# Patient Record
Sex: FEMALE | Race: WHITE | NOT HISPANIC OR LATINO | Employment: UNEMPLOYED | URBAN - METROPOLITAN AREA
[De-identification: names, ages, dates, MRNs, and addresses within clinical notes are randomized per-mention and may not be internally consistent; named-entity substitution may affect disease eponyms.]

---

## 2017-03-11 ENCOUNTER — HOSPITAL ENCOUNTER (EMERGENCY)
Facility: HOSPITAL | Age: 57
Discharge: HOME | End: 2017-03-11
Attending: EMERGENCY MEDICINE
Payer: MEDICAID

## 2017-03-11 VITALS
HEART RATE: 89 BPM | BODY MASS INDEX: 19.29 KG/M2 | SYSTOLIC BLOOD PRESSURE: 115 MMHG | RESPIRATION RATE: 16 BRPM | OXYGEN SATURATION: 95 % | DIASTOLIC BLOOD PRESSURE: 65 MMHG | TEMPERATURE: 98 F | WEIGHT: 120 LBS | HEIGHT: 66 IN

## 2017-03-11 DIAGNOSIS — J20.9 ACUTE BRONCHITIS, UNSPECIFIED ORGANISM: Primary | ICD-10-CM

## 2017-03-11 DIAGNOSIS — R05.9 COUGH: ICD-10-CM

## 2017-03-11 LAB
DEPRECATED S PYO AG THROAT QL EIA: NEGATIVE
FLUAV AG SPEC QL IA: NEGATIVE
FLUBV AG SPEC QL IA: NEGATIVE
SPECIMEN SOURCE: NORMAL

## 2017-03-11 PROCEDURE — 87400 INFLUENZA A/B EACH AG IA: CPT | Mod: 59

## 2017-03-11 PROCEDURE — 87880 STREP A ASSAY W/OPTIC: CPT

## 2017-03-11 PROCEDURE — 99284 EMERGENCY DEPT VISIT MOD MDM: CPT

## 2017-03-11 PROCEDURE — 87081 CULTURE SCREEN ONLY: CPT

## 2017-03-11 RX ORDER — METHOCARBAMOL 750 MG/1
500 TABLET, FILM COATED ORAL 4 TIMES DAILY
COMMUNITY
End: 2017-08-18

## 2017-03-11 RX ORDER — FLUTICASONE PROPIONATE 50 MCG
1 SPRAY, SUSPENSION (ML) NASAL DAILY
Qty: 1 BOTTLE | Refills: 0 | Status: SHIPPED | OUTPATIENT
Start: 2017-03-11 | End: 2019-12-19 | Stop reason: SDUPTHER

## 2017-03-11 RX ORDER — METHYLPREDNISOLONE SOD SUCC 125 MG
125 VIAL (EA) INJECTION
Status: DISCONTINUED | OUTPATIENT
Start: 2017-03-11 | End: 2017-03-11

## 2017-03-11 RX ORDER — METHYLPREDNISOLONE 4 MG/1
TABLET ORAL
Qty: 1 PACKAGE | Refills: 0 | Status: SHIPPED | OUTPATIENT
Start: 2017-03-11 | End: 2017-04-01

## 2017-03-11 RX ORDER — AZITHROMYCIN 250 MG/1
TABLET, FILM COATED ORAL
Qty: 6 TABLET | Refills: 0 | Status: SHIPPED | OUTPATIENT
Start: 2017-03-11 | End: 2017-03-16

## 2017-03-11 RX ORDER — GUAIFENESIN/DEXTROMETHORPHAN 100-10MG/5
5 SYRUP ORAL 4 TIMES DAILY PRN
Qty: 120 ML | Refills: 0 | Status: SHIPPED | OUTPATIENT
Start: 2017-03-11 | End: 2017-03-21

## 2017-03-11 RX ORDER — BENZONATATE 100 MG/1
100 CAPSULE ORAL 3 TIMES DAILY PRN
COMMUNITY
End: 2017-08-18

## 2017-03-11 RX ORDER — ALBUTEROL SULFATE 90 UG/1
2 AEROSOL, METERED RESPIRATORY (INHALATION) EVERY 6 HOURS PRN
Qty: 18 G | Refills: 0 | Status: SHIPPED | OUTPATIENT
Start: 2017-03-11 | End: 2017-08-18 | Stop reason: SDUPTHER

## 2017-03-11 NOTE — ED PROVIDER NOTES
Encounter Date: 3/11/2017       History     Chief Complaint   Patient presents with    Cough     x 4 days     Sinusitis    Generalized Body Aches     Review of patient's allergies indicates:  No Known Allergies  HPI Comments: Patient is a 56 year old female with complain of flu-like symptoms for four days. She reports PMH significant for GERD and depression. She reports productive cough, chills, sore throat and congestion. She smokes 1/2 pack of cigarettes a day. She reports taking johnson seltzer and Nyquil with no significant improvement. She reports no significant improvement. Multiple sick contacts at home. She reports associated diarrhea. She denied fever, abdominal pain, vomiting, headache or neck stiffness.     The history is provided by the patient.     Past Medical History:   Diagnosis Date    Arthritis     Depression     GERD (gastroesophageal reflux disease)      Past Surgical History:   Procedure Laterality Date    TONSILLECTOMY       History reviewed. No pertinent family history.  Social History   Substance Use Topics    Smoking status: Current Every Day Smoker     Packs/day: 1.00    Smokeless tobacco: None    Alcohol use Yes     Review of Systems   Constitutional: Positive for chills. Negative for fever.   HENT: Positive for congestion, sinus pressure and sore throat.    Respiratory: Positive for cough and wheezing. Negative for shortness of breath.    Cardiovascular: Negative for chest pain.   Gastrointestinal: Positive for diarrhea. Negative for abdominal pain, nausea and vomiting.   Genitourinary: Negative for dysuria.   Musculoskeletal: Negative for back pain, neck pain and neck stiffness.   Skin: Negative for rash.   Neurological: Negative for weakness and headaches.   Hematological: Does not bruise/bleed easily.       Physical Exam   Initial Vitals   BP Pulse Resp Temp SpO2   03/11/17 1234 03/11/17 1234 03/11/17 1234 03/11/17 1234 03/11/17 1234   115/65 89 16 98.4 °F (36.9 °C) 95 %      Physical Exam    Nursing note and vitals reviewed.  Constitutional: She appears well-developed and well-nourished. No distress.   HENT:   Head: Normocephalic and atraumatic.   Right Ear: Tympanic membrane, external ear and ear canal normal.   Left Ear: Tympanic membrane, external ear and ear canal normal.   Nose: Rhinorrhea present.   Mouth/Throat: Uvula is midline. Posterior oropharyngeal erythema present.   Cobble stoning and erythema to posterior oropharynx,.    Eyes: Conjunctivae and lids are normal. Pupils are equal, round, and reactive to light. Right eye exhibits no discharge. Left eye exhibits no discharge.   Neck: Normal range of motion and full passive range of motion without pain. Neck supple.   Cardiovascular: Normal rate, regular rhythm and normal heart sounds. Exam reveals no gallop and no friction rub.    No murmur heard.  Pulmonary/Chest: Effort normal. She has no decreased breath sounds. She has wheezes in the right lower field and the left lower field. She has no rhonchi. She has no rales.   Mild expiratory wheezing to bilateral lower lung fields.    Abdominal: Soft. Normal appearance and bowel sounds are normal. There is no tenderness. There is no rigidity, no rebound and no guarding.   Musculoskeletal: Normal range of motion.   Neurological: She is alert.   Skin: Skin is warm and dry.         ED Course   Procedures  Labs Reviewed   THROAT SCREEN, RAPID   CULTURE, STREP A,  THROAT   INFLUENZA A AND B ANTIGEN             Medical Decision Making:   History:   Old Medical Records: I decided to obtain old medical records.  Clinical Tests:   Lab Tests: Ordered  Radiological Study: Ordered       APC / Resident Notes:   This is an emergent evaluation of a 56 year old female. Vital signs are stable. She is afebrile. Wheezing to bilateral lower lobes. Abdomen is soft and nontender.  There is no rebound, rigidity or distention.  I doubt intra-abdominal process. Influenza and strep is negative. CXR shows  no acute changes. Due to wheezing, smoking history and length of symptoms will treat as acute bronchitis with medrol dose pack and azithromycin. Patient is not hypoxic and is stable for discharge. Inhaler as needed. Discussed results with patient. Return precautions given. Patient is to follow up with their primary care provider. Case was discussed with Dr. Monroy who has evaluated the patient and is in agreement with the plan of care. All questions answered.                 ED Course     Clinical Impression:   The primary encounter diagnosis was Acute bronchitis, unspecified organism. A diagnosis of Cough was also pertinent to this visit.    Disposition:   Disposition: Discharged  Condition: Stable       Constance Davila PA-C  03/11/17 5697

## 2017-03-11 NOTE — ED AVS SNAPSHOT
[facility] [address] [place] [address]               Yvette Hutchinson   3/11/2017  1:22 PM   ED    Description:  Female : 1960   Department:  [facility]           Your Care was Coordinated By:     Provider Role From To    Ramone Monroy MD Attending Provider [phone] --    Constance Davila PA-C Physician Assistant [phone] --      Reason for Visit     Cough     Sinusitis     Generalized Body Aches           Diagnoses this Visit        Comments    Acute bronchitis, unspecified organism    -  Primary     Cough           ED Disposition     ED Disposition Condition Comment    Discharge             To Do List           Follow-up Information     Follow up with [facility].    Specialty:  Emergency Medicine    Why:  As needed    Contact information:    [address] [place] [id number] [phone]       These Medications        Disp Refills Start End    methylPREDNISolone (MEDROL DOSEPACK) 4 mg tablet 1 Package 0 3/11/2017 2017    Take as instructed    azithromycin (Z-MARCO ANTONIO) 250 MG tablet 6 tablet 0 3/11/2017 3/16/2017    Take 2 tablets by mouth on day 1; Take 1 tablet by mouth on days 2-5    dextromethorphan-guaifenesin  mg/5 ml (ROBITUSSIN-DM)  mg/5 mL liquid 120 mL 0 3/11/2017 3/21/2017    Take 5 mLs by mouth 4 (four) times daily as needed (cough). - Oral    fluticasone (FLONASE) 50 mcg/actuation nasal spray 1 Bottle 0 3/11/2017     1 spray by Each Nare route once daily. - Each Nare    albuterol 90 mcg/actuation inhaler 18 g 0 3/11/2017 3/11/2018    Inhale 2 puffs into the lungs every 6 (six) hours as needed for Wheezing. Rescue - Inhalation      [facility] On Call     [facility] Nurse Care [facility] -  Assistance  Registered nurses in the [facility] provide clinical advisement, health education, appointment booking, and other advisory  services.  Call for this free service at [phone].             Medications           Message regarding Medications     Verify the changes and/or additions to your medication regime listed below are the same as discussed with your clinician today.  If any of these changes or additions are incorrect, please notify your healthcare provider.        START taking these NEW medications        Refills    methylPREDNISolone (MEDROL DOSEPACK) 4 mg tablet 0    Sig: Take as instructed    Class: Print    azithromycin (Z-MARCO ANTONIO) 250 MG tablet 0    Sig: Take 2 tablets by mouth on day 1; Take 1 tablet by mouth on days 2-5    Class: Print    dextromethorphan-guaifenesin  mg/5 ml (ROBITUSSIN-DM)  mg/5 mL liquid 0    Sig: Take 5 mLs by mouth 4 (four) times daily as needed (cough).    Class: Print    Route: Oral    fluticasone (FLONASE) 50 mcg/actuation nasal spray 0    Si spray by Each Nare route once daily.    Class: Print    Route: Each Nare    albuterol 90 mcg/actuation inhaler 0    Sig: Inhale 2 puffs into the lungs every 6 (six) hours as needed for Wheezing. Rescue    Class: Print    Route: Inhalation      STOP taking these medications     fluoxetine (PROZAC) 20 MG tablet Take 20 mg by mouth once daily.    LANSOPRAZOLE (PREVACID ORAL) Take by mouth.           Verify that the below list of medications is an accurate representation of the medications you are currently taking.  If none reported, the list may be blank. If incorrect, please contact your healthcare provider. Carry this list with you in case of emergency.           Current Medications     benzonatate (TESSALON) 100 MG capsule Take 100 mg by mouth 3 (three) times daily as needed for Cough.    methocarbamol (ROBAXIN) 750 MG Tab Take 500 mg by mouth 4 (four) times daily.    albuterol 90 mcg/actuation inhaler Inhale 2 puffs into the lungs every 6 (six) hours as needed for Wheezing. Rescue    azithromycin (Z-MARCO ANTONIO) 250 MG tablet Take 2 tablets by mouth on  "day 1; Take 1 tablet by mouth on days 2-5    dextromethorphan-guaifenesin  mg/5 ml (ROBITUSSIN-DM)  mg/5 mL liquid Take 5 mLs by mouth 4 (four) times daily as needed (cough).    fluticasone (FLONASE) 50 mcg/actuation nasal spray 1 spray by Each Nare route once daily.    meloxicam (MOBIC) 7.5 MG tablet Take 7.5 mg by mouth once daily.    methylPREDNISolone (MEDROL DOSEPACK) 4 mg tablet Take as instructed           Clinical Reference Information           Your Vitals Were     BP Pulse Temp Resp Height Weight    115/65 89 98.4 °F (36.9 °C) (Oral) 16 5' 6" (1.676 m) 54.4 kg (120 lb)    SpO2 BMI             95% 19.37 kg/m2         Allergies as of 3/11/2017     No Known Allergies      Immunizations Administered on Date of Encounter - 3/11/2017     None      ED Micro, Lab, POCT     Start Ordered       Status Ordering Provider    03/11/17 1343 03/11/17 1342  Influenza antigen Nasopharyngeal Swab  STAT      Final result     03/11/17 1343 03/11/17 1342  Rapid strep screen  STAT      Final result     03/11/17 1342 03/11/17 1342  Strep A culture, throat  Once      In process       ED Imaging Orders     Start Ordered       Status Ordering Provider    03/11/17 1343 03/11/17 1342  X-Ray Chest PA And Lateral  1 time imaging      Final result       Discharge References/Attachments     BRONCHITIS WITH WHEEZING (ADULT) (ENGLISH)      [facility] Sign-Up     Activating your [facility] account is as easy as 1-2-3!     1) Visit [email or web address], select Sign Up Now, enter this activation code and your date of birth, then select Next.  [email or web address]  Expires: 4/25/2017  2:23 PM      2) Create a username and password to use when you visit [facility] in the future and select a security question in case you lose your password and select Next.    3) Enter your e-mail address and click Sign Up!    Additional Information  If you have questions, please e-mail [email or web address] or call [phone] to talk to our [facility] staff. " Remember, [facility] is NOT to be used for urgent needs. For medical emergencies, dial 911.          [facility] complies with applicable Federal civil rights laws and does not discriminate on the basis of race, color, national origin, age, disability, or sex.        Language Assistance Services     ATTENTION: Language assistance services are available, free of charge. Please call [phone].      ATENCIÓN: Si habla español, tiene a moore disposición servicios gratuitos de asistencia lingüística. Llame al [phone].     CHÚ Ý: N?u b?n nói Ti?ng Vi?t, có các d?ch v? h? tr? ngôn ng? mi?n phí dành cho b?n. G?i s? [phone].

## 2017-03-11 NOTE — ED NOTES
Discharge instructions, diagnosis, medications, and follow up discussed with patient. Patient verbalized understanding. All questions and concerns answered. No needs expressed at the time. Pt is awake, alert, and oriented with no acute distress noted. Respirations even and unlabored. Ambulatory out of ed.

## 2017-03-14 LAB — BACTERIA THROAT CULT: NORMAL

## 2017-03-23 ENCOUNTER — HOSPITAL ENCOUNTER (EMERGENCY)
Facility: HOSPITAL | Age: 57
Discharge: HOME | End: 2017-03-23
Attending: EMERGENCY MEDICINE
Payer: MEDICAID

## 2017-03-23 VITALS
TEMPERATURE: 98 F | WEIGHT: 130 LBS | HEIGHT: 63 IN | DIASTOLIC BLOOD PRESSURE: 59 MMHG | BODY MASS INDEX: 23.04 KG/M2 | SYSTOLIC BLOOD PRESSURE: 111 MMHG | OXYGEN SATURATION: 98 % | HEART RATE: 73 BPM | RESPIRATION RATE: 19 BRPM

## 2017-03-23 DIAGNOSIS — R06.2 WHEEZING: ICD-10-CM

## 2017-03-23 DIAGNOSIS — J06.9 VIRAL URI WITH COUGH: Primary | ICD-10-CM

## 2017-03-23 DIAGNOSIS — J20.9 ACUTE BRONCHITIS, UNSPECIFIED ORGANISM: ICD-10-CM

## 2017-03-23 DIAGNOSIS — R06.02 SOB (SHORTNESS OF BREATH): ICD-10-CM

## 2017-03-23 PROCEDURE — 63600175 PHARM REV CODE 636 W HCPCS: Performed by: PHYSICIAN ASSISTANT

## 2017-03-23 PROCEDURE — 96372 THER/PROPH/DIAG INJ SC/IM: CPT

## 2017-03-23 PROCEDURE — 25000242 PHARM REV CODE 250 ALT 637 W/ HCPCS: Performed by: PHYSICIAN ASSISTANT

## 2017-03-23 PROCEDURE — 94640 AIRWAY INHALATION TREATMENT: CPT | Mod: 76

## 2017-03-23 PROCEDURE — 99284 EMERGENCY DEPT VISIT MOD MDM: CPT | Mod: 25

## 2017-03-23 RX ORDER — PREDNISONE 20 MG/1
40 TABLET ORAL DAILY
Qty: 10 TABLET | Refills: 0 | Status: SHIPPED | OUTPATIENT
Start: 2017-03-23 | End: 2017-03-28

## 2017-03-23 RX ORDER — BENZONATATE 100 MG/1
100 CAPSULE ORAL 3 TIMES DAILY PRN
Qty: 20 CAPSULE | Refills: 0 | Status: SHIPPED | OUTPATIENT
Start: 2017-03-23 | End: 2017-04-02

## 2017-03-23 RX ORDER — KETOROLAC TROMETHAMINE 30 MG/ML
15 INJECTION, SOLUTION INTRAMUSCULAR; INTRAVENOUS
Status: COMPLETED | OUTPATIENT
Start: 2017-03-23 | End: 2017-03-23

## 2017-03-23 RX ORDER — ALBUTEROL SULFATE 90 UG/1
1-2 AEROSOL, METERED RESPIRATORY (INHALATION) EVERY 6 HOURS PRN
Qty: 1 INHALER | Refills: 0 | Status: SHIPPED | OUTPATIENT
Start: 2017-03-23 | End: 2019-12-19 | Stop reason: SDUPTHER

## 2017-03-23 RX ORDER — IPRATROPIUM BROMIDE AND ALBUTEROL SULFATE 2.5; .5 MG/3ML; MG/3ML
3 SOLUTION RESPIRATORY (INHALATION)
Status: DISPENSED | OUTPATIENT
Start: 2017-03-23 | End: 2017-03-23

## 2017-03-23 RX ORDER — PREDNISONE 20 MG/1
60 TABLET ORAL
Status: COMPLETED | OUTPATIENT
Start: 2017-03-23 | End: 2017-03-23

## 2017-03-23 RX ADMIN — PREDNISONE 60 MG: 20 TABLET ORAL at 02:03

## 2017-03-23 RX ADMIN — IPRATROPIUM BROMIDE AND ALBUTEROL SULFATE 3 ML: .5; 3 SOLUTION RESPIRATORY (INHALATION) at 02:03

## 2017-03-23 RX ADMIN — KETOROLAC TROMETHAMINE 15 MG: 30 INJECTION, SOLUTION INTRAMUSCULAR at 03:03

## 2017-03-23 NOTE — ED AVS SNAPSHOT
[facility] [address] [place] [address]               Yvette Hutchinson   3/23/2017  2:06 PM   ED    Description:  Female : 1960   Department:  [facility]           Your Care was Coordinated By:     Provider Role From To    Shawn Deleon MD Attending Provider [phone] --    Cj Jarrell PA-C Physician Assistant 17 [phone] --      Reason for Visit     Sore Throat           Diagnoses this Visit        Comments    Viral URI with cough    -  Primary     Wheezing         SOB (shortness of breath)         Acute bronchitis, unspecified organism           ED Disposition     None           To Do List           Follow-up Information     Follow up with [facility].    Specialty:  Emergency Medicine    Why:  If symptoms worsen    Contact information:    [address] [place] [id number] [phone]       These Medications        Disp Refills Start End    predniSONE (DELTASONE) 20 MG tablet 10 tablet 0 3/23/2017 3/28/2017    Take 2 tablets (40 mg total) by mouth once daily. - Oral    benzonatate (TESSALON) 100 MG capsule 20 capsule 0 3/23/2017 2017    Take 1 capsule (100 mg total) by mouth 3 (three) times daily as needed for Cough. - Oral    albuterol 90 mcg/actuation inhaler 1 Inhaler 0 3/23/2017 3/23/2018    Inhale 1-2 puffs into the lungs every 6 (six) hours as needed for Wheezing. Rescue - Inhalation      [facility] On Call     [facility] On Call Nurse Care Line -  Assistance  Registered nurses in the [facility] provide clinical advisement, health education, appointment booking, and other advisory services.  Call for this free service at [phone].             Medications           Message regarding Medications     Verify the changes and/or additions to your medication regime listed below are the same as discussed with your clinician today.  If any of these  changes or additions are incorrect, please notify your healthcare provider.        START taking these NEW medications        Refills    predniSONE (DELTASONE) 20 MG tablet 0    Sig: Take 2 tablets (40 mg total) by mouth once daily.    Class: Print    Route: Oral    benzonatate (TESSALON) 100 MG capsule 0    Sig: Take 1 capsule (100 mg total) by mouth 3 (three) times daily as needed for Cough.    Class: Print    Route: Oral    albuterol 90 mcg/actuation inhaler 0    Sig: Inhale 1-2 puffs into the lungs every 6 (six) hours as needed for Wheezing. Rescue    Class: Print    Route: Inhalation      These medications were administered today        Dose Freq    predniSONE tablet 60 mg 60 mg ED 1 Time    Sig: Take 3 tablets (60 mg total) by mouth ED 1 Time.    Class: Normal    Route: Oral    Cosign for Ordering: Required by Shawn Deleon MD    albuterol-ipratropium 2.5mg-0.5mg/3mL nebulizer solution 3 mL 3 mL Every 5 min    Sig: Take 3 mLs by nebulization every 5 (five) minutes.    Class: Normal    Route: Nebulization    Cosign for Ordering: Required by Shawn Deleon MD    ketorolac injection 15 mg 15 mg ED 1 Time    Sig: Inject 15 mg into the muscle ED 1 Time.    Class: Normal    Route: Intramuscular    Cosign for Ordering: Required by Shawn Deleon MD           Verify that the below list of medications is an accurate representation of the medications you are currently taking.  If none reported, the list may be blank. If incorrect, please contact your healthcare provider. Carry this list with you in case of emergency.           Current Medications     albuterol 90 mcg/actuation inhaler Inhale 2 puffs into the lungs every 6 (six) hours as needed for Wheezing. Rescue    benzonatate (TESSALON) 100 MG capsule Take 100 mg by mouth 3 (three) times daily as needed for Cough.    fluticasone (FLONASE) 50 mcg/actuation nasal spray 1 spray by Each Nare route once daily.    methocarbamol (ROBAXIN) 750 MG Tab Take 500 mg by mouth  "4 (four) times daily.    methylPREDNISolone (MEDROL DOSEPACK) 4 mg tablet Take as instructed    albuterol 90 mcg/actuation inhaler Inhale 1-2 puffs into the lungs every 6 (six) hours as needed for Wheezing. Rescue    benzonatate (TESSALON) 100 MG capsule Take 1 capsule (100 mg total) by mouth 3 (three) times daily as needed for Cough.    ketorolac injection 15 mg Inject 15 mg into the muscle ED 1 Time.    meloxicam (MOBIC) 7.5 MG tablet Take 7.5 mg by mouth once daily.    predniSONE (DELTASONE) 20 MG tablet Take 2 tablets (40 mg total) by mouth once daily.           Clinical Reference Information           Your Vitals Were     BP Pulse Temp Resp Height Weight    111/59 (BP Location: Left arm, Patient Position: Sitting) 73 98.3 °F (36.8 °C) (Oral) 19 5' 3" (1.6 m) 59 kg (130 lb)    SpO2 BMI             98% 23.03 kg/m2         Allergies as of 3/23/2017     No Known Allergies      Immunizations Administered on Date of Encounter - 3/23/2017     None      ED Micro, Lab, POCT     None      ED Imaging Orders     Start Ordered       Status Ordering Provider    03/23/17 1423 03/23/17 1423  X-Ray Chest PA And Lateral  1 time imaging      Final result         Discharge Instructions           Bronchitis with Wheezing (Viral or Bacterial: Adult)    Bronchitis is an infection of the air passages. It often occurs during a cold and is usually caused by a virus. Symptoms include cough with mucus (phlegm) and low-grade fever. This illness is contagious during the first few days and is spread through the air by coughing and sneezing, or by direct contact (touching the sick person and then touching your own eyes, nose, or mouth).  If there is a lot of inflammation, air flow is restricted. The air passages may also go into spasm, especially if you have asthma. This causes wheezing and difficulty breathing even in people who do not have asthma.  Bronchitis usually lasts 7 to 14 days. The wheezing should improve with treatment during the " first week. An inhaler is often prescribed to relax the air passages and stop wheezing. Antibiotics will be prescribed if your doctor thinks there is also a secondary bacterial infection.  Home care  · If symptoms are severe, rest at home for the first 2 to 3 days. When you go back to your usual activities, don't let yourself get too tired.  · Do not smoke. Also avoid being exposed to secondhand smoke.  · You may use over-the-counter medicine to control fever or pain, unless another medicine was prescribed. Note: If you have chronic liver or kidney disease or have ever had a stomach ulcer or gastrointestinal bleeding, talk with your healthcare provider before using these medicines. Also talk to your provider if you are taking medicine to prevent blood clots.) Aspirin should never be given to anyone younger than 18 years of age who is ill with a viral infection or fever. It may cause severe liver or brain damage.  · Your appetite may be poor, so a light diet is fine. Avoid dehydration by drinking 6 to 8 glasses of fluids per day (such as water, soft drinks, sports drinks, juices, tea, or soup). Extra fluids will help loosen secretions in the nose and lungs.  · Over-the-counter cough, cold, and sore-throat medicines will not shorten the length of the illness, but they may be helpful to reduce symptoms. (Note: Do not use decongestants if you have high blood pressure.)  · If you were given an inhaler, use it exactly as directed. If you need to use it more often than prescribed, your condition may be worsening. If this happens, contact your healthcare provider.  · If prescribed, finish all antibiotic medicine, even if you are feeling better after only a few days.  Follow-up care  Follow up with your healthcare provider, or as advised. If you had an X-ray or ECG (electrocardiogram), a specialist will review it. You will be notified of any new findings that may affect your care.  Note: If you are age 65 or older, or if you  have a chronic lung disease or condition that affects your immune system, or you smoke, talk to your healthcare provider about having a pneumococcal vaccinations and a yearly influenza vaccination (flu shot).  When to seek medical advice  Call your healthcare provider right away if any of these occur:  · Fever of 100.4°F (38°C) or higher  · Coughing up increasing amounts of colored sputum  · Weakness, drowsiness, headache, facial pain, ear pain, or a stiff neck  Call 911, or get immediate medical care  Contact emergency services right away if any of these occur.  · Coughing up blood  · Worsening weakness, drowsiness, headache, or stiff neck  · Increased wheezing not helped with medication, shortness of breath, or pain with breathing  Date Last Reviewed: 9/13/2015 © 2000-2016 [employer] [address] [place] [address]. All rights reserved. This information is not intended as a substitute for professional medical care. Always follow your healthcare professional's instructions.                Discharge References/Attachments     URI, VIRAL, NO ABX (ADULT) (ENGLISH)      [facility] Sign-Up     Activating your [facility] account is as easy as 1-2-3!     1) Visit [email or web address], select Sign Up Now, enter this activation code and your date of birth, then select Next.  [email or web address]  Expires: 4/25/2017  3:23 PM      2) Create a username and password to use when you visit [facility] in the future and select a security question in case you lose your password and select Next.    3) Enter your e-mail address and click Sign Up!    Additional Information  If you have questions, please e-mail [email or web address] or call [phone] to talk to our [facility] staff. Remember, [facility] is NOT to be used for urgent needs. For medical emergencies, dial 911.          [facility] complies with applicable Federal civil rights laws and does not discriminate on the basis of race, color,  national origin, age, disability, or sex.        Language Assistance Services     ATTENTION: Language assistance services are available, free of charge. Please call [phone].      ATENCIÓN: Si habla maisha, tiene a moore disposición servicios gratuitos de asistencia lingüística. Llame al [phone].     CHÚ Ý: N?u b?n nói Ti?ng Vi?t, có các d?ch v? h? tr? ngôn ng? mi?n phí dành cho b?n. G?i s? [phone].

## 2017-03-23 NOTE — ED NOTES
C/o sore throat, all over body aches with headache, moist cough, nausea, and abdominal cramping with occasional diarrhea. States that she is not able to sleep well due to the coughing and is not sure if she has had fever because she does not have a thermometer but has felt chills and hot at times. Has been using her inhaler 5 times per day and finished antibiotics that were prescribed to her. States that symptoms have been present for the past 2 weeks. Lung sounds wheezes thorughout. Moist cough noted. Alert calm family at bedside aware to notify nurse of needs or concerns.

## 2017-03-23 NOTE — ED NOTES
Given written and verbal DC instructions questions answered per MD aware to follow up with PCP encouraged to return if needed. Given RX with teaching. Aware to wait for shot time before DC

## 2017-03-23 NOTE — DISCHARGE INSTRUCTIONS
Bronchitis with Wheezing (Viral or Bacterial: Adult)    Bronchitis is an infection of the air passages. It often occurs during a cold and is usually caused by a virus. Symptoms include cough with mucus (phlegm) and low-grade fever. This illness is contagious during the first few days and is spread through the air by coughing and sneezing, or by direct contact (touching the sick person and then touching your own eyes, nose, or mouth).  If there is a lot of inflammation, air flow is restricted. The air passages may also go into spasm, especially if you have asthma. This causes wheezing and difficulty breathing even in people who do not have asthma.  Bronchitis usually lasts 7 to 14 days. The wheezing should improve with treatment during the first week. An inhaler is often prescribed to relax the air passages and stop wheezing. Antibiotics will be prescribed if your doctor thinks there is also a secondary bacterial infection.  Home care  · If symptoms are severe, rest at home for the first 2 to 3 days. When you go back to your usual activities, don't let yourself get too tired.  · Do not smoke. Also avoid being exposed to secondhand smoke.  · You may use over-the-counter medicine to control fever or pain, unless another medicine was prescribed. Note: If you have chronic liver or kidney disease or have ever had a stomach ulcer or gastrointestinal bleeding, talk with your healthcare provider before using these medicines. Also talk to your provider if you are taking medicine to prevent blood clots.) Aspirin should never be given to anyone younger than 18 years of age who is ill with a viral infection or fever. It may cause severe liver or brain damage.  · Your appetite may be poor, so a light diet is fine. Avoid dehydration by drinking 6 to 8 glasses of fluids per day (such as water, soft drinks, sports drinks, juices, tea, or soup). Extra fluids will help loosen secretions in the nose and lungs.  · Over-the-counter  cough, cold, and sore-throat medicines will not shorten the length of the illness, but they may be helpful to reduce symptoms. (Note: Do not use decongestants if you have high blood pressure.)  · If you were given an inhaler, use it exactly as directed. If you need to use it more often than prescribed, your condition may be worsening. If this happens, contact your healthcare provider.  · If prescribed, finish all antibiotic medicine, even if you are feeling better after only a few days.  Follow-up care  Follow up with your healthcare provider, or as advised. If you had an X-ray or ECG (electrocardiogram), a specialist will review it. You will be notified of any new findings that may affect your care.  Note: If you are age 65 or older, or if you have a chronic lung disease or condition that affects your immune system, or you smoke, talk to your healthcare provider about having a pneumococcal vaccinations and a yearly influenza vaccination (flu shot).  When to seek medical advice  Call your healthcare provider right away if any of these occur:  · Fever of 100.4°F (38°C) or higher  · Coughing up increasing amounts of colored sputum  · Weakness, drowsiness, headache, facial pain, ear pain, or a stiff neck  Call 911, or get immediate medical care  Contact emergency services right away if any of these occur.  · Coughing up blood  · Worsening weakness, drowsiness, headache, or stiff neck  · Increased wheezing not helped with medication, shortness of breath, or pain with breathing  Date Last Reviewed: 9/13/2015  © 6636-1264 [employer]. [address] [place] [address], [place] [address]. All rights reserved. This information is not intended as a substitute for professional medical care. Always follow your healthcare professional's instructions.

## 2017-03-23 NOTE — ED PROVIDER NOTES
Encounter Date: 3/23/2017       History     Chief Complaint   Patient presents with    Sore Throat     Sore throat, chest congestion.  Onset approx. 2 weeks ago.  Worsening over the past week.       Review of patient's allergies indicates:  No Known Allergies  HPI Comments: This 56-year-old white female with significant past medical history of GERD, arthritis, depression, tobacco abuse the presents the ED for the second time in 2 weeks with complaint of subjective fever, rhinorrhea, congestion, sore throat, cough, shortness of breath, wheezing, and generalized body aches.  Patient was seen in this ED roughly 2 weeks ago and was diagnosed with acute bronchitis which was treated with azithromycin, a Medrol Dosepak, Tessalon Perles, and Flonase.  The patient states her symptoms resolved completely for several days but returned today's ago.  She admits to taking the Flonase, Tessalon, and albuterol for her symptoms which has helped temporarily.  Since he did symptoms include nausea without vomiting and diarrhea.  She denies any known sick contacts.  No other complaints at this time.    The history is provided by the patient.     Past Medical History:   Diagnosis Date    Arthritis     Depression     GERD (gastroesophageal reflux disease)      Past Surgical History:   Procedure Laterality Date    TONSILLECTOMY       No family history on file.  Social History   Substance Use Topics    Smoking status: Current Every Day Smoker     Packs/day: 1.00    Smokeless tobacco: Not on file    Alcohol use Yes     Review of Systems   Constitutional: Positive for chills. Negative for diaphoresis and fever.   HENT: Positive for congestion, postnasal drip, rhinorrhea and sore throat. Negative for ear pain.    Eyes: Negative for discharge and redness.   Respiratory: Positive for cough, shortness of breath and wheezing.    Cardiovascular: Negative for chest pain and palpitations.   Gastrointestinal: Positive for diarrhea and nausea.  Negative for abdominal distention, abdominal pain and vomiting.   Musculoskeletal: Positive for myalgias.   Skin: Negative for color change and pallor.   Neurological: Negative for dizziness, light-headedness and headaches.   Psychiatric/Behavioral: Negative for agitation. The patient is not nervous/anxious.        Physical Exam   Initial Vitals   BP Pulse Resp Temp SpO2   03/23/17 1404 03/23/17 1404 03/23/17 1404 03/23/17 1404 03/23/17 1404   111/59 79 20 98.3 °F (36.8 °C) 95 %     Physical Exam    Constitutional: She appears well-developed and well-nourished.   HENT:   Head: Normocephalic and atraumatic.   Right Ear: Hearing, tympanic membrane, external ear and ear canal normal.   Left Ear: Hearing, tympanic membrane, external ear and ear canal normal.   Nose: Nose normal.   Mouth/Throat: Uvula is midline, oropharynx is clear and moist and mucous membranes are normal. No oropharyngeal exudate, posterior oropharyngeal edema, posterior oropharyngeal erythema or tonsillar abscesses.   Eyes: EOM are normal. Pupils are equal, round, and reactive to light.   Neck: Normal range of motion. Neck supple.   Cardiovascular: Normal rate, regular rhythm and normal heart sounds.   Pulmonary/Chest: No respiratory distress. She has wheezes. She exhibits no tenderness.   Abdominal: Soft. Bowel sounds are normal. She exhibits no distension. There is no tenderness.   Musculoskeletal: Normal range of motion.   Neurological: She is alert and oriented to person, place, and time.   Skin: Skin is warm and dry.   Psychiatric: She has a normal mood and affect. Thought content normal.         ED Course   Procedures  Labs Reviewed - No data to display          Medical Decision Making:   ED Management:  This is a 56-year-old white female with significant past medical history of GERD, arthritis, depression, tobacco abuse the presents the ED for the second time in 2 weeks with complaint of subjective fever, rhinorrhea, congestion, sore throat,  cough, shortness of breath, wheezing, and generalized body aches.  Just 2 weeks ago the patient was treated for acute bronchitis with azithromycin, Medrol Dosepak, Flonase, and Tessalon Perles along with albuterol inhaler.  She states her symptoms resolved several days but returned today's ago.  On arrival the patient is afebrile and nontoxic-appearing.  Pertinent physical exam findings include an unremarkable HEENT exam.  Heart was regular rate and rhythm.  There was diffuse but mild expiratory wheezing heard in all fields bilaterally.  The patient was using no accessory muscles for breathing and there were no retractions.  The patient's abdomen was soft, nontender with bowel sounds in all quadrants.  Rest of physical exam is unremarkable.  The patient was given a by mouth dose of prednisone and DuoNeb ×3 in the ED and her breathing improved.  Chest x-ray is unremarkable.  I believe this patient likely has a viral URI with cough along with viral bronchitis.  I do not believe that antibiotics are warranted at this time.  The patient will be symptomatically treated with prednisone, Tessalon, and new albuterol inhaler.  I have extremely low suspicion for any bacterial etiology such as strep pharyngitis, acute sinusitis, bacterial bronchitis, or pneumonia.  I do not suspect PE.  The patient has been instructed to follow with her PCP in the next 2-3 days or return to the ED for worsening symptoms.  She verbalized understanding was agreeable to treatment plan.  I discussed this case my attending physician the patient's record was reviewed.              Attending Attestation:     Physician Attestation Statement for NP/PA:   I discussed this assessment and plan of this patient with the NP/PA, but I did not personally examine the patient. The face to face encounter was performed by the NP/PA.                  ED Course     Clinical Impression:   The primary encounter diagnosis was Viral URI with cough. Diagnoses of  Wheezing, SOB (shortness of breath), and Acute bronchitis, unspecified organism were also pertinent to this visit.          Cj Jarrell PA-C  03/23/17 6245       Shawn Deleon MD  03/31/17 7134

## 2017-07-07 LAB — CRC RECOMMENDATION EXT: NORMAL

## 2017-08-18 ENCOUNTER — HOSPITAL ENCOUNTER (OUTPATIENT)
Facility: HOSPITAL | Age: 57
Discharge: HOME | End: 2017-08-19
Attending: EMERGENCY MEDICINE | Admitting: HOSPITALIST
Payer: MEDICAID

## 2017-08-18 DIAGNOSIS — R07.9 CHEST PAIN, UNSPECIFIED: Primary | ICD-10-CM

## 2017-08-18 DIAGNOSIS — R07.9 CHEST PAIN: ICD-10-CM

## 2017-08-18 PROBLEM — Z72.0 TOBACCO ABUSE: Status: ACTIVE | Noted: 2017-08-18

## 2017-08-18 LAB
ALBUMIN SERPL BCP-MCNC: 3.9 G/DL
ALP SERPL-CCNC: 111 U/L
ALT SERPL W/O P-5'-P-CCNC: 13 U/L
ANION GAP SERPL CALC-SCNC: 10 MMOL/L
AST SERPL-CCNC: 19 U/L
BASOPHILS # BLD AUTO: ABNORMAL K/UL
BASOPHILS NFR BLD: 0 %
BILIRUB SERPL-MCNC: 0.3 MG/DL
BUN SERPL-MCNC: 13 MG/DL
CALCIUM SERPL-MCNC: 9.6 MG/DL
CHLORIDE SERPL-SCNC: 107 MMOL/L
CO2 SERPL-SCNC: 24 MMOL/L
CREAT SERPL-MCNC: 0.8 MG/DL
DIFFERENTIAL METHOD: ABNORMAL
EOSINOPHIL # BLD AUTO: ABNORMAL K/UL
EOSINOPHIL NFR BLD: 4 %
ERYTHROCYTE [DISTWIDTH] IN BLOOD BY AUTOMATED COUNT: 13.9 %
EST. GFR  (AFRICAN AMERICAN): >60 ML/MIN/1.73 M^2
EST. GFR  (NON AFRICAN AMERICAN): >60 ML/MIN/1.73 M^2
GLUCOSE SERPL-MCNC: 84 MG/DL
HCT VFR BLD AUTO: 41.6 %
HGB BLD-MCNC: 13.9 G/DL
LYMPHOCYTES # BLD AUTO: ABNORMAL K/UL
LYMPHOCYTES NFR BLD: 39 %
MCH RBC QN AUTO: 31.1 PG
MCHC RBC AUTO-ENTMCNC: 33.5 G/DL
MCV RBC AUTO: 93 FL
MONOCYTES # BLD AUTO: ABNORMAL K/UL
MONOCYTES NFR BLD: 10 %
NEUTROPHILS NFR BLD: 47 %
PLATELET # BLD AUTO: 263 K/UL
PLATELET BLD QL SMEAR: ABNORMAL
PMV BLD AUTO: 9.1 FL
POTASSIUM SERPL-SCNC: 3.9 MMOL/L
PROT SERPL-MCNC: 6.8 G/DL
RBC # BLD AUTO: 4.48 M/UL
SMUDGE CELLS BLD QL SMEAR: PRESENT
SODIUM SERPL-SCNC: 141 MMOL/L
TROPONIN I SERPL DL<=0.01 NG/ML-MCNC: <0.006 NG/ML
WBC # BLD AUTO: 14.3 K/UL

## 2017-08-18 PROCEDURE — 93010 ELECTROCARDIOGRAM REPORT: CPT | Mod: ,,, | Performed by: INTERNAL MEDICINE

## 2017-08-18 PROCEDURE — 85007 BL SMEAR W/DIFF WBC COUNT: CPT

## 2017-08-18 PROCEDURE — 84484 ASSAY OF TROPONIN QUANT: CPT

## 2017-08-18 PROCEDURE — 25000003 PHARM REV CODE 250: Performed by: EMERGENCY MEDICINE

## 2017-08-18 PROCEDURE — 25000003 PHARM REV CODE 250: Performed by: NURSE PRACTITIONER

## 2017-08-18 PROCEDURE — 85027 COMPLETE CBC AUTOMATED: CPT

## 2017-08-18 PROCEDURE — G0378 HOSPITAL OBSERVATION PER HR: HCPCS

## 2017-08-18 PROCEDURE — 80053 COMPREHEN METABOLIC PANEL: CPT

## 2017-08-18 PROCEDURE — 99284 EMERGENCY DEPT VISIT MOD MDM: CPT | Mod: 25

## 2017-08-18 PROCEDURE — 63600175 PHARM REV CODE 636 W HCPCS: Performed by: EMERGENCY MEDICINE

## 2017-08-18 PROCEDURE — 93005 ELECTROCARDIOGRAM TRACING: CPT

## 2017-08-18 PROCEDURE — 96374 THER/PROPH/DIAG INJ IV PUSH: CPT

## 2017-08-18 RX ORDER — NITROGLYCERIN 0.4 MG/1
0.4 TABLET SUBLINGUAL EVERY 5 MIN PRN
Status: DISCONTINUED | OUTPATIENT
Start: 2017-08-18 | End: 2017-08-19 | Stop reason: HOSPADM

## 2017-08-18 RX ORDER — MORPHINE SULFATE 4 MG/ML
4 INJECTION, SOLUTION INTRAMUSCULAR; INTRAVENOUS
Status: COMPLETED | OUTPATIENT
Start: 2017-08-18 | End: 2017-08-18

## 2017-08-18 RX ORDER — ASPIRIN 325 MG
325 TABLET ORAL DAILY
Status: DISCONTINUED | OUTPATIENT
Start: 2017-08-19 | End: 2017-08-19 | Stop reason: HOSPADM

## 2017-08-18 RX ORDER — MAG HYDROX/ALUMINUM HYD/SIMETH 200-200-20
30 SUSPENSION, ORAL (FINAL DOSE FORM) ORAL EVERY 6 HOURS PRN
Status: DISCONTINUED | OUTPATIENT
Start: 2017-08-18 | End: 2017-08-19 | Stop reason: HOSPADM

## 2017-08-18 RX ORDER — ACETAMINOPHEN 325 MG/1
650 TABLET ORAL EVERY 6 HOURS PRN
Status: DISCONTINUED | OUTPATIENT
Start: 2017-08-18 | End: 2017-08-19 | Stop reason: HOSPADM

## 2017-08-18 RX ORDER — ASPIRIN 325 MG
325 TABLET ORAL
Status: COMPLETED | OUTPATIENT
Start: 2017-08-18 | End: 2017-08-18

## 2017-08-18 RX ORDER — IBUPROFEN 200 MG
1 TABLET ORAL
COMMUNITY
End: 2020-03-02

## 2017-08-18 RX ORDER — ONDANSETRON 2 MG/ML
4 INJECTION INTRAMUSCULAR; INTRAVENOUS EVERY 6 HOURS PRN
Status: DISCONTINUED | OUTPATIENT
Start: 2017-08-18 | End: 2017-08-19 | Stop reason: HOSPADM

## 2017-08-18 RX ORDER — SODIUM CHLORIDE 9 MG/ML
INJECTION, SOLUTION INTRAVENOUS CONTINUOUS
Status: DISCONTINUED | OUTPATIENT
Start: 2017-08-19 | End: 2017-08-19 | Stop reason: HOSPADM

## 2017-08-18 RX ADMIN — ASPIRIN 325 MG ORAL TABLET 325 MG: 325 PILL ORAL at 06:08

## 2017-08-18 RX ADMIN — ACETAMINOPHEN 650 MG: 325 TABLET, FILM COATED ORAL at 11:08

## 2017-08-18 RX ADMIN — ALUMINUM HYDROXIDE, MAGNESIUM HYDROXIDE, AND SIMETHICONE 30 ML: 200; 200; 20 SUSPENSION ORAL at 11:08

## 2017-08-18 RX ADMIN — SODIUM CHLORIDE: 0.9 INJECTION, SOLUTION INTRAVENOUS at 11:08

## 2017-08-18 RX ADMIN — MORPHINE SULFATE 4 MG: 4 INJECTION INTRAVENOUS at 06:08

## 2017-08-18 NOTE — ED PROVIDER NOTES
"Encounter Date: 8/18/2017    SCRIBE #1 NOTE: I, Ely Miller , am scribing for, and in the presence of,  Dr. Aiken . I have scribed the entire note.       History     Chief Complaint   Patient presents with    Chest Pain     "heaviness" off and on since yesterday with sob.         08/18/2017  6:03 PM     Chief Complaint: Chest pain       The patient is a 57 y.o. female who is presenting with gradual onset of CP that began x 1 day ago and has since resolved. The pt reports that the pain is intermittent, feels like "pressure across" chest, and radiates to the middle of her back. She notes that laying down exacerbates her pain, and denies any mitigating factors. No ripping or tearing pain.She endorses associated intermittent sweating, productive cough, SOB, as well as posterior R sided leg pain x 4 days. No recent fevers, diarrhea, emesis, nausea. No pertinent past surgical hx. Pertinent PMHx includes GERD. She endorses tobacco abuse.         The history is provided by the patient and medical records.     Review of patient's allergies indicates:  No Known Allergies  Past Medical History:   Diagnosis Date    Arthritis     Depression     GERD (gastroesophageal reflux disease)      Past Surgical History:   Procedure Laterality Date    TONSILLECTOMY       History reviewed. No pertinent family history.  Social History   Substance Use Topics    Smoking status: Current Every Day Smoker     Packs/day: 1.00    Smokeless tobacco: Never Used    Alcohol use Yes     Review of Systems   Constitutional: Positive for diaphoresis (intermittent). Negative for fever.   HENT: Negative for sore throat.    Respiratory: Positive for cough and shortness of breath.    Cardiovascular: Positive for chest pain.   Gastrointestinal: Negative for nausea.   Musculoskeletal: Positive for arthralgias, back pain and myalgias.   Skin: Negative for rash.   Neurological: Negative for weakness.   Hematological: Does not bruise/bleed easily. "   Psychiatric/Behavioral: Negative for confusion.   All other systems reviewed and are negative.      Physical Exam     Initial Vitals [08/18/17 1647]   BP Pulse Resp Temp SpO2   118/62 70 20 98.3 °F (36.8 °C) 95 %      MAP       80.67         Physical Exam    Nursing note and vitals reviewed.  Constitutional: She appears well-developed and well-nourished. She is not diaphoretic. No distress.   HENT:   Head: Normocephalic and atraumatic.   Mouth/Throat: Oropharynx is clear and moist.   Eyes: EOM are normal.   Neck: Normal range of motion. Neck supple.   Cardiovascular: Normal rate, regular rhythm, normal heart sounds and intact distal pulses.   Equal and brisk pulses bilateral radial arteries and bilateral dorsal pedal arteries   Pulmonary/Chest: Breath sounds normal. She has no wheezes. She has no rhonchi. She has no rales. She exhibits no tenderness.   Abdominal: Soft. There is no tenderness.   Musculoskeletal:   Unable to completely flex the right knee    No joint effusion   Lymphadenopathy:     She has no cervical adenopathy.   Neurological: She is alert and oriented to person, place, and time. She has normal strength. No sensory deficit.   There is no numbness to the bilateral upper or lower extremities   Skin: Skin is warm and dry. Capillary refill takes less than 2 seconds.         ED Course   Procedures  Labs Reviewed   CBC W/ AUTO DIFFERENTIAL - Abnormal; Notable for the following:        Result Value    WBC 14.30 (*)     MCH 31.1 (*)     MPV 9.1 (*)     All other components within normal limits   COMPREHENSIVE METABOLIC PANEL   TROPONIN I          X-Rays:   Independently Interpreted Readings:   Chest X-Ray: No acute disease seen, no consolidation, atelectasis or PTX.       Medical Decision Making:   Clinical Tests:   Lab Tests: Ordered and Reviewed  Radiological Study: Ordered and Reviewed  Medical Tests: Ordered and Reviewed            Scribe Attestation:   Scribe #1: I performed the above scribed service  and the documentation accurately describes the services I performed. I attest to the accuracy of the note.    Attending Attestation:           Physician Attestation for Scribe:  Physician Attestation Statement for Scribe #1: I, Dr. Aiken , reviewed documentation, as scribed by Ely Miller  in my presence, and it is both accurate and complete.                 ED Course   Comment By Time   Sinus rhythm 68 bpm no ST elevation or depression.  No T-wave inversion. Dipak Aiken MD 08/18 1800   57-year-old female with high cholesterol, smoker presents with intermittent episodes of substernal chest pressure for 2 days.  This radiates to her back but there is no ripping or tearing pain and no hypertension which would raise suspicion for an aortic dissection.  Description would not be consistent with a pulmonary embolus.  Initial troponin is negative.  Patient has no family history of early onset CAD but I think given her age for high cholesterol and smoking history she should be placed in the observation unit overnight.  [facility] medicine will admit the patient.  No chest pain in the emergency room.  She does complain of pain radiating down her right leg from her right lower back consistent with sciatica.  Normal pulse and normal sensation in the right lower extremity.  Also complaining of some right knee pain but there is no joint effusion or erythema or calor.   Dipak Aiken MD 08/18 2011     Clinical Impression:   The encounter diagnosis was Chest pain, unspecified.                           Dipak Aiken MD  08/19/17 0002

## 2017-08-18 NOTE — ED NOTES
"Pt aaox4, resp even and unlabored, skin pink warm and dry. Pt reports chest pain that woke her up from sleeping two nights ago. Also reports coughing, pt does not feel it's her heart. She thinks it is her bronchitis "acting up." reports right leg pain x several month that starts at her knee and radiates to thigh. Nadn. Safety maintained  "

## 2017-08-19 VITALS
TEMPERATURE: 99 F | OXYGEN SATURATION: 99 % | HEART RATE: 55 BPM | SYSTOLIC BLOOD PRESSURE: 92 MMHG | DIASTOLIC BLOOD PRESSURE: 58 MMHG | WEIGHT: 150 LBS | HEIGHT: 63 IN | BODY MASS INDEX: 26.58 KG/M2 | RESPIRATION RATE: 18 BRPM

## 2017-08-19 PROBLEM — D72.829 LEUKOCYTOSIS: Status: ACTIVE | Noted: 2017-08-19

## 2017-08-19 LAB
BILIRUB UR QL STRIP: NEGATIVE
CHOLEST/HDLC SERPL: 5 {RATIO}
CLARITY UR: CLEAR
COLOR UR: YELLOW
DIASTOLIC DYSFUNCTION: NO
ESTIMATED PA SYSTOLIC PRESSURE: 20.79
GLUCOSE UR QL STRIP: NEGATIVE
HDL/CHOLESTEROL RATIO: 20 %
HDLC SERPL-MCNC: 175 MG/DL
HDLC SERPL-MCNC: 35 MG/DL
HGB UR QL STRIP: ABNORMAL
KETONES UR QL STRIP: NEGATIVE
LDLC SERPL CALC-MCNC: 124.4 MG/DL
LEUKOCYTE ESTERASE UR QL STRIP: NEGATIVE
MITRAL VALVE REGURGITATION: NORMAL
NITRITE UR QL STRIP: NEGATIVE
NONHDLC SERPL-MCNC: 140 MG/DL
PH UR STRIP: 6 [PH] (ref 5–8)
PROT UR QL STRIP: NEGATIVE
RETIRED EF AND QEF - SEE NOTES: 55 (ref 55–65)
SP GR UR STRIP: 1.01 (ref 1–1.03)
TRICUSPID VALVE REGURGITATION: NORMAL
TRIGL SERPL-MCNC: 78 MG/DL
TROPONIN I SERPL DL<=0.01 NG/ML-MCNC: 0.01 NG/ML
TROPONIN I SERPL DL<=0.01 NG/ML-MCNC: <0.006 NG/ML
URN SPEC COLLECT METH UR: ABNORMAL
UROBILINOGEN UR STRIP-ACNC: NEGATIVE EU/DL

## 2017-08-19 PROCEDURE — 96361 HYDRATE IV INFUSION ADD-ON: CPT

## 2017-08-19 PROCEDURE — 63600175 PHARM REV CODE 636 W HCPCS: Performed by: NURSE PRACTITIONER

## 2017-08-19 PROCEDURE — G0378 HOSPITAL OBSERVATION PER HR: HCPCS

## 2017-08-19 PROCEDURE — S4991 NICOTINE PATCH NONLEGEND: HCPCS | Performed by: NURSE PRACTITIONER

## 2017-08-19 PROCEDURE — 96360 HYDRATION IV INFUSION INIT: CPT

## 2017-08-19 PROCEDURE — 36415 COLL VENOUS BLD VENIPUNCTURE: CPT

## 2017-08-19 PROCEDURE — 84484 ASSAY OF TROPONIN QUANT: CPT | Mod: 91

## 2017-08-19 PROCEDURE — 99220 PR INITIAL OBSERVATION CARE,LEVL III: CPT | Mod: ,,, | Performed by: INTERNAL MEDICINE

## 2017-08-19 PROCEDURE — 94761 N-INVAS EAR/PLS OXIMETRY MLT: CPT

## 2017-08-19 PROCEDURE — 25000003 PHARM REV CODE 250: Performed by: NURSE PRACTITIONER

## 2017-08-19 PROCEDURE — 80061 LIPID PANEL: CPT

## 2017-08-19 PROCEDURE — 84484 ASSAY OF TROPONIN QUANT: CPT

## 2017-08-19 PROCEDURE — 99900035 HC TECH TIME PER 15 MIN (STAT)

## 2017-08-19 PROCEDURE — 81003 URINALYSIS AUTO W/O SCOPE: CPT

## 2017-08-19 RX ORDER — ALBUTEROL SULFATE 2.5 MG/.5ML
2.5 SOLUTION RESPIRATORY (INHALATION) EVERY 4 HOURS PRN
Status: DISCONTINUED | OUTPATIENT
Start: 2017-08-19 | End: 2017-08-19 | Stop reason: HOSPADM

## 2017-08-19 RX ORDER — MORPHINE SULFATE 2 MG/ML
2 INJECTION, SOLUTION INTRAMUSCULAR; INTRAVENOUS EVERY 4 HOURS PRN
Status: DISCONTINUED | OUTPATIENT
Start: 2017-08-19 | End: 2017-08-19 | Stop reason: HOSPADM

## 2017-08-19 RX ORDER — MORPHINE SULFATE 4 MG/ML
4 INJECTION, SOLUTION INTRAMUSCULAR; INTRAVENOUS EVERY 4 HOURS PRN
Status: DISCONTINUED | OUTPATIENT
Start: 2017-08-19 | End: 2017-08-19 | Stop reason: HOSPADM

## 2017-08-19 RX ORDER — IBUPROFEN 200 MG
1 TABLET ORAL
Status: DISCONTINUED | OUTPATIENT
Start: 2017-08-19 | End: 2017-08-19 | Stop reason: HOSPADM

## 2017-08-19 RX ORDER — KETOROLAC TROMETHAMINE 30 MG/ML
15 INJECTION, SOLUTION INTRAMUSCULAR; INTRAVENOUS ONCE
Status: COMPLETED | OUTPATIENT
Start: 2017-08-19 | End: 2017-08-19

## 2017-08-19 RX ADMIN — LIDOCAINE HYDROCHLORIDE: 20 SOLUTION ORAL; TOPICAL at 01:08

## 2017-08-19 RX ADMIN — SODIUM CHLORIDE: 0.9 INJECTION, SOLUTION INTRAVENOUS at 07:08

## 2017-08-19 RX ADMIN — NICOTINE 1 PATCH: 21 PATCH TRANSDERMAL at 01:08

## 2017-08-19 RX ADMIN — MORPHINE SULFATE 1 MG: 2 INJECTION, SOLUTION INTRAMUSCULAR; INTRAVENOUS at 07:08

## 2017-08-19 RX ADMIN — KETOROLAC TROMETHAMINE 15 MG: 30 INJECTION, SOLUTION INTRAMUSCULAR at 01:08

## 2017-08-19 NOTE — HPI
Ms. Hutchinson is a 56yo F with a PMH of Arthritis, GERD, and Current Smoker. She presents to the hospital with c/o Chest Pain. It started about 1 day ago, is intermittent, and feels like a chest pressure. It radiates across her chest and to the mid-back.  Laying down makes the pain worst. Associated symptoms include sweating, SOB, and productive cough. She also c/o right posterior leg pain that radiates down the leg from her back. x4 days. Her troponin in the ED was negative. She currently denies pain or discomforts.

## 2017-08-19 NOTE — ASSESSMENT & PLAN NOTE
NPO after MN  NM Stress test  Consult cardiology  Trend Troponin  Lipid panel in AM  Monitor on telemetry  ASA, NTG prn

## 2017-08-19 NOTE — H&P
"[facility] Hospital Medicine  History & Physical    Patient Name: Yvette Hutchinson  MRN: [id number]  Admission Date: 8/18/2017  Attending Physician: Ramone Jovel MD   Primary Care Provider: Hernesto Sutton MD         Patient information was obtained from patient and ER records.     Subjective:     Principal Problem:Chest pain, unspecified    Chief Complaint:   Chief Complaint   Patient presents with    Chest Pain     "heaviness" off and on since yesterday with sob.        HPI: Ms. Hutchinson is a 58yo F with a PMH of Arthritis, GERD, and Current Smoker. She presents to the hospital with c/o Chest Pain. It started about 1 day ago, is intermittent, and feels like a chest pressure. It radiates across her chest and to the mid-back.  Laying down makes the pain worst. Associated symptoms include sweating, SOB, and productive cough. She also c/o right posterior leg pain that radiates down the leg from her back. x4 days. Her troponin in the ED was negative. She currently denies pain or discomforts.          Past Medical History:   Diagnosis Date    Arthritis     Depression     GERD (gastroesophageal reflux disease)        Past Surgical History:   Procedure Laterality Date    TONSILLECTOMY         Review of patient's allergies indicates:  No Known Allergies    No current facility-administered medications on file prior to encounter.      Current Outpatient Prescriptions on File Prior to Encounter   Medication Sig    albuterol 90 mcg/actuation inhaler Inhale 1-2 puffs into the lungs every 6 (six) hours as needed for Wheezing. Rescue    fluticasone (FLONASE) 50 mcg/actuation nasal spray 1 spray by Each Nare route once daily. (Patient taking differently: 1 spray by Each Nare route daily as needed for Rhinitis. )     Family History     None        Social History Main Topics    Smoking status: Current Every Day Smoker     Packs/day: 1.00    Smokeless tobacco: Never Used    Alcohol use " Yes    Drug use:      Types: Marijuana    Sexual activity: Not on file     Review of Systems   Constitutional: Positive for diaphoresis. Negative for fatigue and fever.   HENT: Negative for congestion.    Eyes: Negative for visual disturbance.   Respiratory: Positive for cough and shortness of breath.    Cardiovascular: Positive for chest pain. Negative for palpitations and leg swelling.   Gastrointestinal: Negative for abdominal pain, diarrhea, nausea and vomiting.   Genitourinary: Negative for dysuria.   Musculoskeletal: Positive for arthralgias and myalgias.        Right knee pain   Skin: Negative for wound.   Neurological: Negative for dizziness, syncope and headaches.   Hematological: Does not bruise/bleed easily.   Psychiatric/Behavioral: Negative for confusion and hallucinations.     Objective:     Vital Signs (Most Recent):  Temp: 97.9 °F (36.6 °C) (08/18/17 2336)  Pulse: (!) 55 (08/18/17 2336)  Resp: 18 (08/18/17 2336)  BP: 95/61 (08/18/17 2336)  SpO2: 95 % (08/18/17 2336) Vital Signs (24h Range):  Temp:  [97.7 °F (36.5 °C)-98.3 °F (36.8 °C)] 97.9 °F (36.6 °C)  Pulse:  [54-70] 55  Resp:  [16-20] 18  SpO2:  [95 %-100 %] 95 %  BP: ()/(53-69) 95/61     Weight: 68 kg (150 lb)  Body mass index is 26.57 kg/m².    Physical Exam     Significant Labs:   BMP:   Recent Labs  Lab 08/18/17 1831   GLU 84      K 3.9      CO2 24   BUN 13   CREATININE 0.8   CALCIUM 9.6     CBC:   Recent Labs  Lab 08/18/17 1831   WBC 14.30*   HGB 13.9   HCT 41.6        Troponin:   Recent Labs  Lab 08/18/17 1831   TROPONINI <0.006       EKG: NSR/no ectopy    Significant Imaging:     CXR: Reviewed film, looks ok.        Assessment/Plan:     * Chest pain, unspecified    NPO after MN  NM Stress test  Consult cardiology  Trend Troponin  Lipid panel in AM  Monitor on telemetry  ASA, NTG prn          Leukocytosis    Check UA          Tobacco abuse    Nicotine patch  Smoking cessation encouraged            VTE Risk  Mitigation         Ordered     Low Risk of VTE  Once      08/19/17 0235             Kelsey Stewart NP  Department of Hospital Medicine   [facility]

## 2017-08-19 NOTE — PROGRESS NOTES
08/19/17 0840   Patient Assessment/Suction   Level of Consciousness (AVPU) alert   All Lung Fields Breath Sounds clear   PRE-TX-O2-ETCO2   O2 Device (Oxygen Therapy) room air   SpO2 99 %   Pulse Oximetry Type Intermittent   $ Pulse Oximetry - Multiple Charge Pulse Oximetry - Multiple   Aerosol Therapy   $ Aerosol Therapy Charges PRN treatment not required

## 2017-08-19 NOTE — PLAN OF CARE
Received pt to floor from ED. Pt ambulated to bed from wheelchair with no assistance needed, steady gait. Pt oriented to call light and in NAD at this time. VSS. Reviewed safety precautions with pt, pt verbalized understanding. Will monitor.

## 2017-08-19 NOTE — SUBJECTIVE & OBJECTIVE
Past Medical History:   Diagnosis Date    Arthritis     Depression     GERD (gastroesophageal reflux disease)        Past Surgical History:   Procedure Laterality Date    TONSILLECTOMY         Review of patient's allergies indicates:  No Known Allergies    No current facility-administered medications on file prior to encounter.      Current Outpatient Prescriptions on File Prior to Encounter   Medication Sig    albuterol 90 mcg/actuation inhaler Inhale 1-2 puffs into the lungs every 6 (six) hours as needed for Wheezing. Rescue    fluticasone (FLONASE) 50 mcg/actuation nasal spray 1 spray by Each Nare route once daily. (Patient taking differently: 1 spray by Each Nare route daily as needed for Rhinitis. )     Family History     None        Social History Main Topics    Smoking status: Current Every Day Smoker     Packs/day: 1.00    Smokeless tobacco: Never Used    Alcohol use Yes    Drug use:      Types: Marijuana    Sexual activity: Not on file     Review of Systems   Constitutional: Positive for diaphoresis. Negative for fatigue and fever.   HENT: Negative for congestion.    Eyes: Negative for visual disturbance.   Respiratory: Positive for cough and shortness of breath.    Cardiovascular: Positive for chest pain. Negative for palpitations and leg swelling.   Gastrointestinal: Negative for abdominal pain, diarrhea, nausea and vomiting.   Genitourinary: Negative for dysuria.   Musculoskeletal: Positive for arthralgias and myalgias.        Right knee pain   Skin: Negative for wound.   Neurological: Negative for dizziness, syncope and headaches.   Hematological: Does not bruise/bleed easily.   Psychiatric/Behavioral: Negative for confusion and hallucinations.     Objective:     Vital Signs (Most Recent):  Temp: 97.9 °F (36.6 °C) (08/18/17 2336)  Pulse: (!) 55 (08/18/17 2336)  Resp: 18 (08/18/17 2336)  BP: 95/61 (08/18/17 2336)  SpO2: 95 % (08/18/17 2336) Vital Signs (24h Range):  Temp:  [97.7 °F (36.5  °C)-98.3 °F (36.8 °C)] 97.9 °F (36.6 °C)  Pulse:  [54-70] 55  Resp:  [16-20] 18  SpO2:  [95 %-100 %] 95 %  BP: ()/(53-69) 95/61     Weight: 68 kg (150 lb)  Body mass index is 26.57 kg/m².    Physical Exam     Significant Labs:   BMP:   Recent Labs  Lab 08/18/17  1831   GLU 84      K 3.9      CO2 24   BUN 13   CREATININE 0.8   CALCIUM 9.6     CBC:   Recent Labs  Lab 08/18/17  1831   WBC 14.30*   HGB 13.9   HCT 41.6        Troponin:   Recent Labs  Lab 08/18/17  1831   TROPONINI <0.006       EKG: NSR/no ectopy    Significant Imaging:     CXR: Reviewed film, looks ok.

## 2017-08-19 NOTE — SUBJECTIVE & OBJECTIVE
Past Medical History:   Diagnosis Date    Arthritis     Depression     GERD (gastroesophageal reflux disease)        Past Surgical History:   Procedure Laterality Date    TONSILLECTOMY         Review of patient's allergies indicates:  No Known Allergies    No current facility-administered medications on file prior to encounter.      Current Outpatient Prescriptions on File Prior to Encounter   Medication Sig    albuterol 90 mcg/actuation inhaler Inhale 1-2 puffs into the lungs every 6 (six) hours as needed for Wheezing. Rescue    fluticasone (FLONASE) 50 mcg/actuation nasal spray 1 spray by Each Nare route once daily. (Patient taking differently: 1 spray by Each Nare route daily as needed for Rhinitis. )     Family History     None        Social History Main Topics    Smoking status: Current Every Day Smoker     Packs/day: 1.00    Smokeless tobacco: Never Used    Alcohol use Yes    Drug use:      Types: Marijuana    Sexual activity: Not on file     Review of Systems   Constitution: Negative for decreased appetite, diaphoresis and malaise/fatigue.   Cardiovascular: Positive for chest pain. Negative for claudication, cyanosis, dyspnea on exertion, irregular heartbeat, leg swelling, near-syncope, orthopnea, palpitations, paroxysmal nocturnal dyspnea and syncope.   Respiratory: Negative for cough, shortness of breath, snoring and wheezing.    Endocrine: Negative for cold intolerance and heat intolerance.   Skin: Negative for color change and rash.   Gastrointestinal: Negative for abdominal pain, heartburn, nausea and vomiting.     Objective:     Vital Signs (Most Recent):  Temp: 98.7 °F (37.1 °C) (08/19/17 0750)  Pulse: (!) 55 (08/19/17 0940)  Resp: 18 (08/19/17 0750)  BP: (!) 92/58 (08/19/17 0940)  SpO2: 99 % (08/19/17 0840) Vital Signs (24h Range):  Temp:  [97.7 °F (36.5 °C)-98.7 °F (37.1 °C)] 98.7 °F (37.1 °C)  Pulse:  [54-88] 55  Resp:  [16-20] 18  SpO2:  [93 %-100 %] 99 %  BP: ()/(49-69) 92/58      Weight: 68 kg (150 lb)  Body mass index is 26.57 kg/m².    SpO2: 99 %  O2 Device (Oxygen Therapy): room air      Intake/Output Summary (Last 24 hours) at 08/19/17 1100  Last data filed at 08/18/17 2333   Gross per 24 hour   Intake              500 ml   Output              150 ml   Net              350 ml       Lines/Drains/Airways     Peripheral Intravenous Line                 Peripheral IV - Single Lumen 08/18/17 1856 Left Hand less than 1 day                Physical Exam   Constitutional: She is oriented to person, place, and time. She appears well-developed. No distress.   HENT:   Head: Normocephalic and atraumatic.   Eyes: Pupils are equal, round, and reactive to light.   Neck: No JVD present. No tracheal deviation present.   Cardiovascular: Normal rate, regular rhythm, normal heart sounds and intact distal pulses.  Exam reveals no gallop and no friction rub.    No murmur heard.  Pulmonary/Chest: No stridor. No respiratory distress. She has no wheezes. She has no rales. She exhibits no tenderness.   Abdominal: There is no tenderness.   Musculoskeletal: She exhibits no edema or tenderness.   Neurological: She is oriented to person, place, and time.   Skin: No rash noted. She is not diaphoretic. No erythema. No pallor.       Significant Labs:   CMP   Recent Labs  Lab 08/18/17  1831      K 3.9      CO2 24   GLU 84   BUN 13   CREATININE 0.8   CALCIUM 9.6   PROT 6.8   ALBUMIN 3.9   BILITOT 0.3   ALKPHOS 111   AST 19   ALT 13   ANIONGAP 10   ESTGFRAFRICA >60   EGFRNONAA >60   , CBC   Recent Labs  Lab 08/18/17  1831   WBC 14.30*   HGB 13.9   HCT 41.6      , Lipid Panel   Recent Labs  Lab 08/19/17  0658   CHOL 175   HDL 35*   LDLCALC 124.4   TRIG 78   CHOLHDL 20.0    and Troponin   Recent Labs  Lab 08/18/17  1831 08/19/17  0011 08/19/17  0658   TROPONINI <0.006 <0.006 0.013       EKG- NSR, Possible LA enlargement.

## 2017-08-19 NOTE — PROGRESS NOTES
08/19/17 0445   Vital Signs   Pulse (!) 56   Heart Rate Source Monitor   BP (!) 92/49     Notified BRYNN Cole via intrigma text.

## 2017-08-19 NOTE — HPI
57 yr old female. Started having chest pain over the last few days. Constant. Gets worst when she coughs. Chronic smoker.  Denies any prior cardiac history.  Chest pain was present when she woke up this morning as well. Denies any worsening sob. She is sob at baseline because of the underlying copd.

## 2017-08-19 NOTE — PLAN OF CARE
08/19/17 0100   Patient Assessment/Suction   Level of Consciousness (AVPU) alert   Respiratory Effort Normal;Unlabored   Expansion/Accessory Muscles/Retractions no retractions;no use of accessory muscles   All Lung Fields Breath Sounds diminished   PRE-TX-O2-ETCO2   O2 Device (Oxygen Therapy) room air   SpO2 (!) 93 %   Pulse Oximetry Type Intermittent   Pulse (!) 54   Resp 16   Aerosol Therapy   $ Aerosol Therapy Charges PRN treatment not required   Respiratory Treatment Status PRN treatment not required

## 2017-08-19 NOTE — CONSULTS
[facility]  Cardiology  Consult Note    Patient Name: Yvette Hutchinson  MRN: [id number]  Admission Date: 8/18/2017  Hospital Length of Stay: 0 days  Code Status: Full Code   Attending Provider: Ramone Jovel MD   Consulting Provider: Trev Gomez MD  Primary Care Physician: Hernesto Sutton MD  Principal Problem:Chest pain, unspecified    Patient information was obtained from patient and ER records.     Consults  Subjective:     Chief Complaint:  Chest pain.     HPI:   57 yr old female. Started having chest pain over the last few days. Constant. Gets worst when she coughs. Chronic smoker.  Denies any prior cardiac history.  Chest pain was present when she woke up this morning as well. Denies any worsening sob. She is sob at baseline because of the underlying copd.    Past Medical History:   Diagnosis Date    Arthritis     Depression     GERD (gastroesophageal reflux disease)        Past Surgical History:   Procedure Laterality Date    TONSILLECTOMY         Review of patient's allergies indicates:  No Known Allergies    No current facility-administered medications on file prior to encounter.      Current Outpatient Prescriptions on File Prior to Encounter   Medication Sig    albuterol 90 mcg/actuation inhaler Inhale 1-2 puffs into the lungs every 6 (six) hours as needed for Wheezing. Rescue    fluticasone (FLONASE) 50 mcg/actuation nasal spray 1 spray by Each Nare route once daily. (Patient taking differently: 1 spray by Each Nare route daily as needed for Rhinitis. )     Family History     None        Social History Main Topics    Smoking status: Current Every Day Smoker     Packs/day: 1.00    Smokeless tobacco: Never Used    Alcohol use Yes    Drug use:      Types: Marijuana    Sexual activity: Not on file     Review of Systems   Constitution: Negative for decreased appetite, diaphoresis and malaise/fatigue.   Cardiovascular: Positive for chest pain. Negative for  claudication, cyanosis, dyspnea on exertion, irregular heartbeat, leg swelling, near-syncope, orthopnea, palpitations, paroxysmal nocturnal dyspnea and syncope.   Respiratory: Negative for cough, shortness of breath, snoring and wheezing.    Endocrine: Negative for cold intolerance and heat intolerance.   Skin: Negative for color change and rash.   Gastrointestinal: Negative for abdominal pain, heartburn, nausea and vomiting.     Objective:     Vital Signs (Most Recent):  Temp: 98.7 °F (37.1 °C) (08/19/17 0750)  Pulse: (!) 55 (08/19/17 0940)  Resp: 18 (08/19/17 0750)  BP: (!) 92/58 (08/19/17 0940)  SpO2: 99 % (08/19/17 0840) Vital Signs (24h Range):  Temp:  [97.7 °F (36.5 °C)-98.7 °F (37.1 °C)] 98.7 °F (37.1 °C)  Pulse:  [54-88] 55  Resp:  [16-20] 18  SpO2:  [93 %-100 %] 99 %  BP: ()/(49-69) 92/58     Weight: 68 kg (150 lb)  Body mass index is 26.57 kg/m².    SpO2: 99 %  O2 Device (Oxygen Therapy): room air      Intake/Output Summary (Last 24 hours) at 08/19/17 1100  Last data filed at 08/18/17 2333   Gross per 24 hour   Intake              500 ml   Output              150 ml   Net              350 ml       Lines/Drains/Airways     Peripheral Intravenous Line                 Peripheral IV - Single Lumen 08/18/17 1856 Left Hand less than 1 day                Physical Exam   Constitutional: She is oriented to person, place, and time. She appears well-developed. No distress.   HENT:   Head: Normocephalic and atraumatic.   Eyes: Pupils are equal, round, and reactive to light.   Neck: No JVD present. No tracheal deviation present.   Cardiovascular: Normal rate, regular rhythm, normal heart sounds and intact distal pulses.  Exam reveals no gallop and no friction rub.    No murmur heard.  Pulmonary/Chest: No stridor. No respiratory distress. She has no wheezes. She has no rales. She exhibits no tenderness.   Abdominal: There is no tenderness.   Musculoskeletal: She exhibits no edema or tenderness.   Neurological: She  is oriented to person, place, and time.   Skin: No rash noted. She is not diaphoretic. No erythema. No pallor.       Significant Labs:   CMP   Recent Labs  Lab 08/18/17  1831      K 3.9      CO2 24   GLU 84   BUN 13   CREATININE 0.8   CALCIUM 9.6   PROT 6.8   ALBUMIN 3.9   BILITOT 0.3   ALKPHOS 111   AST 19   ALT 13   ANIONGAP 10   ESTGFRAFRICA >60   EGFRNONAA >60   , CBC   Recent Labs  Lab 08/18/17  1831   WBC 14.30*   HGB 13.9   HCT 41.6      , Lipid Panel   Recent Labs  Lab 08/19/17  0658   CHOL 175   HDL 35*   LDLCALC 124.4   TRIG 78   CHOLHDL 20.0    and Troponin   Recent Labs  Lab 08/18/17  1831 08/19/17  0011 08/19/17  0658   TROPONINI <0.006 <0.006 0.013       EKG- NSR, Possible LA enlargement.    Assessment and Plan:     No notes have been filed under this hospital service.  Service: Cardiology    Chest pain.  The nature of the pain is not very typical for coronary artery disease.  Given her risk factors she has atleast low-intermediate risk for CAD. We will get a stress echo on her.  If stress echo is normal she can be discharged from my side.  Smoking cessation counseling done     VTE Risk Mitigation         Ordered     Low Risk of VTE  Once      08/19/17 7819          Thank you for your consult. I will follow-up with patient. Please contact us if you have any additional questions.    Trev Gomez MD  Cardiology   [facility]

## 2017-08-20 NOTE — DISCHARGE SUMMARY
[facility]  Hospital Medicine  Discharge Summary      Patient Name: Yvette Hutchinson  MRN: [id number]  Admission Date: 8/18/2017  Hospital Length of Stay: 0 days  Discharge Date and Time: 8/19/2017  2:31 PM  Attending Physician: No att. providers found   Discharging Provider: Angella Irvin NP  Primary Care Provider: Hernesto Sutton MD      HPI:   Ms. Hutchinson is a 58yo F with a PMH of Arthritis, GERD, and Current Smoker. She presents to the hospital with c/o Chest Pain. It started about 1 day ago, is intermittent, and feels like a chest pressure. It radiates across her chest and to the mid-back.  Laying down makes the pain worst. Associated symptoms include sweating, SOB, and productive cough. She also c/o right posterior leg pain that radiates down the leg from her back. x4 days. Her troponin in the ED was negative. She currently denies pain or discomforts.          * No surgery found *      Indwelling Lines/Drains at time of discharge:   Lines/Drains/Airways          No matching active lines, drains, or airways        Hospital Course:   Troponins trended and remained negative. Cardiology consulted. Patient underwent cardiac stress test which is negative for myocardia ischemia. She is cleared for DC from cardiac standpoint. Patient given a GI cocktail for GERD symptoms with relief. Recommend outpatient referral to GI if symptoms persists. Patient states she wishes to see her PCP and get referral.      Consults:     Significant Diagnostic Studies: Labs:   BMP:   Recent Labs  Lab 08/18/17  1831   GLU 84      K 3.9      CO2 24   BUN 13   CREATININE 0.8   CALCIUM 9.6    and CMP   Recent Labs  Lab 08/18/17  1831      K 3.9      CO2 24   GLU 84   BUN 13   CREATININE 0.8   CALCIUM 9.6   PROT 6.8   ALBUMIN 3.9   BILITOT 0.3   ALKPHOS 111   AST 19   ALT 13   ANIONGAP 10   ESTGFRAFRICA >60   EGFRNONAA >60     Cardiac Graphics: Stress Test: No evidence of stress  induced myocardial ischemia.  1. The EKG portion of this study is negative for ischemia at a moderate workload, and peak heart rate of 134 bpm (86% of predicted).   2. Blood pressure response to exercise was normal (Presenting BP: 85/48 Peak BP: 139/90).   3. No significant arrhythmias were present.   4. There were no symptoms of chest discomfort or significant dyspnea throughout the protocol.   5. The [place] treadmill score was 6 suggesting a low probability for future cardiovascular events.    Pending Diagnostic Studies:     None        Final Active Diagnoses:    Diagnosis Date Noted POA    PRINCIPAL PROBLEM:  Chest pain, unspecified [R07.9] 08/18/2017 Yes    Leukocytosis [D72.829] 08/19/2017 Yes    Tobacco abuse [Z72.0] 08/18/2017 Yes      Problems Resolved During this Admission:    Diagnosis Date Noted Date Resolved POA      No new Assessment & Plan notes have been filed under this hospital service since the last note was generated.  Service: Hospital Medicine      Discharged Condition: stable    Disposition: Home or Self Care    Follow Up:  Follow-up Information     Hernesto Sutton MD In 1 week.    Specialty:  Family Medicine  Why:  hospital follow up  Contact information:  [address] [place] [address]                 Patient Instructions:     Diet general     Activity as tolerated     Call MD for:  severe uncontrolled pain     Call MD for:  persistent dizziness, light-headedness, or visual disturbances     Call MD for:  difficulty breathing or increased cough       Medications:  Reconciled Home Medications:   Discharge Medication List as of 8/19/2017  1:23 PM      CONTINUE these medications which have NOT CHANGED    Details   albuterol 90 mcg/actuation inhaler Inhale 1-2 puffs into the lungs every 6 (six) hours as needed for Wheezing. Rescue, Starting 3/23/2017, Until Fri 3/23/18, Print      fluticasone (FLONASE) 50 mcg/actuation nasal spray 1 spray by Each Nare route once daily., Starting  3/11/2017, Until Discontinued, Print      nicotine (NICODERM CQ) 21 mg/24 hr Place 1 patch onto the skin every 24 hours., Historical Med           Time spent on the discharge of patient: 35 minutes    HOS POC IP DISCHARGE SUMMARY    Angella Irvin NP  Department of Hospital Medicine  [facility]

## 2017-08-20 NOTE — PLAN OF CARE
08/20/17 0929   Final Note   Assessment Type Final Discharge Note   Discharge Disposition Home   Discharge planning education complete? Yes

## 2017-08-20 NOTE — HOSPITAL COURSE
Troponins trended and remained negative. Cardiology consulted. Patient underwent cardiac stress test which is negative for myocardia ischemia. She is cleared for DC from cardiac standpoint. Patient given a GI cocktail for GERD symptoms with relief. Recommend outpatient referral to GI if symptoms persists. Patient states she wishes to see her PCP and get referral.

## 2017-08-21 ENCOUNTER — TELEPHONE (OUTPATIENT)
Dept: MEDSURG UNIT | Facility: HOSPITAL | Age: 57
End: 2017-08-21

## 2017-12-15 ENCOUNTER — HOSPITAL ENCOUNTER (EMERGENCY)
Facility: HOSPITAL | Age: 57
Discharge: HOME | End: 2017-12-15
Attending: EMERGENCY MEDICINE
Payer: MEDICAID

## 2017-12-15 VITALS
HEART RATE: 75 BPM | WEIGHT: 160 LBS | HEIGHT: 63 IN | TEMPERATURE: 98 F | RESPIRATION RATE: 20 BRPM | SYSTOLIC BLOOD PRESSURE: 117 MMHG | BODY MASS INDEX: 28.35 KG/M2 | DIASTOLIC BLOOD PRESSURE: 86 MMHG | OXYGEN SATURATION: 98 %

## 2017-12-15 DIAGNOSIS — R05.9 COUGH: ICD-10-CM

## 2017-12-15 DIAGNOSIS — R06.02 SOB (SHORTNESS OF BREATH): Primary | ICD-10-CM

## 2017-12-15 LAB
FLUAV AG SPEC QL IA: NEGATIVE
FLUBV AG SPEC QL IA: NEGATIVE
SPECIMEN SOURCE: NORMAL

## 2017-12-15 PROCEDURE — 93005 ELECTROCARDIOGRAM TRACING: CPT

## 2017-12-15 PROCEDURE — 99284 EMERGENCY DEPT VISIT MOD MDM: CPT

## 2017-12-15 PROCEDURE — 87400 INFLUENZA A/B EACH AG IA: CPT | Mod: 59

## 2017-12-15 PROCEDURE — 25000003 PHARM REV CODE 250: Performed by: NURSE PRACTITIONER

## 2017-12-15 RX ORDER — ONDANSETRON 4 MG/1
4 TABLET, ORALLY DISINTEGRATING ORAL
Status: COMPLETED | OUTPATIENT
Start: 2017-12-15 | End: 2017-12-15

## 2017-12-15 RX ADMIN — ONDANSETRON 4 MG: 4 TABLET, ORALLY DISINTEGRATING ORAL at 10:12

## 2017-12-16 NOTE — ED PROVIDER NOTES
Encounter Date: 12/15/2017    SCRIBE #1 NOTE: I, Teresa Rosario, am scribing for, and in the presence of, DHEERAJ Haley.       History     Chief Complaint   Patient presents with    Shortness of Breath     became SOB while eating and felt like throat was closing and she couldn't get her breath, HA and productive cough with green sputum       12/15/2017 10:02 PM     Chief complaint: SOB      Yvette Hutchinson is a 57 y.o. female with PMHx of COPD who presents to the ED with complaints of productive cough, SOB, fatigue, generalized myalgias, and back pain. Associating symptoms includes nausea. She states of intermittent headache, back pain, and cough for the past 2 days and acute onset of SOB today while running errands that resolved without intervention prior to arrival . She also states of having an episode of diarrhea yesterday, but has since resolved; she denies abdominal pain or blood in stool. She describes back pain as dull and worsens only when taking deep breathes; left scapula area. During episode of SOB today, patient recalls unable to use inhaler secondary to inability to take deep breathes. She denies any breathing treatments today and taking any medication for pain and nausea. She endorses smoking less than a pack a day. She also denies fever, vomiting, dysuria, congestion, sore throat, chest pain, abdominal pain, dizziness, blurry vision, and chest tightness. She also denies history of Mi, hormone therapy, and being on home oxygen. Patient is post-menopausal. She has no known drug allergies on file.       The history is provided by the patient.     Review of patient's allergies indicates:  No Known Allergies  Past Medical History:   Diagnosis Date    Arthritis     Depression     GERD (gastroesophageal reflux disease)      Past Surgical History:   Procedure Laterality Date    TONSILLECTOMY       No family history on file.  Social History   Substance Use Topics    Smoking status: Current  Every Day Smoker     Packs/day: 1.00    Smokeless tobacco: Never Used    Alcohol use Yes     Review of Systems   Constitutional: Negative for chills and fever.   HENT: Negative for congestion and sore throat.    Respiratory: Positive for cough, shortness of breath and wheezing. Negative for choking, chest tightness and stridor.    Cardiovascular: Negative for chest pain and leg swelling.   Gastrointestinal: Positive for diarrhea (1 episode; resolved) and nausea. Negative for abdominal distention, abdominal pain and vomiting.   Genitourinary: Negative for dysuria and flank pain.   Musculoskeletal: Positive for back pain and myalgias (generalized). Negative for neck pain and neck stiffness.   Skin: Negative for rash.   Neurological: Positive for headaches. Negative for dizziness, tremors, seizures, syncope, facial asymmetry, speech difficulty, weakness, light-headedness and numbness.   Hematological: Does not bruise/bleed easily.       Physical Exam     Initial Vitals [12/15/17 2141]   BP Pulse Resp Temp SpO2   117/86 75 20 98.1 °F (36.7 °C) 98 %      MAP       96.33         Physical Exam    Constitutional: Vital signs are normal. She appears well-developed and well-nourished. She is not diaphoretic. No distress.   HENT:   Head: Normocephalic and atraumatic.   Eyes: Conjunctivae and EOM are normal. Pupils are equal, round, and reactive to light.   Neck: Normal range of motion. Neck supple.   Cardiovascular: Normal rate, regular rhythm, normal heart sounds and intact distal pulses.   No murmur heard.  Pulmonary/Chest: No respiratory distress. She has decreased breath sounds. She has rhonchi (scattered rhonchi throughout).   Abdominal: Soft. Normal appearance and bowel sounds are normal. She exhibits no distension. There is no tenderness.   Musculoskeletal: Normal range of motion. She exhibits no edema or tenderness.   Neurological: She is alert and oriented to person, place, and time. She has normal strength.   Skin:  Skin is warm, dry and intact.   Psychiatric: She has a normal mood and affect. Her speech is normal and behavior is normal.         ED Course   Procedures  Labs Reviewed   INFLUENZA A AND B ANTIGEN             Medical Decision Making:   History:   Old Medical Records: I decided to obtain old medical records.  Differential Diagnosis:   Pneumonia  COPD exacerbation  Influenza  Bronchospasm     Clinical Tests:   Lab Tests: Reviewed and Ordered  Radiological Study: Ordered and Reviewed  Medical Tests: Reviewed and Ordered       APC / Resident Notes:   Patient is a 57 y.o. female who presents to the ED 12/16/2017 underwent emergent evaluation for shortness of breath that resolved prior to arrival without intervention.  She is a current smoker and has a history of COPD.  On exam patient's bilateral breath sounds are clear; respirations even and unlabored; no wheezing; patient declined this nebulized treatment.  No hypoxia.  She is afebrile and nontoxic-appearing.  CXR without acute findings; doubt CHF or pneumonia.  EKG unremarkable without acute ST or T-wave abnormality; doubt acute coronary syndrome.  Patient has no midline back tenderness.  Pain in back is dull and mild worse with coughing but not present on exam. She has no tenderness. Doubt PE; Do not feel patient would benefit from steroids, antibiotics or further diagnostic imaging at this time.  All other return precautions discussed the patient; patient verbalized understanding and is agreeable to this plan of care.       Scribe Attestation:   Scribe #1: I performed the above scribed service and the documentation accurately describes the services I performed. I attest to the accuracy of the note.    Attending Attestation:     Physician Attestation Statement for NP/PA:   I have conducted a face to face encounter with this patient in addition to the NP/PA, due to NP/PA Request    Other NP/PA Attestation Additions:      Medical Decision Making: Yvette Hazel  Jasper is a 57 y.o. female presenting with viral prodrome with cough and body aches starting yesterday puncture would not by brief episode of dyspnea today resolved by the time of arrival.  No active wheezing here and I doubt scenic and COPD exacerbation warranting further steroids.  She declined breathing treatments in the emergency department.  She appears comfortable here. EKG was reviewed with no sign of acute ischemia or infarction.  I doubt ACS.  I do not think further troponin enzyme assay or provocative testing is indicated.  Influenza testing performed a patient's request.  I doubt pneumonia.  I do not think antibiotics are indicated.  Very low suspicion for other emergent process such as PE.  I do not think further d-dimer or CT angiography are indicated.  Lungs are clear to auscultation aside from occasional scattered rhonchi with normal work of breathing here.  No rales or wheezing.  No tachypnea.  Heart is regular rate and rhythm without murmur or rub.  Midline back tenderness to palpation.  No peripheral edema would like symmetric and nontender to palpation.  2+ DP and PT pulses.  Initial be discharged home with close follow-up.  I do not think she requires admission for observation.  Return precautions reviewed with patient and family.       Physician Attestation for Scribe:  Physician Attestation Statement for Scribe #1: I, Mini Rivers, reviewed documentation, as scribed by in my presence, and it is both accurate and complete.     Comments: I, ITALIA Haley, personally performed the services described in this documentation. All medical record entries made by the scribe were at my direction and in my presence.  I have reviewed the chart and agree that the record reflects my personal performance and is accurate and complete. ITALIA Haley.  1:57 AM 12/16/2017                 ED Course as of Dec 15 2330   Fri Dec 15, 2017   2241 EKG:  NSR, rate of 68, normal intervals and axis.  There are no  acute ST or T wave changes suggestive of acute ischemia or infarction.  No sign of arrhythmia.    [MR]   2300 CXR:  NAD. (my read)  [MR]      ED Course User Index  [MR] Mehdi Baker MD     Clinical Impression:   The primary encounter diagnosis was SOB (shortness of breath). A diagnosis of Cough was also pertinent to this visit.    Disposition:   Disposition: Discharged  Condition: Stable                        Mini Rivers NP  12/16/17 0157

## 2017-12-16 NOTE — ED NOTES
"Presents to the ER with c/o SOB that started just prior to arrival. Patient reports that she was out shopping when SOB started suddenly approximately 2 hours ago. Associated complaints are dull back pain that "Feels the way it did the last time I had bronchitis.", nausea, productive cough with green sputum, and headache that started 2 days ago and rates a 8/10. Mucous membranes are pink and moist. Skin is warm, dry and intact. Bilateral rhonchi, respirations are regular and unlabored. Denies cough, congestion, rhinorrhea or SOB. BS active x4, no tenderness with palpation, abd is soft and not distended. Denies any appetite or activity change. S1S2, capillary refill is < 2 seconds. Denies dysuria, difficulty urinating, frequency, numbness, tingling or weakness. TIMI MICHELLE    "

## 2019-08-08 ENCOUNTER — HOSPITAL ENCOUNTER (EMERGENCY)
Facility: HOSPITAL | Age: 59
Discharge: HOME | End: 2019-08-08
Attending: EMERGENCY MEDICINE

## 2019-08-08 VITALS
RESPIRATION RATE: 20 BRPM | DIASTOLIC BLOOD PRESSURE: 80 MMHG | SYSTOLIC BLOOD PRESSURE: 156 MMHG | HEIGHT: 62 IN | BODY MASS INDEX: 27.6 KG/M2 | TEMPERATURE: 98 F | OXYGEN SATURATION: 95 % | HEART RATE: 78 BPM | WEIGHT: 150 LBS

## 2019-08-08 DIAGNOSIS — L24.9 IRRITANT CONTACT DERMATITIS, UNSPECIFIED TRIGGER: Primary | ICD-10-CM

## 2019-08-08 PROCEDURE — 96372 THER/PROPH/DIAG INJ SC/IM: CPT

## 2019-08-08 PROCEDURE — 25000003 PHARM REV CODE 250: Performed by: NURSE PRACTITIONER

## 2019-08-08 PROCEDURE — 99284 EMERGENCY DEPT VISIT MOD MDM: CPT | Mod: 25

## 2019-08-08 PROCEDURE — 63600175 PHARM REV CODE 636 W HCPCS: Performed by: NURSE PRACTITIONER

## 2019-08-08 RX ORDER — SILVER SULFADIAZINE 10 G/1000G
CREAM TOPICAL 2 TIMES DAILY
Qty: 30 G | Refills: 0 | Status: SHIPPED | OUTPATIENT
Start: 2019-08-08 | End: 2020-03-02

## 2019-08-08 RX ORDER — METHYLPREDNISOLONE SOD SUCC 125 MG
125 VIAL (EA) INJECTION
Status: DISCONTINUED | OUTPATIENT
Start: 2019-08-08 | End: 2019-08-08

## 2019-08-08 RX ORDER — PREDNISONE 20 MG/1
20 TABLET ORAL DAILY
Qty: 10 TABLET | Refills: 0 | Status: SHIPPED | OUTPATIENT
Start: 2019-08-09 | End: 2019-08-19

## 2019-08-08 RX ORDER — DIPHENHYDRAMINE HCL 50 MG
50 CAPSULE ORAL
Status: COMPLETED | OUTPATIENT
Start: 2019-08-08 | End: 2019-08-08

## 2019-08-08 RX ORDER — DEXAMETHASONE SODIUM PHOSPHATE 4 MG/ML
8 INJECTION, SOLUTION INTRA-ARTICULAR; INTRALESIONAL; INTRAMUSCULAR; INTRAVENOUS; SOFT TISSUE
Status: COMPLETED | OUTPATIENT
Start: 2019-08-08 | End: 2019-08-08

## 2019-08-08 RX ORDER — HYDROCORTISONE 25 MG/ML
LOTION TOPICAL 2 TIMES DAILY
Qty: 60 ML | Refills: 0 | Status: SHIPPED | OUTPATIENT
Start: 2019-08-08 | End: 2020-03-02

## 2019-08-08 RX ADMIN — DIPHENHYDRAMINE HYDROCHLORIDE 50 MG: 50 CAPSULE ORAL at 01:08

## 2019-08-08 RX ADMIN — DEXAMETHASONE SODIUM PHOSPHATE 8 MG: 4 INJECTION, SOLUTION INTRA-ARTICULAR; INTRALESIONAL; INTRAMUSCULAR; INTRAVENOUS; SOFT TISSUE at 01:08

## 2019-08-08 NOTE — ED PROVIDER NOTES
Encounter Date: 8/8/2019       History     Chief Complaint   Patient presents with    Rash     Patient is a 59 y.o. female who presents to the ED 08/08/2019 with a chief complaint of rash to bilateral upper extremities for approximately 1 week.  Patient states she has had this rash before.  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  She is unsure if it actually sprayed on her or not she does not recall but she has had itching rash for while.  She states she has similar rashes in the past that got better with steroids from her PCP.  She has occurred everyday smoker.  She denies any past medical history.  She denies any recent travel out of the country or fever.  She has no systemic symptoms at this time.  She is immunized.  She is not currently taking medications.  She states she is postmenopausal.             Review of patient's allergies indicates:  No Known Allergies  Past Medical History:   Diagnosis Date    Arthritis     Depression     GERD (gastroesophageal reflux disease)      Past Surgical History:   Procedure Laterality Date    TONSILLECTOMY       History reviewed. No pertinent family history.  Social History     Tobacco Use    Smoking status: Current Every Day Smoker     Packs/day: 1.00    Smokeless tobacco: Never Used   Substance Use Topics    Alcohol use: Yes    Drug use: Yes     Types: Marijuana     Review of Systems   Constitutional: Negative for chills and fever.   HENT: Negative for facial swelling and sore throat.    Respiratory: Negative for chest tightness and shortness of breath.    Cardiovascular: Negative for chest pain.   Gastrointestinal: Negative for abdominal pain.   Musculoskeletal: Negative for arthralgias and myalgias.   Skin: Positive for rash. Negative for wound.   Hematological: Negative for adenopathy.       Physical Exam     Initial Vitals [08/08/19 1301]   BP Pulse Resp Temp SpO2   (!)  156/80 89 20 97.6 °F (36.4 °C) (!) 93 %      MAP       --         Physical Exam    Nursing note and vitals reviewed.  Constitutional: Vital signs are normal. She appears well-developed and well-nourished.   HENT:   Head: Normocephalic and atraumatic.   Eyes: Pupils are equal, round, and reactive to light.   Neck: Neck supple.   Cardiovascular: Normal rate, regular rhythm, normal heart sounds and intact distal pulses. Exam reveals no gallop and no friction rub.    No murmur heard.  Pulmonary/Chest: Breath sounds normal. She has no wheezes. She has no rhonchi. She has no rales.   Abdominal: Normal appearance.   Neurological: She is alert and oriented to person, place, and time. She has normal strength.   Skin: Skin is warm, dry and intact. Capillary refill takes less than 2 seconds. Rash noted.   Edematous erythematous pruritic maculopapular rash to bi upper extremities. No blistering, eschar, petechia, purpura, or bullae.    Psychiatric: She has a normal mood and affect. Her speech is normal and behavior is normal.         ED Course   Procedures  Labs Reviewed - No data to display       Imaging Results    None          Medical Decision Making:   Differential Diagnosis:   Chemical burn  Urticaria  Dermatitis        APC / Resident Notes:   Patient is a 59 y.o. female who presents to the ED 08/08/2019 who underwent emergent evaluation for a rash that has been present for approximately 1 week.  Patient has bilateral upper extremity rash that is edematous and pruritic.  Appears allergic in nature and is likely reactive to the chemicals patient uses at work.  Vital signs normal.  Patient has no systemic symptoms. She denies any fever.  She has no facial or oral swelling or signs of airway compromise.  I do not think anaphylaxis.  I do not think acute bacterial infection.  Patient is given dose of Benadryl steroids here in the emergency department and discharged home on oral steroids as well as topical Silvadene and  hydrocortisone lotion. I see no indication for antibiotics at this time time. Based on my clinical evaluation, I do not appreciate any immediate, emergent, or life threatening condition or etiology that warrants additional workup today and feel that the patient can be discharged with close follow up care. Case discussed with Dr. Ayala who is agreeable to plan of care. Follow up and return precautions discussed; patient verbalized understanding and is agreeable to plan of care. Patient discharged home in stable condition.                         Clinical Impression:       ICD-10-CM ICD-9-CM   1. Irritant contact dermatitis, unspecified trigger L24.9 692.9         Disposition:   Disposition: Discharged  Condition: Stable                        Mini Rivers NP  08/08/19 0444

## 2019-09-11 ENCOUNTER — HOSPITAL ENCOUNTER (EMERGENCY)
Facility: HOSPITAL | Age: 59
Discharge: HOME | End: 2019-09-11
Attending: EMERGENCY MEDICINE

## 2019-09-11 VITALS
WEIGHT: 150 LBS | RESPIRATION RATE: 14 BRPM | DIASTOLIC BLOOD PRESSURE: 71 MMHG | HEART RATE: 74 BPM | OXYGEN SATURATION: 96 % | BODY MASS INDEX: 27.6 KG/M2 | SYSTOLIC BLOOD PRESSURE: 136 MMHG | TEMPERATURE: 99 F | HEIGHT: 62 IN

## 2019-09-11 DIAGNOSIS — L50.9 URTICARIA: Primary | ICD-10-CM

## 2019-09-11 PROCEDURE — 99283 EMERGENCY DEPT VISIT LOW MDM: CPT

## 2019-09-11 RX ORDER — HYDROXYZINE HYDROCHLORIDE 25 MG/1
25 TABLET, FILM COATED ORAL EVERY 6 HOURS
Qty: 20 TABLET | Refills: 0 | Status: SHIPPED | OUTPATIENT
Start: 2019-09-11 | End: 2020-03-02

## 2019-09-11 NOTE — ED NOTES
Patient identifiers for Yvette Hutchinson checked and correct.  LOC: Patient is awake, alert, and aware of environment with an appropriate affect. Patient is oriented x 3 and speaking appropriately.  APPEARANCE: Patient resting comfortably and in no acute distress. Patient is clean and well groomed, patient's clothing is properly fastened.  SKIN: The skin is warm and dry. Patient has normal skin turgor and moist mucus membrances. Urticarial rash noted to body x1 month.   MUSCULOSKELETAL: Patient is moving all extremities well, no obvious deformities noted. Pulses intact.   RESPIRATORY: Airway is open and patent. Respirations are spontaneous and non-labored with normal effort and rate. Pt placed on continuous pulse ox.  CARDIAC: Patient has a normal rate and rhythm. No peripheral edema noted. Capillary refill < 3 seconds. Pt placed on continuous cardiac monitor.  ABDOMEN: No distention noted. Bowel sounds active in all 4 quadrants. Soft and non-tender upon palpation.  NEUROLOGICAL: PERRL. Facial expression is symmetrical. Hand grasps are equal bilaterally. Normal sensation in all extremities when touched with finger.  Allergies reported: Review of patient's allergies indicates:  No Known Allergies  Bed locked, lowest position. Call light within easy reach. Side rails up x2.

## 2019-09-11 NOTE — ED PROVIDER NOTES
Encounter Date: 9/11/2019    SCRIBE #1 NOTE: IGita, am scribing for, and in the presence of, Brad Alvarado MD.       History     Chief Complaint   Patient presents with    Rash       Time seen by provider: 7:23 AM on 09/11/2019    Yvette Hutchinson is a 59 y.o. female who presents to the ED with an onset of a rash on her neck and bilateral upper extremeties for several weeks. Patient was seen in ED one month ago for the same chief complaint and states that hydrocortisone, prednisone, silver sulfadiazine and benadryl have provided no relief and actually increase the itching. The patient complains of diarrhea, nausea, runny nose and congestion. Patient is a current smoker, drinks alcohol, and smokes marajuana. The patient denies vomiting or any other symptoms at this time. No pertinent PSHx. PMHx includes GERD, arthritis and depression. No known drug allergies.      The history is provided by the patient.     Review of patient's allergies indicates:  No Known Allergies  Past Medical History:   Diagnosis Date    Arthritis     Depression     GERD (gastroesophageal reflux disease)      Past Surgical History:   Procedure Laterality Date    TONSILLECTOMY       History reviewed. No pertinent family history.  Social History     Tobacco Use    Smoking status: Current Every Day Smoker     Packs/day: 1.00    Smokeless tobacco: Never Used   Substance Use Topics    Alcohol use: Yes    Drug use: Yes     Types: Marijuana     Review of Systems   Constitutional: Negative for fever.   HENT: Positive for congestion and rhinorrhea. Negative for sore throat.    Respiratory: Negative for shortness of breath.    Cardiovascular: Negative for chest pain.   Gastrointestinal: Positive for diarrhea and nausea. Negative for vomiting.   Genitourinary: Negative for dysuria.   Musculoskeletal: Negative for back pain.   Skin: Positive for rash.   Neurological: Negative for weakness.   Hematological: Does not  IOP within reasonable range TODAY. bruise/bleed easily.       Physical Exam     Initial Vitals [09/11/19 0706]   BP Pulse Resp Temp SpO2   136/71 74 14 98.7 °F (37.1 °C) 96 %      MAP       --         Physical Exam    Nursing note and vitals reviewed.  Constitutional: She appears well-developed and well-nourished. She is not diaphoretic. No distress.   HENT:   Head: Normocephalic and atraumatic.   Eyes: EOM are normal. Pupils are equal, round, and reactive to light.   Neck: Normal range of motion. Neck supple.   Cardiovascular: Normal rate, regular rhythm, normal heart sounds and intact distal pulses. Exam reveals no gallop and no friction rub.    No murmur heard.  Pulmonary/Chest: Breath sounds normal. No respiratory distress. She has no wheezes. She has no rhonchi. She has no rales.   Abdominal: Soft. Bowel sounds are normal. There is no tenderness. There is no rebound and no guarding.   Musculoskeletal: Normal range of motion.   Neurological: She is alert and oriented to person, place, and time.   Skin: Skin is warm and dry. Rash noted. Rash is urticarial.   Generalized urticarial rash.   Psychiatric: She has a normal mood and affect. Her behavior is normal. Judgment and thought content normal.         ED Course   Procedures  Labs Reviewed - No data to display       Imaging Results    None          Medical Decision Making:   History:   Old Medical Records: I decided to obtain old medical records.  Initial Assessment:   59-year-old female presented with a rash.  Differential Diagnosis:   Initial differential diagnosis included but not limited to urticaria, dermatitis, and eczema.  ED Management:  The patient was urgently evaluated in the emergency department, her evaluation was significant for an elderly female with an urticarial rash noted. The patient's rash is refractory to steroids taken as an outpatient.  The patient has no respiratory difficulty and is stable for discharge to home.  She will be discharged home with p.o. Atarax and she is to  continue her topical hydrocortisone.  She will be referred to Allergy and Dermatology as an outpatient for further care and treatment.  Her diagnosis is urticaria.            Scribe Attestation:   Scribe #1: I performed the above scribed service and the documentation accurately describes the services I performed. I attest to the accuracy of the note.        I, Dr. Brad Alvarado, personally performed the services described in this documentation. All medical record entries made by the scribe were at my direction and in my presence.  I have reviewed the chart and agree that the record reflects my personal performance and is accurate and complete. Brad Alvarado MD.  2:29 PM 09/11/2019          Clinical Impression:       ICD-10-CM ICD-9-CM   1. Urticaria L50.9 708.9         Disposition:   Disposition: Discharged  Condition: Stable                        Brad Alvarado MD  09/11/19 1

## 2019-12-19 ENCOUNTER — HOSPITAL ENCOUNTER (EMERGENCY)
Facility: HOSPITAL | Age: 59
Discharge: HOME | End: 2019-12-19
Attending: EMERGENCY MEDICINE

## 2019-12-19 VITALS
HEART RATE: 90 BPM | OXYGEN SATURATION: 96 % | RESPIRATION RATE: 16 BRPM | BODY MASS INDEX: 27.6 KG/M2 | SYSTOLIC BLOOD PRESSURE: 152 MMHG | TEMPERATURE: 98 F | HEIGHT: 62 IN | WEIGHT: 150 LBS | DIASTOLIC BLOOD PRESSURE: 66 MMHG

## 2019-12-19 DIAGNOSIS — J20.9 ACUTE BRONCHITIS, UNSPECIFIED ORGANISM: Primary | ICD-10-CM

## 2019-12-19 DIAGNOSIS — R05.9 COUGH: ICD-10-CM

## 2019-12-19 PROCEDURE — 99284 EMERGENCY DEPT VISIT MOD MDM: CPT | Mod: 25

## 2019-12-19 RX ORDER — AZITHROMYCIN 250 MG/1
250 TABLET, FILM COATED ORAL DAILY
Qty: 6 TABLET | Refills: 0 | Status: SHIPPED | OUTPATIENT
Start: 2019-12-19 | End: 2020-03-02 | Stop reason: ALTCHOICE

## 2019-12-19 RX ORDER — METHYLPREDNISOLONE 4 MG/1
TABLET ORAL
Qty: 1 PACKAGE | Refills: 0 | Status: SHIPPED | OUTPATIENT
Start: 2019-12-19 | End: 2020-01-09

## 2019-12-19 RX ORDER — ALBUTEROL SULFATE 90 UG/1
1-2 AEROSOL, METERED RESPIRATORY (INHALATION) EVERY 6 HOURS PRN
Qty: 1 INHALER | Refills: 0 | Status: SHIPPED | OUTPATIENT
Start: 2019-12-19 | End: 2020-03-02 | Stop reason: SDUPTHER

## 2019-12-19 RX ORDER — FLUTICASONE PROPIONATE 50 MCG
1 SPRAY, SUSPENSION (ML) NASAL DAILY PRN
Qty: 9.9 ML | Refills: 0 | Status: SHIPPED | OUTPATIENT
Start: 2019-12-19 | End: 2020-03-02

## 2019-12-19 NOTE — ED PROVIDER NOTES
"Encounter Date: 12/19/2019    SCRIBE #1 NOTE: I, Farrahkylee Sandoval, am scribing for, and in the presence of, Constance Davila PA-C.       History     Chief Complaint   Patient presents with    Cough     Pain L lower ribs, "I feel bad".       Time seen by provider: 10:51 AM on 12/19/2019    Yvette Hutchinson is a 59 y.o. female who presents to the ED with an onset of productive cough, lower rib pain, chills, runny nose and congestion for 3 days. Patient also endorses ear pain last week that has resolved. She states the pain in her back is worse with cough and sneezing. Patient reports she has been taking Theraflu. Patient is a current 1 pack per week smoker. The patient denies recent falls, fever, sore throat, calf pain or any other symptoms at this time. No pertinent PMHx. PSHx includes tonsillectomy.      The history is provided by the patient.     Review of patient's allergies indicates:  No Known Allergies  Past Medical History:   Diagnosis Date    Arthritis     Depression     GERD (gastroesophageal reflux disease)      Past Surgical History:   Procedure Laterality Date    TONSILLECTOMY       History reviewed. No pertinent family history.  Social History     Tobacco Use    Smoking status: Current Every Day Smoker     Packs/day: 1.00     Types: Cigarettes    Smokeless tobacco: Never Used   Substance Use Topics    Alcohol use: Yes    Drug use: Yes     Types: Marijuana     Review of Systems   Constitutional: Positive for chills. Negative for activity change, appetite change and fever.   HENT: Positive for congestion, ear pain and rhinorrhea. Negative for sore throat.    Eyes: Negative for redness and visual disturbance.   Respiratory: Positive for cough. Negative for chest tightness and shortness of breath.    Cardiovascular: Negative for chest pain.   Gastrointestinal: Negative for abdominal pain, diarrhea, nausea and vomiting.   Genitourinary: Negative for dysuria and frequency.   Musculoskeletal: " Negative for back pain, neck pain and neck stiffness.        - Calf pain  + Rib pain   Skin: Negative for rash.   Neurological: Negative for dizziness, syncope, numbness and headaches.       Physical Exam     Initial Vitals [12/19/19 1038]   BP Pulse Resp Temp SpO2   (!) 152/66 90 16 98.4 °F (36.9 °C) 96 %      MAP       --         Physical Exam    Nursing note and vitals reviewed.  Constitutional: She appears well-developed and well-nourished. She is cooperative.  Non-toxic appearance. She does not have a sickly appearance.   HENT:   Head: Normocephalic and atraumatic.   Right Ear: External ear normal.   Left Ear: External ear normal.   Nose: Nose normal.   Mouth/Throat: Uvula is midline and oropharynx is clear and moist.   Eyes: Conjunctivae and lids are normal. Pupils are equal, round, and reactive to light.   Neck: Normal range of motion and full passive range of motion without pain. Neck supple.   Cardiovascular: Normal rate, regular rhythm and normal heart sounds. Exam reveals no gallop and no friction rub.    No murmur heard.  Pulmonary/Chest: Breath sounds normal. She has no wheezes. She has no rhonchi. She has no rales.   Abdominal: Soft. Normal appearance. There is no tenderness. There is no rigidity, no rebound and no guarding.   Neurological: She is alert.   Skin: Skin is warm, dry and intact. No rash noted.         ED Course   Procedures  Labs Reviewed - No data to display       Imaging Results          X-Ray Chest PA And Lateral (Final result)  Result time 12/19/19 11:04:51    Final result by Perez Woods Jr., MD (12/19/19 11:04:51)                 Impression:      No acute abnormality.  Atherosclerosis.      Electronically signed by: Perez Woods MD  Date:    12/19/2019  Time:    11:04             Narrative:    EXAMINATION:  XR CHEST PA AND LATERAL    CLINICAL HISTORY:  Cough    TECHNIQUE:  PA and lateral views of the chest were performed.    COMPARISON:  Prior chest of December 15,  2017    FINDINGS:  The lungs are clear, with normal appearance of pulmonary vasculature and no pleural effusion or pneumothorax.    The cardiac silhouette is normal in size. The hilar and mediastinal contours are unremarkable.    Bones are intact. Calcification is noted in the aorta.                              X-Rays:   Independently Interpreted Readings:   Chest X-Ray: No infiltrates. No pneumothorax.     Medical Decision Making:   History:   Old Medical Records: I decided to obtain old medical records.  Clinical Tests:   Radiological Study: Ordered and Reviewed       APC / Resident Notes:   Urgent evaluation of a well appearing 59 year old female who presents with complaint of congestion, rhinorrhea, cough and rib pain.  Patient denied fever.  No abdominal pain, nausea or vomiting.  Vital signs are stable.  Patient is afebrile.  Abdomen is soft and nontender.  There is no rebound, rigidity or distention.  I doubt intra-abdominal process.  Bilateral TMs with no erythema, retraction or perforation.  There is no mastoid tenderness.  There is no movement tenderness to bilateral ears.  No tonsillar swelling or exudate noted.  Uvula is midline.  No increased work of breathing.  She is a current smoker and was a previous heavy smoker. Concern for decompensation so will treat for bronchitis even with normal xray. Discussed results with patient. Return precautions given. Patient is to follow up with their primary care provider. Case was discussed with Dr. Alvarado who is in agreement with the plan of care. All questions answered.          Scribe Attestation:   Scribe #1: I performed the above scribed service and the documentation accurately describes the services I performed. I attest to the accuracy of the note.    Attending Attestation:     Physician Attestation Statement for NP/PA:   I discussed this assessment and plan of this patient with the NP/PA, but I did not personally examine the patient. The face to face  encounter was performed by the NP/PA.    Other NP/PA Attestation Additions:    History of Present Illness: 59-year-old female presented with a cough.    Medical Decision Making: Initial differential diagnosis included but not limited to pneumonia, bronchitis, and upper respiratory infection.  I am in agreement with the physician assistant's  assessment, treatment, and plan of care.         I, Constance Davila PA-C, personally performed the services described in this documentation. All medical record entries made by the scribe were at my direction and in my presence.  I have reviewed the chart and agree that the record reflects my personal performance and is accurate and complete. Constance Davila PA-C.  2:48 PM 12/19/2019                          Clinical Impression:       ICD-10-CM ICD-9-CM   1. Acute bronchitis, unspecified organism J20.9 466.0   2. Cough R05 786.2         Disposition:   Disposition: Discharged  Condition: Stable                     Constance Davila PA-C  12/19/19 5110       Brad Alvarado MD  12/19/19 8107

## 2020-01-20 ENCOUNTER — OFFICE VISIT (OUTPATIENT)
Dept: DERMATOLOGY | Facility: CLINIC | Age: 60
End: 2020-01-20
Payer: COMMERCIAL

## 2020-01-20 VITALS — WEIGHT: 150 LBS | BODY MASS INDEX: 27.6 KG/M2 | HEIGHT: 62 IN

## 2020-01-20 DIAGNOSIS — L25.9 CONTACT DERMATITIS, UNSPECIFIED CONTACT DERMATITIS TYPE, UNSPECIFIED TRIGGER: ICD-10-CM

## 2020-01-20 DIAGNOSIS — R21 RASH: Primary | ICD-10-CM

## 2020-01-20 DIAGNOSIS — D18.01 CHERRY ANGIOMA: ICD-10-CM

## 2020-01-20 PROCEDURE — 99999 PR PBB SHADOW E&M-EST. PATIENT-LVL III: ICD-10-PCS | Mod: PBBFAC,,, | Performed by: DERMATOLOGY

## 2020-01-20 PROCEDURE — 99203 PR OFFICE/OUTPT VISIT, NEW, LEVL III, 30-44 MIN: ICD-10-PCS | Mod: S$GLB,,, | Performed by: DERMATOLOGY

## 2020-01-20 PROCEDURE — 3008F BODY MASS INDEX DOCD: CPT | Mod: CPTII,S$GLB,, | Performed by: DERMATOLOGY

## 2020-01-20 PROCEDURE — 99203 OFFICE O/P NEW LOW 30 MIN: CPT | Mod: S$GLB,,, | Performed by: DERMATOLOGY

## 2020-01-20 PROCEDURE — 3008F PR BODY MASS INDEX (BMI) DOCUMENTED: ICD-10-PCS | Mod: CPTII,S$GLB,, | Performed by: DERMATOLOGY

## 2020-01-20 PROCEDURE — 99999 PR PBB SHADOW E&M-EST. PATIENT-LVL III: CPT | Mod: PBBFAC,,, | Performed by: DERMATOLOGY

## 2020-01-20 NOTE — PATIENT INSTRUCTIONS
1. Avoid hot water.    2. More hand , less soap    3. Stop all products for now, soaps, lotions, makeup.    4. For dryness: organic coconut oil    5. Rx cream is fine for now.     6. Nitrale Gloves, Powder free latex (monitor sweating in the gloves)    7. Clear Face Ortega       Patch testing in the future.

## 2020-01-20 NOTE — PROGRESS NOTES
Subjective:       Patient ID:  Yvette Hutchinson is a 59 y.o. female who presents for   Chief Complaint   Patient presents with    Rash     hands, face, arms    Spot     L breast     HPI    New patient No phx of skin cancer  Family hx of skin cancer.     Presents today with a rash on the hands, face and arms, x 1 year, red and itchy, has been on TAC cream hydroxine and OTC eczema cream. Bathes with Oil of olay, did try vitamin E oil  Also spot on the L breast, x years, getting bigger, no tx    Review of Systems   Constitutional: Negative for fever, chills and fatigue.   HENT: Negative for congestion, sore throat and mouth sores.    Gastrointestinal: Negative for nausea and vomiting.   Musculoskeletal: Negative for joint swelling and arthralgias.   Skin: Positive for itching, rash and dry skin.   Hematologic/Lymphatic: Does not bruise/bleed easily.        Objective:    Physical Exam   Constitutional: She appears well-developed and well-nourished. No distress.   Eyes: No conjunctival no injection.   Neurological: She is alert and oriented to person, place, and time. She is not disoriented.   Psychiatric: She has a normal mood and affect.   Skin:   Areas Examined (abnormalities noted in diagram):   Head / Face Inspection Performed  Neck Inspection Performed  Chest / Axilla Inspection Performed  Nails and Digits Inspection Performed                       Diagram Legend     Erythematous scaling macule/papule c/w actinic keratosis       Vascular papule c/w angioma      Pigmented verrucoid papule/plaque c/w seborrheic keratosis      Yellow umbilicated papule c/w sebaceous hyperplasia      Irregularly shaped tan macule c/w lentigo     1-2 mm smooth white papules consistent with Milia      Movable subcutaneous cyst with punctum c/w epidermal inclusion cyst      Subcutaneous movable cyst c/w pilar cyst      Firm pink to brown papule c/w dermatofibroma      Pedunculated fleshy papule(s) c/w skin tag(s)      Evenly  pigmented macule c/w junctional nevus     Mildly variegated pigmented, slightly irregular-bordered macule c/w mildly atypical nevus      Flesh colored to evenly pigmented papule c/w intradermal nevus       Pink pearly papule/plaque c/w basal cell carcinoma      Erythematous hyperkeratotic cursted plaque c/w SCC      Surgical scar with no sign of skin cancer recurrence      Open and closed comedones      Inflammatory papules and pustules      Verrucoid papule consistent consistent with wart     Erythematous eczematous patches and plaques     Dystrophic onycholytic nail with subungual debris c/w onychomycosis     Umbilicated papule    Erythematous-base heme-crusted tan verrucoid plaque consistent with inflamed seborrheic keratosis     Erythematous Silvery Scaling Plaque c/w Psoriasis     See annotation      Assessment / Plan:        Rash  Good skin care regimen discussed including limiting to one bath or shower/day, using lukewarm water with mild soap and moisturizing cream to skin 1 - 2x/day. Brochure was provided and reviewed with patient.  No hot water bathing reviewed.  Recommended more hand  than water washing for routine germ prevention.    Contact dermatitis, unspecified contact dermatitis type, unspecified trigger  Discussed with patient the etiology and pathogenesis of the disease or skin lesion(s) and possible treatments and aggravators.    Reviewed with patient different treatment options and associated risks.  Told patient to stop all products and make up.  Discussed that less is more right now.  Patient to wash with glycerin bar soap and avoid hot water.  Avoid fragrances.  Change laundry detergent to free and clear.  Patient may need patch testing if index of suspicion is high and patient continues to flare with rashes.  Cont wearing gloves at work but also add face shield or ski mask and wear goggles.  Pt works at [employer] with sprays and dust all around.  Worse at work and at the end of  work.  Discussed today with the patient the option of patch testing to alleviate the risk of allergens.  Pt rtc after trip to do testing.  Cont pt's tac bid prn.  Discussed with patient the risks of topical steroids, including, but not limited, to atrophy, rosacea, acne, glaucoma, cataracts, adrenal suppression, striae.      Cherry angioma   Discussed with patient the benign nature of these lesions and that no treatment is indicated.               Follow up for Procedure.

## 2020-01-28 ENCOUNTER — CLINICAL SUPPORT (OUTPATIENT)
Dept: DERMATOLOGY | Facility: CLINIC | Age: 60
End: 2020-01-28
Payer: COMMERCIAL

## 2020-01-28 DIAGNOSIS — L25.9 CONTACT DERMATITIS, UNSPECIFIED CONTACT DERMATITIS TYPE, UNSPECIFIED TRIGGER: Primary | ICD-10-CM

## 2020-01-28 DIAGNOSIS — R21 RASH: ICD-10-CM

## 2020-01-28 NOTE — PROGRESS NOTES
Initial patch testing.  Patches applied to clean back.  Patient instructed not to bathe or shower and no antihistamines while testing in progress.  Will return on Wednesday for read.

## 2020-01-30 ENCOUNTER — OFFICE VISIT (OUTPATIENT)
Dept: DERMATOLOGY | Facility: CLINIC | Age: 60
End: 2020-01-30
Payer: COMMERCIAL

## 2020-01-30 VITALS — HEIGHT: 62 IN | WEIGHT: 150 LBS | BODY MASS INDEX: 27.6 KG/M2

## 2020-01-30 DIAGNOSIS — L25.9 CONTACT DERMATITIS, UNSPECIFIED CONTACT DERMATITIS TYPE, UNSPECIFIED TRIGGER: Primary | ICD-10-CM

## 2020-01-30 DIAGNOSIS — R21 RASH: ICD-10-CM

## 2020-01-30 PROCEDURE — 99213 PR OFFICE/OUTPT VISIT, EST, LEVL III, 20-29 MIN: ICD-10-PCS | Mod: S$GLB,,, | Performed by: DERMATOLOGY

## 2020-01-30 PROCEDURE — 3008F PR BODY MASS INDEX (BMI) DOCUMENTED: ICD-10-PCS | Mod: CPTII,S$GLB,, | Performed by: DERMATOLOGY

## 2020-01-30 PROCEDURE — 3008F BODY MASS INDEX DOCD: CPT | Mod: CPTII,S$GLB,, | Performed by: DERMATOLOGY

## 2020-01-30 PROCEDURE — 99213 OFFICE O/P EST LOW 20 MIN: CPT | Mod: S$GLB,,, | Performed by: DERMATOLOGY

## 2020-01-30 PROCEDURE — 99999 PR PBB SHADOW E&M-EST. PATIENT-LVL II: CPT | Mod: PBBFAC,,, | Performed by: DERMATOLOGY

## 2020-01-30 PROCEDURE — 99999 PR PBB SHADOW E&M-EST. PATIENT-LVL II: ICD-10-PCS | Mod: PBBFAC,,, | Performed by: DERMATOLOGY

## 2020-01-30 NOTE — PROGRESS NOTES
Subjective:       Patient ID:  Yvette Hutchinson is a 59 y.o. female who presents for No chief complaint on file.    HPI    Last o/v 1/28/2020 for Patch testing application     Rash  Good skin care regimen discussed including limiting to one bath or shower/day, using lukewarm water with mild soap and moisturizing cream to skin 1 - 2x/day. Brochure was provided and reviewed with patient.  No hot water bathing reviewed.  Recommended more hand  than water washing for routine germ prevention.     Contact dermatitis, unspecified contact dermatitis type, unspecified trigger  Discussed with patient the etiology and pathogenesis of the disease or skin lesion(s) and possible treatments and aggravators.    Reviewed with patient different treatment options and associated risks.  Told patient to stop all products and make up.  Discussed that less is more right now.  Patient to wash with glycerin bar soap and avoid hot water.  Avoid fragrances.  Change laundry detergent to free and clear.  Patient may need patch testing if index of suspicion is high and patient continues to flare with rashes.  Cont wearing gloves at work but also add face shield or ski mask and wear goggles.  Pt works at [employer] with sprays and dust all around.  Worse at work and at the end of work.  Discussed today with the patient the option of patch testing to alleviate the risk of allergens.  Pt rtc after trip to do testing.  Cont pt's tac bid prn.  Discussed with patient the risks of topical steroids, including, but not limited, to atrophy, rosacea, acne, glaucoma, cataracts, adrenal suppression, striae.        Cherry angioma   Discussed with patient the benign nature of these lesions and that no treatment is indicated.     Patient presents today for patch testing reading    Possible reaction to:  #15 - carba mix  #17 - Cl+Me Isothiazolinone  #24 - Thiuram Mix  Possible: #4 - potassium  Possible: #12 - [place] Dichloride    Review of  Systems   Constitutional: Negative for fever, chills and fatigue.   HENT: Negative for congestion, sore throat and mouth sores.    Gastrointestinal: Negative for nausea and vomiting.   Musculoskeletal: Negative for joint swelling and arthralgias.   Skin: Positive for itching, rash and dry skin.   Hematologic/Lymphatic: Does not bruise/bleed easily.        Objective:    Physical Exam   Constitutional: She appears well-developed and well-nourished. No distress.   Eyes: No conjunctival no injection.   Neurological: She is alert and oriented to person, place, and time. She is not disoriented.   Psychiatric: She has a normal mood and affect.   Skin:   Areas Examined (abnormalities noted in diagram):   Head / Face Inspection Performed  Back Inspection Performed              Diagram Legend     Erythematous scaling macule/papule c/w actinic keratosis       Vascular papule c/w angioma      Pigmented verrucoid papule/plaque c/w seborrheic keratosis      Yellow umbilicated papule c/w sebaceous hyperplasia      Irregularly shaped tan macule c/w lentigo     1-2 mm smooth white papules consistent with Milia      Movable subcutaneous cyst with punctum c/w epidermal inclusion cyst      Subcutaneous movable cyst c/w pilar cyst      Firm pink to brown papule c/w dermatofibroma      Pedunculated fleshy papule(s) c/w skin tag(s)      Evenly pigmented macule c/w junctional nevus     Mildly variegated pigmented, slightly irregular-bordered macule c/w mildly atypical nevus      Flesh colored to evenly pigmented papule c/w intradermal nevus       Pink pearly papule/plaque c/w basal cell carcinoma      Erythematous hyperkeratotic cursted plaque c/w SCC      Surgical scar with no sign of skin cancer recurrence      Open and closed comedones      Inflammatory papules and pustules      Verrucoid papule consistent consistent with wart     Erythematous eczematous patches and plaques     Dystrophic onycholytic nail with subungual debris c/w  onychomycosis     Umbilicated papule    Erythematous-base heme-crusted tan verrucoid plaque consistent with inflamed seborrheic keratosis     Erythematous Silvery Scaling Plaque c/w Psoriasis     See annotation      Assessment / Plan:        Contact dermatitis, unspecified contact dermatitis type, unspecified trigger  Discussed with patient the etiology and pathogenesis of the disease or skin lesion(s) and possible treatments and aggravators.    Results of patch tests rev'd and handouts given.  + #'s as above.    Pt to review and avoid.  Reviewed with patient different treatment options and associated risks.  Late phase check with staff tomorrow.  Told patient to stop all products and make up.  Discussed that less is more right now.  Patient to wash with glycerin bar soap and avoid hot water.  Avoid fragrances.  Change laundry detergent to free and clear.  Patient may need patch testing if index of suspicion is high and patient continues to flare with rashes.    Rash  Condition is stable.  We will continue present management.  Patient to watch for recurrence or flares or worsening and to call the clinic for a follow up appointment for such.  Patient and or guardian to monitor this area/lesion or these areas/lesions for changes or worsening.  Patient and or guardian to contact us if any changes are noted for such.             Follow up in about 1 day (around 1/31/2020).

## 2020-01-31 ENCOUNTER — CLINICAL SUPPORT (OUTPATIENT)
Dept: DERMATOLOGY | Facility: CLINIC | Age: 60
End: 2020-01-31
Payer: COMMERCIAL

## 2020-01-31 VITALS — HEIGHT: 62 IN | BODY MASS INDEX: 27.6 KG/M2 | WEIGHT: 150 LBS

## 2020-01-31 DIAGNOSIS — R76.9 POSITIVE ALLERGY TEST: Primary | ICD-10-CM

## 2020-01-31 PROCEDURE — 99214 OFFICE O/P EST MOD 30 MIN: CPT | Mod: S$GLB,,, | Performed by: DERMATOLOGY

## 2020-01-31 PROCEDURE — 99999 PR PBB SHADOW E&M-EST. PATIENT-LVL II: CPT | Mod: PBBFAC,,,

## 2020-01-31 PROCEDURE — 99999 PR PBB SHADOW E&M-EST. PATIENT-LVL II: ICD-10-PCS | Mod: PBBFAC,,,

## 2020-01-31 PROCEDURE — 99214 PR OFFICE/OUTPT VISIT, EST, LEVL IV, 30-39 MIN: ICD-10-PCS | Mod: S$GLB,,, | Performed by: DERMATOLOGY

## 2020-01-31 NOTE — PROGRESS NOTES
Patient presents today for late reaction patch test reading.    #15 - carba mix  #17 - Cl+Me Isothiazolinone  #24 - Thiuram Mix   #12 - [place] Dichloride    All had darker reactions confirming allergy.    Discussed with patient next steps to prevent these products. Patient verbally understood.  Photo in Media

## 2020-02-28 ENCOUNTER — DOCUMENTATION ONLY (OUTPATIENT)
Dept: FAMILY MEDICINE | Facility: CLINIC | Age: 60
End: 2020-02-28

## 2020-02-28 NOTE — PROGRESS NOTES
Pre-Visit Chart Review  For Appointment Scheduled on 3-2-20    Health Maintenance Due   Topic Date Due    Hepatitis C Screening  1960    TETANUS VACCINE  07/19/1977    Pneumococcal Vaccine (Medium Risk) (1 of 1 - PPSV23) 07/19/1978    Pap Smear with HPV Cotest  07/19/1980    Mammogram  07/19/1999

## 2020-03-02 ENCOUNTER — OFFICE VISIT (OUTPATIENT)
Dept: FAMILY MEDICINE | Facility: CLINIC | Age: 60
End: 2020-03-02
Payer: COMMERCIAL

## 2020-03-02 ENCOUNTER — HOSPITAL ENCOUNTER (OUTPATIENT)
Dept: RADIOLOGY | Facility: CLINIC | Age: 60
Discharge: HOME | End: 2020-03-02
Attending: FAMILY MEDICINE
Payer: COMMERCIAL

## 2020-03-02 VITALS
TEMPERATURE: 98 F | SYSTOLIC BLOOD PRESSURE: 112 MMHG | BODY MASS INDEX: 27.95 KG/M2 | DIASTOLIC BLOOD PRESSURE: 66 MMHG | HEART RATE: 88 BPM | HEIGHT: 62 IN | OXYGEN SATURATION: 95 % | WEIGHT: 151.88 LBS

## 2020-03-02 DIAGNOSIS — R05.3 CHRONIC COUGH: ICD-10-CM

## 2020-03-02 DIAGNOSIS — Z98.890 S/P DILATATION OF ESOPHAGEAL STRICTURE: ICD-10-CM

## 2020-03-02 DIAGNOSIS — G89.29 CHRONIC NECK PAIN: ICD-10-CM

## 2020-03-02 DIAGNOSIS — M54.2 CHRONIC NECK PAIN: ICD-10-CM

## 2020-03-02 DIAGNOSIS — Z12.31 SCREENING MAMMOGRAM, ENCOUNTER FOR: ICD-10-CM

## 2020-03-02 DIAGNOSIS — K21.9 GERD WITHOUT ESOPHAGITIS: ICD-10-CM

## 2020-03-02 DIAGNOSIS — G47.00 INSOMNIA, UNSPECIFIED TYPE: ICD-10-CM

## 2020-03-02 DIAGNOSIS — Z23 NEEDS FLU SHOT: ICD-10-CM

## 2020-03-02 DIAGNOSIS — Z86.010 HISTORY OF COLON POLYPS: ICD-10-CM

## 2020-03-02 DIAGNOSIS — E78.6 LOW HDL (UNDER 40): ICD-10-CM

## 2020-03-02 DIAGNOSIS — R53.83 LETHARGY: ICD-10-CM

## 2020-03-02 DIAGNOSIS — Z11.4 SCREENING FOR HIV (HUMAN IMMUNODEFICIENCY VIRUS): ICD-10-CM

## 2020-03-02 DIAGNOSIS — Z87.19 S/P DILATATION OF ESOPHAGEAL STRICTURE: ICD-10-CM

## 2020-03-02 DIAGNOSIS — M62.838 MUSCLE SPASM: ICD-10-CM

## 2020-03-02 DIAGNOSIS — Z72.0 TOBACCO ABUSE: ICD-10-CM

## 2020-03-02 DIAGNOSIS — E66.3 OVERWEIGHT (BMI 25.0-29.9): ICD-10-CM

## 2020-03-02 DIAGNOSIS — E53.8 B12 DEFICIENCY: ICD-10-CM

## 2020-03-02 DIAGNOSIS — J44.9 CHRONIC OBSTRUCTIVE PULMONARY DISEASE, UNSPECIFIED COPD TYPE: Primary | ICD-10-CM

## 2020-03-02 DIAGNOSIS — F41.9 ANXIETY: ICD-10-CM

## 2020-03-02 DIAGNOSIS — G89.4 CHRONIC PAIN SYNDROME: ICD-10-CM

## 2020-03-02 DIAGNOSIS — J30.1 SEASONAL ALLERGIC RHINITIS DUE TO POLLEN: ICD-10-CM

## 2020-03-02 DIAGNOSIS — Z23 NEED FOR PNEUMOCOCCAL VACCINE: ICD-10-CM

## 2020-03-02 DIAGNOSIS — Z01.419 PERIODIC HEALTH ASSESSMENT, PAP AND PELVIC: ICD-10-CM

## 2020-03-02 DIAGNOSIS — M15.9 PRIMARY OSTEOARTHRITIS INVOLVING MULTIPLE JOINTS: ICD-10-CM

## 2020-03-02 DIAGNOSIS — M25.50 POLYARTHRALGIA: ICD-10-CM

## 2020-03-02 PROBLEM — Z86.0100 HISTORY OF COLON POLYPS: Status: ACTIVE | Noted: 2020-03-02

## 2020-03-02 PROCEDURE — 99999 PR PBB SHADOW E&M-EST. PATIENT-LVL V: ICD-10-PCS | Mod: PBBFAC,,, | Performed by: FAMILY MEDICINE

## 2020-03-02 PROCEDURE — 90732 PNEUMOCOCCAL POLYSACCHARIDE VACCINE 23-VALENT =>2YO SQ IM: ICD-10-PCS | Mod: S$GLB,,, | Performed by: FAMILY MEDICINE

## 2020-03-02 PROCEDURE — 72040 XR CERVICAL SPINE AP LATERAL: ICD-10-PCS | Mod: 26,,, | Performed by: RADIOLOGY

## 2020-03-02 PROCEDURE — 90471 FLU VACCINE (QUAD) GREATER THAN OR EQUAL TO 3YO PRESERVATIVE FREE IM: ICD-10-PCS | Mod: S$GLB,,, | Performed by: FAMILY MEDICINE

## 2020-03-02 PROCEDURE — 3008F PR BODY MASS INDEX (BMI) DOCUMENTED: ICD-10-PCS | Mod: CPTII,S$GLB,, | Performed by: FAMILY MEDICINE

## 2020-03-02 PROCEDURE — 72040 X-RAY EXAM NECK SPINE 2-3 VW: CPT | Mod: TC,FY,PO

## 2020-03-02 PROCEDURE — 90686 FLU VACCINE (QUAD) GREATER THAN OR EQUAL TO 3YO PRESERVATIVE FREE IM: ICD-10-PCS | Mod: S$GLB,,, | Performed by: FAMILY MEDICINE

## 2020-03-02 PROCEDURE — 99999 PR PBB SHADOW E&M-EST. PATIENT-LVL V: CPT | Mod: PBBFAC,,, | Performed by: FAMILY MEDICINE

## 2020-03-02 PROCEDURE — 90732 PPSV23 VACC 2 YRS+ SUBQ/IM: CPT | Mod: S$GLB,,, | Performed by: FAMILY MEDICINE

## 2020-03-02 PROCEDURE — 99204 PR OFFICE/OUTPT VISIT, NEW, LEVL IV, 45-59 MIN: ICD-10-PCS | Mod: 25,S$GLB,, | Performed by: FAMILY MEDICINE

## 2020-03-02 PROCEDURE — 72040 X-RAY EXAM NECK SPINE 2-3 VW: CPT | Mod: 26,,, | Performed by: RADIOLOGY

## 2020-03-02 PROCEDURE — 99204 OFFICE O/P NEW MOD 45 MIN: CPT | Mod: 25,S$GLB,, | Performed by: FAMILY MEDICINE

## 2020-03-02 PROCEDURE — 3008F BODY MASS INDEX DOCD: CPT | Mod: CPTII,S$GLB,, | Performed by: FAMILY MEDICINE

## 2020-03-02 PROCEDURE — 90686 IIV4 VACC NO PRSV 0.5 ML IM: CPT | Mod: S$GLB,,, | Performed by: FAMILY MEDICINE

## 2020-03-02 PROCEDURE — 90472 IMMUNIZATION ADMIN EACH ADD: CPT | Mod: S$GLB,,, | Performed by: FAMILY MEDICINE

## 2020-03-02 PROCEDURE — 90471 IMMUNIZATION ADMIN: CPT | Mod: S$GLB,,, | Performed by: FAMILY MEDICINE

## 2020-03-02 PROCEDURE — 90472 PNEUMOCOCCAL POLYSACCHARIDE VACCINE 23-VALENT =>2YO SQ IM: ICD-10-PCS | Mod: S$GLB,,, | Performed by: FAMILY MEDICINE

## 2020-03-02 RX ORDER — MELOXICAM 7.5 MG/1
7.5 TABLET ORAL DAILY
Qty: 90 TABLET | Refills: 3 | Status: SHIPPED | OUTPATIENT
Start: 2020-03-02 | End: 2022-01-11 | Stop reason: ALTCHOICE

## 2020-03-02 RX ORDER — METHOCARBAMOL 750 MG/1
750 TABLET, FILM COATED ORAL 4 TIMES DAILY PRN
Qty: 120 TABLET | Refills: 0 | Status: SHIPPED | OUTPATIENT
Start: 2020-03-02 | End: 2020-06-01 | Stop reason: SDUPTHER

## 2020-03-02 RX ORDER — NAPROXEN SODIUM 220 MG
220 TABLET ORAL
COMMUNITY
End: 2020-03-02 | Stop reason: ALTCHOICE

## 2020-03-02 RX ORDER — BUSPIRONE HYDROCHLORIDE 5 MG/1
5 TABLET ORAL 2 TIMES DAILY
Qty: 180 TABLET | Refills: 3 | Status: SHIPPED | OUTPATIENT
Start: 2020-03-02 | End: 2020-05-19 | Stop reason: SDUPTHER

## 2020-03-02 RX ORDER — GUAIFENESIN/DEXTROMETHORPHAN 100-10MG/5
5 SYRUP ORAL EVERY 4 HOURS PRN
Qty: 118 ML | Refills: 0 | COMMUNITY
Start: 2020-03-02 | End: 2020-03-12

## 2020-03-02 RX ORDER — ALBUTEROL SULFATE 90 UG/1
1-2 AEROSOL, METERED RESPIRATORY (INHALATION) EVERY 6 HOURS PRN
Qty: 18 G | Refills: 3 | Status: SHIPPED | OUTPATIENT
Start: 2020-03-02 | End: 2020-03-05 | Stop reason: SDUPTHER

## 2020-03-02 RX ORDER — FLUTICASONE PROPIONATE 50 MCG
1 SPRAY, SUSPENSION (ML) NASAL DAILY
Qty: 16 G | Refills: 3 | Status: SHIPPED | OUTPATIENT
Start: 2020-03-02 | End: 2020-05-19 | Stop reason: SDUPTHER

## 2020-03-02 NOTE — PROGRESS NOTES
"Subjective:       Patient ID: Yvette Hutchinson is a 59 y.o. female.    Chief Complaint: Establish Care    HPI   Patient presents to reestablish care with me and for her yearly annual wellness physical.  She tells me she is okay today and about the same since I saw her last.  I previously followed with her at my office in [place] through the [facility] system seen last on 04/03/2019.  She has a long and complicated past medical history as in the problem list and below and continues to have frequent COPD exacerbations and ER visits.  She continues to smoke although she has tried to quit since last seen failing with use of Chantix, nicotine patches and then cold turkey method but tells me she is now only smoking "a few cigarettes here and there" with a pack lasting several weeks.  She has previously refused to complete any labs or address any health maintenance issues as she was uninsured for many years but would now like to get up-to-date with all health maintenance issues.  She has run out of all of her previous medications and would like to restart on some of these including BuSpar for anxiety, something for her chronic cough, Flonase for chronic allergy symptoms, Mobic for her chronic pains secondary to osteoarthritis in many joints, Robaxin for muscle spasm and problems with sleep and albuterol for chronic cough, wheezing and shortness of breath with COPD exacerbations.  She is also interested in ruling out rheumatoid arthritis which has been suggested as a possible cause of her polyarthralgias and swelling PIP and DIP joints.  Her area of worst pain recently has been in her low neck and she has had associated tight achy muscles across the shoulders with infrequent muscle spasms.  Pains are rated at 7/10 and she has not had any decrease in strength, coordination, sensation or active range of motion.  Allergy symptoms are mild and she has not had any problems with dysphagia or GERD symptoms only " "using antacids as needed for breakthrough symptoms following lifestyle modifications for GERD fairly closely chronically.      Review of Systems   Constitutional: Positive for fatigue. Negative for activity change, appetite change, chills, fever and unexpected weight change.   HENT: Positive for congestion and postnasal drip. Negative for ear pain, hearing loss, rhinorrhea, sinus pressure, sinus pain, sneezing, sore throat, trouble swallowing and voice change.    Eyes: Positive for itching. Negative for photophobia, pain, discharge, redness and visual disturbance.   Respiratory: Positive for cough (Chronic mainly dry cough.  Some mild and thin clear to white sputum produced in the morning hours.). Negative for chest tightness, shortness of breath and wheezing.    Cardiovascular: Negative for chest pain, palpitations and leg swelling.   Gastrointestinal: Negative for abdominal pain, blood in stool, constipation, diarrhea, nausea and vomiting.   Genitourinary: Negative for decreased urine volume, difficulty urinating, dysuria, flank pain, frequency, hematuria, pelvic pain and urgency.   Musculoskeletal: Positive for arthralgias, back pain, joint swelling, myalgias and neck pain. Negative for gait problem and neck stiffness.   Skin: Negative for rash and wound.   Neurological: Positive for headaches. Negative for dizziness, tremors, seizures, syncope, weakness, light-headedness and numbness.   Hematological: Negative for adenopathy. Does not bruise/bleed easily.   Psychiatric/Behavioral: Positive for sleep disturbance. Negative for agitation, behavioral problems, confusion, decreased concentration, dysphoric mood, self-injury and suicidal ideas. The patient is nervous/anxious.          Objective:      Vitals:    03/02/20 1415   BP: 112/66   Pulse: 88   Temp: 98.2 °F (36.8 °C)   SpO2: 95%   Weight: 68.9 kg (151 lb 14.4 oz)   Height: 5' 2" (1.575 m)   PainSc:   7   PainLoc: Neck     Physical Exam   Constitutional: She is " oriented to person, place, and time. She appears well-developed and well-nourished. No distress.   HENT:   Head: Normocephalic and atraumatic.   Right Ear: External ear normal.   Left Ear: External ear normal.   Nose: Nose normal.   Mouth/Throat: Oropharynx is clear and moist.   Eyes: Pupils are equal, round, and reactive to light. Conjunctivae and EOM are normal. No scleral icterus.   Neck: Trachea normal, normal range of motion and phonation normal. Neck supple. No JVD present. Carotid bruit is not present. No thyroid mass and no thyromegaly present.   Cardiovascular: Normal rate, regular rhythm and normal heart sounds. Exam reveals no gallop and no friction rub.   No murmur heard.  Pulmonary/Chest: Effort normal. No respiratory distress. She has no decreased breath sounds. She has wheezes in the right lower field and the left lower field. She has no rhonchi. She has no rales. Chest wall is not dull to percussion. She exhibits no tenderness and no bony tenderness.   Abdominal: Soft. Bowel sounds are normal. She exhibits no distension and no mass. There is no tenderness. There is no guarding.   Musculoskeletal: Normal range of motion. She exhibits tenderness. She exhibits no edema or deformity.   She has full active range of motion, 5/5 strength, sensation intact and good pedal and radial pulses bilateral.  She reports no pain to palpation of soft tissues or bony prominences but reports achy pain with range of motion testing throughout was joints especially in the cervical and lumbar spine and across the shoulder and hip girdles bilateral.  She also reports pains in the hands and elbows as well as the knees.  There are Lyudmila's and Heberden's nodes present bilateral.  There are no swollen or erythematous joints present.   Lymphadenopathy:     She has no cervical adenopathy.   Neurological: She is alert and oriented to person, place, and time.   Skin: Skin is warm and dry. She is not diaphoretic.   Psychiatric:  She has a normal mood and affect. Her behavior is normal. Judgment and thought content normal.   Nursing note and vitals reviewed.        Assessment:       1. Chronic obstructive pulmonary disease, unspecified COPD type    2. History of colon polyps    3. GERD without esophagitis    4. Chronic pain syndrome    5. B12 deficiency    6. Low HDL (under 40)    7. Overweight (BMI 25.0-29.9)    8. S/P dilatation of esophageal stricture    9. Tobacco abuse    10. Insomnia, unspecified type    11. Seasonal allergic rhinitis due to pollen    12. Anxiety    13. Polyarthralgia    14. Lethargy    15. Chronic neck pain    16. Screening for HIV (human immunodeficiency virus)    17. Needs flu shot    18. Need for pneumococcal vaccine    19. Screening mammogram, encounter for    20. Periodic health assessment, Pap and pelvic    21. Primary osteoarthritis involving multiple joints    22. Chronic cough    23. Muscle spasm          Plan:   Chronic obstructive pulmonary disease, unspecified COPD type  -     albuterol (PROVENTIL/VENTOLIN HFA) 90 mcg/actuation inhaler; Inhale 1-2 puffs into the lungs every 6 (six) hours as needed for Wheezing or Shortness of Breath. Rescue  Dispense: 18 g; Refill: 3  -     Ambulatory referral/consult to Pulmonology; Future; Expected date: 03/09/2020    History of colon polyps    GERD without esophagitis    Chronic pain syndrome  -     meloxicam (MOBIC) 7.5 MG tablet; Take 1 tablet (7.5 mg total) by mouth once daily.  Dispense: 90 tablet; Refill: 3    B12 deficiency  -     Vitamin B12; Future; Expected date: 03/02/2020    Low HDL (under 40)  -     Lipid panel; Future; Expected date: 03/02/2020    Overweight (BMI 25.0-29.9)    S/P dilatation of esophageal stricture    Tobacco abuse  -     Ambulatory referral/consult to Smoking Cessation Program; Future; Expected date: 03/09/2020    Insomnia, unspecified type  -     methocarbamol (ROBAXIN) 750 MG Tab; Take 1 tablet (750 mg total) by mouth 4 (four) times  daily as needed.  Dispense: 120 tablet; Refill: 0    Seasonal allergic rhinitis due to pollen  -     fluticasone propionate (FLONASE) 50 mcg/actuation nasal spray; 1 spray (50 mcg total) by Each Nostril route once daily.  Dispense: 16 g; Refill: 3    Anxiety  -     busPIRone (BUSPAR) 5 MG Tab; Take 1 tablet (5 mg total) by mouth 2 (two) times daily.  Dispense: 180 tablet; Refill: 3    Polyarthralgia  -     Rheumatoid factor; Future; Expected date: 03/02/2020  -     CYN Screen w/Reflex; Future; Expected date: 03/02/2020  -     C-reactive protein; Future; Expected date: 03/02/2020  -     CYCLIC CITRUL PEPTIDE ANTIBODY, IGG; Future; Expected date: 03/02/2020    Lethargy  -     CBC auto differential; Future; Expected date: 03/02/2020  -     Comprehensive metabolic panel; Future; Expected date: 03/02/2020  -     TSH; Future; Expected date: 03/02/2020    Chronic neck pain  -     X-Ray Cervical Spine AP And Lateral; Future; Expected date: 03/02/2020  -     meloxicam (MOBIC) 7.5 MG tablet; Take 1 tablet (7.5 mg total) by mouth once daily.  Dispense: 90 tablet; Refill: 3    Screening for HIV (human immunodeficiency virus)  -     HIV 1/2 Ag/Ab (4th Gen); Future; Expected date: 03/02/2020    Needs flu shot  -     Influenza - Quadrivalent (PF)    Need for pneumococcal vaccine  -     (In Office Administered) Pneumococcal Polysaccharide Vaccine (23 Valent) (SQ/IM)    Screening mammogram, encounter for  -     Mammo Digital Screening Bilat; Future; Expected date: 03/02/2020    Periodic health assessment, Pap and pelvic  -     Ambulatory referral/consult to Gynecology; Future; Expected date: 03/09/2020    Primary osteoarthritis involving multiple joints  -     meloxicam (MOBIC) 7.5 MG tablet; Take 1 tablet (7.5 mg total) by mouth once daily.  Dispense: 90 tablet; Refill: 3    Chronic cough  -     dextromethorphan-guaifenesin  mg/5 ml (ROBITUSSIN-DM)  mg/5 mL liquid; Take 5 mLs by mouth every 4 (four) hours as needed  (chronic cough).  Dispense: 118 mL; Refill: 0    Muscle spasm  -     methocarbamol (ROBAXIN) 750 MG Tab; Take 1 tablet (750 mg total) by mouth 4 (four) times daily as needed.  Dispense: 120 tablet; Refill: 0      Follow up in about 3 months (around 6/2/2020).    Yvette appears to be stable today but continues to suffer with frequent COPD exacerbations.  She has not previously followed with pulmonology or had pulmonary function testing and has only used albuterol inhaler as needed when she could afford the medication previously.  She has insurance coverage of and was interested in restarting on the above medications as she has previously done well on these without any adverse effects.  I discussed the risks and benefits of all these and agree to provide her with refills today.  Her GERD has been well controlled and advised her that she may want to consider use of a on pump inhibitor to prevent any acid reflux symptoms recurring with restarting Mobic.  She had no interest in this today but advised me she would alert me if she gets any increase in acid reflux symptoms.  She initially did not want to complete pulmonary function testing and had no interest in pulmonology referral.  I did discuss starting her on a daily maintenance inhaler to try to prevent COPD exacerbations but she was resistant to using an inhaler daily.  She later was interested in referral to pulmonology but advised me that she did not have time to be seen in the near future.  She can schedule this appointment at her convenience and advised her to alert me if she had any worsening signs or symptoms of COPD.  She can use her albuterol inhaler as needed for cough and congestion. I discussed possible treatment options for her chronic pains likely secondary to osteoarthritis including restarting on Mobic and Robaxin, use of heat/ice, deep topical muscle rub, massage, exercise, physical therapy, chiropractic alignment and use of Tylenol.  She was  interested in an x-ray of cervical spine today as these have been her worst pains and I have placed this order for her.  Robaxin as also helped with insomnia previously and she had no interest in other treatment options such as trazodone we discussed today.  She was interested in restarting on BuSpar 5 mg twice daily a previously did well with this to control anxiety symptoms.  She has minimal allergic rhinitis symptoms currently but would like to restart back on Flonase as symptoms worsen with more outdoor exposure.  She has previously used guaifenesin with codeine cough syrup with COPD exacerbation and tells me this has worked this for her another she has insurance coverage she would like to be on this long-term.  I advised her that I would not prescribe long-term controlled substances for her and that she would need to see a specialist for their expert opinion on this.  I recommended non-controlled options such as Robitussin DM which she has used in the past with improved symptoms although she tells me this does not completely resolve her cough.  I discussed checking labs as above with her past medical history and current signs and symptoms and concerns for rheumatoid arthritis.  After reviewing all of these with her she was in agreement them although she only wanted HIV testing and had no interest in other STD testing at this time.  I congratulated her on cutting down her smoking but highly recommended that she quit completely and recommended trying to quit cold turkey again as she is only smoking about 1-2 cigarettes daily now.  She advised me this may be difficult for her and I discussed the smoking cessation program as a possible support.  She was interested in this referral.  I discussed the risks and benefits of the influenza, pneumococcal 23 and Shingrix vaccines and she was interested in all of these but only wanted received 2 vaccines today and would wait to receive the 3rd.  I recommended she receive  the pneumonia and influenza vaccines today and would return for shingles vaccination.  She was in agreement with this.  I discussed the need for mammogram and Pap smears and she was interested in referral to gynecology for past her pelvic exam and an order for a mammogram.  I placed both of these as requested.  With the above, she will be up-to-date with all health maintenance issues at this time.  I initially discussed seeing her back in 1 month for recheck and to discuss her lab and imaging results but she to this in the recent at 3 month interval at the soonest.  I am happy to see her back sooner if she has any problems.

## 2020-03-03 ENCOUNTER — HOSPITAL ENCOUNTER (OUTPATIENT)
Dept: RADIOLOGY | Facility: CLINIC | Age: 60
Discharge: HOME | End: 2020-03-03
Attending: FAMILY MEDICINE
Payer: COMMERCIAL

## 2020-03-03 DIAGNOSIS — Z12.31 SCREENING MAMMOGRAM, ENCOUNTER FOR: ICD-10-CM

## 2020-03-03 PROCEDURE — 77063 MAMMO DIGITAL SCREENING BILAT WITH TOMOSYNTHESIS_CAD: ICD-10-PCS | Mod: 26,,, | Performed by: RADIOLOGY

## 2020-03-03 PROCEDURE — 77067 SCR MAMMO BI INCL CAD: CPT | Mod: 26,,, | Performed by: RADIOLOGY

## 2020-03-03 PROCEDURE — 77067 MAMMO DIGITAL SCREENING BILAT WITH TOMOSYNTHESIS_CAD: ICD-10-PCS | Mod: 26,,, | Performed by: RADIOLOGY

## 2020-03-03 PROCEDURE — 77067 SCR MAMMO BI INCL CAD: CPT | Mod: TC,PO

## 2020-03-03 PROCEDURE — 77063 BREAST TOMOSYNTHESIS BI: CPT | Mod: 26,,, | Performed by: RADIOLOGY

## 2020-03-04 DIAGNOSIS — D72.820 ATYPICAL LYMPHOCYTOSIS: Primary | ICD-10-CM

## 2020-03-05 ENCOUNTER — OFFICE VISIT (OUTPATIENT)
Dept: PULMONOLOGY | Facility: CLINIC | Age: 60
End: 2020-03-05
Payer: COMMERCIAL

## 2020-03-05 VITALS
OXYGEN SATURATION: 90 % | HEART RATE: 65 BPM | WEIGHT: 151.13 LBS | DIASTOLIC BLOOD PRESSURE: 64 MMHG | BODY MASS INDEX: 27.64 KG/M2 | SYSTOLIC BLOOD PRESSURE: 95 MMHG

## 2020-03-05 DIAGNOSIS — F17.200 TOBACCO DEPENDENCE: Primary | ICD-10-CM

## 2020-03-05 DIAGNOSIS — J18.9 PNEUMONIA OF LEFT LUNG DUE TO INFECTIOUS ORGANISM, UNSPECIFIED PART OF LUNG: ICD-10-CM

## 2020-03-05 DIAGNOSIS — J44.9 CHRONIC OBSTRUCTIVE PULMONARY DISEASE, UNSPECIFIED COPD TYPE: ICD-10-CM

## 2020-03-05 DIAGNOSIS — J44.0 CHRONIC OBSTRUCTIVE PULMONARY DISEASE WITH ACUTE LOWER RESPIRATORY INFECTION: ICD-10-CM

## 2020-03-05 DIAGNOSIS — Z12.9 SCREENING FOR CANCER: ICD-10-CM

## 2020-03-05 PROCEDURE — 99999 PR PBB SHADOW E&M-EST. PATIENT-LVL IV: CPT | Mod: PBBFAC,,, | Performed by: INTERNAL MEDICINE

## 2020-03-05 PROCEDURE — 3008F PR BODY MASS INDEX (BMI) DOCUMENTED: ICD-10-PCS | Mod: CPTII,S$GLB,, | Performed by: INTERNAL MEDICINE

## 2020-03-05 PROCEDURE — 99406 PR TOBACCO USE CESSATION INTERMEDIATE 3-10 MINUTES: ICD-10-PCS | Mod: S$GLB,,, | Performed by: INTERNAL MEDICINE

## 2020-03-05 PROCEDURE — 99406 BEHAV CHNG SMOKING 3-10 MIN: CPT | Mod: S$GLB,,, | Performed by: INTERNAL MEDICINE

## 2020-03-05 PROCEDURE — 99205 PR OFFICE/OUTPT VISIT, NEW, LEVL V, 60-74 MIN: ICD-10-PCS | Mod: 25,S$GLB,, | Performed by: INTERNAL MEDICINE

## 2020-03-05 PROCEDURE — 3008F BODY MASS INDEX DOCD: CPT | Mod: CPTII,S$GLB,, | Performed by: INTERNAL MEDICINE

## 2020-03-05 PROCEDURE — 99999 PR PBB SHADOW E&M-EST. PATIENT-LVL IV: ICD-10-PCS | Mod: PBBFAC,,, | Performed by: INTERNAL MEDICINE

## 2020-03-05 PROCEDURE — 99205 OFFICE O/P NEW HI 60 MIN: CPT | Mod: 25,S$GLB,, | Performed by: INTERNAL MEDICINE

## 2020-03-05 RX ORDER — PREDNISONE 20 MG/1
40 TABLET ORAL DAILY
Qty: 10 TABLET | Refills: 0 | Status: SHIPPED | OUTPATIENT
Start: 2020-03-05 | End: 2022-04-28 | Stop reason: SDUPTHER

## 2020-03-05 RX ORDER — LEVOFLOXACIN 750 MG/1
750 TABLET ORAL DAILY
Qty: 7 TABLET | Refills: 0 | Status: SHIPPED | OUTPATIENT
Start: 2020-03-05 | End: 2020-03-17 | Stop reason: ALTCHOICE

## 2020-03-05 NOTE — PROGRESS NOTES
3/5/2020    Yvette Hutchinson  New Patient Consult    Chief Complaint   Patient presents with    Referral To Pulmonary     Dr. Sutton       HPI: Pt is a 60 yo female with depression, GERD and COPD presenting for new evaluation.  Has chronic cough worse last 2 wks w/ phlegm, white, worse in am and evening.  Inhalers: rescue inhaler says insurance didn't cover  She reports wt gain over last 2 yrs. Has abdominal cramps intermittently radiating to the back.   Smoking since she was very young, 1 pack lasts 3 days.  She gets yearly bronchitis.  Labs from her PCP done 2 days ago are concerning for possible CLL- with WBC 21 and peripheral smear w/ smudge cells    The chief compliant  problem is new to me  PFSH:  Past Medical History:   Diagnosis Date    Arthritis     Depression     GERD (gastroesophageal reflux disease)          Past Surgical History:   Procedure Laterality Date    TONSILLECTOMY       Social History     Tobacco Use    Smoking status: Current Some Day Smoker     Packs/day: 1.00     Types: Cigarettes    Smokeless tobacco: Never Used   Substance Use Topics    Alcohol use: Yes    Drug use: Yes     Types: Marijuana     Family History   Problem Relation Age of Onset    Ovarian cancer Sister     Breast cancer Cousin      Review of patient's allergies indicates:   Allergen Reactions    Diphenhydramine hcl Rash     Patient states it makes her rash worse       Performance Status:The patient's activity level is functions out of house.      Review of Systems:  a review of eleven systems covering constitutional, Eye, HEENT, Psych, Respiratory, Cardiac, GI, , Musculoskeletal, Endocrine, Dermatologic was negative except for pertinent findings as listed ABOVE and below:  Headaches daily for a long time- takes advil & tylenol  wt gain      Exam:Comprehensive exam done. BP 95/64 (BP Location: Left arm, Patient Position: Sitting)   Pulse 65   Wt 68.6 kg (151 lb 2 oz)   SpO2 (!) 90% Comment: on room air at  rest  BMI 27.64 kg/m²   Exam included Vitals as listed, and patient's appearance and affect and alertness and mood, oral exam for yeast and hygiene and pharynx lesions and Mallapatti (M) score, neck with inspection for jvd and masses and thyroid abnormalities and lymph nodes (supraclavicular and infraclavicular nodes and axillary also examined and noted if abn), chest exam included symmetry and effort and fremitus and percussion and auscultation, cardiac exam included rhythm and gallops and murmur and rubs and jvd and edema, abdominal exam for mass and hepatosplenomegaly and tenderness and hernias and bowel sounds, Musculoskeletal exam with muscle tone and posture and mobility/gait and  strength, and skin for rashes and cyanosis and pallor and turgor, extremity for clubbing.  Findings were normal except for pertinent findings listed below:  M1  Scattered macular lesions on arms and hands  Rales and crackles left lower lobe, scattered wheezing bilaterally    Radiographs (ct chest and cxr) reviewed: view by direct vision   CXR 12/19/19- clear lung fields bilaterally    Labs reviewed    Results for AUSTYN AURA TROY (MRN [id number]) as of 3/5/2020 13:18   Ref. Range 3/3/2020 09:56   WBC Latest Ref Range: 3.90 - 12.70 K/uL 20.78 (H)   RBC Latest Ref Range: 4.00 - 5.40 M/uL 4.74   Hemoglobin Latest Ref Range: 12.0 - 16.0 g/dL 14.9   Hematocrit Latest Ref Range: 37.0 - 48.5 % 47.0   MCV Latest Ref Range: 82 - 98 fL 99 (H)   MCH Latest Ref Range: 27.0 - 31.0 pg 31.4 (H)   MCHC Latest Ref Range: 32.0 - 36.0 g/dL 31.7 (L)   RDW Latest Ref Range: 11.5 - 14.5 % 13.1   Platelets Latest Ref Range: 150 - 350 K/uL 269   MPV Latest Ref Range: 9.2 - 12.9 fL 11.9   Gran% Latest Ref Range: 38.0 - 73.0 % 33.0 (L)   Lymph% Latest Ref Range: 18.0 - 48.0 % 64.0 (H)   Mono% Latest Ref Range: 4.0 - 15.0 % 3.0 (L)   Eosinophil% Latest Ref Range: 0.0 - 8.0 % 0.0   Basophil% Latest Ref Range: 0.0 - 1.9 % 0.0   nRBC Latest Ref Range: 0  /100 WBC 0       Pathologist interpretation of peripheral blood smear:   Mild leukocytosis shows absolute and relative lymphocytosis with   cytologic atypia and scattered smudge cells.  The morphology is   concerning for lymphoproliferative disorder such as CLL.  Correlation   with flow cytometric analysis of peripheral blood is recommended for   further evaluation.     But cells appear predominantly normochromic and normocytic with mild   anisocytosis.     Platelets are morphologically unremarkable on scanning.     PFT will be done and results to be reviewed      Plan:  Clinical impression is resonably certain and repeated evaluation prn +/- follow up will be needed as below.  Suspect COPD, recent labs concerning for CLL, exam & clinical hx suspicious for possible pneumonia versus COPD exacerbation    Yvette was seen today for referral to pulmonary.    Diagnoses and all orders for this visit:    Tobacco dependence  -     Ambulatory referral/consult to Smoking Cessation Program; Future    Chronic obstructive pulmonary disease, unspecified COPD type  -     Ambulatory referral/consult to Pulmonology  -     Complete PFT with bronchodilator; Future  -     albuterol sulfate (PROAIR RESPICLICK) 90 mcg/actuation AePB; Inhale 1 Inhaler into the lungs every 4 (four) hours as needed (sob, cough). Rescue  -     fluticasone-umeclidin-vilanter (TRELEGY ELLIPTA) 100-62.5-25 mcg DsDv; Inhale 1 puff into the lungs once daily.    Screening for cancer  -     CT Chest Without Contrast; Future    Pneumonia of left lung due to infectious organism, unspecified part of lung  -     CT Chest Without Contrast; Future  -     levoFLOXacin (LEVAQUIN) 750 MG tablet; Take 1 tablet (750 mg total) by mouth once daily.  -     predniSONE (DELTASONE) 20 MG tablet; Take 2 tablets (40 mg total) by mouth once daily. for 5 days  -     Culture, Respiratory with Gram Stain; Future    Chronic obstructive pulmonary disease with acute lower respiratory  infection        Follow up in about 6 weeks (around 4/16/2020).    Discussed with patient above for education the following:    Risk associated with cigarette smoking and benefits of cessation were discussed with patient in detail for 10 minutes, and cessation encouraged.  Pharmacologic and non-pharmacologic therapeutic options were discussed.    Patient Instructions   Go to the lab and get blood tests  Blood counts from Dr. Sutton's office look concerning for a type of blood cancer called CLL- need more testing to investigate   Give sputum sample at the lab  Start Trelegy inhaler- rinse mouth after use  Albuterol inhaler as needed  Get CT chest   Need to quit smoking- go to cessation program  Take Levaquin and prednisone for exacerbation, possible pneumonia

## 2020-03-05 NOTE — LETTER
March 5, 2020      Hernesto Sutton, DO  [address]  [place] [address]           [facility]  [address]  [place] [address]  Phone: [phone]  Fax: [phone]          Patient: Yvette Hutchinson   MR Number: [id number]   YOB: 1960   Date of Visit: 3/5/2020       Dear Dr. Hernesto Sutton:    Thank you for referring Yvette Hutchinson to me for evaluation. Attached you will find relevant portions of my assessment and plan of care.    If you have questions, please do not hesitate to call me. I look forward to following Yvette Hutchinson along with you.    Sincerely,    Angella Lee MD    Enclosure  CC:  No Recipients    If you would like to receive this communication electronically, please contact [email or web address] or [phone] to request more information on [employer] access.    For providers and/or their staff who would like to refer a patient to [facility], please contact us through our one-stop-shop provider referral line, [facility], at [phone].    If you feel you have received this communication in error or would no longer like to receive these types of communications, please e-mail [email or web address]

## 2020-03-05 NOTE — PATIENT INSTRUCTIONS
Go to the lab and get blood tests  Blood counts from Dr. Sutton's office look concerning for a type of blood cancer called CLL- need more testing to investigate   Give sputum sample at the lab  Start Trelegy inhaler- rinse mouth after use  Albuterol inhaler as needed  Get CT chest   Need to quit smoking- go to cessation program  Take Levaquin and prednisone for exacerbation, possible pneumonia   Was the patient seen in the last year in this department? Yes    Does patient have an active prescription for medications requested? No     Received Request Via: Pharmacy

## 2020-03-06 ENCOUNTER — LAB VISIT (OUTPATIENT)
Dept: LAB | Facility: HOSPITAL | Age: 60
End: 2020-03-06
Attending: FAMILY MEDICINE
Payer: COMMERCIAL

## 2020-03-06 DIAGNOSIS — D72.820 ATYPICAL LYMPHOCYTOSIS: ICD-10-CM

## 2020-03-06 PROCEDURE — 88184 FLOWCYTOMETRY/ TC 1 MARKER: CPT | Performed by: PATHOLOGY

## 2020-03-06 PROCEDURE — 88185 FLOWCYTOMETRY/TC ADD-ON: CPT | Performed by: PATHOLOGY

## 2020-03-06 PROCEDURE — 88189 FLOWCYTOMETRY/READ 16 & >: CPT | Mod: ,,, | Performed by: PATHOLOGY

## 2020-03-06 PROCEDURE — 88189 PR  FLOWCYTOMETRY/READ, 16 & > MARKERS: ICD-10-PCS | Mod: ,,, | Performed by: PATHOLOGY

## 2020-03-09 LAB
FLOW CYTOMETRY ANTIBODIES ANALYZED - BLOOD: NORMAL
FLOW CYTOMETRY COMMENT - BLOOD: NORMAL
FLOW CYTOMETRY INTERPRETATION - BLOOD: NORMAL

## 2020-03-10 ENCOUNTER — TELEPHONE (OUTPATIENT)
Dept: PULMONOLOGY | Facility: CLINIC | Age: 60
End: 2020-03-10

## 2020-03-10 NOTE — TELEPHONE ENCOUNTER
Spoke with patient and let her know sputum culture results showed no infection, patient verbally understood. Patient also stated she didn't get her trellegy filled because it was to expensive.     ----- Message from Angella Lee MD sent at 3/10/2020  3:59 PM CDT -----  Sputum culture shows no infection

## 2020-03-11 ENCOUNTER — HOSPITAL ENCOUNTER (OUTPATIENT)
Dept: PULMONOLOGY | Facility: HOSPITAL | Age: 60
Discharge: HOME | End: 2020-03-11
Attending: INTERNAL MEDICINE
Payer: COMMERCIAL

## 2020-03-11 ENCOUNTER — HOSPITAL ENCOUNTER (OUTPATIENT)
Dept: RADIOLOGY | Facility: HOSPITAL | Age: 60
Discharge: HOME | End: 2020-03-11
Attending: INTERNAL MEDICINE
Payer: COMMERCIAL

## 2020-03-11 DIAGNOSIS — D72.820 ATYPICAL LYMPHOCYTOSIS: Primary | ICD-10-CM

## 2020-03-11 DIAGNOSIS — J44.9 CHRONIC OBSTRUCTIVE PULMONARY DISEASE, UNSPECIFIED COPD TYPE: ICD-10-CM

## 2020-03-11 DIAGNOSIS — Z12.9 SCREENING FOR CANCER: ICD-10-CM

## 2020-03-11 DIAGNOSIS — J18.9 PNEUMONIA OF LEFT LUNG DUE TO INFECTIOUS ORGANISM, UNSPECIFIED PART OF LUNG: ICD-10-CM

## 2020-03-11 PROCEDURE — 94727 GAS DIL/WSHOT DETER LNG VOL: CPT

## 2020-03-11 PROCEDURE — 94060 EVALUATION OF WHEEZING: CPT | Mod: 26,,, | Performed by: INTERNAL MEDICINE

## 2020-03-11 PROCEDURE — 94729 PR C02/MEMBANE DIFFUSE CAPACITY: ICD-10-PCS | Mod: 26,,, | Performed by: INTERNAL MEDICINE

## 2020-03-11 PROCEDURE — 71250 CT THORAX DX C-: CPT | Mod: TC

## 2020-03-11 PROCEDURE — 94060 EVALUATION OF WHEEZING: CPT

## 2020-03-11 PROCEDURE — 71250 CT THORAX DX C-: CPT | Mod: 26,,, | Performed by: RADIOLOGY

## 2020-03-11 PROCEDURE — 94729 DIFFUSING CAPACITY: CPT

## 2020-03-11 PROCEDURE — 94729 DIFFUSING CAPACITY: CPT | Mod: 26,,, | Performed by: INTERNAL MEDICINE

## 2020-03-11 PROCEDURE — 94060 PR EVAL OF BRONCHOSPASM: ICD-10-PCS | Mod: 26,,, | Performed by: INTERNAL MEDICINE

## 2020-03-11 PROCEDURE — 71250 CT CHEST WITHOUT CONTRAST: ICD-10-PCS | Mod: 26,,, | Performed by: RADIOLOGY

## 2020-03-11 PROCEDURE — 94727 GAS DIL/WSHOT DETER LNG VOL: CPT | Mod: 26,,, | Performed by: INTERNAL MEDICINE

## 2020-03-11 PROCEDURE — 94727 PR PULM FUNCTION TEST BY GAS: ICD-10-PCS | Mod: 26,,, | Performed by: INTERNAL MEDICINE

## 2020-03-12 ENCOUNTER — TELEPHONE (OUTPATIENT)
Dept: PULMONOLOGY | Facility: CLINIC | Age: 60
End: 2020-03-12

## 2020-03-12 LAB
BRPFT: ABNORMAL
DLCO ADJ PRE: 12.67 ML/(MIN*MMHG) (ref 16.39–27.85)
DLCO SINGLE BREATH LLN: 16.39
DLCO SINGLE BREATH PRE REF: 57.3 %
DLCO SINGLE BREATH REF: 22.12
DLCOC SBVA LLN: 3.1
DLCOC SBVA PRE REF: 81 %
DLCOC SBVA REF: 4.64
DLCOC SINGLE BREATH LLN: 16.39
DLCOC SINGLE BREATH PRE REF: 57.3 %
DLCOC SINGLE BREATH REF: 22.12
DLCOVA LLN: 3.1
DLCOVA PRE REF: 81 %
DLCOVA PRE: 3.76 ML/(MIN*MMHG*L) (ref 3.1–6.18)
DLCOVA REF: 4.64
DLVAADJ PRE: 3.76 ML/(MIN*MMHG*L) (ref 3.1–6.18)
ERVN2 LLN: 0.79
ERVN2 PRE REF: 65.1 %
ERVN2 PRE: 0.51 L (ref 0.79–0.79)
ERVN2 REF: 0.79
FEF 25 75 CHG: 15 %
FEF 25 75 LLN: 1.12
FEF 25 75 POST REF: 19.9 %
FEF 25 75 PRE REF: 17.3 %
FEF 25 75 REF: 2.19
FET100 CHG: 2.6 %
FEV1 CHG: 9.2 %
FEV1 FVC CHG: 0.4 %
FEV1 FVC LLN: 67
FEV1 FVC POST REF: 67.8 %
FEV1 FVC PRE REF: 67.5 %
FEV1 FVC REF: 79
FEV1 LLN: 1.81
FEV1 POST REF: 49.2 %
FEV1 PRE REF: 45.1 %
FEV1 REF: 2.4
FRCN2 LLN: 1.82
FRCN2 PRE REF: 105.5 %
FRCN2 REF: 2.64
FVC CHG: 8.7 %
FVC LLN: 2.3
FVC POST REF: 72.2 %
FVC PRE REF: 66.4 %
FVC REF: 3.04
IVC PRE: 1.94 L (ref 2.3–3.78)
IVC SINGLE BREATH LLN: 2.3
IVC SINGLE BREATH PRE REF: 63.7 %
IVC SINGLE BREATH REF: 3.04
MVV LLN: 75
MVV PRE REF: 49.5 %
MVV REF: 89
PEF CHG: 3.8 %
PEF LLN: 4.48
PEF POST REF: 53.8 %
PEF PRE REF: 51.8 %
PEF REF: 6.14
POST FEF 25 75: 0.44 L/S (ref 1.12–3.27)
POST FET 100: 11.81 SEC
POST FEV1 FVC: 53.81 % (ref 67.29–91.51)
POST FEV1: 1.18 L (ref 1.81–2.99)
POST FVC: 2.19 L (ref 2.3–3.78)
POST PEF: 3.3 L/S (ref 4.48–7.8)
PRE DLCO: 12.67 ML/(MIN*MMHG) (ref 16.39–27.85)
PRE FEF 25 75: 0.38 L/S (ref 1.12–3.27)
PRE FET 100: 11.51 SEC
PRE FEV1 FVC: 53.59 % (ref 67.29–91.51)
PRE FEV1: 1.08 L (ref 1.81–2.99)
PRE FRC N2: 2.79 L
PRE FVC: 2.02 L (ref 2.3–3.78)
PRE MVV: 44 L/MIN (ref 75.48–102.12)
PRE PEF: 3.18 L/S (ref 4.48–7.8)
RVN2 LLN: 1.28
RVN2 PRE REF: 122.6 %
RVN2 PRE: 2.27 L (ref 1.28–2.43)
RVN2 REF: 1.86
RVN2TLCN2 LLN: 29.77
RVN2TLCN2 PRE REF: 124.7 %
RVN2TLCN2 PRE: 49.09 % (ref 29.77–48.95)
RVN2TLCN2 REF: 39.36
TLCN2 LLN: 3.78
TLCN2 PRE REF: 97.1 %
TLCN2 PRE: 4.63 L (ref 3.78–5.76)
TLCN2 REF: 4.77
VA PRE: 3.37 L (ref 4.62–4.62)
VA SINGLE BREATH LLN: 4.62
VA SINGLE BREATH PRE REF: 73 %
VA SINGLE BREATH REF: 4.62
VCMAXN2 LLN: 2.3
VCMAXN2 PRE REF: 77.7 %
VCMAXN2 PRE: 2.36 L (ref 2.3–3.78)
VCMAXN2 REF: 3.04

## 2020-03-12 NOTE — TELEPHONE ENCOUNTER
Spoke with patient about PFT results, patient verbally understood.     ----- Message from Angella Lee MD sent at 3/12/2020  1:29 PM CDT -----  PFTs show obstruction which is consistent with diagnosis of COPD

## 2020-03-16 ENCOUNTER — TELEPHONE (OUTPATIENT)
Dept: HEMATOLOGY/ONCOLOGY | Facility: CLINIC | Age: 60
End: 2020-03-16

## 2020-03-16 NOTE — TELEPHONE ENCOUNTER
Spoke with patient at length to schedule new patient consult.  Patient reports she is fearful of her diagnosis.  Due to COVID-19 precautions and acuity of DX, per Dr. Lee, patient can be seen on 5/4/2020.  I attempted to alleviate patients anxiety with this information and informed her that should things be all clear before May, she can contact me and we can get her in to see provider sooner.  Patient verbalized understanding.  No further questions at this time.

## 2020-03-17 ENCOUNTER — TELEPHONE (OUTPATIENT)
Dept: PULMONOLOGY | Facility: CLINIC | Age: 60
End: 2020-03-17

## 2020-03-17 ENCOUNTER — OFFICE VISIT (OUTPATIENT)
Dept: FAMILY MEDICINE | Facility: CLINIC | Age: 60
End: 2020-03-17
Payer: COMMERCIAL

## 2020-03-17 VITALS
HEIGHT: 62 IN | OXYGEN SATURATION: 94 % | HEART RATE: 72 BPM | BODY MASS INDEX: 28.07 KG/M2 | DIASTOLIC BLOOD PRESSURE: 72 MMHG | SYSTOLIC BLOOD PRESSURE: 116 MMHG | WEIGHT: 152.56 LBS | TEMPERATURE: 98 F

## 2020-03-17 DIAGNOSIS — F41.9 ANXIETY: ICD-10-CM

## 2020-03-17 DIAGNOSIS — D72.820 ATYPICAL LYMPHOCYTOSIS: Primary | ICD-10-CM

## 2020-03-17 DIAGNOSIS — R05.3 CHRONIC COUGH: ICD-10-CM

## 2020-03-17 PROCEDURE — 99999 PR PBB SHADOW E&M-EST. PATIENT-LVL III: ICD-10-PCS | Mod: PBBFAC,,, | Performed by: FAMILY MEDICINE

## 2020-03-17 PROCEDURE — 3008F PR BODY MASS INDEX (BMI) DOCUMENTED: ICD-10-PCS | Mod: CPTII,S$GLB,, | Performed by: FAMILY MEDICINE

## 2020-03-17 PROCEDURE — 99214 PR OFFICE/OUTPT VISIT, EST, LEVL IV, 30-39 MIN: ICD-10-PCS | Mod: S$GLB,,, | Performed by: FAMILY MEDICINE

## 2020-03-17 PROCEDURE — 3008F BODY MASS INDEX DOCD: CPT | Mod: CPTII,S$GLB,, | Performed by: FAMILY MEDICINE

## 2020-03-17 PROCEDURE — 99214 OFFICE O/P EST MOD 30 MIN: CPT | Mod: S$GLB,,, | Performed by: FAMILY MEDICINE

## 2020-03-17 PROCEDURE — 99999 PR PBB SHADOW E&M-EST. PATIENT-LVL III: CPT | Mod: PBBFAC,,, | Performed by: FAMILY MEDICINE

## 2020-03-17 RX ORDER — BENZONATATE 200 MG/1
200 CAPSULE ORAL 3 TIMES DAILY PRN
Qty: 30 CAPSULE | Refills: 3 | Status: SHIPPED | OUTPATIENT
Start: 2020-03-17 | End: 2020-05-19 | Stop reason: ALTCHOICE

## 2020-03-17 RX ORDER — HYDROXYZINE HYDROCHLORIDE 50 MG/1
50 TABLET, FILM COATED ORAL 4 TIMES DAILY PRN
Qty: 360 TABLET | Refills: 3 | Status: SHIPPED | OUTPATIENT
Start: 2020-03-17 | End: 2020-05-19 | Stop reason: SDUPTHER

## 2020-03-17 NOTE — PROGRESS NOTES
"Subjective:       Patient ID: Yvette Hutchinson is a 59 y.o. female.    Chief Complaint: Results    HPI   Patient presents to discuss her recent workup for atypical lymphocytosis and for shoes with anxiety.  She tells me she discussed CLL briefly with pulmonology and also with someone from Oncology yesterday on the phone when she wanted a sooner appointment than 05/04/2020 that she was offered.  She tells me she was advised that Dr. Lee had reviewed her chart and treatment was not necessary at this time but she is unsure why and has been very anxious about this.  She has not had any problems with panic attacks but has been generally more anxious and has had difficulty falling asleep last night.  She tells me BuSpar initially seem to help some but now it does nothing for her.  She is interested in increasing her dose and possibly using something for sleep.  She initially advised me she had no other concerns or complaints today and was doing okay but later in the visit advised me use of over-the-counter cough suppressants suggested by pulmonology as needed for her chronic cough was not working and she requested an unknown prescription cough suppressant she has used before with good results.  Reviewing her past medical record she has used Tessalon Perles with good results and without any adverse effects.    Review of Systems      Objective:      Vitals:    03/17/20 1048   BP: 116/72   Pulse: 72   Temp: 98.2 °F (36.8 °C)   SpO2: (!) 94%   Weight: 69.2 kg (152 lb 8.9 oz)   Height: 5' 2" (1.575 m)   PainSc:   7   PainLoc: Neck     Physical Exam   Constitutional: She is oriented to person, place, and time. Vital signs are normal. She appears well-developed and well-nourished. She is cooperative.  Non-toxic appearance. She does not have a sickly appearance. She does not appear ill. No distress.   No cough during today's visit.   HENT:   Head: Normocephalic and atraumatic.   Cardiovascular: Normal rate, regular " rhythm and normal heart sounds. Exam reveals no gallop and no friction rub.   No murmur heard.  Pulmonary/Chest: Effort normal and breath sounds normal. No respiratory distress. She has no wheezes. She exhibits no tenderness.   Musculoskeletal: Normal range of motion. She exhibits no edema, tenderness or deformity.   Lymphadenopathy:     She has no cervical adenopathy.     She has no axillary adenopathy.        Right: No supraclavicular adenopathy present.        Left: No supraclavicular adenopathy present.   Neurological: She is alert and oriented to person, place, and time.   Skin: Skin is warm and dry. She is not diaphoretic.   Psychiatric: Her speech is normal and behavior is normal. Judgment and thought content normal. Her mood appears anxious. Cognition and memory are normal.   Nursing note and vitals reviewed.        Assessment:       1. Atypical lymphocytosis    2. Chronic cough    3. Anxiety          Plan:   Atypical lymphocytosis    Chronic cough  -     benzonatate (TESSALON) 200 MG capsule; Take 1 capsule (200 mg total) by mouth 3 (three) times daily as needed for Cough.  Dispense: 30 capsule; Refill: 3    Anxiety  -     hydrOXYzine (ATARAX) 50 MG tablet; Take 1 tablet (50 mg total) by mouth 4 (four) times daily as needed for Anxiety.  Dispense: 360 tablet; Refill: 3      Follow up in about 11 weeks (around 6/2/2020), or if symptoms worsen or fail to improve.    Yvette appears to be stable today and I suspect her recent imaging findings suggestive of possible pneumonia were actually secondary to COPD exacerbation as her lungs remain clear.  She has completed antibiotics and continues to have a chronic but sporadic dry cough.  She wanted to be placed back on antibiotics and steroids for this today and advised her that this was not indicated.  She has had difficulty controlling her cough using over-the-counter products such as Delsym more Mucinex and I discussed the risks and benefits of Tessalon Perles  and agree to provide her with refills of this.  She can use these as needed but I discussed signs and symptoms of COPD exacerbation advised her she would have to alert myself for pulmonology immediately if symptoms occur.  We had a long discussion today about CLL and SLL I provided her with a patient handout from up-to-date and reviewed this with her.  She does not have any concerning findings on physical exam or recent labs to suggest immediate treatment necessary and advised her that establishing with Hematology/Oncology on 05/04/2020 would be acceptable.  After reviewing the likely course of CLL and her past lab values she appeared more calm but was still interested in increasing her dosing of BuSpar and possibly using something to help her fall asleep although she admits she believes the problem is secondary to anxiety.  With this recent significant stressor I did discuss use of high risk agents such as benzodiazepines but she was resistant to using any controlled or possibly addictive substance even as needed for her worst symptoms.  I advised her to increase her BuSpar to 10 mg 3 times daily as needed and if in 1 week symptoms are still not controlled and she had no adverse effects with this she can increase to 15 mg 3 times daily as needed.  She has enough the medication to do this and will call the office in 2 weeks to let me know how this works for her.  I can not provide her with refills on the medication at the appropriate dose at that time.  I discussed treatment options for insomnia including melatonin, valerian root, and trazodone but she was resistant to use these or any agents used to treat depression.  I discussed use of Atarax which can be somewhat sedating and is also used to treat anxiety and she advised me if increasing BuSpar did not work she would be willing to try Atarax.  I advised her that this medication could be used for anxiety during the day as well if it was not significantly sedating.   She will try the medication only at night if necessary and if used well informed me of how sedating medication was and if it worked for insomnia when she calls our office in 2 weeks.  She was very resistant to returning for a checkup sooner than 3 month interval with corona virus and other upper respiratory infections prevalent in her immunocompromised state.  I am fine with this and advised her to alert me if any symptoms worsen or new present before her two week phone call and I am happy to see her back sooner than 3 month interval if necessary.    Yvette was seen for a 40 minute appointment today and greater than half this time was spent counseling on the above.

## 2020-03-17 NOTE — TELEPHONE ENCOUNTER
Spoke with patient about CT results, patient verbally understood.     ----- Message from Angella Lee MD sent at 3/16/2020  4:20 PM CDT -----  2 lung nodules don't look too concerning. Should do a repeat CT in 6 months.

## 2020-03-18 ENCOUNTER — TELEPHONE (OUTPATIENT)
Dept: PULMONOLOGY | Facility: CLINIC | Age: 60
End: 2020-03-18

## 2020-03-18 RX ORDER — TIOTROPIUM BROMIDE 18 UG/1
18 CAPSULE ORAL; RESPIRATORY (INHALATION) DAILY
Qty: 30 CAPSULE | Refills: 11 | Status: SHIPPED | OUTPATIENT
Start: 2020-03-18 | End: 2020-07-15

## 2020-03-18 RX ORDER — FLUTICASONE PROPIONATE AND SALMETEROL 100; 50 UG/1; UG/1
1 POWDER RESPIRATORY (INHALATION) 2 TIMES DAILY
Qty: 1 EACH | Refills: 11 | Status: SHIPPED | OUTPATIENT
Start: 2020-03-18 | End: 2020-10-21 | Stop reason: ALTCHOICE

## 2020-03-18 NOTE — TELEPHONE ENCOUNTER
Let patient know her inhalers were changed.     ----- Message from Angella Lee MD sent at 3/18/2020  3:52 PM CDT -----  Regarding: RE: Alf  I changed her inhalers to advair and Spiriva, hopefully will be less expensive.   ----- Message -----  From: Brigid Tamez MA  Sent: 3/12/2020   1:54 PM CDT  To: Angella Lee MD  Subject: Trellegy                                         Patient called in wanting to let us know the trellegy was to expensive so she didn't have it filled. Is there something else you could call in for her?

## 2020-03-19 ENCOUNTER — CLINICAL SUPPORT (OUTPATIENT)
Dept: SMOKING CESSATION | Facility: CLINIC | Age: 60
End: 2020-03-19
Payer: COMMERCIAL

## 2020-03-19 DIAGNOSIS — F17.200 NICOTINE DEPENDENCE: Primary | ICD-10-CM

## 2020-03-19 PROCEDURE — 99999 PR PBB SHADOW E&M-EST. PATIENT-LVL I: ICD-10-PCS | Mod: PBBFAC,,,

## 2020-03-19 PROCEDURE — 99404 PREV MED CNSL INDIV APPRX 60: CPT | Mod: S$GLB,,,

## 2020-03-19 PROCEDURE — 99404 PR PREVENT COUNSEL,INDIV,60 MIN: ICD-10-PCS | Mod: S$GLB,,,

## 2020-03-19 PROCEDURE — 99999 PR PBB SHADOW E&M-EST. PATIENT-LVL I: CPT | Mod: PBBFAC,,,

## 2020-03-19 RX ORDER — IBUPROFEN 200 MG
1 TABLET ORAL DAILY
Qty: 14 PATCH | Refills: 0 | Status: SHIPPED | OUTPATIENT
Start: 2020-03-19 | End: 2020-04-14 | Stop reason: SDUPTHER

## 2020-03-19 NOTE — Clinical Note
Patient stated that she had cut down in the last month or 2 while using the nicotine patch, and she is back to smoking g20 cigarettes per day now. She states that stress and anxiety have been part of her slip backwards. She is ready to quit for health and the financial benefit. She will begin her tobacco cessation regimen of nicotine patch 21 mg QD. She will follow up in the clinic on 4/2/2020.

## 2020-04-02 ENCOUNTER — TELEPHONE (OUTPATIENT)
Dept: SMOKING CESSATION | Facility: CLINIC | Age: 60
End: 2020-04-02

## 2020-04-02 NOTE — TELEPHONE ENCOUNTER
I called patient for tobacco cessation follow up and she stated that she was on the other line with something important and asked that I call her back later.

## 2020-04-14 ENCOUNTER — PATIENT MESSAGE (OUTPATIENT)
Dept: PULMONOLOGY | Facility: CLINIC | Age: 60
End: 2020-04-14

## 2020-04-14 DIAGNOSIS — F17.200 NICOTINE DEPENDENCE: ICD-10-CM

## 2020-04-14 RX ORDER — IBUPROFEN 200 MG
1 TABLET ORAL DAILY
Qty: 14 PATCH | Refills: 0 | Status: SHIPPED | OUTPATIENT
Start: 2020-04-14 | End: 2021-09-07

## 2020-04-15 ENCOUNTER — OFFICE VISIT (OUTPATIENT)
Dept: PULMONOLOGY | Facility: CLINIC | Age: 60
End: 2020-04-15
Payer: COMMERCIAL

## 2020-04-15 DIAGNOSIS — J44.1 CHRONIC OBSTRUCTIVE PULMONARY DISEASE WITH ACUTE EXACERBATION: Primary | ICD-10-CM

## 2020-04-15 PROBLEM — J18.9 PNEUMONIA OF LEFT LUNG DUE TO INFECTIOUS ORGANISM: Status: RESOLVED | Noted: 2020-03-05 | Resolved: 2020-04-15

## 2020-04-15 PROCEDURE — 99214 OFFICE O/P EST MOD 30 MIN: CPT | Mod: 95,,, | Performed by: NURSE PRACTITIONER

## 2020-04-15 PROCEDURE — 99214 PR OFFICE/OUTPT VISIT, EST, LEVL IV, 30-39 MIN: ICD-10-PCS | Mod: 95,,, | Performed by: NURSE PRACTITIONER

## 2020-04-15 RX ORDER — CODEINE PHOSPHATE AND GUAIFENESIN 10; 100 MG/5ML; MG/5ML
5 SOLUTION ORAL EVERY 4 HOURS PRN
Qty: 300 ML | Refills: 0 | Status: SHIPPED | OUTPATIENT
Start: 2020-04-15 | End: 2020-04-25

## 2020-04-15 RX ORDER — IPRATROPIUM BROMIDE AND ALBUTEROL SULFATE 2.5; .5 MG/3ML; MG/3ML
3 SOLUTION RESPIRATORY (INHALATION) EVERY 6 HOURS PRN
Qty: 120 VIAL | Refills: 5 | Status: SHIPPED | OUTPATIENT
Start: 2020-04-15 | End: 2022-07-21 | Stop reason: SDUPTHER

## 2020-04-15 RX ORDER — PREDNISONE 20 MG/1
TABLET ORAL
Qty: 9 TABLET | Refills: 0 | Status: SHIPPED | OUTPATIENT
Start: 2020-04-15 | End: 2020-05-19 | Stop reason: ALTCHOICE

## 2020-04-15 RX ORDER — AZITHROMYCIN 500 MG/1
500 TABLET, FILM COATED ORAL DAILY
Qty: 3 TABLET | Refills: 0 | Status: SHIPPED | OUTPATIENT
Start: 2020-04-15 | End: 2020-04-18

## 2020-04-15 NOTE — PROGRESS NOTES
4/15/2020    Yvette Hutchinson  Office note    Chief Complaint   Patient presents with    Cough       HPI:   4/15/2020- cough- onset weeks, associated with occasional chest tighness, worse at night and early morning, quarter size clear to light brown in color. Currently    advair, spiriva, and rescue inhaler. States using rescue daily with little benefit.      3/5/2020- Pt is a 60 yo female with depression, GERD and COPD presenting for new evaluation.  Has chronic cough worse last 2 wks w/ phlegm, white, worse in am and evening.  Inhalers: rescue inhaler says insurance didn't cover  She reports wt gain over last 2 yrs. Has abdominal cramps intermittently radiating to the back.   Smoking since she was very young, 1 pack lasts 3 days.  She gets yearly bronchitis.  Labs from her PCP done 2 days ago are concerning for possible CLL- with WBC 21 and peripheral smear w/ smudge cells    The chief compliant  problem is new to me  PFSH:  Past Medical History:   Diagnosis Date    Arthritis     Depression     GERD (gastroesophageal reflux disease)     Pneumonia of left lung due to infectious organism 3/5/2020         Past Surgical History:   Procedure Laterality Date    TONSILLECTOMY       Social History     Tobacco Use    Smoking status: Current Some Day Smoker     Packs/day: 1.00     Types: Cigarettes    Smokeless tobacco: Never Used   Substance Use Topics    Alcohol use: Yes    Drug use: Yes     Types: Marijuana     Family History   Problem Relation Age of Onset    Ovarian cancer Sister     Breast cancer Cousin      Review of patient's allergies indicates:   Allergen Reactions    Diphenhydramine hcl Rash     Patient states it makes her rash worse       Performance Status:The patient's activity level is functions out of house.      Review of Systems:  a review of eleven systems covering constitutional, Eye, HEENT, Psych, Respiratory, Cardiac, GI, , Musculoskeletal, Endocrine, Dermatologic was negative  except for pertinent findings as listed ABOVE and below:  Headaches daily for a long time- takes advil & tylenol  Cough, chest tightness      Exam: exam done. There were no vitals taken for this visit.  Exam included Vitals as listed,   Constitutional:  oriented to person, place, and time.  appears well-developed. No distress.   Nose: Nose normal.     Eyes: Pupils are equal, round,   Neck: No JVD present. No thyromegaly visible     Pulmonary/Chest: Effort normal and audible breath sounds normal. No accessory muscle usage or stridor. No apnea and no tachypnea. No respiratory distress,     Musculoskeletal: Normal range of motion.     Neurological:  alert and oriented to person, place, and time. not disoriented.     Psychiatric: normal mood and affect. behavior is normal. Judgment and thought content normal.           Radiographs (ct chest and cxr) reviewed: view by direct vision   CT Chest Without Contrast 03/11/2020   1. There are two right lung pulmonary nodules.  Per LUNG-RADS scoring this would reflect a Category 3 (probably benign).  Recommendation is for 6 month follow-up LDCT.  2. Airways disease or apical typical infection in the left lower lobe.       CXR 12/19/19- clear lung fields bilaterally     Labs reviewed    Lab Results   Component Value Date    WBC 20.78 (H) 03/03/2020    RBC 4.74 03/03/2020    HGB 14.9 03/03/2020    HCT 47.0 03/03/2020    MCV 99 (H) 03/03/2020    MCH 31.4 (H) 03/03/2020    MCHC 31.7 (L) 03/03/2020    RDW 13.1 03/03/2020     03/03/2020    MPV 11.9 03/03/2020    GRAN 33.0 (L) 03/03/2020    LYMPH 64.0 (H) 03/03/2020    MONO 3.0 (L) 03/03/2020    EOS CANCELED 08/18/2017    BASO CANCELED 08/18/2017    EOSINOPHIL 0.0 03/03/2020    BASOPHIL 0.0 03/03/2020         Pathologist interpretation of peripheral blood smear:   Mild leukocytosis shows absolute and relative lymphocytosis with   cytologic atypia and scattered smudge cells.  The morphology is   concerning for lymphoproliferative  disorder such as CLL.  Correlation   with flow cytometric analysis of peripheral blood is recommended for   further evaluation.     But cells appear predominantly normochromic and normocytic with mild   anisocytosis.     Platelets are morphologically unremarkable on scanning.     PFT reviewed    3/12/2020 Severe obstruction. FEV1  49 %  predicted.   Airflow is not clearly improved after bronchodilator. Clinical improvement following bronchodilator therapy may occur in the absence of spirometric improvement.   Mild Hyperinflation.   Moderate reduction in diffusion capacity - unadjusted for hemoglobin.     Plan:  Clinical impression is resonably certain and repeated evaluation prn +/- follow up will be needed as below.  Suspect COPD, Bronchiectasis  Yvette was seen today for cough.    Diagnoses and all orders for this visit:    Chronic obstructive pulmonary disease with acute exacerbation  -     NEBULIZER FOR HOME USE  -     albuterol-ipratropium (DUO-NEB) 2.5 mg-0.5 mg/3 mL nebulizer solution; Take 3 mLs by nebulization every 6 (six) hours as needed for Wheezing. Rescue  -     guaifenesin-codeine 100-10 mg/5 ml (TUSSI-ORGANIDIN NR)  mg/5 mL syrup; Take 5 mLs by mouth every 4 (four) hours as needed.  -     azithromycin (ZITHROMAX) 500 MG tablet; Take 1 tablet (500 mg total) by mouth once daily. for 3 days  -     predniSONE (DELTASONE) 20 MG tablet; Take one pill a day for three days, repeat for shortness of breath        Follow up in about 3 months (around 7/15/2020), or if symptoms worsen or fail to improve.    Discussed with patient above for education the following:    Risk associated with cigarette smoking and benefits of cessation were discussed with patient in detail for 10 minutes, and cessation encouraged.  Pharmacologic and non-pharmacologic therapeutic options were discussed.    Patient Instructions   COPD exacerbation Action plan    Azithromycin 500 mg 1 pill for three days for yellow or green  mucous    Prednisone 20 mg pills, Take one pill a day for three days, repeat for shortness of breath or wheeze    Albuterol Inhaler 1-2 puffs every 4 hours, for cough or shortness of breath    Duo neb nebulizer every 6 hours as needed for cough, chest tightness, wheeze, or shortness of breath    Percussion therapy instruction  Do breathing treatments 2 x a day followed by 10 minutes of laying face down on cough or bed with 2-3 pillows under stomach. Make sure someone else is home in case you become dizzy.  Have someone beat on upper back or use hand held massager on back    Cough syrup take as needed to control cough to allow you to rest         The patient location is: home  The chief complaint leading to consultation is: cough  Visit type: audiovisual  Total time spent with patient: 30 min  Each patient to whom he or she provides medical services by telemedicine is:  (1) informed of the relationship between the physician and patient and the respective role of any other health care provider with respect to management of the patient; and (2) notified that he or she may decline to receive medical services by telemedicine and may withdraw from such care at any time.    Notes:     .

## 2020-04-16 ENCOUNTER — TELEPHONE (OUTPATIENT)
Dept: PULMONOLOGY | Facility: CLINIC | Age: 60
End: 2020-04-16

## 2020-04-16 NOTE — TELEPHONE ENCOUNTER
Orders for nebulizer for home use sent to [facility]. Sent to [facility]. Insurance rejected.

## 2020-04-16 NOTE — PATIENT INSTRUCTIONS
COPD exacerbation Action plan    Azithromycin 500 mg 1 pill for three days for yellow or green mucous    Prednisone 20 mg pills, Take one pill a day for three days, repeat for shortness of breath or wheeze    Albuterol Inhaler 1-2 puffs every 4 hours, for cough or shortness of breath    Duo neb nebulizer every 6 hours as needed for cough, chest tightness, wheeze, or shortness of breath    Percussion therapy instruction  Do breathing treatments 2 x a day followed by 10 minutes of laying face down on cough or bed with 2-3 pillows under stomach. Make sure someone else is home in case you become dizzy.  Have someone beat on upper back or use hand held massager on back    Cough syrup take as needed to control cough to allow you to rest

## 2020-05-04 ENCOUNTER — OFFICE VISIT (OUTPATIENT)
Dept: HEMATOLOGY/ONCOLOGY | Facility: CLINIC | Age: 60
End: 2020-05-04
Payer: COMMERCIAL

## 2020-05-04 VITALS
HEART RATE: 69 BPM | DIASTOLIC BLOOD PRESSURE: 61 MMHG | TEMPERATURE: 99 F | SYSTOLIC BLOOD PRESSURE: 97 MMHG | OXYGEN SATURATION: 96 % | WEIGHT: 160.69 LBS | BODY MASS INDEX: 28.47 KG/M2 | HEIGHT: 63 IN | RESPIRATION RATE: 18 BRPM

## 2020-05-04 DIAGNOSIS — D72.820 ATYPICAL LYMPHOCYTOSIS: ICD-10-CM

## 2020-05-04 DIAGNOSIS — J44.1 CHRONIC OBSTRUCTIVE PULMONARY DISEASE WITH ACUTE EXACERBATION: ICD-10-CM

## 2020-05-04 DIAGNOSIS — C91.10 CLL (CHRONIC LYMPHOCYTIC LEUKEMIA): ICD-10-CM

## 2020-05-04 DIAGNOSIS — G89.4 CHRONIC PAIN SYNDROME: Primary | ICD-10-CM

## 2020-05-04 PROCEDURE — 99204 PR OFFICE/OUTPT VISIT, NEW, LEVL IV, 45-59 MIN: ICD-10-PCS | Mod: S$GLB,,, | Performed by: INTERNAL MEDICINE

## 2020-05-04 PROCEDURE — 99999 PR PBB SHADOW E&M-EST. PATIENT-LVL V: ICD-10-PCS | Mod: PBBFAC,,, | Performed by: INTERNAL MEDICINE

## 2020-05-04 PROCEDURE — 3008F PR BODY MASS INDEX (BMI) DOCUMENTED: ICD-10-PCS | Mod: CPTII,S$GLB,, | Performed by: INTERNAL MEDICINE

## 2020-05-04 PROCEDURE — 3008F BODY MASS INDEX DOCD: CPT | Mod: CPTII,S$GLB,, | Performed by: INTERNAL MEDICINE

## 2020-05-04 PROCEDURE — 99999 PR PBB SHADOW E&M-EST. PATIENT-LVL V: CPT | Mod: PBBFAC,,, | Performed by: INTERNAL MEDICINE

## 2020-05-04 PROCEDURE — 99204 OFFICE O/P NEW MOD 45 MIN: CPT | Mod: S$GLB,,, | Performed by: INTERNAL MEDICINE

## 2020-05-04 NOTE — PROGRESS NOTES
Subjective:       Patient ID: Yvette Hutchinson is a 59 y.o. female.    Chief Complaint:  Sent in consultation for evaluation of lymphocytosis which has been documented for at least 3 years since 2017 in our system    HPI  Patient has chronic complains of chronic pain syndrome and COPD for which periodically she take steroids she is also on BuSpar has issues anxiety has required dilatation of esophageal strictures allergic rhinitis insomnia and history of B12 deficiency documented  Social history; patient is a smoker denies recreational alcohol use or drug use has support in the family daughter is on the phone during our clinic visit    Family history suggestive of ovarian and breast cancer patient advised to see a  or seek   Review of patient's allergies indicates:  No Active Allergies  Medications have been reviewed  Current Outpatient Medications on File Prior to Visit   Medication Sig Dispense Refill    albuterol sulfate (PROAIR RESPICLICK) 90 mcg/actuation AePB Inhale 1 Inhaler into the lungs every 4 (four) hours as needed (sob, cough). Rescue 1 each 11    albuterol-ipratropium (DUO-NEB) 2.5 mg-0.5 mg/3 mL nebulizer solution Take 3 mLs by nebulization every 6 (six) hours as needed for Wheezing. Rescue 120 vial 5    busPIRone (BUSPAR) 5 MG Tab Take 1 tablet (5 mg total) by mouth 2 (two) times daily. 180 tablet 3    fluticasone propionate (FLONASE) 50 mcg/actuation nasal spray 1 spray (50 mcg total) by Each Nostril route once daily. 16 g 3    fluticasone-salmeterol diskus inhaler 100-50 mcg Inhale 1 puff into the lungs 2 (two) times daily. Controller 1 each 11    hydrOXYzine (ATARAX) 50 MG tablet Take 1 tablet (50 mg total) by mouth 4 (four) times daily as needed for Anxiety. 360 tablet 3    meloxicam (MOBIC) 7.5 MG tablet Take 1 tablet (7.5 mg total) by mouth once daily. 90 tablet 3    methocarbamol (ROBAXIN) 750 MG Tab Take 1 tablet (750 mg total) by mouth 4 (four) times daily  as needed. 120 tablet 0    nicotine (NICODERM CQ) 21 mg/24 hr Place 1 patch onto the skin once daily. 14 patch 0    predniSONE (DELTASONE) 20 MG tablet Take one pill a day for three days, repeat for shortness of breath 9 tablet 0    tiotropium (SPIRIVA WITH HANDIHALER) 18 mcg inhalation capsule Inhale 1 capsule (18 mcg total) into the lungs once daily. Controller 30 capsule 11    benzonatate (TESSALON) 200 MG capsule Take 1 capsule (200 mg total) by mouth 3 (three) times daily as needed for Cough. (Patient not taking: Reported on 4/15/2020) 30 capsule 3     No current facility-administered medications on file prior to visit.        REVIEW OF SYSTEMS:     CONSTITUTIONAL: The patient denies any weight change. There is no apparent    change in appetite, fever, night sweats, headaches, vision fatigued, no dizziness, on and off extreme   weakness.      SKIN: Denies rash, issues with nails, non-healing sores, bleeding, blotching    skin or abnormal bruising. Denies new moles or changes to existing moles.      BREASTS: There is no swelling around breasts or nipple discharge.    EYES: Denies eye pain, blurred vision, swelling, redness or discharge.      ENT AND MOUTH: Denies runny nose, stuffiness, sinus trouble or sores. Denies    nosebleeds. Denies, hoarseness, change in voice or swelling in front of the    neck.      CARDIOVASCULAR: Denies chest pain, discomfort or palpitations. Denies neck    swelling or episodes of passing out.      RESPIRATORY:  Reports smoker's cough, requiring steroids on and off for COPD exacerbations follows with PCP    GI: Denies trouble swallowing, indigestion, heartburn, abdominal pain, nausea,    vomiting, diarrhea, altered bowel habits, blood in stool, discoloration of    stools, change in nature of stool, bloating, increased abdominal girth.      GENITOURINARY: No discharge. No pelvic pain or lumps. No rash around groin or  lesions. No urinary frequency, hesitation, painful urination or  blood in    urine. Denies incontinence. No problems with intercourse.      MUSCULOSKELETAL: Denies neck or back pain. Denies weakness in arms or legs,    joint problems or distended inflamed veins in legs. Denies swelling or abnormal  glands.      NEUROLOGICAL: Denies tingling, numbness, altered mentation changes to nerve    function in the face, weakness to one or both of the body. Denies changes to    gait and denies multiple falls or accidents.      PSYCHIATRIC: Denies nervousness, anxiety, hallucinations, depression, suicidal    ideation, trouble sleeping or changes in behavior noticed by family.      The patient denies recent foreign travel or recent exposure to chemicals or    products of concern or infectious diseases.   Lab Results   Component Value Date    WBC 20.78 (H) 03/03/2020    HGB 14.9 03/03/2020    HCT 47.0 03/03/2020    MCV 99 (H) 03/03/2020     03/03/2020     Smudge cells presnt  CMP  Sodium   Date Value Ref Range Status   03/03/2020 142 136 - 145 mmol/L Final     Potassium   Date Value Ref Range Status   03/03/2020 3.9 3.5 - 5.1 mmol/L Final     Chloride   Date Value Ref Range Status   03/03/2020 106 95 - 110 mmol/L Final     CO2   Date Value Ref Range Status   03/03/2020 27 23 - 29 mmol/L Final     Glucose   Date Value Ref Range Status   03/03/2020 93 70 - 110 mg/dL Final     BUN, Bld   Date Value Ref Range Status   03/03/2020 11 6 - 20 mg/dL Final     Creatinine   Date Value Ref Range Status   03/03/2020 0.8 0.5 - 1.4 mg/dL Final     Calcium   Date Value Ref Range Status   03/03/2020 9.3 8.7 - 10.5 mg/dL Final     Total Protein   Date Value Ref Range Status   03/03/2020 6.9 6.0 - 8.4 g/dL Final     Albumin   Date Value Ref Range Status   03/03/2020 4.2 3.5 - 5.2 g/dL Final     Total Bilirubin   Date Value Ref Range Status   03/03/2020 0.5 0.1 - 1.0 mg/dL Final     Comment:     For infants and newborns, interpretation of results should be based  on gestational age, weight and in agreement  with clinical  observations.  Premature Infant recommended reference ranges:  Up to 24 hours.............<8.0 mg/dL  Up to 48 hours............<12.0 mg/dL  3-5 days..................<15.0 mg/dL  6-29 days.................<15.0 mg/dL       Alkaline Phosphatase   Date Value Ref Range Status   03/03/2020 116 55 - 135 U/L Final     AST   Date Value Ref Range Status   03/03/2020 19 10 - 40 U/L Final     ALT   Date Value Ref Range Status   03/03/2020 14 10 - 44 U/L Final     Anion Gap   Date Value Ref Range Status   03/03/2020 9 8 - 16 mmol/L Final     eGFR if    Date Value Ref Range Status   03/03/2020 >60.0 >60 mL/min/1.73 m^2 Final     eGFR if non    Date Value Ref Range Status   03/03/2020 >60.0 >60 mL/min/1.73 m^2 Final     Comment:     Calculation used to obtain the estimated glomerular filtration  rate (eGFR) is the CKD-EPI equation.                Objective:      Assessment:       Lymphocytosis over 3 years leukocytosis and smudge cells most likely suggestive of CLL stage 0 no anemia no thrombocytopenia no generalized lymphadenopathy     Plan:         CLL stage 0 will confirm with flow cytometry and CBC drawn in about 3 months as she has no other cytopenias or lymphadenopathy or B symptoms patient can be observed  Continue with chronic pain syndrome and COPD management advised to stop smoking if at all possible  Advised COVID precaution due to her lung situation she will be a high risk patient

## 2020-05-04 NOTE — LETTER
May 4, 2020      Hernesto Sutton, DO  [address]  [place] [address]           [facility]  [address]  [place] [address]  Phone: [phone]          Patient: Yvette Hutchinson   MR Number: [id number]   YOB: 1960   Date of Visit: 5/4/2020       Dear Dr. Hernesto Sutton:    Thank you for referring Yvette Hutchinson to me for evaluation. Attached you will find relevant portions of my assessment and plan of care.    If you have questions, please do not hesitate to call me. I look forward to following Yvette Hutchinson along with you.    Sincerely,    Rhiannon Lee MD    Enclosure  CC:  No Recipients    If you would like to receive this communication electronically, please contact [email or web address] or [phone] to request more information on [facility] access.    For providers and/or their staff who would like to refer a patient to [facility], please contact us through our one-stop-shop provider referral line, [facility], at [phone].    If you feel you have received this communication in error or would no longer like to receive these types of communications, please e-mail [email or web address]

## 2020-05-19 ENCOUNTER — TELEPHONE (OUTPATIENT)
Dept: FAMILY MEDICINE | Facility: CLINIC | Age: 60
End: 2020-05-19

## 2020-05-19 ENCOUNTER — OFFICE VISIT (OUTPATIENT)
Dept: FAMILY MEDICINE | Facility: CLINIC | Age: 60
End: 2020-05-19
Payer: COMMERCIAL

## 2020-05-19 VITALS
RESPIRATION RATE: 16 BRPM | HEIGHT: 63 IN | OXYGEN SATURATION: 93 % | BODY MASS INDEX: 28.39 KG/M2 | DIASTOLIC BLOOD PRESSURE: 50 MMHG | WEIGHT: 160.25 LBS | SYSTOLIC BLOOD PRESSURE: 110 MMHG | HEART RATE: 77 BPM | TEMPERATURE: 98 F

## 2020-05-19 DIAGNOSIS — J30.1 SEASONAL ALLERGIC RHINITIS DUE TO POLLEN: ICD-10-CM

## 2020-05-19 DIAGNOSIS — J44.9 CHRONIC OBSTRUCTIVE PULMONARY DISEASE, UNSPECIFIED COPD TYPE: ICD-10-CM

## 2020-05-19 DIAGNOSIS — F41.9 ANXIETY: ICD-10-CM

## 2020-05-19 DIAGNOSIS — E66.3 OVERWEIGHT (BMI 25.0-29.9): Primary | ICD-10-CM

## 2020-05-19 PROCEDURE — 99213 OFFICE O/P EST LOW 20 MIN: CPT | Mod: S$GLB,,, | Performed by: FAMILY MEDICINE

## 2020-05-19 PROCEDURE — 99999 PR PBB SHADOW E&M-EST. PATIENT-LVL III: ICD-10-PCS | Mod: PBBFAC,,, | Performed by: FAMILY MEDICINE

## 2020-05-19 PROCEDURE — 99999 PR PBB SHADOW E&M-EST. PATIENT-LVL III: CPT | Mod: PBBFAC,,, | Performed by: FAMILY MEDICINE

## 2020-05-19 PROCEDURE — 99213 PR OFFICE/OUTPT VISIT, EST, LEVL III, 20-29 MIN: ICD-10-PCS | Mod: S$GLB,,, | Performed by: FAMILY MEDICINE

## 2020-05-19 PROCEDURE — 3008F PR BODY MASS INDEX (BMI) DOCUMENTED: ICD-10-PCS | Mod: CPTII,S$GLB,, | Performed by: FAMILY MEDICINE

## 2020-05-19 PROCEDURE — 3008F BODY MASS INDEX DOCD: CPT | Mod: CPTII,S$GLB,, | Performed by: FAMILY MEDICINE

## 2020-05-19 RX ORDER — HYDROXYZINE HYDROCHLORIDE 50 MG/1
50 TABLET, FILM COATED ORAL 4 TIMES DAILY PRN
Qty: 360 TABLET | Refills: 3 | Status: SHIPPED | OUTPATIENT
Start: 2020-05-19 | End: 2021-06-15 | Stop reason: SDUPTHER

## 2020-05-19 RX ORDER — BUSPIRONE HYDROCHLORIDE 5 MG/1
5 TABLET ORAL 2 TIMES DAILY
Qty: 180 TABLET | Refills: 3 | Status: SHIPPED | OUTPATIENT
Start: 2020-05-19 | End: 2021-07-27 | Stop reason: DRUGHIGH

## 2020-05-19 RX ORDER — FLUTICASONE PROPIONATE 50 MCG
1 SPRAY, SUSPENSION (ML) NASAL DAILY
Qty: 16 G | Refills: 3 | Status: SHIPPED | OUTPATIENT
Start: 2020-05-19 | End: 2020-10-21 | Stop reason: SDUPTHER

## 2020-05-19 NOTE — PROGRESS NOTES
Subjective:       Patient ID: Yvette Hutchinson is a 59 y.o. female.    Chief Complaint:  Discuss weight loss options.  Medication refills to new pharmacy.  HPI   Patient presents requesting to be started a weight loss medication as she tells me she has had increasing weight during the pandemic with being less active.  She would like discuss phentermine which has worked for friends.  She has changed pharmacies and is requesting refills of her BuSpar, albuterol, Flonase and Atarax and reports other than some weight gain she has been doing all right and has not had any change in her breathing with controlled allergy symptoms, anxiety symptoms and controlled pains most of the time.  She has no other concerns or complaints today.    Review of Systems   Constitutional: Positive for activity change. Negative for appetite change, chills, fatigue and unexpected weight change.   HENT: Negative for hearing loss, rhinorrhea and trouble swallowing.    Eyes: Negative for discharge and visual disturbance.   Respiratory: Positive for wheezing. Negative for cough, chest tightness and shortness of breath.    Cardiovascular: Negative for chest pain, palpitations and leg swelling.   Gastrointestinal: Negative for abdominal pain, blood in stool, constipation, diarrhea, nausea and vomiting.   Endocrine: Positive for polydipsia. Negative for polyphagia and polyuria.   Genitourinary: Negative for difficulty urinating, dysuria, hematuria and menstrual problem.   Musculoskeletal: Positive for arthralgias and neck pain. Negative for back pain, gait problem, joint swelling and myalgias.   Skin: Negative for rash and wound.   Neurological: Negative for dizziness, tremors, syncope, weakness, light-headedness and headaches.   Hematological: Negative for adenopathy. Does not bruise/bleed easily.   Psychiatric/Behavioral: Negative for confusion and dysphoric mood. The patient is not nervous/anxious.          Objective:      Vitals:     "05/19/20 1556   BP: (!) 110/50   Pulse: 77   Resp: 16   Temp: 98.3 °F (36.8 °C)   TempSrc: Oral   SpO2: (!) 93%   Weight: 72.7 kg (160 lb 4.4 oz)   Height: 5' 3" (1.6 m)   PainSc: 0-No pain   PainLoc: Generalized     Physical Exam   Constitutional: She is oriented to person, place, and time. She appears well-developed and well-nourished. No distress.   HENT:   Head: Normocephalic and atraumatic.   Cardiovascular: Normal rate, regular rhythm and normal heart sounds. Exam reveals no gallop and no friction rub.   No murmur heard.  Pulmonary/Chest: Effort normal and breath sounds normal. No respiratory distress. She has no wheezes. She exhibits no tenderness.   Musculoskeletal: Normal range of motion. She exhibits no edema, tenderness or deformity.   Neurological: She is alert and oriented to person, place, and time.   Skin: Skin is warm and dry. She is not diaphoretic.   Psychiatric: She has a normal mood and affect. Her behavior is normal. Judgment and thought content normal.   Nursing note and vitals reviewed.        Assessment:       1. Overweight (BMI 25.0-29.9)    2. Chronic obstructive pulmonary disease, unspecified COPD type    3. Anxiety    4. Seasonal allergic rhinitis due to pollen          Plan:   Overweight (BMI 25.0-29.9)    Chronic obstructive pulmonary disease, unspecified COPD type  -     albuterol sulfate (PROAIR RESPICLICK) 90 mcg/actuation inhaler; Inhale 200 puffs into the lungs every 4 (four) hours as needed (sob, cough). Rescue  Dispense: 1 each; Refill: 11    Anxiety  -     hydrOXYzine (ATARAX) 50 MG tablet; Take 1 tablet (50 mg total) by mouth 4 (four) times daily as needed for Anxiety.  Dispense: 360 tablet; Refill: 3  -     busPIRone (BUSPAR) 5 MG Tab; Take 1 tablet (5 mg total) by mouth 2 (two) times daily.  Dispense: 180 tablet; Refill: 3    Seasonal allergic rhinitis due to pollen  -     fluticasone propionate (FLONASE) 50 mcg/actuation nasal spray; 1 spray (50 mcg total) by Each Nostril " route once daily.  Dispense: 16 g; Refill: 3      Follow up in about 3 months (around 8/19/2020).    Yvette appears to be stable today but has had 8 lb weight gain over the past few months with her activity limited during the pandemic with the stay at home order.  She is very motivated to lose weight at this time and I reviewed diet exercise recommendations to follow a lower calorie and higher fiber diet and to slowly increase low-impact aerobic exercise but she advised me that she has little interest in making lifestyle modifications and believes only a prescription medication help her lose weight.  I advised her that with her BMI she is not a candidate for stimulant weight loss medications such as Adipex/phentermine and reviewed other possible treatment options for weight loss including Contrave and Saxenda with risks and benefits.  She had no interest in these secondary to cost and I discussed off-label weight loss treatment options with her including the risks and benefits of metformin, Topamax and Wellbutrin.  She does not believe these products would be affective advised me she would just watch her weight with being active now that the stay at home order has been lifted.  She has been doing well on all of her current medications and I have provided her with refills to her new pharmacy as above.  I recommended seeing her back for her routine follow-up but I will be out of the office next month when she is scheduled for this and she advised me that she did not want to return for this and would like to be seen back in 3 months as long as her weight remains stable or improves with the above and she continues to do well.  I am fine with this and will see her back sooner if any problems.

## 2020-05-19 NOTE — TELEPHONE ENCOUNTER
----- Message from Lilli Martinez MA sent at 5/19/2020  4:15 PM CDT -----  Contact: Brooklynn  Type: Needs Medical Advice  Who Called:  Brooklynn Herrera  Best Call Back Number:   Additional Information: please clarify directions on proair.

## 2020-05-28 ENCOUNTER — TELEPHONE (OUTPATIENT)
Dept: FAMILY MEDICINE | Facility: CLINIC | Age: 60
End: 2020-05-28

## 2020-05-28 DIAGNOSIS — G47.00 INSOMNIA, UNSPECIFIED TYPE: ICD-10-CM

## 2020-05-28 DIAGNOSIS — M62.838 MUSCLE SPASM: ICD-10-CM

## 2020-05-28 NOTE — TELEPHONE ENCOUNTER
Last office visit 05/19/2020              ----- Message from Niki Man sent at 5/28/2020 11:45 AM CDT -----  Contact: pt  Type:  RX Refill Request    Who Called:  pt  Refill or New Rx:  Refill  RX Name and Strength:  methocarbamol (ROBAXIN) 750 MG Tab  How is the patient currently taking it? (ex. 1XDay):  4  Is this a 30 day or 90 day RX:  90  Preferred Pharmacy with phone number:    [id number] - [place] - [address] AT [facility] [address]  [address] [place] [address]  Phone: [phone] Fax: [phone]  Local or Mail Order:  Local  Ordering Provider:  Hernesto Rodriguez Call Back Number: [phone]   Additional Information:  Please send over refill. Thank you

## 2020-06-01 RX ORDER — METHOCARBAMOL 750 MG/1
750 TABLET, FILM COATED ORAL 4 TIMES DAILY PRN
Qty: 120 TABLET | Refills: 0 | Status: SHIPPED | OUTPATIENT
Start: 2020-06-01 | End: 2021-06-01

## 2020-07-10 ENCOUNTER — OFFICE VISIT (OUTPATIENT)
Dept: PRIMARY CARE CLINIC | Facility: CLINIC | Age: 60
End: 2020-07-10
Payer: COMMERCIAL

## 2020-07-10 VITALS
DIASTOLIC BLOOD PRESSURE: 67 MMHG | TEMPERATURE: 98 F | OXYGEN SATURATION: 94 % | SYSTOLIC BLOOD PRESSURE: 116 MMHG | RESPIRATION RATE: 16 BRPM | HEART RATE: 64 BPM

## 2020-07-10 DIAGNOSIS — R51.9 HEAD ACHE: ICD-10-CM

## 2020-07-10 PROCEDURE — 99212 PR OFFICE/OUTPT VISIT, EST, LEVL II, 10-19 MIN: ICD-10-PCS | Mod: S$GLB,,, | Performed by: NURSE PRACTITIONER

## 2020-07-10 PROCEDURE — U0003 INFECTIOUS AGENT DETECTION BY NUCLEIC ACID (DNA OR RNA); SEVERE ACUTE RESPIRATORY SYNDROME CORONAVIRUS 2 (SARS-COV-2) (CORONAVIRUS DISEASE [COVID-19]), AMPLIFIED PROBE TECHNIQUE, MAKING USE OF HIGH THROUGHPUT TECHNOLOGIES AS DESCRIBED BY CMS-2020-01-R: HCPCS

## 2020-07-10 PROCEDURE — 99212 OFFICE O/P EST SF 10 MIN: CPT | Mod: S$GLB,,, | Performed by: NURSE PRACTITIONER

## 2020-07-10 NOTE — PROGRESS NOTES
Subjective:        Time seen by provider: 12:55 PM on 07/10/2020    Yvette Hutchinson is a 59 y.o. female with PMHx of COPD, PNA, and tobacco use who presents for an evaluation of possible COVID-19. The patient c/o HA, congestion, bilateral ear pain, and generalized body aches within the past few days. She denies fever, cough, or any other symptoms. Patient reports contact with an individual who recently tested positive for COVID-19. Stating Hx of leukemia, patient denies current chemo therapy treatment. No pertinent PSHx.     Review of Systems   Constitutional: Negative for activity change, appetite change, fatigue and fever.   HENT: Positive for congestion and ear pain (bilateral). Negative for rhinorrhea and sore throat.    Respiratory: Negative for cough, chest tightness, shortness of breath and wheezing.    Cardiovascular: Negative for chest pain and palpitations.   Gastrointestinal: Negative for diarrhea, nausea and vomiting.   Musculoskeletal: Positive for myalgias (generalized). Negative for arthralgias.   Skin: Negative for rash.   Neurological: Positive for headaches. Negative for weakness, light-headedness and numbness.       Objective:      Physical Exam  Vitals signs and nursing note reviewed.   Constitutional:       General: She is not in acute distress.     Appearance: She is well-developed. She is not diaphoretic.   HENT:      Head: Normocephalic and atraumatic.      Right Ear: A middle ear effusion is present. Tympanic membrane is not perforated, erythematous or bulging.      Left Ear: A middle ear effusion is present. Tympanic membrane is not perforated, erythematous or bulging.      Ears:      Comments: Bilateral TMs intact. Clear effusions.     Nose: Nose normal.   Eyes:      Conjunctiva/sclera: Conjunctivae normal.   Neck:      Musculoskeletal: Normal range of motion.   Cardiovascular:      Rate and Rhythm: Normal rate and regular rhythm.      Heart sounds: Normal heart sounds. No murmur.    Pulmonary:      Effort: No respiratory distress.      Breath sounds: Normal breath sounds. No wheezing.   Musculoskeletal: Normal range of motion.   Skin:     General: Skin is warm and dry.   Neurological:      Mental Status: She is alert and oriented to person, place, and time.         Assessment:   1. Suspected COVID-19 Virus infection  Plan:       1. Suspected COVID-19 Virus infection  The patient appears to have a viral upper respiratory infection. COVID-19 rule out test pending.   Based upon the history and physical exam the patient does not appear to have a serious bacterial infection such as pneumonia, sepsis, otitis media, bacterial sinusitis, strep pharyngitis, parapharyngeal or peritonsillar abscess, meningitis.  Patient appears very well and I have given specific return precautions to the patient and/or family members.  The patient can take over the counter medications and does not appear to need antibiotics at this time.     - COVID-19 Routine Screening    2. Discharge home and await results.   3. Return to clinic or ED for new or worsening symptoms.   4. Follow-up with PCP as needed.     Scribe Attestation:   IIta, am scribing for, and in the presence of, ITALIA Haley. I performed the above scribed service and the documentation accurately describes the services I performed. I attest to the accuracy of the note.    I, ITALIA Haley, personally performed the services described in this documentation. All medical record entries made by the scribe were at my direction and in my presence.  I have reviewed the chart and agree that the record reflects my personal performance and is accurate and complete. ITALIA Haley.  2:10 PM 07/10/2020

## 2020-07-10 NOTE — PATIENT INSTRUCTIONS

## 2020-07-11 LAB — SARS-COV-2 RNA RESP QL NAA+PROBE: NOT DETECTED

## 2020-07-15 ENCOUNTER — OFFICE VISIT (OUTPATIENT)
Dept: PULMONOLOGY | Facility: CLINIC | Age: 60
End: 2020-07-15
Payer: COMMERCIAL

## 2020-07-15 VITALS
HEART RATE: 87 BPM | DIASTOLIC BLOOD PRESSURE: 81 MMHG | BODY MASS INDEX: 28.78 KG/M2 | TEMPERATURE: 99 F | OXYGEN SATURATION: 94 % | WEIGHT: 162.5 LBS | SYSTOLIC BLOOD PRESSURE: 118 MMHG

## 2020-07-15 DIAGNOSIS — J44.1 CHRONIC OBSTRUCTIVE PULMONARY DISEASE WITH ACUTE EXACERBATION: Primary | ICD-10-CM

## 2020-07-15 DIAGNOSIS — R05.9 COUGH: ICD-10-CM

## 2020-07-15 PROCEDURE — 99999 PR PBB SHADOW E&M-EST. PATIENT-LVL IV: ICD-10-PCS | Mod: PBBFAC,,, | Performed by: NURSE PRACTITIONER

## 2020-07-15 PROCEDURE — 96372 THER/PROPH/DIAG INJ SC/IM: CPT | Mod: S$GLB,,, | Performed by: NURSE PRACTITIONER

## 2020-07-15 PROCEDURE — 96372 PR INJECTION,THERAP/PROPH/DIAG2ST, IM OR SUBCUT: ICD-10-PCS | Mod: S$GLB,,, | Performed by: NURSE PRACTITIONER

## 2020-07-15 PROCEDURE — 3008F PR BODY MASS INDEX (BMI) DOCUMENTED: ICD-10-PCS | Mod: CPTII,S$GLB,, | Performed by: NURSE PRACTITIONER

## 2020-07-15 PROCEDURE — 99214 OFFICE O/P EST MOD 30 MIN: CPT | Mod: 25,S$GLB,, | Performed by: NURSE PRACTITIONER

## 2020-07-15 PROCEDURE — 99214 PR OFFICE/OUTPT VISIT, EST, LEVL IV, 30-39 MIN: ICD-10-PCS | Mod: 25,S$GLB,, | Performed by: NURSE PRACTITIONER

## 2020-07-15 PROCEDURE — 99999 PR PBB SHADOW E&M-EST. PATIENT-LVL IV: CPT | Mod: PBBFAC,,, | Performed by: NURSE PRACTITIONER

## 2020-07-15 PROCEDURE — 3008F BODY MASS INDEX DOCD: CPT | Mod: CPTII,S$GLB,, | Performed by: NURSE PRACTITIONER

## 2020-07-15 RX ORDER — AZITHROMYCIN 500 MG/1
500 TABLET, FILM COATED ORAL DAILY
Qty: 3 TABLET | Refills: 2 | Status: SHIPPED | OUTPATIENT
Start: 2020-07-15 | End: 2020-07-18

## 2020-07-15 RX ORDER — PREDNISONE 20 MG/1
TABLET ORAL
Qty: 12 TABLET | Refills: 1 | Status: SHIPPED | OUTPATIENT
Start: 2020-07-15 | End: 2020-09-10 | Stop reason: SDUPTHER

## 2020-07-15 RX ORDER — CODEINE PHOSPHATE AND GUAIFENESIN 10; 100 MG/5ML; MG/5ML
5 SOLUTION ORAL EVERY 4 HOURS PRN
Qty: 300 ML | Refills: 0 | Status: SHIPPED | OUTPATIENT
Start: 2020-07-15 | End: 2020-07-25

## 2020-07-15 RX ORDER — BETAMETHASONE SODIUM PHOSPHATE AND BETAMETHASONE ACETATE 3; 3 MG/ML; MG/ML
6 INJECTION, SUSPENSION INTRA-ARTICULAR; INTRALESIONAL; INTRAMUSCULAR; SOFT TISSUE
Status: COMPLETED | OUTPATIENT
Start: 2020-07-15 | End: 2020-07-15

## 2020-07-15 RX ORDER — UMECLIDINIUM 62.5 UG/1
62.5 AEROSOL, POWDER ORAL DAILY
Qty: 30 EACH | Refills: 11 | Status: SHIPPED | OUTPATIENT
Start: 2020-07-15 | End: 2020-07-20

## 2020-07-15 RX ADMIN — BETAMETHASONE SODIUM PHOSPHATE AND BETAMETHASONE ACETATE 6 MG: 3; 3 INJECTION, SUSPENSION INTRA-ARTICULAR; INTRALESIONAL; INTRAMUSCULAR; SOFT TISSUE at 10:07

## 2020-07-15 NOTE — PROGRESS NOTES
7/15/2020    Yvette Hutchinson  Office note    Chief Complaint   Patient presents with    Follow-up     3 months    Cough       HPI:   7/15/2020- cough- onset 7 days, worse in mornings and night, nocturnal arousals 1x nightly, productive yellow/green dime size mucous, associated with chest tightness and wheeze, improves with nebulizer using 1-2 daily, states feels better after coughing up large amounts of mucous; went to covid clinic and tested negative for covid did have fluid on ears.   Currently on Advair daily, insurance did not cover spiriva;       4/15/2020- cough- onset weeks, associated with occasional chest tighness, worse at night and early morning, quarter size clear to light brown in color. Currently    advair, spiriva, and rescue inhaler. States using rescue daily with little benefit.    3/5/2020- Pt is a 60 yo female with depression, GERD and COPD presenting for new evaluation.  Has chronic cough worse last 2 wks w/ phlegm, white, worse in am and evening.  Inhalers: rescue inhaler says insurance didn't cover  She reports wt gain over last 2 yrs. Has abdominal cramps intermittently radiating to the back.   Smoking since she was very young, 1 pack lasts 3 days.  She gets yearly bronchitis.  Labs from her PCP done 2 days ago are concerning for possible CLL- with WBC 21 and peripheral smear w/ smudge cells    The chief compliant  problem varies with instablilty at time  PFSH:  Past Medical History:   Diagnosis Date    Arthritis     Depression     GERD (gastroesophageal reflux disease)     Pneumonia of left lung due to infectious organism 3/5/2020         Past Surgical History:   Procedure Laterality Date    TONSILLECTOMY       Social History     Tobacco Use    Smoking status: Current Some Day Smoker     Packs/day: 1.00     Types: Cigarettes    Smokeless tobacco: Never Used   Substance Use Topics    Alcohol use: Yes    Drug use: Yes     Types: Marijuana     Family History   Problem Relation  Age of Onset    Ovarian cancer Sister     Breast cancer Cousin      Review of patient's allergies indicates:  No Known Allergies    Performance Status:The patient's activity level is functions out of house.      Review of Systems:  a review of eleven systems covering constitutional, Eye, HEENT, Psych, Respiratory, Cardiac, GI, , Musculoskeletal, Endocrine, Dermatologic was negative except for pertinent findings as listed ABOVE and below:  Headaches daily for a long time- takes advil & tylenol  Cough, chest tightness, wheeze, body aches, sinus pain and drainage       Exam:Comprehensive exam done. /81 (BP Location: Left arm, Patient Position: Sitting)   Pulse 87   Temp 98.9 °F (37.2 °C)   Wt 73.7 kg (162 lb 7.7 oz)   SpO2 (!) 94% Comment: on room air at rest  BMI 28.78 kg/m²   Exam included Vitals as listed  Constitutional:  oriented to person, place, and time. appears well-developed. No distress.   Nose: Nose normal.   Mouth/Throat: Uvula is midline, oropharynx is clear and moist and mucous membranes are normal. No dental caries. No oropharyngeal exudate, posterior oropharyngeal edema, posterior oropharyngeal erythema or tonsillar abscesses.  M[place]atti (M) score 2; pain with maxillary palpation  Eyes: Pupils are equal, round, and reactive to light.   Neck: No JVD present. No thyromegaly present.   Cardiovascular: Normal rate, regular rhythm and normal heart sounds. Exam reveals no gallop and no friction rub.   No murmur heard.  Pulmonary/Chest: Effort normal and breath sounds normal. No accessory muscle usage or stridor. No apnea and no tachypnea. No respiratory distress, decreased breath sounds, wheezes, rhonchi, rales, or tenderness.   Abdominal: Soft. exhibits no mass. There is no tenderness. No hepatosplenomegaly, hernias and normoactive bowel sounds  Musculoskeletal: Normal range of motion. exhibits no edema.   Lymphadenopathy:    no cervical adenopathy.   Neurological:  alert and oriented to  person, place, and time. not disoriented.   Skin: Skin is warm and dry. Capillary refill takes less 2 sec. No cyanosis or erythema. No pallor. Nails show no clubbing.   Psychiatric: normal mood and affect. behavior is normal. Judgment and thought content normal.       Radiographs (ct chest and cxr) reviewed: view by direct vision   CT Chest Without Contrast 03/11/2020   1. There are two right lung pulmonary nodules.  Per LUNG-RADS scoring this would reflect a Category 3 (probably benign).  Recommendation is for 6 month follow-up LDCT.  2. Airways disease or apical typical infection in the left lower lobe.       CXR 12/19/19- clear lung fields bilaterally     Labs reviewed    Lab Results   Component Value Date    WBC 20.78 (H) 03/03/2020    RBC 4.74 03/03/2020    HGB 14.9 03/03/2020    HCT 47.0 03/03/2020    MCV 99 (H) 03/03/2020    MCH 31.4 (H) 03/03/2020    MCHC 31.7 (L) 03/03/2020    RDW 13.1 03/03/2020     03/03/2020    MPV 11.9 03/03/2020    GRAN 33.0 (L) 03/03/2020    LYMPH 64.0 (H) 03/03/2020    MONO 3.0 (L) 03/03/2020    EOS CANCELED 08/18/2017    BASO CANCELED 08/18/2017    EOSINOPHIL 0.0 03/03/2020    BASOPHIL 0.0 03/03/2020         Pathologist interpretation of peripheral blood smear:   Mild leukocytosis shows absolute and relative lymphocytosis with   cytologic atypia and scattered smudge cells.  The morphology is   concerning for lymphoproliferative disorder such as CLL.  Correlation   with flow cytometric analysis of peripheral blood is recommended for   further evaluation.     But cells appear predominantly normochromic and normocytic with mild   anisocytosis.     Platelets are morphologically unremarkable on scanning.     PFT reviewed    3/12/2020 Severe obstruction. FEV1  49 %  predicted.   Airflow is not clearly improved after bronchodilator. Clinical improvement following bronchodilator therapy may occur in the absence of spirometric improvement.   Mild Hyperinflation.   Moderate reduction  in diffusion capacity - unadjusted for hemoglobin.       Plan:  Clinical impression is resonably certain and repeated evaluation prn +/- follow up will be needed as below.    Yvette was seen today for follow-up and cough.    Diagnoses and all orders for this visit:    Chronic obstructive pulmonary disease with acute exacerbation  -     glycopyrrolate (SEEBRI NEOHALER) 15.6 mcg CpDv; Inhale 1 capsule (15.6 mcg total) into the lungs 2 (two) times daily. Controller  -     azithromycin (ZITHROMAX) 500 MG tablet; Take 1 tablet (500 mg total) by mouth once daily. for 3 days  -     predniSONE (DELTASONE) 20 MG tablet; Take one pill a day for three days, repeat for shortness of breath  -     betamethasone acetate-betamethasone sodium phosphate injection 6 mg  -     guaifenesin-codeine 100-10 mg/5 ml (TUSSI-ORGANIDIN NR)  mg/5 mL syrup; Take 5 mLs by mouth every 4 (four) hours as needed for Cough or Congestion.    Cough  -     guaifenesin-codeine 100-10 mg/5 ml (TUSSI-ORGANIDIN NR)  mg/5 mL syrup; Take 5 mLs by mouth every 4 (four) hours as needed for Cough or Congestion.        Follow up in about 3 months (around 10/15/2020), or if symptoms worsen or fail to improve.    Discussed with patient above for education the following:      Patient Instructions   Asthma Action plan    Azithromycin 500 mg 1 pill for three days for yellow or green mucous    Prednisone 20 mg pills, Take one pill a day for three days, repeat for shortness of breath or wheeze    Albuterol Inhaler 1-2 puffs every 4 hours, for cough or shortness of breath  Nebulizer every 4-6 hours as needed.    Steroid shot today    Continue Advair and add new medication Seebri 1 puff twice a day

## 2020-07-15 NOTE — PATIENT INSTRUCTIONS
Asthma Action plan    Azithromycin 500 mg 1 pill for three days for yellow or green mucous    Prednisone 20 mg pills, Take one pill a day for three days, repeat for shortness of breath or wheeze    Albuterol Inhaler 1-2 puffs every 4 hours, for cough or shortness of breath  Nebulizer every 4-6 hours as needed.    Steroid shot today    Continue Advair and add new medication Seebri 1 puff twice a day

## 2020-07-20 DIAGNOSIS — J44.9 CHRONIC OBSTRUCTIVE PULMONARY DISEASE, UNSPECIFIED COPD TYPE: Primary | ICD-10-CM

## 2020-07-20 RX ORDER — ACLIDINIUM BROMIDE 400 UG/1
1 POWDER, METERED RESPIRATORY (INHALATION) 2 TIMES DAILY
Qty: 1 EACH | Refills: 11 | Status: SHIPPED | OUTPATIENT
Start: 2020-07-20 | End: 2020-10-21

## 2020-07-24 ENCOUNTER — TELEPHONE (OUTPATIENT)
Dept: FAMILY MEDICINE | Facility: CLINIC | Age: 60
End: 2020-07-24

## 2020-07-24 NOTE — TELEPHONE ENCOUNTER
----- Message from Aide Barrera sent at 7/23/2020  4:51 PM CDT -----  Type:  Sooner Apoointment Request    Caller is requesting a sooner appointment.  Caller declined first available appointment listed below.  Caller will not accept being placed on the waitlist and is requesting a message be sent to doctor.    Name of Caller:  Patient   When is the first available appointment?  8/14  Symptoms:  Weight gain  Best Call Back Number:    Additional Information:  Please advise-thank you

## 2020-07-27 ENCOUNTER — OFFICE VISIT (OUTPATIENT)
Dept: FAMILY MEDICINE | Facility: CLINIC | Age: 60
End: 2020-07-27
Payer: COMMERCIAL

## 2020-07-27 VITALS
OXYGEN SATURATION: 95 % | BODY MASS INDEX: 28.52 KG/M2 | RESPIRATION RATE: 18 BRPM | WEIGHT: 160.94 LBS | HEART RATE: 69 BPM | HEIGHT: 63 IN | DIASTOLIC BLOOD PRESSURE: 74 MMHG | TEMPERATURE: 98 F | SYSTOLIC BLOOD PRESSURE: 130 MMHG

## 2020-07-27 DIAGNOSIS — E66.3 OVERWEIGHT (BMI 25.0-29.9): Primary | ICD-10-CM

## 2020-07-27 PROCEDURE — 3008F PR BODY MASS INDEX (BMI) DOCUMENTED: ICD-10-PCS | Mod: CPTII,S$GLB,, | Performed by: FAMILY MEDICINE

## 2020-07-27 PROCEDURE — 3008F BODY MASS INDEX DOCD: CPT | Mod: CPTII,S$GLB,, | Performed by: FAMILY MEDICINE

## 2020-07-27 PROCEDURE — 99213 PR OFFICE/OUTPT VISIT, EST, LEVL III, 20-29 MIN: ICD-10-PCS | Mod: S$GLB,,, | Performed by: FAMILY MEDICINE

## 2020-07-27 PROCEDURE — 99213 OFFICE O/P EST LOW 20 MIN: CPT | Mod: S$GLB,,, | Performed by: FAMILY MEDICINE

## 2020-07-27 PROCEDURE — 99999 PR PBB SHADOW E&M-EST. PATIENT-LVL IV: CPT | Mod: PBBFAC,,, | Performed by: FAMILY MEDICINE

## 2020-07-27 PROCEDURE — 99999 PR PBB SHADOW E&M-EST. PATIENT-LVL IV: ICD-10-PCS | Mod: PBBFAC,,, | Performed by: FAMILY MEDICINE

## 2020-07-27 RX ORDER — METFORMIN HYDROCHLORIDE 500 MG/1
500 TABLET, EXTENDED RELEASE ORAL
Qty: 90 TABLET | Refills: 3 | Status: SHIPPED | OUTPATIENT
Start: 2020-07-27 | End: 2021-02-10 | Stop reason: DRUGHIGH

## 2020-07-27 NOTE — PROGRESS NOTES
"Subjective:       Patient ID: Yvette Hutchinson is a 60 y.o. female.    Chief Complaint: Weight Gain    HPI   Patient presents to discuss weight loss options again.  She has had no weight gain since last seeing me on 05/19/2020 and has not made any diet exercise changes.  She tells me she feels fine today and has not had any problems since last seen.    Review of Systems      Objective:      Vitals:    07/27/20 1126   BP: 130/74   Pulse: 69   Resp: 18   Temp: 97.6 °F (36.4 °C)   TempSrc: Oral   SpO2: 95%   Weight: 73 kg (160 lb 15 oz)   Height: 5' 3" (1.6 m)   PainSc: 0-No pain     Physical Exam      Assessment:       1. Overweight (BMI 25.0-29.9)          Plan:   Overweight (BMI 25.0-29.9)  -     metFORMIN (GLUCOPHAGE-XR) 500 MG XR 24hr tablet; Take 1 tablet (500 mg total) by mouth daily with breakfast.  Dispense: 90 tablet; Refill: 3      Follow up in about 3 months (around 10/27/2020).    Yvette appears to be stable today and has at least maintained her current weight since seeing me last.  I once again reviewed weight loss treatment options with her she is still not a candidate for stimulant medications.  I once again discussed diet exercise recommendations and recommended keeping a diet and exercise diary.  She was interested in trying metformin XR at the smallest dose and advised her I would be willing to prescribe this off-label to her for weight loss and reviewed weight loss seen with studies on weight loss and to expect minimal weight loss if she does not combine this lifestyle modifications.  I recommended checking up with her in 3 months for weight recheck as long she does well starting the medication without any adverse effects.  She was in agreement with this and I will see her back sooner if she has any problems.    "

## 2020-08-05 ENCOUNTER — LAB VISIT (OUTPATIENT)
Dept: LAB | Facility: HOSPITAL | Age: 60
End: 2020-08-05
Attending: INTERNAL MEDICINE
Payer: COMMERCIAL

## 2020-08-05 DIAGNOSIS — D72.820 ATYPICAL LYMPHOCYTOSIS: ICD-10-CM

## 2020-08-05 LAB
ALBUMIN SERPL BCP-MCNC: 4.2 G/DL (ref 3.5–5.2)
ALP SERPL-CCNC: 101 U/L (ref 55–135)
ALT SERPL W/O P-5'-P-CCNC: 38 U/L (ref 10–44)
ANION GAP SERPL CALC-SCNC: 11 MMOL/L (ref 8–16)
AST SERPL-CCNC: 35 U/L (ref 10–40)
BASOPHILS NFR BLD: 0 % (ref 0–1.9)
BILIRUB SERPL-MCNC: 0.2 MG/DL (ref 0.1–1)
BUN SERPL-MCNC: 11 MG/DL (ref 8–23)
CALCIUM SERPL-MCNC: 9.4 MG/DL (ref 8.7–10.5)
CHLORIDE SERPL-SCNC: 106 MMOL/L (ref 95–110)
CO2 SERPL-SCNC: 27 MMOL/L (ref 23–29)
CREAT SERPL-MCNC: 0.9 MG/DL (ref 0.5–1.4)
DIFFERENTIAL METHOD: ABNORMAL
EOSINOPHIL NFR BLD: 2 % (ref 0–8)
ERYTHROCYTE [DISTWIDTH] IN BLOOD BY AUTOMATED COUNT: 13.5 % (ref 11.5–14.5)
EST. GFR  (AFRICAN AMERICAN): >60 ML/MIN/1.73 M^2
EST. GFR  (NON AFRICAN AMERICAN): >60 ML/MIN/1.73 M^2
GLUCOSE SERPL-MCNC: 72 MG/DL (ref 70–110)
HCT VFR BLD AUTO: 42.9 % (ref 37–48.5)
HGB BLD-MCNC: 14.2 G/DL (ref 12–16)
IMM GRANULOCYTES # BLD AUTO: ABNORMAL K/UL (ref 0–0.04)
IMM GRANULOCYTES NFR BLD AUTO: ABNORMAL % (ref 0–0.5)
LYMPHOCYTES NFR BLD: 65 % (ref 18–48)
MCH RBC QN AUTO: 32.1 PG (ref 27–31)
MCHC RBC AUTO-ENTMCNC: 33.1 G/DL (ref 32–36)
MCV RBC AUTO: 97 FL (ref 82–98)
MONOCYTES NFR BLD: 4 % (ref 4–15)
NEUTROPHILS NFR BLD: 28 % (ref 38–73)
NEUTS BAND NFR BLD MANUAL: 1 %
NRBC BLD-RTO: 0 /100 WBC
PLATELET # BLD AUTO: 276 K/UL (ref 150–350)
PLATELET BLD QL SMEAR: ABNORMAL
PMV BLD AUTO: 10.4 FL (ref 9.2–12.9)
POTASSIUM SERPL-SCNC: 3.4 MMOL/L (ref 3.5–5.1)
PROT SERPL-MCNC: 6.9 G/DL (ref 6–8.4)
RBC # BLD AUTO: 4.43 M/UL (ref 4–5.4)
SODIUM SERPL-SCNC: 144 MMOL/L (ref 136–145)
WBC # BLD AUTO: 34.81 K/UL (ref 3.9–12.7)

## 2020-08-05 PROCEDURE — 88184 FLOWCYTOMETRY/ TC 1 MARKER: CPT | Performed by: PATHOLOGY

## 2020-08-05 PROCEDURE — 88185 FLOWCYTOMETRY/TC ADD-ON: CPT | Mod: 59 | Performed by: PATHOLOGY

## 2020-08-05 PROCEDURE — 80053 COMPREHEN METABOLIC PANEL: CPT

## 2020-08-05 PROCEDURE — 85027 COMPLETE CBC AUTOMATED: CPT

## 2020-08-05 PROCEDURE — 85007 BL SMEAR W/DIFF WBC COUNT: CPT

## 2020-08-05 PROCEDURE — 85060 PATHOLOGIST REVIEW: ICD-10-PCS | Mod: ,,, | Performed by: PATHOLOGY

## 2020-08-05 PROCEDURE — 85060 BLOOD SMEAR INTERPRETATION: CPT | Mod: ,,, | Performed by: PATHOLOGY

## 2020-08-05 PROCEDURE — 88189 PR  FLOWCYTOMETRY/READ, 16 & > MARKERS: ICD-10-PCS | Mod: ,,, | Performed by: PATHOLOGY

## 2020-08-05 PROCEDURE — 36415 COLL VENOUS BLD VENIPUNCTURE: CPT

## 2020-08-05 PROCEDURE — 88189 FLOWCYTOMETRY/READ 16 & >: CPT | Mod: ,,, | Performed by: PATHOLOGY

## 2020-08-07 LAB — PATH REV BLD -IMP: NORMAL

## 2020-08-14 ENCOUNTER — TELEPHONE (OUTPATIENT)
Dept: HEMATOLOGY/ONCOLOGY | Facility: CLINIC | Age: 60
End: 2020-08-14

## 2020-08-14 NOTE — TELEPHONE ENCOUNTER
Pt missed vv r/s with Dr. Lee. Pt confirmed new apt date/time of scheduled apt and verbalized understanding.

## 2020-08-21 ENCOUNTER — OFFICE VISIT (OUTPATIENT)
Dept: HEMATOLOGY/ONCOLOGY | Facility: CLINIC | Age: 60
End: 2020-08-21
Payer: COMMERCIAL

## 2020-08-21 ENCOUNTER — TELEPHONE (OUTPATIENT)
Dept: FAMILY MEDICINE | Facility: CLINIC | Age: 60
End: 2020-08-21

## 2020-08-21 ENCOUNTER — TELEPHONE (OUTPATIENT)
Dept: HEMATOLOGY/ONCOLOGY | Facility: CLINIC | Age: 60
End: 2020-08-21

## 2020-08-21 DIAGNOSIS — Z98.890 S/P DILATATION OF ESOPHAGEAL STRICTURE: ICD-10-CM

## 2020-08-21 DIAGNOSIS — E53.8 B12 DEFICIENCY: Primary | ICD-10-CM

## 2020-08-21 DIAGNOSIS — J44.1 CHRONIC OBSTRUCTIVE PULMONARY DISEASE WITH ACUTE EXACERBATION: ICD-10-CM

## 2020-08-21 DIAGNOSIS — Z87.19 S/P DILATATION OF ESOPHAGEAL STRICTURE: ICD-10-CM

## 2020-08-21 DIAGNOSIS — G89.4 CHRONIC PAIN SYNDROME: ICD-10-CM

## 2020-08-21 DIAGNOSIS — C91.10 CLL (CHRONIC LYMPHOCYTIC LEUKEMIA): ICD-10-CM

## 2020-08-21 PROCEDURE — 99214 OFFICE O/P EST MOD 30 MIN: CPT | Mod: 95,,, | Performed by: INTERNAL MEDICINE

## 2020-08-21 PROCEDURE — 99214 PR OFFICE/OUTPT VISIT, EST, LEVL IV, 30-39 MIN: ICD-10-PCS | Mod: 95,,, | Performed by: INTERNAL MEDICINE

## 2020-08-21 NOTE — TELEPHONE ENCOUNTER
----- Message from Brittany Phoenix sent at 8/21/2020 10:54 AM CDT -----  Patient is calling to request a letter for unemployment stating that she is unable to look for jobs due to having a reduced immunity.  She has leukemia and COPD.  Please call her at [phone].  Thank you!

## 2020-08-21 NOTE — PROGRESS NOTES
The patient location is: home  Visit type: Virtual visit with synchronous audio and video  Face-to-face or time spent with patient on the encounter:25 min  Total time spent on and for  this encounter which includes non face-to-face time preparing to see patient, review of tests, obtaining and or reviewing separately obtained records documenting clinical information in the electronic or other health records, independently interpreting results which is not separately reported ,and communicating results to the patient/family/caregiver and in care coordination and treatment planning/communicating with pharmacy for prescriptions/addressing social needs/arranging follow-up and or referrals :35 min    Each patient I provide medical services by telemedicine is:  (1) informed of the relationship between the physician and patient and the respective role of any other health care provider with respect to management of the patient; and (2) notified that he or she may decline to receive medical services by telemedicine and may withdraw from such care at any time.        Subjective:       Patient ID: Yvette Hutchinson is a 60 y.o. female.    Chief Complaint:  Sent in consultation for evaluation of lymphocytosis which has been documented for at least 3 years since 2017 in our system diagnosed with CLL  Virtual visit for follow-up  HPI  Patient has chronic complains of chronic pain syndrome and COPD for which periodically she take steroids she is also on BuSpar has issues anxiety has required dilatation of esophageal strictures allergic rhinitis insomnia and history of B12 deficiency documented  Social history; patient is a smoker denies recreational alcohol use or drug use   Patient is currently on disability  She is allergic to the mask used during COVID pandemic she also Broussard Breath with her COPD    Family history suggestive of ovarian and breast cancer patient advised to see a  or seek   Review of  patient's allergies indicates:  No Known Allergies  Medications have been reviewed  Current Outpatient Medications on File Prior to Visit   Medication Sig Dispense Refill    aclidinium bromide (TUDORZA PRESSAIR) 400 mcg/actuation AePB Inhale 1 puff into the lungs 2 (two) times a day. Controller 1 each 11    albuterol sulfate (PROAIR RESPICLICK) 90 mcg/actuation inhaler Inhale 200 puffs into the lungs every 4 (four) hours as needed (sob, cough). Rescue 1 each 11    albuterol-ipratropium (DUO-NEB) 2.5 mg-0.5 mg/3 mL nebulizer solution Take 3 mLs by nebulization every 6 (six) hours as needed for Wheezing. Rescue 120 vial 5    busPIRone (BUSPAR) 5 MG Tab Take 1 tablet (5 mg total) by mouth 2 (two) times daily. 180 tablet 3    fluticasone propionate (FLONASE) 50 mcg/actuation nasal spray 1 spray (50 mcg total) by Each Nostril route once daily. 16 g 3    fluticasone-salmeterol diskus inhaler 100-50 mcg Inhale 1 puff into the lungs 2 (two) times daily. Controller 1 each 11    hydrOXYzine (ATARAX) 50 MG tablet Take 1 tablet (50 mg total) by mouth 4 (four) times daily as needed for Anxiety. 360 tablet 3    meloxicam (MOBIC) 7.5 MG tablet Take 1 tablet (7.5 mg total) by mouth once daily. 90 tablet 3    metFORMIN (GLUCOPHAGE-XR) 500 MG XR 24hr tablet Take 1 tablet (500 mg total) by mouth daily with breakfast. 90 tablet 3    methocarbamoL (ROBAXIN) 750 MG Tab Take 1 tablet (750 mg total) by mouth 4 (four) times daily as needed. 120 tablet 0    nicotine (NICODERM CQ) 21 mg/24 hr Place 1 patch onto the skin once daily. 14 patch 0    predniSONE (DELTASONE) 20 MG tablet Take one pill a day for three days, repeat for shortness of breath 12 tablet 1     No current facility-administered medications on file prior to visit.        REVIEW OF SYSTEMS:     CONSTITUTIONAL: The patient denies any weight change. There is no apparent    change in appetite, fever, night sweats, headaches, vision fatigued, no dizziness, on and off  extreme   weakness.      SKIN: Denies rash, issues with nails, non-healing sores, bleeding, blotching    skin or abnormal bruising. Denies new moles or changes to existing moles.      BREASTS: There is no swelling around breasts or nipple discharge.    EYES: Denies eye pain, blurred vision, swelling, redness or discharge.      ENT AND MOUTH: Denies runny nose, stuffiness, sinus trouble or sores. Denies    nosebleeds. Denies, hoarseness, change in voice or swelling in front of the    neck.      CARDIOVASCULAR: Denies chest pain, discomfort or palpitations. Denies neck    swelling or episodes of passing out.      RESPIRATORY:  Reports smoker's cough, requiring steroids on and off for COPD exacerbations follows with PCP    GI: Denies trouble swallowing, indigestion, heartburn, abdominal pain, nausea,    vomiting, diarrhea, altered bowel habits, blood in stool, discoloration of    stools, change in nature of stool, bloating, increased abdominal girth.      GENITOURINARY: No discharge. No pelvic pain or lumps. No rash around groin or  lesions. No urinary frequency, hesitation, painful urination or blood in    urine. Denies incontinence. No problems with intercourse.      MUSCULOSKELETAL: Denies neck or back pain. Denies weakness in arms or legs,    joint problems or distended inflamed veins in legs. Denies swelling or abnormal  glands.      NEUROLOGICAL: Denies tingling, numbness, altered mentation changes to nerve    function in the face, weakness to one or both of the body. Denies changes to    gait and denies multiple falls or accidents.      PSYCHIATRIC: Denies nervousness, anxiety, hallucinations, depression, suicidal    ideation, trouble sleeping or changes in behavior noticed by family.      The patient denies recent foreign travel or recent exposure to chemicals or    products of concern or infectious diseases.   Lab Results   Component Value Date    WBC 20.78 (H) 03/03/2020    HGB 14.9 03/03/2020    HCT 47.0  03/03/2020    MCV 99 (H) 03/03/2020     03/03/2020     Smudge cells presnt  CMP  Sodium   Date Value Ref Range Status   08/05/2020 144 136 - 145 mmol/L Final     Potassium   Date Value Ref Range Status   08/05/2020 3.4 (L) 3.5 - 5.1 mmol/L Final     Chloride   Date Value Ref Range Status   08/05/2020 106 95 - 110 mmol/L Final     CO2   Date Value Ref Range Status   08/05/2020 27 23 - 29 mmol/L Final     Glucose   Date Value Ref Range Status   08/05/2020 72 70 - 110 mg/dL Final     BUN, Bld   Date Value Ref Range Status   08/05/2020 11 8 - 23 mg/dL Final     Creatinine   Date Value Ref Range Status   08/05/2020 0.9 0.5 - 1.4 mg/dL Final     Calcium   Date Value Ref Range Status   08/05/2020 9.4 8.7 - 10.5 mg/dL Final     Total Protein   Date Value Ref Range Status   08/05/2020 6.9 6.0 - 8.4 g/dL Final     Albumin   Date Value Ref Range Status   08/05/2020 4.2 3.5 - 5.2 g/dL Final     Total Bilirubin   Date Value Ref Range Status   08/05/2020 0.2 0.1 - 1.0 mg/dL Final     Comment:     For infants and newborns, interpretation of results should be based  on gestational age, weight and in agreement with clinical  observations.  Premature Infant recommended reference ranges:  Up to 24 hours.............<8.0 mg/dL  Up to 48 hours............<12.0 mg/dL  3-5 days..................<15.0 mg/dL  6-29 days.................<15.0 mg/dL       Alkaline Phosphatase   Date Value Ref Range Status   08/05/2020 101 55 - 135 U/L Final     AST   Date Value Ref Range Status   08/05/2020 35 10 - 40 U/L Final     ALT   Date Value Ref Range Status   08/05/2020 38 10 - 44 U/L Final     Anion Gap   Date Value Ref Range Status   08/05/2020 11 8 - 16 mmol/L Final     eGFR if    Date Value Ref Range Status   08/05/2020 >60 >60 mL/min/1.73 m^2 Final     eGFR if non    Date Value Ref Range Status   08/05/2020 >60 >60 mL/min/1.73 m^2 Final     Comment:     Calculation used to obtain the estimated glomerular  filtration  rate (eGFR) is the CKD-EPI equation.            2wk ago    Blood Interpretation A B cell lymphoproliferative disorder is present. see comment.    Comment: Interpreted by: Jeremias Sosa M.D., Signed on 08/06/2020 at 13:07   Blood Comment Flow cytometric analysis of  peripheral blood  detects a lambda  light chain   restricted B lymphocyte population showing expression of CD19 and dim CD20   with aberrant expression of CD5 (dim).  T lymphocytes are   immunophenotypically unremarkable.  The blasts gate is not increased.  The   findings are consistent with patient history of chronic lymphocytic   leukemia/small lymphocytic lymphoma.   Flow differential:  Lymphocytes 69.6%, Monocytes 2.8%, Granulocytes  27.5%,   Blast  0.1%, Debris/nRBC 0.1%,  Viability 97.5%.          Assessment:     CLL  Lymphocytosis over 3 years leukocytosis and smudge cells suggestive of CLL stage 0 no anemia no thrombocytopenia no generalized lymphadenopathy     Plan:         CLL stage 0 will confirmed with flow cytometry    she has no other cytopenias or lymphadenopathy or B symptoms patient can be observed  Patient also states her dermatologist told her she was allergic to the mask use and she also has COPD that makes a feels smothered during mask use this might hinder finding a job suitable I have directed her to discuss with the PCP regarding letters to support inability to use mask    Continue with chronic pain syndrome and COPD management advised to stop smoking if at all possible  Advised COVID precaution due to her lung situation she will be a high risk patient    Advance Care planning/ directives /living will/patient's wishes discussed with patient.  Patient has been given guidelines and instructions on completing these directives  COVID social distancing, face mask use, hand washing techniques and personal hygiene routine discussed with patientPatient advised that she is immunocompromised and should be very careful with contact  isolation and social distancing and precautions during COVID.    Good exercise, nutrition and weight management discussed with patient  Health maintenance activities and follow-up with PCPs recommendations discussed with patient

## 2020-08-21 NOTE — TELEPHONE ENCOUNTER
Orders scheduled for pt as requested. Apt reminders placed in mail.       ----- Message from Rhiannon Lee MD sent at 8/21/2020  9:21 AM CDT -----  Cbc, cmp VV in 3 months on thursdsy pm

## 2020-08-24 DIAGNOSIS — J44.1 CHRONIC OBSTRUCTIVE PULMONARY DISEASE WITH ACUTE EXACERBATION: ICD-10-CM

## 2020-08-24 NOTE — TELEPHONE ENCOUNTER
Pt stated, Oncology is unable to write the letter and advised pt request letter from PCP. Please advise.

## 2020-08-25 RX ORDER — PREDNISONE 20 MG/1
TABLET ORAL
Qty: 12 TABLET | Refills: 1 | OUTPATIENT
Start: 2020-08-25

## 2020-09-10 DIAGNOSIS — J44.1 CHRONIC OBSTRUCTIVE PULMONARY DISEASE WITH ACUTE EXACERBATION: ICD-10-CM

## 2020-09-10 RX ORDER — PREDNISONE 20 MG/1
TABLET ORAL
Qty: 12 TABLET | Refills: 1 | Status: SHIPPED | OUTPATIENT
Start: 2020-09-10 | End: 2021-05-14 | Stop reason: ALTCHOICE

## 2020-10-07 ENCOUNTER — CLINICAL SUPPORT (OUTPATIENT)
Dept: SMOKING CESSATION | Facility: CLINIC | Age: 60
End: 2020-10-07
Payer: COMMERCIAL

## 2020-10-07 DIAGNOSIS — F17.200 NICOTINE DEPENDENCE: Primary | ICD-10-CM

## 2020-10-07 PROCEDURE — 99407 PR TOBACCO USE CESSATION INTENSIVE >10 MINUTES: ICD-10-PCS | Mod: S$GLB,,,

## 2020-10-07 PROCEDURE — 99407 BEHAV CHNG SMOKING > 10 MIN: CPT | Mod: S$GLB,,,

## 2020-10-07 NOTE — PROGRESS NOTES
Spoke with patient today in regard to smoking cessation progress for 3,6 month phone follow up on quit 1. Patient not tobacco free at this time. Patient has scheduled an appointment to return to the program for Quit attempt #2. Informed patient of benefit period, future follow ups, and contact information if any further help or support is needed. Will  complete smart form for Quit attempt #1.

## 2020-10-13 ENCOUNTER — PATIENT OUTREACH (OUTPATIENT)
Dept: ADMINISTRATIVE | Facility: HOSPITAL | Age: 60
End: 2020-10-13

## 2020-10-13 NOTE — PROGRESS NOTES
Chart review completed 10/13/2020.  Care Everywhere updates requested and reviewed.  Immunizations reconciled. Media reports reviewed.  Duplicate HM overrides and  orders removed.  Overdue HM topic chart audit and/or requested.  Overdue lab testing linked to upcoming lab appointments if applies.    Lab brittaney, and [employer] reviewed    Portal message sent for overdue HM    Health Maintenance Due   Topic Date Due    Shingles Vaccine (1 of 2) 2009    Cervical Cancer Screening  2020

## 2020-10-21 ENCOUNTER — OFFICE VISIT (OUTPATIENT)
Dept: PULMONOLOGY | Facility: CLINIC | Age: 60
End: 2020-10-21
Payer: COMMERCIAL

## 2020-10-21 ENCOUNTER — TELEPHONE (OUTPATIENT)
Dept: PULMONOLOGY | Facility: CLINIC | Age: 60
End: 2020-10-21

## 2020-10-21 VITALS
SYSTOLIC BLOOD PRESSURE: 113 MMHG | BODY MASS INDEX: 27.93 KG/M2 | HEART RATE: 66 BPM | HEIGHT: 63 IN | DIASTOLIC BLOOD PRESSURE: 74 MMHG | WEIGHT: 157.63 LBS | OXYGEN SATURATION: 98 %

## 2020-10-21 DIAGNOSIS — R91.8 MULTIPLE LUNG NODULES ON CT: ICD-10-CM

## 2020-10-21 DIAGNOSIS — R09.89 CHRONIC SINUS COMPLAINTS: ICD-10-CM

## 2020-10-21 DIAGNOSIS — J44.9 CHRONIC OBSTRUCTIVE PULMONARY DISEASE, UNSPECIFIED COPD TYPE: ICD-10-CM

## 2020-10-21 DIAGNOSIS — R05.9 COUGH: ICD-10-CM

## 2020-10-21 DIAGNOSIS — J44.1 CHRONIC OBSTRUCTIVE PULMONARY DISEASE WITH ACUTE EXACERBATION: Primary | ICD-10-CM

## 2020-10-21 DIAGNOSIS — J01.01 ACUTE RECURRENT MAXILLARY SINUSITIS: ICD-10-CM

## 2020-10-21 PROCEDURE — 99214 PR OFFICE/OUTPT VISIT, EST, LEVL IV, 30-39 MIN: ICD-10-PCS | Mod: S$GLB,,, | Performed by: NURSE PRACTITIONER

## 2020-10-21 PROCEDURE — 99214 OFFICE O/P EST MOD 30 MIN: CPT | Mod: PBBFAC,PO | Performed by: NURSE PRACTITIONER

## 2020-10-21 PROCEDURE — 99214 OFFICE O/P EST MOD 30 MIN: CPT | Mod: S$GLB,,, | Performed by: NURSE PRACTITIONER

## 2020-10-21 PROCEDURE — 99999 PR PBB SHADOW E&M-EST. PATIENT-LVL IV: ICD-10-PCS | Mod: PBBFAC,,, | Performed by: NURSE PRACTITIONER

## 2020-10-21 PROCEDURE — 99999 PR PBB SHADOW E&M-EST. PATIENT-LVL IV: CPT | Mod: PBBFAC,,, | Performed by: NURSE PRACTITIONER

## 2020-10-21 RX ORDER — PREDNISONE 20 MG/1
TABLET ORAL
Qty: 18 TABLET | Refills: 1 | Status: SHIPPED | OUTPATIENT
Start: 2020-10-21 | End: 2020-12-03 | Stop reason: SDUPTHER

## 2020-10-21 RX ORDER — MONTELUKAST SODIUM 10 MG/1
10 TABLET ORAL NIGHTLY
Qty: 30 TABLET | Refills: 11 | Status: SHIPPED | OUTPATIENT
Start: 2020-10-21 | End: 2020-11-20

## 2020-10-21 RX ORDER — AZITHROMYCIN 500 MG/1
500 TABLET, FILM COATED ORAL DAILY
Qty: 3 TABLET | Refills: 0 | Status: SHIPPED | OUTPATIENT
Start: 2020-10-21 | End: 2020-10-24

## 2020-10-21 RX ORDER — FLUTICASONE PROPIONATE AND SALMETEROL XINAFOATE 230; 21 UG/1; UG/1
2 AEROSOL, METERED RESPIRATORY (INHALATION) 2 TIMES DAILY
Qty: 12 G | Refills: 11 | Status: SHIPPED | OUTPATIENT
Start: 2020-10-21 | End: 2020-10-23

## 2020-10-21 RX ORDER — CODEINE PHOSPHATE AND GUAIFENESIN 10; 100 MG/5ML; MG/5ML
5 SOLUTION ORAL EVERY 4 HOURS PRN
Qty: 300 ML | Refills: 0 | Status: SHIPPED | OUTPATIENT
Start: 2020-10-21 | End: 2020-10-31

## 2020-10-21 RX ORDER — FLUTICASONE PROPIONATE 50 MCG
2 SPRAY, SUSPENSION (ML) NASAL DAILY
Qty: 16 G | Refills: 3 | Status: SHIPPED | OUTPATIENT
Start: 2020-10-21 | End: 2021-02-15 | Stop reason: SDUPTHER

## 2020-10-21 NOTE — TELEPHONE ENCOUNTER
Printed orders for pulse ox overnight study, waiting for ezra to sign and will be faxing to [facility]..

## 2020-10-21 NOTE — PROGRESS NOTES
10/21/2020    Yvette Hutchinson  Office note    Chief Complaint   Patient presents with    Follow-up     3m f/o & cramping under rip right side     Cough    Shortness of Breath       HPI:   10/21/2020- SOB and wheeze- recurrent problem, worsened last weeks, associated with sinus congestion, wheeze is worse in early morning and when laying down at nigtht  Cough- productive brown/green mucous for 1 week. Has not started steroid therapy. Improves with nebulizer using 1-2x daily for past week. Was not able afford inhalers. Did not receive call from [facility] pharmacy.     Complaint for cramping sensation in chest that occurs sporadically on left and right side that lasts few minutes. Onset years, recurrent complaint, started back 1 week prior. Occurs couple days in row.     7/15/2020- cough- onset 7 days, worse in mornings and night, nocturnal arousals 1x nightly, productive yellow/green dime size mucous, associated with chest tightness and wheeze, improves with nebulizer using 1-2 daily, states feels better after coughing up large amounts of mucous; went to covid clinic and tested negative for covid did have fluid on ears.   Currently on Advair daily, insurance did not cover spiriva;       4/15/2020- cough- onset weeks, associated with occasional chest tighness, worse at night and early morning, quarter size clear to light brown in color. Currently    advair, spiriva, and rescue inhaler. States using rescue daily with little benefit.    3/5/2020- Pt is a 60 yo female with depression, GERD and COPD presenting for new evaluation.  Has chronic cough worse last 2 wks w/ phlegm, white, worse in am and evening.  Inhalers: rescue inhaler says insurance didn't cover  She reports wt gain over last 2 yrs. Has abdominal cramps intermittently radiating to the back.   Smoking since she was very young, 1 pack lasts 3 days.  She gets yearly bronchitis.  Labs from her PCP done 2 days ago are concerning for possible CLL- with  "WBC 21 and peripheral smear w/ smudge cells    The chief compliant  problem varies with instablilty at time  PFSH:  Past Medical History:   Diagnosis Date    Arthritis     Depression     GERD (gastroesophageal reflux disease)     Pneumonia of left lung due to infectious organism 3/5/2020         Past Surgical History:   Procedure Laterality Date    TONSILLECTOMY       Social History     Tobacco Use    Smoking status: Current Some Day Smoker     Packs/day: 1.00     Types: Cigarettes    Smokeless tobacco: Never Used   Substance Use Topics    Alcohol use: Yes    Drug use: Yes     Types: Marijuana     Family History   Problem Relation Age of Onset    Ovarian cancer Sister     Breast cancer Cousin      Review of patient's allergies indicates:  No Known Allergies    Performance Status:The patient's activity level is functions out of house.      Review of Systems:  a review of eleven systems covering constitutional, Eye, HEENT, Psych, Respiratory, Cardiac, GI, , Musculoskeletal, Endocrine, Dermatologic was negative except for pertinent findings as listed ABOVE and below:  Headaches daily for a long time- takes advil & tylenol  Cough, chest tightness, wheeze, body aches, sinus pain and drainage       Exam:Comprehensive exam done. /74 (BP Location: Left arm, Patient Position: Sitting, BP Method: Medium (Automatic))   Pulse 66   Ht 5' 3" (1.6 m)   Wt 71.5 kg (157 lb 10.1 oz)   SpO2 98% Comment: on room air at rest  BMI 27.92 kg/m²   Exam included Vitals as listed  Constitutional:  oriented to person, place, and time. appears well-developed. No distress.   Nose: Nose normal. Pain with palpation left sinus cavity,   Mouth/Throat: Uvula is midline, oropharynx is clear and moist and mucous membranes are normal. No dental caries. No oropharyngeal exudate, posterior oropharyngeal edema, posterior oropharyngeal erythema or tonsillar abscesses.  M[place]atti (M) score 2;   Eyes: Pupils are equal, round, and " reactive to light.   Neck: No JVD present. No thyromegaly present.   Cardiovascular: Normal rate, regular rhythm and normal heart sounds. Exam reveals no gallop and no friction rub.   No murmur heard.  Pulmonary/Chest: Effort normal and breath sounds abnormal: bilateral expiratory wheeze.   No accessory muscle usage or stridor. No apnea and no tachypnea. No respiratory distress, decreased breath sounds, rhonchi, rales, or tenderness.   Abdominal: Soft. exhibits no mass. There is no tenderness. No hepatosplenomegaly, hernias and normoactive bowel sounds  Musculoskeletal: Normal range of motion. exhibits no edema.   Lymphadenopathy:    no cervical adenopathy.   Neurological:  alert and oriented to person, place, and time. not disoriented.   Skin: Skin is warm and dry. Capillary refill takes less 2 sec. No cyanosis or erythema. No pallor. Nails show no clubbing.   Psychiatric: normal mood and affect. behavior is normal. Judgment and thought content normal.       Radiographs (ct chest and cxr) reviewed: view by direct vision   CT Chest Without Contrast 03/11/2020   1. There are two right lung pulmonary nodules.  Per LUNG-RADS scoring this would reflect a Category 3 (probably benign).  Recommendation is for 6 month follow-up LDCT.  2. Airways disease or apical typical infection in the left lower lobe.       CXR 12/19/19- clear lung fields bilaterally     Labs reviewed    Lab Results   Component Value Date    WBC 20.78 (H) 03/03/2020    RBC 4.74 03/03/2020    HGB 14.9 03/03/2020    HCT 47.0 03/03/2020    MCV 99 (H) 03/03/2020    MCH 31.4 (H) 03/03/2020    MCHC 31.7 (L) 03/03/2020    RDW 13.1 03/03/2020     03/03/2020    MPV 11.9 03/03/2020    GRAN 33.0 (L) 03/03/2020    LYMPH 64.0 (H) 03/03/2020    MONO 3.0 (L) 03/03/2020    EOS CANCELED 08/18/2017    BASO CANCELED 08/18/2017    EOSINOPHIL 0.0 03/03/2020    BASOPHIL 0.0 03/03/2020         Pathologist interpretation of peripheral blood smear:   Mild leukocytosis  shows absolute and relative lymphocytosis with   cytologic atypia and scattered smudge cells.  The morphology is   concerning for lymphoproliferative disorder such as CLL.  Correlation   with flow cytometric analysis of peripheral blood is recommended for   further evaluation.     But cells appear predominantly normochromic and normocytic with mild   anisocytosis.     Platelets are morphologically unremarkable on scanning.     PFT reviewed    3/12/2020 Severe obstruction. FEV1  49 %  predicted.   Airflow is not clearly improved after bronchodilator. Clinical improvement following bronchodilator therapy may occur in the absence of spirometric improvement.   Mild Hyperinflation.   Moderate reduction in diffusion capacity - unadjusted for hemoglobin.       Plan:  Clinical impression is resonably certain and repeated evaluation prn +/- follow up will be needed as below.    Yvette was seen today for follow-up, cough and shortness of breath.    Diagnoses and all orders for this visit:    Chronic obstructive pulmonary disease with acute exacerbation  -     predniSONE (DELTASONE) 20 MG tablet; 3 pills for 3 days, 2 pills for 3 days, 1 pill for 3 days  -     guaifenesin-codeine 100-10 mg/5 ml (TUSSI-ORGANIDIN NR)  mg/5 mL syrup; Take 5 mLs by mouth every 4 (four) hours as needed for Cough or Congestion.    Chronic obstructive pulmonary disease, unspecified COPD type  -     fluticasone-salmeterol 230-21 mcg/dose (ADVAIR HFA) 230-21 mcg/actuation HFAA inhaler; Inhale 2 puffs into the lungs 2 (two) times daily. Controller  -     Pulse oximetry overnight; Future    Multiple lung nodules on CT  -     CT Chest Without Contrast; Future    Acute recurrent maxillary sinusitis  -     predniSONE (DELTASONE) 20 MG tablet; 3 pills for 3 days, 2 pills for 3 days, 1 pill for 3 days  -     azithromycin (ZITHROMAX) 500 MG tablet; Take 1 tablet (500 mg total) by mouth once daily. for 3 days    Cough  -     guaifenesin-codeine 100-10  mg/5 ml (TUSSI-ORGANIDIN NR)  mg/5 mL syrup; Take 5 mLs by mouth every 4 (four) hours as needed for Cough or Congestion.    Chronic sinus complaints  -     montelukast (SINGULAIR) 10 mg tablet; Take 1 tablet (10 mg total) by mouth every evening.  -     fluticasone propionate (FLONASE) 50 mcg/actuation nasal spray; 2 sprays (100 mcg total) by Each Nostril route once daily.        Follow up in about 3 months (around 1/21/2021), or if symptoms worsen or fail to improve.    Discussed with patient above for education the following:      Patient Instructions   Will CT of chest in 3 weeks after COPD exacerbation has resolved    Changing to Advair puffer    Azithromycin 500 mg 1 pill for three days for yellow or green mucous    Prednisone 20 mg pills,   3 pills for 3 days, 2 pills for 3 days, 1 pill for 3 days   repeat for shortness of breath or wheeze    Albuterol Inhaler 1-2 puffs every 4 hours, for cough or shortness of breath    Overnight pulse oximetry to evaluate for sleep apnea and COPD cause low oxygen level.    flonase and singulair daily for sinus complaints.

## 2020-10-21 NOTE — PATIENT INSTRUCTIONS
Will CT of chest in 3 weeks after COPD exacerbation has resolved    Changing to Advair puffer    Azithromycin 500 mg 1 pill for three days for yellow or green mucous    Prednisone 20 mg pills,   3 pills for 3 days, 2 pills for 3 days, 1 pill for 3 days   repeat for shortness of breath or wheeze    Albuterol Inhaler 1-2 puffs every 4 hours, for cough or shortness of breath    Overnight pulse oximetry to evaluate for sleep apnea and COPD cause low oxygen level.    flonase and singulair daily for sinus complaints.

## 2020-10-23 DIAGNOSIS — J45.40 MODERATE PERSISTENT ASTHMA WITHOUT COMPLICATION: ICD-10-CM

## 2020-10-23 DIAGNOSIS — J44.9 CHRONIC OBSTRUCTIVE PULMONARY DISEASE, UNSPECIFIED COPD TYPE: Primary | ICD-10-CM

## 2020-10-30 ENCOUNTER — TELEPHONE (OUTPATIENT)
Dept: PULMONOLOGY | Facility: CLINIC | Age: 60
End: 2020-10-30

## 2020-10-30 DIAGNOSIS — J44.9 CHRONIC OBSTRUCTIVE PULMONARY DISEASE, UNSPECIFIED COPD TYPE: Primary | ICD-10-CM

## 2020-11-03 ENCOUNTER — TELEPHONE (OUTPATIENT)
Dept: SMOKING CESSATION | Facility: CLINIC | Age: 60
End: 2020-11-03

## 2020-11-03 NOTE — TELEPHONE ENCOUNTER
I called the patient today to offer her an opportunity to rescheduled her missed tobacco cessation intake appointment. System stated there is no voicemail setup yet. I was unable to reach her or leave a message.

## 2020-11-06 ENCOUNTER — HOSPITAL ENCOUNTER (EMERGENCY)
Facility: HOSPITAL | Age: 60
Discharge: HOME | End: 2020-11-06
Attending: EMERGENCY MEDICINE
Payer: COMMERCIAL

## 2020-11-06 VITALS
DIASTOLIC BLOOD PRESSURE: 71 MMHG | SYSTOLIC BLOOD PRESSURE: 137 MMHG | WEIGHT: 160.94 LBS | OXYGEN SATURATION: 96 % | TEMPERATURE: 98 F | RESPIRATION RATE: 20 BRPM | HEART RATE: 74 BPM | BODY MASS INDEX: 28.52 KG/M2 | HEIGHT: 63 IN

## 2020-11-06 DIAGNOSIS — L03.90 CELLULITIS, UNSPECIFIED CELLULITIS SITE: Primary | ICD-10-CM

## 2020-11-06 LAB
CTP QC/QA: YES
SARS-COV-2 RDRP RESP QL NAA+PROBE: NEGATIVE

## 2020-11-06 PROCEDURE — U0002 COVID-19 LAB TEST NON-CDC: HCPCS | Performed by: EMERGENCY MEDICINE

## 2020-11-06 PROCEDURE — 99283 EMERGENCY DEPT VISIT LOW MDM: CPT | Mod: 25

## 2020-11-06 RX ORDER — DOXYCYCLINE 100 MG/1
100 CAPSULE ORAL 2 TIMES DAILY
Qty: 20 CAPSULE | Refills: 0 | Status: SHIPPED | OUTPATIENT
Start: 2020-11-06 | End: 2020-11-16
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
## 2020-11-16 ENCOUNTER — CLINICAL SUPPORT (OUTPATIENT)
Dept: SMOKING CESSATION | Facility: CLINIC | Age: 60
End: 2020-11-16
Payer: COMMERCIAL

## 2020-11-16 DIAGNOSIS — F17.200 NICOTINE DEPENDENCE: Primary | ICD-10-CM

## 2020-11-16 PROCEDURE — 99999 PR PBB SHADOW E&M-EST. PATIENT-LVL I: ICD-10-PCS | Mod: PBBFAC,,,

## 2020-11-16 PROCEDURE — 99404 PREV MED CNSL INDIV APPRX 60: CPT | Mod: S$GLB,,,

## 2020-11-16 PROCEDURE — 99404 PR PREVENT COUNSEL,INDIV,60 MIN: ICD-10-PCS | Mod: S$GLB,,,

## 2020-11-16 PROCEDURE — 99999 PR PBB SHADOW E&M-EST. PATIENT-LVL I: CPT | Mod: PBBFAC,,,

## 2020-11-16 RX ORDER — VARENICLINE TARTRATE 0.5 (11)-1
KIT ORAL
Qty: 53 TABLET | Refills: 0 | Status: SHIPPED | OUTPATIENT
Start: 2020-11-16 | End: 2021-09-07

## 2020-11-16 NOTE — Clinical Note
Patient returns to the program for a quit 2. She is smoking about 10 cigarettes per day. She has trouble with nicotine patch adherence with previous effort. She will begin her tobacco cessation regimen of Chantix starter pack. She will follow up in 2 weeks.

## 2020-11-17 ENCOUNTER — TELEPHONE (OUTPATIENT)
Dept: HEMATOLOGY/ONCOLOGY | Facility: CLINIC | Age: 60
End: 2020-11-17

## 2020-11-17 ENCOUNTER — PATIENT MESSAGE (OUTPATIENT)
Dept: HEMATOLOGY/ONCOLOGY | Facility: CLINIC | Age: 60
End: 2020-11-17

## 2020-11-17 NOTE — TELEPHONE ENCOUNTER
Attempted to contact pt re: needing to move appt for 11/19 to earlier in the day because of Dr Jesus calhoun's appt. No voicemail, no answer. Portal message sent to confirm new appt time. Encouraged call back to confirm.

## 2020-11-18 ENCOUNTER — LAB VISIT (OUTPATIENT)
Dept: LAB | Facility: HOSPITAL | Age: 60
End: 2020-11-18
Attending: INTERNAL MEDICINE
Payer: MEDICAID

## 2020-11-18 DIAGNOSIS — Z87.19 S/P DILATATION OF ESOPHAGEAL STRICTURE: ICD-10-CM

## 2020-11-18 DIAGNOSIS — Z98.890 S/P DILATATION OF ESOPHAGEAL STRICTURE: ICD-10-CM

## 2020-11-18 DIAGNOSIS — E53.8 B12 DEFICIENCY: ICD-10-CM

## 2020-11-18 DIAGNOSIS — C91.10 CLL (CHRONIC LYMPHOCYTIC LEUKEMIA): ICD-10-CM

## 2020-11-18 DIAGNOSIS — G89.4 CHRONIC PAIN SYNDROME: ICD-10-CM

## 2020-11-18 LAB
ALBUMIN SERPL BCP-MCNC: 4.3 G/DL (ref 3.5–5.2)
ALP SERPL-CCNC: 109 U/L (ref 55–135)
ALT SERPL W/O P-5'-P-CCNC: 23 U/L (ref 10–44)
ANION GAP SERPL CALC-SCNC: 12 MMOL/L (ref 8–16)
AST SERPL-CCNC: 25 U/L (ref 10–40)
BASOPHILS NFR BLD: 0 % (ref 0–1.9)
BILIRUB SERPL-MCNC: 0.4 MG/DL (ref 0.1–1)
BUN SERPL-MCNC: 12 MG/DL (ref 8–23)
CALCIUM SERPL-MCNC: 9.5 MG/DL (ref 8.7–10.5)
CHLORIDE SERPL-SCNC: 104 MMOL/L (ref 95–110)
CO2 SERPL-SCNC: 26 MMOL/L (ref 23–29)
CREAT SERPL-MCNC: 0.8 MG/DL (ref 0.5–1.4)
DIFFERENTIAL METHOD: ABNORMAL
EOSINOPHIL NFR BLD: 0 % (ref 0–8)
ERYTHROCYTE [DISTWIDTH] IN BLOOD BY AUTOMATED COUNT: 12.8 % (ref 11.5–14.5)
EST. GFR  (AFRICAN AMERICAN): >60 ML/MIN/1.73 M^2
EST. GFR  (NON AFRICAN AMERICAN): >60 ML/MIN/1.73 M^2
GLUCOSE SERPL-MCNC: 81 MG/DL (ref 70–110)
HCT VFR BLD AUTO: 45.5 % (ref 37–48.5)
HGB BLD-MCNC: 15.1 G/DL (ref 12–16)
IMM GRANULOCYTES # BLD AUTO: ABNORMAL K/UL (ref 0–0.04)
IMM GRANULOCYTES NFR BLD AUTO: ABNORMAL % (ref 0–0.5)
LYMPHOCYTES NFR BLD: 87 % (ref 18–48)
MCH RBC QN AUTO: 31.8 PG (ref 27–31)
MCHC RBC AUTO-ENTMCNC: 33.2 G/DL (ref 32–36)
MCV RBC AUTO: 96 FL (ref 82–98)
MONOCYTES NFR BLD: 1 % (ref 4–15)
NEUTROPHILS NFR BLD: 12 % (ref 38–73)
NRBC BLD-RTO: 0 /100 WBC
PATH REV BLD -IMP: NORMAL
PLATELET # BLD AUTO: 305 K/UL (ref 150–350)
PLATELET BLD QL SMEAR: ABNORMAL
PMV BLD AUTO: 11.2 FL (ref 9.2–12.9)
POTASSIUM SERPL-SCNC: 3.9 MMOL/L (ref 3.5–5.1)
PROT SERPL-MCNC: 7.5 G/DL (ref 6–8.4)
RBC # BLD AUTO: 4.75 M/UL (ref 4–5.4)
SODIUM SERPL-SCNC: 142 MMOL/L (ref 136–145)
WBC # BLD AUTO: 29.08 K/UL (ref 3.9–12.7)

## 2020-11-18 PROCEDURE — 85007 BL SMEAR W/DIFF WBC COUNT: CPT

## 2020-11-18 PROCEDURE — 80053 COMPREHEN METABOLIC PANEL: CPT

## 2020-11-18 PROCEDURE — 85060 BLOOD SMEAR INTERPRETATION: CPT | Mod: ,,, | Performed by: STUDENT IN AN ORGANIZED HEALTH CARE EDUCATION/TRAINING PROGRAM

## 2020-11-18 PROCEDURE — 85060 PATHOLOGIST REVIEW: ICD-10-PCS | Mod: ,,, | Performed by: STUDENT IN AN ORGANIZED HEALTH CARE EDUCATION/TRAINING PROGRAM

## 2020-11-18 PROCEDURE — 85027 COMPLETE CBC AUTOMATED: CPT

## 2020-11-18 PROCEDURE — 36415 COLL VENOUS BLD VENIPUNCTURE: CPT

## 2020-11-19 ENCOUNTER — TELEPHONE (OUTPATIENT)
Dept: HEMATOLOGY/ONCOLOGY | Facility: CLINIC | Age: 60
End: 2020-11-19

## 2020-11-19 NOTE — TELEPHONE ENCOUNTER
Pt apt r/s with Dr. Lee due to VV not working for pt. Pt confirmed in clinic apt date/time of apt rescheduled and verbalized understanding.       ----- Message from Juliet Salazar sent at 11/19/2020  9:25 AM CST -----  Contact: call  pt [phone]    Type: Needs Medical Advice  Who Called:  pt  Best Call Back Number: call  pt [phone]  Additional Information:   pt is  trying to  get into her VV misty for today / please call   per pt  placed   calls  to pod

## 2020-11-20 ENCOUNTER — PATIENT MESSAGE (OUTPATIENT)
Dept: HEMATOLOGY/ONCOLOGY | Facility: CLINIC | Age: 60
End: 2020-11-20

## 2020-11-20 ENCOUNTER — HOSPITAL ENCOUNTER (OUTPATIENT)
Dept: RADIOLOGY | Facility: HOSPITAL | Age: 60
Discharge: HOME | End: 2020-11-20
Attending: NURSE PRACTITIONER
Payer: COMMERCIAL

## 2020-11-20 DIAGNOSIS — R91.8 MULTIPLE LUNG NODULES ON CT: ICD-10-CM

## 2020-11-20 PROCEDURE — 71250 CT CHEST WITHOUT CONTRAST: ICD-10-PCS | Mod: 26,,, | Performed by: RADIOLOGY

## 2020-11-20 PROCEDURE — 71250 CT THORAX DX C-: CPT | Mod: TC

## 2020-11-20 PROCEDURE — 71250 CT THORAX DX C-: CPT | Mod: 26,,, | Performed by: RADIOLOGY

## 2020-11-24 ENCOUNTER — TELEPHONE (OUTPATIENT)
Dept: HEMATOLOGY/ONCOLOGY | Facility: CLINIC | Age: 60
End: 2020-11-24

## 2020-11-24 ENCOUNTER — TELEPHONE (OUTPATIENT)
Dept: PULMONOLOGY | Facility: CLINIC | Age: 60
End: 2020-11-24

## 2020-11-24 NOTE — TELEPHONE ENCOUNTER
Pt was called at [phone] to offer to change appt to VV. States that she would rather come in person due to prior bad experience with VV technology. Confirmed appt time with pt. Pt verbalized understanding

## 2020-11-24 NOTE — TELEPHONE ENCOUNTER
Called patient to discuss results. No answer x 2. Unable to leave voice message----- Message from Harika Garcia NP sent at 11/24/2020  1:39 PM CST -----  Ct of chest shows the clusters of nodules to be increasing, this appears to be infectious. Review of chart shows only only sputum sample brought to lab. Will need to repeat sputum samples.

## 2020-12-02 ENCOUNTER — CLINICAL SUPPORT (OUTPATIENT)
Dept: SMOKING CESSATION | Facility: CLINIC | Age: 60
End: 2020-12-02
Payer: COMMERCIAL

## 2020-12-02 DIAGNOSIS — F17.200 NICOTINE DEPENDENCE: Primary | ICD-10-CM

## 2020-12-02 PROCEDURE — 99402 PREV MED CNSL INDIV APPRX 30: CPT | Mod: S$GLB,,,

## 2020-12-02 PROCEDURE — 99999 PR PBB SHADOW E&M-EST. PATIENT-LVL I: CPT | Mod: PBBFAC,,,

## 2020-12-02 PROCEDURE — 99999 PR PBB SHADOW E&M-EST. PATIENT-LVL I: ICD-10-PCS | Mod: PBBFAC,,,

## 2020-12-02 PROCEDURE — 99402 PR PREVENT COUNSEL,INDIV,30 MIN: ICD-10-PCS | Mod: S$GLB,,,

## 2020-12-02 NOTE — Clinical Note
Patient has reduced down to 3 cigarettes per day. She was not feeling well so she had to cancel her clinic follow up appointment. We discussed and reviewed:  Self awareness, learned addiction model, identifying triggers related to the 3 cigarettes she smokes now per day, personal reasons for quitting, strategies of delay, distract, change of routine, and move it, medications, goals, and quit date. The patient remains on the prescribed tobacco cessation medication regimen of 1 mg Chantix BID without any negative side effects at this time. The patient will continue to follow up for additional support and encouragement.

## 2020-12-02 NOTE — PROGRESS NOTES
Individual Follow-Up Form    12/2/2020    Quit Date:     Clinical Status of Patient: Outpatient via telephone     Length of Service: 30 minutes    Continuing Medication: yes  Chantix    Other Medications:      Target Symptoms: Withdrawal and medication side effects. The following were rated moderate (3) to severe (4) on TCRS:  · Moderate (3): None  · Severe (4):None    Comments: Patient has reduced down to 3 cigarettes per day. She was not feeling well so she had to cancel her clinic follow up appointment. We discussed and reviewed:  Self awareness, learned addiction model, identifying triggers related to the 3 cigarettes she smokes now per day, personal reasons for quitting, strategies of delay, distract, change of routine, and move it, medications, goals, and quit date. The patient remains on the prescribed tobacco cessation medication regimen of 1 mg Chantix BID without any negative side effects at this time. The patient will continue to follow up for additional support and encouragement.     Diagnosis: F17.200    Next Visit: 2 weeks

## 2020-12-03 ENCOUNTER — OFFICE VISIT (OUTPATIENT)
Dept: PULMONOLOGY | Facility: CLINIC | Age: 60
End: 2020-12-03
Payer: COMMERCIAL

## 2020-12-03 VITALS
HEART RATE: 65 BPM | DIASTOLIC BLOOD PRESSURE: 70 MMHG | WEIGHT: 162.56 LBS | HEIGHT: 63 IN | OXYGEN SATURATION: 96 % | BODY MASS INDEX: 28.8 KG/M2 | SYSTOLIC BLOOD PRESSURE: 108 MMHG

## 2020-12-03 DIAGNOSIS — J44.0 CHRONIC OBSTRUCTIVE PULMONARY DISEASE WITH ACUTE LOWER RESPIRATORY INFECTION: Primary | ICD-10-CM

## 2020-12-03 DIAGNOSIS — J96.11 CHRONIC RESPIRATORY FAILURE WITH HYPOXIA: ICD-10-CM

## 2020-12-03 PROCEDURE — 99999 PR PBB SHADOW E&M-EST. PATIENT-LVL IV: CPT | Mod: PBBFAC,,, | Performed by: NURSE PRACTITIONER

## 2020-12-03 PROCEDURE — 99214 OFFICE O/P EST MOD 30 MIN: CPT | Mod: PBBFAC,PO,25 | Performed by: NURSE PRACTITIONER

## 2020-12-03 PROCEDURE — 96372 THER/PROPH/DIAG INJ SC/IM: CPT | Mod: PBBFAC,PO

## 2020-12-03 PROCEDURE — 99999 PR PBB SHADOW E&M-EST. PATIENT-LVL IV: ICD-10-PCS | Mod: PBBFAC,,, | Performed by: NURSE PRACTITIONER

## 2020-12-03 PROCEDURE — 99214 PR OFFICE/OUTPT VISIT, EST, LEVL IV, 30-39 MIN: ICD-10-PCS | Mod: S$GLB,,, | Performed by: NURSE PRACTITIONER

## 2020-12-03 PROCEDURE — 99214 OFFICE O/P EST MOD 30 MIN: CPT | Mod: S$GLB,,, | Performed by: NURSE PRACTITIONER

## 2020-12-03 RX ORDER — BETAMETHASONE SODIUM PHOSPHATE AND BETAMETHASONE ACETATE 3; 3 MG/ML; MG/ML
6 INJECTION, SUSPENSION INTRA-ARTICULAR; INTRALESIONAL; INTRAMUSCULAR; SOFT TISSUE
Status: COMPLETED | OUTPATIENT
Start: 2020-12-03 | End: 2020-12-03

## 2020-12-03 RX ORDER — LEVOFLOXACIN 500 MG/1
500 TABLET, FILM COATED ORAL DAILY
Qty: 7 TABLET | Refills: 0 | Status: SHIPPED | OUTPATIENT
Start: 2020-12-03 | End: 2020-12-10

## 2020-12-03 RX ORDER — PREDNISONE 20 MG/1
TABLET ORAL
Qty: 18 TABLET | Refills: 1 | Status: SHIPPED | OUTPATIENT
Start: 2020-12-03 | End: 2021-05-14 | Stop reason: SDUPTHER

## 2020-12-03 RX ADMIN — BETAMETHASONE SODIUM PHOSPHATE AND BETAMETHASONE ACETATE 6 MG: 3; 3 INJECTION, SUSPENSION INTRA-ARTICULAR; INTRALESIONAL; INTRAMUSCULAR; SOFT TISSUE at 04:12

## 2020-12-03 NOTE — PATIENT INSTRUCTIONS
Continue current COPD medication regiment    Lung CT shows pneumonia    Bring sputum sample to any [facility] lab with in 4 hours of collecting    Start antibiotic after bringing in sputum sample    Take levaquin 1 pill a day for 7 days    Use nebulizer 1-4 times a day until you feel better    Continue supplemental oxygen at night, can use during day when needed.

## 2020-12-03 NOTE — PROGRESS NOTES
12/3/2020    Yvette Hutchinson  Office note    Chief Complaint   Patient presents with    Shortness of Breath    Cough    Wheezing       HPI:   12/3/2020- has supplemental oxygen set at 3 L at night, states benefiting,   Cough- worsened in last 7 days, worse in early morning and late evenings, nocturnal arousals nightly, productive yellow/green mucous quarter size.      10/21/2020- SOB and wheeze- recurrent problem, worsened last weeks, associated with sinus congestion, wheeze is worse in early morning and when laying down at nigtht  Cough- productive brown/green mucous for 1 week. Has not started steroid therapy. Improves with nebulizer using 1-2x daily for past week. Was not able afford inhalers. Did not receive call from [facility] pharmacy.     Complaint for cramping sensation in chest that occurs sporadically on left and right side that lasts few minutes. Onset years, recurrent complaint, started back 1 week prior. Occurs couple days in row.     7/15/2020- cough- onset 7 days, worse in mornings and night, nocturnal arousals 1x nightly, productive yellow/green dime size mucous, associated with chest tightness and wheeze, improves with nebulizer using 1-2 daily, states feels better after coughing up large amounts of mucous; went to covid clinic and tested negative for covid did have fluid on ears.   Currently on Advair daily, insurance did not cover spiriva;       4/15/2020- cough- onset weeks, associated with occasional chest tighness, worse at night and early morning, quarter size clear to light brown in color. Currently    advair, spiriva, and rescue inhaler. States using rescue daily with little benefit.    3/5/2020- Pt is a 60 yo female with depression, GERD and COPD presenting for new evaluation.  Has chronic cough worse last 2 wks w/ phlegm, white, worse in am and evening.  Inhalers: rescue inhaler says insurance didn't cover  She reports wt gain over last 2 yrs. Has abdominal cramps intermittently  "radiating to the back.   Smoking since she was very young, 1 pack lasts 3 days.  She gets yearly bronchitis.  Labs from her PCP done 2 days ago are concerning for possible CLL- with WBC 21 and peripheral smear w/ smudge cells    The chief compliant  problem varies with instablilty at time  PFSH:  Past Medical History:   Diagnosis Date    Arthritis     Depression     GERD (gastroesophageal reflux disease)     Pneumonia of left lung due to infectious organism 3/5/2020         Past Surgical History:   Procedure Laterality Date    TONSILLECTOMY       Social History     Tobacco Use    Smoking status: Current Some Day Smoker     Packs/day: 1.00     Types: Cigarettes    Smokeless tobacco: Never Used   Substance Use Topics    Alcohol use: Yes    Drug use: Yes     Types: Marijuana     Family History   Problem Relation Age of Onset    Ovarian cancer Sister     Breast cancer Cousin      Review of patient's allergies indicates:  No Known Allergies    Performance Status:The patient's activity level is functions out of house.      Review of Systems:  a review of eleven systems covering constitutional, Eye, HEENT, Psych, Respiratory, Cardiac, GI, , Musculoskeletal, Endocrine, Dermatologic was negative except for pertinent findings as listed ABOVE and below:  Headaches daily for a long time- takes advil & tylenol  Cough, chest tightness, wheeze, body aches, sinus pain and drainage       Exam:Comprehensive exam done. /70 (BP Location: Left arm, Patient Position: Sitting, BP Method: Medium (Automatic))   Pulse 65   Ht 5' 3" (1.6 m)   Wt 73.8 kg (162 lb 9.4 oz)   SpO2 96% Comment: on room air at rest  BMI 28.80 kg/m²   Exam included Vitals as listed  Constitutional:  oriented to person, place, and time. appears well-developed. No distress.   Nose: Nose normal. Pain with palpation left sinus cavity,   Mouth/Throat: Uvula is midline, oropharynx is clear and moist and mucous membranes are normal. No dental caries. " No oropharyngeal exudate, posterior oropharyngeal edema, posterior oropharyngeal erythema or tonsillar abscesses.  M[place]apatti (M) score 2;   Eyes: Pupils are equal, round, and reactive to light.   Neck: No JVD present. No thyromegaly present.   Cardiovascular: Normal rate, regular rhythm and normal heart sounds. Exam reveals no gallop and no friction rub.   No murmur heard.  Pulmonary/Chest: Effort normal and breath sounds abnormal: bilateral expiratory wheeze.   No accessory muscle usage or stridor. No apnea and no tachypnea. No respiratory distress, decreased breath sounds, rhonchi, rales, or tenderness.   Abdominal: Soft. exhibits no mass. There is no tenderness. No hepatosplenomegaly, hernias and normoactive bowel sounds  Musculoskeletal: Normal range of motion. exhibits no edema.   Lymphadenopathy:    no cervical adenopathy.   Neurological:  alert and oriented to person, place, and time. not disoriented.   Skin: Skin is warm and dry. Capillary refill takes less 2 sec. No cyanosis or erythema. No pallor. Nails show no clubbing.   Psychiatric: normal mood and affect. behavior is normal. Judgment and thought content normal.       Radiographs (ct chest and cxr) reviewed: view by direct vision   CT Chest Without Contrast 11/20/2020   Stable right lung pulmonary nodules.  Mild increase in size of clustered nodules within the left upper lobe, with the distribution most compatible with an atypical infectious process.     Old granulomatous disease.     Left adrenal adenoma, unchanged.     Chronic T8 compression fracture.    CT Chest Without Contrast 03/11/2020   1. There are two right lung pulmonary nodules.  Per LUNG-RADS scoring this would reflect a Category 3 (probably benign).  Recommendation is for 6 month follow-up LDCT.  2. Airways disease or apical typical infection in the left lower lobe.       CXR 12/19/19- clear lung fields bilaterally     Labs reviewed    Lab Results   Component Value Date    WBC 20.78 (H)  03/03/2020    RBC 4.74 03/03/2020    HGB 14.9 03/03/2020    HCT 47.0 03/03/2020    MCV 99 (H) 03/03/2020    MCH 31.4 (H) 03/03/2020    MCHC 31.7 (L) 03/03/2020    RDW 13.1 03/03/2020     03/03/2020    MPV 11.9 03/03/2020    GRAN 33.0 (L) 03/03/2020    LYMPH 64.0 (H) 03/03/2020    MONO 3.0 (L) 03/03/2020    EOS CANCELED 08/18/2017    BASO CANCELED 08/18/2017    EOSINOPHIL 0.0 03/03/2020    BASOPHIL 0.0 03/03/2020         Pathologist interpretation of peripheral blood smear:   Mild leukocytosis shows absolute and relative lymphocytosis with   cytologic atypia and scattered smudge cells.  The morphology is   concerning for lymphoproliferative disorder such as CLL.  Correlation   with flow cytometric analysis of peripheral blood is recommended for   further evaluation.     But cells appear predominantly normochromic and normocytic with mild   anisocytosis.     Platelets are morphologically unremarkable on scanning.     PFT reviewed    3/12/2020 Severe obstruction. FEV1  49 %  predicted.   Airflow is not clearly improved after bronchodilator. Clinical improvement following bronchodilator therapy may occur in the absence of spirometric improvement.   Mild Hyperinflation.   Moderate reduction in diffusion capacity - unadjusted for hemoglobin.       Plan:  Clinical impression is resonably certain and repeated evaluation prn +/- follow up will be needed as below.    Yvette was seen today for shortness of breath, cough and wheezing.    Diagnoses and all orders for this visit:    Chronic obstructive pulmonary disease with acute lower respiratory infection  -     Culture, Respiratory with Gram Stain; Future  -     levoFLOXacin (LEVAQUIN) 500 MG tablet; Take 1 tablet (500 mg total) by mouth once daily. for 7 days  -     betamethasone acetate-betamethasone sodium phosphate injection 6 mg  -     predniSONE (DELTASONE) 20 MG tablet; 3 pills for 3 days, 2 pills for 3 days, 1 pill for 3 days    Chronic respiratory failure  with hypoxia     - continue supplemental oxygen     Follow up if symptoms worsen or fail to improve.    Discussed with patient above for education the following:      Patient Instructions   Continue current COPD medication regiment    Lung CT shows pneumonia    Bring sputum sample to any [facility] lab with in 4 hours of collecting    Start antibiotic after bringing in sputum sample    Take levaquin 1 pill a day for 7 days    Use nebulizer 1-4 times a day until you feel better    Continue supplemental oxygen at night, can use during day when needed.

## 2020-12-04 ENCOUNTER — LAB VISIT (OUTPATIENT)
Dept: LAB | Facility: HOSPITAL | Age: 60
End: 2020-12-04
Attending: NURSE PRACTITIONER
Payer: MEDICAID

## 2020-12-04 DIAGNOSIS — J44.0 CHRONIC OBSTRUCTIVE PULMONARY DISEASE WITH ACUTE LOWER RESPIRATORY INFECTION: ICD-10-CM

## 2020-12-04 PROCEDURE — 87070 CULTURE OTHR SPECIMN AEROBIC: CPT

## 2020-12-04 PROCEDURE — 87205 SMEAR GRAM STAIN: CPT

## 2020-12-07 ENCOUNTER — TELEPHONE (OUTPATIENT)
Dept: PULMONOLOGY | Facility: CLINIC | Age: 60
End: 2020-12-07

## 2020-12-07 DIAGNOSIS — J44.1 CHRONIC OBSTRUCTIVE PULMONARY DISEASE WITH ACUTE EXACERBATION: Primary | ICD-10-CM

## 2020-12-07 LAB
BACTERIA SPEC AEROBE CULT: NORMAL
BACTERIA SPEC AEROBE CULT: NORMAL
GRAM STN SPEC: NORMAL

## 2020-12-07 NOTE — TELEPHONE ENCOUNTER
Spoke to pt with results, she says that shes been coughing up more green/brown mucus.       ----- Message from Harika Garcia NP sent at 12/7/2020  1:50 PM CST -----  Sputum culture shows no abnormal bacteria. Continue current treatment plan

## 2021-01-13 ENCOUNTER — TELEPHONE (OUTPATIENT)
Dept: FAMILY MEDICINE | Facility: CLINIC | Age: 61
End: 2021-01-13

## 2021-02-08 ENCOUNTER — OFFICE VISIT (OUTPATIENT)
Dept: PULMONOLOGY | Facility: CLINIC | Age: 61
End: 2021-02-08
Payer: MEDICAID

## 2021-02-08 VITALS
DIASTOLIC BLOOD PRESSURE: 72 MMHG | SYSTOLIC BLOOD PRESSURE: 110 MMHG | HEART RATE: 66 BPM | OXYGEN SATURATION: 97 % | HEIGHT: 63 IN | BODY MASS INDEX: 29.28 KG/M2 | WEIGHT: 165.25 LBS

## 2021-02-08 DIAGNOSIS — J96.11 CHRONIC RESPIRATORY FAILURE WITH HYPOXIA: ICD-10-CM

## 2021-02-08 DIAGNOSIS — F17.210 CIGARETTE NICOTINE DEPENDENCE WITHOUT COMPLICATION: ICD-10-CM

## 2021-02-08 DIAGNOSIS — G47.30 SLEEP APNEA, UNSPECIFIED TYPE: ICD-10-CM

## 2021-02-08 DIAGNOSIS — J44.9 CHRONIC OBSTRUCTIVE PULMONARY DISEASE, UNSPECIFIED COPD TYPE: Primary | ICD-10-CM

## 2021-02-08 PROCEDURE — 99214 PR OFFICE/OUTPT VISIT, EST, LEVL IV, 30-39 MIN: ICD-10-PCS | Mod: S$PBB,,, | Performed by: NURSE PRACTITIONER

## 2021-02-08 PROCEDURE — 99214 OFFICE O/P EST MOD 30 MIN: CPT | Mod: PBBFAC,PO | Performed by: NURSE PRACTITIONER

## 2021-02-08 PROCEDURE — 99999 PR PBB SHADOW E&M-EST. PATIENT-LVL IV: ICD-10-PCS | Mod: PBBFAC,,, | Performed by: NURSE PRACTITIONER

## 2021-02-08 PROCEDURE — 99214 OFFICE O/P EST MOD 30 MIN: CPT | Mod: S$PBB,,, | Performed by: NURSE PRACTITIONER

## 2021-02-08 PROCEDURE — 99999 PR PBB SHADOW E&M-EST. PATIENT-LVL IV: CPT | Mod: PBBFAC,,, | Performed by: NURSE PRACTITIONER

## 2021-02-08 RX ORDER — MONTELUKAST SODIUM 10 MG/1
TABLET ORAL
COMMUNITY
Start: 2021-01-19 | End: 2021-05-14

## 2021-02-08 RX ORDER — CEPHALEXIN 250 MG/1
CAPSULE ORAL
COMMUNITY
Start: 2021-02-01 | End: 2021-04-05 | Stop reason: SDUPTHER

## 2021-02-08 RX ORDER — VARENICLINE TARTRATE 1 MG/1
1 TABLET, FILM COATED ORAL 2 TIMES DAILY
Qty: 60 TABLET | Refills: 1 | Status: SHIPPED | OUTPATIENT
Start: 2021-03-08 | End: 2021-04-19 | Stop reason: SDUPTHER

## 2021-02-09 ENCOUNTER — TELEPHONE (OUTPATIENT)
Dept: PULMONOLOGY | Facility: CLINIC | Age: 61
End: 2021-02-09

## 2021-02-10 ENCOUNTER — OFFICE VISIT (OUTPATIENT)
Dept: FAMILY MEDICINE | Facility: CLINIC | Age: 61
End: 2021-02-10
Payer: MEDICAID

## 2021-02-10 VITALS
SYSTOLIC BLOOD PRESSURE: 120 MMHG | RESPIRATION RATE: 16 BRPM | BODY MASS INDEX: 29.3 KG/M2 | OXYGEN SATURATION: 96 % | HEIGHT: 63 IN | TEMPERATURE: 98 F | DIASTOLIC BLOOD PRESSURE: 66 MMHG | WEIGHT: 165.38 LBS | HEART RATE: 81 BPM

## 2021-02-10 DIAGNOSIS — F41.1 GENERALIZED ANXIETY DISORDER: ICD-10-CM

## 2021-02-10 DIAGNOSIS — J96.11 CHRONIC RESPIRATORY FAILURE WITH HYPOXIA: ICD-10-CM

## 2021-02-10 DIAGNOSIS — Z00.00 ROUTINE GENERAL MEDICAL EXAMINATION AT A HEALTH CARE FACILITY: Primary | ICD-10-CM

## 2021-02-10 DIAGNOSIS — Z12.31 ENCOUNTER FOR SCREENING MAMMOGRAM FOR BREAST CANCER: ICD-10-CM

## 2021-02-10 DIAGNOSIS — C91.10 CLL (CHRONIC LYMPHOCYTIC LEUKEMIA): ICD-10-CM

## 2021-02-10 DIAGNOSIS — J43.1 PANLOBULAR EMPHYSEMA: ICD-10-CM

## 2021-02-10 DIAGNOSIS — E66.3 OVERWEIGHT (BMI 25.0-29.9): ICD-10-CM

## 2021-02-10 DIAGNOSIS — F17.200 TOBACCO DEPENDENCE: ICD-10-CM

## 2021-02-10 PROCEDURE — 99214 OFFICE O/P EST MOD 30 MIN: CPT | Mod: S$PBB,,, | Performed by: STUDENT IN AN ORGANIZED HEALTH CARE EDUCATION/TRAINING PROGRAM

## 2021-02-10 PROCEDURE — 99999 PR PBB SHADOW E&M-EST. PATIENT-LVL V: ICD-10-PCS | Mod: PBBFAC,,, | Performed by: STUDENT IN AN ORGANIZED HEALTH CARE EDUCATION/TRAINING PROGRAM

## 2021-02-10 PROCEDURE — 99214 PR OFFICE/OUTPT VISIT, EST, LEVL IV, 30-39 MIN: ICD-10-PCS | Mod: S$PBB,,, | Performed by: STUDENT IN AN ORGANIZED HEALTH CARE EDUCATION/TRAINING PROGRAM

## 2021-02-10 PROCEDURE — 99215 OFFICE O/P EST HI 40 MIN: CPT | Mod: PBBFAC,PO | Performed by: STUDENT IN AN ORGANIZED HEALTH CARE EDUCATION/TRAINING PROGRAM

## 2021-02-10 PROCEDURE — 99999 PR PBB SHADOW E&M-EST. PATIENT-LVL V: CPT | Mod: PBBFAC,,, | Performed by: STUDENT IN AN ORGANIZED HEALTH CARE EDUCATION/TRAINING PROGRAM

## 2021-02-10 RX ORDER — BUPROPION HYDROCHLORIDE 75 MG/1
75 TABLET ORAL 2 TIMES DAILY
Qty: 60 TABLET | Refills: 11 | Status: SHIPPED | OUTPATIENT
Start: 2021-02-10 | End: 2021-06-15

## 2021-02-15 DIAGNOSIS — R09.89 CHRONIC SINUS COMPLAINTS: ICD-10-CM

## 2021-02-16 RX ORDER — FLUTICASONE PROPIONATE 50 MCG
2 SPRAY, SUSPENSION (ML) NASAL DAILY
Qty: 16 G | Refills: 3 | Status: SHIPPED | OUTPATIENT
Start: 2021-02-16 | End: 2021-02-17 | Stop reason: SDUPTHER

## 2021-02-17 DIAGNOSIS — R09.89 CHRONIC SINUS COMPLAINTS: ICD-10-CM

## 2021-02-18 RX ORDER — FLUTICASONE PROPIONATE 50 MCG
2 SPRAY, SUSPENSION (ML) NASAL DAILY
Qty: 16 G | Refills: 3 | Status: SHIPPED | OUTPATIENT
Start: 2021-02-18 | End: 2021-06-16 | Stop reason: SDUPTHER

## 2021-03-04 ENCOUNTER — HOSPITAL ENCOUNTER (OUTPATIENT)
Dept: RADIOLOGY | Facility: CLINIC | Age: 61
Discharge: HOME | End: 2021-03-04
Attending: STUDENT IN AN ORGANIZED HEALTH CARE EDUCATION/TRAINING PROGRAM
Payer: MEDICAID

## 2021-03-04 DIAGNOSIS — Z12.31 ENCOUNTER FOR SCREENING MAMMOGRAM FOR BREAST CANCER: ICD-10-CM

## 2021-03-04 PROCEDURE — 77067 SCR MAMMO BI INCL CAD: CPT | Mod: 26,,, | Performed by: RADIOLOGY

## 2021-03-04 PROCEDURE — 77063 BREAST TOMOSYNTHESIS BI: CPT | Mod: 26,,, | Performed by: RADIOLOGY

## 2021-03-04 PROCEDURE — 77067 SCR MAMMO BI INCL CAD: CPT | Mod: TC,PO

## 2021-03-04 PROCEDURE — 77063 MAMMO DIGITAL SCREENING BILAT WITH TOMO: ICD-10-PCS | Mod: 26,,, | Performed by: RADIOLOGY

## 2021-03-04 PROCEDURE — 77067 MAMMO DIGITAL SCREENING BILAT WITH TOMO: ICD-10-PCS | Mod: 26,,, | Performed by: RADIOLOGY

## 2021-03-05 ENCOUNTER — PATIENT MESSAGE (OUTPATIENT)
Dept: FAMILY MEDICINE | Facility: CLINIC | Age: 61
End: 2021-03-05

## 2021-03-15 ENCOUNTER — OFFICE VISIT (OUTPATIENT)
Dept: FAMILY MEDICINE | Facility: CLINIC | Age: 61
End: 2021-03-15
Payer: MEDICAID

## 2021-03-15 VITALS
TEMPERATURE: 97 F | BODY MASS INDEX: 29.64 KG/M2 | WEIGHT: 167.31 LBS | HEART RATE: 72 BPM | HEIGHT: 63 IN | OXYGEN SATURATION: 94 % | DIASTOLIC BLOOD PRESSURE: 60 MMHG | SYSTOLIC BLOOD PRESSURE: 104 MMHG | RESPIRATION RATE: 16 BRPM

## 2021-03-15 DIAGNOSIS — Z01.419 WELL WOMAN EXAM WITH ROUTINE GYNECOLOGICAL EXAM: Primary | ICD-10-CM

## 2021-03-15 DIAGNOSIS — E66.3 OVERWEIGHT (BMI 25.0-29.9): ICD-10-CM

## 2021-03-15 PROCEDURE — 99214 PR OFFICE/OUTPT VISIT, EST, LEVL IV, 30-39 MIN: ICD-10-PCS | Mod: S$PBB,25,, | Performed by: FAMILY MEDICINE

## 2021-03-15 PROCEDURE — 88175 CYTOPATH C/V AUTO FLUID REDO: CPT | Performed by: FAMILY MEDICINE

## 2021-03-15 PROCEDURE — 87624 HPV HI-RISK TYP POOLED RSLT: CPT | Performed by: FAMILY MEDICINE

## 2021-03-15 PROCEDURE — 99999 PR PBB SHADOW E&M-EST. PATIENT-LVL IV: ICD-10-PCS | Mod: PBBFAC,,, | Performed by: FAMILY MEDICINE

## 2021-03-15 PROCEDURE — 99999 PR PBB SHADOW E&M-EST. PATIENT-LVL IV: CPT | Mod: PBBFAC,,, | Performed by: FAMILY MEDICINE

## 2021-03-15 PROCEDURE — 99214 OFFICE O/P EST MOD 30 MIN: CPT | Mod: S$PBB,25,, | Performed by: FAMILY MEDICINE

## 2021-03-15 PROCEDURE — 99214 OFFICE O/P EST MOD 30 MIN: CPT | Mod: PBBFAC,PO | Performed by: FAMILY MEDICINE

## 2021-03-17 ENCOUNTER — CLINICAL SUPPORT (OUTPATIENT)
Dept: SMOKING CESSATION | Facility: CLINIC | Age: 61
End: 2021-03-17
Payer: COMMERCIAL

## 2021-03-17 DIAGNOSIS — F17.200 NICOTINE DEPENDENCE: Primary | ICD-10-CM

## 2021-03-17 PROCEDURE — 99407 BEHAV CHNG SMOKING > 10 MIN: CPT | Mod: S$GLB,,,

## 2021-03-17 PROCEDURE — 99407 PR TOBACCO USE CESSATION INTENSIVE >10 MINUTES: ICD-10-PCS | Mod: S$GLB,,,

## 2021-03-18 LAB
HPV HR 12 DNA SPEC QL NAA+PROBE: NEGATIVE
HPV16 AG SPEC QL: NEGATIVE
HPV18 DNA SPEC QL NAA+PROBE: NEGATIVE

## 2021-03-22 LAB
FINAL PATHOLOGIC DIAGNOSIS: NORMAL
Lab: NORMAL

## 2021-04-02 ENCOUNTER — PATIENT MESSAGE (OUTPATIENT)
Dept: PULMONOLOGY | Facility: CLINIC | Age: 61
End: 2021-04-02

## 2021-04-05 DIAGNOSIS — J44.9 CHRONIC OBSTRUCTIVE PULMONARY DISEASE, UNSPECIFIED COPD TYPE: Primary | ICD-10-CM

## 2021-04-05 RX ORDER — CEPHALEXIN 250 MG/1
1 CAPSULE ORAL 2 TIMES DAILY
Qty: 60 EACH | Refills: 2 | Status: SHIPPED | OUTPATIENT
Start: 2021-04-05 | End: 2021-05-14 | Stop reason: SDUPTHER

## 2021-04-13 ENCOUNTER — PATIENT MESSAGE (OUTPATIENT)
Dept: PULMONOLOGY | Facility: CLINIC | Age: 61
End: 2021-04-13

## 2021-04-19 DIAGNOSIS — F17.210 CIGARETTE NICOTINE DEPENDENCE WITHOUT COMPLICATION: ICD-10-CM

## 2021-04-19 RX ORDER — VARENICLINE TARTRATE 1 MG/1
1 TABLET, FILM COATED ORAL 2 TIMES DAILY
Qty: 60 TABLET | Refills: 2 | Status: SHIPPED | OUTPATIENT
Start: 2021-04-19 | End: 2021-09-07

## 2021-04-29 ENCOUNTER — PATIENT MESSAGE (OUTPATIENT)
Dept: PULMONOLOGY | Facility: CLINIC | Age: 61
End: 2021-04-29

## 2021-05-10 ENCOUNTER — PATIENT MESSAGE (OUTPATIENT)
Dept: RESEARCH | Facility: HOSPITAL | Age: 61
End: 2021-05-10

## 2021-05-13 ENCOUNTER — PATIENT OUTREACH (OUTPATIENT)
Dept: ADMINISTRATIVE | Facility: OTHER | Age: 61
End: 2021-05-13

## 2021-05-14 ENCOUNTER — HOSPITAL ENCOUNTER (OUTPATIENT)
Dept: RADIOLOGY | Facility: HOSPITAL | Age: 61
Discharge: HOME | End: 2021-05-14
Attending: NURSE PRACTITIONER
Payer: MEDICAID

## 2021-05-14 ENCOUNTER — OFFICE VISIT (OUTPATIENT)
Dept: PULMONOLOGY | Facility: CLINIC | Age: 61
End: 2021-05-14
Payer: MEDICAID

## 2021-05-14 VITALS
DIASTOLIC BLOOD PRESSURE: 64 MMHG | SYSTOLIC BLOOD PRESSURE: 104 MMHG | WEIGHT: 175.13 LBS | OXYGEN SATURATION: 97 % | HEIGHT: 63 IN | BODY MASS INDEX: 31.03 KG/M2 | HEART RATE: 62 BPM

## 2021-05-14 DIAGNOSIS — R06.02 SHORTNESS OF BREATH: ICD-10-CM

## 2021-05-14 DIAGNOSIS — J44.9 CHRONIC OBSTRUCTIVE PULMONARY DISEASE, UNSPECIFIED COPD TYPE: ICD-10-CM

## 2021-05-14 DIAGNOSIS — R09.89 CHRONIC SINUS COMPLAINTS: ICD-10-CM

## 2021-05-14 DIAGNOSIS — J44.9 CHRONIC OBSTRUCTIVE PULMONARY DISEASE, UNSPECIFIED COPD TYPE: Primary | ICD-10-CM

## 2021-05-14 DIAGNOSIS — G47.30 SLEEP APNEA, UNSPECIFIED TYPE: ICD-10-CM

## 2021-05-14 PROCEDURE — 71046 X-RAY EXAM CHEST 2 VIEWS: CPT | Mod: 26,,, | Performed by: RADIOLOGY

## 2021-05-14 PROCEDURE — 99214 PR OFFICE/OUTPT VISIT, EST, LEVL IV, 30-39 MIN: ICD-10-PCS | Mod: S$PBB,,, | Performed by: NURSE PRACTITIONER

## 2021-05-14 PROCEDURE — 71046 XR CHEST PA AND LATERAL: ICD-10-PCS | Mod: 26,,, | Performed by: RADIOLOGY

## 2021-05-14 PROCEDURE — 99999 PR PBB SHADOW E&M-EST. PATIENT-LVL IV: CPT | Mod: PBBFAC,,, | Performed by: NURSE PRACTITIONER

## 2021-05-14 PROCEDURE — 99999 PR PBB SHADOW E&M-EST. PATIENT-LVL IV: ICD-10-PCS | Mod: PBBFAC,,, | Performed by: NURSE PRACTITIONER

## 2021-05-14 PROCEDURE — 71046 X-RAY EXAM CHEST 2 VIEWS: CPT | Mod: TC,FY

## 2021-05-14 PROCEDURE — 99214 OFFICE O/P EST MOD 30 MIN: CPT | Mod: S$PBB,,, | Performed by: NURSE PRACTITIONER

## 2021-05-14 PROCEDURE — 99214 OFFICE O/P EST MOD 30 MIN: CPT | Mod: PBBFAC,PO | Performed by: NURSE PRACTITIONER

## 2021-05-14 RX ORDER — PREDNISONE 20 MG/1
TABLET ORAL
Qty: 18 TABLET | Refills: 1 | Status: SHIPPED | OUTPATIENT
Start: 2021-05-14 | End: 2022-01-11 | Stop reason: ALTCHOICE

## 2021-05-14 RX ORDER — CEPHALEXIN 250 MG/1
1 CAPSULE ORAL 2 TIMES DAILY
Qty: 60 EACH | Refills: 5 | Status: SHIPPED | OUTPATIENT
Start: 2021-05-14 | End: 2021-07-13 | Stop reason: SDUPTHER

## 2021-05-14 RX ORDER — LEVOCETIRIZINE DIHYDROCHLORIDE 5 MG/1
5 TABLET, FILM COATED ORAL NIGHTLY
Qty: 30 TABLET | Refills: 5 | Status: SHIPPED | OUTPATIENT
Start: 2021-05-14 | End: 2024-02-08 | Stop reason: SDUPTHER

## 2021-05-27 ENCOUNTER — PROCEDURE VISIT (OUTPATIENT)
Dept: SLEEP MEDICINE | Facility: HOSPITAL | Age: 61
End: 2021-05-27
Attending: NURSE PRACTITIONER
Payer: MEDICAID

## 2021-05-27 DIAGNOSIS — G47.30 SLEEP APNEA, UNSPECIFIED TYPE: ICD-10-CM

## 2021-05-27 PROCEDURE — 95806 SLEEP STUDY UNATT&RESP EFFT: CPT

## 2021-06-03 ENCOUNTER — PATIENT MESSAGE (OUTPATIENT)
Dept: PULMONOLOGY | Facility: CLINIC | Age: 61
End: 2021-06-03

## 2021-06-03 ENCOUNTER — TELEPHONE (OUTPATIENT)
Dept: PULMONOLOGY | Facility: CLINIC | Age: 61
End: 2021-06-03

## 2021-06-09 ENCOUNTER — CLINICAL SUPPORT (OUTPATIENT)
Dept: SMOKING CESSATION | Facility: CLINIC | Age: 61
End: 2021-06-09
Payer: COMMERCIAL

## 2021-06-09 ENCOUNTER — TELEPHONE (OUTPATIENT)
Dept: FAMILY MEDICINE | Facility: CLINIC | Age: 61
End: 2021-06-09

## 2021-06-09 DIAGNOSIS — F17.200 NICOTINE DEPENDENCE: Primary | ICD-10-CM

## 2021-06-09 PROCEDURE — 99407 BEHAV CHNG SMOKING > 10 MIN: CPT | Mod: S$GLB,,,

## 2021-06-09 PROCEDURE — 99407 PR TOBACCO USE CESSATION INTENSIVE >10 MINUTES: ICD-10-PCS | Mod: S$GLB,,,

## 2021-06-15 ENCOUNTER — OFFICE VISIT (OUTPATIENT)
Dept: FAMILY MEDICINE | Facility: CLINIC | Age: 61
End: 2021-06-15
Payer: MEDICAID

## 2021-06-15 VITALS
WEIGHT: 174.81 LBS | HEART RATE: 74 BPM | OXYGEN SATURATION: 95 % | TEMPERATURE: 98 F | BODY MASS INDEX: 30.97 KG/M2 | DIASTOLIC BLOOD PRESSURE: 62 MMHG | HEIGHT: 63 IN | RESPIRATION RATE: 16 BRPM | SYSTOLIC BLOOD PRESSURE: 116 MMHG

## 2021-06-15 DIAGNOSIS — G47.00 INSOMNIA, UNSPECIFIED TYPE: Primary | ICD-10-CM

## 2021-06-15 DIAGNOSIS — G89.4 CHRONIC PAIN SYNDROME: ICD-10-CM

## 2021-06-15 DIAGNOSIS — J44.9 CHRONIC OBSTRUCTIVE PULMONARY DISEASE, UNSPECIFIED COPD TYPE: ICD-10-CM

## 2021-06-15 DIAGNOSIS — E66.9 OBESITY, CLASS I, BMI 30-34.9: ICD-10-CM

## 2021-06-15 DIAGNOSIS — J96.11 CHRONIC RESPIRATORY FAILURE WITH HYPOXIA: ICD-10-CM

## 2021-06-15 DIAGNOSIS — F41.9 ANXIETY: ICD-10-CM

## 2021-06-15 PROCEDURE — 99999 PR PBB SHADOW E&M-EST. PATIENT-LVL IV: CPT | Mod: PBBFAC,,, | Performed by: FAMILY MEDICINE

## 2021-06-15 PROCEDURE — 99999 PR PBB SHADOW E&M-EST. PATIENT-LVL IV: ICD-10-PCS | Mod: PBBFAC,,, | Performed by: FAMILY MEDICINE

## 2021-06-15 PROCEDURE — 99214 PR OFFICE/OUTPT VISIT, EST, LEVL IV, 30-39 MIN: ICD-10-PCS | Mod: S$PBB,,, | Performed by: FAMILY MEDICINE

## 2021-06-15 PROCEDURE — 99214 OFFICE O/P EST MOD 30 MIN: CPT | Mod: S$PBB,,, | Performed by: FAMILY MEDICINE

## 2021-06-15 PROCEDURE — 99214 OFFICE O/P EST MOD 30 MIN: CPT | Mod: PBBFAC,PO | Performed by: FAMILY MEDICINE

## 2021-06-15 RX ORDER — BUPROPION HYDROCHLORIDE 150 MG/1
150 TABLET ORAL DAILY
Qty: 30 TABLET | Refills: 2 | Status: SHIPPED | OUTPATIENT
Start: 2021-06-15 | End: 2021-09-07 | Stop reason: SDUPTHER

## 2021-06-15 RX ORDER — GABAPENTIN 300 MG/1
300 CAPSULE ORAL 3 TIMES DAILY
Qty: 90 CAPSULE | Refills: 11 | Status: SHIPPED | OUTPATIENT
Start: 2021-06-15 | End: 2022-07-14

## 2021-06-15 RX ORDER — HYDROXYZINE HYDROCHLORIDE 50 MG/1
100 TABLET, FILM COATED ORAL 4 TIMES DAILY PRN
Qty: 360 TABLET | Refills: 3 | Status: SHIPPED | OUTPATIENT
Start: 2021-06-15 | End: 2022-02-14

## 2021-06-16 DIAGNOSIS — R09.89 CHRONIC SINUS COMPLAINTS: ICD-10-CM

## 2021-06-16 RX ORDER — FLUTICASONE PROPIONATE 50 MCG
2 SPRAY, SUSPENSION (ML) NASAL DAILY
Qty: 16 G | Refills: 3 | Status: SHIPPED | OUTPATIENT
Start: 2021-06-16 | End: 2021-06-17 | Stop reason: SDUPTHER

## 2021-06-17 DIAGNOSIS — R09.89 CHRONIC SINUS COMPLAINTS: ICD-10-CM

## 2021-06-18 RX ORDER — FLUTICASONE PROPIONATE 50 MCG
2 SPRAY, SUSPENSION (ML) NASAL DAILY
Qty: 16 G | Refills: 3 | Status: SHIPPED | OUTPATIENT
Start: 2021-06-18 | End: 2022-06-20

## 2021-07-13 DIAGNOSIS — J44.9 CHRONIC OBSTRUCTIVE PULMONARY DISEASE, UNSPECIFIED COPD TYPE: ICD-10-CM

## 2021-07-13 RX ORDER — CEPHALEXIN 250 MG/1
1 CAPSULE ORAL 2 TIMES DAILY
Qty: 60 EACH | Refills: 5 | Status: SHIPPED | OUTPATIENT
Start: 2021-07-13 | End: 2022-06-20

## 2021-07-21 ENCOUNTER — TELEPHONE (OUTPATIENT)
Dept: PULMONOLOGY | Facility: CLINIC | Age: 61
End: 2021-07-21

## 2021-07-27 ENCOUNTER — LAB VISIT (OUTPATIENT)
Dept: LAB | Facility: HOSPITAL | Age: 61
End: 2021-07-27
Attending: STUDENT IN AN ORGANIZED HEALTH CARE EDUCATION/TRAINING PROGRAM
Payer: MEDICAID

## 2021-07-27 ENCOUNTER — OFFICE VISIT (OUTPATIENT)
Dept: FAMILY MEDICINE | Facility: CLINIC | Age: 61
End: 2021-07-27
Payer: COMMERCIAL

## 2021-07-27 VITALS
WEIGHT: 178.13 LBS | HEIGHT: 63 IN | SYSTOLIC BLOOD PRESSURE: 100 MMHG | HEART RATE: 77 BPM | DIASTOLIC BLOOD PRESSURE: 74 MMHG | BODY MASS INDEX: 31.56 KG/M2 | OXYGEN SATURATION: 95 % | RESPIRATION RATE: 18 BRPM

## 2021-07-27 DIAGNOSIS — F41.1 GENERALIZED ANXIETY DISORDER: Primary | ICD-10-CM

## 2021-07-27 DIAGNOSIS — C91.10 CLL (CHRONIC LYMPHOCYTIC LEUKEMIA): ICD-10-CM

## 2021-07-27 DIAGNOSIS — M54.41 CHRONIC BILATERAL LOW BACK PAIN WITH RIGHT-SIDED SCIATICA: ICD-10-CM

## 2021-07-27 DIAGNOSIS — F43.23 ADJUSTMENT DISORDER WITH MIXED ANXIETY AND DEPRESSED MOOD: ICD-10-CM

## 2021-07-27 DIAGNOSIS — G89.29 CHRONIC BILATERAL LOW BACK PAIN WITH RIGHT-SIDED SCIATICA: ICD-10-CM

## 2021-07-27 PROCEDURE — 36415 COLL VENOUS BLD VENIPUNCTURE: CPT | Mod: PO | Performed by: STUDENT IN AN ORGANIZED HEALTH CARE EDUCATION/TRAINING PROGRAM

## 2021-07-27 PROCEDURE — 99214 PR OFFICE/OUTPT VISIT, EST, LEVL IV, 30-39 MIN: ICD-10-PCS | Mod: S$PBB,,, | Performed by: STUDENT IN AN ORGANIZED HEALTH CARE EDUCATION/TRAINING PROGRAM

## 2021-07-27 PROCEDURE — 80053 COMPREHEN METABOLIC PANEL: CPT | Performed by: STUDENT IN AN ORGANIZED HEALTH CARE EDUCATION/TRAINING PROGRAM

## 2021-07-27 PROCEDURE — 99999 PR PBB SHADOW E&M-EST. PATIENT-LVL V: CPT | Mod: PBBFAC,,, | Performed by: STUDENT IN AN ORGANIZED HEALTH CARE EDUCATION/TRAINING PROGRAM

## 2021-07-27 PROCEDURE — 99215 OFFICE O/P EST HI 40 MIN: CPT | Mod: PBBFAC,PO | Performed by: STUDENT IN AN ORGANIZED HEALTH CARE EDUCATION/TRAINING PROGRAM

## 2021-07-27 PROCEDURE — 99999 PR PBB SHADOW E&M-EST. PATIENT-LVL V: ICD-10-PCS | Mod: PBBFAC,,, | Performed by: STUDENT IN AN ORGANIZED HEALTH CARE EDUCATION/TRAINING PROGRAM

## 2021-07-27 PROCEDURE — 82607 VITAMIN B-12: CPT | Performed by: STUDENT IN AN ORGANIZED HEALTH CARE EDUCATION/TRAINING PROGRAM

## 2021-07-27 PROCEDURE — 82728 ASSAY OF FERRITIN: CPT | Performed by: STUDENT IN AN ORGANIZED HEALTH CARE EDUCATION/TRAINING PROGRAM

## 2021-07-27 PROCEDURE — 99214 OFFICE O/P EST MOD 30 MIN: CPT | Mod: S$PBB,,, | Performed by: STUDENT IN AN ORGANIZED HEALTH CARE EDUCATION/TRAINING PROGRAM

## 2021-07-27 RX ORDER — MONTELUKAST SODIUM 10 MG/1
10 TABLET ORAL DAILY
COMMUNITY
Start: 2021-06-15 | End: 2021-11-17 | Stop reason: SDUPTHER

## 2021-07-27 RX ORDER — FLUOXETINE 10 MG/1
10 CAPSULE ORAL DAILY
Qty: 30 CAPSULE | Refills: 11 | Status: SHIPPED | OUTPATIENT
Start: 2021-07-27 | End: 2021-09-07 | Stop reason: SDUPTHER

## 2021-07-28 ENCOUNTER — TELEPHONE (OUTPATIENT)
Dept: FAMILY MEDICINE | Facility: CLINIC | Age: 61
End: 2021-07-28

## 2021-07-28 DIAGNOSIS — C91.10 CLL (CHRONIC LYMPHOCYTIC LEUKEMIA): Primary | ICD-10-CM

## 2021-07-28 LAB
ALBUMIN SERPL BCP-MCNC: 4.3 G/DL (ref 3.5–5.2)
ALP SERPL-CCNC: 111 U/L (ref 55–135)
ALT SERPL W/O P-5'-P-CCNC: 24 U/L (ref 10–44)
ANION GAP SERPL CALC-SCNC: 12 MMOL/L (ref 8–16)
AST SERPL-CCNC: 25 U/L (ref 10–40)
BILIRUB SERPL-MCNC: 0.4 MG/DL (ref 0.1–1)
BUN SERPL-MCNC: 11 MG/DL (ref 8–23)
CALCIUM SERPL-MCNC: 10.1 MG/DL (ref 8.7–10.5)
CHLORIDE SERPL-SCNC: 107 MMOL/L (ref 95–110)
CO2 SERPL-SCNC: 24 MMOL/L (ref 23–29)
CREAT SERPL-MCNC: 0.9 MG/DL (ref 0.5–1.4)
EST. GFR  (AFRICAN AMERICAN): >60 ML/MIN/1.73 M^2
EST. GFR  (NON AFRICAN AMERICAN): >60 ML/MIN/1.73 M^2
FERRITIN SERPL-MCNC: 29 NG/ML (ref 20–300)
GLUCOSE SERPL-MCNC: 86 MG/DL (ref 70–110)
POTASSIUM SERPL-SCNC: 4.1 MMOL/L (ref 3.5–5.1)
PROT SERPL-MCNC: 7.2 G/DL (ref 6–8.4)
SODIUM SERPL-SCNC: 143 MMOL/L (ref 136–145)
VIT B12 SERPL-MCNC: 668 PG/ML (ref 210–950)

## 2021-08-02 ENCOUNTER — TELEPHONE (OUTPATIENT)
Dept: HEMATOLOGY/ONCOLOGY | Facility: CLINIC | Age: 61
End: 2021-08-02

## 2021-08-03 DIAGNOSIS — C91.10 CLL (CHRONIC LYMPHOCYTIC LEUKEMIA): Primary | ICD-10-CM

## 2021-08-16 ENCOUNTER — OFFICE VISIT (OUTPATIENT)
Dept: PULMONOLOGY | Facility: CLINIC | Age: 61
End: 2021-08-16
Payer: MEDICAID

## 2021-08-16 VITALS
BODY MASS INDEX: 31.53 KG/M2 | HEIGHT: 63 IN | DIASTOLIC BLOOD PRESSURE: 69 MMHG | OXYGEN SATURATION: 94 % | TEMPERATURE: 98 F | SYSTOLIC BLOOD PRESSURE: 110 MMHG | HEART RATE: 62 BPM | WEIGHT: 177.94 LBS

## 2021-08-16 DIAGNOSIS — R05.9 COUGH: ICD-10-CM

## 2021-08-16 DIAGNOSIS — J44.9 CHRONIC OBSTRUCTIVE PULMONARY DISEASE, UNSPECIFIED COPD TYPE: Primary | ICD-10-CM

## 2021-08-16 PROCEDURE — 99213 OFFICE O/P EST LOW 20 MIN: CPT | Mod: S$PBB,,, | Performed by: NURSE PRACTITIONER

## 2021-08-16 PROCEDURE — 99214 OFFICE O/P EST MOD 30 MIN: CPT | Mod: PBBFAC,PO | Performed by: NURSE PRACTITIONER

## 2021-08-16 PROCEDURE — 99213 PR OFFICE/OUTPT VISIT, EST, LEVL III, 20-29 MIN: ICD-10-PCS | Mod: S$PBB,,, | Performed by: NURSE PRACTITIONER

## 2021-08-16 PROCEDURE — 99999 PR PBB SHADOW E&M-EST. PATIENT-LVL IV: CPT | Mod: PBBFAC,,, | Performed by: NURSE PRACTITIONER

## 2021-08-16 PROCEDURE — 99999 PR PBB SHADOW E&M-EST. PATIENT-LVL IV: ICD-10-PCS | Mod: PBBFAC,,, | Performed by: NURSE PRACTITIONER

## 2021-08-16 RX ORDER — ALBUTEROL SULFATE 90 UG/1
200 POWDER, METERED RESPIRATORY (INHALATION) EVERY 4 HOURS PRN
Qty: 1 EACH | Refills: 11 | Status: SHIPPED | OUTPATIENT
Start: 2021-08-16 | End: 2022-06-20

## 2021-08-16 RX ORDER — CODEINE PHOSPHATE AND GUAIFENESIN 10; 100 MG/5ML; MG/5ML
5 SOLUTION ORAL EVERY 4 HOURS PRN
Qty: 210 ML | Refills: 0 | Status: SHIPPED | OUTPATIENT
Start: 2021-08-16 | End: 2021-08-23

## 2021-09-01 ENCOUNTER — TELEPHONE (OUTPATIENT)
Dept: PULMONOLOGY | Facility: CLINIC | Age: 61
End: 2021-09-01

## 2021-09-07 ENCOUNTER — HOSPITAL ENCOUNTER (OUTPATIENT)
Dept: RADIOLOGY | Facility: CLINIC | Age: 61
Discharge: HOME | End: 2021-09-07
Attending: STUDENT IN AN ORGANIZED HEALTH CARE EDUCATION/TRAINING PROGRAM
Payer: MEDICAID

## 2021-09-07 ENCOUNTER — OFFICE VISIT (OUTPATIENT)
Dept: FAMILY MEDICINE | Facility: CLINIC | Age: 61
End: 2021-09-07
Payer: MEDICAID

## 2021-09-07 VITALS
SYSTOLIC BLOOD PRESSURE: 116 MMHG | BODY MASS INDEX: 31.25 KG/M2 | OXYGEN SATURATION: 94 % | WEIGHT: 176.38 LBS | HEIGHT: 63 IN | HEART RATE: 73 BPM | DIASTOLIC BLOOD PRESSURE: 60 MMHG

## 2021-09-07 DIAGNOSIS — M25.50 POLYARTHRALGIA: ICD-10-CM

## 2021-09-07 DIAGNOSIS — F41.1 GENERALIZED ANXIETY DISORDER: ICD-10-CM

## 2021-09-07 DIAGNOSIS — F43.23 ADJUSTMENT DISORDER WITH MIXED ANXIETY AND DEPRESSED MOOD: ICD-10-CM

## 2021-09-07 DIAGNOSIS — S92.324A CLOSED NONDISPLACED FRACTURE OF SECOND METATARSAL BONE OF RIGHT FOOT, INITIAL ENCOUNTER: ICD-10-CM

## 2021-09-07 DIAGNOSIS — M79.671 RIGHT FOOT PAIN: ICD-10-CM

## 2021-09-07 DIAGNOSIS — M19.042 OSTEOARTHRITIS OF BOTH HANDS, UNSPECIFIED OSTEOARTHRITIS TYPE: Primary | ICD-10-CM

## 2021-09-07 DIAGNOSIS — G56.03 BILATERAL CARPAL TUNNEL SYNDROME: ICD-10-CM

## 2021-09-07 DIAGNOSIS — M19.041 OSTEOARTHRITIS OF BOTH HANDS, UNSPECIFIED OSTEOARTHRITIS TYPE: Primary | ICD-10-CM

## 2021-09-07 DIAGNOSIS — M19.041 OSTEOARTHRITIS OF BOTH HANDS, UNSPECIFIED OSTEOARTHRITIS TYPE: ICD-10-CM

## 2021-09-07 DIAGNOSIS — F41.9 ANXIETY: ICD-10-CM

## 2021-09-07 DIAGNOSIS — M19.042 OSTEOARTHRITIS OF BOTH HANDS, UNSPECIFIED OSTEOARTHRITIS TYPE: ICD-10-CM

## 2021-09-07 PROCEDURE — 73130 X-RAY EXAM OF HAND: CPT | Mod: TC,50,FY,PO

## 2021-09-07 PROCEDURE — 73630 XR FOOT COMPLETE 3 VIEW RIGHT: ICD-10-PCS | Mod: 26,RT,S$GLB, | Performed by: RADIOLOGY

## 2021-09-07 PROCEDURE — 99214 OFFICE O/P EST MOD 30 MIN: CPT | Mod: S$PBB,,, | Performed by: STUDENT IN AN ORGANIZED HEALTH CARE EDUCATION/TRAINING PROGRAM

## 2021-09-07 PROCEDURE — 99999 PR PBB SHADOW E&M-EST. PATIENT-LVL V: ICD-10-PCS | Mod: PBBFAC,,, | Performed by: STUDENT IN AN ORGANIZED HEALTH CARE EDUCATION/TRAINING PROGRAM

## 2021-09-07 PROCEDURE — 73130 X-RAY EXAM OF HAND: CPT | Mod: 26,50,S$GLB, | Performed by: RADIOLOGY

## 2021-09-07 PROCEDURE — 99214 PR OFFICE/OUTPT VISIT, EST, LEVL IV, 30-39 MIN: ICD-10-PCS | Mod: S$PBB,,, | Performed by: STUDENT IN AN ORGANIZED HEALTH CARE EDUCATION/TRAINING PROGRAM

## 2021-09-07 PROCEDURE — 73630 X-RAY EXAM OF FOOT: CPT | Mod: 26,RT,S$GLB, | Performed by: RADIOLOGY

## 2021-09-07 PROCEDURE — 99215 OFFICE O/P EST HI 40 MIN: CPT | Mod: PBBFAC,PO | Performed by: STUDENT IN AN ORGANIZED HEALTH CARE EDUCATION/TRAINING PROGRAM

## 2021-09-07 PROCEDURE — 73130 XR HAND COMPLETE 3 VIEWS BILATERAL: ICD-10-PCS | Mod: 26,50,S$GLB, | Performed by: RADIOLOGY

## 2021-09-07 PROCEDURE — 73630 X-RAY EXAM OF FOOT: CPT | Mod: TC,FY,PO,RT

## 2021-09-07 PROCEDURE — 99999 PR PBB SHADOW E&M-EST. PATIENT-LVL V: CPT | Mod: PBBFAC,,, | Performed by: STUDENT IN AN ORGANIZED HEALTH CARE EDUCATION/TRAINING PROGRAM

## 2021-09-07 RX ORDER — FLUOXETINE HYDROCHLORIDE 20 MG/1
20 CAPSULE ORAL DAILY
Qty: 90 CAPSULE | Refills: 1 | Status: SHIPPED | OUTPATIENT
Start: 2021-09-07 | End: 2021-09-08 | Stop reason: SDUPTHER

## 2021-09-07 RX ORDER — BUPROPION HYDROCHLORIDE 150 MG/1
150 TABLET ORAL DAILY
Qty: 30 TABLET | Refills: 2 | Status: SHIPPED | OUTPATIENT
Start: 2021-09-07 | End: 2021-09-08 | Stop reason: SDUPTHER

## 2021-09-08 ENCOUNTER — TELEPHONE (OUTPATIENT)
Dept: FAMILY MEDICINE | Facility: CLINIC | Age: 61
End: 2021-09-08

## 2021-09-08 DIAGNOSIS — F41.1 GENERALIZED ANXIETY DISORDER: ICD-10-CM

## 2021-09-08 DIAGNOSIS — F41.9 ANXIETY: ICD-10-CM

## 2021-09-08 DIAGNOSIS — F43.23 ADJUSTMENT DISORDER WITH MIXED ANXIETY AND DEPRESSED MOOD: ICD-10-CM

## 2021-09-08 DIAGNOSIS — S92.324A CLOSED NONDISPLACED FRACTURE OF SECOND METATARSAL BONE OF RIGHT FOOT, INITIAL ENCOUNTER: Primary | ICD-10-CM

## 2021-09-08 RX ORDER — FLUOXETINE HYDROCHLORIDE 20 MG/1
20 CAPSULE ORAL DAILY
Qty: 90 CAPSULE | Refills: 1 | Status: SHIPPED | OUTPATIENT
Start: 2021-09-08 | End: 2022-03-29

## 2021-09-08 RX ORDER — HYDROCODONE BITARTRATE AND ACETAMINOPHEN 5; 325 MG/1; MG/1
1 TABLET ORAL EVERY 6 HOURS PRN
Qty: 15 TABLET | Refills: 0 | Status: SHIPPED | OUTPATIENT
Start: 2021-09-08 | End: 2021-09-08

## 2021-09-08 RX ORDER — HYDROCODONE BITARTRATE AND ACETAMINOPHEN 5; 325 MG/1; MG/1
1 TABLET ORAL EVERY 6 HOURS PRN
Qty: 15 TABLET | Refills: 0 | Status: SHIPPED | OUTPATIENT
Start: 2021-09-08 | End: 2021-09-18

## 2021-09-08 RX ORDER — BUPROPION HYDROCHLORIDE 150 MG/1
150 TABLET ORAL DAILY
Qty: 30 TABLET | Refills: 2 | Status: SHIPPED | OUTPATIENT
Start: 2021-09-08 | End: 2022-02-11

## 2021-09-10 ENCOUNTER — LAB VISIT (OUTPATIENT)
Dept: LAB | Facility: HOSPITAL | Age: 61
End: 2021-09-10
Attending: INTERNAL MEDICINE
Payer: MEDICAID

## 2021-09-10 DIAGNOSIS — C91.10 CLL (CHRONIC LYMPHOCYTIC LEUKEMIA): ICD-10-CM

## 2021-09-10 LAB
ALBUMIN SERPL BCP-MCNC: 4.4 G/DL (ref 3.5–5.2)
ALP SERPL-CCNC: 135 U/L (ref 55–135)
ALT SERPL W/O P-5'-P-CCNC: 22 U/L (ref 10–44)
ANION GAP SERPL CALC-SCNC: 10 MMOL/L (ref 8–16)
AST SERPL-CCNC: 22 U/L (ref 10–40)
BASOPHILS NFR BLD: 0 % (ref 0–1.9)
BILIRUB SERPL-MCNC: 0.6 MG/DL (ref 0.1–1)
BUN SERPL-MCNC: 9 MG/DL (ref 8–23)
CALCIUM SERPL-MCNC: 9.8 MG/DL (ref 8.7–10.5)
CHLORIDE SERPL-SCNC: 106 MMOL/L (ref 95–110)
CO2 SERPL-SCNC: 24 MMOL/L (ref 23–29)
CREAT SERPL-MCNC: 0.9 MG/DL (ref 0.5–1.4)
DIFFERENTIAL METHOD: ABNORMAL
EOSINOPHIL NFR BLD: 0 % (ref 0–8)
ERYTHROCYTE [DISTWIDTH] IN BLOOD BY AUTOMATED COUNT: 13.4 % (ref 11.5–14.5)
EST. GFR  (AFRICAN AMERICAN): >60 ML/MIN/1.73 M^2
EST. GFR  (NON AFRICAN AMERICAN): >60 ML/MIN/1.73 M^2
GLUCOSE SERPL-MCNC: 92 MG/DL (ref 70–110)
HCT VFR BLD AUTO: 46.9 % (ref 37–48.5)
HGB BLD-MCNC: 15.5 G/DL (ref 12–16)
IMM GRANULOCYTES # BLD AUTO: ABNORMAL K/UL (ref 0–0.04)
IMM GRANULOCYTES NFR BLD AUTO: ABNORMAL % (ref 0–0.5)
LYMPHOCYTES NFR BLD: 82 % (ref 18–48)
MCH RBC QN AUTO: 31.1 PG (ref 27–31)
MCHC RBC AUTO-ENTMCNC: 33 G/DL (ref 32–36)
MCV RBC AUTO: 94 FL (ref 82–98)
MONOCYTES NFR BLD: 2 % (ref 4–15)
NEUTROPHILS NFR BLD: 16 % (ref 38–73)
NRBC BLD-RTO: 0 /100 WBC
PATH REV BLD -IMP: NORMAL
PLATELET # BLD AUTO: 290 K/UL (ref 150–450)
PLATELET BLD QL SMEAR: ABNORMAL
PMV BLD AUTO: 10.1 FL (ref 9.2–12.9)
POTASSIUM SERPL-SCNC: 4.6 MMOL/L (ref 3.5–5.1)
PROT SERPL-MCNC: 7.2 G/DL (ref 6–8.4)
RBC # BLD AUTO: 4.98 M/UL (ref 4–5.4)
SMUDGE CELLS BLD QL SMEAR: PRESENT
SODIUM SERPL-SCNC: 140 MMOL/L (ref 136–145)
WBC # BLD AUTO: 32.04 K/UL (ref 3.9–12.7)

## 2021-09-10 PROCEDURE — 88185 FLOWCYTOMETRY/TC ADD-ON: CPT | Mod: 59 | Performed by: PATHOLOGY

## 2021-09-10 PROCEDURE — 85060 PATHOLOGIST REVIEW: ICD-10-PCS | Mod: ,,, | Performed by: PATHOLOGY

## 2021-09-10 PROCEDURE — 36415 COLL VENOUS BLD VENIPUNCTURE: CPT | Performed by: INTERNAL MEDICINE

## 2021-09-10 PROCEDURE — 80053 COMPREHEN METABOLIC PANEL: CPT | Performed by: INTERNAL MEDICINE

## 2021-09-10 PROCEDURE — 88189 PR  FLOWCYTOMETRY/READ, 16 & > MARKERS: ICD-10-PCS | Mod: ,,, | Performed by: PATHOLOGY

## 2021-09-10 PROCEDURE — 88189 FLOWCYTOMETRY/READ 16 & >: CPT | Mod: ,,, | Performed by: PATHOLOGY

## 2021-09-10 PROCEDURE — 88184 FLOWCYTOMETRY/ TC 1 MARKER: CPT | Performed by: PATHOLOGY

## 2021-09-10 PROCEDURE — 85007 BL SMEAR W/DIFF WBC COUNT: CPT | Performed by: INTERNAL MEDICINE

## 2021-09-10 PROCEDURE — 85060 BLOOD SMEAR INTERPRETATION: CPT | Mod: ,,, | Performed by: PATHOLOGY

## 2021-09-10 PROCEDURE — 85027 COMPLETE CBC AUTOMATED: CPT | Performed by: INTERNAL MEDICINE

## 2021-09-13 ENCOUNTER — OFFICE VISIT (OUTPATIENT)
Dept: PODIATRY | Facility: CLINIC | Age: 61
End: 2021-09-13
Payer: MEDICAID

## 2021-09-13 VITALS — WEIGHT: 176 LBS | OXYGEN SATURATION: 95 % | HEART RATE: 42 BPM | HEIGHT: 63 IN | BODY MASS INDEX: 31.18 KG/M2

## 2021-09-13 DIAGNOSIS — M79.671 RIGHT FOOT PAIN: ICD-10-CM

## 2021-09-13 DIAGNOSIS — S92.324A CLOSED NONDISPLACED FRACTURE OF SECOND METATARSAL BONE OF RIGHT FOOT, INITIAL ENCOUNTER: Primary | ICD-10-CM

## 2021-09-13 LAB
FLOW CYTOMETRY ANTIBODIES ANALYZED - BLOOD: NORMAL
FLOW CYTOMETRY COMMENT - BLOOD: NORMAL
FLOW CYTOMETRY INTERPRETATION - BLOOD: NORMAL
PATH REV BLD -IMP: NORMAL

## 2021-09-13 PROCEDURE — 99203 PR OFFICE/OUTPT VISIT, NEW, LEVL III, 30-44 MIN: ICD-10-PCS | Mod: S$GLB,,, | Performed by: PODIATRIST

## 2021-09-13 PROCEDURE — 99203 OFFICE O/P NEW LOW 30 MIN: CPT | Mod: S$GLB,,, | Performed by: PODIATRIST

## 2021-09-13 RX ORDER — TRAMADOL HYDROCHLORIDE 50 MG/1
50 TABLET ORAL EVERY 4 HOURS PRN
Qty: 30 TABLET | Refills: 0 | Status: SHIPPED | OUTPATIENT
Start: 2021-09-13 | End: 2022-01-11 | Stop reason: ALTCHOICE

## 2021-09-17 ENCOUNTER — OFFICE VISIT (OUTPATIENT)
Dept: HEMATOLOGY/ONCOLOGY | Facility: CLINIC | Age: 61
End: 2021-09-17
Payer: MEDICAID

## 2021-09-17 VITALS
HEIGHT: 63 IN | BODY MASS INDEX: 30.9 KG/M2 | DIASTOLIC BLOOD PRESSURE: 56 MMHG | OXYGEN SATURATION: 95 % | HEART RATE: 67 BPM | WEIGHT: 174.38 LBS | TEMPERATURE: 97 F | SYSTOLIC BLOOD PRESSURE: 109 MMHG | RESPIRATION RATE: 18 BRPM

## 2021-09-17 DIAGNOSIS — E53.8 B12 DEFICIENCY: ICD-10-CM

## 2021-09-17 DIAGNOSIS — C91.10 CLL (CHRONIC LYMPHOCYTIC LEUKEMIA): Primary | ICD-10-CM

## 2021-09-17 DIAGNOSIS — Z98.890 S/P DILATATION OF ESOPHAGEAL STRICTURE: ICD-10-CM

## 2021-09-17 DIAGNOSIS — Z87.19 S/P DILATATION OF ESOPHAGEAL STRICTURE: ICD-10-CM

## 2021-09-17 PROCEDURE — 99999 PR PBB SHADOW E&M-EST. PATIENT-LVL IV: ICD-10-PCS | Mod: PBBFAC,,, | Performed by: INTERNAL MEDICINE

## 2021-09-17 PROCEDURE — 99214 PR OFFICE/OUTPT VISIT, EST, LEVL IV, 30-39 MIN: ICD-10-PCS | Mod: S$PBB,,, | Performed by: INTERNAL MEDICINE

## 2021-09-17 PROCEDURE — 99214 OFFICE O/P EST MOD 30 MIN: CPT | Mod: PBBFAC,PO | Performed by: INTERNAL MEDICINE

## 2021-09-17 PROCEDURE — 99214 OFFICE O/P EST MOD 30 MIN: CPT | Mod: S$PBB,,, | Performed by: INTERNAL MEDICINE

## 2021-09-17 PROCEDURE — 99999 PR PBB SHADOW E&M-EST. PATIENT-LVL IV: CPT | Mod: PBBFAC,,, | Performed by: INTERNAL MEDICINE

## 2021-11-17 DIAGNOSIS — J01.01 ACUTE RECURRENT MAXILLARY SINUSITIS: Primary | ICD-10-CM

## 2021-11-17 RX ORDER — MONTELUKAST SODIUM 10 MG/1
10 TABLET ORAL DAILY
Qty: 30 TABLET | Refills: 11 | Status: SHIPPED | OUTPATIENT
Start: 2021-11-17 | End: 2022-01-11 | Stop reason: ALTCHOICE

## 2021-11-30 ENCOUNTER — PATIENT OUTREACH (OUTPATIENT)
Dept: ADMINISTRATIVE | Facility: OTHER | Age: 61
End: 2021-11-30
Payer: MEDICAID

## 2021-12-01 ENCOUNTER — HOSPITAL ENCOUNTER (OUTPATIENT)
Dept: RADIOLOGY | Facility: HOSPITAL | Age: 61
Discharge: HOME | End: 2021-12-01
Attending: PHYSICAL MEDICINE & REHABILITATION
Payer: MEDICAID

## 2021-12-01 ENCOUNTER — OFFICE VISIT (OUTPATIENT)
Dept: SPINE | Facility: CLINIC | Age: 61
End: 2021-12-01
Payer: MEDICAID

## 2021-12-01 ENCOUNTER — TELEPHONE (OUTPATIENT)
Dept: SPINE | Facility: CLINIC | Age: 61
End: 2021-12-01

## 2021-12-01 VITALS — BODY MASS INDEX: 30.83 KG/M2 | RESPIRATION RATE: 18 BRPM | WEIGHT: 174 LBS | HEIGHT: 63 IN

## 2021-12-01 DIAGNOSIS — M54.41 CHRONIC BILATERAL LOW BACK PAIN WITH RIGHT-SIDED SCIATICA: ICD-10-CM

## 2021-12-01 DIAGNOSIS — M54.2 CERVICALGIA: Primary | ICD-10-CM

## 2021-12-01 DIAGNOSIS — M54.2 CERVICALGIA: ICD-10-CM

## 2021-12-01 DIAGNOSIS — G89.29 CHRONIC BILATERAL LOW BACK PAIN WITH RIGHT-SIDED SCIATICA: ICD-10-CM

## 2021-12-01 PROCEDURE — 72110 X-RAY EXAM L-2 SPINE 4/>VWS: CPT | Mod: 26,,, | Performed by: RADIOLOGY

## 2021-12-01 PROCEDURE — 72050 X-RAY EXAM NECK SPINE 4/5VWS: CPT | Mod: 26,,, | Performed by: RADIOLOGY

## 2021-12-01 PROCEDURE — 99204 PR OFFICE/OUTPT VISIT, NEW, LEVL IV, 45-59 MIN: ICD-10-PCS | Mod: S$GLB,,, | Performed by: PHYSICAL MEDICINE & REHABILITATION

## 2021-12-01 PROCEDURE — 72050 X-RAY EXAM NECK SPINE 4/5VWS: CPT | Mod: TC,FY

## 2021-12-01 PROCEDURE — 99204 OFFICE O/P NEW MOD 45 MIN: CPT | Mod: S$GLB,,, | Performed by: PHYSICAL MEDICINE & REHABILITATION

## 2021-12-01 PROCEDURE — 72050 XR CERVICAL SPINE AP LAT WITH FLEX EXTEN: ICD-10-PCS | Mod: 26,,, | Performed by: RADIOLOGY

## 2021-12-01 PROCEDURE — 72110 X-RAY EXAM L-2 SPINE 4/>VWS: CPT | Mod: TC,FY

## 2021-12-01 PROCEDURE — 72110 XR LUMBAR SPINE 5 VIEW WITH FLEX AND EXT: ICD-10-PCS | Mod: 26,,, | Performed by: RADIOLOGY

## 2021-12-01 RX ORDER — METHOCARBAMOL 500 MG/1
500 TABLET, FILM COATED ORAL 2 TIMES DAILY PRN
Qty: 60 TABLET | Refills: 1 | Status: SHIPPED | OUTPATIENT
Start: 2021-12-01 | End: 2021-12-11

## 2021-12-02 ENCOUNTER — HOSPITAL ENCOUNTER (OUTPATIENT)
Dept: RADIOLOGY | Facility: CLINIC | Age: 61
Discharge: HOME | End: 2021-12-02
Attending: PODIATRIST
Payer: MEDICAID

## 2021-12-02 ENCOUNTER — OFFICE VISIT (OUTPATIENT)
Dept: PODIATRY | Facility: CLINIC | Age: 61
End: 2021-12-02
Payer: MEDICAID

## 2021-12-02 VITALS — WEIGHT: 174 LBS | BODY MASS INDEX: 30.83 KG/M2 | HEIGHT: 63 IN

## 2021-12-02 DIAGNOSIS — S99.921D INJURY OF RIGHT FOOT, SUBSEQUENT ENCOUNTER: Primary | ICD-10-CM

## 2021-12-02 DIAGNOSIS — M79.671 RIGHT FOOT PAIN: ICD-10-CM

## 2021-12-02 PROCEDURE — 73630 X-RAY EXAM OF FOOT: CPT | Mod: RT,S$GLB,, | Performed by: RADIOLOGY

## 2021-12-02 PROCEDURE — 99213 PR OFFICE/OUTPT VISIT, EST, LEVL III, 20-29 MIN: ICD-10-PCS | Mod: S$GLB,,, | Performed by: PODIATRIST

## 2021-12-02 PROCEDURE — 99213 OFFICE O/P EST LOW 20 MIN: CPT | Mod: S$GLB,,, | Performed by: PODIATRIST

## 2021-12-02 PROCEDURE — 73630 XR FOOT COMPLETE 3 VIEW RIGHT: ICD-10-PCS | Mod: RT,S$GLB,, | Performed by: RADIOLOGY

## 2021-12-02 RX ORDER — METHYLPREDNISOLONE 4 MG/1
TABLET ORAL
Qty: 1 EACH | Refills: 0 | Status: SHIPPED | OUTPATIENT
Start: 2021-12-02 | End: 2022-01-11 | Stop reason: ALTCHOICE

## 2021-12-02 RX ORDER — HYDROCODONE BITARTRATE AND ACETAMINOPHEN 5; 325 MG/1; MG/1
1 TABLET ORAL EVERY 6 HOURS PRN
Qty: 20 TABLET | Refills: 0 | Status: SHIPPED | OUTPATIENT
Start: 2021-12-02 | End: 2022-01-11 | Stop reason: ALTCHOICE

## 2021-12-09 ENCOUNTER — PATIENT MESSAGE (OUTPATIENT)
Dept: SPINE | Facility: CLINIC | Age: 61
End: 2021-12-09
Payer: MEDICAID

## 2022-01-07 ENCOUNTER — PATIENT MESSAGE (OUTPATIENT)
Dept: HEMATOLOGY/ONCOLOGY | Facility: CLINIC | Age: 62
End: 2022-01-07
Payer: MEDICAID

## 2022-01-07 ENCOUNTER — TELEPHONE (OUTPATIENT)
Dept: HEMATOLOGY/ONCOLOGY | Facility: CLINIC | Age: 62
End: 2022-01-07
Payer: MEDICAID

## 2022-01-11 ENCOUNTER — PATIENT MESSAGE (OUTPATIENT)
Dept: PULMONOLOGY | Facility: CLINIC | Age: 62
End: 2022-01-11

## 2022-01-11 ENCOUNTER — OFFICE VISIT (OUTPATIENT)
Dept: PULMONOLOGY | Facility: CLINIC | Age: 62
End: 2022-01-11
Payer: MEDICAID

## 2022-01-11 ENCOUNTER — HOSPITAL ENCOUNTER (OUTPATIENT)
Dept: RADIOLOGY | Facility: HOSPITAL | Age: 62
Discharge: HOME | End: 2022-01-11
Attending: NURSE PRACTITIONER
Payer: MEDICAID

## 2022-01-11 ENCOUNTER — TELEPHONE (OUTPATIENT)
Dept: PULMONOLOGY | Facility: CLINIC | Age: 62
End: 2022-01-11
Payer: MEDICAID

## 2022-01-11 ENCOUNTER — PATIENT OUTREACH (OUTPATIENT)
Dept: ADMINISTRATIVE | Facility: OTHER | Age: 62
End: 2022-01-11
Payer: MEDICAID

## 2022-01-11 VITALS
OXYGEN SATURATION: 95 % | WEIGHT: 174.38 LBS | BODY MASS INDEX: 30.9 KG/M2 | DIASTOLIC BLOOD PRESSURE: 65 MMHG | SYSTOLIC BLOOD PRESSURE: 104 MMHG | HEIGHT: 63 IN | HEART RATE: 60 BPM

## 2022-01-11 DIAGNOSIS — J44.1 COPD WITH ACUTE EXACERBATION: Primary | ICD-10-CM

## 2022-01-11 DIAGNOSIS — Z20.822 SUSPECTED COVID-19 VIRUS INFECTION: ICD-10-CM

## 2022-01-11 DIAGNOSIS — J44.1 COPD WITH ACUTE EXACERBATION: ICD-10-CM

## 2022-01-11 PROCEDURE — 71046 X-RAY EXAM CHEST 2 VIEWS: CPT | Mod: TC,FY

## 2022-01-11 PROCEDURE — 99213 OFFICE O/P EST LOW 20 MIN: CPT | Mod: S$PBB,,, | Performed by: NURSE PRACTITIONER

## 2022-01-11 PROCEDURE — 3074F PR MOST RECENT SYSTOLIC BLOOD PRESSURE < 130 MM HG: ICD-10-PCS | Mod: CPTII,,, | Performed by: NURSE PRACTITIONER

## 2022-01-11 PROCEDURE — 99999 PR PBB SHADOW E&M-EST. PATIENT-LVL III: ICD-10-PCS | Mod: PBBFAC,,, | Performed by: NURSE PRACTITIONER

## 2022-01-11 PROCEDURE — 99999 PR PBB SHADOW E&M-EST. PATIENT-LVL III: CPT | Mod: PBBFAC,,, | Performed by: NURSE PRACTITIONER

## 2022-01-11 PROCEDURE — 1159F PR MEDICATION LIST DOCUMENTED IN MEDICAL RECORD: ICD-10-PCS | Mod: CPTII,,, | Performed by: NURSE PRACTITIONER

## 2022-01-11 PROCEDURE — 3074F SYST BP LT 130 MM HG: CPT | Mod: CPTII,,, | Performed by: NURSE PRACTITIONER

## 2022-01-11 PROCEDURE — 3008F PR BODY MASS INDEX (BMI) DOCUMENTED: ICD-10-PCS | Mod: CPTII,,, | Performed by: NURSE PRACTITIONER

## 2022-01-11 PROCEDURE — 1159F MED LIST DOCD IN RCRD: CPT | Mod: CPTII,,, | Performed by: NURSE PRACTITIONER

## 2022-01-11 PROCEDURE — 3078F DIAST BP <80 MM HG: CPT | Mod: CPTII,,, | Performed by: NURSE PRACTITIONER

## 2022-01-11 PROCEDURE — 71046 X-RAY EXAM CHEST 2 VIEWS: CPT | Mod: 26,,, | Performed by: RADIOLOGY

## 2022-01-11 PROCEDURE — 3008F BODY MASS INDEX DOCD: CPT | Mod: CPTII,,, | Performed by: NURSE PRACTITIONER

## 2022-01-11 PROCEDURE — 99213 OFFICE O/P EST LOW 20 MIN: CPT | Mod: PBBFAC,PO,25 | Performed by: NURSE PRACTITIONER

## 2022-01-11 PROCEDURE — 71046 XR CHEST PA AND LATERAL: ICD-10-PCS | Mod: 26,,, | Performed by: RADIOLOGY

## 2022-01-11 PROCEDURE — 96372 THER/PROPH/DIAG INJ SC/IM: CPT | Mod: PBBFAC,PO

## 2022-01-11 PROCEDURE — 99213 PR OFFICE/OUTPT VISIT, EST, LEVL III, 20-29 MIN: ICD-10-PCS | Mod: S$PBB,,, | Performed by: NURSE PRACTITIONER

## 2022-01-11 PROCEDURE — 3078F PR MOST RECENT DIASTOLIC BLOOD PRESSURE < 80 MM HG: ICD-10-PCS | Mod: CPTII,,, | Performed by: NURSE PRACTITIONER

## 2022-01-11 RX ORDER — BETAMETHASONE SODIUM PHOSPHATE AND BETAMETHASONE ACETATE 3; 3 MG/ML; MG/ML
6 INJECTION, SUSPENSION INTRA-ARTICULAR; INTRALESIONAL; INTRAMUSCULAR; SOFT TISSUE
Status: COMPLETED | OUTPATIENT
Start: 2022-01-11 | End: 2022-01-11

## 2022-01-11 RX ORDER — AZITHROMYCIN 250 MG/1
TABLET, FILM COATED ORAL
Qty: 6 TABLET | Refills: 0 | Status: SHIPPED | OUTPATIENT
Start: 2022-01-11 | End: 2022-07-21 | Stop reason: SDUPTHER

## 2022-01-11 RX ORDER — CODEINE PHOSPHATE AND GUAIFENESIN 10; 100 MG/5ML; MG/5ML
5 SOLUTION ORAL EVERY 4 HOURS PRN
Qty: 473 ML | Refills: 0 | Status: SHIPPED | OUTPATIENT
Start: 2022-01-11 | End: 2022-01-21

## 2022-01-11 RX ADMIN — BETAMETHASONE SODIUM PHOSPHATE AND BETAMETHASONE ACETATE 6 MG: 3; 3 INJECTION, SUSPENSION INTRA-ARTICULAR; INTRALESIONAL; INTRAMUSCULAR at 02:01

## 2022-01-11 NOTE — PROGRESS NOTES
1/11/2022    Yvette Hutchinson  Office note    Chief Complaint   Patient presents with    Chest Congestion     Onset 3 days    Sputum Production     Dark colored sputum    Cough    Sore Throat     No recent covid test    Headaches       HPI:   1/11/2022: Hx of CLL, COPD  Endorses onset of headache, fatigue x2 days.  Cough: Productive with green thick sputum production, onset 2 days, worse at night time and in the morning. Not relieved with cough syrup. States Codeine cough syrup has helped in the past. Associated with wheezing.   States breathing is the same, not worse from baseline. Using supplemental oxygen at night and PRN.   Denies fever, denies chest tightness, GI complaints.  On Daily Advair, using rescue inhaler twice daily. Using nebulizer daily with no noted improvement.   Cut back to 2 cigarettes per day.      8/16/2021- not currently interested to perform in clinic sleep study.   SOB- daily, worse with hot weather, ran out of rescue inhaler. On Advair daily with benefit. Improves with rest.   Started coughing  After weed eating yard.     Cough- recurrent complaint, onset 5 days after doing yard work, productive clear mucous, severe complaint, inhibits ability to sleep when first laying down at night.     5/14/2021- did not receive sleep study from "[facility]" as ordered. Wearing supplemental oxygen at night while sleeping at 3 L. Uses when needed during day.    Having trouble falling asleep at night, started on new medication with no current benefit. Not able to fall asleep or stay asleep. Onset years, worsening with time. Feels tired when waking up in morning. Associated with occasional morning headaches.     complaint of chronic back pain followed by PCP.    Cough- recurrent complaint, mild, thick mucous, not using nebulizer regularly,   SOB- daily, worse with exertion and occasionally occurs at rest. Feels she can not take a deep breath.   On advair daily. Taking prednisone 20mg for 3  days 2x monthly.    2/8/2021- Cough- improved, daily, mild, Complaint of dry throat at night. Worse in early morning and late evenings.   occasionally productive small amount yellow mucous.   Currently smoking 1/2 pack daily, finished chantix with improvement but picked up after stopping.   Wearing supplemental oxygen at 3L most nights with benefit. SOB worse when laying flat on back, tried wedge but caused neck pain.   Sinus- unchanged, recurrent headaches in morning, sinus drainage. Currently on Flonase daily    12/3/2020- has supplemental oxygen set at 3 L at night, states benefiting,   Cough- worsened in last 7 days, worse in early morning and late evenings, nocturnal arousals nightly, productive yellow/green mucous quarter size.  Complaint of daily fatigue, dry throat in morning, day time drowsiness, mental cloudiness. Feels she never gets good sleep at night even when wearing supplemental oxygen.     10/21/2020- SOB and wheeze- recurrent problem, worsened last weeks, associated with sinus congestion, wheeze is worse in early morning and when laying down at nigtht  Cough- productive brown/green mucous for 1 week. Has not started steroid therapy. Improves with nebulizer using 1-2x daily for past week. Was not able afford inhalers. Did not receive call from [facility].     Complaint for cramping sensation in chest that occurs sporadically on left and right side that lasts few minutes. Onset years, recurrent complaint, started back 1 week prior. Occurs couple days in row.     7/15/2020- cough- onset 7 days, worse in mornings and night, nocturnal arousals 1x nightly, productive yellow/green dime size mucous, associated with chest tightness and wheeze, improves with nebulizer using 1-2 daily, states feels better after coughing up large amounts of mucous; went to covid clinic and tested negative for covid did have fluid on ears.   Currently on Advair daily, insurance did not cover spiriva;     4/15/2020-  cough- onset weeks, associated with occasional chest tighness, worse at night and early morning, quarter size clear to light brown in color. Currently    advair, spiriva, and rescue inhaler. States using rescue daily with little benefit.    3/5/2020- Pt is a 60 yo female with depression, GERD and COPD presenting for new evaluation.  Has chronic cough worse last 2 wks w/ phlegm, white, worse in am and evening.  Inhalers: rescue inhaler says insurance didn't cover  She reports wt gain over last 2 yrs. Has abdominal cramps intermittently radiating to the back.   Smoking since she was very young, 1 pack lasts 3 days.  She gets yearly bronchitis.  Labs from her PCP done 2 days ago are concerning for possible CLL- with WBC 21 and peripheral smear w/ smudge cells    The chief compliant  problem is new to me.  PFSH:  Past Medical History:   Diagnosis Date    Arthritis     Depression     GERD (gastroesophageal reflux disease)     Pneumonia of left lung due to infectious organism 3/5/2020         Past Surgical History:   Procedure Laterality Date    TONSILLECTOMY       Social History     Tobacco Use    Smoking status: Current Some Day Smoker     Packs/day: 0.15     Types: Cigarettes    Smokeless tobacco: Never Used    Tobacco comment: reports one cigarette every now and then   Substance Use Topics    Alcohol use: Yes    Drug use: Yes     Types: Marijuana     Family History   Problem Relation Age of Onset    Ovarian cancer Sister     Breast cancer Cousin      Review of patient's allergies indicates:  No Known Allergies    Performance Status:The patient's activity level is functions out of house.      Review of Systems:  a review of eleven systems covering constitutional, Eye, HEENT, Psych, Respiratory, Cardiac, GI, , Musculoskeletal, Endocrine, Dermatologic was negative except for pertinent findings as listed ABOVE and below:  Shortness of breath, Cough, body aches, fatigue, congestion, headache      "  Exam:Comprehensive exam done. /65 (BP Location: Right arm, Patient Position: Sitting, BP Method: Medium (Automatic))   Pulse 60   Ht 5' 3" (1.6 m)   Wt 79.1 kg (174 lb 6.1 oz)   LMP 01/13/2010 (Approximate)   SpO2 95% Comment: on room air at rest  BMI 30.89 kg/m²   Exam included Vitals as listed, and patient's appearance and affect and alertness and mood, oral exam for yeast and hygiene and pharynx lesions and Mallapatti (M) score, neck with inspection for jvd and masses and thyroid abnormalities and lymph nodes (supraclavicular and infraclavicular nodes and axillary also examined and noted if abn), chest exam included symmetry and effort and fremitus and percussion and auscultation, cardiac exam included rhythm and gallops and murmur and rubs and jvd and edema, abdominal exam for mass and hepatosplenomegaly and tenderness and hernias and bowel sounds, Musculoskeletal exam with muscle tone and posture and mobility/gait and  strength, and skin for rashes and cyanosis and pallor and turgor, extremity for clubbing.  Findings were normal except for pertinent findings listed below: BS clear. On room air, in no acute resp distress.      Radiographs (ct chest and cxr) reviewed: view by direct vision     X-Ray Chest PA And Lateral  05/14/2021 lungs clear      CT Chest Without Contrast 11/20/2020   Stable right lung pulmonary nodules.  Mild increase in size of clustered nodules within the left upper lobe, with the distribution most compatible with an atypical infectious process.  Old granulomatous disease.  Left adrenal adenoma, unchanged.  Chronic T8 compression fracture.    CT Chest Without Contrast 03/11/2020   1. There are two right lung pulmonary nodules.  Per LUNG-RADS scoring this would reflect a Category 3 (probably benign).  Recommendation is for 6 month follow-up LDCT.  2. Airways disease or apical typical infection in the left lower lobe.       CXR 12/19/19- clear lung fields bilaterally     Labs " reviewed    Lab Results   Component Value Date    WBC 20.78 (H) 03/03/2020    RBC 4.74 03/03/2020    HGB 14.9 03/03/2020    HCT 47.0 03/03/2020    MCV 99 (H) 03/03/2020    MCH 31.4 (H) 03/03/2020    MCHC 31.7 (L) 03/03/2020    RDW 13.1 03/03/2020     03/03/2020    MPV 11.9 03/03/2020    GRAN 33.0 (L) 03/03/2020    LYMPH 64.0 (H) 03/03/2020    MONO 3.0 (L) 03/03/2020    EOS CANCELED 08/18/2017    BASO CANCELED 08/18/2017    EOSINOPHIL 0.0 03/03/2020    BASOPHIL 0.0 03/03/2020         Pathologist interpretation of peripheral blood smear:   Mild leukocytosis shows absolute and relative lymphocytosis with   cytologic atypia and scattered smudge cells.  The morphology is   concerning for lymphoproliferative disorder such as CLL.  Correlation   with flow cytometric analysis of peripheral blood is recommended for   further evaluation.     But cells appear predominantly normochromic and normocytic with mild   anisocytosis.     Platelets are morphologically unremarkable on scanning.     PFT reviewed    3/12/2020 Severe obstruction. FEV1  49 %  predicted.   Airflow is not clearly improved after bronchodilator. Clinical improvement following bronchodilator therapy may occur in the absence of spirometric improvement.   Mild Hyperinflation.   Moderate reduction in diffusion capacity - unadjusted for hemoglobin.     EPWORTH SLEEPINESS SCALE 12 5/14/2021  [facility] HST 05/27/2021 TRISTEN 4.3    Plan:  Clinical impression is resonably certain and repeated evaluation prn +/- follow up will be needed as below.    Yvette was seen today for chest congestion, sputum production, cough, sore throat and headaches.    Diagnoses and all orders for this visit:    COPD with acute exacerbation         Follow up in about 3 months (around 4/11/2022), or if symptoms worsen or fail to improve.    Discussed with patient above for education the following:      Patient Instructions   I have prescribed Azithromycin (Antibiotic) - take as  prescribed.    I have prescribed cough syrup - it does contain Codeine. Be cautious with driving, operating heavy machinery.    I have ordered a chest xray - I will call you with your results.     Steroid injection administered today in office.    Continue current medication regiment. Keep follow up appointment as scheduled. Please call the office if you have any questions or concerns.     Continue use of inhalers. I would recommend nebulizer medications twice daily while sick, then cut back.

## 2022-01-11 NOTE — PROGRESS NOTES
Chart was reviewed for overdue Proactive [facility] Encounters (RATNA)  topics  Updates were requested from care everywhere  Health Maintenance has been updated  LINKS immunization registry triggered

## 2022-01-11 NOTE — TELEPHONE ENCOUNTER
Made pt same day appt/     ----- Message from Yuliya Scott sent at 1/11/2022  1:10 PM CST -----  Regarding: need to schedule nurse visit  Type:  Same Day Appointment Request    Caller is requesting a same day appointment.  Caller declined first available appointment listed below.      Name of Caller: pt  When is the first available appointment?    Symptoms:  coughing, sore throat, headache earaches   Best Call Back Number: [phone] (home)     Additional Information: need flu injection

## 2022-01-11 NOTE — PATIENT INSTRUCTIONS
I have prescribed Azithromycin (Antibiotic) - take as prescribed.    I have prescribed cough syrup - it does contain Codeine. Be cautious with driving, operating heavy machinery.    I have ordered a chest xray - I will call you with your results.     Steroid injection administered today in office.    Continue current medication regiment. Keep follow up appointment as scheduled. Please call the office if you have any questions or concerns.     Continue use of inhalers. I would recommend nebulizer medications twice daily while sick, then cut back.    I would recommend getting COVID test. I have placed the order in Epic.

## 2022-01-12 ENCOUNTER — CLINICAL SUPPORT (OUTPATIENT)
Dept: SMOKING CESSATION | Facility: CLINIC | Age: 62
End: 2022-01-12
Payer: COMMERCIAL

## 2022-01-12 DIAGNOSIS — F17.200 NICOTINE DEPENDENCE: Primary | ICD-10-CM

## 2022-01-12 PROCEDURE — 99407 PR TOBACCO USE CESSATION INTENSIVE >10 MINUTES: ICD-10-PCS | Mod: S$GLB,,,

## 2022-01-12 PROCEDURE — 99407 BEHAV CHNG SMOKING > 10 MIN: CPT | Mod: S$GLB,,,

## 2022-01-12 NOTE — PROGRESS NOTES
Called pt to f/u on her 12 month smoking cessation quit status. Pt stated she is still smoking. Informed her she has benefits available and is able to rejoin. Pt not ready to make appointment. She will call back when ready and is feeling better. Informed her of benefit period, phone follow ups, and contact information. Will complete smart form and resolve quit #2 episode.

## 2022-01-31 ENCOUNTER — LAB VISIT (OUTPATIENT)
Dept: LAB | Facility: HOSPITAL | Age: 62
End: 2022-01-31
Attending: INTERNAL MEDICINE
Payer: MEDICAID

## 2022-01-31 DIAGNOSIS — Z98.890 S/P DILATATION OF ESOPHAGEAL STRICTURE: ICD-10-CM

## 2022-01-31 DIAGNOSIS — Z87.19 S/P DILATATION OF ESOPHAGEAL STRICTURE: ICD-10-CM

## 2022-01-31 DIAGNOSIS — C91.10 CLL (CHRONIC LYMPHOCYTIC LEUKEMIA): ICD-10-CM

## 2022-01-31 DIAGNOSIS — E53.8 B12 DEFICIENCY: ICD-10-CM

## 2022-01-31 LAB
ALBUMIN SERPL BCP-MCNC: 4.2 G/DL (ref 3.5–5.2)
ALP SERPL-CCNC: 127 U/L (ref 55–135)
ALT SERPL W/O P-5'-P-CCNC: 17 U/L (ref 10–44)
ANION GAP SERPL CALC-SCNC: 12 MMOL/L (ref 8–16)
AST SERPL-CCNC: 17 U/L (ref 10–40)
BASOPHILS NFR BLD: 0 % (ref 0–1.9)
BILIRUB SERPL-MCNC: 0.5 MG/DL (ref 0.1–1)
BUN SERPL-MCNC: 10 MG/DL (ref 8–23)
CALCIUM SERPL-MCNC: 9.2 MG/DL (ref 8.7–10.5)
CHLORIDE SERPL-SCNC: 104 MMOL/L (ref 95–110)
CO2 SERPL-SCNC: 23 MMOL/L (ref 23–29)
CREAT SERPL-MCNC: 1 MG/DL (ref 0.5–1.4)
DIFFERENTIAL METHOD: ABNORMAL
EOSINOPHIL NFR BLD: 0 % (ref 0–8)
ERYTHROCYTE [DISTWIDTH] IN BLOOD BY AUTOMATED COUNT: 14 % (ref 11.5–14.5)
EST. GFR  (AFRICAN AMERICAN): >60 ML/MIN/1.73 M^2
EST. GFR  (NON AFRICAN AMERICAN): >60 ML/MIN/1.73 M^2
GLUCOSE SERPL-MCNC: 109 MG/DL (ref 70–110)
HCT VFR BLD AUTO: 45.1 % (ref 37–48.5)
HGB BLD-MCNC: 14.4 G/DL (ref 12–16)
IMM GRANULOCYTES # BLD AUTO: ABNORMAL K/UL (ref 0–0.04)
IMM GRANULOCYTES NFR BLD AUTO: ABNORMAL % (ref 0–0.5)
LYMPHOCYTES NFR BLD: 86 % (ref 18–48)
MCH RBC QN AUTO: 30.2 PG (ref 27–31)
MCHC RBC AUTO-ENTMCNC: 31.9 G/DL (ref 32–36)
MCV RBC AUTO: 95 FL (ref 82–98)
MONOCYTES NFR BLD: 0 % (ref 4–15)
NEUTROPHILS NFR BLD: 14 % (ref 38–73)
NRBC BLD-RTO: 0 /100 WBC
PLATELET # BLD AUTO: 291 K/UL (ref 150–450)
PMV BLD AUTO: 11.1 FL (ref 9.2–12.9)
POTASSIUM SERPL-SCNC: 3.8 MMOL/L (ref 3.5–5.1)
PROT SERPL-MCNC: 7 G/DL (ref 6–8.4)
RBC # BLD AUTO: 4.77 M/UL (ref 4–5.4)
SODIUM SERPL-SCNC: 139 MMOL/L (ref 136–145)
WBC # BLD AUTO: 36.28 K/UL (ref 3.9–12.7)

## 2022-01-31 PROCEDURE — 85027 COMPLETE CBC AUTOMATED: CPT | Performed by: INTERNAL MEDICINE

## 2022-01-31 PROCEDURE — 85060 BLOOD SMEAR INTERPRETATION: CPT | Mod: ,,, | Performed by: PATHOLOGY

## 2022-01-31 PROCEDURE — 80053 COMPREHEN METABOLIC PANEL: CPT | Performed by: INTERNAL MEDICINE

## 2022-01-31 PROCEDURE — 85007 BL SMEAR W/DIFF WBC COUNT: CPT | Performed by: INTERNAL MEDICINE

## 2022-01-31 PROCEDURE — 85060 PATHOLOGIST REVIEW: ICD-10-PCS | Mod: ,,, | Performed by: PATHOLOGY

## 2022-01-31 PROCEDURE — 36415 COLL VENOUS BLD VENIPUNCTURE: CPT | Performed by: INTERNAL MEDICINE

## 2022-02-01 ENCOUNTER — OFFICE VISIT (OUTPATIENT)
Dept: HEMATOLOGY/ONCOLOGY | Facility: CLINIC | Age: 62
End: 2022-02-01
Payer: MEDICAID

## 2022-02-01 VITALS
HEIGHT: 63 IN | SYSTOLIC BLOOD PRESSURE: 105 MMHG | RESPIRATION RATE: 15 BRPM | HEART RATE: 66 BPM | BODY MASS INDEX: 30.16 KG/M2 | WEIGHT: 170.19 LBS | DIASTOLIC BLOOD PRESSURE: 59 MMHG | OXYGEN SATURATION: 95 % | TEMPERATURE: 97 F

## 2022-02-01 DIAGNOSIS — Z87.19 S/P DILATATION OF ESOPHAGEAL STRICTURE: ICD-10-CM

## 2022-02-01 DIAGNOSIS — C91.10 CLL (CHRONIC LYMPHOCYTIC LEUKEMIA): Primary | ICD-10-CM

## 2022-02-01 DIAGNOSIS — E53.8 B12 DEFICIENCY: ICD-10-CM

## 2022-02-01 DIAGNOSIS — Z98.890 S/P DILATATION OF ESOPHAGEAL STRICTURE: ICD-10-CM

## 2022-02-01 LAB — PATH REV BLD -IMP: NORMAL

## 2022-02-01 PROCEDURE — 1159F PR MEDICATION LIST DOCUMENTED IN MEDICAL RECORD: ICD-10-PCS | Mod: CPTII,,, | Performed by: INTERNAL MEDICINE

## 2022-02-01 PROCEDURE — 3078F PR MOST RECENT DIASTOLIC BLOOD PRESSURE < 80 MM HG: ICD-10-PCS | Mod: CPTII,,, | Performed by: INTERNAL MEDICINE

## 2022-02-01 PROCEDURE — 99214 OFFICE O/P EST MOD 30 MIN: CPT | Mod: S$PBB,,, | Performed by: INTERNAL MEDICINE

## 2022-02-01 PROCEDURE — 99999 PR PBB SHADOW E&M-EST. PATIENT-LVL III: CPT | Mod: PBBFAC,,, | Performed by: INTERNAL MEDICINE

## 2022-02-01 PROCEDURE — 3008F PR BODY MASS INDEX (BMI) DOCUMENTED: ICD-10-PCS | Mod: CPTII,,, | Performed by: INTERNAL MEDICINE

## 2022-02-01 PROCEDURE — 3074F PR MOST RECENT SYSTOLIC BLOOD PRESSURE < 130 MM HG: ICD-10-PCS | Mod: CPTII,,, | Performed by: INTERNAL MEDICINE

## 2022-02-01 PROCEDURE — 1159F MED LIST DOCD IN RCRD: CPT | Mod: CPTII,,, | Performed by: INTERNAL MEDICINE

## 2022-02-01 PROCEDURE — 3074F SYST BP LT 130 MM HG: CPT | Mod: CPTII,,, | Performed by: INTERNAL MEDICINE

## 2022-02-01 PROCEDURE — 99214 PR OFFICE/OUTPT VISIT, EST, LEVL IV, 30-39 MIN: ICD-10-PCS | Mod: S$PBB,,, | Performed by: INTERNAL MEDICINE

## 2022-02-01 PROCEDURE — 3008F BODY MASS INDEX DOCD: CPT | Mod: CPTII,,, | Performed by: INTERNAL MEDICINE

## 2022-02-01 PROCEDURE — 99213 OFFICE O/P EST LOW 20 MIN: CPT | Mod: PBBFAC,PO | Performed by: INTERNAL MEDICINE

## 2022-02-01 PROCEDURE — 3078F DIAST BP <80 MM HG: CPT | Mod: CPTII,,, | Performed by: INTERNAL MEDICINE

## 2022-02-01 PROCEDURE — 99999 PR PBB SHADOW E&M-EST. PATIENT-LVL III: ICD-10-PCS | Mod: PBBFAC,,, | Performed by: INTERNAL MEDICINE

## 2022-02-01 NOTE — PROGRESS NOTES
Subjective:       Patient ID: Yvette Hutchinson is a 61 y.o. female.    Chief Complaint:  Sent in consultation for evaluation of lymphocytosis which has been documented for at least 3 years since 2017 in our system diagnosed with CLL  Virtual visit for follow-up  Follow-up      Patient has chronic complains of chronic pain syndrome and COPD for which periodically she take steroids she is also on BuSpar has issues anxiety has required dilatation of esophageal strictures allergic rhinitis insomnia and history of B12 deficiency documented  Social history; patient is a smoker denies recreational alcohol use or drug use   Patient is currently on disability  She is allergic to the mask used during COVID pandemic she also Broussard Breath with her COPD    Family history suggestive of ovarian and breast cancer patient advised to see a  or seek   Review of patient's allergies indicates:  No Known Allergies  Medications have been reviewed  Current Outpatient Medications on File Prior to Visit   Medication Sig Dispense Refill    ADVAIR DISKUS 100-50 mcg/dose diskus inhaler Inhale 1 puff into the lungs 2 (two) times a day. 60 each 5    albuterol sulfate (PROAIR RESPICLICK) 90 mcg/actuation inhaler Inhale 200 puffs into the lungs every 4 (four) hours as needed (sob, cough). Rescue 1 each 11    buPROPion (WELLBUTRIN XL) 150 MG TB24 tablet Take 1 tablet (150 mg total) by mouth once daily. 30 tablet 2    FLUoxetine 20 MG capsule Take 1 capsule (20 mg total) by mouth once daily. 90 capsule 1    fluticasone propionate (FLONASE) 50 mcg/actuation nasal spray 2 sprays (100 mcg total) by Each Nostril route once daily. 16 g 3    gabapentin (NEURONTIN) 300 MG capsule Take 1 capsule (300 mg total) by mouth 3 (three) times daily. 90 capsule 11    hydrOXYzine (ATARAX) 50 MG tablet Take 2 tablets (100 mg total) by mouth 4 (four) times daily as needed for Anxiety. 360 tablet 3    levocetirizine (XYZAL) 5 MG tablet  Take 1 tablet (5 mg total) by mouth every evening. 30 tablet 5    albuterol-ipratropium (DUO-NEB) 2.5 mg-0.5 mg/3 mL nebulizer solution Take 3 mLs by nebulization every 6 (six) hours as needed for Wheezing. Rescue 120 vial 5     No current facility-administered medications on file prior to visit.       REVIEW OF SYSTEMS:     CONSTITUTIONAL: The patient denies any weight change. There is no apparent    change in appetite, fever, night sweats, headaches, vision fatigued, no dizziness, on and off extreme   weakness.      SKIN: Denies rash, issues with nails, non-healing sores, bleeding, blotching    skin or abnormal bruising. Denies new moles or changes to existing moles.      BREASTS: There is no swelling around breasts or nipple discharge.    EYES: Denies eye pain, blurred vision, swelling, redness or discharge.      ENT AND MOUTH: Denies runny nose, stuffiness, sinus trouble or sores. Denies    nosebleeds. Denies, hoarseness, change in voice or swelling in front of the    neck.      CARDIOVASCULAR: Denies chest pain, discomfort or palpitations. Denies neck    swelling or episodes of passing out.      RESPIRATORY:  Reports smoker's cough, requiring steroids on and off for COPD exacerbations follows with PCP    GI: Denies trouble swallowing, indigestion, heartburn, abdominal pain, nausea,    vomiting, diarrhea, altered bowel habits, blood in stool, discoloration of    stools, change in nature of stool, bloating, increased abdominal girth.      GENITOURINARY: No discharge. No pelvic pain or lumps. No rash around groin or  lesions. No urinary frequency, hesitation, painful urination or blood in    urine. Denies incontinence. No problems with intercourse.      MUSCULOSKELETAL: Denies neck or back pain. Denies weakness in arms or legs,    joint problems or distended inflamed veins in legs. Denies swelling or abnormal  glands.      NEUROLOGICAL: Denies tingling, numbness, altered mentation changes to nerve    function in  the face, weakness to one or both of the body. Denies changes to    gait and denies multiple falls or accidents.      PSYCHIATRIC: Denies nervousness, anxiety, hallucinations, depression, suicidal    ideation, trouble sleeping or changes in behavior noticed by family.      The patient denies recent foreign travel or recent exposure to chemicals or    products of concern or infectious diseases.   P?E ,  Wt Readings from Last 3 Encounters:   02/01/22 77.2 kg (170 lb 3.1 oz)   01/11/22 79.1 kg (174 lb 6.1 oz)   12/02/21 78.9 kg (174 lb)     Temp Readings from Last 3 Encounters:   02/01/22 96.7 °F (35.9 °C) (Temporal)   09/17/21 97.4 °F (36.3 °C) (Temporal)   08/16/21 97.8 °F (36.6 °C)     BP Readings from Last 3 Encounters:   02/01/22 (!) 105/59   01/11/22 104/65   09/17/21 (!) 109/56     Pulse Readings from Last 3 Encounters:   02/01/22 66   01/11/22 60   09/17/21 67     GENERAL: alert; in no apparent distress, , well-built well-nourished   HEAD: normocephalic, atraumatic.  EYES: pupils are equal, round, reactive to light and accommodation. Sclera anicteric. Conjunctiva not injected.   NOSE/THROAT: no nasal erythema or rhinorrhea. Oropharynx pink, without erythema, ulcerations or thrush.   NECK: no cervical motion rigidity; supple with no masses.  CHEST: clear to auscultation bilaterally; no wheezes, crackles or rubs. Patient is speaking comfortably on room air with normal work of breathing without using accessory muscles of respiration.  CARDIOVASCULAR: regular rate and rhythm; no murmurs, rubs or gallops.  ABDOMEN: soft, nontender, nondistended. Bowel sounds present.   MUSCULOSKELETAL: no tenderness to palpation along the spine or scapulae. Normal range of motion.  NEUROLOGIC: cranial nerves II-XII intact bilaterally. Strength 5/5 in bilateral upper and lower extremities. No sensory deficits appreciated. Reflexes globally intact. No cerebellar signs. Normal gait.  LYMPHATIC: no cervical, supraclavicular or axillary  adenopathy appreciated bilaterally.   EXTREMITIES: no clubbing, cyanosis, edema.  SKIN: no erythema, rashes, ulcerations noted  Lab Results   Component Value Date    WBC 20.78 (H) 03/03/2020    HGB 14.9 03/03/2020    HCT 47.0 03/03/2020    MCV 99 (H) 03/03/2020     03/03/2020     Smudge cells presnt  CMP  Sodium   Date Value Ref Range Status   01/31/2022 139 136 - 145 mmol/L Final     Potassium   Date Value Ref Range Status   01/31/2022 3.8 3.5 - 5.1 mmol/L Final     Chloride   Date Value Ref Range Status   01/31/2022 104 95 - 110 mmol/L Final     CO2   Date Value Ref Range Status   01/31/2022 23 23 - 29 mmol/L Final     Glucose   Date Value Ref Range Status   01/31/2022 109 70 - 110 mg/dL Final     BUN   Date Value Ref Range Status   01/31/2022 10 8 - 23 mg/dL Final     Creatinine   Date Value Ref Range Status   01/31/2022 1.0 0.5 - 1.4 mg/dL Final     Calcium   Date Value Ref Range Status   01/31/2022 9.2 8.7 - 10.5 mg/dL Final     Total Protein   Date Value Ref Range Status   01/31/2022 7.0 6.0 - 8.4 g/dL Final     Albumin   Date Value Ref Range Status   01/31/2022 4.2 3.5 - 5.2 g/dL Final     Total Bilirubin   Date Value Ref Range Status   01/31/2022 0.5 0.1 - 1.0 mg/dL Final     Comment:     For infants and newborns, interpretation of results should be based  on gestational age, weight and in agreement with clinical  observations.    Premature Infant recommended reference ranges:  Up to 24 hours.............<8.0 mg/dL  Up to 48 hours............<12.0 mg/dL  3-5 days..................<15.0 mg/dL  6-29 days.................<15.0 mg/dL       Alkaline Phosphatase   Date Value Ref Range Status   01/31/2022 127 55 - 135 U/L Final     AST   Date Value Ref Range Status   01/31/2022 17 10 - 40 U/L Final     ALT   Date Value Ref Range Status   01/31/2022 17 10 - 44 U/L Final     Anion Gap   Date Value Ref Range Status   01/31/2022 12 8 - 16 mmol/L Final     eGFR if    Date Value Ref Range Status    01/31/2022 >60 >60 mL/min/1.73 m^2 Final     eGFR if non    Date Value Ref Range Status   01/31/2022 >60 >60 mL/min/1.73 m^2 Final     Comment:     Calculation used to obtain the estimated glomerular filtration  rate (eGFR) is the CKD-EPI equation.            2wk ago    Blood Interpretation A B cell lymphoproliferative disorder is present. see comment.    Comment: Interpreted by: Jeremias Sosa M.D., Signed on 08/06/2020 at 13:07   Blood Comment Flow cytometric analysis of  peripheral blood  detects a lambda  light chain   restricted B lymphocyte population showing expression of CD19 and dim CD20   with aberrant expression of CD5 (dim).  T lymphocytes are   immunophenotypically unremarkable.  The blasts gate is not increased.  The   findings are consistent with patient history of chronic lymphocytic   leukemia/small lymphocytic lymphoma.   Flow differential:  Lymphocytes 69.6%, Monocytes 2.8%, Granulocytes  27.5%,   Blast  0.1%, Debris/nRBC 0.1%,  Viability 97.5%.          Assessment:     CLL  Lymphocytosis over 3 years leukocytosis and smudge cells suggestive of CLL stage 0 no anemia no thrombocytopenia no generalized lymphadenopathy     Plan:         CLL stage 0 will confirmed with flow cytometry    4 months rtc   she has no other cytopenias or lymphadenopathy or B symptoms patient can be observed  Patient also states her dermatologist told her she was allergic to the mask use and she also has COPD that makes a feels smothered during mask use this might hinder finding a job suitable I have directed her to discuss with the PCP regarding letters to support inability to use mask    Continue with chronic pain syndrome and COPD management advised to stop smoking if at all possible      Advised COVID precaution due to her lung situation she will be a high risk patient    Advance Care planning/ directives /living will/patient's wishes discussed with patient.  Patient has been given guidelines and  instructions on completing these directives  COVID social distancing, face mask use, hand washing techniques and personal hygiene routine discussed with patientPatient advised that she is immunocompromised and should be very careful with contact isolation and social distancing and precautions during COVID. Not desirous of vaccinations    Good exercise, nutrition and weight management discussed with patient  Health maintenance activities and follow-up with PCPs recommendations discussed with patient

## 2022-02-11 ENCOUNTER — OFFICE VISIT (OUTPATIENT)
Dept: FAMILY MEDICINE | Facility: CLINIC | Age: 62
End: 2022-02-11
Payer: MEDICAID

## 2022-02-11 ENCOUNTER — HOSPITAL ENCOUNTER (OUTPATIENT)
Dept: RADIOLOGY | Facility: CLINIC | Age: 62
Discharge: HOME | End: 2022-02-11
Attending: STUDENT IN AN ORGANIZED HEALTH CARE EDUCATION/TRAINING PROGRAM
Payer: MEDICAID

## 2022-02-11 VITALS
HEART RATE: 75 BPM | BODY MASS INDEX: 30.28 KG/M2 | DIASTOLIC BLOOD PRESSURE: 62 MMHG | WEIGHT: 170.88 LBS | HEIGHT: 63 IN | OXYGEN SATURATION: 95 % | SYSTOLIC BLOOD PRESSURE: 118 MMHG

## 2022-02-11 DIAGNOSIS — F33.0 MILD EPISODE OF RECURRENT MAJOR DEPRESSIVE DISORDER: ICD-10-CM

## 2022-02-11 DIAGNOSIS — E78.5 HYPERLIPIDEMIA, UNSPECIFIED HYPERLIPIDEMIA TYPE: ICD-10-CM

## 2022-02-11 DIAGNOSIS — Z12.31 ENCOUNTER FOR SCREENING MAMMOGRAM FOR MALIGNANT NEOPLASM OF BREAST: ICD-10-CM

## 2022-02-11 DIAGNOSIS — F41.9 ANXIETY: ICD-10-CM

## 2022-02-11 DIAGNOSIS — R05.9 COUGH: ICD-10-CM

## 2022-02-11 DIAGNOSIS — Z00.00 ROUTINE GENERAL MEDICAL EXAMINATION AT A HEALTH CARE FACILITY: Primary | ICD-10-CM

## 2022-02-11 DIAGNOSIS — F17.200 TOBACCO DEPENDENCE: ICD-10-CM

## 2022-02-11 PROCEDURE — 99999 PR PBB SHADOW E&M-EST. PATIENT-LVL V: ICD-10-PCS | Mod: PBBFAC,,, | Performed by: STUDENT IN AN ORGANIZED HEALTH CARE EDUCATION/TRAINING PROGRAM

## 2022-02-11 PROCEDURE — 71046 X-RAY EXAM CHEST 2 VIEWS: CPT | Mod: TC,FY,PO

## 2022-02-11 PROCEDURE — 3078F PR MOST RECENT DIASTOLIC BLOOD PRESSURE < 80 MM HG: ICD-10-PCS | Mod: CPTII,,, | Performed by: STUDENT IN AN ORGANIZED HEALTH CARE EDUCATION/TRAINING PROGRAM

## 2022-02-11 PROCEDURE — 3008F BODY MASS INDEX DOCD: CPT | Mod: CPTII,,, | Performed by: STUDENT IN AN ORGANIZED HEALTH CARE EDUCATION/TRAINING PROGRAM

## 2022-02-11 PROCEDURE — 71046 XR CHEST PA AND LATERAL: ICD-10-PCS | Mod: 26,,, | Performed by: RADIOLOGY

## 2022-02-11 PROCEDURE — 3078F DIAST BP <80 MM HG: CPT | Mod: CPTII,,, | Performed by: STUDENT IN AN ORGANIZED HEALTH CARE EDUCATION/TRAINING PROGRAM

## 2022-02-11 PROCEDURE — 3074F PR MOST RECENT SYSTOLIC BLOOD PRESSURE < 130 MM HG: ICD-10-PCS | Mod: CPTII,,, | Performed by: STUDENT IN AN ORGANIZED HEALTH CARE EDUCATION/TRAINING PROGRAM

## 2022-02-11 PROCEDURE — 3074F SYST BP LT 130 MM HG: CPT | Mod: CPTII,,, | Performed by: STUDENT IN AN ORGANIZED HEALTH CARE EDUCATION/TRAINING PROGRAM

## 2022-02-11 PROCEDURE — 1159F PR MEDICATION LIST DOCUMENTED IN MEDICAL RECORD: ICD-10-PCS | Mod: CPTII,,, | Performed by: STUDENT IN AN ORGANIZED HEALTH CARE EDUCATION/TRAINING PROGRAM

## 2022-02-11 PROCEDURE — 90471 IMMUNIZATION ADMIN: CPT | Mod: PBBFAC,PO

## 2022-02-11 PROCEDURE — 3008F PR BODY MASS INDEX (BMI) DOCUMENTED: ICD-10-PCS | Mod: CPTII,,, | Performed by: STUDENT IN AN ORGANIZED HEALTH CARE EDUCATION/TRAINING PROGRAM

## 2022-02-11 PROCEDURE — 99999 PR PBB SHADOW E&M-EST. PATIENT-LVL V: CPT | Mod: PBBFAC,,, | Performed by: STUDENT IN AN ORGANIZED HEALTH CARE EDUCATION/TRAINING PROGRAM

## 2022-02-11 PROCEDURE — 71046 X-RAY EXAM CHEST 2 VIEWS: CPT | Mod: 26,,, | Performed by: RADIOLOGY

## 2022-02-11 PROCEDURE — 1159F MED LIST DOCD IN RCRD: CPT | Mod: CPTII,,, | Performed by: STUDENT IN AN ORGANIZED HEALTH CARE EDUCATION/TRAINING PROGRAM

## 2022-02-11 PROCEDURE — 99215 OFFICE O/P EST HI 40 MIN: CPT | Mod: PBBFAC,25,PO | Performed by: STUDENT IN AN ORGANIZED HEALTH CARE EDUCATION/TRAINING PROGRAM

## 2022-02-11 PROCEDURE — 99214 OFFICE O/P EST MOD 30 MIN: CPT | Mod: S$PBB,,, | Performed by: STUDENT IN AN ORGANIZED HEALTH CARE EDUCATION/TRAINING PROGRAM

## 2022-02-11 PROCEDURE — 99214 PR OFFICE/OUTPT VISIT, EST, LEVL IV, 30-39 MIN: ICD-10-PCS | Mod: S$PBB,,, | Performed by: STUDENT IN AN ORGANIZED HEALTH CARE EDUCATION/TRAINING PROGRAM

## 2022-02-11 RX ORDER — ROSUVASTATIN CALCIUM 10 MG/1
10 TABLET, COATED ORAL DAILY
Qty: 90 TABLET | Refills: 3 | Status: SHIPPED | OUTPATIENT
Start: 2022-02-11 | End: 2022-06-20

## 2022-02-11 RX ORDER — BUPROPION HYDROCHLORIDE 300 MG/1
300 TABLET ORAL DAILY
Qty: 30 TABLET | Refills: 2 | Status: SHIPPED | OUTPATIENT
Start: 2022-02-11 | End: 2022-05-30

## 2022-02-11 RX ORDER — CODEINE PHOSPHATE AND GUAIFENESIN 10; 100 MG/5ML; MG/5ML
SOLUTION ORAL
COMMUNITY
Start: 2022-02-02 | End: 2023-01-12 | Stop reason: ALTCHOICE

## 2022-02-11 NOTE — PROGRESS NOTES
[facility] MEDICINE CLINIC NOTE    Patient Name: Yvette Hutchinson  YOB: 1960    PRESENTING HISTORY   Chief Complaint:   Chief Complaint   Patient presents with    Annual Exam        History of Present Illness:  Ms. Yvette Hutchinson is a 61 y.o. female here for annual exam    Tobacco use- she has cut back significantly. Interested in cessation program.     Recent URI. She was offered and declined COVID screening.   Lingering cough, congestion for the last few weeks.   No fevers reported.     HLD- needs monitoring.     Needs routine mammography soon.     Following with Dr. Lee in Oncology, appointments upcoming.                                                 Review of Systems   All other systems reviewed and are negative.        PAST HISTORY:     Past Medical History:   Diagnosis Date    Arthritis     Depression     GERD (gastroesophageal reflux disease)     Pneumonia of left lung due to infectious organism 3/5/2020       Past Surgical History:   Procedure Laterality Date    TONSILLECTOMY         Family History   Problem Relation Age of Onset    Ovarian cancer Sister     Breast cancer Cousin        Social History     Socioeconomic History    Marital status: Legally    Tobacco Use    Smoking status: Current Some Day Smoker     Packs/day: 0.15     Types: Cigarettes    Smokeless tobacco: Never Used    Tobacco comment: reports one cigarette every now and then   Substance and Sexual Activity    Alcohol use: Yes    Drug use: Yes     Types: Marijuana       MEDICATIONS & ALLERGIES:     Current Outpatient Medications on File Prior to Visit   Medication Sig    ADVAIR DISKUS 100-50 mcg/dose diskus inhaler Inhale 1 puff into the lungs 2 (two) times a day.    albuterol sulfate (PROAIR RESPICLICK) 90 mcg/actuation inhaler Inhale 200 puffs into the lungs every 4 (four) hours as needed (sob, cough). Rescue    FLUoxetine 20 MG capsule Take 1 capsule (20 mg total) by mouth  "once daily.    fluticasone propionate (FLONASE) 50 mcg/actuation nasal spray 2 sprays (100 mcg total) by Each Nostril route once daily.    gabapentin (NEURONTIN) 300 MG capsule Take 1 capsule (300 mg total) by mouth 3 (three) times daily.    guaiFENesin-codeine 100-10 mg/5 ml (TUSSI-ORGANIDIN NR)  mg/5 mL syrup TAKE 5 MLS BY MOUTH EVERY 4 HOURS AS NEEDED FOR COUGH OR CONGESTION    hydrOXYzine (ATARAX) 50 MG tablet Take 2 tablets (100 mg total) by mouth 4 (four) times daily as needed for Anxiety.    levocetirizine (XYZAL) 5 MG tablet Take 1 tablet (5 mg total) by mouth every evening.    [DISCONTINUED] buPROPion (WELLBUTRIN XL) 150 MG TB24 tablet Take 1 tablet (150 mg total) by mouth once daily.    albuterol-ipratropium (DUO-NEB) 2.5 mg-0.5 mg/3 mL nebulizer solution Take 3 mLs by nebulization every 6 (six) hours as needed for Wheezing. Rescue     No current facility-administered medications on file prior to visit.       Review of patient's allergies indicates:  No Known Allergies    OBJECTIVE:   Vital Signs:  Vitals:    02/11/22 1044   BP: 118/62   Pulse: 75   SpO2: 95%   Weight: 77.5 kg (170 lb 13.7 oz)   Height: 5' 3" (1.6 m)       No results found for this or any previous visit (from the past 24 hour(s)).      Physical Exam  Vitals and nursing note reviewed.   Constitutional:       General: She is not in acute distress.     Appearance: She is not toxic-appearing or diaphoretic.   HENT:      Head: Normocephalic and atraumatic.      Right Ear: External ear normal.      Left Ear: External ear normal.   Eyes:      General: No scleral icterus.     Extraocular Movements: EOM normal.      Conjunctiva/sclera: Conjunctivae normal.      Pupils: Pupils are equal, round, and reactive to light.   Neck:      Thyroid: No thyromegaly.   Cardiovascular:      Rate and Rhythm: Normal rate and regular rhythm.      Heart sounds: Normal heart sounds. No murmur heard.      Pulmonary:      Effort: Pulmonary effort is normal. " No respiratory distress.      Breath sounds: Normal breath sounds. No wheezing or rales.      Comments: Coarse breath sounds right upper lobe  Musculoskeletal:         General: No tenderness, deformity or edema. Normal range of motion.      Cervical back: Normal range of motion and neck supple.   Lymphadenopathy:      Cervical: No cervical adenopathy.   Skin:     General: Skin is warm and dry.      Findings: No erythema or rash.   Neurological:      Mental Status: She is alert and oriented to person, place, and time.      GCS: GCS score is 15.      Gait: Gait is intact.   Psychiatric:         Mood and Affect: Mood and affect normal.         Cognition and Memory: Memory normal.         Judgment: Judgment normal.         ASSESSMENT & PLAN:     Routine general medical examination at a health care facility  -     Lipid Panel; Future; Expected date: 02/11/2022  -     rosuvastatin (CRESTOR) 10 MG tablet; Take 1 tablet (10 mg total) by mouth once daily.  Dispense: 90 tablet; Refill: 3    Cough  -     X-Ray Chest PA And Lateral; Future; Expected date: 02/11/2022    Hyperlipidemia, unspecified hyperlipidemia type  -     Lipid Panel; Future; Expected date: 02/11/2022  -     rosuvastatin (CRESTOR) 10 MG tablet; Take 1 tablet (10 mg total) by mouth once daily.  Dispense: 90 tablet; Refill: 3    Encounter for screening mammogram for malignant neoplasm of breast  -     Mammo Digital Screening Bilat w/ Emre; Future; Expected date: 02/11/2022    Anxiety  -     buPROPion (WELLBUTRIN XL) 300 MG 24 hr tablet; Take 1 tablet (300 mg total) by mouth once daily.  Dispense: 30 tablet; Refill: 2    Tobacco dependence  -     Ambulatory referral/consult to Smoking Cessation Program; Future; Expected date: 02/18/2022    Mild episode of recurrent major depressive disorder  Persistent depression/anxiety despite SSRI + wellbutrin.   Discussed options for management. Offered referral to psychiatry which she declines.   Will try increase  Wellbutrin. She is very concerned about her weight, wants to quit smoking. This will achieve multiple goals.     Other orders  -     Influenza - Quadrivalent (PF)      Vern Priest MD   Internal Medicine    This note was created using Dragon voice recognition software that occasionally misinterprets phrases or words.

## 2022-03-16 ENCOUNTER — HOSPITAL ENCOUNTER (OUTPATIENT)
Dept: RADIOLOGY | Facility: HOSPITAL | Age: 62
Discharge: HOME | End: 2022-03-16
Attending: STUDENT IN AN ORGANIZED HEALTH CARE EDUCATION/TRAINING PROGRAM
Payer: MEDICAID

## 2022-03-16 DIAGNOSIS — Z12.31 ENCOUNTER FOR SCREENING MAMMOGRAM FOR MALIGNANT NEOPLASM OF BREAST: ICD-10-CM

## 2022-03-16 PROCEDURE — 77063 MAMMO DIGITAL SCREENING BILAT WITH TOMO: ICD-10-PCS | Mod: 26,,, | Performed by: RADIOLOGY

## 2022-03-16 PROCEDURE — 77067 MAMMO DIGITAL SCREENING BILAT WITH TOMO: ICD-10-PCS | Mod: 26,,, | Performed by: RADIOLOGY

## 2022-03-16 PROCEDURE — 77063 BREAST TOMOSYNTHESIS BI: CPT | Mod: 26,,, | Performed by: RADIOLOGY

## 2022-03-16 PROCEDURE — 77067 SCR MAMMO BI INCL CAD: CPT | Mod: 26,,, | Performed by: RADIOLOGY

## 2022-03-16 PROCEDURE — 77067 SCR MAMMO BI INCL CAD: CPT | Mod: TC

## 2022-04-27 ENCOUNTER — TELEPHONE (OUTPATIENT)
Dept: PULMONOLOGY | Facility: CLINIC | Age: 62
End: 2022-04-27
Payer: MEDICAID

## 2022-04-27 NOTE — TELEPHONE ENCOUNTER
Patient coming tomorrow for 8:00 AM with Harika.     ----- Message from Juliet Rao sent at 4/27/2022  1:19 PM CDT -----  Contact: pt  Type: Needs Medical Advice    Who Called: pt  Best Call Back Number: [phone]    Inquiry/Question: pt would like to schedule an appt for [facility]   Symptoms for congestion and cough x a few days.   Please call to advise/ schedule     Thank you~

## 2022-04-28 ENCOUNTER — PATIENT MESSAGE (OUTPATIENT)
Dept: FAMILY MEDICINE | Facility: CLINIC | Age: 62
End: 2022-04-28
Payer: MEDICAID

## 2022-04-28 ENCOUNTER — OFFICE VISIT (OUTPATIENT)
Dept: PULMONOLOGY | Facility: CLINIC | Age: 62
End: 2022-04-28
Payer: MEDICAID

## 2022-04-28 VITALS
DIASTOLIC BLOOD PRESSURE: 61 MMHG | SYSTOLIC BLOOD PRESSURE: 92 MMHG | OXYGEN SATURATION: 98 % | HEART RATE: 60 BPM | WEIGHT: 165.38 LBS | BODY MASS INDEX: 29.29 KG/M2

## 2022-04-28 DIAGNOSIS — G89.29 CHRONIC PAIN OF BOTH KNEES: ICD-10-CM

## 2022-04-28 DIAGNOSIS — M25.562 CHRONIC PAIN OF BOTH KNEES: ICD-10-CM

## 2022-04-28 DIAGNOSIS — M25.561 CHRONIC PAIN OF BOTH KNEES: ICD-10-CM

## 2022-04-28 DIAGNOSIS — J44.1 CHRONIC OBSTRUCTIVE PULMONARY DISEASE WITH ACUTE EXACERBATION: Primary | ICD-10-CM

## 2022-04-28 PROCEDURE — 3008F BODY MASS INDEX DOCD: CPT | Mod: CPTII,,, | Performed by: NURSE PRACTITIONER

## 2022-04-28 PROCEDURE — 3074F SYST BP LT 130 MM HG: CPT | Mod: CPTII,,, | Performed by: NURSE PRACTITIONER

## 2022-04-28 PROCEDURE — 99214 OFFICE O/P EST MOD 30 MIN: CPT | Mod: PBBFAC,PO,25 | Performed by: NURSE PRACTITIONER

## 2022-04-28 PROCEDURE — 3078F PR MOST RECENT DIASTOLIC BLOOD PRESSURE < 80 MM HG: ICD-10-PCS | Mod: CPTII,,, | Performed by: NURSE PRACTITIONER

## 2022-04-28 PROCEDURE — 1159F PR MEDICATION LIST DOCUMENTED IN MEDICAL RECORD: ICD-10-PCS | Mod: CPTII,,, | Performed by: NURSE PRACTITIONER

## 2022-04-28 PROCEDURE — 3078F DIAST BP <80 MM HG: CPT | Mod: CPTII,,, | Performed by: NURSE PRACTITIONER

## 2022-04-28 PROCEDURE — 99214 PR OFFICE/OUTPT VISIT, EST, LEVL IV, 30-39 MIN: ICD-10-PCS | Mod: S$PBB,,, | Performed by: NURSE PRACTITIONER

## 2022-04-28 PROCEDURE — 96372 THER/PROPH/DIAG INJ SC/IM: CPT | Mod: PBBFAC,PO

## 2022-04-28 PROCEDURE — 3074F PR MOST RECENT SYSTOLIC BLOOD PRESSURE < 130 MM HG: ICD-10-PCS | Mod: CPTII,,, | Performed by: NURSE PRACTITIONER

## 2022-04-28 PROCEDURE — 1159F MED LIST DOCD IN RCRD: CPT | Mod: CPTII,,, | Performed by: NURSE PRACTITIONER

## 2022-04-28 PROCEDURE — 3008F PR BODY MASS INDEX (BMI) DOCUMENTED: ICD-10-PCS | Mod: CPTII,,, | Performed by: NURSE PRACTITIONER

## 2022-04-28 PROCEDURE — 99999 PR PBB SHADOW E&M-EST. PATIENT-LVL IV: ICD-10-PCS | Mod: PBBFAC,,, | Performed by: NURSE PRACTITIONER

## 2022-04-28 PROCEDURE — 99214 OFFICE O/P EST MOD 30 MIN: CPT | Mod: S$PBB,,, | Performed by: NURSE PRACTITIONER

## 2022-04-28 PROCEDURE — 1160F RVW MEDS BY RX/DR IN RCRD: CPT | Mod: CPTII,,, | Performed by: NURSE PRACTITIONER

## 2022-04-28 PROCEDURE — 99999 PR PBB SHADOW E&M-EST. PATIENT-LVL IV: CPT | Mod: PBBFAC,,, | Performed by: NURSE PRACTITIONER

## 2022-04-28 PROCEDURE — 1160F PR REVIEW ALL MEDS BY PRESCRIBER/CLIN PHARMACIST DOCUMENTED: ICD-10-PCS | Mod: CPTII,,, | Performed by: NURSE PRACTITIONER

## 2022-04-28 RX ORDER — CODEINE PHOSPHATE AND GUAIFENESIN 10; 100 MG/5ML; MG/5ML
5 SOLUTION ORAL EVERY 4 HOURS PRN
Qty: 210 ML | Refills: 0 | Status: SHIPPED | OUTPATIENT
Start: 2022-04-28 | End: 2022-07-21 | Stop reason: SDUPTHER

## 2022-04-28 RX ORDER — AMOXICILLIN AND CLAVULANATE POTASSIUM 875; 125 MG/1; MG/1
1 TABLET, FILM COATED ORAL 2 TIMES DAILY
Qty: 20 TABLET | Refills: 1 | Status: SHIPPED | OUTPATIENT
Start: 2022-04-28 | End: 2022-05-08

## 2022-04-28 RX ORDER — PREDNISONE 20 MG/1
40 TABLET ORAL DAILY
Qty: 20 TABLET | Refills: 1 | Status: SHIPPED | OUTPATIENT
Start: 2022-04-28 | End: 2022-07-21 | Stop reason: SDUPTHER

## 2022-04-28 RX ORDER — BETAMETHASONE SODIUM PHOSPHATE AND BETAMETHASONE ACETATE 3; 3 MG/ML; MG/ML
6 INJECTION, SUSPENSION INTRA-ARTICULAR; INTRALESIONAL; INTRAMUSCULAR; SOFT TISSUE
Status: COMPLETED | OUTPATIENT
Start: 2022-04-28 | End: 2022-04-28

## 2022-04-28 RX ORDER — BUDESONIDE, GLYCOPYRROLATE, AND FORMOTEROL FUMARATE 160; 9; 4.8 UG/1; UG/1; UG/1
2 AEROSOL, METERED RESPIRATORY (INHALATION) 2 TIMES DAILY
Qty: 10.7 G | Refills: 11 | Status: SHIPPED | OUTPATIENT
Start: 2022-04-28 | End: 2022-06-20

## 2022-04-28 RX ADMIN — BETAMETHASONE SODIUM PHOSPHATE AND BETAMETHASONE ACETATE 6 MG: 3; 3 INJECTION, SUSPENSION INTRA-ARTICULAR; INTRALESIONAL; INTRAMUSCULAR at 09:04

## 2022-04-28 NOTE — PROGRESS NOTES
4/29/2022    Yvette Hutchinson  Office note    Chief Complaint   Patient presents with    Cough    Sputum Production       HPI:   4/28/2022- COPD exacerbation onset 3 days, persistent cough worse in early mornings and late evenings, has nocturnal arousals, productive thick brown mucous,   Associated with chest tightness and wheeze. Improved with albuterol nebulizer but returns after 2 hours. Severe complaint has urinated due to coughing fits.   Complaint of left ear pain and pressure with headaches. Associated with body aches and fatigue.  On average has exacerbation once every 3 months.   History of chronic knee pain, not interested in orthopedic referral, treated previously with ibuprofen with benefit.     NP Pedro reviewed  1/11/2022: Hx of CLL, COPD  Endorses onset of headache, fatigue x2 days.  Cough: Productive with green thick sputum production, onset 2 days, worse at night time and in the morning. Not relieved with cough syrup. States Codeine cough syrup has helped in the past. Associated with wheezing.   States breathing is the same, not worse from baseline. Using supplemental oxygen at night and PRN.   Denies fever, denies chest tightness, GI complaints.  On Daily Advair, using rescue inhaler twice daily. Using nebulizer daily with no noted improvement.   Cut back to 2 cigarettes per day.      8/16/2021- not currently interested to perform in clinic sleep study.   SOB- daily, worse with hot weather, ran out of rescue inhaler. On Advair daily with benefit. Improves with rest.   Started coughing  After weed eating yard.     Cough- recurrent complaint, onset 5 days after doing yard work, productive clear mucous, severe complaint, inhibits ability to sleep when first laying down at night.     5/14/2021- did not receive sleep study from [facility] as ordered. Wearing supplemental oxygen at night while sleeping at 3 L. Uses when needed during day.    Having trouble falling asleep at night,  started on new medication with no current benefit. Not able to fall asleep or stay asleep. Onset years, worsening with time. Feels tired when waking up in morning. Associated with occasional morning headaches.     complaint of chronic back pain followed by PCP.    Cough- recurrent complaint, mild, thick mucous, not using nebulizer regularly,   SOB- daily, worse with exertion and occasionally occurs at rest. Feels she can not take a deep breath.   On advair daily. Taking prednisone 20mg for 3 days 2x monthly.    2/8/2021- Cough- improved, daily, mild, Complaint of dry throat at night. Worse in early morning and late evenings.   occasionally productive small amount yellow mucous.   Currently smoking 1/2 pack daily, finished chantix with improvement but picked up after stopping.   Wearing supplemental oxygen at 3L most nights with benefit. SOB worse when laying flat on back, tried wedge but caused neck pain.   Sinus- unchanged, recurrent headaches in morning, sinus drainage. Currently on Flonase daily    12/3/2020- has supplemental oxygen set at 3 L at night, states benefiting,   Cough- worsened in last 7 days, worse in early morning and late evenings, nocturnal arousals nightly, productive yellow/green mucous quarter size.  Complaint of daily fatigue, dry throat in morning, day time drowsiness, mental cloudiness. Feels she never gets good sleep at night even when wearing supplemental oxygen.     10/21/2020- SOB and wheeze- recurrent problem, worsened last weeks, associated with sinus congestion, wheeze is worse in early morning and when laying down at nigtht  Cough- productive brown/green mucous for 1 week. Has not started steroid therapy. Improves with nebulizer using 1-2x daily for past week. Was not able afford inhalers. Did not receive call from [facility] pharmacy.     Complaint for cramping sensation in chest that occurs sporadically on left and right side that lasts few minutes. Onset years, recurrent complaint,  started back 1 week prior. Occurs couple days in row.     7/15/2020- cough- onset 7 days, worse in mornings and night, nocturnal arousals 1x nightly, productive yellow/green dime size mucous, associated with chest tightness and wheeze, improves with nebulizer using 1-2 daily, states feels better after coughing up large amounts of mucous; went to covid clinic and tested negative for covid did have fluid on ears.   Currently on Advair daily, insurance did not cover spiriva;     4/15/2020- cough- onset weeks, associated with occasional chest tighness, worse at night and early morning, quarter size clear to light brown in color. Currently    advair, spiriva, and rescue inhaler. States using rescue daily with little benefit.    3/5/2020- Pt is a 60 yo female with depression, GERD and COPD presenting for new evaluation.  Has chronic cough worse last 2 wks w/ phlegm, white, worse in am and evening.  Inhalers: rescue inhaler says insurance didn't cover  She reports wt gain over last 2 yrs. Has abdominal cramps intermittently radiating to the back.   Smoking since she was very young, 1 pack lasts 3 days.  She gets yearly bronchitis.  Labs from her PCP done 2 days ago are concerning for possible CLL- with WBC 21 and peripheral smear w/ smudge cells    The chief compliant  problem varies with instablilty at time  PFSH:  Past Medical History:   Diagnosis Date    Arthritis     Depression     GERD (gastroesophageal reflux disease)     Pneumonia of left lung due to infectious organism 3/5/2020         Past Surgical History:   Procedure Laterality Date    TONSILLECTOMY       Social History     Tobacco Use    Smoking status: Current Some Day Smoker     Packs/day: 0.15     Types: Cigarettes    Smokeless tobacco: Never Used    Tobacco comment: reports one cigarette every now and then   Substance Use Topics    Alcohol use: Yes    Drug use: Yes     Types: Marijuana     Family History   Problem Relation Age of Onset    Ovarian  cancer Sister     Breast cancer Cousin      Review of patient's allergies indicates:  No Known Allergies    Performance Status:The patient's activity level is functions out of house.      Review of Systems:  a review of eleven systems covering constitutional, Eye, HEENT, Psych, Respiratory, Cardiac, GI, , Musculoskeletal, Endocrine, Dermatologic was negative except for pertinent findings as listed ABOVE and below:  Shortness of breath, Cough, body aches, fatigue, headache       Exam:Comprehensive exam done. BP 92/61 (BP Location: Left arm, Patient Position: Sitting)   Pulse 60   Wt 75 kg (165 lb 5.5 oz)   LMP 01/13/2010 (Approximate)   SpO2 98%   BMI 29.29 kg/m²   Exam included Vitals as listed, and patient's appearance and affect and alertness and mood, oral exam for yeast and hygiene and pharynx lesions and Mallapatti (M) score, neck with inspection for jvd and masses and thyroid abnormalities and lymph nodes (supraclavicular and infraclavicular nodes and axillary also examined and noted if abn), chest exam included symmetry and effort and fremitus and percussion and auscultation, cardiac exam included rhythm and gallops and murmur and rubs and jvd and edema, abdominal exam for mass and hepatosplenomegaly and tenderness and hernias and bowel sounds, Musculoskeletal exam with muscle tone and posture and mobility/gait and  strength, and skin for rashes and cyanosis and pallor and turgor, extremity for clubbing.  Findings were normal except for pertinent findings listed below: BS bilateral expiratory wheeze, M2      Radiographs (ct chest and cxr) reviewed: view by direct vision   X-Ray Chest PA And Lateral  02/11/2022 lungs clear    CT Chest Without Contrast 11/20/2020   Stable right lung pulmonary nodules.  Mild increase in size of clustered nodules within the left upper lobe, with the distribution most compatible with an atypical infectious process.  Old granulomatous disease.  Left adrenal adenoma,  unchanged.  Chronic T8 compression fracture.    CT Chest Without Contrast 03/11/2020   1. There are two right lung pulmonary nodules.  Per LUNG-RADS scoring this would reflect a Category 3 (probably benign).  Recommendation is for 6 month follow-up LDCT.  2. Airways disease or apical typical infection in the left lower lobe.       CXR 12/19/19- clear lung fields bilaterally     Labs reviewed    Lab Results   Component Value Date    WBC 20.78 (H) 03/03/2020    RBC 4.74 03/03/2020    HGB 14.9 03/03/2020    HCT 47.0 03/03/2020    MCV 99 (H) 03/03/2020    MCH 31.4 (H) 03/03/2020    MCHC 31.7 (L) 03/03/2020    RDW 13.1 03/03/2020     03/03/2020    MPV 11.9 03/03/2020    GRAN 33.0 (L) 03/03/2020    LYMPH 64.0 (H) 03/03/2020    MONO 3.0 (L) 03/03/2020    EOS CANCELED 08/18/2017    BASO CANCELED 08/18/2017    EOSINOPHIL 0.0 03/03/2020    BASOPHIL 0.0 03/03/2020         Pathologist interpretation of peripheral blood smear:   Mild leukocytosis shows absolute and relative lymphocytosis with   cytologic atypia and scattered smudge cells.  The morphology is   concerning for lymphoproliferative disorder such as CLL.  Correlation   with flow cytometric analysis of peripheral blood is recommended for   further evaluation.     But cells appear predominantly normochromic and normocytic with mild   anisocytosis.     Platelets are morphologically unremarkable on scanning.     PFT reviewed    3/12/2020 Severe obstruction. FEV1  49 %  predicted.   Airflow is not clearly improved after bronchodilator. Clinical improvement following bronchodilator therapy may occur in the absence of spirometric improvement.   Mild Hyperinflation.   Moderate reduction in diffusion capacity - unadjusted for hemoglobin.     EPWORTH SLEEPINESS SCALE 12 5/14/2021  [facility] HST 05/27/2021 TRISTEN 4.3    Plan:  Clinical impression is resonably certain and repeated evaluation prn +/- follow up will be needed as below.    Yvette was seen today for cough and  sputum production.    Diagnoses and all orders for this visit:    Chronic obstructive pulmonary disease with acute exacerbation  -     budesonide-glycopyr-formoterol (BREZTRI AEROSPHERE) 160-9-4.8 mcg/actuation HFAA; Inhale 2 puffs into the lungs 2 (two) times a day.  -     betamethasone acetate-betamethasone sodium phosphate injection 6 mg  -     guaiFENesin-codeine 100-10 mg/5 ml (TUSSI-ORGANIDIN NR)  mg/5 mL syrup; Take 5 mLs by mouth every 4 (four) hours as needed for Cough.  -     amoxicillin-clavulanate 875-125mg (AUGMENTIN) 875-125 mg per tablet; Take 1 tablet by mouth 2 (two) times daily. for 10 days  -     predniSONE (DELTASONE) 20 MG tablet; Take 2 tablets (40 mg total) by mouth once daily. for 5 days    Chronic pain of both knees  -     ibuprofen (ADVIL,MOTRIN) 800 MG tablet; Take 1 tablet (800 mg total) by mouth 3 (three) times daily. for 7 days         Follow up in about 3 months (around 7/28/2022), or if symptoms worsen or fail to improve.    Discussed with patient above for education the following:      Patient Instructions   [employer] paper work filled out to cover BReztri instead of Advair.

## 2022-04-29 ENCOUNTER — PATIENT MESSAGE (OUTPATIENT)
Dept: PULMONOLOGY | Facility: CLINIC | Age: 62
End: 2022-04-29
Payer: MEDICAID

## 2022-04-29 RX ORDER — IBUPROFEN 800 MG/1
800 TABLET ORAL 3 TIMES DAILY
Qty: 21 TABLET | Refills: 0 | Status: SHIPPED | OUTPATIENT
Start: 2022-04-29 | End: 2022-05-06

## 2022-05-03 ENCOUNTER — OFFICE VISIT (OUTPATIENT)
Dept: FAMILY MEDICINE | Facility: CLINIC | Age: 62
End: 2022-05-03
Payer: MEDICAID

## 2022-05-03 VITALS
HEIGHT: 63 IN | SYSTOLIC BLOOD PRESSURE: 120 MMHG | TEMPERATURE: 99 F | OXYGEN SATURATION: 95 % | RESPIRATION RATE: 16 BRPM | DIASTOLIC BLOOD PRESSURE: 62 MMHG | BODY MASS INDEX: 30.16 KG/M2 | WEIGHT: 170.19 LBS | HEART RATE: 67 BPM

## 2022-05-03 DIAGNOSIS — M19.90 ARTHRITIS: Primary | ICD-10-CM

## 2022-05-03 DIAGNOSIS — E78.5 HYPERLIPIDEMIA, UNSPECIFIED HYPERLIPIDEMIA TYPE: ICD-10-CM

## 2022-05-03 PROCEDURE — 3074F PR MOST RECENT SYSTOLIC BLOOD PRESSURE < 130 MM HG: ICD-10-PCS | Mod: CPTII,,,

## 2022-05-03 PROCEDURE — 99214 OFFICE O/P EST MOD 30 MIN: CPT | Mod: S$PBB,,,

## 2022-05-03 PROCEDURE — 99214 PR OFFICE/OUTPT VISIT, EST, LEVL IV, 30-39 MIN: ICD-10-PCS | Mod: S$PBB,,,

## 2022-05-03 PROCEDURE — 1160F RVW MEDS BY RX/DR IN RCRD: CPT | Mod: CPTII,,,

## 2022-05-03 PROCEDURE — 3078F DIAST BP <80 MM HG: CPT | Mod: CPTII,,,

## 2022-05-03 PROCEDURE — 1159F PR MEDICATION LIST DOCUMENTED IN MEDICAL RECORD: ICD-10-PCS | Mod: CPTII,,,

## 2022-05-03 PROCEDURE — 1159F MED LIST DOCD IN RCRD: CPT | Mod: CPTII,,,

## 2022-05-03 PROCEDURE — 99215 OFFICE O/P EST HI 40 MIN: CPT | Mod: PBBFAC,PO

## 2022-05-03 PROCEDURE — 3078F PR MOST RECENT DIASTOLIC BLOOD PRESSURE < 80 MM HG: ICD-10-PCS | Mod: CPTII,,,

## 2022-05-03 PROCEDURE — 3074F SYST BP LT 130 MM HG: CPT | Mod: CPTII,,,

## 2022-05-03 PROCEDURE — 3008F BODY MASS INDEX DOCD: CPT | Mod: CPTII,,,

## 2022-05-03 PROCEDURE — 1160F PR REVIEW ALL MEDS BY PRESCRIBER/CLIN PHARMACIST DOCUMENTED: ICD-10-PCS | Mod: CPTII,,,

## 2022-05-03 PROCEDURE — 3008F PR BODY MASS INDEX (BMI) DOCUMENTED: ICD-10-PCS | Mod: CPTII,,,

## 2022-05-03 PROCEDURE — 99999 PR PBB SHADOW E&M-EST. PATIENT-LVL V: CPT | Mod: PBBFAC,,,

## 2022-05-03 PROCEDURE — 99999 PR PBB SHADOW E&M-EST. PATIENT-LVL V: ICD-10-PCS | Mod: PBBFAC,,,

## 2022-05-03 RX ORDER — EZETIMIBE 10 MG/1
10 TABLET ORAL DAILY
Qty: 90 TABLET | Refills: 3 | Status: SHIPPED | OUTPATIENT
Start: 2022-05-03 | End: 2022-06-20

## 2022-05-03 RX ORDER — CELECOXIB 100 MG/1
100 CAPSULE ORAL DAILY
Qty: 30 CAPSULE | Refills: 2 | Status: SHIPPED | OUTPATIENT
Start: 2022-05-03 | End: 2022-06-20

## 2022-05-03 RX ORDER — METHOCARBAMOL 500 MG/1
500 TABLET, FILM COATED ORAL 2 TIMES DAILY
Status: ON HOLD | COMMUNITY
Start: 2022-04-06 | End: 2022-10-06 | Stop reason: HOSPADM

## 2022-05-03 NOTE — PATIENT INSTRUCTIONS
Voltaren gel at [facility]    Copper infused gloves or copper bracelets       Hi Yvette,     If you are due for any health screening(s) below please notify me so we can arrange them to be ordered and scheduled to maintain your health. Most healthy patients complete it. Don't lose out on improving your health.     Health Maintenance   Topic Date Due    Mammogram  03/16/2023    Lipid Panel  03/03/2025    TETANUS VACCINE  10/26/2030    Hepatitis C Screening  Completed

## 2022-05-03 NOTE — PROGRESS NOTES
Subjective:       Patient ID: Yvette Hutchinson is a 61 y.o. female.    Chief Complaint: Hand Pain (Bilateral )    Yvette Hutchinson is a 60 yo F pt w/ Mhx listed below that presents to the clinic w/ complaints of arthritis of both of her hands that is worsening. She has been previously tried on meloxicam for this problem with minimal relief. She states that her symptoms are worse with weather changes. She was previously started on crestor for hyperlipidemia but she could not tolerate this therapy. It gave her severe diarrhea. She stopped the medication and was not started on anything else for cholesterol. She states that she wishes to start a different medication for this.       Past Medical History:   Diagnosis Date    Arthritis     Depression     GERD (gastroesophageal reflux disease)     Pneumonia of left lung due to infectious organism 3/5/2020       Review of patient's allergies indicates:  No Known Allergies      Current Outpatient Medications:     ADVAIR DISKUS 100-50 mcg/dose diskus inhaler, Inhale 1 puff into the lungs 2 (two) times a day., Disp: 60 each, Rfl: 5    albuterol sulfate (PROAIR RESPICLICK) 90 mcg/actuation inhaler, Inhale 200 puffs into the lungs every 4 (four) hours as needed (sob, cough). Rescue, Disp: 1 each, Rfl: 11    amoxicillin-clavulanate 875-125mg (AUGMENTIN) 875-125 mg per tablet, Take 1 tablet by mouth 2 (two) times daily. for 10 days, Disp: 20 tablet, Rfl: 1    budesonide-glycopyr-formoterol (BREZTRI AEROSPHERE) 160-9-4.8 mcg/actuation HFAA, Inhale 2 puffs into the lungs 2 (two) times a day., Disp: 10.7 g, Rfl: 11    buPROPion (WELLBUTRIN XL) 300 MG 24 hr tablet, Take 1 tablet (300 mg total) by mouth once daily., Disp: 30 tablet, Rfl: 2    FLUoxetine 20 MG capsule, Take 1 capsule (20 mg total) by mouth once daily., Disp: 90 capsule, Rfl: 3    fluticasone propionate (FLONASE) 50 mcg/actuation nasal spray, 2 sprays (100 mcg total) by Each Nostril route once daily.,  Disp: 16 g, Rfl: 3    gabapentin (NEURONTIN) 300 MG capsule, Take 1 capsule (300 mg total) by mouth 3 (three) times daily., Disp: 90 capsule, Rfl: 11    guaiFENesin-codeine 100-10 mg/5 ml (TUSSI-ORGANIDIN NR)  mg/5 mL syrup, TAKE 5 MLS BY MOUTH EVERY 4 HOURS AS NEEDED FOR COUGH OR CONGESTION, Disp: , Rfl:     guaiFENesin-codeine 100-10 mg/5 ml (TUSSI-ORGANIDIN NR)  mg/5 mL syrup, Take 5 mLs by mouth every 4 (four) hours as needed for Cough., Disp: 210 mL, Rfl: 0    hydrOXYzine (ATARAX) 50 MG tablet, TAKE 2 TABLETS(100 MG) BY MOUTH FOUR TIMES DAILY AS NEEDED FOR ANXIETY, Disp: 360 tablet, Rfl: 0    ibuprofen (ADVIL,MOTRIN) 800 MG tablet, Take 1 tablet (800 mg total) by mouth 3 (three) times daily. for 7 days, Disp: 21 tablet, Rfl: 0    levocetirizine (XYZAL) 5 MG tablet, Take 1 tablet (5 mg total) by mouth every evening., Disp: 30 tablet, Rfl: 5    methocarbamoL (ROBAXIN) 500 MG Tab, Take 500 mg by mouth 2 (two) times daily., Disp: , Rfl:     predniSONE (DELTASONE) 20 MG tablet, Take 2 tablets (40 mg total) by mouth once daily. for 5 days, Disp: 20 tablet, Rfl: 1    albuterol-ipratropium (DUO-NEB) 2.5 mg-0.5 mg/3 mL nebulizer solution, Take 3 mLs by nebulization every 6 (six) hours as needed for Wheezing. Rescue, Disp: 120 vial, Rfl: 5    celecoxib (CELEBREX) 100 MG capsule, Take 1 capsule (100 mg total) by mouth once daily., Disp: 30 capsule, Rfl: 2    ezetimibe (ZETIA) 10 mg tablet, Take 1 tablet (10 mg total) by mouth once daily., Disp: 90 tablet, Rfl: 3    rosuvastatin (CRESTOR) 10 MG tablet, Take 1 tablet (10 mg total) by mouth once daily. (Patient not taking: No sig reported), Disp: 90 tablet, Rfl: 3    Review of Systems   Constitutional: Negative for activity change, appetite change, chills, diaphoresis, fatigue, fever and unexpected weight change.   HENT: Negative for congestion, ear pain, postnasal drip, rhinorrhea, sinus pressure, sneezing, sore throat and trouble swallowing.   "  Eyes: Negative for pain, itching and visual disturbance.   Respiratory: Negative for cough, chest tightness, shortness of breath and wheezing.    Cardiovascular: Negative for chest pain, palpitations and leg swelling.   Gastrointestinal: Negative for abdominal distention, abdominal pain, blood in stool, constipation, diarrhea, nausea and vomiting.   Endocrine: Negative for cold intolerance and heat intolerance.   Genitourinary: Negative for difficulty urinating, dysuria, frequency, hematuria and urgency.   Musculoskeletal: Positive for arthralgias. Negative for back pain, myalgias and neck pain.   Skin: Negative for color change, pallor, rash and wound.   Neurological: Negative for dizziness, syncope, weakness, numbness and headaches.   Hematological: Negative for adenopathy.   Psychiatric/Behavioral: Negative for behavioral problems. The patient is not nervous/anxious.        Objective:      /62 (BP Location: Right arm, Patient Position: Sitting, BP Method: Medium (Manual))   Pulse 67   Temp 98.6 °F (37 °C) (Oral)   Resp 16   Ht 5' 3" (1.6 m)   Wt 77.2 kg (170 lb 3.1 oz)   LMP 01/13/2010 (Approximate)   SpO2 95%   BMI 30.15 kg/m²   Physical Exam  Vitals reviewed.   Constitutional:       General: She is not in acute distress.     Appearance: Normal appearance. She is obese. She is not ill-appearing, toxic-appearing or diaphoretic.   HENT:      Head: Normocephalic.      Right Ear: External ear normal.      Left Ear: External ear normal.      Nose: Nose normal. No congestion or rhinorrhea.      Mouth/Throat:      Mouth: Mucous membranes are moist.      Pharynx: Oropharynx is clear.   Eyes:      General: No scleral icterus.        Right eye: No discharge.         Left eye: No discharge.      Extraocular Movements: Extraocular movements intact.      Conjunctiva/sclera: Conjunctivae normal.   Cardiovascular:      Rate and Rhythm: Normal rate and regular rhythm.      Pulses: Normal pulses.      Heart " sounds: Normal heart sounds. No murmur heard.    No friction rub. No gallop.   Pulmonary:      Effort: Pulmonary effort is normal. No respiratory distress.      Breath sounds: Normal breath sounds. No wheezing, rhonchi or rales.   Chest:      Chest wall: No tenderness.   Abdominal:      General: There is no distension.      Palpations: Abdomen is soft. There is no mass.      Tenderness: There is no abdominal tenderness. There is no guarding.   Musculoskeletal:         General: Deformity (Arthritic changes to hands) present. No swelling, tenderness or signs of injury. Normal range of motion.      Cervical back: Normal range of motion.      Right lower leg: No edema.      Left lower leg: No edema.   Skin:     General: Skin is warm and dry.      Capillary Refill: Capillary refill takes less than 2 seconds.      Coloration: Skin is not jaundiced.      Findings: No bruising, erythema, lesion or rash.   Neurological:      Mental Status: She is alert and oriented to person, place, and time.      Gait: Gait normal.   Psychiatric:         Mood and Affect: Mood normal.         Behavior: Behavior normal.         Thought Content: Thought content normal.         Judgment: Judgment normal.         Assessment:       1. Arthritis    2. Hyperlipidemia, unspecified hyperlipidemia type        Plan:       Arthritis  -     celecoxib (CELEBREX) 100 MG capsule; Take 1 capsule (100 mg total) by mouth once daily.  Dispense: 30 capsule; Refill: 2        -     Copper glove/ bracelets.         -     Voltaren gel for spot treatment PRN.     Hyperlipidemia, unspecified hyperlipidemia type  -     ezetimibe (ZETIA) 10 mg tablet; Take 1 tablet (10 mg total) by mouth once daily.  Dispense: 90 tablet; Refill: 3         -    Healthy diet and exercise.         Patient is scheduled to follow up w/ me routinely in August.        Herbert Price PA-C  Family Medicine Physician Assistant       I spent a total of 20 minutes on the day of the visit.This  includes face to face time and non-face to face time preparing to see the patient (eg, review of tests), obtaining and/or reviewing separately obtained history, documenting clinical information in the electronic or other health record, independently interpreting results and communicating results to the patient/family/caregiver, or care coordinator.      We have addressed [4] Moderate: 1 or more chronic illnesses with exacerbation, progression, or side effects of treatment / 2 or more stable chronic illnesses / 1 undiagnosed new problem with uncertain prognosis / 1 acute illness with systemic symptoms / 1 acute complicated injury  The complexity of the data reviewed and analyzed for this visit was [2] Minimal or None  The risk of complications and/or morbidity or mortality are [4] Moderate risk (I.e. prescription drug management / decision regarding minor surgery with identified pt or procedure risk factors / decision regarding elective major surgery without identified pt or procedure risk factors / diagnosis or treatment significantly limited by social determinants of health)   The level of Medical Decision Making for this visit is [4] Moderate

## 2022-05-04 ENCOUNTER — TELEPHONE (OUTPATIENT)
Dept: FAMILY MEDICINE | Facility: CLINIC | Age: 62
End: 2022-05-04
Payer: MEDICAID

## 2022-05-04 DIAGNOSIS — M19.90 ARTHRITIS: Primary | ICD-10-CM

## 2022-05-04 RX ORDER — INDOMETHACIN 50 MG/1
50 CAPSULE ORAL 3 TIMES DAILY PRN
Qty: 90 CAPSULE | Refills: 0 | Status: SHIPPED | OUTPATIENT
Start: 2022-05-04 | End: 2022-06-20

## 2022-05-04 NOTE — TELEPHONE ENCOUNTER
Alterative medication sent. This one is different from meloxicam as it is a ROBERTO 1 inhibitor. I am hoping this will help with her symptoms.

## 2022-06-13 ENCOUNTER — LAB VISIT (OUTPATIENT)
Dept: LAB | Facility: HOSPITAL | Age: 62
End: 2022-06-13
Attending: INTERNAL MEDICINE
Payer: MEDICAID

## 2022-06-13 DIAGNOSIS — C91.10 CLL (CHRONIC LYMPHOCYTIC LEUKEMIA): ICD-10-CM

## 2022-06-13 LAB
ALBUMIN SERPL BCP-MCNC: 3.9 G/DL (ref 3.5–5.2)
ALP SERPL-CCNC: 116 U/L (ref 55–135)
ALT SERPL W/O P-5'-P-CCNC: 19 U/L (ref 10–44)
ANION GAP SERPL CALC-SCNC: 9 MMOL/L (ref 8–16)
AST SERPL-CCNC: 19 U/L (ref 10–40)
BASOPHILS NFR BLD: 0 % (ref 0–1.9)
BILIRUB SERPL-MCNC: 0.4 MG/DL (ref 0.1–1)
BUN SERPL-MCNC: 11 MG/DL (ref 8–23)
CALCIUM SERPL-MCNC: 9 MG/DL (ref 8.7–10.5)
CHLORIDE SERPL-SCNC: 109 MMOL/L (ref 95–110)
CO2 SERPL-SCNC: 23 MMOL/L (ref 23–29)
CREAT SERPL-MCNC: 0.9 MG/DL (ref 0.5–1.4)
DIFFERENTIAL METHOD: ABNORMAL
EOSINOPHIL NFR BLD: 2 % (ref 0–8)
ERYTHROCYTE [DISTWIDTH] IN BLOOD BY AUTOMATED COUNT: 13.6 % (ref 11.5–14.5)
EST. GFR  (AFRICAN AMERICAN): >60 ML/MIN/1.73 M^2
EST. GFR  (NON AFRICAN AMERICAN): >60 ML/MIN/1.73 M^2
GLUCOSE SERPL-MCNC: 84 MG/DL (ref 70–110)
HCT VFR BLD AUTO: 42.4 % (ref 37–48.5)
HGB BLD-MCNC: 14.3 G/DL (ref 12–16)
IMM GRANULOCYTES # BLD AUTO: ABNORMAL K/UL (ref 0–0.04)
IMM GRANULOCYTES NFR BLD AUTO: ABNORMAL % (ref 0–0.5)
LYMPHOCYTES NFR BLD: 83 % (ref 18–48)
MCH RBC QN AUTO: 31.4 PG (ref 27–31)
MCHC RBC AUTO-ENTMCNC: 33.7 G/DL (ref 32–36)
MCV RBC AUTO: 93 FL (ref 82–98)
MONOCYTES NFR BLD: 4 % (ref 4–15)
NEUTROPHILS NFR BLD: 11 % (ref 38–73)
NRBC BLD-RTO: 0 /100 WBC
PLATELET # BLD AUTO: 312 K/UL (ref 150–450)
PLATELET BLD QL SMEAR: ABNORMAL
PMV BLD AUTO: 9.9 FL (ref 9.2–12.9)
POTASSIUM SERPL-SCNC: 4.2 MMOL/L (ref 3.5–5.1)
PROT SERPL-MCNC: 6.6 G/DL (ref 6–8.4)
RBC # BLD AUTO: 4.55 M/UL (ref 4–5.4)
SODIUM SERPL-SCNC: 141 MMOL/L (ref 136–145)
WBC # BLD AUTO: 38.86 K/UL (ref 3.9–12.7)

## 2022-06-13 PROCEDURE — 36415 COLL VENOUS BLD VENIPUNCTURE: CPT | Performed by: INTERNAL MEDICINE

## 2022-06-13 PROCEDURE — 85060 BLOOD SMEAR INTERPRETATION: CPT | Mod: ,,, | Performed by: PATHOLOGY

## 2022-06-13 PROCEDURE — 85027 COMPLETE CBC AUTOMATED: CPT | Performed by: INTERNAL MEDICINE

## 2022-06-13 PROCEDURE — 85007 BL SMEAR W/DIFF WBC COUNT: CPT | Performed by: INTERNAL MEDICINE

## 2022-06-13 PROCEDURE — 85060 PATHOLOGIST REVIEW: ICD-10-PCS | Mod: ,,, | Performed by: PATHOLOGY

## 2022-06-13 PROCEDURE — 80053 COMPREHEN METABOLIC PANEL: CPT | Performed by: INTERNAL MEDICINE

## 2022-06-14 LAB — PATH REV BLD -IMP: NORMAL

## 2022-06-15 ENCOUNTER — OFFICE VISIT (OUTPATIENT)
Dept: HEMATOLOGY/ONCOLOGY | Facility: CLINIC | Age: 62
End: 2022-06-15
Payer: MEDICAID

## 2022-06-15 DIAGNOSIS — E53.8 B12 DEFICIENCY: ICD-10-CM

## 2022-06-15 DIAGNOSIS — C91.10 CLL (CHRONIC LYMPHOCYTIC LEUKEMIA): Primary | ICD-10-CM

## 2022-06-15 PROCEDURE — 99214 PR OFFICE/OUTPT VISIT, EST, LEVL IV, 30-39 MIN: ICD-10-PCS | Mod: 95,,, | Performed by: INTERNAL MEDICINE

## 2022-06-15 PROCEDURE — 99214 OFFICE O/P EST MOD 30 MIN: CPT | Mod: 95,,, | Performed by: INTERNAL MEDICINE

## 2022-06-15 NOTE — PROGRESS NOTES
The patient location is: home  Visit type: Virtual visit with synchronous audio and video  Face-to-face or time spent with patient on the encounter:20 min  Total time spent on and for  this encounter which includes non face-to-face time preparing to see patient, review of tests, obtaining and or reviewing separately obtained records documenting clinical information in the electronic or other health records, independently interpreting results which is not separately reported ,and communicating results to the patient/family/caregiver and in care coordination and treatment planning/communicating with pharmacy for prescriptions/addressing social needs/arranging follow-up and or referrals :25 min    Each patient I provide medical services by telemedicine is:  (1) informed of the relationship between the physician and patient and the respective role of any other health care provider with respect to management of the patient; and (2) notified that he or she may decline to receive medical services by telemedicine and may withdraw from such care at any time.  This is a video visit therefore some elements of the physical exam such as vital signs, heart sounds are breath sounds are not included and may be included if found in recent clinic notes of other providers assessing same patient. Any symptoms or signs that were visualized were stated by the patient may be included in this note.      Subjective:       Patient ID: Yvette Hutchinson is a 61 y.o. female.    Chief Complaint:  Sent in consultation for evaluation of lymphocytosis which has been documented for at least 3 years since 2017 in our system diagnosed with CLL  Virtual visit for follow-up  Follow-up      Patient has chronic complains of chronic pain syndrome and COPD for which periodically she take steroids she is also on BuSpar has issues anxiety has required dilatation of esophageal strictures allergic rhinitis insomnia and history of B12 deficiency  documented  Social history; patient is a smoker denies recreational alcohol use or drug use   Patient is currently on disability  She is allergic to the mask used during COVID pandemic she also Broussard Breath with her COPD    Family history suggestive of ovarian and breast cancer patient advised to see a  or seek   Review of patient's allergies indicates:  No Known Allergies  Medications have been reviewed  Current Outpatient Medications on File Prior to Visit   Medication Sig Dispense Refill    ADVAIR DISKUS 100-50 mcg/dose diskus inhaler Inhale 1 puff into the lungs 2 (two) times a day. 60 each 5    albuterol sulfate (PROAIR RESPICLICK) 90 mcg/actuation inhaler Inhale 200 puffs into the lungs every 4 (four) hours as needed (sob, cough). Rescue 1 each 11    albuterol-ipratropium (DUO-NEB) 2.5 mg-0.5 mg/3 mL nebulizer solution Take 3 mLs by nebulization every 6 (six) hours as needed for Wheezing. Rescue 120 vial 5    budesonide-glycopyr-formoterol (BREZTRI AEROSPHERE) 160-9-4.8 mcg/actuation HFAA Inhale 2 puffs into the lungs 2 (two) times a day. 10.7 g 11    buPROPion (WELLBUTRIN XL) 300 MG 24 hr tablet TAKE 1 TABLET(300 MG) BY MOUTH EVERY DAY 90 tablet 0    celecoxib (CELEBREX) 100 MG capsule Take 1 capsule (100 mg total) by mouth once daily. 30 capsule 2    ezetimibe (ZETIA) 10 mg tablet Take 1 tablet (10 mg total) by mouth once daily. 90 tablet 3    FLUoxetine 20 MG capsule Take 1 capsule (20 mg total) by mouth once daily. 90 capsule 3    fluticasone propionate (FLONASE) 50 mcg/actuation nasal spray 2 sprays (100 mcg total) by Each Nostril route once daily. 16 g 3    gabapentin (NEURONTIN) 300 MG capsule Take 1 capsule (300 mg total) by mouth 3 (three) times daily. 90 capsule 11    guaiFENesin-codeine 100-10 mg/5 ml (TUSSI-ORGANIDIN NR)  mg/5 mL syrup TAKE 5 MLS BY MOUTH EVERY 4 HOURS AS NEEDED FOR COUGH OR CONGESTION      hydrOXYzine (ATARAX) 50 MG tablet TAKE 2 TABLETS(100 MG)  BY MOUTH FOUR TIMES DAILY AS NEEDED FOR ANXIETY 360 tablet 0    indomethacin (INDOCIN) 50 MG capsule Take 1 capsule (50 mg total) by mouth 3 (three) times daily as needed (pain. Take with meals.). 90 capsule 0    levocetirizine (XYZAL) 5 MG tablet Take 1 tablet (5 mg total) by mouth every evening. 30 tablet 5    methocarbamoL (ROBAXIN) 500 MG Tab Take 500 mg by mouth 2 (two) times daily.      rosuvastatin (CRESTOR) 10 MG tablet Take 1 tablet (10 mg total) by mouth once daily. (Patient not taking: No sig reported) 90 tablet 3     No current facility-administered medications on file prior to visit.       REVIEW OF SYSTEMS:     CONSTITUTIONAL: The patient denies any weight change. There is no apparent    change in appetite, fever, night sweats, headaches, vision fatigued, no dizziness, on and off extreme   weakness.      SKIN: Denies rash, issues with nails, non-healing sores, bleeding, blotching    skin or abnormal bruising. Denies new moles or changes to existing moles.      BREASTS: There is no swelling around breasts or nipple discharge.    EYES: Denies eye pain, blurred vision, swelling, redness or discharge.      ENT AND MOUTH: Denies runny nose, stuffiness, sinus trouble or sores. Denies    nosebleeds. Denies, hoarseness, change in voice or swelling in front of the    neck.      CARDIOVASCULAR: Denies chest pain, discomfort or palpitations. Denies neck    swelling or episodes of passing out.      RESPIRATORY:  Reports smoker's cough, requiring steroids on and off for COPD exacerbations follows with PCP    GI: Denies trouble swallowing, indigestion, heartburn, abdominal pain, nausea,    vomiting, diarrhea, altered bowel habits, blood in stool, discoloration of    stools, change in nature of stool, bloating, increased abdominal girth.      GENITOURINARY: No discharge. No pelvic pain or lumps. No rash around groin or  lesions. No urinary frequency, hesitation, painful urination or blood in    urine. Denies  incontinence. No problems with intercourse.      MUSCULOSKELETAL: Denies neck or back pain. Denies weakness in arms or legs,    joint problems or distended inflamed veins in legs. Denies swelling or abnormal  glands.      NEUROLOGICAL: Denies tingling, numbness, altered mentation changes to nerve    function in the face, weakness to one or both of the body. Denies changes to    gait and denies multiple falls or accidents.      PSYCHIATRIC: Denies nervousness, anxiety, hallucinations, depression, suicidal    ideation, trouble sleeping or changes in behavior noticed by family.      The patient denies recent foreign travel or recent exposure to chemicals or    products of concern or infectious diseases.   P?E ,  Wt Readings from Last 3 Encounters:   05/03/22 77.2 kg (170 lb 3.1 oz)   04/28/22 75 kg (165 lb 5.5 oz)   02/11/22 77.5 kg (170 lb 13.7 oz)     Temp Readings from Last 3 Encounters:   05/03/22 98.6 °F (37 °C) (Oral)   02/01/22 96.7 °F (35.9 °C) (Temporal)   09/17/21 97.4 °F (36.3 °C) (Temporal)     BP Readings from Last 3 Encounters:   05/03/22 120/62   04/28/22 92/61   02/11/22 118/62     Pulse Readings from Last 3 Encounters:   05/03/22 67   04/28/22 60   02/11/22 75     VITAL SIGNS:  as above   GENERAL: appears well-built, well-nourished.  No anxiety, no agitation, and in no distress.  Patient is awake, alert, oriented and cooperative.  HEENT:  Showed no congestion. Trachea is central no obvious icterus or pallor noted no hoarseness. no obvious JVD   NECK:  Supple.  No JVD. No obvious cervical submental or supraclavicular adenopathy.  RS:the visualized portion of  Chest expands well. chest appears symmetric, no audible wheezes.  No dyspnea recognized  ABDOMEN:  abdomen appears undistended.  EXTREMITIES:  Without edema.  NEUROLOGICAL:  The patient is appropriate, higher functions are normal.  No  obvious neurological deficits.  normal judgement normal thought content  No confusion, no speech impediment.  Cranial nerves are intact and show no deficit. No gross motor deficits noted   SKIN MUSCULOSKELETAL: no joint or skeletal deformity, no clubbing of nails.  No visible rash ecchymosis or petechiae  Lab Results   Component Value Date    WBC 20.78 (H) 03/03/2020    HGB 14.9 03/03/2020    HCT 47.0 03/03/2020    MCV 99 (H) 03/03/2020     03/03/2020     Smudge cells presnt  CMP  Sodium   Date Value Ref Range Status   06/13/2022 141 136 - 145 mmol/L Final     Potassium   Date Value Ref Range Status   06/13/2022 4.2 3.5 - 5.1 mmol/L Final     Chloride   Date Value Ref Range Status   06/13/2022 109 95 - 110 mmol/L Final     CO2   Date Value Ref Range Status   06/13/2022 23 23 - 29 mmol/L Final     Glucose   Date Value Ref Range Status   06/13/2022 84 70 - 110 mg/dL Final     BUN   Date Value Ref Range Status   06/13/2022 11 8 - 23 mg/dL Final     Creatinine   Date Value Ref Range Status   06/13/2022 0.9 0.5 - 1.4 mg/dL Final     Calcium   Date Value Ref Range Status   06/13/2022 9.0 8.7 - 10.5 mg/dL Final     Total Protein   Date Value Ref Range Status   06/13/2022 6.6 6.0 - 8.4 g/dL Final     Albumin   Date Value Ref Range Status   06/13/2022 3.9 3.5 - 5.2 g/dL Final     Total Bilirubin   Date Value Ref Range Status   06/13/2022 0.4 0.1 - 1.0 mg/dL Final     Comment:     For infants and newborns, interpretation of results should be based  on gestational age, weight and in agreement with clinical  observations.    Premature Infant recommended reference ranges:  Up to 24 hours.............<8.0 mg/dL  Up to 48 hours............<12.0 mg/dL  3-5 days..................<15.0 mg/dL  6-29 days.................<15.0 mg/dL       Alkaline Phosphatase   Date Value Ref Range Status   06/13/2022 116 55 - 135 U/L Final     AST   Date Value Ref Range Status   06/13/2022 19 10 - 40 U/L Final     ALT   Date Value Ref Range Status   06/13/2022 19 10 - 44 U/L Final     Anion Gap   Date Value Ref Range Status   06/13/2022 9 8 - 16 mmol/L  Final     eGFR if    Date Value Ref Range Status   06/13/2022 >60 >60 mL/min/1.73 m^2 Final     eGFR if non    Date Value Ref Range Status   06/13/2022 >60 >60 mL/min/1.73 m^2 Final     Comment:     Calculation used to obtain the estimated glomerular filtration  rate (eGFR) is the CKD-EPI equation.            2wk ago    Blood Interpretation A B cell lymphoproliferative disorder is present. see comment.    Comment: Interpreted by: Jeremias Sosa M.D., Signed on 08/06/2020 at 13:07   Blood Comment Flow cytometric analysis of  peripheral blood  detects a lambda  light chain   restricted B lymphocyte population showing expression of CD19 and dim CD20   with aberrant expression of CD5 (dim).  T lymphocytes are   immunophenotypically unremarkable.  The blasts gate is not increased.  The   findings are consistent with patient history of chronic lymphocytic   leukemia/small lymphocytic lymphoma.   Flow differential:  Lymphocytes 69.6%, Monocytes 2.8%, Granulocytes  27.5%,   Blast  0.1%, Debris/nRBC 0.1%,  Viability 97.5%.          Assessment:     CLL  Lymphocytosis over 3 years leukocytosis and smudge cells suggestive of CLL stage 0 no anemia no thrombocytopenia no generalized lymphadenopathy     Plan:         CLL stage 0 will confirmed with flow cytometry    4 months rtc   she has no other cytopenias or lymphadenopathy or B symptoms patient can be observed  Patient also states her dermatologist told her she was allergic to the mask use and she also has COPD that makes a feels smothered during mask use this might hinder finding a job suitable I have directed her to discuss with the PCP regarding letters to support inability to use mask    Continue with chronic pain syndrome and COPD management advised to stop smoking if at all possible      Advised COVID precaution due to her lung situation she will be a high risk patient

## 2022-06-20 ENCOUNTER — OFFICE VISIT (OUTPATIENT)
Dept: URGENT CARE | Facility: CLINIC | Age: 62
End: 2022-06-20
Payer: MEDICAID

## 2022-06-20 ENCOUNTER — TELEPHONE (OUTPATIENT)
Dept: FAMILY MEDICINE | Facility: CLINIC | Age: 62
End: 2022-06-20
Payer: MEDICAID

## 2022-06-20 VITALS
HEIGHT: 63 IN | TEMPERATURE: 99 F | HEART RATE: 61 BPM | BODY MASS INDEX: 29.59 KG/M2 | SYSTOLIC BLOOD PRESSURE: 110 MMHG | DIASTOLIC BLOOD PRESSURE: 69 MMHG | WEIGHT: 167 LBS | OXYGEN SATURATION: 94 % | RESPIRATION RATE: 16 BRPM

## 2022-06-20 DIAGNOSIS — H92.01 RIGHT EAR PAIN: ICD-10-CM

## 2022-06-20 DIAGNOSIS — J02.9 PHARYNGITIS, UNSPECIFIED ETIOLOGY: Primary | ICD-10-CM

## 2022-06-20 DIAGNOSIS — B37.31 VAGINAL YEAST INFECTION: ICD-10-CM

## 2022-06-20 DIAGNOSIS — J44.9 CHRONIC OBSTRUCTIVE PULMONARY DISEASE, UNSPECIFIED COPD TYPE: ICD-10-CM

## 2022-06-20 PROCEDURE — 3074F SYST BP LT 130 MM HG: CPT | Mod: CPTII,S$GLB,, | Performed by: NURSE PRACTITIONER

## 2022-06-20 PROCEDURE — 1160F RVW MEDS BY RX/DR IN RCRD: CPT | Mod: CPTII,S$GLB,, | Performed by: NURSE PRACTITIONER

## 2022-06-20 PROCEDURE — 3078F PR MOST RECENT DIASTOLIC BLOOD PRESSURE < 80 MM HG: ICD-10-PCS | Mod: CPTII,S$GLB,, | Performed by: NURSE PRACTITIONER

## 2022-06-20 PROCEDURE — 3074F PR MOST RECENT SYSTOLIC BLOOD PRESSURE < 130 MM HG: ICD-10-PCS | Mod: CPTII,S$GLB,, | Performed by: NURSE PRACTITIONER

## 2022-06-20 PROCEDURE — 1159F MED LIST DOCD IN RCRD: CPT | Mod: CPTII,S$GLB,, | Performed by: NURSE PRACTITIONER

## 2022-06-20 PROCEDURE — 1160F PR REVIEW ALL MEDS BY PRESCRIBER/CLIN PHARMACIST DOCUMENTED: ICD-10-PCS | Mod: CPTII,S$GLB,, | Performed by: NURSE PRACTITIONER

## 2022-06-20 PROCEDURE — 99213 OFFICE O/P EST LOW 20 MIN: CPT | Mod: S$GLB,,, | Performed by: NURSE PRACTITIONER

## 2022-06-20 PROCEDURE — 3008F BODY MASS INDEX DOCD: CPT | Mod: CPTII,S$GLB,, | Performed by: NURSE PRACTITIONER

## 2022-06-20 PROCEDURE — 3078F DIAST BP <80 MM HG: CPT | Mod: CPTII,S$GLB,, | Performed by: NURSE PRACTITIONER

## 2022-06-20 PROCEDURE — 3008F PR BODY MASS INDEX (BMI) DOCUMENTED: ICD-10-PCS | Mod: CPTII,S$GLB,, | Performed by: NURSE PRACTITIONER

## 2022-06-20 PROCEDURE — 99213 PR OFFICE/OUTPT VISIT, EST, LEVL III, 20-29 MIN: ICD-10-PCS | Mod: S$GLB,,, | Performed by: NURSE PRACTITIONER

## 2022-06-20 PROCEDURE — 1159F PR MEDICATION LIST DOCUMENTED IN MEDICAL RECORD: ICD-10-PCS | Mod: CPTII,S$GLB,, | Performed by: NURSE PRACTITIONER

## 2022-06-20 RX ORDER — AMOXICILLIN AND CLAVULANATE POTASSIUM 875; 125 MG/1; MG/1
1 TABLET, FILM COATED ORAL EVERY 12 HOURS
Qty: 20 TABLET | Refills: 0 | Status: SHIPPED | OUTPATIENT
Start: 2022-06-20 | End: 2022-06-30

## 2022-06-20 RX ORDER — FLUCONAZOLE 100 MG/1
100 TABLET ORAL EVERY OTHER DAY
Qty: 3 TABLET | Refills: 0 | Status: ON HOLD | OUTPATIENT
Start: 2022-06-20 | End: 2022-11-03

## 2022-06-20 NOTE — PROGRESS NOTES
"Subjective:       Patient ID: Yvette Hutchinson is a 61 y.o. female.    Vitals:  height is 5' 3" (1.6 m) and weight is 75.8 kg (167 lb). Her temperature is 98.5 °F (36.9 °C). Her blood pressure is 110/69 and her pulse is 61. Her respiration is 16 and oxygen saturation is 94% (abnormal).     Chief Complaint: Sore Throat    feels terrible Right side of her throat hurts sees white stuff back there, tired, and body aches, hurts to swallow, headache, ear pain mainly right . X 2.5 weeks  Patient went to the dr before the 6/4/2022 for a copd flareup and was prescribed antibiotics and steroids.       HENT: Positive for ear pain, sore throat and trouble swallowing.        Objective:      Physical Exam   Constitutional: She is oriented to person, place, and time.   HENT:   Head: Normocephalic and atraumatic.   Ears:   Right Ear: Hearing, tympanic membrane and external ear normal. There is tenderness. No drainage or swelling.   Left Ear: Hearing, tympanic membrane, external ear and ear canal normal.   Nose: Nose normal.   Mouth/Throat: Uvula is midline. Oropharyngeal exudate and posterior oropharyngeal erythema present.   Eyes: Conjunctivae are normal.   Neck: Neck supple.   Cardiovascular: Normal rate, regular rhythm, normal heart sounds and normal pulses.   Pulmonary/Chest: Effort normal and breath sounds normal.   Abdominal: Normal appearance. Soft.   Musculoskeletal: Normal range of motion.         General: Normal range of motion.   Neurological: She is alert and oriented to person, place, and time.   Skin: Skin is warm and dry. Capillary refill takes 2 to 3 seconds.   Psychiatric: Her behavior is normal. Mood normal.   Nursing note and vitals reviewed.        Assessment:       1. Pharyngitis, unspecified etiology    2. Chronic obstructive pulmonary disease, unspecified COPD type    3. Vaginal yeast infection    4. Right ear pain          Plan:         Pharyngitis, unspecified etiology  -     amoxicillin-clavulanate " 875-125mg (AUGMENTIN) 875-125 mg per tablet; Take 1 tablet by mouth every 12 (twelve) hours. for 10 days  Dispense: 20 tablet; Refill: 0    Chronic obstructive pulmonary disease, unspecified COPD type    Vaginal yeast infection  -     fluconazole (DIFLUCAN) 100 MG tablet; Take 1 tablet (100 mg total) by mouth every other day.  Dispense: 3 tablet; Refill: 0    Right ear pain    Augmentin 875mg twice daily with food x 10 days  Fluconazole 100mg every other day   Continue otc medications for symptom control  Follow up if symptoms persist or worsen

## 2022-06-20 NOTE — PATIENT INSTRUCTIONS
Augmentin 875mg twice daily with food x 10 days  Fluconazole 100mg every other day   Follow up if symptoms persist or worsen

## 2022-06-20 NOTE — TELEPHONE ENCOUNTER
----- Message from Jemima Damian sent at 6/20/2022  3:52 PM CDT -----  Type:  Sooner Apoointment Request    Caller is requesting a sooner appointment.  Caller declined first available appointment listed below.  Caller will not accept being placed on the waitlist and is requesting a message be sent to doctor.    Name of Caller:pt    When is the first available appointment?06/23    Symptoms: sore throat, cough    Best Call Back [phone]

## 2022-06-20 NOTE — TELEPHONE ENCOUNTER
Patient reports experiencing sore throat, cough, and difficulty swallowing. Requesting same day appointment. First available in [place], [place], and [facility] is 6-22-22. Recommended for patient to be seen at urgent care of choice today. Patient verbalized understanding.

## 2022-07-19 DIAGNOSIS — R09.89 CHRONIC SINUS COMPLAINTS: ICD-10-CM

## 2022-07-19 RX ORDER — FLUTICASONE PROPIONATE 50 MCG
1 SPRAY, SUSPENSION (ML) NASAL DAILY
Qty: 16 G | Refills: 3 | Status: SHIPPED | OUTPATIENT
Start: 2022-07-19

## 2022-07-19 RX ORDER — FLUTICASONE PROPIONATE 50 MCG
2 SPRAY, SUSPENSION (ML) NASAL DAILY
Qty: 16 G | Refills: 3 | Status: CANCELLED | OUTPATIENT
Start: 2022-07-19

## 2022-07-21 ENCOUNTER — OFFICE VISIT (OUTPATIENT)
Dept: PULMONOLOGY | Facility: CLINIC | Age: 62
End: 2022-07-21
Payer: MEDICAID

## 2022-07-21 VITALS
WEIGHT: 164.69 LBS | HEIGHT: 63 IN | HEART RATE: 61 BPM | OXYGEN SATURATION: 94 % | DIASTOLIC BLOOD PRESSURE: 70 MMHG | BODY MASS INDEX: 29.18 KG/M2 | SYSTOLIC BLOOD PRESSURE: 110 MMHG

## 2022-07-21 DIAGNOSIS — J44.1 CHRONIC OBSTRUCTIVE PULMONARY DISEASE WITH ACUTE EXACERBATION: ICD-10-CM

## 2022-07-21 DIAGNOSIS — R40.0 DAYTIME SOMNOLENCE: ICD-10-CM

## 2022-07-21 DIAGNOSIS — R06.02 SHORTNESS OF BREATH: ICD-10-CM

## 2022-07-21 DIAGNOSIS — G47.30 SLEEP APNEA, UNSPECIFIED TYPE: ICD-10-CM

## 2022-07-21 DIAGNOSIS — R05.9 COUGH: Primary | ICD-10-CM

## 2022-07-21 DIAGNOSIS — R91.8 LUNG NODULES: ICD-10-CM

## 2022-07-21 PROCEDURE — 99214 PR OFFICE/OUTPT VISIT, EST, LEVL IV, 30-39 MIN: ICD-10-PCS | Mod: S$PBB,,, | Performed by: NURSE PRACTITIONER

## 2022-07-21 PROCEDURE — 1159F PR MEDICATION LIST DOCUMENTED IN MEDICAL RECORD: ICD-10-PCS | Mod: CPTII,,, | Performed by: NURSE PRACTITIONER

## 2022-07-21 PROCEDURE — 3074F PR MOST RECENT SYSTOLIC BLOOD PRESSURE < 130 MM HG: ICD-10-PCS | Mod: CPTII,,, | Performed by: NURSE PRACTITIONER

## 2022-07-21 PROCEDURE — 3008F BODY MASS INDEX DOCD: CPT | Mod: CPTII,,, | Performed by: NURSE PRACTITIONER

## 2022-07-21 PROCEDURE — 3078F PR MOST RECENT DIASTOLIC BLOOD PRESSURE < 80 MM HG: ICD-10-PCS | Mod: CPTII,,, | Performed by: NURSE PRACTITIONER

## 2022-07-21 PROCEDURE — 99213 OFFICE O/P EST LOW 20 MIN: CPT | Mod: PBBFAC,PO | Performed by: NURSE PRACTITIONER

## 2022-07-21 PROCEDURE — 3008F PR BODY MASS INDEX (BMI) DOCUMENTED: ICD-10-PCS | Mod: CPTII,,, | Performed by: NURSE PRACTITIONER

## 2022-07-21 PROCEDURE — 3078F DIAST BP <80 MM HG: CPT | Mod: CPTII,,, | Performed by: NURSE PRACTITIONER

## 2022-07-21 PROCEDURE — 3074F SYST BP LT 130 MM HG: CPT | Mod: CPTII,,, | Performed by: NURSE PRACTITIONER

## 2022-07-21 PROCEDURE — 99214 OFFICE O/P EST MOD 30 MIN: CPT | Mod: S$PBB,,, | Performed by: NURSE PRACTITIONER

## 2022-07-21 PROCEDURE — 1159F MED LIST DOCD IN RCRD: CPT | Mod: CPTII,,, | Performed by: NURSE PRACTITIONER

## 2022-07-21 PROCEDURE — 99999 PR PBB SHADOW E&M-EST. PATIENT-LVL III: CPT | Mod: PBBFAC,,, | Performed by: NURSE PRACTITIONER

## 2022-07-21 PROCEDURE — 99999 PR PBB SHADOW E&M-EST. PATIENT-LVL III: ICD-10-PCS | Mod: PBBFAC,,, | Performed by: NURSE PRACTITIONER

## 2022-07-21 RX ORDER — BUDESONIDE, GLYCOPYRROLATE, AND FORMOTEROL FUMARATE 160; 9; 4.8 UG/1; UG/1; UG/1
2 AEROSOL, METERED RESPIRATORY (INHALATION) 2 TIMES DAILY
Qty: 10.7 G | Refills: 11 | Status: SHIPPED | OUTPATIENT
Start: 2022-07-21 | End: 2022-07-22

## 2022-07-21 RX ORDER — CODEINE PHOSPHATE AND GUAIFENESIN 10; 100 MG/5ML; MG/5ML
5 SOLUTION ORAL EVERY 4 HOURS PRN
Qty: 210 ML | Refills: 0 | Status: SHIPPED | OUTPATIENT
Start: 2022-07-21 | End: 2022-07-28

## 2022-07-21 RX ORDER — PREDNISONE 20 MG/1
40 TABLET ORAL DAILY
Qty: 20 TABLET | Refills: 1 | Status: SHIPPED | OUTPATIENT
Start: 2022-07-21 | End: 2022-07-26

## 2022-07-21 RX ORDER — IPRATROPIUM BROMIDE AND ALBUTEROL SULFATE 2.5; .5 MG/3ML; MG/3ML
3 SOLUTION RESPIRATORY (INHALATION) EVERY 6 HOURS PRN
Qty: 120 EACH | Refills: 5 | Status: SHIPPED | OUTPATIENT
Start: 2022-07-21 | End: 2023-01-23 | Stop reason: SDUPTHER

## 2022-07-21 RX ORDER — AZITHROMYCIN 250 MG/1
TABLET, FILM COATED ORAL
Qty: 6 TABLET | Refills: 0 | Status: SHIPPED | OUTPATIENT
Start: 2022-07-21 | End: 2022-07-26

## 2022-07-21 NOTE — PATIENT INSTRUCTIONS
Will order CT of chest to evaluate nodules and worsening shortness of breath    Ordering 2 night at home sleep study    Reordering Breztri for COPD.       Azithromycin 500 mg 1 pill for three days for yellow or green mucous    Prednisone 20 mg pills, Take one pill a day for three days, repeat for shortness of breath or wheeze    Albuterol Inhaler 1-2 puffs every 4 hours, for cough or shortness of breath    Use codeine cough syrup sparingly, this will decrease cough and allow you to rest at night, do not take with other pain or sleeping medications.

## 2022-07-21 NOTE — PROGRESS NOTES
7/21/2022    Yvette Hutchinson  Office note    Chief Complaint   Patient presents with    Cough     Cough flare x's 2 days with brownish mucus       HPI:   7/21/2022- states doing well, Cough- recurrent complaint, worsened in past 3 days, productive  Brown mucous, associated with chest tightness and wheeze. Worse in early mornings and late evenings.   Has headaches when waking up. Has daytime fatigue.     4/28/2022- COPD exacerbation onset 3 days, persistent cough worse in early mornings and late evenings, has nocturnal arousals, productive thick brown mucous,   Associated with chest tightness and wheeze. Improved with albuterol nebulizer but returns after 2 hours. Severe complaint has urinated due to coughing fits.   Complaint of left ear pain and pressure with headaches. Associated with body aches and fatigue.  On average has exacerbation once every 3 months.   History of chronic knee pain, not interested in orthopedic referral, treated previously with ibuprofen with benefit.     NP Pedro reviewed  1/11/2022: Hx of CLL, COPD  Endorses onset of headache, fatigue x2 days.  Cough: Productive with green thick sputum production, onset 2 days, worse at night time and in the morning. Not relieved with cough syrup. States Codeine cough syrup has helped in the past. Associated with wheezing.   States breathing is the same, not worse from baseline. Using supplemental oxygen at night and PRN.   Denies fever, denies chest tightness, GI complaints.  On Daily Advair, using rescue inhaler twice daily. Using nebulizer daily with no noted improvement.   Cut back to 2 cigarettes per day.      8/16/2021- not currently interested to perform in clinic sleep study.   SOB- daily, worse with hot weather, ran out of rescue inhaler. On Advair daily with benefit. Improves with rest.   Started coughing  After weed eating yard.     Cough- recurrent complaint, onset 5 days after doing yard work, productive clear mucous, severe  complaint, inhibits ability to sleep when first laying down at night.     5/14/2021- did not receive sleep study from [email or web address] as ordered. Wearing supplemental oxygen at night while sleeping at 3 L. Uses when needed during day.    Having trouble falling asleep at night, started on new medication with no current benefit. Not able to fall asleep or stay asleep. Onset years, worsening with time. Feels tired when waking up in morning. Associated with occasional morning headaches.     complaint of chronic back pain followed by PCP.    Cough- recurrent complaint, mild, thick mucous, not using nebulizer regularly,   SOB- daily, worse with exertion and occasionally occurs at rest. Feels she can not take a deep breath.   On advair daily. Taking prednisone 20mg for 3 days 2x monthly.    2/8/2021- Cough- improved, daily, mild, Complaint of dry throat at night. Worse in early morning and late evenings.   occasionally productive small amount yellow mucous.   Currently smoking 1/2 pack daily, finished chantix with improvement but picked up after stopping.   Wearing supplemental oxygen at 3L most nights with benefit. SOB worse when laying flat on back, tried wedge but caused neck pain.   Sinus- unchanged, recurrent headaches in morning, sinus drainage. Currently on Flonase daily    12/3/2020- has supplemental oxygen set at 3 L at night, states benefiting,   Cough- worsened in last 7 days, worse in early morning and late evenings, nocturnal arousals nightly, productive yellow/green mucous quarter size.  Complaint of daily fatigue, dry throat in morning, day time drowsiness, mental cloudiness. Feels she never gets good sleep at night even when wearing supplemental oxygen.     10/21/2020- SOB and wheeze- recurrent problem, worsened last weeks, associated with sinus congestion, wheeze is worse in early morning and when laying down at nigtht  Cough- productive brown/green mucous for 1 week. Has not started steroid therapy.  Improves with nebulizer using 1-2x daily for past week. Was not able afford inhalers. Did not receive call from [facility] pharmacy.     Complaint for cramping sensation in chest that occurs sporadically on left and right side that lasts few minutes. Onset years, recurrent complaint, started back 1 week prior. Occurs couple days in row.     7/15/2020- cough- onset 7 days, worse in mornings and night, nocturnal arousals 1x nightly, productive yellow/green dime size mucous, associated with chest tightness and wheeze, improves with nebulizer using 1-2 daily, states feels better after coughing up large amounts of mucous; went to covid clinic and tested negative for covid did have fluid on ears.   Currently on Advair daily, insurance did not cover spiriva;     4/15/2020- cough- onset weeks, associated with occasional chest tighness, worse at night and early morning, quarter size clear to light brown in color. Currently    advair, spiriva, and rescue inhaler. States using rescue daily with little benefit.    3/5/2020- Pt is a 58 yo female with depression, GERD and COPD presenting for new evaluation.  Has chronic cough worse last 2 wks w/ phlegm, white, worse in am and evening.  Inhalers: rescue inhaler says insurance didn't cover  She reports wt gain over last 2 yrs. Has abdominal cramps intermittently radiating to the back.   Smoking since she was very young, 1 pack lasts 3 days.  She gets yearly bronchitis.  Labs from her PCP done 2 days ago are concerning for possible CLL- with WBC 21 and peripheral smear w/ smudge cells    The chief compliant  problem varies with instablilty at time  PFSH:  Past Medical History:   Diagnosis Date    Arthritis     Depression     GERD (gastroesophageal reflux disease)     Pneumonia of left lung due to infectious organism 3/5/2020         Past Surgical History:   Procedure Laterality Date    TONSILLECTOMY       Social History     Tobacco Use    Smoking status: Current Some Day Smoker  "    Packs/day: 0.15     Types: Cigarettes    Smokeless tobacco: Never Used    Tobacco comment: reports one cigarette every now and then   Substance Use Topics    Alcohol use: Yes    Drug use: Yes     Types: Marijuana     Family History   Problem Relation Age of Onset    Ovarian cancer Sister     Breast cancer Cousin      Review of patient's allergies indicates:  No Known Allergies    Performance Status:The patient's activity level is functions out of house.      Review of Systems:  a review of eleven systems covering constitutional, Eye, HEENT, Psych, Respiratory, Cardiac, GI, , Musculoskeletal, Endocrine, Dermatologic was negative except for pertinent findings as listed ABOVE and below:  Shortness of breath, Cough, body aches, fatigue, headache       Exam:Comprehensive exam done. /70 (BP Location: Left arm, Patient Position: Sitting, BP Method: Small (Automatic))   Pulse 61   Ht 5' 3" (1.6 m)   Wt 74.7 kg (164 lb 10.9 oz)   LMP 01/13/2010 (Approximate)   SpO2 (!) 94% Comment: R/A at rest  BMI 29.17 kg/m²   Exam included Vitals as listed, and patient's appearance and affect and alertness and mood, oral exam for yeast and hygiene and pharynx lesions and Mallapatti (M) score, neck with inspection for jvd and masses and thyroid abnormalities and lymph nodes (supraclavicular and infraclavicular nodes and axillary also examined and noted if abn), chest exam included symmetry and effort and fremitus and percussion and auscultation, cardiac exam included rhythm and gallops and murmur and rubs and jvd and edema, abdominal exam for mass and hepatosplenomegaly and tenderness and hernias and bowel sounds, Musculoskeletal exam with muscle tone and posture and mobility/gait and  strength, and skin for rashes and cyanosis and pallor and turgor, extremity for clubbing.  Findings were normal except for pertinent findings listed below: BS bilateral expiratory wheeze, M2      Radiographs (ct chest and cxr) " reviewed: view by direct vision   X-Ray Chest PA And Lateral  02/11/2022 lungs clear    CT Chest Without Contrast 11/20/2020   Stable right lung pulmonary nodules.  Mild increase in size of clustered nodules within the left upper lobe, with the distribution most compatible with an atypical infectious process.  Old granulomatous disease.  Left adrenal adenoma, unchanged.  Chronic T8 compression fracture.    CT Chest Without Contrast 03/11/2020   1. There are two right lung pulmonary nodules.  Per LUNG-RADS scoring this would reflect a Category 3 (probably benign).  Recommendation is for 6 month follow-up LDCT.  2. Airways disease or apical typical infection in the left lower lobe.       CXR 12/19/19- clear lung fields bilaterally     Labs reviewed    Lab Results   Component Value Date    WBC 20.78 (H) 03/03/2020    RBC 4.74 03/03/2020    HGB 14.9 03/03/2020    HCT 47.0 03/03/2020    MCV 99 (H) 03/03/2020    MCH 31.4 (H) 03/03/2020    MCHC 31.7 (L) 03/03/2020    RDW 13.1 03/03/2020     03/03/2020    MPV 11.9 03/03/2020    GRAN 33.0 (L) 03/03/2020    LYMPH 64.0 (H) 03/03/2020    MONO 3.0 (L) 03/03/2020    EOS CANCELED 08/18/2017    BASO CANCELED 08/18/2017    EOSINOPHIL 0.0 03/03/2020    BASOPHIL 0.0 03/03/2020       Pathologist interpretation of peripheral blood smear:   Mild leukocytosis shows absolute and relative lymphocytosis with   cytologic atypia and scattered smudge cells.  The morphology is   concerning for lymphoproliferative disorder such as CLL.  Correlation   with flow cytometric analysis of peripheral blood is recommended for   further evaluation.     But cells appear predominantly normochromic and normocytic with mild   anisocytosis.     Platelets are morphologically unremarkable on scanning.     PFT reviewed    3/12/2020 Severe obstruction. FEV1  49 %  predicted.   Airflow is not clearly improved after bronchodilator. Clinical improvement following bronchodilator therapy may occur in the absence  of spirometric improvement.   Mild Hyperinflation.   Moderate reduction in diffusion capacity - unadjusted for hemoglobin.     EPWORTH SLEEPINESS SCALE 15 7/21/2022  [facility] HST 05/27/2021 TRISTEN 4.3     Plan:  Clinical impression is resonably certain and repeated evaluation prn +/- follow up will be needed as below.    Yvette was seen today for cough.    Diagnoses and all orders for this visit:    Cough  -     guaiFENesin-codeine 100-10 mg/5 ml (TUSSI-ORGANIDIN NR)  mg/5 mL syrup; Take 5 mLs by mouth every 4 (four) hours as needed for Cough.  -     Culture, Respiratory with Gram Stain; Future    Chronic obstructive pulmonary disease with acute exacerbation  -     albuterol-ipratropium (DUO-NEB) 2.5 mg-0.5 mg/3 mL nebulizer solution; Take 3 mLs by nebulization every 6 (six) hours as needed for Wheezing. Rescue  -     guaiFENesin-codeine 100-10 mg/5 ml (TUSSI-ORGANIDIN NR)  mg/5 mL syrup; Take 5 mLs by mouth every 4 (four) hours as needed for Cough.  -     predniSONE (DELTASONE) 20 MG tablet; Take 2 tablets (40 mg total) by mouth once daily. for 5 days  -     azithromycin (Z-MARCO ANTONIO) 250 MG tablet; Take 2 tablets by mouth on day 1; Take 1 tablet by mouth on days 2-5  -     budesonide-glycopyr-formoterol (BREZTRI AEROSPHERE) 160-9-4.8 mcg/actuation HFAA; Inhale 2 puffs into the lungs 2 (two) times a day.    Lung nodules  -     Culture, Respiratory with Gram Stain; Future    Shortness of breath  -     CT Chest Without Contrast; Future  -     Culture, Respiratory with Gram Stain; Future    Sleep apnea, unspecified type    Daytime somnolence     - At home sleep study 2 night    Follow up in about 3 months (around 10/21/2022), or if symptoms worsen or fail to improve.    Discussed with patient above for education the following:      Patient Instructions   Will order CT of chest to evaluate nodules and worsening shortness of breath    Ordering 2 night at home sleep study    Reordering Breztri for COPD.        Azithromycin 500 mg 1 pill for three days for yellow or green mucous    Prednisone 20 mg pills, Take one pill a day for three days, repeat for shortness of breath or wheeze    Albuterol Inhaler 1-2 puffs every 4 hours, for cough or shortness of breath    Use codeine cough syrup sparingly, this will decrease cough and allow you to rest at night, do not take with other pain or sleeping medications.

## 2022-07-21 NOTE — H&P (VIEW-ONLY)
7/21/2022    Yvette Hutchinson  Office note    Chief Complaint   Patient presents with    Cough     Cough flare x's 2 days with brownish mucus       HPI:   7/21/2022- states doing well, Cough- recurrent complaint, worsened in past 3 days, productive  Brown mucous, associated with chest tightness and wheeze. Worse in early mornings and late evenings.   Has headaches when waking up. Has daytime fatigue.     4/28/2022- COPD exacerbation onset 3 days, persistent cough worse in early mornings and late evenings, has nocturnal arousals, productive thick brown mucous,   Associated with chest tightness and wheeze. Improved with albuterol nebulizer but returns after 2 hours. Severe complaint has urinated due to coughing fits.   Complaint of left ear pain and pressure with headaches. Associated with body aches and fatigue.  On average has exacerbation once every 3 months.   History of chronic knee pain, not interested in orthopedic referral, treated previously with ibuprofen with benefit.     NP Pedro reviewed  1/11/2022: Hx of CLL, COPD  Endorses onset of headache, fatigue x2 days.  Cough: Productive with green thick sputum production, onset 2 days, worse at night time and in the morning. Not relieved with cough syrup. States Codeine cough syrup has helped in the past. Associated with wheezing.   States breathing is the same, not worse from baseline. Using supplemental oxygen at night and PRN.   Denies fever, denies chest tightness, GI complaints.  On Daily Advair, using rescue inhaler twice daily. Using nebulizer daily with no noted improvement.   Cut back to 2 cigarettes per day.      8/16/2021- not currently interested to perform in clinic sleep study.   SOB- daily, worse with hot weather, ran out of rescue inhaler. On Advair daily with benefit. Improves with rest.   Started coughing  After weed eating yard.     Cough- recurrent complaint, onset 5 days after doing yard work, productive clear mucous, severe  complaint, inhibits ability to sleep when first laying down at night.     5/14/2021- did not receive sleep study from [facility] as ordered. Wearing supplemental oxygen at night while sleeping at 3 L. Uses when needed during day.    Having trouble falling asleep at night, started on new medication with no current benefit. Not able to fall asleep or stay asleep. Onset years, worsening with time. Feels tired when waking up in morning. Associated with occasional morning headaches.     complaint of chronic back pain followed by PCP.    Cough- recurrent complaint, mild, thick mucous, not using nebulizer regularly,   SOB- daily, worse with exertion and occasionally occurs at rest. Feels she can not take a deep breath.   On advair daily. Taking prednisone 20mg for 3 days 2x monthly.    2/8/2021- Cough- improved, daily, mild, Complaint of dry throat at night. Worse in early morning and late evenings.   occasionally productive small amount yellow mucous.   Currently smoking 1/2 pack daily, finished chantix with improvement but picked up after stopping.   Wearing supplemental oxygen at 3L most nights with benefit. SOB worse when laying flat on back, tried wedge but caused neck pain.   Sinus- unchanged, recurrent headaches in morning, sinus drainage. Currently on Flonase daily    12/3/2020- has supplemental oxygen set at 3 L at night, states benefiting,   Cough- worsened in last 7 days, worse in early morning and late evenings, nocturnal arousals nightly, productive yellow/green mucous quarter size.  Complaint of daily fatigue, dry throat in morning, day time drowsiness, mental cloudiness. Feels she never gets good sleep at night even when wearing supplemental oxygen.     10/21/2020- SOB and wheeze- recurrent problem, worsened last weeks, associated with sinus congestion, wheeze is worse in early morning and when laying down at nigtht  Cough- productive brown/green mucous for 1 week. Has not started steroid therapy.  Improves with nebulizer using 1-2x daily for past week. Was not able afford inhalers. Did not receive call from [facility] pharmacy.     Complaint for cramping sensation in chest that occurs sporadically on left and right side that lasts few minutes. Onset years, recurrent complaint, started back 1 week prior. Occurs couple days in row.     7/15/2020- cough- onset 7 days, worse in mornings and night, nocturnal arousals 1x nightly, productive yellow/green dime size mucous, associated with chest tightness and wheeze, improves with nebulizer using 1-2 daily, states feels better after coughing up large amounts of mucous; went to covid clinic and tested negative for covid did have fluid on ears.   Currently on Advair daily, insurance did not cover spiriva;     4/15/2020- cough- onset weeks, associated with occasional chest tighness, worse at night and early morning, quarter size clear to light brown in color. Currently    advair, spiriva, and rescue inhaler. States using rescue daily with little benefit.    3/5/2020- Pt is a 60 yo female with depression, GERD and COPD presenting for new evaluation.  Has chronic cough worse last 2 wks w/ phlegm, white, worse in am and evening.  Inhalers: rescue inhaler says insurance didn't cover  She reports wt gain over last 2 yrs. Has abdominal cramps intermittently radiating to the back.   Smoking since she was very young, 1 pack lasts 3 days.  She gets yearly bronchitis.  Labs from her PCP done 2 days ago are concerning for possible CLL- with WBC 21 and peripheral smear w/ smudge cells    The chief compliant  problem varies with instablilty at time  PFSH:  Past Medical History:   Diagnosis Date    Arthritis     Depression     GERD (gastroesophageal reflux disease)     Pneumonia of left lung due to infectious organism 3/5/2020         Past Surgical History:   Procedure Laterality Date    TONSILLECTOMY       Social History     Tobacco Use    Smoking status: Current Some Day Smoker  "    Packs/day: 0.15     Types: Cigarettes    Smokeless tobacco: Never Used    Tobacco comment: reports one cigarette every now and then   Substance Use Topics    Alcohol use: Yes    Drug use: Yes     Types: Marijuana     Family History   Problem Relation Age of Onset    Ovarian cancer Sister     Breast cancer Cousin      Review of patient's allergies indicates:  No Known Allergies    Performance Status:The patient's activity level is functions out of house.      Review of Systems:  a review of eleven systems covering constitutional, Eye, HEENT, Psych, Respiratory, Cardiac, GI, , Musculoskeletal, Endocrine, Dermatologic was negative except for pertinent findings as listed ABOVE and below:  Shortness of breath, Cough, body aches, fatigue, headache       Exam:Comprehensive exam done. /70 (BP Location: Left arm, Patient Position: Sitting, BP Method: Small (Automatic))   Pulse 61   Ht 5' 3" (1.6 m)   Wt 74.7 kg (164 lb 10.9 oz)   LMP 01/13/2010 (Approximate)   SpO2 (!) 94% Comment: R/A at rest  BMI 29.17 kg/m²   Exam included Vitals as listed, and patient's appearance and affect and alertness and mood, oral exam for yeast and hygiene and pharynx lesions and Mallapatti (M) score, neck with inspection for jvd and masses and thyroid abnormalities and lymph nodes (supraclavicular and infraclavicular nodes and axillary also examined and noted if abn), chest exam included symmetry and effort and fremitus and percussion and auscultation, cardiac exam included rhythm and gallops and murmur and rubs and jvd and edema, abdominal exam for mass and hepatosplenomegaly and tenderness and hernias and bowel sounds, Musculoskeletal exam with muscle tone and posture and mobility/gait and  strength, and skin for rashes and cyanosis and pallor and turgor, extremity for clubbing.  Findings were normal except for pertinent findings listed below: BS bilateral expiratory wheeze, M2      Radiographs (ct chest and cxr) " reviewed: view by direct vision   X-Ray Chest PA And Lateral  02/11/2022 lungs clear    CT Chest Without Contrast 11/20/2020   Stable right lung pulmonary nodules.  Mild increase in size of clustered nodules within the left upper lobe, with the distribution most compatible with an atypical infectious process.  Old granulomatous disease.  Left adrenal adenoma, unchanged.  Chronic T8 compression fracture.    CT Chest Without Contrast 03/11/2020   1. There are two right lung pulmonary nodules.  Per LUNG-RADS scoring this would reflect a Category 3 (probably benign).  Recommendation is for 6 month follow-up LDCT.  2. Airways disease or apical typical infection in the left lower lobe.       CXR 12/19/19- clear lung fields bilaterally     Labs reviewed    Lab Results   Component Value Date    WBC 20.78 (H) 03/03/2020    RBC 4.74 03/03/2020    HGB 14.9 03/03/2020    HCT 47.0 03/03/2020    MCV 99 (H) 03/03/2020    MCH 31.4 (H) 03/03/2020    MCHC 31.7 (L) 03/03/2020    RDW 13.1 03/03/2020     03/03/2020    MPV 11.9 03/03/2020    GRAN 33.0 (L) 03/03/2020    LYMPH 64.0 (H) 03/03/2020    MONO 3.0 (L) 03/03/2020    EOS CANCELED 08/18/2017    BASO CANCELED 08/18/2017    EOSINOPHIL 0.0 03/03/2020    BASOPHIL 0.0 03/03/2020       Pathologist interpretation of peripheral blood smear:   Mild leukocytosis shows absolute and relative lymphocytosis with   cytologic atypia and scattered smudge cells.  The morphology is   concerning for lymphoproliferative disorder such as CLL.  Correlation   with flow cytometric analysis of peripheral blood is recommended for   further evaluation.     But cells appear predominantly normochromic and normocytic with mild   anisocytosis.     Platelets are morphologically unremarkable on scanning.     PFT reviewed    3/12/2020 Severe obstruction. FEV1  49 %  predicted.   Airflow is not clearly improved after bronchodilator. Clinical improvement following bronchodilator therapy may occur in the absence  of spirometric improvement.   Mild Hyperinflation.   Moderate reduction in diffusion capacity - unadjusted for hemoglobin.     EPWORTH SLEEPINESS SCALE 15 7/21/2022  [facility] HST 05/27/2021 TRISTEN 4.3     Plan:  Clinical impression is resonably certain and repeated evaluation prn +/- follow up will be needed as below.    Yvette was seen today for cough.    Diagnoses and all orders for this visit:    Cough  -     guaiFENesin-codeine 100-10 mg/5 ml (TUSSI-ORGANIDIN NR)  mg/5 mL syrup; Take 5 mLs by mouth every 4 (four) hours as needed for Cough.  -     Culture, Respiratory with Gram Stain; Future    Chronic obstructive pulmonary disease with acute exacerbation  -     albuterol-ipratropium (DUO-NEB) 2.5 mg-0.5 mg/3 mL nebulizer solution; Take 3 mLs by nebulization every 6 (six) hours as needed for Wheezing. Rescue  -     guaiFENesin-codeine 100-10 mg/5 ml (TUSSI-ORGANIDIN NR)  mg/5 mL syrup; Take 5 mLs by mouth every 4 (four) hours as needed for Cough.  -     predniSONE (DELTASONE) 20 MG tablet; Take 2 tablets (40 mg total) by mouth once daily. for 5 days  -     azithromycin (Z-MARCO ANTONIO) 250 MG tablet; Take 2 tablets by mouth on day 1; Take 1 tablet by mouth on days 2-5  -     budesonide-glycopyr-formoterol (BREZTRI AEROSPHERE) 160-9-4.8 mcg/actuation HFAA; Inhale 2 puffs into the lungs 2 (two) times a day.    Lung nodules  -     Culture, Respiratory with Gram Stain; Future    Shortness of breath  -     CT Chest Without Contrast; Future  -     Culture, Respiratory with Gram Stain; Future    Sleep apnea, unspecified type    Daytime somnolence     - At home sleep study 2 night    Follow up in about 3 months (around 10/21/2022), or if symptoms worsen or fail to improve.    Discussed with patient above for education the following:      Patient Instructions   Will order CT of chest to evaluate nodules and worsening shortness of breath    Ordering 2 night at home sleep study    Reordering Breztri for COPD.        Azithromycin 500 mg 1 pill for three days for yellow or green mucous    Prednisone 20 mg pills, Take one pill a day for three days, repeat for shortness of breath or wheeze    Albuterol Inhaler 1-2 puffs every 4 hours, for cough or shortness of breath    Use codeine cough syrup sparingly, this will decrease cough and allow you to rest at night, do not take with other pain or sleeping medications.

## 2022-07-22 ENCOUNTER — TELEPHONE (OUTPATIENT)
Dept: PULMONOLOGY | Facility: CLINIC | Age: 62
End: 2022-07-22
Payer: MEDICAID

## 2022-07-22 DIAGNOSIS — J44.9 CHRONIC OBSTRUCTIVE PULMONARY DISEASE, UNSPECIFIED COPD TYPE: Primary | ICD-10-CM

## 2022-07-29 ENCOUNTER — HOSPITAL ENCOUNTER (OUTPATIENT)
Dept: RADIOLOGY | Facility: HOSPITAL | Age: 62
Discharge: HOME | End: 2022-07-29
Attending: NURSE PRACTITIONER
Payer: MEDICAID

## 2022-07-29 DIAGNOSIS — R06.02 SHORTNESS OF BREATH: ICD-10-CM

## 2022-07-29 PROCEDURE — 71250 CT THORAX DX C-: CPT | Mod: 26,,, | Performed by: RADIOLOGY

## 2022-07-29 PROCEDURE — 71250 CT THORAX DX C-: CPT | Mod: TC

## 2022-07-29 PROCEDURE — 71250 CT CHEST WITHOUT CONTRAST: ICD-10-PCS | Mod: 26,,, | Performed by: RADIOLOGY

## 2022-08-02 ENCOUNTER — PATIENT MESSAGE (OUTPATIENT)
Dept: PULMONOLOGY | Facility: CLINIC | Age: 62
End: 2022-08-02
Payer: MEDICAID

## 2022-08-02 DIAGNOSIS — J44.9 CHRONIC OBSTRUCTIVE PULMONARY DISEASE, UNSPECIFIED COPD TYPE: Primary | ICD-10-CM

## 2022-08-02 RX ORDER — FLUTICASONE PROPIONATE AND SALMETEROL XINAFOATE 230; 21 UG/1; UG/1
2 AEROSOL, METERED RESPIRATORY (INHALATION) 2 TIMES DAILY
Qty: 12 G | Refills: 5 | Status: SHIPPED | OUTPATIENT
Start: 2022-08-02 | End: 2023-02-13 | Stop reason: SDUPTHER

## 2022-08-02 NOTE — TELEPHONE ENCOUNTER
"Please see patient message in regards to rx of Advair requested. Please send rx if appropriate.     "    Dr. Garcia , can you please send a prescription to [facility] [address] ?  I am out of inhaler. [facility] said they are waiting on the PA on the Breztri inhaler so can you please call in the Advair Diskus ? I am in need of one !! Thank You So Much "          "

## 2022-08-03 DIAGNOSIS — R91.1 LUNG NODULE, SOLITARY: Primary | ICD-10-CM

## 2022-08-05 ENCOUNTER — LAB VISIT (OUTPATIENT)
Dept: LAB | Facility: HOSPITAL | Age: 62
End: 2022-08-05
Attending: NURSE PRACTITIONER
Payer: MEDICAID

## 2022-08-05 DIAGNOSIS — R91.1 LUNG NODULE: Primary | ICD-10-CM

## 2022-08-05 DIAGNOSIS — R05.9 COUGH: ICD-10-CM

## 2022-08-05 DIAGNOSIS — R06.02 SHORTNESS OF BREATH: ICD-10-CM

## 2022-08-05 DIAGNOSIS — R91.1 LUNG NODULE, SOLITARY: ICD-10-CM

## 2022-08-05 DIAGNOSIS — R91.8 LUNG NODULES: ICD-10-CM

## 2022-08-05 PROCEDURE — 87102 FUNGUS ISOLATION CULTURE: CPT | Performed by: NURSE PRACTITIONER

## 2022-08-05 PROCEDURE — 87205 SMEAR GRAM STAIN: CPT | Performed by: NURSE PRACTITIONER

## 2022-08-05 PROCEDURE — 87070 CULTURE OTHR SPECIMN AEROBIC: CPT | Performed by: NURSE PRACTITIONER

## 2022-08-05 PROCEDURE — 87106 FUNGI IDENTIFICATION YEAST: CPT | Mod: 59 | Performed by: NURSE PRACTITIONER

## 2022-08-08 LAB
BACTERIA SPEC AEROBE CULT: NORMAL
BACTERIA SPEC AEROBE CULT: NORMAL
GRAM STN SPEC: NORMAL

## 2022-08-10 ENCOUNTER — DOCUMENTATION ONLY (OUTPATIENT)
Dept: INTERVENTIONAL RADIOLOGY/VASCULAR | Facility: HOSPITAL | Age: 62
End: 2022-08-10
Payer: MEDICAID

## 2022-08-10 DIAGNOSIS — R91.1 LUNG NODULE: ICD-10-CM

## 2022-08-10 DIAGNOSIS — R91.1 LUNG NODULE: Primary | ICD-10-CM

## 2022-08-10 RX ORDER — SODIUM CHLORIDE 9 MG/ML
INJECTION, SOLUTION INTRAVENOUS CONTINUOUS
Status: CANCELLED | OUTPATIENT
Start: 2022-08-10

## 2022-08-10 RX ORDER — LIDOCAINE HYDROCHLORIDE 10 MG/ML
1 INJECTION, SOLUTION EPIDURAL; INFILTRATION; INTRACAUDAL; PERINEURAL ONCE AS NEEDED
Status: CANCELLED | OUTPATIENT
Start: 2022-08-10 | End: 2034-01-06

## 2022-08-10 NOTE — PROGRESS NOTES
Radiology Pre-Procedural Instructions- [facility]    Radiology Nurse contacted patient to provide instructions for scheduled lung biopsy (scheduled on 8/17 at 1000).   Patient instructed to arrive no later than 0830 am to outpatient registration.   Registration will direct patient to outpatient lab for pre-op lab work if required.  Following labs, patient needs to check in at the surgery waiting room desk located on the second floor.   Patient is not currently taking any anticoagulants at this time.   Patient instructed to remain NPO after midnight with the exception of approved essential meds taken with a small sip of water.  Instructed patient to bathe the night before and the morning of their procedure with an anti bacterial soap or Hibiclens.   Patient is aware of their responsibility to make post transportation arrangements home by a responsible adult.   Patient educated on all additional pre-op instructions per policy and verbalized understanding.

## 2022-08-14 ENCOUNTER — LAB VISIT (OUTPATIENT)
Dept: PRIMARY CARE CLINIC | Facility: CLINIC | Age: 62
End: 2022-08-14
Payer: MEDICAID

## 2022-08-14 DIAGNOSIS — R91.1 LUNG NODULE: ICD-10-CM

## 2022-08-14 LAB — SARS-COV-2 RNA RESP QL NAA+PROBE: NOT DETECTED

## 2022-08-14 PROCEDURE — U0003 INFECTIOUS AGENT DETECTION BY NUCLEIC ACID (DNA OR RNA); SEVERE ACUTE RESPIRATORY SYNDROME CORONAVIRUS 2 (SARS-COV-2) (CORONAVIRUS DISEASE [COVID-19]), AMPLIFIED PROBE TECHNIQUE, MAKING USE OF HIGH THROUGHPUT TECHNOLOGIES AS DESCRIBED BY CMS-2020-01-R: HCPCS | Performed by: NURSE PRACTITIONER

## 2022-08-14 PROCEDURE — U0005 INFEC AGEN DETEC AMPLI PROBE: HCPCS | Performed by: NURSE PRACTITIONER

## 2022-08-17 ENCOUNTER — HOSPITAL ENCOUNTER (OUTPATIENT)
Dept: RADIOLOGY | Facility: HOSPITAL | Age: 62
Discharge: HOME | End: 2022-08-17
Attending: RADIOLOGY
Payer: MEDICAID

## 2022-08-17 ENCOUNTER — HOSPITAL ENCOUNTER (OUTPATIENT)
Dept: RADIOLOGY | Facility: HOSPITAL | Age: 62
Discharge: HOME | End: 2022-08-17
Attending: NURSE PRACTITIONER
Payer: MEDICAID

## 2022-08-17 VITALS
SYSTOLIC BLOOD PRESSURE: 111 MMHG | HEART RATE: 60 BPM | RESPIRATION RATE: 18 BRPM | BODY MASS INDEX: 28.35 KG/M2 | OXYGEN SATURATION: 94 % | WEIGHT: 160 LBS | DIASTOLIC BLOOD PRESSURE: 62 MMHG | HEIGHT: 63 IN | TEMPERATURE: 98 F

## 2022-08-17 DIAGNOSIS — R91.1 LUNG NODULE: ICD-10-CM

## 2022-08-17 PROCEDURE — 99900103 DSU ONLY-NO CHARGE-INITIAL HR (STAT)

## 2022-08-17 PROCEDURE — 32408 CORE NDL BX LNG/MED PERQ: CPT

## 2022-08-17 PROCEDURE — 88305 TISSUE EXAM BY PATHOLOGIST: CPT | Performed by: PATHOLOGY

## 2022-08-17 PROCEDURE — 32408 CT BIOPSY LUNG W/ GUIDANCE: ICD-10-PCS | Mod: ,,, | Performed by: RADIOLOGY

## 2022-08-17 PROCEDURE — 63600175 PHARM REV CODE 636 W HCPCS: Performed by: RADIOLOGY

## 2022-08-17 PROCEDURE — 71045 X-RAY EXAM CHEST 1 VIEW: CPT | Mod: 26,,, | Performed by: RADIOLOGY

## 2022-08-17 PROCEDURE — 25000003 PHARM REV CODE 250: Performed by: RADIOLOGY

## 2022-08-17 PROCEDURE — 88342 CHG IMMUNOCYTOCHEMISTRY: ICD-10-PCS | Mod: 26,,, | Performed by: PATHOLOGY

## 2022-08-17 PROCEDURE — 71045 X-RAY EXAM CHEST 1 VIEW: CPT | Mod: TC,FY

## 2022-08-17 PROCEDURE — 88341 PR IHC OR ICC EACH ADD'L SINGLE ANTIBODY  STAINPR: ICD-10-PCS | Mod: 26,,, | Performed by: PATHOLOGY

## 2022-08-17 PROCEDURE — 88305 TISSUE EXAM BY PATHOLOGIST: ICD-10-PCS | Mod: 26,,, | Performed by: PATHOLOGY

## 2022-08-17 PROCEDURE — 71045 XR CHEST 1 VIEW: ICD-10-PCS | Mod: 26,,, | Performed by: RADIOLOGY

## 2022-08-17 PROCEDURE — 32408 CORE NDL BX LNG/MED PERQ: CPT | Mod: ,,, | Performed by: RADIOLOGY

## 2022-08-17 PROCEDURE — 88360 TUMOR IMMUNOHISTOCHEM/MANUAL: CPT | Performed by: PATHOLOGY

## 2022-08-17 PROCEDURE — 88305 TISSUE EXAM BY PATHOLOGIST: CPT | Mod: 26,,, | Performed by: PATHOLOGY

## 2022-08-17 PROCEDURE — 88342 IMHCHEM/IMCYTCHM 1ST ANTB: CPT | Performed by: PATHOLOGY

## 2022-08-17 PROCEDURE — 88341 IMHCHEM/IMCYTCHM EA ADD ANTB: CPT | Performed by: PATHOLOGY

## 2022-08-17 PROCEDURE — 88381 MICRODISSECTION MANUAL: CPT | Performed by: PATHOLOGY

## 2022-08-17 PROCEDURE — 88342 IMHCHEM/IMCYTCHM 1ST ANTB: CPT | Mod: 26,,, | Performed by: PATHOLOGY

## 2022-08-17 PROCEDURE — 71045 X-RAY EXAM CHEST 1 VIEW: CPT | Mod: 26,76,, | Performed by: RADIOLOGY

## 2022-08-17 PROCEDURE — 99900104 DSU ONLY-NO CHARGE-EA ADD'L HR (STAT)

## 2022-08-17 PROCEDURE — 81445 SO NEO GSAP 5-50DNA/DNA&RNA: CPT | Performed by: PATHOLOGY

## 2022-08-17 PROCEDURE — 88341 IMHCHEM/IMCYTCHM EA ADD ANTB: CPT | Mod: 26,,, | Performed by: PATHOLOGY

## 2022-08-17 PROCEDURE — 71045 XR CHEST 1 VIEW: ICD-10-PCS | Mod: 26,76,, | Performed by: RADIOLOGY

## 2022-08-17 RX ORDER — MIDAZOLAM HYDROCHLORIDE 1 MG/ML
INJECTION INTRAMUSCULAR; INTRAVENOUS CODE/TRAUMA/SEDATION MEDICATION
Status: COMPLETED | OUTPATIENT
Start: 2022-08-17 | End: 2022-08-17

## 2022-08-17 RX ORDER — LIDOCAINE HYDROCHLORIDE 10 MG/ML
INJECTION INFILTRATION; PERINEURAL CODE/TRAUMA/SEDATION MEDICATION
Status: COMPLETED | OUTPATIENT
Start: 2022-08-17 | End: 2022-08-17

## 2022-08-17 RX ORDER — FENTANYL CITRATE 50 UG/ML
INJECTION, SOLUTION INTRAMUSCULAR; INTRAVENOUS CODE/TRAUMA/SEDATION MEDICATION
Status: COMPLETED | OUTPATIENT
Start: 2022-08-17 | End: 2022-08-17

## 2022-08-17 RX ADMIN — LIDOCAINE HYDROCHLORIDE 10 ML: 10 INJECTION, SOLUTION INFILTRATION; PERINEURAL at 11:08

## 2022-08-17 RX ADMIN — FENTANYL CITRATE 12.5 MCG: 50 INJECTION, SOLUTION INTRAMUSCULAR; INTRAVENOUS at 11:08

## 2022-08-17 RX ADMIN — MIDAZOLAM HYDROCHLORIDE 1 MG: 1 INJECTION, SOLUTION INTRAMUSCULAR; INTRAVENOUS at 11:08

## 2022-08-17 RX ADMIN — LIDOCAINE HYDROCHLORIDE 5 ML: 10 INJECTION, SOLUTION INFILTRATION; PERINEURAL at 11:08

## 2022-08-17 NOTE — INTERVAL H&P NOTE
The patient has been examined and the H&P has been reviewed:    I concur with the findings and no changes have occurred since H&P was written.    ASA 2  Malampatti 2  Heart RRR  Lungs CTAB    No personal or family history of sedation complications.    Plan CT guided left lung bx with moderate sedation.

## 2022-08-17 NOTE — DISCHARGE SUMMARY
Radiology Discharge Summary      Hospital Course: No complications    Admit Date: 8/17/2022  Discharge Date: 08/17/2022     Instructions Given to Patient: Yes  Diet: Resume prior diet  Activity: activity as tolerated and no driving for today    Description of Condition on Discharge: Stable  Vital Signs (Most Recent): Temp: 97.6 °F (36.4 °C) (08/17/22 0909)  Pulse: 60 (08/17/22 1245)  Resp: 14 (08/17/22 1135)  BP: (!) 127/58 (08/17/22 1245)  SpO2: 95 % (08/17/22 1245)    Discharge Disposition: Home    Discharge Diagnosis: Left upper lobe lung mass status post CT guided biopsy     Follow-up: as per referring provider    @SIG@

## 2022-08-17 NOTE — OR NURSING
Ok to discharge home with daughter per Dr. Reese. Handouts provided, all valuables were returned, IV removed and both stated understanding of instructions given

## 2022-08-17 NOTE — DISCHARGE INSTRUCTIONS
We hope your stay was comfortable as you heal now, mend and rest.    For we have enjoyed taking care of you by giving your our best.    And as you get better, by regaining your health and strength;   We count it as a privilege to have served you and hope your time at [facility] was well spent.      Thank  You!!!

## 2022-08-18 ENCOUNTER — HOSPITAL ENCOUNTER (EMERGENCY)
Facility: HOSPITAL | Age: 62
Discharge: LEFT BEFORE DISCHARGE | End: 2022-08-18
Attending: NURSE PRACTITIONER
Payer: MEDICAID

## 2022-08-18 ENCOUNTER — HOSPITAL ENCOUNTER (OUTPATIENT)
Dept: RADIOLOGY | Facility: HOSPITAL | Age: 62
Discharge: HOME | End: 2022-08-18
Attending: NURSE PRACTITIONER
Payer: MEDICAID

## 2022-08-18 ENCOUNTER — TELEPHONE (OUTPATIENT)
Dept: PULMONOLOGY | Facility: CLINIC | Age: 62
End: 2022-08-18
Payer: MEDICAID

## 2022-08-18 VITALS
TEMPERATURE: 98 F | RESPIRATION RATE: 18 BRPM | HEIGHT: 63 IN | OXYGEN SATURATION: 96 % | WEIGHT: 160.25 LBS | HEART RATE: 62 BPM | DIASTOLIC BLOOD PRESSURE: 58 MMHG | BODY MASS INDEX: 28.39 KG/M2 | SYSTOLIC BLOOD PRESSURE: 115 MMHG

## 2022-08-18 DIAGNOSIS — R04.2 HEMOPTYSIS: Primary | ICD-10-CM

## 2022-08-18 DIAGNOSIS — R04.2 HEMOPTYSIS: ICD-10-CM

## 2022-08-18 PROCEDURE — 71046 X-RAY EXAM CHEST 2 VIEWS: CPT | Mod: TC,FY

## 2022-08-18 PROCEDURE — 71046 X-RAY EXAM CHEST 2 VIEWS: CPT | Mod: 26,,, | Performed by: RADIOLOGY

## 2022-08-18 PROCEDURE — 99900041 HC LEFT WITHOUT BEING SEEN- EMERGENCY

## 2022-08-18 PROCEDURE — 71046 XR CHEST PA AND LATERAL: ICD-10-PCS | Mod: 26,,, | Performed by: RADIOLOGY

## 2022-08-18 NOTE — PROGRESS NOTES
Spoke to pt on phone, onset headache and hemoptysis this am. Went to ER but after waiting left.   Encouraged to get blood test and chest x-ray to evaluate for bleeding post CT biopsy from yesterday.

## 2022-08-18 NOTE — TELEPHONE ENCOUNTER
Spoke to pt on phone,   Chest x-ray and CBC normal.     Contact clinic tomorrow, blood should be less and darker color by tomorrow. If not will need to have bronchoscopy by MD. If shortness of breath worsens or feeling weak go to ER for further evaluation.     Voiced understanding.

## 2022-08-19 ENCOUNTER — PATIENT MESSAGE (OUTPATIENT)
Dept: PULMONOLOGY | Facility: CLINIC | Age: 62
End: 2022-08-19
Payer: MEDICAID

## 2022-08-22 ENCOUNTER — TELEPHONE (OUTPATIENT)
Dept: PULMONOLOGY | Facility: CLINIC | Age: 62
End: 2022-08-22
Payer: MEDICAID

## 2022-08-22 ENCOUNTER — PATIENT MESSAGE (OUTPATIENT)
Dept: PULMONOLOGY | Facility: CLINIC | Age: 62
End: 2022-08-22
Payer: MEDICAID

## 2022-08-22 DIAGNOSIS — C34.90 NON-SMALL CELL LUNG CANCER, UNSPECIFIED LATERALITY: Primary | ICD-10-CM

## 2022-08-22 DIAGNOSIS — R45.89 ANXIETY ABOUT HEALTH: ICD-10-CM

## 2022-08-22 DIAGNOSIS — C34.90 MALIGNANT NEOPLASM OF UNSPECIFIED PART OF UNSPECIFIED BRONCHUS OR LUNG: ICD-10-CM

## 2022-08-22 RX ORDER — ALPRAZOLAM 0.5 MG/1
0.5 TABLET ORAL 3 TIMES DAILY
Qty: 30 TABLET | Refills: 0 | Status: SHIPPED | OUTPATIENT
Start: 2022-08-22 | End: 2023-01-23 | Stop reason: SDUPTHER

## 2022-08-22 NOTE — TELEPHONE ENCOUNTER
Spoke to patient on phone, discussed CT biopsy positive non small cell lung cancer.   Anxiety is very high.   PET and referral to thoracic surgery sent.   Message to oncologist sent.     Pt voiced understanding

## 2022-08-23 ENCOUNTER — TELEPHONE (OUTPATIENT)
Dept: HEMATOLOGY/ONCOLOGY | Facility: CLINIC | Age: 62
End: 2022-08-23
Payer: MEDICAID

## 2022-08-23 NOTE — TELEPHONE ENCOUNTER
I called the pt. Together, we scheduled her appt with Dr Lee on Mon 8/29/22 at 1120 am. Labs were also rescheduled in preparation for the appt      ----- Message from Rhiannon Lee MD sent at 8/22/2022 11:44 PM CDT -----    Harika,    Thank you for bringing this to my attention,   This has been long drawn issue our dept has been trying to address. My staff will make sure with she is brought in asap, - Janae could you help with this appt,?    copying our supervisor/ manager  and AMD  so they can be aware of the difficulty with referral    .   It helps when they have specific the issues such as this to look it into.     Best   Rhiannon Lee   ----- Message -----  From: Harika Garcia NP  Sent: 8/22/2022   5:00 PM CDT  To: MD Dr. Jesus Carlton  An established patient of yours and a mutual patient of mine has a CT biopsy positive for non small cell lung cancer. Requested you to evaluate. You are not an option for the epic referral.   I ordered PET scan and sent referral to thoracic surgery.     I wanted to reach out to you personally. Thank you,    Harika Garcia NP

## 2022-08-26 ENCOUNTER — PATIENT MESSAGE (OUTPATIENT)
Dept: FAMILY MEDICINE | Facility: CLINIC | Age: 62
End: 2022-08-26
Payer: MEDICAID

## 2022-08-26 ENCOUNTER — LAB VISIT (OUTPATIENT)
Dept: LAB | Facility: HOSPITAL | Age: 62
End: 2022-08-26
Attending: INTERNAL MEDICINE
Payer: MEDICAID

## 2022-08-26 DIAGNOSIS — C91.10 CLL (CHRONIC LYMPHOCYTIC LEUKEMIA): ICD-10-CM

## 2022-08-26 DIAGNOSIS — F41.9 ANXIETY: Primary | ICD-10-CM

## 2022-08-26 LAB
ALBUMIN SERPL BCP-MCNC: 4 G/DL (ref 3.5–5.2)
ALP SERPL-CCNC: 110 U/L (ref 55–135)
ALT SERPL W/O P-5'-P-CCNC: 24 U/L (ref 10–44)
ANION GAP SERPL CALC-SCNC: 10 MMOL/L (ref 8–16)
AST SERPL-CCNC: 22 U/L (ref 10–40)
BASOPHILS NFR BLD: 0 % (ref 0–1.9)
BILIRUB SERPL-MCNC: 0.4 MG/DL (ref 0.1–1)
BUN SERPL-MCNC: 14 MG/DL (ref 8–23)
CALCIUM SERPL-MCNC: 9 MG/DL (ref 8.7–10.5)
CHLORIDE SERPL-SCNC: 104 MMOL/L (ref 95–110)
CO2 SERPL-SCNC: 26 MMOL/L (ref 23–29)
CREAT SERPL-MCNC: 0.8 MG/DL (ref 0.5–1.4)
DIFFERENTIAL METHOD: ABNORMAL
EOSINOPHIL NFR BLD: 1 % (ref 0–8)
ERYTHROCYTE [DISTWIDTH] IN BLOOD BY AUTOMATED COUNT: 13.2 % (ref 11.5–14.5)
EST. GFR  (NO RACE VARIABLE): >60 ML/MIN/1.73 M^2
GLUCOSE SERPL-MCNC: 98 MG/DL (ref 70–110)
HCT VFR BLD AUTO: 40.5 % (ref 37–48.5)
HGB BLD-MCNC: 13.4 G/DL (ref 12–16)
IMM GRANULOCYTES # BLD AUTO: ABNORMAL K/UL
IMM GRANULOCYTES NFR BLD AUTO: ABNORMAL %
LYMPHOCYTES NFR BLD: 84 % (ref 18–48)
MCH RBC QN AUTO: 31.1 PG (ref 27–31)
MCHC RBC AUTO-ENTMCNC: 33.1 G/DL (ref 32–36)
MCV RBC AUTO: 94 FL (ref 82–98)
MONOCYTES NFR BLD: 1 % (ref 4–15)
NEUTROPHILS NFR BLD: 14 % (ref 38–73)
NRBC BLD-RTO: 0 /100 WBC
PLATELET # BLD AUTO: 259 K/UL (ref 150–450)
PLATELET BLD QL SMEAR: ABNORMAL
PMV BLD AUTO: 10.3 FL (ref 9.2–12.9)
POTASSIUM SERPL-SCNC: 4.1 MMOL/L (ref 3.5–5.1)
PROT SERPL-MCNC: 6.8 G/DL (ref 6–8.4)
RBC # BLD AUTO: 4.31 M/UL (ref 4–5.4)
SODIUM SERPL-SCNC: 140 MMOL/L (ref 136–145)
WBC # BLD AUTO: 30.46 K/UL (ref 3.9–12.7)

## 2022-08-26 PROCEDURE — 85007 BL SMEAR W/DIFF WBC COUNT: CPT | Performed by: INTERNAL MEDICINE

## 2022-08-26 PROCEDURE — 80053 COMPREHEN METABOLIC PANEL: CPT | Performed by: INTERNAL MEDICINE

## 2022-08-26 PROCEDURE — 85027 COMPLETE CBC AUTOMATED: CPT | Performed by: INTERNAL MEDICINE

## 2022-08-26 PROCEDURE — 36415 COLL VENOUS BLD VENIPUNCTURE: CPT | Performed by: INTERNAL MEDICINE

## 2022-08-26 RX ORDER — FLUOXETINE HYDROCHLORIDE 20 MG/1
20 CAPSULE ORAL DAILY
Qty: 30 CAPSULE | Refills: 11 | Status: SHIPPED | OUTPATIENT
Start: 2022-08-26 | End: 2023-09-29

## 2022-08-26 NOTE — TELEPHONE ENCOUNTER
Please see attached portal message from patient. Patient is requesting refill of Fluoxetine. Upon further assessment this medication is no longer listed on current active medication list. It appears to have been discontinued on 6-20-22, citing no longer taking as reason for discontinue. Please advise. Thank you.      Order Status Reason By On   Discontinued Patient no longer taking Shannon Bourne NP 6/20/22 9042

## 2022-08-29 ENCOUNTER — OFFICE VISIT (OUTPATIENT)
Dept: HEMATOLOGY/ONCOLOGY | Facility: CLINIC | Age: 62
End: 2022-08-29
Payer: MEDICAID

## 2022-08-29 VITALS
WEIGHT: 165.81 LBS | SYSTOLIC BLOOD PRESSURE: 122 MMHG | BODY MASS INDEX: 29.38 KG/M2 | RESPIRATION RATE: 12 BRPM | OXYGEN SATURATION: 96 % | HEART RATE: 60 BPM | TEMPERATURE: 98 F | DIASTOLIC BLOOD PRESSURE: 53 MMHG | HEIGHT: 63 IN

## 2022-08-29 DIAGNOSIS — C34.90 ADENOCARCINOMA OF LUNG, UNSPECIFIED LATERALITY: ICD-10-CM

## 2022-08-29 DIAGNOSIS — C34.10 MALIGNANT NEOPLASM OF UPPER LOBE OF LUNG, UNSPECIFIED LATERALITY: ICD-10-CM

## 2022-08-29 DIAGNOSIS — E53.8 B12 DEFICIENCY: Primary | ICD-10-CM

## 2022-08-29 DIAGNOSIS — C91.10 CLL (CHRONIC LYMPHOCYTIC LEUKEMIA): ICD-10-CM

## 2022-08-29 PROCEDURE — 3008F BODY MASS INDEX DOCD: CPT | Mod: CPTII,,, | Performed by: INTERNAL MEDICINE

## 2022-08-29 PROCEDURE — 3008F PR BODY MASS INDEX (BMI) DOCUMENTED: ICD-10-PCS | Mod: CPTII,,, | Performed by: INTERNAL MEDICINE

## 2022-08-29 PROCEDURE — 3078F DIAST BP <80 MM HG: CPT | Mod: CPTII,,, | Performed by: INTERNAL MEDICINE

## 2022-08-29 PROCEDURE — 3078F PR MOST RECENT DIASTOLIC BLOOD PRESSURE < 80 MM HG: ICD-10-PCS | Mod: CPTII,,, | Performed by: INTERNAL MEDICINE

## 2022-08-29 PROCEDURE — 99214 OFFICE O/P EST MOD 30 MIN: CPT | Mod: PBBFAC,PO | Performed by: INTERNAL MEDICINE

## 2022-08-29 PROCEDURE — 1159F PR MEDICATION LIST DOCUMENTED IN MEDICAL RECORD: ICD-10-PCS | Mod: CPTII,,, | Performed by: INTERNAL MEDICINE

## 2022-08-29 PROCEDURE — 3074F SYST BP LT 130 MM HG: CPT | Mod: CPTII,,, | Performed by: INTERNAL MEDICINE

## 2022-08-29 PROCEDURE — 1159F MED LIST DOCD IN RCRD: CPT | Mod: CPTII,,, | Performed by: INTERNAL MEDICINE

## 2022-08-29 PROCEDURE — 99999 PR PBB SHADOW E&M-EST. PATIENT-LVL IV: CPT | Mod: PBBFAC,,, | Performed by: INTERNAL MEDICINE

## 2022-08-29 PROCEDURE — 99214 PR OFFICE/OUTPT VISIT, EST, LEVL IV, 30-39 MIN: ICD-10-PCS | Mod: S$PBB,,, | Performed by: INTERNAL MEDICINE

## 2022-08-29 PROCEDURE — 99214 OFFICE O/P EST MOD 30 MIN: CPT | Mod: S$PBB,,, | Performed by: INTERNAL MEDICINE

## 2022-08-29 PROCEDURE — 99999 PR PBB SHADOW E&M-EST. PATIENT-LVL IV: ICD-10-PCS | Mod: PBBFAC,,, | Performed by: INTERNAL MEDICINE

## 2022-08-29 PROCEDURE — 3074F PR MOST RECENT SYSTOLIC BLOOD PRESSURE < 130 MM HG: ICD-10-PCS | Mod: CPTII,,, | Performed by: INTERNAL MEDICINE

## 2022-08-29 RX ORDER — HYDROCODONE BITARTRATE AND ACETAMINOPHEN 5; 325 MG/1; MG/1
1 TABLET ORAL EVERY 12 HOURS PRN
Qty: 10 TABLET | Refills: 0 | Status: ON HOLD | OUTPATIENT
Start: 2022-08-29 | End: 2022-11-03

## 2022-08-29 NOTE — PROGRESS NOTES
Subjective:       Patient ID: Yvette Hutchinson is a 62 y.o. female.    Chief Complaint:  Patient known to me with CLL stage 0 recently diagnosed with lung cancer August 2022  Follow-up    Patient has chronic complains of chronic pain syndrome and COPD for which periodically she take steroids she is also on BuSpar has issues anxiety has required dilatation of esophageal strictures allergic rhinitis insomnia and history of B12 deficiency documented   Had CT chest done with pulmonary 7/22 showed mass bx done. 8/22    Oncology hx  Impression:ct chest 7/29/22     2.3 cm spiculated left upper lobe lung nodule highly suspicious for bronchogenic carcinoma.     New nonspecific 7 mm nodule in the right middle lobe; this appears more linear on the coronal images and may be a focus of scarring.     Additional stable lung nodules, mildly enlarged axillary lymph nodes, substernal thyroid nodule; these findings are likely benign given the interval stability.   LEFT LUNG MASS, CT-GUIDED BIOPSY:   Non-small cell lung carcinoma.   Comment:  The biopsy reveals invasive carcinoma with apparent glandular but   also vague squamoid features.  Tumor cells are diffusely positive with TTF-1   and focally positive with P40.  The histology differential includes   adenocarcinoma and adenosquamous carcinoma.  PD-L1 and templates NGS studies   are in progress.  The results will be issued separately.      Social history; patient is a smoker denies recreational alcohol use or drug use   Patient is currently on disability  She is allergic to the mask used during COVID pandemic she is also bothered by her mask with COPD    Family history suggestive of ovarian and breast cancer patient advised to see a  or seek   Review of patient's allergies indicates:  No Known Allergies  Medications have been reviewed  Current Outpatient Medications on File Prior to Visit   Medication Sig Dispense Refill    albuterol-ipratropium  (DUO-NEB) 2.5 mg-0.5 mg/3 mL nebulizer solution Take 3 mLs by nebulization every 6 (six) hours as needed for Wheezing. Rescue 120 each 5    ALPRAZolam (XANAX) 0.5 MG tablet Take 1 tablet (0.5 mg total) by mouth 3 (three) times daily. for 10 days 30 tablet 0    buPROPion (WELLBUTRIN XL) 300 MG 24 hr tablet TAKE 1 TABLET(300 MG) BY MOUTH EVERY DAY 90 tablet 0    FLUoxetine 20 MG capsule Take 1 capsule (20 mg total) by mouth once daily. 30 capsule 11    fluticasone propionate (FLONASE) 50 mcg/actuation nasal spray 1 spray (50 mcg total) by Each Nostril route once daily. 16 g 3    fluticasone-salmeterol 230-21 mcg/dose (ADVAIR HFA) 230-21 mcg/actuation HFAA inhaler Inhale 2 puffs into the lungs 2 (two) times daily. Controller 12 g 5    fluticasone-umeclidin-vilanter (TRELEGY ELLIPTA) 100-62.5-25 mcg DsDv Inhale 1 puff into the lungs once daily. 60 each 11    gabapentin (NEURONTIN) 300 MG capsule TAKE 1 CAPSULE(300 MG) BY MOUTH THREE TIMES DAILY 90 capsule 11    guaiFENesin-codeine 100-10 mg/5 ml (TUSSI-ORGANIDIN NR)  mg/5 mL syrup TAKE 5 MLS BY MOUTH EVERY 4 HOURS AS NEEDED FOR COUGH OR CONGESTION      methocarbamoL (ROBAXIN) 500 MG Tab Take 500 mg by mouth 2 (two) times daily.      fluconazole (DIFLUCAN) 100 MG tablet Take 1 tablet (100 mg total) by mouth every other day. (Patient not taking: No sig reported) 3 tablet 0    levocetirizine (XYZAL) 5 MG tablet Take 1 tablet (5 mg total) by mouth every evening. 30 tablet 5     No current facility-administered medications on file prior to visit.       REVIEW OF SYSTEMS:     CONSTITUTIONAL: The patient denies any weight change. There is no apparent    change in appetite, fever, night sweats, headaches, vision fatigued, no dizziness, on and off extreme   weakness.      SKIN: Denies rash, issues with nails, non-healing sores, bleeding, blotching    skin or abnormal bruising. Denies new moles or changes to existing moles.      BREASTS: There is no swelling around breasts  or nipple discharge.    EYES: Denies eye pain, blurred vision, swelling, redness or discharge.      ENT AND MOUTH: Denies runny nose, stuffiness, sinus trouble or sores. Denies    nosebleeds. Denies, hoarseness, change in voice or swelling in front of the    neck.      CARDIOVASCULAR: Denies chest pain, discomfort or palpitations. Denies neck    swelling or episodes of passing out.      RESPIRATORY:  Reports smoker's cough, requiring steroids on and off for COPD exacerbations follows with PCP    GI: Denies trouble swallowing, indigestion, heartburn, abdominal pain, nausea,    vomiting, diarrhea, altered bowel habits, blood in stool, discoloration of    stools, change in nature of stool, bloating, increased abdominal girth.      GENITOURINARY: No discharge. No pelvic pain or lumps. No rash around groin or  lesions. No urinary frequency, hesitation, painful urination or blood in    urine. Denies incontinence. No problems with intercourse.      MUSCULOSKELETAL:  significant back pain, hurs all over  NEUROLOGICAL: Denies tingling, numbness, altered mentation changes to nerve    function in the face, weakness to one or both of the body. Denies changes to    gait and denies multiple falls or accidents.      PSYCHIATRIC: Denies nervousness, anxiety, hallucinations, depression, suicidal    ideation, trouble sleeping or changes in behavior noticed by family.      The patient denies recent foreign travel or recent exposure to chemicals or    products of concern or infectious diseases.   P?E ,  Wt Readings from Last 3 Encounters:   08/29/22 75.2 kg (165 lb 12.6 oz)   08/18/22 72.7 kg (160 lb 4.4 oz)   08/17/22 72.6 kg (160 lb)     Temp Readings from Last 3 Encounters:   08/29/22 97.9 °F (36.6 °C) (Temporal)   08/18/22 98.1 °F (36.7 °C) (Oral)   08/17/22 97.6 °F (36.4 °C) (Temporal)     BP Readings from Last 3 Encounters:   08/29/22 (!) 122/53   08/18/22 (!) 115/58   08/17/22 111/62     Pulse Readings from Last 3 Encounters:    08/29/22 60   08/18/22 62   08/17/22 60     VITAL SIGNS:  as above   GENERAL: appears well-built, well-nourished.  No anxiety, no agitation, and in no distress.  Patient is awake, alert, oriented and cooperative.  HEENT:  Showed no congestion. Trachea is central no obvious icterus or pallor noted no hoarseness. no obvious JVD   NECK:  Supple.  No JVD. No obvious cervical submental or supraclavicular adenopathy.  RS:the visualized portion of  Chest expands well. chest appears symmetric, no audible wheezes.  No dyspnea recognized  ABDOMEN:  abdomen appears undistended.  EXTREMITIES:  Without edema.  NEUROLOGICAL:  The patient is appropriate, higher functions are normal.  No  obvious neurological deficits.  normal judgement normal thought content  No confusion, no speech impediment. Cranial nerves are intact and show no deficit. No gross motor deficits noted   SKIN MUSCULOSKELETAL: no joint or skeletal deformity, no clubbing of nails.  No visible rash ecchymosis or petechiae  Lab Results   Component Value Date    WBC 20.78 (H) 03/03/2020    HGB 14.9 03/03/2020    HCT 47.0 03/03/2020    MCV 99 (H) 03/03/2020     03/03/2020     Smudge cells presnt  CMP  Sodium   Date Value Ref Range Status   08/26/2022 140 136 - 145 mmol/L Final     Potassium   Date Value Ref Range Status   08/26/2022 4.1 3.5 - 5.1 mmol/L Final     Chloride   Date Value Ref Range Status   08/26/2022 104 95 - 110 mmol/L Final     CO2   Date Value Ref Range Status   08/26/2022 26 23 - 29 mmol/L Final     Glucose   Date Value Ref Range Status   08/26/2022 98 70 - 110 mg/dL Final     BUN   Date Value Ref Range Status   08/26/2022 14 8 - 23 mg/dL Final     Creatinine   Date Value Ref Range Status   08/26/2022 0.8 0.5 - 1.4 mg/dL Final     Calcium   Date Value Ref Range Status   08/26/2022 9.0 8.7 - 10.5 mg/dL Final     Total Protein   Date Value Ref Range Status   08/26/2022 6.8 6.0 - 8.4 g/dL Final     Albumin   Date Value Ref Range Status    08/26/2022 4.0 3.5 - 5.2 g/dL Final     Total Bilirubin   Date Value Ref Range Status   08/26/2022 0.4 0.1 - 1.0 mg/dL Final     Comment:     For infants and newborns, interpretation of results should be based  on gestational age, weight and in agreement with clinical  observations.    Premature Infant recommended reference ranges:  Up to 24 hours.............<8.0 mg/dL  Up to 48 hours............<12.0 mg/dL  3-5 days..................<15.0 mg/dL  6-29 days.................<15.0 mg/dL       Alkaline Phosphatase   Date Value Ref Range Status   08/26/2022 110 55 - 135 U/L Final     AST   Date Value Ref Range Status   08/26/2022 22 10 - 40 U/L Final     ALT   Date Value Ref Range Status   08/26/2022 24 10 - 44 U/L Final     Anion Gap   Date Value Ref Range Status   08/26/2022 10 8 - 16 mmol/L Final     eGFR if    Date Value Ref Range Status   06/13/2022 >60 >60 mL/min/1.73 m^2 Final     eGFR if non    Date Value Ref Range Status   06/13/2022 >60 >60 mL/min/1.73 m^2 Final     Comment:     Calculation used to obtain the estimated glomerular filtration  rate (eGFR) is the CKD-EPI equation.            2wk ago    Blood Interpretation A B cell lymphoproliferative disorder is present. see comment.    Comment: Interpreted by: Jeremias Sosa M.D., Signed on 08/06/2020 at 13:07   Blood Comment Flow cytometric analysis of  peripheral blood  detects a lambda  light chain   restricted B lymphocyte population showing expression of CD19 and dim CD20   with aberrant expression of CD5 (dim).  T lymphocytes are   immunophenotypically unremarkable.  The blasts gate is not increased.  The   findings are consistent with patient history of chronic lymphocytic   leukemia/small lymphocytic lymphoma.   Flow differential:  Lymphocytes 69.6%, Monocytes 2.8%, Granulocytes  27.5%,   Blast  0.1%, Debris/nRBC 0.1%,  Viability 97.5%.          Assessment:     CLL  Lymphocytosis over 3 years leukocytosis and smudge cells  suggestive of CLL stage 0 no anemia no thrombocytopenia no generalized lymphadenopathy   Dx of lung ca 8/2022  Plan:       Lung ca; adeno vs adenosquamous, will need to get caris/ nex  Refer to thoracic surgery to evaluate for resectability get expedited pet for staging   See me with results  CLL   stage 0 will confirmed with flow cytometry    4 months rtc   she has no other cytopenias or lymphadenopathy or B symptoms patient can be observed  Patient also states her dermatologist told her she was allergic to the mask use and she also has COPD that makes a feels smothered during mask use this might hinder finding a job suitable I have directed her to discuss with the PCP regarding letters to support inability to use mask    Continue with chronic pain syndrome and COPD management advised to stop smoking if at all possible 10 pills of hydrocodoen given to pt, she needs to follow with pain mx      Advised COVID precaution due to her lung situation she will be a high risk patient

## 2022-08-29 NOTE — Clinical Note
Please see if we can get pet sooner thasn the 9/13, [facility] [place] [facility] [place] [facility]. See me after [facility] appt. Please call pathology and see if they can idem=ntinfy the specimen and get the nexgen reusltsed asap

## 2022-08-30 ENCOUNTER — TELEPHONE (OUTPATIENT)
Dept: HEMATOLOGY/ONCOLOGY | Facility: CLINIC | Age: 62
End: 2022-08-30
Payer: MEDICAID

## 2022-08-30 DIAGNOSIS — C34.10 MALIGNANT NEOPLASM OF UPPER LOBE OF LUNG, UNSPECIFIED LATERALITY: Primary | ICD-10-CM

## 2022-08-30 NOTE — NURSING
Patient notified of the scheduled Consultation with Thoracic surgery for 09/07/2022 at 1015am.  Reminder mailed.  Provided my contact information.

## 2022-08-30 NOTE — TELEPHONE ENCOUNTER
Patient notified of the scheduled PFTs for 09/07/22 at 830am.  Discussed the location of the PFTs. Patient is active on the patient portal and is aware of the scheduled test.

## 2022-08-31 ENCOUNTER — HOSPITAL ENCOUNTER (OUTPATIENT)
Dept: RADIOLOGY | Facility: HOSPITAL | Age: 62
Discharge: HOME | End: 2022-08-31
Attending: NURSE PRACTITIONER
Payer: MEDICAID

## 2022-08-31 DIAGNOSIS — C34.12 MALIGNANT NEOPLASM OF UPPER LOBE OF LEFT LUNG: Primary | ICD-10-CM

## 2022-08-31 DIAGNOSIS — C34.90 MALIGNANT NEOPLASM OF UNSPECIFIED PART OF UNSPECIFIED BRONCHUS OR LUNG: ICD-10-CM

## 2022-08-31 DIAGNOSIS — C34.90 NON-SMALL CELL LUNG CANCER, UNSPECIFIED LATERALITY: ICD-10-CM

## 2022-08-31 LAB — GLUCOSE SERPL-MCNC: 94 MG/DL (ref 70–110)

## 2022-08-31 PROCEDURE — 78815 PET IMAGE W/CT SKULL-THIGH: CPT | Mod: TC,PN

## 2022-08-31 PROCEDURE — 78815 NM PET CT ROUTINE: ICD-10-PCS | Mod: 26,PI,, | Performed by: RADIOLOGY

## 2022-08-31 PROCEDURE — 78815 PET IMAGE W/CT SKULL-THIGH: CPT | Mod: 26,PI,, | Performed by: RADIOLOGY

## 2022-08-31 NOTE — PROGRESS NOTES
PET Imaging Questionnaire    Are you a Diabetic? Recent Blood Sugar level? No    Are you anemic? Bone Marrow Stimulation Meds? No    Have you had a CT Scan, if so when & where was your last one? Yes -     Have you had a PET Scan, if so when & where was your last one? No    Chemotherapy or currently on Chemotherapy? No    Radiation therapy? No    Surgical History:   Past Surgical History:   Procedure Laterality Date    TONSILLECTOMY          Have you been fasting for at least 6 hours? Yes    Is there any chance you may be pregnant or breastfeeding? No    Assay: 14.4 MCi@:7:22   Injection Site:RT AC    Residual: 3.33 mCi@: 7:24   Technologist: Winston Campuzano Injected:11.07mCi

## 2022-08-31 NOTE — PROGRESS NOTES
I called and spoke to patient regarding results of her PET scan- with left hilar avid lymphadenopathy, known adenocarcinoma vs adenosquamous ca FARZANEH which was biopsied 8/17.  Pt is established with Dr. Rhiannon Lee. Patient states she has appointment with Dr. Kaplan on 09/07 and scheduled to get PFT immediately prior to appointment.  I will communicate with Dr. Kaplan as well.  I think she needs EBUS biopsy of left hilar lymph node to complete staging- placing referral to Dr. Yarbrough.   Pt, daughter and her mother present over phone call and addressed multiple questions and concerns, explained above plan.  They are advised to call our office with further questions.

## 2022-09-01 DIAGNOSIS — C34.10 MALIGNANT NEOPLASM OF UPPER LOBE OF LUNG, UNSPECIFIED LATERALITY: Primary | ICD-10-CM

## 2022-09-01 DIAGNOSIS — C34.90 MALIGNANT NEOPLASM OF UNSPECIFIED PART OF UNSPECIFIED BRONCHUS OR LUNG: ICD-10-CM

## 2022-09-02 ENCOUNTER — TELEPHONE (OUTPATIENT)
Dept: CARDIOTHORACIC SURGERY | Facility: CLINIC | Age: 62
End: 2022-09-02
Payer: MEDICAID

## 2022-09-02 NOTE — TELEPHONE ENCOUNTER
----- Message from Paulette Carter sent at 9/2/2022  3:08 PM CDT -----  Regarding: Order-Issue w/ CT on 9/6/22  Contact: PT @ [phone]  Pt is calling to speak to someone in Dr. Kaplan's office to discuss CT on 9/6/22. Pt states that she spoke to Iris at: [phone] in [facility] and has been advised that her insurance denied the CT. Iris did advise pt to speak to someone in Dr. Kaplan's office today, so this can get resolved. Please call. Thanks.

## 2022-09-02 NOTE — TELEPHONE ENCOUNTER
Called and spoke to patient regarding ct on Tuesday. I spoke to insurance and received auth [id number] it is good from 9/2 to 10/31. Patient informed she is good for her appointment on tues. Patient to call back with questions.

## 2022-09-06 ENCOUNTER — HOSPITAL ENCOUNTER (OUTPATIENT)
Dept: RADIOLOGY | Facility: HOSPITAL | Age: 62
Discharge: HOME | End: 2022-09-06
Attending: THORACIC SURGERY (CARDIOTHORACIC VASCULAR SURGERY)
Payer: MEDICAID

## 2022-09-06 DIAGNOSIS — C34.10 MALIGNANT NEOPLASM OF UPPER LOBE OF LUNG, UNSPECIFIED LATERALITY: ICD-10-CM

## 2022-09-06 DIAGNOSIS — C34.90 MALIGNANT NEOPLASM OF UNSPECIFIED PART OF UNSPECIFIED BRONCHUS OR LUNG: ICD-10-CM

## 2022-09-06 PROCEDURE — 71260 CT THORAX DX C+: CPT | Mod: TC

## 2022-09-06 PROCEDURE — 25500020 PHARM REV CODE 255: Performed by: THORACIC SURGERY (CARDIOTHORACIC VASCULAR SURGERY)

## 2022-09-06 RX ADMIN — IOHEXOL 100 ML: 350 INJECTION, SOLUTION INTRAVENOUS at 01:09

## 2022-09-06 NOTE — PROGRESS NOTES
History & Physical    SUBJECTIVE:     History of Present Illness:  Patient is a 62 y.o. female former smoker with CLL, COPD, chronic pain, and esophageal strictures here today for evaluation of FARZANEH NSCLC. Recurrent productive cough prompting chest CT which identified FARZANEH nodule in location where previously there was a cluster of nodules presumed to be infectious/inflammatory. Percutaneous biopsy positive for NSCLC. PET with 2.0 cm FARZANEH nodule with SUV 14.4 and left hilar SUV 5.8. Comes to clinic with a chest CT with contrast and updated PFTs. No EBUS to date. Nocturnal supplemental oxygen.     CPAP? Oxygen?     Smoker.   PSH: tonsillectomy       No chief complaint on file.      Review of patient's allergies indicates:  No Known Allergies    Current Outpatient Medications   Medication Sig Dispense Refill    albuterol-ipratropium (DUO-NEB) 2.5 mg-0.5 mg/3 mL nebulizer solution Take 3 mLs by nebulization every 6 (six) hours as needed for Wheezing. Rescue 120 each 5    ALPRAZolam (XANAX) 0.5 MG tablet Take 1 tablet (0.5 mg total) by mouth 3 (three) times daily. for 10 days 30 tablet 0    buPROPion (WELLBUTRIN XL) 300 MG 24 hr tablet TAKE 1 TABLET(300 MG) BY MOUTH EVERY DAY 90 tablet 0    fluconazole (DIFLUCAN) 100 MG tablet Take 1 tablet (100 mg total) by mouth every other day. (Patient not taking: No sig reported) 3 tablet 0    FLUoxetine 20 MG capsule Take 1 capsule (20 mg total) by mouth once daily. 30 capsule 11    fluticasone propionate (FLONASE) 50 mcg/actuation nasal spray 1 spray (50 mcg total) by Each Nostril route once daily. 16 g 3    fluticasone-salmeterol 230-21 mcg/dose (ADVAIR HFA) 230-21 mcg/actuation HFAA inhaler Inhale 2 puffs into the lungs 2 (two) times daily. Controller 12 g 5    fluticasone-umeclidin-vilanter (TRELEGY ELLIPTA) 100-62.5-25 mcg DsDv Inhale 1 puff into the lungs once daily. 60 each 11    gabapentin (NEURONTIN) 300 MG capsule TAKE 1 CAPSULE(300 MG) BY MOUTH THREE TIMES DAILY 90 capsule  11    guaiFENesin-codeine 100-10 mg/5 ml (TUSSI-ORGANIDIN NR)  mg/5 mL syrup TAKE 5 MLS BY MOUTH EVERY 4 HOURS AS NEEDED FOR COUGH OR CONGESTION      HYDROcodone-acetaminophen (NORCO) 5-325 mg per tablet Take 1 tablet by mouth every 12 (twelve) hours as needed for Pain. 10 tablet 0    levocetirizine (XYZAL) 5 MG tablet Take 1 tablet (5 mg total) by mouth every evening. 30 tablet 5    methocarbamoL (ROBAXIN) 500 MG Tab Take 500 mg by mouth 2 (two) times daily.       No current facility-administered medications for this visit.       Past Medical History:   Diagnosis Date    Arthritis     Depression     GERD (gastroesophageal reflux disease)     Pneumonia of left lung due to infectious organism 03/05/2020     Past Surgical History:   Procedure Laterality Date    TONSILLECTOMY       Family History   Problem Relation Age of Onset    Ovarian cancer Sister     Breast cancer Cousin      Social History     Tobacco Use    Smoking status: Some Days     Packs/day: 0.15     Types: Cigarettes    Smokeless tobacco: Never    Tobacco comments:     reports one cigarette every now and then   Substance Use Topics    Alcohol use: Yes     Comment: rarely    Drug use: Yes     Types: Marijuana        Review of Systems:  Review of Systems   Respiratory:  Positive for cough.         Occasional productive cough   Cardiovascular: Negative.    Gastrointestinal: Negative.    Skin: Negative.    Neurological: Negative.    Hematological: Negative.    Psychiatric/Behavioral: Negative.       OBJECTIVE:     Vital Signs (Most Recent)  There were no vitals filed for this visit.    Physical Exam:  Physical Exam  Constitutional:       Appearance: Normal appearance.   HENT:      Head: Normocephalic and atraumatic.   Eyes:      Extraocular Movements: Extraocular movements intact.      Conjunctiva/sclera: Conjunctivae normal.      Pupils: Pupils are equal, round, and reactive to light.   Cardiovascular:      Rate and Rhythm: Normal rate and regular  rhythm.   Pulmonary:      Effort: Pulmonary effort is normal.      Breath sounds: Normal breath sounds.   Abdominal:      Palpations: Abdomen is soft.   Musculoskeletal:         General: No deformity. Normal range of motion.   Skin:     General: Skin is warm and dry.   Neurological:      General: No focal deficit present.      Mental Status: She is alert and oriented to person, place, and time.   Psychiatric:         Mood and Affect: Mood normal.         Behavior: Behavior normal.         Thought Content: Thought content normal.         Judgment: Judgment normal.     Chest CT 7/29/22:  I reviewed the images  2.3 cm spiculated left upper lobe lung nodule highly suspicious for bronchogenic carcinoma.  New nonspecific 7 mm nodule in the right middle lobe; this appears more linear on the coronal images and may be a focus of scarring.  Additional stable lung nodules, mildly enlarged axillary lymph nodes, substernal thyroid nodule; these findings are likely benign given the interval stability.    PET 8/31/22:  I reviewed the images  2.0 cm left upper lobe mass consistent with known bronchogenic carcinoma.  Metastatic left hilar lymph node.  No distant metastatic disease.  Partially calcified substernal thyroid nodule with mild FDG uptake.  Given the fact that this is in stable on CT and would be challenging to biopsy due to its location, continued follow-up is recommended.  Enlarged axillary lymph nodes with prominent fatty kate and no abnormal FDG uptake, stable.  These may be related to the known history of CLL.  Left adrenal adenoma, unchanged.    Chest CT with contrast 9/6/22:  I reviewed the images  1. Hypermetabolic nodule left upper lobe appears stable upon comparison.  2. Small localized area of hypermetabolic activity in the left infrahilar region is likewise unchanged upon comparison.  3. Indirect findings related to old granulomatous disease.  4. Soft tissue density located within the upper mediastinum that is  in part contiguous that of the lower pole of the thyroid gland likely reflecting the extra thyroid tissue with densely calcified nodule located towards the right of midline.    PFTs:  FEV1: 1.21(52%), DLCO13.17 (60%)      ASSESSMENT/PLAN:     Patient is a 62 y.o. female former smoker with CLL, COPD, chronic pain, and esophageal strictures here today for evaluation of FARZANEH NSCLC with associated left hilar avidity.   Left hilar avidity is at the level of hilar (N1) lymph nodes and does not preclude upfront anatomic lung resection    PLAN:Plan     Clinical stage II NSCLC   6MWT  Plan robotic LULobectomy with MLND

## 2022-09-07 ENCOUNTER — OFFICE VISIT (OUTPATIENT)
Dept: CARDIOTHORACIC SURGERY | Facility: CLINIC | Age: 62
End: 2022-09-07
Payer: MEDICAID

## 2022-09-07 ENCOUNTER — HOSPITAL ENCOUNTER (OUTPATIENT)
Dept: PULMONOLOGY | Facility: CLINIC | Age: 62
Discharge: HOME | End: 2022-09-07
Payer: MEDICAID

## 2022-09-07 VITALS
HEART RATE: 79 BPM | SYSTOLIC BLOOD PRESSURE: 118 MMHG | OXYGEN SATURATION: 97 % | HEIGHT: 63 IN | BODY MASS INDEX: 29.02 KG/M2 | DIASTOLIC BLOOD PRESSURE: 69 MMHG | WEIGHT: 163.81 LBS

## 2022-09-07 DIAGNOSIS — C34.90 MALIGNANT NEOPLASM OF UNSPECIFIED PART OF UNSPECIFIED BRONCHUS OR LUNG: ICD-10-CM

## 2022-09-07 DIAGNOSIS — C34.10 MALIGNANT NEOPLASM OF UPPER LOBE OF LUNG, UNSPECIFIED LATERALITY: ICD-10-CM

## 2022-09-07 DIAGNOSIS — C34.90 NON-SMALL CELL LUNG CANCER, UNSPECIFIED LATERALITY: ICD-10-CM

## 2022-09-07 DIAGNOSIS — Z13.9 SCREENING PROCEDURE: Primary | ICD-10-CM

## 2022-09-07 DIAGNOSIS — Z01.818 PRE-OPERATIVE CLEARANCE: ICD-10-CM

## 2022-09-07 DIAGNOSIS — C34.90 NON-SMALL CELL LUNG CANCER, UNSPECIFIED LATERALITY: Primary | ICD-10-CM

## 2022-09-07 LAB
CTP QC/QA: YES
DLCO ADJ PRE: 13.17 ML/(MIN*MMHG) (ref 15.95–27.42)
DLCO SINGLE BREATH LLN: 15.95
DLCO SINGLE BREATH PRE REF: 60.7 %
DLCO SINGLE BREATH REF: 21.69
DLCOC SBVA LLN: 3.02
DLCOC SBVA PRE REF: 67.4 %
DLCOC SBVA REF: 4.55
DLCOC SINGLE BREATH LLN: 15.95
DLCOC SINGLE BREATH PRE REF: 60.7 %
DLCOC SINGLE BREATH REF: 21.69
DLCOCSBVAULN: 6.07
DLCOCSINGLEBREATHULN: 27.42
DLCOSINGLEBREATHULN: 27.42
DLCOVA LLN: 3.02
DLCOVA PRE REF: 67.4 %
DLCOVA PRE: 3.06 ML/(MIN*MMHG*L) (ref 3.02–6.07)
DLCOVA REF: 4.55
DLCOVAULN: 6.07
DLVAADJ PRE: 3.06 ML/(MIN*MMHG*L) (ref 3.02–6.07)
ERV LLN: -16449.26
ERV PRE REF: 47.9 %
ERV REF: 0.74
ERVULN: ABNORMAL
FEF 25 75 LLN: 1.04
FEF 25 75 PRE REF: 25.9 %
FEF 25 75 REF: 2.09
FET100 CHG: 0.1 %
FEV05 LLN: 0.9
FEV05 REF: 1.75
FEV1 CHG: 8.4 %
FEV1 FVC LLN: 67
FEV1 FVC PRE REF: 72.3 %
FEV1 FVC REF: 79
FEV1 LLN: 1.74
FEV1 PRE REF: 51.9 %
FEV1 REF: 2.33
FEV1 VOL CHG: 0.1
FRCPLETH LLN: 1.82
FRCPLETH PREREF: 116.5 %
FRCPLETH REF: 2.65
FRCPLETHULN: 3.47
FUNGUS SPEC CULT: ABNORMAL
FUNGUS SPEC CULT: ABNORMAL
FVC CHG: 0.1 %
FVC LLN: 2.22
FVC PRE REF: 71.3 %
FVC REF: 2.96
FVC VOL CHG: 0
IVC PRE: 2.34 L (ref 2.22–3.74)
IVC SINGLE BREATH LLN: 2.22
IVC SINGLE BREATH PRE REF: 79 %
IVC SINGLE BREATH REF: 2.96
IVCSINGLEBREATHULN: 3.74
LLN IC: -16448.08
PEF LLN: 4.31
PEF PRE REF: 69.2 %
PEF REF: 5.97
PHYSICIAN COMMENT: ABNORMAL
POST FEF 25 75: 0.61 L/S (ref 1.04–3.53)
POST FET 100: 7.64 SEC
POST FEV1 FVC: 61.91 % (ref 66.7–89.71)
POST FEV1: 1.31 L (ref 1.74–2.89)
POST FEV5: 1 L (ref 0.9–2.61)
POST FVC: 2.12 L (ref 2.22–3.74)
POST PEF: 3.81 L/S (ref 4.31–7.63)
PRE DLCO: 13.17 ML/(MIN*MMHG) (ref 15.95–27.42)
PRE ERV: 0.36 L (ref -16449.26–16450.74)
PRE FEF 25 75: 0.54 L/S (ref 1.04–3.53)
PRE FET 100: 7.63 SEC
PRE FEV05 REF: 54 %
PRE FEV1 FVC: 57.2 % (ref 66.7–89.71)
PRE FEV1: 1.21 L (ref 1.74–2.89)
PRE FEV5: 0.95 L (ref 0.9–2.61)
PRE FRC PL: 3.08 L (ref 1.82–3.47)
PRE FVC: 2.11 L (ref 2.22–3.74)
PRE IC: 2.07 L (ref -16448.08–16451.92)
PRE PEF: 4.13 L/S (ref 4.31–7.63)
PRE REF IC: 107.9 %
PRE RV: 2.73 L (ref 1.33–2.48)
PRE TLC: 5.16 L (ref 3.78–5.76)
PRE VTG: 3.17 L
RAW PRE REF: 257.6 %
RAW PRE: 7.88 CMH2O*S/L (ref 3.06–3.06)
RAW REF: 3.06
REF IC: 1.92
RV LLN: 1.33
RV PRE REF: 143.3 %
RV REF: 1.9
RVTLC LLN: 31
RVTLC PRE REF: 131 %
RVTLC PRE: 52.91 % (ref 30.79–49.97)
RVTLC REF: 40
RVTLCULN: 50
RVULN: 2.48
SARS-COV-2 AG RESP QL IA.RAPID: NEGATIVE
SGAW PRE REF: 34.7 %
SGAW PRE: 0.04 1/(CMH2O*S) (ref 0.1–0.1)
SGAW REF: 0.1
TLC LLN: 3.78
TLC PRE REF: 108.1 %
TLC REF: 4.77
TLC ULN: 5.76
ULN IC: ABNORMAL
VA PRE: 4.3 L (ref 4.62–4.62)
VA SINGLE BREATH LLN: 4.62
VA SINGLE BREATH PRE REF: 93.1 %
VA SINGLE BREATH REF: 4.62
VASINGLEBREATHULN: 4.62
VC LLN: 2.22
VC PRE REF: 82 %
VC PRE: 2.43 L (ref 2.22–3.74)
VC REF: 2.96
VC ULN: 3.74

## 2022-09-07 PROCEDURE — 3078F PR MOST RECENT DIASTOLIC BLOOD PRESSURE < 80 MM HG: ICD-10-PCS | Mod: CPTII,,, | Performed by: THORACIC SURGERY (CARDIOTHORACIC VASCULAR SURGERY)

## 2022-09-07 PROCEDURE — 94726 PLETHYSMOGRAPHY LUNG VOLUMES: CPT | Mod: PBBFAC | Performed by: INTERNAL MEDICINE

## 2022-09-07 PROCEDURE — 94729 PR C02/MEMBANE DIFFUSE CAPACITY: ICD-10-PCS | Mod: 26,S$PBB,, | Performed by: INTERNAL MEDICINE

## 2022-09-07 PROCEDURE — 94729 DIFFUSING CAPACITY: CPT | Mod: PBBFAC | Performed by: INTERNAL MEDICINE

## 2022-09-07 PROCEDURE — 94060 EVALUATION OF WHEEZING: CPT | Mod: PBBFAC | Performed by: INTERNAL MEDICINE

## 2022-09-07 PROCEDURE — 94060 EVALUATION OF WHEEZING: CPT | Mod: 26,S$PBB,, | Performed by: INTERNAL MEDICINE

## 2022-09-07 PROCEDURE — 99999 PR PBB SHADOW E&M-EST. PATIENT-LVL V: CPT | Mod: PBBFAC,,, | Performed by: THORACIC SURGERY (CARDIOTHORACIC VASCULAR SURGERY)

## 2022-09-07 PROCEDURE — 1159F MED LIST DOCD IN RCRD: CPT | Mod: CPTII,,, | Performed by: THORACIC SURGERY (CARDIOTHORACIC VASCULAR SURGERY)

## 2022-09-07 PROCEDURE — 3008F BODY MASS INDEX DOCD: CPT | Mod: CPTII,,, | Performed by: THORACIC SURGERY (CARDIOTHORACIC VASCULAR SURGERY)

## 2022-09-07 PROCEDURE — 3074F PR MOST RECENT SYSTOLIC BLOOD PRESSURE < 130 MM HG: ICD-10-PCS | Mod: CPTII,,, | Performed by: THORACIC SURGERY (CARDIOTHORACIC VASCULAR SURGERY)

## 2022-09-07 PROCEDURE — 87811 SARS-COV-2 COVID19 W/OPTIC: CPT | Mod: PBBFAC

## 2022-09-07 PROCEDURE — 94060 PR EVAL OF BRONCHOSPASM: ICD-10-PCS | Mod: 26,S$PBB,, | Performed by: INTERNAL MEDICINE

## 2022-09-07 PROCEDURE — 1159F PR MEDICATION LIST DOCUMENTED IN MEDICAL RECORD: ICD-10-PCS | Mod: CPTII,,, | Performed by: THORACIC SURGERY (CARDIOTHORACIC VASCULAR SURGERY)

## 2022-09-07 PROCEDURE — 3078F DIAST BP <80 MM HG: CPT | Mod: CPTII,,, | Performed by: THORACIC SURGERY (CARDIOTHORACIC VASCULAR SURGERY)

## 2022-09-07 PROCEDURE — 3008F PR BODY MASS INDEX (BMI) DOCUMENTED: ICD-10-PCS | Mod: CPTII,,, | Performed by: THORACIC SURGERY (CARDIOTHORACIC VASCULAR SURGERY)

## 2022-09-07 PROCEDURE — 94726 PULM FUNCT TST PLETHYSMOGRAP: ICD-10-PCS | Mod: 26,S$PBB,, | Performed by: INTERNAL MEDICINE

## 2022-09-07 PROCEDURE — 94729 DIFFUSING CAPACITY: CPT | Mod: 26,S$PBB,, | Performed by: INTERNAL MEDICINE

## 2022-09-07 PROCEDURE — 94726 PLETHYSMOGRAPHY LUNG VOLUMES: CPT | Mod: 26,S$PBB,, | Performed by: INTERNAL MEDICINE

## 2022-09-07 PROCEDURE — 99205 PR OFFICE/OUTPT VISIT, NEW, LEVL V, 60-74 MIN: ICD-10-PCS | Mod: S$PBB,,, | Performed by: THORACIC SURGERY (CARDIOTHORACIC VASCULAR SURGERY)

## 2022-09-07 PROCEDURE — 99205 OFFICE O/P NEW HI 60 MIN: CPT | Mod: S$PBB,,, | Performed by: THORACIC SURGERY (CARDIOTHORACIC VASCULAR SURGERY)

## 2022-09-07 PROCEDURE — 99215 OFFICE O/P EST HI 40 MIN: CPT | Mod: PBBFAC | Performed by: THORACIC SURGERY (CARDIOTHORACIC VASCULAR SURGERY)

## 2022-09-07 PROCEDURE — 99999 PR PBB SHADOW E&M-EST. PATIENT-LVL V: ICD-10-PCS | Mod: PBBFAC,,, | Performed by: THORACIC SURGERY (CARDIOTHORACIC VASCULAR SURGERY)

## 2022-09-07 PROCEDURE — 3074F SYST BP LT 130 MM HG: CPT | Mod: CPTII,,, | Performed by: THORACIC SURGERY (CARDIOTHORACIC VASCULAR SURGERY)

## 2022-09-07 RX ORDER — MONTELUKAST SODIUM 10 MG/1
10 TABLET ORAL DAILY
COMMUNITY
Start: 2022-08-13 | End: 2023-09-29

## 2022-09-08 ENCOUNTER — DOCUMENTATION ONLY (OUTPATIENT)
Dept: CARDIOTHORACIC SURGERY | Facility: CLINIC | Age: 62
End: 2022-09-08
Payer: MEDICAID

## 2022-09-08 ENCOUNTER — TELEPHONE (OUTPATIENT)
Dept: HEMATOLOGY/ONCOLOGY | Facility: CLINIC | Age: 62
End: 2022-09-08
Payer: MEDICAID

## 2022-09-08 NOTE — TELEPHONE ENCOUNTER
Called pt as requested. Pt asked to r/s appt to next day d/t Grandparent's Day with grandchild. Appt moved.      ----- Message from Sixto Capellan MA sent at 9/8/2022  1:16 PM CDT -----  Contact: AURA ZAMAN [[id number]]  Type: Needs Medical Advice    Who Called: AURA ZAMAN [[id number]]  Best Call Back Number: [phone]  Inquiry/Question: Please call AURA ZAMAN [[id number]] regarding upcoming appt    Thank you~

## 2022-09-08 NOTE — PATIENT CARE CONFERENCE
[facility]      THORACIC MULTIDISCIPLINARY TUMOR BOARD  PATIENT REVIEW FORM  ________________________________________________________________________    CLINIC #: [id number]  DATE: 09/08/2022    DIAGNOSIS:   NSCLC     PRESENTER:   Dr. Kaplan     PATIENT SUMMARY:   Patient is a 62 y.o. female former smoker with CLL, COPD, chronic pain, and esophageal strictures here today for evaluation of FARZANEH NSCLC. Recurrent productive cough prompting chest CT which identified FARZANEH nodule in location where previously there was a cluster of nodules presumed to be infectious/inflammatory. Percutaneous biopsy positive for NSCLC. PET with 2.0 cm FARZANEH nodule with SUV 14.4 and left hilar SUV 5.8. Comes to clinic with a chest CT with contrast and updated PFTs. No EBUS to date.     FEV1: 1.21(52%), DLCO13.17 (60%)    BOARD RECOMMENDATIONS:   Avid level 10L LN highly suspicious for disease representing single station N1 disease. Proceed with resection for T1N1 FARZANEH NSCLC without EBUS as it would not change resection plan. If final path positive will need systemic therapy.   Brain MRI to complete staging.     CONSULT NEEDED:     [x] Surgery    [] Hem/Onc    [] Rad/Onc    [] Dietary                 [] Social Service    [] Psychology       [] Pulmonology    Clinical Stage: Tumor 1 Node(s) 1 Metastasis 0  Pathologic Stage: Tumor  Node(s)  Metastasis     GROUP STAGE:     [] O    [] 1A    [] IB    [] IIA    [x] IIB     [] IIIA     [] IIIB     [] IIIC    [] IV                               [] Local recurrence     [] Regional recurrence     [] Distant recurrence                   [x] NSCLC     [] SCLC     Tumor type     Unstageable:      [] Yes     [] No  Metastatic site(s): no         [x] [facility] Treatment Guidelines reviewed and care planned is consistent with guidelines.         (i.e., NCCN, NCI, PD, ACO, AUA, etc.)    PRESENTATION AT CANCER CONFERENCE:         [] Prospective    [] Retrospective     [] Follow-Up          [] Eligible for  clinical trial

## 2022-09-09 ENCOUNTER — TELEPHONE (OUTPATIENT)
Dept: CARDIOTHORACIC SURGERY | Facility: CLINIC | Age: 62
End: 2022-09-09
Payer: MEDICAID

## 2022-09-09 DIAGNOSIS — C34.10 MALIGNANT NEOPLASM OF UPPER LOBE OF LUNG, UNSPECIFIED LATERALITY: Primary | ICD-10-CM

## 2022-09-09 NOTE — TELEPHONE ENCOUNTER
Patient notified of the scheduled Brain MRI for 09/24/22.  Discussed location.  Directions given. Reminder mailed.

## 2022-09-12 LAB
FINAL PATHOLOGIC DIAGNOSIS: ABNORMAL
GROSS: ABNORMAL
Lab: ABNORMAL
SUPPLEMENTAL DIAGNOSIS: ABNORMAL

## 2022-09-13 ENCOUNTER — OFFICE VISIT (OUTPATIENT)
Dept: HEMATOLOGY/ONCOLOGY | Facility: CLINIC | Age: 62
End: 2022-09-13
Payer: MEDICAID

## 2022-09-13 VITALS
DIASTOLIC BLOOD PRESSURE: 65 MMHG | TEMPERATURE: 97 F | HEIGHT: 63 IN | OXYGEN SATURATION: 93 % | RESPIRATION RATE: 18 BRPM | WEIGHT: 164.25 LBS | SYSTOLIC BLOOD PRESSURE: 124 MMHG | HEART RATE: 61 BPM | BODY MASS INDEX: 29.1 KG/M2

## 2022-09-13 DIAGNOSIS — C91.10 CLL (CHRONIC LYMPHOCYTIC LEUKEMIA): ICD-10-CM

## 2022-09-13 DIAGNOSIS — J41.0 SIMPLE CHRONIC BRONCHITIS: ICD-10-CM

## 2022-09-13 DIAGNOSIS — E53.8 B12 DEFICIENCY: Primary | ICD-10-CM

## 2022-09-13 DIAGNOSIS — C34.10 MALIGNANT NEOPLASM OF UPPER LOBE OF LUNG, UNSPECIFIED LATERALITY: ICD-10-CM

## 2022-09-13 PROCEDURE — 99213 OFFICE O/P EST LOW 20 MIN: CPT | Mod: PBBFAC,PO | Performed by: INTERNAL MEDICINE

## 2022-09-13 PROCEDURE — 3078F DIAST BP <80 MM HG: CPT | Mod: CPTII,,, | Performed by: INTERNAL MEDICINE

## 2022-09-13 PROCEDURE — 3074F PR MOST RECENT SYSTOLIC BLOOD PRESSURE < 130 MM HG: ICD-10-PCS | Mod: CPTII,,, | Performed by: INTERNAL MEDICINE

## 2022-09-13 PROCEDURE — 99214 PR OFFICE/OUTPT VISIT, EST, LEVL IV, 30-39 MIN: ICD-10-PCS | Mod: S$PBB,,, | Performed by: INTERNAL MEDICINE

## 2022-09-13 PROCEDURE — 3008F BODY MASS INDEX DOCD: CPT | Mod: CPTII,,, | Performed by: INTERNAL MEDICINE

## 2022-09-13 PROCEDURE — 3008F PR BODY MASS INDEX (BMI) DOCUMENTED: ICD-10-PCS | Mod: CPTII,,, | Performed by: INTERNAL MEDICINE

## 2022-09-13 PROCEDURE — 99999 PR PBB SHADOW E&M-EST. PATIENT-LVL III: CPT | Mod: PBBFAC,,, | Performed by: INTERNAL MEDICINE

## 2022-09-13 PROCEDURE — 99214 OFFICE O/P EST MOD 30 MIN: CPT | Mod: S$PBB,,, | Performed by: INTERNAL MEDICINE

## 2022-09-13 PROCEDURE — 1159F MED LIST DOCD IN RCRD: CPT | Mod: CPTII,,, | Performed by: INTERNAL MEDICINE

## 2022-09-13 PROCEDURE — 3074F SYST BP LT 130 MM HG: CPT | Mod: CPTII,,, | Performed by: INTERNAL MEDICINE

## 2022-09-13 PROCEDURE — 1159F PR MEDICATION LIST DOCUMENTED IN MEDICAL RECORD: ICD-10-PCS | Mod: CPTII,,, | Performed by: INTERNAL MEDICINE

## 2022-09-13 PROCEDURE — 3078F PR MOST RECENT DIASTOLIC BLOOD PRESSURE < 80 MM HG: ICD-10-PCS | Mod: CPTII,,, | Performed by: INTERNAL MEDICINE

## 2022-09-13 PROCEDURE — 99999 PR PBB SHADOW E&M-EST. PATIENT-LVL III: ICD-10-PCS | Mod: PBBFAC,,, | Performed by: INTERNAL MEDICINE

## 2022-09-13 NOTE — PROGRESS NOTES
Subjective:       Patient ID: Yvette Hutchinson is a 62 y.o. female.    Chief Complaint:  Patient known to me with CLL stage 0 recently diagnosed with lung cancer August 2022  Follow-up    Patient has chronic complains of chronic pain syndrome and COPD for which periodically she take steroids she is also on BuSpar has issues anxiety has required dilatation of esophageal strictures allergic rhinitis insomnia and history of B12 deficiency documented   Had CT chest done with pulmonary 7/22 showed mass bx done. 8/22    Oncology hx  Impression:ct chest 7/29/22     2.3 cm spiculated left upper lobe lung nodule highly suspicious for bronchogenic carcinoma.     New nonspecific 7 mm nodule in the right middle lobe; this appears more linear on the coronal images and may be a focus of scarring.     Additional stable lung nodules, mildly enlarged axillary lymph nodes, substernal thyroid nodule; these findings are likely benign given the interval stability.   LEFT LUNG MASS, CT-GUIDED BIOPSY:   Non-small cell lung carcinoma.   Comment:  The biopsy reveals invasive carcinoma with apparent glandular but   also vague squamoid features.  Tumor cells are diffusely positive with TTF-1   and focally positive with P40.  The histology differential includes   adenocarcinoma and adenosquamous carcinoma.  PD-L1 and templates NGS studies   are in progress.  The results will be issued separately.      Social history; patient is a smoker denies recreational alcohol use or drug use   Patient is currently on disability  She is allergic to the mask used during COVID pandemic she is also bothered by her mask with COPD    Family history suggestive of ovarian and breast cancer patient advised to see a  or seek   Review of patient's allergies indicates:  No Known Allergies  Medications have been reviewed  Current Outpatient Medications on File Prior to Visit   Medication Sig Dispense Refill    albuterol-ipratropium  (DUO-NEB) 2.5 mg-0.5 mg/3 mL nebulizer solution Take 3 mLs by nebulization every 6 (six) hours as needed for Wheezing. Rescue 120 each 5    ALPRAZolam (XANAX) 0.5 MG tablet Take 1 tablet (0.5 mg total) by mouth 3 (three) times daily. for 10 days 30 tablet 0    buPROPion (WELLBUTRIN XL) 300 MG 24 hr tablet TAKE 1 TABLET(300 MG) BY MOUTH EVERY DAY 90 tablet 0    fluconazole (DIFLUCAN) 100 MG tablet Take 1 tablet (100 mg total) by mouth every other day. 3 tablet 0    FLUoxetine 20 MG capsule Take 1 capsule (20 mg total) by mouth once daily. 30 capsule 11    fluticasone propionate (FLONASE) 50 mcg/actuation nasal spray 1 spray (50 mcg total) by Each Nostril route once daily. 16 g 3    fluticasone-salmeterol 230-21 mcg/dose (ADVAIR HFA) 230-21 mcg/actuation HFAA inhaler Inhale 2 puffs into the lungs 2 (two) times daily. Controller 12 g 5    fluticasone-umeclidin-vilanter (TRELEGY ELLIPTA) 100-62.5-25 mcg DsDv Inhale 1 puff into the lungs once daily. 60 each 11    gabapentin (NEURONTIN) 300 MG capsule TAKE 1 CAPSULE(300 MG) BY MOUTH THREE TIMES DAILY 90 capsule 11    guaiFENesin-codeine 100-10 mg/5 ml (TUSSI-ORGANIDIN NR)  mg/5 mL syrup TAKE 5 MLS BY MOUTH EVERY 4 HOURS AS NEEDED FOR COUGH OR CONGESTION      HYDROcodone-acetaminophen (NORCO) 5-325 mg per tablet Take 1 tablet by mouth every 12 (twelve) hours as needed for Pain. 10 tablet 0    levocetirizine (XYZAL) 5 MG tablet Take 1 tablet (5 mg total) by mouth every evening. 30 tablet 5    methocarbamoL (ROBAXIN) 500 MG Tab Take 500 mg by mouth 2 (two) times daily.      montelukast (SINGULAIR) 10 mg tablet Take 10 mg by mouth once daily.       No current facility-administered medications on file prior to visit.       REVIEW OF SYSTEMS:     CONSTITUTIONAL: The patient denies any weight change. There is no apparent    change in appetite, fever, night sweats, headaches, vision fatigued, no dizziness, on and off extreme   weakness.      SKIN: Denies rash, issues with  nails, non-healing sores, bleeding, blotching    skin or abnormal bruising. Denies new moles or changes to existing moles.      BREASTS: There is no swelling around breasts or nipple discharge.    EYES: Denies eye pain, blurred vision, swelling, redness or discharge.      ENT AND MOUTH: Denies runny nose, stuffiness, sinus trouble or sores. Denies    nosebleeds. Denies, hoarseness, change in voice or swelling in front of the    neck.      CARDIOVASCULAR: Denies chest pain, discomfort or palpitations. Denies neck    swelling or episodes of passing out.      RESPIRATORY:  Reports smoker's cough, requiring steroids on and off for COPD exacerbations follows with PCP    GI: Denies trouble swallowing, indigestion, heartburn, abdominal pain, nausea,    vomiting, diarrhea, altered bowel habits, blood in stool, discoloration of    stools, change in nature of stool, bloating, increased abdominal girth.      GENITOURINARY: No discharge. No pelvic pain or lumps. No rash around groin or  lesions. No urinary frequency, hesitation, painful urination or blood in    urine. Denies incontinence. No problems with intercourse.      MUSCULOSKELETAL:  significant back pain, hurs all over  NEUROLOGICAL: Denies tingling, numbness, altered mentation changes to nerve    function in the face, weakness to one or both of the body. Denies changes to    gait and denies multiple falls or accidents.      PSYCHIATRIC: Denies nervousness, anxiety, hallucinations, depression, suicidal    ideation, trouble sleeping or changes in behavior noticed by family.      The patient denies recent foreign travel or recent exposure to chemicals or    products of concern or infectious diseases.   P?E ,  Wt Readings from Last 3 Encounters:   09/13/22 74.5 kg (164 lb 3.9 oz)   09/07/22 74.3 kg (163 lb 12.8 oz)   08/29/22 75.2 kg (165 lb 12.6 oz)     Temp Readings from Last 3 Encounters:   09/13/22 97.3 °F (36.3 °C) (Temporal)   08/29/22 97.9 °F (36.6 °C) (Temporal)    08/18/22 98.1 °F (36.7 °C) (Oral)     BP Readings from Last 3 Encounters:   09/13/22 124/65   09/07/22 118/69   08/29/22 (!) 122/53     Pulse Readings from Last 3 Encounters:   09/13/22 61   09/07/22 79   08/29/22 60     VITAL SIGNS:  as above   GENERAL: appears well-built, well-nourished.  No anxiety, no agitation, and in no distress.  Patient is awake, alert, oriented and cooperative.  HEENT:  Showed no congestion. Trachea is central no obvious icterus or pallor noted no hoarseness. no obvious JVD   NECK:  Supple.  No JVD. No obvious cervical submental or supraclavicular adenopathy.  RS:the visualized portion of  Chest expands well. chest appears symmetric, no audible wheezes.  No dyspnea recognized  ABDOMEN:  abdomen appears undistended.  EXTREMITIES:  Without edema.  NEUROLOGICAL:  The patient is appropriate, higher functions are normal.  No  obvious neurological deficits.  normal judgement normal thought content  No confusion, no speech impediment. Cranial nerves are intact and show no deficit. No gross motor deficits noted   SKIN MUSCULOSKELETAL: no joint or skeletal deformity, no clubbing of nails.  No visible rash ecchymosis or petechiae  Lab Results   Component Value Date    WBC 20.78 (H) 03/03/2020    HGB 14.9 03/03/2020    HCT 47.0 03/03/2020    MCV 99 (H) 03/03/2020     03/03/2020     Smudge cells presnt  CMP  Sodium   Date Value Ref Range Status   08/26/2022 140 136 - 145 mmol/L Final     Potassium   Date Value Ref Range Status   08/26/2022 4.1 3.5 - 5.1 mmol/L Final     Chloride   Date Value Ref Range Status   08/26/2022 104 95 - 110 mmol/L Final     CO2   Date Value Ref Range Status   08/26/2022 26 23 - 29 mmol/L Final     Glucose   Date Value Ref Range Status   08/26/2022 98 70 - 110 mg/dL Final     BUN   Date Value Ref Range Status   08/26/2022 14 8 - 23 mg/dL Final     Creatinine   Date Value Ref Range Status   08/26/2022 0.8 0.5 - 1.4 mg/dL Final     Calcium   Date Value Ref Range Status    08/26/2022 9.0 8.7 - 10.5 mg/dL Final     Total Protein   Date Value Ref Range Status   08/26/2022 6.8 6.0 - 8.4 g/dL Final     Albumin   Date Value Ref Range Status   08/26/2022 4.0 3.5 - 5.2 g/dL Final     Total Bilirubin   Date Value Ref Range Status   08/26/2022 0.4 0.1 - 1.0 mg/dL Final     Comment:     For infants and newborns, interpretation of results should be based  on gestational age, weight and in agreement with clinical  observations.    Premature Infant recommended reference ranges:  Up to 24 hours.............<8.0 mg/dL  Up to 48 hours............<12.0 mg/dL  3-5 days..................<15.0 mg/dL  6-29 days.................<15.0 mg/dL       Alkaline Phosphatase   Date Value Ref Range Status   08/26/2022 110 55 - 135 U/L Final     AST   Date Value Ref Range Status   08/26/2022 22 10 - 40 U/L Final     ALT   Date Value Ref Range Status   08/26/2022 24 10 - 44 U/L Final     Anion Gap   Date Value Ref Range Status   08/26/2022 10 8 - 16 mmol/L Final     eGFR if    Date Value Ref Range Status   06/13/2022 >60 >60 mL/min/1.73 m^2 Final     eGFR if non    Date Value Ref Range Status   06/13/2022 >60 >60 mL/min/1.73 m^2 Final     Comment:     Calculation used to obtain the estimated glomerular filtration  rate (eGFR) is the CKD-EPI equation.            2wk ago    Blood Interpretation A B cell lymphoproliferative disorder is present. see comment.    Comment: Interpreted by: Jeremias Sosa M.D., Signed on 08/06/2020 at 13:07   Blood Comment Flow cytometric analysis of  peripheral blood  detects a lambda  light chain   restricted B lymphocyte population showing expression of CD19 and dim CD20   with aberrant expression of CD5 (dim).  T lymphocytes are   immunophenotypically unremarkable.  The blasts gate is not increased.  The   findings are consistent with patient history of chronic lymphocytic   leukemia/small lymphocytic lymphoma.   Flow differential:  Lymphocytes 69.6%,  Monocytes 2.8%, Granulocytes  27.5%,   Blast  0.1%, Debris/nRBC 0.1%,  Viability 97.5%.          Assessment:     CLL  Lymphocytosis over 3 years leukocytosis and smudge cells suggestive of CLL stage 0 no anemia no thrombocytopenia no generalized lymphadenopathy   Dx of lung ca 8/2022  Plan:       Lung ca; adeno vs adenosquamous, will need to get caris/ going for robotic FARZANEH 10/3/22  Going through preop studies currently   See me with results  CLL   stage 0 will confirmed with flow cytometry  NTD   she has no other cytopenias or lymphadenopathy or B symptoms patient can be observed  Patient also states her dermatologist told her she was allergic to the mask use and she also has COPD that makes a feels smothered during mask use this might hinder finding a job suitable I have directed her to discuss with the PCP regarding letters to support inability to use mask    Continue with chronic pain syndrome and COPD management advised to stop smoking if at all possible 10 pills of hydrocodoen given to pt, she needs to follow with pain mx      Advised COVID precaution due to her lung situation she will be a high risk patient

## 2022-09-15 ENCOUNTER — TELEPHONE (OUTPATIENT)
Dept: CARDIOLOGY | Facility: HOSPITAL | Age: 62
End: 2022-09-15
Payer: MEDICAID

## 2022-09-19 ENCOUNTER — HOSPITAL ENCOUNTER (OUTPATIENT)
Dept: CARDIOLOGY | Facility: HOSPITAL | Age: 62
Discharge: HOME | End: 2022-09-19
Attending: THORACIC SURGERY (CARDIOTHORACIC VASCULAR SURGERY)
Payer: MEDICAID

## 2022-09-19 ENCOUNTER — HOSPITAL ENCOUNTER (OUTPATIENT)
Dept: PULMONOLOGY | Facility: CLINIC | Age: 62
Discharge: HOME | End: 2022-09-19
Payer: MEDICAID

## 2022-09-19 VITALS — HEIGHT: 63 IN | BODY MASS INDEX: 29.06 KG/M2 | WEIGHT: 164 LBS

## 2022-09-19 VITALS
HEART RATE: 65 BPM | HEIGHT: 67 IN | BODY MASS INDEX: 25.74 KG/M2 | SYSTOLIC BLOOD PRESSURE: 107 MMHG | RESPIRATION RATE: 18 BRPM | WEIGHT: 164 LBS | DIASTOLIC BLOOD PRESSURE: 63 MMHG

## 2022-09-19 DIAGNOSIS — Z01.818 PRE-OPERATIVE CLEARANCE: ICD-10-CM

## 2022-09-19 DIAGNOSIS — C34.90 MALIGNANT NEOPLASM OF UNSPECIFIED PART OF UNSPECIFIED BRONCHUS OR LUNG: ICD-10-CM

## 2022-09-19 DIAGNOSIS — C34.90 NON-SMALL CELL LUNG CANCER, UNSPECIFIED LATERALITY: ICD-10-CM

## 2022-09-19 LAB
CV STRESS BASE HR: 56 BPM
DIASTOLIC BLOOD PRESSURE: 65 MMHG
EJECTION FRACTION- HIGH: 65 %
END DIASTOLIC INDEX-HIGH: 153 ML/M2
END DIASTOLIC INDEX-LOW: 93 ML/M2
END SYSTOLIC INDEX-HIGH: 71 ML/M2
END SYSTOLIC INDEX-LOW: 31 ML/M2
NUC REST DIASTOLIC VOLUME INDEX: 81
NUC REST EJECTION FRACTION: 67
NUC REST SYSTOLIC VOLUME INDEX: 27
NUC STRESS DIASTOLIC VOLUME INDEX: 86
NUC STRESS EJECTION FRACTION: 71 %
NUC STRESS SYSTOLIC VOLUME INDEX: 25
OHS CV CPX 1 MINUTE RECOVERY HEART RATE: 80 BPM
OHS CV CPX 85 PERCENT MAX PREDICTED HEART RATE MALE: 128
OHS CV CPX MAX PREDICTED HEART RATE: 151
OHS CV CPX PATIENT IS FEMALE: 1
OHS CV CPX PATIENT IS MALE: 0
OHS CV CPX PEAK DIASTOLIC BLOOD PRESSURE: 65 MMHG
OHS CV CPX PEAK HEAR RATE: 64 BPM
OHS CV CPX PEAK RATE PRESSURE PRODUCT: 6976
OHS CV CPX PEAK SYSTOLIC BLOOD PRESSURE: 109 MMHG
OHS CV CPX PERCENT MAX PREDICTED HEART RATE ACHIEVED: 43
OHS CV CPX RATE PRESSURE PRODUCT PRESENTING: 5600
RETIRED EF AND QEF - SEE NOTES: 53 %
SYSTOLIC BLOOD PRESSURE: 100 MMHG

## 2022-09-19 PROCEDURE — 94618 PULMONARY STRESS TESTING: CPT | Mod: PBBFAC | Performed by: INTERNAL MEDICINE

## 2022-09-19 PROCEDURE — 93018 CV STRESS TEST I&R ONLY: CPT | Mod: ,,, | Performed by: INTERNAL MEDICINE

## 2022-09-19 PROCEDURE — A9502 TC99M TETROFOSMIN: HCPCS

## 2022-09-19 PROCEDURE — 94618 PULMONARY STRESS TESTING: CPT | Mod: 26,S$PBB,, | Performed by: INTERNAL MEDICINE

## 2022-09-19 PROCEDURE — 63600175 PHARM REV CODE 636 W HCPCS: Performed by: THORACIC SURGERY (CARDIOTHORACIC VASCULAR SURGERY)

## 2022-09-19 PROCEDURE — 78452 STRESS TEST WITH MYOCARDIAL PERFUSION (CUPID ONLY): ICD-10-PCS | Mod: 26,,, | Performed by: INTERNAL MEDICINE

## 2022-09-19 PROCEDURE — 93016 CV STRESS TEST SUPVJ ONLY: CPT | Mod: ,,, | Performed by: INTERNAL MEDICINE

## 2022-09-19 PROCEDURE — 78452 HT MUSCLE IMAGE SPECT MULT: CPT | Mod: 26,,, | Performed by: INTERNAL MEDICINE

## 2022-09-19 PROCEDURE — 94618 PULMONARY STRESS TESTING: ICD-10-PCS | Mod: 26,S$PBB,, | Performed by: INTERNAL MEDICINE

## 2022-09-19 PROCEDURE — 93018 STRESS TEST WITH MYOCARDIAL PERFUSION (CUPID ONLY): ICD-10-PCS | Mod: ,,, | Performed by: INTERNAL MEDICINE

## 2022-09-19 PROCEDURE — 93016 STRESS TEST WITH MYOCARDIAL PERFUSION (CUPID ONLY): ICD-10-PCS | Mod: ,,, | Performed by: INTERNAL MEDICINE

## 2022-09-19 RX ORDER — REGADENOSON 0.08 MG/ML
0.4 INJECTION, SOLUTION INTRAVENOUS ONCE
Status: COMPLETED | OUTPATIENT
Start: 2022-09-19 | End: 2022-09-19

## 2022-09-19 RX ORDER — CAFFEINE CITRATE 20 MG/ML
60 SOLUTION INTRAVENOUS ONCE
Status: COMPLETED | OUTPATIENT
Start: 2022-09-19 | End: 2022-09-19

## 2022-09-19 RX ADMIN — CAFFEINE CITRATE 60 MG: 20 INJECTION INTRAVENOUS at 12:09

## 2022-09-19 RX ADMIN — REGADENOSON 0.4 MG: 0.08 INJECTION, SOLUTION INTRAVENOUS at 12:09

## 2022-09-19 NOTE — PROCEDURES
Yvette Hutchinson is a 62 y.o.  female patient, who presents for a 6 minute walk test ordered by MD Eusebio.  The diagnosis is COPD; Lung Cancer.  The patient's BMI is 29.1 kg/m2.  Predicted distance (lower limit of normal) is 329.13 meters.      Test Results:    The test was completed without stopping.  The total time walked was 360 seconds.  During walking, the patient reported:  Dyspnea.  The patient used no assistive devices during testing.     09/19/2022---------Distance: 457.2 meters (1500 feet)     O2 Sat % Supplemental Oxygen Heart Rate Blood Pressure John Scale   Pre-exercise  (Resting) 95 % Room Air 65 bpm 121/56 mmHg 0   During Exercise 94 % Room Air 99 bpm 121/67 mmHg 3   Post-exercise  (Recovery) 96 % Room Air  83 bpm       Recovery Time: 53 seconds    Performing nurse/tech: ESTELA Costa      PREVIOUS STUDY:   The patient has not had a previous study.      CLINICAL INTERPRETATION:  Six minute walk distance is 457.2 meters (1500 feet) with moderate dyspnea.  During exercise, there was no significant desaturation while breathing room air.  Blood pressure remained stable and Heart rate increased significantly with walking.  The patient did not report non-pulmonary symptoms during exercise.  No previous study performed.  Based upon age and body mass index, exercise capacity is normal.

## 2022-09-20 ENCOUNTER — PATIENT MESSAGE (OUTPATIENT)
Dept: SURGERY | Facility: HOSPITAL | Age: 62
End: 2022-09-20
Payer: MEDICAID

## 2022-09-24 ENCOUNTER — HOSPITAL ENCOUNTER (OUTPATIENT)
Dept: RADIOLOGY | Facility: HOSPITAL | Age: 62
Discharge: HOME | End: 2022-09-24
Attending: PHYSICIAN ASSISTANT
Payer: MEDICAID

## 2022-09-24 DIAGNOSIS — C34.10 MALIGNANT NEOPLASM OF UPPER LOBE OF LUNG, UNSPECIFIED LATERALITY: ICD-10-CM

## 2022-09-24 PROCEDURE — 70553 MRI BRAIN STEM W/O & W/DYE: CPT | Mod: TC

## 2022-09-24 PROCEDURE — A9585 GADOBUTROL INJECTION: HCPCS | Performed by: PHYSICIAN ASSISTANT

## 2022-09-24 PROCEDURE — 25500020 PHARM REV CODE 255: Performed by: PHYSICIAN ASSISTANT

## 2022-09-24 PROCEDURE — 70553 MRI BRAIN STEM W/O & W/DYE: CPT | Mod: 26,,, | Performed by: RADIOLOGY

## 2022-09-24 PROCEDURE — 70553 MRI BRAIN W WO CONTRAST: ICD-10-PCS | Mod: 26,,, | Performed by: RADIOLOGY

## 2022-09-24 RX ORDER — GADOBUTROL 604.72 MG/ML
7 INJECTION INTRAVENOUS
Status: COMPLETED | OUTPATIENT
Start: 2022-09-24 | End: 2022-09-24

## 2022-09-24 RX ADMIN — GADOBUTROL 7 ML: 604.72 INJECTION INTRAVENOUS at 06:09

## 2022-09-30 ENCOUNTER — TELEPHONE (OUTPATIENT)
Dept: CARDIOTHORACIC SURGERY | Facility: CLINIC | Age: 62
End: 2022-09-30
Payer: MEDICAID

## 2022-10-02 ENCOUNTER — ANESTHESIA EVENT (OUTPATIENT)
Dept: SURGERY | Facility: HOSPITAL | Age: 62
DRG: 164 | End: 2022-10-02
Payer: MEDICAID

## 2022-10-02 NOTE — ANESTHESIA PREPROCEDURE EVALUATION
[facility]  Anesthesia Pre-Operative Evaluation         Patient Name/: Yvette Hutchinson, 1960  MRN: [id number]    SUBJECTIVE:     Pre-operative evaluation for Procedure(s) (LRB):  XI ROBOTIC RATS,WITH LOBECTOMY,LUNG (Left)  LYMPHADENECTOMY (N/A)     10/02/2022    Yvette Hutchinson is a 62 y.o. female w/ a significant PMHx of COPD (home O2 qhs), chronic pain, esophageal strictures, GERD, anxiety, current smoker, CLL, and FARZANEH NSCLC who presents for robotic RATs with Dr Kaplan. Patient now presents for the above procedure(s).    She recently had a Pulmonary Stress Test that showed her exercise capacity was normal.     Functional status:  Independent in all ADLs    NPO status:  Appropriate    Previous Anesthetics:  None    Previous Airway:  None    ________________________________________  Nuclear Stress Test (2022)  Normal myocardial perfusion scan. There is no evidence of myocardial ischemia or infarction.    The gated perfusion images showed an ejection fraction of 67% at rest. The gated perfusion images showed an ejection fraction of 71% post stress. Normal ejection fraction is greater than 53%.    There is normal wall motion at rest and post stress.    LV cavity size is normal at rest and normal at stress.    The EKG portion of this study is negative for ischemia.    The patient reported chest pain during the stress test.    There were no arrhythmias during stress.    There are no prior studies for comparison.      ________________________________________    LDA: None documented.       Drips: None documented.      Patient Active Problem List   Diagnosis    Tobacco dependence    B12 deficiency    COLD (chronic obstructive lung disease)    Chronic pain syndrome    Insomnia    Overweight (BMI 25.0-29.9)    S/P dilatation of esophageal stricture    Seasonal allergic rhinitis due to pollen    History of colon polyps    GERD without esophagitis    Anxiety     Low HDL (under 40)    CLL (chronic lymphocytic leukemia)    Chronic respiratory failure with hypoxia    Malignant neoplasm of upper lobe of lung    Screening procedure    Non-small cell lung cancer       Review of patient's allergies indicates:  No Known Allergies    Current Inpatient Medications:       No current facility-administered medications on file prior to encounter.     Current Outpatient Medications on File Prior to Encounter   Medication Sig Dispense Refill    albuterol-ipratropium (DUO-NEB) 2.5 mg-0.5 mg/3 mL nebulizer solution Take 3 mLs by nebulization every 6 (six) hours as needed for Wheezing. Rescue 120 each 5    ALPRAZolam (XANAX) 0.5 MG tablet Take 1 tablet (0.5 mg total) by mouth 3 (three) times daily. for 10 days 30 tablet 0    fluconazole (DIFLUCAN) 100 MG tablet Take 1 tablet (100 mg total) by mouth every other day. 3 tablet 0    FLUoxetine 20 MG capsule Take 1 capsule (20 mg total) by mouth once daily. 30 capsule 11    fluticasone propionate (FLONASE) 50 mcg/actuation nasal spray 1 spray (50 mcg total) by Each Nostril route once daily. 16 g 3    fluticasone-salmeterol 230-21 mcg/dose (ADVAIR HFA) 230-21 mcg/actuation HFAA inhaler Inhale 2 puffs into the lungs 2 (two) times daily. Controller 12 g 5    fluticasone-umeclidin-vilanter (TRELEGY ELLIPTA) 100-62.5-25 mcg DsDv Inhale 1 puff into the lungs once daily. 60 each 11    guaiFENesin-codeine 100-10 mg/5 ml (TUSSI-ORGANIDIN NR)  mg/5 mL syrup TAKE 5 MLS BY MOUTH EVERY 4 HOURS AS NEEDED FOR COUGH OR CONGESTION      HYDROcodone-acetaminophen (NORCO) 5-325 mg per tablet Take 1 tablet by mouth every 12 (twelve) hours as needed for Pain. 10 tablet 0    levocetirizine (XYZAL) 5 MG tablet Take 1 tablet (5 mg total) by mouth every evening. 30 tablet 5    methocarbamoL (ROBAXIN) 500 MG Tab Take 500 mg by mouth 2 (two) times daily.      montelukast (SINGULAIR) 10 mg tablet Take 10 mg by mouth once daily.         Past Surgical  History:   Procedure Laterality Date    TONSILLECTOMY         Social History:  Tobacco Use: High Risk    Smoking Tobacco Use: Some Days    Smokeless Tobacco Use: Never    Passive Exposure: Not on file       Alcohol Use: Not on file       OBJECTIVE:     Vital Signs Range:  BMI Readings from Last 1 Encounters:   09/19/22 25.69 kg/m²               Significant Labs:        Component Value Date/Time    WBC 30.46 (H) 08/26/2022 1350    HGB 13.4 08/26/2022 1350    HCT 40.5 08/26/2022 1350     08/26/2022 1350     08/26/2022 1350    K 4.1 08/26/2022 1350     08/26/2022 1350    CO2 26 08/26/2022 1350    GLU 98 08/26/2022 1350    BUN 14 08/26/2022 1350    CREATININE 0.8 08/26/2022 1350    CALCIUM 9.0 08/26/2022 1350    ALBUMIN 4.0 08/26/2022 1350    PROT 6.8 08/26/2022 1350    ALKPHOS 110 08/26/2022 1350    BILITOT 0.4 08/26/2022 1350    AST 22 08/26/2022 1350    ALT 24 08/26/2022 1350    INR 1.0 08/17/2022 0946    HGBA1C 4.9 03/04/2021 1021        Please see Results Review for additional labs.     Diagnostic Studies: No relevant studies.    EKG:   Results for orders placed or performed during the hospital encounter of 12/15/17   EKG 12-lead    Collection Time: 12/15/17 10:36 PM    Narrative    Test Reason : R06.02  Blood Pressure :  mmHG  Vent. Rate : 068 BPM     Atrial Rate : 068 BPM     P-R Int : 144 ms          QRS Dur : 080 ms      QT Int : 406 ms       P-R-T Axes : 073 071 052 degrees     QTc Int : 431 ms    Normal sinus rhythm  Possible Left atrial enlargement  Borderline Abnormal ECG  When compared with ECG of 18-AUG-2017 16:57,  No significant change was found  Confirmed by Emanuel Bazan MD ([id number]) on 12/18/2017 8:45:48 PM    Referred By: [email or web address]   SELF           Confirmed By:Emanuel Bazan MD       ECHO:  See subjective, if available.      ASSESSMENT/PLAN:           Pre-op Assessment    I have reviewed the Patient Summary Reports.     I have reviewed the Nursing Notes. I have reviewed the  NPO Status.   I have reviewed the Medications.     Review of Systems  Anesthesia Hx:  No problems with previous Anesthesia  History of prior surgery of interest to airway management or planning: Denies Family Hx of Anesthesia complications.   Denies Personal Hx of Anesthesia complications.   Social:  Smoker, Alcohol Use    Hematology/Oncology:         -- Denies Anemia: Current/Recent Cancer.   Cardiovascular:   Exercise tolerance: good Hypertension Denies Valvular problems/Murmurs.  Denies MI.  Denies CAD.    Denies CABG/stent.  Denies Dysrhythmias.   Denies CHF. ECG has been reviewed.    Pulmonary:   Pneumonia COPD, moderate Denies Recent URI.    Renal/:   Denies Chronic Renal Disease.     Hepatic/GI:   GERD Esophageal strictures   Neurological:   Denies Neuromuscular Disease. Denies Seizures.   Chronic Pain Syndrome   Endocrine:   Denies Diabetes.    Psych:   Psychiatric History anxiety          Physical Exam  General: Well nourished    Airway:  Mallampati: II   Mouth Opening: Normal  TM Distance: > 6 cm  Tongue: Normal  Neck ROM: Normal ROM    Dental:  Edentulous, Dentures    Chest/Lungs:  Normal Respiratory Rate    Heart:  Rate: Normal        Anesthesia Plan  Type of Anesthesia, risks & benefits discussed:    Anesthesia Type: Gen ETT  Intra-op Monitoring Plan: Standard ASA Monitors and Art Line  Post Op Pain Control Plan: multimodal analgesia and IV/PO Opioids PRN  Induction:  IV  Airway Plan: Direct, Video and Fiberoptic, Post-Induction  Informed Consent: Informed consent signed with the Patient and all parties understand the risks and agree with anesthesia plan.  All questions answered. Patient consented to blood products? Yes  ASA Score: 3  Day of Surgery Review of History & Physical: H&P Update referred to the surgeon/provider.    Ready For Surgery From Anesthesia Perspective.     .

## 2022-10-03 ENCOUNTER — ANESTHESIA (OUTPATIENT)
Dept: SURGERY | Facility: HOSPITAL | Age: 62
DRG: 164 | End: 2022-10-03
Payer: MEDICAID

## 2022-10-03 ENCOUNTER — HOSPITAL ENCOUNTER (INPATIENT)
Facility: HOSPITAL | Age: 62
LOS: 4 days | Discharge: HOME HEALTH | DRG: 164 | End: 2022-10-07
Attending: THORACIC SURGERY (CARDIOTHORACIC VASCULAR SURGERY) | Admitting: THORACIC SURGERY (CARDIOTHORACIC VASCULAR SURGERY)
Payer: MEDICAID

## 2022-10-03 DIAGNOSIS — C34.92 NSCLC OF LEFT LUNG: ICD-10-CM

## 2022-10-03 DIAGNOSIS — C34.90 NON-SMALL CELL LUNG CANCER, UNSPECIFIED LATERALITY: ICD-10-CM

## 2022-10-03 DIAGNOSIS — C34.92 NON-SMALL CELL CANCER OF LEFT LUNG: Primary | ICD-10-CM

## 2022-10-03 LAB
ABO + RH BLD: NORMAL
ANION GAP SERPL CALC-SCNC: 9 MMOL/L (ref 8–16)
BLD GP AB SCN CELLS X3 SERPL QL: NORMAL
BUN SERPL-MCNC: 13 MG/DL (ref 8–23)
CALCIUM SERPL-MCNC: 7.7 MG/DL (ref 8.7–10.5)
CHLORIDE SERPL-SCNC: 111 MMOL/L (ref 95–110)
CO2 SERPL-SCNC: 21 MMOL/L (ref 23–29)
CREAT SERPL-MCNC: 0.8 MG/DL (ref 0.5–1.4)
CTP QC/QA: YES
EST. GFR  (NO RACE VARIABLE): >60 ML/MIN/1.73 M^2
GLUCOSE SERPL-MCNC: 149 MG/DL (ref 70–110)
POTASSIUM SERPL-SCNC: 3.9 MMOL/L (ref 3.5–5.1)
SARS-COV-2 AG RESP QL IA.RAPID: NEGATIVE
SODIUM SERPL-SCNC: 141 MMOL/L (ref 136–145)

## 2022-10-03 PROCEDURE — 32674 PR THORACOSCOPY LYMPH NODE EXC: ICD-10-PCS | Mod: ,,, | Performed by: THORACIC SURGERY (CARDIOTHORACIC VASCULAR SURGERY)

## 2022-10-03 PROCEDURE — 25000242 PHARM REV CODE 250 ALT 637 W/ HCPCS: Performed by: PHYSICIAN ASSISTANT

## 2022-10-03 PROCEDURE — 25000003 PHARM REV CODE 250: Performed by: THORACIC SURGERY (CARDIOTHORACIC VASCULAR SURGERY)

## 2022-10-03 PROCEDURE — 88331 PR  PATH CONSULT IN SURG,W FRZ SEC: ICD-10-PCS | Mod: 26,,, | Performed by: PATHOLOGY

## 2022-10-03 PROCEDURE — 36620 INSERTION CATHETER ARTERY: CPT | Mod: 59,,, | Performed by: ANESTHESIOLOGY

## 2022-10-03 PROCEDURE — 27201423 OPTIME MED/SURG SUP & DEVICES STERILE SUPPLY: Performed by: THORACIC SURGERY (CARDIOTHORACIC VASCULAR SURGERY)

## 2022-10-03 PROCEDURE — 99900035 HC TECH TIME PER 15 MIN (STAT)

## 2022-10-03 PROCEDURE — 88342 IMHCHEM/IMCYTCHM 1ST ANTB: CPT | Mod: 26,,, | Performed by: PATHOLOGY

## 2022-10-03 PROCEDURE — 88312 SPECIAL STAINS GROUP 1: CPT | Performed by: PATHOLOGY

## 2022-10-03 PROCEDURE — 88307 TISSUE EXAM BY PATHOLOGIST: CPT | Performed by: PATHOLOGY

## 2022-10-03 PROCEDURE — 94761 N-INVAS EAR/PLS OXIMETRY MLT: CPT

## 2022-10-03 PROCEDURE — 63600175 PHARM REV CODE 636 W HCPCS: Performed by: STUDENT IN AN ORGANIZED HEALTH CARE EDUCATION/TRAINING PROGRAM

## 2022-10-03 PROCEDURE — 88341 IMHCHEM/IMCYTCHM EA ADD ANTB: CPT | Mod: 26,,, | Performed by: PATHOLOGY

## 2022-10-03 PROCEDURE — 86850 RBC ANTIBODY SCREEN: CPT | Performed by: PHYSICIAN ASSISTANT

## 2022-10-03 PROCEDURE — 25000003 PHARM REV CODE 250: Performed by: STUDENT IN AN ORGANIZED HEALTH CARE EDUCATION/TRAINING PROGRAM

## 2022-10-03 PROCEDURE — 71000033 HC RECOVERY, INTIAL HOUR: Performed by: THORACIC SURGERY (CARDIOTHORACIC VASCULAR SURGERY)

## 2022-10-03 PROCEDURE — C9290 INJ, BUPIVACAINE LIPOSOME: HCPCS | Performed by: THORACIC SURGERY (CARDIOTHORACIC VASCULAR SURGERY)

## 2022-10-03 PROCEDURE — 63600175 PHARM REV CODE 636 W HCPCS: Performed by: ANESTHESIOLOGY

## 2022-10-03 PROCEDURE — 36000713 HC OR TIME LEV V EA ADD 15 MIN: Performed by: THORACIC SURGERY (CARDIOTHORACIC VASCULAR SURGERY)

## 2022-10-03 PROCEDURE — 27000221 HC OXYGEN, UP TO 24 HOURS

## 2022-10-03 PROCEDURE — 32663 PR THORACOSCOPY SURG LOBECTOMY: ICD-10-PCS | Mod: LT,,, | Performed by: THORACIC SURGERY (CARDIOTHORACIC VASCULAR SURGERY)

## 2022-10-03 PROCEDURE — 88341 IMHCHEM/IMCYTCHM EA ADD ANTB: CPT | Performed by: PATHOLOGY

## 2022-10-03 PROCEDURE — 32674 THORACOSCOPY LYMPH NODE EXC: CPT | Mod: AS,,, | Performed by: PHYSICIAN ASSISTANT

## 2022-10-03 PROCEDURE — 88309 PR  SURG PATH,LEVEL VI: ICD-10-PCS | Mod: 26,,, | Performed by: PATHOLOGY

## 2022-10-03 PROCEDURE — D9220A PRA ANESTHESIA: ICD-10-PCS | Mod: ,,, | Performed by: ANESTHESIOLOGY

## 2022-10-03 PROCEDURE — 32674 THORACOSCOPY LYMPH NODE EXC: CPT | Mod: ,,, | Performed by: THORACIC SURGERY (CARDIOTHORACIC VASCULAR SURGERY)

## 2022-10-03 PROCEDURE — 88313 PR  SPECIAL STAINS,GROUP II: ICD-10-PCS | Mod: 26,,, | Performed by: PATHOLOGY

## 2022-10-03 PROCEDURE — 88331 PATH CONSLTJ SURG 1 BLK 1SPC: CPT | Mod: 26,,, | Performed by: PATHOLOGY

## 2022-10-03 PROCEDURE — 37000008 HC ANESTHESIA 1ST 15 MINUTES: Performed by: THORACIC SURGERY (CARDIOTHORACIC VASCULAR SURGERY)

## 2022-10-03 PROCEDURE — 25000003 PHARM REV CODE 250: Performed by: PHYSICIAN ASSISTANT

## 2022-10-03 PROCEDURE — 32663 PR THORACOSCOPY SURG LOBECTOMY: ICD-10-PCS | Mod: AS,LT,, | Performed by: PHYSICIAN ASSISTANT

## 2022-10-03 PROCEDURE — 36620 ARTERIAL: ICD-10-PCS | Mod: 59,,, | Performed by: ANESTHESIOLOGY

## 2022-10-03 PROCEDURE — 88313 SPECIAL STAINS GROUP 2: CPT | Mod: 26,,, | Performed by: PATHOLOGY

## 2022-10-03 PROCEDURE — 71000039 HC RECOVERY, EACH ADD'L HOUR: Performed by: THORACIC SURGERY (CARDIOTHORACIC VASCULAR SURGERY)

## 2022-10-03 PROCEDURE — 32663 THORACOSCOPY W/LOBECTOMY: CPT | Mod: LT,,, | Performed by: THORACIC SURGERY (CARDIOTHORACIC VASCULAR SURGERY)

## 2022-10-03 PROCEDURE — 88331 PATH CONSLTJ SURG 1 BLK 1SPC: CPT | Performed by: PATHOLOGY

## 2022-10-03 PROCEDURE — 88307 TISSUE EXAM BY PATHOLOGIST: CPT | Mod: 26,,, | Performed by: PATHOLOGY

## 2022-10-03 PROCEDURE — 71000015 HC POSTOP RECOV 1ST HR: Performed by: THORACIC SURGERY (CARDIOTHORACIC VASCULAR SURGERY)

## 2022-10-03 PROCEDURE — 36000712 HC OR TIME LEV V 1ST 15 MIN: Performed by: THORACIC SURGERY (CARDIOTHORACIC VASCULAR SURGERY)

## 2022-10-03 PROCEDURE — 88342 CHG IMMUNOCYTOCHEMISTRY: ICD-10-PCS | Mod: 26,,, | Performed by: PATHOLOGY

## 2022-10-03 PROCEDURE — 80048 BASIC METABOLIC PNL TOTAL CA: CPT | Performed by: PHYSICIAN ASSISTANT

## 2022-10-03 PROCEDURE — 94640 AIRWAY INHALATION TREATMENT: CPT

## 2022-10-03 PROCEDURE — 63600175 PHARM REV CODE 636 W HCPCS: Performed by: THORACIC SURGERY (CARDIOTHORACIC VASCULAR SURGERY)

## 2022-10-03 PROCEDURE — 32663 THORACOSCOPY W/LOBECTOMY: CPT | Mod: AS,LT,, | Performed by: PHYSICIAN ASSISTANT

## 2022-10-03 PROCEDURE — D9220A PRA ANESTHESIA: Mod: ,,, | Performed by: ANESTHESIOLOGY

## 2022-10-03 PROCEDURE — 88341 PR IHC OR ICC EACH ADD'L SINGLE ANTIBODY  STAINPR: ICD-10-PCS | Mod: 26,,, | Performed by: PATHOLOGY

## 2022-10-03 PROCEDURE — 20600001 HC STEP DOWN PRIVATE ROOM

## 2022-10-03 PROCEDURE — 88312 PR  SPECIAL STAINS,GROUP I: ICD-10-PCS | Mod: 26,,, | Performed by: PATHOLOGY

## 2022-10-03 PROCEDURE — C1729 CATH, DRAINAGE: HCPCS | Performed by: THORACIC SURGERY (CARDIOTHORACIC VASCULAR SURGERY)

## 2022-10-03 PROCEDURE — 37000009 HC ANESTHESIA EA ADD 15 MINS: Performed by: THORACIC SURGERY (CARDIOTHORACIC VASCULAR SURGERY)

## 2022-10-03 PROCEDURE — 88309 TISSUE EXAM BY PATHOLOGIST: CPT | Performed by: PATHOLOGY

## 2022-10-03 PROCEDURE — 32674 PR THORACOSCOPY LYMPH NODE EXC: ICD-10-PCS | Mod: AS,,, | Performed by: PHYSICIAN ASSISTANT

## 2022-10-03 PROCEDURE — 27201037 HC PRESSURE MONITORING SET UP

## 2022-10-03 PROCEDURE — 88307 PR  SURG PATH,LEVEL V: ICD-10-PCS | Mod: 26,,, | Performed by: PATHOLOGY

## 2022-10-03 PROCEDURE — 36415 COLL VENOUS BLD VENIPUNCTURE: CPT | Performed by: THORACIC SURGERY (CARDIOTHORACIC VASCULAR SURGERY)

## 2022-10-03 PROCEDURE — 88312 SPECIAL STAINS GROUP 1: CPT | Mod: 26,,, | Performed by: PATHOLOGY

## 2022-10-03 PROCEDURE — 88342 IMHCHEM/IMCYTCHM 1ST ANTB: CPT | Performed by: PATHOLOGY

## 2022-10-03 PROCEDURE — 25000242 PHARM REV CODE 250 ALT 637 W/ HCPCS: Performed by: STUDENT IN AN ORGANIZED HEALTH CARE EDUCATION/TRAINING PROGRAM

## 2022-10-03 PROCEDURE — 71000016 HC POSTOP RECOV ADDL HR: Performed by: THORACIC SURGERY (CARDIOTHORACIC VASCULAR SURGERY)

## 2022-10-03 PROCEDURE — 88309 TISSUE EXAM BY PATHOLOGIST: CPT | Mod: 26,,, | Performed by: PATHOLOGY

## 2022-10-03 PROCEDURE — 88313 SPECIAL STAINS GROUP 2: CPT | Performed by: PATHOLOGY

## 2022-10-03 RX ORDER — ROCURONIUM BROMIDE 10 MG/ML
INJECTION, SOLUTION INTRAVENOUS
Status: DISCONTINUED | OUTPATIENT
Start: 2022-10-03 | End: 2022-10-03

## 2022-10-03 RX ORDER — SODIUM CHLORIDE 9 MG/ML
INJECTION, SOLUTION INTRAVENOUS CONTINUOUS
Status: DISCONTINUED | OUTPATIENT
Start: 2022-10-03 | End: 2022-10-03

## 2022-10-03 RX ORDER — DEXMEDETOMIDINE HYDROCHLORIDE 100 UG/ML
INJECTION, SOLUTION INTRAVENOUS
Status: DISCONTINUED | OUTPATIENT
Start: 2022-10-03 | End: 2022-10-03

## 2022-10-03 RX ORDER — LEVALBUTEROL INHALATION SOLUTION 0.63 MG/3ML
0.63 SOLUTION RESPIRATORY (INHALATION) EVERY 12 HOURS
Status: DISCONTINUED | OUTPATIENT
Start: 2022-10-03 | End: 2022-10-07 | Stop reason: HOSPADM

## 2022-10-03 RX ORDER — MIDAZOLAM HYDROCHLORIDE 1 MG/ML
INJECTION, SOLUTION INTRAMUSCULAR; INTRAVENOUS
Status: DISCONTINUED | OUTPATIENT
Start: 2022-10-03 | End: 2022-10-03

## 2022-10-03 RX ORDER — FENTANYL CITRATE 50 UG/ML
25 INJECTION, SOLUTION INTRAMUSCULAR; INTRAVENOUS EVERY 5 MIN PRN
Status: DISCONTINUED | OUTPATIENT
Start: 2022-10-03 | End: 2022-10-03 | Stop reason: HOSPADM

## 2022-10-03 RX ORDER — BUPIVACAINE HYDROCHLORIDE 2.5 MG/ML
INJECTION, SOLUTION EPIDURAL; INFILTRATION; INTRACAUDAL
Status: DISCONTINUED | OUTPATIENT
Start: 2022-10-03 | End: 2022-10-03

## 2022-10-03 RX ORDER — IPRATROPIUM BROMIDE AND ALBUTEROL SULFATE 2.5; .5 MG/3ML; MG/3ML
3 SOLUTION RESPIRATORY (INHALATION) ONCE
Status: COMPLETED | OUTPATIENT
Start: 2022-10-03 | End: 2022-10-03

## 2022-10-03 RX ORDER — ENOXAPARIN SODIUM 100 MG/ML
40 INJECTION SUBCUTANEOUS EVERY 24 HOURS
Status: DISCONTINUED | OUTPATIENT
Start: 2022-10-04 | End: 2022-10-07 | Stop reason: HOSPADM

## 2022-10-03 RX ORDER — DEXAMETHASONE SODIUM PHOSPHATE 4 MG/ML
INJECTION, SOLUTION INTRA-ARTICULAR; INTRALESIONAL; INTRAMUSCULAR; INTRAVENOUS; SOFT TISSUE
Status: DISCONTINUED | OUTPATIENT
Start: 2022-10-03 | End: 2022-10-03

## 2022-10-03 RX ORDER — CEFAZOLIN SODIUM/WATER 2 G/20 ML
2 SYRINGE (ML) INTRAVENOUS
Status: COMPLETED | OUTPATIENT
Start: 2022-10-03 | End: 2022-10-03

## 2022-10-03 RX ORDER — CELECOXIB 200 MG/1
400 CAPSULE ORAL
Status: COMPLETED | OUTPATIENT
Start: 2022-10-03 | End: 2022-10-03

## 2022-10-03 RX ORDER — NEOSTIGMINE METHYLSULFATE 0.5 MG/ML
INJECTION, SOLUTION INTRAVENOUS
Status: DISCONTINUED | OUTPATIENT
Start: 2022-10-03 | End: 2022-10-03

## 2022-10-03 RX ORDER — OXYCODONE HYDROCHLORIDE 5 MG/1
5 TABLET ORAL EVERY 4 HOURS PRN
Status: DISCONTINUED | OUTPATIENT
Start: 2022-10-03 | End: 2022-10-07 | Stop reason: HOSPADM

## 2022-10-03 RX ORDER — ONDANSETRON 8 MG/1
8 TABLET, ORALLY DISINTEGRATING ORAL EVERY 8 HOURS PRN
Status: DISCONTINUED | OUTPATIENT
Start: 2022-10-03 | End: 2022-10-07 | Stop reason: HOSPADM

## 2022-10-03 RX ORDER — LIDOCAINE HYDROCHLORIDE 10 MG/ML
1 INJECTION, SOLUTION EPIDURAL; INFILTRATION; INTRACAUDAL; PERINEURAL ONCE AS NEEDED
Status: DISCONTINUED | OUTPATIENT
Start: 2022-10-03 | End: 2022-10-03

## 2022-10-03 RX ORDER — GABAPENTIN 300 MG/1
300 CAPSULE ORAL 3 TIMES DAILY
Status: DISCONTINUED | OUTPATIENT
Start: 2022-10-03 | End: 2022-10-06

## 2022-10-03 RX ORDER — ONDANSETRON 2 MG/ML
INJECTION INTRAMUSCULAR; INTRAVENOUS
Status: DISCONTINUED | OUTPATIENT
Start: 2022-10-03 | End: 2022-10-03

## 2022-10-03 RX ORDER — KETAMINE HCL IN 0.9 % NACL 50 MG/5 ML
SYRINGE (ML) INTRAVENOUS
Status: DISCONTINUED | OUTPATIENT
Start: 2022-10-03 | End: 2022-10-03

## 2022-10-03 RX ORDER — LIDOCAINE HYDROCHLORIDE 20 MG/ML
INJECTION INTRAVENOUS
Status: DISCONTINUED | OUTPATIENT
Start: 2022-10-03 | End: 2022-10-03

## 2022-10-03 RX ORDER — FENTANYL CITRATE 50 UG/ML
INJECTION, SOLUTION INTRAMUSCULAR; INTRAVENOUS
Status: DISCONTINUED | OUTPATIENT
Start: 2022-10-03 | End: 2022-10-03

## 2022-10-03 RX ORDER — HALOPERIDOL 5 MG/ML
0.5 INJECTION INTRAMUSCULAR EVERY 10 MIN PRN
Status: DISCONTINUED | OUTPATIENT
Start: 2022-10-03 | End: 2022-10-03 | Stop reason: HOSPADM

## 2022-10-03 RX ORDER — HYDROMORPHONE HYDROCHLORIDE 1 MG/ML
0.2 INJECTION, SOLUTION INTRAMUSCULAR; INTRAVENOUS; SUBCUTANEOUS EVERY 5 MIN PRN
Status: DISCONTINUED | OUTPATIENT
Start: 2022-10-03 | End: 2022-10-03 | Stop reason: HOSPADM

## 2022-10-03 RX ORDER — PROPOFOL 10 MG/ML
VIAL (ML) INTRAVENOUS
Status: DISCONTINUED | OUTPATIENT
Start: 2022-10-03 | End: 2022-10-03

## 2022-10-03 RX ORDER — DOCUSATE SODIUM 100 MG/1
100 CAPSULE, LIQUID FILLED ORAL 2 TIMES DAILY
Status: DISCONTINUED | OUTPATIENT
Start: 2022-10-03 | End: 2022-10-07 | Stop reason: HOSPADM

## 2022-10-03 RX ORDER — ONDANSETRON 2 MG/ML
4 INJECTION INTRAMUSCULAR; INTRAVENOUS DAILY PRN
Status: DISCONTINUED | OUTPATIENT
Start: 2022-10-03 | End: 2022-10-03 | Stop reason: HOSPADM

## 2022-10-03 RX ORDER — METOCLOPRAMIDE HYDROCHLORIDE 5 MG/ML
5 INJECTION INTRAMUSCULAR; INTRAVENOUS EVERY 6 HOURS PRN
Status: DISCONTINUED | OUTPATIENT
Start: 2022-10-03 | End: 2022-10-07 | Stop reason: HOSPADM

## 2022-10-03 RX ORDER — SUCCINYLCHOLINE CHLORIDE 20 MG/ML
INJECTION INTRAMUSCULAR; INTRAVENOUS
Status: DISCONTINUED | OUTPATIENT
Start: 2022-10-03 | End: 2022-10-03

## 2022-10-03 RX ORDER — MONTELUKAST SODIUM 10 MG/1
10 TABLET ORAL DAILY
Status: DISCONTINUED | OUTPATIENT
Start: 2022-10-03 | End: 2022-10-07 | Stop reason: HOSPADM

## 2022-10-03 RX ORDER — IPRATROPIUM BROMIDE AND ALBUTEROL SULFATE 2.5; .5 MG/3ML; MG/3ML
3 SOLUTION RESPIRATORY (INHALATION) EVERY 6 HOURS PRN
Status: DISCONTINUED | OUTPATIENT
Start: 2022-10-03 | End: 2022-10-04

## 2022-10-03 RX ORDER — ACETAMINOPHEN 500 MG
1000 TABLET ORAL
Status: COMPLETED | OUTPATIENT
Start: 2022-10-03 | End: 2022-10-03

## 2022-10-03 RX ORDER — PHENYLEPHRINE HYDROCHLORIDE 10 MG/ML
INJECTION INTRAVENOUS
Status: DISCONTINUED | OUTPATIENT
Start: 2022-10-03 | End: 2022-10-03

## 2022-10-03 RX ORDER — OXYCODONE HYDROCHLORIDE 10 MG/1
10 TABLET ORAL EVERY 4 HOURS PRN
Status: DISCONTINUED | OUTPATIENT
Start: 2022-10-03 | End: 2022-10-07 | Stop reason: HOSPADM

## 2022-10-03 RX ORDER — ACETAMINOPHEN 500 MG
1000 TABLET ORAL EVERY 8 HOURS
Status: DISCONTINUED | OUTPATIENT
Start: 2022-10-03 | End: 2022-10-07 | Stop reason: HOSPADM

## 2022-10-03 RX ORDER — METHOCARBAMOL 500 MG/1
500 TABLET, FILM COATED ORAL 4 TIMES DAILY PRN
Status: DISCONTINUED | OUTPATIENT
Start: 2022-10-03 | End: 2022-10-03

## 2022-10-03 RX ORDER — BUPROPION HYDROCHLORIDE 150 MG/1
300 TABLET ORAL DAILY
Status: DISCONTINUED | OUTPATIENT
Start: 2022-10-03 | End: 2022-10-07 | Stop reason: HOSPADM

## 2022-10-03 RX ORDER — METHOCARBAMOL 500 MG/1
500 TABLET, FILM COATED ORAL 3 TIMES DAILY
Status: DISCONTINUED | OUTPATIENT
Start: 2022-10-03 | End: 2022-10-04

## 2022-10-03 RX ORDER — MUPIROCIN 20 MG/G
1 OINTMENT TOPICAL 2 TIMES DAILY
Status: DISCONTINUED | OUTPATIENT
Start: 2022-10-03 | End: 2022-10-03

## 2022-10-03 RX ADMIN — LIDOCAINE HYDROCHLORIDE 100 MG: 20 INJECTION INTRAVENOUS at 07:10

## 2022-10-03 RX ADMIN — METHOCARBAMOL 500 MG: 500 TABLET ORAL at 09:10

## 2022-10-03 RX ADMIN — LEVALBUTEROL HYDROCHLORIDE 0.63 MG: 0.63 SOLUTION RESPIRATORY (INHALATION) at 08:10

## 2022-10-03 RX ADMIN — Medication 10 MG: at 09:10

## 2022-10-03 RX ADMIN — PHENYLEPHRINE HYDROCHLORIDE 100 MCG: 10 INJECTION INTRAVENOUS at 07:10

## 2022-10-03 RX ADMIN — IPRATROPIUM BROMIDE AND ALBUTEROL SULFATE 3 ML: 2.5; .5 SOLUTION RESPIRATORY (INHALATION) at 12:10

## 2022-10-03 RX ADMIN — ROCURONIUM BROMIDE 50 MG: 10 INJECTION INTRAVENOUS at 07:10

## 2022-10-03 RX ADMIN — PHENYLEPHRINE HYDROCHLORIDE 100 MCG: 10 INJECTION INTRAVENOUS at 08:10

## 2022-10-03 RX ADMIN — NEOSTIGMINE METHYLSULFATE 4 MG: 0.5 INJECTION, SOLUTION INTRAVENOUS at 11:10

## 2022-10-03 RX ADMIN — HYDROMORPHONE HYDROCHLORIDE 0.2 MG: 1 INJECTION, SOLUTION INTRAMUSCULAR; INTRAVENOUS; SUBCUTANEOUS at 01:10

## 2022-10-03 RX ADMIN — Medication 5 MG: at 09:10

## 2022-10-03 RX ADMIN — ONDANSETRON 8 MG: 8 TABLET, ORALLY DISINTEGRATING ORAL at 09:10

## 2022-10-03 RX ADMIN — ONDANSETRON 4 MG: 2 INJECTION INTRAMUSCULAR; INTRAVENOUS at 11:10

## 2022-10-03 RX ADMIN — Medication 2 G: at 07:10

## 2022-10-03 RX ADMIN — ACETAMINOPHEN 1000 MG: 500 TABLET ORAL at 01:10

## 2022-10-03 RX ADMIN — Medication 10 MG: at 11:10

## 2022-10-03 RX ADMIN — OXYCODONE HYDROCHLORIDE 10 MG: 10 TABLET ORAL at 09:10

## 2022-10-03 RX ADMIN — GABAPENTIN 300 MG: 300 CAPSULE ORAL at 09:10

## 2022-10-03 RX ADMIN — Medication 2 G: at 11:10

## 2022-10-03 RX ADMIN — DEXAMETHASONE SODIUM PHOSPHATE 4 MG: 4 INJECTION INTRA-ARTICULAR; INTRALESIONAL; INTRAMUSCULAR; INTRAVENOUS; SOFT TISSUE at 08:10

## 2022-10-03 RX ADMIN — DEXMEDETOMIDINE HYDROCHLORIDE 8 MCG: 100 INJECTION, SOLUTION INTRAVENOUS at 11:10

## 2022-10-03 RX ADMIN — METHOCARBAMOL 500 MG: 500 TABLET ORAL at 12:10

## 2022-10-03 RX ADMIN — GLYCOPYRROLATE 0.2 MG: 0.2 INJECTION, SOLUTION INTRAMUSCULAR; INTRAVENOUS at 08:10

## 2022-10-03 RX ADMIN — PROPOFOL 140 MG: 10 INJECTION, EMULSION INTRAVENOUS at 07:10

## 2022-10-03 RX ADMIN — GABAPENTIN 400 MG: 300 CAPSULE ORAL at 06:10

## 2022-10-03 RX ADMIN — DEXMEDETOMIDINE HYDROCHLORIDE 4 MCG: 100 INJECTION, SOLUTION INTRAVENOUS at 12:10

## 2022-10-03 RX ADMIN — CELECOXIB 400 MG: 200 CAPSULE ORAL at 06:10

## 2022-10-03 RX ADMIN — BUPROPION HYDROCHLORIDE 300 MG: 150 TABLET, FILM COATED, EXTENDED RELEASE ORAL at 05:10

## 2022-10-03 RX ADMIN — GABAPENTIN 300 MG: 300 CAPSULE ORAL at 02:10

## 2022-10-03 RX ADMIN — GLYCOPYRROLATE 0.6 MG: 0.2 INJECTION, SOLUTION INTRAMUSCULAR; INTRAVENOUS at 11:10

## 2022-10-03 RX ADMIN — FENTANYL CITRATE 100 MCG: 50 INJECTION, SOLUTION INTRAMUSCULAR; INTRAVENOUS at 07:10

## 2022-10-03 RX ADMIN — SODIUM CHLORIDE 0.25 MCG/KG/MIN: 9 INJECTION, SOLUTION INTRAVENOUS at 08:10

## 2022-10-03 RX ADMIN — ACETAMINOPHEN 1000 MG: 500 TABLET ORAL at 06:10

## 2022-10-03 RX ADMIN — ROCURONIUM BROMIDE 5 MG: 10 INJECTION INTRAVENOUS at 10:10

## 2022-10-03 RX ADMIN — DOCUSATE SODIUM 100 MG: 100 CAPSULE ORAL at 09:10

## 2022-10-03 RX ADMIN — OXYCODONE HYDROCHLORIDE 10 MG: 10 TABLET ORAL at 05:10

## 2022-10-03 RX ADMIN — ROCURONIUM BROMIDE 15 MG: 10 INJECTION INTRAVENOUS at 09:10

## 2022-10-03 RX ADMIN — Medication 15 MG: at 08:10

## 2022-10-03 RX ADMIN — SUCCINYLCHOLINE CHLORIDE 160 MG: 20 INJECTION, SOLUTION INTRAMUSCULAR; INTRAVENOUS at 07:10

## 2022-10-03 RX ADMIN — SODIUM CHLORIDE: 9 INJECTION, SOLUTION INTRAVENOUS at 06:10

## 2022-10-03 RX ADMIN — HYDROMORPHONE HYDROCHLORIDE 0.2 MG: 1 INJECTION, SOLUTION INTRAMUSCULAR; INTRAVENOUS; SUBCUTANEOUS at 12:10

## 2022-10-03 RX ADMIN — MIDAZOLAM 2 MG: 1 INJECTION INTRAMUSCULAR; INTRAVENOUS at 06:10

## 2022-10-03 RX ADMIN — DOCUSATE SODIUM 100 MG: 100 CAPSULE ORAL at 01:10

## 2022-10-03 RX ADMIN — ROCURONIUM BROMIDE 20 MG: 10 INJECTION INTRAVENOUS at 08:10

## 2022-10-03 RX ADMIN — OXYCODONE HYDROCHLORIDE 10 MG: 10 TABLET ORAL at 12:10

## 2022-10-03 RX ADMIN — SODIUM CHLORIDE, SODIUM GLUCONATE, SODIUM ACETATE, POTASSIUM CHLORIDE, MAGNESIUM CHLORIDE, SODIUM PHOSPHATE, DIBASIC, AND POTASSIUM PHOSPHATE: .53; .5; .37; .037; .03; .012; .00082 INJECTION, SOLUTION INTRAVENOUS at 07:10

## 2022-10-03 RX ADMIN — MONTELUKAST 10 MG: 10 TABLET, FILM COATED ORAL at 05:10

## 2022-10-03 NOTE — ANESTHESIA PROCEDURE NOTES
Arterial    Diagnosis: lung cancer    Patient location during procedure: done in OR  Procedure start time: 10/3/2022 7:21 AM  Timeout: 10/3/2022 7:20 AM  Procedure end time: 10/3/2022 7:25 AM    Staffing  Authorizing Provider: Jazmín Ibarra MD  Performing Provider: Nathanael Wang MD    Anesthesiologist was present at the time of the procedure.    Preanesthetic Checklist  Completed: patient identified, IV checked, site marked, risks and benefits discussed, surgical consent, monitors and equipment checked, pre-op evaluation, timeout performed and anesthesia consent givenArterial  Skin Prep: chlorhexidine gluconate  Local Infiltration: none  Orientation: left  Location: radial    Catheter Size: 20 G  Catheter placement by Anatomical landmarks. Heme positive aspiration all ports. Insertion Attempts: 1  Assessment  Dressing: secured with tape and tegaderm  Patient: Tolerated well

## 2022-10-03 NOTE — NURSING TRANSFER
Nursing Transfer Note      10/3/2022     Transfer To: 1047    Transfer via bed    Transfer with cardiac monitoring    Transported by RN, PCT    Chart send with patient: Yes    Notified: daughter    Patient reassessed at: 1430

## 2022-10-03 NOTE — ANESTHESIA POSTPROCEDURE EVALUATION
Anesthesia Post Evaluation    Patient: Yvette Hutchinson    Procedure(s) Performed: Procedure(s) (LRB):  XI ROBOTIC RATS,WITH LOBECTOMY,LUNG (Left)  XI ROBOTIC LYMPHADENECTOMY (Left)  BLOCK, NERVE, INTERCOSTAL, 2 OR MORE (Left)    Final Anesthesia Type: general      Patient location during evaluation: PACU  Patient participation: Yes- Able to Participate  Level of consciousness: awake and alert  Post-procedure vital signs: reviewed and stable  Pain management: adequate  Airway patency: patent    PONV status at discharge: No PONV  Anesthetic complications: no      Cardiovascular status: hemodynamically stable  Respiratory status: spontaneous ventilation  Follow-up not needed.          Vitals Value Taken Time   BP 98/53 10/03/22 1346   Temp 36 °C (96.8 °F) 10/03/22 1225   Pulse 62 10/03/22 1351   Resp 11 10/03/22 1351   SpO2 98 % 10/03/22 1351   Vitals shown include unvalidated device data.      No case tracking events are documented in the log.      Pain/Meaghan Score: Pain Rating Prior to Med Admin: 7 (10/3/2022  1:40 PM)  Meaghan Score: 7 (10/3/2022 12:30 PM)

## 2022-10-03 NOTE — TRANSFER OF CARE
"Anesthesia Transfer of Care Note    Patient: Yvette Hutchinson    Procedure(s) Performed: Procedure(s) (LRB):  XI ROBOTIC RATS,WITH LOBECTOMY,LUNG (Left)  XI ROBOTIC LYMPHADENECTOMY (Left)  BLOCK, NERVE, INTERCOSTAL, 2 OR MORE (Left)    Patient location: PACU    Anesthesia Type: general    Transport from OR: Transported from OR on 6-10 L/min O2 by face mask with adequate spontaneous ventilation    Post pain: adequate analgesia    Post assessment: no apparent anesthetic complications    Post vital signs: stable    Level of consciousness: awake and alert    Nausea/Vomiting: no nausea/vomiting    Complications: none    Transfer of care protocol was followedComments: Breathing treatment ordered postoperatively as patient arrived to PACU with audible wheezing, however maintaining adequate saturations with simple facemask.       Last vitals:   Visit Vitals  BP (!) 77/47 (BP Location: Left arm, Patient Position: Lying)   Pulse (!) 52   Temp 36 °C (96.8 °F) (Temporal)   Resp 20   Ht 5' 3" (1.6 m)   Wt 74.4 kg (164 lb)   LMP 01/13/2010 (Approximate)   SpO2 96%   Breastfeeding No   BMI 29.05 kg/m²     "

## 2022-10-03 NOTE — OP NOTE
Date of Surgery: 10/02/2022  Preoperative Diagnosis:  Left upper lobe non-small cell lung cancer  Postoperative Diagnosis: Same  Procedure:  Robotic assisted left upper lobectomy, mediastinal lymph node dissection, intercostal nerve block 2 or more intercostal levels  Surgeon: Evelio Kaplan MD  First Assistant: Carol Bean MD  Bedside assistant:  Ila Stanton PA-C  Anesthesia: GETA, 7 level intercostal nerve block using Exparel/Marcaine/saline  EBL: 10 mL    Surgery in Detail:     The patient has a history of left upper lobe non-small cell lung cancer with hypermetabolic hilar activity on preoperative PET scan.  Anatomic left upper lobectomy is indicated.      The patient was taken to the operating room placed supine and identified.  General anesthesia was established with double-lumen tube insertion.  Tube positioning was confirmed fiberoptically.  The patient was positioned in a right lateral decubitus position all pressure points were padded.  Single lung anesthesia was instituted and the patient's chest prepped and draped.  A time-out was performed.  A combination of 8 mm and 12 mm robotic ports were placed in the 8th interspace between the anterior axillary line and the paravertebral gutter.  A 12 mm assistant port was placed in the 10th interspace posterior axillary line.  A 7 level intercostal nerve block was performed.  Carbon dioxide insufflation was administered.  Given the intense hilar PET uptake, dissection commenced at the AP window region.  A very large lymph node was harvested.  Frozen section analysis was negative for malignancy.  The posterior and suprahilar pleurae were opened along with inferior pulmonary ligament.  A very tedious mediastinal and hilar kasia dissection was performed.  There were multiple calcified and enlarged hilar inter lobar and segmental nodes.  The posterior ascending and lingular arterial branches were divided separately with a robotic load.  The proximal and  distal fissure was completed using robotic blue load.  The superior pulmonary vein was divided using a robotic vascular load.  The left upper lobe bronchus was divided using a robotic green load after performing a clamp test.  The truncus anterior was then divided using a robotic vascular load.  An extremely large rubbery hilar node adjacent to the distal left main pulmonary artery was resected.  This enlarged node was placed into an endobag along with the left upper lobectomy specimen and extracted through an enlarged anterior robotic port.  Hemostatic gauze was placed along the mediastinal dissection bed. A #24Fr chest tube was inserted, exteriorized through the assistant's port and secured with silk suture.  All robotic instruments and ports were removed.  All ports were hemostatic.  Left lung ventilation was restored.  The remaining port sites were closed in multiple layers using absorbable suture.  Sterile dressings were applied.    Attending attestation:  I was present for and either directly assisted with or performed the critical and key portions of the procedure.     Bedside assistant attestation: Ila Stanton PA-C functioned as a bedside 1st assistant.  Her duties included robotic docking, instrument exchange, and specimen retrieval throughout the entire surgery.  There was no qualified resident available to function as a bedside first assistant given the case complexity and extent of duties described above. .

## 2022-10-03 NOTE — INTERVAL H&P NOTE
The patient has been examined and the H&P has been reviewed:    I concur with the findings and no changes have occurred since H&P was written.    Surgery risks, benefits and alternative options discussed and understood by patient/family.          There are no hospital problems to display for this patient.    Carol Bean MD, PGY-7  Cardiothoracic Surgery   [phone]

## 2022-10-03 NOTE — ANESTHESIA PROCEDURE NOTES
Intubation    Date/Time: 10/3/2022 7:07 AM  Performed by: Nathanael Wang MD  Authorized by: Jazmín Ibarra MD     Intubation:     Induction:  Rapid sequence induction    Intubated:  Postinduction    Mask Ventilation:  N/a    Attempts:  1    Attempted By:  Resident anesthesiologist    Method of Intubation:  Video laryngoscopy    Blade:  Gilmore 3    Laryngeal View Grade: Grade I - full view of cords      Difficult Airway Encountered?: No      Complications:  None    Airway Device:  Double lumen tube left    Airway Device Size:  37F    Style/Cuff Inflation:  Cuffed    Inflation Amount (mL):  2    Tube secured:  25    Secured at:  The teeth    Placement Verified By:  Capnometry, Revisualization with laryngoscopy and Fiber optic visualization    Complicating Factors:  None    Findings Post-Intubation:  BS equal bilateral and atraumatic/condition of teeth unchanged

## 2022-10-03 NOTE — PLAN OF CARE
Problem: Adult Inpatient Plan of Care  Goal: Plan of Care Review  Outcome: Ongoing, Progressing  Goal: Patient-Specific Goal (Individualized)  Outcome: Ongoing, Progressing  Goal: Optimal Comfort and Wellbeing  Outcome: Ongoing, Progressing     Problem: Fall Injury Risk  Goal: Absence of Fall and Fall-Related Injury  10/3/2022 1809 by Blanche Vázquez RN  Outcome: Ongoing, Progressing  10/3/2022 1808 by Blanche Vázquez RN  Outcome: Ongoing, Progressing     Problem: Pain Acute  Goal: Acceptable Pain Control and Functional Ability  Outcome: Ongoing, Progressing

## 2022-10-03 NOTE — BRIEF OP NOTE
[facility] - Surgery (2nd Fl)  Brief Operative Note    SUMMARY     Surgery Date: 10/3/2022     Surgeon(s) and Role:     * Evelio Kaplan MD - Primary     * JOSE ANTONIO Beard-DAYRON - Assisting     * Carol Bean MD - Fellow        Pre-op Diagnosis:  Non-small cell lung cancer, unspecified laterality [C34.90]  Malignant neoplasm of unspecified part of unspecified bronchus or lung [C34.90]    Post-op Diagnosis:  Post-Op Diagnosis Codes:     * Non-small cell lung cancer, unspecified laterality [C34.90]     * Malignant neoplasm of unspecified part of unspecified bronchus or lung [C34.90]    Procedure(s) (LRB):  XI ROBOTIC RATS,WITH LOBECTOMY,LUNG (Left)  XI ROBOTIC LYMPHADENECTOMY (Left)  BLOCK, NERVE, INTERCOSTAL, 2 OR MORE (Left)    Anesthesia: General    Operative Findings: Left upper lobectomy  Level 5 lymph node sent for frozen section negative for malignancy    Estimated Blood Loss: 10ml    Estimated Blood Loss has been documented.         Specimens:   Specimen (24h ago, onward)       Start     Ordered    10/03/22 1142  Specimen to Pathology, Surgery Pulmonary and Thoracic  Once        Comments: Pre-op Diagnosis: Non-small cell lung cancer, unspecified laterality [C34.90]Malignant neoplasm of unspecified part of unspecified bronchus or lung [C34.90]Post-op Diagnosis:  Procedure(s):XI ROBOTIC RATS,WITH LOBECTOMY,LUNGXI ROBOTIC LYMPHADENECTOMY Number of specimens: 10Name of specimens: 1. Level 5 lymph node - frozen (sent to path)2. Level 5 lymph node - permanent3. Left posterior level 10 lymph node - permanent4. Level 11 lymph node - permanent5. Level 12 lymph node near lingular pulmonary arterial branch - permanent6. Level 12 lymph node near upper division bronchus - permanent7. Level 10 #2 lymph node - permanent8. Left level 8 lymph node - permanent9. Left level 9 lymph node - bleggpoxo61. Left upper lobe - permanent     References:    Click here for ordering Quick Tip   Question:  Procedure Type:   Answer:  Pulmonary and Thoracic    10/03/22 1142                    [id number]    Carol Bean MD, PGY-7  Cardiothoracic Surgery   [phone]

## 2022-10-04 PROCEDURE — 94761 N-INVAS EAR/PLS OXIMETRY MLT: CPT

## 2022-10-04 PROCEDURE — 63600175 PHARM REV CODE 636 W HCPCS: Performed by: STUDENT IN AN ORGANIZED HEALTH CARE EDUCATION/TRAINING PROGRAM

## 2022-10-04 PROCEDURE — 63600175 PHARM REV CODE 636 W HCPCS

## 2022-10-04 PROCEDURE — 27200966 HC CLOSED SUCTION SYSTEM

## 2022-10-04 PROCEDURE — 99900035 HC TECH TIME PER 15 MIN (STAT)

## 2022-10-04 PROCEDURE — 25000003 PHARM REV CODE 250: Performed by: STUDENT IN AN ORGANIZED HEALTH CARE EDUCATION/TRAINING PROGRAM

## 2022-10-04 PROCEDURE — 25000242 PHARM REV CODE 250 ALT 637 W/ HCPCS: Performed by: PHYSICIAN ASSISTANT

## 2022-10-04 PROCEDURE — 94799 UNLISTED PULMONARY SVC/PX: CPT

## 2022-10-04 PROCEDURE — 25000003 PHARM REV CODE 250: Performed by: PHYSICIAN ASSISTANT

## 2022-10-04 PROCEDURE — 20600001 HC STEP DOWN PRIVATE ROOM

## 2022-10-04 PROCEDURE — 27000221 HC OXYGEN, UP TO 24 HOURS

## 2022-10-04 PROCEDURE — 94640 AIRWAY INHALATION TREATMENT: CPT

## 2022-10-04 RX ORDER — HYDROMORPHONE HYDROCHLORIDE 1 MG/ML
0.5 INJECTION, SOLUTION INTRAMUSCULAR; INTRAVENOUS; SUBCUTANEOUS ONCE
Status: COMPLETED | OUTPATIENT
Start: 2022-10-04 | End: 2022-10-04

## 2022-10-04 RX ORDER — LIDOCAINE 50 MG/G
1 PATCH TOPICAL
Status: DISCONTINUED | OUTPATIENT
Start: 2022-10-04 | End: 2022-10-07 | Stop reason: HOSPADM

## 2022-10-04 RX ORDER — METHOCARBAMOL 500 MG/1
500 TABLET, FILM COATED ORAL 4 TIMES DAILY
Status: DISCONTINUED | OUTPATIENT
Start: 2022-10-04 | End: 2022-10-07 | Stop reason: HOSPADM

## 2022-10-04 RX ORDER — ACETYLCYSTEINE 100 MG/ML
4 SOLUTION ORAL; RESPIRATORY (INHALATION)
Status: DISCONTINUED | OUTPATIENT
Start: 2022-10-04 | End: 2022-10-06

## 2022-10-04 RX ADMIN — LEVALBUTEROL HYDROCHLORIDE 0.63 MG: 0.63 SOLUTION RESPIRATORY (INHALATION) at 08:10

## 2022-10-04 RX ADMIN — OXYCODONE HYDROCHLORIDE 10 MG: 10 TABLET ORAL at 03:10

## 2022-10-04 RX ADMIN — GABAPENTIN 300 MG: 300 CAPSULE ORAL at 09:10

## 2022-10-04 RX ADMIN — OXYCODONE HYDROCHLORIDE 10 MG: 10 TABLET ORAL at 11:10

## 2022-10-04 RX ADMIN — MONTELUKAST 10 MG: 10 TABLET, FILM COATED ORAL at 08:10

## 2022-10-04 RX ADMIN — ENOXAPARIN SODIUM 40 MG: 100 INJECTION SUBCUTANEOUS at 08:10

## 2022-10-04 RX ADMIN — DOCUSATE SODIUM 100 MG: 100 CAPSULE ORAL at 08:10

## 2022-10-04 RX ADMIN — METHOCARBAMOL 500 MG: 500 TABLET ORAL at 09:10

## 2022-10-04 RX ADMIN — BUPROPION HYDROCHLORIDE 300 MG: 150 TABLET, FILM COATED, EXTENDED RELEASE ORAL at 08:10

## 2022-10-04 RX ADMIN — ACETYLCYSTEINE 4 ML: 100 INHALANT RESPIRATORY (INHALATION) at 08:10

## 2022-10-04 RX ADMIN — ACETAMINOPHEN 1000 MG: 500 TABLET ORAL at 05:10

## 2022-10-04 RX ADMIN — OXYCODONE 5 MG: 5 TABLET ORAL at 12:10

## 2022-10-04 RX ADMIN — ONDANSETRON 8 MG: 8 TABLET, ORALLY DISINTEGRATING ORAL at 04:10

## 2022-10-04 RX ADMIN — LIDOCAINE 1 PATCH: 50 PATCH CUTANEOUS at 06:10

## 2022-10-04 RX ADMIN — DOCUSATE SODIUM 100 MG: 100 CAPSULE ORAL at 09:10

## 2022-10-04 RX ADMIN — ACETAMINOPHEN 1000 MG: 500 TABLET ORAL at 04:10

## 2022-10-04 RX ADMIN — HYDROMORPHONE HYDROCHLORIDE 0.5 MG: 1 INJECTION, SOLUTION INTRAMUSCULAR; INTRAVENOUS; SUBCUTANEOUS at 04:10

## 2022-10-04 RX ADMIN — GABAPENTIN 300 MG: 300 CAPSULE ORAL at 08:10

## 2022-10-04 RX ADMIN — METHOCARBAMOL 500 MG: 500 TABLET ORAL at 08:10

## 2022-10-04 RX ADMIN — GABAPENTIN 300 MG: 300 CAPSULE ORAL at 02:10

## 2022-10-04 RX ADMIN — ACETAMINOPHEN 1000 MG: 500 TABLET ORAL at 09:10

## 2022-10-04 RX ADMIN — METHOCARBAMOL 500 MG: 500 TABLET ORAL at 02:10

## 2022-10-04 RX ADMIN — LEVALBUTEROL HYDROCHLORIDE 0.63 MG: 0.63 SOLUTION RESPIRATORY (INHALATION) at 07:10

## 2022-10-04 RX ADMIN — OXYCODONE HYDROCHLORIDE 10 MG: 10 TABLET ORAL at 06:10

## 2022-10-04 NOTE — NURSING
PT c/o very uncontrolled pain, increased after moving around in bed. PT also c/o nausea and belching. PRN oxycodone given with no relief. MD on call for SX contacted. PRN zofran given one hour early per MD Collazo and one time dose of 0.5mg IV dilaudid given for pain control. Will continue to manage POC.

## 2022-10-04 NOTE — PLAN OF CARE
Problem: Adult Inpatient Plan of Care  Goal: Plan of Care Review  Outcome: Ongoing, Progressing  Goal: Patient-Specific Goal (Individualized)  Outcome: Ongoing, Progressing  Goal: Optimal Comfort and Wellbeing  Outcome: Ongoing, Progressing     Problem: Fall Injury Risk  Goal: Absence of Fall and Fall-Related Injury  Outcome: Ongoing, Progressing     Problem: Pain Acute  Goal: Acceptable Pain Control and Functional Ability  Outcome: Ongoing, Progressing

## 2022-10-04 NOTE — PLAN OF CARE
[facility]  Initial Discharge Assessment       Primary Care Provider: Vern Priest MD    Admission Diagnosis: Non-small cell lung cancer, unspecified laterality [C34.90]  Malignant neoplasm of unspecified part of unspecified bronchus or lung [C34.90]  NSCLC of left lung [C34.92]    Admission Date: 10/3/2022  Expected Discharge Date:     Discharge Barriers Identified: None    Payor: MEDICAID / Plan: [place] / Product Type: Managed Medicaid /     Extended Emergency Contact Information  Primary Emergency Contact: Tiny Fairchild  Address: [address] [place] [address] [facility]  Home Phone: [phone]  Mobile Phone: [phone]  Relation: Daughter  Secondary Emergency Contact: Sadie Olvera   [facility]  Home Phone: [phone]  Mobile Phone: [phone]  Relation: Mother    Discharge Plan A: Home with family  Discharge Plan B: Home Health      [id number] - [place], [place] - [address] AT [facility] [address]   [address] [place] [address]  Phone: [phone] Fax: [phone]      Initial Assessment (most recent)       Adult Discharge Assessment - 10/04/22 1221          Discharge Assessment    Assessment Type Discharge Planning Brief Assessment     Confirmed/corrected address, phone number and insurance Yes     Confirmed Demographics Correct on Facesheet     Source of Information patient     When was your last doctors appointment? 09/14/22     Does patient/caregiver understand observation status Yes     Communicated SALINA with patient/caregiver Yes     Reason For Admission Non-small cell lung cancer     Lives With parent(s)     Facility Arrived From: Home     Do you expect to return to your current living situation? Yes     Do you have help at home or someone to help you manage your care at home? Yes     Who are your caregiver(s) and their phone number(s)?  [phone]     Prior to hospitilization cognitive status: Alert/Oriented     Current cognitive status: Alert/Oriented     Walking or Climbing Stairs Difficulty none     Dressing/Bathing Difficulty none     Home Layout Able to live on 1st floor     Equipment Currently Used at Home none     Readmission within 30 days? No     Patient currently being followed by outpatient case management? No     Do you currently have service(s) that help you manage your care at home? No     Do you take prescription medications? Yes     Do you have prescription coverage? Yes     Coverage Medicaid     Do you have any problems affording any of your prescribed medications? No     Is the patient taking medications as prescribed? yes     Who is going to help you get home at discharge? [phone]     How do you get to doctors appointments? car, drives self     Are you on dialysis? No     Do you take coumadin? No     Discharge Plan A Home with family     Discharge Plan B Home Health     DME Needed Upon Discharge  other (see comments)   Unknown at this time    Discharge Plan discussed with: Patient     Discharge Barriers Identified None        Relationship/Environment    Name(s) of Who Lives With Patient [phone]                         Spoke to pt. Pt lives at home with mother. Post hospital stay [phone] will be pt support person and pt. has transportation at d/c with daughter. There have been no hospitalizations within the last 30 days per pt. Verified pt PCP and preferred pharmacy. Pt stated not on Coumadin and is not receiving dialysis. All questions answered regarding case management/ discharge planning , pt verbalized understanding. Discharge booklet with SW contact information given to pt.       Kamala Edwards LCSW  Case Management/[facility]  [phone]

## 2022-10-04 NOTE — PROGRESS NOTES
[facility]  Thoracic Surgery  Progress Note    Subjective:     History of Present Illness:  No notes on file    Post-Op Info:  Procedure(s) (LRB):  XI ROBOTIC RATS,WITH LOBECTOMY,LUNG (Left)  XI ROBOTIC LYMPHADENECTOMY (Left)  BLOCK, NERVE, INTERCOSTAL, 2 OR MORE (Left)   1 Day Post-Op     Interval History: NAEON.  140cc of output in chest tube.  Small air leak present on exam this morning.  HDS.  Afebrile.  CXR without significant effusion or pneumo.    Medications:  Continuous Infusions:  Scheduled Meds:   acetaminophen  1,000 mg Oral Q8H    buPROPion  300 mg Oral Daily    docusate sodium  100 mg Oral BID    enoxaparin  40 mg Subcutaneous Daily    gabapentin  300 mg Oral TID    levalbuterol  0.63 mg Nebulization Q12H    methocarbamoL  500 mg Oral TID    montelukast  10 mg Oral Daily     PRN Meds:albuterol-ipratropium, metoclopramide HCl, ondansetron, oxyCODONE, oxyCODONE     Review of patient's allergies indicates:  No Known Allergies  Objective:     Vital Signs (Most Recent):  Temp: 97 °F (36.1 °C) (10/04/22 0749)  Pulse: 101 (10/04/22 0801)  Resp: 19 (10/04/22 0749)  BP: (!) 112/56 (10/04/22 0749)  SpO2: (!) 91 % (10/04/22 0749)   Vital Signs (24h Range):  Temp:  [96.8 °F (36 °C)-99 °F (37.2 °C)] 97 °F (36.1 °C)  Pulse:  [] 101  Resp:  [12-20] 19  SpO2:  [91 %-100 %] 91 %  BP: ()/(47-62) 112/56  Arterial Line BP: ()/() 172/153     Intake/Output - Last 3 Shifts         10/02 0700  10/03 0659 10/03 0700  10/04 0659 10/04 0700  10/05 0659    P.O.  240     IV Piggyback  1600     Total Intake(mL/kg)  1840 (24.7)     Urine (mL/kg/hr)  300 (0.2)     Stool  0     Chest Tube  140     Total Output  440     Net  +1400            Stool Occurrence  0 x             SpO2: (!) 91 %  O2 Device (Oxygen Therapy): nasal cannula    Physical Exam  Constitutional:       Appearance: Normal appearance.   HENT:      Head: Normocephalic and atraumatic.   Eyes:      Extraocular Movements: Extraocular  movements intact.   Cardiovascular:      Rate and Rhythm: Normal rate and regular rhythm.   Pulmonary:      Effort: Pulmonary effort is normal. No respiratory distress.      Comments: CT in place with small air leak present  Abdominal:      General: There is no distension.   Musculoskeletal:         General: Normal range of motion.      Cervical back: Normal range of motion.   Skin:     General: Skin is warm and dry.   Neurological:      General: No focal deficit present.      Mental Status: She is alert and oriented to person, place, and time.   Psychiatric:         Mood and Affect: Mood normal.         Behavior: Behavior normal.       Significant Labs:  Recent Lab Results         10/03/22  1319        Anion Gap 9       BUN 13       Calcium 7.7       Chloride 111       CO2 21       Creatinine 0.8       eGFR >60.0       Glucose 149       Potassium 3.9       Sodium 141               Significant Diagnostics:  I have reviewed all pertinent imaging results/findings within the past 24 hours.    VTE Risk Mitigation (From admission, onward)           Ordered     enoxaparin injection 40 mg  Daily         10/03/22 1158     IP VTE HIGH RISK PATIENT  Once         10/03/22 1158     Place sequential compression device  Until discontinued         10/03/22 1158     Place INDIA hose  Until discontinued         10/03/22 0539     Place sequential compression device  Until discontinued         10/03/22 0539                  Assessment/Plan:     * Malignant neoplasm of upper lobe of lung  Ms. Hutchinson is our 63yo female s/p FARZANEH lobectomy for lung cancer on 10/3/22.    - Regular diet  - Keep chest tube to water seal at this time given air leak  - PRN pain/nausea meds  - OOBTC/ambulate  - Pulmonary toilet  - DVT ppx: lovenox        Ricky Peraza MD  Thoracic Surgery  [facility]

## 2022-10-04 NOTE — NURSING
Pt is c/o pain 8 out of 10. MD Karmen at bedside and aware. Instructed per MD to administer oxycodone 5 mg now, even though pt received oxycodone 10 mg at 1109. Will continue to monitor.

## 2022-10-04 NOTE — SUBJECTIVE & OBJECTIVE
Interval History: NAEON.  140cc of output in chest tube.  Small air leak present on exam this morning.  HDS.  Afebrile.  CXR without significant effusion or pneumo.    Medications:  Continuous Infusions:  Scheduled Meds:   acetaminophen  1,000 mg Oral Q8H    buPROPion  300 mg Oral Daily    docusate sodium  100 mg Oral BID    enoxaparin  40 mg Subcutaneous Daily    gabapentin  300 mg Oral TID    levalbuterol  0.63 mg Nebulization Q12H    methocarbamoL  500 mg Oral TID    montelukast  10 mg Oral Daily     PRN Meds:albuterol-ipratropium, metoclopramide HCl, ondansetron, oxyCODONE, oxyCODONE     Review of patient's allergies indicates:  No Known Allergies  Objective:     Vital Signs (Most Recent):  Temp: 97 °F (36.1 °C) (10/04/22 0749)  Pulse: 101 (10/04/22 0801)  Resp: 19 (10/04/22 0749)  BP: (!) 112/56 (10/04/22 0749)  SpO2: (!) 91 % (10/04/22 0749)   Vital Signs (24h Range):  Temp:  [96.8 °F (36 °C)-99 °F (37.2 °C)] 97 °F (36.1 °C)  Pulse:  [] 101  Resp:  [12-20] 19  SpO2:  [91 %-100 %] 91 %  BP: ()/(47-62) 112/56  Arterial Line BP: ()/() 172/153     Intake/Output - Last 3 Shifts         10/02 0700  10/03 0659 10/03 0700  10/04 0659 10/04 0700  10/05 0659    P.O.  240     IV Piggyback  1600     Total Intake(mL/kg)  1840 (24.7)     Urine (mL/kg/hr)  300 (0.2)     Stool  0     Chest Tube  140     Total Output  440     Net  +1400            Stool Occurrence  0 x             SpO2: (!) 91 %  O2 Device (Oxygen Therapy): nasal cannula    Physical Exam  Constitutional:       Appearance: Normal appearance.   HENT:      Head: Normocephalic and atraumatic.   Eyes:      Extraocular Movements: Extraocular movements intact.   Cardiovascular:      Rate and Rhythm: Normal rate and regular rhythm.   Pulmonary:      Effort: Pulmonary effort is normal. No respiratory distress.      Comments: CT in place with small air leak present  Abdominal:      General: There is no distension.   Musculoskeletal:          General: Normal range of motion.      Cervical back: Normal range of motion.   Skin:     General: Skin is warm and dry.   Neurological:      General: No focal deficit present.      Mental Status: She is alert and oriented to person, place, and time.   Psychiatric:         Mood and Affect: Mood normal.         Behavior: Behavior normal.       Significant Labs:  Recent Lab Results         10/03/22  1319        Anion Gap 9       BUN 13       Calcium 7.7       Chloride 111       CO2 21       Creatinine 0.8       eGFR >60.0       Glucose 149       Potassium 3.9       Sodium 141               Significant Diagnostics:  I have reviewed all pertinent imaging results/findings within the past 24 hours.    VTE Risk Mitigation (From admission, onward)           Ordered     enoxaparin injection 40 mg  Daily         10/03/22 1158     IP VTE HIGH RISK PATIENT  Once         10/03/22 1158     Place sequential compression device  Until discontinued         10/03/22 1158     Place INDIA hose  Until discontinued         10/03/22 0539     Place sequential compression device  Until discontinued         10/03/22 0539

## 2022-10-04 NOTE — ASSESSMENT & PLAN NOTE
Ms. Hutchinson is our 63yo female s/p FARZANEH lobectomy for lung cancer on 10/3/22.    - Regular diet  - Keep chest tube to water seal at this time given air leak  - PRN pain/nausea meds  - OOBTC/ambulate  - Pulmonary toilet  - DVT ppx: lovenox

## 2022-10-04 NOTE — PLAN OF CARE
POC reviewed w PT and daughter, verbalized understanding. AAOX4. VSS on 3LNC. NSR on telemetry.     -Lap sites to L chest area covered with guaze and transparent film CDI  -L chest tube to water seal. Chest tube dressing saturated. Air leak present, MD aware. Subcutaneous emphysema present. Sanguinous output, see flowsheet.   -Pain managed effectively with PRN meds per MAR.   -Clear liquid diet, tolerating well.   -OOB to bathroom with assistance.   -Adequate UOP. No BM.    No adverse events this shift. PT slept well throughout the night in between care. Will continue to manage POC.

## 2022-10-05 LAB
ANION GAP SERPL CALC-SCNC: 7 MMOL/L (ref 8–16)
BUN SERPL-MCNC: 8 MG/DL (ref 8–23)
CALCIUM SERPL-MCNC: 8.2 MG/DL (ref 8.7–10.5)
CHLORIDE SERPL-SCNC: 100 MMOL/L (ref 95–110)
CO2 SERPL-SCNC: 26 MMOL/L (ref 23–29)
CREAT SERPL-MCNC: 0.7 MG/DL (ref 0.5–1.4)
EST. GFR  (NO RACE VARIABLE): >60 ML/MIN/1.73 M^2
GLUCOSE SERPL-MCNC: 129 MG/DL (ref 70–110)
POTASSIUM SERPL-SCNC: 3.7 MMOL/L (ref 3.5–5.1)
SODIUM SERPL-SCNC: 133 MMOL/L (ref 136–145)

## 2022-10-05 PROCEDURE — 20600001 HC STEP DOWN PRIVATE ROOM

## 2022-10-05 PROCEDURE — 25000003 PHARM REV CODE 250: Performed by: STUDENT IN AN ORGANIZED HEALTH CARE EDUCATION/TRAINING PROGRAM

## 2022-10-05 PROCEDURE — 63600175 PHARM REV CODE 636 W HCPCS: Performed by: PHYSICIAN ASSISTANT

## 2022-10-05 PROCEDURE — 27000221 HC OXYGEN, UP TO 24 HOURS

## 2022-10-05 PROCEDURE — 63600175 PHARM REV CODE 636 W HCPCS: Performed by: STUDENT IN AN ORGANIZED HEALTH CARE EDUCATION/TRAINING PROGRAM

## 2022-10-05 PROCEDURE — 94761 N-INVAS EAR/PLS OXIMETRY MLT: CPT

## 2022-10-05 PROCEDURE — 25000003 PHARM REV CODE 250: Performed by: PHYSICIAN ASSISTANT

## 2022-10-05 PROCEDURE — 99900035 HC TECH TIME PER 15 MIN (STAT)

## 2022-10-05 PROCEDURE — 25000242 PHARM REV CODE 250 ALT 637 W/ HCPCS: Performed by: PHYSICIAN ASSISTANT

## 2022-10-05 PROCEDURE — 94640 AIRWAY INHALATION TREATMENT: CPT

## 2022-10-05 PROCEDURE — 36415 COLL VENOUS BLD VENIPUNCTURE: CPT | Performed by: PHYSICIAN ASSISTANT

## 2022-10-05 PROCEDURE — 80048 BASIC METABOLIC PNL TOTAL CA: CPT | Performed by: PHYSICIAN ASSISTANT

## 2022-10-05 RX ORDER — GABAPENTIN 300 MG/1
300 CAPSULE ORAL NIGHTLY
Status: DISCONTINUED | OUTPATIENT
Start: 2022-10-05 | End: 2022-10-06

## 2022-10-05 RX ORDER — LIDOCAINE 50 MG/G
1 PATCH TOPICAL
Status: DISCONTINUED | OUTPATIENT
Start: 2022-10-05 | End: 2022-10-05

## 2022-10-05 RX ORDER — FUROSEMIDE 10 MG/ML
20 INJECTION INTRAMUSCULAR; INTRAVENOUS ONCE
Status: COMPLETED | OUTPATIENT
Start: 2022-10-05 | End: 2022-10-05

## 2022-10-05 RX ADMIN — ENOXAPARIN SODIUM 40 MG: 100 INJECTION SUBCUTANEOUS at 04:10

## 2022-10-05 RX ADMIN — OXYCODONE HYDROCHLORIDE 10 MG: 10 TABLET ORAL at 11:10

## 2022-10-05 RX ADMIN — METHOCARBAMOL 500 MG: 500 TABLET ORAL at 08:10

## 2022-10-05 RX ADMIN — GABAPENTIN 300 MG: 300 CAPSULE ORAL at 02:10

## 2022-10-05 RX ADMIN — DOCUSATE SODIUM 100 MG: 100 CAPSULE ORAL at 09:10

## 2022-10-05 RX ADMIN — ACETYLCYSTEINE 4 ML: 100 INHALANT RESPIRATORY (INHALATION) at 08:10

## 2022-10-05 RX ADMIN — ACETAMINOPHEN 1000 MG: 500 TABLET ORAL at 02:10

## 2022-10-05 RX ADMIN — METHOCARBAMOL 500 MG: 500 TABLET ORAL at 04:10

## 2022-10-05 RX ADMIN — METHOCARBAMOL 500 MG: 500 TABLET ORAL at 01:10

## 2022-10-05 RX ADMIN — ACETAMINOPHEN 1000 MG: 500 TABLET ORAL at 09:10

## 2022-10-05 RX ADMIN — ACETYLCYSTEINE 4 ML: 100 INHALANT RESPIRATORY (INHALATION) at 07:10

## 2022-10-05 RX ADMIN — GABAPENTIN 300 MG: 300 CAPSULE ORAL at 09:10

## 2022-10-05 RX ADMIN — OXYCODONE HYDROCHLORIDE 10 MG: 10 TABLET ORAL at 02:10

## 2022-10-05 RX ADMIN — LEVALBUTEROL HYDROCHLORIDE 0.63 MG: 0.63 SOLUTION RESPIRATORY (INHALATION) at 07:10

## 2022-10-05 RX ADMIN — DOCUSATE SODIUM 100 MG: 100 CAPSULE ORAL at 08:10

## 2022-10-05 RX ADMIN — LIDOCAINE 1 PATCH: 50 PATCH CUTANEOUS at 05:10

## 2022-10-05 RX ADMIN — ACETAMINOPHEN 1000 MG: 500 TABLET ORAL at 06:10

## 2022-10-05 RX ADMIN — OXYCODONE HYDROCHLORIDE 10 MG: 10 TABLET ORAL at 08:10

## 2022-10-05 RX ADMIN — BUPROPION HYDROCHLORIDE 300 MG: 150 TABLET, FILM COATED, EXTENDED RELEASE ORAL at 08:10

## 2022-10-05 RX ADMIN — METHOCARBAMOL 500 MG: 500 TABLET ORAL at 09:10

## 2022-10-05 RX ADMIN — GABAPENTIN 300 MG: 300 CAPSULE ORAL at 08:10

## 2022-10-05 RX ADMIN — OXYCODONE HYDROCHLORIDE 10 MG: 10 TABLET ORAL at 04:10

## 2022-10-05 RX ADMIN — FUROSEMIDE 20 MG: 10 INJECTION, SOLUTION INTRAMUSCULAR; INTRAVENOUS at 08:10

## 2022-10-05 RX ADMIN — LEVALBUTEROL HYDROCHLORIDE 0.63 MG: 0.63 SOLUTION RESPIRATORY (INHALATION) at 08:10

## 2022-10-05 RX ADMIN — MONTELUKAST 10 MG: 10 TABLET, FILM COATED ORAL at 08:10

## 2022-10-05 NOTE — PROGRESS NOTES
Vinay Doherty - [facility]  Thoracic Surgery  Progress Note    Subjective:     History of Present Illness:  No notes on file    Post-Op Info:  Procedure(s) (LRB):  XI ROBOTIC RATS,WITH LOBECTOMY,LUNG (Left)  XI ROBOTIC LYMPHADENECTOMY (Left)  BLOCK, NERVE, INTERCOSTAL, 2 OR MORE (Left)   2 Days Post-Op     Interval History: NAEON. On 3LNC. Chest tube with turbulence and small leak. Pain controlled. Productive cough     Medications:  Continuous Infusions:  Scheduled Meds:   acetaminophen  1,000 mg Oral Q8H    acetylcysteine 100 mg/ml (10%)  4 mL Nebulization TID WAKE    buPROPion  300 mg Oral Daily    docusate sodium  100 mg Oral BID    enoxaparin  40 mg Subcutaneous Daily    furosemide (LASIX) injection  20 mg Intravenous Once    gabapentin  300 mg Oral TID    levalbuterol  0.63 mg Nebulization Q12H    LIDOcaine  1 patch Transdermal Q24H    methocarbamoL  500 mg Oral QID    montelukast  10 mg Oral Daily     PRN Meds:metoclopramide HCl, ondansetron, oxyCODONE, oxyCODONE     Review of patient's allergies indicates:  No Known Allergies  Objective:     Vital Signs (Most Recent):  Temp: 98.6 °F (37 °C) (10/05/22 0429)  Pulse: 88 (10/05/22 0735)  Resp: 18 (10/05/22 0735)  BP: 118/66 (10/05/22 0429)  SpO2: (!) 93 % (10/05/22 0735)   Vital Signs (24h Range):  Temp:  [97.8 °F (36.6 °C)-98.7 °F (37.1 °C)] 98.6 °F (37 °C)  Pulse:  [69-95] 88  Resp:  [15-20] 18  SpO2:  [90 %-94 %] 93 %  BP: (102-125)/(52-66) 118/66     Intake/Output - Last 3 Shifts         10/03 0700  10/04 0659 10/04 0700  10/05 0659 10/05 0700  10/06 0659    P.O. 240 350     IV Piggyback 1600      Total Intake(mL/kg) 1840 (24.7) 350 (4.7)     Urine (mL/kg/hr) 300 (0.2)      Stool 0 0     Chest Tube 140 360     Total Output 440 360     Net +1400 -10            Stool Occurrence 0 x 0 x             SpO2: (!) 93 %  O2 Device (Oxygen Therapy): nasal cannula    Physical Exam  Constitutional:       Appearance: Normal appearance.   HENT:      Head: Normocephalic and  atraumatic.   Eyes:      Extraocular Movements: Extraocular movements intact.   Cardiovascular:      Rate and Rhythm: Normal rate and regular rhythm.   Pulmonary:      Effort: Pulmonary effort is normal. No respiratory distress.      Comments: CT in place with small air leak present  Abdominal:      General: There is no distension.   Musculoskeletal:         General: Normal range of motion.      Cervical back: Normal range of motion.   Skin:     General: Skin is warm and dry.   Neurological:      General: No focal deficit present.      Mental Status: She is alert and oriented to person, place, and time.   Psychiatric:         Mood and Affect: Mood normal.         Behavior: Behavior normal.       Significant Labs:  BMP:   Recent Labs   Lab 10/03/22  1319   *      K 3.9   *   CO2 21*   BUN 13   CREATININE 0.8   CALCIUM 7.7*     CBC: No results for input(s): WBC, RBC, HGB, HCT, PLT, MCV, MCH, MCHC in the last 48 hours.    Significant Diagnostics:  CXR: I have reviewed all pertinent results/findings within the past 24 hours    VTE Risk Mitigation (From admission, onward)           Ordered     enoxaparin injection 40 mg  Daily         10/03/22 1158     IP VTE HIGH RISK PATIENT  Once         10/03/22 1158     Place sequential compression device  Until discontinued         10/03/22 1158     Place INDIA hose  Until discontinued         10/03/22 0539     Place sequential compression device  Until discontinued         10/03/22 0539                  Assessment/Plan:     * Malignant neoplasm of upper lobe of lung  Ms. Hutchinson is our 63yo female s/p FARZANEH lobectomy for lung cancer on 10/3/22.    - Regular diet  - Keep chest tube to water seal at this time given air leak - will attempt clamp trial tomorrow or d/c with pneumostat  -lasix this morning. repleat lytes  - PRN pain/nausea meds  - OOBTC/ambulate  - Pulmonary toilet  - DVT ppx: lovenox        Ila Stanton PA-C  Thoracic Surgery  Vinay HOOD

## 2022-10-05 NOTE — PLAN OF CARE
Vinay carlos - [facility]  Discharge Reassessment    Primary Care Provider: Vern Priest MD    Expected Discharge Date: 10/7/2022    Reassessment (most recent)       Discharge Reassessment - 10/05/22 1001          Discharge Reassessment    Assessment Type Discharge Planning Reassessment     Did the patient's condition or plan change since previous assessment? No     Discharge Plan discussed with: Patient     Communicated SALINA with patient/caregiver Yes     Discharge Plan A Home with family     Discharge Plan B Home     DME Needed Upon Discharge  other (see comments)   Unknown at this time    Discharge Barriers Identified None        Post-Acute Status    Discharge Delays None known at this time                     Pt not ready for discharge due to: Not medically ready   SW will remain available for families in [facility].  Currently pt has d/c plans in progress at this time.      Kamala Edwards LCSW  Case Management/[facility]  [phone]

## 2022-10-05 NOTE — NURSING
POC reviewed with patient. Patient AAOX4. Wound to left chest with Ctube to water seal with serous drainage with air leak present. VSS. 02 on 2lnc sat 91%. To wean to keep sat >88%.Lasix given IVP with large amounts of clear yellow urine. Medicated for pain as needed. Tolerating small amounts of reg diet. WCTM

## 2022-10-05 NOTE — ASSESSMENT & PLAN NOTE
Ms. Hutchinson is our 61yo female s/p FARZANEH lobectomy for lung cancer on 10/3/22.    - Regular diet  - Keep chest tube to water seal at this time given air leak - will attempt clamp trial tomorrow or d/c with pneumostat  -lasix this morning. repleat lytes  - PRN pain/nausea meds  - OOBTC/ambulate  - Pulmonary toilet  - DVT ppx: lovenox

## 2022-10-05 NOTE — PLAN OF CARE
Vinay carlos [facility]      HOME HEALTH ORDERS  FACE TO FACE ENCOUNTER    Patient Name: Yvette Hutchinson  YOB: 1960    PCP: Vern Priest MD   PCP Address: [address] / [place] [address]  PCP Phone Number: [phone]  PCP Fax: [phone]    Encounter Date: 9/7/22    Admit to Home Health    Diagnoses:  Active Hospital Problems    Diagnosis  POA    *Malignant neoplasm of upper lobe of lung [C34.10]  Yes      Resolved Hospital Problems   No resolved problems to display.       Follow Up Appointments:  Future Appointments   Date Time Provider Department Center   10[facility]18/2022 10:40 AM [facility]   10/25/2022 10:30 AM Harika Garcia NP [facility] PULM [place] [facility]       Allergies:Review of patient's allergies indicates:  No Known Allergies    Medications: Review discharge medications with patient and family and provide education.    Current Facility-Administered Medications   Medication Dose Route Frequency Provider Last Rate Last Admin    acetaminophen tablet 1,000 mg  1,000 mg Oral Q8H Carol Bean MD   1,000 mg at 10/05/22 1444    acetylcysteine 100 mg/ml (10%) solution 4 mL  4 mL Nebulization TID WAKE Ila Stanton PA-C   4 mL at 10/05/22 0735    buPROPion TB24 tablet 300 mg  300 mg Oral Daily Carol Bean MD   300 mg at 10/05/22 0821    docusate sodium capsule 100 mg  100 mg Oral BID Carol Bean MD   100 mg at 10/05/22 0821    enoxaparin injection 40 mg  40 mg Subcutaneous Daily Carol Bean MD   40 mg at 10/04/22 0813    gabapentin capsule 300 mg  300 mg Oral TID Carol Bean MD   300 mg at 10/05/22 1443    gabapentin capsule 300 mg  300 mg Oral QHS Ila Stanton PA-C        levalbuterol nebulizer solution 0.63 mg  0.63 mg Nebulization Q12H Ila Stanton PA-C   0.63 mg at 10/05/22 0735    LIDOcaine 5 % patch 1 patch  1 patch Transdermal Q24H Ila Stanton PA-C   1 patch at 10/04/22 6521     methocarbamoL tablet 500 mg  500 mg Oral QID Ila Stanton PA-C   500 mg at 10/05/22 1307    metoclopramide HCl injection 5 mg  5 mg Intravenous Q6H PRN Carol Bean MD        montelukast tablet 10 mg  10 mg Oral Daily Carol Bean MD   10 mg at 10/05/22 0821    ondansetron disintegrating tablet 8 mg  8 mg Oral Q8H PRN Carol Bean MD   8 mg at 10/04/22 0445    oxyCODONE immediate release tablet 5 mg  5 mg Oral Q4H PRN Carol Bean MD   5 mg at 10/04/22 1234    oxyCODONE immediate release tablet Tab 10 mg  10 mg Oral Q4H PRN Carol Bean MD   10 mg at 10/05/22 1448     Current Discharge Medication List        CONTINUE these medications which have NOT CHANGED    Details   albuterol-ipratropium (DUO-NEB) 2.5 mg-0.5 mg/3 mL nebulizer solution Take 3 mLs by nebulization every 6 (six) hours as needed for Wheezing. Rescue  Qty: 120 each, Refills: 5    Associated Diagnoses: Chronic obstructive pulmonary disease with acute exacerbation      ALPRAZolam (XANAX) 0.5 MG tablet Take 1 tablet (0.5 mg total) by mouth 3 (three) times daily. for 10 days  Qty: 30 tablet, Refills: 0    Associated Diagnoses: Anxiety about health      buPROPion (WELLBUTRIN XL) 300 MG 24 hr tablet Take 1 tablet (300 mg total) by mouth once daily.  Qty: 90 tablet, Refills: 3    Associated Diagnoses: Anxiety      FLUoxetine 20 MG capsule Take 1 capsule (20 mg total) by mouth once daily.  Qty: 30 capsule, Refills: 11    Associated Diagnoses: Anxiety      fluticasone propionate (FLONASE) 50 mcg/actuation nasal spray 1 spray (50 mcg total) by Each Nostril route once daily.  Qty: 16 g, Refills: 3      fluticasone-salmeterol 230-21 mcg/dose (ADVAIR HFA) 230-21 mcg/actuation HFAA inhaler Inhale 2 puffs into the lungs 2 (two) times daily. Controller  Qty: 12 g, Refills: 5    Associated Diagnoses: Chronic obstructive pulmonary disease, unspecified COPD type      gabapentin (NEURONTIN) 300 MG capsule Take 1  capsule (300 mg total) by mouth 3 (three) times daily.  Qty: 90 capsule, Refills: 11    Associated Diagnoses: Chronic pain syndrome      levocetirizine (XYZAL) 5 MG tablet Take 1 tablet (5 mg total) by mouth every evening.  Qty: 30 tablet, Refills: 5    Associated Diagnoses: Chronic sinus complaints      montelukast (SINGULAIR) 10 mg tablet Take 10 mg by mouth once daily.      fluconazole (DIFLUCAN) 100 MG tablet Take 1 tablet (100 mg total) by mouth every other day.  Qty: 3 tablet, Refills: 0    Associated Diagnoses: Vaginal yeast infection      fluticasone-umeclidin-vilanter (TRELEGY ELLIPTA) 100-62.5-25 mcg DsDv Inhale 1 puff into the lungs once daily.  Qty: 60 each, Refills: 11    Associated Diagnoses: Chronic obstructive pulmonary disease, unspecified COPD type      guaiFENesin-codeine 100-10 mg/5 ml (TUSSI-ORGANIDIN NR)  mg/5 mL syrup TAKE 5 MLS BY MOUTH EVERY 4 HOURS AS NEEDED FOR COUGH OR CONGESTION      HYDROcodone-acetaminophen (NORCO) 5-325 mg per tablet Take 1 tablet by mouth every 12 (twelve) hours as needed for Pain.  Qty: 10 tablet, Refills: 0    Comments: Quantity prescribed more than 7 day supply? no  Associated Diagnoses: Adenocarcinoma of lung, unspecified laterality      methocarbamoL (ROBAXIN) 500 MG Tab Take 500 mg by mouth 2 (two) times daily.               I have seen and examined this patient within the last 30 days. My clinical findings that support the need for the home health skilled services and home bound status are the following:no   Weakness/numbness causing balance and gait disturbance due to Weakness/Debility, Malignancy/Cancer, and Surgery making it taxing to leave home.     Diet:   regular diet    Referrals/ Consults  Physical Therapy to evaluate and treat. Evaluate for home safety and equipment needs; Establish/upgrade home exercise program. Perform / instruct on therapeutic exercises, gait training, transfer training, and Range of Motion.  Occupational Therapy to evaluate  and treat. Evaluate home environment for safety and equipment needs. Perform/Instruct on transfers, ADL training, ROM, and therapeutic exercises.    Activities:   activity as tolerated and other no showers while chest tube in place - sponge bath only     Nursing:   Agency to admit patient within 24 hours of hospital discharge unless specified on physician order or at patient request    SN to complete comprehensive assessment including routine vital signs. Instruct on disease process and s/s of complications to report to MD. Review/verify medication list sent home with the patient at time of discharge  and instruct patient/caregiver as needed. Frequency may be adjusted depending on start of care date.     Skilled nurse to perform up to 3 visits PRN for symptoms related to diagnosis    Notify MD if SBP > 160 or < 90; DBP > 90 or < 50; HR > 120 or < 50; Temp > 101; O2 < 88%    Ok to schedule additional visits based on staff availability and patient request on consecutive days within the home health episode.    Miscellaneous   Please drain Pneumostat every day and record the amount/color of drainage.    To drain: Use alcohol swab to clean bottom of pneumostat container. Attach an empty lure lock syringe to the bottom of pneumostat container. Aspirate contents of chamber. Okay to reuse syringe while draining during one sitting but please use new syringe each day. Initially may require draining twice per day but expect drainage to decrease over time.     Please change dressings daily or as needed if saturated.      If chest tube is pulled out/ falls out please cover insertion site immediately and go to Emergency Room or call Thoracic Surgery office.   If Pneumostat becomes disconnected please reconnect immediately.     Thoracic Surgery Clinic: [phone]      Home Health Aide:  Nursing Three times weekly, Physical Therapy Twice weekly, and Occupational Therapy Twice weekly        I certify that this patient is confined  to her home and needs intermittent skilled nursing care, physical therapy, and occupational therapy.

## 2022-10-05 NOTE — PLAN OF CARE
POC reviewed w PT and daughter, verbalized understanding. AAOX4. VSS on 3LNC - unable to tolerate weaning. IS encouraged. NSR on telemetry.      -Lap sites to L chest area covered with guaze and transparent film CDI  -L chest tube to water seal. Chest tube dressing saturated. Air leak present, MD aware. Subcutaneous emphysema present. Sanguinous output, see flowsheet.   -Pain managed effectively with PRN meds per MAR.   -Regular diet, tolerating well.   -OOB to bathroom with assistance.   -Adequate UOP. No BM.     No adverse events this shift. PT slept well throughout the night in between care. Will continue to manage POC.

## 2022-10-05 NOTE — SUBJECTIVE & OBJECTIVE
Interval History: NAEON. On 3LNC. Chest tube with turbulence and small leak. Pain controlled. Productive cough     Medications:  Continuous Infusions:  Scheduled Meds:   acetaminophen  1,000 mg Oral Q8H    acetylcysteine 100 mg/ml (10%)  4 mL Nebulization TID WAKE    buPROPion  300 mg Oral Daily    docusate sodium  100 mg Oral BID    enoxaparin  40 mg Subcutaneous Daily    furosemide (LASIX) injection  20 mg Intravenous Once    gabapentin  300 mg Oral TID    levalbuterol  0.63 mg Nebulization Q12H    LIDOcaine  1 patch Transdermal Q24H    methocarbamoL  500 mg Oral QID    montelukast  10 mg Oral Daily     PRN Meds:metoclopramide HCl, ondansetron, oxyCODONE, oxyCODONE     Review of patient's allergies indicates:  No Known Allergies  Objective:     Vital Signs (Most Recent):  Temp: 98.6 °F (37 °C) (10/05/22 0429)  Pulse: 88 (10/05/22 0735)  Resp: 18 (10/05/22 0735)  BP: 118/66 (10/05/22 0429)  SpO2: (!) 93 % (10/05/22 0735)   Vital Signs (24h Range):  Temp:  [97.8 °F (36.6 °C)-98.7 °F (37.1 °C)] 98.6 °F (37 °C)  Pulse:  [69-95] 88  Resp:  [15-20] 18  SpO2:  [90 %-94 %] 93 %  BP: (102-125)/(52-66) 118/66     Intake/Output - Last 3 Shifts         10/03 0700  10/04 0659 10/04 0700  10/05 0659 10/05 0700  10/06 0659    P.O. 240 350     IV Piggyback 1600      Total Intake(mL/kg) 1840 (24.7) 350 (4.7)     Urine (mL/kg/hr) 300 (0.2)      Stool 0 0     Chest Tube 140 360     Total Output 440 360     Net +1400 -10            Stool Occurrence 0 x 0 x             SpO2: (!) 93 %  O2 Device (Oxygen Therapy): nasal cannula    Physical Exam  Constitutional:       Appearance: Normal appearance.   HENT:      Head: Normocephalic and atraumatic.   Eyes:      Extraocular Movements: Extraocular movements intact.   Cardiovascular:      Rate and Rhythm: Normal rate and regular rhythm.   Pulmonary:      Effort: Pulmonary effort is normal. No respiratory distress.      Comments: CT in place with small air leak present  Abdominal:       General: There is no distension.   Musculoskeletal:         General: Normal range of motion.      Cervical back: Normal range of motion.   Skin:     General: Skin is warm and dry.   Neurological:      General: No focal deficit present.      Mental Status: She is alert and oriented to person, place, and time.   Psychiatric:         Mood and Affect: Mood normal.         Behavior: Behavior normal.       Significant Labs:  BMP:   Recent Labs   Lab 10/03/22  1319   *      K 3.9   *   CO2 21*   BUN 13   CREATININE 0.8   CALCIUM 7.7*     CBC: No results for input(s): WBC, RBC, HGB, HCT, PLT, MCV, MCH, MCHC in the last 48 hours.    Significant Diagnostics:  CXR: I have reviewed all pertinent results/findings within the past 24 hours    VTE Risk Mitigation (From admission, onward)           Ordered     enoxaparin injection 40 mg  Daily         10/03/22 1158     IP VTE HIGH RISK PATIENT  Once         10/03/22 1158     Place sequential compression device  Until discontinued         10/03/22 1158     Place INDIA hose  Until discontinued         10/03/22 0539     Place sequential compression device  Until discontinued         10/03/22 0539

## 2022-10-06 LAB
ANION GAP SERPL CALC-SCNC: 10 MMOL/L (ref 8–16)
BUN SERPL-MCNC: 7 MG/DL (ref 8–23)
CALCIUM SERPL-MCNC: 8.7 MG/DL (ref 8.7–10.5)
CHLORIDE SERPL-SCNC: 101 MMOL/L (ref 95–110)
CO2 SERPL-SCNC: 27 MMOL/L (ref 23–29)
CREAT SERPL-MCNC: 0.6 MG/DL (ref 0.5–1.4)
EST. GFR  (NO RACE VARIABLE): >60 ML/MIN/1.73 M^2
GLUCOSE SERPL-MCNC: 97 MG/DL (ref 70–110)
POTASSIUM SERPL-SCNC: 3.9 MMOL/L (ref 3.5–5.1)
SODIUM SERPL-SCNC: 138 MMOL/L (ref 136–145)

## 2022-10-06 PROCEDURE — 63600175 PHARM REV CODE 636 W HCPCS: Performed by: STUDENT IN AN ORGANIZED HEALTH CARE EDUCATION/TRAINING PROGRAM

## 2022-10-06 PROCEDURE — 25000242 PHARM REV CODE 250 ALT 637 W/ HCPCS: Performed by: THORACIC SURGERY (CARDIOTHORACIC VASCULAR SURGERY)

## 2022-10-06 PROCEDURE — 27000221 HC OXYGEN, UP TO 24 HOURS

## 2022-10-06 PROCEDURE — 63600175 PHARM REV CODE 636 W HCPCS: Performed by: PHYSICIAN ASSISTANT

## 2022-10-06 PROCEDURE — 25000003 PHARM REV CODE 250: Performed by: PHYSICIAN ASSISTANT

## 2022-10-06 PROCEDURE — 25000242 PHARM REV CODE 250 ALT 637 W/ HCPCS: Performed by: PHYSICIAN ASSISTANT

## 2022-10-06 PROCEDURE — 36415 COLL VENOUS BLD VENIPUNCTURE: CPT | Performed by: PHYSICIAN ASSISTANT

## 2022-10-06 PROCEDURE — 94761 N-INVAS EAR/PLS OXIMETRY MLT: CPT

## 2022-10-06 PROCEDURE — 80048 BASIC METABOLIC PNL TOTAL CA: CPT | Performed by: PHYSICIAN ASSISTANT

## 2022-10-06 PROCEDURE — 25000003 PHARM REV CODE 250: Performed by: STUDENT IN AN ORGANIZED HEALTH CARE EDUCATION/TRAINING PROGRAM

## 2022-10-06 PROCEDURE — 94640 AIRWAY INHALATION TREATMENT: CPT

## 2022-10-06 PROCEDURE — 99900035 HC TECH TIME PER 15 MIN (STAT)

## 2022-10-06 PROCEDURE — 99900031 HC PATIENT EDUCATION (STAT)

## 2022-10-06 PROCEDURE — 20600001 HC STEP DOWN PRIVATE ROOM

## 2022-10-06 RX ORDER — LIDOCAINE 50 MG/G
1 PATCH TOPICAL DAILY
Qty: 7 PATCH | Refills: 0 | Status: SHIPPED | OUTPATIENT
Start: 2022-10-06 | End: 2022-10-12 | Stop reason: SDUPTHER

## 2022-10-06 RX ORDER — ACETYLCYSTEINE 200 MG/ML
2 SOLUTION ORAL; RESPIRATORY (INHALATION) EVERY 12 HOURS
Status: DISCONTINUED | OUTPATIENT
Start: 2022-10-06 | End: 2022-10-06

## 2022-10-06 RX ORDER — POTASSIUM CHLORIDE 20 MEQ/1
40 TABLET, EXTENDED RELEASE ORAL ONCE
Status: COMPLETED | OUTPATIENT
Start: 2022-10-06 | End: 2022-10-06

## 2022-10-06 RX ORDER — GABAPENTIN 300 MG/1
600 CAPSULE ORAL 3 TIMES DAILY
Qty: 180 CAPSULE | Refills: 11 | Status: SHIPPED | OUTPATIENT
Start: 2022-10-06 | End: 2023-07-20

## 2022-10-06 RX ORDER — OXYCODONE HYDROCHLORIDE 5 MG/1
5 TABLET ORAL EVERY 4 HOURS PRN
Qty: 28 TABLET | Refills: 0 | Status: SHIPPED | OUTPATIENT
Start: 2022-10-06 | End: 2022-10-12 | Stop reason: SDUPTHER

## 2022-10-06 RX ORDER — ONDANSETRON 8 MG/1
8 TABLET, ORALLY DISINTEGRATING ORAL EVERY 8 HOURS PRN
Qty: 15 TABLET | Refills: 0 | Status: SHIPPED | OUTPATIENT
Start: 2022-10-06 | End: 2022-10-18 | Stop reason: SDUPTHER

## 2022-10-06 RX ORDER — METHOCARBAMOL 500 MG/1
500 TABLET, FILM COATED ORAL 4 TIMES DAILY
Qty: 40 TABLET | Refills: 0 | Status: SHIPPED | OUTPATIENT
Start: 2022-10-06 | End: 2022-10-18 | Stop reason: SDUPTHER

## 2022-10-06 RX ORDER — FUROSEMIDE 10 MG/ML
20 INJECTION INTRAMUSCULAR; INTRAVENOUS ONCE
Status: COMPLETED | OUTPATIENT
Start: 2022-10-06 | End: 2022-10-06

## 2022-10-06 RX ORDER — GABAPENTIN 300 MG/1
600 CAPSULE ORAL 3 TIMES DAILY
Status: DISCONTINUED | OUTPATIENT
Start: 2022-10-06 | End: 2022-10-07 | Stop reason: HOSPADM

## 2022-10-06 RX ORDER — DOCUSATE SODIUM 100 MG/1
100 CAPSULE, LIQUID FILLED ORAL 2 TIMES DAILY
Qty: 14 CAPSULE | Refills: 0 | Status: SHIPPED | OUTPATIENT
Start: 2022-10-06 | End: 2022-10-14

## 2022-10-06 RX ADMIN — OXYCODONE HYDROCHLORIDE 10 MG: 10 TABLET ORAL at 04:10

## 2022-10-06 RX ADMIN — ACETYLCYSTEINE 2 ML: 200 INHALANT RESPIRATORY (INHALATION) at 08:10

## 2022-10-06 RX ADMIN — ACETAMINOPHEN 1000 MG: 500 TABLET ORAL at 02:10

## 2022-10-06 RX ADMIN — MONTELUKAST 10 MG: 10 TABLET, FILM COATED ORAL at 09:10

## 2022-10-06 RX ADMIN — LEVALBUTEROL HYDROCHLORIDE 0.63 MG: 0.63 SOLUTION RESPIRATORY (INHALATION) at 08:10

## 2022-10-06 RX ADMIN — DOCUSATE SODIUM 100 MG: 100 CAPSULE ORAL at 09:10

## 2022-10-06 RX ADMIN — GABAPENTIN 300 MG: 300 CAPSULE ORAL at 09:10

## 2022-10-06 RX ADMIN — OXYCODONE HYDROCHLORIDE 10 MG: 10 TABLET ORAL at 01:10

## 2022-10-06 RX ADMIN — OXYCODONE HYDROCHLORIDE 10 MG: 10 TABLET ORAL at 09:10

## 2022-10-06 RX ADMIN — METHOCARBAMOL 500 MG: 500 TABLET ORAL at 01:10

## 2022-10-06 RX ADMIN — LIDOCAINE 1 PATCH: 50 PATCH CUTANEOUS at 04:10

## 2022-10-06 RX ADMIN — ACETAMINOPHEN 1000 MG: 500 TABLET ORAL at 05:10

## 2022-10-06 RX ADMIN — ENOXAPARIN SODIUM 40 MG: 100 INJECTION SUBCUTANEOUS at 04:10

## 2022-10-06 RX ADMIN — FUROSEMIDE 20 MG: 10 INJECTION, SOLUTION INTRAMUSCULAR; INTRAVENOUS at 08:10

## 2022-10-06 RX ADMIN — LIDOCAINE 1 PATCH: 50 PATCH CUTANEOUS at 06:10

## 2022-10-06 RX ADMIN — METHOCARBAMOL 500 MG: 500 TABLET ORAL at 09:10

## 2022-10-06 RX ADMIN — BUPROPION HYDROCHLORIDE 300 MG: 150 TABLET, FILM COATED, EXTENDED RELEASE ORAL at 09:10

## 2022-10-06 RX ADMIN — OXYCODONE HYDROCHLORIDE 10 MG: 10 TABLET ORAL at 05:10

## 2022-10-06 RX ADMIN — POTASSIUM CHLORIDE 40 MEQ: 1500 TABLET, EXTENDED RELEASE ORAL at 09:10

## 2022-10-06 RX ADMIN — GABAPENTIN 600 MG: 300 CAPSULE ORAL at 02:10

## 2022-10-06 RX ADMIN — GABAPENTIN 600 MG: 300 CAPSULE ORAL at 09:10

## 2022-10-06 RX ADMIN — ACETAMINOPHEN 1000 MG: 500 TABLET ORAL at 09:10

## 2022-10-06 RX ADMIN — METHOCARBAMOL 500 MG: 500 TABLET ORAL at 04:10

## 2022-10-06 NOTE — BRIEF OP NOTE
Certification of Assistant at Surgery       Surgery Date: 10/3/2022     Participating Surgeons:  Surgeon(s) and Role:     * Evelio Kaplan MD - Primary     * Ila Stanton PA-C - Assisting     * Carol Bean MD - Fellow    Procedures:  Procedure(s) (LRB):  XI ROBOTIC RATS,WITH LOBECTOMY,LUNG (Left)  XI ROBOTIC LYMPHADENECTOMY (Left)  BLOCK, NERVE, INTERCOSTAL, 2 OR MORE (Left)    Assistant Surgeon's Certification of Necessity:  I understand that section 1842 (b) (6) (d) of the Social Security Act generally prohibits Medicare Part B reasonable charge payment for the services of assistants at surgery in teaching hospitals when qualified residents are available to furnish such services. I certify that the services for which payment is claimed were medically necessary, and that no qualified resident was available to perform the services. I further understand that these services are subject to post-payment review by the Medicare carrier.      Ila Stanton PA-C    10/06/2022  5:08 PM

## 2022-10-06 NOTE — PROGRESS NOTES
Home Oxygen Evaluation    Date Performed: 10/6/2022    1) Patient's Home O2 Sat on room air, while at rest: 87%        If O2 sats on room air at rest are 88% or below, patient qualifies. No additional testing needed. Document N/A in steps 2 and 3. If 89% or above, complete steps 2.      2) Patient's O2 Sat on room air while exercising: na        If O2 sats on room air while exercising remain 89% or above patient does not qualify, no further testing needed Document N/A in step 3. If O2 sats on room air while exercising are 88% or below, continue to step 3.      3) Patient's O2 Sat while exercising on O2: na at na LPM         (Must show improvement from #2 for patients to qualify)    If O2 sats improve on oxygen, patient qualifies for portable oxygen. If not, the patient does not qualify.

## 2022-10-06 NOTE — PLAN OF CARE
"Pt is A&Ox4 injury free. Breathing is shallow due to left chest tube. Pt pain was not controlled with oral medication when it was given every 4 hrs. Pt up to bathroom with walker and assistance from family member. Pt stated to nurse "I have not had a b/m yet, but a lot of gas"  "

## 2022-10-06 NOTE — DISCHARGE SUMMARY
[facility]  General Surgery  Discharge Summary      Patient Name: Yvette Hutchinson  MRN: [id number]  Admission Date: 10/3/2022  Hospital Length of Stay: 3 days  Discharge Date and Time:  10/06/2022 3:02 PM  Attending Physician: Evelio Kapaln MD   Discharging Provider: Carol Bean MD  Primary Care Provider: Vern Priest MD     HPI:   Patient is a 62 y.o. female former smoker with CLL, COPD, chronic pain, and esophageal strictures here today for evaluation of FARZANEH NSCLC. Recurrent productive cough prompting chest CT which identified FARZANEH nodule in location where previously there was a cluster of nodules presumed to be infectious/inflammatory. Percutaneous biopsy positive for NSCLC. PET with 2.0 cm FARZANEH nodule with SUV 14.4 and left hilar SUV 5.8. Comes to clinic with a chest CT with contrast and updated PFTs. No EBUS to date. Nocturnal supplemental oxygen.      CPAP? Oxygen?      Smoker.   PSH: tonsillectomy        Procedure(s) (LRB):  XI ROBOTIC RATS,WITH LOBECTOMY,LUNG (Left)  XI ROBOTIC LYMPHADENECTOMY (Left)  BLOCK, NERVE, INTERCOSTAL, 2 OR MORE (Left)     Hospital Course:   Patient was admitted following the above procedure. Her diet was advanced as tolerated and her pain was controlled with a multimodal regimen. Her chest tube continued to have a small expiratory air leak, so it was placed to an pneumostat for discharge. At the time of discharge, she required home supplemental oxygen based on her walk test. She received teaching on care for her chest tube and she will follow up in clinic for evaluation of removal.     Significant Diagnostic Studies:     Pending Diagnostic Studies:       Procedure Component Value Units Date/Time    Specimen to Pathology, Surgery Pulmonary and Thoracic [[id number]] Collected: 10/03/22 1147    Order Status: Sent Lab Status: In process Updated: 10/04/22 1142    Specimen: Tissue           Final Active Diagnoses:    Diagnosis Date Noted POA    PRINCIPAL PROBLEM:   "Malignant neoplasm of upper lobe of lung [C34.10] 08/29/2022 Yes      Problems Resolved During this Admission:      Discharged Condition: good    Disposition: Home or Self Care    Follow Up:   Follow-up Information       Evelio Kaplan MD Follow up in 1 week(s).    Specialty: Cardiothoracic Surgery  Contact information:  [address]  [facility] [address]  [phone]                           Patient Instructions:      OXYGEN FOR HOME USE     Order Specific Question Answer Comments   Liter Flow 2    Duration Continuous    Qualifying Test Performed at: Activity    Oxygen saturation at rest 90    Oxygen saturation with activity 80    Oxygen saturation with activity on oxygen 92    Portable mode: continuous    Route nasal cannula    Device: home concentrator with portable tanks    Length of need (in months): 99 mos    Patient condition with qualifying saturation COPD    Height: 5' 3" (1.6 m)    Weight: 74.4 kg (164 lb)    Alternative treatment measures have been tried or considered and deemed clinically ineffective. Yes      WALKER FOR HOME USE     Order Specific Question Answer Comments   Type of Walker: Adult (5'4"-6'6")    With wheels? Yes    Height: 5' 3" (1.6 m)    Weight: 74.4 kg (164 lb)    Length of need (1-99 months): 99    Does patient have medical equipment at home? none    Please check all that apply: Patient's condition impairs ambulation.    Please check all that apply: Patient is unable to safely ambulate without equipment.      Diet Adult Regular     Notify your health care provider if you experience any of the following:  temperature >100.4     Notify your health care provider if you experience any of the following:  persistent nausea and vomiting or diarrhea     Notify your health care provider if you experience any of the following:  severe uncontrolled pain     Notify your health care provider if you experience any of the following:  redness, tenderness, or signs of infection (pain, " swelling, redness, odor or green/yellow discharge around incision site)     Notify your health care provider if you experience any of the following:  difficulty breathing or increased cough     Change dressing (specify)   Order Comments: Dressing change: change chest tube dressing daily and as needed when soiled. Use clean gauze and tape or tegaderm     Activity as tolerated     Medications:  Reconciled Home Medications:      Medication List        START taking these medications      docusate sodium 100 MG capsule  Commonly known as: COLACE  Take 1 capsule (100 mg total) by mouth 2 (two) times daily. for 7 days     LIDOcaine 5 %  Commonly known as: LIDODERM  Place 1 patch onto the skin once daily. Remove & Discard patch within 12 hours or as directed by MD     ondansetron 8 MG Tbdl  Commonly known as: ZOFRAN-ODT  Dissolve 1 tablet (8 mg total) by mouth every 8 (eight) hours as needed.     oxyCODONE 5 MG immediate release tablet  Commonly known as: ROXICODONE  Take 1 tablet (5 mg total) by mouth every 4 (four) hours as needed.            CHANGE how you take these medications      gabapentin 300 MG capsule  Commonly known as: NEURONTIN  Take 2 capsules (600 mg total) by mouth 3 (three) times daily.  What changed: how much to take     methocarbamoL 500 MG Tab  Commonly known as: ROBAXIN  Take 1 tablet (500 mg total) by mouth 4 (four) times daily. for 10 days  What changed: when to take this            CONTINUE taking these medications      ADVAIR -21 mcg/actuation Hfaa inhaler  Generic drug: fluticasone-salmeterol 230-21 mcg/dose  Inhale 2 puffs into the lungs 2 (two) times daily. Controller     albuterol-ipratropium 2.5 mg-0.5 mg/3 mL nebulizer solution  Commonly known as: DUO-NEB  Take 3 mLs by nebulization every 6 (six) hours as needed for Wheezing. Rescue     ALPRAZolam 0.5 MG tablet  Commonly known as: XANAX  Take 1 tablet (0.5 mg total) by mouth 3 (three) times daily. for 10 days     buPROPion 300 MG 24 hr  tablet  Commonly known as: WELLBUTRIN XL  Take 1 tablet (300 mg total) by mouth once daily.     fluconazole 100 MG tablet  Commonly known as: DIFLUCAN  Take 1 tablet (100 mg total) by mouth every other day.     FLUoxetine 20 MG capsule  Take 1 capsule (20 mg total) by mouth once daily.     fluticasone propionate 50 mcg/actuation nasal spray  Commonly known as: FLONASE  1 spray (50 mcg total) by Each Nostril route once daily.     fluticasone-umeclidin-vilanter 100-62.5-25 mcg Dsdv  Commonly known as: TRELEGY ELLIPTA  Inhale 1 puff into the lungs once daily.     guaiFENesin-codeine 100-10 mg/5 ml  mg/5 mL syrup  Commonly known as: TUSSI-ORGANIDIN NR  TAKE 5 MLS BY MOUTH EVERY 4 HOURS AS NEEDED FOR COUGH OR CONGESTION     HYDROcodone-acetaminophen 5-325 mg per tablet  Commonly known as: NORCO  Take 1 tablet by mouth every 12 (twelve) hours as needed for Pain.     levocetirizine 5 MG tablet  Commonly known as: XYZAL  Take 1 tablet (5 mg total) by mouth every evening.     montelukast 10 mg tablet  Commonly known as: SINGULAIR  Take 10 mg by mouth once daily.            Carol Bean MD, PGY-7  Cardiothoracic Surgery   [phone]

## 2022-10-07 ENCOUNTER — PATIENT MESSAGE (OUTPATIENT)
Dept: CARDIOTHORACIC SURGERY | Facility: CLINIC | Age: 62
End: 2022-10-07
Payer: MEDICAID

## 2022-10-07 VITALS
HEART RATE: 95 BPM | DIASTOLIC BLOOD PRESSURE: 55 MMHG | TEMPERATURE: 97 F | RESPIRATION RATE: 17 BRPM | WEIGHT: 164 LBS | SYSTOLIC BLOOD PRESSURE: 114 MMHG | OXYGEN SATURATION: 91 % | BODY MASS INDEX: 29.06 KG/M2 | HEIGHT: 63 IN

## 2022-10-07 DIAGNOSIS — C34.10 MALIGNANT NEOPLASM OF UPPER LOBE OF LUNG, UNSPECIFIED LATERALITY: Primary | ICD-10-CM

## 2022-10-07 PROCEDURE — 25000003 PHARM REV CODE 250: Performed by: STUDENT IN AN ORGANIZED HEALTH CARE EDUCATION/TRAINING PROGRAM

## 2022-10-07 PROCEDURE — 94761 N-INVAS EAR/PLS OXIMETRY MLT: CPT

## 2022-10-07 PROCEDURE — 94640 AIRWAY INHALATION TREATMENT: CPT

## 2022-10-07 PROCEDURE — 25000242 PHARM REV CODE 250 ALT 637 W/ HCPCS: Performed by: PHYSICIAN ASSISTANT

## 2022-10-07 PROCEDURE — 25000003 PHARM REV CODE 250: Performed by: PHYSICIAN ASSISTANT

## 2022-10-07 RX ADMIN — GABAPENTIN 600 MG: 300 CAPSULE ORAL at 08:10

## 2022-10-07 RX ADMIN — LEVALBUTEROL HYDROCHLORIDE 0.63 MG: 0.63 SOLUTION RESPIRATORY (INHALATION) at 07:10

## 2022-10-07 RX ADMIN — ACETAMINOPHEN 1000 MG: 500 TABLET ORAL at 06:10

## 2022-10-07 RX ADMIN — BUPROPION HYDROCHLORIDE 300 MG: 150 TABLET, FILM COATED, EXTENDED RELEASE ORAL at 08:10

## 2022-10-07 RX ADMIN — ACETAMINOPHEN 1000 MG: 500 TABLET ORAL at 02:10

## 2022-10-07 RX ADMIN — MONTELUKAST 10 MG: 10 TABLET, FILM COATED ORAL at 08:10

## 2022-10-07 RX ADMIN — OXYCODONE HYDROCHLORIDE 10 MG: 10 TABLET ORAL at 10:10

## 2022-10-07 RX ADMIN — OXYCODONE HYDROCHLORIDE 10 MG: 10 TABLET ORAL at 03:10

## 2022-10-07 RX ADMIN — DOCUSATE SODIUM 100 MG: 100 CAPSULE ORAL at 08:10

## 2022-10-07 RX ADMIN — METHOCARBAMOL 500 MG: 500 TABLET ORAL at 08:10

## 2022-10-07 RX ADMIN — METHOCARBAMOL 500 MG: 500 TABLET ORAL at 12:10

## 2022-10-07 RX ADMIN — GABAPENTIN 600 MG: 300 CAPSULE ORAL at 02:10

## 2022-10-07 RX ADMIN — OXYCODONE HYDROCHLORIDE 10 MG: 10 TABLET ORAL at 02:10

## 2022-10-07 NOTE — PLAN OF CARE
Pt is A&Ox4 injury free. Breathing is regular equal and unlabored bilaterally on 2 liters of oxygen. Daughter spent the night. Frequent oral pain medications. The left chest tube drain did not work properly and the on call Dr came to pt's room to see if there was a problem, and was not able to fix why the canister wont stop leaking.

## 2022-10-07 NOTE — PLAN OF CARE
Sent home o2 order to [facility] for delivery to to hospital for d.c later this afternoon. MAU will continue to monitor.     Kamala Edwards LCSW  Case Management/[facility]  [phone]     10:54 AM  Orders received by [facility]. [facility] stated orders are being processed. No ETA at this time. SW to continue to monitor.    Kamala Edwards LCSW  Case Management/[facility]  [phone]

## 2022-10-08 ENCOUNTER — PATIENT MESSAGE (OUTPATIENT)
Dept: CARDIOTHORACIC SURGERY | Facility: CLINIC | Age: 62
End: 2022-10-08
Payer: MEDICAID

## 2022-10-11 ENCOUNTER — PATIENT OUTREACH (OUTPATIENT)
Dept: ADMINISTRATIVE | Facility: CLINIC | Age: 62
End: 2022-10-11
Payer: MEDICAID

## 2022-10-11 LAB
FINAL PATHOLOGIC DIAGNOSIS: NORMAL
FROZEN SECTION DIAGNOSIS: NORMAL
FROZEN SECTION FOOTNOTE: NORMAL
GROSS: NORMAL
Lab: NORMAL

## 2022-10-11 NOTE — PROGRESS NOTES
"Subjective:       Patient ID: Yvette Hutchinson is a 62 y.o. female.    Chief Complaint: Post-op Evaluation    Diagnosis:  NSCLC    Pre-operative therapy: none     Procedure(s) and date(s): 10/3/22: left robotic assisted upper lobectomy with MLND    Pathology: 2.2 cm adenosquamous. Levels 5,8,9,11,12 = negative. Level 10 positive for metastatic. Margins negative. pT1cN1. Stage IIB      Post-operative therapy: refer back to Dr. Lee       HPI  Patient is a 62 y.o. female former smoker with CLL, COPD, chronic pain, and esophageal strictures here today for follow up 1 week s/p robot assisted left upper lobectomy and lymphadenectomy on 10/03. Patient was discharged home with CT and mini atrium. Since discharge breathing stable. No issues with chest tube. Taking gabapentin, muscle relaxer, and oxycodone.     Review of Systems   Constitutional:  Negative for activity change and fever.   HENT: Negative.     Respiratory:          Pain    Cardiovascular:  Negative for chest pain and leg swelling.   Gastrointestinal: Negative.    Neurological:  Negative for syncope.   Psychiatric/Behavioral:  Negative for agitation. The patient is not nervous/anxious.        Objective:      Vitals:    10/12/22 0955   BP: 108/62   Pulse: 64   SpO2: (!) 94%   Weight: 74.6 kg (164 lb 7.4 oz)   Height: 5' 3" (1.6 m)   PainSc: 0-No pain       Physical Exam  Constitutional:       Appearance: Normal appearance.   HENT:      Head: Atraumatic.   Eyes:      Extraocular Movements: Extraocular movements intact.   Cardiovascular:      Rate and Rhythm: Normal rate and regular rhythm.   Pulmonary:      Effort: Pulmonary effort is normal.      Breath sounds: Normal breath sounds.      Comments: Left robotic incisions healing well. Left chest tube connected to mini atrium. Air leak with cough   Abdominal:      Palpations: Abdomen is soft.   Musculoskeletal:      Cervical back: Normal range of motion.      Right lower leg: No edema.      Left lower leg: " No edema.   Skin:     General: Skin is warm and dry.   Neurological:      General: No focal deficit present.      Mental Status: She is alert and oriented to person, place, and time.   Psychiatric:         Mood and Affect: Mood normal.         Behavior: Behavior normal.         CXR 10/12/22:  Status post left upper lobectomy.  Moderate volume dependent left-sided pleural fluid with mild adjacent atelectatic changes.  Mild blunting of right costophrenic angle, may represent subsegmental atelectasis.  Remaining lungs are well aerated.  No pneumothorax.    Assessment:       Patient is a 62 y.o. female former smoker with CLL, COPD, chronic pain, and esophageal strictures here today for follow up 1 week s/p robot assisted left upper lobectomy and lymphadenectomy on 10/03. Discharged with chest tube to mini atrium for persistent air leak.   Persistent leak.     Plan:       Return to clinic in 1 week with CXR.   Refill for oxycodone and lidoderm.   Pathology discussed with patient and daughter. Refer back to Dr. Lee for adjuvant therapy.

## 2022-10-11 NOTE — PROGRESS NOTES
C3 nurse spoke with Yvette Hutchinson for a TCC post hospital discharge follow up call. The patient does not have a scheduled HOSFU appointment with Vern Priest MD within 5-7 days post hospital discharge date 10/7/22. C3 nurse was unable to schedule HOSFU appointment in [facility]. Not in NP  service area.    Message sent to PCP staff requesting they contact patient and schedule follow up appointment.

## 2022-10-11 NOTE — PROGRESS NOTES
History & Physical    SUBJECTIVE:     History of Present Illness:  Patient is a 62 y.o. female former smoker with CLL, COPD, chronic pain, and esophageal strictures here today for follow up 1 week s/p robot assisted left upper lobectomy and lymphadenectomy on 10/03. Patient was discharged home with CT and mini atrium, eval today for potential removal.     Previous evaluation of FARZANEH NSCLC. Recurrent productive cough prompting chest CT which identified FARZANEH nodule in location where previously there was a cluster of nodules presumed to be infectious/inflammatory. Percutaneous biopsy positive for NSCLC. PET with 2.0 cm FARZANEH nodule with SUV 14.4 and left hilar SUV 5.8. Comes to clinic with a chest CT with contrast and updated PFTs. No EBUS to date. Nocturnal supplemental oxygen.     Smoker.   PSH: tonsillectomy     Procedure(s) (LRB):  XI ROBOTIC RATS,WITH LOBECTOMY,LUNG (Left)  XI ROBOTIC LYMPHADENECTOMY (Left)  BLOCK, NERVE, INTERCOSTAL, 2 OR MORE (Left)        No chief complaint on file.      Review of patient's allergies indicates:  No Known Allergies    Current Outpatient Medications   Medication Sig Dispense Refill    albuterol-ipratropium (DUO-NEB) 2.5 mg-0.5 mg/3 mL nebulizer solution Take 3 mLs by nebulization every 6 (six) hours as needed for Wheezing. Rescue 120 each 5    ALPRAZolam (XANAX) 0.5 MG tablet Take 1 tablet (0.5 mg total) by mouth 3 (three) times daily. for 10 days 30 tablet 0    buPROPion (WELLBUTRIN XL) 300 MG 24 hr tablet Take 1 tablet (300 mg total) by mouth once daily. 90 tablet 3    docusate sodium (COLACE) 100 MG capsule Take 1 capsule (100 mg total) by mouth 2 (two) times daily. for 7 days 14 capsule 0    fluconazole (DIFLUCAN) 100 MG tablet Take 1 tablet (100 mg total) by mouth every other day. 3 tablet 0    FLUoxetine 20 MG capsule Take 1 capsule (20 mg total) by mouth once daily. 30 capsule 11    fluticasone propionate (FLONASE) 50 mcg/actuation nasal spray 1 spray (50 mcg total) by Each  Nostril route once daily. 16 g 3    fluticasone-salmeterol 230-21 mcg/dose (ADVAIR HFA) 230-21 mcg/actuation HFAA inhaler Inhale 2 puffs into the lungs 2 (two) times daily. Controller 12 g 5    fluticasone-umeclidin-vilanter (TRELEGY ELLIPTA) 100-62.5-25 mcg DsDv Inhale 1 puff into the lungs once daily. 60 each 11    gabapentin (NEURONTIN) 300 MG capsule Take 2 capsules (600 mg total) by mouth 3 (three) times daily. 180 capsule 11    guaiFENesin-codeine 100-10 mg/5 ml (TUSSI-ORGANIDIN NR)  mg/5 mL syrup TAKE 5 MLS BY MOUTH EVERY 4 HOURS AS NEEDED FOR COUGH OR CONGESTION      HYDROcodone-acetaminophen (NORCO) 5-325 mg per tablet Take 1 tablet by mouth every 12 (twelve) hours as needed for Pain. 10 tablet 0    levocetirizine (XYZAL) 5 MG tablet Take 1 tablet (5 mg total) by mouth every evening. 30 tablet 5    LIDOcaine (LIDODERM) 5 % Place 1 patch onto the skin once daily. Remove & Discard patch within 12 hours or as directed by MD 7 patch 0    methocarbamoL (ROBAXIN) 500 MG Tab Take 1 tablet (500 mg total) by mouth 4 (four) times daily. for 10 days 40 tablet 0    montelukast (SINGULAIR) 10 mg tablet Take 10 mg by mouth once daily.      ondansetron (ZOFRAN-ODT) 8 MG TbDL Dissolve 1 tablet (8 mg total) by mouth every 8 (eight) hours as needed. 15 tablet 0    oxyCODONE (ROXICODONE) 5 MG immediate release tablet Take 1 tablet (5 mg total) by mouth every 4 (four) hours as needed. 28 tablet 0     No current facility-administered medications for this visit.       Past Medical History:   Diagnosis Date    Arthritis     Depression     GERD (gastroesophageal reflux disease)     Pneumonia of left lung due to infectious organism 03/05/2020     Past Surgical History:   Procedure Laterality Date    INJECTION OF ANESTHETIC AGENT AROUND MULTIPLE INTERCOSTAL NERVES Left 10/3/2022    Procedure: BLOCK, NERVE, INTERCOSTAL, 2 OR MORE;  Surgeon: Evelio Kaplan MD;  Location: [facility] OR [address] [place] [address];  Service: Thoracic;  Laterality:  Left;    ROBOT-ASSISTED LAPAROSCOPIC LYMPHADENECTOMY USING DA MONIQUE XI Left 10/3/2022    Procedure: XI ROBOTIC LYMPHADENECTOMY;  Surgeon: Evelio Kaplan MD;  Location: [facility] OR [address] [place] [address];  Service: Thoracic;  Laterality: Left;    TONSILLECTOMY      XI ROBOTIC RATS,WITH LOBECTOMY,LUNG Left 10/3/2022    Procedure: XI ROBOTIC RATS,WITH LOBECTOMY,LUNG;  Surgeon: Evelio Kaplan MD;  Location: [facility] OR [address] [place] [address];  Service: Thoracic;  Laterality: Left;     Family History   Problem Relation Age of Onset    Ovarian cancer Sister     Breast cancer Cousin      Social History     Tobacco Use    Smoking status: Some Days     Packs/day: 0.15     Types: Cigarettes    Smokeless tobacco: Never    Tobacco comments:     reports one cigarette every now and then   Substance Use Topics    Alcohol use: Yes     Comment: rarely    Drug use: Yes     Types: Marijuana        Review of Systems:  Review of Systems   Respiratory:  Positive for cough.         Occasional productive cough   Cardiovascular: Negative.    Gastrointestinal: Negative.    Skin: Negative.    Neurological: Negative.    Hematological: Negative.    Psychiatric/Behavioral: Negative.       OBJECTIVE:     Vital Signs (Most Recent)  There were no vitals filed for this visit.    Physical Exam:  Physical Exam  Constitutional:       Appearance: Normal appearance.   HENT:      Head: Normocephalic and atraumatic.   Eyes:      Extraocular Movements: Extraocular movements intact.      Conjunctiva/sclera: Conjunctivae normal.      Pupils: Pupils are equal, round, and reactive to light.   Cardiovascular:      Rate and Rhythm: Normal rate and regular rhythm.   Pulmonary:      Effort: Pulmonary effort is normal.      Breath sounds: Normal breath sounds.   Abdominal:      Palpations: Abdomen is soft.   Musculoskeletal:         General: No deformity. Normal range of motion.   Skin:     General: Skin is warm and dry.   Neurological:      General: No focal deficit present.      Mental  Status: She is alert and oriented to person, place, and time.   Psychiatric:         Mood and Affect: Mood normal.         Behavior: Behavior normal.         Thought Content: Thought content normal.         Judgment: Judgment normal.     Chest CT 7/29/22:  I reviewed the images  2.3 cm spiculated left upper lobe lung nodule highly suspicious for bronchogenic carcinoma.  New nonspecific 7 mm nodule in the right middle lobe; this appears more linear on the coronal images and may be a focus of scarring.  Additional stable lung nodules, mildly enlarged axillary lymph nodes, substernal thyroid nodule; these findings are likely benign given the interval stability.    PET 8/31/22:  I reviewed the images  2.0 cm left upper lobe mass consistent with known bronchogenic carcinoma.  Metastatic left hilar lymph node.  No distant metastatic disease.  Partially calcified substernal thyroid nodule with mild FDG uptake.  Given the fact that this is in stable on CT and would be challenging to biopsy due to its location, continued follow-up is recommended.  Enlarged axillary lymph nodes with prominent fatty kate and no abnormal FDG uptake, stable.  These may be related to the known history of CLL.  Left adrenal adenoma, unchanged.    Chest CT with contrast 9/6/22:  I reviewed the images  1. Hypermetabolic nodule left upper lobe appears stable upon comparison.  2. Small localized area of hypermetabolic activity in the left infrahilar region is likewise unchanged upon comparison.  3. Indirect findings related to old granulomatous disease.  4. Soft tissue density located within the upper mediastinum that is in part contiguous that of the lower pole of the thyroid gland likely reflecting the extra thyroid tissue with densely calcified nodule located towards the right of midline.    PFTs:  FEV1: 1.21(52%), DLCO13.17 (60%)        CXR 10/06/22:   Left-sided chest tube remain.  Opacification at the left lung base consistent with atelectatic  changes and probably associated airway disease.  The left costophrenic angle is slightly blunted.  Small subsegmental atelectatic changes noted at the right lung base.  The upper lung fields are clear.    ASSESSMENT/PLAN:     Patient is a 62 y.o. female former smoker with CLL, COPD, chronic pain, and esophageal strictures here today for follow up 1 week s/p robot assisted left upper lobectomy and lymphadenectomy on 10/03.     PLAN:Plan     Clinical stage II NSCLC   6MWT

## 2022-10-12 ENCOUNTER — HOSPITAL ENCOUNTER (OUTPATIENT)
Dept: RADIOLOGY | Facility: HOSPITAL | Age: 62
Discharge: HOME | End: 2022-10-12
Attending: THORACIC SURGERY (CARDIOTHORACIC VASCULAR SURGERY)
Payer: MEDICAID

## 2022-10-12 ENCOUNTER — OFFICE VISIT (OUTPATIENT)
Dept: CARDIOTHORACIC SURGERY | Facility: CLINIC | Age: 62
End: 2022-10-12
Payer: MEDICAID

## 2022-10-12 VITALS
HEART RATE: 64 BPM | BODY MASS INDEX: 29.14 KG/M2 | WEIGHT: 164.44 LBS | HEIGHT: 63 IN | SYSTOLIC BLOOD PRESSURE: 108 MMHG | OXYGEN SATURATION: 94 % | DIASTOLIC BLOOD PRESSURE: 62 MMHG

## 2022-10-12 DIAGNOSIS — C34.92 NSCLC OF LEFT LUNG: ICD-10-CM

## 2022-10-12 DIAGNOSIS — C34.10 MALIGNANT NEOPLASM OF UPPER LOBE OF LUNG, UNSPECIFIED LATERALITY: ICD-10-CM

## 2022-10-12 PROCEDURE — 99999 PR PBB SHADOW E&M-EST. PATIENT-LVL IV: ICD-10-PCS | Mod: PBBFAC,,, | Performed by: THORACIC SURGERY (CARDIOTHORACIC VASCULAR SURGERY)

## 2022-10-12 PROCEDURE — 1159F PR MEDICATION LIST DOCUMENTED IN MEDICAL RECORD: ICD-10-PCS | Mod: CPTII,,, | Performed by: THORACIC SURGERY (CARDIOTHORACIC VASCULAR SURGERY)

## 2022-10-12 PROCEDURE — 3074F SYST BP LT 130 MM HG: CPT | Mod: CPTII,,, | Performed by: THORACIC SURGERY (CARDIOTHORACIC VASCULAR SURGERY)

## 2022-10-12 PROCEDURE — 3078F PR MOST RECENT DIASTOLIC BLOOD PRESSURE < 80 MM HG: ICD-10-PCS | Mod: CPTII,,, | Performed by: THORACIC SURGERY (CARDIOTHORACIC VASCULAR SURGERY)

## 2022-10-12 PROCEDURE — 71046 X-RAY EXAM CHEST 2 VIEWS: CPT | Mod: 26,,, | Performed by: STUDENT IN AN ORGANIZED HEALTH CARE EDUCATION/TRAINING PROGRAM

## 2022-10-12 PROCEDURE — 99999 PR PBB SHADOW E&M-EST. PATIENT-LVL IV: CPT | Mod: PBBFAC,,, | Performed by: THORACIC SURGERY (CARDIOTHORACIC VASCULAR SURGERY)

## 2022-10-12 PROCEDURE — 3008F PR BODY MASS INDEX (BMI) DOCUMENTED: ICD-10-PCS | Mod: CPTII,,, | Performed by: THORACIC SURGERY (CARDIOTHORACIC VASCULAR SURGERY)

## 2022-10-12 PROCEDURE — 99024 PR POST-OP FOLLOW-UP VISIT: ICD-10-PCS | Mod: ,,, | Performed by: THORACIC SURGERY (CARDIOTHORACIC VASCULAR SURGERY)

## 2022-10-12 PROCEDURE — 99024 POSTOP FOLLOW-UP VISIT: CPT | Mod: ,,, | Performed by: THORACIC SURGERY (CARDIOTHORACIC VASCULAR SURGERY)

## 2022-10-12 PROCEDURE — 71046 X-RAY EXAM CHEST 2 VIEWS: CPT | Mod: TC

## 2022-10-12 PROCEDURE — 1159F MED LIST DOCD IN RCRD: CPT | Mod: CPTII,,, | Performed by: THORACIC SURGERY (CARDIOTHORACIC VASCULAR SURGERY)

## 2022-10-12 PROCEDURE — 3078F DIAST BP <80 MM HG: CPT | Mod: CPTII,,, | Performed by: THORACIC SURGERY (CARDIOTHORACIC VASCULAR SURGERY)

## 2022-10-12 PROCEDURE — 3008F BODY MASS INDEX DOCD: CPT | Mod: CPTII,,, | Performed by: THORACIC SURGERY (CARDIOTHORACIC VASCULAR SURGERY)

## 2022-10-12 PROCEDURE — 3074F PR MOST RECENT SYSTOLIC BLOOD PRESSURE < 130 MM HG: ICD-10-PCS | Mod: CPTII,,, | Performed by: THORACIC SURGERY (CARDIOTHORACIC VASCULAR SURGERY)

## 2022-10-12 PROCEDURE — 99214 OFFICE O/P EST MOD 30 MIN: CPT | Mod: PBBFAC,25 | Performed by: THORACIC SURGERY (CARDIOTHORACIC VASCULAR SURGERY)

## 2022-10-12 PROCEDURE — 71046 XR CHEST PA AND LATERAL: ICD-10-PCS | Mod: 26,,, | Performed by: STUDENT IN AN ORGANIZED HEALTH CARE EDUCATION/TRAINING PROGRAM

## 2022-10-12 RX ORDER — OXYCODONE HYDROCHLORIDE 5 MG/1
5 TABLET ORAL EVERY 4 HOURS PRN
Qty: 41 TABLET | Refills: 0 | Status: SHIPPED | OUTPATIENT
Start: 2022-10-12 | End: 2022-10-19 | Stop reason: SDUPTHER

## 2022-10-12 RX ORDER — OXYCODONE HYDROCHLORIDE 5 MG/1
5 TABLET ORAL EVERY 4 HOURS PRN
Qty: 41 TABLET | Refills: 0 | Status: SHIPPED | OUTPATIENT
Start: 2022-10-12 | End: 2022-10-12

## 2022-10-12 RX ORDER — LIDOCAINE 50 MG/G
1 PATCH TOPICAL DAILY
Qty: 7 PATCH | Refills: 0 | Status: SHIPPED | OUTPATIENT
Start: 2022-10-12 | End: 2024-03-06

## 2022-10-12 RX ORDER — OXYCODONE HYDROCHLORIDE 5 MG/1
5 TABLET ORAL EVERY 4 HOURS PRN
Qty: 41 TABLET | Refills: 0 | OUTPATIENT
Start: 2022-10-12

## 2022-10-12 NOTE — PHYSICIAN QUERY
PT Name: Yvette Hutchinson  MR #: [id number]    DOCUMENTATION CLARIFICATION     CDS/: JAIRON Brunner, RN, CCDS               Contact information: [email or web address]    This form is a permanent document in the medical record.     Query Date: October 12, 2022    Dear Provider,  By submitting this query, we are merely seeking further clarification of documentation. Please utilize your independent clinical judgment when addressing the question(s) below.    The medical record contains the following:  Supporting Clinical Findings Location in Medical Record   Preoperative Diagnosis:  Left upper lobe non-small cell lung cancer  Procedure:  Robotic assisted left upper lobectomy, mediastinal lymph node dissection, intercostal nerve block 2 or more intercostal levels    A #24Fr chest tube was inserted, exteriorized through the assistant's port and secured with silk suture    L chest tube to water seal. Chest tube dressing saturated. Air leak present, MD aware. Subcutaneous emphysema present. Sanguinous output    140cc of output in chest tube.  Small air leak present on exam this morning.  HDS.  Afebrile.  CXR without significant effusion or pneumo.    Her chest tube continued to have a small expiratory air leak, so it was placed to an pneumostat for discharge. At the time of discharge, she required home supplemental oxygen based on her walk test Op Note: Dr. Kaplan 10/3              Plan of Care: TWYLA Rueda RN 10/4    TS PN: Dr. Kaplan 10/4      DCS: Dr. Kaplan 10/6       Please clarify if ___ air leak ____ (as it relates to __left upper lobectomy, mediastinal lymph node dissection___) is:      [ x ] Inherent/Integral to the procedure   [  ] Complication of the procedure   [  x] Present, but not a complication of the procedure   [  ] Incidental finding, not clinically significant   [  ] Other (please specify): __________________   [  ] Clinically Undetermined       Please document in your progress notes  daily for the duration of treatment until resolved and include in your discharge summary.

## 2022-10-14 NOTE — PROGRESS NOTES
Subjective:       Patient ID: Yvette Hutchinson is a 62 y.o. female.    Chief Complaint: No chief complaint on file.    Diagnosis:  NSCLC    Pre-operative therapy: none     Procedure(s) and date(s): 10/3/22: left robotic assisted upper lobectomy with MLND    Pathology: 2.2 cm adenosquamous. Levels 5,8,9,11,12 = negative. Level 10 positive for metastatic. Margins negative. pT1cN1. Stage IIB      Post-operative therapy: refer back to Dr. Lee       HPI  Patient is a 62 y.o. female former smoker with CLL, COPD, chronic pain, and esophageal strictures here today for follow up of persistent air leak present at last follow up appointment 10/12. Patient is 2 weeks s/p robot assisted left upper lobectomy and lymphadenectomy on 10/03. Patient was discharged home with CT and mini atrium. Since discharge breathing stable. No issues with chest tube. Taking gabapentin, muscle relaxer, and oxycodone.     Review of Systems   Constitutional:  Negative for activity change and fever.   HENT: Negative.     Respiratory:          Pain    Cardiovascular:  Negative for chest pain and leg swelling.   Gastrointestinal: Negative.    Neurological:  Negative for syncope.   Psychiatric/Behavioral:  Negative for agitation. The patient is not nervous/anxious.        Objective:      There were no vitals filed for this visit.      Physical Exam  Constitutional:       Appearance: Normal appearance.   HENT:      Head: Atraumatic.   Eyes:      Extraocular Movements: Extraocular movements intact.   Cardiovascular:      Rate and Rhythm: Normal rate and regular rhythm.   Pulmonary:      Effort: Pulmonary effort is normal.      Breath sounds: Normal breath sounds.      Comments: Left robotic incisions healing well. Left chest tube connected to mini atrium. No air leak with normal respiration or cough   Abdominal:      Palpations: Abdomen is soft.   Musculoskeletal:      Cervical back: Normal range of motion.      Right lower leg: No edema.       Left lower leg: No edema.   Skin:     General: Skin is warm and dry.   Neurological:      General: No focal deficit present.      Mental Status: She is alert and oriented to person, place, and time.   Psychiatric:         Mood and Affect: Mood normal.         Behavior: Behavior normal.         CXR 10/12/22:  Status post left upper lobectomy.  Moderate volume dependent left-sided pleural fluid with mild adjacent atelectatic changes.  Mild blunting of right costophrenic angle, may represent subsegmental atelectasis.  Remaining lungs are well aerated.  No pneumothorax.    CXR 10/19/22:  I reviewed the images  Postoperative changes of left upper lobectomy with volume loss in the left hemithorax.  Left-sided chest tube in stable position with stable blunting of the left costophrenic angle.  Cardiomediastinal silhouette is stable.  There are aortic arch calcifications.  Right lung is clear and well expanded.  No pneumothorax.  Bones show no acute abnormalities.  There is a compression fracture in the midthoracic spine stable from prior.    Assessment:       Patient is a 62 y.o. female former smoker with CLL, COPD, chronic pain, and esophageal strictures here today for follow up of persistent air leak present at last follow up appointment 10/12. Patient is 2 weeks s/p robot assisted left upper lobectomy and lymphadenectomy on 10/03. Patient was discharged home with CT and mini atrium. Since discharge breathing stable. No issues with chest tube. Taking gabapentin, muscle relaxer, and oxycodone.   Resected stage II NSCLC  Plan:       Recommend adjuvant therapy  Patient will follow-up with Dr. Rhiannon Lee for adjuvant chemotherapy  Longitudinal thoracic surgical follow-up with chest CT in 6 months

## 2022-10-16 ENCOUNTER — PATIENT MESSAGE (OUTPATIENT)
Dept: CARDIOTHORACIC SURGERY | Facility: CLINIC | Age: 62
End: 2022-10-16
Payer: MEDICAID

## 2022-10-18 ENCOUNTER — OFFICE VISIT (OUTPATIENT)
Dept: HEMATOLOGY/ONCOLOGY | Facility: CLINIC | Age: 62
End: 2022-10-18
Payer: MEDICAID

## 2022-10-18 VITALS
SYSTOLIC BLOOD PRESSURE: 101 MMHG | WEIGHT: 160.5 LBS | DIASTOLIC BLOOD PRESSURE: 57 MMHG | TEMPERATURE: 98 F | HEART RATE: 65 BPM | OXYGEN SATURATION: 93 % | HEIGHT: 63 IN | RESPIRATION RATE: 12 BRPM | BODY MASS INDEX: 28.44 KG/M2

## 2022-10-18 DIAGNOSIS — C34.92 NSCLC OF LEFT LUNG: ICD-10-CM

## 2022-10-18 DIAGNOSIS — C91.10 CLL (CHRONIC LYMPHOCYTIC LEUKEMIA): ICD-10-CM

## 2022-10-18 DIAGNOSIS — C34.10 MALIGNANT NEOPLASM OF UPPER LOBE OF LUNG, UNSPECIFIED LATERALITY: Primary | ICD-10-CM

## 2022-10-18 DIAGNOSIS — C34.92 NSCLC OF LEFT LUNG: Primary | ICD-10-CM

## 2022-10-18 DIAGNOSIS — E53.8 B12 DEFICIENCY: ICD-10-CM

## 2022-10-18 PROCEDURE — 1111F PR DISCHARGE MEDS RECONCILED W/ CURRENT OUTPATIENT MED LIST: ICD-10-PCS | Mod: CPTII,,, | Performed by: INTERNAL MEDICINE

## 2022-10-18 PROCEDURE — 1159F PR MEDICATION LIST DOCUMENTED IN MEDICAL RECORD: ICD-10-PCS | Mod: CPTII,,, | Performed by: INTERNAL MEDICINE

## 2022-10-18 PROCEDURE — 99214 OFFICE O/P EST MOD 30 MIN: CPT | Mod: S$PBB,,, | Performed by: INTERNAL MEDICINE

## 2022-10-18 PROCEDURE — 3074F PR MOST RECENT SYSTOLIC BLOOD PRESSURE < 130 MM HG: ICD-10-PCS | Mod: CPTII,,, | Performed by: INTERNAL MEDICINE

## 2022-10-18 PROCEDURE — 99214 PR OFFICE/OUTPT VISIT, EST, LEVL IV, 30-39 MIN: ICD-10-PCS | Mod: S$PBB,,, | Performed by: INTERNAL MEDICINE

## 2022-10-18 PROCEDURE — 1111F DSCHRG MED/CURRENT MED MERGE: CPT | Mod: CPTII,,, | Performed by: INTERNAL MEDICINE

## 2022-10-18 PROCEDURE — 99215 OFFICE O/P EST HI 40 MIN: CPT | Mod: PBBFAC,PO | Performed by: INTERNAL MEDICINE

## 2022-10-18 PROCEDURE — 3074F SYST BP LT 130 MM HG: CPT | Mod: CPTII,,, | Performed by: INTERNAL MEDICINE

## 2022-10-18 PROCEDURE — 99999 PR PBB SHADOW E&M-EST. PATIENT-LVL V: ICD-10-PCS | Mod: PBBFAC,,, | Performed by: INTERNAL MEDICINE

## 2022-10-18 PROCEDURE — 3078F DIAST BP <80 MM HG: CPT | Mod: CPTII,,, | Performed by: INTERNAL MEDICINE

## 2022-10-18 PROCEDURE — 3078F PR MOST RECENT DIASTOLIC BLOOD PRESSURE < 80 MM HG: ICD-10-PCS | Mod: CPTII,,, | Performed by: INTERNAL MEDICINE

## 2022-10-18 PROCEDURE — 1159F MED LIST DOCD IN RCRD: CPT | Mod: CPTII,,, | Performed by: INTERNAL MEDICINE

## 2022-10-18 PROCEDURE — 99999 PR PBB SHADOW E&M-EST. PATIENT-LVL V: CPT | Mod: PBBFAC,,, | Performed by: INTERNAL MEDICINE

## 2022-10-18 RX ORDER — DOCUSATE SODIUM 100 MG/1
100 CAPSULE, LIQUID FILLED ORAL DAILY PRN
Qty: 30 CAPSULE | Refills: 1 | Status: SHIPPED | OUTPATIENT
Start: 2022-10-18 | End: 2023-10-18

## 2022-10-18 RX ORDER — METHOCARBAMOL 500 MG/1
500 TABLET, FILM COATED ORAL 4 TIMES DAILY
Qty: 40 TABLET | Refills: 0 | Status: SHIPPED | OUTPATIENT
Start: 2022-10-18 | End: 2022-10-28

## 2022-10-18 RX ORDER — ONDANSETRON 8 MG/1
8 TABLET, ORALLY DISINTEGRATING ORAL EVERY 12 HOURS PRN
Qty: 60 TABLET | Refills: 1 | Status: SHIPPED | OUTPATIENT
Start: 2022-10-18 | End: 2023-08-09 | Stop reason: SDUPTHER

## 2022-10-18 RX ORDER — PROCHLORPERAZINE MALEATE 10 MG
10 TABLET ORAL EVERY 6 HOURS PRN
Qty: 60 TABLET | Refills: 1 | Status: SHIPPED | OUTPATIENT
Start: 2022-10-18 | End: 2023-01-12 | Stop reason: ALTCHOICE

## 2022-10-18 NOTE — Clinical Note
Needs port  Will be starting chemo in about 3 weeks Needs chemo class  See me for start with cbc,cmp

## 2022-10-18 NOTE — PROGRESS NOTES
Subjective:       Patient ID: Yvette Hutchinson is a 62 y.o. female.    Chief Complaint:  Patient known to me with CLL stage 0 recently diagnosed with lung cancer August 2022  Follow-up    Lung Cancer    Patient has chronic complains of chronic pain syndrome and COPD for which periodically she take steroids she is also on BuSpar has issues anxiety has required dilatation of esophageal strictures allergic rhinitis insomnia and history of B12 deficiency documented   Had CT chest done with pulmonary 7/22 showed mass bx done. 8/22    Oncology hx  Impression:ct chest 7/29/22     2.3 cm spiculated left upper lobe lung nodule highly suspicious for bronchogenic carcinoma.     New nonspecific 7 mm nodule in the right middle lobe; this appears more linear on the coronal images and may be a focus of scarring.     Additional stable lung nodules, mildly enlarged axillary lymph nodes, substernal thyroid nodule; these findings are likely benign given the interval stability.   LEFT LUNG MASS, CT-GUIDED BIOPSY:   Non-small cell lung carcinoma.   Comment:  The biopsy reveals invasive carcinoma with apparent glandular but   also vague squamoid features.  Tumor cells are diffusely positive with TTF-1   and focally positive with P40.  The histology differential includes   adenocarcinoma and adenosquamous carcinoma.  PD-L1 and templates NGS studies   are in progress.  The results will be issued separately.    October 3, 2022: Robotic left lobectomy T1c N1    Social history; patient is a smoker denies recreational alcohol use or drug use   Patient is currently on disability  She is allergic to the mask used during COVID pandemic she is also bothered by her mask with COPD    Family history suggestive of ovarian and breast cancer patient advised to see a  or seek   Review of patient's allergies indicates:  No Known Allergies  Medications have been reviewed  Current Outpatient Medications on File Prior to Visit    Medication Sig Dispense Refill    albuterol-ipratropium (DUO-NEB) 2.5 mg-0.5 mg/3 mL nebulizer solution Take 3 mLs by nebulization every 6 (six) hours as needed for Wheezing. Rescue 120 each 5    buPROPion (WELLBUTRIN XL) 300 MG 24 hr tablet Take 1 tablet (300 mg total) by mouth once daily. 90 tablet 3    fluconazole (DIFLUCAN) 100 MG tablet Take 1 tablet (100 mg total) by mouth every other day. 3 tablet 0    FLUoxetine 20 MG capsule Take 1 capsule (20 mg total) by mouth once daily. 30 capsule 11    fluticasone propionate (FLONASE) 50 mcg/actuation nasal spray 1 spray (50 mcg total) by Each Nostril route once daily. 16 g 3    fluticasone-salmeterol 230-21 mcg/dose (ADVAIR HFA) 230-21 mcg/actuation HFAA inhaler Inhale 2 puffs into the lungs 2 (two) times daily. Controller 12 g 5    fluticasone-umeclidin-vilanter (TRELEGY ELLIPTA) 100-62.5-25 mcg DsDv Inhale 1 puff into the lungs once daily. 60 each 11    gabapentin (NEURONTIN) 300 MG capsule Take 2 capsules (600 mg total) by mouth 3 (three) times daily. 180 capsule 11    guaiFENesin-codeine 100-10 mg/5 ml (TUSSI-ORGANIDIN NR)  mg/5 mL syrup TAKE 5 MLS BY MOUTH EVERY 4 HOURS AS NEEDED FOR COUGH OR CONGESTION      HYDROcodone-acetaminophen (NORCO) 5-325 mg per tablet Take 1 tablet by mouth every 12 (twelve) hours as needed for Pain. 10 tablet 0    LIDOcaine (LIDODERM) 5 % Place 1 patch onto the skin once daily. Remove & Discard patch within 12 hours or as directed by MD 7 patch 0    montelukast (SINGULAIR) 10 mg tablet Take 10 mg by mouth once daily.      ondansetron (ZOFRAN-ODT) 8 MG TbDL Dissolve 1 tablet (8 mg total) by mouth every 8 (eight) hours as needed. 15 tablet 0    oxyCODONE (ROXICODONE) 5 MG immediate release tablet Take 1 tablet (5 mg total) by mouth every 4 (four) hours as needed for Pain. 41 tablet 0    ALPRAZolam (XANAX) 0.5 MG tablet Take 1 tablet (0.5 mg total) by mouth 3 (three) times daily. for 10 days (Patient not taking: Reported on  10/11/2022) 30 tablet 0    levocetirizine (XYZAL) 5 MG tablet Take 1 tablet (5 mg total) by mouth every evening. 30 tablet 5    [] methocarbamoL (ROBAXIN) 500 MG Tab Take 1 tablet (500 mg total) by mouth 4 (four) times daily. for 10 days 40 tablet 0     No current facility-administered medications on file prior to visit.       REVIEW OF SYSTEMS:     S/p lobectomy left side, has draining tube+ with sanguinous fluid.    Wt Readings from Last 3 Encounters:   10/18/22 72.8 kg (160 lb 7.9 oz)   10/12/22 74.6 kg (164 lb 7.4 oz)   10/03/22 74.4 kg (164 lb)     Temp Readings from Last 3 Encounters:   10/18/22 98.1 °F (36.7 °C) (Temporal)   10/07/22 97.1 °F (36.2 °C) (Oral)   22 97.3 °F (36.3 °C) (Temporal)     BP Readings from Last 3 Encounters:   10/18/22 (!) 101/57   10/12/22 108/62   10/07/22 (!) 114/55     Pulse Readings from Last 3 Encounters:   10/18/22 65   10/12/22 64   10/07/22 95     VITAL SIGNS:  as above   GENERAL: appears well-built, well-nourished.  No anxiety, no agitation, and in no distress.  Patient is awake, alert, oriented and cooperative.  HEENT:  Showed no congestion. Trachea is central no obvious icterus or pallor noted no hoarseness. no obvious JVD   NECK:  Supple.  No JVD. No obvious cervical submental or supraclavicular adenopathy.  RS: tube draining on left side. S/p lobectomy  ABDOMEN:  abdomen appears undistended.  EXTREMITIES:  Without edema.  NEUROLOGICAL:  The patient is appropriate, higher functions are normal.  No  obvious neurological deficits.  normal judgement normal thought content  No confusion, no speech impediment. Cranial nerves are intact and show no deficit. No gross motor deficits noted   SKIN MUSCULOSKELETAL: no joint or skeletal deformity, no clubbing of nails.  No visible rash ecchymosis or petechiae  Lab Results   Component Value Date    WBC 20.78 (H) 2020    HGB 14.9 2020    HCT 47.0 2020    MCV 99 (H) 2020     2020      Smudge cells presnt  CMP  Sodium   Date Value Ref Range Status   10/06/2022 138 136 - 145 mmol/L Final     Potassium   Date Value Ref Range Status   10/06/2022 3.9 3.5 - 5.1 mmol/L Final     Chloride   Date Value Ref Range Status   10/06/2022 101 95 - 110 mmol/L Final     CO2   Date Value Ref Range Status   10/06/2022 27 23 - 29 mmol/L Final     Glucose   Date Value Ref Range Status   10/06/2022 97 70 - 110 mg/dL Final     BUN   Date Value Ref Range Status   10/06/2022 7 (L) 8 - 23 mg/dL Final     Creatinine   Date Value Ref Range Status   10/06/2022 0.6 0.5 - 1.4 mg/dL Final     Calcium   Date Value Ref Range Status   10/06/2022 8.7 8.7 - 10.5 mg/dL Final     Total Protein   Date Value Ref Range Status   08/26/2022 6.8 6.0 - 8.4 g/dL Final     Albumin   Date Value Ref Range Status   08/26/2022 4.0 3.5 - 5.2 g/dL Final     Total Bilirubin   Date Value Ref Range Status   08/26/2022 0.4 0.1 - 1.0 mg/dL Final     Comment:     For infants and newborns, interpretation of results should be based  on gestational age, weight and in agreement with clinical  observations.    Premature Infant recommended reference ranges:  Up to 24 hours.............<8.0 mg/dL  Up to 48 hours............<12.0 mg/dL  3-5 days..................<15.0 mg/dL  6-29 days.................<15.0 mg/dL       Alkaline Phosphatase   Date Value Ref Range Status   08/26/2022 110 55 - 135 U/L Final     AST   Date Value Ref Range Status   08/26/2022 22 10 - 40 U/L Final     ALT   Date Value Ref Range Status   08/26/2022 24 10 - 44 U/L Final     Anion Gap   Date Value Ref Range Status   10/06/2022 10 8 - 16 mmol/L Final     eGFR if    Date Value Ref Range Status   06/13/2022 >60 >60 mL/min/1.73 m^2 Final     eGFR if non    Date Value Ref Range Status   06/13/2022 >60 >60 mL/min/1.73 m^2 Final     Comment:     Calculation used to obtain the estimated glomerular filtration  rate (eGFR) is the CKD-EPI equation.            2wk  ago    Blood Interpretation A B cell lymphoproliferative disorder is present. see comment.    Comment: Interpreted by: Jeremias Sosa M.D., Signed on 08/06/2020 at 13:07   Blood Comment Flow cytometric analysis of  peripheral blood  detects a lambda  light chain   restricted B lymphocyte population showing expression of CD19 and dim CD20   with aberrant expression of CD5 (dim).  T lymphocytes are   immunophenotypically unremarkable.  The blasts gate is not increased.  The   findings are consistent with patient history of chronic lymphocytic   leukemia/small lymphocytic lymphoma.   Flow differential:  Lymphocytes 69.6%, Monocytes 2.8%, Granulocytes  27.5%,   Blast  0.1%, Debris/nRBC 0.1%,  Viability 97.5%.   10/3/22 robotic lef lung lobectomy  1. LYMPH NODE, LEVEL 5 FROZEN SECTION, EXCISION:   One lymph node with dominant necrotizing granuloma, negative for malignancy   (0/1).   2. LYMPH NODES, LEVEL 5 PERMANENT, EXCISION:   Two lymph nodes with multifocal necrotizing granulomas, negative for   malignancy (0/2).   3. LYMPH NODES, LEFT POSTERIOR LEVEL 10, EXCISION:   Five lymph nodes with multifocal necrotizing granulomas, negative for   malignancy (0/5).   4. LYMPH NODE, LEVEL 11, EXCISION:   One lymph node, negative for malignancy (0/1).   5. LYMPH NODES, LEVEL 12, EXCISION:   Three lymph nodes, one with single necrotizing granuloma, negative for   malignancy (0/3).   6. LYMPH NODES, LEVEL 12 NEAR UPPER DIVISION BRONCHUS, EXCISION:   Three lymph nodes with sinus histiocytosis, negative for malignancy (0/3).   7. LYMPH NODES, LEVEL 10 #2, EXCISION:   One of two lymph nodes positive for metastatic carcinoma (1/2).   Size of largest metastatic deposit:  8 mm.   Negative for extranodal extension.   8. LYMPH NODES, LEFT LEVEL 8, EXCISION:   Two lymph nodes with sinus histiocytosis, negative for malignancy (0/2).   9. LYMPH NODES, LEFT LEVEL 9, EXCISION:   Two lymph nodes, negative for malignancy (0/2).   10. 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   Surgical margins are negative for malignancy.   Background lung tissue with subpleural fibrosis, no evidence of granulomas.   Two hilar lymph nodes, negative for malignancy (0/2).   Coment (1-3, 5):  Prominent necrotizing granulomatous inflammation identified   is identified in multiple lymph nodes.  An AE1/AE3 cytokeratin immunostain   performed on the largest granuloma (part 3) reveals no evidence of occult   carcinoma.  GMS and AFB special stains are negative for fungal and acid-fast   organisms, respectively.  The necrotizing features are not typical of   sarcoidosis.  As such, other granulomatous processes may be considered   including secondary response to malignancy or infection.  Clinical   correlation is advised.   Comment (10):  Sections reveal invasive carcinoma involving lung tissue with   predominantly squamoid morphologic features including bright eosinophilic   cytoplasm and intracellular bridging.  There are not significant keratinizing   features.  Some areas show basaloid features.  There is also regional luminal   features including cribriforming and vague glandular structures containing   mucin.  Tumor cells are diffusely positive with P40 and regionally positive   with TTF-1.  Mucicarmine special stain highlights mucin producing luminal   spaces. Overall tumor appearance and staining profile supports adenosquamous   carcinoma, a variant of non-small cell lung carcinoma.   [facility] SURGICAL PATHOLOGY CANCER CASE SUMMARY:  LUNG   Procedure:  Lobectomy   Specimen laterality:  Left   Tumor focality:  Single focus   Tumor site: Upper lobe of lung   Tumor size:  2.2 cm   Histologic type:  Adenosquamous carcinoma   Spread through airspaces:  Not identified   Visceral pleural invasion:  Not identified   Direct invasion of adjacent structures: Not applicable   Lymphovascular invasion:  Not identified   Margins:  All margins negative for  invasive carcinoma     Closest margin to invasive carcinoma:  Bronchial (3.0 cm)   Regional lymph nodes:  Tumor present in regional lymph node     Number of lymph nodes with tumor:  1     Scott sites with tumor:  Left level 10 (10L)     Extranodal extension:  Not identified     Number of lymph nodes examined:  23   Pathologic stage classification (pTNM): pT1c pN1   Special studies:  Performed on previous biopsy if additional studies needed   there may be performed on tissue blockd 10G or 10H.      Assessment:     CLL  Lymphocytosis over 3 years leukocytosis and smudge cells suggestive of CLL stage 0 no anemia no thrombocytopenia no generalized lymphadenopathy   Dx of lung ca 8/2022  Plan:       Lung ca; adenosquamous, s/p robotic lobectomy October 3, 2022 T1c N1   5% tumor cells are positive for PDL-1   Have reached out to see if pt is eligible ofr ALCHEMIST trial at [facility],   Will check for additional mutation studies with pathology   May use data off [employer] power . Combine platin + alimta, or gem or navelbine or decetaxel for 4 cycles f/u by tecentriq [facility] in pt with 5 % PDL-1 positivity?  Needs port    Will be starting chemo in about 3 weeks after Tumor board presentation  Needs chemo class    See me for start with cbc,cmp  CLL   stage 0 will confirmed with flow cytometry  NTD   she has no other cytopenias or lymphadenopathy or B symptoms patient can be observed  Patient also states her dermatologist told her she was allergic to the mask use and she also has COPD that makes a feels smothered during mask use this might hinder finding a job suitable I have directed her to discuss with the PCP regarding letters to support inability to use mask    Continue with chronic pain syndrome and COPD management advised to stop smoking if at all possible 10 pills of hydrocodoen given to pt, she needs to follow with pain mx      Advised COVID precaution due to her lung situation she will be a high risk patient

## 2022-10-19 ENCOUNTER — HOSPITAL ENCOUNTER (OUTPATIENT)
Dept: RADIOLOGY | Facility: HOSPITAL | Age: 62
Discharge: HOME | End: 2022-10-19
Attending: THORACIC SURGERY (CARDIOTHORACIC VASCULAR SURGERY)
Payer: MEDICAID

## 2022-10-19 ENCOUNTER — OFFICE VISIT (OUTPATIENT)
Dept: CARDIOTHORACIC SURGERY | Facility: CLINIC | Age: 62
End: 2022-10-19
Payer: MEDICAID

## 2022-10-19 ENCOUNTER — TELEPHONE (OUTPATIENT)
Dept: HEMATOLOGY/ONCOLOGY | Facility: CLINIC | Age: 62
End: 2022-10-19
Payer: MEDICAID

## 2022-10-19 VITALS
HEART RATE: 74 BPM | DIASTOLIC BLOOD PRESSURE: 61 MMHG | WEIGHT: 161.38 LBS | BODY MASS INDEX: 28.59 KG/M2 | OXYGEN SATURATION: 95 % | HEIGHT: 63 IN | SYSTOLIC BLOOD PRESSURE: 115 MMHG

## 2022-10-19 DIAGNOSIS — C34.92 NSCLC OF LEFT LUNG: Primary | ICD-10-CM

## 2022-10-19 DIAGNOSIS — C34.92 NSCLC OF LEFT LUNG: ICD-10-CM

## 2022-10-19 PROCEDURE — 1159F MED LIST DOCD IN RCRD: CPT | Mod: CPTII,,, | Performed by: THORACIC SURGERY (CARDIOTHORACIC VASCULAR SURGERY)

## 2022-10-19 PROCEDURE — 3078F PR MOST RECENT DIASTOLIC BLOOD PRESSURE < 80 MM HG: ICD-10-PCS | Mod: CPTII,,, | Performed by: THORACIC SURGERY (CARDIOTHORACIC VASCULAR SURGERY)

## 2022-10-19 PROCEDURE — 71046 X-RAY EXAM CHEST 2 VIEWS: CPT | Mod: 26,,, | Performed by: RADIOLOGY

## 2022-10-19 PROCEDURE — 1159F PR MEDICATION LIST DOCUMENTED IN MEDICAL RECORD: ICD-10-PCS | Mod: CPTII,,, | Performed by: THORACIC SURGERY (CARDIOTHORACIC VASCULAR SURGERY)

## 2022-10-19 PROCEDURE — 3074F PR MOST RECENT SYSTOLIC BLOOD PRESSURE < 130 MM HG: ICD-10-PCS | Mod: CPTII,,, | Performed by: THORACIC SURGERY (CARDIOTHORACIC VASCULAR SURGERY)

## 2022-10-19 PROCEDURE — 99024 POSTOP FOLLOW-UP VISIT: CPT | Mod: ,,, | Performed by: THORACIC SURGERY (CARDIOTHORACIC VASCULAR SURGERY)

## 2022-10-19 PROCEDURE — 71046 X-RAY EXAM CHEST 2 VIEWS: CPT | Mod: TC

## 2022-10-19 PROCEDURE — 99999 PR PBB SHADOW E&M-EST. PATIENT-LVL IV: CPT | Mod: PBBFAC,,, | Performed by: THORACIC SURGERY (CARDIOTHORACIC VASCULAR SURGERY)

## 2022-10-19 PROCEDURE — 3074F SYST BP LT 130 MM HG: CPT | Mod: CPTII,,, | Performed by: THORACIC SURGERY (CARDIOTHORACIC VASCULAR SURGERY)

## 2022-10-19 PROCEDURE — 71046 XR CHEST PA AND LATERAL: ICD-10-PCS | Mod: 26,,, | Performed by: RADIOLOGY

## 2022-10-19 PROCEDURE — 3078F DIAST BP <80 MM HG: CPT | Mod: CPTII,,, | Performed by: THORACIC SURGERY (CARDIOTHORACIC VASCULAR SURGERY)

## 2022-10-19 PROCEDURE — 99214 OFFICE O/P EST MOD 30 MIN: CPT | Mod: PBBFAC,25 | Performed by: THORACIC SURGERY (CARDIOTHORACIC VASCULAR SURGERY)

## 2022-10-19 PROCEDURE — 99024 PR POST-OP FOLLOW-UP VISIT: ICD-10-PCS | Mod: ,,, | Performed by: THORACIC SURGERY (CARDIOTHORACIC VASCULAR SURGERY)

## 2022-10-19 PROCEDURE — 99999 PR PBB SHADOW E&M-EST. PATIENT-LVL IV: ICD-10-PCS | Mod: PBBFAC,,, | Performed by: THORACIC SURGERY (CARDIOTHORACIC VASCULAR SURGERY)

## 2022-10-19 RX ORDER — OXYCODONE HYDROCHLORIDE 5 MG/1
5 TABLET ORAL EVERY 4 HOURS PRN
Qty: 20 TABLET | Refills: 0 | Status: SHIPPED | OUTPATIENT
Start: 2022-10-19 | End: 2023-03-08

## 2022-10-19 NOTE — TELEPHONE ENCOUNTER
GenSurg referral: no voicemail set up. Portal message with scheduling information: Pippa Yung has availabilities. Due to insurance, nothing to schedule on [facility].

## 2022-10-20 ENCOUNTER — TELEPHONE (OUTPATIENT)
Dept: HEMATOLOGY/ONCOLOGY | Facility: CLINIC | Age: 62
End: 2022-10-20
Payer: MEDICAID

## 2022-10-20 ENCOUNTER — PATIENT MESSAGE (OUTPATIENT)
Dept: HEMATOLOGY/ONCOLOGY | Facility: CLINIC | Age: 62
End: 2022-10-20
Payer: MEDICAID

## 2022-10-20 ENCOUNTER — PATIENT MESSAGE (OUTPATIENT)
Dept: SURGERY | Facility: CLINIC | Age: 62
End: 2022-10-20
Payer: MEDICAID

## 2022-10-20 ENCOUNTER — TELEPHONE (OUTPATIENT)
Dept: SURGERY | Facility: CLINIC | Age: 62
End: 2022-10-20
Payer: MEDICAID

## 2022-10-20 ENCOUNTER — PATIENT MESSAGE (OUTPATIENT)
Dept: FAMILY MEDICINE | Facility: CLINIC | Age: 62
End: 2022-10-20
Payer: MEDICAID

## 2022-10-20 NOTE — TELEPHONE ENCOUNTER
GenSur referral scheduled with patient: 10/25/2022 1500 , [facility].

## 2022-10-20 NOTE — TELEPHONE ENCOUNTER
This nurse called pt to advise she contact sx scheduling for port consult. Phone number provided. Pt v/u.

## 2022-10-25 ENCOUNTER — OFFICE VISIT (OUTPATIENT)
Dept: SURGERY | Facility: CLINIC | Age: 62
End: 2022-10-25
Payer: MEDICAID

## 2022-10-25 ENCOUNTER — OFFICE VISIT (OUTPATIENT)
Dept: PULMONOLOGY | Facility: CLINIC | Age: 62
End: 2022-10-25
Payer: MEDICAID

## 2022-10-25 VITALS
HEIGHT: 63 IN | OXYGEN SATURATION: 94 % | WEIGHT: 157.88 LBS | HEART RATE: 59 BPM | DIASTOLIC BLOOD PRESSURE: 65 MMHG | BODY MASS INDEX: 27.97 KG/M2 | SYSTOLIC BLOOD PRESSURE: 105 MMHG

## 2022-10-25 VITALS
HEART RATE: 72 BPM | WEIGHT: 155.88 LBS | DIASTOLIC BLOOD PRESSURE: 57 MMHG | HEIGHT: 63 IN | SYSTOLIC BLOOD PRESSURE: 115 MMHG | BODY MASS INDEX: 27.62 KG/M2

## 2022-10-25 DIAGNOSIS — C34.92 NSCLC OF LEFT LUNG: ICD-10-CM

## 2022-10-25 DIAGNOSIS — C34.10 MALIGNANT NEOPLASM OF UPPER LOBE OF LUNG, UNSPECIFIED LATERALITY: Primary | ICD-10-CM

## 2022-10-25 DIAGNOSIS — J96.11 CHRONIC RESPIRATORY FAILURE WITH HYPOXIA: ICD-10-CM

## 2022-10-25 DIAGNOSIS — J41.0 SIMPLE CHRONIC BRONCHITIS: ICD-10-CM

## 2022-10-25 DIAGNOSIS — C34.92 NSCLC OF LEFT LUNG: Primary | ICD-10-CM

## 2022-10-25 PROCEDURE — 1160F PR REVIEW ALL MEDS BY PRESCRIBER/CLIN PHARMACIST DOCUMENTED: ICD-10-PCS | Mod: CPTII,,, | Performed by: SURGERY

## 2022-10-25 PROCEDURE — 99215 OFFICE O/P EST HI 40 MIN: CPT | Mod: PBBFAC,27 | Performed by: SURGERY

## 2022-10-25 PROCEDURE — 3078F PR MOST RECENT DIASTOLIC BLOOD PRESSURE < 80 MM HG: ICD-10-PCS | Mod: CPTII,,, | Performed by: NURSE PRACTITIONER

## 2022-10-25 PROCEDURE — 99999 PR PBB SHADOW E&M-EST. PATIENT-LVL V: ICD-10-PCS | Mod: PBBFAC,,, | Performed by: NURSE PRACTITIONER

## 2022-10-25 PROCEDURE — 99999 PR PBB SHADOW E&M-EST. PATIENT-LVL V: CPT | Mod: PBBFAC,,, | Performed by: SURGERY

## 2022-10-25 PROCEDURE — 99203 OFFICE O/P NEW LOW 30 MIN: CPT | Mod: S$PBB,,, | Performed by: SURGERY

## 2022-10-25 PROCEDURE — 1160F RVW MEDS BY RX/DR IN RCRD: CPT | Mod: CPTII,,, | Performed by: SURGERY

## 2022-10-25 PROCEDURE — 1159F PR MEDICATION LIST DOCUMENTED IN MEDICAL RECORD: ICD-10-PCS | Mod: CPTII,,, | Performed by: SURGERY

## 2022-10-25 PROCEDURE — 99999 PR PBB SHADOW E&M-EST. PATIENT-LVL V: CPT | Mod: PBBFAC,,, | Performed by: NURSE PRACTITIONER

## 2022-10-25 PROCEDURE — 3074F SYST BP LT 130 MM HG: CPT | Mod: CPTII,,, | Performed by: SURGERY

## 2022-10-25 PROCEDURE — 99215 OFFICE O/P EST HI 40 MIN: CPT | Mod: PBBFAC,PO | Performed by: NURSE PRACTITIONER

## 2022-10-25 PROCEDURE — 1159F MED LIST DOCD IN RCRD: CPT | Mod: CPTII,,, | Performed by: NURSE PRACTITIONER

## 2022-10-25 PROCEDURE — 1159F PR MEDICATION LIST DOCUMENTED IN MEDICAL RECORD: ICD-10-PCS | Mod: CPTII,,, | Performed by: NURSE PRACTITIONER

## 2022-10-25 PROCEDURE — 99214 OFFICE O/P EST MOD 30 MIN: CPT | Mod: S$PBB,,, | Performed by: NURSE PRACTITIONER

## 2022-10-25 PROCEDURE — 99214 PR OFFICE/OUTPT VISIT, EST, LEVL IV, 30-39 MIN: ICD-10-PCS | Mod: S$PBB,,, | Performed by: NURSE PRACTITIONER

## 2022-10-25 PROCEDURE — 3074F PR MOST RECENT SYSTOLIC BLOOD PRESSURE < 130 MM HG: ICD-10-PCS | Mod: CPTII,,, | Performed by: NURSE PRACTITIONER

## 2022-10-25 PROCEDURE — 3078F PR MOST RECENT DIASTOLIC BLOOD PRESSURE < 80 MM HG: ICD-10-PCS | Mod: CPTII,,, | Performed by: SURGERY

## 2022-10-25 PROCEDURE — 3078F DIAST BP <80 MM HG: CPT | Mod: CPTII,,, | Performed by: NURSE PRACTITIONER

## 2022-10-25 PROCEDURE — 1111F DSCHRG MED/CURRENT MED MERGE: CPT | Mod: CPTII,,, | Performed by: SURGERY

## 2022-10-25 PROCEDURE — 1111F DSCHRG MED/CURRENT MED MERGE: CPT | Mod: CPTII,,, | Performed by: NURSE PRACTITIONER

## 2022-10-25 PROCEDURE — 99203 PR OFFICE/OUTPT VISIT, NEW, LEVL III, 30-44 MIN: ICD-10-PCS | Mod: S$PBB,,, | Performed by: SURGERY

## 2022-10-25 PROCEDURE — 3078F DIAST BP <80 MM HG: CPT | Mod: CPTII,,, | Performed by: SURGERY

## 2022-10-25 PROCEDURE — 3008F BODY MASS INDEX DOCD: CPT | Mod: CPTII,,, | Performed by: SURGERY

## 2022-10-25 PROCEDURE — 3008F PR BODY MASS INDEX (BMI) DOCUMENTED: ICD-10-PCS | Mod: CPTII,,, | Performed by: SURGERY

## 2022-10-25 PROCEDURE — 1111F PR DISCHARGE MEDS RECONCILED W/ CURRENT OUTPATIENT MED LIST: ICD-10-PCS | Mod: CPTII,,, | Performed by: NURSE PRACTITIONER

## 2022-10-25 PROCEDURE — 3074F SYST BP LT 130 MM HG: CPT | Mod: CPTII,,, | Performed by: NURSE PRACTITIONER

## 2022-10-25 PROCEDURE — 3074F PR MOST RECENT SYSTOLIC BLOOD PRESSURE < 130 MM HG: ICD-10-PCS | Mod: CPTII,,, | Performed by: SURGERY

## 2022-10-25 PROCEDURE — 1159F MED LIST DOCD IN RCRD: CPT | Mod: CPTII,,, | Performed by: SURGERY

## 2022-10-25 PROCEDURE — 1111F PR DISCHARGE MEDS RECONCILED W/ CURRENT OUTPATIENT MED LIST: ICD-10-PCS | Mod: CPTII,,, | Performed by: SURGERY

## 2022-10-25 PROCEDURE — 99999 PR PBB SHADOW E&M-EST. PATIENT-LVL V: ICD-10-PCS | Mod: PBBFAC,,, | Performed by: SURGERY

## 2022-10-25 RX ORDER — ALBUTEROL SULFATE 90 UG/1
2 AEROSOL, METERED RESPIRATORY (INHALATION) EVERY 6 HOURS PRN
Qty: 18 G | Refills: 11 | Status: SHIPPED | OUTPATIENT
Start: 2022-10-25 | End: 2023-04-27 | Stop reason: SDUPTHER

## 2022-10-25 RX ORDER — OXYCODONE HYDROCHLORIDE 5 MG/1
5 TABLET ORAL EVERY 4 HOURS PRN
Qty: 36 TABLET | Refills: 0 | Status: SHIPPED | OUTPATIENT
Start: 2022-10-25 | End: 2022-11-22 | Stop reason: SDUPTHER

## 2022-10-25 NOTE — PROGRESS NOTES
History & Physical    SUBJECTIVE:     History of Present Illness:  Patient is a 62 y.o. 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 She denies any central lines, clavicular fractures, or neck surgery in the past. She has healed well from her surgery.    Chief Complaint   Patient presents with    Consult     Port-a-cath placement    Lung Cancer     Review of patient's allergies indicates:  No Known Allergies    Current Outpatient Medications   Medication Sig Dispense Refill    albuterol (PROVENTIL/VENTOLIN HFA) 90 mcg/actuation inhaler Inhale 2 puffs into the lungs every 6 (six) hours as needed for Wheezing or Shortness of Breath. Rescue 18 g 11    albuterol-ipratropium (DUO-NEB) 2.5 mg-0.5 mg/3 mL nebulizer solution Take 3 mLs by nebulization every 6 (six) hours as needed for Wheezing. Rescue 120 each 5    buPROPion (WELLBUTRIN XL) 300 MG 24 hr tablet Take 1 tablet (300 mg total) by mouth once daily. 90 tablet 3    docusate sodium (COLACE) 100 MG capsule Take 1 capsule (100 mg total) by mouth daily as needed for Constipation. 30 capsule 1    fluconazole (DIFLUCAN) 100 MG tablet Take 1 tablet (100 mg total) by mouth every other day. 3 tablet 0    FLUoxetine 20 MG capsule Take 1 capsule (20 mg total) by mouth once daily. 30 capsule 11    fluticasone propionate (FLONASE) 50 mcg/actuation nasal spray 1 spray (50 mcg total) by Each Nostril route once daily. 16 g 3    fluticasone-salmeterol 230-21 mcg/dose (ADVAIR HFA) 230-21 mcg/actuation HFAA inhaler Inhale 2 puffs into the lungs 2 (two) times daily. Controller 12 g 5    fluticasone-umeclidin-vilanter (TRELEGY ELLIPTA) 100-62.5-25 mcg DsDv Inhale 1 puff into the lungs once daily. 60 each 11    gabapentin (NEURONTIN) 300 MG capsule Take 2 capsules (600 mg total) by mouth 3 (three) times  daily. 180 capsule 11    guaiFENesin-codeine 100-10 mg/5 ml (TUSSI-ORGANIDIN NR)  mg/5 mL syrup TAKE 5 MLS BY MOUTH EVERY 4 HOURS AS NEEDED FOR COUGH OR CONGESTION      HYDROcodone-acetaminophen (NORCO) 5-325 mg per tablet Take 1 tablet by mouth every 12 (twelve) hours as needed for Pain. 10 tablet 0    LIDOcaine (LIDODERM) 5 % Place 1 patch onto the skin once daily. Remove & Discard patch within 12 hours or as directed by MD 7 patch 0    methocarbamoL (ROBAXIN) 500 MG Tab Take 1 tablet (500 mg total) by mouth 4 (four) times daily. for 10 days 40 tablet 0    montelukast (SINGULAIR) 10 mg tablet Take 10 mg by mouth once daily.      ondansetron (ZOFRAN-ODT) 8 MG TbDL Take 1 tablet (8 mg total) by mouth every 12 (twelve) hours as needed (nausea). 60 tablet 1    oxyCODONE (ROXICODONE) 5 MG immediate release tablet Take 1 tablet (5 mg total) by mouth every 4 (four) hours as needed for Pain. 20 tablet 0    oxyCODONE (ROXICODONE) 5 MG immediate release tablet Take 1 tablet (5 mg total) by mouth every 4 (four) hours as needed for Pain. 36 tablet 0    prochlorperazine (COMPAZINE) 10 MG tablet Take 1 tablet (10 mg total) by mouth every 6 (six) hours as needed (nausea). 60 tablet 1    ALPRAZolam (XANAX) 0.5 MG tablet Take 1 tablet (0.5 mg total) by mouth 3 (three) times daily. for 10 days (Patient not taking: Reported on 10/11/2022) 30 tablet 0    levocetirizine (XYZAL) 5 MG tablet Take 1 tablet (5 mg total) by mouth every evening. 30 tablet 5     No current facility-administered medications for this visit.     Past Medical History:   Diagnosis Date    Arthritis     Depression     GERD (gastroesophageal reflux disease)     Pneumonia of left lung due to infectious organism 03/05/2020     Past Surgical History:   Procedure Laterality Date    INJECTION OF ANESTHETIC AGENT AROUND MULTIPLE INTERCOSTAL NERVES Left 10/3/2022    Procedure: BLOCK, NERVE, INTERCOSTAL, 2 OR MORE;  Surgeon: Evelio Kaplan MD;  Location: [facility] OR  "2ND FLR;  Service: Thoracic;  Laterality: Left;    ROBOT-ASSISTED LAPAROSCOPIC LYMPHADENECTOMY USING DA MONIQUE XI Left 10/3/2022    Procedure: XI ROBOTIC LYMPHADENECTOMY;  Surgeon: Evelio Kaplan MD;  [facility];  Service: Thoracic;  Laterality: Left;    TONSILLECTOMY      XI ROBOTIC RATS,WITH LOBECTOMY,LUNG Left 10/3/2022    Procedure: XI ROBOTIC RATS,WITH LOBECTOMY,LUNG;  Surgeon: Evelio Kaplan MD;  [facility];  Service: Thoracic;  Laterality: Left;     Family History   Problem Relation Age of Onset    Ovarian cancer Sister     Breast cancer Cousin      Social History     Tobacco Use    Smoking status: Some Days     Packs/day: 0.15     Types: Cigarettes    Smokeless tobacco: Never    Tobacco comments:     reports one cigarette every now and then   Substance Use Topics    Alcohol use: Yes     Comment: rarely    Drug use: Yes     Types: Marijuana      Review of Systems:  Review of Systems   Constitutional:  Negative for chills and fever.   HENT:  Negative for congestion, postnasal drip and sinus pressure.    Eyes: Negative.    Respiratory:  Negative for wheezing.    Cardiovascular:  Negative for chest pain.   Gastrointestinal: Negative.    Endocrine: Negative.    Genitourinary: Negative.    Musculoskeletal: Negative.    Skin: Negative.    Neurological: Negative.    Hematological: Negative.    Psychiatric/Behavioral:  The patient is nervous/anxious (thought she was getting her port placed today).      OBJECTIVE:     Vital Signs (Most Recent)  Pulse: 72 (10/25/22 1515)  BP: (!) 115/57 (10/25/22 1515)  5' 3" (1.6 m)  70.7 kg (155 lb 13.8 oz)     Physical Exam:  Physical Exam  Vitals reviewed.   Constitutional:       General: She is not in acute distress.     Appearance: Normal appearance. She is well-developed. She is not ill-appearing.   HENT:      Head: Normocephalic and atraumatic.   Eyes:      General: No scleral icterus.     Conjunctiva/sclera: Conjunctivae normal. "   Cardiovascular:      Rate and Rhythm: Normal rate and regular rhythm.      Heart sounds: Normal heart sounds.   Pulmonary:      Effort: Pulmonary effort is normal. No respiratory distress.      Breath sounds: Normal breath sounds.   Abdominal:      General: There is no distension.      Palpations: Abdomen is soft.      Tenderness: There is no abdominal tenderness. There is no guarding.   Musculoskeletal:         General: Normal range of motion.      Cervical back: Normal range of motion and neck supple.   Skin:     General: Skin is warm and dry.      Coloration: Skin is not jaundiced.   Neurological:      General: No focal deficit present.      Mental Status: She is alert and oriented to person, place, and time.   Psychiatric:         Mood and Affect: Mood normal.         Behavior: Behavior normal.     Laboratory  CBC: Reviewed  CMP: Reviewed    ASSESSMENT/PLAN:   Patient is a 62 year-old woman with lung cancer who needs a port for chemo. We discussed the technical and convalescent aspects of central venous port-accessed-catheter placement and the rationale for IJ or SC approach. We discussed the risks, benefits and alternatives, the risks including but not limited to bleeding, infection, catheter-associated thrombus or infection, air or fat embolus, pneumothorax requiring thoracostomy, nonfunctioning catheter, malpositioned catheter, and lost guidewire or catheter requiring IR or surgical retrieval. All questions were answered to her satisfaction and she has elected to proceed. Informed consent was obtained. She is scheduled for 11/3/22. It was a pleasure meeting Mj Jasper and thank you for this consultation.    Mini Acevedo  10/25/22

## 2022-10-25 NOTE — PROGRESS NOTES
10/25/2022    Yvette Hutchinson  Office note    Chief Complaint   Patient presents with    3m f/u    COPD    Cancer       HPI:   10/25/22- left lobectomy and lymphadenectomy Dr. Kaplan 10/3/2022- still have incision site pain, 10 on 0-10 scale, soreness. Took pain medication with benefit but has run out. Had chest tube removed 6 days prior. No swelling or drainage from incision site.   Schedule to have port placed today at 3 pm. Will start chemo therapy this week., Followed by oncologist Dr. Lee.   Wearing supplemental oxygen as needed at 3L with benefit.   SOB- stable, on advair daily, using albuterol as needed. Cough- improved, daily, mild.     Had sleep study but not hear results    7/21/2022- states doing well, Cough- recurrent complaint, worsened in past 3 days, productive  Brown mucous, associated with chest tightness and wheeze. Worse in early mornings and late evenings.   Has headaches when waking up. Has daytime fatigue.     4/28/2022- COPD exacerbation onset 3 days, persistent cough worse in early mornings and late evenings, has nocturnal arousals, productive thick brown mucous,   Associated with chest tightness and wheeze. Improved with albuterol nebulizer but returns after 2 hours. Severe complaint has urinated due to coughing fits.   Complaint of left ear pain and pressure with headaches. Associated with body aches and fatigue.  On average has exacerbation once every 3 months.   History of chronic knee pain, not interested in orthopedic referral, treated previously with ibuprofen with benefit.     NP Pedro reviewed  1/11/2022: Hx of CLL, COPD  Endorses onset of headache, fatigue x2 days.  Cough: Productive with green thick sputum production, onset 2 days, worse at night time and in the morning. Not relieved with cough syrup. States Codeine cough syrup has helped in the past. Associated with wheezing.   States breathing is the same, not worse from baseline. Using supplemental oxygen at  night and PRN.   Denies fever, denies chest tightness, GI complaints.  On Daily Advair, using rescue inhaler twice daily. Using nebulizer daily with no noted improvement.   Cut back to 2 cigarettes per day.      8/16/2021- not currently interested to perform in clinic sleep study.   SOB- daily, worse with hot weather, ran out of rescue inhaler. On Advair daily with benefit. Improves with rest.   Started coughing  After weed eating yard.     Cough- recurrent complaint, onset 5 days after doing yard work, productive clear mucous, severe complaint, inhibits ability to sleep when first laying down at night.     5/14/2021- did not receive sleep study from [facility] as ordered. Wearing supplemental oxygen at night while sleeping at 3 L. Uses when needed during day.    Having trouble falling asleep at night, started on new medication with no current benefit. Not able to fall asleep or stay asleep. Onset years, worsening with time. Feels tired when waking up in morning. Associated with occasional morning headaches.     complaint of chronic back pain followed by PCP.    Cough- recurrent complaint, mild, thick mucous, not using nebulizer regularly,   SOB- daily, worse with exertion and occasionally occurs at rest. Feels she can not take a deep breath.   On advair daily. Taking prednisone 20mg for 3 days 2x monthly.    2/8/2021- Cough- improved, daily, mild, Complaint of dry throat at night. Worse in early morning and late evenings.   occasionally productive small amount yellow mucous.   Currently smoking 1/2 pack daily, finished chantix with improvement but picked up after stopping.   Wearing supplemental oxygen at 3L most nights with benefit. SOB worse when laying flat on back, tried wedge but caused neck pain.   Sinus- unchanged, recurrent headaches in morning, sinus drainage. Currently on Flonase daily    12/3/2020- has supplemental oxygen set at 3 L at night, states benefiting,   Cough- worsened in last 7 days,  worse in early morning and late evenings, nocturnal arousals nightly, productive yellow/green mucous quarter size.  Complaint of daily fatigue, dry throat in morning, day time drowsiness, mental cloudiness. Feels she never gets good sleep at night even when wearing supplemental oxygen.     10/21/2020- SOB and wheeze- recurrent problem, worsened last weeks, associated with sinus congestion, wheeze is worse in early morning and when laying down at nigtht  Cough- productive brown/green mucous for 1 week. Has not started steroid therapy. Improves with nebulizer using 1-2x daily for past week. Was not able afford inhalers. Did not receive call from [facility].     Complaint for cramping sensation in chest that occurs sporadically on left and right side that lasts few minutes. Onset years, recurrent complaint, started back 1 week prior. Occurs couple days in row.     7/15/2020- cough- onset 7 days, worse in mornings and night, nocturnal arousals 1x nightly, productive yellow/green dime size mucous, associated with chest tightness and wheeze, improves with nebulizer using 1-2 daily, states feels better after coughing up large amounts of mucous; went to covid clinic and tested negative for covid did have fluid on ears.   Currently on Advair daily, insurance did not cover spiriva;     4/15/2020- cough- onset weeks, associated with occasional chest tighness, worse at night and early morning, quarter size clear to light brown in color. Currently    advair, spiriva, and rescue inhaler. States using rescue daily with little benefit.    3/5/2020- Pt is a 60 yo female with depression, GERD and COPD presenting for new evaluation.  Has chronic cough worse last 2 wks w/ phlegm, white, worse in am and evening.  Inhalers: rescue inhaler says insurance didn't cover  She reports wt gain over last 2 yrs. Has abdominal cramps intermittently radiating to the back.   Smoking since she was very young, 1 pack lasts 3 days.  She gets  yearly bronchitis.  Labs from her PCP done 2 days ago are concerning for possible CLL- with WBC 21 and peripheral smear w/ smudge cells    The chief compliant  problem varies with instablilty at time  PFSH:  Past Medical History:   Diagnosis Date    Arthritis     Depression     GERD (gastroesophageal reflux disease)     Pneumonia of left lung due to infectious organism 03/05/2020         Past Surgical History:   Procedure Laterality Date    INJECTION OF ANESTHETIC AGENT AROUND MULTIPLE INTERCOSTAL NERVES Left 10/3/2022    Procedure: BLOCK, NERVE, INTERCOSTAL, 2 OR MORE;  Surgeon: Evelio Kaplan MD;  Location: [address];  Service: Thoracic;  Laterality: Left;    ROBOT-ASSISTED LAPAROSCOPIC LYMPHADENECTOMY USING DA MONIQUE XI Left 10/3/2022    Procedure: XI ROBOTIC LYMPHADENECTOMY;  Surgeon: Evelio Kaplan MD;  Location: [facility] OR [address] [place] [address];  Service: Thoracic;  Laterality: Left;    TONSILLECTOMY      XI ROBOTIC RATS,WITH LOBECTOMY,LUNG Left 10/3/2022    Procedure: XI ROBOTIC RATS,WITH LOBECTOMY,LUNG;  Surgeon: Evelio Kaplan MD;  Location: [address];  Service: Thoracic;  Laterality: Left;     Social History     Tobacco Use    Smoking status: Some Days     Packs/day: 0.15     Types: Cigarettes    Smokeless tobacco: Never    Tobacco comments:     reports one cigarette every now and then   Substance Use Topics    Alcohol use: Yes     Comment: rarely    Drug use: Yes     Types: Marijuana     Family History   Problem Relation Age of Onset    Ovarian cancer Sister     Breast cancer Cousin      Review of patient's allergies indicates:  No Known Allergies    Performance Status:The patient's activity level is functions out of house.      Review of Systems:  a review of eleven systems covering constitutional, Eye, HEENT, Psych, Respiratory, Cardiac, GI, , Musculoskeletal, Endocrine, Dermatologic was negative except for pertinent findings as listed ABOVE and below:  Shortness of breath, Cough, body aches,  "fatigue, headache       Exam:Comprehensive exam done. /65 (BP Location: Right arm, Patient Position: Sitting, BP Method: Medium (Automatic))   Pulse (!) 59   Ht 5' 3" (1.6 m)   Wt 71.6 kg (157 lb 13.6 oz)   LMP 01/13/2010 (Approximate)   SpO2 (!) 94% Comment: on room air at rest  BMI 27.96 kg/m²   Exam included Vitals as listed, and patient's appearance and affect and alertness and mood, oral exam for yeast and hygiene and pharynx lesions and Mallapatti (M) score, neck with inspection for jvd and masses and thyroid abnormalities and lymph nodes (supraclavicular and infraclavicular nodes and axillary also examined and noted if abn), chest exam included symmetry and effort and fremitus and percussion and auscultation, cardiac exam included rhythm and gallops and murmur and rubs and jvd and edema, abdominal exam for mass and hepatosplenomegaly and tenderness and hernias and bowel sounds, Musculoskeletal exam with muscle tone and posture and mobility/gait and  strength, and skin for rashes and cyanosis and pallor and turgor, extremity for clubbing.  Findings were normal except for pertinent findings listed below: BS bilateral expiratory wheeze, M2      Radiographs (ct chest and cxr) reviewed: view by direct vision   CT Chest With Contrast 09/06/22   1. Hypermetabolic nodule left upper lobe appears stable upon comparison.  2. Small localized area of hypermetabolic activity in the left infrahilar region is likewise unchanged upon comparison.  3. Indirect findings related to old granulomatous disease.    CT Chest Without Contrast 07/29/2022   2.3 cm spiculated left upper lobe lung nodule highly suspicious for bronchogenic carcinoma.     New nonspecific 7 mm nodule in the right middle lobe; this appears more linear on the coronal images and may be a focus of scarring.     Additional stable lung nodules, mildly enlarged axillary lymph nodes, substernal thyroid nodule; these findings are likely benign given " the interval stability.      CT Chest Without Contrast 11/20/2020   Stable right lung pulmonary nodules.  Mild increase in size of clustered nodules within the left upper lobe, with the distribution most compatible with an atypical infectious process.  Old granulomatous disease.  Left adrenal adenoma, unchanged.  Chronic T8 compression fracture.    CT Chest Without Contrast 03/11/2020   1. There are two right lung pulmonary nodules.  Per LUNG-RADS scoring this would reflect a Category 3 (probably benign).  Recommendation is for 6 month follow-up LDCT.  2. Airways disease or apical typical infection in the left lower lobe.       CXR 12/19/19- clear lung fields bilaterally     Labs reviewed    Lab Results   Component Value Date    WBC 20.78 (H) 03/03/2020    RBC 4.74 03/03/2020    HGB 14.9 03/03/2020    HCT 47.0 03/03/2020    MCV 99 (H) 03/03/2020    MCH 31.4 (H) 03/03/2020    MCHC 31.7 (L) 03/03/2020    RDW 13.1 03/03/2020     03/03/2020    MPV 11.9 03/03/2020    GRAN 33.0 (L) 03/03/2020    LYMPH 64.0 (H) 03/03/2020    MONO 3.0 (L) 03/03/2020    EOS CANCELED 08/18/2017    BASO CANCELED 08/18/2017    EOSINOPHIL 0.0 03/03/2020    BASOPHIL 0.0 03/03/2020     Specimen to Pathology, Radiology Lung 08/17/22   Non small cell     PFT reviewed    3/12/2020 Severe obstruction. FEV1  49 %  predicted.   Airflow is not clearly improved after bronchodilator. Clinical improvement following bronchodilator therapy may occur in the absence of spirometric improvement.   Mild Hyperinflation.   Moderate reduction in diffusion capacity - unadjusted for hemoglobin.     EPWORTH SLEEPINESS SCALE 15 7/21/2022  [facility] HST 05/27/2021 TRISTEN 4.3     I spent over 30 mins of time with the patient. Reviewed results of the recently ordered labs, tests and studies; made directives with regards to the results. Over half of this time was spent couseling and coordinating care.     I have explained all of the above in detail and the patient understands  all of the current recommendation(s). I have answered all of their questions to the best of my ability and to their complete satisfaction.   The patient is to continue with the current management plan.        Plan:  Clinical impression is resonably certain and repeated evaluation prn +/- follow up will be needed as below.    Yvette was seen today for 3m f/u, copd and cancer.    Diagnoses and all orders for this visit:    Malignant neoplasm of upper lobe of lung, unspecified laterality  -     oxyCODONE (ROXICODONE) 5 MG immediate release tablet; Take 1 tablet (5 mg total) by mouth every 4 (four) hours as needed for Pain.    Simple chronic bronchitis  -     albuterol (PROVENTIL/VENTOLIN HFA) 90 mcg/actuation inhaler; Inhale 2 puffs into the lungs every 6 (six) hours as needed for Wheezing or Shortness of Breath. Rescue    Chronic respiratory failure with hypoxia  -     albuterol (PROVENTIL/VENTOLIN HFA) 90 mcg/actuation inhaler; Inhale 2 puffs into the lungs every 6 (six) hours as needed for Wheezing or Shortness of Breath. Rescue    NSCLC of left lung  -     oxyCODONE (ROXICODONE) 5 MG immediate release tablet; Take 1 tablet (5 mg total) by mouth every 4 (four) hours as needed for Pain.       Follow up in about 3 months (around 1/25/2023), or if symptoms worsen or fail to improve.    Discussed with patient above for education the following:      Patient Instructions   Use pain medication sparingly, do not take with other pain or sleeping medications.   This medication is due to recent surgery and not for chronic pain, you will not be able to get a refill.     Continue current COPD medication regiment    Conitnue to wear supplmental oxygen     We discussed the side effects chemo therapy has on your breathing.

## 2022-10-25 NOTE — PATIENT INSTRUCTIONS
Use pain medication sparingly, do not take with other pain or sleeping medications.   This medication is due to recent surgery and not for chronic pain, you will not be able to get a refill.     Continue current COPD medication regiment    Conitnue to wear supplmental oxygen     We discussed the side effects chemo therapy has on your breathing.

## 2022-10-25 NOTE — H&P (VIEW-ONLY)
History & Physical    SUBJECTIVE:     History of Present Illness:  Patient is a 62 y.o. 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 She denies any central lines, clavicular fractures, or neck surgery in the past. She has healed well from her surgery.    Chief Complaint   Patient presents with    Consult     Port-a-cath placement    Lung Cancer     Review of patient's allergies indicates:  No Known Allergies    Current Outpatient Medications   Medication Sig Dispense Refill    albuterol (PROVENTIL/VENTOLIN HFA) 90 mcg/actuation inhaler Inhale 2 puffs into the lungs every 6 (six) hours as needed for Wheezing or Shortness of Breath. Rescue 18 g 11    albuterol-ipratropium (DUO-NEB) 2.5 mg-0.5 mg/3 mL nebulizer solution Take 3 mLs by nebulization every 6 (six) hours as needed for Wheezing. Rescue 120 each 5    buPROPion (WELLBUTRIN XL) 300 MG 24 hr tablet Take 1 tablet (300 mg total) by mouth once daily. 90 tablet 3    docusate sodium (COLACE) 100 MG capsule Take 1 capsule (100 mg total) by mouth daily as needed for Constipation. 30 capsule 1    fluconazole (DIFLUCAN) 100 MG tablet Take 1 tablet (100 mg total) by mouth every other day. 3 tablet 0    FLUoxetine 20 MG capsule Take 1 capsule (20 mg total) by mouth once daily. 30 capsule 11    fluticasone propionate (FLONASE) 50 mcg/actuation nasal spray 1 spray (50 mcg total) by Each Nostril route once daily. 16 g 3    fluticasone-salmeterol 230-21 mcg/dose (ADVAIR HFA) 230-21 mcg/actuation HFAA inhaler Inhale 2 puffs into the lungs 2 (two) times daily. Controller 12 g 5    fluticasone-umeclidin-vilanter (TRELEGY ELLIPTA) 100-62.5-25 mcg DsDv Inhale 1 puff into the lungs once daily. 60 each 11    gabapentin (NEURONTIN) 300 MG capsule Take 2 capsules (600 mg total) by mouth 3 (three) times  daily. 180 capsule 11    guaiFENesin-codeine 100-10 mg/5 ml (TUSSI-ORGANIDIN NR)  mg/5 mL syrup TAKE 5 MLS BY MOUTH EVERY 4 HOURS AS NEEDED FOR COUGH OR CONGESTION      HYDROcodone-acetaminophen (NORCO) 5-325 mg per tablet Take 1 tablet by mouth every 12 (twelve) hours as needed for Pain. 10 tablet 0    LIDOcaine (LIDODERM) 5 % Place 1 patch onto the skin once daily. Remove & Discard patch within 12 hours or as directed by MD 7 patch 0    methocarbamoL (ROBAXIN) 500 MG Tab Take 1 tablet (500 mg total) by mouth 4 (four) times daily. for 10 days 40 tablet 0    montelukast (SINGULAIR) 10 mg tablet Take 10 mg by mouth once daily.      ondansetron (ZOFRAN-ODT) 8 MG TbDL Take 1 tablet (8 mg total) by mouth every 12 (twelve) hours as needed (nausea). 60 tablet 1    oxyCODONE (ROXICODONE) 5 MG immediate release tablet Take 1 tablet (5 mg total) by mouth every 4 (four) hours as needed for Pain. 20 tablet 0    oxyCODONE (ROXICODONE) 5 MG immediate release tablet Take 1 tablet (5 mg total) by mouth every 4 (four) hours as needed for Pain. 36 tablet 0    prochlorperazine (COMPAZINE) 10 MG tablet Take 1 tablet (10 mg total) by mouth every 6 (six) hours as needed (nausea). 60 tablet 1    ALPRAZolam (XANAX) 0.5 MG tablet Take 1 tablet (0.5 mg total) by mouth 3 (three) times daily. for 10 days (Patient not taking: Reported on 10/11/2022) 30 tablet 0    levocetirizine (XYZAL) 5 MG tablet Take 1 tablet (5 mg total) by mouth every evening. 30 tablet 5     No current facility-administered medications for this visit.     Past Medical History:   Diagnosis Date    Arthritis     Depression     GERD (gastroesophageal reflux disease)     Pneumonia of left lung due to infectious organism 03/05/2020     Past Surgical History:   Procedure Laterality Date    INJECTION OF ANESTHETIC AGENT AROUND MULTIPLE INTERCOSTAL NERVES Left 10/3/2022    Procedure: BLOCK, NERVE, INTERCOSTAL, 2 OR MORE;  Surgeon: Evelio Kaplan MD;  Location: [facility] OR  "2ND FLR;  Service: Thoracic;  Laterality: Left;    ROBOT-ASSISTED LAPAROSCOPIC LYMPHADENECTOMY USING DA MONIQUE XI Left 10/3/2022    Procedure: XI ROBOTIC LYMPHADENECTOMY;  Surgeon: Evelio Kaplan MD;  [facility];  Service: Thoracic;  Laterality: Left;    TONSILLECTOMY      XI ROBOTIC RATS,WITH LOBECTOMY,LUNG Left 10/3/2022    Procedure: XI ROBOTIC RATS,WITH LOBECTOMY,LUNG;  Surgeon: Evelio Kaplan MD;  [facility];  Service: Thoracic;  Laterality: Left;     Family History   Problem Relation Age of Onset    Ovarian cancer Sister     Breast cancer Cousin      Social History     Tobacco Use    Smoking status: Some Days     Packs/day: 0.15     Types: Cigarettes    Smokeless tobacco: Never    Tobacco comments:     reports one cigarette every now and then   Substance Use Topics    Alcohol use: Yes     Comment: rarely    Drug use: Yes     Types: Marijuana      Review of Systems:  Review of Systems   Constitutional:  Negative for chills and fever.   HENT:  Negative for congestion, postnasal drip and sinus pressure.    Eyes: Negative.    Respiratory:  Negative for wheezing.    Cardiovascular:  Negative for chest pain.   Gastrointestinal: Negative.    Endocrine: Negative.    Genitourinary: Negative.    Musculoskeletal: Negative.    Skin: Negative.    Neurological: Negative.    Hematological: Negative.    Psychiatric/Behavioral:  The patient is nervous/anxious (thought she was getting her port placed today).      OBJECTIVE:     Vital Signs (Most Recent)  Pulse: 72 (10/25/22 1515)  BP: (!) 115/57 (10/25/22 1515)  5' 3" (1.6 m)  70.7 kg (155 lb 13.8 oz)     Physical Exam:  Physical Exam  Vitals reviewed.   Constitutional:       General: She is not in acute distress.     Appearance: Normal appearance. She is well-developed. She is not ill-appearing.   HENT:      Head: Normocephalic and atraumatic.   Eyes:      General: No scleral icterus.     Conjunctiva/sclera: Conjunctivae normal. "   Cardiovascular:      Rate and Rhythm: Normal rate and regular rhythm.      Heart sounds: Normal heart sounds.   Pulmonary:      Effort: Pulmonary effort is normal. No respiratory distress.      Breath sounds: Normal breath sounds.   Abdominal:      General: There is no distension.      Palpations: Abdomen is soft.      Tenderness: There is no abdominal tenderness. There is no guarding.   Musculoskeletal:         General: Normal range of motion.      Cervical back: Normal range of motion and neck supple.   Skin:     General: Skin is warm and dry.      Coloration: Skin is not jaundiced.   Neurological:      General: No focal deficit present.      Mental Status: She is alert and oriented to person, place, and time.   Psychiatric:         Mood and Affect: Mood normal.         Behavior: Behavior normal.     Laboratory  CBC: Reviewed  CMP: Reviewed    ASSESSMENT/PLAN:   Patient is a 62 year-old woman with lung cancer who needs a port for chemo. We discussed the technical and convalescent aspects of central venous port-accessed-catheter placement and the rationale for IJ or SC approach. We discussed the risks, benefits and alternatives, the risks including but not limited to bleeding, infection, catheter-associated thrombus or infection, air or fat embolus, pneumothorax requiring thoracostomy, nonfunctioning catheter, malpositioned catheter, and lost guidewire or catheter requiring IR or surgical retrieval. All questions were answered to her satisfaction and she has elected to proceed. Informed consent was obtained. She is scheduled for 11/3/22. It was a pleasure meeting Mj Jasper and thank you for this consultation.    Mini Acevedo  10/25/22

## 2022-10-31 ENCOUNTER — TELEPHONE (OUTPATIENT)
Dept: PULMONOLOGY | Facility: CLINIC | Age: 62
End: 2022-10-31
Payer: MEDICAID

## 2022-10-31 NOTE — TELEPHONE ENCOUNTER
[facility] is faxing sleep study to us.     ----- Message from Harika Garcia NP sent at 10/25/2022 11:02 AM CDT -----  Please call [facility] and get report for sleep study done in 2022

## 2022-11-02 ENCOUNTER — ANESTHESIA EVENT (OUTPATIENT)
Dept: SURGERY | Facility: HOSPITAL | Age: 62
End: 2022-11-02
Payer: MEDICAID

## 2022-11-02 ENCOUNTER — TELEPHONE (OUTPATIENT)
Dept: SURGERY | Facility: CLINIC | Age: 62
End: 2022-11-02
Payer: MEDICAID

## 2022-11-02 NOTE — PRE-PROCEDURE INSTRUCTIONS
PREOP INSTRUCTIONS:  No food,milk or milk products for 8 hours before surgery.  Clear liquids like water,gatorade,apple juice are allowed up until 2 hours before surgery.  Instructed to follow the surgeon's instructions if they differ from these.  Shower instructions as well as directions to the Surgery Center were given.  Encouraged to wear loose fitting,comfortable clothing.  Attempted to review medication instructions for pm prior to and am of procedure but patient was currently traveling and wasn't sure of her medications.Patient will hold all medications the morning of surgery.  Instructed to avoid taking vitamins,supplements,aspirin and ibuprofen the morning of surgery.    Patient denies any side effects or issues with anesthesia or sedation.     Patient does not know arrival time.Explained that this information comes from the surgeon's office and if they haven't heard from them by 3 pm to call the office.Patient stated an understanding.

## 2022-11-02 NOTE — ANESTHESIA PREPROCEDURE EVALUATION
[facility]  Anesthesia Pre-Operative Evaluation         Patient Name: Yvette Hutchinson  YOB: 1960  MRN: [id number]    SUBJECTIVE:     Pre-operative evaluation for Procedure(s) (LRB):  [id number], Right or Left Neck or Chest (N/A)     11/02/2022    Yvette Hutchinson is a 62 y.o. female with a significant medical history of COPD on home O2 3L, recent left lobectomy and lymphadenectomy Dr. Kaplan 10/3/2022, chronic pain, esophageal strictures, GERD, anxiety, current smoker, CLL, and FARZANEH NSCLC who presents for the above procedure.     Recent left lobectomy and lymphadenectomy with Dr. Kaplan on 10/3/2022.    LDA: None documented.    Prev airway:      Attempted By:  Resident anesthesiologist    Method of Intubation:  Video laryngoscopy    Blade:  [place] 3    Laryngeal View Grade: Grade I - full view of cords      Difficult Airway Encountered?: No      Complications:  None    Airway Device:  Double lumen tube left    Airway Device Size:  37F    Style/Cuff Inflation:  Cuffed    Inflation Amount (mL):  2    Tube secured:  25    Patient Active Problem List   Diagnosis    Tobacco dependence    B12 deficiency    COLD (chronic obstructive lung disease)    Chronic pain syndrome    Insomnia    Overweight (BMI 25.0-29.9)    S/P dilatation of esophageal stricture    Seasonal allergic rhinitis due to pollen    History of colon polyps    GERD without esophagitis    Anxiety    Low HDL (under 40)    CLL (chronic lymphocytic leukemia)    Chronic respiratory failure with hypoxia    Malignant neoplasm of upper lobe of lung    Screening procedure    Non-small cell lung cancer       Review of patient's allergies indicates:  No Known Allergies    Current Inpatient Medications:      No current facility-administered medications on file prior to encounter.     Current Outpatient Medications on File Prior to Encounter   Medication Sig Dispense Refill    albuterol  (PROVENTIL/VENTOLIN HFA) 90 mcg/actuation inhaler Inhale 2 puffs into the lungs every 6 (six) hours as needed for Wheezing or Shortness of Breath. Rescue 18 g 11    albuterol-ipratropium (DUO-NEB) 2.5 mg-0.5 mg/3 mL nebulizer solution Take 3 mLs by nebulization every 6 (six) hours as needed for Wheezing. Rescue 120 each 5    ALPRAZolam (XANAX) 0.5 MG tablet Take 1 tablet (0.5 mg total) by mouth 3 (three) times daily. for 10 days (Patient not taking: Reported on 10/11/2022) 30 tablet 0    buPROPion (WELLBUTRIN XL) 300 MG 24 hr tablet Take 1 tablet (300 mg total) by mouth once daily. 90 tablet 3    docusate sodium (COLACE) 100 MG capsule Take 1 capsule (100 mg total) by mouth daily as needed for Constipation. 30 capsule 1    fluconazole (DIFLUCAN) 100 MG tablet Take 1 tablet (100 mg total) by mouth every other day. 3 tablet 0    FLUoxetine 20 MG capsule Take 1 capsule (20 mg total) by mouth once daily. 30 capsule 11    fluticasone propionate (FLONASE) 50 mcg/actuation nasal spray 1 spray (50 mcg total) by Each Nostril route once daily. 16 g 3    fluticasone-salmeterol 230-21 mcg/dose (ADVAIR HFA) 230-21 mcg/actuation HFAA inhaler Inhale 2 puffs into the lungs 2 (two) times daily. Controller 12 g 5    fluticasone-umeclidin-vilanter (TRELEGY ELLIPTA) 100-62.5-25 mcg DsDv Inhale 1 puff into the lungs once daily. 60 each 11    gabapentin (NEURONTIN) 300 MG capsule Take 2 capsules (600 mg total) by mouth 3 (three) times daily. 180 capsule 11    guaiFENesin-codeine 100-10 mg/5 ml (TUSSI-ORGANIDIN NR)  mg/5 mL syrup TAKE 5 MLS BY MOUTH EVERY 4 HOURS AS NEEDED FOR COUGH OR CONGESTION      HYDROcodone-acetaminophen (NORCO) 5-325 mg per tablet Take 1 tablet by mouth every 12 (twelve) hours as needed for Pain. 10 tablet 0    levocetirizine (XYZAL) 5 MG tablet Take 1 tablet (5 mg total) by mouth every evening. 30 tablet 5    LIDOcaine (LIDODERM) 5 % Place 1 patch onto the skin once daily. Remove & Discard  patch within 12 hours or as directed by MD 7 patch 0    montelukast (SINGULAIR) 10 mg tablet Take 10 mg by mouth once daily.      ondansetron (ZOFRAN-ODT) 8 MG TbDL Take 1 tablet (8 mg total) by mouth every 12 (twelve) hours as needed (nausea). 60 tablet 1    oxyCODONE (ROXICODONE) 5 MG immediate release tablet Take 1 tablet (5 mg total) by mouth every 4 (four) hours as needed for Pain. 20 tablet 0    oxyCODONE (ROXICODONE) 5 MG immediate release tablet Take 1 tablet (5 mg total) by mouth every 4 (four) hours as needed for Pain. 36 tablet 0    prochlorperazine (COMPAZINE) 10 MG tablet Take 1 tablet (10 mg total) by mouth every 6 (six) hours as needed (nausea). 60 tablet 1       Past Surgical History:   Procedure Laterality Date    INJECTION OF ANESTHETIC AGENT AROUND MULTIPLE INTERCOSTAL NERVES Left 10/3/2022    Procedure: BLOCK, NERVE, INTERCOSTAL, 2 OR MORE;  Surgeon: Evelio Kaplan MD;  Location: [facility] OR [address] [place] [address];  Service: Thoracic;  Laterality: Left;    ROBOT-ASSISTED LAPAROSCOPIC LYMPHADENECTOMY USING DA MONIQUE XI Left 10/3/2022    Procedure: XI ROBOTIC LYMPHADENECTOMY;  Surgeon: Evelio Kaplan MD;  Location: [facility] OR [address] [place] [address];  Service: Thoracic;  Laterality: Left;    TONSILLECTOMY      XI ROBOTIC RATS,WITH LOBECTOMY,LUNG Left 10/3/2022    Procedure: XI ROBOTIC RATS,WITH LOBECTOMY,LUNG;  Surgeon: Evelio Kaplan MD;  Location: [facility] OR [address] [place] [address];  Service: Thoracic;  Laterality: Left;       Social History     Socioeconomic History    Marital status: Legally    Tobacco Use    Smoking status: Some Days     Packs/day: 0.15     Types: Cigarettes    Smokeless tobacco: Never    Tobacco comments:     reports one cigarette every now and then   Substance and Sexual Activity    Alcohol use: Yes     Comment: rarely    Drug use: Yes     Types: Marijuana       OBJECTIVE:     Vital Signs Range:  Vitals - 1 value per visit 10/25/2022 10/25/2022 10/25/2022   SYSTOLIC - 105 115   DIASTOLIC - 65  57   Pulse - 59 72   Temp - - -   Resp - - -   SPO2 - 94 -   Weight (lb) - 157.85 155.87   Weight (kg) - 71.6 70.7   Height - 63 63   BMI (Calculated) - 28 27.6   VISIT REPORT - - -   Pain Score  10 - -   Some recent data might be hidden         CBC:   Lab Results   Component Value Date    WBC 30.46 (H) 08/26/2022    HGB 13.4 08/26/2022    HCT 40.5 08/26/2022    MCV 94 08/26/2022     08/26/2022         CMP:   Sodium   Date Value Ref Range Status   10/06/2022 138 136 - 145 mmol/L Final     Potassium   Date Value Ref Range Status   10/06/2022 3.9 3.5 - 5.1 mmol/L Final     Chloride   Date Value Ref Range Status   10/06/2022 101 95 - 110 mmol/L Final     CO2   Date Value Ref Range Status   10/06/2022 27 23 - 29 mmol/L Final     Glucose   Date Value Ref Range Status   10/06/2022 97 70 - 110 mg/dL Final     BUN   Date Value Ref Range Status   10/06/2022 7 (L) 8 - 23 mg/dL Final     Creatinine   Date Value Ref Range Status   10/06/2022 0.6 0.5 - 1.4 mg/dL Final     Calcium   Date Value Ref Range Status   10/06/2022 8.7 8.7 - 10.5 mg/dL Final     Total Protein   Date Value Ref Range Status   08/26/2022 6.8 6.0 - 8.4 g/dL Final     Albumin   Date Value Ref Range Status   08/26/2022 4.0 3.5 - 5.2 g/dL Final     Total Bilirubin   Date Value Ref Range Status   08/26/2022 0.4 0.1 - 1.0 mg/dL Final     Comment:     For infants and newborns, interpretation of results should be based  on gestational age, weight and in agreement with clinical  observations.    Premature Infant recommended reference ranges:  Up to 24 hours.............<8.0 mg/dL  Up to 48 hours............<12.0 mg/dL  3-5 days..................<15.0 mg/dL  6-29 days.................<15.0 mg/dL       Alkaline Phosphatase   Date Value Ref Range Status   08/26/2022 110 55 - 135 U/L Final     AST   Date Value Ref Range Status   08/26/2022 22 10 - 40 U/L Final     ALT   Date Value Ref Range Status   08/26/2022 24 10 - 44 U/L Final     Anion Gap   Date Value Ref  Range Status   10/06/2022 10 8 - 16 mmol/L Final     eGFR if    Date Value Ref Range Status   06/13/2022 >60 >60 mL/min/1.73 m^2 Final     eGFR if non    Date Value Ref Range Status   06/13/2022 >60 >60 mL/min/1.73 m^2 Final     Comment:     Calculation used to obtain the estimated glomerular filtration  rate (eGFR) is the CKD-EPI equation.          INR:  Lab Results   Component Value Date    INR 1.0 08/17/2022       EKG:   Results for orders placed or performed during the hospital encounter of 12/15/17   EKG 12-lead    Collection Time: 12/15/17 10:36 PM    Narrative    Test Reason : R06.02  Vent. Rate : 068 BPM     Atrial Rate : 068 BPM     P-R Int : 144 ms          QRS Dur : 080 ms      QT Int : 406 ms       P-R-T Axes : 073 071 052 degrees     QTc Int : 431 ms    Normal sinus rhythm  Possible Left atrial enlargement  Borderline Abnormal ECG  When compared with ECG of 18-AUG-2017 16:57,  No significant change was found  Confirmed by Emanuel Bazan MD ([id number]) on 12/18/2017 8:45:48 PM    Referred By: [email or web address]   SELF           Confirmed By:Emanuel Bazan MD        2D ECHO:   No results found for this or any previous visit.         ASSESSMENT/PLAN:         Pre-op Assessment    I have reviewed the Patient Summary Reports.     I have reviewed the Nursing Notes. I have reviewed the NPO Status.   I have reviewed the Medications.     Review of Systems  Anesthesia Hx:  No problems with previous Anesthesia  History of prior surgery of interest to airway management or planning: Denies Family Hx of Anesthesia complications.   Denies Personal Hx of Anesthesia complications.   Social:  Smoker, Alcohol Use    Hematology/Oncology:         -- Denies Anemia: Current/Recent Cancer.   Cardiovascular:   Exercise tolerance: good Hypertension Denies MI.  Denies CAD.     Denies CHF. ECG has been reviewed.    Pulmonary:   COPD, moderate    Renal/:   Denies Chronic Renal Disease.     Hepatic/GI:   GERD  Esophageal strictures   Neurological:   Denies Neuromuscular Disease. Denies Seizures.   Chronic Pain Syndrome   Endocrine:   Denies Diabetes.    Psych:   Psychiatric History anxiety          Physical Exam  General: Well nourished, Cooperative, Alert and Oriented    Airway:  Mallampati: II / II  Mouth Opening: Normal  TM Distance: Normal  Tongue: Normal  Neck ROM: Normal ROM    Dental:  Dentures        Anesthesia Plan  Type of Anesthesia, risks & benefits discussed:    Anesthesia Type: Gen ETT  Intra-op Monitoring Plan: Standard ASA Monitors  Post Op Pain Control Plan: multimodal analgesia and IV/PO Opioids PRN  Induction:  IV  Airway Plan: Direct and Video, Post-Induction  Informed Consent: Informed consent signed with the Patient and all parties understand the risks and agree with anesthesia plan.  All questions answered.   ASA Score: 3  Day of Surgery Review of History & Physical: H&P Update referred to the surgeon/provider.    Ready For Surgery From Anesthesia Perspective.     .

## 2022-11-03 ENCOUNTER — HOSPITAL ENCOUNTER (OUTPATIENT)
Facility: HOSPITAL | Age: 62
Discharge: HOME | End: 2022-11-03
Attending: SURGERY | Admitting: SURGERY
Payer: MEDICAID

## 2022-11-03 ENCOUNTER — ANESTHESIA (OUTPATIENT)
Dept: SURGERY | Facility: HOSPITAL | Age: 62
End: 2022-11-03
Payer: MEDICAID

## 2022-11-03 VITALS
RESPIRATION RATE: 18 BRPM | WEIGHT: 155.88 LBS | HEART RATE: 54 BPM | SYSTOLIC BLOOD PRESSURE: 99 MMHG | BODY MASS INDEX: 27.62 KG/M2 | OXYGEN SATURATION: 95 % | TEMPERATURE: 98 F | HEIGHT: 63 IN | DIASTOLIC BLOOD PRESSURE: 55 MMHG

## 2022-11-03 DIAGNOSIS — C34.90 NON-SMALL CELL LUNG CANCER, UNSPECIFIED LATERALITY: Primary | ICD-10-CM

## 2022-11-03 DIAGNOSIS — C34.90 NON-SMALL CELL LUNG CANCER: ICD-10-CM

## 2022-11-03 PROCEDURE — D9220A PRA ANESTHESIA: Mod: ,,, | Performed by: ANESTHESIOLOGY

## 2022-11-03 PROCEDURE — 36000707: Performed by: SURGERY

## 2022-11-03 PROCEDURE — 63600175 PHARM REV CODE 636 W HCPCS: Performed by: STUDENT IN AN ORGANIZED HEALTH CARE EDUCATION/TRAINING PROGRAM

## 2022-11-03 PROCEDURE — 36561 PR INSERT TUNNELED CV CATH WITH PORT: ICD-10-PCS | Mod: RT,,, | Performed by: SURGERY

## 2022-11-03 PROCEDURE — 77001 CHG FLUOROGUIDE CNTRL VEN ACCESS,PLACE,REPLACE,REMOVE: ICD-10-PCS | Mod: 26,,, | Performed by: SURGERY

## 2022-11-03 PROCEDURE — D9220A PRA ANESTHESIA: ICD-10-PCS | Mod: ,,, | Performed by: ANESTHESIOLOGY

## 2022-11-03 PROCEDURE — 71000044 HC DOSC ROUTINE RECOVERY FIRST HOUR: Performed by: SURGERY

## 2022-11-03 PROCEDURE — 00532 ANES ACCESS CTR VENOUS CRCJ: CPT | Performed by: SURGERY

## 2022-11-03 PROCEDURE — 25000003 PHARM REV CODE 250: Performed by: STUDENT IN AN ORGANIZED HEALTH CARE EDUCATION/TRAINING PROGRAM

## 2022-11-03 PROCEDURE — 77001 FLUOROGUIDE FOR VEIN DEVICE: CPT | Mod: 26,,, | Performed by: SURGERY

## 2022-11-03 PROCEDURE — 71000015 HC POSTOP RECOV 1ST HR: Performed by: SURGERY

## 2022-11-03 PROCEDURE — C1788 PORT, INDWELLING, IMP: HCPCS | Performed by: SURGERY

## 2022-11-03 PROCEDURE — 36561 INSERT TUNNELED CV CATH: CPT | Mod: RT,,, | Performed by: SURGERY

## 2022-11-03 PROCEDURE — 36000706: Performed by: SURGERY

## 2022-11-03 PROCEDURE — 63600175 PHARM REV CODE 636 W HCPCS: Performed by: SURGERY

## 2022-11-03 PROCEDURE — 37000008 HC ANESTHESIA 1ST 15 MINUTES: Performed by: SURGERY

## 2022-11-03 PROCEDURE — 25000003 PHARM REV CODE 250

## 2022-11-03 PROCEDURE — 25000003 PHARM REV CODE 250: Performed by: SURGERY

## 2022-11-03 PROCEDURE — 37000009 HC ANESTHESIA EA ADD 15 MINS: Performed by: SURGERY

## 2022-11-03 DEVICE — KIT POWERPORT SINGLE 8FR: Type: IMPLANTABLE DEVICE | Site: CHEST | Status: FUNCTIONAL

## 2022-11-03 RX ORDER — ACETAMINOPHEN 325 MG/1
650 TABLET ORAL EVERY 4 HOURS PRN
Status: DISCONTINUED | OUTPATIENT
Start: 2022-11-03 | End: 2022-11-03 | Stop reason: HOSPADM

## 2022-11-03 RX ORDER — HEPARIN SODIUM (PORCINE) LOCK FLUSH IV SOLN 100 UNIT/ML 100 UNIT/ML
SOLUTION INTRAVENOUS
Status: DISCONTINUED | OUTPATIENT
Start: 2022-11-03 | End: 2022-11-03 | Stop reason: HOSPADM

## 2022-11-03 RX ORDER — SODIUM CHLORIDE 9 MG/ML
INJECTION, SOLUTION INTRAVENOUS CONTINUOUS
Status: DISCONTINUED | OUTPATIENT
Start: 2022-11-03 | End: 2022-11-03 | Stop reason: HOSPADM

## 2022-11-03 RX ORDER — PROPOFOL 10 MG/ML
VIAL (ML) INTRAVENOUS
Status: DISCONTINUED | OUTPATIENT
Start: 2022-11-03 | End: 2022-11-03

## 2022-11-03 RX ORDER — LIDOCAINE HYDROCHLORIDE 10 MG/ML
INJECTION, SOLUTION EPIDURAL; INFILTRATION; INTRACAUDAL; PERINEURAL
Status: DISCONTINUED | OUTPATIENT
Start: 2022-11-03 | End: 2022-11-03 | Stop reason: HOSPADM

## 2022-11-03 RX ORDER — SODIUM CHLORIDE 9 MG/ML
INJECTION, SOLUTION INTRAVENOUS CONTINUOUS
Status: DISCONTINUED | OUTPATIENT
Start: 2022-11-03 | End: 2023-10-09

## 2022-11-03 RX ORDER — ACETAMINOPHEN 10 MG/ML
INJECTION, SOLUTION INTRAVENOUS
Status: DISCONTINUED | OUTPATIENT
Start: 2022-11-03 | End: 2022-11-03

## 2022-11-03 RX ORDER — ONDANSETRON 2 MG/ML
INJECTION INTRAMUSCULAR; INTRAVENOUS
Status: DISCONTINUED | OUTPATIENT
Start: 2022-11-03 | End: 2022-11-03

## 2022-11-03 RX ORDER — ACETAMINOPHEN 500 MG
1000 TABLET ORAL EVERY 6 HOURS PRN
Qty: 8 TABLET | Refills: 0 | Status: SHIPPED | OUTPATIENT
Start: 2022-11-03

## 2022-11-03 RX ORDER — BUPIVACAINE HYDROCHLORIDE 5 MG/ML
INJECTION, SOLUTION EPIDURAL; INTRACAUDAL
Status: DISCONTINUED | OUTPATIENT
Start: 2022-11-03 | End: 2022-11-03 | Stop reason: HOSPADM

## 2022-11-03 RX ORDER — PHENYLEPHRINE HCL IN 0.9% NACL 1 MG/10 ML
SYRINGE (ML) INTRAVENOUS
Status: DISCONTINUED | OUTPATIENT
Start: 2022-11-03 | End: 2022-11-03

## 2022-11-03 RX ORDER — HYDROMORPHONE HYDROCHLORIDE 1 MG/ML
0.2 INJECTION, SOLUTION INTRAMUSCULAR; INTRAVENOUS; SUBCUTANEOUS EVERY 5 MIN PRN
Status: DISCONTINUED | OUTPATIENT
Start: 2022-11-03 | End: 2022-11-03 | Stop reason: HOSPADM

## 2022-11-03 RX ORDER — LIDOCAINE HYDROCHLORIDE 20 MG/ML
INJECTION, SOLUTION EPIDURAL; INFILTRATION; INTRACAUDAL; PERINEURAL
Status: DISCONTINUED | OUTPATIENT
Start: 2022-11-03 | End: 2022-11-03

## 2022-11-03 RX ORDER — MIDAZOLAM HYDROCHLORIDE 5 MG/ML
INJECTION INTRAMUSCULAR; INTRAVENOUS
Status: DISCONTINUED | OUTPATIENT
Start: 2022-11-03 | End: 2022-11-03

## 2022-11-03 RX ORDER — SODIUM CHLORIDE 0.9 % (FLUSH) 0.9 %
10 SYRINGE (ML) INJECTION
Status: DISCONTINUED | OUTPATIENT
Start: 2022-11-03 | End: 2022-11-03 | Stop reason: HOSPADM

## 2022-11-03 RX ORDER — SODIUM CHLORIDE 9 MG/ML
INJECTION, SOLUTION INTRAVENOUS
Status: COMPLETED | OUTPATIENT
Start: 2022-11-03 | End: 2022-11-03

## 2022-11-03 RX ORDER — CEFAZOLIN SODIUM/WATER 2 G/20 ML
2 SYRINGE (ML) INTRAVENOUS
Status: COMPLETED | OUTPATIENT
Start: 2022-11-03 | End: 2022-11-03

## 2022-11-03 RX ORDER — HALOPERIDOL 5 MG/ML
0.5 INJECTION INTRAMUSCULAR EVERY 10 MIN PRN
Status: DISCONTINUED | OUTPATIENT
Start: 2022-11-03 | End: 2022-11-03 | Stop reason: HOSPADM

## 2022-11-03 RX ORDER — FENTANYL CITRATE 50 UG/ML
INJECTION, SOLUTION INTRAMUSCULAR; INTRAVENOUS
Status: DISCONTINUED | OUTPATIENT
Start: 2022-11-03 | End: 2022-11-03

## 2022-11-03 RX ADMIN — HYDROMORPHONE HYDROCHLORIDE 0.2 MG: 1 INJECTION, SOLUTION INTRAMUSCULAR; INTRAVENOUS; SUBCUTANEOUS at 09:11

## 2022-11-03 RX ADMIN — Medication 2 G: at 07:11

## 2022-11-03 RX ADMIN — SODIUM CHLORIDE: 0.9 INJECTION, SOLUTION INTRAVENOUS at 07:11

## 2022-11-03 RX ADMIN — Medication 50 MCG: at 08:11

## 2022-11-03 RX ADMIN — Medication 100 MCG: at 07:11

## 2022-11-03 RX ADMIN — Medication 50 MCG: at 07:11

## 2022-11-03 RX ADMIN — HALOPERIDOL LACTATE 0.5 MG: 5 INJECTION, SOLUTION INTRAMUSCULAR at 09:11

## 2022-11-03 RX ADMIN — FENTANYL CITRATE 25 MCG: 0.05 INJECTION, SOLUTION INTRAMUSCULAR; INTRAVENOUS at 07:11

## 2022-11-03 RX ADMIN — PROPOFOL 100 MCG/KG/MIN: 10 INJECTION, EMULSION INTRAVENOUS at 07:11

## 2022-11-03 RX ADMIN — MIDAZOLAM HYDROCHLORIDE 2 MG: 5 INJECTION, SOLUTION INTRAMUSCULAR; INTRAVENOUS at 07:11

## 2022-11-03 RX ADMIN — LIDOCAINE HYDROCHLORIDE 20 MG: 20 INJECTION, SOLUTION EPIDURAL; INFILTRATION; INTRACAUDAL; PERINEURAL at 07:11

## 2022-11-03 RX ADMIN — ONDANSETRON 4 MG: 2 INJECTION INTRAMUSCULAR; INTRAVENOUS at 08:11

## 2022-11-03 RX ADMIN — ACETAMINOPHEN 1000 MG: 10 INJECTION INTRAVENOUS at 07:11

## 2022-11-03 RX ADMIN — FENTANYL CITRATE 25 MCG: 0.05 INJECTION, SOLUTION INTRAMUSCULAR; INTRAVENOUS at 08:11

## 2022-11-03 RX ADMIN — SODIUM CHLORIDE, SODIUM GLUCONATE, SODIUM ACETATE, POTASSIUM CHLORIDE, MAGNESIUM CHLORIDE, SODIUM PHOSPHATE, DIBASIC, AND POTASSIUM PHOSPHATE: .53; .5; .37; .037; .03; .012; .00082 INJECTION, SOLUTION INTRAVENOUS at 07:11

## 2022-11-03 RX ADMIN — GLYCOPYRROLATE 0.2 MG: 0.2 INJECTION INTRAMUSCULAR; INTRAVENOUS at 07:11

## 2022-11-03 NOTE — BRIEF OP NOTE
[facility] - Surgery ([facility])  Brief Operative Note    Surgery Date: 11/3/2022     Surgeon(s) and Role:     * Mini Acevedo MD - Primary     * Yuridia Cisneros MD    Assisting Surgeon: None    Pre-op Diagnosis:  NSCLC of left lung [C34.92]    Post-op Diagnosis:  Post-Op Diagnosis Codes:     * NSCLC of left lung [C34.92]    Procedure(s) (LRB):  [id number], Right or Left Neck or Chest (Right)    Anesthesia: Choice    Operative Findings: Uneventful right internal jugular vein port placement.    Estimated Blood Loss: 2cc         Specimens:   Specimen (24h ago, onward)      None              Discharge Note    OUTCOME: Patient tolerated treatment/procedure well without complication and is now ready for discharge.    DISPOSITION: Home or Self Care    FINAL DIAGNOSIS:  NSCLC of left lung [C34.92]    FOLLOWUP: In clinic    DISCHARGE INSTRUCTIONS:    Discharge Procedure Orders   Diet general     Other restrictions (specify):   Order Comments: No heavy lifting over 10lbs for 6 weeks     Wound care routine (specify)   Order Comments: WOUND CARE  You have skin glue over your incision(s); this will slowly flake away over the next few weeks.  -- Ok to shower; however, no baths or submerging in water (I.e. swimming, submerging in water) for at least two weeks.  -- Please keep the incision clean with soap and water, pat your incision dry, do not scrub hard over your incisions.    MEDICATIONS AND PAIN CONTROL  -- Please resume all home medications as instructed and take any newly prescribed medications.  -- Most people find that over-the-counter pain medications (Tylenol combined with Ibuprofen) will be sufficient for most pain control.  -- If you're taking prescription narcotics, do not drive or operate heavy machinery. Do not drive if your pain is not controlled enough for you to react quickly safely.  -- Take a stool softener with narcotics medications to prevent constipation.    OTHER INSTRUCTIONS  -- Monitor  for temp > 101 F, bleeding, redness, purulent drainage, or any sudden, new extreme pain. If any occur, please call our clinic or go to the emergency department if after normal business hours.  -- You may resume your regular diet as tolerated.   -- No heavy lifting (anything >10 lbs or = to a gallon of milk) or strenuous exercise until cleared by a physician.  -- Follow up with Dr. Acevedo in 2 weeks in clinic for a post-op check. If no appointment is made within the week, please call the clinic to schedule.     Call MD for:  temperature >100.4     Call MD for:  persistent nausea and vomiting     Call MD for:  severe uncontrolled pain     Call MD for:  difficulty breathing, headache or visual disturbances     Call MD for:  redness, tenderness, or signs of infection (pain, swelling, redness, odor or green/yellow discharge around incision site)     Call MD for:  hives     Call MD for:  persistent dizziness or light-headedness     Call MD for:  extreme fatigue     Yuridia Cisneros M.D.  General Surgery PGY-I  [facility]

## 2022-11-03 NOTE — OP NOTE
DATE OF PROCEDURE: 11/3/2022    PRE OP DIAGNOSIS: NSCLC of left lung [C34.92]    POST OP DIAGNOSIS: NSCLC of left lung [C34.92]    PROCEDURE: Procedure(s) (LRB):  [id number], Internal Jugular Vein (Right)    Surgeon(s) and Role:     * Mini Acevedo MD - Primary     * Yuridia Cisneros MD - Resident - Assisting    ANESTHESIA: MAC + local    INDICATION: Patient is a 62 year-old female with left upper lobe lung cancer s/p lobectomy for whom chemotherapy is planned. After discussing the risks, benefits and alternatives to port-a-cath placement, she elected to proceed. Informed consent was obtained.     FINDINGS:  1. Ultrasound guided access to right internal jugular vein  2. 8 Fr Power Port-Accessed-Catheter placed under ultrasound and fluoroscopic confirmation     DESCRIPTION OF OPERATION: Patient was met in the pre-op area and her identity and consent confirmed. She was then brought to the Operating Room and placed in the supine position. Sedation was provided and pre-operative IV antibiotics administered. A shoulder bump was placed. Her chest and neck were prepped and draped in sterile fashion and a pre-procedural pause performed by all members of the surgical and anesthesia teams. Ultrasound was used to ensure the right internal jugular vein was patent. Local anesthesia was injected and ultrasound was used for guidance and an 18-gauge hollow needle was used to access the right internal jugular vein on first pass. The guidewire was inserted through the access needle and ultrasound was used to confirm the wire was in the correct position. Local anesthetic was then injected on the anterior right chest. A small incision was made and the port pocket was created using electrocautery and blunt dissection down to the prepectoral fascia. A small nick was made at the puncture site at the level of the guidewire. The catheter was tunneled from the anterior right chest over the clavicle and exited at the wire  access site. Then over the wire, the vessel was dilated and the catheter was passed into the vessel via the Seldinger technique under direct fluoroscopic guidance. The catheter was confirmed to be in proper position at the cavoatrial junction using fluoroscopy.  Next, the catheter was cut to the appropriate length (23 cm at incision site) and attached to the port. The port was sutured in place using 2-0 Vicryl at 3 points. Blood was aspirated and the catheter flushed first with injectable saline followed by heparinized saline. The port incision was closed using interuppted 3-0 vicryl followed by 4-0 monocryl in running subcuticular fashion, as was the skin nick. Dermabond was applied. All surgical sponge, needle and instrument counts were correct at the end of the procedure. The patient tolerated the procedure well. A chest x-ray will be performed in the recovery room.     EBL: 1 mL  Specimens: None  Drains: None  Complications: None apparent  Dispo: Home following CXR in PACU     I was present and scrubbed for the entirety of this operation.     Mini Aecvedo  11/3/2022

## 2022-11-03 NOTE — TRANSFER OF CARE
"Anesthesia Transfer of Care Note    Patient: Yvette Hutchinson    Procedure(s) Performed: Procedure(s) (LRB):  [id number], Right or Left Neck or Chest (Right)    Anesthesia PACU Handoff    Last vitals:   Visit Vitals  BP (!) 117/58 (BP Location: Left arm, Patient Position: Lying)   Pulse (!) 59   Temp 36.7 °C (98.1 °F) (Oral)   Resp 18   Ht 5' 3" (1.6 m)   Wt 70.7 kg (155 lb 13.8 oz)   LMP 01/13/2010 (Approximate)   SpO2 96%   Breastfeeding No   BMI 27.61 kg/m²     "

## 2022-11-03 NOTE — ANESTHESIA POSTPROCEDURE EVALUATION
Anesthesia Post Evaluation    Patient: Yvette Hutchinson    Procedure(s) Performed: Procedure(s) (LRB):  [id number], Right or Left Neck or Chest (Right)    Final Anesthesia Type: general      Patient location during evaluation: [facility]  Patient participation: Yes- Able to Participate  Level of consciousness: awake and alert  Post-procedure vital signs: reviewed and stable  Pain management: adequate  Airway patency: patent    PONV status at discharge: No PONV  Anesthetic complications: no      Cardiovascular status: blood pressure returned to baseline  Respiratory status: unassisted  Hydration status: euvolemic  Follow-up not needed.          Vitals Value Taken Time   BP 96/51 11/03/22 0946   Temp 36.9 °C (98.4 °F) 11/03/22 0835   Pulse 52 11/03/22 0952   Resp 34 11/03/22 0952   SpO2 99 % 11/03/22 0952   Vitals shown include unvalidated device data.      No case tracking events are documented in the log.      Pain/Meaghan Score: Pain Rating Prior to Med Admin: 6 (11/3/2022  9:32 AM)  Meaghan Score: 10 (11/3/2022  8:50 AM)

## 2022-11-07 DIAGNOSIS — C34.01 MALIGNANT NEOPLASM OF HILUS OF RIGHT LUNG: ICD-10-CM

## 2022-11-07 PROBLEM — C34.91 MALIGNANT NEOPLASM OF RIGHT LUNG: Status: ACTIVE | Noted: 2022-11-07

## 2022-11-07 NOTE — PLAN OF CARE
START ON PATHWAY REGIMEN - Non-Small Cell Lung    [id number]        Paclitaxel       Carboplatin     **Always confirm dose/schedule in your pharmacy ordering system**    Patient Characteristics:  Postoperative without Neoadjuvant Therapy (Pathologic Staging), Stage IIB,   Adjuvant Chemotherapy, Squamous Cell  Therapeutic Status: Postoperative without Neoadjuvant Therapy (Pathologic   Staging)  AJCC T Category: pT1c  AJCC N Category: pN1  AJCC M Category: cM0  AJCC 8 Stage Grouping: IIB  Histology: Squamous Cell  Intent of Therapy:  Curative Intent, Discussed with Patient

## 2022-11-08 ENCOUNTER — TELEPHONE (OUTPATIENT)
Dept: HEMATOLOGY/ONCOLOGY | Facility: CLINIC | Age: 62
End: 2022-11-08
Payer: MEDICAID

## 2022-11-11 ENCOUNTER — TELEPHONE (OUTPATIENT)
Dept: HEMATOLOGY/ONCOLOGY | Facility: CLINIC | Age: 62
End: 2022-11-11
Payer: MEDICAID

## 2022-11-16 ENCOUNTER — TELEPHONE (OUTPATIENT)
Dept: HEMATOLOGY/ONCOLOGY | Facility: CLINIC | Age: 62
End: 2022-11-16
Payer: MEDICAID

## 2022-11-17 ENCOUNTER — PATIENT MESSAGE (OUTPATIENT)
Dept: HEMATOLOGY/ONCOLOGY | Facility: CLINIC | Age: 62
End: 2022-11-17
Payer: MEDICAID

## 2022-11-17 DIAGNOSIS — C34.01 MALIGNANT NEOPLASM OF HILUS OF RIGHT LUNG: Primary | ICD-10-CM

## 2022-11-17 RX ORDER — LIDOCAINE 50 MG/G
1 PATCH TOPICAL DAILY
Qty: 7 PATCH | Refills: 1 | Status: SHIPPED | OUTPATIENT
Start: 2022-11-17 | End: 2022-12-20 | Stop reason: SDUPTHER

## 2022-11-17 RX ORDER — HYDROCODONE BITARTRATE AND ACETAMINOPHEN 5; 300 MG/1; MG/1
1 TABLET ORAL
Qty: 40 EACH | Refills: 0 | Status: SHIPPED | OUTPATIENT
Start: 2022-11-17 | End: 2023-03-08

## 2022-11-22 ENCOUNTER — OFFICE VISIT (OUTPATIENT)
Dept: HEMATOLOGY/ONCOLOGY | Facility: CLINIC | Age: 62
End: 2022-11-22
Payer: MEDICAID

## 2022-11-22 VITALS
OXYGEN SATURATION: 97 % | SYSTOLIC BLOOD PRESSURE: 106 MMHG | HEART RATE: 72 BPM | RESPIRATION RATE: 14 BRPM | BODY MASS INDEX: 28.05 KG/M2 | DIASTOLIC BLOOD PRESSURE: 62 MMHG | HEIGHT: 63 IN | WEIGHT: 158.31 LBS | TEMPERATURE: 98 F

## 2022-11-22 DIAGNOSIS — C34.10 MALIGNANT NEOPLASM OF UPPER LOBE OF LUNG, UNSPECIFIED LATERALITY: ICD-10-CM

## 2022-11-22 DIAGNOSIS — C34.92 NSCLC OF LEFT LUNG: ICD-10-CM

## 2022-11-22 DIAGNOSIS — C34.01 MALIGNANT NEOPLASM OF HILUS OF RIGHT LUNG: Primary | ICD-10-CM

## 2022-11-22 PROCEDURE — 1159F MED LIST DOCD IN RCRD: CPT | Mod: CPTII,,, | Performed by: NURSE PRACTITIONER

## 2022-11-22 PROCEDURE — 99215 OFFICE O/P EST HI 40 MIN: CPT | Mod: S$PBB,,, | Performed by: NURSE PRACTITIONER

## 2022-11-22 PROCEDURE — 99999 PR PBB SHADOW E&M-EST. PATIENT-LVL V: ICD-10-PCS | Mod: PBBFAC,,, | Performed by: NURSE PRACTITIONER

## 2022-11-22 PROCEDURE — 1159F PR MEDICATION LIST DOCUMENTED IN MEDICAL RECORD: ICD-10-PCS | Mod: CPTII,,, | Performed by: NURSE PRACTITIONER

## 2022-11-22 PROCEDURE — 1160F RVW MEDS BY RX/DR IN RCRD: CPT | Mod: CPTII,,, | Performed by: NURSE PRACTITIONER

## 2022-11-22 PROCEDURE — 99215 OFFICE O/P EST HI 40 MIN: CPT | Mod: PBBFAC,PO | Performed by: NURSE PRACTITIONER

## 2022-11-22 PROCEDURE — 3008F BODY MASS INDEX DOCD: CPT | Mod: CPTII,,, | Performed by: NURSE PRACTITIONER

## 2022-11-22 PROCEDURE — 1160F PR REVIEW ALL MEDS BY PRESCRIBER/CLIN PHARMACIST DOCUMENTED: ICD-10-PCS | Mod: CPTII,,, | Performed by: NURSE PRACTITIONER

## 2022-11-22 PROCEDURE — 3008F PR BODY MASS INDEX (BMI) DOCUMENTED: ICD-10-PCS | Mod: CPTII,,, | Performed by: NURSE PRACTITIONER

## 2022-11-22 PROCEDURE — 3078F DIAST BP <80 MM HG: CPT | Mod: CPTII,,, | Performed by: NURSE PRACTITIONER

## 2022-11-22 PROCEDURE — 3074F PR MOST RECENT SYSTOLIC BLOOD PRESSURE < 130 MM HG: ICD-10-PCS | Mod: CPTII,,, | Performed by: NURSE PRACTITIONER

## 2022-11-22 PROCEDURE — 99999 PR PBB SHADOW E&M-EST. PATIENT-LVL V: CPT | Mod: PBBFAC,,, | Performed by: NURSE PRACTITIONER

## 2022-11-22 PROCEDURE — 3074F SYST BP LT 130 MM HG: CPT | Mod: CPTII,,, | Performed by: NURSE PRACTITIONER

## 2022-11-22 PROCEDURE — 99215 PR OFFICE/OUTPT VISIT, EST, LEVL V, 40-54 MIN: ICD-10-PCS | Mod: S$PBB,,, | Performed by: NURSE PRACTITIONER

## 2022-11-22 PROCEDURE — 3078F PR MOST RECENT DIASTOLIC BLOOD PRESSURE < 80 MM HG: ICD-10-PCS | Mod: CPTII,,, | Performed by: NURSE PRACTITIONER

## 2022-11-22 RX ORDER — OXYCODONE HYDROCHLORIDE 5 MG/1
5 TABLET ORAL EVERY 6 HOURS PRN
Qty: 60 TABLET | Refills: 0 | Status: SHIPPED | OUTPATIENT
Start: 2022-11-22 | End: 2022-12-20 | Stop reason: SDUPTHER

## 2022-11-22 RX ORDER — LIDOCAINE AND PRILOCAINE 25; 25 MG/G; MG/G
CREAM TOPICAL
Qty: 30 G | Refills: 0 | Status: SHIPPED | OUTPATIENT
Start: 2022-11-22

## 2022-11-22 NOTE — PROGRESS NOTES
FOLLOW-UP APPOINTMENT    PATIENT:   Yvette Hutchinson  :    1960  MR#:    [id number]  DATE OF VISIT:  2022      Chief Complaint:lung cancer     HPI:   Ms. Yvette Hutchinson presents today for chemotherapy education.  She will be starting treatment with carbo/taxol/avastin for the above diagnosis.       Review of Systems   Constitutional: Negative.    HENT:  Negative.     Eyes: Negative.    Respiratory: Negative.     Cardiovascular: Negative.    Gastrointestinal: Negative.    Endocrine: Negative.    Genitourinary: Negative.     Musculoskeletal: Negative.    Skin: Negative.    Neurological: Negative.    Hematological: Negative.    Psychiatric/Behavioral: Negative.       Oncology History   Malignant neoplasm of upper lobe of lung   2022 Initial Diagnosis    Malignant neoplasm of upper lobe of lung     2022 Cancer Staged    Staging form: Lung, AJCC 8th Edition  - Clinical stage from 2022: Stage IIB (cT1c, cN1, cM0)       10/12/2022 Cancer Staged    Staging form: Lung, AJCC 8th Edition  - Pathologic stage from 10/12/2022: Stage IIB (pT1c, pN1, cM0)       Non-small cell lung cancer   2022 Initial Diagnosis    Non-small cell lung cancer     2022 -  Chemotherapy    Treatment Summary   Plan Name: OP NSCLC ATEZOLIZUMAB BEVACIZUMAB PACLITAXEL CARBOPLATIN (AUC) Q3W FOLLOWED BY ATEZOLIZUMAB BEVACIZUMAB MAINTENANCE Q3W  Treatment Goal: Curative  Status: Active  Start Date: 2022 (Planned)  End Date: 10/9/2023 (Planned)  Provider: Rhiannon Lee MD  Chemotherapy: CARBOplatin (PARAPLATIN) in sodium chloride 0.9% 250 mL chemo infusion, , Intravenous, Clinic/HOD 1 time, 0 of 6 cycles  Dose modification:   (Cycle 2)  PACLitaxeL (TAXOL) 200 mg/m2 = 360 mg in sodium chloride 0.9% 500 mL chemo infusion, 200 mg/m2, Intravenous, Clinic/HOD 1 time, 0 of 6 cycles       Malignant neoplasm of right lung   2022 -  Chemotherapy    Treatment Summary   Plan Name: OP NSCLC ATEZOLIZUMAB  BEVACIZUMAB PACLITAXEL CARBOPLATIN (AUC) Q3W FOLLOWED BY ATEZOLIZUMAB BEVACIZUMAB MAINTENANCE Q3W  Treatment Goal: Curative  Status: Active  Start Date: 11/7/2022 (Planned)  End Date: 10/9/2023 (Planned)  Provider: Rhiannon Lee MD  Chemotherapy: CARBOplatin (PARAPLATIN) in sodium chloride 0.9% 250 mL chemo infusion, , Intravenous, Clinic/HOD 1 time, 0 of 6 cycles  Dose modification:   (Cycle 2)  PACLitaxeL (TAXOL) 200 mg/m2 = 360 mg in sodium chloride 0.9% 500 mL chemo infusion, 200 mg/m2, Intravenous, Clinic/HOD 1 time, 0 of 6 cycles       11/7/2022 Initial Diagnosis    Malignant neoplasm of right lung         Patient Active Problem List   Diagnosis    Tobacco dependence    B12 deficiency    COLD (chronic obstructive lung disease)    Chronic pain syndrome    Insomnia    Overweight (BMI 25.0-29.9)    S/P dilatation of esophageal stricture    Seasonal allergic rhinitis due to pollen    History of colon polyps    GERD without esophagitis    Anxiety    Low HDL (under 40)    CLL (chronic lymphocytic leukemia)    Chronic respiratory failure with hypoxia    Malignant neoplasm of upper lobe of lung    Screening procedure    Non-small cell lung cancer    Malignant neoplasm of right lung       Past Medical History:   Diagnosis Date    Arthritis     Depression     GERD (gastroesophageal reflux disease)     Pneumonia of left lung due to infectious organism 03/05/2020       Past Surgical History:   Procedure Laterality Date    INJECTION OF ANESTHETIC AGENT AROUND MULTIPLE INTERCOSTAL NERVES Left 10/3/2022    Procedure: BLOCK, NERVE, INTERCOSTAL, 2 OR MORE;  Surgeon: Evelio Kaplan MD;  Location: [facility] OR [address] [place] [address];  Service: Thoracic;  Laterality: Left;    INSERTION OF TUNNELED CENTRAL VENOUS CATHETER (CVC) WITH SUBCUTANEOUS PORT Right 11/3/2022    Procedure: [id number], Right or Left Neck or Chest;  Surgeon: Mini Acevedo MD;  Location: [facility] OR [address] [place] [address];  Service: General;  Laterality: Right;     ROBOT-ASSISTED LAPAROSCOPIC LYMPHADENECTOMY USING DA MONIQUE XI Left 10/3/2022    Procedure: XI ROBOTIC LYMPHADENECTOMY;  Surgeon: Evelio Kaplan MD;  Location: [facility] OR 2ND FLR;  Service: Thoracic;  Laterality: Left;    TONSILLECTOMY      XI ROBOTIC RATS,WITH LOBECTOMY,LUNG Left 10/3/2022    Procedure: XI ROBOTIC RATS,WITH LOBECTOMY,LUNG;  Surgeon: Evelio Kaplan MD;  Location: [facility] OR 2ND FLR;  Service: Thoracic;  Laterality: Left;       Social History     Socioeconomic History    Marital status: Legally    Tobacco Use    Smoking status: Some Days     Packs/day: 0.15     Types: Cigarettes    Smokeless tobacco: Never    Tobacco comments:     reports one cigarette every now and then   Substance and Sexual Activity    Alcohol use: Yes     Comment: rarely    Drug use: Yes     Types: Marijuana       Family History   Problem Relation Age of Onset    Ovarian cancer Sister     Breast cancer Cousin          Current Outpatient Medications:     acetaminophen (TYLENOL) 500 MG tablet, Take 2 tablets (1,000 mg total) by mouth every 6 (six) hours as needed for Pain., Disp: 8 tablet, Rfl: 0    albuterol (PROVENTIL/VENTOLIN HFA) 90 mcg/actuation inhaler, Inhale 2 puffs into the lungs every 6 (six) hours as needed for Wheezing or Shortness of Breath. Rescue, Disp: 18 g, Rfl: 11    albuterol-ipratropium (DUO-NEB) 2.5 mg-0.5 mg/3 mL nebulizer solution, Take 3 mLs by nebulization every 6 (six) hours as needed for Wheezing. Rescue, Disp: 120 each, Rfl: 5    ALPRAZolam (XANAX) 0.5 MG tablet, Take 1 tablet (0.5 mg total) by mouth 3 (three) times daily. for 10 days (Patient not taking: Reported on 10/11/2022), Disp: 30 tablet, Rfl: 0    buPROPion (WELLBUTRIN XL) 300 MG 24 hr tablet, Take 1 tablet (300 mg total) by mouth once daily., Disp: 90 tablet, Rfl: 3    docusate sodium (COLACE) 100 MG capsule, Take 1 capsule (100 mg total) by mouth daily as needed for Constipation., Disp: 30 capsule, Rfl: 1    FLUoxetine 20 MG  capsule, Take 1 capsule (20 mg total) by mouth once daily., Disp: 30 capsule, Rfl: 11    fluticasone propionate (FLONASE) 50 mcg/actuation nasal spray, 1 spray (50 mcg total) by Each Nostril route once daily., Disp: 16 g, Rfl: 3    fluticasone-salmeterol 230-21 mcg/dose (ADVAIR HFA) 230-21 mcg/actuation HFAA inhaler, Inhale 2 puffs into the lungs 2 (two) times daily. Controller, Disp: 12 g, Rfl: 5    fluticasone-umeclidin-vilanter (TRELEGY ELLIPTA) 100-62.5-25 mcg DsDv, Inhale 1 puff into the lungs once daily., Disp: 60 each, Rfl: 11    gabapentin (NEURONTIN) 300 MG capsule, Take 2 capsules (600 mg total) by mouth 3 (three) times daily., Disp: 180 capsule, Rfl: 11    guaiFENesin-codeine 100-10 mg/5 ml (TUSSI-ORGANIDIN NR)  mg/5 mL syrup, TAKE 5 MLS BY MOUTH EVERY 4 HOURS AS NEEDED FOR COUGH OR CONGESTION, Disp: , Rfl:     HYDROcodone-acetaminophen 5-300 mg Tab, Take 1 tablet by mouth after meals as needed (pain)., Disp: 40 each, Rfl: 0    levocetirizine (XYZAL) 5 MG tablet, Take 1 tablet (5 mg total) by mouth every evening., Disp: 30 tablet, Rfl: 5    LIDOcaine (LIDODERM) 5 %, Place 1 patch onto the skin once daily. Remove & Discard patch within 12 hours or as directed by MD, Disp: 7 patch, Rfl: 0    LIDOcaine (LIDODERM) 5 %, Place 1 patch onto the skin once daily. Remove & Discard patch within 12 hours or as directed by MD, Disp: 7 patch, Rfl: 1    montelukast (SINGULAIR) 10 mg tablet, Take 10 mg by mouth once daily., Disp: , Rfl:     ondansetron (ZOFRAN-ODT) 8 MG TbDL, Take 1 tablet (8 mg total) by mouth every 12 (twelve) hours as needed (nausea)., Disp: 60 tablet, Rfl: 1    oxyCODONE (ROXICODONE) 5 MG immediate release tablet, Take 1 tablet (5 mg total) by mouth every 4 (four) hours as needed for Pain., Disp: 20 tablet, Rfl: 0    oxyCODONE (ROXICODONE) 5 MG immediate release tablet, Take 1 tablet (5 mg total) by mouth every 4 (four) hours as needed for Pain., Disp: 36 tablet, Rfl: 0    prochlorperazine  (COMPAZINE) 10 MG tablet, Take 1 tablet (10 mg total) by mouth every 6 (six) hours as needed (nausea)., Disp: 60 tablet, Rfl: 1  No current facility-administered medications for this visit.    Facility-Administered Medications Ordered in Other Visits:     0.9%  NaCl infusion, , Intravenous, Continuous, Yuridia Cisneros MD, New Bag at 11/03/22 0701    Review of patient's allergies indicates:  No Known Allergies    Physcial Examination  VITAL SIGNS:    There is no height or weight on file to calculate BSA.   Pain Assessment: No pain   There were no vitals filed for this visit.       Wt Readings from Last 5 Encounters:   11/03/22 70.7 kg (155 lb 13.8 oz)   10/25/22 70.7 kg (155 lb 13.8 oz)   10/25/22 71.6 kg (157 lb 13.6 oz)   10/19/22 73.2 kg (161 lb 6 oz)   10/18/22 72.8 kg (160 lb 7.9 oz)       GENERAL:  Yvette Hutchinson is healthy-appearing 62 y.o. female, in no distress.   EYES:   Pupils equal, round, reactive.  Conjunctivae, sclera and lids normal.  HEENT: Head normocephalic and atraumatic, without alopecia.  Oropharynx is unremarkable.  No icterus, jaundice, stomatitis, mucositis, or ulceration is noted.  Ears are clear and unremarkable.  Nose, nares, and septum are unremarkable.    NECK:   No masses.  Thyroid and trachea are normal.    BREASTS:  Deferred.  RESPIRATORY: Clear to auscultation bilaterally.  Symmetrically effortless expansion.  No wheezing and no stridor.    CV: Heart reveals regular rate and rhythm without murmur, rub, or gallops.  ABDOMEN: Soft, non-tender.  No masses, no hernias, and no rebound or rigidity are noted.  /RECTAL:  Deferred.  LYMPHATICS: No preauricular, submandibular, cervical, supraclavicular, axillary, lymphadenopathy.  MUSCULOSKELETAL:Fair musculature, no atrophy.  No arthritic changes.  No edema or cyanosis. Back is without gross abnormal curvature.   NEUROLOGICAL: Cranial nerves II-XII grossly intact.  Motor and sensory exam intact.  SKIN:   No lesions, bruises,  petechiae or rashes.  Good turgor.    PSYCHIATRIC: Patient is alert and oriented to time, place and person.  Mood and affect are appropriate.         Laboratory and Radiology   Lab Results   Component Value Date    WBC 30.46 (H) 08/26/2022    RBC 4.31 08/26/2022    HGB 13.4 08/26/2022    HCT 40.5 08/26/2022    MCV 94 08/26/2022    MCH 31.1 (H) 08/26/2022    MCHC 33.1 08/26/2022    RDW 13.2 08/26/2022     08/26/2022    MPV 10.3 08/26/2022    GRAN 14.0 (L) 08/26/2022    LYMPH 84.0 (H) 08/26/2022    MONO 1.0 (L) 08/26/2022    EOS CANCELED 03/04/2021    BASO CANCELED 03/04/2021    EOSINOPHIL 1.0 08/26/2022    BASOPHIL 0.0 08/26/2022     BMP  Lab Results   Component Value Date     10/06/2022    K 3.9 10/06/2022     10/06/2022    CO2 27 10/06/2022    BUN 7 (L) 10/06/2022    CREATININE 0.6 10/06/2022    CALCIUM 8.7 10/06/2022    ANIONGAP 10 10/06/2022    ESTGFRAFRICA >60 06/13/2022    EGFRNONAA >60 06/13/2022     Lab Results   Component Value Date    ALT 24 08/26/2022    AST 22 08/26/2022    ALKPHOS 110 08/26/2022    BILITOT 0.4 08/26/2022     No results found for this or any previous visit (from the past 2160 hour(s)).  Results for orders placed or performed during the hospital encounter of 08/31/22 (from the past 2160 hour(s))   NM PET CT Routine Skull to Mid Thigh    Impression    2.0 cm left upper lobe mass consistent with known bronchogenic carcinoma.  Metastatic left hilar lymph node.  No distant metastatic disease.    Partially calcified substernal thyroid nodule with mild FDG uptake.  Given the fact that this is in stable on CT and would be challenging to biopsy due to its location, continued follow-up is recommended.    Enlarged axillary lymph nodes with prominent fatty kate and no abnormal FDG uptake, stable.  These may be related to the known history of CLL.    Left adrenal adenoma, unchanged.      Electronically signed by: Lianna Ross  Date:    08/31/2022  Time:    09:50      Results for orders placed or performed during the hospital encounter of 09/24/22 (from the past 2160 hour(s))   MRI Brain W WO Contrast    Impression    No evidence for intracranial metastatic disease or other significant intracranial pathology.      Electronically signed by: Israel Steele MD  Date:    09/25/2022  Time:    08:44       Pathology  Pathology Results  (Last 10 years)                 10/03/22 1147  Specimen to Pathology, Surgery Pulmonary and Thoracic Final result    Narrative:  Pre-op Diagnosis: Non-small cell lung cancer, unspecified   laterality [C34.90]   Malignant neoplasm of unspecified part of unspecified   bronchus or lung [C34.90]   Post-op Diagnosis:   Procedure(s):   XI ROBOTIC RATS,WITH LOBECTOMY,LUNG   XI ROBOTIC LYMPHADENECTOMY   Number of specimens: 10   Name of specimens:   1. Level 5 lymph node - frozen (sent to path)   2. Level 5 lymph node - permanent   3. Left posterior level 10 lymph node - permanent   4. Level 11 lymph node - permanent   5. Level 12 lymph node near lingular pulmonary arterial   branch - permanent   6. Level 12 lymph node near upper division bronchus -   permanent   7. Level 10 #2 lymph node - permanent   8. Left level 8 lymph node - permanent   9. Left level 9 lymph node - permanent   10. Left upper lobe - permanent   Specimen total (fresh, frozen, permanent):->10       08/17/22 1129  Specimen to Pathology, Radiology Lung, biopsy not tranplant (Abnormal) Edited Result - FINAL    Narrative:  Left lung mass       03/15/21 1035  Liquid-Based Pap Smear, Screening Final result    Narrative:  Release to patient->Immediate               TITLE: PLAN OF CARE FOR THE CHEMOTHERAPY PATIENT / TEACHING PROTOCOL    PURPOSE: To involve the patient / significant other in the plan of care and to provide teaching to the significant other & patient receiving chemotherapy.    LEVEL: Independent.    CONTENT: The Plan of Care for the chemotherapy patient is individualized and  appropriate to the patients needs, strengths, limitations, & goals.  Education includes information regarding chemotherapy side effects, the treatment itself, and self-care  Activities.    GOAL / OUTCOME STANDARDS    PHYSIOLOGIC: The client will remain free or experience minimal side effects or toxicities throughout the chemotherapy treatment period.     PSYCHOLOGIC: The client/significant others will demonstrate positive coping mechanisms in relation to chemotherapy and its side effects.      COGINITIVE: The client/significant others will verbalize understanding of self-care measure to avoid/minimize side effects of the chemotherapy regime.    EVALUATION / COMMENT KEY:    V = Audiovisual/Video  S = Successfully meets outcome  N = Needs further instruction  NA = Not applicable to the patient  P = Previous knowledge  U = Unable to comprehend  * = See progress notes          PLAN OF CARE  INFORMATION TO BE DELIVERED / NURSING INTERVENTIONS DATE EVALUATION   Assessment of client/caregiver,         knowledge of cancer diagnosis,         and chemotherapy as a treatment. 1a. Evaluate patient/caregiver learning ability    b. Plan teaching sessions with patient/caregiver according to needs and present anxiety level/ability to learn.    c. Provide Chemotherapy Education Packet,        Mouth Care Protocol,         Specific Patient Education Sheets. 11/22/2022 S   Individual chemotherapy treatment         plan. 2a. Review of Chemotherapy Education handout from [employer]            11/22/2022   S   Knowledge Deficit & Self-Management of general side effects common to all chemotherapy:  Nausea/Vomiting  b.   Diarrhea  Mouth Care  Dental care  Constipation  Hair Loss  Potential for infection  Fatigue   3a. Reinforce that the majority of side effects from chemotherapy are reversible and are  controlled both in the hospital and at home        (blood counts recover, hair grows back).   b.  Refer to the following for  reinforcement of         information post-treatment:  Mouth Care Protocol.  Bowel Protocol for constipation or diarrhea.  3.  Drug Specific Chemotherapy Information Sheets for each medication patient receiving.    11/22/2022     S     PLAN OF CARE  INFORMATION TO BE DELIVERED / NURSING INTERVENTIONS DATE EVALUATION   h. Potential for bleeding         i. Potential anemia/fatigue         j. Potential sunburn         k. Birth control measures  l. Safety measures post treatment 4.  Chemotherapy Home Care Instruction  and Safety Information Sheet.  A. patient/caregivers to thoroughly cook shellfish (shrimp, crab, etc) to decrease the chance of infection.    B.  Use sunscreen and protective clothing while in the sun.   11/22/2022      Knowledge deficit & Self Management of Drug Specific  Side Effects.    BLADDER EFFECTS        (Hemorrhagic Cystitis)                  Preventable with adequate hydration; occurs 2-3 days or more post treatment.   1.  Instruct patient to:  a.   Void at least every 2 hours; increase intake.  b.   DO NOT hold urine; go when urge is felt.  c.    Empty bladder at bedtime and on         awakening.  d.   Observe for color changes (red to tea           colored), amount and frequency changes.  e.   Notify oncologist of any abnormalities           in urine or voiding or if you cannot               drink adequate fluids.   11/22/2022   S   b.   CHANGES IN URINE        COLOR:      1.   Instruct patient:  a.   Most evident in first 2-3 voidings after           administration.  Lasts less than 24 hours.  If urine is discolored 2 or more days post- treatment, notify oncologist.      11/22/2022 S   c.    KIDNEY EFFECTS           (Nephrotoxicity)   1.  Instruct patient to:  a.   Drink 8-16 glasses of fluid/day the day   pre-treatment and 3-4 days post-treatment to maintain hydration; the best way to minimize kidney problems.  b.   Notify oncologist immediately if unable to drink fluids or if changes are  noted in urinary elimination.     11/22/2022   S   PULMONARY TOXICITY    Instruct patient to report symptoms such as shortness of breath, chest pain, shallow breathing, or chest wall discomfort to physician.  Reinforce preventative measures used by the health care team.  Baseline and periodic PFT and chest x-ray.   11/22/2022   S     PLAN OF CARE INFORMATION TO BE DELIVERED / NURSING INTERVENTIONS DATE EVALUATION   NERVE & MUSCLE EFFECTS (neurotoxocity; neuropathy, possible visual/hearing changes)        Instruct patient to:    Report numbness or tingling of the hands/feet, loss of fine motor movement (buttoning shirt, tying shoelaces), or gait changes to your oncologist.  If numbness/tingling are present:  protect feet with shoes at all times.  Use gloves for washing dishes/gardening & potholders in kitchen.       11/22/2022   S   CARDIOTOXICITY  Decreased effectiveness of             cardiac function. Effective are                  cumulative and irreversible.                                    CARDIAC ARRYTHMIAS              4   Instruct:  Heart function may be tested before treatment and perdiocally during treatment.  Notify oncologist of irregular pulse, palpitations, shortness of breath, or swelling in lower extremities/feet.          Taxol and Taxotere can cause arrhythmias on infusion that resolve once infusion discontinued. Instruct nurse if any irregularity felt.    11/22/2022   S   EXTRAVASTION  Occurs when vesicants leak outside of vein and cause damage to the skin and underlying tissues.   Reinforce preventive measures used to avoid complications.  Fresh IV site or central line monitored continuously with vesicant IVP.  Continuous infusion via central line site and blood return monitored periodically around the clock.  Instruct to:  Notify nurse of any discomfort, burning, stinging, etc. at IV site during chemotherapy administration.  Notify oncologist of any redness, pain, or swelling at IV site after  discharge from hospital.   11/22/2022   S   HYPERSENSITIVITY can happen with any medication.   Instruct patient:  Nurse is with them during the initial part of treatment and will be close by to monitor.  Pre-medication ordered by the oncologist must be taken on time. If doses are missed, treatment will need to be re-scheduled.  Skin redness, itching, or hives appearing after discharge should be reported to oncologist. 11/22/2022   S       PLAN OF CARE INFORMATION TO BE DELIVERED / NURSING INTERVENTIONS DATE EVALUATION   FLU-LIKE SYNDROME      Instruct patient symptoms are hard to prevent and may include fever, shaking chills, muscle and body aches.  Taking prescribed medications from physician if needed.  Adequate fluids are important.    Reinforce the need to call if temperature is         elevated to 100.4 or more  11/22/2022   S   HAND-FOOT SYNDROME  causes painful, symmetric swelling and redness of palms and soles                  Instruct patient to report any numbness or tingling in the hands or feet.  Explain prevention techniques, such as     Use heavy moisturizers to lessen skin dryness and itching, but to avoid if skin is cracked or broken  Bathe in tepid water, use non-perfumed soap, and wash gently. Baths with oatmeal or diluted baking soda may be soothing.  Avoid tight fitting shoes and repetitive actions, such as rubbing hands or applying pressure to hands/feet.  Review measures to take should syndrome occur:  Cold compresses and elevation for          edema  Pain medications and other measures as ordered by oncologist.   4.   Syndrome resolves few weeks after therapy. 11/22/2022   S   5. DISCHARGE PLANNING /        EDUCATION 1.    Explain importance of compliance with follow- up  tests (CBC, CMP).  2.    Verify patient/caregiver know:  a.    Oncologists office phone number.  b.    Dates of follow-up appointments.  c.    Prescriptions given for nausea  3.   Review side effects to monitor and notify           oncologist about.  4.   Reinforce the need for patient and caregivers to:  a.    Review information given.  b.    Call oncologists office with questions          or symptoms  5.   Provide Cancer Resource [employer] Brochure make referrals if needed for financial or .   11/22/2022   S     PROGRESS NOTES: I met with the patient  today for chemotherapy education. she will be starting treatment with carbo/taxol/avastin. We discussed the mechanism of action, potential side effects of this treatment as well as ways she can manage them at home. Some of these side effects include but or not limited to fever, nausea, vomiting, decreased appetite, fatigue, weakness, cytopenias, myalgia/arthralgia, constipation, diarrhea, bleeding, headache, shortness of breath, nail changes, taste change, hair thinning/loss, mood disturbances, or edema. We also discussed dietary modifications she should make although this will be discussed in more detail with the dietician. she was provided with anti-emetic medication, a copy of all of the information we discussed today as well as our contact information. she will be provided a schedule on his first day of treatment. We will obtain labs on a weekly basis and the patient will follow-up with the physician for toxicity monitoring throughout treatment. All questions were answered and an informed consent was obtained. she was reminded to certainly contact us sooner if needed.  Attached to the patients folder and discussed with the patient the 24 hour/ 7 days a week after hours telephone number for the physician.  Patient notified to call anytime 24/7 because their is a physician on call for any problems that may arise.  Patient also notified to report to [facility] if they can not get in touch with a physician after hours.  Discussed the five wishes booklet with the patient and their family.           Assessment/Plan   Stage IIB lung cancer.  -start  carbo/Taxol/Avastin---plan to Tecentriq maintenance upon completion    Medications Ordered:  Zofran 8mg 1 tab PO Q8h prn nausea  Compazine 10 mg PO Q4-6h prn nausea  Emla cream: Apply to port site 30min prior to treatment and cover with saran wrap  Claritin 10mg PO QD day of chemotherapy and for 5 days after Neulasta to help prevent bone pain  OTC NSAIDS  Take 2 PO QD day of and for 5 days after Neulasta to help prevent bone pain  OTC Imodium as directed  OTC MiraLax 17 g daily for constipation    * please notify clinic or report to the emergency department for fever of 100.4 grade  * avoid tobacco and alcohol use  * maintain a healthy diet and good oral hydration  * good blood pressure and blood sugar control is important.  Please keep all followups with your primary care provider  *good hand hygiene  * please call clinic with any questions or concerns  * please take all medications as directed     Standing Labs Ordered:  CBC and CMP every 3 weeks    Patient is in agreement with the proposed treatment plan. All questions were answered to the patient's satisfaction. Patient knows to call clinic for any new or worsening symptoms and if anything is needed before the next clinic visit.    Lianna ECHAVARRIA  Hematology & Medical Oncology     Collaborating physician, Dr. Lee.    Total Face to Face Time: 40 minutes face to face with the patient and their family discussing the chemotherapy side-effects and when to call our office.   Electronically signed by: Lianna ECHAVARRIA

## 2022-11-30 ENCOUNTER — TELEPHONE (OUTPATIENT)
Dept: HEMATOLOGY/ONCOLOGY | Facility: CLINIC | Age: 62
End: 2022-11-30
Payer: MEDICAID

## 2022-11-30 NOTE — TELEPHONE ENCOUNTER
Called pt to notify of scheduled appts for chemo start. Pt v/u. Pt asking if she can get flu vaccine. This nurse advised that this would be okay per Dr Lee.

## 2022-12-01 ENCOUNTER — PATIENT MESSAGE (OUTPATIENT)
Dept: HEMATOLOGY/ONCOLOGY | Facility: CLINIC | Age: 62
End: 2022-12-01
Payer: MEDICAID

## 2022-12-01 ENCOUNTER — TELEPHONE (OUTPATIENT)
Dept: HEMATOLOGY/ONCOLOGY | Facility: CLINIC | Age: 62
End: 2022-12-01
Payer: MEDICAID

## 2022-12-06 ENCOUNTER — LAB VISIT (OUTPATIENT)
Dept: LAB | Facility: HOSPITAL | Age: 62
End: 2022-12-06
Attending: INTERNAL MEDICINE
Payer: MEDICAID

## 2022-12-06 DIAGNOSIS — C34.92 NSCLC OF LEFT LUNG: ICD-10-CM

## 2022-12-06 LAB
ALBUMIN SERPL BCP-MCNC: 4 G/DL (ref 3.5–5.2)
ALP SERPL-CCNC: 144 U/L (ref 55–135)
ALT SERPL W/O P-5'-P-CCNC: 17 U/L (ref 10–44)
ANION GAP SERPL CALC-SCNC: 9 MMOL/L (ref 8–16)
AST SERPL-CCNC: 17 U/L (ref 10–40)
BASOPHILS # BLD AUTO: ABNORMAL K/UL (ref 0–0.2)
BASOPHILS NFR BLD: 0 % (ref 0–1.9)
BILIRUB SERPL-MCNC: 0.3 MG/DL (ref 0.1–1)
BUN SERPL-MCNC: 11 MG/DL (ref 8–23)
CALCIUM SERPL-MCNC: 9.1 MG/DL (ref 8.7–10.5)
CHLORIDE SERPL-SCNC: 106 MMOL/L (ref 95–110)
CO2 SERPL-SCNC: 25 MMOL/L (ref 23–29)
CREAT SERPL-MCNC: 0.9 MG/DL (ref 0.5–1.4)
DIFFERENTIAL METHOD: ABNORMAL
EOSINOPHIL # BLD AUTO: ABNORMAL K/UL (ref 0–0.5)
EOSINOPHIL NFR BLD: 0 % (ref 0–8)
ERYTHROCYTE [DISTWIDTH] IN BLOOD BY AUTOMATED COUNT: 13.5 % (ref 11.5–14.5)
EST. GFR  (NO RACE VARIABLE): >60 ML/MIN/1.73 M^2
GLUCOSE SERPL-MCNC: 97 MG/DL (ref 70–110)
HCT VFR BLD AUTO: 40.6 % (ref 37–48.5)
HGB BLD-MCNC: 13.3 G/DL (ref 12–16)
IMM GRANULOCYTES # BLD AUTO: ABNORMAL K/UL (ref 0–0.04)
IMM GRANULOCYTES NFR BLD AUTO: ABNORMAL % (ref 0–0.5)
LYMPHOCYTES # BLD AUTO: ABNORMAL K/UL (ref 1–4.8)
LYMPHOCYTES NFR BLD: 70 % (ref 18–48)
MCH RBC QN AUTO: 30.8 PG (ref 27–31)
MCHC RBC AUTO-ENTMCNC: 32.8 G/DL (ref 32–36)
MCV RBC AUTO: 94 FL (ref 82–98)
MONOCYTES # BLD AUTO: ABNORMAL K/UL (ref 0.3–1)
MONOCYTES NFR BLD: 6 % (ref 4–15)
NEUTROPHILS NFR BLD: 24 % (ref 38–73)
NRBC BLD-RTO: 0 /100 WBC
PLATELET # BLD AUTO: 252 K/UL (ref 150–450)
PLATELET BLD QL SMEAR: ABNORMAL
PMV BLD AUTO: 10 FL (ref 9.2–12.9)
POTASSIUM SERPL-SCNC: 4.1 MMOL/L (ref 3.5–5.1)
PROT SERPL-MCNC: 6.9 G/DL (ref 6–8.4)
RBC # BLD AUTO: 4.32 M/UL (ref 4–5.4)
SODIUM SERPL-SCNC: 140 MMOL/L (ref 136–145)
WBC # BLD AUTO: 34.22 K/UL (ref 3.9–12.7)

## 2022-12-06 PROCEDURE — 85007 BL SMEAR W/DIFF WBC COUNT: CPT | Performed by: INTERNAL MEDICINE

## 2022-12-06 PROCEDURE — 85027 COMPLETE CBC AUTOMATED: CPT | Performed by: INTERNAL MEDICINE

## 2022-12-06 PROCEDURE — 85060 PATHOLOGIST REVIEW: ICD-10-PCS | Mod: ,,, | Performed by: STUDENT IN AN ORGANIZED HEALTH CARE EDUCATION/TRAINING PROGRAM

## 2022-12-06 PROCEDURE — 36415 COLL VENOUS BLD VENIPUNCTURE: CPT | Performed by: INTERNAL MEDICINE

## 2022-12-06 PROCEDURE — 80053 COMPREHEN METABOLIC PANEL: CPT | Performed by: INTERNAL MEDICINE

## 2022-12-06 PROCEDURE — 85060 BLOOD SMEAR INTERPRETATION: CPT | Mod: ,,, | Performed by: STUDENT IN AN ORGANIZED HEALTH CARE EDUCATION/TRAINING PROGRAM

## 2022-12-07 ENCOUNTER — OFFICE VISIT (OUTPATIENT)
Dept: HEMATOLOGY/ONCOLOGY | Facility: CLINIC | Age: 62
End: 2022-12-07
Payer: MEDICAID

## 2022-12-07 VITALS
RESPIRATION RATE: 14 BRPM | WEIGHT: 160.06 LBS | BODY MASS INDEX: 28.36 KG/M2 | HEART RATE: 64 BPM | SYSTOLIC BLOOD PRESSURE: 102 MMHG | HEIGHT: 63 IN | TEMPERATURE: 97 F | OXYGEN SATURATION: 97 % | DIASTOLIC BLOOD PRESSURE: 58 MMHG

## 2022-12-07 DIAGNOSIS — C91.10 CLL (CHRONIC LYMPHOCYTIC LEUKEMIA): Primary | ICD-10-CM

## 2022-12-07 DIAGNOSIS — C34.01 MALIGNANT NEOPLASM OF HILUS OF RIGHT LUNG: ICD-10-CM

## 2022-12-07 DIAGNOSIS — C34.90 NON-SMALL CELL LUNG CANCER, UNSPECIFIED LATERALITY: ICD-10-CM

## 2022-12-07 LAB — PATH REV BLD -IMP: NORMAL

## 2022-12-07 PROCEDURE — 99999 PR PBB SHADOW E&M-EST. PATIENT-LVL IV: ICD-10-PCS | Mod: PBBFAC,,, | Performed by: INTERNAL MEDICINE

## 2022-12-07 PROCEDURE — 3078F DIAST BP <80 MM HG: CPT | Mod: CPTII,,, | Performed by: INTERNAL MEDICINE

## 2022-12-07 PROCEDURE — 99215 PR OFFICE/OUTPT VISIT, EST, LEVL V, 40-54 MIN: ICD-10-PCS | Mod: S$PBB,,, | Performed by: INTERNAL MEDICINE

## 2022-12-07 PROCEDURE — 1159F MED LIST DOCD IN RCRD: CPT | Mod: CPTII,,, | Performed by: INTERNAL MEDICINE

## 2022-12-07 PROCEDURE — 3008F BODY MASS INDEX DOCD: CPT | Mod: CPTII,,, | Performed by: INTERNAL MEDICINE

## 2022-12-07 PROCEDURE — 99214 OFFICE O/P EST MOD 30 MIN: CPT | Mod: PBBFAC,PO | Performed by: INTERNAL MEDICINE

## 2022-12-07 PROCEDURE — 3074F SYST BP LT 130 MM HG: CPT | Mod: CPTII,,, | Performed by: INTERNAL MEDICINE

## 2022-12-07 PROCEDURE — 99215 OFFICE O/P EST HI 40 MIN: CPT | Mod: S$PBB,,, | Performed by: INTERNAL MEDICINE

## 2022-12-07 PROCEDURE — 3008F PR BODY MASS INDEX (BMI) DOCUMENTED: ICD-10-PCS | Mod: CPTII,,, | Performed by: INTERNAL MEDICINE

## 2022-12-07 PROCEDURE — 99999 PR PBB SHADOW E&M-EST. PATIENT-LVL IV: CPT | Mod: PBBFAC,,, | Performed by: INTERNAL MEDICINE

## 2022-12-07 PROCEDURE — 3078F PR MOST RECENT DIASTOLIC BLOOD PRESSURE < 80 MM HG: ICD-10-PCS | Mod: CPTII,,, | Performed by: INTERNAL MEDICINE

## 2022-12-07 PROCEDURE — 3074F PR MOST RECENT SYSTOLIC BLOOD PRESSURE < 130 MM HG: ICD-10-PCS | Mod: CPTII,,, | Performed by: INTERNAL MEDICINE

## 2022-12-07 PROCEDURE — 1159F PR MEDICATION LIST DOCUMENTED IN MEDICAL RECORD: ICD-10-PCS | Mod: CPTII,,, | Performed by: INTERNAL MEDICINE

## 2022-12-07 RX ORDER — HEPARIN 100 UNIT/ML
500 SYRINGE INTRAVENOUS
Status: CANCELLED | OUTPATIENT
Start: 2022-12-07

## 2022-12-07 RX ORDER — EPINEPHRINE 0.3 MG/.3ML
0.3 INJECTION SUBCUTANEOUS ONCE AS NEEDED
Status: CANCELLED | OUTPATIENT
Start: 2022-12-07

## 2022-12-07 RX ORDER — DIPHENHYDRAMINE HYDROCHLORIDE 50 MG/ML
50 INJECTION INTRAMUSCULAR; INTRAVENOUS ONCE AS NEEDED
Status: CANCELLED | OUTPATIENT
Start: 2022-12-07

## 2022-12-07 RX ORDER — FAMOTIDINE 10 MG/ML
20 INJECTION INTRAVENOUS
Status: CANCELLED | OUTPATIENT
Start: 2022-12-07

## 2022-12-07 RX ORDER — SODIUM CHLORIDE 0.9 % (FLUSH) 0.9 %
10 SYRINGE (ML) INJECTION
Status: CANCELLED | OUTPATIENT
Start: 2022-12-07

## 2022-12-07 NOTE — PROGRESS NOTES
Subjective:       Patient ID: Yvette Hutchinson is a 62 y.o. female.    Chief Complaint:  Patient known to me with CLL stage 0 recently diagnosed with lung cancer August 2022  Follow-up    Lung Cancer    Patient has chronic complains of chronic pain syndrome and COPD for which periodically she take steroids she is also on BuSpar has issues anxiety has required dilatation of esophageal strictures allergic rhinitis insomnia and history of B12 deficiency documented   Had CT chest done with pulmonary 7/22 showed mass bx done. 8/22    Oncology hx  Impression:ct chest 7/29/22     2.3 cm spiculated left upper lobe lung nodule highly suspicious for bronchogenic carcinoma.     New nonspecific 7 mm nodule in the right middle lobe; this appears more linear on the coronal images and may be a focus of scarring.     Additional stable lung nodules, mildly enlarged axillary lymph nodes, substernal thyroid nodule; these findings are likely benign given the interval stability.   LEFT LUNG MASS, CT-GUIDED BIOPSY:   Non-small cell lung carcinoma.   Comment:  The biopsy reveals invasive carcinoma with apparent glandular but   also vague squamoid features.  Tumor cells are diffusely positive with TTF-1   and focally positive with P40.  The histology differential includes   adenocarcinoma and adenosquamous carcinoma.  PD-L1 and templates NGS studies   are in progress.  The results will be issued separately.    October 3, 2022: Robotic left lobectomy T1c N1    Social history; patient is a smoker denies recreational alcohol use or drug use   Patient is currently on disability  She is allergic to the mask used during COVID pandemic she is also bothered by her mask with COPD    Family history suggestive of ovarian and breast cancer patient advised to see a  or seek   Review of patient's allergies indicates:  No Known Allergies  Medications have been reviewed  Current Outpatient Medications on File Prior to Visit    Medication Sig Dispense Refill    acetaminophen (TYLENOL) 500 MG tablet Take 2 tablets (1,000 mg total) by mouth every 6 (six) hours as needed for Pain. 8 tablet 0    albuterol (PROVENTIL/VENTOLIN HFA) 90 mcg/actuation inhaler Inhale 2 puffs into the lungs every 6 (six) hours as needed for Wheezing or Shortness of Breath. Rescue 18 g 11    albuterol-ipratropium (DUO-NEB) 2.5 mg-0.5 mg/3 mL nebulizer solution Take 3 mLs by nebulization every 6 (six) hours as needed for Wheezing. Rescue 120 each 5    ALPRAZolam (XANAX) 0.5 MG tablet Take 1 tablet (0.5 mg total) by mouth 3 (three) times daily. for 10 days (Patient not taking: Reported on 10/11/2022) 30 tablet 0    buPROPion (WELLBUTRIN XL) 300 MG 24 hr tablet Take 1 tablet (300 mg total) by mouth once daily. 90 tablet 3    docusate sodium (COLACE) 100 MG capsule Take 1 capsule (100 mg total) by mouth daily as needed for Constipation. 30 capsule 1    FLUoxetine 20 MG capsule Take 1 capsule (20 mg total) by mouth once daily. 30 capsule 11    fluticasone propionate (FLONASE) 50 mcg/actuation nasal spray 1 spray (50 mcg total) by Each Nostril route once daily. 16 g 3    fluticasone-salmeterol 230-21 mcg/dose (ADVAIR HFA) 230-21 mcg/actuation HFAA inhaler Inhale 2 puffs into the lungs 2 (two) times daily. Controller 12 g 5    fluticasone-umeclidin-vilanter (TRELEGY ELLIPTA) 100-62.5-25 mcg DsDv Inhale 1 puff into the lungs once daily. 60 each 11    gabapentin (NEURONTIN) 300 MG capsule Take 2 capsules (600 mg total) by mouth 3 (three) times daily. 180 capsule 11    guaiFENesin-codeine 100-10 mg/5 ml (TUSSI-ORGANIDIN NR)  mg/5 mL syrup TAKE 5 MLS BY MOUTH EVERY 4 HOURS AS NEEDED FOR COUGH OR CONGESTION      HYDROcodone-acetaminophen 5-300 mg Tab Take 1 tablet by mouth after meals as needed (pain). 40 each 0    levocetirizine (XYZAL) 5 MG tablet Take 1 tablet (5 mg total) by mouth every evening. 30 tablet 5    LIDOcaine (LIDODERM) 5 % Place 1 patch onto the skin once  daily. Remove & Discard patch within 12 hours or as directed by MD Bloom patch 0    LIDOcaine (LIDODERM) 5 % Place 1 patch onto the skin once daily. Remove & Discard patch within 12 hours or as directed by MD Bloom patch 1    LIDOcaine-prilocaine (EMLA) cream Apply topically as needed (apply to port site 30 min before access). 30 g 0    montelukast (SINGULAIR) 10 mg tablet Take 10 mg by mouth once daily.      ondansetron (ZOFRAN-ODT) 8 MG TbDL Take 1 tablet (8 mg total) by mouth every 12 (twelve) hours as needed (nausea). 60 tablet 1    oxyCODONE (ROXICODONE) 5 MG immediate release tablet Take 1 tablet (5 mg total) by mouth every 4 (four) hours as needed for Pain. 20 tablet 0    oxyCODONE (ROXICODONE) 5 MG immediate release tablet Take 1 tablet (5 mg total) by mouth every 6 (six) hours as needed for Pain. 60 tablet 0    prochlorperazine (COMPAZINE) 10 MG tablet Take 1 tablet (10 mg total) by mouth every 6 (six) hours as needed (nausea). 60 tablet 1     Current Facility-Administered Medications on File Prior to Visit   Medication Dose Route Frequency Provider Last Rate Last Admin    0.9%  NaCl infusion   Intravenous Continuous Yuridia Cisneros MD   New Bag at 11/03/22 0701       REVIEW OF SYSTEMS:     S/p lobectomy left side, has draining tube+ with sanguinous fluid.    Wt Readings from Last 3 Encounters:   11/22/22 71.8 kg (158 lb 4.6 oz)   11/03/22 70.7 kg (155 lb 13.8 oz)   10/25/22 70.7 kg (155 lb 13.8 oz)     Temp Readings from Last 3 Encounters:   11/22/22 97.7 °F (36.5 °C) (Temporal)   11/03/22 98.4 °F (36.9 °C) (Temporal)   10/18/22 98.1 °F (36.7 °C) (Temporal)     BP Readings from Last 3 Encounters:   11/22/22 106/62   11/03/22 (!) 99/55   10/25/22 (!) 115/57     Pulse Readings from Last 3 Encounters:   11/22/22 72   11/03/22 (!) 54   10/25/22 72     VITAL SIGNS:  as above   GENERAL: appears well-built, well-nourished.  No anxiety, no agitation, and in no distress.  Patient is awake, alert, oriented and  cooperative.  HEENT:  Showed no congestion. Trachea is central no obvious icterus or pallor noted no hoarseness. no obvious JVD   NECK:  Supple.  No JVD. No obvious cervical submental or supraclavicular adenopathy.  RS: tube draining on left side. S/p lobectomy  ABDOMEN:  abdomen appears undistended.  EXTREMITIES:  Without edema.  NEUROLOGICAL:  The patient is appropriate, higher functions are normal.  No  obvious neurological deficits.  normal judgement normal thought content  No confusion, no speech impediment. Cranial nerves are intact and show no deficit. No gross motor deficits noted   SKIN MUSCULOSKELETAL: no joint or skeletal deformity, no clubbing of nails.  No visible rash ecchymosis or petechiae  Lab Results   Component Value Date    WBC 20.78 (H) 03/03/2020    HGB 14.9 03/03/2020    HCT 47.0 03/03/2020    MCV 99 (H) 03/03/2020     03/03/2020     Smudge cells presnt  CMP  Sodium   Date Value Ref Range Status   12/06/2022 140 136 - 145 mmol/L Final     Potassium   Date Value Ref Range Status   12/06/2022 4.1 3.5 - 5.1 mmol/L Final     Chloride   Date Value Ref Range Status   12/06/2022 106 95 - 110 mmol/L Final     CO2   Date Value Ref Range Status   12/06/2022 25 23 - 29 mmol/L Final     Glucose   Date Value Ref Range Status   12/06/2022 97 70 - 110 mg/dL Final     BUN   Date Value Ref Range Status   12/06/2022 11 8 - 23 mg/dL Final     Creatinine   Date Value Ref Range Status   12/06/2022 0.9 0.5 - 1.4 mg/dL Final     Calcium   Date Value Ref Range Status   12/06/2022 9.1 8.7 - 10.5 mg/dL Final     Total Protein   Date Value Ref Range Status   12/06/2022 6.9 6.0 - 8.4 g/dL Final     Albumin   Date Value Ref Range Status   12/06/2022 4.0 3.5 - 5.2 g/dL Final     Total Bilirubin   Date Value Ref Range Status   12/06/2022 0.3 0.1 - 1.0 mg/dL Final     Comment:     For infants and newborns, interpretation of results should be based  on gestational age, weight and in agreement with  clinical  observations.    Premature Infant recommended reference ranges:  Up to 24 hours.............<8.0 mg/dL  Up to 48 hours............<12.0 mg/dL  3-5 days..................<15.0 mg/dL  6-29 days.................<15.0 mg/dL       Alkaline Phosphatase   Date Value Ref Range Status   12/06/2022 144 (H) 55 - 135 U/L Final     AST   Date Value Ref Range Status   12/06/2022 17 10 - 40 U/L Final     ALT   Date Value Ref Range Status   12/06/2022 17 10 - 44 U/L Final     Anion Gap   Date Value Ref Range Status   12/06/2022 9 8 - 16 mmol/L Final     eGFR if    Date Value Ref Range Status   06/13/2022 >60 >60 mL/min/1.73 m^2 Final     eGFR if non    Date Value Ref Range Status   06/13/2022 >60 >60 mL/min/1.73 m^2 Final     Comment:     Calculation used to obtain the estimated glomerular filtration  rate (eGFR) is the CKD-EPI equation.            2wk ago    Blood Interpretation A B cell lymphoproliferative disorder is present. see comment.    Comment: Interpreted by: Jeremias Sosa M.D., Signed on 08/06/2020 at 13:07   Blood Comment Flow cytometric analysis of  peripheral blood  detects a lambda  light chain   restricted B lymphocyte population showing expression of CD19 and dim CD20   with aberrant expression of CD5 (dim).  T lymphocytes are   immunophenotypically unremarkable.  The blasts gate is not increased.  The   findings are consistent with patient history of chronic lymphocytic   leukemia/small lymphocytic lymphoma.   Flow differential:  Lymphocytes 69.6%, Monocytes 2.8%, Granulocytes  27.5%,   Blast  0.1%, Debris/nRBC 0.1%,  Viability 97.5%.   10/3/22 robotic lef lung lobectomy  1. LYMPH NODE, LEVEL 5 FROZEN SECTION, EXCISION:   One lymph node with dominant necrotizing granuloma, negative for malignancy   (0/1).   2. LYMPH NODES, LEVEL 5 PERMANENT, EXCISION:   Two lymph nodes with multifocal necrotizing granulomas, negative for   malignancy (0/2).   3. LYMPH NODES, LEFT POSTERIOR  LEVEL 10, EXCISION:   Five lymph nodes with multifocal necrotizing granulomas, negative for   malignancy (0/5).   4. LYMPH NODE, LEVEL 11, EXCISION:   One lymph node, negative for malignancy (0/1).   5. LYMPH NODES, LEVEL 12, EXCISION:   Three lymph nodes, one with single necrotizing granuloma, negative for   malignancy (0/3).   6. LYMPH NODES, LEVEL 12 NEAR UPPER DIVISION BRONCHUS, EXCISION:   Three lymph nodes with sinus histiocytosis, negative for malignancy (0/3).   7. LYMPH NODES, LEVEL 10 #2, EXCISION:   One of two lymph nodes positive for metastatic carcinoma (1/2).   Size of largest metastatic deposit:  8 mm.   Negative for extranodal extension.   8. LYMPH NODES, LEFT LEVEL 8, EXCISION:   Two lymph nodes with sinus histiocytosis, negative for malignancy (0/2).   9. LYMPH NODES, LEFT LEVEL 9, EXCISION:   Two lymph nodes, negative for malignancy (0/2).   10. 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   Surgical margins are negative for malignancy.   Background lung tissue with subpleural fibrosis, no evidence of granulomas.   Two hilar lymph nodes, negative for malignancy (0/2).   Coment (1-3, 5):  Prominent necrotizing granulomatous inflammation identified   is identified in multiple lymph nodes.  An AE1/AE3 cytokeratin immunostain   performed on the largest granuloma (part 3) reveals no evidence of occult   carcinoma.  GMS and AFB special stains are negative for fungal and acid-fast   organisms, respectively.  The necrotizing features are not typical of   sarcoidosis.  As such, other granulomatous processes may be considered   including secondary response to malignancy or infection.  Clinical   correlation is advised.   Comment (10):  Sections reveal invasive carcinoma involving lung tissue with   predominantly squamoid morphologic features including bright eosinophilic   cytoplasm and intracellular bridging.  There are not significant keratinizing   features.   Some areas show basaloid features.  There is also regional luminal   features including cribriforming and vague glandular structures containing   mucin.  Tumor cells are diffusely positive with P40 and regionally positive   with TTF-1.  Mucicarmine special stain highlights mucin producing luminal   spaces. Overall tumor appearance and staining profile supports adenosquamous   carcinoma, a variant of non-small cell lung carcinoma.   [facility] SURGICAL PATHOLOGY CANCER CASE SUMMARY:  LUNG   Procedure:  Lobectomy   Specimen laterality:  Left   Tumor focality:  Single focus   Tumor site: Upper lobe of lung   Tumor size:  2.2 cm   Histologic type:  Adenosquamous carcinoma   Spread through airspaces:  Not identified   Visceral pleural invasion:  Not identified   Direct invasion of adjacent structures: Not applicable   Lymphovascular invasion:  Not identified   Margins:  All margins negative for invasive carcinoma     Closest margin to invasive carcinoma:  Bronchial (3.0 cm)   Regional lymph nodes:  Tumor present in regional lymph node     Number of lymph nodes with tumor:  1     Scott sites with tumor:  Left level 10 (10L)     Extranodal extension:  Not identified     Number of lymph nodes examined:  23   Pathologic stage classification (pTNM): pT1c pN1   Special studies:  Performed on previous biopsy if additional studies needed   there may be performed on tissue blockd 10G or 10H.      Assessment:     CLL  Lymphocytosis over 3 years leukocytosis and smudge cells suggestive of CLL stage 0 no anemia no thrombocytopenia no generalized lymphadenopathy   Dx of lung ca 8/2022  Plan:       Lung ca; adenosquamous, s/p robotic lobectomy October 3, 2022 T1c N1   5% tumor cells are positive for PDL-1   Have reached out to see if pt is eligible ofr ALCHEMIST trial at [facility],   Will check for additional mutation studies with pathology  data off [facility] . Combine platin + alimta, or gem or navelbine or decetaxel for 4 cycles f/u by  tecentriq maintanence in pt with 5 % PDL-1 positivity?  Has port   Proceed with chemo   Had chemo class  See me for next cycle cbc,cmp  CLL   stage 0 will confirmed with flow cytometry  NTD   she has no other cytopenias or lymphadenopathy or B symptoms patient can be observed  Patient also states her dermatologist told her she was allergic to the mask use and she also has COPD that makes a feels smothered during mask use this might hinder finding a job suitable I have directed her to discuss with the PCP regarding letters to support inability to use mask    Continue with chronic pain syndrome and COPD management advised to stop smoking if at all possible 10 pills of hydrocodoen given to pt, she needs to follow with pain mx      Advised COVID precaution due to her lung situation she will be a high risk patient

## 2022-12-08 ENCOUNTER — DOCUMENTATION ONLY (OUTPATIENT)
Dept: HEMATOLOGY/ONCOLOGY | Facility: CLINIC | Age: 62
End: 2022-12-08

## 2022-12-08 ENCOUNTER — INFUSION (OUTPATIENT)
Dept: INFUSION THERAPY | Facility: HOSPITAL | Age: 62
End: 2022-12-08
Attending: INTERNAL MEDICINE
Payer: MEDICAID

## 2022-12-08 ENCOUNTER — SOCIAL WORK (OUTPATIENT)
Dept: HEMATOLOGY/ONCOLOGY | Facility: CLINIC | Age: 62
End: 2022-12-08

## 2022-12-08 VITALS
HEIGHT: 63 IN | SYSTOLIC BLOOD PRESSURE: 114 MMHG | HEART RATE: 61 BPM | BODY MASS INDEX: 28.55 KG/M2 | RESPIRATION RATE: 16 BRPM | WEIGHT: 161.13 LBS | TEMPERATURE: 98 F | OXYGEN SATURATION: 95 % | DIASTOLIC BLOOD PRESSURE: 58 MMHG

## 2022-12-08 DIAGNOSIS — C34.01 MALIGNANT NEOPLASM OF HILUS OF RIGHT LUNG: Primary | ICD-10-CM

## 2022-12-08 DIAGNOSIS — C34.90 NON-SMALL CELL LUNG CANCER, UNSPECIFIED LATERALITY: ICD-10-CM

## 2022-12-08 PROCEDURE — 96417 CHEMO IV INFUS EACH ADDL SEQ: CPT

## 2022-12-08 PROCEDURE — 63600175 PHARM REV CODE 636 W HCPCS: Performed by: INTERNAL MEDICINE

## 2022-12-08 PROCEDURE — 25000003 PHARM REV CODE 250: Performed by: INTERNAL MEDICINE

## 2022-12-08 PROCEDURE — 96413 CHEMO IV INFUSION 1 HR: CPT

## 2022-12-08 PROCEDURE — 96375 TX/PRO/DX INJ NEW DRUG ADDON: CPT

## 2022-12-08 PROCEDURE — 96367 TX/PROPH/DG ADDL SEQ IV INF: CPT

## 2022-12-08 PROCEDURE — 96415 CHEMO IV INFUSION ADDL HR: CPT

## 2022-12-08 PROCEDURE — A4216 STERILE WATER/SALINE, 10 ML: HCPCS | Performed by: INTERNAL MEDICINE

## 2022-12-08 RX ORDER — OLANZAPINE 5 MG/1
5 TABLET ORAL NIGHTLY
Qty: 30 TABLET | Refills: 2 | Status: SHIPPED | OUTPATIENT
Start: 2022-12-08 | End: 2023-12-08

## 2022-12-08 RX ORDER — FAMOTIDINE 10 MG/ML
20 INJECTION INTRAVENOUS
Status: COMPLETED | OUTPATIENT
Start: 2022-12-08 | End: 2022-12-08

## 2022-12-08 RX ORDER — HEPARIN 100 UNIT/ML
500 SYRINGE INTRAVENOUS
Status: DISCONTINUED | OUTPATIENT
Start: 2022-12-08 | End: 2022-12-08 | Stop reason: HOSPADM

## 2022-12-08 RX ORDER — EPINEPHRINE 0.3 MG/.3ML
0.3 INJECTION SUBCUTANEOUS ONCE AS NEEDED
Status: DISCONTINUED | OUTPATIENT
Start: 2022-12-08 | End: 2022-12-08 | Stop reason: HOSPADM

## 2022-12-08 RX ORDER — SODIUM CHLORIDE 0.9 % (FLUSH) 0.9 %
10 SYRINGE (ML) INJECTION
Status: DISCONTINUED | OUTPATIENT
Start: 2022-12-08 | End: 2022-12-08 | Stop reason: HOSPADM

## 2022-12-08 RX ORDER — DEXAMETHASONE 4 MG/1
8 TABLET ORAL DAILY
Qty: 120 TABLET | Refills: 0 | Status: SHIPPED | OUTPATIENT
Start: 2022-12-08 | End: 2023-07-27

## 2022-12-08 RX ADMIN — PACLITAXEL 360 MG: 6 INJECTION, SOLUTION INTRAVENOUS at 08:12

## 2022-12-08 RX ADMIN — CARBOPLATIN 585 MG: 10 INJECTION, SOLUTION INTRAVENOUS at 12:12

## 2022-12-08 RX ADMIN — HEPARIN 500 UNITS: 100 SYRINGE at 01:12

## 2022-12-08 RX ADMIN — PALONOSETRON HYDROCHLORIDE 0.25 MG: 0.25 INJECTION, SOLUTION INTRAVENOUS at 07:12

## 2022-12-08 RX ADMIN — DIPHENHYDRAMINE HYDROCHLORIDE 50 MG: 50 INJECTION INTRAMUSCULAR; INTRAVENOUS at 08:12

## 2022-12-08 RX ADMIN — FAMOTIDINE 20 MG: 10 INJECTION INTRAVENOUS at 07:12

## 2022-12-08 RX ADMIN — APREPITANT 130 MG: 130 INJECTION, EMULSION INTRAVENOUS at 07:12

## 2022-12-08 RX ADMIN — SODIUM CHLORIDE: 9 INJECTION, SOLUTION INTRAVENOUS at 07:12

## 2022-12-08 RX ADMIN — SODIUM CHLORIDE, PRESERVATIVE FREE 10 ML: 5 INJECTION INTRAVENOUS at 01:12

## 2022-12-08 NOTE — PROGRESS NOTES
CHEMO SCHOOL- NUTRITION    Yvette Hutchinson is a 62 y.o. female with lung cancer. Pt will be receiving carboplatin and taxol. I met with Mrs. Hutchinson for chemo school today. Pt reports good appetite and intake. She denies any recent unintentional weight loss. She reports good hydration though she does like to drink coffee and tea frequently during the day. She is not currently taking any herbal or antioxidant supplements. She denies N/V/D/C. BMs normal. She had questions today regarding food safety and caffeine intake.     CW: 161#.     Plan:   Reviewed chemo school packet & provided copy to pt.   Discussed importance of maintaining wt & staying hydrated.   Reviewed food safety guidelines recommended during treatment.   Discussed importance of hydration and limiting caffeine during chemo  Answered pt questions to the best of my ability and to the patient's satisfaction  Provided RD contact info & encouraged pt to call with any questions/concerns.   Will f/u prn.       Electronically signed by: Palak Waite MBA, COREYN, LDN

## 2022-12-08 NOTE — PLAN OF CARE
Problem: Fatigue  Goal: Improved Activity Tolerance  Outcome: Ongoing, Progressing  Intervention: Promote Improved Energy  Flowsheets (Taken 12/8/2022 0718)  Fatigue Management: frequent rest breaks encouraged  Sleep/Rest Enhancement: relaxation techniques promoted  Activity Management: Ambulated -L4

## 2022-12-08 NOTE — PROGRESS NOTES
Yvette Hutchinson is a 62 year old diagnosed with lung cancer.  Patient is here today for her first chemo infusion.  I met with patient to complete new patient orientation and to complete the NCCN Distress Screening; patient indicated a rating of 5.  Patient reported being treated for depression; she denied wanting mental health supports.  She denied needing community resource supports at this time.  I provided patient with the [facility] community events flyer and my contact information in the event supportive services arise in the future. Patient given a $50 gift card for [employer].

## 2022-12-12 PROBLEM — Z13.9 SCREENING PROCEDURE: Status: RESOLVED | Noted: 2022-09-07 | Resolved: 2022-12-12

## 2022-12-13 ENCOUNTER — PATIENT MESSAGE (OUTPATIENT)
Dept: HEMATOLOGY/ONCOLOGY | Facility: CLINIC | Age: 62
End: 2022-12-13
Payer: MEDICAID

## 2022-12-13 DIAGNOSIS — C34.01 MALIGNANT NEOPLASM OF HILUS OF RIGHT LUNG: Primary | ICD-10-CM

## 2022-12-14 ENCOUNTER — TELEPHONE (OUTPATIENT)
Dept: HEMATOLOGY/ONCOLOGY | Facility: CLINIC | Age: 62
End: 2022-12-14
Payer: MEDICAID

## 2022-12-15 ENCOUNTER — TELEPHONE (OUTPATIENT)
Dept: HEMATOLOGY/ONCOLOGY | Facility: CLINIC | Age: 62
End: 2022-12-15
Payer: MEDICAID

## 2022-12-15 NOTE — TELEPHONE ENCOUNTER
Msg handled in previous note.      ----- Message from Marleni Lugo sent at 12/15/2022 12:46 PM CST -----  Contact: angela  Type:  Patient Returning Call    Who Called:  Angela with [facility]  Who Left Message for Patient:  Jelena  Does the patient know what this is regarding?:  magic mouth wash  Best Call Back Number:  [phone]  Additional Information:

## 2022-12-15 NOTE — TELEPHONE ENCOUNTER
Called phamacy to give instructions for magic mouthwash. No answer. LVM.    Rx for magic mouthwash faxed.

## 2022-12-27 ENCOUNTER — LAB VISIT (OUTPATIENT)
Dept: LAB | Facility: HOSPITAL | Age: 62
End: 2022-12-27
Attending: INTERNAL MEDICINE
Payer: MEDICAID

## 2022-12-27 DIAGNOSIS — C34.01 MALIGNANT NEOPLASM OF HILUS OF RIGHT LUNG: ICD-10-CM

## 2022-12-27 LAB
ALBUMIN SERPL BCP-MCNC: 4 G/DL (ref 3.5–5.2)
ALP SERPL-CCNC: 143 U/L (ref 55–135)
ALT SERPL W/O P-5'-P-CCNC: 25 U/L (ref 10–44)
ANION GAP SERPL CALC-SCNC: 10 MMOL/L (ref 8–16)
AST SERPL-CCNC: 21 U/L (ref 10–40)
BASOPHILS NFR BLD: 0 % (ref 0–1.9)
BILIRUB SERPL-MCNC: 0.2 MG/DL (ref 0.1–1)
BUN SERPL-MCNC: 9 MG/DL (ref 8–23)
CALCIUM SERPL-MCNC: 9 MG/DL (ref 8.7–10.5)
CHLORIDE SERPL-SCNC: 106 MMOL/L (ref 95–110)
CO2 SERPL-SCNC: 24 MMOL/L (ref 23–29)
CREAT SERPL-MCNC: 0.9 MG/DL (ref 0.5–1.4)
DIFFERENTIAL METHOD: ABNORMAL
EOSINOPHIL NFR BLD: 0 % (ref 0–8)
ERYTHROCYTE [DISTWIDTH] IN BLOOD BY AUTOMATED COUNT: 14.6 % (ref 11.5–14.5)
EST. GFR  (NO RACE VARIABLE): >60 ML/MIN/1.73 M^2
GLUCOSE SERPL-MCNC: 109 MG/DL (ref 70–110)
HCT VFR BLD AUTO: 38.2 % (ref 37–48.5)
HGB BLD-MCNC: 12.3 G/DL (ref 12–16)
IMM GRANULOCYTES # BLD AUTO: ABNORMAL K/UL
IMM GRANULOCYTES NFR BLD AUTO: ABNORMAL %
LYMPHOCYTES NFR BLD: 88 % (ref 18–48)
MCH RBC QN AUTO: 31 PG (ref 27–31)
MCHC RBC AUTO-ENTMCNC: 32.2 G/DL (ref 32–36)
MCV RBC AUTO: 96 FL (ref 82–98)
MONOCYTES NFR BLD: 0 % (ref 4–15)
NEUTROPHILS NFR BLD: 12 % (ref 38–73)
NRBC BLD-RTO: 0 /100 WBC
PLATELET # BLD AUTO: 256 K/UL (ref 150–450)
PMV BLD AUTO: 9.1 FL (ref 9.2–12.9)
POTASSIUM SERPL-SCNC: 3.9 MMOL/L (ref 3.5–5.1)
PROT SERPL-MCNC: 7.1 G/DL (ref 6–8.4)
RBC # BLD AUTO: 3.97 M/UL (ref 4–5.4)
SODIUM SERPL-SCNC: 140 MMOL/L (ref 136–145)
WBC # BLD AUTO: 28.41 K/UL (ref 3.9–12.7)

## 2022-12-27 PROCEDURE — 85027 COMPLETE CBC AUTOMATED: CPT | Performed by: INTERNAL MEDICINE

## 2022-12-27 PROCEDURE — 36415 COLL VENOUS BLD VENIPUNCTURE: CPT | Performed by: INTERNAL MEDICINE

## 2022-12-27 PROCEDURE — 80053 COMPREHEN METABOLIC PANEL: CPT | Performed by: INTERNAL MEDICINE

## 2022-12-27 PROCEDURE — 85007 BL SMEAR W/DIFF WBC COUNT: CPT | Performed by: INTERNAL MEDICINE

## 2022-12-28 ENCOUNTER — OFFICE VISIT (OUTPATIENT)
Dept: HEMATOLOGY/ONCOLOGY | Facility: CLINIC | Age: 62
End: 2022-12-28
Payer: MEDICAID

## 2022-12-28 VITALS
HEIGHT: 63 IN | SYSTOLIC BLOOD PRESSURE: 109 MMHG | TEMPERATURE: 98 F | WEIGHT: 162.5 LBS | RESPIRATION RATE: 12 BRPM | HEART RATE: 78 BPM | OXYGEN SATURATION: 98 % | BODY MASS INDEX: 28.79 KG/M2 | DIASTOLIC BLOOD PRESSURE: 63 MMHG

## 2022-12-28 DIAGNOSIS — C34.01 MALIGNANT NEOPLASM OF HILUS OF RIGHT LUNG: ICD-10-CM

## 2022-12-28 DIAGNOSIS — C34.90 NON-SMALL CELL LUNG CANCER, UNSPECIFIED LATERALITY: ICD-10-CM

## 2022-12-28 DIAGNOSIS — K12.30 MUCOSITIS: Primary | ICD-10-CM

## 2022-12-28 DIAGNOSIS — C91.10 CLL (CHRONIC LYMPHOCYTIC LEUKEMIA): ICD-10-CM

## 2022-12-28 DIAGNOSIS — Z51.11 ENCOUNTER FOR ANTINEOPLASTIC CHEMOTHERAPY: ICD-10-CM

## 2022-12-28 PROCEDURE — 3008F PR BODY MASS INDEX (BMI) DOCUMENTED: ICD-10-PCS | Mod: CPTII,,, | Performed by: INTERNAL MEDICINE

## 2022-12-28 PROCEDURE — 3078F DIAST BP <80 MM HG: CPT | Mod: CPTII,,, | Performed by: INTERNAL MEDICINE

## 2022-12-28 PROCEDURE — 99215 OFFICE O/P EST HI 40 MIN: CPT | Mod: PBBFAC,PO | Performed by: INTERNAL MEDICINE

## 2022-12-28 PROCEDURE — 99215 PR OFFICE/OUTPT VISIT, EST, LEVL V, 40-54 MIN: ICD-10-PCS | Mod: S$PBB,,, | Performed by: INTERNAL MEDICINE

## 2022-12-28 PROCEDURE — 99215 OFFICE O/P EST HI 40 MIN: CPT | Mod: S$PBB,,, | Performed by: INTERNAL MEDICINE

## 2022-12-28 PROCEDURE — 1159F MED LIST DOCD IN RCRD: CPT | Mod: CPTII,,, | Performed by: INTERNAL MEDICINE

## 2022-12-28 PROCEDURE — 99999 PR PBB SHADOW E&M-EST. PATIENT-LVL V: CPT | Mod: PBBFAC,,, | Performed by: INTERNAL MEDICINE

## 2022-12-28 PROCEDURE — 1159F PR MEDICATION LIST DOCUMENTED IN MEDICAL RECORD: ICD-10-PCS | Mod: CPTII,,, | Performed by: INTERNAL MEDICINE

## 2022-12-28 PROCEDURE — 99999 PR PBB SHADOW E&M-EST. PATIENT-LVL V: ICD-10-PCS | Mod: PBBFAC,,, | Performed by: INTERNAL MEDICINE

## 2022-12-28 PROCEDURE — 3008F BODY MASS INDEX DOCD: CPT | Mod: CPTII,,, | Performed by: INTERNAL MEDICINE

## 2022-12-28 PROCEDURE — 3078F PR MOST RECENT DIASTOLIC BLOOD PRESSURE < 80 MM HG: ICD-10-PCS | Mod: CPTII,,, | Performed by: INTERNAL MEDICINE

## 2022-12-28 PROCEDURE — 3074F SYST BP LT 130 MM HG: CPT | Mod: CPTII,,, | Performed by: INTERNAL MEDICINE

## 2022-12-28 PROCEDURE — 3074F PR MOST RECENT SYSTOLIC BLOOD PRESSURE < 130 MM HG: ICD-10-PCS | Mod: CPTII,,, | Performed by: INTERNAL MEDICINE

## 2022-12-28 NOTE — PROGRESS NOTES
Subjective:       Patient ID: Yvette Hutchinson is a 62 y.o. female.    Chief Complaint:  Patient known to me with CLL stage 0 recently diagnosed with lung cancer August 2022  Follow-up    Lung Cancer    Patient has chronic complains of chronic pain syndrome and COPD for which periodically she take steroids she is also on BuSpar has issues anxiety has required dilatation of esophageal strictures allergic rhinitis insomnia and history of B12 deficiency documented   Had CT chest done with pulmonary 7/22 showed mass bx done. 8/22    Oncology hx  Impression:ct chest 7/29/22     2.3 cm spiculated left upper lobe lung nodule highly suspicious for bronchogenic carcinoma.     New nonspecific 7 mm nodule in the right middle lobe; this appears more linear on the coronal images and may be a focus of scarring.     Additional stable lung nodules, mildly enlarged axillary lymph nodes, substernal thyroid nodule; these findings are likely benign given the interval stability.   LEFT LUNG MASS, CT-GUIDED BIOPSY:   Non-small cell lung carcinoma.   Comment:  The biopsy reveals invasive carcinoma with apparent glandular but   also vague squamoid features.  Tumor cells are diffusely positive with TTF-1   and focally positive with P40.  The histology differential includes   adenocarcinoma and adenosquamous carcinoma.  PD-L1 and templates NGS studies   are in progress.  The results will be issued separately.    October 3, 2022: Robotic left lobectomy T1c N1    Social history; patient is a smoker denies recreational alcohol use or drug use   Patient is currently on disability  She is allergic to the mask used during COVID pandemic she is also bothered by her mask with COPD    Family history suggestive of ovarian and breast cancer patient advised to see a  or seek   Review of patient's allergies indicates:  No Known Allergies  Medications have been reviewed  Current Outpatient Medications on File Prior to Visit    Medication Sig Dispense Refill    acetaminophen (TYLENOL) 500 MG tablet Take 2 tablets (1,000 mg total) by mouth every 6 (six) hours as needed for Pain. 8 tablet 0    albuterol (PROVENTIL/VENTOLIN HFA) 90 mcg/actuation inhaler Inhale 2 puffs into the lungs every 6 (six) hours as needed for Wheezing or Shortness of Breath. Rescue 18 g 11    albuterol-ipratropium (DUO-NEB) 2.5 mg-0.5 mg/3 mL nebulizer solution Take 3 mLs by nebulization every 6 (six) hours as needed for Wheezing. Rescue 120 each 5    buPROPion (WELLBUTRIN XL) 300 MG 24 hr tablet Take 1 tablet (300 mg total) by mouth once daily. 90 tablet 3    dexAMETHasone (DECADRON) 4 MG Tab Take 2 tablets (8 mg total) by mouth once daily. Take 2 tablets by mouth daily on days 2 3 and 4 of chemotherapy 120 tablet 0    diphenhydrAMINE-aluminum-magnesium hydroxide-simethicone-LIDOcaine HCl 2% Swish and spit 15 mLs every 4 (four) hours as needed (mouth sores). 100 each 2    docusate sodium (COLACE) 100 MG capsule Take 1 capsule (100 mg total) by mouth daily as needed for Constipation. 30 capsule 1    FLUoxetine 20 MG capsule Take 1 capsule (20 mg total) by mouth once daily. 30 capsule 11    fluticasone propionate (FLONASE) 50 mcg/actuation nasal spray 1 spray (50 mcg total) by Each Nostril route once daily. 16 g 3    fluticasone-salmeterol 230-21 mcg/dose (ADVAIR HFA) 230-21 mcg/actuation HFAA inhaler Inhale 2 puffs into the lungs 2 (two) times daily. Controller 12 g 5    fluticasone-umeclidin-vilanter (TRELEGY ELLIPTA) 100-62.5-25 mcg DsDv Inhale 1 puff into the lungs once daily. 60 each 11    gabapentin (NEURONTIN) 300 MG capsule Take 2 capsules (600 mg total) by mouth 3 (three) times daily. 180 capsule 11    guaiFENesin-codeine 100-10 mg/5 ml (TUSSI-ORGANIDIN NR)  mg/5 mL syrup TAKE 5 MLS BY MOUTH EVERY 4 HOURS AS NEEDED FOR COUGH OR CONGESTION      HYDROcodone-acetaminophen 5-300 mg Tab Take 1 tablet by mouth after meals as needed (pain). 40 each 0     LIDOcaine (LIDODERM) 5 % Place 1 patch onto the skin once daily. Remove & Discard patch within 12 hours or as directed by MD Bloom patch 0    LIDOcaine (LIDODERM) 5 % Place 1 patch onto the skin once daily. Remove & Discard patch within 12 hours or as directed by MD Bloom patch 1    LIDOcaine-prilocaine (EMLA) cream Apply topically as needed (apply to port site 30 min before access). 30 g 0    montelukast (SINGULAIR) 10 mg tablet Take 10 mg by mouth once daily.      OLANZapine (ZYPREXA) 5 MG tablet Take 1 tablet (5 mg total) by mouth nightly. Take 1 tablet at bedtime on days 1-4 of each cycle of chemotherapy 30 tablet 2    ondansetron (ZOFRAN-ODT) 8 MG TbDL Take 1 tablet (8 mg total) by mouth every 12 (twelve) hours as needed (nausea). 60 tablet 1    oxyCODONE (ROXICODONE) 5 MG immediate release tablet Take 1 tablet (5 mg total) by mouth every 4 (four) hours as needed for Pain. 20 tablet 0    oxyCODONE (ROXICODONE) 5 MG immediate release tablet Take 1 tablet (5 mg total) by mouth every 6 (six) hours as needed for Pain. 60 tablet 0    prochlorperazine (COMPAZINE) 10 MG tablet Take 1 tablet (10 mg total) by mouth every 6 (six) hours as needed (nausea). 60 tablet 1    ALPRAZolam (XANAX) 0.5 MG tablet Take 1 tablet (0.5 mg total) by mouth 3 (three) times daily. for 10 days (Patient not taking: Reported on 10/11/2022) 30 tablet 0    levocetirizine (XYZAL) 5 MG tablet Take 1 tablet (5 mg total) by mouth every evening. 30 tablet 5     Current Facility-Administered Medications on File Prior to Visit   Medication Dose Route Frequency Provider Last Rate Last Admin    0.9%  NaCl infusion   Intravenous Continuous Yuridia Cisneros MD   New Bag at 11/03/22 0701       REVIEW OF SYSTEMS:     S/p lobectomy left side, has draining tube+ with sanguinous fluid.    Wt Readings from Last 3 Encounters:   12/28/22 73.7 kg (162 lb 7.7 oz)   12/08/22 73.1 kg (161 lb 1.6 oz)   12/07/22 72.6 kg (160 lb 0.9 oz)     Temp Readings from Last 3 Encounters:    12/28/22 97.7 °F (36.5 °C) (Temporal)   12/08/22 97.6 °F (36.4 °C)   12/07/22 97.4 °F (36.3 °C) (Temporal)     BP Readings from Last 3 Encounters:   12/28/22 109/63   12/08/22 (!) 114/58   12/07/22 (!) 102/58     Pulse Readings from Last 3 Encounters:   12/28/22 78   12/08/22 61   12/07/22 64     VITAL SIGNS:  as above   GENERAL: appears well-built, well-nourished.  No anxiety, no agitation, and in no distress.  Patient is awake, alert, oriented and cooperative.  HEENT:  Showed no congestion. Trachea is central no obvious icterus or pallor noted no hoarseness. no obvious JVD   NECK:  Supple.  No JVD. No obvious cervical submental or supraclavicular adenopathy.  RS: tube draining on left side. S/p lobectomy  ABDOMEN:  abdomen appears undistended.  EXTREMITIES:  Without edema.  NEUROLOGICAL:  The patient is appropriate, higher functions are normal.  No  obvious neurological deficits.  normal judgement normal thought content  No confusion, no speech impediment. Cranial nerves are intact and show no deficit. No gross motor deficits noted   SKIN MUSCULOSKELETAL: no joint or skeletal deformity, no clubbing of nails.  No visible rash ecchymosis or petechiae  Lab Results   Component Value Date    WBC 20.78 (H) 03/03/2020    HGB 14.9 03/03/2020    HCT 47.0 03/03/2020    MCV 99 (H) 03/03/2020     03/03/2020     Smudge cells presnt  CMP  Sodium   Date Value Ref Range Status   12/27/2022 140 136 - 145 mmol/L Final     Potassium   Date Value Ref Range Status   12/27/2022 3.9 3.5 - 5.1 mmol/L Final     Chloride   Date Value Ref Range Status   12/27/2022 106 95 - 110 mmol/L Final     CO2   Date Value Ref Range Status   12/27/2022 24 23 - 29 mmol/L Final     Glucose   Date Value Ref Range Status   12/27/2022 109 70 - 110 mg/dL Final     BUN   Date Value Ref Range Status   12/27/2022 9 8 - 23 mg/dL Final     Creatinine   Date Value Ref Range Status   12/27/2022 0.9 0.5 - 1.4 mg/dL Final     Calcium   Date Value Ref Range  Status   12/27/2022 9.0 8.7 - 10.5 mg/dL Final     Total Protein   Date Value Ref Range Status   12/27/2022 7.1 6.0 - 8.4 g/dL Final     Albumin   Date Value Ref Range Status   12/27/2022 4.0 3.5 - 5.2 g/dL Final     Total Bilirubin   Date Value Ref Range Status   12/27/2022 0.2 0.1 - 1.0 mg/dL Final     Comment:     For infants and newborns, interpretation of results should be based  on gestational age, weight and in agreement with clinical  observations.    Premature Infant recommended reference ranges:  Up to 24 hours.............<8.0 mg/dL  Up to 48 hours............<12.0 mg/dL  3-5 days..................<15.0 mg/dL  6-29 days.................<15.0 mg/dL       Alkaline Phosphatase   Date Value Ref Range Status   12/27/2022 143 (H) 55 - 135 U/L Final     AST   Date Value Ref Range Status   12/27/2022 21 10 - 40 U/L Final     ALT   Date Value Ref Range Status   12/27/2022 25 10 - 44 U/L Final     Anion Gap   Date Value Ref Range Status   12/27/2022 10 8 - 16 mmol/L Final     eGFR if    Date Value Ref Range Status   06/13/2022 >60 >60 mL/min/1.73 m^2 Final     eGFR if non    Date Value Ref Range Status   06/13/2022 >60 >60 mL/min/1.73 m^2 Final     Comment:     Calculation used to obtain the estimated glomerular filtration  rate (eGFR) is the CKD-EPI equation.            2wk ago    Blood Interpretation A B cell lymphoproliferative disorder is present. see comment.    Comment: Interpreted by: Jeremias Sosa M.D., Signed on 08/06/2020 at 13:07   Blood Comment Flow cytometric analysis of  peripheral blood  detects a lambda  light chain   restricted B lymphocyte population showing expression of CD19 and dim CD20   with aberrant expression of CD5 (dim).  T lymphocytes are   immunophenotypically unremarkable.  The blasts gate is not increased.  The   findings are consistent with patient history of chronic lymphocytic   leukemia/small lymphocytic lymphoma.   Flow differential:  Lymphocytes  69.6%, Monocytes 2.8%, Granulocytes  27.5%,   Blast  0.1%, Debris/nRBC 0.1%,  Viability 97.5%.   10/3/22 robotic lef lung lobectomy  1. LYMPH NODE, LEVEL 5 FROZEN SECTION, EXCISION:   One lymph node with dominant necrotizing granuloma, negative for malignancy   (0/1).   2. LYMPH NODES, LEVEL 5 PERMANENT, EXCISION:   Two lymph nodes with multifocal necrotizing granulomas, negative for   malignancy (0/2).   3. LYMPH NODES, LEFT POSTERIOR LEVEL 10, EXCISION:   Five lymph nodes with multifocal necrotizing granulomas, negative for   malignancy (0/5).   4. LYMPH NODE, LEVEL 11, EXCISION:   One lymph node, negative for malignancy (0/1).   5. LYMPH NODES, LEVEL 12, EXCISION:   Three lymph nodes, one with single necrotizing granuloma, negative for   malignancy (0/3).   6. LYMPH NODES, LEVEL 12 NEAR UPPER DIVISION BRONCHUS, EXCISION:   Three lymph nodes with sinus histiocytosis, negative for malignancy (0/3).   7. LYMPH NODES, LEVEL 10 #2, EXCISION:   One of two lymph nodes positive for metastatic carcinoma (1/2).   Size of largest metastatic deposit:  8 mm.   Negative for extranodal extension.   8. LYMPH NODES, LEFT LEVEL 8, EXCISION:   Two lymph nodes with sinus histiocytosis, negative for malignancy (0/2).   9. LYMPH NODES, LEFT LEVEL 9, EXCISION:   Two lymph nodes, negative for malignancy (0/2).   10. 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   Surgical margins are negative for malignancy.   Background lung tissue with subpleural fibrosis, no evidence of granulomas.   Two hilar lymph nodes, negative for malignancy (0/2).   Coment (1-3, 5):  Prominent necrotizing granulomatous inflammation identified   is identified in multiple lymph nodes.  An AE1/AE3 cytokeratin immunostain   performed on the largest granuloma (part 3) reveals no evidence of occult   carcinoma.  GMS and AFB special stains are negative for fungal and acid-fast   organisms, respectively.  The necrotizing  features are not typical of   sarcoidosis.  As such, other granulomatous processes may be considered   including secondary response to malignancy or infection.  Clinical   correlation is advised.   Comment (10):  Sections reveal invasive carcinoma involving lung tissue with   predominantly squamoid morphologic features including bright eosinophilic   cytoplasm and intracellular bridging.  There are not significant keratinizing   features.  Some areas show basaloid features.  There is also regional luminal   features including cribriforming and vague glandular structures containing   mucin.  Tumor cells are diffusely positive with P40 and regionally positive   with TTF-1.  Mucicarmine special stain highlights mucin producing luminal   spaces. Overall tumor appearance and staining profile supports adenosquamous   carcinoma, a variant of non-small cell lung carcinoma.   [facility] SURGICAL PATHOLOGY CANCER CASE SUMMARY:  LUNG   Procedure:  Lobectomy   Specimen laterality:  Left   Tumor focality:  Single focus   Tumor site: Upper lobe of lung   Tumor size:  2.2 cm   Histologic type:  Adenosquamous carcinoma   Spread through airspaces:  Not identified   Visceral pleural invasion:  Not identified   Direct invasion of adjacent structures: Not applicable   Lymphovascular invasion:  Not identified   Margins:  All margins negative for invasive carcinoma     Closest margin to invasive carcinoma:  Bronchial (3.0 cm)   Regional lymph nodes:  Tumor present in regional lymph node     Number of lymph nodes with tumor:  1     Scott sites with tumor:  Left level 10 (10L)     Extranodal extension:  Not identified     Number of lymph nodes examined:  23   Pathologic stage classification (pTNM): pT1c pN1   Special studies:  Performed on previous biopsy if additional studies needed   there may be performed on tissue blockd 10G or 10H.      Assessment:     CLL  Lymphocytosis over 3 years leukocytosis and smudge cells suggestive of CLL stage 0 no  anemia no thrombocytopenia no generalized lymphadenopathy   Dx of lung ca 8/2022  Plan:       Lung ca; adenosquamous, s/p robotic lobectomy October 3, 2022 T1c N1   5% tumor cells are positive for PDL-1   Have reached out to see if pt is eligible ofr ALCHEMIST trial at [facility],   Will check for additional mutation studies with pathology  data off [facility] . Combine platin + alimta, or gem or navelbine or decetaxel for 4 cycles f/u by tecentriq [facility] in pt with 5 % PDL-1 positivity?  Has port   Proceed with chemo   Had chemo class  See me for next cycle cbc,cmp  CLL   stage 0 will confirmed with flow cytometry  NTD   she has no other cytopenias or lymphadenopathy or B symptoms patient can be observed  Patient also states her dermatologist told her she was allergic to the mask use and she also has COPD that makes a feels smothered during mask use this might hinder finding a job suitable I have directed her to discuss with the PCP regarding letters to support inability to use mask    Continue with chronic pain syndrome and COPD management advised to stop smoking if at all possible 10 pills of hydrocodoen given to pt, she needs to follow with pain mx      Advised COVID precaution due to her lung situation she will be a high risk patient        12/28/2022    Elevated WBC might be components of CLL.  In term of baseline CBC and chemistry patient is able to proceed forward with cycle 2 of chemotherapy for lung malignancy.  I will have patient follow up with Dr. Lee   Patient complaining of oral mucositis it was given mouthwash solution but was too expensive.  I instructed her to do a salt rinse at the meantime I will also order magic mouthwash for her.

## 2022-12-29 ENCOUNTER — INFUSION (OUTPATIENT)
Dept: INFUSION THERAPY | Facility: HOSPITAL | Age: 62
End: 2022-12-29
Attending: INTERNAL MEDICINE
Payer: MEDICAID

## 2022-12-29 VITALS
RESPIRATION RATE: 18 BRPM | HEIGHT: 63 IN | SYSTOLIC BLOOD PRESSURE: 142 MMHG | WEIGHT: 160 LBS | OXYGEN SATURATION: 96 % | DIASTOLIC BLOOD PRESSURE: 64 MMHG | BODY MASS INDEX: 28.35 KG/M2 | TEMPERATURE: 97 F | HEART RATE: 88 BPM

## 2022-12-29 DIAGNOSIS — C34.90 NON-SMALL CELL LUNG CANCER, UNSPECIFIED LATERALITY: ICD-10-CM

## 2022-12-29 DIAGNOSIS — C34.01 MALIGNANT NEOPLASM OF HILUS OF RIGHT LUNG: Primary | ICD-10-CM

## 2022-12-29 PROCEDURE — 96413 CHEMO IV INFUSION 1 HR: CPT

## 2022-12-29 PROCEDURE — 96415 CHEMO IV INFUSION ADDL HR: CPT

## 2022-12-29 PROCEDURE — A4216 STERILE WATER/SALINE, 10 ML: HCPCS | Performed by: INTERNAL MEDICINE

## 2022-12-29 PROCEDURE — 96375 TX/PRO/DX INJ NEW DRUG ADDON: CPT

## 2022-12-29 PROCEDURE — 96367 TX/PROPH/DG ADDL SEQ IV INF: CPT

## 2022-12-29 PROCEDURE — 25000003 PHARM REV CODE 250: Performed by: INTERNAL MEDICINE

## 2022-12-29 PROCEDURE — 63600175 PHARM REV CODE 636 W HCPCS: Performed by: INTERNAL MEDICINE

## 2022-12-29 PROCEDURE — 96417 CHEMO IV INFUS EACH ADDL SEQ: CPT

## 2022-12-29 RX ORDER — EPINEPHRINE 0.3 MG/.3ML
0.3 INJECTION SUBCUTANEOUS ONCE AS NEEDED
Status: CANCELLED | OUTPATIENT
Start: 2022-12-29

## 2022-12-29 RX ORDER — SODIUM CHLORIDE 0.9 % (FLUSH) 0.9 %
10 SYRINGE (ML) INJECTION
Status: DISCONTINUED | OUTPATIENT
Start: 2022-12-29 | End: 2022-12-29 | Stop reason: HOSPADM

## 2022-12-29 RX ORDER — HEPARIN 100 UNIT/ML
500 SYRINGE INTRAVENOUS
Status: DISCONTINUED | OUTPATIENT
Start: 2022-12-29 | End: 2022-12-29 | Stop reason: HOSPADM

## 2022-12-29 RX ORDER — SODIUM CHLORIDE 0.9 % (FLUSH) 0.9 %
10 SYRINGE (ML) INJECTION
Status: CANCELLED | OUTPATIENT
Start: 2022-12-29

## 2022-12-29 RX ORDER — FAMOTIDINE 10 MG/ML
20 INJECTION INTRAVENOUS
Status: COMPLETED | OUTPATIENT
Start: 2022-12-29 | End: 2022-12-29

## 2022-12-29 RX ORDER — FAMOTIDINE 10 MG/ML
20 INJECTION INTRAVENOUS
Status: CANCELLED | OUTPATIENT
Start: 2022-12-29

## 2022-12-29 RX ORDER — DIPHENHYDRAMINE HYDROCHLORIDE 50 MG/ML
50 INJECTION INTRAMUSCULAR; INTRAVENOUS ONCE AS NEEDED
Status: CANCELLED | OUTPATIENT
Start: 2022-12-29

## 2022-12-29 RX ORDER — HEPARIN 100 UNIT/ML
500 SYRINGE INTRAVENOUS
Status: CANCELLED | OUTPATIENT
Start: 2022-12-29

## 2022-12-29 RX ORDER — EPINEPHRINE 0.3 MG/.3ML
0.3 INJECTION SUBCUTANEOUS ONCE AS NEEDED
Status: DISCONTINUED | OUTPATIENT
Start: 2022-12-29 | End: 2022-12-29 | Stop reason: HOSPADM

## 2022-12-29 RX ADMIN — APREPITANT 130 MG: 130 INJECTION, EMULSION INTRAVENOUS at 08:12

## 2022-12-29 RX ADMIN — SODIUM CHLORIDE, PRESERVATIVE FREE 10 ML: 5 INJECTION INTRAVENOUS at 01:12

## 2022-12-29 RX ADMIN — FAMOTIDINE 20 MG: 10 INJECTION INTRAVENOUS at 08:12

## 2022-12-29 RX ADMIN — PALONOSETRON HYDROCHLORIDE 0.25 MG: 0.25 INJECTION, SOLUTION INTRAVENOUS at 08:12

## 2022-12-29 RX ADMIN — DIPHENHYDRAMINE HYDROCHLORIDE 50 MG: 50 INJECTION INTRAMUSCULAR; INTRAVENOUS at 09:12

## 2022-12-29 RX ADMIN — PACLITAXEL 360 MG: 300 INJECTION, SOLUTION INTRAVENOUS at 09:12

## 2022-12-29 RX ADMIN — HEPARIN 500 UNITS: 100 SYRINGE at 01:12

## 2022-12-29 RX ADMIN — CARBOPLATIN 590 MG: 10 INJECTION, SOLUTION INTRAVENOUS at 01:12

## 2022-12-29 NOTE — PLAN OF CARE
Problem: Fatigue  Goal: Improved Activity Tolerance  Outcome: Ongoing, Progressing  Intervention: Promote Improved Energy  Flowsheets (Taken 12/29/2022 0806)  Fatigue Management:   fatigue-related activity identified   paced activity encouraged   frequent rest breaks encouraged  Sleep/Rest Enhancement:   noise level reduced   relaxation techniques promoted   regular sleep/rest pattern promoted

## 2023-01-12 ENCOUNTER — PATIENT MESSAGE (OUTPATIENT)
Dept: HEMATOLOGY/ONCOLOGY | Facility: CLINIC | Age: 63
End: 2023-01-12
Payer: MEDICAID

## 2023-01-12 ENCOUNTER — OFFICE VISIT (OUTPATIENT)
Dept: URGENT CARE | Facility: CLINIC | Age: 63
End: 2023-01-12
Payer: MEDICAID

## 2023-01-12 ENCOUNTER — TELEPHONE (OUTPATIENT)
Dept: HEMATOLOGY/ONCOLOGY | Facility: CLINIC | Age: 63
End: 2023-01-12
Payer: MEDICAID

## 2023-01-12 VITALS
HEIGHT: 63 IN | BODY MASS INDEX: 27.64 KG/M2 | OXYGEN SATURATION: 95 % | SYSTOLIC BLOOD PRESSURE: 95 MMHG | WEIGHT: 156 LBS | RESPIRATION RATE: 20 BRPM | DIASTOLIC BLOOD PRESSURE: 57 MMHG | HEART RATE: 91 BPM | TEMPERATURE: 99 F

## 2023-01-12 DIAGNOSIS — Z20.822 COVID-19 VIRUS NOT DETECTED: ICD-10-CM

## 2023-01-12 DIAGNOSIS — J06.9 URI WITH COUGH AND CONGESTION: Primary | ICD-10-CM

## 2023-01-12 DIAGNOSIS — R89.4 INFLUENZA A VIRUS NOT DETECTED: ICD-10-CM

## 2023-01-12 DIAGNOSIS — J44.9 CHRONIC OBSTRUCTIVE PULMONARY DISEASE, UNSPECIFIED COPD TYPE: ICD-10-CM

## 2023-01-12 DIAGNOSIS — Z85.118 HISTORY OF LUNG CANCER: ICD-10-CM

## 2023-01-12 LAB
CTP QC/QA: YES
CTP QC/QA: YES
FLUAV AG NPH QL: NEGATIVE
FLUBV AG NPH QL: NEGATIVE
SARS-COV-2 AG RESP QL IA.RAPID: NEGATIVE

## 2023-01-12 PROCEDURE — 1159F MED LIST DOCD IN RCRD: CPT | Mod: CPTII,S$GLB,, | Performed by: NURSE PRACTITIONER

## 2023-01-12 PROCEDURE — 94640 AIRWAY INHALATION TREATMENT: CPT | Mod: S$GLB,,, | Performed by: NURSE PRACTITIONER

## 2023-01-12 PROCEDURE — 94640 PR INHAL RX, AIRWAY OBST/DX SPUTUM INDUCT: ICD-10-PCS | Mod: S$GLB,,, | Performed by: NURSE PRACTITIONER

## 2023-01-12 PROCEDURE — 1160F RVW MEDS BY RX/DR IN RCRD: CPT | Mod: CPTII,S$GLB,, | Performed by: NURSE PRACTITIONER

## 2023-01-12 PROCEDURE — 3074F SYST BP LT 130 MM HG: CPT | Mod: CPTII,S$GLB,, | Performed by: NURSE PRACTITIONER

## 2023-01-12 PROCEDURE — 3078F DIAST BP <80 MM HG: CPT | Mod: CPTII,S$GLB,, | Performed by: NURSE PRACTITIONER

## 2023-01-12 PROCEDURE — 87804 INFLUENZA ASSAY W/OPTIC: CPT | Mod: QW,,, | Performed by: NURSE PRACTITIONER

## 2023-01-12 PROCEDURE — 87811 SARS-COV-2 COVID19 W/OPTIC: CPT | Mod: QW,S$GLB,, | Performed by: NURSE PRACTITIONER

## 2023-01-12 PROCEDURE — 71046 XR CHEST PA AND LATERAL: ICD-10-PCS | Mod: S$GLB,,, | Performed by: RADIOLOGY

## 2023-01-12 PROCEDURE — 71046 X-RAY EXAM CHEST 2 VIEWS: CPT | Mod: S$GLB,,, | Performed by: RADIOLOGY

## 2023-01-12 PROCEDURE — 99214 OFFICE O/P EST MOD 30 MIN: CPT | Mod: 25,S$GLB,, | Performed by: NURSE PRACTITIONER

## 2023-01-12 PROCEDURE — 1160F PR REVIEW ALL MEDS BY PRESCRIBER/CLIN PHARMACIST DOCUMENTED: ICD-10-PCS | Mod: CPTII,S$GLB,, | Performed by: NURSE PRACTITIONER

## 2023-01-12 PROCEDURE — 1159F PR MEDICATION LIST DOCUMENTED IN MEDICAL RECORD: ICD-10-PCS | Mod: CPTII,S$GLB,, | Performed by: NURSE PRACTITIONER

## 2023-01-12 PROCEDURE — 3008F PR BODY MASS INDEX (BMI) DOCUMENTED: ICD-10-PCS | Mod: CPTII,S$GLB,, | Performed by: NURSE PRACTITIONER

## 2023-01-12 PROCEDURE — 3074F PR MOST RECENT SYSTOLIC BLOOD PRESSURE < 130 MM HG: ICD-10-PCS | Mod: CPTII,S$GLB,, | Performed by: NURSE PRACTITIONER

## 2023-01-12 PROCEDURE — 3008F BODY MASS INDEX DOCD: CPT | Mod: CPTII,S$GLB,, | Performed by: NURSE PRACTITIONER

## 2023-01-12 PROCEDURE — 87811 SARS CORONAVIRUS 2 ANTIGEN POCT, MANUAL READ: ICD-10-PCS | Mod: QW,S$GLB,, | Performed by: NURSE PRACTITIONER

## 2023-01-12 PROCEDURE — 99214 PR OFFICE/OUTPT VISIT, EST, LEVL IV, 30-39 MIN: ICD-10-PCS | Mod: 25,S$GLB,, | Performed by: NURSE PRACTITIONER

## 2023-01-12 PROCEDURE — 87804 POCT INFLUENZA A/B: ICD-10-PCS | Mod: QW,,, | Performed by: NURSE PRACTITIONER

## 2023-01-12 PROCEDURE — 3078F PR MOST RECENT DIASTOLIC BLOOD PRESSURE < 80 MM HG: ICD-10-PCS | Mod: CPTII,S$GLB,, | Performed by: NURSE PRACTITIONER

## 2023-01-12 RX ORDER — BENZONATATE 100 MG/1
100 CAPSULE ORAL 3 TIMES DAILY PRN
Qty: 21 CAPSULE | Refills: 0 | Status: SHIPPED | OUTPATIENT
Start: 2023-01-12 | End: 2023-04-27 | Stop reason: SDUPTHER

## 2023-01-12 RX ORDER — PREDNISONE 20 MG/1
20 TABLET ORAL 2 TIMES DAILY
Qty: 10 TABLET | Refills: 0 | Status: SHIPPED | OUTPATIENT
Start: 2023-01-12 | End: 2023-01-17

## 2023-01-12 RX ORDER — IPRATROPIUM BROMIDE 0.5 MG/2.5ML
0.5 SOLUTION RESPIRATORY (INHALATION)
Status: COMPLETED | OUTPATIENT
Start: 2023-01-12 | End: 2023-01-12

## 2023-01-12 RX ORDER — PROMETHAZINE HYDROCHLORIDE AND DEXTROMETHORPHAN HYDROBROMIDE 6.25; 15 MG/5ML; MG/5ML
5 SYRUP ORAL NIGHTLY PRN
Qty: 118 ML | Refills: 0 | Status: SHIPPED | OUTPATIENT
Start: 2023-01-12 | End: 2023-04-27 | Stop reason: SDUPTHER

## 2023-01-12 RX ORDER — DOXYCYCLINE 100 MG/1
100 CAPSULE ORAL EVERY 12 HOURS
Qty: 14 CAPSULE | Refills: 0 | Status: SHIPPED | OUTPATIENT
Start: 2023-01-12 | End: 2023-01-19

## 2023-01-12 RX ORDER — GUAIFENESIN AND DEXTROMETHORPHAN HYDROBROMIDE 20; 400 MG/1; MG/1
1 TABLET ORAL EVERY 4 HOURS PRN
Qty: 30 EACH | Refills: 0 | Status: SHIPPED | OUTPATIENT
Start: 2023-01-12 | End: 2023-01-22

## 2023-01-12 RX ORDER — FLUTICASONE PROPIONATE 50 MCG
2 SPRAY, SUSPENSION (ML) NASAL DAILY PRN
Qty: 15.8 ML | Refills: 0 | Status: SHIPPED | OUTPATIENT
Start: 2023-01-12 | End: 2023-01-26

## 2023-01-12 RX ORDER — ALBUTEROL SULFATE 0.83 MG/ML
2.5 SOLUTION RESPIRATORY (INHALATION)
Status: COMPLETED | OUTPATIENT
Start: 2023-01-12 | End: 2023-01-12

## 2023-01-12 RX ADMIN — IPRATROPIUM BROMIDE 0.5 MG: 0.5 SOLUTION RESPIRATORY (INHALATION) at 02:01

## 2023-01-12 RX ADMIN — ALBUTEROL SULFATE 2.5 MG: 0.83 SOLUTION RESPIRATORY (INHALATION) at 02:01

## 2023-01-12 NOTE — TELEPHONE ENCOUNTER
Called pt to r/s appt on 1/18 per provider request. Pt states she has a cough, chilld at night, body aches, diarrhea, no appetite,a nd dizziness. She is unsure if she is running a fever as she has given her thermometer to her daughter. Pt states she has taken robitussin, theraflu, and codeine, but none of these have relieved her sx. Pt states she has a home covid test. This nurse has asked pt to take test and report results on my [facility] portal.    NP Tahira notified. Orders for pt to go to urgent care to be swabbed for covid and flu.    Called pt to advise she go to urgent care. Pt states she will go today, and she is okay with seeing NP for next clinic visit.

## 2023-01-12 NOTE — PROGRESS NOTES
"Subjective:       Patient ID: Yvette Hutchinson is a 62 y.o. female.    Vitals:  height is 5' 3" (1.6 m) and weight is 70.8 kg (156 lb). Her temperature is 98.9 °F (37.2 °C). Her blood pressure is 95/57 (abnormal) and her pulse is 91. Her respiration is 20 and oxygen saturation is 95%.     Chief Complaint: Cough    Cough  This is a new problem. Episode onset: 12-28-22. The problem has been gradually worsening. The cough is Productive of sputum. Associated symptoms include headaches, myalgias, shortness of breath and wheezing. Pertinent negatives include no chest pain, chills, ear pain, fever, rash or sore throat. Associated symptoms comments: Lightheadedness. She has tried prescription cough suppressant and OTC cough suppressant for the symptoms. The treatment provided no relief. Her past medical history is significant for COPD.     Constitution: Negative for chills and fever.   HENT:  Positive for congestion. Negative for ear pain, sinus pressure and sore throat.    Neck: Negative for painful lymph nodes.   Cardiovascular:  Negative for chest pain, palpitations and sob on exertion.   Respiratory:  Positive for cough, sputum production, shortness of breath and wheezing. Negative for stridor.    Gastrointestinal:  Negative for abdominal pain, nausea, vomiting and diarrhea.   Musculoskeletal:  Positive for muscle ache.   Skin:  Negative for rash.   Neurological:  Positive for headaches. Negative for dizziness, light-headedness, passing out, disorientation and altered mental status.   Hematologic/Lymphatic: Negative for swollen lymph nodes.   Psychiatric/Behavioral:  Negative for altered mental status, disorientation and confusion.      Objective:      Physical Exam   Constitutional: She is oriented to person, place, and time. She appears well-developed. She is cooperative.  Non-toxic appearance. She does not appear ill. No distress.   HENT:   Head: Normocephalic and atraumatic.   Ears:   Right Ear: Hearing, " tympanic membrane, external ear and ear canal normal.   Left Ear: Hearing, tympanic membrane, external ear and ear canal normal.   Nose: Mucosal edema, rhinorrhea and congestion present. No nasal deformity. No epistaxis. Right sinus exhibits no maxillary sinus tenderness and no frontal sinus tenderness. Left sinus exhibits no maxillary sinus tenderness and no frontal sinus tenderness.   Mouth/Throat: Uvula is midline, oropharynx is clear and moist and mucous membranes are normal. Mucous membranes are moist. No trismus in the jaw. Normal dentition. No uvula swelling. No oropharyngeal exudate, posterior oropharyngeal edema, posterior oropharyngeal erythema, tonsillar abscesses or cobblestoning. Tonsils are 0 on the right. Tonsils are 0 on the left.   Eyes: Conjunctivae and lids are normal. No scleral icterus.   Neck: Trachea normal and phonation normal. Neck supple. No edema present. No erythema present. No neck rigidity present.   Cardiovascular: Normal rate, regular rhythm, normal heart sounds and normal pulses.   Pulmonary/Chest: Effort normal. No accessory muscle usage or stridor. Tachypnea noted. No respiratory distress. She has no decreased breath sounds. She has wheezes (Diffuse). She has rhonchi in the right upper field and the left upper field.   Abdominal: Normal appearance.   Musculoskeletal: Normal range of motion.         General: No deformity. Normal range of motion.   Neurological: She is alert and oriented to person, place, and time. She exhibits normal muscle tone. Coordination normal.   Skin: Skin is warm, dry, intact, not diaphoretic and not pale. Capillary refill takes 2 to 3 seconds.   Psychiatric: Her speech is normal and behavior is normal. Judgment and thought content normal.   Nursing note and vitals reviewed.      Assessment:       1. URI with cough and congestion    2. Chronic obstructive pulmonary disease, unspecified COPD type    3. History of lung cancer    4. COVID-19 virus not detected     5. Influenza A virus not detected          Plan:         URI with cough and congestion  -     SARS Coronavirus 2 Antigen, POCT Manual Read  -     POCT Influenza A/B  -     benzocaine-menthoL 6-10 mg lozenge; Take 1 lozenge by mouth every 2 (two) hours as needed for Pain.  Dispense: 18 tablet; Refill: 0  -     fluticasone propionate (FLONASE) 50 mcg/actuation nasal spray; 2 sprays (100 mcg total) by Each Nostril route daily as needed for Rhinitis.  Dispense: 15.8 mL; Refill: 0  -     benzonatate (TESSALON) 100 MG capsule; Take 1 capsule (100 mg total) by mouth 3 (three) times daily as needed for Cough.  Dispense: 21 capsule; Refill: 0  -     promethazine-dextromethorphan (PROMETHAZINE-DM) 6.25-15 mg/5 mL Syrp; Take 5 mLs by mouth nightly as needed (cough). Take as needed for nighttime coughing  Dispense: 118 mL; Refill: 0  -     dextromethorphan-guaiFENesin  mg Tab; Take 1 tablet by mouth every 4 (four) hours as needed (cough/congestion).  Dispense: 30 each; Refill: 0    Chronic obstructive pulmonary disease, unspecified COPD type  -     XR CHEST PA AND LATERAL; Future; Expected date: 01/12/2023  -     albuterol nebulizer solution 2.5 mg  -     ipratropium 0.02 % nebulizer solution 0.5 mg  -     doxycycline (VIBRAMYCIN) 100 MG Cap; Take 1 capsule (100 mg total) by mouth every 12 (twelve) hours. for 7 days  Dispense: 14 capsule; Refill: 0  -     predniSONE (DELTASONE) 20 MG tablet; Take 1 tablet (20 mg total) by mouth 2 (two) times daily. for 5 days  Dispense: 10 tablet; Refill: 0    History of lung cancer    COVID-19 virus not detected    Influenza A virus not detected         I have discussed the test results and physical exam findings with the patient.  Patient condition much improved after nebulizer treatment in clinic.  Wheezing completely resolved, however bilateral rales remain.  After treatment she was able to speak clearly and slowly without lengthy pauses or breaks and speech due to dyspnea.  Oxygen  saturation remained around 94-95% which is her normal as stated by her.  Chest x-ray unremarkable for acute changes.  I feel she can be sent home and managed outpatient with close follow-up.  We discussed symptom monitoring, conservative care methods, medication use, and follow up orders.  She verbalized understanding and agreement with the plan of care.      Increase clear fluid intake  Stop all current over the counter cough, cold, flu medicine  Tylenol/motrin otc for fever or pain  Continue all current home medications including nebulized medications  Flonase nasal spray as needed for sinus congestion and pressure.  Take Mucinex DM as needed for chest congestion and tessalon perles as needed for daytime coughing. Take mucinex with a full glass of water at each dose  May take promethazine cough syrup as needed for nighttime cough. Do not take zofran within 6 hours of promethazine dm  Saltwater gargles 4 x daily and benzocaine anesthetic throat lozenges for sore throat. May also add honey based cough syrup  Start doxycycline and prednisone today. Take all as prescribed.  Follow up with PCP and oncology  Go immediately to the nearest emergency room for shortness of breath, chest pain,  or other emergent concern.  Return to clinic for new, worse, or unresolving symptoms       EXAMINATION:  XR CHEST PA AND LATERAL     CLINICAL HISTORY:  Chronic obstructive pulmonary disease, unspecified     TECHNIQUE:  PA and lateral views of the chest were performed.     COMPARISON:  None     FINDINGS:  There is a right-sided Port-A-Cath with the catheter tip in the superior vena cava.  There is no pneumothorax, pleural effusion or focal consolidation.  The cardiac silhouette is within normal limits.     Impression:     There is no evidence acute pulmonary disease.        Electronically signed by: Yue Hazel MD  Date:                                            01/12/2023  Time:                                           14:52

## 2023-01-12 NOTE — PATIENT INSTRUCTIONS
Increase clear fluid intake  Stop all current over the counter cough, cold, flu medicine  Tylenol/motrin otc for fever or pain  Continue all current home medications including nebulized medications  Flonase nasal spray as needed for sinus congestion and pressure.  Take Mucinex DM as needed for chest congestion and tessalon perles as needed for daytime coughing. Take mucinex with a full glass of water at each dose  May take promethazine cough syrup as needed for nighttime cough. Do not take zofran within 6 hours of promethazine dm  Saltwater gargles 4 x daily and benzocaine anesthetic throat lozenges for sore throat. May also add honey based cough syrup  Start doxycycline and prednisone today. Take all as prescribed.  Follow up with PCP and oncology  Go immediately to the nearest emergency room for shortness of breath, chest pain,  or other emergent concern.  Return to clinic for new, worse, or unresolving symptoms

## 2023-01-13 ENCOUNTER — TELEPHONE (OUTPATIENT)
Dept: HEMATOLOGY/ONCOLOGY | Facility: CLINIC | Age: 63
End: 2023-01-13
Payer: MEDICAID

## 2023-01-13 ENCOUNTER — PATIENT MESSAGE (OUTPATIENT)
Dept: HEMATOLOGY/ONCOLOGY | Facility: CLINIC | Age: 63
End: 2023-01-13
Payer: MEDICAID

## 2023-01-13 NOTE — TELEPHONE ENCOUNTER
Called pt to inquire if cramping at port site has resolved. Pt states this has not happened again. Pt has appt scheduled for Wednesday with NP. Pt instructed to go to ER if she has chest pain, shortness of breath, or feels like she is having a medical emergency which needs attention. Pt v/u.

## 2023-01-17 ENCOUNTER — LAB VISIT (OUTPATIENT)
Dept: LAB | Facility: HOSPITAL | Age: 63
End: 2023-01-17
Attending: INTERNAL MEDICINE
Payer: MEDICAID

## 2023-01-17 DIAGNOSIS — K12.30 MUCOSITIS: ICD-10-CM

## 2023-01-17 DIAGNOSIS — Z51.11 ENCOUNTER FOR ANTINEOPLASTIC CHEMOTHERAPY: ICD-10-CM

## 2023-01-17 DIAGNOSIS — C91.10 CLL (CHRONIC LYMPHOCYTIC LEUKEMIA): ICD-10-CM

## 2023-01-17 DIAGNOSIS — C34.01 MALIGNANT NEOPLASM OF HILUS OF RIGHT LUNG: ICD-10-CM

## 2023-01-17 DIAGNOSIS — C34.90 NON-SMALL CELL LUNG CANCER, UNSPECIFIED LATERALITY: ICD-10-CM

## 2023-01-17 LAB
ALBUMIN SERPL BCP-MCNC: 3.7 G/DL (ref 3.5–5.2)
ALP SERPL-CCNC: 123 U/L (ref 55–135)
ALT SERPL W/O P-5'-P-CCNC: 17 U/L (ref 10–44)
ANION GAP SERPL CALC-SCNC: 9 MMOL/L (ref 8–16)
AST SERPL-CCNC: 13 U/L (ref 10–40)
BASOPHILS # BLD AUTO: ABNORMAL K/UL (ref 0–0.2)
BASOPHILS NFR BLD: 0 % (ref 0–1.9)
BILIRUB SERPL-MCNC: 0.2 MG/DL (ref 0.1–1)
BUN SERPL-MCNC: 15 MG/DL (ref 8–23)
CALCIUM SERPL-MCNC: 9.4 MG/DL (ref 8.7–10.5)
CHLORIDE SERPL-SCNC: 105 MMOL/L (ref 95–110)
CO2 SERPL-SCNC: 26 MMOL/L (ref 23–29)
CREAT SERPL-MCNC: 0.8 MG/DL (ref 0.5–1.4)
DIFFERENTIAL METHOD: ABNORMAL
EOSINOPHIL # BLD AUTO: ABNORMAL K/UL (ref 0–0.5)
EOSINOPHIL NFR BLD: 0 % (ref 0–8)
ERYTHROCYTE [DISTWIDTH] IN BLOOD BY AUTOMATED COUNT: 15.1 % (ref 11.5–14.5)
EST. GFR  (NO RACE VARIABLE): >60 ML/MIN/1.73 M^2
GLUCOSE SERPL-MCNC: 88 MG/DL (ref 70–110)
HCT VFR BLD AUTO: 37.3 % (ref 37–48.5)
HGB BLD-MCNC: 12.3 G/DL (ref 12–16)
IMM GRANULOCYTES # BLD AUTO: ABNORMAL K/UL (ref 0–0.04)
IMM GRANULOCYTES NFR BLD AUTO: ABNORMAL % (ref 0–0.5)
LYMPHOCYTES # BLD AUTO: ABNORMAL K/UL (ref 1–4.8)
LYMPHOCYTES NFR BLD: 77 % (ref 18–48)
MAGNESIUM SERPL-MCNC: 1.9 MG/DL (ref 1.6–2.6)
MCH RBC QN AUTO: 31.4 PG (ref 27–31)
MCHC RBC AUTO-ENTMCNC: 33 G/DL (ref 32–36)
MCV RBC AUTO: 95 FL (ref 82–98)
MONOCYTES # BLD AUTO: ABNORMAL K/UL (ref 0.3–1)
MONOCYTES NFR BLD: 1 % (ref 4–15)
NEUTROPHILS NFR BLD: 21 % (ref 38–73)
NRBC BLD-RTO: 0 /100 WBC
PLATELET # BLD AUTO: 372 K/UL (ref 150–450)
PLATELET BLD QL SMEAR: ABNORMAL
PMV BLD AUTO: 8.5 FL (ref 9.2–12.9)
POTASSIUM SERPL-SCNC: 4 MMOL/L (ref 3.5–5.1)
PROT SERPL-MCNC: 7.1 G/DL (ref 6–8.4)
RBC # BLD AUTO: 3.92 M/UL (ref 4–5.4)
SODIUM SERPL-SCNC: 140 MMOL/L (ref 136–145)
WBC # BLD AUTO: 34.85 K/UL (ref 3.9–12.7)
WBC OTHER NFR BLD MANUAL: 1 %

## 2023-01-17 PROCEDURE — 83735 ASSAY OF MAGNESIUM: CPT | Performed by: INTERNAL MEDICINE

## 2023-01-17 PROCEDURE — 36415 COLL VENOUS BLD VENIPUNCTURE: CPT | Performed by: INTERNAL MEDICINE

## 2023-01-17 PROCEDURE — 85027 COMPLETE CBC AUTOMATED: CPT | Performed by: INTERNAL MEDICINE

## 2023-01-17 PROCEDURE — 85007 BL SMEAR W/DIFF WBC COUNT: CPT | Performed by: INTERNAL MEDICINE

## 2023-01-17 PROCEDURE — 80053 COMPREHEN METABOLIC PANEL: CPT | Performed by: INTERNAL MEDICINE

## 2023-01-18 ENCOUNTER — OFFICE VISIT (OUTPATIENT)
Dept: HEMATOLOGY/ONCOLOGY | Facility: CLINIC | Age: 63
End: 2023-01-18
Payer: MEDICAID

## 2023-01-18 VITALS
TEMPERATURE: 97 F | OXYGEN SATURATION: 98 % | BODY MASS INDEX: 28.13 KG/M2 | SYSTOLIC BLOOD PRESSURE: 109 MMHG | DIASTOLIC BLOOD PRESSURE: 61 MMHG | RESPIRATION RATE: 12 BRPM | HEART RATE: 59 BPM | HEIGHT: 63 IN | WEIGHT: 158.75 LBS

## 2023-01-18 DIAGNOSIS — C34.10 MALIGNANT NEOPLASM OF UPPER LOBE OF LUNG, UNSPECIFIED LATERALITY: ICD-10-CM

## 2023-01-18 DIAGNOSIS — C34.01 MALIGNANT NEOPLASM OF HILUS OF RIGHT LUNG: ICD-10-CM

## 2023-01-18 DIAGNOSIS — C34.92 NSCLC OF LEFT LUNG: ICD-10-CM

## 2023-01-18 PROCEDURE — 3008F BODY MASS INDEX DOCD: CPT | Mod: CPTII,,, | Performed by: NURSE PRACTITIONER

## 2023-01-18 PROCEDURE — 1159F PR MEDICATION LIST DOCUMENTED IN MEDICAL RECORD: ICD-10-PCS | Mod: CPTII,,, | Performed by: NURSE PRACTITIONER

## 2023-01-18 PROCEDURE — 99214 OFFICE O/P EST MOD 30 MIN: CPT | Mod: S$PBB,,, | Performed by: NURSE PRACTITIONER

## 2023-01-18 PROCEDURE — 3074F SYST BP LT 130 MM HG: CPT | Mod: CPTII,,, | Performed by: NURSE PRACTITIONER

## 2023-01-18 PROCEDURE — 3074F PR MOST RECENT SYSTOLIC BLOOD PRESSURE < 130 MM HG: ICD-10-PCS | Mod: CPTII,,, | Performed by: NURSE PRACTITIONER

## 2023-01-18 PROCEDURE — 1160F RVW MEDS BY RX/DR IN RCRD: CPT | Mod: CPTII,,, | Performed by: NURSE PRACTITIONER

## 2023-01-18 PROCEDURE — 1159F MED LIST DOCD IN RCRD: CPT | Mod: CPTII,,, | Performed by: NURSE PRACTITIONER

## 2023-01-18 PROCEDURE — 1160F PR REVIEW ALL MEDS BY PRESCRIBER/CLIN PHARMACIST DOCUMENTED: ICD-10-PCS | Mod: CPTII,,, | Performed by: NURSE PRACTITIONER

## 2023-01-18 PROCEDURE — 99214 PR OFFICE/OUTPT VISIT, EST, LEVL IV, 30-39 MIN: ICD-10-PCS | Mod: S$PBB,,, | Performed by: NURSE PRACTITIONER

## 2023-01-18 PROCEDURE — 3008F PR BODY MASS INDEX (BMI) DOCUMENTED: ICD-10-PCS | Mod: CPTII,,, | Performed by: NURSE PRACTITIONER

## 2023-01-18 PROCEDURE — 99999 PR PBB SHADOW E&M-EST. PATIENT-LVL V: ICD-10-PCS | Mod: PBBFAC,,, | Performed by: NURSE PRACTITIONER

## 2023-01-18 PROCEDURE — 3078F DIAST BP <80 MM HG: CPT | Mod: CPTII,,, | Performed by: NURSE PRACTITIONER

## 2023-01-18 PROCEDURE — 3078F PR MOST RECENT DIASTOLIC BLOOD PRESSURE < 80 MM HG: ICD-10-PCS | Mod: CPTII,,, | Performed by: NURSE PRACTITIONER

## 2023-01-18 PROCEDURE — 99215 OFFICE O/P EST HI 40 MIN: CPT | Mod: PBBFAC,PO | Performed by: NURSE PRACTITIONER

## 2023-01-18 PROCEDURE — 99999 PR PBB SHADOW E&M-EST. PATIENT-LVL V: CPT | Mod: PBBFAC,,, | Performed by: NURSE PRACTITIONER

## 2023-01-18 RX ORDER — FAMOTIDINE 10 MG/ML
20 INJECTION INTRAVENOUS
Status: CANCELLED | OUTPATIENT
Start: 2023-01-19

## 2023-01-18 RX ORDER — LIDOCAINE 50 MG/G
1 PATCH TOPICAL DAILY
Qty: 7 PATCH | Refills: 1 | Status: SHIPPED | OUTPATIENT
Start: 2023-01-18

## 2023-01-18 RX ORDER — OXYCODONE HYDROCHLORIDE 5 MG/1
5 TABLET ORAL EVERY 6 HOURS PRN
Qty: 60 TABLET | Refills: 0 | Status: SHIPPED | OUTPATIENT
Start: 2023-01-18 | End: 2023-03-08

## 2023-01-18 RX ORDER — HEPARIN 100 UNIT/ML
500 SYRINGE INTRAVENOUS
Status: CANCELLED | OUTPATIENT
Start: 2023-01-19

## 2023-01-18 RX ORDER — EPINEPHRINE 0.3 MG/.3ML
0.3 INJECTION SUBCUTANEOUS ONCE AS NEEDED
Status: CANCELLED | OUTPATIENT
Start: 2023-01-19

## 2023-01-18 RX ORDER — SODIUM CHLORIDE 0.9 % (FLUSH) 0.9 %
10 SYRINGE (ML) INJECTION
Status: CANCELLED | OUTPATIENT
Start: 2023-01-19

## 2023-01-18 RX ORDER — DIPHENHYDRAMINE HYDROCHLORIDE 50 MG/ML
50 INJECTION INTRAMUSCULAR; INTRAVENOUS ONCE AS NEEDED
Status: CANCELLED | OUTPATIENT
Start: 2023-01-19

## 2023-01-18 NOTE — PROGRESS NOTES
Subjective:       Patient ID: Yvette Hutchinson is a 62 y.o. female.    Chief Complaint:  Patient known to me with CLL stage 0 recently diagnosed with lung cancer August 2022  Follow-up    Lung Cancer    Patient has chronic complains of chronic pain syndrome and COPD for which periodically she take steroids she is also on BuSpar has issues anxiety has required dilatation of esophageal strictures allergic rhinitis insomnia and history of B12 deficiency documented   Had CT chest done with pulmonary 7/22 showed mass bx done. 8/22 01/18/2023:  She presents today for follow-up.  She is crusting refill on oxycodone and lidocaine patch    Oncology hx  Impression:ct chest 7/29/22     2.3 cm spiculated left upper lobe lung nodule highly suspicious for bronchogenic carcinoma.     New nonspecific 7 mm nodule in the right middle lobe; this appears more linear on the coronal images and may be a focus of scarring.     Additional stable lung nodules, mildly enlarged axillary lymph nodes, substernal thyroid nodule; these findings are likely benign given the interval stability.   LEFT LUNG MASS, CT-GUIDED BIOPSY:   Non-small cell lung carcinoma.   Comment:  The biopsy reveals invasive carcinoma with apparent glandular but   also vague squamoid features.  Tumor cells are diffusely positive with TTF-1   and focally positive with P40.  The histology differential includes   adenocarcinoma and adenosquamous carcinoma.  PD-L1 and templates NGS studies   are in progress.  The results will be issued separately.    October 3, 2022: Robotic left lobectomy T1c N1    Social history; patient is a smoker denies recreational alcohol use or drug use   Patient is currently on disability  She is allergic to the mask used during COVID pandemic she is also bothered by her mask with COPD    Family history suggestive of ovarian and breast cancer patient advised to see a  or seek   Review of patient's allergies  indicates:  No Known Allergies  Medications have been reviewed  Current Outpatient Medications on File Prior to Visit   Medication Sig Dispense Refill    (Magic mouthwash) 1:1:1 diphenhydrAMINE(Benadryl) 12.5mg/5ml liq, aluminum & magnesium hydroxide-simethicone (Maalox), LIDOcaine viscous 2% Swish and spit 10 mLs 3 (three) times daily. for mouth sores 250 mL 0    acetaminophen (TYLENOL) 500 MG tablet Take 2 tablets (1,000 mg total) by mouth every 6 (six) hours as needed for Pain. 8 tablet 0    albuterol (PROVENTIL/VENTOLIN HFA) 90 mcg/actuation inhaler Inhale 2 puffs into the lungs every 6 (six) hours as needed for Wheezing or Shortness of Breath. Rescue 18 g 11    albuterol-ipratropium (DUO-NEB) 2.5 mg-0.5 mg/3 mL nebulizer solution Take 3 mLs by nebulization every 6 (six) hours as needed for Wheezing. Rescue 120 each 5    benzocaine-menthoL 6-10 mg lozenge Take 1 lozenge by mouth every 2 (two) hours as needed for Pain. 18 tablet 0    benzonatate (TESSALON) 100 MG capsule Take 1 capsule (100 mg total) by mouth 3 (three) times daily as needed for Cough. 21 capsule 0    buPROPion (WELLBUTRIN XL) 300 MG 24 hr tablet Take 1 tablet (300 mg total) by mouth once daily. 90 tablet 3    dexAMETHasone (DECADRON) 4 MG Tab Take 2 tablets (8 mg total) by mouth once daily. Take 2 tablets by mouth daily on days 2 3 and 4 of chemotherapy 120 tablet 0    dextromethorphan-guaiFENesin  mg Tab Take 1 tablet by mouth every 4 (four) hours as needed (cough/congestion). 30 each 0    diphenhydrAMINE-aluminum-magnesium hydroxide-simethicone-LIDOcaine HCl 2% Swish and spit 15 mLs every 4 (four) hours as needed (mouth sores). 100 each 2    docusate sodium (COLACE) 100 MG capsule Take 1 capsule (100 mg total) by mouth daily as needed for Constipation. 30 capsule 1    doxycycline (VIBRAMYCIN) 100 MG Cap Take 1 capsule (100 mg total) by mouth every 12 (twelve) hours. for 7 days 14 capsule 0    FLUoxetine 20 MG capsule Take 1 capsule (20 mg  total) by mouth once daily. 30 capsule 11    fluticasone propionate (FLONASE) 50 mcg/actuation nasal spray 1 spray (50 mcg total) by Each Nostril route once daily. 16 g 3    fluticasone propionate (FLONASE) 50 mcg/actuation nasal spray 2 sprays (100 mcg total) by Each Nostril route daily as needed for Rhinitis. 15.8 mL 0    fluticasone-salmeterol 230-21 mcg/dose (ADVAIR HFA) 230-21 mcg/actuation HFAA inhaler Inhale 2 puffs into the lungs 2 (two) times daily. Controller 12 g 5    fluticasone-umeclidin-vilanter (TRELEGY ELLIPTA) 100-62.5-25 mcg DsDv Inhale 1 puff into the lungs once daily. 60 each 11    gabapentin (NEURONTIN) 300 MG capsule Take 2 capsules (600 mg total) by mouth 3 (three) times daily. 180 capsule 11    HYDROcodone-acetaminophen 5-300 mg Tab Take 1 tablet by mouth after meals as needed (pain). 40 each 0    LIDOcaine (LIDODERM) 5 % Place 1 patch onto the skin once daily. Remove & Discard patch within 12 hours or as directed by MD Bloom patch 0    LIDOcaine (LIDODERM) 5 % Place 1 patch onto the skin once daily. Remove & Discard patch within 12 hours or as directed by MD Bloom patch 1    LIDOcaine-prilocaine (EMLA) cream Apply topically as needed (apply to port site 30 min before access). 30 g 0    montelukast (SINGULAIR) 10 mg tablet Take 10 mg by mouth once daily.      OLANZapine (ZYPREXA) 5 MG tablet Take 1 tablet (5 mg total) by mouth nightly. Take 1 tablet at bedtime on days 1-4 of each cycle of chemotherapy 30 tablet 2    ondansetron (ZOFRAN-ODT) 8 MG TbDL Take 1 tablet (8 mg total) by mouth every 12 (twelve) hours as needed (nausea). 60 tablet 1    oxyCODONE (ROXICODONE) 5 MG immediate release tablet Take 1 tablet (5 mg total) by mouth every 4 (four) hours as needed for Pain. 20 tablet 0    oxyCODONE (ROXICODONE) 5 MG immediate release tablet Take 1 tablet (5 mg total) by mouth every 6 (six) hours as needed for Pain. 60 tablet 0    promethazine-dextromethorphan (PROMETHAZINE-DM) 6.25-15 mg/5 mL Syrp Take  5 mLs by mouth nightly as needed (cough). Take as needed for nighttime coughing 118 mL 0    ALPRAZolam (XANAX) 0.5 MG tablet Take 1 tablet (0.5 mg total) by mouth 3 (three) times daily. for 10 days (Patient not taking: Reported on 10/11/2022) 30 tablet 0    levocetirizine (XYZAL) 5 MG tablet Take 1 tablet (5 mg total) by mouth every evening. 30 tablet 5    [] predniSONE (DELTASONE) 20 MG tablet Take 1 tablet (20 mg total) by mouth 2 (two) times daily. for 5 days 10 tablet 0     Current Facility-Administered Medications on File Prior to Visit   Medication Dose Route Frequency Provider Last Rate Last Admin    0.9%  NaCl infusion   Intravenous Continuous Yuridia Cisneros MD   New Bag at 22 0701       REVIEW OF SYSTEMS:         Wt Readings from Last 3 Encounters:   23 72 kg (158 lb 11.7 oz)   23 70.8 kg (156 lb)   22 72.6 kg (160 lb)     Temp Readings from Last 3 Encounters:   23 97.2 °F (36.2 °C) (Temporal)   23 98.9 °F (37.2 °C)   22 97.2 °F (36.2 °C)     BP Readings from Last 3 Encounters:   23 109/61   23 (!) 95/57   22 (!) 142/64     Pulse Readings from Last 3 Encounters:   23 (!) 59   23 91   22 88     VITAL SIGNS:  as above   GENERAL: appears well-built, well-nourished.  No anxiety, no agitation, and in no distress.  Patient is awake, alert, oriented and cooperative.  HEENT:  Showed no congestion. Trachea is central no obvious icterus or pallor noted no hoarseness. no obvious JVD   NECK:  Supple.  No JVD. No obvious cervical submental or supraclavicular adenopathy.  RS: tube draining on left side. S/p lobectomy  ABDOMEN:  abdomen appears undistended.  EXTREMITIES:  Without edema.  NEUROLOGICAL:  The patient is appropriate, higher functions are normal.  No  obvious neurological deficits.  normal judgement normal thought content  No confusion, no speech impediment. Cranial nerves are intact and show no deficit. No gross motor deficits  noted   SKIN MUSCULOSKELETAL: no joint or skeletal deformity, no clubbing of nails.  No visible rash ecchymosis or petechiae    Lab Results   Component Value Date    WBC 20.78 (H) 03/03/2020    HGB 14.9 03/03/2020    HCT 47.0 03/03/2020    MCV 99 (H) 03/03/2020     03/03/2020     Smudge cells presnt  CMP  Sodium   Date Value Ref Range Status   01/17/2023 140 136 - 145 mmol/L Final     Potassium   Date Value Ref Range Status   01/17/2023 4.0 3.5 - 5.1 mmol/L Final     Chloride   Date Value Ref Range Status   01/17/2023 105 95 - 110 mmol/L Final     CO2   Date Value Ref Range Status   01/17/2023 26 23 - 29 mmol/L Final     Glucose   Date Value Ref Range Status   01/17/2023 88 70 - 110 mg/dL Final     BUN   Date Value Ref Range Status   01/17/2023 15 8 - 23 mg/dL Final     Creatinine   Date Value Ref Range Status   01/17/2023 0.8 0.5 - 1.4 mg/dL Final     Calcium   Date Value Ref Range Status   01/17/2023 9.4 8.7 - 10.5 mg/dL Final     Total Protein   Date Value Ref Range Status   01/17/2023 7.1 6.0 - 8.4 g/dL Final     Albumin   Date Value Ref Range Status   01/17/2023 3.7 3.5 - 5.2 g/dL Final     Total Bilirubin   Date Value Ref Range Status   01/17/2023 0.2 0.1 - 1.0 mg/dL Final     Comment:     For infants and newborns, interpretation of results should be based  on gestational age, weight and in agreement with clinical  observations.    Premature Infant recommended reference ranges:  Up to 24 hours.............<8.0 mg/dL  Up to 48 hours............<12.0 mg/dL  3-5 days..................<15.0 mg/dL  6-29 days.................<15.0 mg/dL       Alkaline Phosphatase   Date Value Ref Range Status   01/17/2023 123 55 - 135 U/L Final     AST   Date Value Ref Range Status   01/17/2023 13 10 - 40 U/L Final     ALT   Date Value Ref Range Status   01/17/2023 17 10 - 44 U/L Final     Anion Gap   Date Value Ref Range Status   01/17/2023 9 8 - 16 mmol/L Final     eGFR if    Date Value Ref Range Status    06/13/2022 >60 >60 mL/min/1.73 m^2 Final     eGFR if non    Date Value Ref Range Status   06/13/2022 >60 >60 mL/min/1.73 m^2 Final     Comment:     Calculation used to obtain the estimated glomerular filtration  rate (eGFR) is the CKD-EPI equation.            2wk ago    Blood Interpretation A B cell lymphoproliferative disorder is present. see comment.    Comment: Interpreted by: Jeremias Sosa M.D., Signed on 08/06/2020 at 13:07   Blood Comment Flow cytometric analysis of  peripheral blood  detects a lambda  light chain   restricted B lymphocyte population showing expression of CD19 and dim CD20   with aberrant expression of CD5 (dim).  T lymphocytes are   immunophenotypically unremarkable.  The blasts gate is not increased.  The   findings are consistent with patient history of chronic lymphocytic   leukemia/small lymphocytic lymphoma.   Flow differential:  Lymphocytes 69.6%, Monocytes 2.8%, Granulocytes  27.5%,   Blast  0.1%, Debris/nRBC 0.1%,  Viability 97.5%.   10/3/22 robotic lef lung lobectomy  1. LYMPH NODE, LEVEL 5 FROZEN SECTION, EXCISION:   One lymph node with dominant necrotizing granuloma, negative for malignancy   (0/1).   2. LYMPH NODES, LEVEL 5 PERMANENT, EXCISION:   Two lymph nodes with multifocal necrotizing granulomas, negative for   malignancy (0/2).   3. LYMPH NODES, LEFT POSTERIOR LEVEL 10, EXCISION:   Five lymph nodes with multifocal necrotizing granulomas, negative for   malignancy (0/5).   4. LYMPH NODE, LEVEL 11, EXCISION:   One lymph node, negative for malignancy (0/1).   5. LYMPH NODES, LEVEL 12, EXCISION:   Three lymph nodes, one with single necrotizing granuloma, negative for   malignancy (0/3).   6. LYMPH NODES, LEVEL 12 NEAR UPPER DIVISION BRONCHUS, EXCISION:   Three lymph nodes with sinus histiocytosis, negative for malignancy (0/3).   7. LYMPH NODES, LEVEL 10 #2, EXCISION:   One of two lymph nodes positive for metastatic carcinoma (1/2).   Size of largest metastatic  deposit:  8 mm.   Negative for extranodal extension.   8. LYMPH NODES, LEFT LEVEL 8, EXCISION:   Two lymph nodes with sinus histiocytosis, negative for malignancy (0/2).   9. LYMPH NODES, LEFT LEVEL 9, EXCISION:   Two lymph nodes, negative for malignancy (0/2).   10. 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   Surgical margins are negative for malignancy.   Background lung tissue with subpleural fibrosis, no evidence of granulomas.   Two hilar lymph nodes, negative for malignancy (0/2).   Coment (1-3, 5):  Prominent necrotizing granulomatous inflammation identified   is identified in multiple lymph nodes.  An AE1/AE3 cytokeratin immunostain   performed on the largest granuloma (part 3) reveals no evidence of occult   carcinoma.  GMS and AFB special stains are negative for fungal and acid-fast   organisms, respectively.  The necrotizing features are not typical of   sarcoidosis.  As such, other granulomatous processes may be considered   including secondary response to malignancy or infection.  Clinical   correlation is advised.   Comment (10):  Sections reveal invasive carcinoma involving lung tissue with   predominantly squamoid morphologic features including bright eosinophilic   cytoplasm and intracellular bridging.  There are not significant keratinizing   features.  Some areas show basaloid features.  There is also regional luminal   features including cribriforming and vague glandular structures containing   mucin.  Tumor cells are diffusely positive with P40 and regionally positive   with TTF-1.  Mucicarmine special stain highlights mucin producing luminal   spaces. Overall tumor appearance and staining profile supports adenosquamous   carcinoma, a variant of non-small cell lung carcinoma.   [facility] SURGICAL PATHOLOGY CANCER CASE SUMMARY:  LUNG   Procedure:  Lobectomy   Specimen laterality:  Left   Tumor focality:  Single focus   Tumor site: Upper lobe of lung   Tumor  size:  2.2 cm   Histologic type:  Adenosquamous carcinoma   Spread through airspaces:  Not identified   Visceral pleural invasion:  Not identified   Direct invasion of adjacent structures: Not applicable   Lymphovascular invasion:  Not identified   Margins:  All margins negative for invasive carcinoma     Closest margin to invasive carcinoma:  Bronchial (3.0 cm)   Regional lymph nodes:  Tumor present in regional lymph node     Number of lymph nodes with tumor:  1     Scott sites with tumor:  Left level 10 (10L)     Extranodal extension:  Not identified     Number of lymph nodes examined:  23   Pathologic stage classification (pTNM): pT1c pN1   Special studies:  Performed on previous biopsy if additional studies needed   there may be performed on tissue blockd 10G or 10H.      Assessment:     CLL  Lymphocytosis over 3 years leukocytosis and smudge cells suggestive of CLL stage 0 no anemia no thrombocytopenia no generalized lymphadenopathy   Dx of lung ca 8/2022  Plan:       Lung ca; adenosquamous, s/p robotic lobectomy October 3, 2022 T1c N1   5% tumor cells are positive for PDL-1   Have reached out to see if pt is eligible ofr ALCHEMIST trial at [facility],   Will check for additional mutation studies with pathology  data off [facility] . Combine platin + alimta, or gem or navelbine or decetaxel [facility] with 5 % PDL-1 positivity?      CLL   stage 0 will confirmed with flow cytometry  NTD   she has no other cytopenias or lymphadenopathy or B symptoms patient can be observed  Patient also states her dermatologist told her she was allergic to the mask use and she also has COPD that makes a feels smothered during mask use this might hinder finding a job suitable I have directed her to discuss with the PCP regarding letters to support inability to use mask    Continue with chronic pain syndrome and COPD management advised to stop smoking if at all possible 10 pills of hydrocodoen  given to pt, she needs to follow with pain mx      Advised COVID precaution due to her lung situation she will be a high risk patient        1/18/2023:  Patient presents today for chemo clearance.  She was seen a week ago in urgent care for upper respiratory infection she completed her steroid today and will complete antibiotic tomorrow.  She is feeling well today.  Refill provided on oxycodone and lidocaine patch.  She will proceed with treatment as scheduled and see MD in 3 weeks to continue treatment

## 2023-01-19 ENCOUNTER — INFUSION (OUTPATIENT)
Dept: INFUSION THERAPY | Facility: HOSPITAL | Age: 63
End: 2023-01-19
Attending: INTERNAL MEDICINE
Payer: MEDICAID

## 2023-01-19 VITALS
TEMPERATURE: 98 F | WEIGHT: 158 LBS | HEIGHT: 63 IN | BODY MASS INDEX: 28 KG/M2 | RESPIRATION RATE: 17 BRPM | OXYGEN SATURATION: 96 % | DIASTOLIC BLOOD PRESSURE: 58 MMHG | HEART RATE: 63 BPM | SYSTOLIC BLOOD PRESSURE: 96 MMHG

## 2023-01-19 DIAGNOSIS — C34.01 MALIGNANT NEOPLASM OF HILUS OF RIGHT LUNG: Primary | ICD-10-CM

## 2023-01-19 DIAGNOSIS — C34.90 NON-SMALL CELL LUNG CANCER, UNSPECIFIED LATERALITY: ICD-10-CM

## 2023-01-19 PROCEDURE — 96367 TX/PROPH/DG ADDL SEQ IV INF: CPT

## 2023-01-19 PROCEDURE — A4216 STERILE WATER/SALINE, 10 ML: HCPCS | Performed by: NURSE PRACTITIONER

## 2023-01-19 PROCEDURE — 63600175 PHARM REV CODE 636 W HCPCS: Performed by: NURSE PRACTITIONER

## 2023-01-19 PROCEDURE — 96417 CHEMO IV INFUS EACH ADDL SEQ: CPT

## 2023-01-19 PROCEDURE — 96375 TX/PRO/DX INJ NEW DRUG ADDON: CPT

## 2023-01-19 PROCEDURE — 96415 CHEMO IV INFUSION ADDL HR: CPT

## 2023-01-19 PROCEDURE — 96413 CHEMO IV INFUSION 1 HR: CPT

## 2023-01-19 PROCEDURE — 25000003 PHARM REV CODE 250: Performed by: NURSE PRACTITIONER

## 2023-01-19 RX ORDER — SODIUM CHLORIDE 0.9 % (FLUSH) 0.9 %
10 SYRINGE (ML) INJECTION
Status: DISCONTINUED | OUTPATIENT
Start: 2023-01-19 | End: 2023-01-19 | Stop reason: HOSPADM

## 2023-01-19 RX ORDER — HEPARIN 100 UNIT/ML
500 SYRINGE INTRAVENOUS
Status: DISCONTINUED | OUTPATIENT
Start: 2023-01-19 | End: 2023-01-19 | Stop reason: HOSPADM

## 2023-01-19 RX ORDER — EPINEPHRINE 0.3 MG/.3ML
0.3 INJECTION SUBCUTANEOUS ONCE AS NEEDED
Status: DISCONTINUED | OUTPATIENT
Start: 2023-01-19 | End: 2023-01-19 | Stop reason: HOSPADM

## 2023-01-19 RX ORDER — DIPHENHYDRAMINE HYDROCHLORIDE 50 MG/ML
50 INJECTION INTRAMUSCULAR; INTRAVENOUS ONCE AS NEEDED
Status: DISCONTINUED | OUTPATIENT
Start: 2023-01-19 | End: 2023-01-19 | Stop reason: HOSPADM

## 2023-01-19 RX ORDER — FAMOTIDINE 10 MG/ML
20 INJECTION INTRAVENOUS
Status: COMPLETED | OUTPATIENT
Start: 2023-01-19 | End: 2023-01-19

## 2023-01-19 RX ADMIN — PALONOSETRON HYDROCHLORIDE: 0.25 INJECTION INTRAVENOUS at 09:01

## 2023-01-19 RX ADMIN — HEPARIN 500 UNITS: 100 SYRINGE at 01:01

## 2023-01-19 RX ADMIN — SODIUM CHLORIDE: 0.9 INJECTION, SOLUTION INTRAVENOUS at 08:01

## 2023-01-19 RX ADMIN — CARBOPLATIN 640 MG: 10 INJECTION, SOLUTION INTRAVENOUS at 12:01

## 2023-01-19 RX ADMIN — FAMOTIDINE 20 MG: 10 INJECTION INTRAVENOUS at 08:01

## 2023-01-19 RX ADMIN — DIPHENHYDRAMINE HYDROCHLORIDE 50 MG: 50 INJECTION INTRAMUSCULAR; INTRAVENOUS at 08:01

## 2023-01-19 RX ADMIN — PACLITAXEL 360 MG: 6 INJECTION, SOLUTION INTRAVENOUS at 09:01

## 2023-01-19 RX ADMIN — SODIUM CHLORIDE, PRESERVATIVE FREE 10 ML: 5 INJECTION INTRAVENOUS at 01:01

## 2023-01-19 RX ADMIN — APREPITANT 130 MG: 130 INJECTION, EMULSION INTRAVENOUS at 08:01

## 2023-01-19 NOTE — PLAN OF CARE
Problem: Fatigue  Goal: Improved Activity Tolerance  Outcome: Met     Problem: Activity Intolerance  Goal: Enhanced Capacity and Energy  Outcome: Met

## 2023-01-23 ENCOUNTER — OFFICE VISIT (OUTPATIENT)
Dept: PULMONOLOGY | Facility: CLINIC | Age: 63
End: 2023-01-23
Payer: MEDICAID

## 2023-01-23 VITALS
OXYGEN SATURATION: 97 % | WEIGHT: 156.31 LBS | DIASTOLIC BLOOD PRESSURE: 73 MMHG | SYSTOLIC BLOOD PRESSURE: 115 MMHG | HEART RATE: 73 BPM | HEIGHT: 63 IN | BODY MASS INDEX: 27.7 KG/M2

## 2023-01-23 DIAGNOSIS — J44.9 CHRONIC OBSTRUCTIVE PULMONARY DISEASE, UNSPECIFIED COPD TYPE: Primary | ICD-10-CM

## 2023-01-23 DIAGNOSIS — R45.89 ANXIETY ABOUT HEALTH: ICD-10-CM

## 2023-01-23 PROCEDURE — 99213 PR OFFICE/OUTPT VISIT, EST, LEVL III, 20-29 MIN: ICD-10-PCS | Mod: S$PBB,,, | Performed by: NURSE PRACTITIONER

## 2023-01-23 PROCEDURE — 3074F SYST BP LT 130 MM HG: CPT | Mod: CPTII,,, | Performed by: NURSE PRACTITIONER

## 2023-01-23 PROCEDURE — 1159F MED LIST DOCD IN RCRD: CPT | Mod: CPTII,,, | Performed by: NURSE PRACTITIONER

## 2023-01-23 PROCEDURE — 1159F PR MEDICATION LIST DOCUMENTED IN MEDICAL RECORD: ICD-10-PCS | Mod: CPTII,,, | Performed by: NURSE PRACTITIONER

## 2023-01-23 PROCEDURE — 3074F PR MOST RECENT SYSTOLIC BLOOD PRESSURE < 130 MM HG: ICD-10-PCS | Mod: CPTII,,, | Performed by: NURSE PRACTITIONER

## 2023-01-23 PROCEDURE — 3008F PR BODY MASS INDEX (BMI) DOCUMENTED: ICD-10-PCS | Mod: CPTII,,, | Performed by: NURSE PRACTITIONER

## 2023-01-23 PROCEDURE — 99999 PR PBB SHADOW E&M-EST. PATIENT-LVL III: ICD-10-PCS | Mod: PBBFAC,,, | Performed by: NURSE PRACTITIONER

## 2023-01-23 PROCEDURE — 99213 OFFICE O/P EST LOW 20 MIN: CPT | Mod: S$PBB,,, | Performed by: NURSE PRACTITIONER

## 2023-01-23 PROCEDURE — 99999 PR PBB SHADOW E&M-EST. PATIENT-LVL III: CPT | Mod: PBBFAC,,, | Performed by: NURSE PRACTITIONER

## 2023-01-23 PROCEDURE — 99213 OFFICE O/P EST LOW 20 MIN: CPT | Mod: PBBFAC,PO | Performed by: NURSE PRACTITIONER

## 2023-01-23 PROCEDURE — 1160F PR REVIEW ALL MEDS BY PRESCRIBER/CLIN PHARMACIST DOCUMENTED: ICD-10-PCS | Mod: CPTII,,, | Performed by: NURSE PRACTITIONER

## 2023-01-23 PROCEDURE — 3078F DIAST BP <80 MM HG: CPT | Mod: CPTII,,, | Performed by: NURSE PRACTITIONER

## 2023-01-23 PROCEDURE — 1160F RVW MEDS BY RX/DR IN RCRD: CPT | Mod: CPTII,,, | Performed by: NURSE PRACTITIONER

## 2023-01-23 PROCEDURE — 3078F PR MOST RECENT DIASTOLIC BLOOD PRESSURE < 80 MM HG: ICD-10-PCS | Mod: CPTII,,, | Performed by: NURSE PRACTITIONER

## 2023-01-23 PROCEDURE — 3008F BODY MASS INDEX DOCD: CPT | Mod: CPTII,,, | Performed by: NURSE PRACTITIONER

## 2023-01-23 RX ORDER — IPRATROPIUM BROMIDE AND ALBUTEROL SULFATE 2.5; .5 MG/3ML; MG/3ML
3 SOLUTION RESPIRATORY (INHALATION) EVERY 6 HOURS PRN
Qty: 120 EACH | Refills: 5 | Status: SHIPPED | OUTPATIENT
Start: 2023-01-23 | End: 2024-03-06

## 2023-01-23 RX ORDER — ALPRAZOLAM 0.5 MG/1
0.5 TABLET ORAL 3 TIMES DAILY
Qty: 30 TABLET | Refills: 0 | Status: SHIPPED | OUTPATIENT
Start: 2023-01-23 | End: 2023-03-08 | Stop reason: SDUPTHER

## 2023-01-23 NOTE — PROGRESS NOTES
1/23/2023    Yvette Ilir Jasper  Office note    Chief Complaint   Patient presents with    3m f/u    Cough    Shortness of Breath       HPI:   1/23/23- undergoing chemo followed by Dr. Lee, complaint of cough and chest tightness onset 3 weeks, seen at urgent care, x-ray clear. Productive dark brown mucous to clear. Using nebulizer daily with benefit.   Treated with cough syrup, steroids, and antibiotics. States feeling better.   Wearing supplemental oxygen as needed. Has smaller more portable bottles, wearing at 3 liters with benefit. Able to do more in home.   Does have moments of anxiety due to states of health. Takes xanax as needed with benefit.     10/25/22- left lobectomy and lymphadenectomy Dr. Kaplan 10/3/2022- still have incision site pain, 10 on 0-10 scale, soreness. Took pain medication with benefit but has run out. Had chest tube removed 6 days prior. No swelling or drainage from incision site.   Schedule to have port placed today at 3 pm. Will start chemo therapy this week., Followed by oncologist Dr. Lee.   Wearing supplemental oxygen as needed at 3L with benefit.   SOB- stable, on advair daily, using albuterol as needed. Cough- improved, daily, mild.     Had sleep study but not hear results    7/21/2022- states doing well, Cough- recurrent complaint, worsened in past 3 days, productive  Brown mucous, associated with chest tightness and wheeze. Worse in early mornings and late evenings.   Has headaches when waking up. Has daytime fatigue.     4/28/2022- COPD exacerbation onset 3 days, persistent cough worse in early mornings and late evenings, has nocturnal arousals, productive thick brown mucous,   Associated with chest tightness and wheeze. Improved with albuterol nebulizer but returns after 2 hours. Severe complaint has urinated due to coughing fits.   Complaint of left ear pain and pressure with headaches. Associated with body aches and fatigue.  On average has exacerbation once every  3 months.   History of chronic knee pain, not interested in orthopedic referral, treated previously with ibuprofen with benefit.     NP Pedro reviewed  1/11/2022: Hx of CLL, COPD  Endorses onset of headache, fatigue x2 days.  Cough: Productive with green thick sputum production, onset 2 days, worse at night time and in the morning. Not relieved with cough syrup. States Codeine cough syrup has helped in the past. Associated with wheezing.   States breathing is the same, not worse from baseline. Using supplemental oxygen at night and PRN.   Denies fever, denies chest tightness, GI complaints.  On Daily Advair, using rescue inhaler twice daily. Using nebulizer daily with no noted improvement.   Cut back to 2 cigarettes per day.      8/16/2021- not currently interested to perform in clinic sleep study.   SOB- daily, worse with hot weather, ran out of rescue inhaler. On Advair daily with benefit. Improves with rest.   Started coughing  After weed eating yard.     Cough- recurrent complaint, onset 5 days after doing yard work, productive clear mucous, severe complaint, inhibits ability to sleep when first laying down at night.     5/14/2021- did not receive sleep study from [facility] as ordered. Wearing supplemental oxygen at night while sleeping at 3 L. Uses when needed during day.    Having trouble falling asleep at night, started on new medication with no current benefit. Not able to fall asleep or stay asleep. Onset years, worsening with time. Feels tired when waking up in morning. Associated with occasional morning headaches.     complaint of chronic back pain followed by PCP.    Cough- recurrent complaint, mild, thick mucous, not using nebulizer regularly,   SOB- daily, worse with exertion and occasionally occurs at rest. Feels she can not take a deep breath.   On advair daily. Taking prednisone 20mg for 3 days 2x monthly.    2/8/2021- Cough- improved, daily, mild, Complaint of dry throat at night.  Worse in early morning and late evenings.   occasionally productive small amount yellow mucous.   Currently smoking 1/2 pack daily, finished chantix with improvement but picked up after stopping.   Wearing supplemental oxygen at 3L most nights with benefit. SOB worse when laying flat on back, tried wedge but caused neck pain.   Sinus- unchanged, recurrent headaches in morning, sinus drainage. Currently on Flonase daily    12/3/2020- has supplemental oxygen set at 3 L at night, states benefiting,   Cough- worsened in last 7 days, worse in early morning and late evenings, nocturnal arousals nightly, productive yellow/green mucous quarter size.  Complaint of daily fatigue, dry throat in morning, day time drowsiness, mental cloudiness. Feels she never gets good sleep at night even when wearing supplemental oxygen.     10/21/2020- SOB and wheeze- recurrent problem, worsened last weeks, associated with sinus congestion, wheeze is worse in early morning and when laying down at nigtht  Cough- productive brown/green mucous for 1 week. Has not started steroid therapy. Improves with nebulizer using 1-2x daily for past week. Was not able afford inhalers. Did not receive call from [facility].     Complaint for cramping sensation in chest that occurs sporadically on left and right side that lasts few minutes. Onset years, recurrent complaint, started back 1 week prior. Occurs couple days in row.     7/15/2020- cough- onset 7 days, worse in mornings and night, nocturnal arousals 1x nightly, productive yellow/green dime size mucous, associated with chest tightness and wheeze, improves with nebulizer using 1-2 daily, states feels better after coughing up large amounts of mucous; went to covid clinic and tested negative for covid did have fluid on ears.   Currently on Advair daily, insurance did not cover spiriva;     4/15/2020- cough- onset weeks, associated with occasional chest tighness, worse at night and early morning,  quarter size clear to light brown in color. Currently    advair, spiriva, and rescue inhaler. States using rescue daily with little benefit.    3/5/2020- Pt is a 58 yo female with depression, GERD and COPD presenting for new evaluation.  Has chronic cough worse last 2 wks w/ phlegm, white, worse in am and evening.  Inhalers: rescue inhaler says insurance didn't cover  She reports wt gain over last 2 yrs. Has abdominal cramps intermittently radiating to the back.   Smoking since she was very young, 1 pack lasts 3 days.  She gets yearly bronchitis.  Labs from her PCP done 2 days ago are concerning for possible CLL- with WBC 21 and peripheral smear w/ smudge cells    The chief compliant  problem varies with instablilty at time  PFSH:  Past Medical History:   Diagnosis Date    Arthritis     Depression     GERD (gastroesophageal reflux disease)     Pneumonia of left lung due to infectious organism 03/05/2020         Past Surgical History:   Procedure Laterality Date    INJECTION OF ANESTHETIC AGENT AROUND MULTIPLE INTERCOSTAL NERVES Left 10/3/2022    Procedure: BLOCK, NERVE, INTERCOSTAL, 2 OR MORE;  Surgeon: Evelio Kaplan MD;  Location: [address];  Service: Thoracic;  Laterality: Left;    INSERTION OF TUNNELED CENTRAL VENOUS CATHETER (CVC) WITH SUBCUTANEOUS PORT Right 11/3/2022    Procedure: [id number], Right or Left Neck or Chest;  Surgeon: Mini Acevedo MD;  Location: [address];  Service: General;  Laterality: Right;    ROBOT-ASSISTED LAPAROSCOPIC LYMPHADENECTOMY USING DA MONIQUE XI Left 10/3/2022    Procedure: XI ROBOTIC LYMPHADENECTOMY;  Surgeon: Evelio Kaplan MD;  Location: [address];  Service: Thoracic;  Laterality: Left;    TONSILLECTOMY      XI ROBOTIC RATS,WITH LOBECTOMY,LUNG Left 10/3/2022    Procedure: XI ROBOTIC RATS,WITH LOBECTOMY,LUNG;  Surgeon: Evelio Kaplan MD;  Location: [address];  Service: Thoracic;  Laterality: Left;     Social History  "    Tobacco Use    Smoking status: Some Days     Packs/day: 0.15     Types: Cigarettes    Smokeless tobacco: Never    Tobacco comments:     reports one cigarette every now and then   Substance Use Topics    Alcohol use: Yes     Comment: rarely    Drug use: Yes     Types: Marijuana     Family History   Problem Relation Age of Onset    Ovarian cancer Sister     Breast cancer Cousin      Review of patient's allergies indicates:  No Known Allergies    Performance Status:The patient's activity level is functions out of house.      Review of Systems:  a review of eleven systems covering constitutional, Eye, HEENT, Psych, Respiratory, Cardiac, GI, , Musculoskeletal, Endocrine, Dermatologic was negative except for pertinent findings as listed ABOVE and below:  Shortness of breath, Cough, fatigue       Exam:Comprehensive exam done. /73 (BP Location: Left arm, Patient Position: Sitting, BP Method: Medium (Automatic))   Pulse 73   Ht 5' 3" (1.6 m)   Wt 70.9 kg (156 lb 4.9 oz)   LMP 01/13/2010 (Approximate)   SpO2 97% Comment: on room air at rest  BMI 27.69 kg/m²   Exam included Vitals as listed, and patient's appearance and affect and alertness and mood, oral exam for yeast and hygiene and pharynx lesions and Mallapatti (M) score, neck with inspection for jvd and masses and thyroid abnormalities and lymph nodes (supraclavicular and infraclavicular nodes and axillary also examined and noted if abn), chest exam included symmetry and effort and fremitus and percussion and auscultation, cardiac exam included rhythm and gallops and murmur and rubs and jvd and edema, abdominal exam for mass and hepatosplenomegaly and tenderness and hernias and bowel sounds, Musculoskeletal exam with muscle tone and posture and mobility/gait and  strength, and skin for rashes and cyanosis and pallor and turgor, extremity for clubbing.  Findings were normal except for pertinent findings listed below:   Breath sounds clear   " M2      Radiographs (ct chest and cxr) reviewed: view by direct vision   XR CHEST PA AND LATERAL   01/12/2023 lungs clear    CT Chest With Contrast 09/06/22   1. Hypermetabolic nodule left upper lobe appears stable upon comparison.  2. Small localized area of hypermetabolic activity in the left infrahilar region is likewise unchanged upon comparison.  3. Indirect findings related to old granulomatous disease.    CT Chest Without Contrast 07/29/2022   2.3 cm spiculated left upper lobe lung nodule highly suspicious for bronchogenic carcinoma.     New nonspecific 7 mm nodule in the right middle lobe; this appears more linear on the coronal images and may be a focus of scarring.     Additional stable lung nodules, mildly enlarged axillary lymph nodes, substernal thyroid nodule; these findings are likely benign given the interval stability.      CT Chest Without Contrast 11/20/2020   Stable right lung pulmonary nodules.  Mild increase in size of clustered nodules within the left upper lobe, with the distribution most compatible with an atypical infectious process.  Old granulomatous disease.  Left adrenal adenoma, unchanged.  Chronic T8 compression fracture.    CT Chest Without Contrast 03/11/2020   1. There are two right lung pulmonary nodules.  Per LUNG-RADS scoring this would reflect a Category 3 (probably benign).  Recommendation is for 6 month follow-up LDCT.  2. Airways disease or apical typical infection in the left lower lobe.       CXR 12/19/19- clear lung fields bilaterally     Labs reviewed    Lab Results   Component Value Date    WBC 20.78 (H) 03/03/2020    RBC 4.74 03/03/2020    HGB 14.9 03/03/2020    HCT 47.0 03/03/2020    MCV 99 (H) 03/03/2020    MCH 31.4 (H) 03/03/2020    MCHC 31.7 (L) 03/03/2020    RDW 13.1 03/03/2020     03/03/2020    MPV 11.9 03/03/2020    GRAN 33.0 (L) 03/03/2020    LYMPH 64.0 (H) 03/03/2020    MONO 3.0 (L) 03/03/2020    EOS CANCELED 08/18/2017    BASO CANCELED 08/18/2017     EOSINOPHIL 0.0 03/03/2020    BASOPHIL 0.0 03/03/2020     Specimen to Pathology, Radiology Lung 08/17/22   Non small cell     PFT reviewed    3/12/2020 Severe obstruction. FEV1  49 %  predicted.   Airflow is not clearly improved after bronchodilator. Clinical improvement following bronchodilator therapy may occur in the absence of spirometric improvement.   Mild Hyperinflation.   Moderate reduction in diffusion capacity - unadjusted for hemoglobin.     EPWORTH SLEEPINESS SCALE 15 7/21/2022  [facility] HST 05/27/2021 TRISTEN 4.3       Plan:  Clinical impression is resonably certain and repeated evaluation prn +/- follow up will be needed as below.    Yvette was seen today for 3m f/u, cough and shortness of breath.    Diagnoses and all orders for this visit:    Chronic obstructive pulmonary disease, unspecified COPD type  -     albuterol-ipratropium (DUO-NEB) 2.5 mg-0.5 mg/3 mL nebulizer solution; Take 3 mLs by nebulization every 6 (six) hours as needed for Wheezing. Rescue    Anxiety about health  -     ALPRAZolam (XANAX) 0.5 MG tablet; Take 1 tablet (0.5 mg total) by mouth 3 (three) times daily. for 10 days           Follow up in about 3 months (around 4/23/2023), or if symptoms worsen or fail to improve.    Discussed with patient above for education the following:      Patient Instructions   Continue current medication regiment    Use xanax as needed for anxiety

## 2023-02-07 ENCOUNTER — LAB VISIT (OUTPATIENT)
Dept: LAB | Facility: HOSPITAL | Age: 63
End: 2023-02-07
Attending: INTERNAL MEDICINE
Payer: COMMERCIAL

## 2023-02-07 DIAGNOSIS — C34.01 MALIGNANT NEOPLASM OF HILUS OF RIGHT LUNG: ICD-10-CM

## 2023-02-07 LAB
ALBUMIN SERPL BCP-MCNC: 4.1 G/DL (ref 3.5–5.2)
ALP SERPL-CCNC: 121 U/L (ref 55–135)
ALT SERPL W/O P-5'-P-CCNC: 20 U/L (ref 10–44)
ANION GAP SERPL CALC-SCNC: 9 MMOL/L (ref 8–16)
AST SERPL-CCNC: 19 U/L (ref 10–40)
BASOPHILS # BLD AUTO: ABNORMAL K/UL (ref 0–0.2)
BASOPHILS NFR BLD: 0 % (ref 0–1.9)
BILIRUB SERPL-MCNC: 0.5 MG/DL (ref 0.1–1)
BUN SERPL-MCNC: 9 MG/DL (ref 8–23)
CALCIUM SERPL-MCNC: 9.5 MG/DL (ref 8.7–10.5)
CHLORIDE SERPL-SCNC: 105 MMOL/L (ref 95–110)
CO2 SERPL-SCNC: 25 MMOL/L (ref 23–29)
CREAT SERPL-MCNC: 0.8 MG/DL (ref 0.5–1.4)
DIFFERENTIAL METHOD: ABNORMAL
EOSINOPHIL # BLD AUTO: ABNORMAL K/UL (ref 0–0.5)
EOSINOPHIL NFR BLD: 0 % (ref 0–8)
ERYTHROCYTE [DISTWIDTH] IN BLOOD BY AUTOMATED COUNT: 18.7 % (ref 11.5–14.5)
EST. GFR  (NO RACE VARIABLE): >60 ML/MIN/1.73 M^2
GLUCOSE SERPL-MCNC: 87 MG/DL (ref 70–110)
HCT VFR BLD AUTO: 37.2 % (ref 37–48.5)
HGB BLD-MCNC: 12.2 G/DL (ref 12–16)
IMM GRANULOCYTES # BLD AUTO: ABNORMAL K/UL (ref 0–0.04)
IMM GRANULOCYTES NFR BLD AUTO: ABNORMAL % (ref 0–0.5)
LYMPHOCYTES # BLD AUTO: ABNORMAL K/UL (ref 1–4.8)
LYMPHOCYTES NFR BLD: 86 % (ref 18–48)
MAGNESIUM SERPL-MCNC: 2 MG/DL (ref 1.6–2.6)
MCH RBC QN AUTO: 32.4 PG (ref 27–31)
MCHC RBC AUTO-ENTMCNC: 32.8 G/DL (ref 32–36)
MCV RBC AUTO: 99 FL (ref 82–98)
MONOCYTES # BLD AUTO: ABNORMAL K/UL (ref 0.3–1)
MONOCYTES NFR BLD: 3 % (ref 4–15)
NEUTROPHILS NFR BLD: 11 % (ref 38–73)
NRBC BLD-RTO: 0 /100 WBC
PLATELET # BLD AUTO: 187 K/UL (ref 150–450)
PLATELET BLD QL SMEAR: ABNORMAL
PMV BLD AUTO: 9.3 FL (ref 9.2–12.9)
POTASSIUM SERPL-SCNC: 4 MMOL/L (ref 3.5–5.1)
PROT SERPL-MCNC: 7.2 G/DL (ref 6–8.4)
RBC # BLD AUTO: 3.77 M/UL (ref 4–5.4)
SODIUM SERPL-SCNC: 139 MMOL/L (ref 136–145)
WBC # BLD AUTO: 25.85 K/UL (ref 3.9–12.7)

## 2023-02-07 PROCEDURE — 85027 COMPLETE CBC AUTOMATED: CPT | Performed by: NURSE PRACTITIONER

## 2023-02-07 PROCEDURE — 36415 COLL VENOUS BLD VENIPUNCTURE: CPT | Performed by: NURSE PRACTITIONER

## 2023-02-07 PROCEDURE — 80053 COMPREHEN METABOLIC PANEL: CPT | Performed by: NURSE PRACTITIONER

## 2023-02-07 PROCEDURE — 83735 ASSAY OF MAGNESIUM: CPT | Performed by: NURSE PRACTITIONER

## 2023-02-07 PROCEDURE — 85007 BL SMEAR W/DIFF WBC COUNT: CPT | Performed by: NURSE PRACTITIONER

## 2023-02-08 ENCOUNTER — OFFICE VISIT (OUTPATIENT)
Dept: HEMATOLOGY/ONCOLOGY | Facility: CLINIC | Age: 63
End: 2023-02-08
Payer: MEDICAID

## 2023-02-08 VITALS
HEIGHT: 63 IN | WEIGHT: 158.75 LBS | HEART RATE: 92 BPM | RESPIRATION RATE: 12 BRPM | OXYGEN SATURATION: 97 % | BODY MASS INDEX: 28.13 KG/M2 | TEMPERATURE: 98 F | SYSTOLIC BLOOD PRESSURE: 113 MMHG | DIASTOLIC BLOOD PRESSURE: 67 MMHG

## 2023-02-08 DIAGNOSIS — C91.10 CLL (CHRONIC LYMPHOCYTIC LEUKEMIA): ICD-10-CM

## 2023-02-08 DIAGNOSIS — E53.8 B12 DEFICIENCY: ICD-10-CM

## 2023-02-08 DIAGNOSIS — C34.01 MALIGNANT NEOPLASM OF HILUS OF RIGHT LUNG: Primary | ICD-10-CM

## 2023-02-08 PROCEDURE — 3074F SYST BP LT 130 MM HG: CPT | Mod: CPTII,,, | Performed by: INTERNAL MEDICINE

## 2023-02-08 PROCEDURE — 99999 PR PBB SHADOW E&M-EST. PATIENT-LVL V: CPT | Mod: PBBFAC,,, | Performed by: INTERNAL MEDICINE

## 2023-02-08 PROCEDURE — 3008F BODY MASS INDEX DOCD: CPT | Mod: CPTII,,, | Performed by: INTERNAL MEDICINE

## 2023-02-08 PROCEDURE — 99215 OFFICE O/P EST HI 40 MIN: CPT | Mod: S$PBB,,, | Performed by: INTERNAL MEDICINE

## 2023-02-08 PROCEDURE — 1159F MED LIST DOCD IN RCRD: CPT | Mod: CPTII,,, | Performed by: INTERNAL MEDICINE

## 2023-02-08 PROCEDURE — 3008F PR BODY MASS INDEX (BMI) DOCUMENTED: ICD-10-PCS | Mod: CPTII,,, | Performed by: INTERNAL MEDICINE

## 2023-02-08 PROCEDURE — 99999 PR PBB SHADOW E&M-EST. PATIENT-LVL V: ICD-10-PCS | Mod: PBBFAC,,, | Performed by: INTERNAL MEDICINE

## 2023-02-08 PROCEDURE — 3074F PR MOST RECENT SYSTOLIC BLOOD PRESSURE < 130 MM HG: ICD-10-PCS | Mod: CPTII,,, | Performed by: INTERNAL MEDICINE

## 2023-02-08 PROCEDURE — 99215 OFFICE O/P EST HI 40 MIN: CPT | Mod: PBBFAC,PO | Performed by: INTERNAL MEDICINE

## 2023-02-08 PROCEDURE — 3078F PR MOST RECENT DIASTOLIC BLOOD PRESSURE < 80 MM HG: ICD-10-PCS | Mod: CPTII,,, | Performed by: INTERNAL MEDICINE

## 2023-02-08 PROCEDURE — 1159F PR MEDICATION LIST DOCUMENTED IN MEDICAL RECORD: ICD-10-PCS | Mod: CPTII,,, | Performed by: INTERNAL MEDICINE

## 2023-02-08 PROCEDURE — 99215 PR OFFICE/OUTPT VISIT, EST, LEVL V, 40-54 MIN: ICD-10-PCS | Mod: S$PBB,,, | Performed by: INTERNAL MEDICINE

## 2023-02-08 PROCEDURE — 3078F DIAST BP <80 MM HG: CPT | Mod: CPTII,,, | Performed by: INTERNAL MEDICINE

## 2023-02-08 RX ORDER — DIPHENHYDRAMINE HYDROCHLORIDE 50 MG/ML
50 INJECTION INTRAMUSCULAR; INTRAVENOUS ONCE AS NEEDED
Status: CANCELLED | OUTPATIENT
Start: 2023-02-08

## 2023-02-08 RX ORDER — FAMOTIDINE 10 MG/ML
20 INJECTION INTRAVENOUS
Status: CANCELLED | OUTPATIENT
Start: 2023-02-08

## 2023-02-08 RX ORDER — SODIUM CHLORIDE 0.9 % (FLUSH) 0.9 %
10 SYRINGE (ML) INJECTION
Status: CANCELLED | OUTPATIENT
Start: 2023-02-08

## 2023-02-08 RX ORDER — HEPARIN 100 UNIT/ML
500 SYRINGE INTRAVENOUS
Status: CANCELLED | OUTPATIENT
Start: 2023-02-08

## 2023-02-08 RX ORDER — EPINEPHRINE 0.3 MG/.3ML
0.3 INJECTION SUBCUTANEOUS ONCE AS NEEDED
Status: CANCELLED | OUTPATIENT
Start: 2023-02-08

## 2023-02-08 NOTE — PROGRESS NOTES
Subjective:       Patient ID: Yvette Hutchinson is a 62 y.o. female.    Chief Complaint:  Patient known to me with CLL stage 0 recently diagnosed with lung cancer August 2022  Follow-up    Lung Cancer    Patient has chronic complains of chronic pain syndrome and COPD for which periodically she take steroids she is also on BuSpar has issues anxiety has required dilatation of esophageal strictures allergic rhinitis insomnia and history of B12 deficiency documented   Had CT chest done with pulmonary 7/22 showed mass bx done. 8/22    Oncology hx  Impression:ct chest 7/29/22     2.3 cm spiculated left upper lobe lung nodule highly suspicious for bronchogenic carcinoma.     New nonspecific 7 mm nodule in the right middle lobe; this appears more linear on the coronal images and may be a focus of scarring.     Additional stable lung nodules, mildly enlarged axillary lymph nodes, substernal thyroid nodule; these findings are likely benign given the interval stability.   LEFT LUNG MASS, CT-GUIDED BIOPSY:   Non-small cell lung carcinoma.   Comment:  The biopsy reveals invasive carcinoma with apparent glandular but   also vague squamoid features.  Tumor cells are diffusely positive with TTF-1   and focally positive with P40.  The histology differential includes   adenocarcinoma and adenosquamous carcinoma.  PD-L1 and templates NGS studies   are in progress.  The results will be issued separately.    October 3, 2022: Robotic left lobectomy T1c N1    Social history; patient is a smoker denies recreational alcohol use or drug use   Patient is currently on disability  She is allergic to the mask used during COVID pandemic she is also bothered by her mask with COPD    Family history suggestive of ovarian and breast cancer patient advised to see a  or seek   Review of patient's allergies indicates:  No Known Allergies  Medications have been reviewed  Current Outpatient Medications on File Prior to Visit    Medication Sig Dispense Refill    (Magic mouthwash) 1:1:1 diphenhydrAMINE(Benadryl) 12.5mg/5ml liq, aluminum & magnesium hydroxide-simethicone (Maalox), LIDOcaine viscous 2% Swish and spit 10 mLs 3 (three) times daily. for mouth sores 250 mL 0    acetaminophen (TYLENOL) 500 MG tablet Take 2 tablets (1,000 mg total) by mouth every 6 (six) hours as needed for Pain. 8 tablet 0    albuterol (PROVENTIL/VENTOLIN HFA) 90 mcg/actuation inhaler Inhale 2 puffs into the lungs every 6 (six) hours as needed for Wheezing or Shortness of Breath. Rescue 18 g 11    albuterol-ipratropium (DUO-NEB) 2.5 mg-0.5 mg/3 mL nebulizer solution Take 3 mLs by nebulization every 6 (six) hours as needed for Wheezing. Rescue 120 each 5    ALPRAZolam (XANAX) 0.5 MG tablet Take 1 tablet (0.5 mg total) by mouth 3 (three) times daily. for 10 days 30 tablet 0    benzocaine-menthoL 6-10 mg lozenge Take 1 lozenge by mouth every 2 (two) hours as needed for Pain. 18 tablet 0    buPROPion (WELLBUTRIN XL) 300 MG 24 hr tablet Take 1 tablet (300 mg total) by mouth once daily. 90 tablet 3    dexAMETHasone (DECADRON) 4 MG Tab Take 2 tablets (8 mg total) by mouth once daily. Take 2 tablets by mouth daily on days 2 3 and 4 of chemotherapy 120 tablet 0    diphenhydrAMINE-aluminum-magnesium hydroxide-simethicone-LIDOcaine HCl 2% Swish and spit 15 mLs every 4 (four) hours as needed (mouth sores). 100 each 2    docusate sodium (COLACE) 100 MG capsule Take 1 capsule (100 mg total) by mouth daily as needed for Constipation. 30 capsule 1    FLUoxetine 20 MG capsule Take 1 capsule (20 mg total) by mouth once daily. 30 capsule 11    fluticasone propionate (FLONASE) 50 mcg/actuation nasal spray 1 spray (50 mcg total) by Each Nostril route once daily. 16 g 3    fluticasone-salmeterol 230-21 mcg/dose (ADVAIR HFA) 230-21 mcg/actuation HFAA inhaler Inhale 2 puffs into the lungs 2 (two) times daily. Controller 12 g 5    gabapentin (NEURONTIN) 300 MG capsule Take 2 capsules  (600 mg total) by mouth 3 (three) times daily. 180 capsule 11    HYDROcodone-acetaminophen 5-300 mg Tab Take 1 tablet by mouth after meals as needed (pain). 40 each 0    levocetirizine (XYZAL) 5 MG tablet Take 1 tablet (5 mg total) by mouth every evening. 30 tablet 5    LIDOcaine (LIDODERM) 5 % Place 1 patch onto the skin once daily. Remove & Discard patch within 12 hours or as directed by MD Bloom patch 0    LIDOcaine (LIDODERM) 5 % Place 1 patch onto the skin once daily. Remove & Discard patch within 12 hours or as directed by MD Bloom patch 1    LIDOcaine-prilocaine (EMLA) cream Apply topically as needed (apply to port site 30 min before access). 30 g 0    montelukast (SINGULAIR) 10 mg tablet Take 10 mg by mouth once daily.      OLANZapine (ZYPREXA) 5 MG tablet Take 1 tablet (5 mg total) by mouth nightly. Take 1 tablet at bedtime on days 1-4 of each cycle of chemotherapy 30 tablet 2    ondansetron (ZOFRAN-ODT) 8 MG TbDL Take 1 tablet (8 mg total) by mouth every 12 (twelve) hours as needed (nausea). 60 tablet 1    oxyCODONE (ROXICODONE) 5 MG immediate release tablet Take 1 tablet (5 mg total) by mouth every 4 (four) hours as needed for Pain. 20 tablet 0    oxyCODONE (ROXICODONE) 5 MG immediate release tablet Take 1 tablet (5 mg total) by mouth every 6 (six) hours as needed for Pain. 60 tablet 0     Current Facility-Administered Medications on File Prior to Visit   Medication Dose Route Frequency Provider Last Rate Last Admin    0.9%  NaCl infusion   Intravenous Continuous Yuridia Cisneros MD   New Bag at 11/03/22 0701       REVIEW OF SYSTEMS:     S/p lobectomy left side, has draining tube+ with sanguinous fluid.    Wt Readings from Last 3 Encounters:   02/08/23 72 kg (158 lb 11.7 oz)   01/23/23 70.9 kg (156 lb 4.9 oz)   01/19/23 71.7 kg (158 lb)     Temp Readings from Last 3 Encounters:   02/08/23 97.7 °F (36.5 °C) (Temporal)   01/19/23 97.9 °F (36.6 °C)   01/18/23 97.2 °F (36.2 °C) (Temporal)     BP Readings from Last 3  Encounters:   02/08/23 113/67   01/23/23 115/73   01/19/23 (!) 96/58     Pulse Readings from Last 3 Encounters:   02/08/23 92   01/23/23 73   01/19/23 63     VITAL SIGNS:  as above   GENERAL: appears well-built, well-nourished.  No anxiety, no agitation, and in no distress.  Patient is awake, alert, oriented and cooperative.  HEENT:  Showed no congestion. Trachea is central no obvious icterus or pallor noted no hoarseness. no obvious JVD   NECK:  Supple.  No JVD. No obvious cervical submental or supraclavicular adenopathy.  RS: tube draining on left side. S/p lobectomy  ABDOMEN:  abdomen appears undistended.  EXTREMITIES:  Without edema.  NEUROLOGICAL:  The patient is appropriate, higher functions are normal.  No  obvious neurological deficits.  normal judgement normal thought content  No confusion, no speech impediment. Cranial nerves are intact and show no deficit. No gross motor deficits noted   SKIN MUSCULOSKELETAL: no joint or skeletal deformity, no clubbing of nails.  No visible rash ecchymosis or petechiae  Lab Results   Component Value Date    WBC 20.78 (H) 03/03/2020    HGB 14.9 03/03/2020    HCT 47.0 03/03/2020    MCV 99 (H) 03/03/2020     03/03/2020     Smudge cells presnt  CMP  Sodium   Date Value Ref Range Status   02/07/2023 139 136 - 145 mmol/L Final     Potassium   Date Value Ref Range Status   02/07/2023 4.0 3.5 - 5.1 mmol/L Final     Chloride   Date Value Ref Range Status   02/07/2023 105 95 - 110 mmol/L Final     CO2   Date Value Ref Range Status   02/07/2023 25 23 - 29 mmol/L Final     Glucose   Date Value Ref Range Status   02/07/2023 87 70 - 110 mg/dL Final     BUN   Date Value Ref Range Status   02/07/2023 9 8 - 23 mg/dL Final     Creatinine   Date Value Ref Range Status   02/07/2023 0.8 0.5 - 1.4 mg/dL Final     Calcium   Date Value Ref Range Status   02/07/2023 9.5 8.7 - 10.5 mg/dL Final     Total Protein   Date Value Ref Range Status   02/07/2023 7.2 6.0 - 8.4 g/dL Final     Albumin    Date Value Ref Range Status   02/07/2023 4.1 3.5 - 5.2 g/dL Final     Total Bilirubin   Date Value Ref Range Status   02/07/2023 0.5 0.1 - 1.0 mg/dL Final     Comment:     For infants and newborns, interpretation of results should be based  on gestational age, weight and in agreement with clinical  observations.    Premature Infant recommended reference ranges:  Up to 24 hours.............<8.0 mg/dL  Up to 48 hours............<12.0 mg/dL  3-5 days..................<15.0 mg/dL  6-29 days.................<15.0 mg/dL       Alkaline Phosphatase   Date Value Ref Range Status   02/07/2023 121 55 - 135 U/L Final     AST   Date Value Ref Range Status   02/07/2023 19 10 - 40 U/L Final     ALT   Date Value Ref Range Status   02/07/2023 20 10 - 44 U/L Final     Anion Gap   Date Value Ref Range Status   02/07/2023 9 8 - 16 mmol/L Final     eGFR if    Date Value Ref Range Status   06/13/2022 >60 >60 mL/min/1.73 m^2 Final     eGFR if non    Date Value Ref Range Status   06/13/2022 >60 >60 mL/min/1.73 m^2 Final     Comment:     Calculation used to obtain the estimated glomerular filtration  rate (eGFR) is the CKD-EPI equation.            2wk ago    Blood Interpretation A B cell lymphoproliferative disorder is present. see comment.    Comment: Interpreted by: Jeremias Sosa M.D., Signed on 08/06/2020 at 13:07   Blood Comment Flow cytometric analysis of  peripheral blood  detects a lambda  light chain   restricted B lymphocyte population showing expression of CD19 and dim CD20   with aberrant expression of CD5 (dim).  T lymphocytes are   immunophenotypically unremarkable.  The blasts gate is not increased.  The   findings are consistent with patient history of chronic lymphocytic   leukemia/small lymphocytic lymphoma.   Flow differential:  Lymphocytes 69.6%, Monocytes 2.8%, Granulocytes  27.5%,   Blast  0.1%, Debris/nRBC 0.1%,  Viability 97.5%.   10/3/22 robotic lef lung lobectomy  1. LYMPH NODE, LEVEL  5 FROZEN SECTION, EXCISION:   One lymph node with dominant necrotizing granuloma, negative for malignancy   (0/1).   2. LYMPH NODES, LEVEL 5 PERMANENT, EXCISION:   Two lymph nodes with multifocal necrotizing granulomas, negative for   malignancy (0/2).   3. LYMPH NODES, LEFT POSTERIOR LEVEL 10, EXCISION:   Five lymph nodes with multifocal necrotizing granulomas, negative for   malignancy (0/5).   4. LYMPH NODE, LEVEL 11, EXCISION:   One lymph node, negative for malignancy (0/1).   5. LYMPH NODES, LEVEL 12, EXCISION:   Three lymph nodes, one with single necrotizing granuloma, negative for   malignancy (0/3).   6. LYMPH NODES, LEVEL 12 NEAR UPPER DIVISION BRONCHUS, EXCISION:   Three lymph nodes with sinus histiocytosis, negative for malignancy (0/3).   7. LYMPH NODES, LEVEL 10 #2, EXCISION:   One of two lymph nodes positive for metastatic carcinoma (1/2).   Size of largest metastatic deposit:  8 mm.   Negative for extranodal extension.   8. LYMPH NODES, LEFT LEVEL 8, EXCISION:   Two lymph nodes with sinus histiocytosis, negative for malignancy (0/2).   9. LYMPH NODES, LEFT LEVEL 9, EXCISION:   Two lymph nodes, negative for malignancy (0/2).   10. 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   Surgical margins are negative for malignancy.   Background lung tissue with subpleural fibrosis, no evidence of granulomas.   Two hilar lymph nodes, negative for malignancy (0/2).   Coment (1-3, 5):  Prominent necrotizing granulomatous inflammation identified   is identified in multiple lymph nodes.  An AE1/AE3 cytokeratin immunostain   performed on the largest granuloma (part 3) reveals no evidence of occult   carcinoma.  GMS and AFB special stains are negative for fungal and acid-fast   organisms, respectively.  The necrotizing features are not typical of   sarcoidosis.  As such, other granulomatous processes may be considered   including secondary response to malignancy or infection.   Clinical   correlation is advised.   Comment (10):  Sections reveal invasive carcinoma involving lung tissue with   predominantly squamoid morphologic features including bright eosinophilic   cytoplasm and intracellular bridging.  There are not significant keratinizing   features.  Some areas show basaloid features.  There is also regional luminal   features including cribriforming and vague glandular structures containing   mucin.  Tumor cells are diffusely positive with P40 and regionally positive   with TTF-1.  Mucicarmine special stain highlights mucin producing luminal   spaces. Overall tumor appearance and staining profile supports adenosquamous   carcinoma, a variant of non-small cell lung carcinoma.   [facility] SURGICAL PATHOLOGY CANCER CASE SUMMARY:  LUNG   Procedure:  Lobectomy   Specimen laterality:  Left   Tumor focality:  Single focus   Tumor site: Upper lobe of lung   Tumor size:  2.2 cm   Histologic type:  Adenosquamous carcinoma   Spread through airspaces:  Not identified   Visceral pleural invasion:  Not identified   Direct invasion of adjacent structures: Not applicable   Lymphovascular invasion:  Not identified   Margins:  All margins negative for invasive carcinoma     Closest margin to invasive carcinoma:  Bronchial (3.0 cm)   Regional lymph nodes:  Tumor present in regional lymph node     Number of lymph nodes with tumor:  1     Scott sites with tumor:  Left level 10 (10L)     Extranodal extension:  Not identified     Number of lymph nodes examined:  23   Pathologic stage classification (pTNM): pT1c pN1   Special studies:  Performed on previous biopsy if additional studies needed   there may be performed on tissue blockd 10G or 10H.      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 Due to adenosq histology chose carbo/ taxol x 4 cycles tecentriq maintanence x 1 year  pt is here for cycle 4 will discuss maintainenec at tumor board  Has port   Proceed with chemo   Had chemo class  See me for next cycle cbc,cmp  CLL   stage 0 will confirmed with flow cytometry  NTD   she has no other cytopenias or lymphadenopathy or B symptoms patient can be observed      Continue with chronic pain syndrome and COPD management advised to stop smoking if at all possible 10 pills of hydrocodoen given to pt, she needs to follow with pain mx      Advised COVID precaution due to her lung situation she will be a high risk patient

## 2023-02-08 NOTE — Clinical Note
Ok for chemo, please add to tumor board as sson as possible, ask matt which comes firt, see me as if she is getting chemo next cycle with cbc, cmp

## 2023-02-09 ENCOUNTER — INFUSION (OUTPATIENT)
Dept: INFUSION THERAPY | Facility: HOSPITAL | Age: 63
End: 2023-02-09
Attending: INTERNAL MEDICINE
Payer: COMMERCIAL

## 2023-02-09 VITALS
HEIGHT: 63 IN | RESPIRATION RATE: 17 BRPM | HEART RATE: 86 BPM | SYSTOLIC BLOOD PRESSURE: 109 MMHG | OXYGEN SATURATION: 98 % | TEMPERATURE: 98 F | WEIGHT: 159.13 LBS | BODY MASS INDEX: 28.2 KG/M2 | DIASTOLIC BLOOD PRESSURE: 63 MMHG

## 2023-02-09 DIAGNOSIS — C34.01 MALIGNANT NEOPLASM OF HILUS OF RIGHT LUNG: Primary | ICD-10-CM

## 2023-02-09 DIAGNOSIS — C34.90 NON-SMALL CELL LUNG CANCER, UNSPECIFIED LATERALITY: ICD-10-CM

## 2023-02-09 PROCEDURE — 96375 TX/PRO/DX INJ NEW DRUG ADDON: CPT

## 2023-02-09 PROCEDURE — 96417 CHEMO IV INFUS EACH ADDL SEQ: CPT

## 2023-02-09 PROCEDURE — 96415 CHEMO IV INFUSION ADDL HR: CPT

## 2023-02-09 PROCEDURE — 25000003 PHARM REV CODE 250: Performed by: INTERNAL MEDICINE

## 2023-02-09 PROCEDURE — A4216 STERILE WATER/SALINE, 10 ML: HCPCS | Performed by: INTERNAL MEDICINE

## 2023-02-09 PROCEDURE — 63600175 PHARM REV CODE 636 W HCPCS: Mod: JG | Performed by: INTERNAL MEDICINE

## 2023-02-09 PROCEDURE — 96413 CHEMO IV INFUSION 1 HR: CPT

## 2023-02-09 PROCEDURE — 96367 TX/PROPH/DG ADDL SEQ IV INF: CPT

## 2023-02-09 RX ORDER — SODIUM CHLORIDE 0.9 % (FLUSH) 0.9 %
10 SYRINGE (ML) INJECTION
Status: DISCONTINUED | OUTPATIENT
Start: 2023-02-09 | End: 2023-02-09 | Stop reason: HOSPADM

## 2023-02-09 RX ORDER — DIPHENHYDRAMINE HYDROCHLORIDE 50 MG/ML
50 INJECTION INTRAMUSCULAR; INTRAVENOUS ONCE AS NEEDED
Status: DISCONTINUED | OUTPATIENT
Start: 2023-02-09 | End: 2023-02-09 | Stop reason: HOSPADM

## 2023-02-09 RX ORDER — EPINEPHRINE 0.3 MG/.3ML
0.3 INJECTION SUBCUTANEOUS ONCE AS NEEDED
Status: DISCONTINUED | OUTPATIENT
Start: 2023-02-09 | End: 2023-02-09 | Stop reason: HOSPADM

## 2023-02-09 RX ORDER — HEPARIN 100 UNIT/ML
500 SYRINGE INTRAVENOUS
Status: DISCONTINUED | OUTPATIENT
Start: 2023-02-09 | End: 2023-02-09 | Stop reason: HOSPADM

## 2023-02-09 RX ORDER — FAMOTIDINE 10 MG/ML
20 INJECTION INTRAVENOUS
Status: COMPLETED | OUTPATIENT
Start: 2023-02-09 | End: 2023-02-09

## 2023-02-09 RX ADMIN — PACLITAXEL 360 MG: 6 INJECTION, SOLUTION, CONCENTRATE INTRAVENOUS at 10:02

## 2023-02-09 RX ADMIN — HEPARIN 500 UNITS: 100 SYRINGE at 02:02

## 2023-02-09 RX ADMIN — SODIUM CHLORIDE: 0.9 INJECTION, SOLUTION INTRAVENOUS at 10:02

## 2023-02-09 RX ADMIN — DIPHENHYDRAMINE HYDROCHLORIDE 50 MG: 50 INJECTION INTRAMUSCULAR; INTRAVENOUS at 09:02

## 2023-02-09 RX ADMIN — SODIUM CHLORIDE, PRESERVATIVE FREE 10 ML: 5 INJECTION INTRAVENOUS at 02:02

## 2023-02-09 RX ADMIN — FAMOTIDINE 20 MG: 10 INJECTION INTRAVENOUS at 09:02

## 2023-02-09 RX ADMIN — PALONOSETRON HYDROCHLORIDE 0.25 MG: 0.25 INJECTION INTRAVENOUS at 10:02

## 2023-02-09 RX ADMIN — CARBOPLATIN 640 MG: 10 INJECTION, SOLUTION INTRAVENOUS at 01:02

## 2023-02-09 RX ADMIN — APREPITANT 130 MG: 130 INJECTION, EMULSION INTRAVENOUS at 09:02

## 2023-02-13 DIAGNOSIS — J44.9 CHRONIC OBSTRUCTIVE PULMONARY DISEASE, UNSPECIFIED COPD TYPE: ICD-10-CM

## 2023-02-13 NOTE — TELEPHONE ENCOUNTER
Medication requesting - Advair /21 MCG   Last Rx-01/08/2023 quanity -12 refill-0   Last office visit -10/25/2022  Next Office Visit-04/27/2023

## 2023-02-14 RX ORDER — FLUTICASONE PROPIONATE AND SALMETEROL XINAFOATE 230; 21 UG/1; UG/1
2 AEROSOL, METERED RESPIRATORY (INHALATION) 2 TIMES DAILY
Qty: 12 G | Refills: 5 | Status: SHIPPED | OUTPATIENT
Start: 2023-02-14 | End: 2023-10-23 | Stop reason: SDUPTHER

## 2023-02-28 ENCOUNTER — PATIENT MESSAGE (OUTPATIENT)
Dept: HEMATOLOGY/ONCOLOGY | Facility: CLINIC | Age: 63
End: 2023-02-28
Payer: MEDICAID

## 2023-02-28 ENCOUNTER — TELEPHONE (OUTPATIENT)
Dept: HEMATOLOGY/ONCOLOGY | Facility: CLINIC | Age: 63
End: 2023-02-28
Payer: MEDICAID

## 2023-02-28 ENCOUNTER — LAB VISIT (OUTPATIENT)
Dept: LAB | Facility: HOSPITAL | Age: 63
End: 2023-02-28
Attending: INTERNAL MEDICINE
Payer: MEDICAID

## 2023-02-28 DIAGNOSIS — C91.10 CLL (CHRONIC LYMPHOCYTIC LEUKEMIA): ICD-10-CM

## 2023-02-28 LAB
ALBUMIN SERPL BCP-MCNC: 4.1 G/DL (ref 3.5–5.2)
ALP SERPL-CCNC: 126 U/L (ref 55–135)
ALT SERPL W/O P-5'-P-CCNC: 19 U/L (ref 10–44)
ANION GAP SERPL CALC-SCNC: 10 MMOL/L (ref 8–16)
ANISOCYTOSIS BLD QL SMEAR: SLIGHT
AST SERPL-CCNC: 18 U/L (ref 10–40)
BASOPHILS # BLD AUTO: ABNORMAL K/UL (ref 0–0.2)
BASOPHILS NFR BLD: 0 % (ref 0–1.9)
BILIRUB SERPL-MCNC: 0.3 MG/DL (ref 0.1–1)
BUN SERPL-MCNC: 12 MG/DL (ref 8–23)
CALCIUM SERPL-MCNC: 9.8 MG/DL (ref 8.7–10.5)
CHLORIDE SERPL-SCNC: 104 MMOL/L (ref 95–110)
CO2 SERPL-SCNC: 26 MMOL/L (ref 23–29)
CREAT SERPL-MCNC: 0.9 MG/DL (ref 0.5–1.4)
DIFFERENTIAL METHOD: ABNORMAL
EOSINOPHIL # BLD AUTO: ABNORMAL K/UL (ref 0–0.5)
EOSINOPHIL NFR BLD: 0 % (ref 0–8)
ERYTHROCYTE [DISTWIDTH] IN BLOOD BY AUTOMATED COUNT: 19 % (ref 11.5–14.5)
EST. GFR  (NO RACE VARIABLE): >60 ML/MIN/1.73 M^2
GLUCOSE SERPL-MCNC: 96 MG/DL (ref 70–110)
HCT VFR BLD AUTO: 36.6 % (ref 37–48.5)
HGB BLD-MCNC: 12.3 G/DL (ref 12–16)
IMM GRANULOCYTES # BLD AUTO: ABNORMAL K/UL (ref 0–0.04)
IMM GRANULOCYTES NFR BLD AUTO: ABNORMAL % (ref 0–0.5)
LYMPHOCYTES # BLD AUTO: ABNORMAL K/UL (ref 1–4.8)
LYMPHOCYTES NFR BLD: 82 % (ref 18–48)
MCH RBC QN AUTO: 33.9 PG (ref 27–31)
MCHC RBC AUTO-ENTMCNC: 33.6 G/DL (ref 32–36)
MCV RBC AUTO: 101 FL (ref 82–98)
MONOCYTES # BLD AUTO: ABNORMAL K/UL (ref 0.3–1)
MONOCYTES NFR BLD: 0 % (ref 4–15)
NEUTROPHILS NFR BLD: 18 % (ref 38–73)
NRBC BLD-RTO: 0 /100 WBC
PLATELET # BLD AUTO: 198 K/UL (ref 150–450)
PLATELET BLD QL SMEAR: ABNORMAL
PMV BLD AUTO: 9.1 FL (ref 9.2–12.9)
POTASSIUM SERPL-SCNC: 4.2 MMOL/L (ref 3.5–5.1)
PROT SERPL-MCNC: 7.2 G/DL (ref 6–8.4)
RBC # BLD AUTO: 3.63 M/UL (ref 4–5.4)
SODIUM SERPL-SCNC: 140 MMOL/L (ref 136–145)
WBC # BLD AUTO: 24.45 K/UL (ref 3.9–12.7)

## 2023-02-28 PROCEDURE — 36415 COLL VENOUS BLD VENIPUNCTURE: CPT | Performed by: INTERNAL MEDICINE

## 2023-02-28 PROCEDURE — 80053 COMPREHEN METABOLIC PANEL: CPT | Performed by: INTERNAL MEDICINE

## 2023-02-28 PROCEDURE — 85007 BL SMEAR W/DIFF WBC COUNT: CPT | Performed by: INTERNAL MEDICINE

## 2023-02-28 PROCEDURE — 85027 COMPLETE CBC AUTOMATED: CPT | Performed by: INTERNAL MEDICINE

## 2023-03-01 ENCOUNTER — OFFICE VISIT (OUTPATIENT)
Dept: HEMATOLOGY/ONCOLOGY | Facility: CLINIC | Age: 63
End: 2023-03-01
Payer: MEDICAID

## 2023-03-01 VITALS
WEIGHT: 159.38 LBS | TEMPERATURE: 97 F | HEART RATE: 83 BPM | RESPIRATION RATE: 14 BRPM | DIASTOLIC BLOOD PRESSURE: 62 MMHG | HEIGHT: 63 IN | SYSTOLIC BLOOD PRESSURE: 113 MMHG | OXYGEN SATURATION: 98 % | BODY MASS INDEX: 28.24 KG/M2

## 2023-03-01 DIAGNOSIS — C91.10 CLL (CHRONIC LYMPHOCYTIC LEUKEMIA): ICD-10-CM

## 2023-03-01 DIAGNOSIS — C34.01 MALIGNANT NEOPLASM OF HILUS OF RIGHT LUNG: Primary | ICD-10-CM

## 2023-03-01 PROCEDURE — 1160F PR REVIEW ALL MEDS BY PRESCRIBER/CLIN PHARMACIST DOCUMENTED: ICD-10-PCS | Mod: CPTII,,, | Performed by: INTERNAL MEDICINE

## 2023-03-01 PROCEDURE — 99215 OFFICE O/P EST HI 40 MIN: CPT | Mod: PBBFAC,PO | Performed by: INTERNAL MEDICINE

## 2023-03-01 PROCEDURE — 1159F MED LIST DOCD IN RCRD: CPT | Mod: CPTII,,, | Performed by: INTERNAL MEDICINE

## 2023-03-01 PROCEDURE — 3008F PR BODY MASS INDEX (BMI) DOCUMENTED: ICD-10-PCS | Mod: CPTII,,, | Performed by: INTERNAL MEDICINE

## 2023-03-01 PROCEDURE — 99215 OFFICE O/P EST HI 40 MIN: CPT | Mod: S$PBB,,, | Performed by: INTERNAL MEDICINE

## 2023-03-01 PROCEDURE — 3078F DIAST BP <80 MM HG: CPT | Mod: CPTII,,, | Performed by: INTERNAL MEDICINE

## 2023-03-01 PROCEDURE — 1159F PR MEDICATION LIST DOCUMENTED IN MEDICAL RECORD: ICD-10-PCS | Mod: CPTII,,, | Performed by: INTERNAL MEDICINE

## 2023-03-01 PROCEDURE — 3008F BODY MASS INDEX DOCD: CPT | Mod: CPTII,,, | Performed by: INTERNAL MEDICINE

## 2023-03-01 PROCEDURE — 3078F PR MOST RECENT DIASTOLIC BLOOD PRESSURE < 80 MM HG: ICD-10-PCS | Mod: CPTII,,, | Performed by: INTERNAL MEDICINE

## 2023-03-01 PROCEDURE — 99215 PR OFFICE/OUTPT VISIT, EST, LEVL V, 40-54 MIN: ICD-10-PCS | Mod: S$PBB,,, | Performed by: INTERNAL MEDICINE

## 2023-03-01 PROCEDURE — 3074F SYST BP LT 130 MM HG: CPT | Mod: CPTII,,, | Performed by: INTERNAL MEDICINE

## 2023-03-01 PROCEDURE — 99999 PR PBB SHADOW E&M-EST. PATIENT-LVL V: ICD-10-PCS | Mod: PBBFAC,,, | Performed by: INTERNAL MEDICINE

## 2023-03-01 PROCEDURE — 99999 PR PBB SHADOW E&M-EST. PATIENT-LVL V: CPT | Mod: PBBFAC,,, | Performed by: INTERNAL MEDICINE

## 2023-03-01 PROCEDURE — 3074F PR MOST RECENT SYSTOLIC BLOOD PRESSURE < 130 MM HG: ICD-10-PCS | Mod: CPTII,,, | Performed by: INTERNAL MEDICINE

## 2023-03-01 PROCEDURE — 1160F RVW MEDS BY RX/DR IN RCRD: CPT | Mod: CPTII,,, | Performed by: INTERNAL MEDICINE

## 2023-03-01 RX ORDER — EPINEPHRINE 0.3 MG/.3ML
0.3 INJECTION SUBCUTANEOUS ONCE AS NEEDED
Status: CANCELLED | OUTPATIENT
Start: 2023-03-02

## 2023-03-01 RX ORDER — DIPHENHYDRAMINE HYDROCHLORIDE 50 MG/ML
50 INJECTION INTRAMUSCULAR; INTRAVENOUS ONCE AS NEEDED
Status: CANCELLED | OUTPATIENT
Start: 2023-03-02

## 2023-03-01 RX ORDER — FAMOTIDINE 10 MG/ML
20 INJECTION INTRAVENOUS
Status: CANCELLED | OUTPATIENT
Start: 2023-03-02

## 2023-03-01 RX ORDER — SODIUM CHLORIDE 0.9 % (FLUSH) 0.9 %
10 SYRINGE (ML) INJECTION
Status: CANCELLED | OUTPATIENT
Start: 2023-03-02

## 2023-03-01 RX ORDER — HEPARIN 100 UNIT/ML
500 SYRINGE INTRAVENOUS
Status: CANCELLED | OUTPATIENT
Start: 2023-03-02

## 2023-03-01 NOTE — PROGRESS NOTES
Service Date:  3/1/23    Chief Complaint:  Non-small cell lung cancer     Yvette Hutchinson is a 62 y.o. female with adenosquamous s/p robotic lobectomy 10/3/22 T1cN1 stage IIB    Patient doing okay.  Mostly just has nausea with her chemo    Review of Systems   Constitutional: Negative.    HENT: Negative.     Eyes: Negative.    Respiratory: Negative.     Cardiovascular: Negative.    Gastrointestinal: Negative.    Endocrine: Negative.    Genitourinary: Negative.    Musculoskeletal: Negative.    Integumentary:  Negative.   Neurological: Negative.    Hematological: Negative.    Psychiatric/Behavioral: Negative.        Current Outpatient Medications   Medication Instructions    (Magic mouthwash) 1:1:1 diphenhydrAMINE(Benadryl) 12.5mg/5ml liq, aluminum & magnesium hydroxide-simethicone (Maalox), LIDOcaine viscous 2% 10 mLs, Swish & Spit, 3 times daily, for mouth sores    acetaminophen (TYLENOL) 1,000 mg, Oral, Every 6 hours PRN    albuterol (PROVENTIL/VENTOLIN HFA) 90 mcg/actuation inhaler 2 puffs, Inhalation, Every 6 hours PRN, Rescue    albuterol-ipratropium (DUO-NEB) 2.5 mg-0.5 mg/3 mL nebulizer solution 3 mLs, Nebulization, Every 6 hours PRN, Rescue    ALPRAZolam (XANAX) 0.5 mg, Oral, 3 times daily    benzocaine-menthoL 6-10 mg lozenge 1 lozenge, Oral, Every 2 hours PRN    buPROPion (WELLBUTRIN XL) 300 mg, Oral, Daily    dexAMETHasone (DECADRON) 8 mg, Oral, Daily, Take 2 tablets by mouth daily on days 2 3 and 4 of chemotherapy    diphenhydrAMINE-aluminum-magnesium hydroxide-simethicone-LIDOcaine HCl 2% 15 mLs, Swish & Spit, Every 4 hours PRN    docusate sodium (COLACE) 100 mg, Oral, Daily PRN    FLUoxetine 20 mg, Oral, Daily    fluticasone propionate (FLONASE) 50 mcg, Each Nostril, Daily    fluticasone-salmeterol 230-21 mcg/dose (ADVAIR HFA) 230-21 mcg/actuation HFAA inhaler 2 puffs, Inhalation, 2 times daily, Controller    gabapentin (NEURONTIN) 600 mg, Oral, 3 times daily    HYDROcodone-acetaminophen 5-300 mg  Tab 1 tablet, Oral, 3 times daily after meals PRN    levocetirizine (XYZAL) 5 mg, Oral, Nightly    LIDOcaine (LIDODERM) 5 % 1 patch, Transdermal, Daily, Remove & Discard patch within 12 hours or as directed by MD    LIDOcaine (LIDODERM) 5 % 1 patch, Transdermal, Daily, Remove & Discard patch within 12 hours or as directed by MD    LIDOcaine-prilocaine (EMLA) cream Topical (Top), As needed (PRN)    montelukast (SINGULAIR) 10 mg, Oral, Daily    OLANZapine (ZYPREXA) 5 mg, Oral, Nightly, Take 1 tablet at bedtime on days 1-4 of each cycle of chemotherapy    ondansetron (ZOFRAN-ODT) 8 mg, Oral, Every 12 hours PRN    oxyCODONE (ROXICODONE) 5 mg, Oral, Every 4 hours PRN    oxyCODONE (ROXICODONE) 5 mg, Oral, Every 6 hours PRN        Past Medical History:   Diagnosis Date    Arthritis     Depression     GERD (gastroesophageal reflux disease)     Pneumonia of left lung due to infectious organism 03/05/2020        Past Surgical History:   Procedure Laterality Date    INJECTION OF ANESTHETIC AGENT AROUND MULTIPLE INTERCOSTAL NERVES Left 10/3/2022    Procedure: BLOCK, NERVE, INTERCOSTAL, 2 OR MORE;  Surgeon: Evelio Kaplan MD;  Location: [facility] OR 2ND FLR;  Service: Thoracic;  Laterality: Left;    INSERTION OF TUNNELED CENTRAL VENOUS CATHETER (CVC) WITH SUBCUTANEOUS PORT Right 11/3/2022    Procedure: [id number], Right or Left Neck or Chest;  Surgeon: Mini Acevedo MD;  Location: [facility] OR [facility] FLR;  Service: General;  Laterality: Right;    ROBOT-ASSISTED LAPAROSCOPIC LYMPHADENECTOMY USING DA MONIQUE XI Left 10/3/2022    Procedure: XI ROBOTIC LYMPHADENECTOMY;  Surgeon: Evelio Kaplan MD;  Location: [facility] OR 2ND FLR;  Service: Thoracic;  Laterality: Left;    TONSILLECTOMY      XI ROBOTIC RATS,WITH LOBECTOMY,LUNG Left 10/3/2022    Procedure: XI ROBOTIC RATS,WITH LOBECTOMY,LUNG;  Surgeon: Evelio Kaplan MD;  Location: [facility] OR 2ND FLR;  Service: Thoracic;  Laterality: Left;        Family History   Problem  "Relation Age of Onset    Ovarian cancer Sister     Breast cancer Cousin        Social History     Tobacco Use    Smoking status: Some Days     Packs/day: 0.15     Types: Cigarettes    Smokeless tobacco: Never    Tobacco comments:     reports one cigarette every now and then   Substance Use Topics    Alcohol use: Yes     Comment: rarely    Drug use: Yes     Types: Marijuana         Vitals:    03/01/23 0936   BP: 113/62   Pulse: 83   Resp: 14   Temp: 97.1 °F (36.2 °C)        Physical Exam:  /62 (BP Location: Right arm, Patient Position: Sitting, BP Method: Medium (Automatic))   Pulse 83   Temp 97.1 °F (36.2 °C) (Temporal)   Resp 14   Ht 5' 3" (1.6 m)   Wt 72.3 kg (159 lb 6.3 oz)   LMP 01/13/2010 (Approximate)   SpO2 98%   BMI 28.24 kg/m²     Physical Exam  Constitutional:       Appearance: Normal appearance.   HENT:      Head: Normocephalic and atraumatic.      Nose: Nose normal.      Mouth/Throat:      Mouth: Mucous membranes are moist.      Pharynx: Oropharynx is clear.   Eyes:      Conjunctiva/sclera: Conjunctivae normal.   Cardiovascular:      Rate and Rhythm: Normal rate and regular rhythm.      Heart sounds: Normal heart sounds.   Pulmonary:      Effort: Pulmonary effort is normal.      Breath sounds: Normal breath sounds.   Abdominal:      General: Abdomen is flat. Bowel sounds are normal.      Palpations: Abdomen is soft.   Musculoskeletal:         General: Normal range of motion.      Cervical back: Normal range of motion and neck supple.   Skin:     General: Skin is warm and dry.   Neurological:      General: No focal deficit present.      Mental Status: She is alert and oriented to person, place, and time. Mental status is at baseline.   Psychiatric:         Mood and Affect: Mood normal.        Labs:  Lab Results   Component Value Date    WBC 24.45 (H) 02/28/2023    RBC 3.63 (L) 02/28/2023    HGB 12.3 02/28/2023    HCT 36.6 (L) 02/28/2023     (H) 02/28/2023    MCH 33.9 (H) 02/28/2023    " MCHC 33.6 02/28/2023    RDW 19.0 (H) 02/28/2023     02/28/2023    MPV 9.1 (L) 02/28/2023    GRAN 18.0 (L) 02/28/2023    LYMPH CANCELED 02/28/2023    LYMPH 82.0 (H) 02/28/2023    MONO CANCELED 02/28/2023    MONO 0.0 (L) 02/28/2023    EOS CANCELED 02/28/2023    BASO CANCELED 02/28/2023    EOSINOPHIL 0.0 02/28/2023    BASOPHIL 0.0 02/28/2023     Sodium   Date Value Ref Range Status   02/28/2023 140 136 - 145 mmol/L Final     Potassium   Date Value Ref Range Status   02/28/2023 4.2 3.5 - 5.1 mmol/L Final     Chloride   Date Value Ref Range Status   02/28/2023 104 95 - 110 mmol/L Final     CO2   Date Value Ref Range Status   02/28/2023 26 23 - 29 mmol/L Final     Glucose   Date Value Ref Range Status   02/28/2023 96 70 - 110 mg/dL Final     BUN   Date Value Ref Range Status   02/28/2023 12 8 - 23 mg/dL Final     Creatinine   Date Value Ref Range Status   02/28/2023 0.9 0.5 - 1.4 mg/dL Final     Calcium   Date Value Ref Range Status   02/28/2023 9.8 8.7 - 10.5 mg/dL Final     Total Protein   Date Value Ref Range Status   02/28/2023 7.2 6.0 - 8.4 g/dL Final     Albumin   Date Value Ref Range Status   02/28/2023 4.1 3.5 - 5.2 g/dL Final     Total Bilirubin   Date Value Ref Range Status   02/28/2023 0.3 0.1 - 1.0 mg/dL Final     Comment:     For infants and newborns, interpretation of results should be based  on gestational age, weight and in agreement with clinical  observations.    Premature Infant recommended reference ranges:  Up to 24 hours.............<8.0 mg/dL  Up to 48 hours............<12.0 mg/dL  3-5 days..................<15.0 mg/dL  6-29 days.................<15.0 mg/dL       Alkaline Phosphatase   Date Value Ref Range Status   02/28/2023 126 55 - 135 U/L Final     AST   Date Value Ref Range Status   02/28/2023 18 10 - 40 U/L Final     ALT   Date Value Ref Range Status   02/28/2023 19 10 - 44 U/L Final     Anion Gap   Date Value Ref Range Status   02/28/2023 10 8 - 16 mmol/L Final     eGFR if African  American   Date Value Ref Range Status   06/13/2022 >60 >60 mL/min/1.73 m^2 Final     eGFR if non    Date Value Ref Range Status   06/13/2022 >60 >60 mL/min/1.73 m^2 Final     Comment:     Calculation used to obtain the estimated glomerular filtration  rate (eGFR) is the CKD-EPI equation.          A/P:    Non-small cell lung cancer  -T1cN1  - 5% PDL-1  - has had 4 cycles of carbotaxol.  Here for cycle 5.  Okay to proceed.  -plan is for patient to eventually switch to immunotherapy.  Plan was also to present patient tumor board given the unusual histology.  I will have her return to clinic in 1 week to discuss the plan with Dr. Lee.  At that time, she will possibly switch the patient over to maintenance Tecentriq.    CLL   -stage 0   -on observation      Aurash Khoobehi, MD  Hematology and Oncology

## 2023-03-02 ENCOUNTER — INFUSION (OUTPATIENT)
Dept: INFUSION THERAPY | Facility: HOSPITAL | Age: 63
End: 2023-03-02
Attending: INTERNAL MEDICINE
Payer: MEDICAID

## 2023-03-02 VITALS
OXYGEN SATURATION: 94 % | DIASTOLIC BLOOD PRESSURE: 61 MMHG | TEMPERATURE: 98 F | RESPIRATION RATE: 18 BRPM | HEART RATE: 84 BPM | SYSTOLIC BLOOD PRESSURE: 103 MMHG | HEIGHT: 63 IN | WEIGHT: 160.31 LBS | BODY MASS INDEX: 28.41 KG/M2

## 2023-03-02 DIAGNOSIS — C34.90 NON-SMALL CELL LUNG CANCER, UNSPECIFIED LATERALITY: ICD-10-CM

## 2023-03-02 DIAGNOSIS — C34.01 MALIGNANT NEOPLASM OF HILUS OF RIGHT LUNG: Primary | ICD-10-CM

## 2023-03-02 PROCEDURE — 96367 TX/PROPH/DG ADDL SEQ IV INF: CPT

## 2023-03-02 PROCEDURE — 25000003 PHARM REV CODE 250: Performed by: INTERNAL MEDICINE

## 2023-03-02 PROCEDURE — A4216 STERILE WATER/SALINE, 10 ML: HCPCS | Performed by: INTERNAL MEDICINE

## 2023-03-02 PROCEDURE — 96375 TX/PRO/DX INJ NEW DRUG ADDON: CPT

## 2023-03-02 PROCEDURE — 63600175 PHARM REV CODE 636 W HCPCS: Mod: JZ,JG | Performed by: INTERNAL MEDICINE

## 2023-03-02 PROCEDURE — 96415 CHEMO IV INFUSION ADDL HR: CPT

## 2023-03-02 PROCEDURE — 96417 CHEMO IV INFUS EACH ADDL SEQ: CPT

## 2023-03-02 PROCEDURE — 96413 CHEMO IV INFUSION 1 HR: CPT

## 2023-03-02 RX ORDER — FAMOTIDINE 10 MG/ML
20 INJECTION INTRAVENOUS
Status: COMPLETED | OUTPATIENT
Start: 2023-03-02 | End: 2023-03-02

## 2023-03-02 RX ORDER — DIPHENHYDRAMINE HYDROCHLORIDE 50 MG/ML
50 INJECTION INTRAMUSCULAR; INTRAVENOUS ONCE AS NEEDED
Status: DISCONTINUED | OUTPATIENT
Start: 2023-03-02 | End: 2023-03-02 | Stop reason: HOSPADM

## 2023-03-02 RX ORDER — EPINEPHRINE 0.3 MG/.3ML
0.3 INJECTION SUBCUTANEOUS ONCE AS NEEDED
Status: DISCONTINUED | OUTPATIENT
Start: 2023-03-02 | End: 2023-03-02 | Stop reason: HOSPADM

## 2023-03-02 RX ORDER — HEPARIN 100 UNIT/ML
500 SYRINGE INTRAVENOUS
Status: DISCONTINUED | OUTPATIENT
Start: 2023-03-02 | End: 2023-03-02 | Stop reason: HOSPADM

## 2023-03-02 RX ORDER — SODIUM CHLORIDE 0.9 % (FLUSH) 0.9 %
10 SYRINGE (ML) INJECTION
Status: DISCONTINUED | OUTPATIENT
Start: 2023-03-02 | End: 2023-03-02 | Stop reason: HOSPADM

## 2023-03-02 RX ADMIN — CARBOPLATIN 585 MG: 600 INJECTION, SOLUTION INTRAVENOUS at 12:03

## 2023-03-02 RX ADMIN — PACLITAXEL 360 MG: 6 INJECTION, SOLUTION, CONCENTRATE INTRAVENOUS at 09:03

## 2023-03-02 RX ADMIN — FAMOTIDINE 20 MG: 10 INJECTION INTRAVENOUS at 08:03

## 2023-03-02 RX ADMIN — PALONOSETRON HYDROCHLORIDE 0.25 MG: 0.25 INJECTION INTRAVENOUS at 08:03

## 2023-03-02 RX ADMIN — HEPARIN 500 UNITS: 100 SYRINGE at 01:03

## 2023-03-02 RX ADMIN — DIPHENHYDRAMINE HYDROCHLORIDE 50 MG: 50 INJECTION INTRAMUSCULAR; INTRAVENOUS at 09:03

## 2023-03-02 RX ADMIN — SODIUM CHLORIDE: 0.9 INJECTION, SOLUTION INTRAVENOUS at 08:03

## 2023-03-02 RX ADMIN — SODIUM CHLORIDE, PRESERVATIVE FREE 10 ML: 5 INJECTION INTRAVENOUS at 01:03

## 2023-03-02 RX ADMIN — APREPITANT 130 MG: 130 INJECTION, EMULSION INTRAVENOUS at 08:03

## 2023-03-03 ENCOUNTER — PATIENT MESSAGE (OUTPATIENT)
Dept: HEMATOLOGY/ONCOLOGY | Facility: CLINIC | Age: 63
End: 2023-03-03
Payer: MEDICAID

## 2023-03-06 ENCOUNTER — OFFICE VISIT (OUTPATIENT)
Dept: HEMATOLOGY/ONCOLOGY | Facility: CLINIC | Age: 63
End: 2023-03-06
Payer: MEDICAID

## 2023-03-06 VITALS
TEMPERATURE: 97 F | HEART RATE: 74 BPM | OXYGEN SATURATION: 99 % | SYSTOLIC BLOOD PRESSURE: 124 MMHG | DIASTOLIC BLOOD PRESSURE: 61 MMHG | BODY MASS INDEX: 28.56 KG/M2 | HEIGHT: 63 IN | RESPIRATION RATE: 10 BRPM | WEIGHT: 161.19 LBS

## 2023-03-06 DIAGNOSIS — C34.01 MALIGNANT NEOPLASM OF HILUS OF RIGHT LUNG: Primary | ICD-10-CM

## 2023-03-06 DIAGNOSIS — E53.8 B12 DEFICIENCY: ICD-10-CM

## 2023-03-06 DIAGNOSIS — J41.0 SIMPLE CHRONIC BRONCHITIS: ICD-10-CM

## 2023-03-06 DIAGNOSIS — C91.10 CLL (CHRONIC LYMPHOCYTIC LEUKEMIA): ICD-10-CM

## 2023-03-06 DIAGNOSIS — G89.4 CHRONIC PAIN SYNDROME: ICD-10-CM

## 2023-03-06 DIAGNOSIS — C34.10 MALIGNANT NEOPLASM OF UPPER LOBE OF LUNG, UNSPECIFIED LATERALITY: ICD-10-CM

## 2023-03-06 PROCEDURE — 99215 OFFICE O/P EST HI 40 MIN: CPT | Mod: S$PBB,,, | Performed by: INTERNAL MEDICINE

## 2023-03-06 PROCEDURE — 3074F SYST BP LT 130 MM HG: CPT | Mod: CPTII,,, | Performed by: INTERNAL MEDICINE

## 2023-03-06 PROCEDURE — 3008F BODY MASS INDEX DOCD: CPT | Mod: CPTII,,, | Performed by: INTERNAL MEDICINE

## 2023-03-06 PROCEDURE — 3078F DIAST BP <80 MM HG: CPT | Mod: CPTII,,, | Performed by: INTERNAL MEDICINE

## 2023-03-06 PROCEDURE — 3008F PR BODY MASS INDEX (BMI) DOCUMENTED: ICD-10-PCS | Mod: CPTII,,, | Performed by: INTERNAL MEDICINE

## 2023-03-06 PROCEDURE — 99999 PR PBB SHADOW E&M-EST. PATIENT-LVL V: ICD-10-PCS | Mod: PBBFAC,,, | Performed by: INTERNAL MEDICINE

## 2023-03-06 PROCEDURE — 3074F PR MOST RECENT SYSTOLIC BLOOD PRESSURE < 130 MM HG: ICD-10-PCS | Mod: CPTII,,, | Performed by: INTERNAL MEDICINE

## 2023-03-06 PROCEDURE — 1159F MED LIST DOCD IN RCRD: CPT | Mod: CPTII,,, | Performed by: INTERNAL MEDICINE

## 2023-03-06 PROCEDURE — 3078F PR MOST RECENT DIASTOLIC BLOOD PRESSURE < 80 MM HG: ICD-10-PCS | Mod: CPTII,,, | Performed by: INTERNAL MEDICINE

## 2023-03-06 PROCEDURE — 99215 OFFICE O/P EST HI 40 MIN: CPT | Mod: PBBFAC,PO | Performed by: INTERNAL MEDICINE

## 2023-03-06 PROCEDURE — 1159F PR MEDICATION LIST DOCUMENTED IN MEDICAL RECORD: ICD-10-PCS | Mod: CPTII,,, | Performed by: INTERNAL MEDICINE

## 2023-03-06 PROCEDURE — 99215 PR OFFICE/OUTPT VISIT, EST, LEVL V, 40-54 MIN: ICD-10-PCS | Mod: S$PBB,,, | Performed by: INTERNAL MEDICINE

## 2023-03-06 PROCEDURE — 99999 PR PBB SHADOW E&M-EST. PATIENT-LVL V: CPT | Mod: PBBFAC,,, | Performed by: INTERNAL MEDICINE

## 2023-03-06 RX ORDER — ESCITALOPRAM OXALATE 10 MG/1
10 TABLET ORAL DAILY
Qty: 30 TABLET | Refills: 2 | Status: SHIPPED | OUTPATIENT
Start: 2023-03-06 | End: 2023-03-22

## 2023-03-06 NOTE — PROGRESS NOTES
Subjective:       Patient ID: Yvette Hutchinson is a 62 y.o. female.    Chief Complaint:  Patient known to me with CLL stage 0 recently diagnosed with lung cancer August 2022  Follow-up    Lung Cancer    Patient has chronic complains of chronic pain syndrome and COPD for which periodically she take steroids she is also on BuSpar has issues anxiety has required dilatation of esophageal strictures allergic rhinitis insomnia and history of B12 deficiency documented   Had CT chest done with pulmonary 7/22 showed mass bx done. 8/22    Oncology hx  Impression:ct chest 7/29/22     2.3 cm spiculated left upper lobe lung nodule highly suspicious for bronchogenic carcinoma.     New nonspecific 7 mm nodule in the right middle lobe; this appears more linear on the coronal images and may be a focus of scarring.     Additional stable lung nodules, mildly enlarged axillary lymph nodes, substernal thyroid nodule; these findings are likely benign given the interval stability.   LEFT LUNG MASS, CT-GUIDED BIOPSY:   Non-small cell lung carcinoma.   Comment:  The biopsy reveals invasive carcinoma with apparent glandular but   also vague squamoid features.  Tumor cells are diffusely positive with TTF-1   and focally positive with P40.  The histology differential includes   adenocarcinoma and adenosquamous carcinoma.  PD-L1 and templates NGS studies   are in progress.  The results will be issued separately.    October 3, 2022: Robotic left lobectomy T1c N1    Social history; patient is a smoker denies recreational alcohol use or drug use   Patient is currently on disability  She is allergic to the mask used during COVID pandemic she is also bothered by her mask with COPD    Family history suggestive of ovarian and breast cancer patient advised to see a  or seek   Review of patient's allergies indicates:  No Known Allergies  Medications have been reviewed  Current Outpatient Medications on File Prior to Visit    Medication Sig Dispense Refill    (Magic mouthwash) 1:1:1 diphenhydrAMINE(Benadryl) 12.5mg/5ml liq, aluminum & magnesium hydroxide-simethicone (Maalox), LIDOcaine viscous 2% Swish and spit 10 mLs 3 (three) times daily. for mouth sores 250 mL 0    acetaminophen (TYLENOL) 500 MG tablet Take 2 tablets (1,000 mg total) by mouth every 6 (six) hours as needed for Pain. 8 tablet 0    albuterol (PROVENTIL/VENTOLIN HFA) 90 mcg/actuation inhaler Inhale 2 puffs into the lungs every 6 (six) hours as needed for Wheezing or Shortness of Breath. Rescue 18 g 11    albuterol-ipratropium (DUO-NEB) 2.5 mg-0.5 mg/3 mL nebulizer solution Take 3 mLs by nebulization every 6 (six) hours as needed for Wheezing. Rescue 120 each 5    ALPRAZolam (XANAX) 0.5 MG tablet Take 1 tablet (0.5 mg total) by mouth 3 (three) times daily. for 10 days 30 tablet 0    benzocaine-menthoL 6-10 mg lozenge Take 1 lozenge by mouth every 2 (two) hours as needed for Pain. 18 tablet 0    buPROPion (WELLBUTRIN XL) 300 MG 24 hr tablet Take 1 tablet (300 mg total) by mouth once daily. 90 tablet 3    dexAMETHasone (DECADRON) 4 MG Tab Take 2 tablets (8 mg total) by mouth once daily. Take 2 tablets by mouth daily on days 2 3 and 4 of chemotherapy 120 tablet 0    diphenhydrAMINE-aluminum-magnesium hydroxide-simethicone-LIDOcaine HCl 2% Swish and spit 15 mLs every 4 (four) hours as needed (mouth sores). 100 each 2    docusate sodium (COLACE) 100 MG capsule Take 1 capsule (100 mg total) by mouth daily as needed for Constipation. 30 capsule 1    FLUoxetine 20 MG capsule Take 1 capsule (20 mg total) by mouth once daily. 30 capsule 11    fluticasone propionate (FLONASE) 50 mcg/actuation nasal spray 1 spray (50 mcg total) by Each Nostril route once daily. 16 g 3    fluticasone-salmeterol 230-21 mcg/dose (ADVAIR HFA) 230-21 mcg/actuation HFAA inhaler Inhale 2 puffs into the lungs 2 (two) times daily. Controller 12 g 5    gabapentin (NEURONTIN) 300 MG capsule Take 2 capsules  (600 mg total) by mouth 3 (three) times daily. 180 capsule 11    HYDROcodone-acetaminophen 5-300 mg Tab Take 1 tablet by mouth after meals as needed (pain). 40 each 0    levocetirizine (XYZAL) 5 MG tablet Take 1 tablet (5 mg total) by mouth every evening. 30 tablet 5    LIDOcaine (LIDODERM) 5 % Place 1 patch onto the skin once daily. Remove & Discard patch within 12 hours or as directed by MD Bloom patch 0    LIDOcaine (LIDODERM) 5 % Place 1 patch onto the skin once daily. Remove & Discard patch within 12 hours or as directed by MD Bloom patch 1    LIDOcaine-prilocaine (EMLA) cream Apply topically as needed (apply to port site 30 min before access). 30 g 0    montelukast (SINGULAIR) 10 mg tablet Take 10 mg by mouth once daily.      OLANZapine (ZYPREXA) 5 MG tablet Take 1 tablet (5 mg total) by mouth nightly. Take 1 tablet at bedtime on days 1-4 of each cycle of chemotherapy 30 tablet 2    ondansetron (ZOFRAN-ODT) 8 MG TbDL Take 1 tablet (8 mg total) by mouth every 12 (twelve) hours as needed (nausea). 60 tablet 1    oxyCODONE (ROXICODONE) 5 MG immediate release tablet Take 1 tablet (5 mg total) by mouth every 4 (four) hours as needed for Pain. 20 tablet 0    oxyCODONE (ROXICODONE) 5 MG immediate release tablet Take 1 tablet (5 mg total) by mouth every 6 (six) hours as needed for Pain. 60 tablet 0     Current Facility-Administered Medications on File Prior to Visit   Medication Dose Route Frequency Provider Last Rate Last Admin    0.9%  NaCl infusion   Intravenous Continuous Yuridia Cisneros MD   New Bag at 11/03/22 0701       REVIEW OF SYSTEMS:     S/p lobectomy left side, has draining tube+ with sanguinous fluid.    Wt Readings from Last 3 Encounters:   03/06/23 73.1 kg (161 lb 2.5 oz)   03/02/23 72.7 kg (160 lb 4.8 oz)   03/01/23 72.3 kg (159 lb 6.3 oz)     Temp Readings from Last 3 Encounters:   03/06/23 96.8 °F (36 °C) (Temporal)   03/02/23 97.8 °F (36.6 °C)   03/01/23 97.1 °F (36.2 °C) (Temporal)     BP Readings from Last  3 Encounters:   03/06/23 124/61   03/02/23 103/61   03/01/23 113/62     Pulse Readings from Last 3 Encounters:   03/06/23 74   03/02/23 84   03/01/23 83     VITAL SIGNS:  as above   GENERAL: appears well-built, well-nourished.  No anxiety, no agitation, and in no distress.  Patient is awake, alert, oriented and cooperative.  HEENT:  Showed no congestion. Trachea is central no obvious icterus or pallor noted no hoarseness. no obvious JVD   NECK:  Supple.  No JVD. No obvious cervical submental or supraclavicular adenopathy.  RS: tube draining on left side. S/p lobectomy  ABDOMEN:  abdomen appears undistended.  EXTREMITIES:  Without edema.  NEUROLOGICAL:  The patient is appropriate, higher functions are normal.  No  obvious neurological deficits.  normal judgement normal thought content  No confusion, no speech impediment. Cranial nerves are intact and show no deficit. No gross motor deficits noted   SKIN MUSCULOSKELETAL: no joint or skeletal deformity, no clubbing of nails.  No visible rash ecchymosis or petechiae  Lab Results   Component Value Date    WBC 20.78 (H) 03/03/2020    HGB 14.9 03/03/2020    HCT 47.0 03/03/2020    MCV 99 (H) 03/03/2020     03/03/2020     Smudge cells presnt  CMP  Sodium   Date Value Ref Range Status   02/28/2023 140 136 - 145 mmol/L Final     Potassium   Date Value Ref Range Status   02/28/2023 4.2 3.5 - 5.1 mmol/L Final     Chloride   Date Value Ref Range Status   02/28/2023 104 95 - 110 mmol/L Final     CO2   Date Value Ref Range Status   02/28/2023 26 23 - 29 mmol/L Final     Glucose   Date Value Ref Range Status   02/28/2023 96 70 - 110 mg/dL Final     BUN   Date Value Ref Range Status   02/28/2023 12 8 - 23 mg/dL Final     Creatinine   Date Value Ref Range Status   02/28/2023 0.9 0.5 - 1.4 mg/dL Final     Calcium   Date Value Ref Range Status   02/28/2023 9.8 8.7 - 10.5 mg/dL Final     Total Protein   Date Value Ref Range Status   02/28/2023 7.2 6.0 - 8.4 g/dL Final     Albumin    Date Value Ref Range Status   02/28/2023 4.1 3.5 - 5.2 g/dL Final     Total Bilirubin   Date Value Ref Range Status   02/28/2023 0.3 0.1 - 1.0 mg/dL Final     Comment:     For infants and newborns, interpretation of results should be based  on gestational age, weight and in agreement with clinical  observations.    Premature Infant recommended reference ranges:  Up to 24 hours.............<8.0 mg/dL  Up to 48 hours............<12.0 mg/dL  3-5 days..................<15.0 mg/dL  6-29 days.................<15.0 mg/dL       Alkaline Phosphatase   Date Value Ref Range Status   02/28/2023 126 55 - 135 U/L Final     AST   Date Value Ref Range Status   02/28/2023 18 10 - 40 U/L Final     ALT   Date Value Ref Range Status   02/28/2023 19 10 - 44 U/L Final     Anion Gap   Date Value Ref Range Status   02/28/2023 10 8 - 16 mmol/L Final     eGFR if    Date Value Ref Range Status   06/13/2022 >60 >60 mL/min/1.73 m^2 Final     eGFR if non    Date Value Ref Range Status   06/13/2022 >60 >60 mL/min/1.73 m^2 Final     Comment:     Calculation used to obtain the estimated glomerular filtration  rate (eGFR) is the CKD-EPI equation.            2wk ago    Blood Interpretation A B cell lymphoproliferative disorder is present. see comment.    Comment: Interpreted by: Jeremias Sosa M.D., Signed on 08/06/2020 at 13:07   Blood Comment Flow cytometric analysis of  peripheral blood  detects a lambda  light chain   restricted B lymphocyte population showing expression of CD19 and dim CD20   with aberrant expression of CD5 (dim).  T lymphocytes are   immunophenotypically unremarkable.  The blasts gate is not increased.  The   findings are consistent with patient history of chronic lymphocytic   leukemia/small lymphocytic lymphoma.   Flow differential:  Lymphocytes 69.6%, Monocytes 2.8%, Granulocytes  27.5%,   Blast  0.1%, Debris/nRBC 0.1%,  Viability 97.5%.   10/3/22 robotic lef lung lobectomy  1. LYMPH NODE, LEVEL  5 FROZEN SECTION, EXCISION:   One lymph node with dominant necrotizing granuloma, negative for malignancy   (0/1).   2. LYMPH NODES, LEVEL 5 PERMANENT, EXCISION:   Two lymph nodes with multifocal necrotizing granulomas, negative for   malignancy (0/2).   3. LYMPH NODES, LEFT POSTERIOR LEVEL 10, EXCISION:   Five lymph nodes with multifocal necrotizing granulomas, negative for   malignancy (0/5).   4. LYMPH NODE, LEVEL 11, EXCISION:   One lymph node, negative for malignancy (0/1).   5. LYMPH NODES, LEVEL 12, EXCISION:   Three lymph nodes, one with single necrotizing granuloma, negative for   malignancy (0/3).   6. LYMPH NODES, LEVEL 12 NEAR UPPER DIVISION BRONCHUS, EXCISION:   Three lymph nodes with sinus histiocytosis, negative for malignancy (0/3).   7. LYMPH NODES, LEVEL 10 #2, EXCISION:   One of two lymph nodes positive for metastatic carcinoma (1/2).   Size of largest metastatic deposit:  8 mm.   Negative for extranodal extension.   8. LYMPH NODES, LEFT LEVEL 8, EXCISION:   Two lymph nodes with sinus histiocytosis, negative for malignancy (0/2).   9. LYMPH NODES, LEFT LEVEL 9, EXCISION:   Two lymph nodes, negative for malignancy (0/2).   10. 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   Surgical margins are negative for malignancy.   Background lung tissue with subpleural fibrosis, no evidence of granulomas.   Two hilar lymph nodes, negative for malignancy (0/2).   Coment (1-3, 5):  Prominent necrotizing granulomatous inflammation identified   is identified in multiple lymph nodes.  An AE1/AE3 cytokeratin immunostain   performed on the largest granuloma (part 3) reveals no evidence of occult   carcinoma.  GMS and AFB special stains are negative for fungal and acid-fast   organisms, respectively.  The necrotizing features are not typical of   sarcoidosis.  As such, other granulomatous processes may be considered   including secondary response to malignancy or infection.   Clinical   correlation is advised.   Comment (10):  Sections reveal invasive carcinoma involving lung tissue with   predominantly squamoid morphologic features including bright eosinophilic   cytoplasm and intracellular bridging.  There are not significant keratinizing   features.  Some areas show basaloid features.  There is also regional luminal   features including cribriforming and vague glandular structures containing   mucin.  Tumor cells are diffusely positive with P40 and regionally positive   with TTF-1.  Mucicarmine special stain highlights mucin producing luminal   spaces. Overall tumor appearance and staining profile supports adenosquamous   carcinoma, a variant of non-small cell lung carcinoma.   [facility] PATHOLOGY CANCER CASE SUMMARY:  LUNG   Procedure:  Lobectomy   Specimen laterality:  Left   Tumor focality:  Single focus   Tumor site: Upper lobe of lung   Tumor size:  2.2 cm   Histologic type:  Adenosquamous carcinoma   Spread through airspaces:  Not identified   Visceral pleural invasion:  Not identified   Direct invasion of adjacent structures: Not applicable   Lymphovascular invasion:  Not identified   Margins:  All margins negative for invasive carcinoma     Closest margin to invasive carcinoma:  Bronchial (3.0 cm)   Regional lymph nodes:  Tumor present in regional lymph node     Number of lymph nodes with tumor:  1     Scott sites with tumor:  Left level 10 (10L)     Extranodal extension:  Not identified     Number of lymph nodes examined:  23   Pathologic stage classification (pTNM): pT1c pN1   Special studies:  Performed on previous biopsy if additional studies needed   there may be performed on tissue blockd 10G or 10H.      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 Due to adenosq histology chose carbo/ taxol x 4 cycles tecentriq maintanence x 1 year  pt is has had 5 cycles of carbo/ taxol   will discuss maintainenec at tumor board   Orders for tecentriq maintainence placed, needs chemo class and start date and see me for start with cbc,cmp      See me for next cycle cbc,cmp  CLL   stage 0 will confirmed with flow cytometry  NTD   she has no other cytopenias or lymphadenopathy or B symptoms patient can be observed      Continue with chronic pain syndrome and COPD management advised to stop smoking if at all possible 10 pills of hydrocodoen given to pt, she needs to follow with pain mx      Advised COVID precaution due to her lung situation she will be a high risk patient

## 2023-03-06 NOTE — PLAN OF CARE
DISCONTINUE ON PATHWAY REGIMEN - Non-Small Cell Lung    [id number]        Paclitaxel       Carboplatin     **Always confirm dose/schedule in your pharmacy ordering system**    REASON: Other Reason  PRIOR TREATMENT: [id number]  TREATMENT RESPONSE: Stable Disease (SD)    START ON PATHWAY REGIMEN - Non-Small Cell Lung    [id number]        Atezolizumab (Tecentriq)           Additional Orders: Serious immune-mediated adverse events can occur with   atezolizumab. Please monitor your patient and refer to the linked   immune-mediated adverse reaction management materials for more information.    **Always confirm dose/schedule in your pharmacy ordering system**    Patient Characteristics:  Postoperative without Neoadjuvant Therapy (Pathologic Staging), Stage IIB,   Adjuvant Chemotherapy Completed, EGFR Negative/Unknown and ALK Negative/Unknown,   PD-L1 Expression ? 1% (TC)  Therapeutic Status: Postoperative without Neoadjuvant Therapy (Pathologic   Staging)  AJCC T Category: pT1c  AJCC N Category: pN1  AJCC M Category: cM0  AJCC 8 Stage Grouping: IIB  ALK Rearrangement Status: Awaiting Test Results  EGFR Mutation Status: Awaiting Test Results  PD-L1 Expression Status: PD-L1 Expression ? 1% (TC)  Intent of Therapy:  Curative Intent, Discussed with Patient

## 2023-03-06 NOTE — Clinical Note
Cbc, cmp see me for start date of tecentriq, I have put in orders fro tecentiq maintenance for 1 year . Pl proceed gettings auth and start date. On pts specimens I want egfr and alk run, please call pathology about it asap See me for start date with cbc, cmp

## 2023-03-07 ENCOUNTER — TELEPHONE (OUTPATIENT)
Dept: HEMATOLOGY/ONCOLOGY | Facility: CLINIC | Age: 63
End: 2023-03-07
Payer: MEDICAID

## 2023-03-07 NOTE — TELEPHONE ENCOUNTER
Spoke with Kristy at [facility] pathology. Requested that additional tests (EGFR and ALK) be run on pt's specimen per Dr Lee.

## 2023-03-08 ENCOUNTER — OFFICE VISIT (OUTPATIENT)
Dept: HEMATOLOGY/ONCOLOGY | Facility: CLINIC | Age: 63
End: 2023-03-08
Payer: MEDICAID

## 2023-03-08 VITALS
OXYGEN SATURATION: 96 % | SYSTOLIC BLOOD PRESSURE: 123 MMHG | HEIGHT: 63 IN | BODY MASS INDEX: 28.56 KG/M2 | HEART RATE: 92 BPM | WEIGHT: 161.19 LBS | RESPIRATION RATE: 12 BRPM | DIASTOLIC BLOOD PRESSURE: 70 MMHG | TEMPERATURE: 97 F

## 2023-03-08 DIAGNOSIS — C34.01 MALIGNANT NEOPLASM OF HILUS OF RIGHT LUNG: ICD-10-CM

## 2023-03-08 DIAGNOSIS — R45.89 ANXIETY ABOUT HEALTH: ICD-10-CM

## 2023-03-08 PROCEDURE — 1159F MED LIST DOCD IN RCRD: CPT | Mod: CPTII,,, | Performed by: NURSE PRACTITIONER

## 2023-03-08 PROCEDURE — 3008F PR BODY MASS INDEX (BMI) DOCUMENTED: ICD-10-PCS | Mod: CPTII,,, | Performed by: NURSE PRACTITIONER

## 2023-03-08 PROCEDURE — 1160F RVW MEDS BY RX/DR IN RCRD: CPT | Mod: CPTII,,, | Performed by: NURSE PRACTITIONER

## 2023-03-08 PROCEDURE — 99215 PR OFFICE/OUTPT VISIT, EST, LEVL V, 40-54 MIN: ICD-10-PCS | Mod: S$PBB,,, | Performed by: NURSE PRACTITIONER

## 2023-03-08 PROCEDURE — 3078F PR MOST RECENT DIASTOLIC BLOOD PRESSURE < 80 MM HG: ICD-10-PCS | Mod: CPTII,,, | Performed by: NURSE PRACTITIONER

## 2023-03-08 PROCEDURE — 99215 OFFICE O/P EST HI 40 MIN: CPT | Mod: S$PBB,,, | Performed by: NURSE PRACTITIONER

## 2023-03-08 PROCEDURE — 99999 PR PBB SHADOW E&M-EST. PATIENT-LVL V: CPT | Mod: PBBFAC,,, | Performed by: NURSE PRACTITIONER

## 2023-03-08 PROCEDURE — 3074F SYST BP LT 130 MM HG: CPT | Mod: CPTII,,, | Performed by: NURSE PRACTITIONER

## 2023-03-08 PROCEDURE — 3008F BODY MASS INDEX DOCD: CPT | Mod: CPTII,,, | Performed by: NURSE PRACTITIONER

## 2023-03-08 PROCEDURE — 1159F PR MEDICATION LIST DOCUMENTED IN MEDICAL RECORD: ICD-10-PCS | Mod: CPTII,,, | Performed by: NURSE PRACTITIONER

## 2023-03-08 PROCEDURE — 1160F PR REVIEW ALL MEDS BY PRESCRIBER/CLIN PHARMACIST DOCUMENTED: ICD-10-PCS | Mod: CPTII,,, | Performed by: NURSE PRACTITIONER

## 2023-03-08 PROCEDURE — 3074F PR MOST RECENT SYSTOLIC BLOOD PRESSURE < 130 MM HG: ICD-10-PCS | Mod: CPTII,,, | Performed by: NURSE PRACTITIONER

## 2023-03-08 PROCEDURE — 99215 OFFICE O/P EST HI 40 MIN: CPT | Mod: PBBFAC,PO | Performed by: NURSE PRACTITIONER

## 2023-03-08 PROCEDURE — 99999 PR PBB SHADOW E&M-EST. PATIENT-LVL V: ICD-10-PCS | Mod: PBBFAC,,, | Performed by: NURSE PRACTITIONER

## 2023-03-08 PROCEDURE — 3078F DIAST BP <80 MM HG: CPT | Mod: CPTII,,, | Performed by: NURSE PRACTITIONER

## 2023-03-08 RX ORDER — ALPRAZOLAM 0.5 MG/1
0.5 TABLET ORAL 3 TIMES DAILY
Qty: 30 TABLET | Refills: 0 | Status: SHIPPED | OUTPATIENT
Start: 2023-03-08 | End: 2023-05-31 | Stop reason: SDUPTHER

## 2023-03-08 RX ORDER — OXYCODONE HYDROCHLORIDE 10 MG/1
10 TABLET ORAL EVERY 4 HOURS PRN
Qty: 60 TABLET | Refills: 0 | Status: SHIPPED | OUTPATIENT
Start: 2023-03-08 | End: 2023-05-31 | Stop reason: SDUPTHER

## 2023-03-08 NOTE — PROGRESS NOTES
[facility] CANCER CENTER    MULTIDISCIPLINARY TUMOR BOARD  PATIENT REVIEW FORM  ________________________________________________________________________    CLINIC #   DATE: 03/08/2023      TUMOR SITE:   Lung    PRESENTER:   KWAKU Lee MD     PATIENT SUMMARY:   62-year-old with a known history of CLL lymphocytosis over 3 years leukocytosis and smudge cells suggestive of CLL stage 0.  No anemia and no thrombocytopenia and no generalized lymphadenopathy.  She had diagnosis of lung cancer 08/2022.  Pathology showed adenosquamous she is status post robotic lobectomy October 3, 2022.  Stage IIB T1c N1 level 10+ margins negative.  5% of tumor cells are positive for PDL1.  No other additional mutations reported.  Due to adenosquamous histology carbo/Taxol x4 cycles followed by 1 year of  Tecentiqwas chosen    BOARD RECOMMENDATIONS:   For reason unclear maintenance Tecentriq was initially denied by insurance company.  Tumor board recommends resubmitted authorization and proceeding with maintenance Tecentriq x1 year        Clinical Stage: Tumor T1c Node(s) N1 Metastasis M0      GROUP STAGE:     [] O    [] 1A    [] IB    [] IIA    [x] IIB     [] IIIA     [] IIIB     [] IIIC    [] IV                               [] Local recurrence     [] Regional recurrence     [] Distant recurrence                   [x] NSCLC     [] SCLC     Unstageable:      [] Yes     [x] No  Metastatic site(s):         [x] [facility] Treatment Guidelines reviewed and care planned is consistent with guidelines.         (i.e., NCCN, NCI, PD, ACO, AUA, etc.)          Lianna Hoffmann

## 2023-03-09 ENCOUNTER — TELEPHONE (OUTPATIENT)
Dept: HEMATOLOGY/ONCOLOGY | Facility: CLINIC | Age: 63
End: 2023-03-09
Payer: MEDICAID

## 2023-03-16 ENCOUNTER — PATIENT MESSAGE (OUTPATIENT)
Dept: HEMATOLOGY/ONCOLOGY | Facility: CLINIC | Age: 63
End: 2023-03-16
Payer: MEDICAID

## 2023-03-22 ENCOUNTER — OFFICE VISIT (OUTPATIENT)
Dept: HEMATOLOGY/ONCOLOGY | Facility: CLINIC | Age: 63
End: 2023-03-22
Payer: MEDICAID

## 2023-03-22 ENCOUNTER — LAB VISIT (OUTPATIENT)
Dept: LAB | Facility: HOSPITAL | Age: 63
End: 2023-03-22
Attending: INTERNAL MEDICINE
Payer: MEDICAID

## 2023-03-22 VITALS
TEMPERATURE: 97 F | HEART RATE: 64 BPM | RESPIRATION RATE: 10 BRPM | SYSTOLIC BLOOD PRESSURE: 106 MMHG | DIASTOLIC BLOOD PRESSURE: 58 MMHG | WEIGHT: 162.06 LBS | BODY MASS INDEX: 28.71 KG/M2 | OXYGEN SATURATION: 99 % | HEIGHT: 63 IN

## 2023-03-22 DIAGNOSIS — E03.2 HYPOTHYROIDISM DUE TO MEDICATION: Primary | ICD-10-CM

## 2023-03-22 DIAGNOSIS — C34.90 NON-SMALL CELL LUNG CANCER, UNSPECIFIED LATERALITY: ICD-10-CM

## 2023-03-22 DIAGNOSIS — C34.01 MALIGNANT NEOPLASM OF HILUS OF RIGHT LUNG: ICD-10-CM

## 2023-03-22 DIAGNOSIS — E53.8 B12 DEFICIENCY: ICD-10-CM

## 2023-03-22 DIAGNOSIS — E03.2 HYPOTHYROIDISM DUE TO MEDICATION: ICD-10-CM

## 2023-03-22 DIAGNOSIS — C91.10 CLL (CHRONIC LYMPHOCYTIC LEUKEMIA): Primary | ICD-10-CM

## 2023-03-22 LAB
ALBUMIN SERPL BCP-MCNC: 4 G/DL (ref 3.5–5.2)
ALP SERPL-CCNC: 124 U/L (ref 55–135)
ALT SERPL W/O P-5'-P-CCNC: 18 U/L (ref 10–44)
ANION GAP SERPL CALC-SCNC: 12 MMOL/L (ref 8–16)
AST SERPL-CCNC: 18 U/L (ref 10–40)
BASOPHILS NFR BLD: 0 % (ref 0–1.9)
BILIRUB SERPL-MCNC: 0.2 MG/DL (ref 0.1–1)
BUN SERPL-MCNC: 10 MG/DL (ref 8–23)
CALCIUM SERPL-MCNC: 9.2 MG/DL (ref 8.7–10.5)
CHLORIDE SERPL-SCNC: 106 MMOL/L (ref 95–110)
CO2 SERPL-SCNC: 25 MMOL/L (ref 23–29)
CREAT SERPL-MCNC: 0.8 MG/DL (ref 0.5–1.4)
DIFFERENTIAL METHOD: ABNORMAL
EOSINOPHIL NFR BLD: 0 % (ref 0–8)
ERYTHROCYTE [DISTWIDTH] IN BLOOD BY AUTOMATED COUNT: 18.6 % (ref 11.5–14.5)
EST. GFR  (NO RACE VARIABLE): >60 ML/MIN/1.73 M^2
GLUCOSE SERPL-MCNC: 95 MG/DL (ref 70–110)
HCT VFR BLD AUTO: 34.1 % (ref 37–48.5)
HGB BLD-MCNC: 11.3 G/DL (ref 12–16)
IMM GRANULOCYTES # BLD AUTO: ABNORMAL K/UL (ref 0–0.04)
IMM GRANULOCYTES NFR BLD AUTO: ABNORMAL % (ref 0–0.5)
LYMPHOCYTES NFR BLD: 91 % (ref 18–48)
MCH RBC QN AUTO: 35.4 PG (ref 27–31)
MCHC RBC AUTO-ENTMCNC: 33.1 G/DL (ref 32–36)
MCV RBC AUTO: 107 FL (ref 82–98)
MONOCYTES NFR BLD: 2 % (ref 4–15)
NEUTROPHILS NFR BLD: 7 % (ref 38–73)
NRBC BLD-RTO: 0 /100 WBC
PLATELET # BLD AUTO: 317 K/UL (ref 150–450)
PLATELET BLD QL SMEAR: ABNORMAL
PMV BLD AUTO: 9.1 FL (ref 9.2–12.9)
POTASSIUM SERPL-SCNC: 3.9 MMOL/L (ref 3.5–5.1)
PROT SERPL-MCNC: 6.9 G/DL (ref 6–8.4)
RBC # BLD AUTO: 3.19 M/UL (ref 4–5.4)
SODIUM SERPL-SCNC: 143 MMOL/L (ref 136–145)
TSH SERPL DL<=0.005 MIU/L-ACNC: 1.39 UIU/ML (ref 0.4–4)
WBC # BLD AUTO: 17.9 K/UL (ref 3.9–12.7)

## 2023-03-22 PROCEDURE — 36415 COLL VENOUS BLD VENIPUNCTURE: CPT | Performed by: INTERNAL MEDICINE

## 2023-03-22 PROCEDURE — 99215 OFFICE O/P EST HI 40 MIN: CPT | Mod: S$PBB,,, | Performed by: INTERNAL MEDICINE

## 2023-03-22 PROCEDURE — 3078F PR MOST RECENT DIASTOLIC BLOOD PRESSURE < 80 MM HG: ICD-10-PCS | Mod: CPTII,,, | Performed by: INTERNAL MEDICINE

## 2023-03-22 PROCEDURE — 85027 COMPLETE CBC AUTOMATED: CPT | Performed by: INTERNAL MEDICINE

## 2023-03-22 PROCEDURE — 99999 PR PBB SHADOW E&M-EST. PATIENT-LVL IV: ICD-10-PCS | Mod: PBBFAC,,, | Performed by: INTERNAL MEDICINE

## 2023-03-22 PROCEDURE — 85007 BL SMEAR W/DIFF WBC COUNT: CPT | Performed by: INTERNAL MEDICINE

## 2023-03-22 PROCEDURE — 99215 PR OFFICE/OUTPT VISIT, EST, LEVL V, 40-54 MIN: ICD-10-PCS | Mod: S$PBB,,, | Performed by: INTERNAL MEDICINE

## 2023-03-22 PROCEDURE — 3074F SYST BP LT 130 MM HG: CPT | Mod: CPTII,,, | Performed by: INTERNAL MEDICINE

## 2023-03-22 PROCEDURE — 3008F BODY MASS INDEX DOCD: CPT | Mod: CPTII,,, | Performed by: INTERNAL MEDICINE

## 2023-03-22 PROCEDURE — 1159F PR MEDICATION LIST DOCUMENTED IN MEDICAL RECORD: ICD-10-PCS | Mod: CPTII,,, | Performed by: INTERNAL MEDICINE

## 2023-03-22 PROCEDURE — 1159F MED LIST DOCD IN RCRD: CPT | Mod: CPTII,,, | Performed by: INTERNAL MEDICINE

## 2023-03-22 PROCEDURE — 3008F PR BODY MASS INDEX (BMI) DOCUMENTED: ICD-10-PCS | Mod: CPTII,,, | Performed by: INTERNAL MEDICINE

## 2023-03-22 PROCEDURE — 3074F PR MOST RECENT SYSTOLIC BLOOD PRESSURE < 130 MM HG: ICD-10-PCS | Mod: CPTII,,, | Performed by: INTERNAL MEDICINE

## 2023-03-22 PROCEDURE — 85060 PATHOLOGIST REVIEW: ICD-10-PCS | Mod: ,,, | Performed by: PATHOLOGY

## 2023-03-22 PROCEDURE — 99214 OFFICE O/P EST MOD 30 MIN: CPT | Mod: PBBFAC,PO | Performed by: INTERNAL MEDICINE

## 2023-03-22 PROCEDURE — 3078F DIAST BP <80 MM HG: CPT | Mod: CPTII,,, | Performed by: INTERNAL MEDICINE

## 2023-03-22 PROCEDURE — 99999 PR PBB SHADOW E&M-EST. PATIENT-LVL IV: CPT | Mod: PBBFAC,,, | Performed by: INTERNAL MEDICINE

## 2023-03-22 PROCEDURE — 85060 BLOOD SMEAR INTERPRETATION: CPT | Mod: ,,, | Performed by: PATHOLOGY

## 2023-03-22 PROCEDURE — 80053 COMPREHEN METABOLIC PANEL: CPT | Performed by: INTERNAL MEDICINE

## 2023-03-22 PROCEDURE — 84443 ASSAY THYROID STIM HORMONE: CPT | Performed by: INTERNAL MEDICINE

## 2023-03-22 RX ORDER — ESCITALOPRAM OXALATE 10 MG/1
20 TABLET ORAL DAILY
Qty: 90 TABLET | Refills: 6 | Status: SHIPPED | OUTPATIENT
Start: 2023-03-22 | End: 2023-06-05

## 2023-03-22 RX ORDER — SODIUM CHLORIDE 0.9 % (FLUSH) 0.9 %
10 SYRINGE (ML) INJECTION
Status: CANCELLED | OUTPATIENT
Start: 2023-03-22

## 2023-03-22 RX ORDER — EPINEPHRINE 0.3 MG/.3ML
0.3 INJECTION SUBCUTANEOUS ONCE AS NEEDED
Status: CANCELLED | OUTPATIENT
Start: 2023-03-22

## 2023-03-22 RX ORDER — HEPARIN 100 UNIT/ML
500 SYRINGE INTRAVENOUS
Status: CANCELLED | OUTPATIENT
Start: 2023-03-22

## 2023-03-22 RX ORDER — DIPHENHYDRAMINE HYDROCHLORIDE 50 MG/ML
50 INJECTION INTRAMUSCULAR; INTRAVENOUS ONCE AS NEEDED
Status: CANCELLED | OUTPATIENT
Start: 2023-03-22

## 2023-03-22 NOTE — PROGRESS NOTES
Subjective:       Patient ID: Yvette Hutchinson is a 62 y.o. female.    Chief Complaint:  Patient known to me with CLL stage 0 recently diagnosed with lung cancer August 2022  Follow-up    Lung Cancer    Patient has chronic complains of chronic pain syndrome and COPD for which periodically she take steroids she is also on BuSpar has issues anxiety has required dilatation of esophageal strictures allergic rhinitis insomnia and history of B12 deficiency documented   Had CT chest done with pulmonary 7/22 showed mass bx done. 8/22    Oncology hx  Impression:ct chest 7/29/22     2.3 cm spiculated left upper lobe lung nodule highly suspicious for bronchogenic carcinoma.     New nonspecific 7 mm nodule in the right middle lobe; this appears more linear on the coronal images and may be a focus of scarring.     Additional stable lung nodules, mildly enlarged axillary lymph nodes, substernal thyroid nodule; these findings are likely benign given the interval stability.   LEFT LUNG MASS, CT-GUIDED BIOPSY:   Non-small cell lung carcinoma.   Comment:  The biopsy reveals invasive carcinoma with apparent glandular but   also vague squamoid features.  Tumor cells are diffusely positive with TTF-1   and focally positive with P40.  The histology differential includes   adenocarcinoma and adenosquamous carcinoma.  PD-L1 and templates NGS studies   are in progress.  The results will be issued separately.    October 3, 2022: Robotic left lobectomy T1c N1    Social history; patient is a smoker denies recreational alcohol use or drug use   Patient is currently on disability  She is allergic to the mask used during COVID pandemic she is also bothered by her mask with COPD    Family history suggestive of ovarian and breast cancer patient advised to see a  or seek   Review of patient's allergies indicates:  No Known Allergies  Medications have been reviewed  Current Outpatient Medications on File Prior to Visit    Medication Sig Dispense Refill    (Magic mouthwash) 1:1:1 diphenhydrAMINE(Benadryl) 12.5mg/5ml liq, aluminum & magnesium hydroxide-simethicone (Maalox), LIDOcaine viscous 2% Swish and spit 10 mLs 3 (three) times daily. for mouth sores 250 mL 0    acetaminophen (TYLENOL) 500 MG tablet Take 2 tablets (1,000 mg total) by mouth every 6 (six) hours as needed for Pain. 8 tablet 0    albuterol (PROVENTIL/VENTOLIN HFA) 90 mcg/actuation inhaler Inhale 2 puffs into the lungs every 6 (six) hours as needed for Wheezing or Shortness of Breath. Rescue 18 g 11    albuterol-ipratropium (DUO-NEB) 2.5 mg-0.5 mg/3 mL nebulizer solution Take 3 mLs by nebulization every 6 (six) hours as needed for Wheezing. Rescue 120 each 5    ALPRAZolam (XANAX) 0.5 MG tablet Take 1 tablet (0.5 mg total) by mouth 3 (three) times daily. for 10 days 30 tablet 0    benzocaine-menthoL 6-10 mg lozenge Take 1 lozenge by mouth every 2 (two) hours as needed for Pain. 18 tablet 0    buPROPion (WELLBUTRIN XL) 300 MG 24 hr tablet Take 1 tablet (300 mg total) by mouth once daily. 90 tablet 3    dexAMETHasone (DECADRON) 4 MG Tab Take 2 tablets (8 mg total) by mouth once daily. Take 2 tablets by mouth daily on days 2 3 and 4 of chemotherapy 120 tablet 0    diphenhydrAMINE-aluminum-magnesium hydroxide-simethicone-LIDOcaine HCl 2% Swish and spit 15 mLs every 4 (four) hours as needed (mouth sores). 100 each 2    docusate sodium (COLACE) 100 MG capsule Take 1 capsule (100 mg total) by mouth daily as needed for Constipation. 30 capsule 1    EScitalopram oxalate (LEXAPRO) 10 MG tablet Take 1 tablet (10 mg total) by mouth once daily. 30 tablet 2    FLUoxetine 20 MG capsule Take 1 capsule (20 mg total) by mouth once daily. 30 capsule 11    fluticasone propionate (FLONASE) 50 mcg/actuation nasal spray 1 spray (50 mcg total) by Each Nostril route once daily. 16 g 3    fluticasone-salmeterol 230-21 mcg/dose (ADVAIR HFA) 230-21 mcg/actuation HFAA inhaler Inhale 2 puffs into  the lungs 2 (two) times daily. Controller 12 g 5    gabapentin (NEURONTIN) 300 MG capsule Take 2 capsules (600 mg total) by mouth 3 (three) times daily. 180 capsule 11    levocetirizine (XYZAL) 5 MG tablet Take 1 tablet (5 mg total) by mouth every evening. 30 tablet 5    LIDOcaine (LIDODERM) 5 % Place 1 patch onto the skin once daily. Remove & Discard patch within 12 hours or as directed by MD Bloom patch 0    LIDOcaine (LIDODERM) 5 % Place 1 patch onto the skin once daily. Remove & Discard patch within 12 hours or as directed by MD Bloom patch 1    LIDOcaine-prilocaine (EMLA) cream Apply topically as needed (apply to port site 30 min before access). 30 g 0    montelukast (SINGULAIR) 10 mg tablet Take 10 mg by mouth once daily.      OLANZapine (ZYPREXA) 5 MG tablet Take 1 tablet (5 mg total) by mouth nightly. Take 1 tablet at bedtime on days 1-4 of each cycle of chemotherapy 30 tablet 2    ondansetron (ZOFRAN-ODT) 8 MG TbDL Take 1 tablet (8 mg total) by mouth every 12 (twelve) hours as needed (nausea). 60 tablet 1    oxyCODONE (ROXICODONE) 10 mg Tab immediate release tablet Take 1 tablet (10 mg total) by mouth every 4 (four) hours as needed for Pain. 60 tablet 0     Current Facility-Administered Medications on File Prior to Visit   Medication Dose Route Frequency Provider Last Rate Last Admin    0.9%  NaCl infusion   Intravenous Continuous Yuridia Cisneros MD   New Bag at 11/03/22 0701       REVIEW OF SYSTEMS:     S/p lobectomy left side, has draining tube+ with sanguinous fluid.    Wt Readings from Last 3 Encounters:   03/22/23 73.5 kg (162 lb 0.6 oz)   03/08/23 73.1 kg (161 lb 2.5 oz)   03/06/23 73.1 kg (161 lb 2.5 oz)     Temp Readings from Last 3 Encounters:   03/22/23 96.8 °F (36 °C) (Temporal)   03/08/23 96.9 °F (36.1 °C) (Temporal)   03/06/23 96.8 °F (36 °C) (Temporal)     BP Readings from Last 3 Encounters:   03/22/23 (!) 106/58   03/08/23 123/70   03/06/23 124/61     Pulse Readings from Last 3 Encounters:   03/22/23  64   03/08/23 92   03/06/23 74     VITAL SIGNS:  as above   GENERAL: appears well-built, well-nourished.  No anxiety, no agitation, and in no distress.  Patient is awake, alert, oriented and cooperative.  HEENT:  Showed no congestion. Trachea is central no obvious icterus or pallor noted no hoarseness. no obvious JVD   NECK:  Supple.  No JVD. No obvious cervical submental or supraclavicular adenopathy.  RS: tube draining on left side. S/p lobectomy  ABDOMEN:  abdomen appears undistended.  EXTREMITIES:  Without edema.  NEUROLOGICAL:  The patient is appropriate, higher functions are normal.  No  obvious neurological deficits.  normal judgement normal thought content  No confusion, no speech impediment. Cranial nerves are intact and show no deficit. No gross motor deficits noted   SKIN MUSCULOSKELETAL: no joint or skeletal deformity, no clubbing of nails.  No visible rash ecchymosis or petechiae  Lab Results   Component Value Date    WBC 20.78 (H) 03/03/2020    HGB 14.9 03/03/2020    HCT 47.0 03/03/2020    MCV 99 (H) 03/03/2020     03/03/2020     Smudge cells presnt  CMP  Sodium   Date Value Ref Range Status   03/22/2023 143 136 - 145 mmol/L Final     Potassium   Date Value Ref Range Status   03/22/2023 3.9 3.5 - 5.1 mmol/L Final     Chloride   Date Value Ref Range Status   03/22/2023 106 95 - 110 mmol/L Final     CO2   Date Value Ref Range Status   03/22/2023 25 23 - 29 mmol/L Final     Glucose   Date Value Ref Range Status   03/22/2023 95 70 - 110 mg/dL Final     BUN   Date Value Ref Range Status   03/22/2023 10 8 - 23 mg/dL Final     Creatinine   Date Value Ref Range Status   03/22/2023 0.8 0.5 - 1.4 mg/dL Final     Calcium   Date Value Ref Range Status   03/22/2023 9.2 8.7 - 10.5 mg/dL Final     Total Protein   Date Value Ref Range Status   03/22/2023 6.9 6.0 - 8.4 g/dL Final     Albumin   Date Value Ref Range Status   03/22/2023 4.0 3.5 - 5.2 g/dL Final     Total Bilirubin   Date Value Ref Range Status    03/22/2023 0.2 0.1 - 1.0 mg/dL Final     Comment:     For infants and newborns, interpretation of results should be based  on gestational age, weight and in agreement with clinical  observations.    Premature Infant recommended reference ranges:  Up to 24 hours.............<8.0 mg/dL  Up to 48 hours............<12.0 mg/dL  3-5 days..................<15.0 mg/dL  6-29 days.................<15.0 mg/dL       Alkaline Phosphatase   Date Value Ref Range Status   03/22/2023 124 55 - 135 U/L Final     AST   Date Value Ref Range Status   03/22/2023 18 10 - 40 U/L Final     ALT   Date Value Ref Range Status   03/22/2023 18 10 - 44 U/L Final     Anion Gap   Date Value Ref Range Status   03/22/2023 12 8 - 16 mmol/L Final     eGFR if    Date Value Ref Range Status   06/13/2022 >60 >60 mL/min/1.73 m^2 Final     eGFR if non    Date Value Ref Range Status   06/13/2022 >60 >60 mL/min/1.73 m^2 Final     Comment:     Calculation used to obtain the estimated glomerular filtration  rate (eGFR) is the CKD-EPI equation.            2wk ago    Blood Interpretation A B cell lymphoproliferative disorder is present. see comment.    Comment: Interpreted by: Jeremias Sosa M.D., Signed on 08/06/2020 at 13:07   Blood Comment Flow cytometric analysis of  peripheral blood  detects a lambda  light chain   restricted B lymphocyte population showing expression of CD19 and dim CD20   with aberrant expression of CD5 (dim).  T lymphocytes are   immunophenotypically unremarkable.  The blasts gate is not increased.  The   findings are consistent with patient history of chronic lymphocytic   leukemia/small lymphocytic lymphoma.   Flow differential:  Lymphocytes 69.6%, Monocytes 2.8%, Granulocytes  27.5%,   Blast  0.1%, Debris/nRBC 0.1%,  Viability 97.5%.   10/3/22 robotic lef lung lobectomy  1. LYMPH NODE, LEVEL 5 FROZEN SECTION, EXCISION:   One lymph node with dominant necrotizing granuloma, negative for malignancy   (0/1).    2. LYMPH NODES, LEVEL 5 PERMANENT, EXCISION:   Two lymph nodes with multifocal necrotizing granulomas, negative for   malignancy (0/2).   3. LYMPH NODES, LEFT POSTERIOR LEVEL 10, EXCISION:   Five lymph nodes with multifocal necrotizing granulomas, negative for   malignancy (0/5).   4. LYMPH NODE, LEVEL 11, EXCISION:   One lymph node, negative for malignancy (0/1).   5. LYMPH NODES, LEVEL 12, EXCISION:   Three lymph nodes, one with single necrotizing granuloma, negative for   malignancy (0/3).   6. LYMPH NODES, LEVEL 12 NEAR UPPER DIVISION BRONCHUS, EXCISION:   Three lymph nodes with sinus histiocytosis, negative for malignancy (0/3).   7. LYMPH NODES, LEVEL 10 #2, EXCISION:   One of two lymph nodes positive for metastatic carcinoma (1/2).   Size of largest metastatic deposit:  8 mm.   Negative for extranodal extension.   8. LYMPH NODES, LEFT LEVEL 8, EXCISION:   Two lymph nodes with sinus histiocytosis, negative for malignancy (0/2).   9. LYMPH NODES, LEFT LEVEL 9, EXCISION:   Two lymph nodes, negative for malignancy (0/2).   10. 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   Surgical margins are negative for malignancy.   Background lung tissue with subpleural fibrosis, no evidence of granulomas.   Two hilar lymph nodes, negative for malignancy (0/2).   Coment (1-3, 5):  Prominent necrotizing granulomatous inflammation identified   is identified in multiple lymph nodes.  An AE1/AE3 cytokeratin immunostain   performed on the largest granuloma (part 3) reveals no evidence of occult   carcinoma.  GMS and AFB special stains are negative for fungal and acid-fast   organisms, respectively.  The necrotizing features are not typical of   sarcoidosis.  As such, other granulomatous processes may be considered   including secondary response to malignancy or infection.  Clinical   correlation is advised.   Comment (10):  Sections reveal invasive carcinoma involving lung tissue with    predominantly squamoid morphologic features including bright eosinophilic   cytoplasm and intracellular bridging.  There are not significant keratinizing   features.  Some areas show basaloid features.  There is also regional luminal   features including cribriforming and vague glandular structures containing   mucin.  Tumor cells are diffusely positive with P40 and regionally positive   with TTF-1.  Mucicarmine special stain highlights mucin producing luminal   spaces. Overall tumor appearance and staining profile supports adenosquamous   carcinoma, a variant of non-small cell lung carcinoma.   [facility] SURGICAL PATHOLOGY CANCER CASE SUMMARY:  LUNG   Procedure:  Lobectomy   Specimen laterality:  Left   Tumor focality:  Single focus   Tumor site: Upper lobe of lung   Tumor size:  2.2 cm   Histologic type:  Adenosquamous carcinoma   Spread through airspaces:  Not identified   Visceral pleural invasion:  Not identified   Direct invasion of adjacent structures: Not applicable   Lymphovascular invasion:  Not identified   Margins:  All margins negative for invasive carcinoma     Closest margin to invasive carcinoma:  Bronchial (3.0 cm)   Regional lymph nodes:  Tumor present in regional lymph node     Number of lymph nodes with tumor:  1     Scott sites with tumor:  Left level 10 (10L)     Extranodal extension:  Not identified     Number of lymph nodes examined:  23   Pathologic stage classification (pTNM): pT1c pN1   Special studies:  Performed on previous biopsy if additional studies needed   there may be performed on tissue blockd 10G or 10H.      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 Due to adenosq histology chose carbo/ taxol x 4 cycles tecentriq maintanence x 1 year  pt is has had 5 cycles of carbo/ taxol  discused maintainenec at tumor board   Orders for tecentriq maintainence placed,     See me for next cycle cbc,cmp  CLL   stage 0 will confirmed with flow cytometry  NTD   she has no other cytopenias or lymphadenopathy or B symptoms patient can be observed      Continue with chronic pain syndrome and COPD management advised to stop smoking if at all possible 10 pills of hydrocodoen given to pt, she needs to follow with pain mx      Advised COVID precaution due to her lung situation she will be a high risk patient

## 2023-03-23 ENCOUNTER — INFUSION (OUTPATIENT)
Dept: INFUSION THERAPY | Facility: HOSPITAL | Age: 63
End: 2023-03-23
Attending: INTERNAL MEDICINE
Payer: MEDICAID

## 2023-03-23 VITALS
HEIGHT: 63 IN | HEART RATE: 71 BPM | DIASTOLIC BLOOD PRESSURE: 62 MMHG | BODY MASS INDEX: 28.79 KG/M2 | WEIGHT: 162.5 LBS | RESPIRATION RATE: 18 BRPM | OXYGEN SATURATION: 96 % | SYSTOLIC BLOOD PRESSURE: 103 MMHG | TEMPERATURE: 98 F

## 2023-03-23 DIAGNOSIS — C34.90 NON-SMALL CELL LUNG CANCER, UNSPECIFIED LATERALITY: ICD-10-CM

## 2023-03-23 DIAGNOSIS — E03.2 HYPOTHYROIDISM DUE TO MEDICATION: Primary | ICD-10-CM

## 2023-03-23 DIAGNOSIS — Z12.31 OTHER SCREENING MAMMOGRAM: ICD-10-CM

## 2023-03-23 DIAGNOSIS — C34.01 MALIGNANT NEOPLASM OF HILUS OF RIGHT LUNG: ICD-10-CM

## 2023-03-23 PROCEDURE — A4216 STERILE WATER/SALINE, 10 ML: HCPCS | Performed by: INTERNAL MEDICINE

## 2023-03-23 PROCEDURE — 63600175 PHARM REV CODE 636 W HCPCS: Performed by: INTERNAL MEDICINE

## 2023-03-23 PROCEDURE — 96413 CHEMO IV INFUSION 1 HR: CPT

## 2023-03-23 PROCEDURE — 25000003 PHARM REV CODE 250: Performed by: INTERNAL MEDICINE

## 2023-03-23 RX ORDER — DIPHENHYDRAMINE HYDROCHLORIDE 50 MG/ML
50 INJECTION INTRAMUSCULAR; INTRAVENOUS ONCE AS NEEDED
Status: DISCONTINUED | OUTPATIENT
Start: 2023-03-23 | End: 2023-03-23 | Stop reason: HOSPADM

## 2023-03-23 RX ORDER — EPINEPHRINE 0.3 MG/.3ML
0.3 INJECTION SUBCUTANEOUS ONCE AS NEEDED
Status: DISCONTINUED | OUTPATIENT
Start: 2023-03-23 | End: 2023-03-23 | Stop reason: HOSPADM

## 2023-03-23 RX ORDER — SODIUM CHLORIDE 0.9 % (FLUSH) 0.9 %
10 SYRINGE (ML) INJECTION
Status: DISCONTINUED | OUTPATIENT
Start: 2023-03-23 | End: 2023-03-23 | Stop reason: HOSPADM

## 2023-03-23 RX ORDER — HEPARIN 100 UNIT/ML
500 SYRINGE INTRAVENOUS
Status: DISCONTINUED | OUTPATIENT
Start: 2023-03-23 | End: 2023-03-23 | Stop reason: HOSPADM

## 2023-03-23 RX ADMIN — ATEZOLIZUMAB 1200 MG: 1200 INJECTION, SOLUTION INTRAVENOUS at 03:03

## 2023-03-23 RX ADMIN — HEPARIN 500 UNITS: 100 SYRINGE at 04:03

## 2023-03-23 RX ADMIN — SODIUM CHLORIDE, PRESERVATIVE FREE 10 ML: 5 INJECTION INTRAVENOUS at 04:03

## 2023-03-23 NOTE — PLAN OF CARE
Problem: Fatigue  Goal: Improved Activity Tolerance  Intervention: Promote Improved Energy  Flowsheets (Taken 3/23/2023 1501)  Fatigue Management:   fatigue-related activity identified   paced activity encouraged   frequent rest breaks encouraged  Sleep/Rest Enhancement:   noise level reduced   regular sleep/rest pattern promoted   relaxation techniques promoted  Activity Management: Up in chair - L3

## 2023-03-24 LAB — PATH REV BLD -IMP: NORMAL

## 2023-03-25 ENCOUNTER — HOSPITAL ENCOUNTER (EMERGENCY)
Facility: HOSPITAL | Age: 63
Discharge: HOME | End: 2023-03-25
Attending: EMERGENCY MEDICINE
Payer: MEDICARE

## 2023-03-25 VITALS
SYSTOLIC BLOOD PRESSURE: 101 MMHG | DIASTOLIC BLOOD PRESSURE: 58 MMHG | HEART RATE: 69 BPM | HEIGHT: 63 IN | TEMPERATURE: 99 F | BODY MASS INDEX: 28.35 KG/M2 | RESPIRATION RATE: 21 BRPM | WEIGHT: 160 LBS | OXYGEN SATURATION: 94 %

## 2023-03-25 DIAGNOSIS — B34.9 VIRAL SYNDROME: Primary | ICD-10-CM

## 2023-03-25 DIAGNOSIS — U07.1 COVID: ICD-10-CM

## 2023-03-25 DIAGNOSIS — U07.1 COVID-19 VIRUS DETECTED: ICD-10-CM

## 2023-03-25 DIAGNOSIS — R53.1 WEAKNESS: ICD-10-CM

## 2023-03-25 LAB
ALBUMIN SERPL BCP-MCNC: 4.1 G/DL (ref 3.5–5.2)
ALP SERPL-CCNC: 110 U/L (ref 55–135)
ALT SERPL W/O P-5'-P-CCNC: 17 U/L (ref 10–44)
ANION GAP SERPL CALC-SCNC: 11 MMOL/L (ref 8–16)
AST SERPL-CCNC: 21 U/L (ref 10–40)
BASOPHILS # BLD AUTO: 0.01 K/UL (ref 0–0.2)
BASOPHILS NFR BLD: 0.1 % (ref 0–1.9)
BILIRUB SERPL-MCNC: 0.1 MG/DL (ref 0.1–1)
BILIRUB UR QL STRIP: NEGATIVE
BUN SERPL-MCNC: 9 MG/DL (ref 8–23)
CALCIUM SERPL-MCNC: 8.9 MG/DL (ref 8.7–10.5)
CHLORIDE SERPL-SCNC: 98 MMOL/L (ref 95–110)
CLARITY UR: CLEAR
CO2 SERPL-SCNC: 26 MMOL/L (ref 23–29)
COLOR UR: YELLOW
CREAT SERPL-MCNC: 0.9 MG/DL (ref 0.5–1.4)
DIFFERENTIAL METHOD: ABNORMAL
EOSINOPHIL # BLD AUTO: 0 K/UL (ref 0–0.5)
EOSINOPHIL NFR BLD: 0 % (ref 0–8)
ERYTHROCYTE [DISTWIDTH] IN BLOOD BY AUTOMATED COUNT: 18.4 % (ref 11.5–14.5)
EST. GFR  (NO RACE VARIABLE): >60 ML/MIN/1.73 M^2
GLUCOSE SERPL-MCNC: 90 MG/DL (ref 70–110)
GLUCOSE UR QL STRIP: NEGATIVE
HCT VFR BLD AUTO: 30.8 % (ref 37–48.5)
HGB BLD-MCNC: 10.3 G/DL (ref 12–16)
HGB UR QL STRIP: ABNORMAL
IMM GRANULOCYTES # BLD AUTO: 0.01 K/UL (ref 0–0.04)
IMM GRANULOCYTES NFR BLD AUTO: 0.1 % (ref 0–0.5)
INFLUENZA A, MOLECULAR: NEGATIVE
INFLUENZA B, MOLECULAR: NEGATIVE
KETONES UR QL STRIP: NEGATIVE
LACTATE SERPL-SCNC: 0.7 MMOL/L (ref 0.5–1.9)
LEUKOCYTE ESTERASE UR QL STRIP: NEGATIVE
LYMPHOCYTES # BLD AUTO: 6.5 K/UL (ref 1–4.8)
LYMPHOCYTES NFR BLD: 76.2 % (ref 18–48)
MCH RBC QN AUTO: 35.4 PG (ref 27–31)
MCHC RBC AUTO-ENTMCNC: 33.4 G/DL (ref 32–36)
MCV RBC AUTO: 106 FL (ref 82–98)
MONOCYTES # BLD AUTO: 0.6 K/UL (ref 0.3–1)
MONOCYTES NFR BLD: 7.1 % (ref 4–15)
NEUTROPHILS # BLD AUTO: 1.4 K/UL (ref 1.8–7.7)
NEUTROPHILS NFR BLD: 16.5 % (ref 38–73)
NITRITE UR QL STRIP: NEGATIVE
NRBC BLD-RTO: 0 /100 WBC
PH UR STRIP: 7 [PH] (ref 5–8)
PLATELET # BLD AUTO: 287 K/UL (ref 150–450)
PMV BLD AUTO: 9.1 FL (ref 9.2–12.9)
POTASSIUM SERPL-SCNC: 3.8 MMOL/L (ref 3.5–5.1)
PROT SERPL-MCNC: 6.8 G/DL (ref 6–8.4)
PROT UR QL STRIP: NEGATIVE
RBC # BLD AUTO: 2.91 M/UL (ref 4–5.4)
SARS-COV-2 RDRP RESP QL NAA+PROBE: POSITIVE
SODIUM SERPL-SCNC: 135 MMOL/L (ref 136–145)
SP GR UR STRIP: 1 (ref 1–1.03)
SPECIMEN SOURCE: NORMAL
URN SPEC COLLECT METH UR: ABNORMAL
UROBILINOGEN UR STRIP-ACNC: NEGATIVE EU/DL
WBC # BLD AUTO: 8.47 K/UL (ref 3.9–12.7)

## 2023-03-25 PROCEDURE — 99284 EMERGENCY DEPT VISIT MOD MDM: CPT | Mod: 25

## 2023-03-25 PROCEDURE — 63600175 PHARM REV CODE 636 W HCPCS: Performed by: EMERGENCY MEDICINE

## 2023-03-25 PROCEDURE — 83605 ASSAY OF LACTIC ACID: CPT | Performed by: EMERGENCY MEDICINE

## 2023-03-25 PROCEDURE — 87502 INFLUENZA DNA AMP PROBE: CPT | Performed by: EMERGENCY MEDICINE

## 2023-03-25 PROCEDURE — 81003 URINALYSIS AUTO W/O SCOPE: CPT | Performed by: EMERGENCY MEDICINE

## 2023-03-25 PROCEDURE — 87040 BLOOD CULTURE FOR BACTERIA: CPT | Mod: 59 | Performed by: EMERGENCY MEDICINE

## 2023-03-25 PROCEDURE — 80053 COMPREHEN METABOLIC PANEL: CPT | Performed by: EMERGENCY MEDICINE

## 2023-03-25 PROCEDURE — 96361 HYDRATE IV INFUSION ADD-ON: CPT

## 2023-03-25 PROCEDURE — U0002 COVID-19 LAB TEST NON-CDC: HCPCS | Performed by: EMERGENCY MEDICINE

## 2023-03-25 PROCEDURE — 94640 AIRWAY INHALATION TREATMENT: CPT

## 2023-03-25 PROCEDURE — 93005 ELECTROCARDIOGRAM TRACING: CPT | Performed by: SPECIALIST

## 2023-03-25 PROCEDURE — 93010 EKG 12-LEAD: ICD-10-PCS | Mod: ,,, | Performed by: SPECIALIST

## 2023-03-25 PROCEDURE — 96360 HYDRATION IV INFUSION INIT: CPT

## 2023-03-25 PROCEDURE — 94799 UNLISTED PULMONARY SVC/PX: CPT

## 2023-03-25 PROCEDURE — 99900031 HC PATIENT EDUCATION (STAT)

## 2023-03-25 PROCEDURE — 85025 COMPLETE CBC W/AUTO DIFF WBC: CPT | Performed by: EMERGENCY MEDICINE

## 2023-03-25 PROCEDURE — 25000003 PHARM REV CODE 250: Performed by: EMERGENCY MEDICINE

## 2023-03-25 PROCEDURE — 93010 ELECTROCARDIOGRAM REPORT: CPT | Mod: ,,, | Performed by: SPECIALIST

## 2023-03-25 PROCEDURE — 25000242 PHARM REV CODE 250 ALT 637 W/ HCPCS: Performed by: EMERGENCY MEDICINE

## 2023-03-25 PROCEDURE — 99900035 HC TECH TIME PER 15 MIN (STAT)

## 2023-03-25 RX ORDER — ACETAMINOPHEN 500 MG
1000 TABLET ORAL
Status: COMPLETED | OUTPATIENT
Start: 2023-03-25 | End: 2023-03-25

## 2023-03-25 RX ORDER — IPRATROPIUM BROMIDE AND ALBUTEROL SULFATE 2.5; .5 MG/3ML; MG/3ML
3 SOLUTION RESPIRATORY (INHALATION)
Status: COMPLETED | OUTPATIENT
Start: 2023-03-25 | End: 2023-03-25

## 2023-03-25 RX ADMIN — ACETAMINOPHEN 1000 MG: 500 TABLET ORAL at 01:03

## 2023-03-25 RX ADMIN — SODIUM CHLORIDE, SODIUM LACTATE, POTASSIUM CHLORIDE, AND CALCIUM CHLORIDE 2178 ML: .6; .31; .03; .02 INJECTION, SOLUTION INTRAVENOUS at 02:03

## 2023-03-25 RX ADMIN — IPRATROPIUM BROMIDE AND ALBUTEROL SULFATE 3 ML: 2.5; .5 SOLUTION RESPIRATORY (INHALATION) at 01:03

## 2023-03-25 NOTE — ED PROVIDER NOTES
Encounter Date: 3/25/2023       History     Chief Complaint   Patient presents with    Generalized Body Aches     STATES HAD 1ST IMMOTHERAPY DONE ON Thursday. BODY HURTS    Back Pain     62-year-old female presented emergency department with generalized malaise and body aches and cough and wheezing.  Denies dysuria or hematuria.  Denies any focal weakness however has generalized malaise and fatigue and weakness and everything hurts.  Patient has history of lung cancer and had lung resection and had chemotherapy and now getting immunotherapy and after receiving treatment of immunotherapy feels weak in general and dry.  Denies chest pain or shortness of breath.  Denies any abdominal pain or any focal weakness.    Review of patient's allergies indicates:  No Known Allergies  Past Medical History:   Diagnosis Date    Arthritis     Cancer     Depression     GERD (gastroesophageal reflux disease)     Pneumonia of left lung due to infectious organism 03/05/2020     Past Surgical History:   Procedure Laterality Date    INJECTION OF ANESTHETIC AGENT AROUND MULTIPLE INTERCOSTAL NERVES Left 10/3/2022    Procedure: BLOCK, NERVE, INTERCOSTAL, 2 OR MORE;  Surgeon: Evelio Kaplan MD;  Location: [facility] OR [address] [place] [address];  Service: Thoracic;  Laterality: Left;    INSERTION OF TUNNELED CENTRAL VENOUS CATHETER (CVC) WITH SUBCUTANEOUS PORT Right 11/3/2022    Procedure: [id number]-CATH, Right or Left Neck or Chest;  Surgeon: Mini Acevedo MD;  Location: [address];  Service: General;  Laterality: Right;    ROBOT-ASSISTED LAPAROSCOPIC LYMPHADENECTOMY USING DA MONIQUE XI Left 10/3/2022    Procedure: XI ROBOTIC LYMPHADENECTOMY;  Surgeon: Evelio Kaplan MD;  Location: [facility] OR [address] [place] [address];  Service: Thoracic;  Laterality: Left;    TONSILLECTOMY      XI ROBOTIC RATS,WITH LOBECTOMY,LUNG Left 10/3/2022    Procedure: XI ROBOTIC RATS,WITH LOBECTOMY,LUNG;  Surgeon: Evelio Kaplan MD;  Location: [facility] OR [address] [place] [address];  Service:  Thoracic;  Laterality: Left;     Family History   Problem Relation Age of Onset    Ovarian cancer Sister     Breast cancer Cousin      Social History     Tobacco Use    Smoking status: Some Days     Packs/day: 0.15     Types: Cigarettes    Smokeless tobacco: Never    Tobacco comments:     reports one cigarette every now and then   Substance Use Topics    Alcohol use: Yes     Comment: rarely    Drug use: Yes     Types: Marijuana     Review of Systems   Constitutional:  Positive for fatigue.   HENT: Negative.     Eyes: Negative.    Respiratory:  Positive for cough and wheezing.    Cardiovascular: Negative.  Negative for chest pain.   Gastrointestinal: Negative.    Endocrine: Negative.    Genitourinary: Negative.    Musculoskeletal:  Positive for myalgias.   Skin: Negative.    Allergic/Immunologic: Negative.    Neurological:  Positive for weakness.   Hematological: Negative.    Psychiatric/Behavioral: Negative.     All other systems reviewed and are negative.    Physical Exam     Initial Vitals [03/25/23 1247]   BP Pulse Resp Temp SpO2   102/76 97 18 99.6 °F (37.6 °C) 95 %      MAP       --         Physical Exam    Nursing note and vitals reviewed.  Constitutional: She appears well-developed and well-nourished.   HENT:   Head: Normocephalic and atraumatic.   Nose: Nose normal.   Mouth/Throat: Oropharynx is clear and moist.   Eyes: Conjunctivae and EOM are normal. Pupils are equal, round, and reactive to light.   Neck: Neck supple. No thyromegaly present. No tracheal deviation present. No JVD present.   Normal range of motion.  Cardiovascular:  Normal rate, regular rhythm, normal heart sounds and intact distal pulses.           No murmur heard.  Pulmonary/Chest: Breath sounds normal. No stridor. No respiratory distress. She has no wheezes. She has no rales.   Abdominal: Abdomen is soft. Bowel sounds are normal. She exhibits no distension. There is no abdominal tenderness.   Musculoskeletal:         General: No  tenderness or edema. Normal range of motion.      Cervical back: Normal range of motion and neck supple.      Comments: Generalized myalgias and discomfort with palpation of any muscle     Neurological: She is alert and oriented to person, place, and time. She has normal strength. GCS score is 15. GCS eye subscore is 4. GCS verbal subscore is 5. GCS motor subscore is 6.   Skin: Skin is warm. Capillary refill takes less than 2 seconds.   Psychiatric: She has a normal mood and affect. Thought content normal.       ED Course   Procedures  Labs Reviewed   CBC W/ AUTO DIFFERENTIAL - Abnormal; Notable for the following components:       Result Value    RBC 2.91 (*)     Hemoglobin 10.3 (*)     Hematocrit 30.8 (*)      (*)     MCH 35.4 (*)     RDW 18.4 (*)     MPV 9.1 (*)     All other components within normal limits   COMPREHENSIVE METABOLIC PANEL - Abnormal; Notable for the following components:    Sodium 135 (*)     All other components within normal limits   SARS-COV-2 RNA AMPLIFICATION, QUAL - Abnormal; Notable for the following components:    SARS-CoV-2 RNA, Amplification, Qual Positive (*)     All other components within normal limits   CULTURE, BLOOD   CULTURE, BLOOD   LACTIC ACID, PLASMA   INFLUENZA A AND B ANTIGEN    Narrative:     Specimen Source->Nasopharyngeal Swab   URINALYSIS, REFLEX TO URINE CULTURE          Imaging Results              X-Ray Chest AP Portable (Final result)  Result time 03/25/23 13:43:16      Final result by Uriel Lee MD (03/25/23 13:43:16)                   Narrative:    XR CHEST 1 VIEW    CLINICAL HISTORY:  62 years Female Sepsis    COMPARISON: CT chest September 6, 2022    FINDINGS: Patient is status post partial pneumonectomy on the left with associated volume loss, leftward mediastinal shift, and hyperexpansion of the residual left lung. No confluent airspace disease. No pleural effusion or pneumothorax. Right internal jugular Mediport catheter is in stable  "position.    Cardiac silhouette size is stable. Atherosclerotic calcification of the aorta. No acute osseous abnormality.    IMPRESSION:    Status post partial pneumonectomy on the left with associated volume loss. No acute pulmonary pathology.    Electronically signed by:  Uriel Lee MD  3/25/2023 1:43 PM CDT Workstation: [id number]                                  X-Rays:   Independently Interpreted Readings:   Other Readings:  Chest x-ray does not show any acute infiltrate  Medications   lactated ringers bolus 2,178 mL (2,178 mLs Intravenous New Bag 3/25/23 1401)   albuterol-ipratropium 2.5 mg-0.5 mg/3 mL nebulizer solution 3 mL (3 mLs Nebulization Given 3/25/23 1314)   acetaminophen tablet 1,000 mg (1,000 mg Oral Given 3/25/23 1327)     Medical Decision Making:   Differential Diagnosis:   62-year-old female with lung cancer currently getting immunotherapy presented emergency department with fever generalized malaise and body aches and cough and wheezing.  Patient did have improvement of symptoms with hydration and treatment.  Patient has generalized malaise and presentation consistent with viral syndrome and COVID positive on testing.  Patient on immunotherapy so Paxlovid offered however at this point patient declined any treatment with Paxlovid.  Patient otherwise nontoxic and feels better and hemodynamically stable so discharged with instructions and follow-up and return precautions given.  Independently Interpreted Test(s):   I have ordered and independently interpreted X-rays - see prior notes.  Clinical Tests:   Lab Tests: Reviewed  Radiological Study: Reviewed  Sepsis Perfusion Assessment: "I attest a sepsis perfusion exam was performed within 6 hours of sepsis, severe sepsis, or septic shock presentation, following fluid resuscitation."                        Clinical Impression:   Final diagnoses:  [R53.1] Weakness  [B34.9] Viral syndrome (Primary)  [U07.1] COVID        ED Disposition Condition    " Discharge Stable          ED Prescriptions    None       Follow-up Information       Follow up With Specialties Details Why Contact Info    Vern Priest MD Family Medicine In 1 day  [address]  [place] [address]  [phone]               Alexei Huynh MD  03/25/23 1523

## 2023-03-25 NOTE — ED NOTES
"PRIVATE ROOM. EVEN AND NON LABORED RESPIRATIONS.  AIRWAY CLEAR.  PULSES REGULAR.  < 3" CAPILLARY REFILL. SKIN WDI.  MAEW.  NON DISTENDED ABDOMEN. ALERT, ORIENTED AND AMBULATORY. C/O BACK AND BODY PAIN AT THIS TIME.  CALL LIGHT IN REACH.   "

## 2023-03-25 NOTE — ED NOTES
LOW BP AT ATTEMPTED DC TO HOME.  STEADY GAIT PRIOR TO BP READING.  DENIES SOB. MD TO BS FOR REASSESSMENT.

## 2023-03-25 NOTE — DISCHARGE INSTRUCTIONS
Drink lots of fluids.  Rest.  Return to emergency department for worsening symptoms.  Follow-up with your oncologist in 48 hours for recheck.  Return to emergency department for worsening symptoms or any problems.  Paxlovid offered to you however at this point you declined it so if you change your mind please come back to the emergency department.  Tylenol for body aches.

## 2023-03-27 ENCOUNTER — PATIENT MESSAGE (OUTPATIENT)
Dept: ADMINISTRATIVE | Facility: HOSPITAL | Age: 63
End: 2023-03-27
Payer: MEDICAID

## 2023-03-29 ENCOUNTER — PATIENT MESSAGE (OUTPATIENT)
Dept: HEMATOLOGY/ONCOLOGY | Facility: CLINIC | Age: 63
End: 2023-03-29
Payer: MEDICAID

## 2023-03-30 LAB
BACTERIA BLD CULT: NORMAL
BACTERIA BLD CULT: NORMAL

## 2023-04-12 ENCOUNTER — LAB VISIT (OUTPATIENT)
Dept: LAB | Facility: HOSPITAL | Age: 63
End: 2023-04-12
Attending: INTERNAL MEDICINE
Payer: MEDICAID

## 2023-04-12 ENCOUNTER — PATIENT MESSAGE (OUTPATIENT)
Dept: HEMATOLOGY/ONCOLOGY | Facility: CLINIC | Age: 63
End: 2023-04-12

## 2023-04-12 ENCOUNTER — OFFICE VISIT (OUTPATIENT)
Dept: HEMATOLOGY/ONCOLOGY | Facility: CLINIC | Age: 63
End: 2023-04-12
Payer: MEDICAID

## 2023-04-12 VITALS
DIASTOLIC BLOOD PRESSURE: 58 MMHG | WEIGHT: 155.63 LBS | BODY MASS INDEX: 27.57 KG/M2 | TEMPERATURE: 97 F | SYSTOLIC BLOOD PRESSURE: 100 MMHG | RESPIRATION RATE: 12 BRPM | HEART RATE: 74 BPM | HEIGHT: 63 IN | OXYGEN SATURATION: 98 %

## 2023-04-12 DIAGNOSIS — C91.10 CLL (CHRONIC LYMPHOCYTIC LEUKEMIA): ICD-10-CM

## 2023-04-12 DIAGNOSIS — E03.2 HYPOTHYROIDISM DUE TO MEDICATION: ICD-10-CM

## 2023-04-12 DIAGNOSIS — C34.01 MALIGNANT NEOPLASM OF HILUS OF RIGHT LUNG: ICD-10-CM

## 2023-04-12 DIAGNOSIS — C34.01 MALIGNANT NEOPLASM OF HILUS OF RIGHT LUNG: Primary | ICD-10-CM

## 2023-04-12 LAB
ALBUMIN SERPL BCP-MCNC: 3.8 G/DL (ref 3.5–5.2)
ALP SERPL-CCNC: 143 U/L (ref 55–135)
ALT SERPL W/O P-5'-P-CCNC: 11 U/L (ref 10–44)
ANION GAP SERPL CALC-SCNC: 10 MMOL/L (ref 8–16)
AST SERPL-CCNC: 15 U/L (ref 10–40)
BASOPHILS NFR BLD: 0 % (ref 0–1.9)
BILIRUB SERPL-MCNC: 0.4 MG/DL (ref 0.1–1)
BUN SERPL-MCNC: 7 MG/DL (ref 8–23)
CALCIUM SERPL-MCNC: 9.2 MG/DL (ref 8.7–10.5)
CHLORIDE SERPL-SCNC: 105 MMOL/L (ref 95–110)
CO2 SERPL-SCNC: 24 MMOL/L (ref 23–29)
CREAT SERPL-MCNC: 0.8 MG/DL (ref 0.5–1.4)
DIFFERENTIAL METHOD: ABNORMAL
EOSINOPHIL NFR BLD: 0 % (ref 0–8)
ERYTHROCYTE [DISTWIDTH] IN BLOOD BY AUTOMATED COUNT: 15 % (ref 11.5–14.5)
EST. GFR  (NO RACE VARIABLE): >60 ML/MIN/1.73 M^2
GLUCOSE SERPL-MCNC: 79 MG/DL (ref 70–110)
HCT VFR BLD AUTO: 35 % (ref 37–48.5)
HGB BLD-MCNC: 11.3 G/DL (ref 12–16)
IMM GRANULOCYTES # BLD AUTO: ABNORMAL K/UL
IMM GRANULOCYTES NFR BLD AUTO: ABNORMAL %
LYMPHOCYTES NFR BLD: 72 % (ref 18–48)
MCH RBC QN AUTO: 34.7 PG (ref 27–31)
MCHC RBC AUTO-ENTMCNC: 32.3 G/DL (ref 32–36)
MCV RBC AUTO: 107 FL (ref 82–98)
MONOCYTES NFR BLD: 6 % (ref 4–15)
NEUTROPHILS NFR BLD: 22 % (ref 38–73)
NRBC BLD-RTO: 0 /100 WBC
PLATELET # BLD AUTO: 421 K/UL (ref 150–450)
PLATELET BLD QL SMEAR: ABNORMAL
PMV BLD AUTO: 10.1 FL (ref 9.2–12.9)
POTASSIUM SERPL-SCNC: 3.6 MMOL/L (ref 3.5–5.1)
PROT SERPL-MCNC: 6.9 G/DL (ref 6–8.4)
RBC # BLD AUTO: 3.26 M/UL (ref 4–5.4)
SODIUM SERPL-SCNC: 139 MMOL/L (ref 136–145)
TSH SERPL DL<=0.005 MIU/L-ACNC: 0.99 UIU/ML (ref 0.4–4)
WBC # BLD AUTO: 24.17 K/UL (ref 3.9–12.7)

## 2023-04-12 PROCEDURE — 99214 PR OFFICE/OUTPT VISIT, EST, LEVL IV, 30-39 MIN: ICD-10-PCS | Mod: S$PBB,,, | Performed by: NURSE PRACTITIONER

## 2023-04-12 PROCEDURE — 3078F DIAST BP <80 MM HG: CPT | Mod: CPTII,,, | Performed by: NURSE PRACTITIONER

## 2023-04-12 PROCEDURE — 84443 ASSAY THYROID STIM HORMONE: CPT | Performed by: INTERNAL MEDICINE

## 2023-04-12 PROCEDURE — 99214 OFFICE O/P EST MOD 30 MIN: CPT | Mod: S$PBB,,, | Performed by: NURSE PRACTITIONER

## 2023-04-12 PROCEDURE — 3074F SYST BP LT 130 MM HG: CPT | Mod: CPTII,,, | Performed by: NURSE PRACTITIONER

## 2023-04-12 PROCEDURE — 99215 OFFICE O/P EST HI 40 MIN: CPT | Mod: PBBFAC,PO | Performed by: NURSE PRACTITIONER

## 2023-04-12 PROCEDURE — 3078F PR MOST RECENT DIASTOLIC BLOOD PRESSURE < 80 MM HG: ICD-10-PCS | Mod: CPTII,,, | Performed by: NURSE PRACTITIONER

## 2023-04-12 PROCEDURE — 1159F MED LIST DOCD IN RCRD: CPT | Mod: CPTII,,, | Performed by: NURSE PRACTITIONER

## 2023-04-12 PROCEDURE — 99999 PR PBB SHADOW E&M-EST. PATIENT-LVL V: ICD-10-PCS | Mod: PBBFAC,,, | Performed by: NURSE PRACTITIONER

## 2023-04-12 PROCEDURE — 1160F PR REVIEW ALL MEDS BY PRESCRIBER/CLIN PHARMACIST DOCUMENTED: ICD-10-PCS | Mod: CPTII,,, | Performed by: NURSE PRACTITIONER

## 2023-04-12 PROCEDURE — 1160F RVW MEDS BY RX/DR IN RCRD: CPT | Mod: CPTII,,, | Performed by: NURSE PRACTITIONER

## 2023-04-12 PROCEDURE — 3008F PR BODY MASS INDEX (BMI) DOCUMENTED: ICD-10-PCS | Mod: CPTII,,, | Performed by: NURSE PRACTITIONER

## 2023-04-12 PROCEDURE — 3074F PR MOST RECENT SYSTOLIC BLOOD PRESSURE < 130 MM HG: ICD-10-PCS | Mod: CPTII,,, | Performed by: NURSE PRACTITIONER

## 2023-04-12 PROCEDURE — 36415 COLL VENOUS BLD VENIPUNCTURE: CPT | Performed by: INTERNAL MEDICINE

## 2023-04-12 PROCEDURE — 99999 PR PBB SHADOW E&M-EST. PATIENT-LVL V: CPT | Mod: PBBFAC,,, | Performed by: NURSE PRACTITIONER

## 2023-04-12 PROCEDURE — 85027 COMPLETE CBC AUTOMATED: CPT | Performed by: INTERNAL MEDICINE

## 2023-04-12 PROCEDURE — 3008F BODY MASS INDEX DOCD: CPT | Mod: CPTII,,, | Performed by: NURSE PRACTITIONER

## 2023-04-12 PROCEDURE — 1159F PR MEDICATION LIST DOCUMENTED IN MEDICAL RECORD: ICD-10-PCS | Mod: CPTII,,, | Performed by: NURSE PRACTITIONER

## 2023-04-12 PROCEDURE — 85007 BL SMEAR W/DIFF WBC COUNT: CPT | Performed by: INTERNAL MEDICINE

## 2023-04-12 PROCEDURE — 80053 COMPREHEN METABOLIC PANEL: CPT | Performed by: INTERNAL MEDICINE

## 2023-04-12 NOTE — PROGRESS NOTES
Subjective:       Patient ID: Yvette Hutchinson is a 62 y.o. female.    Chief Complaint:  Patient known to me with CLL stage 0 recently diagnosed with lung cancer August 2022  Follow-up    Lung Cancer    Patient has chronic complains of chronic pain syndrome and COPD for which periodically she take steroids she is also on BuSpar has issues anxiety has required dilatation of esophageal strictures allergic rhinitis insomnia and history of B12 deficiency documented   Had CT chest done with pulmonary 7/22 showed mass bx done. 8/22    Oncology hx  Impression:ct chest 7/29/22     2.3 cm spiculated left upper lobe lung nodule highly suspicious for bronchogenic carcinoma.     New nonspecific 7 mm nodule in the right middle lobe; this appears more linear on the coronal images and may be a focus of scarring.     Additional stable lung nodules, mildly enlarged axillary lymph nodes, substernal thyroid nodule; these findings are likely benign given the interval stability.   LEFT LUNG MASS, CT-GUIDED BIOPSY:   Non-small cell lung carcinoma.   Comment:  The biopsy reveals invasive carcinoma with apparent glandular but   also vague squamoid features.  Tumor cells are diffusely positive with TTF-1   and focally positive with P40.  The histology differential includes   adenocarcinoma and adenosquamous carcinoma.  PD-L1 and templates NGS studies   are in progress.  The results will be issued separately.  October 3, 2022: Robotic left lobectomy T1c N1    4/12/2023:  she presents today for follow-up prior to her next maintenance Tecentriq.  She was diagnosed with COVID on 03/25/2023.   She is complaining today of residual cough.  On physical exam she was noted to have scattered rhonchi    Social history; patient is a smoker denies recreational alcohol use or drug use   Patient is currently on disability  She is allergic to the mask used during COVID pandemic she is also bothered by her mask with COPD    Family history  suggestive of ovarian and breast cancer patient advised to see a  or seek   Review of patient's allergies indicates:  No Known Allergies  Medications have been reviewed  Current Outpatient Medications on File Prior to Visit   Medication Sig Dispense Refill    (Magic mouthwash) 1:1:1 diphenhydrAMINE(Benadryl) 12.5mg/5ml liq, aluminum & magnesium hydroxide-simethicone (Maalox), LIDOcaine viscous 2% Swish and spit 10 mLs 3 (three) times daily. for mouth sores 250 mL 0    acetaminophen (TYLENOL) 500 MG tablet Take 2 tablets (1,000 mg total) by mouth every 6 (six) hours as needed for Pain. 8 tablet 0    albuterol (PROVENTIL/VENTOLIN HFA) 90 mcg/actuation inhaler Inhale 2 puffs into the lungs every 6 (six) hours as needed for Wheezing or Shortness of Breath. Rescue 18 g 11    albuterol-ipratropium (DUO-NEB) 2.5 mg-0.5 mg/3 mL nebulizer solution Take 3 mLs by nebulization every 6 (six) hours as needed for Wheezing. Rescue 120 each 5    ALPRAZolam (XANAX) 0.5 MG tablet Take 1 tablet (0.5 mg total) by mouth 3 (three) times daily. for 10 days 30 tablet 0    benzocaine-menthoL 6-10 mg lozenge Take 1 lozenge by mouth every 2 (two) hours as needed for Pain. 18 tablet 0    buPROPion (WELLBUTRIN XL) 300 MG 24 hr tablet Take 1 tablet (300 mg total) by mouth once daily. 90 tablet 3    dexAMETHasone (DECADRON) 4 MG Tab Take 2 tablets (8 mg total) by mouth once daily. Take 2 tablets by mouth daily on days 2 3 and 4 of chemotherapy 120 tablet 0    diphenhydrAMINE-aluminum-magnesium hydroxide-simethicone-LIDOcaine HCl 2% Swish and spit 15 mLs every 4 (four) hours as needed (mouth sores). 100 each 2    docusate sodium (COLACE) 100 MG capsule Take 1 capsule (100 mg total) by mouth daily as needed for Constipation. 30 capsule 1    EScitalopram oxalate (LEXAPRO) 10 MG tablet Take 2 tablets (20 mg total) by mouth once daily. 90 tablet 6    FLUoxetine 20 MG capsule Take 1 capsule (20 mg total) by mouth once daily. 30 capsule  11    fluticasone propionate (FLONASE) 50 mcg/actuation nasal spray 1 spray (50 mcg total) by Each Nostril route once daily. 16 g 3    fluticasone-salmeterol 230-21 mcg/dose (ADVAIR HFA) 230-21 mcg/actuation HFAA inhaler Inhale 2 puffs into the lungs 2 (two) times daily. Controller 12 g 5    gabapentin (NEURONTIN) 300 MG capsule Take 2 capsules (600 mg total) by mouth 3 (three) times daily. 180 capsule 11    LIDOcaine (LIDODERM) 5 % Place 1 patch onto the skin once daily. Remove & Discard patch within 12 hours or as directed by MD Bloom patch 0    LIDOcaine (LIDODERM) 5 % Place 1 patch onto the skin once daily. Remove & Discard patch within 12 hours or as directed by MD Bloom patch 1    LIDOcaine-prilocaine (EMLA) cream Apply topically as needed (apply to port site 30 min before access). 30 g 0    montelukast (SINGULAIR) 10 mg tablet Take 10 mg by mouth once daily.      OLANZapine (ZYPREXA) 5 MG tablet Take 1 tablet (5 mg total) by mouth nightly. Take 1 tablet at bedtime on days 1-4 of each cycle of chemotherapy 30 tablet 2    ondansetron (ZOFRAN-ODT) 8 MG TbDL Take 1 tablet (8 mg total) by mouth every 12 (twelve) hours as needed (nausea). 60 tablet 1    oxyCODONE (ROXICODONE) 10 mg Tab immediate release tablet Take 1 tablet (10 mg total) by mouth every 4 (four) hours as needed for Pain. 60 tablet 0    levocetirizine (XYZAL) 5 MG tablet Take 1 tablet (5 mg total) by mouth every evening. 30 tablet 5     Current Facility-Administered Medications on File Prior to Visit   Medication Dose Route Frequency Provider Last Rate Last Admin    0.9%  NaCl infusion   Intravenous Continuous Yuridia Cisneros MD   New Bag at 11/03/22 0701       REVIEW OF SYSTEMS:     S/p lobectomy left side, has draining tube+ with sanguinous fluid.    Wt Readings from Last 3 Encounters:   03/25/23 72.6 kg (160 lb)   03/23/23 73.7 kg (162 lb 8 oz)   03/22/23 73.5 kg (162 lb 0.6 oz)     Temp Readings from Last 3 Encounters:   03/25/23 98.6 °F (37 °C) (Oral)    03/23/23 98 °F (36.7 °C)   03/22/23 96.8 °F (36 °C) (Temporal)     BP Readings from Last 3 Encounters:   03/25/23 (!) 101/58   03/23/23 103/62   03/22/23 (!) 106/58     Pulse Readings from Last 3 Encounters:   03/25/23 69   03/23/23 71   03/22/23 64     VITAL SIGNS:  as above   GENERAL: appears well-built, well-nourished.  No anxiety, no agitation, and in no distress.  Patient is awake, alert, oriented and cooperative.  HEENT:  Showed no congestion. Trachea is central no obvious icterus or pallor noted no hoarseness. no obvious JVD   NECK:  Supple.  No JVD. No obvious cervical submental or supraclavicular adenopathy.  RS:  scattered rhonchi  ABDOMEN:  abdomen appears undistended.  EXTREMITIES:  Without edema.  NEUROLOGICAL:  The patient is appropriate, higher functions are normal.  No  obvious neurological deficits.  normal judgement normal thought content  No confusion, no speech impediment. Cranial nerves are intact and show no deficit. No gross motor deficits noted   SKIN MUSCULOSKELETAL: no joint or skeletal deformity, no clubbing of nails.  No visible rash ecchymosis or petechiae  Lab Results   Component Value Date    WBC 20.78 (H) 03/03/2020    HGB 14.9 03/03/2020    HCT 47.0 03/03/2020    MCV 99 (H) 03/03/2020     03/03/2020     Smudge cells presnt  CMP  Sodium   Date Value Ref Range Status   03/25/2023 135 (L) 136 - 145 mmol/L Final     Potassium   Date Value Ref Range Status   03/25/2023 3.8 3.5 - 5.1 mmol/L Final     Chloride   Date Value Ref Range Status   03/25/2023 98 95 - 110 mmol/L Final     CO2   Date Value Ref Range Status   03/25/2023 26 23 - 29 mmol/L Final     Glucose   Date Value Ref Range Status   03/25/2023 90 70 - 110 mg/dL Final     BUN   Date Value Ref Range Status   03/25/2023 9 8 - 23 mg/dL Final     Creatinine   Date Value Ref Range Status   03/25/2023 0.9 0.5 - 1.4 mg/dL Final     Calcium   Date Value Ref Range Status   03/25/2023 8.9 8.7 - 10.5 mg/dL Final     Total Protein    Date Value Ref Range Status   03/25/2023 6.8 6.0 - 8.4 g/dL Final     Albumin   Date Value Ref Range Status   03/25/2023 4.1 3.5 - 5.2 g/dL Final     Total Bilirubin   Date Value Ref Range Status   03/25/2023 0.1 0.1 - 1.0 mg/dL Final     Comment:     For infants and newborns, interpretation of results should be based  on gestational age, weight and in agreement with clinical  observations.    Premature Infant recommended reference ranges:  Up to 24 hours.............<8.0 mg/dL  Up to 48 hours............<12.0 mg/dL  3-5 days..................<15.0 mg/dL  6-29 days.................<15.0 mg/dL       Alkaline Phosphatase   Date Value Ref Range Status   03/25/2023 110 55 - 135 U/L Final     AST   Date Value Ref Range Status   03/25/2023 21 10 - 40 U/L Final     ALT   Date Value Ref Range Status   03/25/2023 17 10 - 44 U/L Final     Anion Gap   Date Value Ref Range Status   03/25/2023 11 8 - 16 mmol/L Final     eGFR if    Date Value Ref Range Status   06/13/2022 >60 >60 mL/min/1.73 m^2 Final     eGFR if non    Date Value Ref Range Status   06/13/2022 >60 >60 mL/min/1.73 m^2 Final     Comment:     Calculation used to obtain the estimated glomerular filtration  rate (eGFR) is the CKD-EPI equation.            2wk ago    Blood Interpretation A B cell lymphoproliferative disorder is present. see comment.    Comment: Interpreted by: Jeremias Sosa M.D., Signed on 08/06/2020 at 13:07   Blood Comment Flow cytometric analysis of  peripheral blood  detects a lambda  light chain   restricted B lymphocyte population showing expression of CD19 and dim CD20   with aberrant expression of CD5 (dim).  T lymphocytes are   immunophenotypically unremarkable.  The blasts gate is not increased.  The   findings are consistent with patient history of chronic lymphocytic   leukemia/small lymphocytic lymphoma.   Flow differential:  Lymphocytes 69.6%, Monocytes 2.8%, Granulocytes  27.5%,   Blast  0.1%, Debris/nRBC  0.1%,  Viability 97.5%.   10/3/22 robotic lef lung lobectomy  1. LYMPH NODE, LEVEL 5 FROZEN SECTION, EXCISION:   One lymph node with dominant necrotizing granuloma, negative for malignancy   (0/1).   2. LYMPH NODES, LEVEL 5 PERMANENT, EXCISION:   Two lymph nodes with multifocal necrotizing granulomas, negative for   malignancy (0/2).   3. LYMPH NODES, LEFT POSTERIOR LEVEL 10, EXCISION:   Five lymph nodes with multifocal necrotizing granulomas, negative for   malignancy (0/5).   4. LYMPH NODE, LEVEL 11, EXCISION:   One lymph node, negative for malignancy (0/1).   5. LYMPH NODES, LEVEL 12, EXCISION:   Three lymph nodes, one with single necrotizing granuloma, negative for   malignancy (0/3).   6. LYMPH NODES, LEVEL 12 NEAR UPPER DIVISION BRONCHUS, EXCISION:   Three lymph nodes with sinus histiocytosis, negative for malignancy (0/3).   7. LYMPH NODES, LEVEL 10 #2, EXCISION:   One of two lymph nodes positive for metastatic carcinoma (1/2).   Size of largest metastatic deposit:  8 mm.   Negative for extranodal extension.   8. LYMPH NODES, LEFT LEVEL 8, EXCISION:   Two lymph nodes with sinus histiocytosis, negative for malignancy (0/2).   9. LYMPH NODES, LEFT LEVEL 9, EXCISION:   Two lymph nodes, negative for malignancy (0/2).   10. 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   Surgical margins are negative for malignancy.   Background lung tissue with subpleural fibrosis, no evidence of granulomas.   Two hilar lymph nodes, negative for malignancy (0/2).   Coment (1-3, 5):  Prominent necrotizing granulomatous inflammation identified   is identified in multiple lymph nodes.  An AE1/AE3 cytokeratin immunostain   performed on the largest granuloma (part 3) reveals no evidence of occult   carcinoma.  GMS and AFB special stains are negative for fungal and acid-fast   organisms, respectively.  The necrotizing features are not typical of   sarcoidosis.  As such, other granulomatous  processes may be considered   including secondary response to malignancy or infection.  Clinical   correlation is advised.   Comment (10):  Sections reveal invasive carcinoma involving lung tissue with   predominantly squamoid morphologic features including bright eosinophilic   cytoplasm and intracellular bridging.  There are not significant keratinizing   features.  Some areas show basaloid features.  There is also regional luminal   features including cribriforming and vague glandular structures containing   mucin.  Tumor cells are diffusely positive with P40 and regionally positive   with TTF-1.  Mucicarmine special stain highlights mucin producing luminal   spaces. Overall tumor appearance and staining profile supports adenosquamous   carcinoma, a variant of non-small cell lung carcinoma.   [facility] PATHOLOGY CANCER CASE SUMMARY:  LUNG   Procedure:  Lobectomy   Specimen laterality:  Left   Tumor focality:  Single focus   Tumor site: Upper lobe of lung   Tumor size:  2.2 cm   Histologic type:  Adenosquamous carcinoma   Spread through airspaces:  Not identified   Visceral pleural invasion:  Not identified   Direct invasion of adjacent structures: Not applicable   Lymphovascular invasion:  Not identified   Margins:  All margins negative for invasive carcinoma     Closest margin to invasive carcinoma:  Bronchial (3.0 cm)   Regional lymph nodes:  Tumor present in regional lymph node     Number of lymph nodes with tumor:  1     Scott sites with tumor:  Left level 10 (10L)     Extranodal extension:  Not identified     Number of lymph nodes examined:  23   Pathologic stage classification (pTNM): pT1c pN1   Special studies:  Performed on previous biopsy if additional studies needed   there may be performed on tissue blockd 10G or 10H.      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 Due to adenosq histology chose carbo/ taxol x 4 cycles tecentriq maintanence x 1 year  pt is has had 5 cycles of carbo/ taxol  discused maintainenec at tumor board   Orders for tecentriq maintainence placed,   COVID  + 3/25/23:  presents today with residual cough and scattered rhonchi   We will delay maintenance Tecentriq for one-week  She will see Dr. Lee for clearance next week    CLL   stage 0 will confirmed with flow cytometry  NTD   she has no other cytopenias or lymphadenopathy or B symptoms patient can be observed      Continue with chronic pain syndrome and COPD management advised to stop smoking if at all possible 10 pills of hydrocodoen given to pt, she needs to follow with pain mx      Advised COVID precaution due to her lung situation she will be a high risk patient

## 2023-04-18 ENCOUNTER — LAB VISIT (OUTPATIENT)
Dept: LAB | Facility: HOSPITAL | Age: 63
End: 2023-04-18
Attending: INTERNAL MEDICINE
Payer: MEDICAID

## 2023-04-18 ENCOUNTER — OFFICE VISIT (OUTPATIENT)
Dept: HEMATOLOGY/ONCOLOGY | Facility: CLINIC | Age: 63
End: 2023-04-18
Payer: MEDICAID

## 2023-04-18 VITALS
WEIGHT: 154.75 LBS | HEART RATE: 85 BPM | SYSTOLIC BLOOD PRESSURE: 122 MMHG | HEIGHT: 63 IN | OXYGEN SATURATION: 99 % | BODY MASS INDEX: 27.42 KG/M2 | TEMPERATURE: 97 F | DIASTOLIC BLOOD PRESSURE: 68 MMHG | RESPIRATION RATE: 12 BRPM

## 2023-04-18 DIAGNOSIS — C34.01 MALIGNANT NEOPLASM OF HILUS OF RIGHT LUNG: ICD-10-CM

## 2023-04-18 DIAGNOSIS — E53.8 B12 DEFICIENCY: Primary | ICD-10-CM

## 2023-04-18 DIAGNOSIS — E03.2 HYPOTHYROIDISM DUE TO MEDICATION: ICD-10-CM

## 2023-04-18 DIAGNOSIS — C91.10 CLL (CHRONIC LYMPHOCYTIC LEUKEMIA): ICD-10-CM

## 2023-04-18 LAB
ALBUMIN SERPL BCP-MCNC: 4.2 G/DL (ref 3.5–5.2)
ALP SERPL-CCNC: 150 U/L (ref 55–135)
ALT SERPL W/O P-5'-P-CCNC: 13 U/L (ref 10–44)
ANION GAP SERPL CALC-SCNC: 10 MMOL/L (ref 8–16)
AST SERPL-CCNC: 16 U/L (ref 10–40)
BASOPHILS NFR BLD: 0 % (ref 0–1.9)
BILIRUB SERPL-MCNC: 0.3 MG/DL (ref 0.1–1)
BUN SERPL-MCNC: 11 MG/DL (ref 8–23)
CALCIUM SERPL-MCNC: 9.7 MG/DL (ref 8.7–10.5)
CHLORIDE SERPL-SCNC: 103 MMOL/L (ref 95–110)
CO2 SERPL-SCNC: 25 MMOL/L (ref 23–29)
CREAT SERPL-MCNC: 1 MG/DL (ref 0.5–1.4)
DIFFERENTIAL METHOD: ABNORMAL
EOSINOPHIL NFR BLD: 1 % (ref 0–8)
ERYTHROCYTE [DISTWIDTH] IN BLOOD BY AUTOMATED COUNT: 14.6 % (ref 11.5–14.5)
EST. GFR  (NO RACE VARIABLE): >60 ML/MIN/1.73 M^2
GLUCOSE SERPL-MCNC: 90 MG/DL (ref 70–110)
HCT VFR BLD AUTO: 36.9 % (ref 37–48.5)
HGB BLD-MCNC: 11.9 G/DL (ref 12–16)
IMM GRANULOCYTES # BLD AUTO: ABNORMAL K/UL
IMM GRANULOCYTES NFR BLD AUTO: ABNORMAL %
LYMPHOCYTES NFR BLD: 59 % (ref 18–48)
MCH RBC QN AUTO: 34.4 PG (ref 27–31)
MCHC RBC AUTO-ENTMCNC: 32.2 G/DL (ref 32–36)
MCV RBC AUTO: 107 FL (ref 82–98)
MONOCYTES NFR BLD: 8 % (ref 4–15)
NEUTROPHILS NFR BLD: 31 % (ref 38–73)
NEUTS BAND NFR BLD MANUAL: 1 %
NRBC BLD-RTO: 0 /100 WBC
PLATELET # BLD AUTO: 403 K/UL (ref 150–450)
PLATELET BLD QL SMEAR: ABNORMAL
PMV BLD AUTO: 9.8 FL (ref 9.2–12.9)
POTASSIUM SERPL-SCNC: 4.3 MMOL/L (ref 3.5–5.1)
PROT SERPL-MCNC: 7.4 G/DL (ref 6–8.4)
RBC # BLD AUTO: 3.46 M/UL (ref 4–5.4)
SODIUM SERPL-SCNC: 138 MMOL/L (ref 136–145)
TSH SERPL DL<=0.005 MIU/L-ACNC: 2.43 UIU/ML (ref 0.4–4)
WBC # BLD AUTO: 27.87 K/UL (ref 3.9–12.7)

## 2023-04-18 PROCEDURE — 1159F MED LIST DOCD IN RCRD: CPT | Mod: CPTII,,, | Performed by: INTERNAL MEDICINE

## 2023-04-18 PROCEDURE — 99215 PR OFFICE/OUTPT VISIT, EST, LEVL V, 40-54 MIN: ICD-10-PCS | Mod: S$PBB,,, | Performed by: INTERNAL MEDICINE

## 2023-04-18 PROCEDURE — 99214 OFFICE O/P EST MOD 30 MIN: CPT | Mod: PBBFAC,PO | Performed by: INTERNAL MEDICINE

## 2023-04-18 PROCEDURE — 3008F BODY MASS INDEX DOCD: CPT | Mod: CPTII,,, | Performed by: INTERNAL MEDICINE

## 2023-04-18 PROCEDURE — 1159F PR MEDICATION LIST DOCUMENTED IN MEDICAL RECORD: ICD-10-PCS | Mod: CPTII,,, | Performed by: INTERNAL MEDICINE

## 2023-04-18 PROCEDURE — 3074F SYST BP LT 130 MM HG: CPT | Mod: CPTII,,, | Performed by: INTERNAL MEDICINE

## 2023-04-18 PROCEDURE — 36415 COLL VENOUS BLD VENIPUNCTURE: CPT | Performed by: INTERNAL MEDICINE

## 2023-04-18 PROCEDURE — 84443 ASSAY THYROID STIM HORMONE: CPT | Performed by: INTERNAL MEDICINE

## 2023-04-18 PROCEDURE — 80053 COMPREHEN METABOLIC PANEL: CPT | Performed by: INTERNAL MEDICINE

## 2023-04-18 PROCEDURE — 99999 PR PBB SHADOW E&M-EST. PATIENT-LVL IV: CPT | Mod: PBBFAC,,, | Performed by: INTERNAL MEDICINE

## 2023-04-18 PROCEDURE — 99215 OFFICE O/P EST HI 40 MIN: CPT | Mod: S$PBB,,, | Performed by: INTERNAL MEDICINE

## 2023-04-18 PROCEDURE — 3008F PR BODY MASS INDEX (BMI) DOCUMENTED: ICD-10-PCS | Mod: CPTII,,, | Performed by: INTERNAL MEDICINE

## 2023-04-18 PROCEDURE — 99999 PR PBB SHADOW E&M-EST. PATIENT-LVL IV: ICD-10-PCS | Mod: PBBFAC,,, | Performed by: INTERNAL MEDICINE

## 2023-04-18 PROCEDURE — 3078F PR MOST RECENT DIASTOLIC BLOOD PRESSURE < 80 MM HG: ICD-10-PCS | Mod: CPTII,,, | Performed by: INTERNAL MEDICINE

## 2023-04-18 PROCEDURE — 85027 COMPLETE CBC AUTOMATED: CPT | Performed by: INTERNAL MEDICINE

## 2023-04-18 PROCEDURE — 3074F PR MOST RECENT SYSTOLIC BLOOD PRESSURE < 130 MM HG: ICD-10-PCS | Mod: CPTII,,, | Performed by: INTERNAL MEDICINE

## 2023-04-18 PROCEDURE — 3078F DIAST BP <80 MM HG: CPT | Mod: CPTII,,, | Performed by: INTERNAL MEDICINE

## 2023-04-18 PROCEDURE — 85007 BL SMEAR W/DIFF WBC COUNT: CPT | Performed by: INTERNAL MEDICINE

## 2023-04-18 RX ORDER — DIPHENHYDRAMINE HYDROCHLORIDE 50 MG/ML
50 INJECTION INTRAMUSCULAR; INTRAVENOUS ONCE AS NEEDED
Status: CANCELLED | OUTPATIENT
Start: 2023-04-18

## 2023-04-18 RX ORDER — SODIUM CHLORIDE 0.9 % (FLUSH) 0.9 %
10 SYRINGE (ML) INJECTION
Status: CANCELLED | OUTPATIENT
Start: 2023-04-18

## 2023-04-18 RX ORDER — EPINEPHRINE 0.3 MG/.3ML
0.3 INJECTION SUBCUTANEOUS ONCE AS NEEDED
Status: CANCELLED | OUTPATIENT
Start: 2023-04-18

## 2023-04-18 RX ORDER — HEPARIN 100 UNIT/ML
500 SYRINGE INTRAVENOUS
Status: CANCELLED | OUTPATIENT
Start: 2023-04-18

## 2023-04-18 NOTE — PROGRESS NOTES
Subjective:       Patient ID: Yvette Hutchinson is a 62 y.o. female.    Chief Complaint:  Patient known to me with CLL stage 0 recently diagnosed with lung cancer August 2022  Follow-up    Lung Cancer    Patient has chronic complains of chronic pain syndrome and COPD for which periodically she take steroids she is also on BuSpar has issues anxiety has required dilatation of esophageal strictures allergic rhinitis insomnia and history of B12 deficiency documented   Had CT chest done with pulmonary 7/22 showed mass bx done. 8/22    Oncology hx  Impression:ct chest 7/29/22     2.3 cm spiculated left upper lobe lung nodule highly suspicious for bronchogenic carcinoma.     New nonspecific 7 mm nodule in the right middle lobe; this appears more linear on the coronal images and may be a focus of scarring.     Additional stable lung nodules, mildly enlarged axillary lymph nodes, substernal thyroid nodule; these findings are likely benign given the interval stability.   LEFT LUNG MASS, CT-GUIDED BIOPSY:   Non-small cell lung carcinoma.   Comment:  The biopsy reveals invasive carcinoma with apparent glandular but   also vague squamoid features.  Tumor cells are diffusely positive with TTF-1   and focally positive with P40.  The histology differential includes   adenocarcinoma and adenosquamous carcinoma.  PD-L1 and templates NGS studies   are in progress.  The results will be issued separately.    October 3, 2022: Robotic left lobectomy T1c N1    Social history; patient is a smoker denies recreational alcohol use or drug use   Patient is currently on disability  She is allergic to the mask used during COVID pandemic she is also bothered by her mask with COPD    Family history suggestive of ovarian and breast cancer patient advised to see a  or seek   Review of patient's allergies indicates:  No Known Allergies  Medications have been reviewed  Current Outpatient Medications on File Prior to Visit    Medication Sig Dispense Refill    (Magic mouthwash) 1:1:1 diphenhydrAMINE(Benadryl) 12.5mg/5ml liq, aluminum & magnesium hydroxide-simethicone (Maalox), LIDOcaine viscous 2% Swish and spit 10 mLs 3 (three) times daily. for mouth sores 250 mL 0    acetaminophen (TYLENOL) 500 MG tablet Take 2 tablets (1,000 mg total) by mouth every 6 (six) hours as needed for Pain. 8 tablet 0    albuterol (PROVENTIL/VENTOLIN HFA) 90 mcg/actuation inhaler Inhale 2 puffs into the lungs every 6 (six) hours as needed for Wheezing or Shortness of Breath. Rescue 18 g 11    albuterol-ipratropium (DUO-NEB) 2.5 mg-0.5 mg/3 mL nebulizer solution Take 3 mLs by nebulization every 6 (six) hours as needed for Wheezing. Rescue 120 each 5    ALPRAZolam (XANAX) 0.5 MG tablet Take 1 tablet (0.5 mg total) by mouth 3 (three) times daily. for 10 days 30 tablet 0    benzocaine-menthoL 6-10 mg lozenge Take 1 lozenge by mouth every 2 (two) hours as needed for Pain. 18 tablet 0    buPROPion (WELLBUTRIN XL) 300 MG 24 hr tablet Take 1 tablet (300 mg total) by mouth once daily. 90 tablet 3    dexAMETHasone (DECADRON) 4 MG Tab Take 2 tablets (8 mg total) by mouth once daily. Take 2 tablets by mouth daily on days 2 3 and 4 of chemotherapy 120 tablet 0    diphenhydrAMINE-aluminum-magnesium hydroxide-simethicone-LIDOcaine HCl 2% Swish and spit 15 mLs every 4 (four) hours as needed (mouth sores). 100 each 2    docusate sodium (COLACE) 100 MG capsule Take 1 capsule (100 mg total) by mouth daily as needed for Constipation. 30 capsule 1    EScitalopram oxalate (LEXAPRO) 10 MG tablet Take 2 tablets (20 mg total) by mouth once daily. 90 tablet 6    FLUoxetine 20 MG capsule Take 1 capsule (20 mg total) by mouth once daily. 30 capsule 11    fluticasone propionate (FLONASE) 50 mcg/actuation nasal spray 1 spray (50 mcg total) by Each Nostril route once daily. 16 g 3    fluticasone-salmeterol 230-21 mcg/dose (ADVAIR HFA) 230-21 mcg/actuation HFAA inhaler Inhale 2 puffs into  the lungs 2 (two) times daily. Controller 12 g 5    gabapentin (NEURONTIN) 300 MG capsule Take 2 capsules (600 mg total) by mouth 3 (three) times daily. 180 capsule 11    levocetirizine (XYZAL) 5 MG tablet Take 1 tablet (5 mg total) by mouth every evening. 30 tablet 5    LIDOcaine (LIDODERM) 5 % Place 1 patch onto the skin once daily. Remove & Discard patch within 12 hours or as directed by MD Bloom patch 0    LIDOcaine (LIDODERM) 5 % Place 1 patch onto the skin once daily. Remove & Discard patch within 12 hours or as directed by MD Bloom patch 1    LIDOcaine-prilocaine (EMLA) cream Apply topically as needed (apply to port site 30 min before access). 30 g 0    montelukast (SINGULAIR) 10 mg tablet Take 10 mg by mouth once daily.      OLANZapine (ZYPREXA) 5 MG tablet Take 1 tablet (5 mg total) by mouth nightly. Take 1 tablet at bedtime on days 1-4 of each cycle of chemotherapy 30 tablet 2    ondansetron (ZOFRAN-ODT) 8 MG TbDL Take 1 tablet (8 mg total) by mouth every 12 (twelve) hours as needed (nausea). 60 tablet 1    oxyCODONE (ROXICODONE) 10 mg Tab immediate release tablet Take 1 tablet (10 mg total) by mouth every 4 (four) hours as needed for Pain. 60 tablet 0     Current Facility-Administered Medications on File Prior to Visit   Medication Dose Route Frequency Provider Last Rate Last Admin    0.9%  NaCl infusion   Intravenous Continuous Yuridia Cisneros MD   New Bag at 11/03/22 0701       REVIEW OF SYSTEMS:     S/p lobectomy left side, has draining tube+ with sanguinous fluid.    Wt Readings from Last 3 Encounters:   04/18/23 70.2 kg (154 lb 12.2 oz)   04/12/23 70.6 kg (155 lb 10.3 oz)   03/25/23 72.6 kg (160 lb)     Temp Readings from Last 3 Encounters:   04/18/23 96.9 °F (36.1 °C) (Temporal)   04/12/23 97 °F (36.1 °C) (Temporal)   03/25/23 98.6 °F (37 °C) (Oral)     BP Readings from Last 3 Encounters:   04/18/23 122/68   04/12/23 (!) 100/58   03/25/23 (!) 101/58     Pulse Readings from Last 3 Encounters:   04/18/23 85    04/12/23 74   03/25/23 69     VITAL SIGNS:  as above   GENERAL: appears well-built, well-nourished.  No anxiety, no agitation, and in no distress.  Patient is awake, alert, oriented and cooperative.  HEENT:  Showed no congestion. Trachea is central no obvious icterus or pallor noted no hoarseness. no obvious JVD   NECK:  Supple.  No JVD. No obvious cervical submental or supraclavicular adenopathy.  RS: tube draining on left side. S/p lobectomy  ABDOMEN:  abdomen appears undistended.  EXTREMITIES:  Without edema.  NEUROLOGICAL:  The patient is appropriate, higher functions are normal.  No  obvious neurological deficits.  normal judgement normal thought content  No confusion, no speech impediment. Cranial nerves are intact and show no deficit. No gross motor deficits noted   SKIN MUSCULOSKELETAL: no joint or skeletal deformity, no clubbing of nails.  No visible rash ecchymosis or petechiae  Lab Results   Component Value Date    WBC 20.78 (H) 03/03/2020    HGB 14.9 03/03/2020    HCT 47.0 03/03/2020    MCV 99 (H) 03/03/2020     03/03/2020     Smudge cells presnt  CMP  Sodium   Date Value Ref Range Status   04/18/2023 138 136 - 145 mmol/L Final     Potassium   Date Value Ref Range Status   04/18/2023 4.3 3.5 - 5.1 mmol/L Final     Chloride   Date Value Ref Range Status   04/18/2023 103 95 - 110 mmol/L Final     CO2   Date Value Ref Range Status   04/18/2023 25 23 - 29 mmol/L Final     Glucose   Date Value Ref Range Status   04/18/2023 90 70 - 110 mg/dL Final     BUN   Date Value Ref Range Status   04/18/2023 11 8 - 23 mg/dL Final     Creatinine   Date Value Ref Range Status   04/18/2023 1.0 0.5 - 1.4 mg/dL Final     Calcium   Date Value Ref Range Status   04/18/2023 9.7 8.7 - 10.5 mg/dL Final     Total Protein   Date Value Ref Range Status   04/18/2023 7.4 6.0 - 8.4 g/dL Final     Albumin   Date Value Ref Range Status   04/18/2023 4.2 3.5 - 5.2 g/dL Final     Total Bilirubin   Date Value Ref Range Status    04/18/2023 0.3 0.1 - 1.0 mg/dL Final     Comment:     For infants and newborns, interpretation of results should be based  on gestational age, weight and in agreement with clinical  observations.    Premature Infant recommended reference ranges:  Up to 24 hours.............<8.0 mg/dL  Up to 48 hours............<12.0 mg/dL  3-5 days..................<15.0 mg/dL  6-29 days.................<15.0 mg/dL       Alkaline Phosphatase   Date Value Ref Range Status   04/18/2023 150 (H) 55 - 135 U/L Final     AST   Date Value Ref Range Status   04/18/2023 16 10 - 40 U/L Final     ALT   Date Value Ref Range Status   04/18/2023 13 10 - 44 U/L Final     Anion Gap   Date Value Ref Range Status   04/18/2023 10 8 - 16 mmol/L Final     eGFR if    Date Value Ref Range Status   06/13/2022 >60 >60 mL/min/1.73 m^2 Final     eGFR if non    Date Value Ref Range Status   06/13/2022 >60 >60 mL/min/1.73 m^2 Final     Comment:     Calculation used to obtain the estimated glomerular filtration  rate (eGFR) is the CKD-EPI equation.            2wk ago    Blood Interpretation A B cell lymphoproliferative disorder is present. see comment.    Comment: Interpreted by: Jeremias Sosa M.D., Signed on 08/06/2020 at 13:07   Blood Comment Flow cytometric analysis of  peripheral blood  detects a lambda  light chain   restricted B lymphocyte population showing expression of CD19 and dim CD20   with aberrant expression of CD5 (dim).  T lymphocytes are   immunophenotypically unremarkable.  The blasts gate is not increased.  The   findings are consistent with patient history of chronic lymphocytic   leukemia/small lymphocytic lymphoma.   Flow differential:  Lymphocytes 69.6%, Monocytes 2.8%, Granulocytes  27.5%,   Blast  0.1%, Debris/nRBC 0.1%,  Viability 97.5%.   10/3/22 robotic lef lung lobectomy  1. LYMPH NODE, LEVEL 5 FROZEN SECTION, EXCISION:   One lymph node with dominant necrotizing granuloma, negative for malignancy    (0/1).   2. LYMPH NODES, LEVEL 5 PERMANENT, EXCISION:   Two lymph nodes with multifocal necrotizing granulomas, negative for   malignancy (0/2).   3. LYMPH NODES, LEFT POSTERIOR LEVEL 10, EXCISION:   Five lymph nodes with multifocal necrotizing granulomas, negative for   malignancy (0/5).   4. LYMPH NODE, LEVEL 11, EXCISION:   One lymph node, negative for malignancy (0/1).   5. LYMPH NODES, LEVEL 12, EXCISION:   Three lymph nodes, one with single necrotizing granuloma, negative for   malignancy (0/3).   6. LYMPH NODES, LEVEL 12 NEAR UPPER DIVISION BRONCHUS, EXCISION:   Three lymph nodes with sinus histiocytosis, negative for malignancy (0/3).   7. LYMPH NODES, LEVEL 10 #2, EXCISION:   One of two lymph nodes positive for metastatic carcinoma (1/2).   Size of largest metastatic deposit:  8 mm.   Negative for extranodal extension.   8. LYMPH NODES, LEFT LEVEL 8, EXCISION:   Two lymph nodes with sinus histiocytosis, negative for malignancy (0/2).   9. LYMPH NODES, LEFT LEVEL 9, EXCISION:   Two lymph nodes, negative for malignancy (0/2).   10. 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   Surgical margins are negative for malignancy.   Background lung tissue with subpleural fibrosis, no evidence of granulomas.   Two hilar lymph nodes, negative for malignancy (0/2).   Coment (1-3, 5):  Prominent necrotizing granulomatous inflammation identified   is identified in multiple lymph nodes.  An AE1/AE3 cytokeratin immunostain   performed on the largest granuloma (part 3) reveals no evidence of occult   carcinoma.  GMS and AFB special stains are negative for fungal and acid-fast   organisms, respectively.  The necrotizing features are not typical of   sarcoidosis.  As such, other granulomatous processes may be considered   including secondary response to malignancy or infection.  Clinical   correlation is advised.   Comment (10):  Sections reveal invasive carcinoma involving lung  tissue with   predominantly squamoid morphologic features including bright eosinophilic   cytoplasm and intracellular bridging.  There are not significant keratinizing   features.  Some areas show basaloid features.  There is also regional luminal   features including cribriforming and vague glandular structures containing   mucin.  Tumor cells are diffusely positive with P40 and regionally positive   with TTF-1.  Mucicarmine special stain highlights mucin producing luminal   spaces. Overall tumor appearance and staining profile supports adenosquamous   carcinoma, a variant of non-small cell lung carcinoma.   [facility] SURGICAL PATHOLOGY CANCER CASE SUMMARY:  LUNG   Procedure:  Lobectomy   Specimen laterality:  Left   Tumor focality:  Single focus   Tumor site: Upper lobe of lung   Tumor size:  2.2 cm   Histologic type:  Adenosquamous carcinoma   Spread through airspaces:  Not identified   Visceral pleural invasion:  Not identified   Direct invasion of adjacent structures: Not applicable   Lymphovascular invasion:  Not identified   Margins:  All margins negative for invasive carcinoma     Closest margin to invasive carcinoma:  Bronchial (3.0 cm)   Regional lymph nodes:  Tumor present in regional lymph node     Number of lymph nodes with tumor:  1     Scott sites with tumor:  Left level 10 (10L)     Extranodal extension:  Not identified     Number of lymph nodes examined:  23   Pathologic stage classification (pTNM): pT1c pN1   Special studies:  Performed on previous biopsy if additional studies needed   there may be performed on tissue blockd 10G or 10H.      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 Due to adenosq histology chose carbo/ taxol x 4 cycles tecentriq maintanence x 1 year  pt is has had 5 cycles of carbo/ taxol  discused maintainenec at tumor board   Orders for tecentriq maintainence placed,     See me for next cycle cbc,cmp this week was delayed as pt had covid  CLL   stage 0 will confirmed with flow cytometry  NTD   she has no other cytopenias or lymphadenopathy or B symptoms patient can be observed      Continue with chronic pain syndrome and pain management advised to stop smoking if at all possible 10 pills of hydrocodoen given to pt, she needs to follow with pain mx      History of B12 deficiency will continue to monitor    Advised COVID precaution due to her lung situation she will be a high risk patient    Advance Care Planning     Date: 04/18/2023    Power of   I initiated the process of advance care planning today and explained the importance of this process to the patient.  I introduced the concept of advance directives to the patient, as well. Then the patient received detailed information about the importance of designating a Health Care Power of  (HCPOA). She was also instructed to communicate with this person about their wishes for future healthcare, should she become sick and lose decision-making capacity. The patient has not previously appointed a HCPOA. After our discussion, the patient has decided to complete a HCPOA .

## 2023-04-20 ENCOUNTER — INFUSION (OUTPATIENT)
Dept: INFUSION THERAPY | Facility: HOSPITAL | Age: 63
End: 2023-04-20
Attending: INTERNAL MEDICINE
Payer: MEDICAID

## 2023-04-20 VITALS
HEART RATE: 67 BPM | WEIGHT: 154.38 LBS | HEIGHT: 63 IN | TEMPERATURE: 98 F | SYSTOLIC BLOOD PRESSURE: 101 MMHG | RESPIRATION RATE: 18 BRPM | BODY MASS INDEX: 27.36 KG/M2 | OXYGEN SATURATION: 98 % | DIASTOLIC BLOOD PRESSURE: 63 MMHG

## 2023-04-20 DIAGNOSIS — C34.01 MALIGNANT NEOPLASM OF HILUS OF RIGHT LUNG: Primary | ICD-10-CM

## 2023-04-20 DIAGNOSIS — C34.90 NON-SMALL CELL LUNG CANCER, UNSPECIFIED LATERALITY: ICD-10-CM

## 2023-04-20 PROCEDURE — 96413 CHEMO IV INFUSION 1 HR: CPT

## 2023-04-20 PROCEDURE — 25000003 PHARM REV CODE 250: Performed by: INTERNAL MEDICINE

## 2023-04-20 PROCEDURE — A4216 STERILE WATER/SALINE, 10 ML: HCPCS | Performed by: INTERNAL MEDICINE

## 2023-04-20 PROCEDURE — 63600175 PHARM REV CODE 636 W HCPCS: Mod: JZ,JG | Performed by: INTERNAL MEDICINE

## 2023-04-20 RX ORDER — HEPARIN 100 UNIT/ML
500 SYRINGE INTRAVENOUS
Status: DISCONTINUED | OUTPATIENT
Start: 2023-04-20 | End: 2023-04-20 | Stop reason: HOSPADM

## 2023-04-20 RX ORDER — SODIUM CHLORIDE 0.9 % (FLUSH) 0.9 %
10 SYRINGE (ML) INJECTION
Status: DISCONTINUED | OUTPATIENT
Start: 2023-04-20 | End: 2023-04-20 | Stop reason: HOSPADM

## 2023-04-20 RX ORDER — EPINEPHRINE 0.3 MG/.3ML
0.3 INJECTION SUBCUTANEOUS ONCE AS NEEDED
Status: DISCONTINUED | OUTPATIENT
Start: 2023-04-20 | End: 2023-04-20 | Stop reason: HOSPADM

## 2023-04-20 RX ADMIN — ATEZOLIZUMAB 1200 MG: 1200 INJECTION, SOLUTION INTRAVENOUS at 08:04

## 2023-04-20 RX ADMIN — HEPARIN 500 UNITS: 100 SYRINGE at 09:04

## 2023-04-20 RX ADMIN — SODIUM CHLORIDE, PRESERVATIVE FREE 10 ML: 5 INJECTION INTRAVENOUS at 09:04

## 2023-04-27 ENCOUNTER — OFFICE VISIT (OUTPATIENT)
Dept: PULMONOLOGY | Facility: CLINIC | Age: 63
End: 2023-04-27
Payer: MEDICAID

## 2023-04-27 ENCOUNTER — TELEPHONE (OUTPATIENT)
Dept: HEMATOLOGY/ONCOLOGY | Facility: CLINIC | Age: 63
End: 2023-04-27
Payer: MEDICAID

## 2023-04-27 VITALS
HEART RATE: 81 BPM | BODY MASS INDEX: 27.56 KG/M2 | OXYGEN SATURATION: 93 % | WEIGHT: 155.56 LBS | SYSTOLIC BLOOD PRESSURE: 107 MMHG | DIASTOLIC BLOOD PRESSURE: 72 MMHG | HEIGHT: 63 IN

## 2023-04-27 DIAGNOSIS — J96.11 CHRONIC RESPIRATORY FAILURE WITH HYPOXIA: ICD-10-CM

## 2023-04-27 DIAGNOSIS — R05.1 ACUTE COUGH: ICD-10-CM

## 2023-04-27 DIAGNOSIS — J44.0 CHRONIC OBSTRUCTIVE PULMONARY DISEASE WITH ACUTE LOWER RESPIRATORY INFECTION: Primary | ICD-10-CM

## 2023-04-27 PROCEDURE — 3008F BODY MASS INDEX DOCD: CPT | Mod: CPTII,,, | Performed by: NURSE PRACTITIONER

## 2023-04-27 PROCEDURE — 99999 PR PBB SHADOW E&M-EST. PATIENT-LVL IV: CPT | Mod: PBBFAC,,, | Performed by: NURSE PRACTITIONER

## 2023-04-27 PROCEDURE — 99213 OFFICE O/P EST LOW 20 MIN: CPT | Mod: S$PBB,,, | Performed by: NURSE PRACTITIONER

## 2023-04-27 PROCEDURE — 3074F PR MOST RECENT SYSTOLIC BLOOD PRESSURE < 130 MM HG: ICD-10-PCS | Mod: CPTII,,, | Performed by: NURSE PRACTITIONER

## 2023-04-27 PROCEDURE — 3074F SYST BP LT 130 MM HG: CPT | Mod: CPTII,,, | Performed by: NURSE PRACTITIONER

## 2023-04-27 PROCEDURE — 3008F PR BODY MASS INDEX (BMI) DOCUMENTED: ICD-10-PCS | Mod: CPTII,,, | Performed by: NURSE PRACTITIONER

## 2023-04-27 PROCEDURE — 3078F PR MOST RECENT DIASTOLIC BLOOD PRESSURE < 80 MM HG: ICD-10-PCS | Mod: CPTII,,, | Performed by: NURSE PRACTITIONER

## 2023-04-27 PROCEDURE — 99213 PR OFFICE/OUTPT VISIT, EST, LEVL III, 20-29 MIN: ICD-10-PCS | Mod: S$PBB,,, | Performed by: NURSE PRACTITIONER

## 2023-04-27 PROCEDURE — 99999 PR PBB SHADOW E&M-EST. PATIENT-LVL IV: ICD-10-PCS | Mod: PBBFAC,,, | Performed by: NURSE PRACTITIONER

## 2023-04-27 PROCEDURE — 3078F DIAST BP <80 MM HG: CPT | Mod: CPTII,,, | Performed by: NURSE PRACTITIONER

## 2023-04-27 PROCEDURE — 99214 OFFICE O/P EST MOD 30 MIN: CPT | Mod: PBBFAC,PO | Performed by: NURSE PRACTITIONER

## 2023-04-27 PROCEDURE — 1159F PR MEDICATION LIST DOCUMENTED IN MEDICAL RECORD: ICD-10-PCS | Mod: CPTII,,, | Performed by: NURSE PRACTITIONER

## 2023-04-27 PROCEDURE — 1159F MED LIST DOCD IN RCRD: CPT | Mod: CPTII,,, | Performed by: NURSE PRACTITIONER

## 2023-04-27 RX ORDER — PREDNISONE 20 MG/1
TABLET ORAL
Qty: 12 TABLET | Refills: 0 | Status: SHIPPED | OUTPATIENT
Start: 2023-04-27 | End: 2023-11-06 | Stop reason: SDUPTHER

## 2023-04-27 RX ORDER — ALBUTEROL SULFATE 90 UG/1
2 AEROSOL, METERED RESPIRATORY (INHALATION) EVERY 6 HOURS PRN
Qty: 18 G | Refills: 11 | Status: SHIPPED | OUTPATIENT
Start: 2023-04-27 | End: 2023-11-03 | Stop reason: SDUPTHER

## 2023-04-27 RX ORDER — PROMETHAZINE HYDROCHLORIDE AND DEXTROMETHORPHAN HYDROBROMIDE 6.25; 15 MG/5ML; MG/5ML
5 SYRUP ORAL NIGHTLY PRN
Qty: 118 ML | Refills: 0 | Status: SHIPPED | OUTPATIENT
Start: 2023-04-27 | End: 2023-05-07

## 2023-04-27 RX ORDER — AZITHROMYCIN 250 MG/1
TABLET, FILM COATED ORAL
Qty: 6 TABLET | Refills: 0 | Status: SHIPPED | OUTPATIENT
Start: 2023-04-27 | End: 2023-05-02

## 2023-04-27 RX ORDER — BENZONATATE 100 MG/1
100 CAPSULE ORAL 3 TIMES DAILY PRN
Qty: 21 CAPSULE | Refills: 0 | Status: SHIPPED | OUTPATIENT
Start: 2023-04-27 | End: 2023-05-04

## 2023-04-27 NOTE — PROGRESS NOTES
4/27/2023    Yvette Hutchinson  Office note    Chief Complaint   Patient presents with    Cough       HPI:   4/27/2023-  reviewed note oncologist Dr. Lee 4/18/23 on immune therapy for adenosquamous carcinoma ; recently lost brother from Sepsis. anxiety treated by Dr. Lee office but has referral to pain management. Currently living with and caring for her mother.   SOB- stable complaint, worse with exertion, improves with rest. Wearing supplemental oxygen at 3L. Able to do activities just has to pace herself  Associated with productive cough, dx COVID 19 March 2022 productive clear yellow/green mucous. Daily complaint, wakes her at night.    1/23/23- undergoing chemo followed by Dr. Lee, complaint of cough and chest tightness onset 3 weeks, seen at urgent care, x-ray clear. Productive dark brown mucous to clear. Using nebulizer daily with benefit.   Treated with cough syrup, steroids, and antibiotics. States feeling better.   Wearing supplemental oxygen as needed. Has smaller more portable bottles, wearing at 3 liters with benefit. Able to do more in home.   Does have moments of anxiety due to states of health. Takes xanax as needed with benefit.     10/25/22- left lobectomy and lymphadenectomy Dr. Kaplan 10/3/2022- still have incision site pain, 10 on 0-10 scale, soreness. Took pain medication with benefit but has run out. Had chest tube removed 6 days prior. No swelling or drainage from incision site.   Schedule to have port placed today at 3 pm. Will start chemo therapy this week., Followed by oncologist Dr. Lee.   Wearing supplemental oxygen as needed at 3L with benefit.   SOB- stable, on advair daily, using albuterol as needed. Cough- improved, daily, mild.     Had sleep study but not hear results    7/21/2022- states doing well, Cough- recurrent complaint, worsened in past 3 days, productive  Brown mucous, associated with chest tightness and wheeze. Worse in early mornings and late evenings.    Has headaches when waking up. Has daytime fatigue.     4/28/2022- COPD exacerbation onset 3 days, persistent cough worse in early mornings and late evenings, has nocturnal arousals, productive thick brown mucous,   Associated with chest tightness and wheeze. Improved with albuterol nebulizer but returns after 2 hours. Severe complaint has urinated due to coughing fits.   Complaint of left ear pain and pressure with headaches. Associated with body aches and fatigue.  On average has exacerbation once every 3 months.   History of chronic knee pain, not interested in orthopedic referral, treated previously with ibuprofen with benefit.     NP Pedro reviewed  1/11/2022: Hx of CLL, COPD  Endorses onset of headache, fatigue x2 days.  Cough: Productive with green thick sputum production, onset 2 days, worse at night time and in the morning. Not relieved with cough syrup. States Codeine cough syrup has helped in the past. Associated with wheezing.   States breathing is the same, not worse from baseline. Using supplemental oxygen at night and PRN.   Denies fever, denies chest tightness, GI complaints.  On Daily Advair, using rescue inhaler twice daily. Using nebulizer daily with no noted improvement.   Cut back to 2 cigarettes per day.      8/16/2021- not currently interested to perform in clinic sleep study.   SOB- daily, worse with hot weather, ran out of rescue inhaler. On Advair daily with benefit. Improves with rest.   Started coughing  After weed eating yard.     Cough- recurrent complaint, onset 5 days after doing yard work, productive clear mucous, severe complaint, inhibits ability to sleep when first laying down at night.     5/14/2021- did not receive sleep study from [facility] as ordered. Wearing supplemental oxygen at night while sleeping at 3 L. Uses when needed during day.    Having trouble falling asleep at night, started on new medication with no current benefit. Not able to fall asleep or stay  asleep. Onset years, worsening with time. Feels tired when waking up in morning. Associated with occasional morning headaches.     complaint of chronic back pain followed by PCP.    Cough- recurrent complaint, mild, thick mucous, not using nebulizer regularly,   SOB- daily, worse with exertion and occasionally occurs at rest. Feels she can not take a deep breath.   On advair daily. Taking prednisone 20mg for 3 days 2x monthly.    2/8/2021- Cough- improved, daily, mild, Complaint of dry throat at night. Worse in early morning and late evenings.   occasionally productive small amount yellow mucous.   Currently smoking 1/2 pack daily, finished chantix with improvement but picked up after stopping.   Wearing supplemental oxygen at 3L most nights with benefit. SOB worse when laying flat on back, tried wedge but caused neck pain.   Sinus- unchanged, recurrent headaches in morning, sinus drainage. Currently on Flonase daily    12/3/2020- has supplemental oxygen set at 3 L at night, states benefiting,   Cough- worsened in last 7 days, worse in early morning and late evenings, nocturnal arousals nightly, productive yellow/green mucous quarter size.  Complaint of daily fatigue, dry throat in morning, day time drowsiness, mental cloudiness. Feels she never gets good sleep at night even when wearing supplemental oxygen.     10/21/2020- SOB and wheeze- recurrent problem, worsened last weeks, associated with sinus congestion, wheeze is worse in early morning and when laying down at nigtht  Cough- productive brown/green mucous for 1 week. Has not started steroid therapy. Improves with nebulizer using 1-2x daily for past week. Was not able afford inhalers. Did not receive call from [facility].     Complaint for cramping sensation in chest that occurs sporadically on left and right side that lasts few minutes. Onset years, recurrent complaint, started back 1 week prior. Occurs couple days in row.     7/15/2020- cough- onset 7  days, worse in mornings and night, nocturnal arousals 1x nightly, productive yellow/green dime size mucous, associated with chest tightness and wheeze, improves with nebulizer using 1-2 daily, states feels better after coughing up large amounts of mucous; went to covid clinic and tested negative for covid did have fluid on ears.   Currently on Advair daily, insurance did not cover spiriva;     4/15/2020- cough- onset weeks, associated with occasional chest tighness, worse at night and early morning, quarter size clear to light brown in color. Currently    advair, spiriva, and rescue inhaler. States using rescue daily with little benefit.    3/5/2020- Pt is a 58 yo female with depression, GERD and COPD presenting for new evaluation.  Has chronic cough worse last 2 wks w/ phlegm, white, worse in am and evening.  Inhalers: rescue inhaler says insurance didn't cover  She reports wt gain over last 2 yrs. Has abdominal cramps intermittently radiating to the back.   Smoking since she was very young, 1 pack lasts 3 days.  She gets yearly bronchitis.  Labs from her PCP done 2 days ago are concerning for possible CLL- with WBC 21 and peripheral smear w/ smudge cells    The chief compliant  problem varies with instablilty at time  PFSH:  Past Medical History:   Diagnosis Date    Arthritis     Cancer     Depression     GERD (gastroesophageal reflux disease)     Pneumonia of left lung due to infectious organism 03/05/2020         Past Surgical History:   Procedure Laterality Date    INJECTION OF ANESTHETIC AGENT AROUND MULTIPLE INTERCOSTAL NERVES Left 10/3/2022    Procedure: BLOCK, NERVE, INTERCOSTAL, 2 OR MORE;  Surgeon: Evelio Kaplan MD;  Location: [facility] OR [address] [place] [address];  Service: Thoracic;  Laterality: Left;    INSERTION OF TUNNELED CENTRAL VENOUS CATHETER (CVC) WITH SUBCUTANEOUS PORT Right 11/3/2022    Procedure: [id number], Right or Left Neck or Chest;  Surgeon: Mini Acevedo MD;  Location: [facility] OR  "2ND FLR;  Service: General;  Laterality: Right;    ROBOT-ASSISTED LAPAROSCOPIC LYMPHADENECTOMY USING DA MONIQUE XI Left 10/3/2022    Procedure: XI ROBOTIC LYMPHADENECTOMY;  Surgeon: Evelio Kaplan MD;  [facility];  Service: Thoracic;  Laterality: Left;    TONSILLECTOMY      XI ROBOTIC RATS,WITH LOBECTOMY,LUNG Left 10/3/2022    Procedure: XI ROBOTIC RATS,WITH LOBECTOMY,LUNG;  Surgeon: Evelio Kaplan MD;  [facility];  Service: Thoracic;  Laterality: Left;     Social History     Tobacco Use    Smoking status: Some Days     Packs/day: 0.15     Types: Cigarettes    Smokeless tobacco: Never    Tobacco comments:     reports one cigarette every now and then   Substance Use Topics    Alcohol use: Yes     Comment: rarely    Drug use: Yes     Types: Marijuana     Family History   Problem Relation Age of Onset    Ovarian cancer Sister     Breast cancer Cousin      Review of patient's allergies indicates:  No Known Allergies    Performance Status:The patient's activity level is functions out of house.      Review of Systems:  a review of eleven systems covering constitutional, Eye, HEENT, Psych, Respiratory, Cardiac, GI, , Musculoskeletal, Endocrine, Dermatologic was negative except for pertinent findings as listed ABOVE and below:  Shortness of breath, Cough, fatigue       Exam:Comprehensive exam done. /72 (BP Location: Right arm, Patient Position: Sitting, BP Method: Medium (Automatic))   Pulse 81   Ht 5' 3" (1.6 m)   Wt 70.5 kg (155 lb 8.6 oz)   LMP 01/13/2010 (Approximate)   SpO2 (!) 93% Comment: on room air at rest  BMI 27.55 kg/m²   Exam included Vitals as listed, and patient's appearance and affect and alertness and mood, oral exam for yeast and hygiene and pharynx lesions and Mallapatti (M) score, neck with inspection for jvd and masses and thyroid abnormalities and lymph nodes (supraclavicular and infraclavicular nodes and axillary also examined and noted if abn), " chest exam included symmetry and effort and fremitus and percussion and auscultation, cardiac exam included rhythm and gallops and murmur and rubs and jvd and edema, abdominal exam for mass and hepatosplenomegaly and tenderness and hernias and bowel sounds, Musculoskeletal exam with muscle tone and posture and mobility/gait and  strength, and skin for rashes and cyanosis and pallor and turgor, extremity for clubbing.  Findings were normal except for pertinent findings listed below:   Breath sounds clear   M2      Radiographs (ct chest and cxr) reviewed: view by direct vision   X-Ray Chest AP Portable 03/25/23 left volume loss, leftward mediastinal shift, hyper inflation right lung    CT Chest With Contrast 09/06/22   1. Hypermetabolic nodule left upper lobe appears stable upon comparison.  2. Small localized area of hypermetabolic activity in the left infrahilar region is likewise unchanged upon comparison.  3. Indirect findings related to old granulomatous disease.    CT Chest Without Contrast 07/29/2022   2.3 cm spiculated left upper lobe lung nodule highly suspicious for bronchogenic carcinoma.  New nonspecific 7 mm nodule in the right middle lobe; this appears more linear on the coronal images and may be a focus of scarring.  Additional stable lung nodules, mildly enlarged axillary lymph nodes, substernal thyroid nodule; these findings are likely benign given the interval stability.      CT Chest Without Contrast 11/20/2020   Stable right lung pulmonary nodules.  Mild increase in size of clustered nodules within the left upper lobe, with the distribution most compatible with an atypical infectious process.  Old granulomatous disease.  Left adrenal adenoma, unchanged.  Chronic T8 compression fracture.    CT Chest Without Contrast 03/11/2020   1. There are two right lung pulmonary nodules.  Per LUNG-RADS scoring this would reflect a Category 3 (probably benign).  Recommendation is for 6 month follow-up  LDCT.  2. Airways disease or apical typical infection in the left lower lobe.       CXR 12/19/19- clear lung fields bilaterally     Labs reviewed    Lab Results   Component Value Date    WBC 20.78 (H) 03/03/2020    RBC 4.74 03/03/2020    HGB 14.9 03/03/2020    HCT 47.0 03/03/2020    MCV 99 (H) 03/03/2020    MCH 31.4 (H) 03/03/2020    MCHC 31.7 (L) 03/03/2020    RDW 13.1 03/03/2020     03/03/2020    MPV 11.9 03/03/2020    GRAN 33.0 (L) 03/03/2020    LYMPH 64.0 (H) 03/03/2020    MONO 3.0 (L) 03/03/2020    EOS CANCELED 08/18/2017    BASO CANCELED 08/18/2017    EOSINOPHIL 0.0 03/03/2020    BASOPHIL 0.0 03/03/2020     Specimen to Pathology, Radiology Lung 08/17/22   Non small cell     PFT reviewed    3/12/2020 Severe obstruction. FEV1  49 %  predicted.   Airflow is not clearly improved after bronchodilator. Clinical improvement following bronchodilator therapy may occur in the absence of spirometric improvement.   Mild Hyperinflation.   Moderate reduction in diffusion capacity - unadjusted for hemoglobin.     EPWORTH SLEEPINESS SCALE 15 7/21/2022  [facility] HST 05/27/2021 TRISTEN 4.3       Plan:  Clinical impression is resonably certain and repeated evaluation prn +/- follow up will be needed as below.    Yvette was seen today for cough.    Diagnoses and all orders for this visit:    Chronic obstructive pulmonary disease with acute lower respiratory infection  -     Culture, Respiratory with Gram Stain; Future  -     benzonatate (TESSALON) 100 MG capsule; Take 1 capsule (100 mg total) by mouth 3 (three) times daily as needed for Cough.  -     promethazine-dextromethorphan (PROMETHAZINE-DM) 6.25-15 mg/5 mL Syrp; Take 5 mLs by mouth nightly as needed (cough). Take as needed for nighttime coughing  -     azithromycin (Z-MARCO ANTONIO) 250 MG tablet; Take 2 tablets by mouth on day 1; Take 1 tablet by mouth on days 2-5  -     predniSONE (DELTASONE) 20 MG tablet; Take one pill a day for three days, repeat for shortness of  breath    Chronic respiratory failure with hypoxia  -     albuterol (PROVENTIL/VENTOLIN HFA) 90 mcg/actuation inhaler; Inhale 2 puffs into the lungs every 6 (six) hours as needed for Wheezing or Shortness of Breath. Rescue    Acute cough  -     promethazine-dextromethorphan (PROMETHAZINE-DM) 6.25-15 mg/5 mL Syrp; Take 5 mLs by mouth nightly as needed (cough). Take as needed for nighttime coughing  -     predniSONE (DELTASONE) 20 MG tablet; Take one pill a day for three days, repeat for shortness of breath             Follow up in about 6 months (around 10/27/2023), or if symptoms worsen or fail to improve.    Discussed with patient above for education the following:      Patient Instructions   Will wait until cancer immune therapy is completed to repeat PFT test    Continue COPD medication regiment    Continue supplemental oxygen     Use codeine cough syrup sparingly, this will decrease cough and allow you to rest at night, do not take with other pain or sleeping medications.     Asthma Action plan    Azithromycin 500 mg 1 pill for three days for yellow or green mucous    Prednisone 20 mg pills, Take one pill a day for three days, repeat for shortness of breath or wheeze    Albuterol Inhaler 1-2 puffs every 4 hours, for cough or shortness of breath

## 2023-04-27 NOTE — PATIENT INSTRUCTIONS
Will wait until cancer immune therapy is completed to repeat PFT test    Continue COPD medication regiment    Continue supplemental oxygen     Use codeine cough syrup sparingly, this will decrease cough and allow you to rest at night, do not take with other pain or sleeping medications.     Asthma Action plan    Azithromycin 500 mg 1 pill for three days for yellow or green mucous    Prednisone 20 mg pills, Take one pill a day for three days, repeat for shortness of breath or wheeze    Albuterol Inhaler 1-2 puffs every 4 hours, for cough or shortness of breath

## 2023-04-27 NOTE — TELEPHONE ENCOUNTER
Spoke to pt and notified Dr out appt 05/10/23 and will need to reschedule, pt would like to see np scheduled appt same day 05/10/23.

## 2023-05-09 ENCOUNTER — LAB VISIT (OUTPATIENT)
Dept: LAB | Facility: HOSPITAL | Age: 63
End: 2023-05-09
Attending: INTERNAL MEDICINE
Payer: MEDICARE

## 2023-05-09 DIAGNOSIS — C91.10 CLL (CHRONIC LYMPHOCYTIC LEUKEMIA): ICD-10-CM

## 2023-05-09 DIAGNOSIS — C34.01 MALIGNANT NEOPLASM OF HILUS OF RIGHT LUNG: ICD-10-CM

## 2023-05-09 DIAGNOSIS — E53.8 B12 DEFICIENCY: ICD-10-CM

## 2023-05-09 LAB
ALBUMIN SERPL BCP-MCNC: 4.1 G/DL (ref 3.5–5.2)
ALP SERPL-CCNC: 122 U/L (ref 55–135)
ALT SERPL W/O P-5'-P-CCNC: 16 U/L (ref 10–44)
ANION GAP SERPL CALC-SCNC: 11 MMOL/L (ref 8–16)
AST SERPL-CCNC: 17 U/L (ref 10–40)
BASOPHILS NFR BLD: 0 % (ref 0–1.9)
BILIRUB SERPL-MCNC: 0.4 MG/DL (ref 0.1–1)
BUN SERPL-MCNC: 9 MG/DL (ref 8–23)
CALCIUM SERPL-MCNC: 9.4 MG/DL (ref 8.7–10.5)
CHLORIDE SERPL-SCNC: 105 MMOL/L (ref 95–110)
CO2 SERPL-SCNC: 24 MMOL/L (ref 23–29)
CREAT SERPL-MCNC: 0.8 MG/DL (ref 0.5–1.4)
DIFFERENTIAL METHOD: ABNORMAL
EOSINOPHIL NFR BLD: 0 % (ref 0–8)
ERYTHROCYTE [DISTWIDTH] IN BLOOD BY AUTOMATED COUNT: 13.6 % (ref 11.5–14.5)
EST. GFR  (NO RACE VARIABLE): >60 ML/MIN/1.73 M^2
GLUCOSE SERPL-MCNC: 96 MG/DL (ref 70–110)
HCT VFR BLD AUTO: 39.5 % (ref 37–48.5)
HGB BLD-MCNC: 12.9 G/DL (ref 12–16)
IMM GRANULOCYTES # BLD AUTO: ABNORMAL K/UL
IMM GRANULOCYTES NFR BLD AUTO: ABNORMAL %
LYMPHOCYTES NFR BLD: 70 % (ref 18–48)
MCH RBC QN AUTO: 33.9 PG (ref 27–31)
MCHC RBC AUTO-ENTMCNC: 32.7 G/DL (ref 32–36)
MCV RBC AUTO: 104 FL (ref 82–98)
MONOCYTES NFR BLD: 5 % (ref 4–15)
NEUTROPHILS NFR BLD: 25 % (ref 38–73)
NRBC BLD-RTO: 0 /100 WBC
PLATELET # BLD AUTO: 221 K/UL (ref 150–450)
PLATELET BLD QL SMEAR: ABNORMAL
PMV BLD AUTO: 9.4 FL (ref 9.2–12.9)
POTASSIUM SERPL-SCNC: 4 MMOL/L (ref 3.5–5.1)
PROT SERPL-MCNC: 7.1 G/DL (ref 6–8.4)
RBC # BLD AUTO: 3.81 M/UL (ref 4–5.4)
SODIUM SERPL-SCNC: 140 MMOL/L (ref 136–145)
TSH SERPL DL<=0.005 MIU/L-ACNC: 0.93 UIU/ML (ref 0.4–4)
VIT B12 SERPL-MCNC: 596 PG/ML (ref 210–950)
WBC # BLD AUTO: 28.35 K/UL (ref 3.9–12.7)

## 2023-05-09 PROCEDURE — 85027 COMPLETE CBC AUTOMATED: CPT | Performed by: INTERNAL MEDICINE

## 2023-05-09 PROCEDURE — 80053 COMPREHEN METABOLIC PANEL: CPT | Performed by: INTERNAL MEDICINE

## 2023-05-09 PROCEDURE — 82607 VITAMIN B-12: CPT | Performed by: INTERNAL MEDICINE

## 2023-05-09 PROCEDURE — 36415 COLL VENOUS BLD VENIPUNCTURE: CPT | Performed by: INTERNAL MEDICINE

## 2023-05-09 PROCEDURE — 84443 ASSAY THYROID STIM HORMONE: CPT | Performed by: INTERNAL MEDICINE

## 2023-05-09 PROCEDURE — 85007 BL SMEAR W/DIFF WBC COUNT: CPT | Performed by: INTERNAL MEDICINE

## 2023-05-10 ENCOUNTER — OFFICE VISIT (OUTPATIENT)
Dept: HEMATOLOGY/ONCOLOGY | Facility: CLINIC | Age: 63
End: 2023-05-10
Payer: MEDICAID

## 2023-05-10 VITALS
WEIGHT: 158.94 LBS | TEMPERATURE: 97 F | BODY MASS INDEX: 28.16 KG/M2 | SYSTOLIC BLOOD PRESSURE: 100 MMHG | HEIGHT: 63 IN | HEART RATE: 69 BPM | DIASTOLIC BLOOD PRESSURE: 61 MMHG | RESPIRATION RATE: 12 BRPM | OXYGEN SATURATION: 98 %

## 2023-05-10 DIAGNOSIS — E53.8 B12 DEFICIENCY: ICD-10-CM

## 2023-05-10 DIAGNOSIS — C91.10 CLL (CHRONIC LYMPHOCYTIC LEUKEMIA): ICD-10-CM

## 2023-05-10 DIAGNOSIS — C34.10 MALIGNANT NEOPLASM OF UPPER LOBE OF LUNG, UNSPECIFIED LATERALITY: ICD-10-CM

## 2023-05-10 DIAGNOSIS — C34.01 MALIGNANT NEOPLASM OF HILUS OF RIGHT LUNG: Primary | ICD-10-CM

## 2023-05-10 DIAGNOSIS — G89.4 CHRONIC PAIN SYNDROME: ICD-10-CM

## 2023-05-10 DIAGNOSIS — J41.0 SIMPLE CHRONIC BRONCHITIS: ICD-10-CM

## 2023-05-10 DIAGNOSIS — C34.90 NON-SMALL CELL LUNG CANCER, UNSPECIFIED LATERALITY: ICD-10-CM

## 2023-05-10 PROCEDURE — 99215 OFFICE O/P EST HI 40 MIN: CPT | Mod: S$PBB,,, | Performed by: INTERNAL MEDICINE

## 2023-05-10 PROCEDURE — 99999 PR PBB SHADOW E&M-EST. PATIENT-LVL IV: CPT | Mod: PBBFAC,,, | Performed by: INTERNAL MEDICINE

## 2023-05-10 PROCEDURE — 99215 PR OFFICE/OUTPT VISIT, EST, LEVL V, 40-54 MIN: ICD-10-PCS | Mod: S$PBB,,, | Performed by: INTERNAL MEDICINE

## 2023-05-10 PROCEDURE — 99214 OFFICE O/P EST MOD 30 MIN: CPT | Mod: PBBFAC,PN | Performed by: INTERNAL MEDICINE

## 2023-05-10 PROCEDURE — 99999 PR PBB SHADOW E&M-EST. PATIENT-LVL IV: ICD-10-PCS | Mod: PBBFAC,,, | Performed by: INTERNAL MEDICINE

## 2023-05-10 RX ORDER — SODIUM CHLORIDE 0.9 % (FLUSH) 0.9 %
10 SYRINGE (ML) INJECTION
Status: CANCELLED | OUTPATIENT
Start: 2023-05-10

## 2023-05-10 RX ORDER — DIPHENHYDRAMINE HYDROCHLORIDE 50 MG/ML
50 INJECTION INTRAMUSCULAR; INTRAVENOUS ONCE AS NEEDED
Status: CANCELLED | OUTPATIENT
Start: 2023-05-10

## 2023-05-10 RX ORDER — EPINEPHRINE 0.3 MG/.3ML
0.3 INJECTION SUBCUTANEOUS ONCE AS NEEDED
Status: CANCELLED | OUTPATIENT
Start: 2023-05-10

## 2023-05-10 RX ORDER — HEPARIN 100 UNIT/ML
500 SYRINGE INTRAVENOUS
Status: CANCELLED | OUTPATIENT
Start: 2023-05-10

## 2023-05-10 NOTE — PROGRESS NOTES
Subjective:       Patient ID: Yvette Hutchinson is a 62 y.o. female.    Chief Complaint:  Patient known to me with CLL stage 0 recently diagnosed with lung cancer August 2022  Follow-up    Lung Cancer    Patient has chronic complains of chronic pain syndrome and COPD for which periodically she take steroids she is also on BuSpar has issues anxiety has required dilatation of esophageal strictures allergic rhinitis insomnia and history of B12 deficiency documented   Had CT chest done with pulmonary 7/22 showed mass bx done. 8/22    Oncology hx  Impression:ct chest 7/29/22     2.3 cm spiculated left upper lobe lung nodule highly suspicious for bronchogenic carcinoma.     New nonspecific 7 mm nodule in the right middle lobe; this appears more linear on the coronal images and may be a focus of scarring.     Additional stable lung nodules, mildly enlarged axillary lymph nodes, substernal thyroid nodule; these findings are likely benign given the interval stability.   LEFT LUNG MASS, CT-GUIDED BIOPSY:   Non-small cell lung carcinoma.   Comment:  The biopsy reveals invasive carcinoma with apparent glandular but   also vague squamoid features.  Tumor cells are diffusely positive with TTF-1   and focally positive with P40.  The histology differential includes   adenocarcinoma and adenosquamous carcinoma.  PD-L1 and templates NGS studies   are in progress.  The results will be issued separately.    October 3, 2022: Robotic left lobectomy T1c N1    Social history; patient is a smoker denies recreational alcohol use or drug use   Patient is currently on disability  She is allergic to the mask used during COVID pandemic she is also bothered by her mask with COPD    Family history suggestive of ovarian and breast cancer patient advised to see a  or seek   Review of patient's allergies indicates:  No Known Allergies  Medications have been reviewed  Current Outpatient Medications on File Prior to Visit    Medication Sig Dispense Refill    (Magic mouthwash) 1:1:1 diphenhydrAMINE(Benadryl) 12.5mg/5ml liq, aluminum & magnesium hydroxide-simethicone (Maalox), LIDOcaine viscous 2% Swish and spit 10 mLs 3 (three) times daily. for mouth sores 250 mL 0    acetaminophen (TYLENOL) 500 MG tablet Take 2 tablets (1,000 mg total) by mouth every 6 (six) hours as needed for Pain. 8 tablet 0    albuterol (PROVENTIL/VENTOLIN HFA) 90 mcg/actuation inhaler Inhale 2 puffs into the lungs every 6 (six) hours as needed for Wheezing or Shortness of Breath. Rescue 18 g 11    albuterol-ipratropium (DUO-NEB) 2.5 mg-0.5 mg/3 mL nebulizer solution Take 3 mLs by nebulization every 6 (six) hours as needed for Wheezing. Rescue 120 each 5    ALPRAZolam (XANAX) 0.5 MG tablet Take 1 tablet (0.5 mg total) by mouth 3 (three) times daily. for 10 days 30 tablet 0    benzocaine-menthoL 6-10 mg lozenge Take 1 lozenge by mouth every 2 (two) hours as needed for Pain. 18 tablet 0    buPROPion (WELLBUTRIN XL) 300 MG 24 hr tablet Take 1 tablet (300 mg total) by mouth once daily. 90 tablet 3    dexAMETHasone (DECADRON) 4 MG Tab Take 2 tablets (8 mg total) by mouth once daily. Take 2 tablets by mouth daily on days 2 3 and 4 of chemotherapy 120 tablet 0    diphenhydrAMINE-aluminum-magnesium hydroxide-simethicone-LIDOcaine HCl 2% Swish and spit 15 mLs every 4 (four) hours as needed (mouth sores). 100 each 2    docusate sodium (COLACE) 100 MG capsule Take 1 capsule (100 mg total) by mouth daily as needed for Constipation. 30 capsule 1    EScitalopram oxalate (LEXAPRO) 10 MG tablet Take 2 tablets (20 mg total) by mouth once daily. 90 tablet 6    FLUoxetine 20 MG capsule Take 1 capsule (20 mg total) by mouth once daily. 30 capsule 11    fluticasone propionate (FLONASE) 50 mcg/actuation nasal spray 1 spray (50 mcg total) by Each Nostril route once daily. 16 g 3    fluticasone-salmeterol 230-21 mcg/dose (ADVAIR HFA) 230-21 mcg/actuation HFAA inhaler Inhale 2 puffs into  the lungs 2 (two) times daily. Controller 12 g 5    gabapentin (NEURONTIN) 300 MG capsule Take 2 capsules (600 mg total) by mouth 3 (three) times daily. 180 capsule 11    levocetirizine (XYZAL) 5 MG tablet Take 1 tablet (5 mg total) by mouth every evening. 30 tablet 5    LIDOcaine (LIDODERM) 5 % Place 1 patch onto the skin once daily. Remove & Discard patch within 12 hours or as directed by MD Bloom patch 0    LIDOcaine (LIDODERM) 5 % Place 1 patch onto the skin once daily. Remove & Discard patch within 12 hours or as directed by MD Bloom patch 1    LIDOcaine-prilocaine (EMLA) cream Apply topically as needed (apply to port site 30 min before access). 30 g 0    montelukast (SINGULAIR) 10 mg tablet Take 10 mg by mouth once daily.      OLANZapine (ZYPREXA) 5 MG tablet Take 1 tablet (5 mg total) by mouth nightly. Take 1 tablet at bedtime on days 1-4 of each cycle of chemotherapy 30 tablet 2    ondansetron (ZOFRAN-ODT) 8 MG TbDL Take 1 tablet (8 mg total) by mouth every 12 (twelve) hours as needed (nausea). 60 tablet 1    oxyCODONE (ROXICODONE) 10 mg Tab immediate release tablet Take 1 tablet (10 mg total) by mouth every 4 (four) hours as needed for Pain. 60 tablet 0    predniSONE (DELTASONE) 20 MG tablet Take one pill a day for three days, repeat for shortness of breath 12 tablet 0     Current Facility-Administered Medications on File Prior to Visit   Medication Dose Route Frequency Provider Last Rate Last Admin    0.9%  NaCl infusion   Intravenous Continuous Yuridia Cisneros MD   New Bag at 11/03/22 0701       REVIEW OF SYSTEMS:     S/p lobectomy left side, has draining tube+ with sanguinous fluid.    Wt Readings from Last 3 Encounters:   05/10/23 72.1 kg (158 lb 15.2 oz)   04/27/23 70.5 kg (155 lb 8.6 oz)   04/20/23 70 kg (154 lb 6.4 oz)     Temp Readings from Last 3 Encounters:   05/10/23 97.3 °F (36.3 °C) (Temporal)   04/20/23 97.9 °F (36.6 °C)   04/18/23 96.9 °F (36.1 °C) (Temporal)     BP Readings from Last 3 Encounters:    05/10/23 100/61   04/27/23 107/72   04/20/23 101/63     Pulse Readings from Last 3 Encounters:   05/10/23 69   04/27/23 81   04/20/23 67     VITAL SIGNS:  as above   GENERAL: appears well-built, well-nourished.  No anxiety, no agitation, and in no distress.  Patient is awake, alert, oriented and cooperative.  HEENT:  Showed no congestion. Trachea is central no obvious icterus or pallor noted no hoarseness. no obvious JVD   NECK:  Supple.  No JVD. No obvious cervical submental or supraclavicular adenopathy.  RS: tube draining on left side. S/p lobectomy  ABDOMEN:  abdomen appears undistended.  EXTREMITIES:  Without edema.  NEUROLOGICAL:  The patient is appropriate, higher functions are normal.  No  obvious neurological deficits.  normal judgement normal thought content  No confusion, no speech impediment. Cranial nerves are intact and show no deficit. No gross motor deficits noted   SKIN MUSCULOSKELETAL: no joint or skeletal deformity, no clubbing of nails.  No visible rash ecchymosis or petechiae  Lab Results   Component Value Date    WBC 20.78 (H) 03/03/2020    HGB 14.9 03/03/2020    HCT 47.0 03/03/2020    MCV 99 (H) 03/03/2020     03/03/2020     Smudge cells presnt  CMP  Sodium   Date Value Ref Range Status   05/09/2023 140 136 - 145 mmol/L Final     Potassium   Date Value Ref Range Status   05/09/2023 4.0 3.5 - 5.1 mmol/L Final     Chloride   Date Value Ref Range Status   05/09/2023 105 95 - 110 mmol/L Final     CO2   Date Value Ref Range Status   05/09/2023 24 23 - 29 mmol/L Final     Glucose   Date Value Ref Range Status   05/09/2023 96 70 - 110 mg/dL Final     BUN   Date Value Ref Range Status   05/09/2023 9 8 - 23 mg/dL Final     Creatinine   Date Value Ref Range Status   05/09/2023 0.8 0.5 - 1.4 mg/dL Final     Calcium   Date Value Ref Range Status   05/09/2023 9.4 8.7 - 10.5 mg/dL Final     Total Protein   Date Value Ref Range Status   05/09/2023 7.1 6.0 - 8.4 g/dL Final     Albumin   Date Value  Ref Range Status   05/09/2023 4.1 3.5 - 5.2 g/dL Final     Total Bilirubin   Date Value Ref Range Status   05/09/2023 0.4 0.1 - 1.0 mg/dL Final     Comment:     For infants and newborns, interpretation of results should be based  on gestational age, weight and in agreement with clinical  observations.    Premature Infant recommended reference ranges:  Up to 24 hours.............<8.0 mg/dL  Up to 48 hours............<12.0 mg/dL  3-5 days..................<15.0 mg/dL  6-29 days.................<15.0 mg/dL       Alkaline Phosphatase   Date Value Ref Range Status   05/09/2023 122 55 - 135 U/L Final     AST   Date Value Ref Range Status   05/09/2023 17 10 - 40 U/L Final     ALT   Date Value Ref Range Status   05/09/2023 16 10 - 44 U/L Final     Anion Gap   Date Value Ref Range Status   05/09/2023 11 8 - 16 mmol/L Final     eGFR if    Date Value Ref Range Status   06/13/2022 >60 >60 mL/min/1.73 m^2 Final     eGFR if non    Date Value Ref Range Status   06/13/2022 >60 >60 mL/min/1.73 m^2 Final     Comment:     Calculation used to obtain the estimated glomerular filtration  rate (eGFR) is the CKD-EPI equation.            2wk ago    Blood Interpretation A B cell lymphoproliferative disorder is present. see comment.    Comment: Interpreted by: Jeremias Sosa M.D., Signed on 08/06/2020 at 13:07   Blood Comment Flow cytometric analysis of  peripheral blood  detects a lambda  light chain   restricted B lymphocyte population showing expression of CD19 and dim CD20   with aberrant expression of CD5 (dim).  T lymphocytes are   immunophenotypically unremarkable.  The blasts gate is not increased.  The   findings are consistent with patient history of chronic lymphocytic   leukemia/small lymphocytic lymphoma.   Flow differential:  Lymphocytes 69.6%, Monocytes 2.8%, Granulocytes  27.5%,   Blast  0.1%, Debris/nRBC 0.1%,  Viability 97.5%.   10/3/22 robotic lef lung lobectomy  1. LYMPH NODE, LEVEL 5 FROZEN  SECTION, EXCISION:   One lymph node with dominant necrotizing granuloma, negative for malignancy   (0/1).   2. LYMPH NODES, LEVEL 5 PERMANENT, EXCISION:   Two lymph nodes with multifocal necrotizing granulomas, negative for   malignancy (0/2).   3. LYMPH NODES, LEFT POSTERIOR LEVEL 10, EXCISION:   Five lymph nodes with multifocal necrotizing granulomas, negative for   malignancy (0/5).   4. LYMPH NODE, LEVEL 11, EXCISION:   One lymph node, negative for malignancy (0/1).   5. LYMPH NODES, LEVEL 12, EXCISION:   Three lymph nodes, one with single necrotizing granuloma, negative for   malignancy (0/3).   6. LYMPH NODES, LEVEL 12 NEAR UPPER DIVISION BRONCHUS, EXCISION:   Three lymph nodes with sinus histiocytosis, negative for malignancy (0/3).   7. LYMPH NODES, LEVEL 10 #2, EXCISION:   One of two lymph nodes positive for metastatic carcinoma (1/2).   Size of largest metastatic deposit:  8 mm.   Negative for extranodal extension.   8. LYMPH NODES, LEFT LEVEL 8, EXCISION:   Two lymph nodes with sinus histiocytosis, negative for malignancy (0/2).   9. LYMPH NODES, LEFT LEVEL 9, EXCISION:   Two lymph nodes, negative for malignancy (0/2).   10. 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   Surgical margins are negative for malignancy.   Background lung tissue with subpleural fibrosis, no evidence of granulomas.   Two hilar lymph nodes, negative for malignancy (0/2).   Coment (1-3, 5):  Prominent necrotizing granulomatous inflammation identified   is identified in multiple lymph nodes.  An AE1/AE3 cytokeratin immunostain   performed on the largest granuloma (part 3) reveals no evidence of occult   carcinoma.  GMS and AFB special stains are negative for fungal and acid-fast   organisms, respectively.  The necrotizing features are not typical of   sarcoidosis.  As such, other granulomatous processes may be considered   including secondary response to malignancy or infection.  Clinical    correlation is advised.   Comment (10):  Sections reveal invasive carcinoma involving lung tissue with   predominantly squamoid morphologic features including bright eosinophilic   cytoplasm and intracellular bridging.  There are not significant keratinizing   features.  Some areas show basaloid features.  There is also regional luminal   features including cribriforming and vague glandular structures containing   mucin.  Tumor cells are diffusely positive with P40 and regionally positive   with TTF-1.  Mucicarmine special stain highlights mucin producing luminal   spaces. Overall tumor appearance and staining profile supports adenosquamous   carcinoma, a variant of non-small cell lung carcinoma.   [facility] SURGICAL PATHOLOGY CANCER CASE SUMMARY:  LUNG   Procedure:  Lobectomy   Specimen laterality:  Left   Tumor focality:  Single focus   Tumor site: Upper lobe of lung   Tumor size:  2.2 cm   Histologic type:  Adenosquamous carcinoma   Spread through airspaces:  Not identified   Visceral pleural invasion:  Not identified   Direct invasion of adjacent structures: Not applicable   Lymphovascular invasion:  Not identified   Margins:  All margins negative for invasive carcinoma     Closest margin to invasive carcinoma:  Bronchial (3.0 cm)   Regional lymph nodes:  Tumor present in regional lymph node     Number of lymph nodes with tumor:  1     Scott sites with tumor:  Left level 10 (10L)     Extranodal extension:  Not identified     Number of lymph nodes examined:  23   Pathologic stage classification (pTNM): pT1c pN1   Special studies:  Performed on previous biopsy if additional studies needed   there may be performed on tissue blockd 10G or 10H.      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 Due to adenosq histology chose carbo/ taxol x 4 cycles tecentriq maintanence x 1 year  pt is has had 5 cycles of carbo/ taxol  discused maintainenec at tumor board   Orders for tecentriq maintainence placed,   Proceed with chemo  See me for next cycle cbc,cmp   CLL   stage 0 will confirmed with flow cytometry  NTD   she has no other cytopenias or lymphadenopathy or B symptoms patient can be observed      Continue with chronic pain syndrome and pain management advised to stop smoking if at all possible 10 pills of hydrocodoen given to pt, she needs to follow with pain mx      History of B12 deficiency will continue to monitor    Advised COVID precaution due to her lung situation she will be a high risk patient    Advance Care Planning     Date: 04/18/2023    Power of   I initiated the process of advance care planning today and explained the importance of this process to the patient.  I introduced the concept of advance directives to the patient, as well. Then the patient received detailed information about the importance of designating a Health Care Power of  (HCPOA). She was also instructed to communicate with this person about their wishes for future healthcare, should she become sick and lose decision-making capacity. The patient has not previously appointed a HCPOA. After our discussion, the patient has decided to complete a HCPOA .

## 2023-05-11 ENCOUNTER — INFUSION (OUTPATIENT)
Dept: INFUSION THERAPY | Facility: HOSPITAL | Age: 63
End: 2023-05-11
Attending: INTERNAL MEDICINE
Payer: MEDICARE

## 2023-05-11 VITALS
SYSTOLIC BLOOD PRESSURE: 97 MMHG | TEMPERATURE: 98 F | WEIGHT: 157.88 LBS | RESPIRATION RATE: 17 BRPM | HEART RATE: 79 BPM | BODY MASS INDEX: 27.97 KG/M2 | DIASTOLIC BLOOD PRESSURE: 59 MMHG | HEIGHT: 63 IN | OXYGEN SATURATION: 96 %

## 2023-05-11 DIAGNOSIS — C34.01 MALIGNANT NEOPLASM OF HILUS OF RIGHT LUNG: Primary | ICD-10-CM

## 2023-05-11 DIAGNOSIS — C34.90 NON-SMALL CELL LUNG CANCER, UNSPECIFIED LATERALITY: ICD-10-CM

## 2023-05-11 PROCEDURE — 25000003 PHARM REV CODE 250: Performed by: INTERNAL MEDICINE

## 2023-05-11 PROCEDURE — 96413 CHEMO IV INFUSION 1 HR: CPT

## 2023-05-11 PROCEDURE — 63600175 PHARM REV CODE 636 W HCPCS: Performed by: INTERNAL MEDICINE

## 2023-05-11 RX ORDER — DIPHENHYDRAMINE HYDROCHLORIDE 50 MG/ML
50 INJECTION INTRAMUSCULAR; INTRAVENOUS ONCE AS NEEDED
Status: DISCONTINUED | OUTPATIENT
Start: 2023-05-11 | End: 2023-05-11 | Stop reason: HOSPADM

## 2023-05-11 RX ORDER — EPINEPHRINE 0.3 MG/.3ML
0.3 INJECTION SUBCUTANEOUS ONCE AS NEEDED
Status: DISCONTINUED | OUTPATIENT
Start: 2023-05-11 | End: 2023-05-11 | Stop reason: HOSPADM

## 2023-05-11 RX ORDER — SODIUM CHLORIDE 0.9 % (FLUSH) 0.9 %
10 SYRINGE (ML) INJECTION
Status: DISCONTINUED | OUTPATIENT
Start: 2023-05-11 | End: 2023-05-11 | Stop reason: HOSPADM

## 2023-05-11 RX ORDER — HEPARIN 100 UNIT/ML
500 SYRINGE INTRAVENOUS
Status: DISCONTINUED | OUTPATIENT
Start: 2023-05-11 | End: 2023-05-11 | Stop reason: HOSPADM

## 2023-05-11 RX ADMIN — HEPARIN 500 UNITS: 100 SYRINGE at 02:05

## 2023-05-11 RX ADMIN — ATEZOLIZUMAB 1200 MG: 1200 INJECTION, SOLUTION INTRAVENOUS at 02:05

## 2023-05-29 ENCOUNTER — PATIENT MESSAGE (OUTPATIENT)
Dept: HEMATOLOGY/ONCOLOGY | Facility: CLINIC | Age: 63
End: 2023-05-29
Payer: MEDICARE

## 2023-05-30 ENCOUNTER — LAB VISIT (OUTPATIENT)
Dept: LAB | Facility: HOSPITAL | Age: 63
End: 2023-05-30
Attending: INTERNAL MEDICINE
Payer: MEDICARE

## 2023-05-30 DIAGNOSIS — C34.01 MALIGNANT NEOPLASM OF HILUS OF RIGHT LUNG: ICD-10-CM

## 2023-05-30 LAB
ALBUMIN SERPL BCP-MCNC: 4.1 G/DL (ref 3.5–5.2)
ALP SERPL-CCNC: 108 U/L (ref 55–135)
ALT SERPL W/O P-5'-P-CCNC: 18 U/L (ref 10–44)
ANION GAP SERPL CALC-SCNC: 8 MMOL/L (ref 8–16)
AST SERPL-CCNC: 16 U/L (ref 10–40)
BASOPHILS # BLD AUTO: ABNORMAL K/UL (ref 0–0.2)
BASOPHILS NFR BLD: 0 % (ref 0–1.9)
BILIRUB SERPL-MCNC: 0.3 MG/DL (ref 0.1–1)
BUN SERPL-MCNC: 12 MG/DL (ref 8–23)
CALCIUM SERPL-MCNC: 9.4 MG/DL (ref 8.7–10.5)
CHLORIDE SERPL-SCNC: 105 MMOL/L (ref 95–110)
CO2 SERPL-SCNC: 29 MMOL/L (ref 23–29)
CREAT SERPL-MCNC: 0.8 MG/DL (ref 0.5–1.4)
DIFFERENTIAL METHOD: ABNORMAL
EOSINOPHIL # BLD AUTO: ABNORMAL K/UL (ref 0–0.5)
EOSINOPHIL NFR BLD: 0 % (ref 0–8)
ERYTHROCYTE [DISTWIDTH] IN BLOOD BY AUTOMATED COUNT: 13.3 % (ref 11.5–14.5)
EST. GFR  (NO RACE VARIABLE): >60 ML/MIN/1.73 M^2
GLUCOSE SERPL-MCNC: 79 MG/DL (ref 70–110)
HCT VFR BLD AUTO: 39.5 % (ref 37–48.5)
HGB BLD-MCNC: 12.7 G/DL (ref 12–16)
IMM GRANULOCYTES # BLD AUTO: ABNORMAL K/UL (ref 0–0.04)
IMM GRANULOCYTES NFR BLD AUTO: ABNORMAL % (ref 0–0.5)
LYMPHOCYTES # BLD AUTO: ABNORMAL K/UL (ref 1–4.8)
LYMPHOCYTES NFR BLD: 90 % (ref 18–48)
MCH RBC QN AUTO: 32.8 PG (ref 27–31)
MCHC RBC AUTO-ENTMCNC: 32.2 G/DL (ref 32–36)
MCV RBC AUTO: 102 FL (ref 82–98)
MONOCYTES # BLD AUTO: ABNORMAL K/UL (ref 0.3–1)
MONOCYTES NFR BLD: 2 % (ref 4–15)
NEUTROPHILS NFR BLD: 8 % (ref 38–73)
NRBC BLD-RTO: 0 /100 WBC
PLATELET # BLD AUTO: 284 K/UL (ref 150–450)
PLATELET BLD QL SMEAR: ABNORMAL
PMV BLD AUTO: 9.6 FL (ref 9.2–12.9)
POTASSIUM SERPL-SCNC: 3.9 MMOL/L (ref 3.5–5.1)
PROT SERPL-MCNC: 6.8 G/DL (ref 6–8.4)
RBC # BLD AUTO: 3.87 M/UL (ref 4–5.4)
SODIUM SERPL-SCNC: 142 MMOL/L (ref 136–145)
WBC # BLD AUTO: 32.5 K/UL (ref 3.9–12.7)

## 2023-05-30 PROCEDURE — 85027 COMPLETE CBC AUTOMATED: CPT | Performed by: INTERNAL MEDICINE

## 2023-05-30 PROCEDURE — 80053 COMPREHEN METABOLIC PANEL: CPT | Performed by: INTERNAL MEDICINE

## 2023-05-30 PROCEDURE — 36415 COLL VENOUS BLD VENIPUNCTURE: CPT | Performed by: INTERNAL MEDICINE

## 2023-05-30 PROCEDURE — 85007 BL SMEAR W/DIFF WBC COUNT: CPT | Performed by: INTERNAL MEDICINE

## 2023-05-31 ENCOUNTER — OFFICE VISIT (OUTPATIENT)
Dept: HEMATOLOGY/ONCOLOGY | Facility: CLINIC | Age: 63
End: 2023-05-31
Payer: MEDICARE

## 2023-05-31 VITALS
WEIGHT: 160.25 LBS | BODY MASS INDEX: 28.39 KG/M2 | DIASTOLIC BLOOD PRESSURE: 62 MMHG | RESPIRATION RATE: 12 BRPM | OXYGEN SATURATION: 96 % | SYSTOLIC BLOOD PRESSURE: 115 MMHG | HEIGHT: 63 IN | TEMPERATURE: 98 F | HEART RATE: 87 BPM

## 2023-05-31 DIAGNOSIS — C34.01 MALIGNANT NEOPLASM OF HILUS OF RIGHT LUNG: ICD-10-CM

## 2023-05-31 DIAGNOSIS — R45.89 ANXIETY ABOUT HEALTH: ICD-10-CM

## 2023-05-31 PROCEDURE — 3074F PR MOST RECENT SYSTOLIC BLOOD PRESSURE < 130 MM HG: ICD-10-PCS | Mod: CPTII,S$GLB,, | Performed by: NURSE PRACTITIONER

## 2023-05-31 PROCEDURE — 3008F PR BODY MASS INDEX (BMI) DOCUMENTED: ICD-10-PCS | Mod: CPTII,S$GLB,, | Performed by: NURSE PRACTITIONER

## 2023-05-31 PROCEDURE — 1159F MED LIST DOCD IN RCRD: CPT | Mod: CPTII,S$GLB,, | Performed by: NURSE PRACTITIONER

## 2023-05-31 PROCEDURE — 3078F DIAST BP <80 MM HG: CPT | Mod: CPTII,S$GLB,, | Performed by: NURSE PRACTITIONER

## 2023-05-31 PROCEDURE — 99999 PR PBB SHADOW E&M-EST. PATIENT-LVL V: CPT | Mod: PBBFAC,,, | Performed by: NURSE PRACTITIONER

## 2023-05-31 PROCEDURE — 99214 OFFICE O/P EST MOD 30 MIN: CPT | Mod: S$GLB,,, | Performed by: NURSE PRACTITIONER

## 2023-05-31 PROCEDURE — 1160F PR REVIEW ALL MEDS BY PRESCRIBER/CLIN PHARMACIST DOCUMENTED: ICD-10-PCS | Mod: CPTII,S$GLB,, | Performed by: NURSE PRACTITIONER

## 2023-05-31 PROCEDURE — 1159F PR MEDICATION LIST DOCUMENTED IN MEDICAL RECORD: ICD-10-PCS | Mod: CPTII,S$GLB,, | Performed by: NURSE PRACTITIONER

## 2023-05-31 PROCEDURE — 3074F SYST BP LT 130 MM HG: CPT | Mod: CPTII,S$GLB,, | Performed by: NURSE PRACTITIONER

## 2023-05-31 PROCEDURE — 99214 PR OFFICE/OUTPT VISIT, EST, LEVL IV, 30-39 MIN: ICD-10-PCS | Mod: S$GLB,,, | Performed by: NURSE PRACTITIONER

## 2023-05-31 PROCEDURE — 1160F RVW MEDS BY RX/DR IN RCRD: CPT | Mod: CPTII,S$GLB,, | Performed by: NURSE PRACTITIONER

## 2023-05-31 PROCEDURE — 99215 OFFICE O/P EST HI 40 MIN: CPT | Mod: PBBFAC,PN | Performed by: NURSE PRACTITIONER

## 2023-05-31 PROCEDURE — 3078F PR MOST RECENT DIASTOLIC BLOOD PRESSURE < 80 MM HG: ICD-10-PCS | Mod: CPTII,S$GLB,, | Performed by: NURSE PRACTITIONER

## 2023-05-31 PROCEDURE — 99999 PR PBB SHADOW E&M-EST. PATIENT-LVL V: ICD-10-PCS | Mod: PBBFAC,,, | Performed by: NURSE PRACTITIONER

## 2023-05-31 PROCEDURE — 3008F BODY MASS INDEX DOCD: CPT | Mod: CPTII,S$GLB,, | Performed by: NURSE PRACTITIONER

## 2023-05-31 RX ORDER — HEPARIN 100 UNIT/ML
500 SYRINGE INTRAVENOUS
Status: CANCELLED | OUTPATIENT
Start: 2023-06-01

## 2023-05-31 RX ORDER — OXYCODONE HYDROCHLORIDE 10 MG/1
10 TABLET ORAL EVERY 4 HOURS PRN
Qty: 60 TABLET | Refills: 0 | Status: SHIPPED | OUTPATIENT
Start: 2023-05-31 | End: 2023-08-09 | Stop reason: SDUPTHER

## 2023-05-31 RX ORDER — ALPRAZOLAM 0.5 MG/1
0.5 TABLET ORAL 3 TIMES DAILY
Qty: 90 TABLET | Refills: 0 | Status: SHIPPED | OUTPATIENT
Start: 2023-05-31 | End: 2023-08-09 | Stop reason: SDUPTHER

## 2023-05-31 RX ORDER — DIPHENHYDRAMINE HYDROCHLORIDE 50 MG/ML
50 INJECTION INTRAMUSCULAR; INTRAVENOUS ONCE AS NEEDED
Status: CANCELLED | OUTPATIENT
Start: 2023-06-01

## 2023-05-31 RX ORDER — EPINEPHRINE 0.3 MG/.3ML
0.3 INJECTION SUBCUTANEOUS ONCE AS NEEDED
Status: CANCELLED | OUTPATIENT
Start: 2023-06-01

## 2023-05-31 RX ORDER — SODIUM CHLORIDE 0.9 % (FLUSH) 0.9 %
10 SYRINGE (ML) INJECTION
Status: CANCELLED | OUTPATIENT
Start: 2023-06-01

## 2023-05-31 NOTE — PROGRESS NOTES
Subjective:       Patient ID: Yvette Hutchinson is a 62 y.o. female.    Chief Complaint:  Patient known to me with CLL stage 0 recently diagnosed with lung cancer August 2022  Follow-up    Lung Cancer    Patient has chronic complains of chronic pain syndrome and COPD for which periodically she take steroids she is also on BuSpar has issues anxiety has required dilatation of esophageal strictures allergic rhinitis insomnia and history of B12 deficiency documented   Had CT chest done with pulmonary 7/22 showed mass bx done. 8/22    Oncology hx  Impression:ct chest 7/29/22     2.3 cm spiculated left upper lobe lung nodule highly suspicious for bronchogenic carcinoma.     New nonspecific 7 mm nodule in the right middle lobe; this appears more linear on the coronal images and may be a focus of scarring.     Additional stable lung nodules, mildly enlarged axillary lymph nodes, substernal thyroid nodule; these findings are likely benign given the interval stability.   LEFT LUNG MASS, CT-GUIDED BIOPSY:   Non-small cell lung carcinoma.   Comment:  The biopsy reveals invasive carcinoma with apparent glandular but   also vague squamoid features.  Tumor cells are diffusely positive with TTF-1   and focally positive with P40.  The histology differential includes   adenocarcinoma and adenosquamous carcinoma.  PD-L1 and templates NGS studies   are in progress.  The results will be issued separately.    October 3, 2022: Robotic left lobectomy T1c N1    5/31/2023:  She returns today follow-up prior to next cycle of Tecentriq.  She is tolerating immunotherapy well without toxicity     Social history; patient is a smoker denies recreational alcohol use or drug use   Patient is currently on disability  She is allergic to the mask used during COVID pandemic she is also bothered by her mask with COPD    Family history suggestive of ovarian and breast cancer patient advised to see a  or seek   Review of  patient's allergies indicates:  No Known Allergies  Medications have been reviewed  Current Outpatient Medications on File Prior to Visit   Medication Sig Dispense Refill    (Magic mouthwash) 1:1:1 diphenhydrAMINE(Benadryl) 12.5mg/5ml liq, aluminum & magnesium hydroxide-simethicone (Maalox), LIDOcaine viscous 2% Swish and spit 10 mLs 3 (three) times daily. for mouth sores 250 mL 0    acetaminophen (TYLENOL) 500 MG tablet Take 2 tablets (1,000 mg total) by mouth every 6 (six) hours as needed for Pain. 8 tablet 0    albuterol (PROVENTIL/VENTOLIN HFA) 90 mcg/actuation inhaler Inhale 2 puffs into the lungs every 6 (six) hours as needed for Wheezing or Shortness of Breath. Rescue 18 g 11    albuterol-ipratropium (DUO-NEB) 2.5 mg-0.5 mg/3 mL nebulizer solution Take 3 mLs by nebulization every 6 (six) hours as needed for Wheezing. Rescue 120 each 5    ALPRAZolam (XANAX) 0.5 MG tablet Take 1 tablet (0.5 mg total) by mouth 3 (three) times daily. for 10 days 30 tablet 0    benzocaine-menthoL 6-10 mg lozenge Take 1 lozenge by mouth every 2 (two) hours as needed for Pain. 18 tablet 0    buPROPion (WELLBUTRIN XL) 300 MG 24 hr tablet Take 1 tablet (300 mg total) by mouth once daily. 90 tablet 3    dexAMETHasone (DECADRON) 4 MG Tab Take 2 tablets (8 mg total) by mouth once daily. Take 2 tablets by mouth daily on days 2 3 and 4 of chemotherapy 120 tablet 0    diphenhydrAMINE-aluminum-magnesium hydroxide-simethicone-LIDOcaine HCl 2% Swish and spit 15 mLs every 4 (four) hours as needed (mouth sores). 100 each 2    docusate sodium (COLACE) 100 MG capsule Take 1 capsule (100 mg total) by mouth daily as needed for Constipation. 30 capsule 1    EScitalopram oxalate (LEXAPRO) 10 MG tablet Take 2 tablets (20 mg total) by mouth once daily. 90 tablet 6    FLUoxetine 20 MG capsule Take 1 capsule (20 mg total) by mouth once daily. 30 capsule 11    fluticasone propionate (FLONASE) 50 mcg/actuation nasal spray 1 spray (50 mcg total) by Each  Nostril route once daily. 16 g 3    fluticasone-salmeterol 230-21 mcg/dose (ADVAIR HFA) 230-21 mcg/actuation HFAA inhaler Inhale 2 puffs into the lungs 2 (two) times daily. Controller 12 g 5    gabapentin (NEURONTIN) 300 MG capsule Take 2 capsules (600 mg total) by mouth 3 (three) times daily. 180 capsule 11    levocetirizine (XYZAL) 5 MG tablet Take 1 tablet (5 mg total) by mouth every evening. 30 tablet 5    LIDOcaine (LIDODERM) 5 % Place 1 patch onto the skin once daily. Remove & Discard patch within 12 hours or as directed by MD Bloom patch 0    LIDOcaine (LIDODERM) 5 % Place 1 patch onto the skin once daily. Remove & Discard patch within 12 hours or as directed by MD Bloom patch 1    LIDOcaine-prilocaine (EMLA) cream Apply topically as needed (apply to port site 30 min before access). 30 g 0    montelukast (SINGULAIR) 10 mg tablet Take 10 mg by mouth once daily.      OLANZapine (ZYPREXA) 5 MG tablet Take 1 tablet (5 mg total) by mouth nightly. Take 1 tablet at bedtime on days 1-4 of each cycle of chemotherapy 30 tablet 2    ondansetron (ZOFRAN-ODT) 8 MG TbDL Take 1 tablet (8 mg total) by mouth every 12 (twelve) hours as needed (nausea). 60 tablet 1    oxyCODONE (ROXICODONE) 10 mg Tab immediate release tablet Take 1 tablet (10 mg total) by mouth every 4 (four) hours as needed for Pain. 60 tablet 0    predniSONE (DELTASONE) 20 MG tablet Take one pill a day for three days, repeat for shortness of breath 12 tablet 0     Current Facility-Administered Medications on File Prior to Visit   Medication Dose Route Frequency Provider Last Rate Last Admin    0.9%  NaCl infusion   Intravenous Continuous Yuridia Cisneros MD   New Bag at 11/03/22 0701       REVIEW OF SYSTEMS:     S/p lobectomy left side, has draining tube+ with sanguinous fluid.    Wt Readings from Last 3 Encounters:   05/11/23 71.6 kg (157 lb 14.4 oz)   05/10/23 72.1 kg (158 lb 15.2 oz)   04/27/23 70.5 kg (155 lb 8.6 oz)     Temp Readings from Last 3 Encounters:    05/11/23 97.7 °F (36.5 °C)   05/10/23 97.3 °F (36.3 °C) (Temporal)   04/20/23 97.9 °F (36.6 °C)     BP Readings from Last 3 Encounters:   05/11/23 (!) 97/59   05/10/23 100/61   04/27/23 107/72     Pulse Readings from Last 3 Encounters:   05/11/23 79   05/10/23 69   04/27/23 81     VITAL SIGNS:  as above   GENERAL: appears well-built, well-nourished.  No anxiety, no agitation, and in no distress.  Patient is awake, alert, oriented and cooperative.  HEENT:  Showed no congestion. Trachea is central no obvious icterus or pallor noted no hoarseness. no obvious JVD   NECK:  Supple.  No JVD. No obvious cervical submental or supraclavicular adenopathy.  RS: tube draining on left side. S/p lobectomy  ABDOMEN:  abdomen appears undistended.  EXTREMITIES:  Without edema.  NEUROLOGICAL:  The patient is appropriate, higher functions are normal.  No  obvious neurological deficits.  normal judgement normal thought content  No confusion, no speech impediment. Cranial nerves are intact and show no deficit. No gross motor deficits noted   SKIN MUSCULOSKELETAL: no joint or skeletal deformity, no clubbing of nails.  No visible rash ecchymosis or petechiae  Lab Results   Component Value Date    WBC 20.78 (H) 03/03/2020    HGB 14.9 03/03/2020    HCT 47.0 03/03/2020    MCV 99 (H) 03/03/2020     03/03/2020     Smudge cells presnt  CMP  Sodium   Date Value Ref Range Status   05/30/2023 142 136 - 145 mmol/L Final     Potassium   Date Value Ref Range Status   05/30/2023 3.9 3.5 - 5.1 mmol/L Final     Chloride   Date Value Ref Range Status   05/30/2023 105 95 - 110 mmol/L Final     CO2   Date Value Ref Range Status   05/30/2023 29 23 - 29 mmol/L Final     Glucose   Date Value Ref Range Status   05/30/2023 79 70 - 110 mg/dL Final     BUN   Date Value Ref Range Status   05/30/2023 12 8 - 23 mg/dL Final     Creatinine   Date Value Ref Range Status   05/30/2023 0.8 0.5 - 1.4 mg/dL Final     Calcium   Date Value Ref Range Status    05/30/2023 9.4 8.7 - 10.5 mg/dL Final     Total Protein   Date Value Ref Range Status   05/30/2023 6.8 6.0 - 8.4 g/dL Final     Albumin   Date Value Ref Range Status   05/30/2023 4.1 3.5 - 5.2 g/dL Final     Total Bilirubin   Date Value Ref Range Status   05/30/2023 0.3 0.1 - 1.0 mg/dL Final     Comment:     For infants and newborns, interpretation of results should be based  on gestational age, weight and in agreement with clinical  observations.    Premature Infant recommended reference ranges:  Up to 24 hours.............<8.0 mg/dL  Up to 48 hours............<12.0 mg/dL  3-5 days..................<15.0 mg/dL  6-29 days.................<15.0 mg/dL       Alkaline Phosphatase   Date Value Ref Range Status   05/30/2023 108 55 - 135 U/L Final     AST   Date Value Ref Range Status   05/30/2023 16 10 - 40 U/L Final     ALT   Date Value Ref Range Status   05/30/2023 18 10 - 44 U/L Final     Anion Gap   Date Value Ref Range Status   05/30/2023 8 8 - 16 mmol/L Final     eGFR if    Date Value Ref Range Status   06/13/2022 >60 >60 mL/min/1.73 m^2 Final     eGFR if non    Date Value Ref Range Status   06/13/2022 >60 >60 mL/min/1.73 m^2 Final     Comment:     Calculation used to obtain the estimated glomerular filtration  rate (eGFR) is the CKD-EPI equation.            2wk ago    Blood Interpretation A B cell lymphoproliferative disorder is present. see comment.    Comment: Interpreted by: Jeremias Sosa M.D., Signed on 08/06/2020 at 13:07   Blood Comment Flow cytometric analysis of  peripheral blood  detects a lambda  light chain   restricted B lymphocyte population showing expression of CD19 and dim CD20   with aberrant expression of CD5 (dim).  T lymphocytes are   immunophenotypically unremarkable.  The blasts gate is not increased.  The   findings are consistent with patient history of chronic lymphocytic   leukemia/small lymphocytic lymphoma.   Flow differential:  Lymphocytes 69.6%, Monocytes  2.8%, Granulocytes  27.5%,   Blast  0.1%, Debris/nRBC 0.1%,  Viability 97.5%.   10/3/22 robotic lef lung lobectomy  1. LYMPH NODE, LEVEL 5 FROZEN SECTION, EXCISION:   One lymph node with dominant necrotizing granuloma, negative for malignancy   (0/1).   2. LYMPH NODES, LEVEL 5 PERMANENT, EXCISION:   Two lymph nodes with multifocal necrotizing granulomas, negative for   malignancy (0/2).   3. LYMPH NODES, LEFT POSTERIOR LEVEL 10, EXCISION:   Five lymph nodes with multifocal necrotizing granulomas, negative for   malignancy (0/5).   4. LYMPH NODE, LEVEL 11, EXCISION:   One lymph node, negative for malignancy (0/1).   5. LYMPH NODES, LEVEL 12, EXCISION:   Three lymph nodes, one with single necrotizing granuloma, negative for   malignancy (0/3).   6. LYMPH NODES, LEVEL 12 NEAR UPPER DIVISION BRONCHUS, EXCISION:   Three lymph nodes with sinus histiocytosis, negative for malignancy (0/3).   7. LYMPH NODES, LEVEL 10 #2, EXCISION:   One of two lymph nodes positive for metastatic carcinoma (1/2).   Size of largest metastatic deposit:  8 mm.   Negative for extranodal extension.   8. LYMPH NODES, LEFT LEVEL 8, EXCISION:   Two lymph nodes with sinus histiocytosis, negative for malignancy (0/2).   9. LYMPH NODES, LEFT LEVEL 9, EXCISION:   Two lymph nodes, negative for malignancy (0/2).   10. 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   Surgical margins are negative for malignancy.   Background lung tissue with subpleural fibrosis, no evidence of granulomas.   Two hilar lymph nodes, negative for malignancy (0/2).   Coment (1-3, 5):  Prominent necrotizing granulomatous inflammation identified   is identified in multiple lymph nodes.  An AE1/AE3 cytokeratin immunostain   performed on the largest granuloma (part 3) reveals no evidence of occult   carcinoma.  GMS and AFB special stains are negative for fungal and acid-fast   organisms, respectively.  The necrotizing features are not typical  of   sarcoidosis.  As such, other granulomatous processes may be considered   including secondary response to malignancy or infection.  Clinical   correlation is advised.   Comment (10):  Sections reveal invasive carcinoma involving lung tissue with   predominantly squamoid morphologic features including bright eosinophilic   cytoplasm and intracellular bridging.  There are not significant keratinizing   features.  Some areas show basaloid features.  There is also regional luminal   features including cribriforming and vague glandular structures containing   mucin.  Tumor cells are diffusely positive with P40 and regionally positive   with TTF-1.  Mucicarmine special stain highlights mucin producing luminal   spaces. Overall tumor appearance and staining profile supports adenosquamous   carcinoma, a variant of non-small cell lung carcinoma.   [facility]ICAL PATHOLOGY CANCER CASE SUMMARY:  LUNG   Procedure:  Lobectomy   Specimen laterality:  Left   Tumor focality:  Single focus   Tumor site: Upper lobe of lung   Tumor size:  2.2 cm   Histologic type:  Adenosquamous carcinoma   Spread through airspaces:  Not identified   Visceral pleural invasion:  Not identified   Direct invasion of adjacent structures: Not applicable   Lymphovascular invasion:  Not identified   Margins:  All margins negative for invasive carcinoma     Closest margin to invasive carcinoma:  Bronchial (3.0 cm)   Regional lymph nodes:  Tumor present in regional lymph node     Number of lymph nodes with tumor:  1     Scott sites with tumor:  Left level 10 (10L)     Extranodal extension:  Not identified     Number of lymph nodes examined:  23   Pathologic stage classification (pTNM): pT1c pN1   Special studies:  Performed on previous biopsy if additional studies needed   there may be performed on tissue blockd 10G or 10H.      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 Due to adenosq histology chose carbo/ taxol x 4 cycles tecentriq maintanence x 1 year  pt is has had 5 cycles of carbo/ taxol  discused maintainenec at tumor board   Labs reviewed in detail with the patient  Proceed with next cycle of Tecentriq  Return to clinic in 3 weeks next cycle of Tecentriq    CLL   stage 0 will confirmed with flow cytometry  NTD   she has no other cytopenias or lymphadenopathy or B symptoms patient can be observed      Continue with chronic pain syndrome and pain management advised to stop smoking if at all possible      History of B12 deficiency will continue to monitor    Anxiety:   -continue Xanax t.i.d. as needed

## 2023-06-01 ENCOUNTER — INFUSION (OUTPATIENT)
Dept: INFUSION THERAPY | Facility: HOSPITAL | Age: 63
End: 2023-06-01
Attending: INTERNAL MEDICINE
Payer: MEDICARE

## 2023-06-01 VITALS
DIASTOLIC BLOOD PRESSURE: 78 MMHG | HEIGHT: 63 IN | TEMPERATURE: 98 F | WEIGHT: 158 LBS | OXYGEN SATURATION: 97 % | BODY MASS INDEX: 28 KG/M2 | HEART RATE: 80 BPM | SYSTOLIC BLOOD PRESSURE: 112 MMHG | RESPIRATION RATE: 16 BRPM

## 2023-06-01 DIAGNOSIS — C34.01 MALIGNANT NEOPLASM OF HILUS OF RIGHT LUNG: Primary | ICD-10-CM

## 2023-06-01 DIAGNOSIS — C34.90 NON-SMALL CELL LUNG CANCER, UNSPECIFIED LATERALITY: ICD-10-CM

## 2023-06-01 PROCEDURE — 96413 CHEMO IV INFUSION 1 HR: CPT

## 2023-06-01 PROCEDURE — 25000003 PHARM REV CODE 250: Performed by: NURSE PRACTITIONER

## 2023-06-01 PROCEDURE — 63600175 PHARM REV CODE 636 W HCPCS: Performed by: NURSE PRACTITIONER

## 2023-06-01 PROCEDURE — A4216 STERILE WATER/SALINE, 10 ML: HCPCS | Performed by: NURSE PRACTITIONER

## 2023-06-01 RX ORDER — HEPARIN 100 UNIT/ML
500 SYRINGE INTRAVENOUS
Status: DISCONTINUED | OUTPATIENT
Start: 2023-06-01 | End: 2023-06-01 | Stop reason: HOSPADM

## 2023-06-01 RX ORDER — SODIUM CHLORIDE 0.9 % (FLUSH) 0.9 %
10 SYRINGE (ML) INJECTION
Status: DISCONTINUED | OUTPATIENT
Start: 2023-06-01 | End: 2023-06-01 | Stop reason: HOSPADM

## 2023-06-01 RX ORDER — DIPHENHYDRAMINE HYDROCHLORIDE 50 MG/ML
50 INJECTION INTRAMUSCULAR; INTRAVENOUS ONCE AS NEEDED
Status: DISCONTINUED | OUTPATIENT
Start: 2023-06-01 | End: 2023-06-01 | Stop reason: HOSPADM

## 2023-06-01 RX ORDER — EPINEPHRINE 0.3 MG/.3ML
0.3 INJECTION SUBCUTANEOUS ONCE AS NEEDED
Status: DISCONTINUED | OUTPATIENT
Start: 2023-06-01 | End: 2023-06-01 | Stop reason: HOSPADM

## 2023-06-01 RX ADMIN — HEPARIN 500 UNITS: 100 SYRINGE at 03:06

## 2023-06-01 RX ADMIN — ATEZOLIZUMAB 1200 MG: 1200 INJECTION, SOLUTION INTRAVENOUS at 02:06

## 2023-06-01 RX ADMIN — SODIUM CHLORIDE, PRESERVATIVE FREE 10 ML: 5 INJECTION INTRAVENOUS at 03:06

## 2023-06-01 NOTE — PLAN OF CARE
Problem: Fatigue  Goal: Improved Activity Tolerance  Outcome: Ongoing, Progressing  Intervention: Promote Improved Energy  Flowsheets (Taken 6/1/2023 1440)  Fatigue Management:   frequent rest breaks encouraged   paced activity encouraged   fatigue-related activity identified  Sleep/Rest Enhancement:   relaxation techniques promoted   regular sleep/rest pattern promoted

## 2023-06-03 DIAGNOSIS — C34.01 MALIGNANT NEOPLASM OF HILUS OF RIGHT LUNG: ICD-10-CM

## 2023-06-05 RX ORDER — ESCITALOPRAM OXALATE 10 MG/1
TABLET ORAL
Qty: 30 TABLET | Refills: 6 | Status: SHIPPED | OUTPATIENT
Start: 2023-06-05 | End: 2023-08-09 | Stop reason: SDUPTHER

## 2023-06-20 ENCOUNTER — LAB VISIT (OUTPATIENT)
Dept: LAB | Facility: HOSPITAL | Age: 63
End: 2023-06-20
Attending: INTERNAL MEDICINE
Payer: MEDICARE

## 2023-06-20 DIAGNOSIS — C34.01 MALIGNANT NEOPLASM OF HILUS OF RIGHT LUNG: ICD-10-CM

## 2023-06-20 LAB
ALBUMIN SERPL BCP-MCNC: 4 G/DL (ref 3.5–5.2)
ALP SERPL-CCNC: 111 U/L (ref 55–135)
ALT SERPL W/O P-5'-P-CCNC: 15 U/L (ref 10–44)
ANION GAP SERPL CALC-SCNC: 10 MMOL/L (ref 8–16)
AST SERPL-CCNC: 17 U/L (ref 10–40)
BASOPHILS NFR BLD: 0 % (ref 0–1.9)
BILIRUB SERPL-MCNC: 0.4 MG/DL (ref 0.1–1)
BUN SERPL-MCNC: 7 MG/DL (ref 8–23)
CALCIUM SERPL-MCNC: 9.2 MG/DL (ref 8.7–10.5)
CHLORIDE SERPL-SCNC: 106 MMOL/L (ref 95–110)
CO2 SERPL-SCNC: 25 MMOL/L (ref 23–29)
CREAT SERPL-MCNC: 0.9 MG/DL (ref 0.5–1.4)
DIFFERENTIAL METHOD: ABNORMAL
EOSINOPHIL NFR BLD: 0 % (ref 0–8)
ERYTHROCYTE [DISTWIDTH] IN BLOOD BY AUTOMATED COUNT: 13.1 % (ref 11.5–14.5)
EST. GFR  (NO RACE VARIABLE): >60 ML/MIN/1.73 M^2
GLUCOSE SERPL-MCNC: 87 MG/DL (ref 70–110)
HCT VFR BLD AUTO: 40.1 % (ref 37–48.5)
HGB BLD-MCNC: 13 G/DL (ref 12–16)
IMM GRANULOCYTES # BLD AUTO: ABNORMAL K/UL
IMM GRANULOCYTES NFR BLD AUTO: ABNORMAL %
LYMPHOCYTES NFR BLD: 78 % (ref 18–48)
MCH RBC QN AUTO: 31.8 PG (ref 27–31)
MCHC RBC AUTO-ENTMCNC: 32.4 G/DL (ref 32–36)
MCV RBC AUTO: 98 FL (ref 82–98)
MONOCYTES NFR BLD: 0 % (ref 4–15)
NEUTROPHILS NFR BLD: 22 % (ref 38–73)
NRBC BLD-RTO: 0 /100 WBC
PLATELET # BLD AUTO: 225 K/UL (ref 150–450)
PLATELET BLD QL SMEAR: ABNORMAL
PMV BLD AUTO: 9.1 FL (ref 9.2–12.9)
POTASSIUM SERPL-SCNC: 3.9 MMOL/L (ref 3.5–5.1)
PROT SERPL-MCNC: 6.8 G/DL (ref 6–8.4)
RBC # BLD AUTO: 4.09 M/UL (ref 4–5.4)
SODIUM SERPL-SCNC: 141 MMOL/L (ref 136–145)
TSH SERPL DL<=0.005 MIU/L-ACNC: 1 UIU/ML (ref 0.4–4)
WBC # BLD AUTO: 24.43 K/UL (ref 3.9–12.7)

## 2023-06-20 PROCEDURE — 85060 BLOOD SMEAR INTERPRETATION: CPT | Mod: ,,, | Performed by: PATHOLOGY

## 2023-06-20 PROCEDURE — 80053 COMPREHEN METABOLIC PANEL: CPT | Performed by: NURSE PRACTITIONER

## 2023-06-20 PROCEDURE — 84443 ASSAY THYROID STIM HORMONE: CPT | Performed by: NURSE PRACTITIONER

## 2023-06-20 PROCEDURE — 85060 PATHOLOGIST REVIEW: ICD-10-PCS | Mod: ,,, | Performed by: PATHOLOGY

## 2023-06-20 PROCEDURE — 36415 COLL VENOUS BLD VENIPUNCTURE: CPT | Performed by: NURSE PRACTITIONER

## 2023-06-20 PROCEDURE — 85007 BL SMEAR W/DIFF WBC COUNT: CPT | Performed by: NURSE PRACTITIONER

## 2023-06-20 PROCEDURE — 85027 COMPLETE CBC AUTOMATED: CPT | Performed by: NURSE PRACTITIONER

## 2023-06-21 ENCOUNTER — OFFICE VISIT (OUTPATIENT)
Dept: HEMATOLOGY/ONCOLOGY | Facility: CLINIC | Age: 63
End: 2023-06-21
Payer: MEDICARE

## 2023-06-21 ENCOUNTER — HOSPITAL ENCOUNTER (OUTPATIENT)
Dept: RADIOLOGY | Facility: HOSPITAL | Age: 63
Discharge: HOME | End: 2023-06-21
Attending: NURSE PRACTITIONER
Payer: MEDICARE

## 2023-06-21 VITALS
TEMPERATURE: 98 F | HEIGHT: 63 IN | SYSTOLIC BLOOD PRESSURE: 113 MMHG | BODY MASS INDEX: 28.32 KG/M2 | WEIGHT: 159.81 LBS | OXYGEN SATURATION: 96 % | DIASTOLIC BLOOD PRESSURE: 63 MMHG | HEART RATE: 71 BPM | RESPIRATION RATE: 12 BRPM

## 2023-06-21 DIAGNOSIS — E03.2 HYPOTHYROIDISM DUE TO MEDICATION: ICD-10-CM

## 2023-06-21 DIAGNOSIS — C34.01 MALIGNANT NEOPLASM OF HILUS OF RIGHT LUNG: ICD-10-CM

## 2023-06-21 DIAGNOSIS — C34.01 MALIGNANT NEOPLASM OF HILUS OF RIGHT LUNG: Primary | ICD-10-CM

## 2023-06-21 PROCEDURE — 71046 X-RAY EXAM CHEST 2 VIEWS: CPT | Mod: 26,,, | Performed by: RADIOLOGY

## 2023-06-21 PROCEDURE — 99214 PR OFFICE/OUTPT VISIT, EST, LEVL IV, 30-39 MIN: ICD-10-PCS | Mod: S$PBB,,, | Performed by: NURSE PRACTITIONER

## 2023-06-21 PROCEDURE — 99999 PR PBB SHADOW E&M-EST. PATIENT-LVL V: ICD-10-PCS | Mod: PBBFAC,,, | Performed by: NURSE PRACTITIONER

## 2023-06-21 PROCEDURE — 71046 X-RAY EXAM CHEST 2 VIEWS: CPT | Mod: TC,FY

## 2023-06-21 PROCEDURE — 99215 OFFICE O/P EST HI 40 MIN: CPT | Mod: PBBFAC,25,PN | Performed by: NURSE PRACTITIONER

## 2023-06-21 PROCEDURE — 99999 PR PBB SHADOW E&M-EST. PATIENT-LVL V: CPT | Mod: PBBFAC,,, | Performed by: NURSE PRACTITIONER

## 2023-06-21 PROCEDURE — 71046 XR CHEST PA AND LATERAL: ICD-10-PCS | Mod: 26,,, | Performed by: RADIOLOGY

## 2023-06-21 PROCEDURE — 99214 OFFICE O/P EST MOD 30 MIN: CPT | Mod: S$PBB,,, | Performed by: NURSE PRACTITIONER

## 2023-06-21 RX ORDER — AZITHROMYCIN 250 MG/1
TABLET, FILM COATED ORAL
Qty: 6 TABLET | Refills: 0 | Status: SHIPPED | OUTPATIENT
Start: 2023-06-21 | End: 2023-06-26

## 2023-06-21 RX ORDER — GUAIFENESIN/DEXTROMETHORPHAN 100-10MG/5
5 SYRUP ORAL EVERY 6 HOURS PRN
Qty: 473 ML | Refills: 0 | Status: SHIPPED | OUTPATIENT
Start: 2023-06-21 | End: 2023-07-01

## 2023-06-21 NOTE — PROGRESS NOTES
Subjective:       Patient ID: Yvette Hutchinson is a 62 y.o. female.    Chief Complaint:  Patient known to me with CLL stage 0 recently diagnosed with lung cancer August 2022  Follow-up  Associated symptoms include coughing.   Lung Cancer  Associated symptoms include coughing.     Patient has chronic complains of chronic pain syndrome and COPD for which periodically she take steroids she is also on BuSpar has issues anxiety has required dilatation of esophageal strictures allergic rhinitis insomnia and history of B12 deficiency documented   Had CT chest done with pulmonary 7/22 showed mass bx done. 8/22    Oncology hx  Impression:ct chest 7/29/22     2.3 cm spiculated left upper lobe lung nodule highly suspicious for bronchogenic carcinoma.     New nonspecific 7 mm nodule in the right middle lobe; this appears more linear on the coronal images and may be a focus of scarring.     Additional stable lung nodules, mildly enlarged axillary lymph nodes, substernal thyroid nodule; these findings are likely benign given the interval stability.   LEFT LUNG MASS, CT-GUIDED BIOPSY:   Non-small cell lung carcinoma.   Comment:  The biopsy reveals invasive carcinoma with apparent glandular but   also vague squamoid features.  Tumor cells are diffusely positive with TTF-1   and focally positive with P40.  The histology differential includes   adenocarcinoma and adenosquamous carcinoma.  PD-L1 and templates NGS studies   are in progress.  The results will be issued separately.    October 3, 2022: Robotic left lobectomy T1c N1    5/31/2023:  She returns today follow-up prior to next cycle of Tecentriq.  She is tolerating immunotherapy well without toxicity     6/21/2023:  She returns today for follow-up with complaints of upper respiratory infection    Social history; patient is a smoker denies recreational alcohol use or drug use   Patient is currently on disability  She is allergic to the mask used during COVID pandemic  she is also bothered by her mask with COPD    Family history suggestive of ovarian and breast cancer patient advised to see a  or seek   Review of patient's allergies indicates:  No Known Allergies  Medications have been reviewed    Review of Systems   Constitutional: Negative.    HENT: Negative.     Eyes: Negative.    Respiratory:  Positive for cough and sputum production.    Cardiovascular: Negative.    Gastrointestinal: Negative.    Genitourinary: Negative.    Musculoskeletal: Negative.    Skin: Negative.    Neurological: Negative.    Endo/Heme/Allergies: Negative.    Psychiatric/Behavioral: Negative.      Current Outpatient Medications on File Prior to Visit   Medication Sig Dispense Refill    (Magic mouthwash) 1:1:1 diphenhydrAMINE(Benadryl) 12.5mg/5ml liq, aluminum & magnesium hydroxide-simethicone (Maalox), LIDOcaine viscous 2% Swish and spit 10 mLs 3 (three) times daily. for mouth sores 250 mL 0    acetaminophen (TYLENOL) 500 MG tablet Take 2 tablets (1,000 mg total) by mouth every 6 (six) hours as needed for Pain. 8 tablet 0    albuterol (PROVENTIL/VENTOLIN HFA) 90 mcg/actuation inhaler Inhale 2 puffs into the lungs every 6 (six) hours as needed for Wheezing or Shortness of Breath. Rescue 18 g 11    albuterol-ipratropium (DUO-NEB) 2.5 mg-0.5 mg/3 mL nebulizer solution Take 3 mLs by nebulization every 6 (six) hours as needed for Wheezing. Rescue 120 each 5    ALPRAZolam (XANAX) 0.5 MG tablet Take 1 tablet (0.5 mg total) by mouth 3 (three) times daily. 90 tablet 0    benzocaine-menthoL 6-10 mg lozenge Take 1 lozenge by mouth every 2 (two) hours as needed for Pain. 18 tablet 0    buPROPion (WELLBUTRIN XL) 300 MG 24 hr tablet Take 1 tablet (300 mg total) by mouth once daily. 90 tablet 3    dexAMETHasone (DECADRON) 4 MG Tab Take 2 tablets (8 mg total) by mouth once daily. Take 2 tablets by mouth daily on days 2 3 and 4 of chemotherapy 120 tablet 0    diphenhydrAMINE-aluminum-magnesium  hydroxide-simethicone-LIDOcaine HCl 2% Swish and spit 15 mLs every 4 (four) hours as needed (mouth sores). 100 each 2    docusate sodium (COLACE) 100 MG capsule Take 1 capsule (100 mg total) by mouth daily as needed for Constipation. 30 capsule 1    EScitalopram oxalate (LEXAPRO) 10 MG tablet TAKE 1 TABLET(10 MG) BY MOUTH EVERY DAY FOR 1 WEEK THEN INCREASE TO 20MG AS DIRECTED 30 tablet 6    FLUoxetine 20 MG capsule Take 1 capsule (20 mg total) by mouth once daily. 30 capsule 11    fluticasone propionate (FLONASE) 50 mcg/actuation nasal spray 1 spray (50 mcg total) by Each Nostril route once daily. 16 g 3    fluticasone-salmeterol 230-21 mcg/dose (ADVAIR HFA) 230-21 mcg/actuation HFAA inhaler Inhale 2 puffs into the lungs 2 (two) times daily. Controller 12 g 5    gabapentin (NEURONTIN) 300 MG capsule Take 2 capsules (600 mg total) by mouth 3 (three) times daily. 180 capsule 11    levocetirizine (XYZAL) 5 MG tablet Take 1 tablet (5 mg total) by mouth every evening. 30 tablet 5    LIDOcaine (LIDODERM) 5 % Place 1 patch onto the skin once daily. Remove & Discard patch within 12 hours or as directed by MD Bloom patch 0    LIDOcaine (LIDODERM) 5 % Place 1 patch onto the skin once daily. Remove & Discard patch within 12 hours or as directed by MD Bloom patch 1    LIDOcaine-prilocaine (EMLA) cream Apply topically as needed (apply to port site 30 min before access). 30 g 0    montelukast (SINGULAIR) 10 mg tablet Take 10 mg by mouth once daily.      OLANZapine (ZYPREXA) 5 MG tablet Take 1 tablet (5 mg total) by mouth nightly. Take 1 tablet at bedtime on days 1-4 of each cycle of chemotherapy 30 tablet 2    ondansetron (ZOFRAN-ODT) 8 MG TbDL Take 1 tablet (8 mg total) by mouth every 12 (twelve) hours as needed (nausea). 60 tablet 1    oxyCODONE (ROXICODONE) 10 mg Tab immediate release tablet Take 1 tablet (10 mg total) by mouth every 4 (four) hours as needed for Pain. 60 tablet 0    predniSONE (DELTASONE) 20 MG tablet Take one pill a  day for three days, repeat for shortness of breath 12 tablet 0     Current Facility-Administered Medications on File Prior to Visit   Medication Dose Route Frequency Provider Last Rate Last Admin    0.9%  NaCl infusion   Intravenous Continuous Yuridia Cisneros MD   New Bag at 11/03/22 0701       REVIEW OF SYSTEMS:         Wt Readings from Last 3 Encounters:   06/21/23 72.5 kg (159 lb 13.3 oz)   06/01/23 71.7 kg (158 lb)   05/31/23 72.7 kg (160 lb 4.4 oz)     Temp Readings from Last 3 Encounters:   06/21/23 97.8 °F (36.6 °C) (Temporal)   06/01/23 97.9 °F (36.6 °C)   05/31/23 98.2 °F (36.8 °C) (Temporal)     BP Readings from Last 3 Encounters:   06/21/23 113/63   06/01/23 112/78   05/31/23 115/62     Pulse Readings from Last 3 Encounters:   06/21/23 71   06/01/23 80   05/31/23 87     VITAL SIGNS:  as above   GENERAL: appears well-built, well-nourished.  No anxiety, no agitation, and in no distress.  Patient is awake, alert, oriented and cooperative.  HEENT:  Showed no congestion. Trachea is central no obvious icterus or pallor noted no hoarseness. no obvious JVD   NECK:  Supple.  No JVD. No obvious cervical submental or supraclavicular adenopathy.  RS: tube draining on left side. S/p lobectomy  ABDOMEN:  abdomen appears undistended.  EXTREMITIES:  Without edema.  NEUROLOGICAL:  The patient is appropriate, higher functions are normal.  No  obvious neurological deficits.  normal judgement normal thought content  No confusion, no speech impediment. Cranial nerves are intact and show no deficit. No gross motor deficits noted   SKIN MUSCULOSKELETAL: no joint or skeletal deformity, no clubbing of nails.  No visible rash ecchymosis or petechiae  Lab Results   Component Value Date    WBC 20.78 (H) 03/03/2020    HGB 14.9 03/03/2020    HCT 47.0 03/03/2020    MCV 99 (H) 03/03/2020     03/03/2020     Smudge cells presnt  CMP  Sodium   Date Value Ref Range Status   06/20/2023 141 136 - 145 mmol/L Final     Potassium   Date  Value Ref Range Status   06/20/2023 3.9 3.5 - 5.1 mmol/L Final     Chloride   Date Value Ref Range Status   06/20/2023 106 95 - 110 mmol/L Final     CO2   Date Value Ref Range Status   06/20/2023 25 23 - 29 mmol/L Final     Glucose   Date Value Ref Range Status   06/20/2023 87 70 - 110 mg/dL Final     BUN   Date Value Ref Range Status   06/20/2023 7 (L) 8 - 23 mg/dL Final     Creatinine   Date Value Ref Range Status   06/20/2023 0.9 0.5 - 1.4 mg/dL Final     Calcium   Date Value Ref Range Status   06/20/2023 9.2 8.7 - 10.5 mg/dL Final     Total Protein   Date Value Ref Range Status   06/20/2023 6.8 6.0 - 8.4 g/dL Final     Albumin   Date Value Ref Range Status   06/20/2023 4.0 3.5 - 5.2 g/dL Final     Total Bilirubin   Date Value Ref Range Status   06/20/2023 0.4 0.1 - 1.0 mg/dL Final     Comment:     For infants and newborns, interpretation of results should be based  on gestational age, weight and in agreement with clinical  observations.    Premature Infant recommended reference ranges:  Up to 24 hours.............<8.0 mg/dL  Up to 48 hours............<12.0 mg/dL  3-5 days..................<15.0 mg/dL  6-29 days.................<15.0 mg/dL       Alkaline Phosphatase   Date Value Ref Range Status   06/20/2023 111 55 - 135 U/L Final     AST   Date Value Ref Range Status   06/20/2023 17 10 - 40 U/L Final     ALT   Date Value Ref Range Status   06/20/2023 15 10 - 44 U/L Final     Anion Gap   Date Value Ref Range Status   06/20/2023 10 8 - 16 mmol/L Final     eGFR if    Date Value Ref Range Status   06/13/2022 >60 >60 mL/min/1.73 m^2 Final     eGFR if non    Date Value Ref Range Status   06/13/2022 >60 >60 mL/min/1.73 m^2 Final     Comment:     Calculation used to obtain the estimated glomerular filtration  rate (eGFR) is the CKD-EPI equation.            2wk ago    Blood Interpretation A B cell lymphoproliferative disorder is present. see comment.    Comment: Interpreted by: Jeremias Sosa,  M.D., Signed on 08/06/2020 at 13:07   Blood Comment Flow cytometric analysis of  peripheral blood  detects a lambda  light chain   restricted B lymphocyte population showing expression of CD19 and dim CD20   with aberrant expression of CD5 (dim).  T lymphocytes are   immunophenotypically unremarkable.  The blasts gate is not increased.  The   findings are consistent with patient history of chronic lymphocytic   leukemia/small lymphocytic lymphoma.   Flow differential:  Lymphocytes 69.6%, Monocytes 2.8%, Granulocytes  27.5%,   Blast  0.1%, Debris/nRBC 0.1%,  Viability 97.5%.   10/3/22 robotic lef lung lobectomy  1. LYMPH NODE, LEVEL 5 FROZEN SECTION, EXCISION:   One lymph node with dominant necrotizing granuloma, negative for malignancy   (0/1).   2. LYMPH NODES, LEVEL 5 PERMANENT, EXCISION:   Two lymph nodes with multifocal necrotizing granulomas, negative for   malignancy (0/2).   3. LYMPH NODES, LEFT POSTERIOR LEVEL 10, EXCISION:   Five lymph nodes with multifocal necrotizing granulomas, negative for   malignancy (0/5).   4. LYMPH NODE, LEVEL 11, EXCISION:   One lymph node, negative for malignancy (0/1).   5. LYMPH NODES, LEVEL 12, EXCISION:   Three lymph nodes, one with single necrotizing granuloma, negative for   malignancy (0/3).   6. LYMPH NODES, LEVEL 12 NEAR UPPER DIVISION BRONCHUS, EXCISION:   Three lymph nodes with sinus histiocytosis, negative for malignancy (0/3).   7. LYMPH NODES, LEVEL 10 #2, EXCISION:   One of two lymph nodes positive for metastatic carcinoma (1/2).   Size of largest metastatic deposit:  8 mm.   Negative for extranodal extension.   8. LYMPH NODES, LEFT LEVEL 8, EXCISION:   Two lymph nodes with sinus histiocytosis, negative for malignancy (0/2).   9. LYMPH NODES, LEFT LEVEL 9, EXCISION:   Two lymph nodes, negative for malignancy (0/2).   10. 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   Surgical margins are negative for malignancy.    Background lung tissue with subpleural fibrosis, no evidence of granulomas.   Two hilar lymph nodes, negative for malignancy (0/2).   Coment (1-3, 5):  Prominent necrotizing granulomatous inflammation identified   is identified in multiple lymph nodes.  An AE1/AE3 cytokeratin immunostain   performed on the largest granuloma (part 3) reveals no evidence of occult   carcinoma.  GMS and AFB special stains are negative for fungal and acid-fast   organisms, respectively.  The necrotizing features are not typical of   sarcoidosis.  As such, other granulomatous processes may be considered   including secondary response to malignancy or infection.  Clinical   correlation is advised.   Comment (10):  Sections reveal invasive carcinoma involving lung tissue with   predominantly squamoid morphologic features including bright eosinophilic   cytoplasm and intracellular bridging.  There are not significant keratinizing   features.  Some areas show basaloid features.  There is also regional luminal   features including cribriforming and vague glandular structures containing   mucin.  Tumor cells are diffusely positive with P40 and regionally positive   with TTF-1.  Mucicarmine special stain highlights mucin producing luminal   spaces. Overall tumor appearance and staining profile supports adenosquamous   carcinoma, a variant of non-small cell lung carcinoma.   [facility] SURGICAL PATHOLOGY CANCER CASE SUMMARY:  LUNG   Procedure:  Lobectomy   Specimen laterality:  Left   Tumor focality:  Single focus   Tumor site: Upper lobe of lung   Tumor size:  2.2 cm   Histologic type:  Adenosquamous carcinoma   Spread through airspaces:  Not identified   Visceral pleural invasion:  Not identified   Direct invasion of adjacent structures: Not applicable   Lymphovascular invasion:  Not identified   Margins:  All margins negative for invasive carcinoma     Closest margin to invasive carcinoma:  Bronchial (3.0 cm)   Regional lymph nodes:  Tumor present  in regional lymph node     Number of lymph nodes with tumor:  1     Scott sites with tumor:  Left level 10 (10L)     Extranodal extension:  Not identified     Number of lymph nodes examined:  23   Pathologic stage classification (pTNM): pT1c pN1   Special studies:  Performed on previous biopsy if additional studies needed   there may be performed on tissue blockd 10G or 10H.      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 Due to adenosq histology chose carbo/ taxol x 4 cycles tecentriq maintanence x 1 year  pt is has had 5 cycles of carbo/ taxol  discused maintainenec at tumor board  Delay Tecentriq one-week    Upper respiratory infection:   -Robitussin DM prescribed  -Z-Guillaume prescribed  -chest x-ray today  -we will delay Tecentriq one-week  -see MD in 1 week with labs    CLL   stage 0 will confirmed with flow cytometry  NTD   she has no other cytopenias or lymphadenopathy or B symptoms patient can be observed      Continue with chronic pain syndrome and pain management advised to stop smoking if at all possible      History of B12 deficiency will continue to monitor    Anxiety:   -continue Xanax t.i.d. as needed

## 2023-06-22 ENCOUNTER — PATIENT MESSAGE (OUTPATIENT)
Dept: HEMATOLOGY/ONCOLOGY | Facility: CLINIC | Age: 63
End: 2023-06-22
Payer: MEDICARE

## 2023-06-22 LAB — PATH REV BLD -IMP: NORMAL

## 2023-06-27 ENCOUNTER — PATIENT MESSAGE (OUTPATIENT)
Dept: HEMATOLOGY/ONCOLOGY | Facility: CLINIC | Age: 63
End: 2023-06-27
Payer: MEDICARE

## 2023-06-28 ENCOUNTER — OFFICE VISIT (OUTPATIENT)
Dept: HEMATOLOGY/ONCOLOGY | Facility: CLINIC | Age: 63
End: 2023-06-28
Payer: MEDICARE

## 2023-06-28 ENCOUNTER — LAB VISIT (OUTPATIENT)
Dept: LAB | Facility: HOSPITAL | Age: 63
End: 2023-06-28
Attending: NURSE PRACTITIONER
Payer: MEDICARE

## 2023-06-28 VITALS
OXYGEN SATURATION: 97 % | HEART RATE: 71 BPM | BODY MASS INDEX: 28.39 KG/M2 | DIASTOLIC BLOOD PRESSURE: 57 MMHG | WEIGHT: 160.25 LBS | TEMPERATURE: 98 F | HEIGHT: 63 IN | SYSTOLIC BLOOD PRESSURE: 110 MMHG | RESPIRATION RATE: 12 BRPM

## 2023-06-28 DIAGNOSIS — E03.2 HYPOTHYROIDISM DUE TO MEDICATION: ICD-10-CM

## 2023-06-28 DIAGNOSIS — E53.8 B12 DEFICIENCY: ICD-10-CM

## 2023-06-28 DIAGNOSIS — C34.10 MALIGNANT NEOPLASM OF UPPER LOBE OF LUNG, UNSPECIFIED LATERALITY: Primary | ICD-10-CM

## 2023-06-28 DIAGNOSIS — C34.01 MALIGNANT NEOPLASM OF HILUS OF RIGHT LUNG: ICD-10-CM

## 2023-06-28 DIAGNOSIS — C91.10 CLL (CHRONIC LYMPHOCYTIC LEUKEMIA): ICD-10-CM

## 2023-06-28 LAB
ALBUMIN SERPL BCP-MCNC: 4.1 G/DL (ref 3.5–5.2)
ALP SERPL-CCNC: 101 U/L (ref 55–135)
ALT SERPL W/O P-5'-P-CCNC: 16 U/L (ref 10–44)
ANION GAP SERPL CALC-SCNC: 10 MMOL/L (ref 8–16)
AST SERPL-CCNC: 14 U/L (ref 10–40)
BASOPHILS NFR BLD: 0 % (ref 0–1.9)
BILIRUB SERPL-MCNC: 0.3 MG/DL (ref 0.1–1)
BUN SERPL-MCNC: 12 MG/DL (ref 8–23)
CALCIUM SERPL-MCNC: 9.3 MG/DL (ref 8.7–10.5)
CHLORIDE SERPL-SCNC: 104 MMOL/L (ref 95–110)
CO2 SERPL-SCNC: 26 MMOL/L (ref 23–29)
CREAT SERPL-MCNC: 1 MG/DL (ref 0.5–1.4)
DIFFERENTIAL METHOD: ABNORMAL
EOSINOPHIL NFR BLD: 2 % (ref 0–8)
ERYTHROCYTE [DISTWIDTH] IN BLOOD BY AUTOMATED COUNT: 13.4 % (ref 11.5–14.5)
EST. GFR  (NO RACE VARIABLE): >60 ML/MIN/1.73 M^2
GLUCOSE SERPL-MCNC: 93 MG/DL (ref 70–110)
HCT VFR BLD AUTO: 43.3 % (ref 37–48.5)
HGB BLD-MCNC: 14 G/DL (ref 12–16)
IMM GRANULOCYTES # BLD AUTO: ABNORMAL K/UL
IMM GRANULOCYTES NFR BLD AUTO: ABNORMAL %
LYMPHOCYTES NFR BLD: 82 % (ref 18–48)
MCH RBC QN AUTO: 32 PG (ref 27–31)
MCHC RBC AUTO-ENTMCNC: 32.3 G/DL (ref 32–36)
MCV RBC AUTO: 99 FL (ref 82–98)
MONOCYTES NFR BLD: 1 % (ref 4–15)
NEUTROPHILS NFR BLD: 15 % (ref 38–73)
NRBC BLD-RTO: 0 /100 WBC
PLATELET # BLD AUTO: 250 K/UL (ref 150–450)
PLATELET BLD QL SMEAR: ABNORMAL
PMV BLD AUTO: 9.4 FL (ref 9.2–12.9)
POTASSIUM SERPL-SCNC: 3.9 MMOL/L (ref 3.5–5.1)
PROT SERPL-MCNC: 6.8 G/DL (ref 6–8.4)
RBC # BLD AUTO: 4.38 M/UL (ref 4–5.4)
SODIUM SERPL-SCNC: 140 MMOL/L (ref 136–145)
TSH SERPL DL<=0.005 MIU/L-ACNC: 1.99 UIU/ML (ref 0.4–4)
WBC # BLD AUTO: 33.88 K/UL (ref 3.9–12.7)

## 2023-06-28 PROCEDURE — 85027 COMPLETE CBC AUTOMATED: CPT | Performed by: NURSE PRACTITIONER

## 2023-06-28 PROCEDURE — 85007 BL SMEAR W/DIFF WBC COUNT: CPT | Performed by: NURSE PRACTITIONER

## 2023-06-28 PROCEDURE — 80053 COMPREHEN METABOLIC PANEL: CPT | Performed by: NURSE PRACTITIONER

## 2023-06-28 PROCEDURE — 84443 ASSAY THYROID STIM HORMONE: CPT | Performed by: NURSE PRACTITIONER

## 2023-06-28 PROCEDURE — 99215 OFFICE O/P EST HI 40 MIN: CPT | Mod: PBBFAC,PN | Performed by: INTERNAL MEDICINE

## 2023-06-28 PROCEDURE — 99999 PR PBB SHADOW E&M-EST. PATIENT-LVL V: ICD-10-PCS | Mod: PBBFAC,,, | Performed by: INTERNAL MEDICINE

## 2023-06-28 PROCEDURE — 99999 PR PBB SHADOW E&M-EST. PATIENT-LVL V: CPT | Mod: PBBFAC,,, | Performed by: INTERNAL MEDICINE

## 2023-06-28 PROCEDURE — 99215 PR OFFICE/OUTPT VISIT, EST, LEVL V, 40-54 MIN: ICD-10-PCS | Mod: S$PBB,,, | Performed by: INTERNAL MEDICINE

## 2023-06-28 PROCEDURE — 99215 OFFICE O/P EST HI 40 MIN: CPT | Mod: S$PBB,,, | Performed by: INTERNAL MEDICINE

## 2023-06-28 PROCEDURE — 36415 COLL VENOUS BLD VENIPUNCTURE: CPT | Performed by: NURSE PRACTITIONER

## 2023-06-28 RX ORDER — SODIUM CHLORIDE 0.9 % (FLUSH) 0.9 %
10 SYRINGE (ML) INJECTION
Status: CANCELLED | OUTPATIENT
Start: 2023-06-30

## 2023-06-28 RX ORDER — DIPHENHYDRAMINE HYDROCHLORIDE 50 MG/ML
50 INJECTION INTRAMUSCULAR; INTRAVENOUS ONCE AS NEEDED
Status: CANCELLED | OUTPATIENT
Start: 2023-06-30

## 2023-06-28 RX ORDER — HEPARIN 100 UNIT/ML
500 SYRINGE INTRAVENOUS
Status: CANCELLED | OUTPATIENT
Start: 2023-06-30

## 2023-06-28 RX ORDER — EPINEPHRINE 0.3 MG/.3ML
0.3 INJECTION SUBCUTANEOUS ONCE AS NEEDED
Status: CANCELLED | OUTPATIENT
Start: 2023-06-30

## 2023-06-28 NOTE — PROGRESS NOTES
Subjective:       Patient ID: Yvette Hutchinson is a 62 y.o. female.    Chief Complaint:  Patient known to me with CLL stage 0 recently diagnosed with lung cancer August 2022  Follow-up    Lung Cancer    Patient has chronic complains of chronic pain syndrome and COPD for which periodically she take steroids she is also on BuSpar has issues anxiety has required dilatation of esophageal strictures allergic rhinitis insomnia and history of B12 deficiency documented   Had CT chest done with pulmonary 7/22 showed mass bx done. 8/22    Oncology hx  Impression:ct chest 7/29/22     2.3 cm spiculated left upper lobe lung nodule highly suspicious for bronchogenic carcinoma.     New nonspecific 7 mm nodule in the right middle lobe; this appears more linear on the coronal images and may be a focus of scarring.     Additional stable lung nodules, mildly enlarged axillary lymph nodes, substernal thyroid nodule; these findings are likely benign given the interval stability.   LEFT LUNG MASS, CT-GUIDED BIOPSY:   Non-small cell lung carcinoma.   Comment:  The biopsy reveals invasive carcinoma with apparent glandular but   also vague squamoid features.  Tumor cells are diffusely positive with TTF-1   and focally positive with P40.  The histology differential includes   adenocarcinoma and adenosquamous carcinoma.  PD-L1 and templates NGS studies   are in progress.  The results will be issued separately.    October 3, 2022: Robotic left lobectomy T1c N1    Social history; patient is a smoker denies recreational alcohol use or drug use   Patient is currently on disability  She is allergic to the mask used during COVID pandemic she is also bothered by her mask with COPD    Family history suggestive of ovarian and breast cancer patient advised to see a  or seek   Review of patient's allergies indicates:  No Known Allergies  Medications have been reviewed  Current Outpatient Medications on File Prior to Visit    Medication Sig Dispense Refill    (Magic mouthwash) 1:1:1 diphenhydrAMINE(Benadryl) 12.5mg/5ml liq, aluminum & magnesium hydroxide-simethicone (Maalox), LIDOcaine viscous 2% Swish and spit 10 mLs 3 (three) times daily. for mouth sores 250 mL 0    acetaminophen (TYLENOL) 500 MG tablet Take 2 tablets (1,000 mg total) by mouth every 6 (six) hours as needed for Pain. 8 tablet 0    albuterol (PROVENTIL/VENTOLIN HFA) 90 mcg/actuation inhaler Inhale 2 puffs into the lungs every 6 (six) hours as needed for Wheezing or Shortness of Breath. Rescue 18 g 11    albuterol-ipratropium (DUO-NEB) 2.5 mg-0.5 mg/3 mL nebulizer solution Take 3 mLs by nebulization every 6 (six) hours as needed for Wheezing. Rescue 120 each 5    ALPRAZolam (XANAX) 0.5 MG tablet Take 1 tablet (0.5 mg total) by mouth 3 (three) times daily. 90 tablet 0    benzocaine-menthoL 6-10 mg lozenge Take 1 lozenge by mouth every 2 (two) hours as needed for Pain. 18 tablet 0    buPROPion (WELLBUTRIN XL) 300 MG 24 hr tablet Take 1 tablet (300 mg total) by mouth once daily. 90 tablet 3    dexAMETHasone (DECADRON) 4 MG Tab Take 2 tablets (8 mg total) by mouth once daily. Take 2 tablets by mouth daily on days 2 3 and 4 of chemotherapy 120 tablet 0    dextromethorphan-guaiFENesin  mg/5 ml (ROBITUSSIN-DM)  mg/5 mL liquid Take 5 mLs by mouth every 6 (six) hours as needed (as need for cough). 473 mL 0    diphenhydrAMINE-aluminum-magnesium hydroxide-simethicone-LIDOcaine HCl 2% Swish and spit 15 mLs every 4 (four) hours as needed (mouth sores). 100 each 2    docusate sodium (COLACE) 100 MG capsule Take 1 capsule (100 mg total) by mouth daily as needed for Constipation. 30 capsule 1    EScitalopram oxalate (LEXAPRO) 10 MG tablet TAKE 1 TABLET(10 MG) BY MOUTH EVERY DAY FOR 1 WEEK THEN INCREASE TO 20MG AS DIRECTED 30 tablet 6    FLUoxetine 20 MG capsule Take 1 capsule (20 mg total) by mouth once daily. 30 capsule 11    fluticasone propionate (FLONASE) 50  mcg/actuation nasal spray 1 spray (50 mcg total) by Each Nostril route once daily. 16 g 3    fluticasone-salmeterol 230-21 mcg/dose (ADVAIR HFA) 230-21 mcg/actuation HFAA inhaler Inhale 2 puffs into the lungs 2 (two) times daily. Controller 12 g 5    gabapentin (NEURONTIN) 300 MG capsule Take 2 capsules (600 mg total) by mouth 3 (three) times daily. 180 capsule 11    levocetirizine (XYZAL) 5 MG tablet Take 1 tablet (5 mg total) by mouth every evening. 30 tablet 5    LIDOcaine (LIDODERM) 5 % Place 1 patch onto the skin once daily. Remove & Discard patch within 12 hours or as directed by MD Bloom patch 0    LIDOcaine (LIDODERM) 5 % Place 1 patch onto the skin once daily. Remove & Discard patch within 12 hours or as directed by MD Bloom patch 1    LIDOcaine-prilocaine (EMLA) cream Apply topically as needed (apply to port site 30 min before access). 30 g 0    montelukast (SINGULAIR) 10 mg tablet Take 10 mg by mouth once daily.      OLANZapine (ZYPREXA) 5 MG tablet Take 1 tablet (5 mg total) by mouth nightly. Take 1 tablet at bedtime on days 1-4 of each cycle of chemotherapy 30 tablet 2    ondansetron (ZOFRAN-ODT) 8 MG TbDL Take 1 tablet (8 mg total) by mouth every 12 (twelve) hours as needed (nausea). 60 tablet 1    oxyCODONE (ROXICODONE) 10 mg Tab immediate release tablet Take 1 tablet (10 mg total) by mouth every 4 (four) hours as needed for Pain. 60 tablet 0    predniSONE (DELTASONE) 20 MG tablet Take one pill a day for three days, repeat for shortness of breath 12 tablet 0     Current Facility-Administered Medications on File Prior to Visit   Medication Dose Route Frequency Provider Last Rate Last Admin    0.9%  NaCl infusion   Intravenous Continuous Yuridia Cisneros MD   New Bag at 11/03/22 0701       REVIEW OF SYSTEMS:     S/p lobectomy left side, has draining tube+ with sanguinous fluid.    Wt Readings from Last 3 Encounters:   06/28/23 72.7 kg (160 lb 4.4 oz)   06/21/23 72.5 kg (159 lb 13.3 oz)   06/01/23 71.7 kg (158 lb)      Temp Readings from Last 3 Encounters:   06/28/23 98.1 °F (36.7 °C) (Temporal)   06/21/23 97.8 °F (36.6 °C) (Temporal)   06/01/23 97.9 °F (36.6 °C)     BP Readings from Last 3 Encounters:   06/28/23 (!) 110/57   06/21/23 113/63   06/01/23 112/78     Pulse Readings from Last 3 Encounters:   06/28/23 71   06/21/23 71   06/01/23 80     VITAL SIGNS:  as above   GENERAL: appears well-built, well-nourished.  No anxiety, no agitation, and in no distress.  Patient is awake, alert, oriented and cooperative.  HEENT:  Showed no congestion. Trachea is central no obvious icterus or pallor noted no hoarseness. no obvious JVD   NECK:  Supple.  No JVD. No obvious cervical submental or supraclavicular adenopathy.  RS: tube draining on left side. S/p lobectomy  ABDOMEN:  abdomen appears undistended.  EXTREMITIES:  Without edema.  NEUROLOGICAL:  The patient is appropriate, higher functions are normal.  No  obvious neurological deficits.  normal judgement normal thought content  No confusion, no speech impediment. Cranial nerves are intact and show no deficit. No gross motor deficits noted   SKIN MUSCULOSKELETAL: no joint or skeletal deformity, no clubbing of nails.  No visible rash ecchymosis or petechiae  Lab Results   Component Value Date    WBC 20.78 (H) 03/03/2020    HGB 14.9 03/03/2020    HCT 47.0 03/03/2020    MCV 99 (H) 03/03/2020     03/03/2020     Smudge cells presnt  CMP  Sodium   Date Value Ref Range Status   06/28/2023 140 136 - 145 mmol/L Final     Potassium   Date Value Ref Range Status   06/28/2023 3.9 3.5 - 5.1 mmol/L Final     Chloride   Date Value Ref Range Status   06/28/2023 104 95 - 110 mmol/L Final     CO2   Date Value Ref Range Status   06/28/2023 26 23 - 29 mmol/L Final     Glucose   Date Value Ref Range Status   06/28/2023 93 70 - 110 mg/dL Final     BUN   Date Value Ref Range Status   06/28/2023 12 8 - 23 mg/dL Final     Creatinine   Date Value Ref Range Status   06/28/2023 1.0 0.5 - 1.4 mg/dL Final      Calcium   Date Value Ref Range Status   06/28/2023 9.3 8.7 - 10.5 mg/dL Final     Total Protein   Date Value Ref Range Status   06/28/2023 6.8 6.0 - 8.4 g/dL Final     Albumin   Date Value Ref Range Status   06/28/2023 4.1 3.5 - 5.2 g/dL Final     Total Bilirubin   Date Value Ref Range Status   06/28/2023 0.3 0.1 - 1.0 mg/dL Final     Comment:     For infants and newborns, interpretation of results should be based  on gestational age, weight and in agreement with clinical  observations.    Premature Infant recommended reference ranges:  Up to 24 hours.............<8.0 mg/dL  Up to 48 hours............<12.0 mg/dL  3-5 days..................<15.0 mg/dL  6-29 days.................<15.0 mg/dL       Alkaline Phosphatase   Date Value Ref Range Status   06/28/2023 101 55 - 135 U/L Final     AST   Date Value Ref Range Status   06/28/2023 14 10 - 40 U/L Final     ALT   Date Value Ref Range Status   06/28/2023 16 10 - 44 U/L Final     Anion Gap   Date Value Ref Range Status   06/28/2023 10 8 - 16 mmol/L Final     eGFR if    Date Value Ref Range Status   06/13/2022 >60 >60 mL/min/1.73 m^2 Final     eGFR if non    Date Value Ref Range Status   06/13/2022 >60 >60 mL/min/1.73 m^2 Final     Comment:     Calculation used to obtain the estimated glomerular filtration  rate (eGFR) is the CKD-EPI equation.            2wk ago    Blood Interpretation A B cell lymphoproliferative disorder is present. see comment.    Comment: Interpreted by: Jeremias Sosa M.D., Signed on 08/06/2020 at 13:07   Blood Comment Flow cytometric analysis of  peripheral blood  detects a lambda  light chain   restricted B lymphocyte population showing expression of CD19 and dim CD20   with aberrant expression of CD5 (dim).  T lymphocytes are   immunophenotypically unremarkable.  The blasts gate is not increased.  The   findings are consistent with patient history of chronic lymphocytic   leukemia/small lymphocytic lymphoma.   Flow  differential:  Lymphocytes 69.6%, Monocytes 2.8%, Granulocytes  27.5%,   Blast  0.1%, Debris/nRBC 0.1%,  Viability 97.5%.   10/3/22 robotic lef lung lobectomy  1. LYMPH NODE, LEVEL 5 FROZEN SECTION, EXCISION:   One lymph node with dominant necrotizing granuloma, negative for malignancy   (0/1).   2. LYMPH NODES, LEVEL 5 PERMANENT, EXCISION:   Two lymph nodes with multifocal necrotizing granulomas, negative for   malignancy (0/2).   3. LYMPH NODES, LEFT POSTERIOR LEVEL 10, EXCISION:   Five lymph nodes with multifocal necrotizing granulomas, negative for   malignancy (0/5).   4. LYMPH NODE, LEVEL 11, EXCISION:   One lymph node, negative for malignancy (0/1).   5. LYMPH NODES, LEVEL 12, EXCISION:   Three lymph nodes, one with single necrotizing granuloma, negative for   malignancy (0/3).   6. LYMPH NODES, LEVEL 12 NEAR UPPER DIVISION BRONCHUS, EXCISION:   Three lymph nodes with sinus histiocytosis, negative for malignancy (0/3).   7. LYMPH NODES, LEVEL 10 #2, EXCISION:   One of two lymph nodes positive for metastatic carcinoma (1/2).   Size of largest metastatic deposit:  8 mm.   Negative for extranodal extension.   8. LYMPH NODES, LEFT LEVEL 8, EXCISION:   Two lymph nodes with sinus histiocytosis, negative for malignancy (0/2).   9. LYMPH NODES, LEFT LEVEL 9, EXCISION:   Two lymph nodes, negative for malignancy (0/2).   10. 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   Surgical margins are negative for malignancy.   Background lung tissue with subpleural fibrosis, no evidence of granulomas.   Two hilar lymph nodes, negative for malignancy (0/2).   Coment (1-3, 5):  Prominent necrotizing granulomatous inflammation identified   is identified in multiple lymph nodes.  An AE1/AE3 cytokeratin immunostain   performed on the largest granuloma (part 3) reveals no evidence of occult   carcinoma.  GMS and AFB special stains are negative for fungal and acid-fast   organisms,  respectively.  The necrotizing features are not typical of   sarcoidosis.  As such, other granulomatous processes may be considered   including secondary response to malignancy or infection.  Clinical   correlation is advised.   Comment (10):  Sections reveal invasive carcinoma involving lung tissue with   predominantly squamoid morphologic features including bright eosinophilic   cytoplasm and intracellular bridging.  There are not significant keratinizing   features.  Some areas show basaloid features.  There is also regional luminal   features including cribriforming and vague glandular structures containing   mucin.  Tumor cells are diffusely positive with P40 and regionally positive   with TTF-1.  Mucicarmine special stain highlights mucin producing luminal   spaces. Overall tumor appearance and staining profile supports adenosquamous   carcinoma, a variant of non-small cell lung carcinoma.   [facility] SURGICAL PATHOLOGY CANCER CASE SUMMARY:  LUNG   Procedure:  Lobectomy   Specimen laterality:  Left   Tumor focality:  Single focus   Tumor site: Upper lobe of lung   Tumor size:  2.2 cm   Histologic type:  Adenosquamous carcinoma   Spread through airspaces:  Not identified   Visceral pleural invasion:  Not identified   Direct invasion of adjacent structures: Not applicable   Lymphovascular invasion:  Not identified   Margins:  All margins negative for invasive carcinoma     Closest margin to invasive carcinoma:  Bronchial (3.0 cm)   Regional lymph nodes:  Tumor present in regional lymph node     Number of lymph nodes with tumor:  1     Scott sites with tumor:  Left level 10 (10L)     Extranodal extension:  Not identified     Number of lymph nodes examined:  23   Pathologic stage classification (pTNM): pT1c pN1   Special studies:  Performed on previous biopsy if additional studies needed   there may be performed on tissue blockd 10G or 10H.      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 Due to adenosq histology chose carbo/ taxol x 4 cycles tecentriq maintanence x 1 year  pt is has had 5 cycles of carbo/ taxol  discused maintainenec at tumor board   On tecentriq maintainence   Proceed with chemo  See me for next cycle cbc,cmp  need pet  CLL   stage 0 will confirmed with flow cytometry  NTD   she has no other cytopenias or lymphadenopathy or B symptoms patient can be observed      Continue with chronic pain syndrome and pain management advised to stop smoking if at all possible 10 pills of hydrocodoen given to pt, she needs to follow with pain mx      History of B12 deficiency will continue to monitor    Advised COVID precaution due to her lung situation she will be a high risk patient    Advance Care Planning     Date: 04/18/2023    Power of   I initiated the process of advance care planning today and explained the importance of this process to the patient.  I introduced the concept of advance directives to the patient, as well. Then the patient received detailed information about the importance of designating a Health Care Power of  (HCPOA). She was also instructed to communicate with this person about their wishes for future healthcare, should she become sick and lose decision-making capacity. The patient has not previously appointed a HCPOA. After our discussion, the patient has decided to complete a HCPOA .

## 2023-06-30 ENCOUNTER — INFUSION (OUTPATIENT)
Dept: INFUSION THERAPY | Facility: HOSPITAL | Age: 63
End: 2023-06-30
Attending: INTERNAL MEDICINE
Payer: MEDICARE

## 2023-06-30 VITALS
DIASTOLIC BLOOD PRESSURE: 62 MMHG | BODY MASS INDEX: 28.73 KG/M2 | OXYGEN SATURATION: 96 % | TEMPERATURE: 98 F | RESPIRATION RATE: 18 BRPM | HEIGHT: 63 IN | HEART RATE: 70 BPM | SYSTOLIC BLOOD PRESSURE: 113 MMHG | WEIGHT: 162.13 LBS

## 2023-06-30 DIAGNOSIS — C34.90 NON-SMALL CELL LUNG CANCER, UNSPECIFIED LATERALITY: ICD-10-CM

## 2023-06-30 DIAGNOSIS — C34.01 MALIGNANT NEOPLASM OF HILUS OF RIGHT LUNG: Primary | ICD-10-CM

## 2023-06-30 PROCEDURE — 63600175 PHARM REV CODE 636 W HCPCS: Performed by: INTERNAL MEDICINE

## 2023-06-30 PROCEDURE — A4216 STERILE WATER/SALINE, 10 ML: HCPCS | Performed by: INTERNAL MEDICINE

## 2023-06-30 PROCEDURE — 25000003 PHARM REV CODE 250: Performed by: INTERNAL MEDICINE

## 2023-06-30 PROCEDURE — 96413 CHEMO IV INFUSION 1 HR: CPT

## 2023-06-30 RX ORDER — EPINEPHRINE 0.3 MG/.3ML
0.3 INJECTION SUBCUTANEOUS ONCE AS NEEDED
Status: DISCONTINUED | OUTPATIENT
Start: 2023-06-30 | End: 2023-06-30 | Stop reason: HOSPADM

## 2023-06-30 RX ORDER — SODIUM CHLORIDE 0.9 % (FLUSH) 0.9 %
10 SYRINGE (ML) INJECTION
Status: DISCONTINUED | OUTPATIENT
Start: 2023-06-30 | End: 2023-06-30 | Stop reason: HOSPADM

## 2023-06-30 RX ORDER — HEPARIN 100 UNIT/ML
500 SYRINGE INTRAVENOUS
Status: DISCONTINUED | OUTPATIENT
Start: 2023-06-30 | End: 2023-06-30 | Stop reason: HOSPADM

## 2023-06-30 RX ORDER — DIPHENHYDRAMINE HYDROCHLORIDE 50 MG/ML
50 INJECTION INTRAMUSCULAR; INTRAVENOUS ONCE AS NEEDED
Status: DISCONTINUED | OUTPATIENT
Start: 2023-06-30 | End: 2023-06-30 | Stop reason: HOSPADM

## 2023-06-30 RX ADMIN — ATEZOLIZUMAB 1200 MG: 1200 INJECTION, SOLUTION INTRAVENOUS at 08:06

## 2023-06-30 RX ADMIN — HEPARIN 500 UNITS: 100 SYRINGE at 09:06

## 2023-06-30 RX ADMIN — SODIUM CHLORIDE, PRESERVATIVE FREE 10 ML: 5 INJECTION INTRAVENOUS at 09:06

## 2023-06-30 NOTE — PLAN OF CARE
Problem: Fatigue  Goal: Improved Activity Tolerance  Outcome: Ongoing, Progressing  Intervention: Promote Improved Energy  Flowsheets (Taken 6/30/2023 0831)  Fatigue Management: frequent rest breaks encouraged

## 2023-07-18 ENCOUNTER — HOSPITAL ENCOUNTER (OUTPATIENT)
Dept: RADIOLOGY | Facility: HOSPITAL | Age: 63
Discharge: HOME | End: 2023-07-18
Attending: INTERNAL MEDICINE
Payer: COMMERCIAL

## 2023-07-18 VITALS — HEIGHT: 63 IN | BODY MASS INDEX: 28 KG/M2 | WEIGHT: 158 LBS

## 2023-07-18 DIAGNOSIS — C34.10 MALIGNANT NEOPLASM OF UPPER LOBE OF LUNG, UNSPECIFIED LATERALITY: ICD-10-CM

## 2023-07-18 DIAGNOSIS — E53.8 B12 DEFICIENCY: ICD-10-CM

## 2023-07-18 DIAGNOSIS — C91.10 CLL (CHRONIC LYMPHOCYTIC LEUKEMIA): ICD-10-CM

## 2023-07-18 LAB — GLUCOSE SERPL-MCNC: 101 MG/DL (ref 70–110)

## 2023-07-18 PROCEDURE — 78815 PET IMAGE W/CT SKULL-THIGH: CPT | Mod: TC,PO

## 2023-07-19 ENCOUNTER — OFFICE VISIT (OUTPATIENT)
Dept: HEMATOLOGY/ONCOLOGY | Facility: CLINIC | Age: 63
End: 2023-07-19
Payer: COMMERCIAL

## 2023-07-19 VITALS
SYSTOLIC BLOOD PRESSURE: 100 MMHG | WEIGHT: 162.5 LBS | RESPIRATION RATE: 12 BRPM | HEART RATE: 64 BPM | OXYGEN SATURATION: 95 % | TEMPERATURE: 97 F | DIASTOLIC BLOOD PRESSURE: 66 MMHG | BODY MASS INDEX: 28.79 KG/M2 | HEIGHT: 63 IN

## 2023-07-19 DIAGNOSIS — C34.01 MALIGNANT NEOPLASM OF HILUS OF RIGHT LUNG: Primary | ICD-10-CM

## 2023-07-19 DIAGNOSIS — E06.4 DRUG-INDUCED THYROIDITIS: ICD-10-CM

## 2023-07-19 DIAGNOSIS — C91.10 CLL (CHRONIC LYMPHOCYTIC LEUKEMIA): ICD-10-CM

## 2023-07-19 PROCEDURE — 99215 PR OFFICE/OUTPT VISIT, EST, LEVL V, 40-54 MIN: ICD-10-PCS | Mod: S$GLB,,, | Performed by: INTERNAL MEDICINE

## 2023-07-19 PROCEDURE — 99999 PR PBB SHADOW E&M-EST. PATIENT-LVL V: ICD-10-PCS | Mod: PBBFAC,,, | Performed by: INTERNAL MEDICINE

## 2023-07-19 PROCEDURE — 99999 PR PBB SHADOW E&M-EST. PATIENT-LVL V: CPT | Mod: PBBFAC,,, | Performed by: INTERNAL MEDICINE

## 2023-07-19 PROCEDURE — 99215 OFFICE O/P EST HI 40 MIN: CPT | Mod: PBBFAC,PN | Performed by: INTERNAL MEDICINE

## 2023-07-19 PROCEDURE — 99215 OFFICE O/P EST HI 40 MIN: CPT | Mod: S$GLB,,, | Performed by: INTERNAL MEDICINE

## 2023-07-19 RX ORDER — DIPHENHYDRAMINE HYDROCHLORIDE 50 MG/ML
50 INJECTION INTRAMUSCULAR; INTRAVENOUS ONCE AS NEEDED
Status: CANCELLED | OUTPATIENT
Start: 2023-07-21

## 2023-07-19 RX ORDER — HEPARIN 100 UNIT/ML
500 SYRINGE INTRAVENOUS
Status: CANCELLED | OUTPATIENT
Start: 2023-07-21

## 2023-07-19 RX ORDER — EPINEPHRINE 0.3 MG/.3ML
0.3 INJECTION SUBCUTANEOUS ONCE AS NEEDED
Status: CANCELLED | OUTPATIENT
Start: 2023-07-21

## 2023-07-19 RX ORDER — SODIUM CHLORIDE 0.9 % (FLUSH) 0.9 %
10 SYRINGE (ML) INJECTION
Status: CANCELLED | OUTPATIENT
Start: 2023-07-21

## 2023-07-19 NOTE — PROGRESS NOTES
Service Date:  7/19/23    Chief Complaint:  Non-small cell lung cancer     Yvette Hutchinson is a 63 y.o. 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  She is tolerating well.  She also had a PET scan yesterday and is here for those results.  No other complaints to me.    Review of Systems   Constitutional: Negative.    HENT: Negative.     Eyes: Negative.    Respiratory: Negative.     Cardiovascular: Negative.    Gastrointestinal: Negative.    Endocrine: Negative.    Genitourinary: Negative.    Musculoskeletal: Negative.    Integumentary:  Negative.   Neurological: Negative.    Hematological: Negative.    Psychiatric/Behavioral: Negative.        Current Outpatient Medications   Medication Instructions    (Magic mouthwash) 1:1:1 diphenhydrAMINE(Benadryl) 12.5mg/5ml liq, aluminum & magnesium hydroxide-simethicone (Maalox), LIDOcaine viscous 2% 10 mLs, Swish & Spit, 3 times daily, for mouth sores    acetaminophen (TYLENOL) 1,000 mg, Oral, Every 6 hours PRN    albuterol (PROVENTIL/VENTOLIN HFA) 90 mcg/actuation inhaler 2 puffs, Inhalation, Every 6 hours PRN, Rescue    albuterol-ipratropium (DUO-NEB) 2.5 mg-0.5 mg/3 mL nebulizer solution 3 mLs, Nebulization, Every 6 hours PRN, Rescue    ALPRAZolam (XANAX) 0.5 mg, Oral, 3 times daily    benzocaine-menthoL 6-10 mg lozenge 1 lozenge, Oral, Every 2 hours PRN    buPROPion (WELLBUTRIN XL) 300 mg, Oral, Daily    dexAMETHasone (DECADRON) 8 mg, Oral, Daily, Take 2 tablets by mouth daily on days 2 3 and 4 of chemotherapy    diphenhydrAMINE-aluminum-magnesium hydroxide-simethicone-LIDOcaine HCl 2% 15 mLs, Swish & Spit, Every 4 hours PRN    docusate sodium (COLACE) 100 mg, Oral, Daily PRN    EScitalopram oxalate (LEXAPRO) 10 MG tablet TAKE 1 TABLET(10 MG) BY MOUTH EVERY DAY FOR 1 WEEK THEN INCREASE TO 20MG AS DIRECTED    FLUoxetine 20 mg, Oral, Daily    fluticasone propionate (FLONASE) 50 mcg, Each Nostril,  Daily    fluticasone-salmeterol 230-21 mcg/dose (ADVAIR HFA) 230-21 mcg/actuation HFAA inhaler 2 puffs, Inhalation, 2 times daily, Controller    gabapentin (NEURONTIN) 600 mg, Oral, 3 times daily    levocetirizine (XYZAL) 5 mg, Oral, Nightly    LIDOcaine (LIDODERM) 5 % 1 patch, Transdermal, Daily, Remove & Discard patch within 12 hours or as directed by MD    LIDOcaine (LIDODERM) 5 % 1 patch, Transdermal, Daily, Remove & Discard patch within 12 hours or as directed by MD    LIDOcaine-prilocaine (EMLA) cream Topical (Top), As needed (PRN)    montelukast (SINGULAIR) 10 mg, Oral, Daily    OLANZapine (ZYPREXA) 5 mg, Oral, Nightly, Take 1 tablet at bedtime on days 1-4 of each cycle of chemotherapy    ondansetron (ZOFRAN-ODT) 8 mg, Oral, Every 12 hours PRN    oxyCODONE (ROXICODONE) 10 mg, Oral, Every 4 hours PRN    predniSONE (DELTASONE) 20 MG tablet Take one pill a day for three days, repeat for shortness of breath        Past Medical History:   Diagnosis Date    Arthritis     Cancer     Depression     GERD (gastroesophageal reflux disease)     Pneumonia of left lung due to infectious organism 03/05/2020        Past Surgical History:   Procedure Laterality Date    INJECTION OF ANESTHETIC AGENT AROUND MULTIPLE INTERCOSTAL NERVES Left 10/3/2022    Procedure: BLOCK, NERVE, INTERCOSTAL, 2 OR MORE;  Surgeon: Evelio Kaplan MD;  [facility];  Service: Thoracic;  Laterality: Left;    INSERTION OF TUNNELED CENTRAL VENOUS CATHETER (CVC) WITH SUBCUTANEOUS PORT Right 11/3/2022    Procedure: OYGCIATTO-BRBC-L-CATH, Right or Left Neck or Chest;  Surgeon: Mini Acevedo MD;  Location: [facility] OR 2ND FLR;  Service: General;  Laterality: Right;    ROBOT-ASSISTED LAPAROSCOPIC LYMPHADENECTOMY USING DA MONIQUE XI Left 10/3/2022    Procedure: XI ROBOTIC LYMPHADENECTOMY;  Surgeon: Evelio Kaplan MD;  Location: [facility] OR 2ND FLR;  Service: Thoracic;  Laterality: Left;    TONSILLECTOMY      XI ROBOTIC RATS,WITH  "LOBECTOMY,LUNG Left 10/3/2022    Procedure: XI ROBOTIC RATS,WITH LOBECTOMY,LUNG;  Surgeon: Evelio Kaplan MD;  Location: [facility] OR [address] [place] [address];  Service: Thoracic;  Laterality: Left;        Family History   Problem Relation Age of Onset    Ovarian cancer Sister     Breast cancer Cousin        Social History     Tobacco Use    Smoking status: Some Days     Packs/day: 0.15     Types: Cigarettes    Smokeless tobacco: Never    Tobacco comments:     reports one cigarette every now and then   Substance Use Topics    Alcohol use: Yes     Comment: rarely    Drug use: Yes     Types: Marijuana         Vitals:    07/19/23 0913   BP: 100/66   Pulse: 64   Resp: 12   Temp: 97.3 °F (36.3 °C)        Physical Exam:  /66 (BP Location: Left arm, Patient Position: Sitting, BP Method: Large (Automatic))   Pulse 64   Temp 97.3 °F (36.3 °C) (Temporal)   Resp 12   Ht 5' 3" (1.6 m)   Wt 73.7 kg (162 lb 7.7 oz)   LMP 01/13/2010 (Approximate)   SpO2 95%   BMI 28.78 kg/m²     Physical Exam  Constitutional:       Appearance: Normal appearance.   HENT:      Head: Normocephalic and atraumatic.      Nose: Nose normal.      Mouth/Throat:      Mouth: Mucous membranes are moist.      Pharynx: Oropharynx is clear.   Eyes:      Conjunctiva/sclera: Conjunctivae normal.   Cardiovascular:      Rate and Rhythm: Normal rate and regular rhythm.      Heart sounds: Normal heart sounds.   Pulmonary:      Effort: Pulmonary effort is normal.      Breath sounds: Normal breath sounds.   Abdominal:      General: Abdomen is flat. Bowel sounds are normal.      Palpations: Abdomen is soft.   Musculoskeletal:         General: Normal range of motion.      Cervical back: Normal range of motion and neck supple.   Skin:     General: Skin is warm and dry.   Neurological:      General: No focal deficit present.      Mental Status: She is alert and oriented to person, place, and time. Mental status is at baseline.   Psychiatric:         Mood and Affect: Mood " normal.        Labs:  Lab Results   Component Value Date    WBC 25.38 (H) 07/18/2023    RBC 4.26 07/18/2023    HGB 13.4 07/18/2023    HCT 41.3 07/18/2023    MCV 97 07/18/2023    MCH 31.5 (H) 07/18/2023    MCHC 32.4 07/18/2023    RDW 13.7 07/18/2023     07/18/2023    MPV 9.9 07/18/2023    GRAN 19.0 (L) 07/18/2023    LYMPH 79.0 (H) 07/18/2023    MONO 2.0 (L) 07/18/2023    EOS CANCELED 05/30/2023    BASO CANCELED 05/30/2023    EOSINOPHIL 0.0 07/18/2023    BASOPHIL 0.0 07/18/2023     Sodium   Date Value Ref Range Status   07/18/2023 142 136 - 145 mmol/L Final     Potassium   Date Value Ref Range Status   07/18/2023 4.1 3.5 - 5.1 mmol/L Final     Chloride   Date Value Ref Range Status   07/18/2023 105 95 - 110 mmol/L Final     CO2   Date Value Ref Range Status   07/18/2023 28 23 - 29 mmol/L Final     Glucose   Date Value Ref Range Status   07/18/2023 96 70 - 110 mg/dL Final     BUN   Date Value Ref Range Status   07/18/2023 13 8 - 23 mg/dL Final     Creatinine   Date Value Ref Range Status   07/18/2023 0.9 0.5 - 1.4 mg/dL Final     Calcium   Date Value Ref Range Status   07/18/2023 9.2 8.7 - 10.5 mg/dL Final     Total Protein   Date Value Ref Range Status   07/18/2023 6.6 6.0 - 8.4 g/dL Final     Albumin   Date Value Ref Range Status   07/18/2023 4.0 3.5 - 5.2 g/dL Final     Total Bilirubin   Date Value Ref Range Status   07/18/2023 0.3 0.1 - 1.0 mg/dL Final     Comment:     For infants and newborns, interpretation of results should be based  on gestational age, weight and in agreement with clinical  observations.    Premature Infant recommended reference ranges:  Up to 24 hours.............<8.0 mg/dL  Up to 48 hours............<12.0 mg/dL  3-5 days..................<15.0 mg/dL  6-29 days.................<15.0 mg/dL       Alkaline Phosphatase   Date Value Ref Range Status   07/18/2023 136 (H) 55 - 135 U/L Final     AST   Date Value Ref Range Status   07/18/2023 19 10 - 40 U/L Final     ALT   Date Value Ref Range  Status   07/18/2023 24 10 - 44 U/L Final     Anion Gap   Date Value Ref Range Status   07/18/2023 9 8 - 16 mmol/L Final     eGFR if    Date Value Ref Range Status   06/13/2022 >60 >60 mL/min/1.73 m^2 Final     eGFR if non    Date Value Ref Range Status   06/13/2022 >60 >60 mL/min/1.73 m^2 Final     Comment:     Calculation used to obtain the estimated glomerular filtration  rate (eGFR) is the CKD-EPI equation.          A/P:    Non-small cell lung cancer  -T1cN1  - 5% PDL-1  - completed 5 cycles of carbotaxol  -now on maintenance Tecentriq   -PET scan 7/18/23 shows no evidence of recurrent or metastatic disease  -return to clinic in 3 weeks for next treatment    CLL   -stage 0   -on observation      Aurash Khoobehi, MD  Hematology and Oncology

## 2023-07-20 RX ORDER — GABAPENTIN 300 MG/1
CAPSULE ORAL
Qty: 90 CAPSULE | Refills: 0 | Status: SHIPPED | OUTPATIENT
Start: 2023-07-20 | End: 2023-07-27 | Stop reason: SDUPTHER

## 2023-07-20 NOTE — TELEPHONE ENCOUNTER
"I spoke with pt via phone.  I advised her that her medication was refilled. Per Dr. Priest " Needs appointment "    Pt states that she does not need any gabapentin at this time . Would like to see a provider to discuss her hand pain that she has been having. Will see Dr. Hay first available appt on 07/27/2023 .   "

## 2023-07-20 NOTE — TELEPHONE ENCOUNTER
No care due was identified.  [facility] Embedded Care Due Messages. Reference number: [id number].   7/20/2023 5:27:53 AM CDT

## 2023-07-21 ENCOUNTER — TELEPHONE (OUTPATIENT)
Dept: HEMATOLOGY/ONCOLOGY | Facility: CLINIC | Age: 63
End: 2023-07-21
Payer: COMMERCIAL

## 2023-07-21 ENCOUNTER — TELEPHONE (OUTPATIENT)
Dept: FAMILY MEDICINE | Facility: CLINIC | Age: 63
End: 2023-07-21
Payer: COMMERCIAL

## 2023-07-21 ENCOUNTER — INFUSION (OUTPATIENT)
Dept: INFUSION THERAPY | Facility: HOSPITAL | Age: 63
End: 2023-07-21
Attending: INTERNAL MEDICINE
Payer: COMMERCIAL

## 2023-07-21 VITALS
OXYGEN SATURATION: 94 % | HEART RATE: 67 BPM | BODY MASS INDEX: 29.02 KG/M2 | DIASTOLIC BLOOD PRESSURE: 69 MMHG | SYSTOLIC BLOOD PRESSURE: 111 MMHG | RESPIRATION RATE: 18 BRPM | WEIGHT: 163.81 LBS | HEIGHT: 63 IN | TEMPERATURE: 97 F

## 2023-07-21 DIAGNOSIS — C34.01 MALIGNANT NEOPLASM OF HILUS OF RIGHT LUNG: Primary | ICD-10-CM

## 2023-07-21 DIAGNOSIS — C34.90 NON-SMALL CELL LUNG CANCER, UNSPECIFIED LATERALITY: ICD-10-CM

## 2023-07-21 PROCEDURE — 25000003 PHARM REV CODE 250: Performed by: INTERNAL MEDICINE

## 2023-07-21 PROCEDURE — 96413 CHEMO IV INFUSION 1 HR: CPT

## 2023-07-21 PROCEDURE — 63600175 PHARM REV CODE 636 W HCPCS: Performed by: INTERNAL MEDICINE

## 2023-07-21 RX ORDER — DIPHENHYDRAMINE HYDROCHLORIDE 50 MG/ML
50 INJECTION INTRAMUSCULAR; INTRAVENOUS ONCE AS NEEDED
Status: DISCONTINUED | OUTPATIENT
Start: 2023-07-21 | End: 2023-07-21 | Stop reason: HOSPADM

## 2023-07-21 RX ORDER — SODIUM CHLORIDE 0.9 % (FLUSH) 0.9 %
10 SYRINGE (ML) INJECTION
Status: DISCONTINUED | OUTPATIENT
Start: 2023-07-21 | End: 2023-07-21 | Stop reason: HOSPADM

## 2023-07-21 RX ORDER — EPINEPHRINE 0.3 MG/.3ML
0.3 INJECTION SUBCUTANEOUS ONCE AS NEEDED
Status: DISCONTINUED | OUTPATIENT
Start: 2023-07-21 | End: 2023-07-21 | Stop reason: HOSPADM

## 2023-07-21 RX ORDER — HEPARIN 100 UNIT/ML
500 SYRINGE INTRAVENOUS
Status: DISCONTINUED | OUTPATIENT
Start: 2023-07-21 | End: 2023-07-21 | Stop reason: HOSPADM

## 2023-07-21 RX ADMIN — HEPARIN 500 UNITS: 100 SYRINGE at 02:07

## 2023-07-21 RX ADMIN — ATEZOLIZUMAB 1200 MG: 1200 INJECTION, SOLUTION INTRAVENOUS at 01:07

## 2023-07-21 NOTE — TELEPHONE ENCOUNTER
Appointment Request   Yvettecarlie Hutchinson   Sent:   7:15 PM   To: [facility] Clinical Staff      Yvette Hutchinson   MRN: [id number] : 1960   Pt Home: [phone]     Entered: [phone]        Message    Appointment Request From: Yvette Realin Jasper      With Provider: Herbert Price PA-C [[facility]]      Preferred Date Range: Any date 2023 or later      Preferred Times: Any Time      Reason for visit: Need to see a doctor about... having trouble with my hands and fingers      Comments:   Hand and fingers have been giving me a lot of trouble for a long time and getting worse      Primary Coverage(Effective 2023)    Payer Plan Group Number Group Name Effective From Effective To   HEALTHY BLUE MEDICARE ADVANTAGE HEALTHY BLUE DUAL ADVANTAGE [id number]  2023      Secondary Coverage(Effective 2023)    Payer Plan Group Number Group Name Effective From Effective To   MEDICAID [place]   2023      Patient Demographics    Address   [address] [place] [address] Phone   [phone] (Home)   [phone] (Mobile) *Preferred* E-mail Address   [email or web address]     # of No Show in Current Department:0     Future Appointments  2023 - 2024   Date Visit Type Department Provider    2023  2:20 PM EP - PRIMARY CARE ([facility]) [facility] Delfina Hay MD           2023  8:30 AM NON FASTING LAB [facility] - Lab LAB, [facility]           2023 10:20 AM ESTABLISHED PATIENT [facility] Rhiannon Lee MD           2023  2:00 PM INFUSION 060 MIN [facility] CHAIR 22 [facility]           2023  2:00 PM INFUSION 060 MIN [facility] CHAIR 09 [facility]           2023  2:00 PM INFUSION 060 MIN [facility] CHAIR 09 [facility]           10/2/2023  2:00 PM MYCHART ANNUAL CHECKUP/PHYS [facility] Vern Priest MD            10/13/2023  2:00 PM INFUSION 060 MIN [facility] CHAIR 09 [facility]           10/27/2023 10:00 AM EST PATIENT - PULMONARY ([facility]) [facility] Harika Garcia NP           11/3/2023  2:00 PM INFUSION 060 MIN [facility] CHAIR 09 [facility]           11/24/2023 11:00 AM INFUSION 060 MIN [facility] CHAIR 09 [facility]           12/15/2023  2:00 PM INFUSION 060 MIN [facility] CHAIR 09 [facility]

## 2023-07-21 NOTE — TELEPHONE ENCOUNTER
This nurse called pt to notify that d/t no authorization, pt's tx will need to be cancelled at this time. No answer on either number dialed. No voicemail box set up.

## 2023-07-21 NOTE — TELEPHONE ENCOUNTER
Spoke with pt. Explained that tx is not authorized at this time. Pt states she is aware. She has spoken with infusion center.      ----- Message from Leonard Larios sent at 7/21/2023  9:38 AM CDT -----  Regarding: ret call  Contact: YVETTE ZAMAN [[id number]]  Type:  Patient Returning Call    Who Called:  Yvette    Who Left Message for Patient:  Jelena    Does the patient know what this is regarding?:  no    Best Call Back Number:  [phone]    Additional Information:  Please call to advise.

## 2023-07-21 NOTE — PLAN OF CARE
Problem: Fatigue  Goal: Improved Activity Tolerance  Intervention: Promote Improved Energy  Flowsheets (Taken 7/21/2023 1330)  Fatigue Management: fatigue-related activity identified  Activity Management: Ambulated -L4

## 2023-07-27 ENCOUNTER — OFFICE VISIT (OUTPATIENT)
Dept: FAMILY MEDICINE | Facility: CLINIC | Age: 63
End: 2023-07-27
Payer: COMMERCIAL

## 2023-07-27 VITALS
HEART RATE: 73 BPM | SYSTOLIC BLOOD PRESSURE: 102 MMHG | WEIGHT: 164.25 LBS | BODY MASS INDEX: 29.1 KG/M2 | OXYGEN SATURATION: 96 % | DIASTOLIC BLOOD PRESSURE: 60 MMHG | HEIGHT: 63 IN | RESPIRATION RATE: 16 BRPM | TEMPERATURE: 98 F

## 2023-07-27 DIAGNOSIS — M19.049 HAND ARTHRITIS: Primary | ICD-10-CM

## 2023-07-27 PROCEDURE — 99213 OFFICE O/P EST LOW 20 MIN: CPT | Mod: 25,S$GLB,, | Performed by: FAMILY MEDICINE

## 2023-07-27 PROCEDURE — 3078F DIAST BP <80 MM HG: CPT | Mod: CPTII,S$GLB,, | Performed by: FAMILY MEDICINE

## 2023-07-27 PROCEDURE — 1159F MED LIST DOCD IN RCRD: CPT | Mod: CPTII,S$GLB,, | Performed by: FAMILY MEDICINE

## 2023-07-27 PROCEDURE — 3078F PR MOST RECENT DIASTOLIC BLOOD PRESSURE < 80 MM HG: ICD-10-PCS | Mod: CPTII,S$GLB,, | Performed by: FAMILY MEDICINE

## 2023-07-27 PROCEDURE — 96372 PR INJECTION,THERAP/PROPH/DIAG2ST, IM OR SUBCUT: ICD-10-PCS | Mod: S$GLB,,, | Performed by: FAMILY MEDICINE

## 2023-07-27 PROCEDURE — 99213 PR OFFICE/OUTPT VISIT, EST, LEVL III, 20-29 MIN: ICD-10-PCS | Mod: 25,S$GLB,, | Performed by: FAMILY MEDICINE

## 2023-07-27 PROCEDURE — 96372 THER/PROPH/DIAG INJ SC/IM: CPT | Mod: S$GLB,,, | Performed by: FAMILY MEDICINE

## 2023-07-27 PROCEDURE — 99999 PR PBB SHADOW E&M-EST. PATIENT-LVL IV: CPT | Mod: PBBFAC,,, | Performed by: FAMILY MEDICINE

## 2023-07-27 PROCEDURE — 3074F PR MOST RECENT SYSTOLIC BLOOD PRESSURE < 130 MM HG: ICD-10-PCS | Mod: CPTII,S$GLB,, | Performed by: FAMILY MEDICINE

## 2023-07-27 PROCEDURE — 1160F RVW MEDS BY RX/DR IN RCRD: CPT | Mod: CPTII,S$GLB,, | Performed by: FAMILY MEDICINE

## 2023-07-27 PROCEDURE — 1159F PR MEDICATION LIST DOCUMENTED IN MEDICAL RECORD: ICD-10-PCS | Mod: CPTII,S$GLB,, | Performed by: FAMILY MEDICINE

## 2023-07-27 PROCEDURE — 99999 PR PBB SHADOW E&M-EST. PATIENT-LVL IV: ICD-10-PCS | Mod: PBBFAC,,, | Performed by: FAMILY MEDICINE

## 2023-07-27 PROCEDURE — 3008F PR BODY MASS INDEX (BMI) DOCUMENTED: ICD-10-PCS | Mod: CPTII,S$GLB,, | Performed by: FAMILY MEDICINE

## 2023-07-27 PROCEDURE — 3008F BODY MASS INDEX DOCD: CPT | Mod: CPTII,S$GLB,, | Performed by: FAMILY MEDICINE

## 2023-07-27 PROCEDURE — 3074F SYST BP LT 130 MM HG: CPT | Mod: CPTII,S$GLB,, | Performed by: FAMILY MEDICINE

## 2023-07-27 PROCEDURE — 1160F PR REVIEW ALL MEDS BY PRESCRIBER/CLIN PHARMACIST DOCUMENTED: ICD-10-PCS | Mod: CPTII,S$GLB,, | Performed by: FAMILY MEDICINE

## 2023-07-27 RX ORDER — DEXAMETHASONE SODIUM PHOSPHATE 4 MG/ML
6 INJECTION, SOLUTION INTRA-ARTICULAR; INTRALESIONAL; INTRAMUSCULAR; INTRAVENOUS; SOFT TISSUE
Status: DISCONTINUED | OUTPATIENT
Start: 2023-07-27 | End: 2023-07-27

## 2023-07-27 RX ORDER — DEXAMETHASONE SODIUM PHOSPHATE 4 MG/ML
6 INJECTION, SOLUTION INTRA-ARTICULAR; INTRALESIONAL; INTRAMUSCULAR; INTRAVENOUS; SOFT TISSUE
Status: COMPLETED | OUTPATIENT
Start: 2023-07-27 | End: 2023-07-27

## 2023-07-27 RX ORDER — GABAPENTIN 300 MG/1
300 CAPSULE ORAL 3 TIMES DAILY
Qty: 270 CAPSULE | Refills: 1 | Status: SHIPPED | OUTPATIENT
Start: 2023-07-27 | End: 2023-11-27 | Stop reason: SDUPTHER

## 2023-07-27 RX ORDER — CELECOXIB 200 MG/1
200 CAPSULE ORAL DAILY
Qty: 90 CAPSULE | Refills: 1 | Status: SHIPPED | OUTPATIENT
Start: 2023-07-27 | End: 2023-09-29

## 2023-07-27 RX ADMIN — DEXAMETHASONE SODIUM PHOSPHATE 6 MG: 4 INJECTION, SOLUTION INTRA-ARTICULAR; INTRALESIONAL; INTRAMUSCULAR; INTRAVENOUS; SOFT TISSUE at 03:07

## 2023-07-27 NOTE — PROGRESS NOTES
Dexametha  Subjective:       Patient ID: Yvette Hutchinson is a 63 y.o. female.    Chief Complaint: Hand Pain    HPI  Review of Systems   Constitutional:  Negative for fever.   Musculoskeletal:  Positive for arthralgias. Negative for joint swelling.   Skin:  Negative for color change, pallor, rash and wound.   Neurological:  Negative for numbness.     Patient Active Problem List   Diagnosis    Tobacco dependence    B12 deficiency    COLD (chronic obstructive lung disease)    Chronic pain syndrome    Insomnia    Overweight (BMI 25.0-29.9)    S/P dilatation of esophageal stricture    Seasonal allergic rhinitis due to pollen    History of colon polyps    GERD without esophagitis    Anxiety    Low HDL (under 40)    CLL (chronic lymphocytic leukemia)    Chronic respiratory failure with hypoxia    Malignant neoplasm of upper lobe of lung    Non-small cell lung cancer    Malignant neoplasm of right lung     Patient is here for a problem visit  C/o joint pain in hands boubacar thumb and has trigger fingers boubacar left 3rd and fourth digits.  Tried mobic and did not help.  Under care of onc for CLL.   Objective:      Physical Exam  Vitals and nursing note reviewed.   Constitutional:       Appearance: She is well-developed.   Cardiovascular:      Rate and Rhythm: Normal rate and regular rhythm.      Heart sounds: Normal heart sounds.   Pulmonary:      Effort: Pulmonary effort is normal.      Breath sounds: Normal breath sounds.   Musculoskeletal:      Comments: 3rd and 4th left trigger finger.  Hypertrophy MCP and IP joints thumb karlee, mcp, pip and dip 2-3 karlee   Skin:     General: Skin is warm and dry.   Neurological:      Mental Status: She is alert and oriented to person, place, and time.       Assessment:       1. Hand arthritis        Plan:       1. Hand arthritis  continue  - gabapentin (NEURONTIN) 300 MG capsule; Take 1 capsule (300 mg total) by mouth 3 (three) times daily.  Dispense: 270 capsule; Refill: 1  Add  -  celecoxib (CELEBREX) 200 MG capsule; Take 1 capsule (200 mg total) by mouth once daily.  Dispense: 90 capsule; Refill: 1  Refer for eval and treat  - Ambulatory referral/consult to Orthopedics; Future  - dexAMETHasone injection 6 mg    Cont use of tylenol prn and voltaren gel as needed

## 2023-08-03 ENCOUNTER — TELEPHONE (OUTPATIENT)
Dept: HEMATOLOGY/ONCOLOGY | Facility: CLINIC | Age: 63
End: 2023-08-03
Payer: COMMERCIAL

## 2023-08-03 NOTE — TELEPHONE ENCOUNTER
Spoke to pt and notified dr out appt 08/09/23 and will need to reschedule to another provider, appt same day/time w/NP.

## 2023-08-09 ENCOUNTER — LAB VISIT (OUTPATIENT)
Dept: LAB | Facility: HOSPITAL | Age: 63
End: 2023-08-09
Attending: INTERNAL MEDICINE
Payer: COMMERCIAL

## 2023-08-09 ENCOUNTER — OFFICE VISIT (OUTPATIENT)
Dept: HEMATOLOGY/ONCOLOGY | Facility: CLINIC | Age: 63
End: 2023-08-09
Payer: COMMERCIAL

## 2023-08-09 VITALS
WEIGHT: 164.88 LBS | SYSTOLIC BLOOD PRESSURE: 106 MMHG | HEART RATE: 69 BPM | OXYGEN SATURATION: 94 % | HEIGHT: 63 IN | DIASTOLIC BLOOD PRESSURE: 56 MMHG | BODY MASS INDEX: 29.21 KG/M2 | TEMPERATURE: 98 F | RESPIRATION RATE: 12 BRPM

## 2023-08-09 DIAGNOSIS — C34.01 MALIGNANT NEOPLASM OF HILUS OF RIGHT LUNG: ICD-10-CM

## 2023-08-09 DIAGNOSIS — E06.4 DRUG-INDUCED THYROIDITIS: ICD-10-CM

## 2023-08-09 DIAGNOSIS — C34.92 NSCLC OF LEFT LUNG: ICD-10-CM

## 2023-08-09 DIAGNOSIS — C34.10 MALIGNANT NEOPLASM OF UPPER LOBE OF LUNG, UNSPECIFIED LATERALITY: Primary | ICD-10-CM

## 2023-08-09 DIAGNOSIS — R45.89 ANXIETY ABOUT HEALTH: ICD-10-CM

## 2023-08-09 LAB
ALBUMIN SERPL BCP-MCNC: 4 G/DL (ref 3.5–5.2)
ALP SERPL-CCNC: 118 U/L (ref 55–135)
ALT SERPL W/O P-5'-P-CCNC: 17 U/L (ref 10–44)
ANION GAP SERPL CALC-SCNC: 12 MMOL/L (ref 8–16)
AST SERPL-CCNC: 21 U/L (ref 10–40)
BASOPHILS NFR BLD: 0 % (ref 0–1.9)
BILIRUB SERPL-MCNC: 0.2 MG/DL (ref 0.1–1)
BUN SERPL-MCNC: 9 MG/DL (ref 8–23)
CALCIUM SERPL-MCNC: 9.1 MG/DL (ref 8.7–10.5)
CHLORIDE SERPL-SCNC: 106 MMOL/L (ref 95–110)
CO2 SERPL-SCNC: 24 MMOL/L (ref 23–29)
CREAT SERPL-MCNC: 0.9 MG/DL (ref 0.5–1.4)
DIFFERENTIAL METHOD: ABNORMAL
EOSINOPHIL NFR BLD: 0 % (ref 0–8)
ERYTHROCYTE [DISTWIDTH] IN BLOOD BY AUTOMATED COUNT: 14.1 % (ref 11.5–14.5)
EST. GFR  (NO RACE VARIABLE): >60 ML/MIN/1.73 M^2
GLUCOSE SERPL-MCNC: 112 MG/DL (ref 70–110)
HCT VFR BLD AUTO: 42 % (ref 37–48.5)
HGB BLD-MCNC: 13.3 G/DL (ref 12–16)
IMM GRANULOCYTES # BLD AUTO: ABNORMAL K/UL
IMM GRANULOCYTES NFR BLD AUTO: ABNORMAL %
LYMPHOCYTES NFR BLD: 70 % (ref 18–48)
MCH RBC QN AUTO: 30.6 PG (ref 27–31)
MCHC RBC AUTO-ENTMCNC: 31.7 G/DL (ref 32–36)
MCV RBC AUTO: 97 FL (ref 82–98)
MONOCYTES NFR BLD: 3 % (ref 4–15)
NEUTROPHILS NFR BLD: 27 % (ref 38–73)
NRBC BLD-RTO: 0 /100 WBC
PLATELET # BLD AUTO: 233 K/UL (ref 150–450)
PLATELET BLD QL SMEAR: ABNORMAL
PMV BLD AUTO: 9.6 FL (ref 9.2–12.9)
POTASSIUM SERPL-SCNC: 4 MMOL/L (ref 3.5–5.1)
PROT SERPL-MCNC: 6.5 G/DL (ref 6–8.4)
RBC # BLD AUTO: 4.35 M/UL (ref 4–5.4)
SODIUM SERPL-SCNC: 142 MMOL/L (ref 136–145)
TSH SERPL DL<=0.005 MIU/L-ACNC: 1.86 UIU/ML (ref 0.4–4)
WBC # BLD AUTO: 28.71 K/UL (ref 3.9–12.7)

## 2023-08-09 PROCEDURE — 85027 COMPLETE CBC AUTOMATED: CPT | Performed by: INTERNAL MEDICINE

## 2023-08-09 PROCEDURE — 99999 PR PBB SHADOW E&M-EST. PATIENT-LVL V: CPT | Mod: PBBFAC,,, | Performed by: NURSE PRACTITIONER

## 2023-08-09 PROCEDURE — 99214 PR OFFICE/OUTPT VISIT, EST, LEVL IV, 30-39 MIN: ICD-10-PCS | Mod: S$GLB,,, | Performed by: NURSE PRACTITIONER

## 2023-08-09 PROCEDURE — 84443 ASSAY THYROID STIM HORMONE: CPT | Performed by: INTERNAL MEDICINE

## 2023-08-09 PROCEDURE — 80053 COMPREHEN METABOLIC PANEL: CPT | Performed by: INTERNAL MEDICINE

## 2023-08-09 PROCEDURE — 99215 OFFICE O/P EST HI 40 MIN: CPT | Mod: PBBFAC,PN | Performed by: NURSE PRACTITIONER

## 2023-08-09 PROCEDURE — 36415 COLL VENOUS BLD VENIPUNCTURE: CPT | Performed by: INTERNAL MEDICINE

## 2023-08-09 PROCEDURE — 99214 OFFICE O/P EST MOD 30 MIN: CPT | Mod: S$GLB,,, | Performed by: NURSE PRACTITIONER

## 2023-08-09 PROCEDURE — 99999 PR PBB SHADOW E&M-EST. PATIENT-LVL V: ICD-10-PCS | Mod: PBBFAC,,, | Performed by: NURSE PRACTITIONER

## 2023-08-09 PROCEDURE — 85007 BL SMEAR W/DIFF WBC COUNT: CPT | Performed by: INTERNAL MEDICINE

## 2023-08-09 RX ORDER — OXYCODONE HYDROCHLORIDE 10 MG/1
10 TABLET ORAL EVERY 4 HOURS PRN
Qty: 60 TABLET | Refills: 0 | Status: SHIPPED | OUTPATIENT
Start: 2023-08-09 | End: 2023-09-29

## 2023-08-09 RX ORDER — ESCITALOPRAM OXALATE 20 MG/1
20 TABLET ORAL DAILY
Qty: 30 TABLET | Refills: 11 | Status: CANCELLED | OUTPATIENT
Start: 2023-08-09 | End: 2024-08-08

## 2023-08-09 RX ORDER — ONDANSETRON 8 MG/1
8 TABLET, ORALLY DISINTEGRATING ORAL EVERY 12 HOURS PRN
Qty: 60 TABLET | Refills: 1 | Status: SHIPPED | OUTPATIENT
Start: 2023-08-09 | End: 2024-03-06

## 2023-08-09 RX ORDER — ALPRAZOLAM 0.5 MG/1
0.5 TABLET ORAL 3 TIMES DAILY
Qty: 90 TABLET | Refills: 0 | Status: SHIPPED | OUTPATIENT
Start: 2023-08-09 | End: 2023-11-22 | Stop reason: SDUPTHER

## 2023-08-09 RX ORDER — ESCITALOPRAM OXALATE 10 MG/1
TABLET ORAL
Qty: 30 TABLET | Refills: 6 | Status: SHIPPED | OUTPATIENT
Start: 2023-08-09 | End: 2023-08-28

## 2023-08-09 NOTE — PROGRESS NOTES
Service Date:  8/9/23    Chief Complaint:  Non-small cell lung cancer     Yvette Hutchinson is a 63 y.o. 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  She is tolerating well.  She also had a PET scan yesterday and is here for those results.  No other complaints to me.    8/9/2023:  She returns today prior to her next cycle of Tecentriq     Review of Systems   Constitutional: Negative.    HENT: Negative.     Eyes: Negative.    Respiratory: Negative.     Cardiovascular: Negative.    Gastrointestinal: Negative.    Endocrine: Negative.    Genitourinary: Negative.    Musculoskeletal: Negative.    Integumentary:  Negative.   Neurological: Negative.    Hematological: Negative.    Psychiatric/Behavioral: Negative.        Current Outpatient Medications   Medication Instructions    (Magic mouthwash) 1:1:1 diphenhydrAMINE(Benadryl) 12.5mg/5ml liq, aluminum & magnesium hydroxide-simethicone (Maalox), LIDOcaine viscous 2% 10 mLs, Swish & Spit, 3 times daily, for mouth sores    acetaminophen (TYLENOL) 1,000 mg, Oral, Every 6 hours PRN    albuterol (PROVENTIL/VENTOLIN HFA) 90 mcg/actuation inhaler 2 puffs, Inhalation, Every 6 hours PRN, Rescue    albuterol-ipratropium (DUO-NEB) 2.5 mg-0.5 mg/3 mL nebulizer solution 3 mLs, Nebulization, Every 6 hours PRN, Rescue    ALPRAZolam (XANAX) 0.5 mg, Oral, 3 times daily    benzocaine-menthoL 6-10 mg lozenge 1 lozenge, Oral, Every 2 hours PRN    buPROPion (WELLBUTRIN XL) 300 mg, Oral, Daily    celecoxib (CELEBREX) 200 mg, Oral, Daily    diphenhydrAMINE-aluminum-magnesium hydroxide-simethicone-LIDOcaine HCl 2% 15 mLs, Swish & Spit, Every 4 hours PRN    docusate sodium (COLACE) 100 mg, Oral, Daily PRN    EScitalopram oxalate (LEXAPRO) 10 MG tablet TAKE 1 TABLET(10 MG) BY MOUTH EVERY DAY FOR 1 WEEK THEN INCREASE TO 20MG AS DIRECTED    FLUoxetine 20 mg, Oral, Daily    fluticasone propionate (FLONASE) 50 mcg, Each Nostril,  Daily    fluticasone-salmeterol 230-21 mcg/dose (ADVAIR HFA) 230-21 mcg/actuation HFAA inhaler 2 puffs, Inhalation, 2 times daily, Controller    gabapentin (NEURONTIN) 300 mg, Oral, 3 times daily    levocetirizine (XYZAL) 5 mg, Oral, Nightly    LIDOcaine (LIDODERM) 5 % 1 patch, Transdermal, Daily, Remove & Discard patch within 12 hours or as directed by MD    LIDOcaine (LIDODERM) 5 % 1 patch, Transdermal, Daily, Remove & Discard patch within 12 hours or as directed by MD    LIDOcaine-prilocaine (EMLA) cream Topical (Top), As needed (PRN)    montelukast (SINGULAIR) 10 mg, Oral, Daily    OLANZapine (ZYPREXA) 5 mg, Oral, Nightly, Take 1 tablet at bedtime on days 1-4 of each cycle of chemotherapy    ondansetron (ZOFRAN-ODT) 8 mg, Oral, Every 12 hours PRN    oxyCODONE (ROXICODONE) 10 mg, Oral, Every 4 hours PRN    predniSONE (DELTASONE) 20 MG tablet Take one pill a day for three days, repeat for shortness of breath        Past Medical History:   Diagnosis Date    Arthritis     Cancer     Depression     GERD (gastroesophageal reflux disease)     Pneumonia of left lung due to infectious organism 03/05/2020        Past Surgical History:   Procedure Laterality Date    INJECTION OF ANESTHETIC AGENT AROUND MULTIPLE INTERCOSTAL NERVES Left 10/3/2022    Procedure: BLOCK, NERVE, INTERCOSTAL, 2 OR MORE;  Surgeon: Evelio Kaplan MD;  [facility];  Service: Thoracic;  Laterality: Left;    INSERTION OF TUNNELED CENTRAL VENOUS CATHETER (CVC) WITH SUBCUTANEOUS PORT Right 11/3/2022    Procedure: [id number]-H-CATH, Right or Left Neck or Chest;  Surgeon: Mini Acevedo MD;  Location: [facility] OR 2ND FLR;  Service: General;  Laterality: Right;    ROBOT-ASSISTED LAPAROSCOPIC LYMPHADENECTOMY USING DA MONIQUE XI Left 10/3/2022    Procedure: XI ROBOTIC LYMPHADENECTOMY;  Surgeon: Evelio Kaplan MD;  Location: [facility] OR 2ND FLR;  Service: Thoracic;  Laterality: Left;    TONSILLECTOMY      XI ROBOTIC RATS,WITH  LOBECTOMY,LUNG Left 10/3/2022    Procedure: XI ROBOTIC RATS,WITH LOBECTOMY,LUNG;  Surgeon: Evelio Kaplan MD;  Location: [facility] OR [address] [place] [address];  Service: Thoracic;  Laterality: Left;        Family History   Problem Relation Age of Onset    Ovarian cancer Sister     Breast cancer Cousin        Social History     Tobacco Use    Smoking status: Some Days     Current packs/day: 0.15     Types: Cigarettes     Passive exposure: Current    Smokeless tobacco: Never    Tobacco comments:     reports one cigarette every now and then   Substance Use Topics    Alcohol use: Yes     Comment: rarely    Drug use: Yes     Types: Marijuana         There were no vitals filed for this visit.       Physical Exam:  LMP 01/13/2010 (Approximate)     Physical Exam  Constitutional:       Appearance: Normal appearance.   HENT:      Head: Normocephalic and atraumatic.      Nose: Nose normal.      Mouth/Throat:      Mouth: Mucous membranes are moist.      Pharynx: Oropharynx is clear.   Eyes:      Conjunctiva/sclera: Conjunctivae normal.   Cardiovascular:      Rate and Rhythm: Normal rate and regular rhythm.      Heart sounds: Normal heart sounds.   Pulmonary:      Effort: Pulmonary effort is normal.      Breath sounds: Normal breath sounds.   Abdominal:      General: Abdomen is flat. Bowel sounds are normal.      Palpations: Abdomen is soft.   Musculoskeletal:         General: Normal range of motion.      Cervical back: Normal range of motion and neck supple.   Skin:     General: Skin is warm and dry.   Neurological:      General: No focal deficit present.      Mental Status: She is alert and oriented to person, place, and time. Mental status is at baseline.   Psychiatric:         Mood and Affect: Mood normal.        Labs:  Lab Results   Component Value Date    WBC 28.71 (H) 08/09/2023    RBC 4.35 08/09/2023    HGB 13.3 08/09/2023    HCT 42.0 08/09/2023    MCV 97 08/09/2023    MCH 30.6 08/09/2023    MCHC 31.7 (L) 08/09/2023    RDW 14.1  08/09/2023     08/09/2023    MPV 9.6 08/09/2023    GRAN 27.0 (L) 08/09/2023    LYMPH 70.0 (H) 08/09/2023    MONO 3.0 (L) 08/09/2023    EOS CANCELED 05/30/2023    BASO CANCELED 05/30/2023    EOSINOPHIL 0.0 08/09/2023    BASOPHIL 0.0 08/09/2023     Sodium   Date Value Ref Range Status   08/09/2023 142 136 - 145 mmol/L Final     Potassium   Date Value Ref Range Status   08/09/2023 4.0 3.5 - 5.1 mmol/L Final     Chloride   Date Value Ref Range Status   08/09/2023 106 95 - 110 mmol/L Final     CO2   Date Value Ref Range Status   08/09/2023 24 23 - 29 mmol/L Final     Glucose   Date Value Ref Range Status   08/09/2023 112 (H) 70 - 110 mg/dL Final     BUN   Date Value Ref Range Status   08/09/2023 9 8 - 23 mg/dL Final     Creatinine   Date Value Ref Range Status   08/09/2023 0.9 0.5 - 1.4 mg/dL Final     Calcium   Date Value Ref Range Status   08/09/2023 9.1 8.7 - 10.5 mg/dL Final     Total Protein   Date Value Ref Range Status   08/09/2023 6.5 6.0 - 8.4 g/dL Final     Albumin   Date Value Ref Range Status   08/09/2023 4.0 3.5 - 5.2 g/dL Final     Total Bilirubin   Date Value Ref Range Status   08/09/2023 0.2 0.1 - 1.0 mg/dL Final     Comment:     For infants and newborns, interpretation of results should be based  on gestational age, weight and in agreement with clinical  observations.    Premature Infant recommended reference ranges:  Up to 24 hours.............<8.0 mg/dL  Up to 48 hours............<12.0 mg/dL  3-5 days..................<15.0 mg/dL  6-29 days.................<15.0 mg/dL       Alkaline Phosphatase   Date Value Ref Range Status   08/09/2023 118 55 - 135 U/L Final     AST   Date Value Ref Range Status   08/09/2023 21 10 - 40 U/L Final     ALT   Date Value Ref Range Status   08/09/2023 17 10 - 44 U/L Final     Anion Gap   Date Value Ref Range Status   08/09/2023 12 8 - 16 mmol/L Final     eGFR if    Date Value Ref Range Status   06/13/2022 >60 >60 mL/min/1.73 m^2 Final     eGFR if non     Date Value Ref Range Status   06/13/2022 >60 >60 mL/min/1.73 m^2 Final     Comment:     Calculation used to obtain the estimated glomerular filtration  rate (eGFR) is the CKD-EPI equation.          A/P:    Non-small cell lung cancer  -T1cN1  - 5% PDL-1  - completed 5 cycles of carbotaxol  -now on maintenance Tecentriq   -PET scan 7/18/23 shows no evidence of recurrent or metastatic disease  -proceed with cycle 7  -return to clinic in 3 weeks for next treatment    CLL   -stage 0   -on observation    Labs reviewed in detail with the patient.  All questions and concerns were addressed and answered.  She will proceed with Tecentriq as scheduled.  I have provided medication refills on requested prescription

## 2023-08-10 RX ORDER — DIPHENHYDRAMINE HYDROCHLORIDE 50 MG/ML
50 INJECTION INTRAMUSCULAR; INTRAVENOUS ONCE AS NEEDED
Status: CANCELLED | OUTPATIENT
Start: 2023-08-11

## 2023-08-10 RX ORDER — SODIUM CHLORIDE 0.9 % (FLUSH) 0.9 %
10 SYRINGE (ML) INJECTION
Status: CANCELLED | OUTPATIENT
Start: 2023-08-11

## 2023-08-10 RX ORDER — EPINEPHRINE 0.3 MG/.3ML
0.3 INJECTION SUBCUTANEOUS ONCE AS NEEDED
Status: CANCELLED | OUTPATIENT
Start: 2023-08-11

## 2023-08-10 RX ORDER — HEPARIN 100 UNIT/ML
500 SYRINGE INTRAVENOUS
Status: CANCELLED | OUTPATIENT
Start: 2023-08-11

## 2023-08-11 ENCOUNTER — INFUSION (OUTPATIENT)
Dept: INFUSION THERAPY | Facility: HOSPITAL | Age: 63
End: 2023-08-11
Attending: INTERNAL MEDICINE
Payer: COMMERCIAL

## 2023-08-11 VITALS
RESPIRATION RATE: 16 BRPM | WEIGHT: 166.19 LBS | DIASTOLIC BLOOD PRESSURE: 66 MMHG | SYSTOLIC BLOOD PRESSURE: 102 MMHG | OXYGEN SATURATION: 95 % | HEART RATE: 65 BPM | HEIGHT: 63 IN | TEMPERATURE: 99 F | BODY MASS INDEX: 29.45 KG/M2

## 2023-08-11 DIAGNOSIS — C34.90 NON-SMALL CELL LUNG CANCER, UNSPECIFIED LATERALITY: ICD-10-CM

## 2023-08-11 DIAGNOSIS — C34.01 MALIGNANT NEOPLASM OF HILUS OF RIGHT LUNG: Primary | ICD-10-CM

## 2023-08-11 PROCEDURE — 25000003 PHARM REV CODE 250: Performed by: NURSE PRACTITIONER

## 2023-08-11 PROCEDURE — 96413 CHEMO IV INFUSION 1 HR: CPT

## 2023-08-11 PROCEDURE — A4216 STERILE WATER/SALINE, 10 ML: HCPCS | Performed by: NURSE PRACTITIONER

## 2023-08-11 PROCEDURE — 63600175 PHARM REV CODE 636 W HCPCS: Performed by: NURSE PRACTITIONER

## 2023-08-11 RX ORDER — EPINEPHRINE 0.3 MG/.3ML
0.3 INJECTION SUBCUTANEOUS ONCE AS NEEDED
Status: DISCONTINUED | OUTPATIENT
Start: 2023-08-11 | End: 2023-08-11 | Stop reason: HOSPADM

## 2023-08-11 RX ORDER — SODIUM CHLORIDE 0.9 % (FLUSH) 0.9 %
10 SYRINGE (ML) INJECTION
Status: DISCONTINUED | OUTPATIENT
Start: 2023-08-11 | End: 2023-08-11 | Stop reason: HOSPADM

## 2023-08-11 RX ORDER — DIPHENHYDRAMINE HYDROCHLORIDE 50 MG/ML
50 INJECTION INTRAMUSCULAR; INTRAVENOUS ONCE AS NEEDED
Status: DISCONTINUED | OUTPATIENT
Start: 2023-08-11 | End: 2023-08-11 | Stop reason: HOSPADM

## 2023-08-11 RX ORDER — HEPARIN 100 UNIT/ML
500 SYRINGE INTRAVENOUS
Status: DISCONTINUED | OUTPATIENT
Start: 2023-08-11 | End: 2023-08-11 | Stop reason: HOSPADM

## 2023-08-11 RX ADMIN — ATEZOLIZUMAB 1200 MG: 1200 INJECTION, SOLUTION INTRAVENOUS at 02:08

## 2023-08-11 RX ADMIN — SODIUM CHLORIDE, PRESERVATIVE FREE 10 ML: 5 INJECTION INTRAVENOUS at 02:08

## 2023-08-11 RX ADMIN — HEPARIN 500 UNITS: 100 SYRINGE at 02:08

## 2023-08-11 NOTE — PLAN OF CARE
Problem: Fall Injury Risk  Goal: Absence of Fall and Fall-Related Injury  Outcome: Ongoing, Progressing  Intervention: Identify and Manage Contributors  Flowsheets (Taken 8/11/2023 1405)  Self-Care Promotion: safe use of adaptive equipment encouraged  Medication Review/Management: medications reviewed  Intervention: Promote Injury-Free Environment  Flowsheets (Taken 8/11/2023 1405)  Safety Promotion/Fall Prevention: assistive device/personal item within reach

## 2023-08-25 ENCOUNTER — PATIENT MESSAGE (OUTPATIENT)
Dept: HEMATOLOGY/ONCOLOGY | Facility: CLINIC | Age: 63
End: 2023-08-25
Payer: COMMERCIAL

## 2023-08-28 ENCOUNTER — PATIENT MESSAGE (OUTPATIENT)
Dept: HEMATOLOGY/ONCOLOGY | Facility: CLINIC | Age: 63
End: 2023-08-28
Payer: COMMERCIAL

## 2023-08-28 DIAGNOSIS — C34.10 MALIGNANT NEOPLASM OF UPPER LOBE OF LUNG, UNSPECIFIED LATERALITY: Primary | ICD-10-CM

## 2023-08-28 RX ORDER — ESCITALOPRAM OXALATE 20 MG/1
20 TABLET ORAL DAILY
Qty: 30 TABLET | Refills: 11 | Status: SHIPPED | OUTPATIENT
Start: 2023-08-28 | End: 2023-11-27 | Stop reason: SDUPTHER

## 2023-08-30 ENCOUNTER — LAB VISIT (OUTPATIENT)
Dept: LAB | Facility: HOSPITAL | Age: 63
End: 2023-08-30
Attending: INTERNAL MEDICINE
Payer: COMMERCIAL

## 2023-08-30 DIAGNOSIS — C34.10 MALIGNANT NEOPLASM OF UPPER LOBE OF LUNG, UNSPECIFIED LATERALITY: ICD-10-CM

## 2023-08-30 DIAGNOSIS — E06.4 DRUG-INDUCED THYROIDITIS: ICD-10-CM

## 2023-08-30 LAB
ALBUMIN SERPL BCP-MCNC: 4.1 G/DL (ref 3.5–5.2)
ALP SERPL-CCNC: 117 U/L (ref 55–135)
ALT SERPL W/O P-5'-P-CCNC: 19 U/L (ref 10–44)
ANION GAP SERPL CALC-SCNC: 12 MMOL/L (ref 8–16)
AST SERPL-CCNC: 23 U/L (ref 10–40)
BASOPHILS NFR BLD: 1 % (ref 0–1.9)
BILIRUB SERPL-MCNC: 0.4 MG/DL (ref 0.1–1)
BUN SERPL-MCNC: 9 MG/DL (ref 8–23)
CALCIUM SERPL-MCNC: 9.1 MG/DL (ref 8.7–10.5)
CHLORIDE SERPL-SCNC: 104 MMOL/L (ref 95–110)
CO2 SERPL-SCNC: 24 MMOL/L (ref 23–29)
CREAT SERPL-MCNC: 0.9 MG/DL (ref 0.5–1.4)
DIFFERENTIAL METHOD: ABNORMAL
EOSINOPHIL NFR BLD: 0 % (ref 0–8)
ERYTHROCYTE [DISTWIDTH] IN BLOOD BY AUTOMATED COUNT: 14.5 % (ref 11.5–14.5)
EST. GFR  (NO RACE VARIABLE): >60 ML/MIN/1.73 M^2
GLUCOSE SERPL-MCNC: 106 MG/DL (ref 70–110)
HCT VFR BLD AUTO: 39.7 % (ref 37–48.5)
HGB BLD-MCNC: 12.7 G/DL (ref 12–16)
IMM GRANULOCYTES # BLD AUTO: ABNORMAL K/UL
IMM GRANULOCYTES NFR BLD AUTO: ABNORMAL %
LYMPHOCYTES NFR BLD: 82 % (ref 18–48)
MCH RBC QN AUTO: 30.7 PG (ref 27–31)
MCHC RBC AUTO-ENTMCNC: 32 G/DL (ref 32–36)
MCV RBC AUTO: 96 FL (ref 82–98)
MONOCYTES NFR BLD: 1 % (ref 4–15)
NEUTROPHILS NFR BLD: 16 % (ref 38–73)
NRBC BLD-RTO: 0 /100 WBC
PLATELET # BLD AUTO: 223 K/UL (ref 150–450)
PLATELET BLD QL SMEAR: ABNORMAL
PMV BLD AUTO: 9.1 FL (ref 9.2–12.9)
POTASSIUM SERPL-SCNC: 4.1 MMOL/L (ref 3.5–5.1)
PROT SERPL-MCNC: 6.5 G/DL (ref 6–8.4)
RBC # BLD AUTO: 4.14 M/UL (ref 4–5.4)
SODIUM SERPL-SCNC: 140 MMOL/L (ref 136–145)
TSH SERPL DL<=0.005 MIU/L-ACNC: 1.08 UIU/ML (ref 0.4–4)
WBC # BLD AUTO: 25.33 K/UL (ref 3.9–12.7)

## 2023-08-30 PROCEDURE — 80053 COMPREHEN METABOLIC PANEL: CPT | Performed by: NURSE PRACTITIONER

## 2023-08-30 PROCEDURE — 84443 ASSAY THYROID STIM HORMONE: CPT | Performed by: NURSE PRACTITIONER

## 2023-08-30 PROCEDURE — 36415 COLL VENOUS BLD VENIPUNCTURE: CPT | Performed by: NURSE PRACTITIONER

## 2023-08-30 PROCEDURE — 85007 BL SMEAR W/DIFF WBC COUNT: CPT | Performed by: NURSE PRACTITIONER

## 2023-08-30 PROCEDURE — 85027 COMPLETE CBC AUTOMATED: CPT | Performed by: NURSE PRACTITIONER

## 2023-08-31 ENCOUNTER — OFFICE VISIT (OUTPATIENT)
Dept: HEMATOLOGY/ONCOLOGY | Facility: CLINIC | Age: 63
End: 2023-08-31
Payer: COMMERCIAL

## 2023-08-31 DIAGNOSIS — C34.01 MALIGNANT NEOPLASM OF HILUS OF RIGHT LUNG: ICD-10-CM

## 2023-08-31 DIAGNOSIS — C91.10 CLL (CHRONIC LYMPHOCYTIC LEUKEMIA): Primary | ICD-10-CM

## 2023-08-31 DIAGNOSIS — D51.8 OTHER VITAMIN B12 DEFICIENCY ANEMIAS: ICD-10-CM

## 2023-08-31 DIAGNOSIS — E03.2 HYPOTHYROIDISM DUE TO MEDICATION: ICD-10-CM

## 2023-08-31 DIAGNOSIS — E53.8 B12 DEFICIENCY: ICD-10-CM

## 2023-08-31 PROCEDURE — 99215 PR OFFICE/OUTPT VISIT, EST, LEVL V, 40-54 MIN: ICD-10-PCS | Mod: 95,,, | Performed by: INTERNAL MEDICINE

## 2023-08-31 PROCEDURE — 99215 OFFICE O/P EST HI 40 MIN: CPT | Mod: 95,,, | Performed by: INTERNAL MEDICINE

## 2023-08-31 RX ORDER — EPINEPHRINE 0.3 MG/.3ML
0.3 INJECTION SUBCUTANEOUS ONCE AS NEEDED
Status: CANCELLED | OUTPATIENT
Start: 2023-09-01

## 2023-08-31 RX ORDER — DIPHENHYDRAMINE HYDROCHLORIDE 50 MG/ML
50 INJECTION INTRAMUSCULAR; INTRAVENOUS ONCE AS NEEDED
Status: CANCELLED | OUTPATIENT
Start: 2023-09-01

## 2023-08-31 RX ORDER — HEPARIN 100 UNIT/ML
500 SYRINGE INTRAVENOUS
Status: CANCELLED | OUTPATIENT
Start: 2023-09-01

## 2023-08-31 RX ORDER — SODIUM CHLORIDE 0.9 % (FLUSH) 0.9 %
10 SYRINGE (ML) INJECTION
Status: CANCELLED | OUTPATIENT
Start: 2023-09-01

## 2023-08-31 NOTE — PROGRESS NOTES
The patient location is: home  Visit type: Virtual visit with synchronous audio and video  Face-to-face or time spent with patient on the encounter: 25 min  Total time spent on and for  this encounter which includes non face-to-face time preparing to see patient, review of tests, obtaining and or reviewing separately obtained records documenting clinical information in the electronic or other health records, independently interpreting results which is not separately reported ,and communicating results to the patient/family/caregiver and in care coordination and treatment planning/communicating with pharmacy for prescriptions/addressing social needs/arranging follow-up and or referrals :35 min    Each patient I provide medical services by telemedicine is:  (1) informed of the relationship between the physician and patient and the respective role of any other health care provider with respect to management of the patient; and (2) notified that he or she may decline to receive medical services by telemedicine and may withdraw from such care at any time.  This is a video visit therefore some elements of the physical exam such as vital signs, heart sounds are breath sounds are not included and may be included if found in recent clinic notes of other providers assessing same patient. Any symptoms or signs that were visualized were stated by the patient may be included in this note.     Subjective:       Patient ID: Yvette Hutchinson is a 63 y.o. female.    Chief Complaint:  Patient known to me with CLL stage 0 recently diagnosed with lung cancer August 2022  Follow-up    Lung Cancer      Patient has chronic complains of chronic pain syndrome and COPD for which periodically she take steroids she is also on BuSpar has issues anxiety has required dilatation of esophageal strictures allergic rhinitis insomnia and history of B12 deficiency documented   Had CT chest done with pulmonary 7/22 showed mass bx done.  8/22    Oncology hx  Impression:ct chest 7/29/22     2.3 cm spiculated left upper lobe lung nodule highly suspicious for bronchogenic carcinoma.     New nonspecific 7 mm nodule in the right middle lobe; this appears more linear on the coronal images and may be a focus of scarring.     Additional stable lung nodules, mildly enlarged axillary lymph nodes, substernal thyroid nodule; these findings are likely benign given the interval stability.   LEFT LUNG MASS, CT-GUIDED BIOPSY:   Non-small cell lung carcinoma.   Comment:  The biopsy reveals invasive carcinoma with apparent glandular but   also vague squamoid features.  Tumor cells are diffusely positive with TTF-1   and focally positive with P40.  The histology differential includes   adenocarcinoma and adenosquamous carcinoma.  PD-L1 and templates NGS studies   are in progress.  The results will be issued separately.    October 3, 2022: Robotic left lobectomy T1c N1    Social history; patient is a smoker denies recreational alcohol use or drug use   Patient is currently on disability  She is allergic to the mask used during COVID pandemic she is also bothered by her mask with COPD    Family history suggestive of ovarian and breast cancer patient advised to see a  or seek   Review of patient's allergies indicates:  No Known Allergies  Medications have been reviewed  Current Outpatient Medications on File Prior to Visit   Medication Sig Dispense Refill    (Magic mouthwash) 1:1:1 diphenhydrAMINE(Benadryl) 12.5mg/5ml liq, aluminum & magnesium hydroxide-simethicone (Maalox), LIDOcaine viscous 2% Swish and spit 10 mLs 3 (three) times daily. for mouth sores 250 mL 0    acetaminophen (TYLENOL) 500 MG tablet Take 2 tablets (1,000 mg total) by mouth every 6 (six) hours as needed for Pain. 8 tablet 0    albuterol (PROVENTIL/VENTOLIN HFA) 90 mcg/actuation inhaler Inhale 2 puffs into the lungs every 6 (six) hours as needed for Wheezing or Shortness of Breath.  Rescue 18 g 11    albuterol-ipratropium (DUO-NEB) 2.5 mg-0.5 mg/3 mL nebulizer solution Take 3 mLs by nebulization every 6 (six) hours as needed for Wheezing. Rescue 120 each 5    ALPRAZolam (XANAX) 0.5 MG tablet Take 1 tablet (0.5 mg total) by mouth 3 (three) times daily. 90 tablet 0    benzocaine-menthoL 6-10 mg lozenge Take 1 lozenge by mouth every 2 (two) hours as needed for Pain. 18 tablet 0    buPROPion (WELLBUTRIN XL) 300 MG 24 hr tablet Take 1 tablet (300 mg total) by mouth once daily. 90 tablet 3    celecoxib (CELEBREX) 200 MG capsule Take 1 capsule (200 mg total) by mouth once daily. 90 capsule 1    diphenhydrAMINE-aluminum-magnesium hydroxide-simethicone-LIDOcaine HCl 2% Swish and spit 15 mLs every 4 (four) hours as needed (mouth sores). 100 each 2    docusate sodium (COLACE) 100 MG capsule Take 1 capsule (100 mg total) by mouth daily as needed for Constipation. 30 capsule 1    EScitalopram oxalate (LEXAPRO) 20 MG tablet Take 1 tablet (20 mg total) by mouth once daily. 30 tablet 11    FLUoxetine 20 MG capsule Take 1 capsule (20 mg total) by mouth once daily. 30 capsule 11    fluticasone propionate (FLONASE) 50 mcg/actuation nasal spray 1 spray (50 mcg total) by Each Nostril route once daily. 16 g 3    fluticasone-salmeterol 230-21 mcg/dose (ADVAIR HFA) 230-21 mcg/actuation HFAA inhaler Inhale 2 puffs into the lungs 2 (two) times daily. Controller 12 g 5    gabapentin (NEURONTIN) 300 MG capsule Take 1 capsule (300 mg total) by mouth 3 (three) times daily. 270 capsule 1    levocetirizine (XYZAL) 5 MG tablet Take 1 tablet (5 mg total) by mouth every evening. 30 tablet 5    LIDOcaine (LIDODERM) 5 % Place 1 patch onto the skin once daily. Remove & Discard patch within 12 hours or as directed by MD Bloom patch 0    LIDOcaine (LIDODERM) 5 % Place 1 patch onto the skin once daily. Remove & Discard patch within 12 hours or as directed by MD Bloom patch 1    LIDOcaine-prilocaine (EMLA) cream Apply topically as needed  (apply to port site 30 min before access). 30 g 0    montelukast (SINGULAIR) 10 mg tablet Take 10 mg by mouth once daily.      OLANZapine (ZYPREXA) 5 MG tablet Take 1 tablet (5 mg total) by mouth nightly. Take 1 tablet at bedtime on days 1-4 of each cycle of chemotherapy 30 tablet 2    ondansetron (ZOFRAN-ODT) 8 MG TbDL Take 1 tablet (8 mg total) by mouth every 12 (twelve) hours as needed (nausea). 60 tablet 1    oxyCODONE (ROXICODONE) 10 mg Tab immediate release tablet Take 1 tablet (10 mg total) by mouth every 4 (four) hours as needed for Pain. 60 tablet 0    predniSONE (DELTASONE) 20 MG tablet Take one pill a day for three days, repeat for shortness of breath 12 tablet 0     Current Facility-Administered Medications on File Prior to Visit   Medication Dose Route Frequency Provider Last Rate Last Admin    0.9%  NaCl infusion   Intravenous Continuous Yuridia Cisneros MD   New Bag at 11/03/22 0701       REVIEW OF SYSTEMS:     S/p lobectomy left side, has draining tube+ with sanguinous fluid.    Wt Readings from Last 3 Encounters:   08/11/23 75.4 kg (166 lb 3.2 oz)   08/09/23 74.8 kg (164 lb 14.5 oz)   07/27/23 74.5 kg (164 lb 3.9 oz)     Temp Readings from Last 3 Encounters:   08/11/23 98.5 °F (36.9 °C)   08/09/23 97.5 °F (36.4 °C) (Temporal)   07/27/23 98.4 °F (36.9 °C) (Oral)     BP Readings from Last 3 Encounters:   08/11/23 102/66   08/09/23 (!) 106/56   07/27/23 102/60     Pulse Readings from Last 3 Encounters:   08/11/23 65   08/09/23 69   07/27/23 73     VITAL SIGNS:  as above   GENERAL: appears well-built, well-nourished.  No anxiety, no agitation, and in no distress.  Patient is awake, alert, oriented and cooperative.  HEENT:  Showed no congestion. Trachea is central no obvious icterus or pallor noted no hoarseness. no obvious JVD   NECK:  Supple.  No JVD. No obvious cervical submental or supraclavicular adenopathy.  RS: tube draining on left side. S/p lobectomy  ABDOMEN:  abdomen appears  undistended.  EXTREMITIES:  Without edema.  NEUROLOGICAL:  The patient is appropriate, higher functions are normal.  No  obvious neurological deficits.  normal judgement normal thought content  No confusion, no speech impediment. Cranial nerves are intact and show no deficit. No gross motor deficits noted   SKIN MUSCULOSKELETAL: no joint or skeletal deformity, no clubbing of nails.  No visible rash ecchymosis or petechiae  Lab Results   Component Value Date    WBC 20.78 (H) 03/03/2020    HGB 14.9 03/03/2020    HCT 47.0 03/03/2020    MCV 99 (H) 03/03/2020     03/03/2020     Smudge cells presnt  CMP  Sodium   Date Value Ref Range Status   08/30/2023 140 136 - 145 mmol/L Final     Potassium   Date Value Ref Range Status   08/30/2023 4.1 3.5 - 5.1 mmol/L Final     Chloride   Date Value Ref Range Status   08/30/2023 104 95 - 110 mmol/L Final     CO2   Date Value Ref Range Status   08/30/2023 24 23 - 29 mmol/L Final     Glucose   Date Value Ref Range Status   08/30/2023 106 70 - 110 mg/dL Final     BUN   Date Value Ref Range Status   08/30/2023 9 8 - 23 mg/dL Final     Creatinine   Date Value Ref Range Status   08/30/2023 0.9 0.5 - 1.4 mg/dL Final     Calcium   Date Value Ref Range Status   08/30/2023 9.1 8.7 - 10.5 mg/dL Final     Total Protein   Date Value Ref Range Status   08/30/2023 6.5 6.0 - 8.4 g/dL Final     Albumin   Date Value Ref Range Status   08/30/2023 4.1 3.5 - 5.2 g/dL Final     Total Bilirubin   Date Value Ref Range Status   08/30/2023 0.4 0.1 - 1.0 mg/dL Final     Comment:     For infants and newborns, interpretation of results should be based  on gestational age, weight and in agreement with clinical  observations.    Premature Infant recommended reference ranges:  Up to 24 hours.............<8.0 mg/dL  Up to 48 hours............<12.0 mg/dL  3-5 days..................<15.0 mg/dL  6-29 days.................<15.0 mg/dL       Alkaline Phosphatase   Date Value Ref Range Status   08/30/2023 117 55 -  135 U/L Final     AST   Date Value Ref Range Status   08/30/2023 23 10 - 40 U/L Final     ALT   Date Value Ref Range Status   08/30/2023 19 10 - 44 U/L Final     Anion Gap   Date Value Ref Range Status   08/30/2023 12 8 - 16 mmol/L Final     eGFR if    Date Value Ref Range Status   06/13/2022 >60 >60 mL/min/1.73 m^2 Final     eGFR if non    Date Value Ref Range Status   06/13/2022 >60 >60 mL/min/1.73 m^2 Final     Comment:     Calculation used to obtain the estimated glomerular filtration  rate (eGFR) is the CKD-EPI equation.            2wk ago    Blood Interpretation A B cell lymphoproliferative disorder is present. see comment.    Comment: Interpreted by: Jeremias Sosa M.D., Signed on 08/06/2020 at 13:07   Blood Comment Flow cytometric analysis of  peripheral blood  detects a lambda  light chain   restricted B lymphocyte population showing expression of CD19 and dim CD20   with aberrant expression of CD5 (dim).  T lymphocytes are   immunophenotypically unremarkable.  The blasts gate is not increased.  The   findings are consistent with patient history of chronic lymphocytic   leukemia/small lymphocytic lymphoma.   Flow differential:  Lymphocytes 69.6%, Monocytes 2.8%, Granulocytes  27.5%,   Blast  0.1%, Debris/nRBC 0.1%,  Viability 97.5%.   10/3/22 robotic lef lung lobectomy  1. LYMPH NODE, LEVEL 5 FROZEN SECTION, EXCISION:   One lymph node with dominant necrotizing granuloma, negative for malignancy   (0/1).   2. LYMPH NODES, LEVEL 5 PERMANENT, EXCISION:   Two lymph nodes with multifocal necrotizing granulomas, negative for   malignancy (0/2).   3. LYMPH NODES, LEFT POSTERIOR LEVEL 10, EXCISION:   Five lymph nodes with multifocal necrotizing granulomas, negative for   malignancy (0/5).   4. LYMPH NODE, LEVEL 11, EXCISION:   One lymph node, negative for malignancy (0/1).   5. LYMPH NODES, LEVEL 12, EXCISION:   Three lymph nodes, one with single necrotizing granuloma, negative for    malignancy (0/3).   6. LYMPH NODES, LEVEL 12 NEAR UPPER DIVISION BRONCHUS, EXCISION:   Three lymph nodes with sinus histiocytosis, negative for malignancy (0/3).   7. LYMPH NODES, LEVEL 10 #2, EXCISION:   One of two lymph nodes positive for metastatic carcinoma (1/2).   Size of largest metastatic deposit:  8 mm.   Negative for extranodal extension.   8. LYMPH NODES, LEFT LEVEL 8, EXCISION:   Two lymph nodes with sinus histiocytosis, negative for malignancy (0/2).   9. LYMPH NODES, LEFT LEVEL 9, EXCISION:   Two lymph nodes, negative for malignancy (0/2).   10. 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   Surgical margins are negative for malignancy.   Background lung tissue with subpleural fibrosis, no evidence of granulomas.   Two hilar lymph nodes, negative for malignancy (0/2).   Coment (1-3, 5):  Prominent necrotizing granulomatous inflammation identified   is identified in multiple lymph nodes.  An AE1/AE3 cytokeratin immunostain   performed on the largest granuloma (part 3) reveals no evidence of occult   carcinoma.  GMS and AFB special stains are negative for fungal and acid-fast   organisms, respectively.  The necrotizing features are not typical of   sarcoidosis.  As such, other granulomatous processes may be considered   including secondary response to malignancy or infection.  Clinical   correlation is advised.   Comment (10):  Sections reveal invasive carcinoma involving lung tissue with   predominantly squamoid morphologic features including bright eosinophilic   cytoplasm and intracellular bridging.  There are not significant keratinizing   features.  Some areas show basaloid features.  There is also regional luminal   features including cribriforming and vague glandular structures containing   mucin.  Tumor cells are diffusely positive with P40 and regionally positive   with TTF-1.  Mucicarmine special stain highlights mucin producing luminal   spaces.  Overall tumor appearance and staining profile supports adenosquamous   carcinoma, a variant of non-small cell lung carcinoma.   [facility] SURGICAL PATHOLOGY CANCER CASE SUMMARY:  LUNG   Procedure:  Lobectomy   Specimen laterality:  Left   Tumor focality:  Single focus   Tumor site: Upper lobe of lung   Tumor size:  2.2 cm   Histologic type:  Adenosquamous carcinoma   Spread through airspaces:  Not identified   Visceral pleural invasion:  Not identified   Direct invasion of adjacent structures: Not applicable   Lymphovascular invasion:  Not identified   Margins:  All margins negative for invasive carcinoma     Closest margin to invasive carcinoma:  Bronchial (3.0 cm)   Regional lymph nodes:  Tumor present in regional lymph node     Number of lymph nodes with tumor:  1     Scott sites with tumor:  Left level 10 (10L)     Extranodal extension:  Not identified     Number of lymph nodes examined:  23   Pathologic stage classification (pTNM): pT1c pN1   Special studies:  Performed on previous biopsy if additional studies needed   there may be performed on tissue blockd 10G or 10H.      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 Due to adenosq histology chose carbo/ taxol x 4 cycles tecentriq maintanence x 1 year  pt is has had 5 cycles of carbo/ taxol  discused maintainenec at tumor board   On tecentriq maintainence   Proceed with tecentriq  Pet7/18/23 neg for mets  See me for next cycle cbc,cmp      CLL   stage 0 will confirmed with flow cytometry  NTD   she has no other cytopenias or lymphadenopathy or B symptoms patient can be observed      Continue with chronic  pain syndrome and pain management advised to stop smoking if at all possible 10 pills of hydrocodoen given to pt, she needs to follow with pain mx      History of B12 deficiency will continue to monitor    Advised COVID precaution due to her lung situation she will be a high risk patient    Advance Care Planning     Date: 04/18/2023    Power of   I initiated the process of advance care planning today and explained the importance of this process to the patient.  I introduced the concept of advance directives to the patient, as well. Then the patient received detailed information about the importance of designating a Health Care Power of  (HCPOA). She was also instructed to communicate with this person about their wishes for future healthcare, should she become sick and lose decision-making capacity. The patient has not previously appointed a HCPOA. After our discussion, the patient has decided to complete a HCPOA .

## 2023-09-01 ENCOUNTER — INFUSION (OUTPATIENT)
Dept: INFUSION THERAPY | Facility: HOSPITAL | Age: 63
End: 2023-09-01
Attending: INTERNAL MEDICINE
Payer: COMMERCIAL

## 2023-09-01 ENCOUNTER — PATIENT MESSAGE (OUTPATIENT)
Dept: HEMATOLOGY/ONCOLOGY | Facility: CLINIC | Age: 63
End: 2023-09-01
Payer: COMMERCIAL

## 2023-09-01 VITALS
BODY MASS INDEX: 29.71 KG/M2 | SYSTOLIC BLOOD PRESSURE: 94 MMHG | OXYGEN SATURATION: 96 % | HEART RATE: 64 BPM | RESPIRATION RATE: 16 BRPM | DIASTOLIC BLOOD PRESSURE: 52 MMHG | HEIGHT: 63 IN | TEMPERATURE: 98 F | WEIGHT: 167.69 LBS

## 2023-09-01 DIAGNOSIS — C34.90 NON-SMALL CELL LUNG CANCER, UNSPECIFIED LATERALITY: ICD-10-CM

## 2023-09-01 DIAGNOSIS — C34.01 MALIGNANT NEOPLASM OF HILUS OF RIGHT LUNG: Primary | ICD-10-CM

## 2023-09-01 PROCEDURE — 96413 CHEMO IV INFUSION 1 HR: CPT

## 2023-09-01 PROCEDURE — 63600175 PHARM REV CODE 636 W HCPCS: Mod: JZ,JG | Performed by: INTERNAL MEDICINE

## 2023-09-01 PROCEDURE — 25000003 PHARM REV CODE 250: Performed by: INTERNAL MEDICINE

## 2023-09-01 RX ORDER — EPINEPHRINE 0.3 MG/.3ML
0.3 INJECTION SUBCUTANEOUS ONCE AS NEEDED
Status: DISCONTINUED | OUTPATIENT
Start: 2023-09-01 | End: 2023-09-01 | Stop reason: HOSPADM

## 2023-09-01 RX ORDER — SODIUM CHLORIDE 0.9 % (FLUSH) 0.9 %
10 SYRINGE (ML) INJECTION
Status: DISCONTINUED | OUTPATIENT
Start: 2023-09-01 | End: 2023-09-01 | Stop reason: HOSPADM

## 2023-09-01 RX ORDER — HEPARIN 100 UNIT/ML
500 SYRINGE INTRAVENOUS
Status: DISCONTINUED | OUTPATIENT
Start: 2023-09-01 | End: 2023-09-01 | Stop reason: HOSPADM

## 2023-09-01 RX ORDER — DIPHENHYDRAMINE HYDROCHLORIDE 50 MG/ML
50 INJECTION INTRAMUSCULAR; INTRAVENOUS ONCE AS NEEDED
Status: DISCONTINUED | OUTPATIENT
Start: 2023-09-01 | End: 2023-09-01 | Stop reason: HOSPADM

## 2023-09-01 RX ADMIN — HEPARIN 500 UNITS: 100 SYRINGE at 03:09

## 2023-09-01 RX ADMIN — ATEZOLIZUMAB 1200 MG: 1200 INJECTION, SOLUTION INTRAVENOUS at 02:09

## 2023-09-01 RX ADMIN — SODIUM CHLORIDE: 0.9 INJECTION, SOLUTION INTRAVENOUS at 02:09

## 2023-09-01 NOTE — PLAN OF CARE
Problem: Fatigue  Goal: Improved Activity Tolerance  Outcome: Ongoing, Progressing  Intervention: Promote Improved Energy  Flowsheets (Taken 9/1/2023 1402)  Fatigue Management: fatigue-related activity identified  Sleep/Rest Enhancement: noise level reduced  Activity Management: Up in chair - L3

## 2023-09-19 ENCOUNTER — PATIENT MESSAGE (OUTPATIENT)
Dept: HEMATOLOGY/ONCOLOGY | Facility: CLINIC | Age: 63
End: 2023-09-19
Payer: COMMERCIAL

## 2023-09-20 ENCOUNTER — TELEPHONE (OUTPATIENT)
Dept: HEMATOLOGY/ONCOLOGY | Facility: CLINIC | Age: 63
End: 2023-09-20
Payer: COMMERCIAL

## 2023-09-20 ENCOUNTER — LAB VISIT (OUTPATIENT)
Dept: LAB | Facility: HOSPITAL | Age: 63
End: 2023-09-20
Attending: INTERNAL MEDICINE
Payer: COMMERCIAL

## 2023-09-20 DIAGNOSIS — C34.01 MALIGNANT NEOPLASM OF HILUS OF RIGHT LUNG: ICD-10-CM

## 2023-09-20 DIAGNOSIS — D51.8 OTHER VITAMIN B12 DEFICIENCY ANEMIAS: ICD-10-CM

## 2023-09-20 DIAGNOSIS — E53.8 B12 DEFICIENCY: ICD-10-CM

## 2023-09-20 DIAGNOSIS — C91.10 CLL (CHRONIC LYMPHOCYTIC LEUKEMIA): ICD-10-CM

## 2023-09-20 DIAGNOSIS — E03.2 HYPOTHYROIDISM DUE TO MEDICATION: ICD-10-CM

## 2023-09-20 LAB
ALBUMIN SERPL BCP-MCNC: 4.2 G/DL (ref 3.5–5.2)
ALP SERPL-CCNC: 110 U/L (ref 55–135)
ALT SERPL W/O P-5'-P-CCNC: 19 U/L (ref 10–44)
ANION GAP SERPL CALC-SCNC: 10 MMOL/L (ref 8–16)
AST SERPL-CCNC: 22 U/L (ref 10–40)
BASOPHILS NFR BLD: 0 % (ref 0–1.9)
BILIRUB SERPL-MCNC: 0.4 MG/DL (ref 0.1–1)
BUN SERPL-MCNC: 12 MG/DL (ref 8–23)
CALCIUM SERPL-MCNC: 9.2 MG/DL (ref 8.7–10.5)
CHLORIDE SERPL-SCNC: 105 MMOL/L (ref 95–110)
CO2 SERPL-SCNC: 26 MMOL/L (ref 23–29)
CREAT SERPL-MCNC: 0.9 MG/DL (ref 0.5–1.4)
DIFFERENTIAL METHOD: ABNORMAL
EOSINOPHIL NFR BLD: 0 % (ref 0–8)
ERYTHROCYTE [DISTWIDTH] IN BLOOD BY AUTOMATED COUNT: 14.2 % (ref 11.5–14.5)
EST. GFR  (NO RACE VARIABLE): >60 ML/MIN/1.73 M^2
GLUCOSE SERPL-MCNC: 95 MG/DL (ref 70–110)
HCT VFR BLD AUTO: 39.6 % (ref 37–48.5)
HGB BLD-MCNC: 12.6 G/DL (ref 12–16)
IMM GRANULOCYTES # BLD AUTO: ABNORMAL K/UL
IMM GRANULOCYTES NFR BLD AUTO: ABNORMAL %
LYMPHOCYTES NFR BLD: 77 % (ref 18–48)
MCH RBC QN AUTO: 31.1 PG (ref 27–31)
MCHC RBC AUTO-ENTMCNC: 31.8 G/DL (ref 32–36)
MCV RBC AUTO: 98 FL (ref 82–98)
MONOCYTES NFR BLD: 1 % (ref 4–15)
NEUTROPHILS NFR BLD: 22 % (ref 38–73)
NRBC BLD-RTO: 0 /100 WBC
PLATELET # BLD AUTO: 233 K/UL (ref 150–450)
PLATELET BLD QL SMEAR: ABNORMAL
PMV BLD AUTO: 9.3 FL (ref 9.2–12.9)
POTASSIUM SERPL-SCNC: 4.2 MMOL/L (ref 3.5–5.1)
PROT SERPL-MCNC: 6.6 G/DL (ref 6–8.4)
RBC # BLD AUTO: 4.05 M/UL (ref 4–5.4)
SODIUM SERPL-SCNC: 141 MMOL/L (ref 136–145)
TSH SERPL DL<=0.005 MIU/L-ACNC: 2.1 UIU/ML (ref 0.4–4)
VIT B12 SERPL-MCNC: 574 PG/ML (ref 210–950)
WBC # BLD AUTO: 29.36 K/UL (ref 3.9–12.7)

## 2023-09-20 PROCEDURE — 80053 COMPREHEN METABOLIC PANEL: CPT | Performed by: INTERNAL MEDICINE

## 2023-09-20 PROCEDURE — 36415 COLL VENOUS BLD VENIPUNCTURE: CPT | Performed by: INTERNAL MEDICINE

## 2023-09-20 PROCEDURE — 85007 BL SMEAR W/DIFF WBC COUNT: CPT | Performed by: INTERNAL MEDICINE

## 2023-09-20 PROCEDURE — 85027 COMPLETE CBC AUTOMATED: CPT | Performed by: INTERNAL MEDICINE

## 2023-09-20 PROCEDURE — 84443 ASSAY THYROID STIM HORMONE: CPT | Performed by: INTERNAL MEDICINE

## 2023-09-20 PROCEDURE — 82607 VITAMIN B-12: CPT | Performed by: INTERNAL MEDICINE

## 2023-09-20 NOTE — TELEPHONE ENCOUNTER
Pt messaged in portal.    ----- Message from Mulu Olsen sent at 9/19/2023  4:16 PM CDT -----  Type:  Patient Returning Call    Who Called:  pt  Who Left Message for Patient:  N/A  Does the patient know what this is regarding?:  no  Best Call Back Number:  [phone]  Additional Information:  pl call bk to advise thanks

## 2023-09-21 ENCOUNTER — OFFICE VISIT (OUTPATIENT)
Dept: HEMATOLOGY/ONCOLOGY | Facility: CLINIC | Age: 63
End: 2023-09-21
Payer: COMMERCIAL

## 2023-09-21 DIAGNOSIS — G89.4 CHRONIC PAIN SYNDROME: ICD-10-CM

## 2023-09-21 DIAGNOSIS — E53.8 B12 DEFICIENCY: ICD-10-CM

## 2023-09-21 DIAGNOSIS — C34.01 MALIGNANT NEOPLASM OF HILUS OF RIGHT LUNG: Primary | ICD-10-CM

## 2023-09-21 DIAGNOSIS — C91.10 CLL (CHRONIC LYMPHOCYTIC LEUKEMIA): ICD-10-CM

## 2023-09-21 PROCEDURE — 99215 OFFICE O/P EST HI 40 MIN: CPT | Mod: 95,,, | Performed by: INTERNAL MEDICINE

## 2023-09-21 PROCEDURE — 99215 PR OFFICE/OUTPT VISIT, EST, LEVL V, 40-54 MIN: ICD-10-PCS | Mod: 95,,, | Performed by: INTERNAL MEDICINE

## 2023-09-21 RX ORDER — SODIUM CHLORIDE 0.9 % (FLUSH) 0.9 %
10 SYRINGE (ML) INJECTION
Status: CANCELLED | OUTPATIENT
Start: 2023-09-22

## 2023-09-21 RX ORDER — HEPARIN 100 UNIT/ML
500 SYRINGE INTRAVENOUS
Status: CANCELLED | OUTPATIENT
Start: 2023-09-22

## 2023-09-21 RX ORDER — EPINEPHRINE 0.3 MG/.3ML
0.3 INJECTION SUBCUTANEOUS ONCE AS NEEDED
Status: CANCELLED | OUTPATIENT
Start: 2023-09-22

## 2023-09-21 RX ORDER — DIPHENHYDRAMINE HYDROCHLORIDE 50 MG/ML
50 INJECTION INTRAMUSCULAR; INTRAVENOUS ONCE AS NEEDED
Status: CANCELLED | OUTPATIENT
Start: 2023-09-22

## 2023-09-21 NOTE — PROGRESS NOTES
The patient location is: home  Visit type: Virtual visit with synchronous audio and video  Face-to-face or time spent with patient on the encounter: 25 min  Total time spent on and for  this encounter which includes non face-to-face time preparing to see patient, review of tests, obtaining and or reviewing separately obtained records documenting clinical information in the electronic or other health records, independently interpreting results which is not separately reported ,and communicating results to the patient/family/caregiver and in care coordination and treatment planning/communicating with pharmacy for prescriptions/addressing social needs/arranging follow-up and or referrals :35 min    Each patient I provide medical services by telemedicine is:  (1) informed of the relationship between the physician and patient and the respective role of any other health care provider with respect to management of the patient; and (2) notified that he or she may decline to receive medical services by telemedicine and may withdraw from such care at any time.  This is a video visit therefore some elements of the physical exam such as vital signs, heart sounds are breath sounds are not included and may be included if found in recent clinic notes of other providers assessing same patient. Any symptoms or signs that were visualized were stated by the patient may be included in this note.     Subjective:       Patient ID: Yvette Hutchinson is a 63 y.o. female.    Chief Complaint:  Patient known to me with CLL stage 0 recently diagnosed with lung cancer August 2022  Follow-up    Lung Cancer      Patient has chronic complains of chronic pain syndrome and COPD for which periodically she take steroids she is also on BuSpar has issues anxiety has required dilatation of esophageal strictures allergic rhinitis insomnia and history of B12 deficiency documented   Had CT chest done with pulmonary 7/22 showed mass bx done.  8/22    Oncology hx  Impression:ct chest 7/29/22     2.3 cm spiculated left upper lobe lung nodule highly suspicious for bronchogenic carcinoma.     New nonspecific 7 mm nodule in the right middle lobe; this appears more linear on the coronal images and may be a focus of scarring.     Additional stable lung nodules, mildly enlarged axillary lymph nodes, substernal thyroid nodule; these findings are likely benign given the interval stability.   LEFT LUNG MASS, CT-GUIDED BIOPSY:   Non-small cell lung carcinoma.   Comment:  The biopsy reveals invasive carcinoma with apparent glandular but   also vague squamoid features.  Tumor cells are diffusely positive with TTF-1   and focally positive with P40.  The histology differential includes   adenocarcinoma and adenosquamous carcinoma.  PD-L1 and templates NGS studies   are in progress.  The results will be issued separately.    October 3, 2022: Robotic left lobectomy T1c N1    Social history; patient is a smoker denies recreational alcohol use or drug use   Patient is currently on disability  She is allergic to the mask used during COVID pandemic she is also bothered by her mask with COPD    Family history suggestive of ovarian and breast cancer patient advised to see a  or seek   Review of patient's allergies indicates:  No Known Allergies  Medications have been reviewed  Current Outpatient Medications on File Prior to Visit   Medication Sig Dispense Refill    (Magic mouthwash) 1:1:1 diphenhydrAMINE(Benadryl) 12.5mg/5ml liq, aluminum & magnesium hydroxide-simethicone (Maalox), LIDOcaine viscous 2% Swish and spit 10 mLs 3 (three) times daily. for mouth sores 250 mL 0    acetaminophen (TYLENOL) 500 MG tablet Take 2 tablets (1,000 mg total) by mouth every 6 (six) hours as needed for Pain. 8 tablet 0    albuterol (PROVENTIL/VENTOLIN HFA) 90 mcg/actuation inhaler Inhale 2 puffs into the lungs every 6 (six) hours as needed for Wheezing or Shortness of Breath.  Rescue 18 g 11    albuterol-ipratropium (DUO-NEB) 2.5 mg-0.5 mg/3 mL nebulizer solution Take 3 mLs by nebulization every 6 (six) hours as needed for Wheezing. Rescue 120 each 5    ALPRAZolam (XANAX) 0.5 MG tablet Take 1 tablet (0.5 mg total) by mouth 3 (three) times daily. 90 tablet 0    benzocaine-menthoL 6-10 mg lozenge Take 1 lozenge by mouth every 2 (two) hours as needed for Pain. 18 tablet 0    buPROPion (WELLBUTRIN XL) 300 MG 24 hr tablet Take 1 tablet (300 mg total) by mouth once daily. 90 tablet 3    celecoxib (CELEBREX) 200 MG capsule Take 1 capsule (200 mg total) by mouth once daily. 90 capsule 1    diphenhydrAMINE-aluminum-magnesium hydroxide-simethicone-LIDOcaine HCl 2% Swish and spit 15 mLs every 4 (four) hours as needed (mouth sores). 100 each 2    docusate sodium (COLACE) 100 MG capsule Take 1 capsule (100 mg total) by mouth daily as needed for Constipation. 30 capsule 1    EScitalopram oxalate (LEXAPRO) 20 MG tablet Take 1 tablet (20 mg total) by mouth once daily. 30 tablet 11    FLUoxetine 20 MG capsule Take 1 capsule (20 mg total) by mouth once daily. 30 capsule 11    fluticasone propionate (FLONASE) 50 mcg/actuation nasal spray 1 spray (50 mcg total) by Each Nostril route once daily. 16 g 3    fluticasone-salmeterol 230-21 mcg/dose (ADVAIR HFA) 230-21 mcg/actuation HFAA inhaler Inhale 2 puffs into the lungs 2 (two) times daily. Controller 12 g 5    gabapentin (NEURONTIN) 300 MG capsule Take 1 capsule (300 mg total) by mouth 3 (three) times daily. 270 capsule 1    levocetirizine (XYZAL) 5 MG tablet Take 1 tablet (5 mg total) by mouth every evening. 30 tablet 5    LIDOcaine (LIDODERM) 5 % Place 1 patch onto the skin once daily. Remove & Discard patch within 12 hours or as directed by MD Bloom patch 0    LIDOcaine (LIDODERM) 5 % Place 1 patch onto the skin once daily. Remove & Discard patch within 12 hours or as directed by MD Bloom patch 1    LIDOcaine-prilocaine (EMLA) cream Apply topically as needed  (apply to port site 30 min before access). 30 g 0    montelukast (SINGULAIR) 10 mg tablet Take 10 mg by mouth once daily.      OLANZapine (ZYPREXA) 5 MG tablet Take 1 tablet (5 mg total) by mouth nightly. Take 1 tablet at bedtime on days 1-4 of each cycle of chemotherapy 30 tablet 2    ondansetron (ZOFRAN-ODT) 8 MG TbDL Take 1 tablet (8 mg total) by mouth every 12 (twelve) hours as needed (nausea). 60 tablet 1    oxyCODONE (ROXICODONE) 10 mg Tab immediate release tablet Take 1 tablet (10 mg total) by mouth every 4 (four) hours as needed for Pain. 60 tablet 0    predniSONE (DELTASONE) 20 MG tablet Take one pill a day for three days, repeat for shortness of breath 12 tablet 0     Current Facility-Administered Medications on File Prior to Visit   Medication Dose Route Frequency Provider Last Rate Last Admin    0.9%  NaCl infusion   Intravenous Continuous Yuridia Cisneros MD   New Bag at 11/03/22 0701       REVIEW OF SYSTEMS:     S/p lobectomy left side, has draining tube+ with sanguinous fluid.    Wt Readings from Last 3 Encounters:   09/01/23 76.1 kg (167 lb 11.2 oz)   08/11/23 75.4 kg (166 lb 3.2 oz)   08/09/23 74.8 kg (164 lb 14.5 oz)     Temp Readings from Last 3 Encounters:   09/01/23 97.7 °F (36.5 °C)   08/11/23 98.5 °F (36.9 °C)   08/09/23 97.5 °F (36.4 °C) (Temporal)     BP Readings from Last 3 Encounters:   09/01/23 (!) 94/52   08/11/23 102/66   08/09/23 (!) 106/56     Pulse Readings from Last 3 Encounters:   09/01/23 64   08/11/23 65   08/09/23 69     VITAL SIGNS:  as above   GENERAL: appears well-built, well-nourished.  No anxiety, no agitation, and in no distress.  Patient is awake, alert, oriented and cooperative.  HEENT:  Showed no congestion. Trachea is central no obvious icterus or pallor noted no hoarseness. no obvious JVD   NECK:  Supple.  No JVD. No obvious cervical submental or supraclavicular adenopathy.  RS: tube draining on left side. S/p lobectomy  ABDOMEN:  abdomen appears  undistended.  EXTREMITIES:  Without edema.  NEUROLOGICAL:  The patient is appropriate, higher functions are normal.  No  obvious neurological deficits.  normal judgement normal thought content  No confusion, no speech impediment. Cranial nerves are intact and show no deficit. No gross motor deficits noted   SKIN MUSCULOSKELETAL: no joint or skeletal deformity, no clubbing of nails.  No visible rash ecchymosis or petechiae  Lab Results   Component Value Date    WBC 20.78 (H) 03/03/2020    HGB 14.9 03/03/2020    HCT 47.0 03/03/2020    MCV 99 (H) 03/03/2020     03/03/2020     Smudge cells presnt  CMP  Sodium   Date Value Ref Range Status   09/20/2023 141 136 - 145 mmol/L Final     Potassium   Date Value Ref Range Status   09/20/2023 4.2 3.5 - 5.1 mmol/L Final     Chloride   Date Value Ref Range Status   09/20/2023 105 95 - 110 mmol/L Final     CO2   Date Value Ref Range Status   09/20/2023 26 23 - 29 mmol/L Final     Glucose   Date Value Ref Range Status   09/20/2023 95 70 - 110 mg/dL Final     BUN   Date Value Ref Range Status   09/20/2023 12 8 - 23 mg/dL Final     Creatinine   Date Value Ref Range Status   09/20/2023 0.9 0.5 - 1.4 mg/dL Final     Calcium   Date Value Ref Range Status   09/20/2023 9.2 8.7 - 10.5 mg/dL Final     Total Protein   Date Value Ref Range Status   09/20/2023 6.6 6.0 - 8.4 g/dL Final     Albumin   Date Value Ref Range Status   09/20/2023 4.2 3.5 - 5.2 g/dL Final     Total Bilirubin   Date Value Ref Range Status   09/20/2023 0.4 0.1 - 1.0 mg/dL Final     Comment:     For infants and newborns, interpretation of results should be based  on gestational age, weight and in agreement with clinical  observations.    Premature Infant recommended reference ranges:  Up to 24 hours.............<8.0 mg/dL  Up to 48 hours............<12.0 mg/dL  3-5 days..................<15.0 mg/dL  6-29 days.................<15.0 mg/dL       Alkaline Phosphatase   Date Value Ref Range Status   09/20/2023 110 55 -  135 U/L Final     AST   Date Value Ref Range Status   09/20/2023 22 10 - 40 U/L Final     ALT   Date Value Ref Range Status   09/20/2023 19 10 - 44 U/L Final     Anion Gap   Date Value Ref Range Status   09/20/2023 10 8 - 16 mmol/L Final     eGFR if    Date Value Ref Range Status   06/13/2022 >60 >60 mL/min/1.73 m^2 Final     eGFR if non    Date Value Ref Range Status   06/13/2022 >60 >60 mL/min/1.73 m^2 Final     Comment:     Calculation used to obtain the estimated glomerular filtration  rate (eGFR) is the CKD-EPI equation.            2wk ago    Blood Interpretation A B cell lymphoproliferative disorder is present. see comment.    Comment: Interpreted by: Jeremias Sosa M.D., Signed on 08/06/2020 at 13:07   Blood Comment Flow cytometric analysis of  peripheral blood  detects a lambda  light chain   restricted B lymphocyte population showing expression of CD19 and dim CD20   with aberrant expression of CD5 (dim).  T lymphocytes are   immunophenotypically unremarkable.  The blasts gate is not increased.  The   findings are consistent with patient history of chronic lymphocytic   leukemia/small lymphocytic lymphoma.   Flow differential:  Lymphocytes 69.6%, Monocytes 2.8%, Granulocytes  27.5%,   Blast  0.1%, Debris/nRBC 0.1%,  Viability 97.5%.   10/3/22 robotic lef lung lobectomy  1. LYMPH NODE, LEVEL 5 FROZEN SECTION, EXCISION:   One lymph node with dominant necrotizing granuloma, negative for malignancy   (0/1).   2. LYMPH NODES, LEVEL 5 PERMANENT, EXCISION:   Two lymph nodes with multifocal necrotizing granulomas, negative for   malignancy (0/2).   3. LYMPH NODES, LEFT POSTERIOR LEVEL 10, EXCISION:   Five lymph nodes with multifocal necrotizing granulomas, negative for   malignancy (0/5).   4. LYMPH NODE, LEVEL 11, EXCISION:   One lymph node, negative for malignancy (0/1).   5. LYMPH NODES, LEVEL 12, EXCISION:   Three lymph nodes, one with single necrotizing granuloma, negative for    malignancy (0/3).   6. LYMPH NODES, LEVEL 12 NEAR UPPER DIVISION BRONCHUS, EXCISION:   Three lymph nodes with sinus histiocytosis, negative for malignancy (0/3).   7. LYMPH NODES, LEVEL 10 #2, EXCISION:   One of two lymph nodes positive for metastatic carcinoma (1/2).   Size of largest metastatic deposit:  8 mm.   Negative for extranodal extension.   8. LYMPH NODES, LEFT LEVEL 8, EXCISION:   Two lymph nodes with sinus histiocytosis, negative for malignancy (0/2).   9. LYMPH NODES, LEFT LEVEL 9, EXCISION:   Two lymph nodes, negative for malignancy (0/2).   10. 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   Surgical margins are negative for malignancy.   Background lung tissue with subpleural fibrosis, no evidence of granulomas.   Two hilar lymph nodes, negative for malignancy (0/2).   Coment (1-3, 5):  Prominent necrotizing granulomatous inflammation identified   is identified in multiple lymph nodes.  An AE1/AE3 cytokeratin immunostain   performed on the largest granuloma (part 3) reveals no evidence of occult   carcinoma.  GMS and AFB special stains are negative for fungal and acid-fast   organisms, respectively.  The necrotizing features are not typical of   sarcoidosis.  As such, other granulomatous processes may be considered   including secondary response to malignancy or infection.  Clinical   correlation is advised.   Comment (10):  Sections reveal invasive carcinoma involving lung tissue with   predominantly squamoid morphologic features including bright eosinophilic   cytoplasm and intracellular bridging.  There are not significant keratinizing   features.  Some areas show basaloid features.  There is also regional luminal   features including cribriforming and vague glandular structures containing   mucin.  Tumor cells are diffusely positive with P40 and regionally positive   with TTF-1.  Mucicarmine special stain highlights mucin producing luminal   spaces.  Overall tumor appearance and staining profile supports adenosquamous   carcinoma, a variant of non-small cell lung carcinoma.   CAP SURGICAL PATHOLOGY CANCER CASE SUMMARY:  LUNG   Procedure:  Lobectomy   Specimen laterality:  Left   Tumor focality:  Single focus   Tumor site: Upper lobe of lung   Tumor size:  2.2 cm   Histologic type:  Adenosquamous carcinoma   Spread through airspaces:  Not identified   Visceral pleural invasion:  Not identified   Direct invasion of adjacent structures: Not applicable   Lymphovascular invasion:  Not identified   Margins:  All margins negative for invasive carcinoma     Closest margin to invasive carcinoma:  Bronchial (3.0 cm)   Regional lymph nodes:  Tumor present in regional lymph node     Number of lymph nodes with tumor:  1     Scott sites with tumor:  Left level 10 (10L)     Extranodal extension:  Not identified     Number of lymph nodes examined:  23   Pathologic stage classification (pTNM): pT1c pN1   Special studies:  Performed on previous biopsy if additional studies needed   there may be performed on tissue blockd 10G or 10H.      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 Due to adenosq histology chose carbo/ taxol x 4 cycles tecentriq maintanence x 1 year  pt is has had 5 cycles of carbo/ taxol  discused maintainenec at tumor board   On tecentriq maintainence   Proceed with tecentriq  Pet7/18/23 neg for mets  See me for next cycle cbc,cmp      CLL   stage 0 will confirmed with flow cytometry  NTD   she has no other cytopenias or lymphadenopathy or B symptoms patient can be observed      Continue with chronic  pain syndrome and pain management advised to stop smoking if at all possible 10 pills of hydrocodoen given to pt, she needs to follow with pain mx      History of B12 deficiency will continue to monitor    Advised COVID precaution due to her lung situation she will be a high risk patient    Advance Care Planning     Date: 04/18/2023    Power of   I initiated the process of advance care planning today and explained the importance of this process to the patient.  I introduced the concept of advance directives to the patient, as well. Then the patient received detailed information about the importance of designating a Health Care Power of  (HCPOA). She was also instructed to communicate with this person about their wishes for future healthcare, should she become sick and lose decision-making capacity. The patient has not previously appointed a HCPOA. After our discussion, the patient has decided to complete a HCPOA .

## 2023-09-22 ENCOUNTER — INFUSION (OUTPATIENT)
Dept: INFUSION THERAPY | Facility: HOSPITAL | Age: 63
End: 2023-09-22
Attending: INTERNAL MEDICINE
Payer: COMMERCIAL

## 2023-09-22 ENCOUNTER — PATIENT MESSAGE (OUTPATIENT)
Dept: HEMATOLOGY/ONCOLOGY | Facility: CLINIC | Age: 63
End: 2023-09-22
Payer: COMMERCIAL

## 2023-09-22 VITALS
DIASTOLIC BLOOD PRESSURE: 62 MMHG | RESPIRATION RATE: 17 BRPM | SYSTOLIC BLOOD PRESSURE: 102 MMHG | OXYGEN SATURATION: 98 % | TEMPERATURE: 97 F | BODY MASS INDEX: 29.98 KG/M2 | HEART RATE: 60 BPM | WEIGHT: 169.19 LBS | HEIGHT: 63 IN

## 2023-09-22 DIAGNOSIS — C34.90 NON-SMALL CELL LUNG CANCER, UNSPECIFIED LATERALITY: ICD-10-CM

## 2023-09-22 DIAGNOSIS — C34.01 MALIGNANT NEOPLASM OF HILUS OF RIGHT LUNG: Primary | ICD-10-CM

## 2023-09-22 PROCEDURE — 63600175 PHARM REV CODE 636 W HCPCS: Performed by: INTERNAL MEDICINE

## 2023-09-22 PROCEDURE — 96413 CHEMO IV INFUSION 1 HR: CPT

## 2023-09-22 PROCEDURE — 25000003 PHARM REV CODE 250: Performed by: INTERNAL MEDICINE

## 2023-09-22 RX ORDER — SODIUM CHLORIDE 0.9 % (FLUSH) 0.9 %
10 SYRINGE (ML) INJECTION
Status: DISCONTINUED | OUTPATIENT
Start: 2023-09-22 | End: 2023-09-22 | Stop reason: HOSPADM

## 2023-09-22 RX ORDER — HEPARIN 100 UNIT/ML
500 SYRINGE INTRAVENOUS
Status: DISCONTINUED | OUTPATIENT
Start: 2023-09-22 | End: 2023-09-22 | Stop reason: HOSPADM

## 2023-09-22 RX ADMIN — ATEZOLIZUMAB 1200 MG: 1200 INJECTION, SOLUTION INTRAVENOUS at 02:09

## 2023-09-22 RX ADMIN — HEPARIN 500 UNITS: 100 SYRINGE at 03:09

## 2023-09-22 NOTE — PLAN OF CARE
Problem: Activity Intolerance  Goal: Enhanced Capacity and Energy  Outcome: Ongoing, Progressing  Intervention: Optimize Activity Tolerance  Flowsheets (Taken 9/22/2023 1604)  Self-Care Promotion: independence encouraged  Activity Management: Ambulated -L4  Environmental Support: calm environment promoted

## 2023-09-29 ENCOUNTER — HOSPITAL ENCOUNTER (OUTPATIENT)
Dept: RADIOLOGY | Facility: CLINIC | Age: 63
Discharge: HOME | End: 2023-09-29
Attending: STUDENT IN AN ORGANIZED HEALTH CARE EDUCATION/TRAINING PROGRAM
Payer: COMMERCIAL

## 2023-09-29 ENCOUNTER — OFFICE VISIT (OUTPATIENT)
Dept: FAMILY MEDICINE | Facility: CLINIC | Age: 63
End: 2023-09-29
Payer: COMMERCIAL

## 2023-09-29 VITALS
BODY MASS INDEX: 29.69 KG/M2 | OXYGEN SATURATION: 95 % | RESPIRATION RATE: 18 BRPM | WEIGHT: 167.56 LBS | HEART RATE: 93 BPM | SYSTOLIC BLOOD PRESSURE: 110 MMHG | HEIGHT: 63 IN | DIASTOLIC BLOOD PRESSURE: 60 MMHG

## 2023-09-29 DIAGNOSIS — M19.041 OSTEOARTHRITIS OF BOTH HANDS, UNSPECIFIED OSTEOARTHRITIS TYPE: ICD-10-CM

## 2023-09-29 DIAGNOSIS — M19.042 OSTEOARTHRITIS OF BOTH HANDS, UNSPECIFIED OSTEOARTHRITIS TYPE: ICD-10-CM

## 2023-09-29 DIAGNOSIS — M19.041 OSTEOARTHRITIS OF BOTH HANDS, UNSPECIFIED OSTEOARTHRITIS TYPE: Primary | ICD-10-CM

## 2023-09-29 DIAGNOSIS — M19.042 OSTEOARTHRITIS OF BOTH HANDS, UNSPECIFIED OSTEOARTHRITIS TYPE: Primary | ICD-10-CM

## 2023-09-29 DIAGNOSIS — M19.049 HAND ARTHRITIS: ICD-10-CM

## 2023-09-29 PROCEDURE — 3008F BODY MASS INDEX DOCD: CPT | Mod: CPTII,S$GLB,, | Performed by: STUDENT IN AN ORGANIZED HEALTH CARE EDUCATION/TRAINING PROGRAM

## 2023-09-29 PROCEDURE — 73130 X-RAY EXAM OF HAND: CPT | Mod: 26,50,S$GLB, | Performed by: RADIOLOGY

## 2023-09-29 PROCEDURE — 73130 X-RAY EXAM OF HAND: CPT | Mod: TC,50,FY,PO

## 2023-09-29 PROCEDURE — 3008F PR BODY MASS INDEX (BMI) DOCUMENTED: ICD-10-PCS | Mod: CPTII,S$GLB,, | Performed by: STUDENT IN AN ORGANIZED HEALTH CARE EDUCATION/TRAINING PROGRAM

## 2023-09-29 PROCEDURE — 99999 PR PBB SHADOW E&M-EST. PATIENT-LVL V: ICD-10-PCS | Mod: PBBFAC,,, | Performed by: STUDENT IN AN ORGANIZED HEALTH CARE EDUCATION/TRAINING PROGRAM

## 2023-09-29 PROCEDURE — 3078F PR MOST RECENT DIASTOLIC BLOOD PRESSURE < 80 MM HG: ICD-10-PCS | Mod: CPTII,S$GLB,, | Performed by: STUDENT IN AN ORGANIZED HEALTH CARE EDUCATION/TRAINING PROGRAM

## 2023-09-29 PROCEDURE — 1160F PR REVIEW ALL MEDS BY PRESCRIBER/CLIN PHARMACIST DOCUMENTED: ICD-10-PCS | Mod: CPTII,S$GLB,, | Performed by: STUDENT IN AN ORGANIZED HEALTH CARE EDUCATION/TRAINING PROGRAM

## 2023-09-29 PROCEDURE — 99213 OFFICE O/P EST LOW 20 MIN: CPT | Mod: S$GLB,,, | Performed by: STUDENT IN AN ORGANIZED HEALTH CARE EDUCATION/TRAINING PROGRAM

## 2023-09-29 PROCEDURE — 1159F MED LIST DOCD IN RCRD: CPT | Mod: CPTII,S$GLB,, | Performed by: STUDENT IN AN ORGANIZED HEALTH CARE EDUCATION/TRAINING PROGRAM

## 2023-09-29 PROCEDURE — 99999 PR PBB SHADOW E&M-EST. PATIENT-LVL V: CPT | Mod: PBBFAC,,, | Performed by: STUDENT IN AN ORGANIZED HEALTH CARE EDUCATION/TRAINING PROGRAM

## 2023-09-29 PROCEDURE — 3074F PR MOST RECENT SYSTOLIC BLOOD PRESSURE < 130 MM HG: ICD-10-PCS | Mod: CPTII,S$GLB,, | Performed by: STUDENT IN AN ORGANIZED HEALTH CARE EDUCATION/TRAINING PROGRAM

## 2023-09-29 PROCEDURE — 1159F PR MEDICATION LIST DOCUMENTED IN MEDICAL RECORD: ICD-10-PCS | Mod: CPTII,S$GLB,, | Performed by: STUDENT IN AN ORGANIZED HEALTH CARE EDUCATION/TRAINING PROGRAM

## 2023-09-29 PROCEDURE — 3078F DIAST BP <80 MM HG: CPT | Mod: CPTII,S$GLB,, | Performed by: STUDENT IN AN ORGANIZED HEALTH CARE EDUCATION/TRAINING PROGRAM

## 2023-09-29 PROCEDURE — 3074F SYST BP LT 130 MM HG: CPT | Mod: CPTII,S$GLB,, | Performed by: STUDENT IN AN ORGANIZED HEALTH CARE EDUCATION/TRAINING PROGRAM

## 2023-09-29 PROCEDURE — 90686 IIV4 VACC NO PRSV 0.5 ML IM: CPT | Mod: S$GLB,,, | Performed by: STUDENT IN AN ORGANIZED HEALTH CARE EDUCATION/TRAINING PROGRAM

## 2023-09-29 PROCEDURE — G0008 ADMIN INFLUENZA VIRUS VAC: HCPCS | Mod: S$GLB,,, | Performed by: STUDENT IN AN ORGANIZED HEALTH CARE EDUCATION/TRAINING PROGRAM

## 2023-09-29 PROCEDURE — G0008 FLU VACCINE (QUAD) GREATER THAN OR EQUAL TO 3YO PRESERVATIVE FREE IM: ICD-10-PCS | Mod: S$GLB,,, | Performed by: STUDENT IN AN ORGANIZED HEALTH CARE EDUCATION/TRAINING PROGRAM

## 2023-09-29 PROCEDURE — 90686 FLU VACCINE (QUAD) GREATER THAN OR EQUAL TO 3YO PRESERVATIVE FREE IM: ICD-10-PCS | Mod: S$GLB,,, | Performed by: STUDENT IN AN ORGANIZED HEALTH CARE EDUCATION/TRAINING PROGRAM

## 2023-09-29 PROCEDURE — 99213 PR OFFICE/OUTPT VISIT, EST, LEVL III, 20-29 MIN: ICD-10-PCS | Mod: S$GLB,,, | Performed by: STUDENT IN AN ORGANIZED HEALTH CARE EDUCATION/TRAINING PROGRAM

## 2023-09-29 PROCEDURE — 73130 XR HAND COMPLETE 3 VIEWS BILATERAL: ICD-10-PCS | Mod: 26,50,S$GLB, | Performed by: RADIOLOGY

## 2023-09-29 PROCEDURE — 1160F RVW MEDS BY RX/DR IN RCRD: CPT | Mod: CPTII,S$GLB,, | Performed by: STUDENT IN AN ORGANIZED HEALTH CARE EDUCATION/TRAINING PROGRAM

## 2023-09-29 RX ORDER — CELECOXIB 200 MG/1
400 CAPSULE ORAL DAILY
Qty: 180 CAPSULE | Refills: 1 | Status: SHIPPED | OUTPATIENT
Start: 2023-09-29

## 2023-09-29 NOTE — PROGRESS NOTES
"[facility], [place] CLINIC NOTE    Patient Name: Yvette Hutchinson  YOB: 1960    PRESENTING HISTORY     History of Present Illness:  Ms. Yvette Hutchinson is a 63 y.o. female here with hand pain.     C/o bilateral hand pain.   Onset months.   Both hands  Affects fingers and base of thumbs.   Fingers get stuck, very painful.     Already on 200mg celebrex, gabapentin, oxycodone.       ROS      OBJECTIVE:   Vital Signs:  Vitals:    09/29/23 1132   BP: 110/60   Pulse: 93   Resp: 18   SpO2: 95%   Weight: 76 kg (167 lb 8.8 oz)   Height: 5' 3" (1.6 m)          Physical Exam: [place] and Lyudmila nodes to bilateral hands. No obvious synovitis today. No involvement of MCPs. CMCs with mild ttp.     Physical Exam    ASSESSMENT & PLAN:     Osteoarthritis of both hands, unspecified osteoarthritis type  -     X-Ray Hand 3 View Bilateral; Future; Expected date: 09/29/2023  -     RHEUMATOID FACTOR; Future; Expected date: 09/29/2023  -     CYCLIC CITRUL PEPTIDE ANTIBODY, IGG; Future; Expected date: 09/29/2023  -     URIC ACID; Future; Expected date: 09/29/2023    Hand arthritis  -     celecoxib (CELEBREX) 200 MG capsule; Take 2 capsules (400 mg total) by mouth once daily.  Dispense: 180 capsule; Refill: 1  -     URIC ACID; Future; Expected date: 09/29/2023    Other orders  -     Influenza - Quadrivalent (PF)      Renal function normal. Discussed risks of increasing celebrex. She feels that something must be done, so we will increase dose.          Vern Priest MD   Internal Medicine            "

## 2023-09-29 NOTE — PROGRESS NOTES
2 patient identifiers used (name and ). Administered Flu vaccine IM  ( LD) . Patient tolerated well, no bleeding at insertion site noted. Pain 0 on scale 0/10. Aseptic technique maintained. Immunization information given to patient. Advised patient to remain in clinic for 15 minutes to monitor for reaction. No AR noted.

## 2023-10-09 ENCOUNTER — OFFICE VISIT (OUTPATIENT)
Dept: URGENT CARE | Facility: CLINIC | Age: 63
End: 2023-10-09
Payer: COMMERCIAL

## 2023-10-09 VITALS
BODY MASS INDEX: 29.77 KG/M2 | TEMPERATURE: 99 F | HEART RATE: 66 BPM | HEIGHT: 63 IN | WEIGHT: 168 LBS | OXYGEN SATURATION: 97 % | SYSTOLIC BLOOD PRESSURE: 108 MMHG | DIASTOLIC BLOOD PRESSURE: 63 MMHG | RESPIRATION RATE: 18 BRPM

## 2023-10-09 DIAGNOSIS — R30.0 DYSURIA: Primary | ICD-10-CM

## 2023-10-09 DIAGNOSIS — N30.00 ACUTE CYSTITIS WITHOUT HEMATURIA: ICD-10-CM

## 2023-10-09 LAB
BILIRUB UR QL STRIP: NEGATIVE
GLUCOSE UR QL STRIP: NEGATIVE
KETONES UR QL STRIP: NEGATIVE
LEUKOCYTE ESTERASE UR QL STRIP: POSITIVE
PH, POC UA: 6.5
POC BLOOD, URINE: POSITIVE
POC NITRATES, URINE: NEGATIVE
PROT UR QL STRIP: NEGATIVE
SP GR UR STRIP: 1 (ref 1–1.03)
UROBILINOGEN UR STRIP-ACNC: ABNORMAL (ref 0.1–1.1)

## 2023-10-09 PROCEDURE — 99214 PR OFFICE/OUTPT VISIT, EST, LEVL IV, 30-39 MIN: ICD-10-PCS | Mod: S$GLB,,, | Performed by: NURSE PRACTITIONER

## 2023-10-09 PROCEDURE — 99214 OFFICE O/P EST MOD 30 MIN: CPT | Mod: S$GLB,,, | Performed by: NURSE PRACTITIONER

## 2023-10-09 PROCEDURE — 81003 URINALYSIS AUTO W/O SCOPE: CPT | Mod: QW,S$GLB,, | Performed by: NURSE PRACTITIONER

## 2023-10-09 PROCEDURE — 81003 POCT URINALYSIS, DIPSTICK, AUTOMATED, W/O SCOPE: ICD-10-PCS | Mod: QW,S$GLB,, | Performed by: NURSE PRACTITIONER

## 2023-10-09 RX ORDER — CEPHALEXIN 500 MG/1
500 CAPSULE ORAL EVERY 12 HOURS
Qty: 14 CAPSULE | Refills: 0 | Status: SHIPPED | OUTPATIENT
Start: 2023-10-09 | End: 2023-10-16

## 2023-10-09 NOTE — PROGRESS NOTES
"Subjective:      Patient ID: Yvette Hutchinson is a 63 y.o. female.    Vitals:  height is 5' 3" (1.6 m) and weight is 76.2 kg (168 lb). Her temperature is 98.6 °F (37 °C). Her blood pressure is 108/63 and her pulse is 66. Her respiration is 18 and oxygen saturation is 97%.     Chief Complaint: Dysuria    Dysuria   This is a new problem. The current episode started yesterday. The problem occurs every urination. The problem has been rapidly worsening. The pain is at a severity of 6/10. The pain is mild. There has been no fever. Associated symptoms include frequency, urgency and withholding. Pertinent negatives include no chills, flank pain, hematuria, nausea or vomiting. Associated symptoms comments: Back pain. Treatments tried: azo. The treatment provided no relief.       Constitution: Negative for chills and fever.   Gastrointestinal:  Negative for abdominal pain, nausea, vomiting and diarrhea.   Genitourinary:  Positive for dysuria, frequency and urgency. Negative for flank pain, hematuria and pelvic pain.   Musculoskeletal:  Positive for back pain (Low back, nothing new or different).      Objective:     Physical Exam   Constitutional: She is oriented to person, place, and time.  Non-toxic appearance. She does not appear ill. No distress.   HENT:   Head: Normocephalic and atraumatic.   Nose: Nose normal.   Mouth/Throat: Mucous membranes are moist.   Eyes: Conjunctivae are normal.   Cardiovascular: Normal rate.   Pulmonary/Chest: Effort normal. No respiratory distress.   Abdominal: Normal appearance. There is no left CVA tenderness and no right CVA tenderness.   Neurological: no focal deficit. She is alert and oriented to person, place, and time.   Skin: Skin is warm and dry. Capillary refill takes 2 to 3 seconds.   Psychiatric: Her behavior is normal. Mood normal.   Nursing note and vitals reviewed.      Assessment:     1. Dysuria    2. Acute cystitis without hematuria      UA: 80 rbc's, 15 wbc's, neg " nit  Plan:       Dysuria  -     POCT Urinalysis, Dipstick, Automated, W/O Scope    Acute cystitis without hematuria  -     Urine culture  -     cephALEXin (KEFLEX) 500 MG capsule; Take 1 capsule (500 mg total) by mouth every 12 (twelve) hours. for 7 days  Dispense: 14 capsule; Refill: 0        Urine culture pending

## 2023-10-09 NOTE — PATIENT INSTRUCTIONS
Keflex twice a day for 7 days.  Always take with food to minimize nausea.    It is always advisable to take probiotics for intestinal health when taking any antibiotic.      Increase fluid significantly to help flush urinary system.    Urine collected today will be sent out to the lab for culture.  Expect results In 3-7 days    For your recurrent UTIs:  Behavioral changes to help decrease UTI recurrences   -increase water intake (6 to 8 bottles water per day)   -don't hold urine, void every 2 to 3 hours   -prevent constipation (miralax capful daily if necessary) to have a bowel movement daily and keep stools soft  -cut down on caffeine,drink mainly water  -no douching   -void immediately after sexual intercourse  -wipe front to back      OTC meds to try (go to the pharmacist and ask where they are, they are over the counter)  -Use lactobacillus probiotic in capsule form in vagina once nightly for 10 days if you feel like you have an infection coming on   -cranberry pills 500mg tabs TID, can buy over the counter  -take a probiotic daily

## 2023-10-11 ENCOUNTER — PATIENT MESSAGE (OUTPATIENT)
Dept: HEMATOLOGY/ONCOLOGY | Facility: CLINIC | Age: 63
End: 2023-10-11
Payer: COMMERCIAL

## 2023-10-12 ENCOUNTER — OFFICE VISIT (OUTPATIENT)
Dept: HEMATOLOGY/ONCOLOGY | Facility: CLINIC | Age: 63
End: 2023-10-12
Payer: COMMERCIAL

## 2023-10-12 ENCOUNTER — PATIENT MESSAGE (OUTPATIENT)
Dept: HEMATOLOGY/ONCOLOGY | Facility: CLINIC | Age: 63
End: 2023-10-12

## 2023-10-12 ENCOUNTER — LAB VISIT (OUTPATIENT)
Dept: LAB | Facility: HOSPITAL | Age: 63
End: 2023-10-12
Attending: INTERNAL MEDICINE
Payer: COMMERCIAL

## 2023-10-12 DIAGNOSIS — C91.10 CLL (CHRONIC LYMPHOCYTIC LEUKEMIA): ICD-10-CM

## 2023-10-12 DIAGNOSIS — E53.8 B12 DEFICIENCY: Primary | ICD-10-CM

## 2023-10-12 DIAGNOSIS — J41.0 SIMPLE CHRONIC BRONCHITIS: ICD-10-CM

## 2023-10-12 DIAGNOSIS — C34.90 NON-SMALL CELL LUNG CANCER, UNSPECIFIED LATERALITY: ICD-10-CM

## 2023-10-12 DIAGNOSIS — C34.01 MALIGNANT NEOPLASM OF HILUS OF RIGHT LUNG: ICD-10-CM

## 2023-10-12 LAB
ALBUMIN SERPL BCP-MCNC: 4.1 G/DL (ref 3.5–5.2)
ALP SERPL-CCNC: 129 U/L (ref 55–135)
ALT SERPL W/O P-5'-P-CCNC: 15 U/L (ref 10–44)
ANION GAP SERPL CALC-SCNC: 10 MMOL/L (ref 8–16)
AST SERPL-CCNC: 17 U/L (ref 10–40)
BASOPHILS NFR BLD: 0 % (ref 0–1.9)
BILIRUB SERPL-MCNC: 0.3 MG/DL (ref 0.1–1)
BUN SERPL-MCNC: 12 MG/DL (ref 8–23)
CALCIUM SERPL-MCNC: 9 MG/DL (ref 8.7–10.5)
CHLORIDE SERPL-SCNC: 106 MMOL/L (ref 95–110)
CO2 SERPL-SCNC: 25 MMOL/L (ref 23–29)
CREAT SERPL-MCNC: 0.9 MG/DL (ref 0.5–1.4)
DIFFERENTIAL METHOD: ABNORMAL
EOSINOPHIL NFR BLD: 0 % (ref 0–8)
ERYTHROCYTE [DISTWIDTH] IN BLOOD BY AUTOMATED COUNT: 13.7 % (ref 11.5–14.5)
EST. GFR  (NO RACE VARIABLE): >60 ML/MIN/1.73 M^2
GLUCOSE SERPL-MCNC: 103 MG/DL (ref 70–110)
HCT VFR BLD AUTO: 40.2 % (ref 37–48.5)
HGB BLD-MCNC: 12.8 G/DL (ref 12–16)
IMM GRANULOCYTES # BLD AUTO: ABNORMAL K/UL
IMM GRANULOCYTES NFR BLD AUTO: ABNORMAL %
LYMPHOCYTES NFR BLD: 82 % (ref 18–48)
MCH RBC QN AUTO: 31.3 PG (ref 27–31)
MCHC RBC AUTO-ENTMCNC: 31.8 G/DL (ref 32–36)
MCV RBC AUTO: 98 FL (ref 82–98)
MONOCYTES NFR BLD: 1 % (ref 4–15)
NEUTROPHILS NFR BLD: 17 % (ref 38–73)
NRBC BLD-RTO: 0 /100 WBC
PLATELET # BLD AUTO: 265 K/UL (ref 150–450)
PLATELET BLD QL SMEAR: ABNORMAL
PMV BLD AUTO: 9.6 FL (ref 9.2–12.9)
POTASSIUM SERPL-SCNC: 4.2 MMOL/L (ref 3.5–5.1)
PROT SERPL-MCNC: 6.7 G/DL (ref 6–8.4)
RBC # BLD AUTO: 4.09 M/UL (ref 4–5.4)
SODIUM SERPL-SCNC: 141 MMOL/L (ref 136–145)
WBC # BLD AUTO: 28.09 K/UL (ref 3.9–12.7)

## 2023-10-12 PROCEDURE — 85027 COMPLETE CBC AUTOMATED: CPT | Performed by: INTERNAL MEDICINE

## 2023-10-12 PROCEDURE — 80053 COMPREHEN METABOLIC PANEL: CPT | Performed by: INTERNAL MEDICINE

## 2023-10-12 PROCEDURE — 99215 OFFICE O/P EST HI 40 MIN: CPT | Mod: 95,,, | Performed by: INTERNAL MEDICINE

## 2023-10-12 PROCEDURE — 85007 BL SMEAR W/DIFF WBC COUNT: CPT | Performed by: INTERNAL MEDICINE

## 2023-10-12 PROCEDURE — 99215 PR OFFICE/OUTPT VISIT, EST, LEVL V, 40-54 MIN: ICD-10-PCS | Mod: 95,,, | Performed by: INTERNAL MEDICINE

## 2023-10-12 PROCEDURE — 36415 COLL VENOUS BLD VENIPUNCTURE: CPT | Performed by: INTERNAL MEDICINE

## 2023-10-12 RX ORDER — SODIUM CHLORIDE 0.9 % (FLUSH) 0.9 %
10 SYRINGE (ML) INJECTION
Status: CANCELLED | OUTPATIENT
Start: 2023-10-13

## 2023-10-12 RX ORDER — HEPARIN 100 UNIT/ML
500 SYRINGE INTRAVENOUS
Status: CANCELLED | OUTPATIENT
Start: 2023-10-13

## 2023-10-12 RX ORDER — EPINEPHRINE 0.3 MG/.3ML
0.3 INJECTION SUBCUTANEOUS ONCE AS NEEDED
Status: CANCELLED | OUTPATIENT
Start: 2023-10-13

## 2023-10-12 RX ORDER — DIPHENHYDRAMINE HYDROCHLORIDE 50 MG/ML
50 INJECTION INTRAMUSCULAR; INTRAVENOUS ONCE AS NEEDED
Status: CANCELLED | OUTPATIENT
Start: 2023-10-13

## 2023-10-12 NOTE — PROGRESS NOTES
The patient location is: home  Visit type: Virtual visit with synchronous audio and video  Face-to-face or time spent with patient on the encounter: 25 min  Total time spent on and for  this encounter which includes non face-to-face time preparing to see patient, review of tests, obtaining and or reviewing separately obtained records documenting clinical information in the electronic or other health records, independently interpreting results which is not separately reported ,and communicating results to the patient/family/caregiver and in care coordination and treatment planning/communicating with pharmacy for prescriptions/addressing social needs/arranging follow-up and or referrals :35 min    Each patient I provide medical services by telemedicine is:  (1) informed of the relationship between the physician and patient and the respective role of any other health care provider with respect to management of the patient; and (2) notified that he or she may decline to receive medical services by telemedicine and may withdraw from such care at any time.  This is a video visit therefore some elements of the physical exam such as vital signs, heart sounds are breath sounds are not included and may be included if found in recent clinic notes of other providers assessing same patient. Any symptoms or signs that were visualized were stated by the patient may be included in this note.     Subjective:       Patient ID: Yvette Hutchinson is a 63 y.o. female.    Chief Complaint:  Patient known to me with CLL stage 0 recently diagnosed with lung cancer August 2022  Follow-up    Lung Cancer    Too worried but I do want to keep a close watch on it how you feeling no steroids nothing why 20 mg no steroids steroids nothing at all we will discontinue to do this we will not change   Patient has chronic complains of chronic pain syndrome and COPD for which periodically she take steroids she is also on BuSpar has issues anxiety  has required dilatation of esophageal strictures allergic rhinitis insomnia and history of B12 deficiency documented   Had CT chest done with pulmonary 7/22 showed mass bx done. 8/22    Oncology hx  Impression:ct chest 7/29/22     2.3 cm spiculated left upper lobe lung nodule highly suspicious for bronchogenic carcinoma.     New nonspecific 7 mm nodule in the right middle lobe; this appears more linear on the coronal images and may be a focus of scarring.     Additional stable lung nodules, mildly enlarged axillary lymph nodes, substernal thyroid nodule; these findings are likely benign given the interval stability.   LEFT LUNG MASS, CT-GUIDED BIOPSY:   Non-small cell lung carcinoma.   Comment:  The biopsy reveals invasive carcinoma with apparent glandular but   also vague squamoid features.  Tumor cells are diffusely positive with TTF-1   and focally positive with P40.  The histology differential includes   adenocarcinoma and adenosquamous carcinoma.  PD-L1 and templates NGS studies   are in progress.  The results will be issued separately.    October 3, 2022: Robotic left lobectomy T1c N1    Social history; patient is a smoker denies recreational alcohol use or drug use   Patient is currently on disability  She is allergic to the mask used during COVID pandemic she is also bothered by her mask with COPD    Family history suggestive of ovarian and breast cancer patient advised to see a  or seek   Review of patient's allergies indicates:  No Known Allergies  Medications have been reviewed  Current Outpatient Medications on File Prior to Visit   Medication Sig Dispense Refill    (Magic mouthwash) 1:1:1 diphenhydrAMINE(Benadryl) 12.5mg/5ml liq, aluminum & magnesium hydroxide-simethicone (Maalox), LIDOcaine viscous 2% Swish and spit 10 mLs 3 (three) times daily. for mouth sores 250 mL 0    acetaminophen (TYLENOL) 500 MG tablet Take 2 tablets (1,000 mg total) by mouth every 6 (six) hours as needed for  Pain. 8 tablet 0    albuterol (PROVENTIL/VENTOLIN HFA) 90 mcg/actuation inhaler Inhale 2 puffs into the lungs every 6 (six) hours as needed for Wheezing or Shortness of Breath. Rescue 18 g 11    albuterol-ipratropium (DUO-NEB) 2.5 mg-0.5 mg/3 mL nebulizer solution Take 3 mLs by nebulization every 6 (six) hours as needed for Wheezing. Rescue 120 each 5    ALPRAZolam (XANAX) 0.5 MG tablet Take 1 tablet (0.5 mg total) by mouth 3 (three) times daily. 90 tablet 0    benzocaine-menthoL 6-10 mg lozenge Take 1 lozenge by mouth every 2 (two) hours as needed for Pain. 18 tablet 0    buPROPion (WELLBUTRIN XL) 300 MG 24 hr tablet Take 1 tablet (300 mg total) by mouth once daily. 90 tablet 3    celecoxib (CELEBREX) 200 MG capsule Take 2 capsules (400 mg total) by mouth once daily. 180 capsule 1    cephALEXin (KEFLEX) 500 MG capsule Take 1 capsule (500 mg total) by mouth every 12 (twelve) hours. for 7 days 14 capsule 0    diphenhydrAMINE-aluminum-magnesium hydroxide-simethicone-LIDOcaine HCl 2% Swish and spit 15 mLs every 4 (four) hours as needed (mouth sores). 100 each 2    docusate sodium (COLACE) 100 MG capsule Take 1 capsule (100 mg total) by mouth daily as needed for Constipation. 30 capsule 1    EScitalopram oxalate (LEXAPRO) 20 MG tablet Take 1 tablet (20 mg total) by mouth once daily. 30 tablet 11    fluticasone propionate (FLONASE) 50 mcg/actuation nasal spray 1 spray (50 mcg total) by Each Nostril route once daily. 16 g 3    fluticasone-salmeterol 230-21 mcg/dose (ADVAIR HFA) 230-21 mcg/actuation HFAA inhaler Inhale 2 puffs into the lungs 2 (two) times daily. Controller 12 g 5    gabapentin (NEURONTIN) 300 MG capsule Take 1 capsule (300 mg total) by mouth 3 (three) times daily. 270 capsule 1    levocetirizine (XYZAL) 5 MG tablet Take 1 tablet (5 mg total) by mouth every evening. 30 tablet 5    LIDOcaine (LIDODERM) 5 % Place 1 patch onto the skin once daily. Remove & Discard patch within 12 hours or as directed by MD Bloom  patch 0    LIDOcaine (LIDODERM) 5 % Place 1 patch onto the skin once daily. Remove & Discard patch within 12 hours or as directed by MD Bloom patch 1    LIDOcaine-prilocaine (EMLA) cream Apply topically as needed (apply to port site 30 min before access). 30 g 0    OLANZapine (ZYPREXA) 5 MG tablet Take 1 tablet (5 mg total) by mouth nightly. Take 1 tablet at bedtime on days 1-4 of each cycle of chemotherapy 30 tablet 2    ondansetron (ZOFRAN-ODT) 8 MG TbDL Take 1 tablet (8 mg total) by mouth every 12 (twelve) hours as needed (nausea). 60 tablet 1    predniSONE (DELTASONE) 20 MG tablet Take one pill a day for three days, repeat for shortness of breath 12 tablet 0     No current facility-administered medications on file prior to visit.       REVIEW OF SYSTEMS:     S/p lobectomy left side, has draining tube+ with sanguinous fluid.    Wt Readings from Last 3 Encounters:   10/09/23 76.2 kg (168 lb)   09/29/23 76 kg (167 lb 8.8 oz)   09/22/23 76.7 kg (169 lb 3.2 oz)     Temp Readings from Last 3 Encounters:   10/09/23 98.6 °F (37 °C)   09/22/23 97.4 °F (36.3 °C)   09/01/23 97.7 °F (36.5 °C)     BP Readings from Last 3 Encounters:   10/09/23 108/63   09/29/23 110/60   09/22/23 102/62     Pulse Readings from Last 3 Encounters:   10/09/23 66   09/29/23 93   09/22/23 60     VITAL SIGNS:  as above   GENERAL: appears well-built, well-nourished.  No anxiety, no agitation, and in no distress.  Patient is awake, alert, oriented and cooperative.  HEENT:  Showed no congestion. Trachea is central no obvious icterus or pallor noted no hoarseness. no obvious JVD   NECK:  Supple.  No JVD. No obvious cervical submental or supraclavicular adenopathy.  RS: tube draining on left side. S/p lobectomy  ABDOMEN:  abdomen appears undistended.  EXTREMITIES:  Without edema.  NEUROLOGICAL:  The patient is appropriate, higher functions are normal.  No  obvious neurological deficits.  normal judgement normal thought content  No confusion, no speech  impediment. Cranial nerves are intact and show no deficit. No gross motor deficits noted   SKIN MUSCULOSKELETAL: no joint or skeletal deformity, no clubbing of nails.  No visible rash ecchymosis or petechiae  Lab Results   Component Value Date    WBC 20.78 (H) 03/03/2020    HGB 14.9 03/03/2020    HCT 47.0 03/03/2020    MCV 99 (H) 03/03/2020     03/03/2020     Smudge cells presnt  CMP  Sodium   Date Value Ref Range Status   10/12/2023 141 136 - 145 mmol/L Final     Potassium   Date Value Ref Range Status   10/12/2023 4.2 3.5 - 5.1 mmol/L Final     Chloride   Date Value Ref Range Status   10/12/2023 106 95 - 110 mmol/L Final     CO2   Date Value Ref Range Status   10/12/2023 25 23 - 29 mmol/L Final     Glucose   Date Value Ref Range Status   10/12/2023 103 70 - 110 mg/dL Final     BUN   Date Value Ref Range Status   10/12/2023 12 8 - 23 mg/dL Final     Creatinine   Date Value Ref Range Status   10/12/2023 0.9 0.5 - 1.4 mg/dL Final     Calcium   Date Value Ref Range Status   10/12/2023 9.0 8.7 - 10.5 mg/dL Final     Total Protein   Date Value Ref Range Status   10/12/2023 6.7 6.0 - 8.4 g/dL Final     Albumin   Date Value Ref Range Status   10/12/2023 4.1 3.5 - 5.2 g/dL Final     Total Bilirubin   Date Value Ref Range Status   10/12/2023 0.3 0.1 - 1.0 mg/dL Final     Comment:     For infants and newborns, interpretation of results should be based  on gestational age, weight and in agreement with clinical  observations.    Premature Infant recommended reference ranges:  Up to 24 hours.............<8.0 mg/dL  Up to 48 hours............<12.0 mg/dL  3-5 days..................<15.0 mg/dL  6-29 days.................<15.0 mg/dL       Alkaline Phosphatase   Date Value Ref Range Status   10/12/2023 129 55 - 135 U/L Final     AST   Date Value Ref Range Status   10/12/2023 17 10 - 40 U/L Final     ALT   Date Value Ref Range Status   10/12/2023 15 10 - 44 U/L Final     Anion Gap   Date Value Ref Range Status   10/12/2023 10  8 - 16 mmol/L Final     eGFR if    Date Value Ref Range Status   06/13/2022 >60 >60 mL/min/1.73 m^2 Final     eGFR if non    Date Value Ref Range Status   06/13/2022 >60 >60 mL/min/1.73 m^2 Final     Comment:     Calculation used to obtain the estimated glomerular filtration  rate (eGFR) is the CKD-EPI equation.            2wk ago    Blood Interpretation A B cell lymphoproliferative disorder is present. see comment.    Comment: Interpreted by: Jeremias Sosa M.D., Signed on 08/06/2020 at 13:07   Blood Comment Flow cytometric analysis of  peripheral blood  detects a lambda  light chain   restricted B lymphocyte population showing expression of CD19 and dim CD20   with aberrant expression of CD5 (dim).  T lymphocytes are   immunophenotypically unremarkable.  The blasts gate is not increased.  The   findings are consistent with patient history of chronic lymphocytic   leukemia/small lymphocytic lymphoma.   Flow differential:  Lymphocytes 69.6%, Monocytes 2.8%, Granulocytes  27.5%,   Blast  0.1%, Debris/nRBC 0.1%,  Viability 97.5%.   10/3/22 robotic lef lung lobectomy  1. LYMPH NODE, LEVEL 5 FROZEN SECTION, EXCISION:   One lymph node with dominant necrotizing granuloma, negative for malignancy   (0/1).   2. LYMPH NODES, LEVEL 5 PERMANENT, EXCISION:   Two lymph nodes with multifocal necrotizing granulomas, negative for   malignancy (0/2).   3. LYMPH NODES, LEFT POSTERIOR LEVEL 10, EXCISION:   Five lymph nodes with multifocal necrotizing granulomas, negative for   malignancy (0/5).   4. LYMPH NODE, LEVEL 11, EXCISION:   One lymph node, negative for malignancy (0/1).   5. LYMPH NODES, LEVEL 12, EXCISION:   Three lymph nodes, one with single necrotizing granuloma, negative for   malignancy (0/3).   6. LYMPH NODES, LEVEL 12 NEAR UPPER DIVISION BRONCHUS, EXCISION:   Three lymph nodes with sinus histiocytosis, negative for malignancy (0/3).   7. LYMPH NODES, LEVEL 10 #2, EXCISION:   One of two lymph  nodes positive for metastatic carcinoma (1/2).   Size of largest metastatic deposit:  8 mm.   Negative for extranodal extension.   8. LYMPH NODES, LEFT LEVEL 8, EXCISION:   Two lymph nodes with sinus histiocytosis, negative for malignancy (0/2).   9. LYMPH NODES, LEFT LEVEL 9, EXCISION:   Two lymph nodes, negative for malignancy (0/2).   10. 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   Surgical margins are negative for malignancy.   Background lung tissue with subpleural fibrosis, no evidence of granulomas.   Two hilar lymph nodes, negative for malignancy (0/2).   Coment (1-3, 5):  Prominent necrotizing granulomatous inflammation identified   is identified in multiple lymph nodes.  An AE1/AE3 cytokeratin immunostain   performed on the largest granuloma (part 3) reveals no evidence of occult   carcinoma.  GMS and AFB special stains are negative for fungal and acid-fast   organisms, respectively.  The necrotizing features are not typical of   sarcoidosis.  As such, other granulomatous processes may be considered   including secondary response to malignancy or infection.  Clinical   correlation is advised.   Comment (10):  Sections reveal invasive carcinoma involving lung tissue with   predominantly squamoid morphologic features including bright eosinophilic   cytoplasm and intracellular bridging.  There are not significant keratinizing   features.  Some areas show basaloid features.  There is also regional luminal   features including cribriforming and vague glandular structures containing   mucin.  Tumor cells are diffusely positive with P40 and regionally positive   with TTF-1.  Mucicarmine special stain highlights mucin producing luminal   spaces. Overall tumor appearance and staining profile supports adenosquamous   carcinoma, a variant of non-small cell lung carcinoma.   [facility] SURGICAL PATHOLOGY CANCER CASE SUMMARY:  LUNG   Procedure:  Lobectomy   Specimen laterality:   Left   Tumor focality:  Single focus   Tumor site: Upper lobe of lung   Tumor size:  2.2 cm   Histologic type:  Adenosquamous carcinoma   Spread through airspaces:  Not identified   Visceral pleural invasion:  Not identified   Direct invasion of adjacent structures: Not applicable   Lymphovascular invasion:  Not identified   Margins:  All margins negative for invasive carcinoma     Closest margin to invasive carcinoma:  Bronchial (3.0 cm)   Regional lymph nodes:  Tumor present in regional lymph node     Number of lymph nodes with tumor:  1     Scott sites with tumor:  Left level 10 (10L)     Extranodal extension:  Not identified     Number of lymph nodes examined:  23   Pathologic stage classification (pTNM): pT1c pN1   Special studies:  Performed on previous biopsy if additional studies needed   there may be performed on tissue blockd 10G or 10H.      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 Due to adenosq histology chose carbo/ taxol x 4 cycles tecentriq maintanence x 1 year  pt is has had 5 cycles of carbo/ taxol  discused maintainenec at tumor board   On tecentriq maintainence   Proceed with tecentriq  Pet7/18/23 neg for mets  See me for next cycle cbc,cmp      CLL   stage 0 will confirmed with flow cytometry  NTD   she has no other cytopenias or lymphadenopathy or B symptoms patient can be observed      Continue with chronic pain syndrome and pain management advised to stop smoking if at all possible 10 pills of hydrocodoen given to pt, she needs to follow with pain mx      History of B12 deficiency will continue to monitor    Advised COVID  precaution due to her lung situation she will be a high risk patient    Advance Care Planning     Date: 04/18/2023    Power of   I initiated the process of advance care planning today and explained the importance of this process to the patient.  I introduced the concept of advance directives to the patient, as well. Then the patient received detailed information about the importance of designating a Health Care Power of  (HCPOA). She was also instructed to communicate with this person about their wishes for future healthcare, should she become sick and lose decision-making capacity. The patient has not previously appointed a HCPOA. After our discussion, the patient has decided to complete a HCPOA .

## 2023-10-13 ENCOUNTER — INFUSION (OUTPATIENT)
Dept: INFUSION THERAPY | Facility: HOSPITAL | Age: 63
End: 2023-10-13
Attending: INTERNAL MEDICINE
Payer: COMMERCIAL

## 2023-10-13 VITALS
BODY MASS INDEX: 30.21 KG/M2 | DIASTOLIC BLOOD PRESSURE: 58 MMHG | HEIGHT: 63 IN | SYSTOLIC BLOOD PRESSURE: 101 MMHG | HEART RATE: 64 BPM | WEIGHT: 170.5 LBS | OXYGEN SATURATION: 95 % | TEMPERATURE: 98 F | RESPIRATION RATE: 18 BRPM

## 2023-10-13 DIAGNOSIS — E03.2 HYPOTHYROIDISM DUE TO MEDICAMENTS AND OTHER EXOGENOUS SUBSTANCES: Primary | ICD-10-CM

## 2023-10-13 DIAGNOSIS — C34.01 MALIGNANT NEOPLASM OF HILUS OF RIGHT LUNG: ICD-10-CM

## 2023-10-13 DIAGNOSIS — C34.90 NON-SMALL CELL LUNG CANCER, UNSPECIFIED LATERALITY: ICD-10-CM

## 2023-10-13 LAB — TSH SERPL DL<=0.005 MIU/L-ACNC: 2.62 UIU/ML (ref 0.34–5.6)

## 2023-10-13 PROCEDURE — 63600175 PHARM REV CODE 636 W HCPCS: Mod: JZ,JG | Performed by: INTERNAL MEDICINE

## 2023-10-13 PROCEDURE — 84443 ASSAY THYROID STIM HORMONE: CPT | Performed by: INTERNAL MEDICINE

## 2023-10-13 PROCEDURE — A4216 STERILE WATER/SALINE, 10 ML: HCPCS | Performed by: INTERNAL MEDICINE

## 2023-10-13 PROCEDURE — 25000003 PHARM REV CODE 250: Performed by: INTERNAL MEDICINE

## 2023-10-13 PROCEDURE — 96413 CHEMO IV INFUSION 1 HR: CPT

## 2023-10-13 RX ORDER — HEPARIN 100 UNIT/ML
500 SYRINGE INTRAVENOUS
Status: DISCONTINUED | OUTPATIENT
Start: 2023-10-13 | End: 2023-10-13 | Stop reason: HOSPADM

## 2023-10-13 RX ORDER — DIPHENHYDRAMINE HYDROCHLORIDE 50 MG/ML
50 INJECTION INTRAMUSCULAR; INTRAVENOUS ONCE AS NEEDED
Status: DISCONTINUED | OUTPATIENT
Start: 2023-10-13 | End: 2023-10-13 | Stop reason: HOSPADM

## 2023-10-13 RX ORDER — EPINEPHRINE 0.3 MG/.3ML
0.3 INJECTION SUBCUTANEOUS ONCE AS NEEDED
Status: DISCONTINUED | OUTPATIENT
Start: 2023-10-13 | End: 2023-10-13 | Stop reason: HOSPADM

## 2023-10-13 RX ORDER — SODIUM CHLORIDE 0.9 % (FLUSH) 0.9 %
10 SYRINGE (ML) INJECTION
Status: DISCONTINUED | OUTPATIENT
Start: 2023-10-13 | End: 2023-10-13 | Stop reason: HOSPADM

## 2023-10-13 RX ADMIN — HEPARIN 500 UNITS: 100 SYRINGE at 02:10

## 2023-10-13 RX ADMIN — ATEZOLIZUMAB 1200 MG: 1200 INJECTION, SOLUTION INTRAVENOUS at 02:10

## 2023-10-13 RX ADMIN — SODIUM CHLORIDE, PRESERVATIVE FREE 10 ML: 5 INJECTION INTRAVENOUS at 02:10

## 2023-10-13 NOTE — PLAN OF CARE
Problem: Fatigue  Goal: Improved Activity Tolerance  Outcome: Ongoing, Progressing  Intervention: Promote Improved Energy  Flowsheets (Taken 10/13/2023 1404)  Fatigue Management:   fatigue-related activity identified   paced activity encouraged   frequent rest breaks encouraged  Sleep/Rest Enhancement:   noise level reduced   relaxation techniques promoted   regular sleep/rest pattern promoted  Activity Management:   Ambulated -L4   Up in chair - L3

## 2023-10-23 ENCOUNTER — OFFICE VISIT (OUTPATIENT)
Dept: FAMILY MEDICINE | Facility: CLINIC | Age: 63
End: 2023-10-23
Payer: COMMERCIAL

## 2023-10-23 VITALS
DIASTOLIC BLOOD PRESSURE: 60 MMHG | WEIGHT: 171.94 LBS | HEART RATE: 68 BPM | BODY MASS INDEX: 30.46 KG/M2 | SYSTOLIC BLOOD PRESSURE: 110 MMHG | HEIGHT: 63 IN | OXYGEN SATURATION: 95 % | RESPIRATION RATE: 18 BRPM

## 2023-10-23 DIAGNOSIS — G89.29 CHRONIC LOW BACK PAIN WITHOUT SCIATICA, UNSPECIFIED BACK PAIN LATERALITY: ICD-10-CM

## 2023-10-23 DIAGNOSIS — Z72.0 TOBACCO USE: ICD-10-CM

## 2023-10-23 DIAGNOSIS — G89.29 CHRONIC CERVICAL PAIN: ICD-10-CM

## 2023-10-23 DIAGNOSIS — M54.2 CHRONIC CERVICAL PAIN: ICD-10-CM

## 2023-10-23 DIAGNOSIS — J44.9 CHRONIC OBSTRUCTIVE PULMONARY DISEASE, UNSPECIFIED COPD TYPE: ICD-10-CM

## 2023-10-23 DIAGNOSIS — M19.042 OSTEOARTHRITIS OF BOTH HANDS, UNSPECIFIED OSTEOARTHRITIS TYPE: Primary | ICD-10-CM

## 2023-10-23 DIAGNOSIS — M54.50 CHRONIC LOW BACK PAIN WITHOUT SCIATICA, UNSPECIFIED BACK PAIN LATERALITY: ICD-10-CM

## 2023-10-23 DIAGNOSIS — M19.041 OSTEOARTHRITIS OF BOTH HANDS, UNSPECIFIED OSTEOARTHRITIS TYPE: Primary | ICD-10-CM

## 2023-10-23 PROCEDURE — 3074F PR MOST RECENT SYSTOLIC BLOOD PRESSURE < 130 MM HG: ICD-10-PCS | Mod: CPTII,S$GLB,, | Performed by: STUDENT IN AN ORGANIZED HEALTH CARE EDUCATION/TRAINING PROGRAM

## 2023-10-23 PROCEDURE — 99406 BEHAV CHNG SMOKING 3-10 MIN: CPT | Mod: S$GLB,,, | Performed by: STUDENT IN AN ORGANIZED HEALTH CARE EDUCATION/TRAINING PROGRAM

## 2023-10-23 PROCEDURE — 99213 PR OFFICE/OUTPT VISIT, EST, LEVL III, 20-29 MIN: ICD-10-PCS | Mod: 25,S$GLB,, | Performed by: STUDENT IN AN ORGANIZED HEALTH CARE EDUCATION/TRAINING PROGRAM

## 2023-10-23 PROCEDURE — 1159F PR MEDICATION LIST DOCUMENTED IN MEDICAL RECORD: ICD-10-PCS | Mod: CPTII,S$GLB,, | Performed by: STUDENT IN AN ORGANIZED HEALTH CARE EDUCATION/TRAINING PROGRAM

## 2023-10-23 PROCEDURE — 3008F BODY MASS INDEX DOCD: CPT | Mod: CPTII,S$GLB,, | Performed by: STUDENT IN AN ORGANIZED HEALTH CARE EDUCATION/TRAINING PROGRAM

## 2023-10-23 PROCEDURE — 99999 PR PBB SHADOW E&M-EST. PATIENT-LVL V: ICD-10-PCS | Mod: PBBFAC,,, | Performed by: STUDENT IN AN ORGANIZED HEALTH CARE EDUCATION/TRAINING PROGRAM

## 2023-10-23 PROCEDURE — 3074F SYST BP LT 130 MM HG: CPT | Mod: CPTII,S$GLB,, | Performed by: STUDENT IN AN ORGANIZED HEALTH CARE EDUCATION/TRAINING PROGRAM

## 2023-10-23 PROCEDURE — 1159F MED LIST DOCD IN RCRD: CPT | Mod: CPTII,S$GLB,, | Performed by: STUDENT IN AN ORGANIZED HEALTH CARE EDUCATION/TRAINING PROGRAM

## 2023-10-23 PROCEDURE — 99406 PR TOBACCO USE CESSATION INTERMEDIATE 3-10 MINUTES: ICD-10-PCS | Mod: S$GLB,,, | Performed by: STUDENT IN AN ORGANIZED HEALTH CARE EDUCATION/TRAINING PROGRAM

## 2023-10-23 PROCEDURE — 3008F PR BODY MASS INDEX (BMI) DOCUMENTED: ICD-10-PCS | Mod: CPTII,S$GLB,, | Performed by: STUDENT IN AN ORGANIZED HEALTH CARE EDUCATION/TRAINING PROGRAM

## 2023-10-23 PROCEDURE — 99999 PR PBB SHADOW E&M-EST. PATIENT-LVL V: CPT | Mod: PBBFAC,,, | Performed by: STUDENT IN AN ORGANIZED HEALTH CARE EDUCATION/TRAINING PROGRAM

## 2023-10-23 PROCEDURE — 3078F PR MOST RECENT DIASTOLIC BLOOD PRESSURE < 80 MM HG: ICD-10-PCS | Mod: CPTII,S$GLB,, | Performed by: STUDENT IN AN ORGANIZED HEALTH CARE EDUCATION/TRAINING PROGRAM

## 2023-10-23 PROCEDURE — 99213 OFFICE O/P EST LOW 20 MIN: CPT | Mod: 25,S$GLB,, | Performed by: STUDENT IN AN ORGANIZED HEALTH CARE EDUCATION/TRAINING PROGRAM

## 2023-10-23 PROCEDURE — 1160F PR REVIEW ALL MEDS BY PRESCRIBER/CLIN PHARMACIST DOCUMENTED: ICD-10-PCS | Mod: CPTII,S$GLB,, | Performed by: STUDENT IN AN ORGANIZED HEALTH CARE EDUCATION/TRAINING PROGRAM

## 2023-10-23 PROCEDURE — 3078F DIAST BP <80 MM HG: CPT | Mod: CPTII,S$GLB,, | Performed by: STUDENT IN AN ORGANIZED HEALTH CARE EDUCATION/TRAINING PROGRAM

## 2023-10-23 PROCEDURE — 1160F RVW MEDS BY RX/DR IN RCRD: CPT | Mod: CPTII,S$GLB,, | Performed by: STUDENT IN AN ORGANIZED HEALTH CARE EDUCATION/TRAINING PROGRAM

## 2023-10-23 RX ORDER — VARENICLINE TARTRATE 0.5 (11)-1
KIT ORAL
Qty: 1 EACH | Refills: 0 | Status: SHIPPED | OUTPATIENT
Start: 2023-10-23 | End: 2023-11-28

## 2023-10-23 RX ORDER — ESCITALOPRAM OXALATE 10 MG/1
10 TABLET ORAL
COMMUNITY
Start: 2023-10-10 | End: 2023-10-23 | Stop reason: DRUGHIGH

## 2023-10-23 NOTE — PROGRESS NOTES
"[facility] CLINIC NOTE    Patient Name: Yvette Hutchinson  YOB: 1960    PRESENTING HISTORY     History of Present Illness:  Ms. Yvette Hutchinson is a 63 y.o. female here for f/u.     Hands unclear if improved on celebrex.    Workup c/w OA.     Tobacco use- cut back. Wants to quit altogether.    Uses for anxiety/nervousness   Ready to quit now. Quit for 3 months in past until brother .    Previous attempts- Chantix, patches. Neither helped    C/o chronic neck and back pain.   + malignancy history.     ROS      OBJECTIVE:   Vital Signs:  Vitals:    10/23/23 1501   BP: 110/60   Pulse: 68   Resp: 18   SpO2: 95%   Weight: 78 kg (171 lb 15.3 oz)   Height: 5' 3" (1.6 m)          Physical Exam: Normal, no change.     Physical Exam    ASSESSMENT & PLAN:     Osteoarthritis of both hands, unspecified osteoarthritis type  Continue NSAID for now.     Tobacco use  -     Ambulatory referral/consult to Smoking Cessation Program; Future; Expected date: 10/30/2023  -     varenicline (CHANTIX STARTING MONTH BOX) 0.5 mg (11)- 1 mg (42) tablet; Take one 0.5mg tab by mouth once daily X3 days,then increase to one 0.5mg tab twice daily X4 days,then increase to one 1mg tab twice daily  Dispense: 1 each; Refill: 0  Discussed for 6 minutes today.     Chronic low back pain without sciatica, unspecified back pain laterality  -     X-Ray Lumbar Spine AP And Lateral; Future; Expected date: 10/23/2023    Chronic cervical pain  -     X-Ray Cervical Spine 2 or 3 Views; Future; Expected date: 10/23/2023             Vern Priest  Internal Medicine            "

## 2023-10-23 NOTE — TELEPHONE ENCOUNTER
Pt requesting a refill on her Advair.  Ordered-2/14/23  Last office visit-4/27/23  Next office visit-10/27/23

## 2023-10-24 ENCOUNTER — HOSPITAL ENCOUNTER (OUTPATIENT)
Dept: RADIOLOGY | Facility: CLINIC | Age: 63
Discharge: HOME | End: 2023-10-24
Attending: STUDENT IN AN ORGANIZED HEALTH CARE EDUCATION/TRAINING PROGRAM
Payer: COMMERCIAL

## 2023-10-24 DIAGNOSIS — M54.50 CHRONIC LOW BACK PAIN WITHOUT SCIATICA, UNSPECIFIED BACK PAIN LATERALITY: ICD-10-CM

## 2023-10-24 DIAGNOSIS — M54.2 CHRONIC CERVICAL PAIN: ICD-10-CM

## 2023-10-24 DIAGNOSIS — G89.29 CHRONIC CERVICAL PAIN: ICD-10-CM

## 2023-10-24 DIAGNOSIS — G89.29 CHRONIC LOW BACK PAIN WITHOUT SCIATICA, UNSPECIFIED BACK PAIN LATERALITY: ICD-10-CM

## 2023-10-24 PROCEDURE — 72040 XR CERVICAL SPINE 2 OR 3 VIEWS: ICD-10-PCS | Mod: 26,,, | Performed by: RADIOLOGY

## 2023-10-24 PROCEDURE — 72040 X-RAY EXAM NECK SPINE 2-3 VW: CPT | Mod: TC,FY,PO

## 2023-10-24 PROCEDURE — 72040 X-RAY EXAM NECK SPINE 2-3 VW: CPT | Mod: 26,,, | Performed by: RADIOLOGY

## 2023-10-24 PROCEDURE — 72100 X-RAY EXAM L-S SPINE 2/3 VWS: CPT | Mod: TC,FY,PO

## 2023-10-24 PROCEDURE — 72100 X-RAY EXAM L-S SPINE 2/3 VWS: CPT | Mod: 26,,, | Performed by: RADIOLOGY

## 2023-10-24 PROCEDURE — 72100 XR LUMBAR SPINE AP AND LATERAL: ICD-10-PCS | Mod: 26,,, | Performed by: RADIOLOGY

## 2023-10-24 RX ORDER — FLUTICASONE PROPIONATE AND SALMETEROL XINAFOATE 230; 21 UG/1; UG/1
2 AEROSOL, METERED RESPIRATORY (INHALATION) 2 TIMES DAILY
Qty: 12 G | Refills: 5 | Status: SHIPPED | OUTPATIENT
Start: 2023-10-24 | End: 2024-02-08 | Stop reason: SDUPTHER

## 2023-10-24 NOTE — PATIENT INSTRUCTIONS
Damaso Crawford,     If you are due for any health screening(s) below please notify me so we can arrange them to be ordered and scheduled to maintain your health. Most healthy patients complete it. Don't lose out on improving your health.     Tests to Keep You Healthy    Mammogram: ORDERED BUT NOT SCHEDULED  Colon Cancer Screening: DUE  Cervical Cancer Screening: Met on 3/15/2021  Tobacco Cessation: NO      Breast Cancer Screening    Breast cancer is the second most common cancer in women after skin cancer, and the second leading cause of death from cancer after lung cancer. Mammograms can detect breast cancer early, which significantly increases the chances of curing the cancer.      A screening mammogram is an x-ray image of the breasts used for early breast cancer detection. It can help reduce the number of deaths from breast cancer among women. To get a clear image, the breast is placed between two plastic plates to make it flat. How often a mammogram is needed depends on your age and your breast cancer risk.

## 2023-10-31 ENCOUNTER — TELEPHONE (OUTPATIENT)
Dept: HEMATOLOGY/ONCOLOGY | Facility: CLINIC | Age: 63
End: 2023-10-31
Payer: COMMERCIAL

## 2023-10-31 NOTE — TELEPHONE ENCOUNTER
----- Message from Shey Laughlin sent at 10/31/2023  2:31 PM CDT -----  Type: Needs Medical Advice  Who Called:  Patient   Symptoms (please be specific):    How long has patient had these symptoms:    Pharmacy name and phone #:    Best Call Back Number: [phone]  Additional Information: Patient is requesting a call back to reschedule her appt. that was scheduled for 11/2. Her daughter canceled on mistake.

## 2023-11-01 ENCOUNTER — LAB VISIT (OUTPATIENT)
Dept: LAB | Facility: HOSPITAL | Age: 63
End: 2023-11-01
Attending: INTERNAL MEDICINE
Payer: COMMERCIAL

## 2023-11-01 DIAGNOSIS — C91.10 CLL (CHRONIC LYMPHOCYTIC LEUKEMIA): ICD-10-CM

## 2023-11-01 DIAGNOSIS — E53.8 B12 DEFICIENCY: ICD-10-CM

## 2023-11-01 DIAGNOSIS — C34.01 MALIGNANT NEOPLASM OF HILUS OF RIGHT LUNG: ICD-10-CM

## 2023-11-01 LAB
ALBUMIN SERPL BCP-MCNC: 4.2 G/DL (ref 3.5–5.2)
ALP SERPL-CCNC: 114 U/L (ref 55–135)
ALT SERPL W/O P-5'-P-CCNC: 19 U/L (ref 10–44)
ANION GAP SERPL CALC-SCNC: 11 MMOL/L (ref 8–16)
AST SERPL-CCNC: 21 U/L (ref 10–40)
BASOPHILS NFR BLD: 0 % (ref 0–1.9)
BILIRUB SERPL-MCNC: 0.4 MG/DL (ref 0.1–1)
BUN SERPL-MCNC: 10 MG/DL (ref 8–23)
CALCIUM SERPL-MCNC: 9 MG/DL (ref 8.7–10.5)
CHLORIDE SERPL-SCNC: 105 MMOL/L (ref 95–110)
CO2 SERPL-SCNC: 25 MMOL/L (ref 23–29)
CREAT SERPL-MCNC: 1 MG/DL (ref 0.5–1.4)
DIFFERENTIAL METHOD: ABNORMAL
EOSINOPHIL NFR BLD: 0 % (ref 0–8)
ERYTHROCYTE [DISTWIDTH] IN BLOOD BY AUTOMATED COUNT: 13.7 % (ref 11.5–14.5)
EST. GFR  (NO RACE VARIABLE): >60 ML/MIN/1.73 M^2
GLUCOSE SERPL-MCNC: 102 MG/DL (ref 70–110)
HCT VFR BLD AUTO: 40.7 % (ref 37–48.5)
HGB BLD-MCNC: 13.4 G/DL (ref 12–16)
IMM GRANULOCYTES # BLD AUTO: ABNORMAL K/UL
IMM GRANULOCYTES NFR BLD AUTO: ABNORMAL %
LYMPHOCYTES NFR BLD: 88 % (ref 18–48)
MCH RBC QN AUTO: 32.6 PG (ref 27–31)
MCHC RBC AUTO-ENTMCNC: 32.9 G/DL (ref 32–36)
MCV RBC AUTO: 99 FL (ref 82–98)
MONOCYTES NFR BLD: 0 % (ref 4–15)
NEUTROPHILS NFR BLD: 12 % (ref 38–73)
NRBC BLD-RTO: 0 /100 WBC
PLATELET # BLD AUTO: 239 K/UL (ref 150–450)
PLATELET BLD QL SMEAR: ABNORMAL
PMV BLD AUTO: 9.3 FL (ref 9.2–12.9)
POTASSIUM SERPL-SCNC: 4.1 MMOL/L (ref 3.5–5.1)
PROT SERPL-MCNC: 6.7 G/DL (ref 6–8.4)
RBC # BLD AUTO: 4.11 M/UL (ref 4–5.4)
SODIUM SERPL-SCNC: 141 MMOL/L (ref 136–145)
VIT B12 SERPL-MCNC: 1708 PG/ML (ref 210–950)
WBC # BLD AUTO: 27.93 K/UL (ref 3.9–12.7)

## 2023-11-01 PROCEDURE — 82607 VITAMIN B-12: CPT | Performed by: INTERNAL MEDICINE

## 2023-11-01 PROCEDURE — 80053 COMPREHEN METABOLIC PANEL: CPT | Performed by: INTERNAL MEDICINE

## 2023-11-01 PROCEDURE — 85007 BL SMEAR W/DIFF WBC COUNT: CPT | Performed by: INTERNAL MEDICINE

## 2023-11-01 PROCEDURE — 85027 COMPLETE CBC AUTOMATED: CPT | Performed by: INTERNAL MEDICINE

## 2023-11-01 PROCEDURE — 36415 COLL VENOUS BLD VENIPUNCTURE: CPT | Performed by: INTERNAL MEDICINE

## 2023-11-02 ENCOUNTER — OFFICE VISIT (OUTPATIENT)
Dept: HEMATOLOGY/ONCOLOGY | Facility: CLINIC | Age: 63
End: 2023-11-02
Payer: COMMERCIAL

## 2023-11-02 DIAGNOSIS — C91.10 CLL (CHRONIC LYMPHOCYTIC LEUKEMIA): ICD-10-CM

## 2023-11-02 DIAGNOSIS — C34.01 MALIGNANT NEOPLASM OF HILUS OF RIGHT LUNG: ICD-10-CM

## 2023-11-02 DIAGNOSIS — C34.10 MALIGNANT NEOPLASM OF UPPER LOBE OF LUNG, UNSPECIFIED LATERALITY: ICD-10-CM

## 2023-11-02 DIAGNOSIS — C34.90 NON-SMALL CELL LUNG CANCER, UNSPECIFIED LATERALITY: ICD-10-CM

## 2023-11-02 DIAGNOSIS — J41.0 SIMPLE CHRONIC BRONCHITIS: ICD-10-CM

## 2023-11-02 DIAGNOSIS — E53.8 B12 DEFICIENCY: Primary | ICD-10-CM

## 2023-11-02 PROCEDURE — 99215 OFFICE O/P EST HI 40 MIN: CPT | Mod: 95,,, | Performed by: INTERNAL MEDICINE

## 2023-11-02 PROCEDURE — 99215 PR OFFICE/OUTPT VISIT, EST, LEVL V, 40-54 MIN: ICD-10-PCS | Mod: 95,,, | Performed by: INTERNAL MEDICINE

## 2023-11-02 RX ORDER — EPINEPHRINE 0.3 MG/.3ML
0.3 INJECTION SUBCUTANEOUS ONCE AS NEEDED
Status: CANCELLED | OUTPATIENT
Start: 2023-11-03

## 2023-11-02 RX ORDER — SODIUM CHLORIDE 0.9 % (FLUSH) 0.9 %
10 SYRINGE (ML) INJECTION
Status: CANCELLED | OUTPATIENT
Start: 2023-11-03

## 2023-11-02 RX ORDER — HEPARIN 100 UNIT/ML
500 SYRINGE INTRAVENOUS
Status: CANCELLED | OUTPATIENT
Start: 2023-11-03

## 2023-11-02 RX ORDER — DIPHENHYDRAMINE HYDROCHLORIDE 50 MG/ML
50 INJECTION INTRAMUSCULAR; INTRAVENOUS ONCE AS NEEDED
Status: CANCELLED | OUTPATIENT
Start: 2023-11-03

## 2023-11-02 NOTE — PROGRESS NOTES
The patient location is: home  Visit type: Virtual visit with synchronous audio and video  Face-to-face or time spent with patient on the encounter: 25 min  Total time spent on and for  this encounter which includes non face-to-face time preparing to see patient, review of tests, obtaining and or reviewing separately obtained records documenting clinical information in the electronic or other health records, independently interpreting results which is not separately reported ,and communicating results to the patient/family/caregiver and in care coordination and treatment planning/communicating with pharmacy for prescriptions/addressing social needs/arranging follow-up and or referrals :35 min    Each patient I provide medical services by telemedicine is:  (1) informed of the relationship between the physician and patient and the respective role of any other health care provider with respect to management of the patient; and (2) notified that he or she may decline to receive medical services by telemedicine and may withdraw from such care at any time.  This is a video visit therefore some elements of the physical exam such as vital signs, heart sounds are breath sounds are not included and may be included if found in recent clinic notes of other providers assessing same patient. Any symptoms or signs that were visualized were stated by the patient may be included in this note.     Subjective:       Patient ID: Yvette Hutchinson is a 63 y.o. female.    Chief Complaint:  Patient known to me with CLL stage 0 recently diagnosed with lung cancer August 2022  Follow-up    Lung Cancer    Too worried but I do want to keep a close watch on it how you feeling no steroids nothing why 20 mg no steroids steroids nothing at all we will discontinue to do this we will not change   Patient has chronic complains of chronic pain syndrome and COPD for which periodically she take steroids she is also on BuSpar has issues anxiety  has required dilatation of esophageal strictures allergic rhinitis insomnia and history of B12 deficiency documented   Had CT chest done with pulmonary 7/22 showed mass bx done. 8/22    Oncology hx  Impression:ct chest 7/29/22     2.3 cm spiculated left upper lobe lung nodule highly suspicious for bronchogenic carcinoma.     New nonspecific 7 mm nodule in the right middle lobe; this appears more linear on the coronal images and may be a focus of scarring.     Additional stable lung nodules, mildly enlarged axillary lymph nodes, substernal thyroid nodule; these findings are likely benign given the interval stability.   LEFT LUNG MASS, CT-GUIDED BIOPSY:   Non-small cell lung carcinoma.   Comment:  The biopsy reveals invasive carcinoma with apparent glandular but   also vague squamoid features.  Tumor cells are diffusely positive with TTF-1   and focally positive with P40.  The histology differential includes   adenocarcinoma and adenosquamous carcinoma.  PD-L1 and templates NGS studies   are in progress.  The results will be issued separately.    October 3, 2022: Robotic left lobectomy T1c N1    Social history; patient is a smoker denies recreational alcohol use or drug use   Patient is currently on disability  She is allergic to the mask used during COVID pandemic she is also bothered by her mask with COPD    Family history suggestive of ovarian and breast cancer patient advised to see a  or seek   Review of patient's allergies indicates:  No Known Allergies  Medications have been reviewed  Current Outpatient Medications on File Prior to Visit   Medication Sig Dispense Refill    (Magic mouthwash) 1:1:1 diphenhydrAMINE(Benadryl) 12.5mg/5ml liq, aluminum & magnesium hydroxide-simethicone (Maalox), LIDOcaine viscous 2% Swish and spit 10 mLs 3 (three) times daily. for mouth sores 250 mL 0    acetaminophen (TYLENOL) 500 MG tablet Take 2 tablets (1,000 mg total) by mouth every 6 (six) hours as needed for  Pain. 8 tablet 0    albuterol (PROVENTIL/VENTOLIN HFA) 90 mcg/actuation inhaler Inhale 2 puffs into the lungs every 6 (six) hours as needed for Wheezing or Shortness of Breath. Rescue 18 g 11    albuterol-ipratropium (DUO-NEB) 2.5 mg-0.5 mg/3 mL nebulizer solution Take 3 mLs by nebulization every 6 (six) hours as needed for Wheezing. Rescue 120 each 5    ALPRAZolam (XANAX) 0.5 MG tablet Take 1 tablet (0.5 mg total) by mouth 3 (three) times daily. 90 tablet 0    benzocaine-menthoL 6-10 mg lozenge Take 1 lozenge by mouth every 2 (two) hours as needed for Pain. 18 tablet 0    buPROPion (WELLBUTRIN XL) 300 MG 24 hr tablet Take 1 tablet (300 mg total) by mouth once daily. 90 tablet 3    celecoxib (CELEBREX) 200 MG capsule Take 2 capsules (400 mg total) by mouth once daily. 180 capsule 1    diphenhydrAMINE-aluminum-magnesium hydroxide-simethicone-LIDOcaine HCl 2% Swish and spit 15 mLs every 4 (four) hours as needed (mouth sores). 100 each 2    EScitalopram oxalate (LEXAPRO) 20 MG tablet Take 1 tablet (20 mg total) by mouth once daily. 30 tablet 11    fluticasone propionate (FLONASE) 50 mcg/actuation nasal spray 1 spray (50 mcg total) by Each Nostril route once daily. 16 g 3    fluticasone-salmeterol 230-21 mcg/dose (ADVAIR HFA) 230-21 mcg/actuation HFAA inhaler Inhale 2 puffs into the lungs 2 (two) times daily. Controller 12 g 5    gabapentin (NEURONTIN) 300 MG capsule Take 1 capsule (300 mg total) by mouth 3 (three) times daily. 270 capsule 1    levocetirizine (XYZAL) 5 MG tablet Take 1 tablet (5 mg total) by mouth every evening. 30 tablet 5    LIDOcaine (LIDODERM) 5 % Place 1 patch onto the skin once daily. Remove & Discard patch within 12 hours or as directed by MD Bloom patch 0    LIDOcaine (LIDODERM) 5 % Place 1 patch onto the skin once daily. Remove & Discard patch within 12 hours or as directed by MD Bloom patch 1    LIDOcaine-prilocaine (EMLA) cream Apply topically as needed (apply to port site 30 min before access). 30 g  0    OLANZapine (ZYPREXA) 5 MG tablet Take 1 tablet (5 mg total) by mouth nightly. Take 1 tablet at bedtime on days 1-4 of each cycle of chemotherapy 30 tablet 2    ondansetron (ZOFRAN-ODT) 8 MG TbDL Take 1 tablet (8 mg total) by mouth every 12 (twelve) hours as needed (nausea). 60 tablet 1    predniSONE (DELTASONE) 20 MG tablet Take one pill a day for three days, repeat for shortness of breath 12 tablet 0    varenicline (CHANTIX STARTING MONTH BOX) 0.5 mg (11)- 1 mg (42) tablet Take one 0.5mg tab by mouth once daily X3 days,then increase to one 0.5mg tab twice daily X4 days,then increase to one 1mg tab twice daily 1 each 0     No current facility-administered medications on file prior to visit.       REVIEW OF SYSTEMS:     S/p lobectomy left side, has draining tube+ with sanguinous fluid.    Wt Readings from Last 3 Encounters:   10/23/23 78 kg (171 lb 15.3 oz)   10/13/23 77.3 kg (170 lb 8 oz)   10/09/23 76.2 kg (168 lb)     Temp Readings from Last 3 Encounters:   10/13/23 97.8 °F (36.6 °C)   10/09/23 98.6 °F (37 °C)   09/22/23 97.4 °F (36.3 °C)     BP Readings from Last 3 Encounters:   10/23/23 110/60   10/13/23 (!) 101/58   10/09/23 108/63     Pulse Readings from Last 3 Encounters:   10/23/23 68   10/13/23 64   10/09/23 66     VITAL SIGNS:  as above   GENERAL: appears well-built, well-nourished.  No anxiety, no agitation, and in no distress.  Patient is awake, alert, oriented and cooperative.  HEENT:  Showed no congestion. Trachea is central no obvious icterus or pallor noted no hoarseness. no obvious JVD   NECK:  Supple.  No JVD. No obvious cervical submental or supraclavicular adenopathy.  RS: tube draining on left side. S/p lobectomy  ABDOMEN:  abdomen appears undistended.  EXTREMITIES:  Without edema.  NEUROLOGICAL:  The patient is appropriate, higher functions are normal.  No  obvious neurological deficits.  normal judgement normal thought content  No confusion, no speech impediment. Cranial nerves are intact  and show no deficit. No gross motor deficits noted   SKIN MUSCULOSKELETAL: no joint or skeletal deformity, no clubbing of nails.  No visible rash ecchymosis or petechiae  Lab Results   Component Value Date    WBC 20.78 (H) 03/03/2020    HGB 14.9 03/03/2020    HCT 47.0 03/03/2020    MCV 99 (H) 03/03/2020     03/03/2020     Smudge cells presnt  CMP  Sodium   Date Value Ref Range Status   11/01/2023 141 136 - 145 mmol/L Final     Potassium   Date Value Ref Range Status   11/01/2023 4.1 3.5 - 5.1 mmol/L Final     Chloride   Date Value Ref Range Status   11/01/2023 105 95 - 110 mmol/L Final     CO2   Date Value Ref Range Status   11/01/2023 25 23 - 29 mmol/L Final     Glucose   Date Value Ref Range Status   11/01/2023 102 70 - 110 mg/dL Final     BUN   Date Value Ref Range Status   11/01/2023 10 8 - 23 mg/dL Final     Creatinine   Date Value Ref Range Status   11/01/2023 1.0 0.5 - 1.4 mg/dL Final     Calcium   Date Value Ref Range Status   11/01/2023 9.0 8.7 - 10.5 mg/dL Final     Total Protein   Date Value Ref Range Status   11/01/2023 6.7 6.0 - 8.4 g/dL Final     Albumin   Date Value Ref Range Status   11/01/2023 4.2 3.5 - 5.2 g/dL Final     Total Bilirubin   Date Value Ref Range Status   11/01/2023 0.4 0.1 - 1.0 mg/dL Final     Comment:     For infants and newborns, interpretation of results should be based  on gestational age, weight and in agreement with clinical  observations.    Premature Infant recommended reference ranges:  Up to 24 hours.............<8.0 mg/dL  Up to 48 hours............<12.0 mg/dL  3-5 days..................<15.0 mg/dL  6-29 days.................<15.0 mg/dL       Alkaline Phosphatase   Date Value Ref Range Status   11/01/2023 114 55 - 135 U/L Final     AST   Date Value Ref Range Status   11/01/2023 21 10 - 40 U/L Final     ALT   Date Value Ref Range Status   11/01/2023 19 10 - 44 U/L Final     Anion Gap   Date Value Ref Range Status   11/01/2023 11 8 - 16 mmol/L Final     eGFR if     Date Value Ref Range Status   06/13/2022 >60 >60 mL/min/1.73 m^2 Final     eGFR if non    Date Value Ref Range Status   06/13/2022 >60 >60 mL/min/1.73 m^2 Final     Comment:     Calculation used to obtain the estimated glomerular filtration  rate (eGFR) is the CKD-EPI equation.            2wk ago    Blood Interpretation A B cell lymphoproliferative disorder is present. see comment.    Comment: Interpreted by: Jeremias Sosa M.D., Signed on 08/06/2020 at 13:07   Blood Comment Flow cytometric analysis of  peripheral blood  detects a lambda  light chain   restricted B lymphocyte population showing expression of CD19 and dim CD20   with aberrant expression of CD5 (dim).  T lymphocytes are   immunophenotypically unremarkable.  The blasts gate is not increased.  The   findings are consistent with patient history of chronic lymphocytic   leukemia/small lymphocytic lymphoma.   Flow differential:  Lymphocytes 69.6%, Monocytes 2.8%, Granulocytes  27.5%,   Blast  0.1%, Debris/nRBC 0.1%,  Viability 97.5%.   10/3/22 robotic lef lung lobectomy  1. LYMPH NODE, LEVEL 5 FROZEN SECTION, EXCISION:   One lymph node with dominant necrotizing granuloma, negative for malignancy   (0/1).   2. LYMPH NODES, LEVEL 5 PERMANENT, EXCISION:   Two lymph nodes with multifocal necrotizing granulomas, negative for   malignancy (0/2).   3. LYMPH NODES, LEFT POSTERIOR LEVEL 10, EXCISION:   Five lymph nodes with multifocal necrotizing granulomas, negative for   malignancy (0/5).   4. LYMPH NODE, LEVEL 11, EXCISION:   One lymph node, negative for malignancy (0/1).   5. LYMPH NODES, LEVEL 12, EXCISION:   Three lymph nodes, one with single necrotizing granuloma, negative for   malignancy (0/3).   6. LYMPH NODES, LEVEL 12 NEAR UPPER DIVISION BRONCHUS, EXCISION:   Three lymph nodes with sinus histiocytosis, negative for malignancy (0/3).   7. LYMPH NODES, LEVEL 10 #2, EXCISION:   One of two lymph nodes positive for metastatic  carcinoma (1/2).   Size of largest metastatic deposit:  8 mm.   Negative for extranodal extension.   8. LYMPH NODES, LEFT LEVEL 8, EXCISION:   Two lymph nodes with sinus histiocytosis, negative for malignancy (0/2).   9. LYMPH NODES, LEFT LEVEL 9, EXCISION:   Two lymph nodes, negative for malignancy (0/2).   10. 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   Surgical margins are negative for malignancy.   Background lung tissue with subpleural fibrosis, no evidence of granulomas.   Two hilar lymph nodes, negative for malignancy (0/2).   Coment (1-3, 5):  Prominent necrotizing granulomatous inflammation identified   is identified in multiple lymph nodes.  An AE1/AE3 cytokeratin immunostain   performed on the largest granuloma (part 3) reveals no evidence of occult   carcinoma.  GMS and AFB special stains are negative for fungal and acid-fast   organisms, respectively.  The necrotizing features are not typical of   sarcoidosis.  As such, other granulomatous processes may be considered   including secondary response to malignancy or infection.  Clinical   correlation is advised.   Comment (10):  Sections reveal invasive carcinoma involving lung tissue with   predominantly squamoid morphologic features including bright eosinophilic   cytoplasm and intracellular bridging.  There are not significant keratinizing   features.  Some areas show basaloid features.  There is also regional luminal   features including cribriforming and vague glandular structures containing   mucin.  Tumor cells are diffusely positive with P40 and regionally positive   with TTF-1.  Mucicarmine special stain highlights mucin producing luminal   spaces. Overall tumor appearance and staining profile supports adenosquamous   carcinoma, a variant of non-small cell lung carcinoma.   [facility] SURGICAL PATHOLOGY CANCER CASE SUMMARY:  LUNG   Procedure:  Lobectomy   Specimen laterality:  Left   Tumor focality:  Single  focus   Tumor site: Upper lobe of lung   Tumor size:  2.2 cm   Histologic type:  Adenosquamous carcinoma   Spread through airspaces:  Not identified   Visceral pleural invasion:  Not identified   Direct invasion of adjacent structures: Not applicable   Lymphovascular invasion:  Not identified   Margins:  All margins negative for invasive carcinoma     Closest margin to invasive carcinoma:  Bronchial (3.0 cm)   Regional lymph nodes:  Tumor present in regional lymph node     Number of lymph nodes with tumor:  1     Scott sites with tumor:  Left level 10 (10L)     Extranodal extension:  Not identified     Number of lymph nodes examined:  23   Pathologic stage classification (pTNM): pT1c pN1   Special studies:  Performed on previous biopsy if additional studies needed   there may be performed on tissue blockd 10G or 10H.      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 Due to adenosq histology chose carbo/ taxol x 4 cycles tecentriq maintanence x 1 year  pt is has had 5 cycles of carbo/ taxol  discused maintainenec at tumor board   On tecentriq maintainence   Proceed with tecentriq  Pet7/18/23 neg for mets  See me for next cycle cbc,cmp      CLL   stage 0 will confirmed with flow cytometry  NTD   she has no other cytopenias or lymphadenopathy or B symptoms patient can be observed      Continue with chronic pain syndrome and pain management advised to stop smoking if at all possible 10 pills of hydrocodoen given to pt, she needs to follow with pain mx      History of B12 deficiency will continue to monitor    Advised COVID precaution due to her lung situation  she will be a high risk patient    Advance Care Planning     Date: 04/18/2023    Power of   I initiated the process of advance care planning today and explained the importance of this process to the patient.  I introduced the concept of advance directives to the patient, as well. Then the patient received detailed information about the importance of designating a Health Care Power of  (HCPOA). She was also instructed to communicate with this person about their wishes for future healthcare, should she become sick and lose decision-making capacity. The patient has not previously appointed a HCPOA. After our discussion, the patient has decided to complete a HCPOA .

## 2023-11-03 ENCOUNTER — INFUSION (OUTPATIENT)
Dept: INFUSION THERAPY | Facility: HOSPITAL | Age: 63
End: 2023-11-03
Attending: INTERNAL MEDICINE
Payer: COMMERCIAL

## 2023-11-03 VITALS
HEART RATE: 63 BPM | DIASTOLIC BLOOD PRESSURE: 67 MMHG | TEMPERATURE: 98 F | WEIGHT: 171.38 LBS | BODY MASS INDEX: 30.37 KG/M2 | OXYGEN SATURATION: 94 % | SYSTOLIC BLOOD PRESSURE: 109 MMHG | RESPIRATION RATE: 18 BRPM | HEIGHT: 63 IN

## 2023-11-03 DIAGNOSIS — C34.01 MALIGNANT NEOPLASM OF HILUS OF RIGHT LUNG: Primary | ICD-10-CM

## 2023-11-03 DIAGNOSIS — J96.11 CHRONIC RESPIRATORY FAILURE WITH HYPOXIA: ICD-10-CM

## 2023-11-03 DIAGNOSIS — C34.90 NON-SMALL CELL LUNG CANCER, UNSPECIFIED LATERALITY: ICD-10-CM

## 2023-11-03 PROCEDURE — A4216 STERILE WATER/SALINE, 10 ML: HCPCS | Performed by: INTERNAL MEDICINE

## 2023-11-03 PROCEDURE — 96413 CHEMO IV INFUSION 1 HR: CPT

## 2023-11-03 PROCEDURE — 63600175 PHARM REV CODE 636 W HCPCS: Performed by: INTERNAL MEDICINE

## 2023-11-03 PROCEDURE — 25000003 PHARM REV CODE 250: Performed by: INTERNAL MEDICINE

## 2023-11-03 RX ORDER — SODIUM CHLORIDE 0.9 % (FLUSH) 0.9 %
10 SYRINGE (ML) INJECTION
Status: DISCONTINUED | OUTPATIENT
Start: 2023-11-03 | End: 2023-11-03 | Stop reason: HOSPADM

## 2023-11-03 RX ORDER — DIPHENHYDRAMINE HYDROCHLORIDE 50 MG/ML
50 INJECTION INTRAMUSCULAR; INTRAVENOUS ONCE AS NEEDED
Status: DISCONTINUED | OUTPATIENT
Start: 2023-11-03 | End: 2023-11-03 | Stop reason: HOSPADM

## 2023-11-03 RX ORDER — HEPARIN 100 UNIT/ML
500 SYRINGE INTRAVENOUS
Status: DISCONTINUED | OUTPATIENT
Start: 2023-11-03 | End: 2023-11-03 | Stop reason: HOSPADM

## 2023-11-03 RX ORDER — EPINEPHRINE 0.3 MG/.3ML
0.3 INJECTION SUBCUTANEOUS ONCE AS NEEDED
Status: DISCONTINUED | OUTPATIENT
Start: 2023-11-03 | End: 2023-11-03 | Stop reason: HOSPADM

## 2023-11-03 RX ADMIN — ATEZOLIZUMAB 1200 MG: 1200 INJECTION, SOLUTION INTRAVENOUS at 02:11

## 2023-11-03 RX ADMIN — SODIUM CHLORIDE, PRESERVATIVE FREE 10 ML: 5 INJECTION INTRAVENOUS at 03:11

## 2023-11-03 RX ADMIN — HEPARIN 500 UNITS: 100 SYRINGE at 03:11

## 2023-11-06 ENCOUNTER — OFFICE VISIT (OUTPATIENT)
Dept: PULMONOLOGY | Facility: CLINIC | Age: 63
End: 2023-11-06
Payer: COMMERCIAL

## 2023-11-06 VITALS
DIASTOLIC BLOOD PRESSURE: 64 MMHG | WEIGHT: 172.5 LBS | BODY MASS INDEX: 30.56 KG/M2 | OXYGEN SATURATION: 93 % | HEIGHT: 63 IN | HEART RATE: 63 BPM | SYSTOLIC BLOOD PRESSURE: 98 MMHG

## 2023-11-06 DIAGNOSIS — G47.30 SLEEP APNEA, UNSPECIFIED TYPE: Primary | ICD-10-CM

## 2023-11-06 DIAGNOSIS — J44.9 CHRONIC OBSTRUCTIVE PULMONARY DISEASE, UNSPECIFIED COPD TYPE: ICD-10-CM

## 2023-11-06 DIAGNOSIS — J96.11 CHRONIC RESPIRATORY FAILURE WITH HYPOXIA: ICD-10-CM

## 2023-11-06 DIAGNOSIS — R40.0 DAYTIME SOMNOLENCE: ICD-10-CM

## 2023-11-06 PROCEDURE — 99213 OFFICE O/P EST LOW 20 MIN: CPT | Mod: S$GLB,,, | Performed by: NURSE PRACTITIONER

## 2023-11-06 PROCEDURE — 3078F PR MOST RECENT DIASTOLIC BLOOD PRESSURE < 80 MM HG: ICD-10-PCS | Mod: CPTII,S$GLB,, | Performed by: NURSE PRACTITIONER

## 2023-11-06 PROCEDURE — 99999 PR PBB SHADOW E&M-EST. PATIENT-LVL IV: ICD-10-PCS | Mod: PBBFAC,,, | Performed by: NURSE PRACTITIONER

## 2023-11-06 PROCEDURE — 1159F MED LIST DOCD IN RCRD: CPT | Mod: CPTII,S$GLB,, | Performed by: NURSE PRACTITIONER

## 2023-11-06 PROCEDURE — 3078F DIAST BP <80 MM HG: CPT | Mod: CPTII,S$GLB,, | Performed by: NURSE PRACTITIONER

## 2023-11-06 PROCEDURE — 3074F PR MOST RECENT SYSTOLIC BLOOD PRESSURE < 130 MM HG: ICD-10-PCS | Mod: CPTII,S$GLB,, | Performed by: NURSE PRACTITIONER

## 2023-11-06 PROCEDURE — 3008F PR BODY MASS INDEX (BMI) DOCUMENTED: ICD-10-PCS | Mod: CPTII,S$GLB,, | Performed by: NURSE PRACTITIONER

## 2023-11-06 PROCEDURE — 3074F SYST BP LT 130 MM HG: CPT | Mod: CPTII,S$GLB,, | Performed by: NURSE PRACTITIONER

## 2023-11-06 PROCEDURE — 1159F PR MEDICATION LIST DOCUMENTED IN MEDICAL RECORD: ICD-10-PCS | Mod: CPTII,S$GLB,, | Performed by: NURSE PRACTITIONER

## 2023-11-06 PROCEDURE — 99213 PR OFFICE/OUTPT VISIT, EST, LEVL III, 20-29 MIN: ICD-10-PCS | Mod: S$GLB,,, | Performed by: NURSE PRACTITIONER

## 2023-11-06 PROCEDURE — 99999 PR PBB SHADOW E&M-EST. PATIENT-LVL IV: CPT | Mod: PBBFAC,,, | Performed by: NURSE PRACTITIONER

## 2023-11-06 PROCEDURE — 3008F BODY MASS INDEX DOCD: CPT | Mod: CPTII,S$GLB,, | Performed by: NURSE PRACTITIONER

## 2023-11-06 RX ORDER — ALBUTEROL SULFATE 90 UG/1
2 AEROSOL, METERED RESPIRATORY (INHALATION) EVERY 6 HOURS PRN
Qty: 8.5 G | Refills: 11 | Status: SHIPPED | OUTPATIENT
Start: 2023-11-06 | End: 2024-11-05

## 2023-11-06 RX ORDER — PREDNISONE 20 MG/1
TABLET ORAL
Qty: 12 TABLET | Refills: 0 | Status: SHIPPED | OUTPATIENT
Start: 2023-11-06 | End: 2024-02-08 | Stop reason: SDUPTHER

## 2023-11-06 NOTE — PATIENT INSTRUCTIONS
Continue current copd medication regiment    Use nebulizer at least 3 days week every week.     Ordering overnight sleep study in clinic to evaluate for sleep apnea    If positive will order CPAP machine, notify clinic when machine is delivered to home to set up 31 days compliance appointment. You must use the machine for a least 4 hours every night.

## 2023-11-06 NOTE — PROGRESS NOTES
11/6/2023    Yvette Hutchinson  Office note    Chief Complaint   Patient presents with    6m F/U    COPD       HPI:   11/6/2023- states doing well, followed by Dr. Lee last note reviewed from 11/2/2023. States hair has grown back following completion of chemo. On immune suppressant therapy.   SOB is persistent complaint. Worse with exertion, improves with rest. Using albuterol inhaler 2-3 times weekly. On Advair daily.     Cough is improved. Now gets breaks for weeks, states cough last few days with change in weather.     Wakes herself up snoring, has sore throat in mornings. Wakes up feeling tired. Gets sudden feeling of fatigue when standing in kitchen. On supplemental oxygen from [employer], wearing nightly with benefit. Had normal at home sleep study in 2021 negative. Complaint of gaining weight.     4/27/2023-  reviewed note oncologist Dr. Lee 4/18/23 on immune therapy for adenosquamous carcinoma ; recently lost brother from Sepsis. anxiety treated by Dr. Lee office but has referral to pain management. Currently living with and caring for her mother.   SOB- stable complaint, worse with exertion, improves with rest. Wearing supplemental oxygen at 3L. Able to do activities just has to pace herself  Associated with productive cough, dx COVID 19 March 2022 productive clear yellow/green mucous. Daily complaint, wakes her at night.    1/23/23- undergoing chemo followed by Dr. Lee, complaint of cough and chest tightness onset 3 weeks, seen at urgent care, x-ray clear. Productive dark brown mucous to clear. Using nebulizer daily with benefit.   Treated with cough syrup, steroids, and antibiotics. States feeling better.   Wearing supplemental oxygen as needed. Has smaller more portable bottles, wearing at 3 liters with benefit. Able to do more in home.   Does have moments of anxiety due to states of health. Takes xanax as needed with benefit.     10/25/22- left lobectomy and lymphadenectomy Dr. Kaplan  10/3/2022- still have incision site pain, 10 on 0-10 scale, soreness. Took pain medication with benefit but has run out. Had chest tube removed 6 days prior. No swelling or drainage from incision site.   Schedule to have port placed today at 3 pm. Will start chemo therapy this week., Followed by oncologist Dr. Lee.   Wearing supplemental oxygen as needed at 3L with benefit.   SOB- stable, on advair daily, using albuterol as needed. Cough- improved, daily, mild.     Had sleep study but not hear results    7/21/2022- states doing well, Cough- recurrent complaint, worsened in past 3 days, productive  Brown mucous, associated with chest tightness and wheeze. Worse in early mornings and late evenings.   Has headaches when waking up. Has daytime fatigue.     4/28/2022- COPD exacerbation onset 3 days, persistent cough worse in early mornings and late evenings, has nocturnal arousals, productive thick brown mucous,   Associated with chest tightness and wheeze. Improved with albuterol nebulizer but returns after 2 hours. Severe complaint has urinated due to coughing fits.   Complaint of left ear pain and pressure with headaches. Associated with body aches and fatigue.  On average has exacerbation once every 3 months.   History of chronic knee pain, not interested in orthopedic referral, treated previously with ibuprofen with benefit.     BRYNN Vasquez reviewed  1/11/2022: Hx of CLL, COPD  Endorses onset of headache, fatigue x2 days.  Cough: Productive with green thick sputum production, onset 2 days, worse at night time and in the morning. Not relieved with cough syrup. States Codeine cough syrup has helped in the past. Associated with wheezing.   States breathing is the same, not worse from baseline. Using supplemental oxygen at night and PRN.   Denies fever, denies chest tightness, GI complaints.  On Daily Advair, using rescue inhaler twice daily. Using nebulizer daily with no noted improvement.   Cut back to 2  cigarettes per day.      8/16/2021- not currently interested to perform in clinic sleep study.   SOB- daily, worse with hot weather, ran out of rescue inhaler. On Advair daily with benefit. Improves with rest.   Started coughing  After weed eating yard.     Cough- recurrent complaint, onset 5 days after doing yard work, productive clear mucous, severe complaint, inhibits ability to sleep when first laying down at night.     5/14/2021- did not receive sleep study from [facility] as ordered. Wearing supplemental oxygen at night while sleeping at 3 L. Uses when needed during day.    Having trouble falling asleep at night, started on new medication with no current benefit. Not able to fall asleep or stay asleep. Onset years, worsening with time. Feels tired when waking up in morning. Associated with occasional morning headaches.     complaint of chronic back pain followed by PCP.    Cough- recurrent complaint, mild, thick mucous, not using nebulizer regularly,   SOB- daily, worse with exertion and occasionally occurs at rest. Feels she can not take a deep breath.   On advair daily. Taking prednisone 20mg for 3 days 2x monthly.    2/8/2021- Cough- improved, daily, mild, Complaint of dry throat at night. Worse in early morning and late evenings.   occasionally productive small amount yellow mucous.   Currently smoking 1/2 pack daily, finished chantix with improvement but picked up after stopping.   Wearing supplemental oxygen at 3L most nights with benefit. SOB worse when laying flat on back, tried wedge but caused neck pain.   Sinus- unchanged, recurrent headaches in morning, sinus drainage. Currently on Flonase daily    12/3/2020- has supplemental oxygen set at 3 L at night, states benefiting,   Cough- worsened in last 7 days, worse in early morning and late evenings, nocturnal arousals nightly, productive yellow/green mucous quarter size.  Complaint of daily fatigue, dry throat in morning, day time drowsiness,  mental cloudiness. Feels she never gets good sleep at night even when wearing supplemental oxygen.     10/21/2020- SOB and wheeze- recurrent problem, worsened last weeks, associated with sinus congestion, wheeze is worse in early morning and when laying down at nigtht  Cough- productive brown/green mucous for 1 week. Has not started steroid therapy. Improves with nebulizer using 1-2x daily for past week. Was not able afford inhalers. Did not receive call from [facility].     Complaint for cramping sensation in chest that occurs sporadically on left and right side that lasts few minutes. Onset years, recurrent complaint, started back 1 week prior. Occurs couple days in row.     7/15/2020- cough- onset 7 days, worse in mornings and night, nocturnal arousals 1x nightly, productive yellow/green dime size mucous, associated with chest tightness and wheeze, improves with nebulizer using 1-2 daily, states feels better after coughing up large amounts of mucous; went to covid clinic and tested negative for covid did have fluid on ears.   Currently on Advair daily, insurance did not cover spiriva;     4/15/2020- cough- onset weeks, associated with occasional chest tighness, worse at night and early morning, quarter size clear to light brown in color. Currently    advair, spiriva, and rescue inhaler. States using rescue daily with little benefit.    3/5/2020- Pt is a 60 yo female with depression, GERD and COPD presenting for new evaluation.  Has chronic cough worse last 2 wks w/ phlegm, white, worse in am and evening.  Inhalers: rescue inhaler says insurance didn't cover  She reports wt gain over last 2 yrs. Has abdominal cramps intermittently radiating to the back.   Smoking since she was very young, 1 pack lasts 3 days.  She gets yearly bronchitis.  Labs from her PCP done 2 days ago are concerning for possible CLL- with WBC 21 and peripheral smear w/ smudge cells    The chief compliant  problem varies with instablilty  at time  PFSH:  Past Medical History:   Diagnosis Date    Arthritis     Cancer     Depression     GERD (gastroesophageal reflux disease)     Pneumonia of left lung due to infectious organism 03/05/2020         Past Surgical History:   Procedure Laterality Date    INJECTION OF ANESTHETIC AGENT AROUND MULTIPLE INTERCOSTAL NERVES Left 10/3/2022    Procedure: BLOCK, NERVE, INTERCOSTAL, 2 OR MORE;  Surgeon: Evelio Kaplan MD;  Location: [facility] OR [address] [place] [address];  Service: Thoracic;  Laterality: Left;    INSERTION OF TUNNELED CENTRAL VENOUS CATHETER (CVC) WITH SUBCUTANEOUS PORT Right 11/3/2022    Procedure: [id number], Right or Left Neck or Chest;  Surgeon: Mini Acevedo MD;  Location: [facility] OR [address] [place] [address];  Service: General;  Laterality: Right;    ROBOT-ASSISTED LAPAROSCOPIC LYMPHADENECTOMY USING DA MONIQUE XI Left 10/3/2022    Procedure: XI ROBOTIC LYMPHADENECTOMY;  Surgeon: Evelio Kaplan MD;  Location: [facility] OR [address] [place] [address];  Service: Thoracic;  Laterality: Left;    TONSILLECTOMY      XI ROBOTIC RATS,WITH LOBECTOMY,LUNG Left 10/3/2022    Procedure: XI ROBOTIC RATS,WITH LOBECTOMY,LUNG;  Surgeon: Evelio Kaplan MD;  Location: [facility] OR [address] [place] [address];  Service: Thoracic;  Laterality: Left;     Social History     Tobacco Use    Smoking status: Some Days     Current packs/day: 0.15     Types: Cigarettes     Passive exposure: Current    Smokeless tobacco: Never    Tobacco comments:     reports one cigarette every now and then   Substance Use Topics    Alcohol use: Yes     Comment: rarely    Drug use: Yes     Types: Marijuana     Family History   Problem Relation Age of Onset    Ovarian cancer Sister     Breast cancer Cousin      Review of patient's allergies indicates:  No Known Allergies    Performance Status:The patient's activity level is functions out of house.      Review of Systems:  a review of eleven systems covering constitutional, Eye, HEENT, Psych, Respiratory, Cardiac, GI, , Musculoskeletal, Endocrine,  "Dermatologic was negative except for pertinent findings as listed ABOVE and below:  Shortness of breath, Cough, fatigue       Exam:Comprehensive exam done. BP 98/64 (BP Location: Left arm, Patient Position: Sitting, BP Method: Medium (Automatic))   Pulse 63   Ht 5' 3" (1.6 m)   Wt 78.3 kg (172 lb 8.2 oz)   LMP 01/13/2010 (Approximate)   SpO2 (!) 93% Comment: on room air at rest  BMI 30.56 kg/m²   Exam included Vitals as listed, and patient's appearance and affect and alertness and mood, oral exam for yeast and hygiene and pharynx lesions and Mallapatti (M) score, neck with inspection for jvd and masses and thyroid abnormalities and lymph nodes (supraclavicular and infraclavicular nodes and axillary also examined and noted if abn), chest exam included symmetry and effort and fremitus and percussion and auscultation, cardiac exam included rhythm and gallops and murmur and rubs and jvd and edema, abdominal exam for mass and hepatosplenomegaly and tenderness and hernias and bowel sounds, Musculoskeletal exam with muscle tone and posture and mobility/gait and  strength, and skin for rashes and cyanosis and pallor and turgor, extremity for clubbing.  Findings were normal except for pertinent findings listed below:   Breath sounds clear   M2      Radiographs (ct chest and cxr) reviewed: view by direct vision   CT/PET SCAN ROUTINE 07/18/23 No findings of recurrent lung neoplasm or metastatic disease     X-Ray Chest AP Portable 03/25/23 left volume loss, leftward mediastinal shift, hyper inflation right lung    CT Chest With Contrast 09/06/22   1. Hypermetabolic nodule left upper lobe appears stable upon comparison.  2. Small localized area of hypermetabolic activity in the left infrahilar region is likewise unchanged upon comparison.  3. Indirect findings related to old granulomatous disease.    CT Chest Without Contrast 07/29/2022   2.3 cm spiculated left upper lobe lung nodule highly suspicious for bronchogenic " carcinoma.  New nonspecific 7 mm nodule in the right middle lobe; this appears more linear on the coronal images and may be a focus of scarring.  Additional stable lung nodules, mildly enlarged axillary lymph nodes, substernal thyroid nodule; these findings are likely benign given the interval stability.      CT Chest Without Contrast 11/20/2020   Stable right lung pulmonary nodules.  Mild increase in size of clustered nodules within the left upper lobe, with the distribution most compatible with an atypical infectious process.  Old granulomatous disease.  Left adrenal adenoma, unchanged.  Chronic T8 compression fracture.    CT Chest Without Contrast 03/11/2020   1. There are two right lung pulmonary nodules.  Per LUNG-RADS scoring this would reflect a Category 3 (probably benign).  Recommendation is for 6 month follow-up LDCT.  2. Airways disease or apical typical infection in the left lower lobe.       CXR 12/19/19- clear lung fields bilaterally     Labs reviewed    Lab Results   Component Value Date    WBC 20.78 (H) 03/03/2020    RBC 4.74 03/03/2020    HGB 14.9 03/03/2020    HCT 47.0 03/03/2020    MCV 99 (H) 03/03/2020    MCH 31.4 (H) 03/03/2020    MCHC 31.7 (L) 03/03/2020    RDW 13.1 03/03/2020     03/03/2020    MPV 11.9 03/03/2020    GRAN 33.0 (L) 03/03/2020    LYMPH 64.0 (H) 03/03/2020    MONO 3.0 (L) 03/03/2020    EOS CANCELED 08/18/2017    BASO CANCELED 08/18/2017    EOSINOPHIL 0.0 03/03/2020    BASOPHIL 0.0 03/03/2020     Specimen to Pathology, Radiology Lung 08/17/22   Non small cell     PFT reviewed    3/12/2020 Severe obstruction. FEV1  49 %  predicted.   Airflow is not clearly improved after bronchodilator. Clinical improvement following bronchodilator therapy may occur in the absence of spirometric improvement.   Mild Hyperinflation.   Moderate reduction in diffusion capacity - unadjusted for hemoglobin.     EPWORTH SLEEPINESS SCALE 15 7/21/2022  [facility] HST 05/27/2021 TRISTEN 4.3       Plan:  Clinical  impression is resonably certain and repeated evaluation prn +/- follow up will be needed as below.    Yvette was seen today for 6m f/u and copd.    Diagnoses and all orders for this visit:    Sleep apnea, unspecified type  -     Polysomnogram (CPAP will be added if patient meets diagnostic criteria.); Future    Daytime somnolence  -     Polysomnogram (CPAP will be added if patient meets diagnostic criteria.); Future    Chronic obstructive pulmonary disease, unspecified COPD type  -     predniSONE (DELTASONE) 20 MG tablet; Take one pill a day for three days, repeat for shortness of breath    Chronic respiratory failure with hypoxia  Comments:  - continue supplemental oxygen                Follow up in about 3 months (around 2/6/2024), or if symptoms worsen or fail to improve.    Discussed with patient above for education the following:      Patient Instructions   Continue current copd medication regiment    Use nebulizer at least 3 days week every week.     Ordering overnight sleep study in clinic to evaluate for sleep apnea    If positive will order CPAP machine, notify clinic when machine is delivered to home to set up 31 days compliance appointment. You must use the machine for a least 4 hours every night.

## 2023-11-07 ENCOUNTER — PATIENT MESSAGE (OUTPATIENT)
Dept: HEMATOLOGY/ONCOLOGY | Facility: CLINIC | Age: 63
End: 2023-11-07
Payer: COMMERCIAL

## 2023-11-22 ENCOUNTER — OFFICE VISIT (OUTPATIENT)
Dept: HEMATOLOGY/ONCOLOGY | Facility: CLINIC | Age: 63
End: 2023-11-22
Payer: COMMERCIAL

## 2023-11-22 ENCOUNTER — LAB VISIT (OUTPATIENT)
Dept: LAB | Facility: HOSPITAL | Age: 63
End: 2023-11-22
Attending: INTERNAL MEDICINE
Payer: COMMERCIAL

## 2023-11-22 ENCOUNTER — TELEPHONE (OUTPATIENT)
Dept: HEMATOLOGY/ONCOLOGY | Facility: CLINIC | Age: 63
End: 2023-11-22

## 2023-11-22 DIAGNOSIS — C34.01 MALIGNANT NEOPLASM OF HILUS OF RIGHT LUNG: ICD-10-CM

## 2023-11-22 DIAGNOSIS — E06.4 DRUG-INDUCED THYROIDITIS: Primary | ICD-10-CM

## 2023-11-22 DIAGNOSIS — G89.3 NEOPLASM RELATED PAIN: ICD-10-CM

## 2023-11-22 DIAGNOSIS — R45.89 ANXIETY ABOUT HEALTH: ICD-10-CM

## 2023-11-22 LAB
ALBUMIN SERPL BCP-MCNC: 4.2 G/DL (ref 3.5–5.2)
ALP SERPL-CCNC: 114 U/L (ref 55–135)
ALT SERPL W/O P-5'-P-CCNC: 17 U/L (ref 10–44)
ANION GAP SERPL CALC-SCNC: 10 MMOL/L (ref 8–16)
AST SERPL-CCNC: 19 U/L (ref 10–40)
BASOPHILS NFR BLD: 0 % (ref 0–1.9)
BILIRUB SERPL-MCNC: 0.3 MG/DL (ref 0.1–1)
BUN SERPL-MCNC: 11 MG/DL (ref 8–23)
CALCIUM SERPL-MCNC: 9 MG/DL (ref 8.7–10.5)
CHLORIDE SERPL-SCNC: 107 MMOL/L (ref 95–110)
CO2 SERPL-SCNC: 27 MMOL/L (ref 23–29)
CREAT SERPL-MCNC: 0.9 MG/DL (ref 0.5–1.4)
DIFFERENTIAL METHOD: ABNORMAL
EOSINOPHIL NFR BLD: 1 % (ref 0–8)
ERYTHROCYTE [DISTWIDTH] IN BLOOD BY AUTOMATED COUNT: 13.2 % (ref 11.5–14.5)
EST. GFR  (NO RACE VARIABLE): >60 ML/MIN/1.73 M^2
GLUCOSE SERPL-MCNC: 100 MG/DL (ref 70–110)
HCT VFR BLD AUTO: 39.6 % (ref 37–48.5)
HGB BLD-MCNC: 13.1 G/DL (ref 12–16)
IMM GRANULOCYTES # BLD AUTO: ABNORMAL K/UL (ref 0–0.04)
IMM GRANULOCYTES NFR BLD AUTO: ABNORMAL % (ref 0–0.5)
LYMPHOCYTES NFR BLD: 77 % (ref 18–48)
MCH RBC QN AUTO: 32.6 PG (ref 27–31)
MCHC RBC AUTO-ENTMCNC: 33.1 G/DL (ref 32–36)
MCV RBC AUTO: 99 FL (ref 82–98)
MONOCYTES NFR BLD: 8 % (ref 4–15)
NEUTROPHILS NFR BLD: 14 % (ref 38–73)
NRBC BLD-RTO: 0 /100 WBC
PLATELET # BLD AUTO: 215 K/UL (ref 150–450)
PLATELET BLD QL SMEAR: ABNORMAL
PMV BLD AUTO: 9.4 FL (ref 9.2–12.9)
POTASSIUM SERPL-SCNC: 4 MMOL/L (ref 3.5–5.1)
PROT SERPL-MCNC: 6.6 G/DL (ref 6–8.4)
RBC # BLD AUTO: 4.02 M/UL (ref 4–5.4)
SODIUM SERPL-SCNC: 144 MMOL/L (ref 136–145)
WBC # BLD AUTO: 31.48 K/UL (ref 3.9–12.7)

## 2023-11-22 PROCEDURE — 1160F PR REVIEW ALL MEDS BY PRESCRIBER/CLIN PHARMACIST DOCUMENTED: ICD-10-PCS | Mod: CPTII,95,, | Performed by: NURSE PRACTITIONER

## 2023-11-22 PROCEDURE — 36415 COLL VENOUS BLD VENIPUNCTURE: CPT | Performed by: INTERNAL MEDICINE

## 2023-11-22 PROCEDURE — 1159F PR MEDICATION LIST DOCUMENTED IN MEDICAL RECORD: ICD-10-PCS | Mod: CPTII,95,, | Performed by: NURSE PRACTITIONER

## 2023-11-22 PROCEDURE — 85027 COMPLETE CBC AUTOMATED: CPT | Performed by: INTERNAL MEDICINE

## 2023-11-22 PROCEDURE — 1159F MED LIST DOCD IN RCRD: CPT | Mod: CPTII,95,, | Performed by: NURSE PRACTITIONER

## 2023-11-22 PROCEDURE — 99214 OFFICE O/P EST MOD 30 MIN: CPT | Mod: 95,,, | Performed by: NURSE PRACTITIONER

## 2023-11-22 PROCEDURE — 1160F RVW MEDS BY RX/DR IN RCRD: CPT | Mod: CPTII,95,, | Performed by: NURSE PRACTITIONER

## 2023-11-22 PROCEDURE — 99214 PR OFFICE/OUTPT VISIT, EST, LEVL IV, 30-39 MIN: ICD-10-PCS | Mod: 95,,, | Performed by: NURSE PRACTITIONER

## 2023-11-22 PROCEDURE — 80053 COMPREHEN METABOLIC PANEL: CPT | Performed by: INTERNAL MEDICINE

## 2023-11-22 PROCEDURE — 85007 BL SMEAR W/DIFF WBC COUNT: CPT | Performed by: INTERNAL MEDICINE

## 2023-11-22 RX ORDER — ALPRAZOLAM 0.5 MG/1
0.5 TABLET ORAL 3 TIMES DAILY
Qty: 90 TABLET | Refills: 0 | Status: SHIPPED | OUTPATIENT
Start: 2023-11-22 | End: 2024-02-07

## 2023-11-22 RX ORDER — SODIUM CHLORIDE 0.9 % (FLUSH) 0.9 %
10 SYRINGE (ML) INJECTION
Status: CANCELLED | OUTPATIENT
Start: 2023-11-24

## 2023-11-22 RX ORDER — OXYCODONE AND ACETAMINOPHEN 5; 325 MG/1; MG/1
1 TABLET ORAL EVERY 4 HOURS PRN
Qty: 60 TABLET | Refills: 0 | Status: SHIPPED | OUTPATIENT
Start: 2023-11-22 | End: 2024-03-06

## 2023-11-22 RX ORDER — EPINEPHRINE 0.3 MG/.3ML
0.3 INJECTION SUBCUTANEOUS ONCE AS NEEDED
Status: CANCELLED | OUTPATIENT
Start: 2023-11-24

## 2023-11-22 RX ORDER — DIPHENHYDRAMINE HYDROCHLORIDE 50 MG/ML
50 INJECTION INTRAMUSCULAR; INTRAVENOUS ONCE AS NEEDED
Status: CANCELLED | OUTPATIENT
Start: 2023-11-24

## 2023-11-22 RX ORDER — HEPARIN 100 UNIT/ML
500 SYRINGE INTRAVENOUS
Status: CANCELLED | OUTPATIENT
Start: 2023-11-24

## 2023-11-22 RX ORDER — NALOXONE HYDROCHLORIDE 4 MG/.1ML
1 SPRAY NASAL ONCE
Qty: 1 EACH | Refills: 0 | Status: SHIPPED | OUTPATIENT
Start: 2023-11-22 | End: 2023-11-22

## 2023-11-22 NOTE — PROGRESS NOTES
The patient location is: home  Visit type: Virtual visit with synchronous audio and video  Face-to-face or time spent with patient on the encounter: 25 min  Total time spent on and for  this encounter which includes non face-to-face time preparing to see patient, review of tests, obtaining and or reviewing separately obtained records documenting clinical information in the electronic or other health records, independently interpreting results which is not separately reported ,and communicating results to the patient/family/caregiver and in care coordination and treatment planning/communicating with pharmacy for prescriptions/addressing social needs/arranging follow-up and or referrals :35 min    Each patient I provide medical services by telemedicine is:  (1) informed of the relationship between the physician and patient and the respective role of any other health care provider with respect to management of the patient; and (2) notified that he or she may decline to receive medical services by telemedicine and may withdraw from such care at any time.  This is a video visit therefore some elements of the physical exam such as vital signs, heart sounds are breath sounds are not included and may be included if found in recent clinic notes of other providers assessing same patient. Any symptoms or signs that were visualized were stated by the patient may be included in this note.     Subjective:       Patient ID: Yvette Hutchinson is a 63 y.o. female.    Chief Complaint:  Patient known to me with CLL stage 0 recently diagnosed with lung cancer August 2022  Follow-up  Associated symptoms include myalgias.   Lung Cancer  Associated symptoms include myalgias.   Too worried but I do want to keep a close watch on it how you feeling no steroids nothing why 20 mg no steroids steroids nothing at all we will discontinue to do this we will not change   Patient has chronic complains of chronic pain syndrome and COPD for  which periodically she take steroids she is also on BuSpar has issues anxiety has required dilatation of esophageal strictures allergic rhinitis insomnia and history of B12 deficiency documented   Had CT chest done with pulmonary 7/22 showed mass bx done. 8/22 11/22/2023:  She returns today for follow-up.  She is complaining of myalgias related to her malignancy.  She is requesting something for pain and anxiety    Oncology hx  Impression:ct chest 7/29/22     2.3 cm spiculated left upper lobe lung nodule highly suspicious for bronchogenic carcinoma.     New nonspecific 7 mm nodule in the right middle lobe; this appears more linear on the coronal images and may be a focus of scarring.     Additional stable lung nodules, mildly enlarged axillary lymph nodes, substernal thyroid nodule; these findings are likely benign given the interval stability.   LEFT LUNG MASS, CT-GUIDED BIOPSY:   Non-small cell lung carcinoma.   Comment:  The biopsy reveals invasive carcinoma with apparent glandular but   also vague squamoid features.  Tumor cells are diffusely positive with TTF-1   and focally positive with P40.  The histology differential includes   adenocarcinoma and adenosquamous carcinoma.  PD-L1 and templates NGS studies   are in progress.  The results will be issued separately.    October 3, 2022: Robotic left lobectomy T1c N1    Social history; patient is a smoker denies recreational alcohol use or drug use   Patient is currently on disability  She is allergic to the mask used during COVID pandemic she is also bothered by her mask with COPD    Family history suggestive of ovarian and breast cancer patient advised to see a  or seek   Review of patient's allergies indicates:  No Known Allergies  Medications have been reviewed  Current Outpatient Medications on File Prior to Visit   Medication Sig Dispense Refill    (Magic mouthwash) 1:1:1 diphenhydrAMINE(Benadryl) 12.5mg/5ml liq, aluminum & magnesium  hydroxide-simethicone (Maalox), LIDOcaine viscous 2% Swish and spit 10 mLs 3 (three) times daily. for mouth sores 250 mL 0    acetaminophen (TYLENOL) 500 MG tablet Take 2 tablets (1,000 mg total) by mouth every 6 (six) hours as needed for Pain. 8 tablet 0    albuterol (PROVENTIL/VENTOLIN HFA) 90 mcg/actuation inhaler Inhale 2 puffs into the lungs every 6 (six) hours as needed for Wheezing or Shortness of Breath. Rescue 8.5 g 11    albuterol-ipratropium (DUO-NEB) 2.5 mg-0.5 mg/3 mL nebulizer solution Take 3 mLs by nebulization every 6 (six) hours as needed for Wheezing. Rescue 120 each 5    ALPRAZolam (XANAX) 0.5 MG tablet Take 1 tablet (0.5 mg total) by mouth 3 (three) times daily. 90 tablet 0    benzocaine-menthoL 6-10 mg lozenge Take 1 lozenge by mouth every 2 (two) hours as needed for Pain. 18 tablet 0    buPROPion (WELLBUTRIN XL) 300 MG 24 hr tablet Take 1 tablet (300 mg total) by mouth once daily. 90 tablet 3    celecoxib (CELEBREX) 200 MG capsule Take 2 capsules (400 mg total) by mouth once daily. 180 capsule 1    diphenhydrAMINE-aluminum-magnesium hydroxide-simethicone-LIDOcaine HCl 2% Swish and spit 15 mLs every 4 (four) hours as needed (mouth sores). 100 each 2    EScitalopram oxalate (LEXAPRO) 20 MG tablet Take 1 tablet (20 mg total) by mouth once daily. 30 tablet 11    fluticasone propionate (FLONASE) 50 mcg/actuation nasal spray 1 spray (50 mcg total) by Each Nostril route once daily. 16 g 3    fluticasone-salmeterol 230-21 mcg/dose (ADVAIR HFA) 230-21 mcg/actuation HFAA inhaler Inhale 2 puffs into the lungs 2 (two) times daily. Controller 12 g 5    gabapentin (NEURONTIN) 300 MG capsule Take 1 capsule (300 mg total) by mouth 3 (three) times daily. 270 capsule 1    levocetirizine (XYZAL) 5 MG tablet Take 1 tablet (5 mg total) by mouth every evening. 30 tablet 5    LIDOcaine (LIDODERM) 5 % Place 1 patch onto the skin once daily. Remove & Discard patch within 12 hours or as directed by MD Bloom patch 0     LIDOcaine (LIDODERM) 5 % Place 1 patch onto the skin once daily. Remove & Discard patch within 12 hours or as directed by MD Bloom patch 1    LIDOcaine-prilocaine (EMLA) cream Apply topically as needed (apply to port site 30 min before access). 30 g 0    OLANZapine (ZYPREXA) 5 MG tablet Take 1 tablet (5 mg total) by mouth nightly. Take 1 tablet at bedtime on days 1-4 of each cycle of chemotherapy 30 tablet 2    ondansetron (ZOFRAN-ODT) 8 MG TbDL Take 1 tablet (8 mg total) by mouth every 12 (twelve) hours as needed (nausea). 60 tablet 1    predniSONE (DELTASONE) 20 MG tablet Take one pill a day for three days, repeat for shortness of breath 12 tablet 0    varenicline (CHANTIX STARTING MONTH BOX) 0.5 mg (11)- 1 mg (42) tablet Take one 0.5mg tab by mouth once daily X3 days,then increase to one 0.5mg tab twice daily X4 days,then increase to one 1mg tab twice daily 1 each 0     No current facility-administered medications on file prior to visit.       REVIEW OF SYSTEMS:     Review of Systems   Constitutional: Negative.    HENT: Negative.     Eyes: Negative.    Respiratory: Negative.     Cardiovascular: Negative.    Gastrointestinal: Negative.    Genitourinary: Negative.    Musculoskeletal:  Positive for myalgias.   Skin: Negative.    Neurological: Negative.    Endo/Heme/Allergies: Negative.    Psychiatric/Behavioral: Negative.        Wt Readings from Last 3 Encounters:   11/06/23 78.3 kg (172 lb 8.2 oz)   11/03/23 77.7 kg (171 lb 6.4 oz)   10/23/23 78 kg (171 lb 15.3 oz)     Temp Readings from Last 3 Encounters:   11/03/23 98 °F (36.7 °C)   10/13/23 97.8 °F (36.6 °C)   10/09/23 98.6 °F (37 °C)     BP Readings from Last 3 Encounters:   11/06/23 98/64   11/03/23 109/67   10/23/23 110/60     Pulse Readings from Last 3 Encounters:   11/06/23 63   11/03/23 63   10/23/23 68     VITAL SIGNS:  as above   GENERAL: appears well-built, well-nourished.  No anxiety, no agitation, and in no distress.  Patient is awake, alert, oriented and  cooperative.  HEENT:  Showed no congestion. Trachea is central no obvious icterus or pallor noted no hoarseness. no obvious JVD   NECK:  Supple.  No JVD. No obvious cervical submental or supraclavicular adenopathy.  RS: tube draining on left side. S/p lobectomy  ABDOMEN:  abdomen appears undistended.  EXTREMITIES:  Without edema.  NEUROLOGICAL:  The patient is appropriate, higher functions are normal.  No  obvious neurological deficits.  normal judgement normal thought content  No confusion, no speech impediment. Cranial nerves are intact and show no deficit. No gross motor deficits noted   SKIN MUSCULOSKELETAL: no joint or skeletal deformity, no clubbing of nails.  No visible rash ecchymosis or petechiae  Lab Results   Component Value Date    WBC 20.78 (H) 03/03/2020    HGB 14.9 03/03/2020    HCT 47.0 03/03/2020    MCV 99 (H) 03/03/2020     03/03/2020     Smudge cells presnt  CMP  Sodium   Date Value Ref Range Status   11/22/2023 144 136 - 145 mmol/L Final     Potassium   Date Value Ref Range Status   11/22/2023 4.0 3.5 - 5.1 mmol/L Final     Chloride   Date Value Ref Range Status   11/22/2023 107 95 - 110 mmol/L Final     CO2   Date Value Ref Range Status   11/22/2023 27 23 - 29 mmol/L Final     Glucose   Date Value Ref Range Status   11/22/2023 100 70 - 110 mg/dL Final     BUN   Date Value Ref Range Status   11/22/2023 11 8 - 23 mg/dL Final     Creatinine   Date Value Ref Range Status   11/22/2023 0.9 0.5 - 1.4 mg/dL Final     Calcium   Date Value Ref Range Status   11/22/2023 9.0 8.7 - 10.5 mg/dL Final     Total Protein   Date Value Ref Range Status   11/22/2023 6.6 6.0 - 8.4 g/dL Final     Albumin   Date Value Ref Range Status   11/22/2023 4.2 3.5 - 5.2 g/dL Final     Total Bilirubin   Date Value Ref Range Status   11/22/2023 0.3 0.1 - 1.0 mg/dL Final     Comment:     For infants and newborns, interpretation of results should be based  on gestational age, weight and in agreement with  clinical  observations.    Premature Infant recommended reference ranges:  Up to 24 hours.............<8.0 mg/dL  Up to 48 hours............<12.0 mg/dL  3-5 days..................<15.0 mg/dL  6-29 days.................<15.0 mg/dL       Alkaline Phosphatase   Date Value Ref Range Status   11/22/2023 114 55 - 135 U/L Final     AST   Date Value Ref Range Status   11/22/2023 19 10 - 40 U/L Final     ALT   Date Value Ref Range Status   11/22/2023 17 10 - 44 U/L Final     Anion Gap   Date Value Ref Range Status   11/22/2023 10 8 - 16 mmol/L Final     eGFR if    Date Value Ref Range Status   06/13/2022 >60 >60 mL/min/1.73 m^2 Final     eGFR if non    Date Value Ref Range Status   06/13/2022 >60 >60 mL/min/1.73 m^2 Final     Comment:     Calculation used to obtain the estimated glomerular filtration  rate (eGFR) is the CKD-EPI equation.            2wk ago    Blood Interpretation A B cell lymphoproliferative disorder is present. see comment.    Comment: Interpreted by: Jeremias Sosa M.D., Signed on 08/06/2020 at 13:07   Blood Comment Flow cytometric analysis of  peripheral blood  detects a lambda  light chain   restricted B lymphocyte population showing expression of CD19 and dim CD20   with aberrant expression of CD5 (dim).  T lymphocytes are   immunophenotypically unremarkable.  The blasts gate is not increased.  The   findings are consistent with patient history of chronic lymphocytic   leukemia/small lymphocytic lymphoma.   Flow differential:  Lymphocytes 69.6%, Monocytes 2.8%, Granulocytes  27.5%,   Blast  0.1%, Debris/nRBC 0.1%,  Viability 97.5%.   10/3/22 robotic lef lung lobectomy  1. LYMPH NODE, LEVEL 5 FROZEN SECTION, EXCISION:   One lymph node with dominant necrotizing granuloma, negative for malignancy   (0/1).   2. LYMPH NODES, LEVEL 5 PERMANENT, EXCISION:   Two lymph nodes with multifocal necrotizing granulomas, negative for   malignancy (0/2).   3. LYMPH NODES, LEFT POSTERIOR LEVEL  10, EXCISION:   Five lymph nodes with multifocal necrotizing granulomas, negative for   malignancy (0/5).   4. LYMPH NODE, LEVEL 11, EXCISION:   One lymph node, negative for malignancy (0/1).   5. LYMPH NODES, LEVEL 12, EXCISION:   Three lymph nodes, one with single necrotizing granuloma, negative for   malignancy (0/3).   6. LYMPH NODES, LEVEL 12 NEAR UPPER DIVISION BRONCHUS, EXCISION:   Three lymph nodes with sinus histiocytosis, negative for malignancy (0/3).   7. LYMPH NODES, LEVEL 10 #2, EXCISION:   One of two lymph nodes positive for metastatic carcinoma (1/2).   Size of largest metastatic deposit:  8 mm.   Negative for extranodal extension.   8. LYMPH NODES, LEFT LEVEL 8, EXCISION:   Two lymph nodes with sinus histiocytosis, negative for malignancy (0/2).   9. LYMPH NODES, LEFT LEVEL 9, EXCISION:   Two lymph nodes, negative for malignancy (0/2).   10. 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   Surgical margins are negative for malignancy.   Background lung tissue with subpleural fibrosis, no evidence of granulomas.   Two hilar lymph nodes, negative for malignancy (0/2).   Coment (1-3, 5):  Prominent necrotizing granulomatous inflammation identified   is identified in multiple lymph nodes.  An AE1/AE3 cytokeratin immunostain   performed on the largest granuloma (part 3) reveals no evidence of occult   carcinoma.  GMS and AFB special stains are negative for fungal and acid-fast   organisms, respectively.  The necrotizing features are not typical of   sarcoidosis.  As such, other granulomatous processes may be considered   including secondary response to malignancy or infection.  Clinical   correlation is advised.   Comment (10):  Sections reveal invasive carcinoma involving lung tissue with   predominantly squamoid morphologic features including bright eosinophilic   cytoplasm and intracellular bridging.  There are not significant keratinizing   features.  Some  areas show basaloid features.  There is also regional luminal   features including cribriforming and vague glandular structures containing   mucin.  Tumor cells are diffusely positive with P40 and regionally positive   with TTF-1.  Mucicarmine special stain highlights mucin producing luminal   spaces. Overall tumor appearance and staining profile supports adenosquamous   carcinoma, a variant of non-small cell lung carcinoma.   [facility] SURGICAL PATHOLOGY CANCER CASE SUMMARY:  LUNG   Procedure:  Lobectomy   Specimen laterality:  Left   Tumor focality:  Single focus   Tumor site: Upper lobe of lung   Tumor size:  2.2 cm   Histologic type:  Adenosquamous carcinoma   Spread through airspaces:  Not identified   Visceral pleural invasion:  Not identified   Direct invasion of adjacent structures: Not applicable   Lymphovascular invasion:  Not identified   Margins:  All margins negative for invasive carcinoma     Closest margin to invasive carcinoma:  Bronchial (3.0 cm)   Regional lymph nodes:  Tumor present in regional lymph node     Number of lymph nodes with tumor:  1     Scott sites with tumor:  Left level 10 (10L)     Extranodal extension:  Not identified     Number of lymph nodes examined:  23   Pathologic stage classification (pTNM): pT1c pN1   Special studies:  Performed on previous biopsy if additional studies needed   there may be performed on tissue blockd 10G or 10H.      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 Due to adenosq histology chose carbo/ taxol x 4 cycles tecentriq maintanence x 1 year  pt is has had 5 cycles of carbo/ taxol  discused  maintainenec at tumor board   On tecentriq maintainence   Proceed with tecentriq  Pet7/18/23 neg for mets  See me for next cycle cbc,cmp      CLL   stage 0 will confirmed with flow cytometry  NTD   she has no other cytopenias or lymphadenopathy or B symptoms patient can be observed      Continue with chronic pain syndrome and pain management advised to stop smoking if at all possible 10 pills of hydrocodoen given to pt, she needs to follow with pain mx      History of B12 deficiency will continue to monitor    Advised COVID precaution due to her lung situation she will be a high risk patient    11/23/2023:  She will proceed with her next dose of Tecentriq.  Prescription for Percocet and Xanax provider for anxiety and malignancy related pain.  Also sent a prescription for Narcan.  Educated patient the purpose of Narcan.  She verbalized understanding.  She will see MD in 3 weeks with labs

## 2023-11-22 NOTE — TELEPHONE ENCOUNTER
Returned call per request. Spoke with meseret Michaels. Clarified dx code for pt's percocet rx. No further questions.    ----- Message from Derek Reardon sent at 11/22/2023  9:40 AM CST -----  Contact: Pharm  Type:  Pharmacy Calling to Clarify an RX    Name of Caller:  Xenia  Pharmacy Name:    [facility] [address] [place] [id number] [address]  [address] [facility] [address]  Phone: [phone] Fax: [phone]    Prescription Name: oxyCODONE-acetaminophen (PERCOCET) 5-325 mg per tablet     What do they need to clarify?:  on diagnoses code  Best Call Back Number:  [phone]  Additional Information:  Please advise

## 2023-11-24 ENCOUNTER — INFUSION (OUTPATIENT)
Dept: INFUSION THERAPY | Facility: HOSPITAL | Age: 63
End: 2023-11-24
Attending: INTERNAL MEDICINE
Payer: COMMERCIAL

## 2023-11-24 VITALS
OXYGEN SATURATION: 95 % | TEMPERATURE: 97 F | WEIGHT: 171.38 LBS | DIASTOLIC BLOOD PRESSURE: 68 MMHG | HEART RATE: 55 BPM | RESPIRATION RATE: 18 BRPM | SYSTOLIC BLOOD PRESSURE: 108 MMHG | HEIGHT: 63 IN | BODY MASS INDEX: 30.37 KG/M2

## 2023-11-24 DIAGNOSIS — C34.01 MALIGNANT NEOPLASM OF HILUS OF RIGHT LUNG: ICD-10-CM

## 2023-11-24 DIAGNOSIS — C34.90 NON-SMALL CELL LUNG CANCER, UNSPECIFIED LATERALITY: Primary | ICD-10-CM

## 2023-11-24 PROCEDURE — A4216 STERILE WATER/SALINE, 10 ML: HCPCS | Performed by: NURSE PRACTITIONER

## 2023-11-24 PROCEDURE — 63600175 PHARM REV CODE 636 W HCPCS: Mod: JZ,JG | Performed by: NURSE PRACTITIONER

## 2023-11-24 PROCEDURE — 96413 CHEMO IV INFUSION 1 HR: CPT

## 2023-11-24 PROCEDURE — 25000003 PHARM REV CODE 250: Performed by: NURSE PRACTITIONER

## 2023-11-24 RX ORDER — HEPARIN 100 UNIT/ML
500 SYRINGE INTRAVENOUS
Status: DISCONTINUED | OUTPATIENT
Start: 2023-11-24 | End: 2023-11-24 | Stop reason: HOSPADM

## 2023-11-24 RX ORDER — EPINEPHRINE 0.3 MG/.3ML
0.3 INJECTION SUBCUTANEOUS ONCE AS NEEDED
Status: DISCONTINUED | OUTPATIENT
Start: 2023-11-24 | End: 2023-11-24 | Stop reason: HOSPADM

## 2023-11-24 RX ORDER — SODIUM CHLORIDE 0.9 % (FLUSH) 0.9 %
10 SYRINGE (ML) INJECTION
Status: DISCONTINUED | OUTPATIENT
Start: 2023-11-24 | End: 2023-11-24 | Stop reason: HOSPADM

## 2023-11-24 RX ORDER — DIPHENHYDRAMINE HYDROCHLORIDE 50 MG/ML
50 INJECTION INTRAMUSCULAR; INTRAVENOUS ONCE AS NEEDED
Status: DISCONTINUED | OUTPATIENT
Start: 2023-11-24 | End: 2023-11-24 | Stop reason: HOSPADM

## 2023-11-24 RX ADMIN — SODIUM CHLORIDE, PRESERVATIVE FREE 10 ML: 5 INJECTION INTRAVENOUS at 09:11

## 2023-11-24 RX ADMIN — ATEZOLIZUMAB 1200 MG: 1200 INJECTION, SOLUTION INTRAVENOUS at 08:11

## 2023-11-24 RX ADMIN — HEPARIN 500 UNITS: 100 SYRINGE at 09:11

## 2023-11-24 NOTE — PLAN OF CARE
Problem: Activity Intolerance  Goal: Enhanced Capacity and Energy  Outcome: Ongoing, Progressing  Intervention: Optimize Activity Tolerance  Flowsheets (Taken 11/24/2023 2730)  Self-Care Promotion: independence encouraged  Activity Management: Ambulated -L4  Environmental Support: calm environment promoted

## 2023-11-27 ENCOUNTER — PATIENT MESSAGE (OUTPATIENT)
Dept: FAMILY MEDICINE | Facility: CLINIC | Age: 63
End: 2023-11-27
Payer: COMMERCIAL

## 2023-11-27 DIAGNOSIS — C34.10 MALIGNANT NEOPLASM OF UPPER LOBE OF LUNG, UNSPECIFIED LATERALITY: ICD-10-CM

## 2023-11-27 DIAGNOSIS — M19.049 HAND ARTHRITIS: ICD-10-CM

## 2023-11-27 DIAGNOSIS — Z72.0 TOBACCO USE: Primary | ICD-10-CM

## 2023-11-27 DIAGNOSIS — F41.9 ANXIETY: ICD-10-CM

## 2023-11-27 NOTE — TELEPHONE ENCOUNTER
No care due was identified.  [facility] Embedded Care Due Messages. Reference number: [id number].   11/27/2023 11:07:05 AM CST

## 2023-11-28 RX ORDER — BUPROPION HYDROCHLORIDE 300 MG/1
300 TABLET ORAL DAILY
Qty: 90 TABLET | Refills: 3 | Status: SHIPPED | OUTPATIENT
Start: 2023-11-28

## 2023-11-28 RX ORDER — GABAPENTIN 300 MG/1
300 CAPSULE ORAL 3 TIMES DAILY
Qty: 270 CAPSULE | Refills: 1 | Status: SHIPPED | OUTPATIENT
Start: 2023-11-28

## 2023-11-28 RX ORDER — VARENICLINE TARTRATE 1 MG/1
1 TABLET, FILM COATED ORAL 2 TIMES DAILY
Qty: 180 TABLET | Refills: 0 | Status: SHIPPED | OUTPATIENT
Start: 2023-11-28 | End: 2024-03-06 | Stop reason: SDUPTHER

## 2023-11-28 RX ORDER — ESCITALOPRAM OXALATE 20 MG/1
20 TABLET ORAL DAILY
Qty: 30 TABLET | Refills: 11 | Status: SHIPPED | OUTPATIENT
Start: 2023-11-28 | End: 2024-02-07

## 2023-12-13 ENCOUNTER — LAB VISIT (OUTPATIENT)
Dept: LAB | Facility: HOSPITAL | Age: 63
End: 2023-12-13
Attending: INTERNAL MEDICINE
Payer: COMMERCIAL

## 2023-12-13 DIAGNOSIS — C34.01 MALIGNANT NEOPLASM OF HILUS OF RIGHT LUNG: ICD-10-CM

## 2023-12-13 DIAGNOSIS — E06.4 DRUG-INDUCED THYROIDITIS: ICD-10-CM

## 2023-12-13 LAB
ALBUMIN SERPL BCP-MCNC: 4 G/DL (ref 3.5–5.2)
ALP SERPL-CCNC: 111 U/L (ref 55–135)
ALT SERPL W/O P-5'-P-CCNC: 14 U/L (ref 10–44)
ANION GAP SERPL CALC-SCNC: 11 MMOL/L (ref 8–16)
AST SERPL-CCNC: 16 U/L (ref 10–40)
BASOPHILS # BLD AUTO: ABNORMAL K/UL (ref 0–0.2)
BASOPHILS NFR BLD: 0 % (ref 0–1.9)
BILIRUB SERPL-MCNC: 0.3 MG/DL (ref 0.1–1)
BUN SERPL-MCNC: 12 MG/DL (ref 8–23)
CALCIUM SERPL-MCNC: 8.8 MG/DL (ref 8.7–10.5)
CHLORIDE SERPL-SCNC: 105 MMOL/L (ref 95–110)
CO2 SERPL-SCNC: 23 MMOL/L (ref 23–29)
CREAT SERPL-MCNC: 0.9 MG/DL (ref 0.5–1.4)
DIFFERENTIAL METHOD: ABNORMAL
EOSINOPHIL # BLD AUTO: ABNORMAL K/UL (ref 0–0.5)
EOSINOPHIL NFR BLD: 1 % (ref 0–8)
ERYTHROCYTE [DISTWIDTH] IN BLOOD BY AUTOMATED COUNT: 13 % (ref 11.5–14.5)
EST. GFR  (NO RACE VARIABLE): >60 ML/MIN/1.73 M^2
GLUCOSE SERPL-MCNC: 96 MG/DL (ref 70–110)
HCT VFR BLD AUTO: 39.1 % (ref 37–48.5)
HGB BLD-MCNC: 12.8 G/DL (ref 12–16)
IMM GRANULOCYTES # BLD AUTO: ABNORMAL K/UL (ref 0–0.04)
IMM GRANULOCYTES NFR BLD AUTO: ABNORMAL % (ref 0–0.5)
LYMPHOCYTES # BLD AUTO: ABNORMAL K/UL (ref 1–4.8)
LYMPHOCYTES NFR BLD: 82 % (ref 18–48)
MCH RBC QN AUTO: 31.8 PG (ref 27–31)
MCHC RBC AUTO-ENTMCNC: 32.7 G/DL (ref 32–36)
MCV RBC AUTO: 97 FL (ref 82–98)
MONOCYTES # BLD AUTO: ABNORMAL K/UL (ref 0.3–1)
MONOCYTES NFR BLD: 2 % (ref 4–15)
NEUTROPHILS NFR BLD: 15 % (ref 38–73)
NRBC BLD-RTO: 0 /100 WBC
PLATELET # BLD AUTO: 218 K/UL (ref 150–450)
PLATELET BLD QL SMEAR: ABNORMAL
PMV BLD AUTO: 9.4 FL (ref 9.2–12.9)
POTASSIUM SERPL-SCNC: 4.2 MMOL/L (ref 3.5–5.1)
PROT SERPL-MCNC: 6.5 G/DL (ref 6–8.4)
RBC # BLD AUTO: 4.02 M/UL (ref 4–5.4)
SODIUM SERPL-SCNC: 139 MMOL/L (ref 136–145)
TSH SERPL DL<=0.005 MIU/L-ACNC: 2.15 UIU/ML (ref 0.4–4)
WBC # BLD AUTO: 28.46 K/UL (ref 3.9–12.7)

## 2023-12-13 PROCEDURE — 84443 ASSAY THYROID STIM HORMONE: CPT | Performed by: NURSE PRACTITIONER

## 2023-12-13 PROCEDURE — 36415 COLL VENOUS BLD VENIPUNCTURE: CPT | Performed by: NURSE PRACTITIONER

## 2023-12-13 PROCEDURE — 85027 COMPLETE CBC AUTOMATED: CPT | Performed by: NURSE PRACTITIONER

## 2023-12-13 PROCEDURE — 80053 COMPREHEN METABOLIC PANEL: CPT | Performed by: NURSE PRACTITIONER

## 2023-12-13 PROCEDURE — 85007 BL SMEAR W/DIFF WBC COUNT: CPT | Performed by: NURSE PRACTITIONER

## 2023-12-14 ENCOUNTER — OFFICE VISIT (OUTPATIENT)
Dept: HEMATOLOGY/ONCOLOGY | Facility: CLINIC | Age: 63
End: 2023-12-14
Payer: COMMERCIAL

## 2023-12-14 DIAGNOSIS — C34.90 NON-SMALL CELL LUNG CANCER, UNSPECIFIED LATERALITY: ICD-10-CM

## 2023-12-14 DIAGNOSIS — C34.01 MALIGNANT NEOPLASM OF HILUS OF RIGHT LUNG: ICD-10-CM

## 2023-12-14 DIAGNOSIS — E53.8 B12 DEFICIENCY: ICD-10-CM

## 2023-12-14 DIAGNOSIS — E03.2 HYPOTHYROIDISM DUE TO MEDICATION: ICD-10-CM

## 2023-12-14 DIAGNOSIS — F41.9 ANXIETY: Primary | ICD-10-CM

## 2023-12-14 DIAGNOSIS — C34.10 MALIGNANT NEOPLASM OF UPPER LOBE OF LUNG, UNSPECIFIED LATERALITY: ICD-10-CM

## 2023-12-14 DIAGNOSIS — C91.10 CLL (CHRONIC LYMPHOCYTIC LEUKEMIA): ICD-10-CM

## 2023-12-14 PROCEDURE — 99215 OFFICE O/P EST HI 40 MIN: CPT | Mod: 95,,, | Performed by: INTERNAL MEDICINE

## 2023-12-14 PROCEDURE — 99215 PR OFFICE/OUTPT VISIT, EST, LEVL V, 40-54 MIN: ICD-10-PCS | Mod: 95,,, | Performed by: INTERNAL MEDICINE

## 2023-12-14 RX ORDER — HEPARIN 100 UNIT/ML
500 SYRINGE INTRAVENOUS
Status: CANCELLED | OUTPATIENT
Start: 2023-12-15

## 2023-12-14 RX ORDER — DIPHENHYDRAMINE HYDROCHLORIDE 50 MG/ML
50 INJECTION INTRAMUSCULAR; INTRAVENOUS ONCE AS NEEDED
Status: CANCELLED | OUTPATIENT
Start: 2023-12-15

## 2023-12-14 RX ORDER — SODIUM CHLORIDE 0.9 % (FLUSH) 0.9 %
10 SYRINGE (ML) INJECTION
Status: CANCELLED | OUTPATIENT
Start: 2023-12-15

## 2023-12-14 RX ORDER — EPINEPHRINE 0.3 MG/.3ML
0.3 INJECTION SUBCUTANEOUS ONCE AS NEEDED
Status: CANCELLED | OUTPATIENT
Start: 2023-12-15

## 2023-12-14 NOTE — PROGRESS NOTES
The patient location is: home  Visit type: Virtual visit with synchronous audio and video  Face-to-face or time spent with patient on the encounter: 25 min  Total time spent on and for  this encounter which includes non face-to-face time preparing to see patient, review of tests, obtaining and or reviewing separately obtained records documenting clinical information in the electronic or other health records, independently interpreting results which is not separately reported ,and communicating results to the patient/family/caregiver and in care coordination and treatment planning/communicating with pharmacy for prescriptions/addressing social needs/arranging follow-up and or referrals :35 min    Each patient I provide medical services by telemedicine is:  (1) informed of the relationship between the physician and patient and the respective role of any other health care provider with respect to management of the patient; and (2) notified that he or she may decline to receive medical services by telemedicine and may withdraw from such care at any time.  This is a video visit therefore some elements of the physical exam such as vital signs, heart sounds are breath sounds are not included and may be included if found in recent clinic notes of other providers assessing same patient. Any symptoms or signs that were visualized were stated by the patient may be included in this note.     Subjective:       Patient ID: Yvette Hutchinson is a 63 y.o. female.    Chief Complaint:  Patient known to me with CLL stage 0 recently diagnosed with lung cancer August 2022  Follow-up    Lung Cancer    Too worried but I do want to keep a close watch on it how you feeling no steroids nothing why 20 mg no steroids steroids nothing at all we will discontinue to do this we will not change   Patient has chronic complains of chronic pain syndrome and COPD for which periodically she take steroids she is also on BuSpar has issues anxiety  has required dilatation of esophageal strictures allergic rhinitis insomnia and history of B12 deficiency documented   Had CT chest done with pulmonary 7/22 showed mass bx done. 8/22    Oncology hx  Impression:ct chest 7/29/22     2.3 cm spiculated left upper lobe lung nodule highly suspicious for bronchogenic carcinoma.     New nonspecific 7 mm nodule in the right middle lobe; this appears more linear on the coronal images and may be a focus of scarring.     Additional stable lung nodules, mildly enlarged axillary lymph nodes, substernal thyroid nodule; these findings are likely benign given the interval stability.   LEFT LUNG MASS, CT-GUIDED BIOPSY:   Non-small cell lung carcinoma.   Comment:  The biopsy reveals invasive carcinoma with apparent glandular but   also vague squamoid features.  Tumor cells are diffusely positive with TTF-1   and focally positive with P40.  The histology differential includes   adenocarcinoma and adenosquamous carcinoma.  PD-L1 and templates NGS studies   are in progress.  The results will be issued separately.    October 3, 2022: Robotic left lobectomy T1c N1    Social history; patient is a smoker denies recreational alcohol use or drug use   Patient is currently on disability  She is allergic to the mask used during COVID pandemic she is also bothered by her mask with COPD    Family history suggestive of ovarian and breast cancer patient advised to see a  or seek   Review of patient's allergies indicates:  No Known Allergies  Medications have been reviewed  Current Outpatient Medications on File Prior to Visit   Medication Sig Dispense Refill    (Magic mouthwash) 1:1:1 diphenhydrAMINE(Benadryl) 12.5mg/5ml liq, aluminum & magnesium hydroxide-simethicone (Maalox), LIDOcaine viscous 2% Swish and spit 10 mLs 3 (three) times daily. for mouth sores 250 mL 0    acetaminophen (TYLENOL) 500 MG tablet Take 2 tablets (1,000 mg total) by mouth every 6 (six) hours as needed for  Pain. 8 tablet 0    albuterol (PROVENTIL/VENTOLIN HFA) 90 mcg/actuation inhaler Inhale 2 puffs into the lungs every 6 (six) hours as needed for Wheezing or Shortness of Breath. Rescue 8.5 g 11    albuterol-ipratropium (DUO-NEB) 2.5 mg-0.5 mg/3 mL nebulizer solution Take 3 mLs by nebulization every 6 (six) hours as needed for Wheezing. Rescue 120 each 5    ALPRAZolam (XANAX) 0.5 MG tablet Take 1 tablet (0.5 mg total) by mouth 3 (three) times daily. 90 tablet 0    benzocaine-menthoL 6-10 mg lozenge Take 1 lozenge by mouth every 2 (two) hours as needed for Pain. 18 tablet 0    buPROPion (WELLBUTRIN XL) 300 MG 24 hr tablet Take 1 tablet (300 mg total) by mouth once daily. 90 tablet 3    celecoxib (CELEBREX) 200 MG capsule Take 2 capsules (400 mg total) by mouth once daily. 180 capsule 1    diphenhydrAMINE-aluminum-magnesium hydroxide-simethicone-LIDOcaine HCl 2% Swish and spit 15 mLs every 4 (four) hours as needed (mouth sores). 100 each 2    EScitalopram oxalate (LEXAPRO) 20 MG tablet Take 1 tablet (20 mg total) by mouth once daily. 30 tablet 11    fluticasone propionate (FLONASE) 50 mcg/actuation nasal spray 1 spray (50 mcg total) by Each Nostril route once daily. 16 g 3    fluticasone-salmeterol 230-21 mcg/dose (ADVAIR HFA) 230-21 mcg/actuation HFAA inhaler Inhale 2 puffs into the lungs 2 (two) times daily. Controller 12 g 5    gabapentin (NEURONTIN) 300 MG capsule Take 1 capsule (300 mg total) by mouth 3 (three) times daily. 270 capsule 1    levocetirizine (XYZAL) 5 MG tablet Take 1 tablet (5 mg total) by mouth every evening. 30 tablet 5    LIDOcaine (LIDODERM) 5 % Place 1 patch onto the skin once daily. Remove & Discard patch within 12 hours or as directed by MD Bloom patch 0    LIDOcaine (LIDODERM) 5 % Place 1 patch onto the skin once daily. Remove & Discard patch within 12 hours or as directed by MD 7 patch 1    LIDOcaine-prilocaine (EMLA) cream Apply topically as needed (apply to port site 30 min before access). 30  g 0    OLANZapine (ZYPREXA) 5 MG tablet Take 1 tablet (5 mg total) by mouth nightly. Take 1 tablet at bedtime on days 1-4 of each cycle of chemotherapy 30 tablet 2    ondansetron (ZOFRAN-ODT) 8 MG TbDL Take 1 tablet (8 mg total) by mouth every 12 (twelve) hours as needed (nausea). 60 tablet 1    oxyCODONE-acetaminophen (PERCOCET) 5-325 mg per tablet Take 1 tablet by mouth every 4 (four) hours as needed for Pain. 60 tablet 0    predniSONE (DELTASONE) 20 MG tablet Take one pill a day for three days, repeat for shortness of breath 12 tablet 0    varenicline (CHANTIX) 1 mg Tab Take 1 tablet (1 mg total) by mouth 2 (two) times daily. 180 tablet 0     No current facility-administered medications on file prior to visit.       REVIEW OF SYSTEMS:     S/p lobectomy left side, has draining tube+ with sanguinous fluid.    Wt Readings from Last 3 Encounters:   11/24/23 77.7 kg (171 lb 6.4 oz)   11/06/23 78.3 kg (172 lb 8.2 oz)   11/03/23 77.7 kg (171 lb 6.4 oz)     Temp Readings from Last 3 Encounters:   11/24/23 97.2 °F (36.2 °C)   11/03/23 98 °F (36.7 °C)   10/13/23 97.8 °F (36.6 °C)     BP Readings from Last 3 Encounters:   11/24/23 108/68   11/06/23 98/64   11/03/23 109/67     Pulse Readings from Last 3 Encounters:   11/24/23 (!) 55   11/06/23 63   11/03/23 63     VITAL SIGNS:  as above   GENERAL: appears well-built, well-nourished.  No anxiety, no agitation, and in no distress.  Patient is awake, alert, oriented and cooperative.  HEENT:  Showed no congestion. Trachea is central no obvious icterus or pallor noted no hoarseness. no obvious JVD   NECK:  Supple.  No JVD. No obvious cervical submental or supraclavicular adenopathy.  RS: tube draining on left side. S/p lobectomy  ABDOMEN:  abdomen appears undistended.  EXTREMITIES:  Without edema.  NEUROLOGICAL:  The patient is appropriate, higher functions are normal.  No  obvious neurological deficits.  normal judgement normal thought content  No confusion, no speech  impediment. Cranial nerves are intact and show no deficit. No gross motor deficits noted   SKIN MUSCULOSKELETAL: no joint or skeletal deformity, no clubbing of nails.  No visible rash ecchymosis or petechiae  Lab Results   Component Value Date    WBC 20.78 (H) 03/03/2020    HGB 14.9 03/03/2020    HCT 47.0 03/03/2020    MCV 99 (H) 03/03/2020     03/03/2020     Smudge cells presnt  CMP  Sodium   Date Value Ref Range Status   12/13/2023 139 136 - 145 mmol/L Final     Potassium   Date Value Ref Range Status   12/13/2023 4.2 3.5 - 5.1 mmol/L Final     Chloride   Date Value Ref Range Status   12/13/2023 105 95 - 110 mmol/L Final     CO2   Date Value Ref Range Status   12/13/2023 23 23 - 29 mmol/L Final     Glucose   Date Value Ref Range Status   12/13/2023 96 70 - 110 mg/dL Final     BUN   Date Value Ref Range Status   12/13/2023 12 8 - 23 mg/dL Final     Creatinine   Date Value Ref Range Status   12/13/2023 0.9 0.5 - 1.4 mg/dL Final     Calcium   Date Value Ref Range Status   12/13/2023 8.8 8.7 - 10.5 mg/dL Final     Total Protein   Date Value Ref Range Status   12/13/2023 6.5 6.0 - 8.4 g/dL Final     Albumin   Date Value Ref Range Status   12/13/2023 4.0 3.5 - 5.2 g/dL Final     Total Bilirubin   Date Value Ref Range Status   12/13/2023 0.3 0.1 - 1.0 mg/dL Final     Comment:     For infants and newborns, interpretation of results should be based  on gestational age, weight and in agreement with clinical  observations.    Premature Infant recommended reference ranges:  Up to 24 hours.............<8.0 mg/dL  Up to 48 hours............<12.0 mg/dL  3-5 days..................<15.0 mg/dL  6-29 days.................<15.0 mg/dL       Alkaline Phosphatase   Date Value Ref Range Status   12/13/2023 111 55 - 135 U/L Final     AST   Date Value Ref Range Status   12/13/2023 16 10 - 40 U/L Final     ALT   Date Value Ref Range Status   12/13/2023 14 10 - 44 U/L Final     Anion Gap   Date Value Ref Range Status   12/13/2023 11 8  - 16 mmol/L Final     eGFR if    Date Value Ref Range Status   06/13/2022 >60 >60 mL/min/1.73 m^2 Final     eGFR if non    Date Value Ref Range Status   06/13/2022 >60 >60 mL/min/1.73 m^2 Final     Comment:     Calculation used to obtain the estimated glomerular filtration  rate (eGFR) is the CKD-EPI equation.            2wk ago    Blood Interpretation A B cell lymphoproliferative disorder is present. see comment.    Comment: Interpreted by: Jeremias Sosa M.D., Signed on 08/06/2020 at 13:07   Blood Comment Flow cytometric analysis of  peripheral blood  detects a lambda  light chain   restricted B lymphocyte population showing expression of CD19 and dim CD20   with aberrant expression of CD5 (dim).  T lymphocytes are   immunophenotypically unremarkable.  The blasts gate is not increased.  The   findings are consistent with patient history of chronic lymphocytic   leukemia/small lymphocytic lymphoma.   Flow differential:  Lymphocytes 69.6%, Monocytes 2.8%, Granulocytes  27.5%,   Blast  0.1%, Debris/nRBC 0.1%,  Viability 97.5%.   10/3/22 robotic lef lung lobectomy  1. LYMPH NODE, LEVEL 5 FROZEN SECTION, EXCISION:   One lymph node with dominant necrotizing granuloma, negative for malignancy   (0/1).   2. LYMPH NODES, LEVEL 5 PERMANENT, EXCISION:   Two lymph nodes with multifocal necrotizing granulomas, negative for   malignancy (0/2).   3. LYMPH NODES, LEFT POSTERIOR LEVEL 10, EXCISION:   Five lymph nodes with multifocal necrotizing granulomas, negative for   malignancy (0/5).   4. LYMPH NODE, LEVEL 11, EXCISION:   One lymph node, negative for malignancy (0/1).   5. LYMPH NODES, LEVEL 12, EXCISION:   Three lymph nodes, one with single necrotizing granuloma, negative for   malignancy (0/3).   6. LYMPH NODES, LEVEL 12 NEAR UPPER DIVISION BRONCHUS, EXCISION:   Three lymph nodes with sinus histiocytosis, negative for malignancy (0/3).   7. LYMPH NODES, LEVEL 10 #2, EXCISION:   One of two lymph  nodes positive for metastatic carcinoma (1/2).   Size of largest metastatic deposit:  8 mm.   Negative for extranodal extension.   8. LYMPH NODES, LEFT LEVEL 8, EXCISION:   Two lymph nodes with sinus histiocytosis, negative for malignancy (0/2).   9. LYMPH NODES, LEFT LEVEL 9, EXCISION:   Two lymph nodes, negative for malignancy (0/2).   10. 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   Surgical margins are negative for malignancy.   Background lung tissue with subpleural fibrosis, no evidence of granulomas.   Two hilar lymph nodes, negative for malignancy (0/2).   Coment (1-3, 5):  Prominent necrotizing granulomatous inflammation identified   is identified in multiple lymph nodes.  An AE1/AE3 cytokeratin immunostain   performed on the largest granuloma (part 3) reveals no evidence of occult   carcinoma.  GMS and AFB special stains are negative for fungal and acid-fast   organisms, respectively.  The necrotizing features are not typical of   sarcoidosis.  As such, other granulomatous processes may be considered   including secondary response to malignancy or infection.  Clinical   correlation is advised.   Comment (10):  Sections reveal invasive carcinoma involving lung tissue with   predominantly squamoid morphologic features including bright eosinophilic   cytoplasm and intracellular bridging.  There are not significant keratinizing   features.  Some areas show basaloid features.  There is also regional luminal   features including cribriforming and vague glandular structures containing   mucin.  Tumor cells are diffusely positive with P40 and regionally positive   with TTF-1.  Mucicarmine special stain highlights mucin producing luminal   spaces. Overall tumor appearance and staining profile supports adenosquamous   carcinoma, a variant of non-small cell lung carcinoma.   [facility] SURGICAL PATHOLOGY CANCER CASE SUMMARY:  LUNG   Procedure:  Lobectomy   Specimen laterality:   Left   Tumor focality:  Single focus   Tumor site: Upper lobe of lung   Tumor size:  2.2 cm   Histologic type:  Adenosquamous carcinoma   Spread through airspaces:  Not identified   Visceral pleural invasion:  Not identified   Direct invasion of adjacent structures: Not applicable   Lymphovascular invasion:  Not identified   Margins:  All margins negative for invasive carcinoma     Closest margin to invasive carcinoma:  Bronchial (3.0 cm)   Regional lymph nodes:  Tumor present in regional lymph node     Number of lymph nodes with tumor:  1     Scott sites with tumor:  Left level 10 (10L)     Extranodal extension:  Not identified     Number of lymph nodes examined:  23   Pathologic stage classification (pTNM): pT1c pN1   Special studies:  Performed on previous biopsy if additional studies needed   there may be performed on tissue blockd 10G or 10H.      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 Due to adenosq histology chose carbo/ taxol x 4 cycles tecentriq maintanence x 1 year  pt is has had 5 cycles of carbo/ taxol  discused maintainenec at tumor board   On tecentriq maintainence   Proceed with tecentriq  Pet7/18/23 neg for mets  See me for next cycle cbc,cmp      CLL   stage 0 will confirmed with flow cytometry  NTD   she has no other cytopenias or lymphadenopathy or B symptoms patient can be observed      Continue with chronic pain syndrome and pain management advised to stop smoking if at all possible 10 pills of hydrocodoen given to pt, she needs to follow with pain mx      History of B12 deficiency will continue to monitor    Advised COVID  precaution due to her lung situation she will be a high risk patient    Advance Care Planning     Date: 04/18/2023    Power of   I initiated the process of advance care planning today and explained the importance of this process to the patient.  I introduced the concept of advance directives to the patient, as well. Then the patient received detailed information about the importance of designating a Health Care Power of  (HCPOA). She was also instructed to communicate with this person about their wishes for future healthcare, should she become sick and lose decision-making capacity. The patient has not previously appointed a HCPOA. After our discussion, the patient has decided to complete a HCPOA .

## 2023-12-15 ENCOUNTER — INFUSION (OUTPATIENT)
Dept: INFUSION THERAPY | Facility: HOSPITAL | Age: 63
End: 2023-12-15
Attending: INTERNAL MEDICINE
Payer: COMMERCIAL

## 2023-12-15 ENCOUNTER — PATIENT MESSAGE (OUTPATIENT)
Dept: HEMATOLOGY/ONCOLOGY | Facility: CLINIC | Age: 63
End: 2023-12-15
Payer: COMMERCIAL

## 2023-12-15 VITALS
OXYGEN SATURATION: 97 % | RESPIRATION RATE: 18 BRPM | SYSTOLIC BLOOD PRESSURE: 105 MMHG | HEART RATE: 58 BPM | WEIGHT: 171.88 LBS | HEIGHT: 63 IN | TEMPERATURE: 98 F | BODY MASS INDEX: 30.45 KG/M2 | DIASTOLIC BLOOD PRESSURE: 58 MMHG

## 2023-12-15 DIAGNOSIS — C34.90 NON-SMALL CELL LUNG CANCER, UNSPECIFIED LATERALITY: Primary | ICD-10-CM

## 2023-12-15 DIAGNOSIS — C34.01 MALIGNANT NEOPLASM OF HILUS OF RIGHT LUNG: ICD-10-CM

## 2023-12-15 PROCEDURE — 25000003 PHARM REV CODE 250: Performed by: INTERNAL MEDICINE

## 2023-12-15 PROCEDURE — 63600175 PHARM REV CODE 636 W HCPCS: Performed by: INTERNAL MEDICINE

## 2023-12-15 PROCEDURE — 96413 CHEMO IV INFUSION 1 HR: CPT

## 2023-12-15 PROCEDURE — A4216 STERILE WATER/SALINE, 10 ML: HCPCS | Performed by: INTERNAL MEDICINE

## 2023-12-15 RX ORDER — DIPHENHYDRAMINE HYDROCHLORIDE 50 MG/ML
50 INJECTION INTRAMUSCULAR; INTRAVENOUS ONCE AS NEEDED
Status: DISCONTINUED | OUTPATIENT
Start: 2023-12-15 | End: 2023-12-15 | Stop reason: HOSPADM

## 2023-12-15 RX ORDER — SODIUM CHLORIDE 0.9 % (FLUSH) 0.9 %
10 SYRINGE (ML) INJECTION
Status: DISCONTINUED | OUTPATIENT
Start: 2023-12-15 | End: 2023-12-15 | Stop reason: HOSPADM

## 2023-12-15 RX ORDER — EPINEPHRINE 0.3 MG/.3ML
0.3 INJECTION SUBCUTANEOUS ONCE AS NEEDED
Status: DISCONTINUED | OUTPATIENT
Start: 2023-12-15 | End: 2023-12-15 | Stop reason: HOSPADM

## 2023-12-15 RX ORDER — HEPARIN 100 UNIT/ML
500 SYRINGE INTRAVENOUS
Status: DISCONTINUED | OUTPATIENT
Start: 2023-12-15 | End: 2023-12-15 | Stop reason: HOSPADM

## 2023-12-15 RX ADMIN — ATEZOLIZUMAB 1200 MG: 1200 INJECTION, SOLUTION INTRAVENOUS at 02:12

## 2023-12-15 RX ADMIN — HEPARIN 500 UNITS: 100 SYRINGE at 02:12

## 2023-12-15 RX ADMIN — SODIUM CHLORIDE, PRESERVATIVE FREE 10 ML: 5 INJECTION INTRAVENOUS at 02:12

## 2023-12-15 NOTE — PLAN OF CARE
Problem: Fall Injury Risk  Goal: Absence of Fall and Fall-Related Injury  Outcome: Ongoing, Progressing  Intervention: Identify and Manage Contributors  Flowsheets (Taken 12/15/2023 1500)  Self-Care Promotion: safe use of adaptive equipment encouraged  Medication Review/Management: medications reviewed  Intervention: Promote Injury-Free Environment  Flowsheets (Taken 12/15/2023 1500)  Safety Promotion/Fall Prevention: assistive device/personal item within reach

## 2023-12-20 ENCOUNTER — PROCEDURE VISIT (OUTPATIENT)
Dept: SLEEP MEDICINE | Facility: HOSPITAL | Age: 63
End: 2023-12-20
Payer: COMMERCIAL

## 2023-12-20 DIAGNOSIS — R40.0 DAYTIME SOMNOLENCE: ICD-10-CM

## 2023-12-20 DIAGNOSIS — G47.30 SLEEP APNEA, UNSPECIFIED TYPE: ICD-10-CM

## 2023-12-20 PROCEDURE — 95810 POLYSOM 6/> YRS 4/> PARAM: CPT

## 2024-01-03 ENCOUNTER — LAB VISIT (OUTPATIENT)
Dept: LAB | Facility: HOSPITAL | Age: 64
End: 2024-01-03
Attending: NURSE PRACTITIONER
Payer: COMMERCIAL

## 2024-01-03 ENCOUNTER — DOCUMENTATION ONLY (OUTPATIENT)
Dept: HEMATOLOGY/ONCOLOGY | Facility: CLINIC | Age: 64
End: 2024-01-03
Payer: COMMERCIAL

## 2024-01-03 DIAGNOSIS — E03.2 HYPOTHYROIDISM DUE TO MEDICATION: ICD-10-CM

## 2024-01-03 DIAGNOSIS — C91.10 CLL (CHRONIC LYMPHOCYTIC LEUKEMIA): ICD-10-CM

## 2024-01-03 DIAGNOSIS — C34.01 MALIGNANT NEOPLASM OF HILUS OF RIGHT LUNG: ICD-10-CM

## 2024-01-03 LAB
ALBUMIN SERPL BCP-MCNC: 4.2 G/DL (ref 3.5–5.2)
ALP SERPL-CCNC: 127 U/L (ref 55–135)
ALT SERPL W/O P-5'-P-CCNC: 20 U/L (ref 10–44)
ANION GAP SERPL CALC-SCNC: 11 MMOL/L (ref 8–16)
AST SERPL-CCNC: 19 U/L (ref 10–40)
BASOPHILS NFR BLD: 0 % (ref 0–1.9)
BILIRUB SERPL-MCNC: 0.4 MG/DL (ref 0.1–1)
BUN SERPL-MCNC: 14 MG/DL (ref 8–23)
CALCIUM SERPL-MCNC: 9.2 MG/DL (ref 8.7–10.5)
CHLORIDE SERPL-SCNC: 104 MMOL/L (ref 95–110)
CO2 SERPL-SCNC: 26 MMOL/L (ref 23–29)
CREAT SERPL-MCNC: 1 MG/DL (ref 0.5–1.4)
DIFFERENTIAL METHOD BLD: ABNORMAL
EOSINOPHIL NFR BLD: 1 % (ref 0–8)
ERYTHROCYTE [DISTWIDTH] IN BLOOD BY AUTOMATED COUNT: 13.2 % (ref 11.5–14.5)
EST. GFR  (NO RACE VARIABLE): >60 ML/MIN/1.73 M^2
GLUCOSE SERPL-MCNC: 104 MG/DL (ref 70–110)
HCT VFR BLD AUTO: 41.5 % (ref 37–48.5)
HGB BLD-MCNC: 13.8 G/DL (ref 12–16)
IMM GRANULOCYTES # BLD AUTO: ABNORMAL K/UL
IMM GRANULOCYTES NFR BLD AUTO: ABNORMAL %
LYMPHOCYTES NFR BLD: 79 % (ref 18–48)
MCH RBC QN AUTO: 32.2 PG (ref 27–31)
MCHC RBC AUTO-ENTMCNC: 33.3 G/DL (ref 32–36)
MCV RBC AUTO: 97 FL (ref 82–98)
MONOCYTES NFR BLD: 2 % (ref 4–15)
NEUTROPHILS NFR BLD: 18 % (ref 38–73)
NRBC BLD-RTO: 0 /100 WBC
PLATELET # BLD AUTO: 252 K/UL (ref 150–450)
PLATELET BLD QL SMEAR: ABNORMAL
PMV BLD AUTO: 9.9 FL (ref 9.2–12.9)
POTASSIUM SERPL-SCNC: 4.2 MMOL/L (ref 3.5–5.1)
PROT SERPL-MCNC: 7 G/DL (ref 6–8.4)
RBC # BLD AUTO: 4.29 M/UL (ref 4–5.4)
SODIUM SERPL-SCNC: 141 MMOL/L (ref 136–145)
TSH SERPL DL<=0.005 MIU/L-ACNC: 2.52 UIU/ML (ref 0.4–4)
WBC # BLD AUTO: 35.65 K/UL (ref 3.9–12.7)

## 2024-01-03 PROCEDURE — 80053 COMPREHEN METABOLIC PANEL: CPT | Performed by: INTERNAL MEDICINE

## 2024-01-03 PROCEDURE — 85027 COMPLETE CBC AUTOMATED: CPT | Performed by: INTERNAL MEDICINE

## 2024-01-03 PROCEDURE — 85007 BL SMEAR W/DIFF WBC COUNT: CPT | Performed by: INTERNAL MEDICINE

## 2024-01-03 PROCEDURE — 36415 COLL VENOUS BLD VENIPUNCTURE: CPT | Performed by: INTERNAL MEDICINE

## 2024-01-03 PROCEDURE — 84443 ASSAY THYROID STIM HORMONE: CPT | Performed by: INTERNAL MEDICINE

## 2024-01-04 ENCOUNTER — OFFICE VISIT (OUTPATIENT)
Dept: HEMATOLOGY/ONCOLOGY | Facility: CLINIC | Age: 64
End: 2024-01-04
Payer: COMMERCIAL

## 2024-01-04 DIAGNOSIS — E53.8 B12 DEFICIENCY: Primary | ICD-10-CM

## 2024-01-04 DIAGNOSIS — C34.01 MALIGNANT NEOPLASM OF HILUS OF RIGHT LUNG: ICD-10-CM

## 2024-01-04 DIAGNOSIS — C91.10 CLL (CHRONIC LYMPHOCYTIC LEUKEMIA): ICD-10-CM

## 2024-01-04 PROCEDURE — 99215 OFFICE O/P EST HI 40 MIN: CPT | Mod: 95,,, | Performed by: INTERNAL MEDICINE

## 2024-01-04 RX ORDER — DIPHENHYDRAMINE HYDROCHLORIDE 50 MG/ML
50 INJECTION, SOLUTION INTRAMUSCULAR; INTRAVENOUS ONCE AS NEEDED
Status: CANCELLED | OUTPATIENT
Start: 2024-01-05

## 2024-01-04 RX ORDER — HEPARIN 100 UNIT/ML
500 SYRINGE INTRAVENOUS
Status: CANCELLED | OUTPATIENT
Start: 2024-01-05

## 2024-01-04 RX ORDER — EPINEPHRINE 0.3 MG/.3ML
0.3 INJECTION SUBCUTANEOUS ONCE AS NEEDED
Status: CANCELLED | OUTPATIENT
Start: 2024-01-05

## 2024-01-04 RX ORDER — SODIUM CHLORIDE 0.9 % (FLUSH) 0.9 %
10 SYRINGE (ML) INJECTION
Status: CANCELLED | OUTPATIENT
Start: 2024-01-05

## 2024-01-05 ENCOUNTER — INFUSION (OUTPATIENT)
Dept: INFUSION THERAPY | Facility: HOSPITAL | Age: 64
End: 2024-01-05
Attending: INTERNAL MEDICINE
Payer: COMMERCIAL

## 2024-01-05 VITALS
SYSTOLIC BLOOD PRESSURE: 105 MMHG | OXYGEN SATURATION: 96 % | DIASTOLIC BLOOD PRESSURE: 59 MMHG | TEMPERATURE: 98 F | HEIGHT: 63 IN | RESPIRATION RATE: 15 BRPM | HEART RATE: 55 BPM | BODY MASS INDEX: 30.39 KG/M2 | WEIGHT: 171.5 LBS

## 2024-01-05 DIAGNOSIS — C34.90 NON-SMALL CELL LUNG CANCER, UNSPECIFIED LATERALITY: Primary | ICD-10-CM

## 2024-01-05 DIAGNOSIS — C34.01 MALIGNANT NEOPLASM OF HILUS OF RIGHT LUNG: ICD-10-CM

## 2024-01-05 PROCEDURE — A4216 STERILE WATER/SALINE, 10 ML: HCPCS | Performed by: INTERNAL MEDICINE

## 2024-01-05 PROCEDURE — 25000003 PHARM REV CODE 250: Performed by: INTERNAL MEDICINE

## 2024-01-05 PROCEDURE — 96413 CHEMO IV INFUSION 1 HR: CPT

## 2024-01-05 PROCEDURE — 63600175 PHARM REV CODE 636 W HCPCS: Performed by: INTERNAL MEDICINE

## 2024-01-05 RX ORDER — SODIUM CHLORIDE 0.9 % (FLUSH) 0.9 %
10 SYRINGE (ML) INJECTION
Status: DISCONTINUED | OUTPATIENT
Start: 2024-01-05 | End: 2024-01-05 | Stop reason: HOSPADM

## 2024-01-05 RX ORDER — HEPARIN 100 UNIT/ML
500 SYRINGE INTRAVENOUS
Status: DISCONTINUED | OUTPATIENT
Start: 2024-01-05 | End: 2024-01-05 | Stop reason: HOSPADM

## 2024-01-05 RX ORDER — EPINEPHRINE 0.3 MG/.3ML
0.3 INJECTION SUBCUTANEOUS ONCE AS NEEDED
Status: DISCONTINUED | OUTPATIENT
Start: 2024-01-05 | End: 2024-01-05 | Stop reason: HOSPADM

## 2024-01-05 RX ORDER — DIPHENHYDRAMINE HYDROCHLORIDE 50 MG/ML
50 INJECTION, SOLUTION INTRAMUSCULAR; INTRAVENOUS ONCE AS NEEDED
Status: DISCONTINUED | OUTPATIENT
Start: 2024-01-05 | End: 2024-01-05 | Stop reason: HOSPADM

## 2024-01-05 RX ADMIN — ATEZOLIZUMAB 1200 MG: 1200 INJECTION, SOLUTION INTRAVENOUS at 01:01

## 2024-01-05 RX ADMIN — SODIUM CHLORIDE, PRESERVATIVE FREE 10 ML: 5 INJECTION INTRAVENOUS at 01:01

## 2024-01-05 RX ADMIN — HEPARIN 500 UNITS: 100 SYRINGE at 01:01

## 2024-01-05 NOTE — PLAN OF CARE
Problem: Fatigue  Goal: Improved Activity Tolerance  Outcome: Ongoing, Progressing  Intervention: Promote Improved Energy  Flowsheets (Taken 1/5/2024 1316)  Fatigue Management:   fatigue-related activity identified   frequent rest breaks encouraged   paced activity encouraged  Sleep/Rest Enhancement:   regular sleep/rest pattern promoted   relaxation techniques promoted  Activity Management: Ambulated -L4

## 2024-01-05 NOTE — PROGRESS NOTES
The patient location is: home  Visit type: Virtual visit with synchronous audio and video  Face-to-face or time spent with patient on the encounter: 25 min  Total time spent on and for  this encounter which includes non face-to-face time preparing to see patient, review of tests, obtaining and or reviewing separately obtained records documenting clinical information in the electronic or other health records, independently interpreting results which is not separately reported ,and communicating results to the patient/family/caregiver and in care coordination and treatment planning/communicating with pharmacy for prescriptions/addressing social needs/arranging follow-up and or referrals :35 min    Each patient I provide medical services by telemedicine is:  (1) informed of the relationship between the physician and patient and the respective role of any other health care provider with respect to management of the patient; and (2) notified that he or she may decline to receive medical services by telemedicine and may withdraw from such care at any time.  This is a video visit therefore some elements of the physical exam such as vital signs, heart sounds are breath sounds are not included and may be included if found in recent clinic notes of other providers assessing same patient. Any symptoms or signs that were visualized were stated by the patient may be included in this note.     Subjective:       Patient ID: Yvette Hutchinson is a 63 y.o. female.    Chief Complaint:  Patient known to me with CLL stage 0 recently diagnosed with lung cancer August 2022  Follow-up    Lung Cancer    Too worried but I do want to keep a close watch on it how you feeling no steroids nothing why 20 mg no steroids steroids nothing at all we will discontinue to do this we will not change   Patient has chronic complains of chronic pain syndrome and COPD for which periodically she take steroids she is also on BuSpar has issues anxiety  has required dilatation of esophageal strictures allergic rhinitis insomnia and history of B12 deficiency documented   Had CT chest done with pulmonary 7/22 showed mass bx done. 8/22    Oncology hx  Impression:ct chest 7/29/22     2.3 cm spiculated left upper lobe lung nodule highly suspicious for bronchogenic carcinoma.     New nonspecific 7 mm nodule in the right middle lobe; this appears more linear on the coronal images and may be a focus of scarring.     Additional stable lung nodules, mildly enlarged axillary lymph nodes, substernal thyroid nodule; these findings are likely benign given the interval stability.   LEFT LUNG MASS, CT-GUIDED BIOPSY:   Non-small cell lung carcinoma.   Comment:  The biopsy reveals invasive carcinoma with apparent glandular but   also vague squamoid features.  Tumor cells are diffusely positive with TTF-1   and focally positive with P40.  The histology differential includes   adenocarcinoma and adenosquamous carcinoma.  PD-L1 and templates NGS studies   are in progress.  The results will be issued separately.    October 3, 2022: Robotic left lobectomy T1c N1    Social history; patient is a smoker denies recreational alcohol use or drug use   Patient is currently on disability  She is allergic to the mask used during COVID pandemic she is also bothered by her mask with COPD    Family history suggestive of ovarian and breast cancer patient advised to see a  or seek   Review of patient's allergies indicates:  No Known Allergies  Medications have been reviewed  Current Outpatient Medications on File Prior to Visit   Medication Sig Dispense Refill    (Magic mouthwash) 1:1:1 diphenhydrAMINE(Benadryl) 12.5mg/5ml liq, aluminum & magnesium hydroxide-simethicone (Maalox), LIDOcaine viscous 2% Swish and spit 10 mLs 3 (three) times daily. for mouth sores 250 mL 0    acetaminophen (TYLENOL) 500 MG tablet Take 2 tablets (1,000 mg total) by mouth every 6 (six) hours as needed for  Pain. 8 tablet 0    albuterol (PROVENTIL/VENTOLIN HFA) 90 mcg/actuation inhaler Inhale 2 puffs into the lungs every 6 (six) hours as needed for Wheezing or Shortness of Breath. Rescue 8.5 g 11    albuterol-ipratropium (DUO-NEB) 2.5 mg-0.5 mg/3 mL nebulizer solution Take 3 mLs by nebulization every 6 (six) hours as needed for Wheezing. Rescue 120 each 5    ALPRAZolam (XANAX) 0.5 MG tablet Take 1 tablet (0.5 mg total) by mouth 3 (three) times daily. 90 tablet 0    benzocaine-menthoL 6-10 mg lozenge Take 1 lozenge by mouth every 2 (two) hours as needed for Pain. 18 tablet 0    buPROPion (WELLBUTRIN XL) 300 MG 24 hr tablet Take 1 tablet (300 mg total) by mouth once daily. 90 tablet 3    celecoxib (CELEBREX) 200 MG capsule Take 2 capsules (400 mg total) by mouth once daily. 180 capsule 1    diphenhydrAMINE-aluminum-magnesium hydroxide-simethicone-LIDOcaine HCl 2% Swish and spit 15 mLs every 4 (four) hours as needed (mouth sores). 100 each 2    EScitalopram oxalate (LEXAPRO) 20 MG tablet Take 1 tablet (20 mg total) by mouth once daily. 30 tablet 11    fluticasone propionate (FLONASE) 50 mcg/actuation nasal spray 1 spray (50 mcg total) by Each Nostril route once daily. 16 g 3    fluticasone-salmeterol 230-21 mcg/dose (ADVAIR HFA) 230-21 mcg/actuation HFAA inhaler Inhale 2 puffs into the lungs 2 (two) times daily. Controller 12 g 5    gabapentin (NEURONTIN) 300 MG capsule Take 1 capsule (300 mg total) by mouth 3 (three) times daily. 270 capsule 1    levocetirizine (XYZAL) 5 MG tablet Take 1 tablet (5 mg total) by mouth every evening. 30 tablet 5    LIDOcaine (LIDODERM) 5 % Place 1 patch onto the skin once daily. Remove & Discard patch within 12 hours or as directed by MD Bloom patch 0    LIDOcaine (LIDODERM) 5 % Place 1 patch onto the skin once daily. Remove & Discard patch within 12 hours or as directed by MD 7 patch 1    LIDOcaine-prilocaine (EMLA) cream Apply topically as needed (apply to port site 30 min before access). 30  g 0    OLANZapine (ZYPREXA) 5 MG tablet Take 1 tablet (5 mg total) by mouth nightly. Take 1 tablet at bedtime on days 1-4 of each cycle of chemotherapy 30 tablet 2    ondansetron (ZOFRAN-ODT) 8 MG TbDL Take 1 tablet (8 mg total) by mouth every 12 (twelve) hours as needed (nausea). 60 tablet 1    oxyCODONE-acetaminophen (PERCOCET) 5-325 mg per tablet Take 1 tablet by mouth every 4 (four) hours as needed for Pain. 60 tablet 0    predniSONE (DELTASONE) 20 MG tablet Take one pill a day for three days, repeat for shortness of breath 12 tablet 0    varenicline (CHANTIX) 1 mg Tab Take 1 tablet (1 mg total) by mouth 2 (two) times daily. 180 tablet 0     No current facility-administered medications on file prior to visit.       REVIEW OF SYSTEMS:     S/p lobectomy left side, has draining tube+ with sanguinous fluid.    Wt Readings from Last 3 Encounters:   12/15/23 78 kg (171 lb 14.4 oz)   11/24/23 77.7 kg (171 lb 6.4 oz)   11/06/23 78.3 kg (172 lb 8.2 oz)     Temp Readings from Last 3 Encounters:   12/15/23 97.7 °F (36.5 °C)   11/24/23 97.2 °F (36.2 °C)   11/03/23 98 °F (36.7 °C)     BP Readings from Last 3 Encounters:   12/15/23 (!) 105/58   11/24/23 108/68   11/06/23 98/64     Pulse Readings from Last 3 Encounters:   12/15/23 (!) 58   11/24/23 (!) 55   11/06/23 63     VITAL SIGNS:  as above   GENERAL: appears well-built, well-nourished.  No anxiety, no agitation, and in no distress.  Patient is awake, alert, oriented and cooperative.  HEENT:  Showed no congestion. Trachea is central no obvious icterus or pallor noted no hoarseness. no obvious JVD   NECK:  Supple.  No JVD. No obvious cervical submental or supraclavicular adenopathy.  RS: tube draining on left side. S/p lobectomy  ABDOMEN:  abdomen appears undistended.  EXTREMITIES:  Without edema.  NEUROLOGICAL:  The patient is appropriate, higher functions are normal.  No  obvious neurological deficits.  normal judgement normal thought content  No confusion, no speech  impediment. Cranial nerves are intact and show no deficit. No gross motor deficits noted   SKIN MUSCULOSKELETAL: no joint or skeletal deformity, no clubbing of nails.  No visible rash ecchymosis or petechiae  Lab Results   Component Value Date    WBC 20.78 (H) 03/03/2020    HGB 14.9 03/03/2020    HCT 47.0 03/03/2020    MCV 99 (H) 03/03/2020     03/03/2020     Smudge cells presnt  CMP  Sodium   Date Value Ref Range Status   01/03/2024 141 136 - 145 mmol/L Final     Potassium   Date Value Ref Range Status   01/03/2024 4.2 3.5 - 5.1 mmol/L Final     Chloride   Date Value Ref Range Status   01/03/2024 104 95 - 110 mmol/L Final     CO2   Date Value Ref Range Status   01/03/2024 26 23 - 29 mmol/L Final     Glucose   Date Value Ref Range Status   01/03/2024 104 70 - 110 mg/dL Final     BUN   Date Value Ref Range Status   01/03/2024 14 8 - 23 mg/dL Final     Creatinine   Date Value Ref Range Status   01/03/2024 1.0 0.5 - 1.4 mg/dL Final     Calcium   Date Value Ref Range Status   01/03/2024 9.2 8.7 - 10.5 mg/dL Final     Total Protein   Date Value Ref Range Status   01/03/2024 7.0 6.0 - 8.4 g/dL Final     Albumin   Date Value Ref Range Status   01/03/2024 4.2 3.5 - 5.2 g/dL Final     Total Bilirubin   Date Value Ref Range Status   01/03/2024 0.4 0.1 - 1.0 mg/dL Final     Comment:     For infants and newborns, interpretation of results should be based  on gestational age, weight and in agreement with clinical  observations.    Premature Infant recommended reference ranges:  Up to 24 hours.............<8.0 mg/dL  Up to 48 hours............<12.0 mg/dL  3-5 days..................<15.0 mg/dL  6-29 days.................<15.0 mg/dL       Alkaline Phosphatase   Date Value Ref Range Status   01/03/2024 127 55 - 135 U/L Final     AST   Date Value Ref Range Status   01/03/2024 19 10 - 40 U/L Final     ALT   Date Value Ref Range Status   01/03/2024 20 10 - 44 U/L Final     Anion Gap   Date Value Ref Range Status   01/03/2024 11  8 - 16 mmol/L Final     eGFR if    Date Value Ref Range Status   06/13/2022 >60 >60 mL/min/1.73 m^2 Final     eGFR if non    Date Value Ref Range Status   06/13/2022 >60 >60 mL/min/1.73 m^2 Final     Comment:     Calculation used to obtain the estimated glomerular filtration  rate (eGFR) is the CKD-EPI equation.            2wk ago    Blood Interpretation A B cell lymphoproliferative disorder is present. see comment.    Comment: Interpreted by: Jeremias Sosa M.D., Signed on 08/06/2020 at 13:07   Blood Comment Flow cytometric analysis of  peripheral blood  detects a lambda  light chain   restricted B lymphocyte population showing expression of CD19 and dim CD20   with aberrant expression of CD5 (dim).  T lymphocytes are   immunophenotypically unremarkable.  The blasts gate is not increased.  The   findings are consistent with patient history of chronic lymphocytic   leukemia/small lymphocytic lymphoma.   Flow differential:  Lymphocytes 69.6%, Monocytes 2.8%, Granulocytes  27.5%,   Blast  0.1%, Debris/nRBC 0.1%,  Viability 97.5%.   10/3/22 robotic lef lung lobectomy  1. LYMPH NODE, LEVEL 5 FROZEN SECTION, EXCISION:   One lymph node with dominant necrotizing granuloma, negative for malignancy   (0/1).   2. LYMPH NODES, LEVEL 5 PERMANENT, EXCISION:   Two lymph nodes with multifocal necrotizing granulomas, negative for   malignancy (0/2).   3. LYMPH NODES, LEFT POSTERIOR LEVEL 10, EXCISION:   Five lymph nodes with multifocal necrotizing granulomas, negative for   malignancy (0/5).   4. LYMPH NODE, LEVEL 11, EXCISION:   One lymph node, negative for malignancy (0/1).   5. LYMPH NODES, LEVEL 12, EXCISION:   Three lymph nodes, one with single necrotizing granuloma, negative for   malignancy (0/3).   6. LYMPH NODES, LEVEL 12 NEAR UPPER DIVISION BRONCHUS, EXCISION:   Three lymph nodes with sinus histiocytosis, negative for malignancy (0/3).   7. LYMPH NODES, LEVEL 10 #2, EXCISION:   One of two lymph  nodes positive for metastatic carcinoma (1/2).   Size of largest metastatic deposit:  8 mm.   Negative for extranodal extension.   8. LYMPH NODES, LEFT LEVEL 8, EXCISION:   Two lymph nodes with sinus histiocytosis, negative for malignancy (0/2).   9. LYMPH NODES, LEFT LEVEL 9, EXCISION:   Two lymph nodes, negative for malignancy (0/2).   10. 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   Surgical margins are negative for malignancy.   Background lung tissue with subpleural fibrosis, no evidence of granulomas.   Two hilar lymph nodes, negative for malignancy (0/2).   Coment (1-3, 5):  Prominent necrotizing granulomatous inflammation identified   is identified in multiple lymph nodes.  An AE1/AE3 cytokeratin immunostain   performed on the largest granuloma (part 3) reveals no evidence of occult   carcinoma.  GMS and AFB special stains are negative for fungal and acid-fast   organisms, respectively.  The necrotizing features are not typical of   sarcoidosis.  As such, other granulomatous processes may be considered   including secondary response to malignancy or infection.  Clinical   correlation is advised.   Comment (10):  Sections reveal invasive carcinoma involving lung tissue with   predominantly squamoid morphologic features including bright eosinophilic   cytoplasm and intracellular bridging.  There are not significant keratinizing   features.  Some areas show basaloid features.  There is also regional luminal   features including cribriforming and vague glandular structures containing   mucin.  Tumor cells are diffusely positive with P40 and regionally positive   with TTF-1.  Mucicarmine special stain highlights mucin producing luminal   spaces. Overall tumor appearance and staining profile supports adenosquamous   carcinoma, a variant of non-small cell lung carcinoma.   [facility] SURGICAL PATHOLOGY CANCER CASE SUMMARY:  LUNG   Procedure:  Lobectomy   Specimen laterality:   Left   Tumor focality:  Single focus   Tumor site: Upper lobe of lung   Tumor size:  2.2 cm   Histologic type:  Adenosquamous carcinoma   Spread through airspaces:  Not identified   Visceral pleural invasion:  Not identified   Direct invasion of adjacent structures: Not applicable   Lymphovascular invasion:  Not identified   Margins:  All margins negative for invasive carcinoma     Closest margin to invasive carcinoma:  Bronchial (3.0 cm)   Regional lymph nodes:  Tumor present in regional lymph node     Number of lymph nodes with tumor:  1     Scott sites with tumor:  Left level 10 (10L)     Extranodal extension:  Not identified     Number of lymph nodes examined:  23   Pathologic stage classification (pTNM): pT1c pN1   Special studies:  Performed on previous biopsy if additional studies needed   there may be performed on tissue blockd 10G or 10H.      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 Due to adenosq histology chose carbo/ taxol x 4 cycles tecentriq maintanence x 1 year  pt is has had 5 cycles of carbo/ taxol  discused maintainenec at tumor board   On tecentriq maintainence   Proceed with tecentriq  Pet7/18/23 neg for mets  See me for next cycle cbc,cmp  pet    CLL   stage 0 will confirmed with flow cytometry  NTD   she has no other cytopenias or lymphadenopathy or B symptoms patient can be observed      Continue with chronic pain syndrome and pain management advised to stop smoking if at all possible 10 pills of hydrocodoen given to pt, she needs to follow with pain mx      History of B12 deficiency will continue to monitor    Advised COVID  precaution due to her lung situation she will be a high risk patient    Advance Care Planning     Date: 04/18/2023    Power of   I initiated the process of advance care planning today and explained the importance of this process to the patient.  I introduced the concept of advance directives to the patient, as well. Then the patient received detailed information about the importance of designating a Health Care Power of  (HCPOA). She was also instructed to communicate with this person about their wishes for future healthcare, should she become sick and lose decision-making capacity. The patient has not previously appointed a HCPOA. After our discussion, the patient has decided to complete a HCPOA .

## 2024-01-08 ENCOUNTER — PATIENT MESSAGE (OUTPATIENT)
Dept: HEMATOLOGY/ONCOLOGY | Facility: CLINIC | Age: 64
End: 2024-01-08
Payer: COMMERCIAL

## 2024-01-24 ENCOUNTER — DOCUMENTATION ONLY (OUTPATIENT)
Dept: HEMATOLOGY/ONCOLOGY | Facility: CLINIC | Age: 64
End: 2024-01-24
Payer: COMMERCIAL

## 2024-01-24 ENCOUNTER — LAB VISIT (OUTPATIENT)
Dept: LAB | Facility: HOSPITAL | Age: 64
End: 2024-01-24
Attending: INTERNAL MEDICINE
Payer: COMMERCIAL

## 2024-01-24 DIAGNOSIS — C34.01 MALIGNANT NEOPLASM OF HILUS OF RIGHT LUNG: ICD-10-CM

## 2024-01-24 LAB
ALBUMIN SERPL BCP-MCNC: 3.9 G/DL (ref 3.5–5.2)
ALP SERPL-CCNC: 105 U/L (ref 55–135)
ALT SERPL W/O P-5'-P-CCNC: 16 U/L (ref 10–44)
ANION GAP SERPL CALC-SCNC: 9 MMOL/L (ref 8–16)
AST SERPL-CCNC: 15 U/L (ref 10–40)
BASOPHILS # BLD AUTO: ABNORMAL K/UL (ref 0–0.2)
BASOPHILS NFR BLD: 0 % (ref 0–1.9)
BILIRUB SERPL-MCNC: 0.4 MG/DL (ref 0.1–1)
BUN SERPL-MCNC: 13 MG/DL (ref 8–23)
CALCIUM SERPL-MCNC: 8.8 MG/DL (ref 8.7–10.5)
CHLORIDE SERPL-SCNC: 104 MMOL/L (ref 95–110)
CO2 SERPL-SCNC: 26 MMOL/L (ref 23–29)
CREAT SERPL-MCNC: 1.1 MG/DL (ref 0.5–1.4)
DIFFERENTIAL METHOD BLD: ABNORMAL
EOSINOPHIL # BLD AUTO: ABNORMAL K/UL (ref 0–0.5)
EOSINOPHIL NFR BLD: 1 % (ref 0–8)
ERYTHROCYTE [DISTWIDTH] IN BLOOD BY AUTOMATED COUNT: 13.9 % (ref 11.5–14.5)
EST. GFR  (NO RACE VARIABLE): 56 ML/MIN/1.73 M^2
GLUCOSE SERPL-MCNC: 111 MG/DL (ref 70–110)
HCT VFR BLD AUTO: 40.1 % (ref 37–48.5)
HGB BLD-MCNC: 12.9 G/DL (ref 12–16)
IMM GRANULOCYTES # BLD AUTO: ABNORMAL K/UL (ref 0–0.04)
IMM GRANULOCYTES NFR BLD AUTO: ABNORMAL % (ref 0–0.5)
LYMPHOCYTES # BLD AUTO: ABNORMAL K/UL (ref 1–4.8)
LYMPHOCYTES NFR BLD: 81 % (ref 18–48)
MCH RBC QN AUTO: 31.6 PG (ref 27–31)
MCHC RBC AUTO-ENTMCNC: 32.2 G/DL (ref 32–36)
MCV RBC AUTO: 98 FL (ref 82–98)
MONOCYTES # BLD AUTO: ABNORMAL K/UL (ref 0.3–1)
MONOCYTES NFR BLD: 2 % (ref 4–15)
NEUTROPHILS NFR BLD: 16 % (ref 38–73)
NRBC BLD-RTO: 0 /100 WBC
PLATELET # BLD AUTO: 254 K/UL (ref 150–450)
PLATELET BLD QL SMEAR: ABNORMAL
PMV BLD AUTO: 9.2 FL (ref 9.2–12.9)
POTASSIUM SERPL-SCNC: 4.1 MMOL/L (ref 3.5–5.1)
PROT SERPL-MCNC: 6.4 G/DL (ref 6–8.4)
RBC # BLD AUTO: 4.08 M/UL (ref 4–5.4)
SMUDGE CELLS BLD QL SMEAR: PRESENT
SODIUM SERPL-SCNC: 139 MMOL/L (ref 136–145)
WBC # BLD AUTO: 45.61 K/UL (ref 3.9–12.7)

## 2024-01-24 PROCEDURE — 80053 COMPREHEN METABOLIC PANEL: CPT | Performed by: INTERNAL MEDICINE

## 2024-01-24 PROCEDURE — 36415 COLL VENOUS BLD VENIPUNCTURE: CPT | Performed by: INTERNAL MEDICINE

## 2024-01-24 PROCEDURE — 85027 COMPLETE CBC AUTOMATED: CPT | Performed by: INTERNAL MEDICINE

## 2024-01-24 PROCEDURE — 85007 BL SMEAR W/DIFF WBC COUNT: CPT | Performed by: INTERNAL MEDICINE

## 2024-01-24 NOTE — PROGRESS NOTES
Message sent to Nathaniel Whitfield and Sapna Stewart for update on authorization letter and advised patient has chemotherapy treatment on 1/26/24.

## 2024-01-25 ENCOUNTER — OFFICE VISIT (OUTPATIENT)
Dept: HEMATOLOGY/ONCOLOGY | Facility: CLINIC | Age: 64
End: 2024-01-25
Payer: COMMERCIAL

## 2024-01-25 ENCOUNTER — PATIENT MESSAGE (OUTPATIENT)
Dept: FAMILY MEDICINE | Facility: CLINIC | Age: 64
End: 2024-01-25
Payer: COMMERCIAL

## 2024-01-25 ENCOUNTER — PATIENT MESSAGE (OUTPATIENT)
Dept: HEMATOLOGY/ONCOLOGY | Facility: CLINIC | Age: 64
End: 2024-01-25

## 2024-01-25 ENCOUNTER — TELEPHONE (OUTPATIENT)
Dept: FAMILY MEDICINE | Facility: CLINIC | Age: 64
End: 2024-01-25
Payer: COMMERCIAL

## 2024-01-25 DIAGNOSIS — C34.01 MALIGNANT NEOPLASM OF HILUS OF RIGHT LUNG: ICD-10-CM

## 2024-01-25 DIAGNOSIS — C91.10 CLL (CHRONIC LYMPHOCYTIC LEUKEMIA): Primary | ICD-10-CM

## 2024-01-25 DIAGNOSIS — E53.8 B12 DEFICIENCY: ICD-10-CM

## 2024-01-25 DIAGNOSIS — C34.90 NON-SMALL CELL LUNG CANCER, UNSPECIFIED LATERALITY: ICD-10-CM

## 2024-01-25 PROCEDURE — 99215 OFFICE O/P EST HI 40 MIN: CPT | Mod: 95,,, | Performed by: INTERNAL MEDICINE

## 2024-01-25 RX ORDER — ONDANSETRON 8 MG/1
8 TABLET, ORALLY DISINTEGRATING ORAL EVERY 12 HOURS PRN
Qty: 30 TABLET | Refills: 1 | Status: SHIPPED | OUTPATIENT
Start: 2024-01-25 | End: 2025-01-24

## 2024-01-25 RX ORDER — DIPHENHYDRAMINE HYDROCHLORIDE 50 MG/ML
50 INJECTION INTRAMUSCULAR; INTRAVENOUS ONCE AS NEEDED
Status: CANCELLED | OUTPATIENT
Start: 2024-01-26

## 2024-01-25 RX ORDER — EPINEPHRINE 0.3 MG/.3ML
0.3 INJECTION SUBCUTANEOUS ONCE AS NEEDED
Status: CANCELLED | OUTPATIENT
Start: 2024-01-26

## 2024-01-25 RX ORDER — HEPARIN 100 UNIT/ML
500 SYRINGE INTRAVENOUS
Status: CANCELLED | OUTPATIENT
Start: 2024-01-26

## 2024-01-25 RX ORDER — SODIUM CHLORIDE 0.9 % (FLUSH) 0.9 %
10 SYRINGE (ML) INJECTION
Status: CANCELLED | OUTPATIENT
Start: 2024-01-26

## 2024-01-25 NOTE — PROGRESS NOTES
The patient location is: home  Visit type: Virtual visit with synchronous audio and video  Face-to-face or time spent with patient on the encounter: 25 min  Total time spent on and for  this encounter which includes non face-to-face time preparing to see patient, review of tests, obtaining and or reviewing separately obtained records documenting clinical information in the electronic or other health records, independently interpreting results which is not separately reported ,and communicating results to the patient/family/caregiver and in care coordination and treatment planning/communicating with pharmacy for prescriptions/addressing social needs/arranging follow-up and or referrals :35 min    Each patient I provide medical services by telemedicine is:  (1) informed of the relationship between the physician and patient and the respective role of any other health care provider with respect to management of the patient; and (2) notified that he or she may decline to receive medical services by telemedicine and may withdraw from such care at any time.  This is a video visit therefore some elements of the physical exam such as vital signs, heart sounds are breath sounds are not included and may be included if found in recent clinic notes of other providers assessing same patient. Any symptoms or signs that were visualized were stated by the patient may be included in this note.     Subjective:       Patient ID: Yvette Hutchinson is a 63 y.o. female.    Chief Complaint:  Patient known to me with CLL stage 0 recently diagnosed with lung cancer August 2022  Follow-up    Lung Cancer    Too worried but I do want to keep a close watch on it how you feeling no steroids nothing why 20 mg no steroids steroids nothing at all we will discontinue to do this we will not change   Patient has chronic complains of chronic pain syndrome and COPD for which periodically she take steroids she is also on BuSpar has issues anxiety  has required dilatation of esophageal strictures allergic rhinitis insomnia and history of B12 deficiency documented   Had CT chest done with pulmonary 7/22 showed mass bx done. 8/22    Oncology hx  Impression:ct chest 7/29/22     2.3 cm spiculated left upper lobe lung nodule highly suspicious for bronchogenic carcinoma.     New nonspecific 7 mm nodule in the right middle lobe; this appears more linear on the coronal images and may be a focus of scarring.     Additional stable lung nodules, mildly enlarged axillary lymph nodes, substernal thyroid nodule; these findings are likely benign given the interval stability.   LEFT LUNG MASS, CT-GUIDED BIOPSY:   Non-small cell lung carcinoma.   Comment:  The biopsy reveals invasive carcinoma with apparent glandular but   also vague squamoid features.  Tumor cells are diffusely positive with TTF-1   and focally positive with P40.  The histology differential includes   adenocarcinoma and adenosquamous carcinoma.  PD-L1 and templates NGS studies   are in progress.  The results will be issued separately.    October 3, 2022: Robotic left lobectomy T1c N1    Social history; patient is a smoker denies recreational alcohol use or drug use   Patient is currently on disability  She is allergic to the mask used during COVID pandemic she is also bothered by her mask with COPD    Family history suggestive of ovarian and breast cancer patient advised to see a  or seek   Review of patient's allergies indicates:  No Known Allergies  Medications have been reviewed  Current Outpatient Medications on File Prior to Visit   Medication Sig Dispense Refill    (Magic mouthwash) 1:1:1 diphenhydrAMINE(Benadryl) 12.5mg/5ml liq, aluminum & magnesium hydroxide-simethicone (Maalox), LIDOcaine viscous 2% Swish and spit 10 mLs 3 (three) times daily. for mouth sores 250 mL 0    acetaminophen (TYLENOL) 500 MG tablet Take 2 tablets (1,000 mg total) by mouth every 6 (six) hours as needed for  Pain. 8 tablet 0    albuterol (PROVENTIL/VENTOLIN HFA) 90 mcg/actuation inhaler Inhale 2 puffs into the lungs every 6 (six) hours as needed for Wheezing or Shortness of Breath. Rescue 8.5 g 11    albuterol-ipratropium (DUO-NEB) 2.5 mg-0.5 mg/3 mL nebulizer solution Take 3 mLs by nebulization every 6 (six) hours as needed for Wheezing. Rescue 120 each 5    ALPRAZolam (XANAX) 0.5 MG tablet Take 1 tablet (0.5 mg total) by mouth 3 (three) times daily. 90 tablet 0    benzocaine-menthoL 6-10 mg lozenge Take 1 lozenge by mouth every 2 (two) hours as needed for Pain. 18 tablet 0    buPROPion (WELLBUTRIN XL) 300 MG 24 hr tablet Take 1 tablet (300 mg total) by mouth once daily. 90 tablet 3    celecoxib (CELEBREX) 200 MG capsule Take 2 capsules (400 mg total) by mouth once daily. 180 capsule 1    diphenhydrAMINE-aluminum-magnesium hydroxide-simethicone-LIDOcaine HCl 2% Swish and spit 15 mLs every 4 (four) hours as needed (mouth sores). 100 each 2    EScitalopram oxalate (LEXAPRO) 20 MG tablet Take 1 tablet (20 mg total) by mouth once daily. 30 tablet 11    fluticasone propionate (FLONASE) 50 mcg/actuation nasal spray 1 spray (50 mcg total) by Each Nostril route once daily. 16 g 3    fluticasone-salmeterol 230-21 mcg/dose (ADVAIR HFA) 230-21 mcg/actuation HFAA inhaler Inhale 2 puffs into the lungs 2 (two) times daily. Controller 12 g 5    gabapentin (NEURONTIN) 300 MG capsule Take 1 capsule (300 mg total) by mouth 3 (three) times daily. 270 capsule 1    levocetirizine (XYZAL) 5 MG tablet Take 1 tablet (5 mg total) by mouth every evening. 30 tablet 5    LIDOcaine (LIDODERM) 5 % Place 1 patch onto the skin once daily. Remove & Discard patch within 12 hours or as directed by MD Bloom patch 0    LIDOcaine (LIDODERM) 5 % Place 1 patch onto the skin once daily. Remove & Discard patch within 12 hours or as directed by MD 7 patch 1    LIDOcaine-prilocaine (EMLA) cream Apply topically as needed (apply to port site 30 min before access). 30  g 0    OLANZapine (ZYPREXA) 5 MG tablet Take 1 tablet (5 mg total) by mouth nightly. Take 1 tablet at bedtime on days 1-4 of each cycle of chemotherapy 30 tablet 2    ondansetron (ZOFRAN-ODT) 8 MG TbDL Take 1 tablet (8 mg total) by mouth every 12 (twelve) hours as needed (nausea). 60 tablet 1    oxyCODONE-acetaminophen (PERCOCET) 5-325 mg per tablet Take 1 tablet by mouth every 4 (four) hours as needed for Pain. 60 tablet 0    predniSONE (DELTASONE) 20 MG tablet Take one pill a day for three days, repeat for shortness of breath 12 tablet 0    varenicline (CHANTIX) 1 mg Tab Take 1 tablet (1 mg total) by mouth 2 (two) times daily. 180 tablet 0     No current facility-administered medications on file prior to visit.       REVIEW OF SYSTEMS:     S/p lobectomy left side, has draining tube+ with sanguinous fluid.    Wt Readings from Last 3 Encounters:   01/05/24 77.8 kg (171 lb 8 oz)   12/15/23 78 kg (171 lb 14.4 oz)   11/24/23 77.7 kg (171 lb 6.4 oz)     Temp Readings from Last 3 Encounters:   01/05/24 97.8 °F (36.6 °C)   12/15/23 97.7 °F (36.5 °C)   11/24/23 97.2 °F (36.2 °C)     BP Readings from Last 3 Encounters:   01/05/24 (!) 105/59   12/15/23 (!) 105/58   11/24/23 108/68     Pulse Readings from Last 3 Encounters:   01/05/24 (!) 55   12/15/23 (!) 58   11/24/23 (!) 55     VITAL SIGNS:  as above   GENERAL: appears well-built, well-nourished.  No anxiety, no agitation, and in no distress.  Patient is awake, alert, oriented and cooperative.  HEENT:  Showed no congestion. Trachea is central no obvious icterus or pallor noted no hoarseness. no obvious JVD   NECK:  Supple.  No JVD. No obvious cervical submental or supraclavicular adenopathy.  RS: tube draining on left side. S/p lobectomy  ABDOMEN:  abdomen appears undistended.  EXTREMITIES:  Without edema.  NEUROLOGICAL:  The patient is appropriate, higher functions are normal.  No  obvious neurological deficits.  normal judgement normal thought content  No confusion, no  speech impediment. Cranial nerves are intact and show no deficit. No gross motor deficits noted   SKIN MUSCULOSKELETAL: no joint or skeletal deformity, no clubbing of nails.  No visible rash ecchymosis or petechiae  Lab Results   Component Value Date    WBC 20.78 (H) 03/03/2020    HGB 14.9 03/03/2020    HCT 47.0 03/03/2020    MCV 99 (H) 03/03/2020     03/03/2020     Smudge cells presnt  CMP  Sodium   Date Value Ref Range Status   01/24/2024 139 136 - 145 mmol/L Final     Potassium   Date Value Ref Range Status   01/24/2024 4.1 3.5 - 5.1 mmol/L Final     Chloride   Date Value Ref Range Status   01/24/2024 104 95 - 110 mmol/L Final     CO2   Date Value Ref Range Status   01/24/2024 26 23 - 29 mmol/L Final     Glucose   Date Value Ref Range Status   01/24/2024 111 (H) 70 - 110 mg/dL Final     BUN   Date Value Ref Range Status   01/24/2024 13 8 - 23 mg/dL Final     Creatinine   Date Value Ref Range Status   01/24/2024 1.1 0.5 - 1.4 mg/dL Final     Calcium   Date Value Ref Range Status   01/24/2024 8.8 8.7 - 10.5 mg/dL Final     Total Protein   Date Value Ref Range Status   01/24/2024 6.4 6.0 - 8.4 g/dL Final     Albumin   Date Value Ref Range Status   01/24/2024 3.9 3.5 - 5.2 g/dL Final     Total Bilirubin   Date Value Ref Range Status   01/24/2024 0.4 0.1 - 1.0 mg/dL Final     Comment:     For infants and newborns, interpretation of results should be based  on gestational age, weight and in agreement with clinical  observations.    Premature Infant recommended reference ranges:  Up to 24 hours.............<8.0 mg/dL  Up to 48 hours............<12.0 mg/dL  3-5 days..................<15.0 mg/dL  6-29 days.................<15.0 mg/dL       Alkaline Phosphatase   Date Value Ref Range Status   01/24/2024 105 55 - 135 U/L Final     AST   Date Value Ref Range Status   01/24/2024 15 10 - 40 U/L Final     ALT   Date Value Ref Range Status   01/24/2024 16 10 - 44 U/L Final     Anion Gap   Date Value Ref Range Status    01/24/2024 9 8 - 16 mmol/L Final     eGFR if    Date Value Ref Range Status   06/13/2022 >60 >60 mL/min/1.73 m^2 Final     eGFR if non    Date Value Ref Range Status   06/13/2022 >60 >60 mL/min/1.73 m^2 Final     Comment:     Calculation used to obtain the estimated glomerular filtration  rate (eGFR) is the CKD-EPI equation.            2wk ago    Blood Interpretation A B cell lymphoproliferative disorder is present. see comment.    Comment: Interpreted by: Jeremias Sosa M.D., Signed on 08/06/2020 at 13:07   Blood Comment Flow cytometric analysis of  peripheral blood  detects a lambda  light chain   restricted B lymphocyte population showing expression of CD19 and dim CD20   with aberrant expression of CD5 (dim).  T lymphocytes are   immunophenotypically unremarkable.  The blasts gate is not increased.  The   findings are consistent with patient history of chronic lymphocytic   leukemia/small lymphocytic lymphoma.   Flow differential:  Lymphocytes 69.6%, Monocytes 2.8%, Granulocytes  27.5%,   Blast  0.1%, Debris/nRBC 0.1%,  Viability 97.5%.   10/3/22 robotic lef lung lobectomy  1. LYMPH NODE, LEVEL 5 FROZEN SECTION, EXCISION:   One lymph node with dominant necrotizing granuloma, negative for malignancy   (0/1).   2. LYMPH NODES, LEVEL 5 PERMANENT, EXCISION:   Two lymph nodes with multifocal necrotizing granulomas, negative for   malignancy (0/2).   3. LYMPH NODES, LEFT POSTERIOR LEVEL 10, EXCISION:   Five lymph nodes with multifocal necrotizing granulomas, negative for   malignancy (0/5).   4. LYMPH NODE, LEVEL 11, EXCISION:   One lymph node, negative for malignancy (0/1).   5. LYMPH NODES, LEVEL 12, EXCISION:   Three lymph nodes, one with single necrotizing granuloma, negative for   malignancy (0/3).   6. LYMPH NODES, LEVEL 12 NEAR UPPER DIVISION BRONCHUS, EXCISION:   Three lymph nodes with sinus histiocytosis, negative for malignancy (0/3).   7. LYMPH NODES, LEVEL 10 #2, EXCISION:    One of two lymph nodes positive for metastatic carcinoma (1/2).   Size of largest metastatic deposit:  8 mm.   Negative for extranodal extension.   8. LYMPH NODES, LEFT LEVEL 8, EXCISION:   Two lymph nodes with sinus histiocytosis, negative for malignancy (0/2).   9. LYMPH NODES, LEFT LEVEL 9, EXCISION:   Two lymph nodes, negative for malignancy (0/2).   10. 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   Surgical margins are negative for malignancy.   Background lung tissue with subpleural fibrosis, no evidence of granulomas.   Two hilar lymph nodes, negative for malignancy (0/2).   Coment (1-3, 5):  Prominent necrotizing granulomatous inflammation identified   is identified in multiple lymph nodes.  An AE1/AE3 cytokeratin immunostain   performed on the largest granuloma (part 3) reveals no evidence of occult   carcinoma.  GMS and AFB special stains are negative for fungal and acid-fast   organisms, respectively.  The necrotizing features are not typical of   sarcoidosis.  As such, other granulomatous processes may be considered   including secondary response to malignancy or infection.  Clinical   correlation is advised.   Comment (10):  Sections reveal invasive carcinoma involving lung tissue with   predominantly squamoid morphologic features including bright eosinophilic   cytoplasm and intracellular bridging.  There are not significant keratinizing   features.  Some areas show basaloid features.  There is also regional luminal   features including cribriforming and vague glandular structures containing   mucin.  Tumor cells are diffusely positive with P40 and regionally positive   with TTF-1.  Mucicarmine special stain highlights mucin producing luminal   spaces. Overall tumor appearance and staining profile supports adenosquamous   carcinoma, a variant of non-small cell lung carcinoma.   [facility] SURGICAL PATHOLOGY CANCER CASE SUMMARY:  LUNG   Procedure:  Lobectomy    Specimen laterality:  Left   Tumor focality:  Single focus   Tumor site: Upper lobe of lung   Tumor size:  2.2 cm   Histologic type:  Adenosquamous carcinoma   Spread through airspaces:  Not identified   Visceral pleural invasion:  Not identified   Direct invasion of adjacent structures: Not applicable   Lymphovascular invasion:  Not identified   Margins:  All margins negative for invasive carcinoma     Closest margin to invasive carcinoma:  Bronchial (3.0 cm)   Regional lymph nodes:  Tumor present in regional lymph node     Number of lymph nodes with tumor:  1     Scott sites with tumor:  Left level 10 (10L)     Extranodal extension:  Not identified     Number of lymph nodes examined:  23   Pathologic stage classification (pTNM): pT1c pN1   Special studies:  Performed on previous biopsy if additional studies needed   there may be performed on tissue blockd 10G or 10H.      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 Due to adenosq histology chose carbo/ taxol x 4 cycles tecentriq maintanence x 1 year  pt is has had 5 cycles of carbo/ taxol  discused maintainenec at tumor board   On tecentriq maintainence   Proceed with tecentriq  Pet7/18/23 neg for mets  See me for next cycle cbc,cmp  pet    CLL   stage 0 will confirmed with flow cytometry  NTD   she has no other cytopenias or lymphadenopathy or B symptoms patient can be observed      Continue with chronic pain syndrome and pain management advised to stop smoking if at all possible 10 pills of hydrocodoen given to pt, she needs to follow with pain mx      History of B12 deficiency will continue to  monitor    Advised COVID precaution due to her lung situation she will be a high risk patient    Advance Care Planning     Date: 04/18/2023    Power of   I initiated the process of advance care planning today and explained the importance of this process to the patient.  I introduced the concept of advance directives to the patient, as well. Then the patient received detailed information about the importance of designating a Health Care Power of  (HCPOA). She was also instructed to communicate with this person about their wishes for future healthcare, should she become sick and lose decision-making capacity. The patient has not previously appointed a HCPOA. After our discussion, the patient has decided to complete a HCPOA .

## 2024-01-26 ENCOUNTER — TELEPHONE (OUTPATIENT)
Dept: HEMATOLOGY/ONCOLOGY | Facility: CLINIC | Age: 64
End: 2024-01-26
Payer: COMMERCIAL

## 2024-01-26 ENCOUNTER — INFUSION (OUTPATIENT)
Dept: INFUSION THERAPY | Facility: HOSPITAL | Age: 64
End: 2024-01-26
Attending: INTERNAL MEDICINE
Payer: COMMERCIAL

## 2024-01-26 VITALS
SYSTOLIC BLOOD PRESSURE: 118 MMHG | DIASTOLIC BLOOD PRESSURE: 70 MMHG | TEMPERATURE: 97 F | HEIGHT: 63 IN | BODY MASS INDEX: 31.2 KG/M2 | RESPIRATION RATE: 18 BRPM | HEART RATE: 65 BPM | WEIGHT: 176.06 LBS

## 2024-01-26 DIAGNOSIS — C34.90 NON-SMALL CELL LUNG CANCER, UNSPECIFIED LATERALITY: Primary | ICD-10-CM

## 2024-01-26 DIAGNOSIS — C34.01 MALIGNANT NEOPLASM OF HILUS OF RIGHT LUNG: ICD-10-CM

## 2024-01-26 PROCEDURE — 96413 CHEMO IV INFUSION 1 HR: CPT

## 2024-01-26 PROCEDURE — 25000003 PHARM REV CODE 250: Performed by: INTERNAL MEDICINE

## 2024-01-26 PROCEDURE — A4216 STERILE WATER/SALINE, 10 ML: HCPCS | Performed by: INTERNAL MEDICINE

## 2024-01-26 PROCEDURE — 63600175 PHARM REV CODE 636 W HCPCS: Performed by: INTERNAL MEDICINE

## 2024-01-26 RX ORDER — HEPARIN 100 UNIT/ML
500 SYRINGE INTRAVENOUS
Status: DISCONTINUED | OUTPATIENT
Start: 2024-01-26 | End: 2024-01-26 | Stop reason: HOSPADM

## 2024-01-26 RX ORDER — DIPHENHYDRAMINE HYDROCHLORIDE 50 MG/ML
50 INJECTION INTRAMUSCULAR; INTRAVENOUS ONCE AS NEEDED
Status: DISCONTINUED | OUTPATIENT
Start: 2024-01-26 | End: 2024-01-26 | Stop reason: HOSPADM

## 2024-01-26 RX ORDER — SODIUM CHLORIDE 0.9 % (FLUSH) 0.9 %
10 SYRINGE (ML) INJECTION
Status: DISCONTINUED | OUTPATIENT
Start: 2024-01-26 | End: 2024-01-26 | Stop reason: HOSPADM

## 2024-01-26 RX ORDER — EPINEPHRINE 0.3 MG/.3ML
0.3 INJECTION SUBCUTANEOUS ONCE AS NEEDED
Status: DISCONTINUED | OUTPATIENT
Start: 2024-01-26 | End: 2024-01-26 | Stop reason: HOSPADM

## 2024-01-26 RX ADMIN — SODIUM CHLORIDE: 9 INJECTION, SOLUTION INTRAVENOUS at 01:01

## 2024-01-26 RX ADMIN — HEPARIN 500 UNITS: 100 SYRINGE at 02:01

## 2024-01-26 RX ADMIN — ATEZOLIZUMAB 1200 MG: 1200 INJECTION, SOLUTION INTRAVENOUS at 01:01

## 2024-01-26 RX ADMIN — SODIUM CHLORIDE, PRESERVATIVE FREE 10 ML: 5 INJECTION INTRAVENOUS at 02:01

## 2024-01-26 NOTE — TELEPHONE ENCOUNTER
This RN Supv contacted the pt to notify her that her chemo tx cycle today is canceled as it is not authorized by her insurance at this time beyond the 1st dose received. Pt was scheduled for Chemo C15 [id number] today at 1p and this is canceled until additional coverage for the drug can be obtained to cover the cost. Cancer Center director is aware.Our team is working on getting the situation resolved for the pt. I apologized to the pt for the current situation and reassured her that we would communicate any information to her asap. Pt was also advised to use the pt portal for updates or additional questions she may have regarding the coverage. Dr Lee was notified.

## 2024-01-26 NOTE — TELEPHONE ENCOUNTER
The pre services nurse called this RN Supv to provide an update on the pt's auth process. She states that she has been communicating with the insurance provider and there is some discrepancy in the information provided to her via phone and the coverage for only 1 treatment received by letter. She is resubmitting the auth to the pt's insurance and she will keep the clinic informed and updated on progress

## 2024-02-07 ENCOUNTER — LAB VISIT (OUTPATIENT)
Dept: LAB | Facility: HOSPITAL | Age: 64
End: 2024-02-07
Payer: COMMERCIAL

## 2024-02-07 ENCOUNTER — OFFICE VISIT (OUTPATIENT)
Dept: FAMILY MEDICINE | Facility: CLINIC | Age: 64
End: 2024-02-07
Payer: COMMERCIAL

## 2024-02-07 VITALS
SYSTOLIC BLOOD PRESSURE: 126 MMHG | OXYGEN SATURATION: 97 % | HEART RATE: 62 BPM | BODY MASS INDEX: 31.64 KG/M2 | HEIGHT: 63 IN | DIASTOLIC BLOOD PRESSURE: 62 MMHG | RESPIRATION RATE: 16 BRPM | TEMPERATURE: 98 F | WEIGHT: 178.56 LBS

## 2024-02-07 DIAGNOSIS — F33.0 MILD EPISODE OF RECURRENT MAJOR DEPRESSIVE DISORDER: ICD-10-CM

## 2024-02-07 DIAGNOSIS — R73.9 HYPERGLYCEMIA: ICD-10-CM

## 2024-02-07 DIAGNOSIS — E66.9 OBESITY, UNSPECIFIED CLASSIFICATION, UNSPECIFIED OBESITY TYPE, UNSPECIFIED WHETHER SERIOUS COMORBIDITY PRESENT: ICD-10-CM

## 2024-02-07 DIAGNOSIS — C91.10 CLL (CHRONIC LYMPHOCYTIC LEUKEMIA): ICD-10-CM

## 2024-02-07 DIAGNOSIS — E78.2 MIXED HYPERLIPIDEMIA: ICD-10-CM

## 2024-02-07 DIAGNOSIS — R73.9 HYPERGLYCEMIA: Primary | ICD-10-CM

## 2024-02-07 DIAGNOSIS — C34.01 MALIGNANT NEOPLASM OF HILUS OF RIGHT LUNG: ICD-10-CM

## 2024-02-07 LAB
CHOLEST SERPL-MCNC: 308 MG/DL (ref 120–199)
CHOLEST/HDLC SERPL: 7 {RATIO} (ref 2–5)
ESTIMATED AVG GLUCOSE: 97 MG/DL (ref 68–131)
HBA1C MFR BLD: 5 % (ref 4–5.6)
HDLC SERPL-MCNC: 44 MG/DL (ref 40–75)
HDLC SERPL: 14.3 % (ref 20–50)
LDLC SERPL CALC-MCNC: 220 MG/DL (ref 63–159)
NONHDLC SERPL-MCNC: 264 MG/DL
TRIGL SERPL-MCNC: 220 MG/DL (ref 30–150)

## 2024-02-07 PROCEDURE — 1159F MED LIST DOCD IN RCRD: CPT | Mod: CPTII,S$GLB,,

## 2024-02-07 PROCEDURE — 3074F SYST BP LT 130 MM HG: CPT | Mod: CPTII,S$GLB,,

## 2024-02-07 PROCEDURE — 36415 COLL VENOUS BLD VENIPUNCTURE: CPT | Mod: PO

## 2024-02-07 PROCEDURE — 80061 LIPID PANEL: CPT

## 2024-02-07 PROCEDURE — 3078F DIAST BP <80 MM HG: CPT | Mod: CPTII,S$GLB,,

## 2024-02-07 PROCEDURE — 99999 PR PBB SHADOW E&M-EST. PATIENT-LVL V: CPT | Mod: PBBFAC,,,

## 2024-02-07 PROCEDURE — 83036 HEMOGLOBIN GLYCOSYLATED A1C: CPT

## 2024-02-07 PROCEDURE — 99214 OFFICE O/P EST MOD 30 MIN: CPT | Mod: S$GLB,,,

## 2024-02-07 PROCEDURE — 3008F BODY MASS INDEX DOCD: CPT | Mod: CPTII,S$GLB,,

## 2024-02-07 PROCEDURE — 1160F RVW MEDS BY RX/DR IN RCRD: CPT | Mod: CPTII,S$GLB,,

## 2024-02-07 RX ORDER — CITALOPRAM 10 MG/1
10 TABLET ORAL DAILY
Qty: 30 TABLET | Refills: 2 | Status: SHIPPED | OUTPATIENT
Start: 2024-02-07 | End: 2024-03-06

## 2024-02-07 RX ORDER — TIRZEPATIDE 2.5 MG/.5ML
2.5 INJECTION, SOLUTION SUBCUTANEOUS
Qty: 4 PEN | Refills: 11 | Status: SHIPPED | OUTPATIENT
Start: 2024-02-07 | End: 2024-02-07

## 2024-02-07 RX ORDER — TIRZEPATIDE 2.5 MG/.5ML
2.5 INJECTION, SOLUTION SUBCUTANEOUS
Qty: 4 PEN | Refills: 11 | Status: SHIPPED | OUTPATIENT
Start: 2024-02-07 | End: 2024-03-06

## 2024-02-07 NOTE — PATIENT INSTRUCTIONS
Damaso Crawford,     If you are due for any health screening(s) below please notify me so we can arrange them to be ordered and scheduled to maintain your health. Most healthy patients complete it. Don't lose out on improving your health.     Tests to Keep You Healthy    Mammogram: ORDERED BUT NOT SCHEDULED  Colon Cancer Screening: DUE  Cervical Cancer Screening: Met on 3/15/2021      Breast Cancer Screening    Breast cancer is the second most common cancer in women after skin cancer, and the second leading cause of death from cancer after lung cancer. Mammograms can detect breast cancer early, which significantly increases the chances of curing the cancer.      A screening mammogram is an x-ray image of the breasts used for early breast cancer detection. It can help reduce the number of deaths from breast cancer among women. To get a clear image, the breast is placed between two plastic plates to make it flat. How often a mammogram is needed depends on your age and your breast cancer risk.

## 2024-02-07 NOTE — PROGRESS NOTES
Subjective:       Patient ID: Yvette Hutchinson is a 63 y.o. female.    Chief Complaint: Follow-up    Presents to the clinic follow up. Had fasting labs via hem/onc and was found to have elevated blood pressure.     Wishes to discuss weight loss medications. Not sure what would be covered at this time. We can try for zepbound. Will need to check with her oncologist to make sure they are ok with her taking if its covered or affordable.     Shortness of breath. Sees pulmonology tomorrow for this.     Depression. Feeling more depressed lately despite wellbutrin and lexapro as prescribed. Open to changing therapy.         Past Medical History:   Diagnosis Date    Arthritis     Cancer     Depression     GERD (gastroesophageal reflux disease)     Pneumonia of left lung due to infectious organism 03/05/2020       Review of patient's allergies indicates:  No Known Allergies      Current Outpatient Medications:     (Magic mouthwash) 1:1:1 diphenhydrAMINE(Benadryl) 12.5mg/5ml liq, aluminum & magnesium hydroxide-simethicone (Maalox), LIDOcaine viscous 2%, Swish and spit 10 mLs 3 (three) times daily. for mouth sores, Disp: 250 mL, Rfl: 0    acetaminophen (TYLENOL) 500 MG tablet, Take 2 tablets (1,000 mg total) by mouth every 6 (six) hours as needed for Pain., Disp: 8 tablet, Rfl: 0    albuterol (PROVENTIL/VENTOLIN HFA) 90 mcg/actuation inhaler, Inhale 2 puffs into the lungs every 6 (six) hours as needed for Wheezing or Shortness of Breath. Rescue, Disp: 8.5 g, Rfl: 11    albuterol-ipratropium (DUO-NEB) 2.5 mg-0.5 mg/3 mL nebulizer solution, Take 3 mLs by nebulization every 6 (six) hours as needed for Wheezing. Rescue, Disp: 120 each, Rfl: 5    ALPRAZolam (XANAX) 0.5 MG tablet, Take 1 tablet (0.5 mg total) by mouth 3 (three) times daily., Disp: 90 tablet, Rfl: 0    buPROPion (WELLBUTRIN XL) 300 MG 24 hr tablet, Take 1 tablet (300 mg total) by mouth once daily., Disp: 90 tablet, Rfl: 3    celecoxib (CELEBREX) 200 MG  capsule, Take 2 capsules (400 mg total) by mouth once daily., Disp: 180 capsule, Rfl: 1    diphenhydrAMINE-aluminum-magnesium hydroxide-simethicone-LIDOcaine HCl 2%, Swish and spit 15 mLs every 4 (four) hours as needed (mouth sores)., Disp: 100 each, Rfl: 2    fluticasone propionate (FLONASE) 50 mcg/actuation nasal spray, 1 spray (50 mcg total) by Each Nostril route once daily., Disp: 16 g, Rfl: 3    fluticasone-salmeterol 230-21 mcg/dose (ADVAIR HFA) 230-21 mcg/actuation HFAA inhaler, Inhale 2 puffs into the lungs 2 (two) times daily. Controller, Disp: 12 g, Rfl: 5    gabapentin (NEURONTIN) 300 MG capsule, Take 1 capsule (300 mg total) by mouth 3 (three) times daily., Disp: 270 capsule, Rfl: 1    LIDOcaine (LIDODERM) 5 %, Place 1 patch onto the skin once daily. Remove & Discard patch within 12 hours or as directed by MD, Disp: 7 patch, Rfl: 1    LIDOcaine-prilocaine (EMLA) cream, Apply topically as needed (apply to port site 30 min before access)., Disp: 30 g, Rfl: 0    ondansetron (ZOFRAN-ODT) 8 MG TbDL, Take 1 tablet (8 mg total) by mouth every 12 (twelve) hours as needed (nausea)., Disp: 30 tablet, Rfl: 1    varenicline (CHANTIX) 1 mg Tab, Take 1 tablet (1 mg total) by mouth 2 (two) times daily., Disp: 180 tablet, Rfl: 0    benzocaine-menthoL 6-10 mg lozenge, Take 1 lozenge by mouth every 2 (two) hours as needed for Pain. (Patient not taking: Reported on 2/7/2024), Disp: 18 tablet, Rfl: 0    citalopram (CELEXA) 10 MG tablet, Take 1 tablet (10 mg total) by mouth once daily., Disp: 30 tablet, Rfl: 2    levocetirizine (XYZAL) 5 MG tablet, Take 1 tablet (5 mg total) by mouth every evening. (Patient not taking: Reported on 2/7/2024), Disp: 30 tablet, Rfl: 5    LIDOcaine (LIDODERM) 5 %, Place 1 patch onto the skin once daily. Remove & Discard patch within 12 hours or as directed by MD (Patient not taking: Reported on 2/7/2024), Disp: 7 patch, Rfl: 0    OLANZapine (ZYPREXA) 5 MG tablet, Take 1 tablet (5 mg total) by  "mouth nightly. Take 1 tablet at bedtime on days 1-4 of each cycle of chemotherapy (Patient not taking: Reported on 2/7/2024), Disp: 30 tablet, Rfl: 2    ondansetron (ZOFRAN-ODT) 8 MG TbDL, Take 1 tablet (8 mg total) by mouth every 12 (twelve) hours as needed (nausea). (Patient not taking: Reported on 2/7/2024), Disp: 60 tablet, Rfl: 1    oxyCODONE-acetaminophen (PERCOCET) 5-325 mg per tablet, Take 1 tablet by mouth every 4 (four) hours as needed for Pain. (Patient not taking: Reported on 2/7/2024), Disp: 60 tablet, Rfl: 0    predniSONE (DELTASONE) 20 MG tablet, Take one pill a day for three days, repeat for shortness of breath (Patient not taking: Reported on 2/7/2024), Disp: 12 tablet, Rfl: 0    tirzepatide, weight loss, (ZEPBOUND) 2.5 mg/0.5 mL PnIj, Inject 2.5 mg into the skin every 7 days., Disp: 4 Pen, Rfl: 11    Review of Systems   Respiratory:  Positive for shortness of breath.    Psychiatric/Behavioral:  Positive for dysphoric mood. The patient is nervous/anxious.        Objective:      /62 (BP Location: Right arm, Patient Position: Sitting, BP Method: Small (Manual))   Pulse 62   Temp 98.4 °F (36.9 °C) (Oral)   Resp 16   Ht 5' 3" (1.6 m)   Wt 81 kg (178 lb 9.2 oz)   LMP 01/13/2010 (Approximate)   SpO2 97%   BMI 31.63 kg/m²   Physical Exam  Vitals reviewed.   Constitutional:       General: She is not in acute distress.     Appearance: Normal appearance. She is obese. She is not ill-appearing, toxic-appearing or diaphoretic.   HENT:      Head: Normocephalic.      Right Ear: External ear normal.      Left Ear: External ear normal.      Nose: Nose normal. No congestion or rhinorrhea.      Mouth/Throat:      Mouth: Mucous membranes are moist.      Pharynx: Oropharynx is clear.   Eyes:      General: No scleral icterus.        Right eye: No discharge.         Left eye: No discharge.      Extraocular Movements: Extraocular movements intact.      Conjunctiva/sclera: Conjunctivae normal. "   Cardiovascular:      Rate and Rhythm: Normal rate.   Pulmonary:      Effort: Pulmonary effort is normal. No respiratory distress.   Musculoskeletal:         General: No swelling, tenderness or deformity. Normal range of motion.      Cervical back: Normal range of motion.      Right lower leg: No edema.      Left lower leg: No edema.   Skin:     General: Skin is warm and dry.      Capillary Refill: Capillary refill takes less than 2 seconds.      Coloration: Skin is not jaundiced.      Findings: No bruising, erythema, lesion or rash.   Neurological:      Mental Status: She is alert and oriented to person, place, and time.      Gait: Gait normal.   Psychiatric:         Mood and Affect: Mood normal.         Behavior: Behavior normal.         Thought Content: Thought content normal.         Judgment: Judgment normal.         Assessment:       1. Hyperglycemia    2. Malignant neoplasm of hilus of right lung    3. CLL (chronic lymphocytic leukemia)    4. Mixed hyperlipidemia    5. Obesity, unspecified classification, unspecified obesity type, unspecified whether serious comorbidity present    6. Mild episode of recurrent major depressive disorder        Plan:       Hyperglycemia  -     Hemoglobin A1C; Future; Expected date: 02/07/2024    Malignant neoplasm of hilus of right lung        -    Follows with oncology    CLL (chronic lymphocytic leukemia)       -     As above    Mixed hyperlipidemia  -     LIPID PANEL; Future; Expected date: 02/07/2024    Obesity, unspecified classification, unspecified obesity type, unspecified whether serious comorbidity present  -     Discontinue: tirzepatide, weight loss, (ZEPBOUND) 2.5 mg/0.5 mL PnIj; Inject 2.5 mg into the skin every 7 days.  Dispense: 4 Pen; Refill: 11  -     tirzepatide, weight loss, (ZEPBOUND) 2.5 mg/0.5 mL PnIj; Inject 2.5 mg into the skin every 7 days.  Dispense: 4 Pen; Refill: 11        -      Recommended she discuss with oncologist to make sure they are ok with  her taking this and losing weight.     Mild episode of recurrent major depressive disorder  -     citalopram (CELEXA) 10 MG tablet; Take 1 tablet (10 mg total) by mouth once daily.  Dispense: 30 tablet; Refill: 2                 Herbert Price PA-C  Family Medicine Physician Assistant       Future Appointments       Date Provider Specialty Appt Notes    2/8/2024 Harika Garcia, NP Pulmonology 3M F/U    2/15/2024 Rhiannon Lee MD Hematology and Oncology [facility]/chemoc[facility]    3/6/2024 Herbert Price PA-C Family Medicine 1 mo f/u               I spent a total of 20 minutes on the day of the visit.This includes face to face time and non-face to face time preparing to see the patient (eg, review of tests), obtaining and/or reviewing separately obtained history, documenting clinical information in the electronic or other health record, independently interpreting results and communicating results to the patient/family/caregiver, or care coordinator.      We have addressed [4] Moderate: 1 or more chronic illnesses with exacerbation, progression, or side effects of treatment / 2 or more stable chronic illnesses / 1 undiagnosed new problem with uncertain prognosis / 1 acute illness with systemic symptoms / 1 acute complicated injury  The complexity of the data reviewed and analyzed for this visit was [3] Limited (Reviewed prior external note, ordered unique testing or reviewed the results of each unique test)   The risk of complications and/or morbidity or mortality are [4] Moderate risk (I.e. prescription drug management / decision regarding minor surgery with identified pt or procedure risk factors / decision regarding elective major surgery without identified pt or procedure risk factors / diagnosis or treatment significantly limited by social determinants of health)   The level of Medical Decision Making for this visit is [4] Moderate

## 2024-02-08 ENCOUNTER — OFFICE VISIT (OUTPATIENT)
Dept: PULMONOLOGY | Facility: CLINIC | Age: 64
End: 2024-02-08
Payer: COMMERCIAL

## 2024-02-08 VITALS
HEIGHT: 63 IN | OXYGEN SATURATION: 96 % | WEIGHT: 178.56 LBS | HEART RATE: 62 BPM | DIASTOLIC BLOOD PRESSURE: 67 MMHG | BODY MASS INDEX: 31.64 KG/M2 | SYSTOLIC BLOOD PRESSURE: 104 MMHG

## 2024-02-08 DIAGNOSIS — J96.11 CHRONIC RESPIRATORY FAILURE WITH HYPOXIA: ICD-10-CM

## 2024-02-08 DIAGNOSIS — R05.1 ACUTE COUGH: ICD-10-CM

## 2024-02-08 DIAGNOSIS — J41.0 SIMPLE CHRONIC BRONCHITIS: Primary | ICD-10-CM

## 2024-02-08 PROCEDURE — 3074F SYST BP LT 130 MM HG: CPT | Mod: CPTII,S$GLB,, | Performed by: NURSE PRACTITIONER

## 2024-02-08 PROCEDURE — 3008F BODY MASS INDEX DOCD: CPT | Mod: CPTII,S$GLB,, | Performed by: NURSE PRACTITIONER

## 2024-02-08 PROCEDURE — 1159F MED LIST DOCD IN RCRD: CPT | Mod: CPTII,S$GLB,, | Performed by: NURSE PRACTITIONER

## 2024-02-08 PROCEDURE — 99999 PR PBB SHADOW E&M-EST. PATIENT-LVL IV: CPT | Mod: PBBFAC,,, | Performed by: NURSE PRACTITIONER

## 2024-02-08 PROCEDURE — 3078F DIAST BP <80 MM HG: CPT | Mod: CPTII,S$GLB,, | Performed by: NURSE PRACTITIONER

## 2024-02-08 PROCEDURE — 3044F HG A1C LEVEL LT 7.0%: CPT | Mod: CPTII,S$GLB,, | Performed by: NURSE PRACTITIONER

## 2024-02-08 PROCEDURE — 99214 OFFICE O/P EST MOD 30 MIN: CPT | Mod: S$GLB,,, | Performed by: NURSE PRACTITIONER

## 2024-02-08 RX ORDER — CODEINE PHOSPHATE AND GUAIFENESIN 10; 100 MG/5ML; MG/5ML
5 SOLUTION ORAL EVERY 4 HOURS PRN
Qty: 210 ML | Refills: 0 | Status: SHIPPED | OUTPATIENT
Start: 2024-02-08 | End: 2024-02-16 | Stop reason: SDUPTHER

## 2024-02-08 RX ORDER — AZITHROMYCIN 250 MG/1
TABLET, FILM COATED ORAL
Qty: 6 TABLET | Refills: 0 | Status: SHIPPED | OUTPATIENT
Start: 2024-02-08 | End: 2024-03-06 | Stop reason: ALTCHOICE

## 2024-02-08 RX ORDER — FLUTICASONE PROPIONATE AND SALMETEROL XINAFOATE 230; 21 UG/1; UG/1
2 AEROSOL, METERED RESPIRATORY (INHALATION) 2 TIMES DAILY
Qty: 12 G | Refills: 5 | Status: SHIPPED | OUTPATIENT
Start: 2024-02-08 | End: 2025-02-07

## 2024-02-08 RX ORDER — PREDNISONE 20 MG/1
TABLET ORAL
Qty: 12 TABLET | Refills: 0 | Status: SHIPPED | OUTPATIENT
Start: 2024-02-08 | End: 2024-05-13 | Stop reason: SDUPTHER

## 2024-02-08 RX ORDER — LEVOCETIRIZINE DIHYDROCHLORIDE 5 MG/1
5 TABLET, FILM COATED ORAL NIGHTLY
Qty: 30 TABLET | Refills: 5 | Status: SHIPPED | OUTPATIENT
Start: 2024-02-08 | End: 2025-02-07

## 2024-02-08 NOTE — PROGRESS NOTES
2/8/2024    Yvette Hutchinson  Office note    Chief Complaint   Patient presents with    3m f/u    Cough     Brownish/ clear    Medication Refill       HPI:   2/8/2024- complaint of weight gain, states she has cut back on smoking 1 cigarette daily. On therapy for depression followed by primary care provider, interested in weight loss therapy.   Wearing supplemental oxygen at 3 Liters with benefit. Needing refill on oxygen order for DME, wanting portable oxygen concentrator to assist when leaving home. Difficult to leave home with tanks.     Complaint of productive cough with brown mucous. Associated with post nasal drip.     11/6/2023- states doing well, followed by Dr. Lee last note reviewed from 11/2/2023. States hair has grown back following completion of chemo. On immune suppressant therapy.   SOB is persistent complaint. Worse with exertion, improves with rest. Using albuterol inhaler 2-3 times weekly. On Advair daily.     Cough is improved. Now gets breaks for weeks, states cough last few days with change in weather.     Wakes herself up snoring, has sore throat in mornings. Wakes up feeling tired. Gets sudden feeling of fatigue when standing in kitchen. On supplemental oxygen from [employer], wearing nightly with benefit. Had normal at home sleep study in 2021 negative. Complaint of gaining weight.     4/27/2023-  reviewed note oncologist Dr. Lee 4/18/23 on immune therapy for adenosquamous carcinoma ; recently lost brother from Sepsis. anxiety treated by Dr. Lee office but has referral to pain management. Currently living with and caring for her mother.   SOB- stable complaint, worse with exertion, improves with rest. Wearing supplemental oxygen at 3L. Able to do activities just has to pace herself  Associated with productive cough, dx COVID 19 March 2022 productive clear yellow/green mucous. Daily complaint, wakes her at night.    1/23/23- undergoing chemo followed by Dr. Lee, complaint of  cough and chest tightness onset 3 weeks, seen at urgent care, x-ray clear. Productive dark brown mucous to clear. Using nebulizer daily with benefit.   Treated with cough syrup, steroids, and antibiotics. States feeling better.   Wearing supplemental oxygen as needed. Has smaller more portable bottles, wearing at 3 liters with benefit. Able to do more in home.   Does have moments of anxiety due to states of health. Takes xanax as needed with benefit.     10/25/22- left lobectomy and lymphadenectomy Dr. Kaplan 10/3/2022- still have incision site pain, 10 on 0-10 scale, soreness. Took pain medication with benefit but has run out. Had chest tube removed 6 days prior. No swelling or drainage from incision site.   Schedule to have port placed today at 3 pm. Will start chemo therapy this week., Followed by oncologist Dr. Lee.   Wearing supplemental oxygen as needed at 3L with benefit.   SOB- stable, on advair daily, using albuterol as needed. Cough- improved, daily, mild.     Had sleep study but not hear results    7/21/2022- states doing well, Cough- recurrent complaint, worsened in past 3 days, productive  Brown mucous, associated with chest tightness and wheeze. Worse in early mornings and late evenings.   Has headaches when waking up. Has daytime fatigue.     4/28/2022- COPD exacerbation onset 3 days, persistent cough worse in early mornings and late evenings, has nocturnal arousals, productive thick brown mucous,   Associated with chest tightness and wheeze. Improved with albuterol nebulizer but returns after 2 hours. Severe complaint has urinated due to coughing fits.   Complaint of left ear pain and pressure with headaches. Associated with body aches and fatigue.  On average has exacerbation once every 3 months.   History of chronic knee pain, not interested in orthopedic referral, treated previously with ibuprofen with benefit.     BRYNN Vasquez reviewed  1/11/2022: Hx of CLL, COPD  Endorses onset of  headache, fatigue x2 days.  Cough: Productive with green thick sputum production, onset 2 days, worse at night time and in the morning. Not relieved with cough syrup. States Codeine cough syrup has helped in the past. Associated with wheezing.   States breathing is the same, not worse from baseline. Using supplemental oxygen at night and PRN.   Denies fever, denies chest tightness, GI complaints.  On Daily Advair, using rescue inhaler twice daily. Using nebulizer daily with no noted improvement.   Cut back to 2 cigarettes per day.      8/16/2021- not currently interested to perform in clinic sleep study.   SOB- daily, worse with hot weather, ran out of rescue inhaler. On Advair daily with benefit. Improves with rest.   Started coughing  After weed eating yard.     Cough- recurrent complaint, onset 5 days after doing yard work, productive clear mucous, severe complaint, inhibits ability to sleep when first laying down at night.     5/14/2021- did not receive sleep study from [facility] as ordered. Wearing supplemental oxygen at night while sleeping at 3 L. Uses when needed during day.    Having trouble falling asleep at night, started on new medication with no current benefit. Not able to fall asleep or stay asleep. Onset years, worsening with time. Feels tired when waking up in morning. Associated with occasional morning headaches.     complaint of chronic back pain followed by PCP.    Cough- recurrent complaint, mild, thick mucous, not using nebulizer regularly,   SOB- daily, worse with exertion and occasionally occurs at rest. Feels she can not take a deep breath.   On advair daily. Taking prednisone 20mg for 3 days 2x monthly.    2/8/2021- Cough- improved, daily, mild, Complaint of dry throat at night. Worse in early morning and late evenings.   occasionally productive small amount yellow mucous.   Currently smoking 1/2 pack daily, finished chantix with improvement but picked up after stopping.   Wearing  supplemental oxygen at 3L most nights with benefit. SOB worse when laying flat on back, tried wedge but caused neck pain.   Sinus- unchanged, recurrent headaches in morning, sinus drainage. Currently on Flonase daily    12/3/2020- has supplemental oxygen set at 3 L at night, states benefiting,   Cough- worsened in last 7 days, worse in early morning and late evenings, nocturnal arousals nightly, productive yellow/green mucous quarter size.  Complaint of daily fatigue, dry throat in morning, day time drowsiness, mental cloudiness. Feels she never gets good sleep at night even when wearing supplemental oxygen.     10/21/2020- SOB and wheeze- recurrent problem, worsened last weeks, associated with sinus congestion, wheeze is worse in early morning and when laying down at nigtht  Cough- productive brown/green mucous for 1 week. Has not started steroid therapy. Improves with nebulizer using 1-2x daily for past week. Was not able afford inhalers. Did not receive call from [facility] pharmacy.     Complaint for cramping sensation in chest that occurs sporadically on left and right side that lasts few minutes. Onset years, recurrent complaint, started back 1 week prior. Occurs couple days in row.     7/15/2020- cough- onset 7 days, worse in mornings and night, nocturnal arousals 1x nightly, productive yellow/green dime size mucous, associated with chest tightness and wheeze, improves with nebulizer using 1-2 daily, states feels better after coughing up large amounts of mucous; went to covid clinic and tested negative for covid did have fluid on ears.   Currently on Advair daily, insurance did not cover spiriva;     4/15/2020- cough- onset weeks, associated with occasional chest tighness, worse at night and early morning, quarter size clear to light brown in color. Currently    advair, spiriva, and rescue inhaler. States using rescue daily with little benefit.    3/5/2020- Pt is a 58 yo female with depression, GERD and COPD  presenting for new evaluation.  Has chronic cough worse last 2 wks w/ phlegm, white, worse in am and evening.  Inhalers: rescue inhaler says insurance didn't cover  She reports wt gain over last 2 yrs. Has abdominal cramps intermittently radiating to the back.   Smoking since she was very young, 1 pack lasts 3 days.  She gets yearly bronchitis.  Labs from her PCP done 2 days ago are concerning for possible CLL- with WBC 21 and peripheral smear w/ smudge cells    The chief compliant  problem varies with instablilty at time  PFSH:  Past Medical History:   Diagnosis Date    Arthritis     Cancer     Depression     GERD (gastroesophageal reflux disease)     Pneumonia of left lung due to infectious organism 03/05/2020         Past Surgical History:   Procedure Laterality Date    INJECTION OF ANESTHETIC AGENT AROUND MULTIPLE INTERCOSTAL NERVES Left 10/3/2022    Procedure: BLOCK, NERVE, INTERCOSTAL, 2 OR MORE;  Surgeon: Evelio Kaplan MD;  Location: [facility] OR [address] [place] [address];  Service: Thoracic;  Laterality: Left;    INSERTION OF TUNNELED CENTRAL VENOUS CATHETER (CVC) WITH SUBCUTANEOUS PORT Right 11/3/2022    Procedure: BEUGOLEAC-YGIL-C-CATH, Right or Left Neck or Chest;  Surgeon: Mini Acevedo MD;  Location: [facility] OR [address] [place] [address];  Service: General;  Laterality: Right;    ROBOT-ASSISTED LAPAROSCOPIC LYMPHADENECTOMY USING DA MONIQUE XI Left 10/3/2022    Procedure: XI ROBOTIC LYMPHADENECTOMY;  Surgeon: Evelio Kaplan MD;  Location: [address];  Service: Thoracic;  Laterality: Left;    TONSILLECTOMY      XI ROBOTIC RATS,WITH LOBECTOMY,LUNG Left 10/3/2022    Procedure: XI ROBOTIC RATS,WITH LOBECTOMY,LUNG;  Surgeon: Evelio Kaplan MD;  Location: [address];  Service: Thoracic;  Laterality: Left;     Social History     Tobacco Use    Smoking status: Some Days     Current packs/day: 0.15     Types: Cigarettes     Passive exposure: Current    Smokeless tobacco: Never    Tobacco comments:     reports one  "cigarette every now and then   Substance Use Topics    Alcohol use: Yes     Comment: rarely    Drug use: Yes     Types: Marijuana     Family History   Problem Relation Age of Onset    Ovarian cancer Sister     Breast cancer Cousin      Review of patient's allergies indicates:  No Known Allergies    Performance Status:The patient's activity level is functions out of house.      Review of Systems:  a review of eleven systems covering constitutional, Eye, HEENT, Psych, Respiratory, Cardiac, GI, , Musculoskeletal, Endocrine, Dermatologic was negative except for pertinent findings as listed ABOVE and below:  Shortness of breath, Cough, fatigue       Exam:Comprehensive exam done. /67 (BP Location: Right arm, Patient Position: Sitting, BP Method: Medium (Automatic))   Pulse 62   Ht 5' 3" (1.6 m)   Wt 81 kg (178 lb 9.2 oz)   LMP 01/13/2010 (Approximate)   SpO2 96% Comment: on room air at rest  BMI 31.63 kg/m²   Exam included Vitals as listed, and patient's appearance and affect and alertness and mood, oral exam for yeast and hygiene and pharynx lesions and Mallapatti (M) score, neck with inspection for jvd and masses and thyroid abnormalities and lymph nodes (supraclavicular and infraclavicular nodes and axillary also examined and noted if abn), chest exam included symmetry and effort and fremitus and percussion and auscultation, cardiac exam included rhythm and gallops and murmur and rubs and jvd and edema, abdominal exam for mass and hepatosplenomegaly and tenderness and hernias and bowel sounds, Musculoskeletal exam with muscle tone and posture and mobility/gait and  strength, and skin for rashes and cyanosis and pallor and turgor, extremity for clubbing.  Findings were normal except for pertinent findings listed below:   Breath sounds clear   M2      Radiographs (ct chest and cxr) reviewed: view by direct vision   CT/PET SCAN ROUTINE 07/18/23 No findings of recurrent lung neoplasm or metastatic disease "     X-Ray Chest AP Portable 03/25/23 left volume loss, leftward mediastinal shift, hyper inflation right lung    CT Chest With Contrast 09/06/22   1. Hypermetabolic nodule left upper lobe appears stable upon comparison.  2. Small localized area of hypermetabolic activity in the left infrahilar region is likewise unchanged upon comparison.  3. Indirect findings related to old granulomatous disease.    CT Chest Without Contrast 07/29/2022   2.3 cm spiculated left upper lobe lung nodule highly suspicious for bronchogenic carcinoma.  New nonspecific 7 mm nodule in the right middle lobe; this appears more linear on the coronal images and may be a focus of scarring.  Additional stable lung nodules, mildly enlarged axillary lymph nodes, substernal thyroid nodule; these findings are likely benign given the interval stability.      CT Chest Without Contrast 11/20/2020   Stable right lung pulmonary nodules.  Mild increase in size of clustered nodules within the left upper lobe, with the distribution most compatible with an atypical infectious process.  Old granulomatous disease.  Left adrenal adenoma, unchanged.  Chronic T8 compression fracture.    CT Chest Without Contrast 03/11/2020   1. There are two right lung pulmonary nodules.  Per LUNG-RADS scoring this would reflect a Category 3 (probably benign).  Recommendation is for 6 month follow-up LDCT.  2. Airways disease or apical typical infection in the left lower lobe.       CXR 12/19/19- clear lung fields bilaterally     Labs reviewed    Lab Results   Component Value Date    WBC 20.78 (H) 03/03/2020    RBC 4.74 03/03/2020    HGB 14.9 03/03/2020    HCT 47.0 03/03/2020    MCV 99 (H) 03/03/2020    MCH 31.4 (H) 03/03/2020    MCHC 31.7 (L) 03/03/2020    RDW 13.1 03/03/2020     03/03/2020    MPV 11.9 03/03/2020    GRAN 33.0 (L) 03/03/2020    LYMPH 64.0 (H) 03/03/2020    MONO 3.0 (L) 03/03/2020    EOS CANCELED 08/18/2017    BASO CANCELED 08/18/2017    EOSINOPHIL 0.0  03/03/2020    BASOPHIL 0.0 03/03/2020     Specimen to Pathology, Radiology Lung 08/17/22   Non small cell     PFT reviewed    3/12/2020 Severe obstruction. FEV1  49 %  predicted.   Airflow is not clearly improved after bronchodilator. Clinical improvement following bronchodilator therapy may occur in the absence of spirometric improvement.   Mild Hyperinflation.   Moderate reduction in diffusion capacity - unadjusted for hemoglobin.     EPWORTH SLEEPINESS SCALE 15 7/21/2022  [facility] HST 05/27/2021 TRISTEN 4.3       Plan:  Clinical impression is resonably certain and repeated evaluation prn +/- follow up will be needed as below.    Yvette was seen today for 3m f/u, cough and medication refill.    Diagnoses and all orders for this visit:    Simple chronic bronchitis  -     Ambulatory referral/consult to Bariatric/Obesity Medicine; Future  -     Six Minute Walk Test to qualify for Home Oxygen; Future  -     fluticasone-salmeterol 230-21 mcg/dose (ADVAIR HFA) 230-21 mcg/actuation HFAA inhaler; Inhale 2 puffs into the lungs 2 (two) times daily. Controller  -     levocetirizine (XYZAL) 5 MG tablet; Take 1 tablet (5 mg total) by mouth every evening.  -     predniSONE (DELTASONE) 20 MG tablet; Take one pill a day for three days, repeat for shortness of breath  -     azithromycin (Z-MARCO ANTONIO) 250 MG tablet; 2 pills day one, then 1 pill for 4 days  -     guaiFENesin-codeine 100-10 mg/5 ml (TUSSI-ORGANIDIN NR)  mg/5 mL syrup; Take 5 mLs by mouth every 4 (four) hours as needed for Cough.    Chronic respiratory failure with hypoxia  -     Ambulatory referral/consult to Bariatric/Obesity Medicine; Future  -     Six Minute Walk Test to qualify for Home Oxygen; Future    Acute cough  -     levocetirizine (XYZAL) 5 MG tablet; Take 1 tablet (5 mg total) by mouth every evening.  -     predniSONE (DELTASONE) 20 MG tablet; Take one pill a day for three days, repeat for shortness of breath  -     azithromycin (Z-MARCO ANTONIO) 250 MG tablet; 2 pills  day one, then 1 pill for 4 days  -     guaiFENesin-codeine 100-10 mg/5 ml (TUSSI-ORGANIDIN NR)  mg/5 mL syrup; Take 5 mLs by mouth every 4 (four) hours as needed for Cough.         Follow up in about 3 months (around 5/8/2024), or if symptoms worsen or fail to improve.    Discussed with patient above for education the following:      Patient Instructions   Use codeine cough syrup sparingly, this will decrease cough and allow you to rest at night, do not take with other pain or sleeping medications.         I spent a total of 32 minutes on the day of the visit.This includes face to face time and non-face to face time preparing to see the patient (eg, review of tests), obtaining and/or reviewing separately obtained history, documenting clinical information in the electronic or other health record, independently interpreting results and communicating results to the patient/family/caregiver, or care coordinator.

## 2024-02-09 ENCOUNTER — PATIENT MESSAGE (OUTPATIENT)
Dept: FAMILY MEDICINE | Facility: CLINIC | Age: 64
End: 2024-02-09
Payer: COMMERCIAL

## 2024-02-09 ENCOUNTER — TELEPHONE (OUTPATIENT)
Dept: FAMILY MEDICINE | Facility: CLINIC | Age: 64
End: 2024-02-09
Payer: COMMERCIAL

## 2024-02-09 DIAGNOSIS — E78.2 MIXED HYPERLIPIDEMIA: Primary | ICD-10-CM

## 2024-02-09 NOTE — TELEPHONE ENCOUNTER
Patient viewed lab results online on 2-9-24 @ 3:45 PM.  Patient sent portal message to office confirming she is in agreement with starting Pravastatin.

## 2024-02-09 NOTE — TELEPHONE ENCOUNTER
----- Message from Herbert Price PA-C sent at 2/9/2024  2:44 PM CST -----  Cholesterol levels are high. Recommend starting low dose pravastatin or similar to see if she can tolerate. I see that she couldn't tolerate rosuvastatin before.

## 2024-02-09 NOTE — TELEPHONE ENCOUNTER
Patient viewed lab results online. Requesting prescription for Pravastatin. Preferred pharmacy is [facility] [address].  Please advise. Thank you.

## 2024-02-12 RX ORDER — PRAVASTATIN SODIUM 10 MG/1
10 TABLET ORAL DAILY
Qty: 90 TABLET | Refills: 3 | Status: SHIPPED | OUTPATIENT
Start: 2024-02-12 | End: 2025-02-11

## 2024-02-12 NOTE — TELEPHONE ENCOUNTER
This RN clinic supv contacted the pt to advise that she is to come get tx as scheduled today at 1p to avoid delay in care per Brooklynn Sorto, Cancer Center Director. New auth is pending at this time   seizures, alcoholic cirrhosis Seizures seizures, alcoholic cirrhosis 1) Follow-up with your Primary Medical Doctor and Dr. Blanco. Call today / next business day for prompt follow-up.  2) Return to Emergency room for any worsening or persistent pain, weakness, fever, or any other concerning symptoms.  3) See attached instruction sheets for additional information, including information regarding signs and symptoms to look out for, reasons to seek immediate care and other important instructions.  4) Follow-up with any specialists as discussed / noted as well.    A nosebleed is when blood comes out of the nose. Nosebleeds are common. Usually, they are not a sign of a serious condition.    Nosebleeds can happen if a blood vessel in your nose starts to bleed or if the lining of your nose (mucous membrane) cracks. They are commonly caused by:  Allergies.  Colds.  Picking your nose.  Blowing your nose too hard.  An injury from sticking an object into your nose or getting hit in the nose.  Dry or cold air.  Less common causes of nosebleeds include:  Toxic fumes.  Something abnormal in the nose or in the air-filled spaces in the bones of the face (sinuses).  Growths in the nose, such as polyps.  Blood thinners or conditions that cause blood to clot slowly.  Certain illnesses or procedures that irritate or dry out the nasal passages.  Follow these instructions at home:  When you have a nosebleed:      Sit down and tilt your head slightly forward.  Use a clean towel or tissue to pinch your nostrils under the bony part of your nose. After 5 minutes, let go of your nose and see if bleeding starts again. Do not release pressure before that time. If there is still bleeding, repeat the pinching and holding for 5 minutes or until the bleeding stops.  Do not place tissues or gauze in the nose to stop the bleeding.  Avoid lying down and avoid tilting your head backward. That may make blood collect in the throat and cause gagging or coughing.  Use a nasal spray decongestant to help with a nosebleed as told by your health care provider.  After a nosebleed:    Avoid blowing your nose or sniffing for a number of hours.  Avoid straining, lifting, or bending at the waist for several days. You may go back to other normal activities as you are able.  If you are taking aspirin or blood thinners and you have nosebleeds, talk to your health care provider. These medicines make bleeding more likely.  Ask your health care provider if you should stop taking the medicines or if you should adjust the dose.  Do not stop taking medicines that your health care provider has recommended unless he or she tells you to stop taking them.  If your nosebleed was caused by dry mucous membranes, use over-the-counter saline nasal spray or gel and a humidifier as told by your health care provider. This will keep the mucous membranes moist and allow them to heal. If you need to use one of these products:  Choose one that is water-soluble.  Use only as much as you need and use it only as often as needed.  Do not lie down right after you use it.  If you get nosebleeds often, talk with your health care provider about medical treatments. Options may include:  Nasal cautery. This treatment stops and prevents nosebleeds by using a chemical swab or electrical device to lightly burn tiny blood vessels inside the nose.  Nasal packing. A gauze or other material is placed in the nose to keep constant pressure on the bleeding area.  Contact a health care provider if you:  Have a fever.  Get nosebleeds often or more often than usual.  Bruise very easily.  Have a nosebleed from having something stuck in your nose.  Have bleeding in your mouth.  Vomit or cough up brown material.  Have a nosebleed after you start a new medicine.  Get help right away if:  You have a nosebleed after a fall or a head injury.  Your nosebleed does not go away after 20 minutes.  You feel dizzy or weak.  You have unusual bleeding from other parts of your body.  You have unusual bruising on other parts of your body.  You become sweaty.  You vomit blood.  Summary  A nosebleed is when blood comes out of the nose. Common causes include allergies, an injury to the nose, or cold or dry air.  Initial treatment includes applying pressure for 5 minutes.  Moisturizing the nose with saline nasal spray or gel after a nosebleed may help prevent future bleeding.  Get help right away if your nosebleed does not go away after 20 minutes.  This information is not intended to replace advice given to you by your health care provider. Make sure you discuss any questions you have with your health care provider. You can access the FollowMyHealth Patient Portal offered by [facility] by registering at the following website: [email or web address]. By joining [employer]’s FollowMyHealth portal, you will also be able to view your health information using other applications (apps) compatible with our system.

## 2024-02-14 ENCOUNTER — DOCUMENTATION ONLY (OUTPATIENT)
Dept: HEMATOLOGY/ONCOLOGY | Facility: CLINIC | Age: 64
End: 2024-02-14
Payer: COMMERCIAL

## 2024-02-14 ENCOUNTER — HOSPITAL ENCOUNTER (OUTPATIENT)
Dept: PULMONOLOGY | Facility: HOSPITAL | Age: 64
Discharge: HOME | End: 2024-02-14
Attending: INTERNAL MEDICINE
Payer: COMMERCIAL

## 2024-02-14 DIAGNOSIS — J96.11 CHRONIC RESPIRATORY FAILURE WITH HYPOXIA: ICD-10-CM

## 2024-02-14 DIAGNOSIS — J41.0 SIMPLE CHRONIC BRONCHITIS: ICD-10-CM

## 2024-02-14 DIAGNOSIS — J44.9 CHRONIC OBSTRUCTIVE PULMONARY DISEASE, UNSPECIFIED COPD TYPE: Primary | ICD-10-CM

## 2024-02-14 PROCEDURE — 94618 PULMONARY STRESS TESTING: CPT | Mod: 26,,, | Performed by: INTERNAL MEDICINE

## 2024-02-14 PROCEDURE — 94618 PULMONARY STRESS TESTING: CPT

## 2024-02-15 ENCOUNTER — PATIENT MESSAGE (OUTPATIENT)
Dept: HEMATOLOGY/ONCOLOGY | Facility: CLINIC | Age: 64
End: 2024-02-15

## 2024-02-15 ENCOUNTER — OFFICE VISIT (OUTPATIENT)
Dept: HEMATOLOGY/ONCOLOGY | Facility: CLINIC | Age: 64
End: 2024-02-15
Payer: COMMERCIAL

## 2024-02-15 ENCOUNTER — PATIENT MESSAGE (OUTPATIENT)
Dept: PULMONOLOGY | Facility: CLINIC | Age: 64
End: 2024-02-15
Payer: COMMERCIAL

## 2024-02-15 DIAGNOSIS — E53.8 B12 DEFICIENCY: ICD-10-CM

## 2024-02-15 DIAGNOSIS — G89.4 CHRONIC PAIN SYNDROME: Primary | ICD-10-CM

## 2024-02-15 DIAGNOSIS — C34.91 MALIGNANT NEOPLASM OF RIGHT LUNG, UNSPECIFIED PART OF LUNG: ICD-10-CM

## 2024-02-15 DIAGNOSIS — C91.10 CLL (CHRONIC LYMPHOCYTIC LEUKEMIA): ICD-10-CM

## 2024-02-15 PROCEDURE — 99215 OFFICE O/P EST HI 40 MIN: CPT | Mod: 95,,, | Performed by: INTERNAL MEDICINE

## 2024-02-15 PROCEDURE — 3044F HG A1C LEVEL LT 7.0%: CPT | Mod: CPTII,95,, | Performed by: INTERNAL MEDICINE

## 2024-02-15 NOTE — PROGRESS NOTES
The patient location is: home  Visit type: Virtual visit with synchronous audio and video  Face-to-face or time spent with patient on the encounter: 25 min  Total time spent on and for  this encounter which includes non face-to-face time preparing to see patient, review of tests, obtaining and or reviewing separately obtained records documenting clinical information in the electronic or other health records, independently interpreting results which is not separately reported ,and communicating results to the patient/family/caregiver and in care coordination and treatment planning/communicating with pharmacy for prescriptions/addressing social needs/arranging follow-up and or referrals :35 min    Each patient I provide medical services by telemedicine is:  (1) informed of the relationship between the physician and patient and the respective role of any other health care provider with respect to management of the patient; and (2) notified that he or she may decline to receive medical services by telemedicine and may withdraw from such care at any time.  This is a video visit therefore some elements of the physical exam such as vital signs, heart sounds are breath sounds are not included and may be included if found in recent clinic notes of other providers assessing same patient. Any symptoms or signs that were visualized were stated by the patient may be included in this note.     Subjective:       Patient ID: Yvette Hutchinson is a 63 y.o. female.    Chief Complaint:  Patient known to me with CLL stage 0 recently diagnosed with lung cancer August 2022  Follow-up    Lung Cancer    Too worried but I do want to keep a close watch on it how you feeling no steroids nothing why 20 mg no steroids steroids nothing at all we will discontinue to do this we will not change   Patient has chronic complains of chronic pain syndrome and COPD for which periodically she take steroids she is also on BuSpar has issues anxiety  has required dilatation of esophageal strictures allergic rhinitis insomnia and history of B12 deficiency documented   Had CT chest done with pulmonary 7/22 showed mass bx done. 8/22    Oncology hx  Impression:ct chest 7/29/22     2.3 cm spiculated left upper lobe lung nodule highly suspicious for bronchogenic carcinoma.     New nonspecific 7 mm nodule in the right middle lobe; this appears more linear on the coronal images and may be a focus of scarring.     Additional stable lung nodules, mildly enlarged axillary lymph nodes, substernal thyroid nodule; these findings are likely benign given the interval stability.   LEFT LUNG MASS, CT-GUIDED BIOPSY:   Non-small cell lung carcinoma.   Comment:  The biopsy reveals invasive carcinoma with apparent glandular but   also vague squamoid features.  Tumor cells are diffusely positive with TTF-1   and focally positive with P40.  The histology differential includes   adenocarcinoma and adenosquamous carcinoma.  PD-L1 and templates NGS studies   are in progress.  The results will be issued separately.    October 3, 2022: Robotic left lobectomy T1c N1    Social history; patient is a smoker denies recreational alcohol use or drug use   Patient is currently on disability  She is allergic to the mask used during COVID pandemic she is also bothered by her mask with COPD    Family history suggestive of ovarian and breast cancer patient advised to see a  or seek   Review of patient's allergies indicates:  No Known Allergies  Medications have been reviewed  Current Outpatient Medications on File Prior to Visit   Medication Sig Dispense Refill    (Magic mouthwash) 1:1:1 diphenhydrAMINE(Benadryl) 12.5mg/5ml liq, aluminum & magnesium hydroxide-simethicone (Maalox), LIDOcaine viscous 2% Swish and spit 10 mLs 3 (three) times daily. for mouth sores 250 mL 0    acetaminophen (TYLENOL) 500 MG tablet Take 2 tablets (1,000 mg total) by mouth every 6 (six) hours as needed for  Pain. 8 tablet 0    albuterol (PROVENTIL/VENTOLIN HFA) 90 mcg/actuation inhaler Inhale 2 puffs into the lungs every 6 (six) hours as needed for Wheezing or Shortness of Breath. Rescue 8.5 g 11    albuterol-ipratropium (DUO-NEB) 2.5 mg-0.5 mg/3 mL nebulizer solution Take 3 mLs by nebulization every 6 (six) hours as needed for Wheezing. Rescue 120 each 5    ALPRAZolam (XANAX) 0.5 MG tablet Take 1 tablet (0.5 mg total) by mouth 3 (three) times daily. 90 tablet 0    azithromycin (Z-MARCO ANTONIO) 250 MG tablet 2 pills day one, then 1 pill for 4 days 6 tablet 0    benzocaine-menthoL 6-10 mg lozenge Take 1 lozenge by mouth every 2 (two) hours as needed for Pain. (Patient not taking: Reported on 2/7/2024) 18 tablet 0    buPROPion (WELLBUTRIN XL) 300 MG 24 hr tablet Take 1 tablet (300 mg total) by mouth once daily. 90 tablet 3    celecoxib (CELEBREX) 200 MG capsule Take 2 capsules (400 mg total) by mouth once daily. 180 capsule 1    citalopram (CELEXA) 10 MG tablet Take 1 tablet (10 mg total) by mouth once daily. 30 tablet 2    diphenhydrAMINE-aluminum-magnesium hydroxide-simethicone-LIDOcaine HCl 2% Swish and spit 15 mLs every 4 (four) hours as needed (mouth sores). 100 each 2    fluticasone propionate (FLONASE) 50 mcg/actuation nasal spray 1 spray (50 mcg total) by Each Nostril route once daily. 16 g 3    fluticasone-salmeterol 230-21 mcg/dose (ADVAIR HFA) 230-21 mcg/actuation HFAA inhaler Inhale 2 puffs into the lungs 2 (two) times daily. Controller 12 g 5    gabapentin (NEURONTIN) 300 MG capsule Take 1 capsule (300 mg total) by mouth 3 (three) times daily. 270 capsule 1    guaiFENesin-codeine 100-10 mg/5 ml (TUSSI-ORGANIDIN NR)  mg/5 mL syrup Take 5 mLs by mouth every 4 (four) hours as needed for Cough. 210 mL 0    levocetirizine (XYZAL) 5 MG tablet Take 1 tablet (5 mg total) by mouth every evening. 30 tablet 5    LIDOcaine (LIDODERM) 5 % Place 1 patch onto the skin once daily. Remove & Discard patch within 12 hours or as  directed by MD (Patient not taking: Reported on 2/7/2024) 7 patch 0    LIDOcaine (LIDODERM) 5 % Place 1 patch onto the skin once daily. Remove & Discard patch within 12 hours or as directed by MD 7 patch 1    LIDOcaine-prilocaine (EMLA) cream Apply topically as needed (apply to port site 30 min before access). 30 g 0    OLANZapine (ZYPREXA) 5 MG tablet Take 1 tablet (5 mg total) by mouth nightly. Take 1 tablet at bedtime on days 1-4 of each cycle of chemotherapy (Patient not taking: Reported on 2/7/2024) 30 tablet 2    ondansetron (ZOFRAN-ODT) 8 MG TbDL Take 1 tablet (8 mg total) by mouth every 12 (twelve) hours as needed (nausea). (Patient not taking: Reported on 2/7/2024) 60 tablet 1    ondansetron (ZOFRAN-ODT) 8 MG TbDL Take 1 tablet (8 mg total) by mouth every 12 (twelve) hours as needed (nausea). 30 tablet 1    oxyCODONE-acetaminophen (PERCOCET) 5-325 mg per tablet Take 1 tablet by mouth every 4 (four) hours as needed for Pain. (Patient not taking: Reported on 2/7/2024) 60 tablet 0    pravastatin (PRAVACHOL) 10 MG tablet Take 1 tablet (10 mg total) by mouth once daily. 90 tablet 3    predniSONE (DELTASONE) 20 MG tablet Take one pill a day for three days, repeat for shortness of breath 12 tablet 0    tirzepatide, weight loss, (ZEPBOUND) 2.5 mg/0.5 mL PnIj Inject 2.5 mg into the skin every 7 days. 4 Pen 11    varenicline (CHANTIX) 1 mg Tab Take 1 tablet (1 mg total) by mouth 2 (two) times daily. 180 tablet 0     No current facility-administered medications on file prior to visit.       REVIEW OF SYSTEMS:     S/p lobectomy left side, has draining tube+ with sanguinous fluid.    Wt Readings from Last 3 Encounters:   02/08/24 81 kg (178 lb 9.2 oz)   02/07/24 81 kg (178 lb 9.2 oz)   01/26/24 79.9 kg (176 lb 0.6 oz)     Temp Readings from Last 3 Encounters:   02/07/24 98.4 °F (36.9 °C) (Oral)   01/26/24 97.3 °F (36.3 °C)   01/05/24 97.8 °F (36.6 °C)     BP Readings from Last 3 Encounters:   02/08/24 104/67   02/07/24  126/62   01/26/24 118/70     Pulse Readings from Last 3 Encounters:   02/08/24 62   02/07/24 62   01/26/24 65     VITAL SIGNS:  as above   GENERAL: appears well-built, well-nourished.  No anxiety, no agitation, and in no distress.  Patient is awake, alert, oriented and cooperative.  HEENT:  Showed no congestion. Trachea is central no obvious icterus or pallor noted no hoarseness. no obvious JVD   NECK:  Supple.  No JVD. No obvious cervical submental or supraclavicular adenopathy.  RS: tube draining on left side. S/p lobectomy  ABDOMEN:  abdomen appears undistended.  EXTREMITIES:  Without edema.  NEUROLOGICAL:  The patient is appropriate, higher functions are normal.  No  obvious neurological deficits.  normal judgement normal thought content  No confusion, no speech impediment. Cranial nerves are intact and show no deficit. No gross motor deficits noted   SKIN MUSCULOSKELETAL: no joint or skeletal deformity, no clubbing of nails.  No visible rash ecchymosis or petechiae  Lab Results   Component Value Date    WBC 20.78 (H) 03/03/2020    HGB 14.9 03/03/2020    HCT 47.0 03/03/2020    MCV 99 (H) 03/03/2020     03/03/2020     Smudge cells presnt  CMP  Sodium   Date Value Ref Range Status   02/14/2024 141 136 - 145 mmol/L Final     Potassium   Date Value Ref Range Status   02/14/2024 4.5 3.5 - 5.1 mmol/L Final     Chloride   Date Value Ref Range Status   02/14/2024 106 95 - 110 mmol/L Final     CO2   Date Value Ref Range Status   02/14/2024 28 23 - 29 mmol/L Final     Glucose   Date Value Ref Range Status   02/14/2024 89 70 - 110 mg/dL Final     BUN   Date Value Ref Range Status   02/14/2024 11 8 - 23 mg/dL Final     Creatinine   Date Value Ref Range Status   02/14/2024 1.0 0.5 - 1.4 mg/dL Final     Calcium   Date Value Ref Range Status   02/14/2024 8.8 8.7 - 10.5 mg/dL Final     Total Protein   Date Value Ref Range Status   02/14/2024 6.4 6.0 - 8.4 g/dL Final     Albumin   Date Value Ref Range Status   02/14/2024  3.9 3.5 - 5.2 g/dL Final     Total Bilirubin   Date Value Ref Range Status   02/14/2024 0.3 0.1 - 1.0 mg/dL Final     Comment:     For infants and newborns, interpretation of results should be based  on gestational age, weight and in agreement with clinical  observations.    Premature Infant recommended reference ranges:  Up to 24 hours.............<8.0 mg/dL  Up to 48 hours............<12.0 mg/dL  3-5 days..................<15.0 mg/dL  6-29 days.................<15.0 mg/dL       Alkaline Phosphatase   Date Value Ref Range Status   02/14/2024 105 55 - 135 U/L Final     AST   Date Value Ref Range Status   02/14/2024 17 10 - 40 U/L Final     ALT   Date Value Ref Range Status   02/14/2024 15 10 - 44 U/L Final     Anion Gap   Date Value Ref Range Status   02/14/2024 7 (L) 8 - 16 mmol/L Final     eGFR if    Date Value Ref Range Status   06/13/2022 >60 >60 mL/min/1.73 m^2 Final     eGFR if non    Date Value Ref Range Status   06/13/2022 >60 >60 mL/min/1.73 m^2 Final     Comment:     Calculation used to obtain the estimated glomerular filtration  rate (eGFR) is the CKD-EPI equation.            2wk ago    Blood Interpretation A B cell lymphoproliferative disorder is present. see comment.    Comment: Interpreted by: Jeremias Sosa M.D., Signed on 08/06/2020 at 13:07   Blood Comment Flow cytometric analysis of  peripheral blood  detects a lambda  light chain   restricted B lymphocyte population showing expression of CD19 and dim CD20   with aberrant expression of CD5 (dim).  T lymphocytes are   immunophenotypically unremarkable.  The blasts gate is not increased.  The   findings are consistent with patient history of chronic lymphocytic   leukemia/small lymphocytic lymphoma.   Flow differential:  Lymphocytes 69.6%, Monocytes 2.8%, Granulocytes  27.5%,   Blast  0.1%, Debris/nRBC 0.1%,  Viability 97.5%.   10/3/22 robotic lef lung lobectomy  1. LYMPH NODE, LEVEL 5 FROZEN SECTION, EXCISION:   One lymph  node with dominant necrotizing granuloma, negative for malignancy   (0/1).   2. LYMPH NODES, LEVEL 5 PERMANENT, EXCISION:   Two lymph nodes with multifocal necrotizing granulomas, negative for   malignancy (0/2).   3. LYMPH NODES, LEFT POSTERIOR LEVEL 10, EXCISION:   Five lymph nodes with multifocal necrotizing granulomas, negative for   malignancy (0/5).   4. LYMPH NODE, LEVEL 11, EXCISION:   One lymph node, negative for malignancy (0/1).   5. LYMPH NODES, LEVEL 12, EXCISION:   Three lymph nodes, one with single necrotizing granuloma, negative for   malignancy (0/3).   6. LYMPH NODES, LEVEL 12 NEAR UPPER DIVISION BRONCHUS, EXCISION:   Three lymph nodes with sinus histiocytosis, negative for malignancy (0/3).   7. LYMPH NODES, LEVEL 10 #2, EXCISION:   One of two lymph nodes positive for metastatic carcinoma (1/2).   Size of largest metastatic deposit:  8 mm.   Negative for extranodal extension.   8. LYMPH NODES, LEFT LEVEL 8, EXCISION:   Two lymph nodes with sinus histiocytosis, negative for malignancy (0/2).   9. LYMPH NODES, LEFT LEVEL 9, EXCISION:   Two lymph nodes, negative for malignancy (0/2).   10. 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   Surgical margins are negative for malignancy.   Background lung tissue with subpleural fibrosis, no evidence of granulomas.   Two hilar lymph nodes, negative for malignancy (0/2).   Coment (1-3, 5):  Prominent necrotizing granulomatous inflammation identified   is identified in multiple lymph nodes.  An AE1/AE3 cytokeratin immunostain   performed on the largest granuloma (part 3) reveals no evidence of occult   carcinoma.  GMS and AFB special stains are negative for fungal and acid-fast   organisms, respectively.  The necrotizing features are not typical of   sarcoidosis.  As such, other granulomatous processes may be considered   including secondary response to malignancy or infection.  Clinical   correlation is advised.    Comment (10):  Sections reveal invasive carcinoma involving lung tissue with   predominantly squamoid morphologic features including bright eosinophilic   cytoplasm and intracellular bridging.  There are not significant keratinizing   features.  Some areas show basaloid features.  There is also regional luminal   features including cribriforming and vague glandular structures containing   mucin.  Tumor cells are diffusely positive with P40 and regionally positive   with TTF-1.  Mucicarmine special stain highlights mucin producing luminal   spaces. Overall tumor appearance and staining profile supports adenosquamous   carcinoma, a variant of non-small cell lung carcinoma.   [facility] SURGICAL PATHOLOGY CANCER CASE SUMMARY:  LUNG   Procedure:  Lobectomy   Specimen laterality:  Left   Tumor focality:  Single focus   Tumor site: Upper lobe of lung   Tumor size:  2.2 cm   Histologic type:  Adenosquamous carcinoma   Spread through airspaces:  Not identified   Visceral pleural invasion:  Not identified   Direct invasion of adjacent structures: Not applicable   Lymphovascular invasion:  Not identified   Margins:  All margins negative for invasive carcinoma     Closest margin to invasive carcinoma:  Bronchial (3.0 cm)   Regional lymph nodes:  Tumor present in regional lymph node     Number of lymph nodes with tumor:  1     Scott sites with tumor:  Left level 10 (10L)     Extranodal extension:  Not identified     Number of lymph nodes examined:  23   Pathologic stage classification (pTNM): pT1c pN1   Special studies:  Performed on previous biopsy if additional studies needed   there may be performed on tissue blockd 10G or 10H.      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 Due to adenosq histology chose carbo/ taxol x 4 cycles tecentriq maintanence x 1 year  pt is has had 5 cycles of carbo/ taxol  discused maintainenec at tumor board   On tecentriq maintainence   Proceed with tecentriq  Pet7/18/23 neg for mets  See me for next cycle cbc,cmp  pet    CLL   stage 0 will confirmed with flow cytometry  NTD   she has no other cytopenias or lymphadenopathy or B symptoms patient can be observed      Continue with chronic pain syndrome and pain management advised to stop smoking if at all possible 10 pills of hydrocodoen given to pt, she needs to follow with pain mx      History of B12 deficiency will continue to monitor    Advised COVID precaution due to her lung situation she will be a high risk patient    Advance Care Planning     Date: 04/18/2023    Power of   I initiated the process of advance care planning today and explained the importance of this process to the patient.  I introduced the concept of advance directives to the patient, as well. Then the patient received detailed information about the importance of designating a Health Care Power of  (HCPOA). She was also instructed to communicate with this person about their wishes for future healthcare, should she become sick and lose decision-making capacity. The patient has not previously appointed a HCPOA. After our discussion, the patient has decided to complete a HCPOA .

## 2024-02-16 ENCOUNTER — PATIENT MESSAGE (OUTPATIENT)
Dept: FAMILY MEDICINE | Facility: CLINIC | Age: 64
End: 2024-02-16
Payer: COMMERCIAL

## 2024-02-16 ENCOUNTER — TELEPHONE (OUTPATIENT)
Dept: PULMONOLOGY | Facility: CLINIC | Age: 64
End: 2024-02-16
Payer: COMMERCIAL

## 2024-02-16 ENCOUNTER — INFUSION (OUTPATIENT)
Dept: INFUSION THERAPY | Facility: HOSPITAL | Age: 64
End: 2024-02-16
Attending: INTERNAL MEDICINE
Payer: COMMERCIAL

## 2024-02-16 VITALS
WEIGHT: 179 LBS | SYSTOLIC BLOOD PRESSURE: 113 MMHG | HEIGHT: 63 IN | RESPIRATION RATE: 15 BRPM | DIASTOLIC BLOOD PRESSURE: 66 MMHG | TEMPERATURE: 97 F | HEART RATE: 56 BPM | OXYGEN SATURATION: 95 % | BODY MASS INDEX: 31.71 KG/M2

## 2024-02-16 DIAGNOSIS — R05.1 ACUTE COUGH: ICD-10-CM

## 2024-02-16 DIAGNOSIS — C34.90 NON-SMALL CELL LUNG CANCER, UNSPECIFIED LATERALITY: Primary | ICD-10-CM

## 2024-02-16 DIAGNOSIS — C34.01 MALIGNANT NEOPLASM OF HILUS OF RIGHT LUNG: ICD-10-CM

## 2024-02-16 DIAGNOSIS — J41.0 SIMPLE CHRONIC BRONCHITIS: ICD-10-CM

## 2024-02-16 DIAGNOSIS — E66.9 OBESITY, UNSPECIFIED CLASSIFICATION, UNSPECIFIED OBESITY TYPE, UNSPECIFIED WHETHER SERIOUS COMORBIDITY PRESENT: Primary | ICD-10-CM

## 2024-02-16 DIAGNOSIS — E03.2 HYPOTHYROIDISM DUE TO MEDICAMENTS AND OTHER EXOGENOUS SUBSTANCES: ICD-10-CM

## 2024-02-16 LAB
T4 FREE SERPL-MCNC: 0.52 NG/DL (ref 0.71–1.51)
TSH SERPL DL<=0.005 MIU/L-ACNC: 8.01 UIU/ML (ref 0.34–5.6)

## 2024-02-16 PROCEDURE — 84439 ASSAY OF FREE THYROXINE: CPT | Performed by: INTERNAL MEDICINE

## 2024-02-16 PROCEDURE — 25000003 PHARM REV CODE 250: Performed by: INTERNAL MEDICINE

## 2024-02-16 PROCEDURE — 63600175 PHARM REV CODE 636 W HCPCS: Mod: JZ,JG | Performed by: INTERNAL MEDICINE

## 2024-02-16 PROCEDURE — 84443 ASSAY THYROID STIM HORMONE: CPT | Performed by: INTERNAL MEDICINE

## 2024-02-16 PROCEDURE — A4216 STERILE WATER/SALINE, 10 ML: HCPCS | Performed by: INTERNAL MEDICINE

## 2024-02-16 PROCEDURE — 96413 CHEMO IV INFUSION 1 HR: CPT

## 2024-02-16 RX ORDER — EPINEPHRINE 0.3 MG/.3ML
0.3 INJECTION SUBCUTANEOUS ONCE AS NEEDED
Status: DISCONTINUED | OUTPATIENT
Start: 2024-02-16 | End: 2024-02-16 | Stop reason: HOSPADM

## 2024-02-16 RX ORDER — TOPIRAMATE 25 MG/1
25 TABLET ORAL DAILY
Qty: 30 TABLET | Refills: 0 | Status: SHIPPED | OUTPATIENT
Start: 2024-02-16 | End: 2024-03-06

## 2024-02-16 RX ORDER — HEPARIN 100 UNIT/ML
500 SYRINGE INTRAVENOUS
Status: CANCELLED | OUTPATIENT
Start: 2024-02-16

## 2024-02-16 RX ORDER — SODIUM CHLORIDE 0.9 % (FLUSH) 0.9 %
10 SYRINGE (ML) INJECTION
Status: CANCELLED | OUTPATIENT
Start: 2024-02-16

## 2024-02-16 RX ORDER — CODEINE PHOSPHATE AND GUAIFENESIN 10; 100 MG/5ML; MG/5ML
5 SOLUTION ORAL EVERY 4 HOURS PRN
Qty: 210 ML | Refills: 0 | Status: SHIPPED | OUTPATIENT
Start: 2024-02-16 | End: 2024-05-13 | Stop reason: SDUPTHER

## 2024-02-16 RX ORDER — SODIUM CHLORIDE 0.9 % (FLUSH) 0.9 %
10 SYRINGE (ML) INJECTION
Status: DISCONTINUED | OUTPATIENT
Start: 2024-02-16 | End: 2024-02-16 | Stop reason: HOSPADM

## 2024-02-16 RX ORDER — EPINEPHRINE 0.3 MG/.3ML
0.3 INJECTION SUBCUTANEOUS ONCE AS NEEDED
Status: CANCELLED | OUTPATIENT
Start: 2024-02-16

## 2024-02-16 RX ORDER — HEPARIN 100 UNIT/ML
500 SYRINGE INTRAVENOUS
Status: DISCONTINUED | OUTPATIENT
Start: 2024-02-16 | End: 2024-02-16 | Stop reason: HOSPADM

## 2024-02-16 RX ORDER — DIPHENHYDRAMINE HYDROCHLORIDE 50 MG/ML
50 INJECTION INTRAMUSCULAR; INTRAVENOUS ONCE AS NEEDED
Status: CANCELLED | OUTPATIENT
Start: 2024-02-16

## 2024-02-16 RX ORDER — DIPHENHYDRAMINE HYDROCHLORIDE 50 MG/ML
50 INJECTION INTRAMUSCULAR; INTRAVENOUS ONCE AS NEEDED
Status: DISCONTINUED | OUTPATIENT
Start: 2024-02-16 | End: 2024-02-16 | Stop reason: HOSPADM

## 2024-02-16 RX ORDER — CODEINE PHOSPHATE AND GUAIFENESIN 10; 100 MG/5ML; MG/5ML
5 SOLUTION ORAL EVERY 4 HOURS PRN
Qty: 210 ML | Refills: 0 | Status: SHIPPED | OUTPATIENT
Start: 2024-02-16 | End: 2024-02-16 | Stop reason: SDUPTHER

## 2024-02-16 RX ADMIN — ATEZOLIZUMAB 1200 MG: 1200 INJECTION, SOLUTION INTRAVENOUS at 02:02

## 2024-02-16 RX ADMIN — HEPARIN 500 UNITS: 100 SYRINGE at 03:02

## 2024-02-16 RX ADMIN — SODIUM CHLORIDE, PRESERVATIVE FREE 10 ML: 5 INJECTION INTRAVENOUS at 03:02

## 2024-02-16 NOTE — PLAN OF CARE
Problem: Fatigue  Goal: Improved Activity Tolerance  Outcome: Ongoing, Progressing  Intervention: Promote Improved Energy  Flowsheets (Taken 2/16/2024 1416)  Fatigue Management:   fatigue-related activity identified   frequent rest breaks encouraged   paced activity encouraged  Sleep/Rest Enhancement:   regular sleep/rest pattern promoted   relaxation techniques promoted  Activity Management: Ambulated -L4

## 2024-02-16 NOTE — TELEPHONE ENCOUNTER
"----- Message from Payton Mendez sent at 2/16/2024  3:17 PM CST -----  Contact: [facility] [phone]  Type:  Pharmacy Calling to Clarify an RX    Name of Caller:  Uriel   Pharmacy Name:  [facility]  Prescription Name:  guaiFENesin-codeine 100-10 mg/5 ml (TUSSI-ORGANIDIN NR)  mg/5 mL syrup   What do they need to clarify?:  "[facility] does not carry any liquid codeine anymore"   Best Call Back Number:  [phone]  Additional Information:        [facility] [address] [place] [address] - [id number]  [id number] [address] [facility] [address]  Phone: [phone] Fax: [phone]            "

## 2024-02-16 NOTE — TELEPHONE ENCOUNTER
Start topamax. We will start low and slow and see how she tolerates. This increases risk of kidney stones. Make sure drinking enough water daily. Keep follow up with me as we may want to increase dose as long as she tolerates.

## 2024-02-16 NOTE — TELEPHONE ENCOUNTER
Spoke to patient.  Informed that the pharmacy she used for her cough medicine doesn't carry it anymore.  I advised her to call around to see who might be able to fill it for her.  CITLALLI.

## 2024-02-19 ENCOUNTER — HOSPITAL ENCOUNTER (OUTPATIENT)
Dept: RADIOLOGY | Facility: HOSPITAL | Age: 64
Discharge: HOME | End: 2024-02-19
Attending: INTERNAL MEDICINE
Payer: COMMERCIAL

## 2024-02-19 VITALS — HEIGHT: 63 IN | WEIGHT: 170 LBS | BODY MASS INDEX: 30.12 KG/M2

## 2024-02-19 DIAGNOSIS — C34.01 MALIGNANT NEOPLASM OF HILUS OF RIGHT LUNG: ICD-10-CM

## 2024-02-19 LAB — GLUCOSE SERPL-MCNC: 93 MG/DL (ref 70–110)

## 2024-02-19 PROCEDURE — 78815 PET IMAGE W/CT SKULL-THIGH: CPT | Mod: TC,PO

## 2024-02-19 PROCEDURE — 82962 GLUCOSE BLOOD TEST: CPT | Mod: PO

## 2024-02-23 ENCOUNTER — PATIENT MESSAGE (OUTPATIENT)
Dept: HEMATOLOGY/ONCOLOGY | Facility: CLINIC | Age: 64
End: 2024-02-23
Payer: COMMERCIAL

## 2024-03-06 ENCOUNTER — LAB VISIT (OUTPATIENT)
Dept: LAB | Facility: HOSPITAL | Age: 64
End: 2024-03-06
Attending: INTERNAL MEDICINE
Payer: COMMERCIAL

## 2024-03-06 ENCOUNTER — OFFICE VISIT (OUTPATIENT)
Dept: FAMILY MEDICINE | Facility: CLINIC | Age: 64
End: 2024-03-06
Payer: COMMERCIAL

## 2024-03-06 VITALS
DIASTOLIC BLOOD PRESSURE: 60 MMHG | HEART RATE: 66 BPM | BODY MASS INDEX: 31.87 KG/M2 | RESPIRATION RATE: 18 BRPM | HEIGHT: 63 IN | WEIGHT: 179.88 LBS | OXYGEN SATURATION: 97 % | SYSTOLIC BLOOD PRESSURE: 106 MMHG

## 2024-03-06 DIAGNOSIS — C34.91 MALIGNANT NEOPLASM OF RIGHT LUNG, UNSPECIFIED PART OF LUNG: ICD-10-CM

## 2024-03-06 DIAGNOSIS — F33.0 MILD EPISODE OF RECURRENT MAJOR DEPRESSIVE DISORDER: ICD-10-CM

## 2024-03-06 DIAGNOSIS — E66.9 OBESITY, UNSPECIFIED CLASSIFICATION, UNSPECIFIED OBESITY TYPE, UNSPECIFIED WHETHER SERIOUS COMORBIDITY PRESENT: Primary | ICD-10-CM

## 2024-03-06 DIAGNOSIS — Z72.0 TOBACCO USE: ICD-10-CM

## 2024-03-06 LAB
ALBUMIN SERPL BCP-MCNC: 4.4 G/DL (ref 3.5–5.2)
ALP SERPL-CCNC: 122 U/L (ref 55–135)
ALT SERPL W/O P-5'-P-CCNC: 19 U/L (ref 10–44)
ANION GAP SERPL CALC-SCNC: 9 MMOL/L (ref 8–16)
AST SERPL-CCNC: 20 U/L (ref 10–40)
BASOPHILS NFR BLD: 0 % (ref 0–1.9)
BILIRUB SERPL-MCNC: 0.5 MG/DL (ref 0.1–1)
BUN SERPL-MCNC: 11 MG/DL (ref 8–23)
CALCIUM SERPL-MCNC: 9.5 MG/DL (ref 8.7–10.5)
CHLORIDE SERPL-SCNC: 106 MMOL/L (ref 95–110)
CO2 SERPL-SCNC: 26 MMOL/L (ref 23–29)
CREAT SERPL-MCNC: 1.2 MG/DL (ref 0.5–1.4)
DIFFERENTIAL METHOD BLD: ABNORMAL
EOSINOPHIL NFR BLD: 1 % (ref 0–8)
ERYTHROCYTE [DISTWIDTH] IN BLOOD BY AUTOMATED COUNT: 13.7 % (ref 11.5–14.5)
EST. GFR  (NO RACE VARIABLE): 51 ML/MIN/1.73 M^2
GLUCOSE SERPL-MCNC: 98 MG/DL (ref 70–110)
HCT VFR BLD AUTO: 39.8 % (ref 37–48.5)
HGB BLD-MCNC: 12.9 G/DL (ref 12–16)
IMM GRANULOCYTES # BLD AUTO: ABNORMAL K/UL
IMM GRANULOCYTES NFR BLD AUTO: ABNORMAL %
LYMPHOCYTES NFR BLD: 76 % (ref 18–48)
MCH RBC QN AUTO: 32.2 PG (ref 27–31)
MCHC RBC AUTO-ENTMCNC: 32.4 G/DL (ref 32–36)
MCV RBC AUTO: 99 FL (ref 82–98)
MONOCYTES NFR BLD: 1 % (ref 4–15)
NEUTROPHILS NFR BLD: 21 % (ref 38–73)
NEUTS BAND NFR BLD MANUAL: 1 %
NRBC BLD-RTO: 0 /100 WBC
PLATELET # BLD AUTO: 239 K/UL (ref 150–450)
PLATELET BLD QL SMEAR: ABNORMAL
PMV BLD AUTO: 9.2 FL (ref 9.2–12.9)
POTASSIUM SERPL-SCNC: 4.5 MMOL/L (ref 3.5–5.1)
PROT SERPL-MCNC: 7 G/DL (ref 6–8.4)
RBC # BLD AUTO: 4.01 M/UL (ref 4–5.4)
SODIUM SERPL-SCNC: 141 MMOL/L (ref 136–145)
WBC # BLD AUTO: 32.33 K/UL (ref 3.9–12.7)

## 2024-03-06 PROCEDURE — 85007 BL SMEAR W/DIFF WBC COUNT: CPT | Performed by: INTERNAL MEDICINE

## 2024-03-06 PROCEDURE — 3078F DIAST BP <80 MM HG: CPT | Mod: CPTII,S$GLB,,

## 2024-03-06 PROCEDURE — 36415 COLL VENOUS BLD VENIPUNCTURE: CPT | Performed by: INTERNAL MEDICINE

## 2024-03-06 PROCEDURE — 80053 COMPREHEN METABOLIC PANEL: CPT | Performed by: INTERNAL MEDICINE

## 2024-03-06 PROCEDURE — 1159F MED LIST DOCD IN RCRD: CPT | Mod: CPTII,S$GLB,,

## 2024-03-06 PROCEDURE — 1160F RVW MEDS BY RX/DR IN RCRD: CPT | Mod: CPTII,S$GLB,,

## 2024-03-06 PROCEDURE — 99999 PR PBB SHADOW E&M-EST. PATIENT-LVL V: CPT | Mod: PBBFAC,,,

## 2024-03-06 PROCEDURE — 99214 OFFICE O/P EST MOD 30 MIN: CPT | Mod: S$GLB,,,

## 2024-03-06 PROCEDURE — 3008F BODY MASS INDEX DOCD: CPT | Mod: CPTII,S$GLB,,

## 2024-03-06 PROCEDURE — 3074F SYST BP LT 130 MM HG: CPT | Mod: CPTII,S$GLB,,

## 2024-03-06 PROCEDURE — 3044F HG A1C LEVEL LT 7.0%: CPT | Mod: CPTII,S$GLB,,

## 2024-03-06 PROCEDURE — 85027 COMPLETE CBC AUTOMATED: CPT | Performed by: INTERNAL MEDICINE

## 2024-03-06 RX ORDER — TOPIRAMATE 50 MG/1
50 TABLET, FILM COATED ORAL DAILY
Qty: 7 TABLET | Refills: 0 | Status: SHIPPED | OUTPATIENT
Start: 2024-03-06 | End: 2024-06-07 | Stop reason: DRUGHIGH

## 2024-03-06 RX ORDER — TOPIRAMATE 100 MG/1
100 TABLET, FILM COATED ORAL DAILY
Qty: 30 TABLET | Refills: 11 | Status: SHIPPED | OUTPATIENT
Start: 2024-03-13 | End: 2025-03-13

## 2024-03-06 RX ORDER — NALOXONE HYDROCHLORIDE 4 MG/.1ML
SPRAY NASAL
COMMUNITY
Start: 2023-11-22

## 2024-03-06 RX ORDER — VARENICLINE TARTRATE 1 MG/1
1 TABLET, FILM COATED ORAL 2 TIMES DAILY
Qty: 180 TABLET | Refills: 0 | Status: SHIPPED | OUTPATIENT
Start: 2024-03-06 | End: 2024-06-04

## 2024-03-06 RX ORDER — CITALOPRAM 20 MG/1
20 TABLET, FILM COATED ORAL DAILY
Qty: 30 TABLET | Refills: 11 | Status: SHIPPED | OUTPATIENT
Start: 2024-03-06 | End: 2025-03-06

## 2024-03-06 NOTE — PROGRESS NOTES
Subjective:       Patient ID: Yvette Hutchinson is a 63 y.o. female.    Chief Complaint: Follow-up    Follow up.     Depression. Hasn't noticed difference in celexa. Will increase therapy. No unwanted side effects thus far.     Obesity. Zepbound not approved. Taking topamax without issue. Will crank up therapy on this as well. She has adjusted her diet and is exercising.     Requests refill of chantix for smoking cessation.           Past Medical History:   Diagnosis Date    Arthritis     Cancer     Depression     GERD (gastroesophageal reflux disease)     Pneumonia of left lung due to infectious organism 03/05/2020       Review of patient's allergies indicates:  No Known Allergies      Current Outpatient Medications:     (Magic mouthwash) 1:1:1 diphenhydrAMINE(Benadryl) 12.5mg/5ml liq, aluminum & magnesium hydroxide-simethicone (Maalox), LIDOcaine viscous 2%, Swish and spit 10 mLs 3 (three) times daily. for mouth sores, Disp: 250 mL, Rfl: 0    acetaminophen (TYLENOL) 500 MG tablet, Take 2 tablets (1,000 mg total) by mouth every 6 (six) hours as needed for Pain., Disp: 8 tablet, Rfl: 0    albuterol (PROVENTIL/VENTOLIN HFA) 90 mcg/actuation inhaler, Inhale 2 puffs into the lungs every 6 (six) hours as needed for Wheezing or Shortness of Breath. Rescue, Disp: 8.5 g, Rfl: 11    albuterol-ipratropium (DUO-NEB) 2.5 mg-0.5 mg/3 mL nebulizer solution, Take 3 mLs by nebulization every 6 (six) hours as needed for Wheezing. Rescue, Disp: 120 each, Rfl: 5    buPROPion (WELLBUTRIN XL) 300 MG 24 hr tablet, Take 1 tablet (300 mg total) by mouth once daily., Disp: 90 tablet, Rfl: 3    celecoxib (CELEBREX) 200 MG capsule, Take 2 capsules (400 mg total) by mouth once daily., Disp: 180 capsule, Rfl: 1    diphenhydrAMINE-aluminum-magnesium hydroxide-simethicone-LIDOcaine HCl 2%, Swish and spit 15 mLs every 4 (four) hours as needed (mouth sores)., Disp: 100 each, Rfl: 2    fluticasone propionate (FLONASE) 50 mcg/actuation nasal  spray, 1 spray (50 mcg total) by Each Nostril route once daily., Disp: 16 g, Rfl: 3    fluticasone-salmeterol 230-21 mcg/dose (ADVAIR HFA) 230-21 mcg/actuation HFAA inhaler, Inhale 2 puffs into the lungs 2 (two) times daily. Controller, Disp: 12 g, Rfl: 5    gabapentin (NEURONTIN) 300 MG capsule, Take 1 capsule (300 mg total) by mouth 3 (three) times daily., Disp: 270 capsule, Rfl: 1    levocetirizine (XYZAL) 5 MG tablet, Take 1 tablet (5 mg total) by mouth every evening., Disp: 30 tablet, Rfl: 5    LIDOcaine (LIDODERM) 5 %, Place 1 patch onto the skin once daily. Remove & Discard patch within 12 hours or as directed by MD, Disp: 7 patch, Rfl: 1    LIDOcaine-prilocaine (EMLA) cream, Apply topically as needed (apply to port site 30 min before access)., Disp: 30 g, Rfl: 0    naloxone (NARCAN) 4 mg/actuation Spry, ADMINISTER A SINGLE SPRAY IN ONE NOSTRIL UPON SIGNS OF OPIOID OVERDOSE. CALL 911. REPEAT AFTER 3 MINUTES IF NO RESPONSE., Disp: , Rfl:     ondansetron (ZOFRAN-ODT) 8 MG TbDL, Take 1 tablet (8 mg total) by mouth every 12 (twelve) hours as needed (nausea)., Disp: 30 tablet, Rfl: 1    pravastatin (PRAVACHOL) 10 MG tablet, Take 1 tablet (10 mg total) by mouth once daily., Disp: 90 tablet, Rfl: 3    predniSONE (DELTASONE) 20 MG tablet, Take one pill a day for three days, repeat for shortness of breath, Disp: 12 tablet, Rfl: 0    ALPRAZolam (XANAX) 0.5 MG tablet, Take 1 tablet (0.5 mg total) by mouth 3 (three) times daily. (Patient not taking: Reported on 3/6/2024), Disp: 90 tablet, Rfl: 0    citalopram (CELEXA) 20 MG tablet, Take 1 tablet (20 mg total) by mouth once daily., Disp: 30 tablet, Rfl: 11    OLANZapine (ZYPREXA) 5 MG tablet, Take 1 tablet (5 mg total) by mouth nightly. Take 1 tablet at bedtime on days 1-4 of each cycle of chemotherapy (Patient not taking: Reported on 3/6/2024), Disp: 30 tablet, Rfl: 2    [START ON 3/13/2024] topiramate (TOPAMAX) 100 MG tablet, Take 1 tablet (100 mg total) by mouth once  "daily., Disp: 30 tablet, Rfl: 11    topiramate (TOPAMAX) 50 MG tablet, Take 1 tablet (50 mg total) by mouth once daily., Disp: 7 tablet, Rfl: 0    varenicline (CHANTIX) 1 mg Tab, Take 1 tablet (1 mg total) by mouth 2 (two) times daily., Disp: 180 tablet, Rfl: 0    Review of Systems   Constitutional:  Positive for unexpected weight change.   Respiratory:  Positive for cough and wheezing.    Psychiatric/Behavioral:  Positive for dysphoric mood.        Objective:      /60 (BP Location: Right arm, Patient Position: Sitting, BP Method: Large (Manual))   Pulse 66   Resp 18   Ht 5' 3" (1.6 m)   Wt 81.6 kg (179 lb 14.3 oz)   LMP 01/13/2010 (Approximate)   SpO2 97%   BMI 31.87 kg/m²   Physical Exam  Vitals reviewed.   Constitutional:       General: She is not in acute distress.     Appearance: Normal appearance. She is obese. She is not ill-appearing, toxic-appearing or diaphoretic.   HENT:      Head: Normocephalic.      Right Ear: External ear normal.      Left Ear: External ear normal.      Nose: Nose normal. No congestion or rhinorrhea.      Mouth/Throat:      Mouth: Mucous membranes are moist.      Pharynx: Oropharynx is clear.   Eyes:      General: No scleral icterus.        Right eye: No discharge.         Left eye: No discharge.      Extraocular Movements: Extraocular movements intact.      Conjunctiva/sclera: Conjunctivae normal.   Cardiovascular:      Rate and Rhythm: Normal rate and regular rhythm.      Pulses: Normal pulses.      Heart sounds: Normal heart sounds. No murmur heard.     No friction rub. No gallop.   Pulmonary:      Effort: Pulmonary effort is normal. No respiratory distress.      Breath sounds: Wheezing (mild) present. No rhonchi or rales.   Chest:      Chest wall: No tenderness.   Musculoskeletal:         General: No swelling, tenderness or deformity. Normal range of motion.      Cervical back: Normal range of motion.      Right lower leg: No edema.      Left lower leg: No edema. "   Skin:     General: Skin is warm and dry.      Capillary Refill: Capillary refill takes less than 2 seconds.      Coloration: Skin is not jaundiced.      Findings: No bruising, erythema, lesion or rash.   Neurological:      Mental Status: She is alert and oriented to person, place, and time.      Gait: Gait normal.   Psychiatric:         Mood and Affect: Mood normal.         Behavior: Behavior normal.         Thought Content: Thought content normal.         Judgment: Judgment normal.         Assessment:       1. Obesity, unspecified classification, unspecified obesity type, unspecified whether serious comorbidity present    2. Tobacco use    3. Mild episode of recurrent major depressive disorder        Plan:       Obesity, unspecified classification, unspecified obesity type, unspecified whether serious comorbidity present  -     topiramate (TOPAMAX) 50 MG tablet; Take 1 tablet (50 mg total) by mouth once daily.  Dispense: 7 tablet; Refill: 0  -     topiramate (TOPAMAX) 100 MG tablet; Take 1 tablet (100 mg total) by mouth once daily.  Dispense: 30 tablet; Refill: 11    Tobacco use  -     varenicline (CHANTIX) 1 mg Tab; Take 1 tablet (1 mg total) by mouth 2 (two) times daily.  Dispense: 180 tablet; Refill: 0    Mild episode of recurrent major depressive disorder  -     citalopram (CELEXA) 20 MG tablet; Take 1 tablet (20 mg total) by mouth once daily.  Dispense: 30 tablet; Refill: 11                 Herbert Price PA-C  Family Medicine Physician Assistant       Future Appointments       Date Provider Specialty Appt Notes    3/7/2024 Lianna Hoffmann NP Hematology and Oncology [facility]/Lung Ca/labs/chemo clear - [facility] pt/Confirmed appt.fp    5/15/2024 Harika Garcia NP Pulmonology  f/u               I spent a total of 20 minutes on the day of the visit.This includes face to face time and non-face to face time preparing to see the patient (eg, review of tests), obtaining and/or reviewing separately obtained history,  documenting clinical information in the electronic or other health record, independently interpreting results and communicating results to the patient/family/caregiver, or care coordinator.      We have addressed [4] Moderate: 1 or more chronic illnesses with exacerbation, progression, or side effects of treatment / 2 or more stable chronic illnesses / 1 undiagnosed new problem with uncertain prognosis / 1 acute illness with systemic symptoms / 1 acute complicated injury  The complexity of the data reviewed and analyzed for this visit was [2] Minimal or None  The risk of complications and/or morbidity or mortality are [4] Moderate risk (I.e. prescription drug management / decision regarding minor surgery with identified pt or procedure risk factors / decision regarding elective major surgery without identified pt or procedure risk factors / diagnosis or treatment significantly limited by social determinants of health)   The level of Medical Decision Making for this visit is [4] Moderate

## 2024-03-07 ENCOUNTER — TELEPHONE (OUTPATIENT)
Dept: HEMATOLOGY/ONCOLOGY | Facility: CLINIC | Age: 64
End: 2024-03-07

## 2024-03-07 ENCOUNTER — OFFICE VISIT (OUTPATIENT)
Dept: HEMATOLOGY/ONCOLOGY | Facility: CLINIC | Age: 64
End: 2024-03-07
Payer: COMMERCIAL

## 2024-03-07 ENCOUNTER — DOCUMENTATION ONLY (OUTPATIENT)
Dept: INFUSION THERAPY | Facility: HOSPITAL | Age: 64
End: 2024-03-07

## 2024-03-07 DIAGNOSIS — C34.91 MALIGNANT NEOPLASM OF RIGHT LUNG, UNSPECIFIED PART OF LUNG: ICD-10-CM

## 2024-03-07 DIAGNOSIS — E03.2 HYPOTHYROIDISM DUE TO MEDICATION: Primary | ICD-10-CM

## 2024-03-07 PROCEDURE — 99215 OFFICE O/P EST HI 40 MIN: CPT | Mod: 95,,, | Performed by: NURSE PRACTITIONER

## 2024-03-07 PROCEDURE — 1159F MED LIST DOCD IN RCRD: CPT | Mod: CPTII,95,, | Performed by: NURSE PRACTITIONER

## 2024-03-07 PROCEDURE — 3044F HG A1C LEVEL LT 7.0%: CPT | Mod: CPTII,95,, | Performed by: NURSE PRACTITIONER

## 2024-03-07 PROCEDURE — 1160F RVW MEDS BY RX/DR IN RCRD: CPT | Mod: CPTII,95,, | Performed by: NURSE PRACTITIONER

## 2024-03-07 RX ORDER — HEPARIN 100 UNIT/ML
500 SYRINGE INTRAVENOUS
Status: CANCELLED | OUTPATIENT
Start: 2024-03-08

## 2024-03-07 RX ORDER — EPINEPHRINE 0.3 MG/.3ML
0.3 INJECTION SUBCUTANEOUS ONCE AS NEEDED
Status: CANCELLED | OUTPATIENT
Start: 2024-03-08

## 2024-03-07 RX ORDER — DIPHENHYDRAMINE HYDROCHLORIDE 50 MG/ML
50 INJECTION INTRAMUSCULAR; INTRAVENOUS ONCE AS NEEDED
Status: CANCELLED | OUTPATIENT
Start: 2024-03-08

## 2024-03-07 RX ORDER — SODIUM CHLORIDE 0.9 % (FLUSH) 0.9 %
10 SYRINGE (ML) INJECTION
Status: CANCELLED | OUTPATIENT
Start: 2024-03-08

## 2024-03-07 NOTE — PROGRESS NOTES
Message sent to Hong NP regarding patients elevated TSH of 8.  No new orders noted, plan to proceed with Tecentriq treatment tomorrow.

## 2024-03-07 NOTE — TELEPHONE ENCOUNTER
----- Message from Lianna Hoffmann NP sent at 3/7/2024  1:33 PM CST -----  Please schedule an appointment with Dr. Lee next week to review PET scan

## 2024-03-07 NOTE — PROGRESS NOTES
The patient location is: home  Visit type: Virtual visit with synchronous audio and video  Face-to-face or time spent with patient on the encounter: 10 min  Total time spent on and for  this encounter which includes non face-to-face time preparing to see patient, review of tests, obtaining and or reviewing separately obtained records documenting clinical information in the electronic or other health records, independently interpreting results which is not separately reported ,and communicating results to the patient/family/caregiver and in care coordination and treatment planning/communicating with pharmacy for prescriptions/addressing social needs/arranging follow-up and or referrals :40 min    Each patient I provide medical services by telemedicine is:  (1) informed of the relationship between the physician and patient and the respective role of any other health care provider with respect to management of the patient; and (2) notified that he or she may decline to receive medical services by telemedicine and may withdraw from such care at any time.  This is a video visit therefore some elements of the physical exam such as vital signs, heart sounds are breath sounds are not included and may be included if found in recent clinic notes of other providers assessing same patient. Any symptoms or signs that were visualized were stated by the patient may be included in this note.     Subjective:       Patient ID: Yvette Hutchinson is a 63 y.o. female.    Chief Complaint:  Patient known to me with CLL stage 0 recently diagnosed with lung cancer August 2022  Follow-up    Lung Cancer  Too worried but I do want to keep a close watch on it how you feeling no steroids nothing why 20 mg no steroids steroids nothing at all we will discontinue to do this we will not change   Patient has chronic complains of chronic pain syndrome and COPD for which periodically she take steroids she is also on BuSpar has issues anxiety  has required dilatation of esophageal strictures allergic rhinitis insomnia and history of B12 deficiency documented   Had CT chest done with pulmonary 7/22 showed mass bx done. 8/22    Oncology hx  Impression:ct chest 7/29/22     2.3 cm spiculated left upper lobe lung nodule highly suspicious for bronchogenic carcinoma.     New nonspecific 7 mm nodule in the right middle lobe; this appears more linear on the coronal images and may be a focus of scarring.     Additional stable lung nodules, mildly enlarged axillary lymph nodes, substernal thyroid nodule; these findings are likely benign given the interval stability.   LEFT LUNG MASS, CT-GUIDED BIOPSY:   Non-small cell lung carcinoma.   Comment:  The biopsy reveals invasive carcinoma with apparent glandular but   also vague squamoid features.  Tumor cells are diffusely positive with TTF-1   and focally positive with P40.  The histology differential includes   adenocarcinoma and adenosquamous carcinoma.  PD-L1 and templates NGS studies   are in progress.  The results will be issued separately.    October 3, 2022: Robotic left lobectomy T1c N1    3/7/2024:  She returns today follow-up for clearance.    Social history; patient is a smoker denies recreational alcohol use or drug use   Patient is currently on disability  She is allergic to the mask used during COVID pandemic she is also bothered by her mask with COPD    Family history suggestive of ovarian and breast cancer patient advised to see a  or seek   Review of patient's allergies indicates:  No Known Allergies  Medications have been reviewed  Current Outpatient Medications on File Prior to Visit   Medication Sig Dispense Refill    (Magic mouthwash) 1:1:1 diphenhydrAMINE(Benadryl) 12.5mg/5ml liq, aluminum & magnesium hydroxide-simethicone (Maalox), LIDOcaine viscous 2% Swish and spit 10 mLs 3 (three) times daily. for mouth sores 250 mL 0    acetaminophen (TYLENOL) 500 MG tablet Take 2 tablets  (1,000 mg total) by mouth every 6 (six) hours as needed for Pain. 8 tablet 0    albuterol (PROVENTIL/VENTOLIN HFA) 90 mcg/actuation inhaler Inhale 2 puffs into the lungs every 6 (six) hours as needed for Wheezing or Shortness of Breath. Rescue 8.5 g 11    albuterol-ipratropium (DUO-NEB) 2.5 mg-0.5 mg/3 mL nebulizer solution Take 3 mLs by nebulization every 6 (six) hours as needed for Wheezing. Rescue 120 each 5    ALPRAZolam (XANAX) 0.5 MG tablet Take 1 tablet (0.5 mg total) by mouth 3 (three) times daily. (Patient not taking: Reported on 3/6/2024) 90 tablet 0    buPROPion (WELLBUTRIN XL) 300 MG 24 hr tablet Take 1 tablet (300 mg total) by mouth once daily. 90 tablet 3    celecoxib (CELEBREX) 200 MG capsule Take 2 capsules (400 mg total) by mouth once daily. 180 capsule 1    citalopram (CELEXA) 20 MG tablet Take 1 tablet (20 mg total) by mouth once daily. 30 tablet 11    diphenhydrAMINE-aluminum-magnesium hydroxide-simethicone-LIDOcaine HCl 2% Swish and spit 15 mLs every 4 (four) hours as needed (mouth sores). 100 each 2    fluticasone propionate (FLONASE) 50 mcg/actuation nasal spray 1 spray (50 mcg total) by Each Nostril route once daily. 16 g 3    fluticasone-salmeterol 230-21 mcg/dose (ADVAIR HFA) 230-21 mcg/actuation HFAA inhaler Inhale 2 puffs into the lungs 2 (two) times daily. Controller 12 g 5    gabapentin (NEURONTIN) 300 MG capsule Take 1 capsule (300 mg total) by mouth 3 (three) times daily. 270 capsule 1    levocetirizine (XYZAL) 5 MG tablet Take 1 tablet (5 mg total) by mouth every evening. 30 tablet 5    LIDOcaine (LIDODERM) 5 % Place 1 patch onto the skin once daily. Remove & Discard patch within 12 hours or as directed by MD Bloom patch 1    LIDOcaine-prilocaine (EMLA) cream Apply topically as needed (apply to port site 30 min before access). 30 g 0    naloxone (NARCAN) 4 mg/actuation Spry ADMINISTER A SINGLE SPRAY IN ONE NOSTRIL UPON SIGNS OF OPIOID OVERDOSE. CALL 911. REPEAT AFTER 3 MINUTES IF NO  RESPONSE.      OLANZapine (ZYPREXA) 5 MG tablet Take 1 tablet (5 mg total) by mouth nightly. Take 1 tablet at bedtime on days 1-4 of each cycle of chemotherapy (Patient not taking: Reported on 3/6/2024) 30 tablet 2    ondansetron (ZOFRAN-ODT) 8 MG TbDL Take 1 tablet (8 mg total) by mouth every 12 (twelve) hours as needed (nausea). 30 tablet 1    pravastatin (PRAVACHOL) 10 MG tablet Take 1 tablet (10 mg total) by mouth once daily. 90 tablet 3    predniSONE (DELTASONE) 20 MG tablet Take one pill a day for three days, repeat for shortness of breath 12 tablet 0    [START ON 3/13/2024] topiramate (TOPAMAX) 100 MG tablet Take 1 tablet (100 mg total) by mouth once daily. 30 tablet 11    topiramate (TOPAMAX) 50 MG tablet Take 1 tablet (50 mg total) by mouth once daily. 7 tablet 0    varenicline (CHANTIX) 1 mg Tab Take 1 tablet (1 mg total) by mouth 2 (two) times daily. 180 tablet 0     No current facility-administered medications on file prior to visit.       REVIEW OF SYSTEMS:     S/p lobectomy left side, has draining tube+ with sanguinous fluid.    Wt Readings from Last 3 Encounters:   03/06/24 81.6 kg (179 lb 14.3 oz)   02/19/24 77.1 kg (170 lb)   02/16/24 81.2 kg (179 lb)     Temp Readings from Last 3 Encounters:   02/16/24 97.4 °F (36.3 °C)   02/07/24 98.4 °F (36.9 °C) (Oral)   01/26/24 97.3 °F (36.3 °C)     BP Readings from Last 3 Encounters:   03/06/24 106/60   02/16/24 113/66   02/08/24 104/67     Pulse Readings from Last 3 Encounters:   03/06/24 66   02/16/24 (!) 56   02/08/24 62     VITAL SIGNS:  as above   GENERAL: appears well-built, well-nourished.  No anxiety, no agitation, and in no distress.  Patient is awake, alert, oriented and cooperative.  HEENT:  Showed no congestion. Trachea is central no obvious icterus or pallor noted no hoarseness. no obvious JVD   NECK:  Supple.  No JVD. No obvious cervical submental or supraclavicular adenopathy.  RS: tube draining on left side. S/p lobectomy  ABDOMEN:  abdomen  appears undistended.  EXTREMITIES:  Without edema.  NEUROLOGICAL:  The patient is appropriate, higher functions are normal.  No  obvious neurological deficits.  normal judgement normal thought content  No confusion, no speech impediment. Cranial nerves are intact and show no deficit. No gross motor deficits noted   SKIN MUSCULOSKELETAL: no joint or skeletal deformity, no clubbing of nails.  No visible rash ecchymosis or petechiae  Lab Results   Component Value Date    WBC 20.78 (H) 03/03/2020    HGB 14.9 03/03/2020    HCT 47.0 03/03/2020    MCV 99 (H) 03/03/2020     03/03/2020     Smudge cells presnt  CMP  Sodium   Date Value Ref Range Status   03/06/2024 141 136 - 145 mmol/L Final     Potassium   Date Value Ref Range Status   03/06/2024 4.5 3.5 - 5.1 mmol/L Final     Chloride   Date Value Ref Range Status   03/06/2024 106 95 - 110 mmol/L Final     CO2   Date Value Ref Range Status   03/06/2024 26 23 - 29 mmol/L Final     Glucose   Date Value Ref Range Status   03/06/2024 98 70 - 110 mg/dL Final     BUN   Date Value Ref Range Status   03/06/2024 11 8 - 23 mg/dL Final     Creatinine   Date Value Ref Range Status   03/06/2024 1.2 0.5 - 1.4 mg/dL Final     Calcium   Date Value Ref Range Status   03/06/2024 9.5 8.7 - 10.5 mg/dL Final     Total Protein   Date Value Ref Range Status   03/06/2024 7.0 6.0 - 8.4 g/dL Final     Albumin   Date Value Ref Range Status   03/06/2024 4.4 3.5 - 5.2 g/dL Final     Total Bilirubin   Date Value Ref Range Status   03/06/2024 0.5 0.1 - 1.0 mg/dL Final     Comment:     For infants and newborns, interpretation of results should be based  on gestational age, weight and in agreement with clinical  observations.    Premature Infant recommended reference ranges:  Up to 24 hours.............<8.0 mg/dL  Up to 48 hours............<12.0 mg/dL  3-5 days..................<15.0 mg/dL  6-29 days.................<15.0 mg/dL       Alkaline Phosphatase   Date Value Ref Range Status   03/06/2024  122 55 - 135 U/L Final     AST   Date Value Ref Range Status   03/06/2024 20 10 - 40 U/L Final     ALT   Date Value Ref Range Status   03/06/2024 19 10 - 44 U/L Final     Anion Gap   Date Value Ref Range Status   03/06/2024 9 8 - 16 mmol/L Final     eGFR if    Date Value Ref Range Status   06/13/2022 >60 >60 mL/min/1.73 m^2 Final     eGFR if non    Date Value Ref Range Status   06/13/2022 >60 >60 mL/min/1.73 m^2 Final     Comment:     Calculation used to obtain the estimated glomerular filtration  rate (eGFR) is the CKD-EPI equation.            2wk ago    Blood Interpretation A B cell lymphoproliferative disorder is present. see comment.    Comment: Interpreted by: Jeremias Sosa M.D., Signed on 08/06/2020 at 13:07   Blood Comment Flow cytometric analysis of  peripheral blood  detects a lambda  light chain   restricted B lymphocyte population showing expression of CD19 and dim CD20   with aberrant expression of CD5 (dim).  T lymphocytes are   immunophenotypically unremarkable.  The blasts gate is not increased.  The   findings are consistent with patient history of chronic lymphocytic   leukemia/small lymphocytic lymphoma.   Flow differential:  Lymphocytes 69.6%, Monocytes 2.8%, Granulocytes  27.5%,   Blast  0.1%, Debris/nRBC 0.1%,  Viability 97.5%.   10/3/22 robotic lef lung lobectomy  1. LYMPH NODE, LEVEL 5 FROZEN SECTION, EXCISION:   One lymph node with dominant necrotizing granuloma, negative for malignancy   (0/1).   2. LYMPH NODES, LEVEL 5 PERMANENT, EXCISION:   Two lymph nodes with multifocal necrotizing granulomas, negative for   malignancy (0/2).   3. LYMPH NODES, LEFT POSTERIOR LEVEL 10, EXCISION:   Five lymph nodes with multifocal necrotizing granulomas, negative for   malignancy (0/5).   4. LYMPH NODE, LEVEL 11, EXCISION:   One lymph node, negative for malignancy (0/1).   5. LYMPH NODES, LEVEL 12, EXCISION:   Three lymph nodes, one with single necrotizing granuloma, negative  for   malignancy (0/3).   6. LYMPH NODES, LEVEL 12 NEAR UPPER DIVISION BRONCHUS, EXCISION:   Three lymph nodes with sinus histiocytosis, negative for malignancy (0/3).   7. LYMPH NODES, LEVEL 10 #2, EXCISION:   One of two lymph nodes positive for metastatic carcinoma (1/2).   Size of largest metastatic deposit:  8 mm.   Negative for extranodal extension.   8. LYMPH NODES, LEFT LEVEL 8, EXCISION:   Two lymph nodes with sinus histiocytosis, negative for malignancy (0/2).   9. LYMPH NODES, LEFT LEVEL 9, EXCISION:   Two lymph nodes, negative for malignancy (0/2).   10. 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   Surgical margins are negative for malignancy.   Background lung tissue with subpleural fibrosis, no evidence of granulomas.   Two hilar lymph nodes, negative for malignancy (0/2).   Coment (1-3, 5):  Prominent necrotizing granulomatous inflammation identified   is identified in multiple lymph nodes.  An AE1/AE3 cytokeratin immunostain   performed on the largest granuloma (part 3) reveals no evidence of occult   carcinoma.  GMS and AFB special stains are negative for fungal and acid-fast   organisms, respectively.  The necrotizing features are not typical of   sarcoidosis.  As such, other granulomatous processes may be considered   including secondary response to malignancy or infection.  Clinical   correlation is advised.   Comment (10):  Sections reveal invasive carcinoma involving lung tissue with   predominantly squamoid morphologic features including bright eosinophilic   cytoplasm and intracellular bridging.  There are not significant keratinizing   features.  Some areas show basaloid features.  There is also regional luminal   features including cribriforming and vague glandular structures containing   mucin.  Tumor cells are diffusely positive with P40 and regionally positive   with TTF-1.  Mucicarmine special stain highlights mucin producing luminal   spaces.  Overall tumor appearance and staining profile supports adenosquamous   carcinoma, a variant of non-small cell lung carcinoma.   [facility] SURGICAL PATHOLOGY CANCER CASE SUMMARY:  LUNG   Procedure:  Lobectomy   Specimen laterality:  Left   Tumor focality:  Single focus   Tumor site: Upper lobe of lung   Tumor size:  2.2 cm   Histologic type:  Adenosquamous carcinoma   Spread through airspaces:  Not identified   Visceral pleural invasion:  Not identified   Direct invasion of adjacent structures: Not applicable   Lymphovascular invasion:  Not identified   Margins:  All margins negative for invasive carcinoma     Closest margin to invasive carcinoma:  Bronchial (3.0 cm)   Regional lymph nodes:  Tumor present in regional lymph node     Number of lymph nodes with tumor:  1     Scott sites with tumor:  Left level 10 (10L)     Extranodal extension:  Not identified     Number of lymph nodes examined:  23   Pathologic stage classification (pTNM): pT1c pN1   Special studies:  Performed on previous biopsy if additional studies needed   there may be performed on tissue blockd 10G or 10H.     PET 2/19/24  Increasing SUV max of FDG avid partially calcified lymph node involving the anterior superior mediastinum. This could be metastatic, lymphoproliferative, or reactive in nature.  No other findings of FDG avid malignancy.  Prominent cervical, axillary, mediastinal lymph nodes.        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 Due to adenosq histology chose carbo/ taxol x 4 cycles tecentriq maintanence x 1 year  pt is has had 5 cycles of carbo/ taxol  discused maintainenec at  tumor board   On tecentriq maintainence   Proceed with tecentriq  Pet7/18/23 neg for mets  See me for next cycle cbc,cmp  pet    CLL   stage 0 will confirmed with flow cytometry  NTD   she has no other cytopenias or lymphadenopathy or B symptoms patient can be observed      Continue with chronic pain syndrome and pain management advised to stop smoking if at all possible 10 pills of hydrocodoen given to pt, she needs to follow with pain mx      History of B12 deficiency will continue to monitor    Advised COVID precaution due to her lung situation she will be a high risk patient    Advance Care Planning     Date: 04/18/2023    Power of   I initiated the process of advance care planning today and explained the importance of this process to the patient.  I introduced the concept of advance directives to the patient, as well. Then the patient received detailed information about the importance of designating a Health Care Power of  (HCPOA). She was also instructed to communicate with this person about their wishes for future healthcare, should she become sick and lose decision-making capacity. The patient has not previously appointed a HCPOA. After our discussion, the patient has decided to complete a HCPOA .      3/7/2024:  Labs and PET scan reviewed in detail with the patient.  She has some concerns about increasing SUV.  All questions and concerns were addressed and answered to the best of my ability.  She would like to speak with MD about PET scan.  We will proceed with treatment as scheduled she will follow-up with MD next week.  She is also concerned about increasing TSH.  I will recheck TSH with T4 on follow-up

## 2024-03-08 ENCOUNTER — TELEPHONE (OUTPATIENT)
Dept: HEMATOLOGY/ONCOLOGY | Facility: CLINIC | Age: 64
End: 2024-03-08
Payer: COMMERCIAL

## 2024-03-08 ENCOUNTER — INFUSION (OUTPATIENT)
Dept: INFUSION THERAPY | Facility: HOSPITAL | Age: 64
End: 2024-03-08
Attending: INTERNAL MEDICINE
Payer: COMMERCIAL

## 2024-03-08 VITALS
BODY MASS INDEX: 31.83 KG/M2 | HEIGHT: 63 IN | HEART RATE: 63 BPM | WEIGHT: 179.63 LBS | RESPIRATION RATE: 18 BRPM | SYSTOLIC BLOOD PRESSURE: 109 MMHG | DIASTOLIC BLOOD PRESSURE: 59 MMHG | OXYGEN SATURATION: 98 % | TEMPERATURE: 98 F

## 2024-03-08 DIAGNOSIS — C34.90 NON-SMALL CELL LUNG CANCER, UNSPECIFIED LATERALITY: Primary | ICD-10-CM

## 2024-03-08 DIAGNOSIS — C34.01 MALIGNANT NEOPLASM OF HILUS OF RIGHT LUNG: ICD-10-CM

## 2024-03-08 PROCEDURE — 96361 HYDRATE IV INFUSION ADD-ON: CPT

## 2024-03-08 PROCEDURE — 63600175 PHARM REV CODE 636 W HCPCS: Mod: JZ,JG | Performed by: NURSE PRACTITIONER

## 2024-03-08 PROCEDURE — 25000003 PHARM REV CODE 250: Performed by: NURSE PRACTITIONER

## 2024-03-08 PROCEDURE — 96413 CHEMO IV INFUSION 1 HR: CPT

## 2024-03-08 PROCEDURE — A4216 STERILE WATER/SALINE, 10 ML: HCPCS | Performed by: NURSE PRACTITIONER

## 2024-03-08 RX ORDER — SODIUM CHLORIDE 0.9 % (FLUSH) 0.9 %
10 SYRINGE (ML) INJECTION
Status: DISCONTINUED | OUTPATIENT
Start: 2024-03-08 | End: 2024-03-08 | Stop reason: HOSPADM

## 2024-03-08 RX ORDER — DIPHENHYDRAMINE HYDROCHLORIDE 50 MG/ML
50 INJECTION INTRAMUSCULAR; INTRAVENOUS ONCE AS NEEDED
Status: DISCONTINUED | OUTPATIENT
Start: 2024-03-08 | End: 2024-03-08 | Stop reason: HOSPADM

## 2024-03-08 RX ORDER — HEPARIN 100 UNIT/ML
500 SYRINGE INTRAVENOUS
Status: DISCONTINUED | OUTPATIENT
Start: 2024-03-08 | End: 2024-03-08 | Stop reason: HOSPADM

## 2024-03-08 RX ORDER — EPINEPHRINE 0.3 MG/.3ML
0.3 INJECTION SUBCUTANEOUS ONCE AS NEEDED
Status: DISCONTINUED | OUTPATIENT
Start: 2024-03-08 | End: 2024-03-08 | Stop reason: HOSPADM

## 2024-03-08 RX ADMIN — HEPARIN 500 UNITS: 100 SYRINGE at 03:03

## 2024-03-08 RX ADMIN — ATEZOLIZUMAB 1200 MG: 1200 INJECTION, SOLUTION INTRAVENOUS at 02:03

## 2024-03-08 RX ADMIN — SODIUM CHLORIDE, PRESERVATIVE FREE 10 ML: 5 INJECTION INTRAVENOUS at 02:03

## 2024-03-08 RX ADMIN — SODIUM CHLORIDE: 9 INJECTION, SOLUTION INTRAVENOUS at 02:03

## 2024-03-08 NOTE — TELEPHONE ENCOUNTER
Called patient and patient stated that the infusion nurse inquired if she was given anything for her high TSH 8.009 and Free T4 .52 on 2/16/24. Patient's concerns relayed to Dr. Lee and BRYNN Hoffmann.

## 2024-03-08 NOTE — PLAN OF CARE
Problem: Fatigue  Goal: Improved Activity Tolerance  Outcome: Ongoing, Progressing  Intervention: Promote Improved Energy  Flowsheets (Taken 3/8/2024 1406)  Fatigue Management: fatigue-related activity identified  Sleep/Rest Enhancement: noise level reduced  Activity Management: Up in chair - L3

## 2024-03-11 ENCOUNTER — TELEPHONE (OUTPATIENT)
Dept: HEMATOLOGY/ONCOLOGY | Facility: CLINIC | Age: 64
End: 2024-03-11
Payer: COMMERCIAL

## 2024-03-11 NOTE — TELEPHONE ENCOUNTER
Called patient and advised for her to follow-up with her PCP as soon as possible in regards to her elevated thyroid levels. Patient verbalized understanding.     ----- Message from Rhiannon Lee MD sent at 3/11/2024 10:47 AM CDT -----  Regarding: FW: thyroid  Please have her see her pcp asap for this  ----- Message -----  From: Aicha Chau RN  Sent: 3/8/2024   3:42 PM CDT  To: Rhiannon Lee MD; Lianna Hoffmann NP  Subject: FW: thyroid                                      Patient stated that the infusion nurse inquired if she was given anything for her high TSH 8.009 and Free T4 .52 on 2/16/24. Please advise.   ----- Message -----  From: Zita Cisneros MA  Sent: 3/8/2024   3:26 PM CST  To: Aicha Chau RN  Subject: thyroid                                          Pt came to office and states someone infusion suggested elevated thyroid should be addressed, pt would like a ph call for advice # [phone], Please advise.

## 2024-03-12 ENCOUNTER — OFFICE VISIT (OUTPATIENT)
Dept: FAMILY MEDICINE | Facility: CLINIC | Age: 64
End: 2024-03-12
Payer: COMMERCIAL

## 2024-03-12 VITALS
SYSTOLIC BLOOD PRESSURE: 118 MMHG | DIASTOLIC BLOOD PRESSURE: 60 MMHG | HEART RATE: 65 BPM | OXYGEN SATURATION: 96 % | BODY MASS INDEX: 31.45 KG/M2 | TEMPERATURE: 98 F | HEIGHT: 63 IN | RESPIRATION RATE: 17 BRPM | WEIGHT: 177.5 LBS

## 2024-03-12 DIAGNOSIS — E03.9 HYPOTHYROIDISM, UNSPECIFIED TYPE: Primary | ICD-10-CM

## 2024-03-12 PROCEDURE — 99999 PR PBB SHADOW E&M-EST. PATIENT-LVL V: CPT | Mod: PBBFAC,,,

## 2024-03-12 PROCEDURE — 3074F SYST BP LT 130 MM HG: CPT | Mod: CPTII,S$GLB,,

## 2024-03-12 PROCEDURE — 1159F MED LIST DOCD IN RCRD: CPT | Mod: CPTII,S$GLB,,

## 2024-03-12 PROCEDURE — 3044F HG A1C LEVEL LT 7.0%: CPT | Mod: CPTII,S$GLB,,

## 2024-03-12 PROCEDURE — 3078F DIAST BP <80 MM HG: CPT | Mod: CPTII,S$GLB,,

## 2024-03-12 PROCEDURE — 3008F BODY MASS INDEX DOCD: CPT | Mod: CPTII,S$GLB,,

## 2024-03-12 PROCEDURE — 1160F RVW MEDS BY RX/DR IN RCRD: CPT | Mod: CPTII,S$GLB,,

## 2024-03-12 PROCEDURE — 99214 OFFICE O/P EST MOD 30 MIN: CPT | Mod: S$GLB,,,

## 2024-03-12 RX ORDER — LEVOTHYROXINE SODIUM 25 UG/1
25 TABLET ORAL
Qty: 90 TABLET | Refills: 1 | Status: SHIPPED | OUTPATIENT
Start: 2024-03-12 | End: 2024-06-07

## 2024-03-12 NOTE — PROGRESS NOTES
Subjective:       Patient ID: Yvette Hutchinson is a 63 y.o. female.    Chief Complaint: Thyroid Problem (Needing thyroid medication.)    Presents after labs showed abnormal thyroid levels. Wishes to initiate thyroid medication today. Denies symptoms associated with abnromal thyroid. She does have difficulty losing weight but has lost a few lbs recently.     Thyroid Problem  Presents for initial visit. Patient reports no anxiety, cold intolerance, constipation, diaphoresis, diarrhea, fatigue, heat intolerance or palpitations. Past treatments include nothing.       Past Medical History:   Diagnosis Date    Arthritis     Cancer     Depression     GERD (gastroesophageal reflux disease)     Pneumonia of left lung due to infectious organism 03/05/2020       Review of patient's allergies indicates:  No Known Allergies      Current Outpatient Medications:     (Magic mouthwash) 1:1:1 diphenhydrAMINE(Benadryl) 12.5mg/5ml liq, aluminum & magnesium hydroxide-simethicone (Maalox), LIDOcaine viscous 2%, Swish and spit 10 mLs 3 (three) times daily. for mouth sores, Disp: 250 mL, Rfl: 0    acetaminophen (TYLENOL) 500 MG tablet, Take 2 tablets (1,000 mg total) by mouth every 6 (six) hours as needed for Pain., Disp: 8 tablet, Rfl: 0    albuterol (PROVENTIL/VENTOLIN HFA) 90 mcg/actuation inhaler, Inhale 2 puffs into the lungs every 6 (six) hours as needed for Wheezing or Shortness of Breath. Rescue, Disp: 8.5 g, Rfl: 11    buPROPion (WELLBUTRIN XL) 300 MG 24 hr tablet, Take 1 tablet (300 mg total) by mouth once daily., Disp: 90 tablet, Rfl: 3    celecoxib (CELEBREX) 200 MG capsule, Take 2 capsules (400 mg total) by mouth once daily., Disp: 180 capsule, Rfl: 1    citalopram (CELEXA) 20 MG tablet, Take 1 tablet (20 mg total) by mouth once daily., Disp: 30 tablet, Rfl: 11    diphenhydrAMINE-aluminum-magnesium hydroxide-simethicone-LIDOcaine HCl 2%, Swish and spit 15 mLs every 4 (four) hours as needed (mouth sores)., Disp: 100  each, Rfl: 2    fluticasone propionate (FLONASE) 50 mcg/actuation nasal spray, 1 spray (50 mcg total) by Each Nostril route once daily., Disp: 16 g, Rfl: 3    fluticasone-salmeterol 230-21 mcg/dose (ADVAIR HFA) 230-21 mcg/actuation HFAA inhaler, Inhale 2 puffs into the lungs 2 (two) times daily. Controller, Disp: 12 g, Rfl: 5    gabapentin (NEURONTIN) 300 MG capsule, Take 1 capsule (300 mg total) by mouth 3 (three) times daily., Disp: 270 capsule, Rfl: 1    levocetirizine (XYZAL) 5 MG tablet, Take 1 tablet (5 mg total) by mouth every evening., Disp: 30 tablet, Rfl: 5    LIDOcaine (LIDODERM) 5 %, Place 1 patch onto the skin once daily. Remove & Discard patch within 12 hours or as directed by MD, Disp: 7 patch, Rfl: 1    LIDOcaine-prilocaine (EMLA) cream, Apply topically as needed (apply to port site 30 min before access)., Disp: 30 g, Rfl: 0    naloxone (NARCAN) 4 mg/actuation Spry, ADMINISTER A SINGLE SPRAY IN ONE NOSTRIL UPON SIGNS OF OPIOID OVERDOSE. CALL 911. REPEAT AFTER 3 MINUTES IF NO RESPONSE., Disp: , Rfl:     ondansetron (ZOFRAN-ODT) 8 MG TbDL, Take 1 tablet (8 mg total) by mouth every 12 (twelve) hours as needed (nausea)., Disp: 30 tablet, Rfl: 1    pravastatin (PRAVACHOL) 10 MG tablet, Take 1 tablet (10 mg total) by mouth once daily., Disp: 90 tablet, Rfl: 3    predniSONE (DELTASONE) 20 MG tablet, Take one pill a day for three days, repeat for shortness of breath, Disp: 12 tablet, Rfl: 0    [START ON 3/13/2024] topiramate (TOPAMAX) 100 MG tablet, Take 1 tablet (100 mg total) by mouth once daily., Disp: 30 tablet, Rfl: 11    topiramate (TOPAMAX) 50 MG tablet, Take 1 tablet (50 mg total) by mouth once daily., Disp: 7 tablet, Rfl: 0    varenicline (CHANTIX) 1 mg Tab, Take 1 tablet (1 mg total) by mouth 2 (two) times daily., Disp: 180 tablet, Rfl: 0    albuterol-ipratropium (DUO-NEB) 2.5 mg-0.5 mg/3 mL nebulizer solution, Take 3 mLs by nebulization every 6 (six) hours as needed for Wheezing. Rescue, Disp: 120  "each, Rfl: 5    ALPRAZolam (XANAX) 0.5 MG tablet, Take 1 tablet (0.5 mg total) by mouth 3 (three) times daily. (Patient not taking: Reported on 3/6/2024), Disp: 90 tablet, Rfl: 0    levothyroxine (SYNTHROID) 25 MCG tablet, Take 1 tablet (25 mcg total) by mouth before breakfast., Disp: 90 tablet, Rfl: 1    OLANZapine (ZYPREXA) 5 MG tablet, Take 1 tablet (5 mg total) by mouth nightly. Take 1 tablet at bedtime on days 1-4 of each cycle of chemotherapy (Patient not taking: Reported on 3/6/2024), Disp: 30 tablet, Rfl: 2    Review of Systems   Constitutional:  Negative for activity change, appetite change, chills, diaphoresis, fatigue, fever and unexpected weight change.   HENT:  Negative for congestion, ear pain, postnasal drip, rhinorrhea, sinus pressure, sneezing, sore throat and trouble swallowing.    Eyes:  Negative for pain, itching and visual disturbance.   Respiratory:  Negative for cough, chest tightness, shortness of breath and wheezing.    Cardiovascular:  Negative for chest pain, palpitations and leg swelling.   Gastrointestinal:  Negative for abdominal distention, abdominal pain, blood in stool, constipation, diarrhea, nausea and vomiting.   Endocrine: Negative for cold intolerance and heat intolerance.   Genitourinary:  Negative for difficulty urinating, dysuria, frequency, hematuria and urgency.   Musculoskeletal:  Negative for arthralgias, back pain, myalgias and neck pain.   Skin:  Negative for color change, pallor, rash and wound.   Neurological:  Negative for dizziness, syncope, weakness, numbness and headaches.   Hematological:  Negative for adenopathy.   Psychiatric/Behavioral:  Negative for behavioral problems. The patient is not nervous/anxious.        Objective:      /60 (BP Location: Right arm, Patient Position: Sitting, BP Method: Medium (Manual))   Pulse 65   Temp 98.2 °F (36.8 °C) (Oral)   Resp 17   Ht 5' 3" (1.6 m)   Wt 80.5 kg (177 lb 7.5 oz)   LMP 01/13/2010 (Approximate)   SpO2 " 96%   BMI 31.44 kg/m²   Physical Exam  Vitals reviewed.   Constitutional:       General: She is not in acute distress.     Appearance: Normal appearance. She is obese. She is not ill-appearing, toxic-appearing or diaphoretic.   HENT:      Head: Normocephalic.      Right Ear: External ear normal.      Left Ear: External ear normal.      Nose: Nose normal. No congestion or rhinorrhea.      Mouth/Throat:      Mouth: Mucous membranes are moist.      Pharynx: Oropharynx is clear.   Eyes:      General: No scleral icterus.        Right eye: No discharge.         Left eye: No discharge.      Extraocular Movements: Extraocular movements intact.      Conjunctiva/sclera: Conjunctivae normal.   Cardiovascular:      Rate and Rhythm: Normal rate and regular rhythm.      Pulses: Normal pulses.      Heart sounds: Normal heart sounds. No murmur heard.     No friction rub. No gallop.   Pulmonary:      Effort: Pulmonary effort is normal. No respiratory distress.      Breath sounds: Normal breath sounds. No wheezing, rhonchi or rales.   Chest:      Chest wall: No tenderness.   Musculoskeletal:         General: No swelling, tenderness or deformity. Normal range of motion.      Cervical back: Normal range of motion.      Right lower leg: No edema.      Left lower leg: No edema.   Skin:     General: Skin is warm and dry.      Capillary Refill: Capillary refill takes less than 2 seconds.      Coloration: Skin is not jaundiced.      Findings: No bruising, erythema, lesion or rash.   Neurological:      Mental Status: She is alert and oriented to person, place, and time.      Gait: Gait normal.   Psychiatric:         Mood and Affect: Mood normal.         Behavior: Behavior normal.         Thought Content: Thought content normal.         Judgment: Judgment normal.         Assessment:       1. Hypothyroidism, unspecified type        Plan:       Hypothyroidism, unspecified type  -     levothyroxine (SYNTHROID) 25 MCG tablet; Take 1 tablet (25  mcg total) by mouth before breakfast.  Dispense: 90 tablet; Refill: 1  -     TSH; Future; Expected date: 03/12/2024                 Herbert Price PA-C  Family Medicine Physician Assistant       Future Appointments       Date Provider Specialty Appt Notes    3/14/2024 Rhiannon Lee MD Hematology and Oncology VV/Lung Ca/labs/pet scan results    4/9/2024 Herbert Price PA-C Family Medicine 1 month f/u    5/13/2024  Lab nonfasting    5/15/2024 Harika Garcia, NP Pulmonology 3m f/u               I spent a total of 20 minutes on the day of the visit.This includes face to face time and non-face to face time preparing to see the patient (eg, review of tests), obtaining and/or reviewing separately obtained history, documenting clinical information in the electronic or other health record, independently interpreting results and communicating results to the patient/family/caregiver, or care coordinator.      We have addressed [4] Moderate: 1 or more chronic illnesses with exacerbation, progression, or side effects of treatment / 2 or more stable chronic illnesses / 1 undiagnosed new problem with uncertain prognosis / 1 acute illness with systemic symptoms / 1 acute complicated injury  The complexity of the data reviewed and analyzed for this visit was [3] Limited (Reviewed prior external note, ordered unique testing or reviewed the results of each unique test)   The risk of complications and/or morbidity or mortality are [4] Moderate risk (I.e. prescription drug management / decision regarding minor surgery with identified pt or procedure risk factors / decision regarding elective major surgery without identified pt or procedure risk factors / diagnosis or treatment significantly limited by social determinants of health)   The level of Medical Decision Making for this visit is [4] Moderate

## 2024-03-14 ENCOUNTER — PATIENT MESSAGE (OUTPATIENT)
Dept: PULMONOLOGY | Facility: CLINIC | Age: 64
End: 2024-03-14
Payer: COMMERCIAL

## 2024-03-14 ENCOUNTER — OFFICE VISIT (OUTPATIENT)
Dept: HEMATOLOGY/ONCOLOGY | Facility: CLINIC | Age: 64
End: 2024-03-14
Payer: COMMERCIAL

## 2024-03-14 DIAGNOSIS — C91.10 CLL (CHRONIC LYMPHOCYTIC LEUKEMIA): Primary | ICD-10-CM

## 2024-03-14 DIAGNOSIS — C34.01 MALIGNANT NEOPLASM OF HILUS OF RIGHT LUNG: ICD-10-CM

## 2024-03-14 DIAGNOSIS — C34.10 MALIGNANT NEOPLASM OF UPPER LOBE OF LUNG, UNSPECIFIED LATERALITY: ICD-10-CM

## 2024-03-14 DIAGNOSIS — E53.8 B12 DEFICIENCY: ICD-10-CM

## 2024-03-14 PROCEDURE — 99215 OFFICE O/P EST HI 40 MIN: CPT | Mod: 95,,, | Performed by: INTERNAL MEDICINE

## 2024-03-14 PROCEDURE — 3044F HG A1C LEVEL LT 7.0%: CPT | Mod: CPTII,95,, | Performed by: INTERNAL MEDICINE

## 2024-03-14 NOTE — PROGRESS NOTES
The patient location is: home  Visit type: Virtual visit with synchronous audio and video  Face-to-face or time spent with patient on the encounter: 25 min  Total time spent on and for  this encounter which includes non face-to-face time preparing to see patient, review of tests, obtaining and or reviewing separately obtained records documenting clinical information in the electronic or other health records, independently interpreting results which is not separately reported ,and communicating results to the patient/family/caregiver and in care coordination and treatment planning/communicating with pharmacy for prescriptions/addressing social needs/arranging follow-up and or referrals :35 min    Each patient I provide medical services by telemedicine is:  (1) informed of the relationship between the physician and patient and the respective role of any other health care provider with respect to management of the patient; and (2) notified that he or she may decline to receive medical services by telemedicine and may withdraw from such care at any time.  This is a video visit therefore some elements of the physical exam such as vital signs, heart sounds are breath sounds are not included and may be included if found in recent clinic notes of other providers assessing same patient. Any symptoms or signs that were visualized were stated by the patient may be included in this note.     Subjective:       Patient ID: Yvette Hutchinson is a 63 y.o. female.    Chief Complaint:  Patient known to me with CLL stage 0 recently diagnosed with lung cancer August 2022  Follow-up    Lung Cancer    Too worried but I do want to keep a close watch on it how you feeling no steroids nothing why 20 mg no steroids steroids nothing at all we will discontinue to do this we will not change   Patient has chronic complains of chronic pain syndrome and COPD for which periodically she take steroids she is also on BuSpar has issues anxiety  has required dilatation of esophageal strictures allergic rhinitis insomnia and history of B12 deficiency documented   Had CT chest done with pulmonary 7/22 showed mass bx done. 8/22    Oncology hx  Impression:ct chest 7/29/22     2.3 cm spiculated left upper lobe lung nodule highly suspicious for bronchogenic carcinoma.     New nonspecific 7 mm nodule in the right middle lobe; this appears more linear on the coronal images and may be a focus of scarring.     Additional stable lung nodules, mildly enlarged axillary lymph nodes, substernal thyroid nodule; these findings are likely benign given the interval stability.   LEFT LUNG MASS, CT-GUIDED BIOPSY:   Non-small cell lung carcinoma.   Comment:  The biopsy reveals invasive carcinoma with apparent glandular but   also vague squamoid features.  Tumor cells are diffusely positive with TTF-1   and focally positive with P40.  The histology differential includes   adenocarcinoma and adenosquamous carcinoma.  PD-L1 and templates NGS studies   are in progress.  The results will be issued separately.    October 3, 2022: Robotic left lobectomy T1c N1    Social history; patient is a smoker denies recreational alcohol use or drug use   Patient is currently on disability  She is allergic to the mask used during COVID pandemic she is also bothered by her mask with COPD    Family history suggestive of ovarian and breast cancer patient advised to see a  or seek   Review of patient's allergies indicates:  No Known Allergies  Medications have been reviewed  Current Outpatient Medications on File Prior to Visit   Medication Sig Dispense Refill    (Magic mouthwash) 1:1:1 diphenhydrAMINE(Benadryl) 12.5mg/5ml liq, aluminum & magnesium hydroxide-simethicone (Maalox), LIDOcaine viscous 2% Swish and spit 10 mLs 3 (three) times daily. for mouth sores 250 mL 0    acetaminophen (TYLENOL) 500 MG tablet Take 2 tablets (1,000 mg total) by mouth every 6 (six) hours as needed for  Pain. 8 tablet 0    albuterol (PROVENTIL/VENTOLIN HFA) 90 mcg/actuation inhaler Inhale 2 puffs into the lungs every 6 (six) hours as needed for Wheezing or Shortness of Breath. Rescue 8.5 g 11    albuterol-ipratropium (DUO-NEB) 2.5 mg-0.5 mg/3 mL nebulizer solution Take 3 mLs by nebulization every 6 (six) hours as needed for Wheezing. Rescue 120 each 5    ALPRAZolam (XANAX) 0.5 MG tablet Take 1 tablet (0.5 mg total) by mouth 3 (three) times daily. (Patient not taking: Reported on 3/6/2024) 90 tablet 0    buPROPion (WELLBUTRIN XL) 300 MG 24 hr tablet Take 1 tablet (300 mg total) by mouth once daily. 90 tablet 3    celecoxib (CELEBREX) 200 MG capsule Take 2 capsules (400 mg total) by mouth once daily. 180 capsule 1    citalopram (CELEXA) 20 MG tablet Take 1 tablet (20 mg total) by mouth once daily. 30 tablet 11    diphenhydrAMINE-aluminum-magnesium hydroxide-simethicone-LIDOcaine HCl 2% Swish and spit 15 mLs every 4 (four) hours as needed (mouth sores). 100 each 2    fluticasone propionate (FLONASE) 50 mcg/actuation nasal spray 1 spray (50 mcg total) by Each Nostril route once daily. 16 g 3    fluticasone-salmeterol 230-21 mcg/dose (ADVAIR HFA) 230-21 mcg/actuation HFAA inhaler Inhale 2 puffs into the lungs 2 (two) times daily. Controller 12 g 5    gabapentin (NEURONTIN) 300 MG capsule Take 1 capsule (300 mg total) by mouth 3 (three) times daily. 270 capsule 1    levocetirizine (XYZAL) 5 MG tablet Take 1 tablet (5 mg total) by mouth every evening. 30 tablet 5    levothyroxine (SYNTHROID) 25 MCG tablet Take 1 tablet (25 mcg total) by mouth before breakfast. 90 tablet 1    LIDOcaine (LIDODERM) 5 % Place 1 patch onto the skin once daily. Remove & Discard patch within 12 hours or as directed by MD Bloom patch 1    LIDOcaine-prilocaine (EMLA) cream Apply topically as needed (apply to port site 30 min before access). 30 g 0    naloxone (NARCAN) 4 mg/actuation Spry ADMINISTER A SINGLE SPRAY IN ONE NOSTRIL UPON SIGNS OF OPIOID  OVERDOSE. CALL 911. REPEAT AFTER 3 MINUTES IF NO RESPONSE.      OLANZapine (ZYPREXA) 5 MG tablet Take 1 tablet (5 mg total) by mouth nightly. Take 1 tablet at bedtime on days 1-4 of each cycle of chemotherapy (Patient not taking: Reported on 3/6/2024) 30 tablet 2    ondansetron (ZOFRAN-ODT) 8 MG TbDL Take 1 tablet (8 mg total) by mouth every 12 (twelve) hours as needed (nausea). 30 tablet 1    pravastatin (PRAVACHOL) 10 MG tablet Take 1 tablet (10 mg total) by mouth once daily. 90 tablet 3    predniSONE (DELTASONE) 20 MG tablet Take one pill a day for three days, repeat for shortness of breath 12 tablet 0    topiramate (TOPAMAX) 100 MG tablet Take 1 tablet (100 mg total) by mouth once daily. 30 tablet 11    topiramate (TOPAMAX) 50 MG tablet Take 1 tablet (50 mg total) by mouth once daily. 7 tablet 0    varenicline (CHANTIX) 1 mg Tab Take 1 tablet (1 mg total) by mouth 2 (two) times daily. 180 tablet 0     No current facility-administered medications on file prior to visit.       REVIEW OF SYSTEMS:     S/p lobectomy left side, has draining tube+ with sanguinous fluid.    Wt Readings from Last 3 Encounters:   03/12/24 80.5 kg (177 lb 7.5 oz)   03/08/24 81.5 kg (179 lb 9.6 oz)   03/06/24 81.6 kg (179 lb 14.3 oz)     Temp Readings from Last 3 Encounters:   03/12/24 98.2 °F (36.8 °C) (Oral)   03/08/24 97.7 °F (36.5 °C)   02/16/24 97.4 °F (36.3 °C)     BP Readings from Last 3 Encounters:   03/12/24 118/60   03/08/24 (!) 109/59   03/06/24 106/60     Pulse Readings from Last 3 Encounters:   03/12/24 65   03/08/24 63   03/06/24 66     VITAL SIGNS:  as above   GENERAL: appears well-built, well-nourished.  No anxiety, no agitation, and in no distress.  Patient is awake, alert, oriented and cooperative.  HEENT:  Showed no congestion. Trachea is central no obvious icterus or pallor noted no hoarseness. no obvious JVD   NECK:  Supple.  No JVD. No obvious cervical submental or supraclavicular adenopathy.  RS: tube draining on left  side. S/p lobectomy  ABDOMEN:  abdomen appears undistended.  EXTREMITIES:  Without edema.  NEUROLOGICAL:  The patient is appropriate, higher functions are normal.  No  obvious neurological deficits.  normal judgement normal thought content  No confusion, no speech impediment. Cranial nerves are intact and show no deficit. No gross motor deficits noted   SKIN MUSCULOSKELETAL: no joint or skeletal deformity, no clubbing of nails.  No visible rash ecchymosis or petechiae  Lab Results   Component Value Date    WBC 20.78 (H) 03/03/2020    HGB 14.9 03/03/2020    HCT 47.0 03/03/2020    MCV 99 (H) 03/03/2020     03/03/2020     Smudge cells presnt  CMP  Sodium   Date Value Ref Range Status   03/06/2024 141 136 - 145 mmol/L Final     Potassium   Date Value Ref Range Status   03/06/2024 4.5 3.5 - 5.1 mmol/L Final     Chloride   Date Value Ref Range Status   03/06/2024 106 95 - 110 mmol/L Final     CO2   Date Value Ref Range Status   03/06/2024 26 23 - 29 mmol/L Final     Glucose   Date Value Ref Range Status   03/06/2024 98 70 - 110 mg/dL Final     BUN   Date Value Ref Range Status   03/06/2024 11 8 - 23 mg/dL Final     Creatinine   Date Value Ref Range Status   03/06/2024 1.2 0.5 - 1.4 mg/dL Final     Calcium   Date Value Ref Range Status   03/06/2024 9.5 8.7 - 10.5 mg/dL Final     Total Protein   Date Value Ref Range Status   03/06/2024 7.0 6.0 - 8.4 g/dL Final     Albumin   Date Value Ref Range Status   03/06/2024 4.4 3.5 - 5.2 g/dL Final     Total Bilirubin   Date Value Ref Range Status   03/06/2024 0.5 0.1 - 1.0 mg/dL Final     Comment:     For infants and newborns, interpretation of results should be based  on gestational age, weight and in agreement with clinical  observations.    Premature Infant recommended reference ranges:  Up to 24 hours.............<8.0 mg/dL  Up to 48 hours............<12.0 mg/dL  3-5 days..................<15.0 mg/dL  6-29 days.................<15.0 mg/dL       Alkaline Phosphatase   Date  Value Ref Range Status   03/06/2024 122 55 - 135 U/L Final     AST   Date Value Ref Range Status   03/06/2024 20 10 - 40 U/L Final     ALT   Date Value Ref Range Status   03/06/2024 19 10 - 44 U/L Final     Anion Gap   Date Value Ref Range Status   03/06/2024 9 8 - 16 mmol/L Final     eGFR if    Date Value Ref Range Status   06/13/2022 >60 >60 mL/min/1.73 m^2 Final     eGFR if non    Date Value Ref Range Status   06/13/2022 >60 >60 mL/min/1.73 m^2 Final     Comment:     Calculation used to obtain the estimated glomerular filtration  rate (eGFR) is the CKD-EPI equation.            2wk ago    Blood Interpretation A B cell lymphoproliferative disorder is present. see comment.    Comment: Interpreted by: Jeremias Sosa M.D., Signed on 08/06/2020 at 13:07   Blood Comment Flow cytometric analysis of  peripheral blood  detects a lambda  light chain   restricted B lymphocyte population showing expression of CD19 and dim CD20   with aberrant expression of CD5 (dim).  T lymphocytes are   immunophenotypically unremarkable.  The blasts gate is not increased.  The   findings are consistent with patient history of chronic lymphocytic   leukemia/small lymphocytic lymphoma.   Flow differential:  Lymphocytes 69.6%, Monocytes 2.8%, Granulocytes  27.5%,   Blast  0.1%, Debris/nRBC 0.1%,  Viability 97.5%.   10/3/22 robotic lef lung lobectomy  1. LYMPH NODE, LEVEL 5 FROZEN SECTION, EXCISION:   One lymph node with dominant necrotizing granuloma, negative for malignancy   (0/1).   2. LYMPH NODES, LEVEL 5 PERMANENT, EXCISION:   Two lymph nodes with multifocal necrotizing granulomas, negative for   malignancy (0/2).   3. LYMPH NODES, LEFT POSTERIOR LEVEL 10, EXCISION:   Five lymph nodes with multifocal necrotizing granulomas, negative for   malignancy (0/5).   4. LYMPH NODE, LEVEL 11, EXCISION:   One lymph node, negative for malignancy (0/1).   5. LYMPH NODES, LEVEL 12, EXCISION:   Three lymph nodes, one with  single necrotizing granuloma, negative for   malignancy (0/3).   6. LYMPH NODES, LEVEL 12 NEAR UPPER DIVISION BRONCHUS, EXCISION:   Three lymph nodes with sinus histiocytosis, negative for malignancy (0/3).   7. LYMPH NODES, LEVEL 10 #2, EXCISION:   One of two lymph nodes positive for metastatic carcinoma (1/2).   Size of largest metastatic deposit:  8 mm.   Negative for extranodal extension.   8. LYMPH NODES, LEFT LEVEL 8, EXCISION:   Two lymph nodes with sinus histiocytosis, negative for malignancy (0/2).   9. LYMPH NODES, LEFT LEVEL 9, EXCISION:   Two lymph nodes, negative for malignancy (0/2).   10. 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   Surgical margins are negative for malignancy.   Background lung tissue with subpleural fibrosis, no evidence of granulomas.   Two hilar lymph nodes, negative for malignancy (0/2).   Coment (1-3, 5):  Prominent necrotizing granulomatous inflammation identified   is identified in multiple lymph nodes.  An AE1/AE3 cytokeratin immunostain   performed on the largest granuloma (part 3) reveals no evidence of occult   carcinoma.  GMS and AFB special stains are negative for fungal and acid-fast   organisms, respectively.  The necrotizing features are not typical of   sarcoidosis.  As such, other granulomatous processes may be considered   including secondary response to malignancy or infection.  Clinical   correlation is advised.   Comment (10):  Sections reveal invasive carcinoma involving lung tissue with   predominantly squamoid morphologic features including bright eosinophilic   cytoplasm and intracellular bridging.  There are not significant keratinizing   features.  Some areas show basaloid features.  There is also regional luminal   features including cribriforming and vague glandular structures containing   mucin.  Tumor cells are diffusely positive with P40 and regionally positive   with TTF-1.  Mucicarmine special stain  highlights mucin producing luminal   spaces. Overall tumor appearance and staining profile supports adenosquamous   carcinoma, a variant of non-small cell lung carcinoma.   [facility] SURGICAL PATHOLOGY CANCER CASE SUMMARY:  LUNG   Procedure:  Lobectomy   Specimen laterality:  Left   Tumor focality:  Single focus   Tumor site: Upper lobe of lung   Tumor size:  2.2 cm   Histologic type:  Adenosquamous carcinoma   Spread through airspaces:  Not identified   Visceral pleural invasion:  Not identified   Direct invasion of adjacent structures: Not applicable   Lymphovascular invasion:  Not identified   Margins:  All margins negative for invasive carcinoma     Closest margin to invasive carcinoma:  Bronchial (3.0 cm)   Regional lymph nodes:  Tumor present in regional lymph node     Number of lymph nodes with tumor:  1     Scott sites with tumor:  Left level 10 (10L)     Extranodal extension:  Not identified     Number of lymph nodes examined:  23   Pathologic stage classification (pTNM): pT1c pN1   Special studies:  Performed on previous biopsy if additional studies needed   there may be performed on tissue blockd 10G or 10H.        Impression:pet 2/24     Increasing SUV max of FDG avid partially calcified lymph node involving the anterior superior mediastinum. This could be metastatic, lymphoproliferative, or reactive in nature.     No other findings of FDG avid malignancy.     Prominent cervical, axillary, mediastinal lymph nodes.  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 Due to adenosq histology chose carbo/ taxol x 4 cycles tecentriq maintanence x 1 year  pt is  has had 5 cycles of carbo/ taxol  discused maintainenec at tumor board  Completed 1 year of tecentriq   Pet 2/24 showing slight enlarged calcified node pet avid      Will get short term pet to follow in 3 months which will be may 24      CLL   stage 0 will confirmed with flow cytometry  NTD   she has no other cytopenias or lymphadenopathy or B symptoms patient can be observed      Continue with chronic pain syndrome and pain management advised to stop smoking if at all possible 10 pills of hydrocodoen given to pt, she needs to follow with pain mx      History of B12 deficiency will continue to monitor    Elavted TSH : pt to [id number] this with primary care    Advised COVID precaution due to her lung situation she will be a high risk patient    Advance Care Planning     Date: 04/18/2023    Power of   I initiated the process of advance care planning today and explained the importance of this process to the patient.  I introduced the concept of advance directives to the patient, as well. Then the patient received detailed information about the importance of designating a Health Care Power of  (HCPOA). She was also instructed to communicate with this person about their wishes for future healthcare, should she become sick and lose decision-making capacity. The patient has not previously appointed a HCPOA. After our discussion, the patient has decided to complete a HCPOA .

## 2024-03-15 ENCOUNTER — PATIENT MESSAGE (OUTPATIENT)
Dept: HEMATOLOGY/ONCOLOGY | Facility: CLINIC | Age: 64
End: 2024-03-15
Payer: COMMERCIAL

## 2024-04-09 ENCOUNTER — PATIENT MESSAGE (OUTPATIENT)
Dept: ADMINISTRATIVE | Facility: HOSPITAL | Age: 64
End: 2024-04-09
Payer: COMMERCIAL

## 2024-04-24 ENCOUNTER — TELEPHONE (OUTPATIENT)
Dept: FAMILY MEDICINE | Facility: CLINIC | Age: 64
End: 2024-04-24
Payer: COMMERCIAL

## 2024-04-24 NOTE — TELEPHONE ENCOUNTER
Patient performed office visit on 3-12-24 with JOSE ANTONIO Price and was started on Levothyroxine.  Patient states she has a follow up appointment scheduled for the date of 4-30-24, and would like to know if she should have labs performed prior.  Upon further assessment it was noted an order for TSH was placed on 3-12-24 and this order is linked to an existing lab appointment on 5-13-24.  Will send follow up message to Mr. Price to confirm when TSH is next due.  Please advise. Thank you.

## 2024-04-24 NOTE — TELEPHONE ENCOUNTER
Call placed to patient for notification. Patient verbalized understanding.  Patient rescheduled office visit to the date of 5-17-24 as she would like to review lab results at appointment with Mr. Price.

## 2024-04-24 NOTE — TELEPHONE ENCOUNTER
Christine Fletcher [facility] Staff     ----- Message from Christine Fletcher sent at 4/24/2024 11:59 AM CDT -----  Contact: Patient  Type:  Needs Medical Advice    Who Called: Patient     Would the patient rather a call back or a response via [employer]? Call    Best Call Back Number: [phone] (work)    Additional Information: Patient needs to know if she needs labs for upcoming appt to check thyroid issue

## 2024-05-08 ENCOUNTER — PATIENT MESSAGE (OUTPATIENT)
Dept: FAMILY MEDICINE | Facility: CLINIC | Age: 64
End: 2024-05-08
Payer: COMMERCIAL

## 2024-05-08 DIAGNOSIS — M19.049 HAND ARTHRITIS: ICD-10-CM

## 2024-05-08 NOTE — TELEPHONE ENCOUNTER
No care due was identified.  [facility] Embedded Care Due Messages. Reference number: [id number].   5/08/2024 12:52:15 PM CDT

## 2024-05-09 RX ORDER — GABAPENTIN 300 MG/1
300 CAPSULE ORAL 3 TIMES DAILY
Qty: 270 CAPSULE | Refills: 1 | Status: SHIPPED | OUTPATIENT
Start: 2024-05-09

## 2024-05-13 ENCOUNTER — OFFICE VISIT (OUTPATIENT)
Dept: PULMONOLOGY | Facility: CLINIC | Age: 64
End: 2024-05-13
Payer: COMMERCIAL

## 2024-05-13 VITALS
SYSTOLIC BLOOD PRESSURE: 104 MMHG | DIASTOLIC BLOOD PRESSURE: 65 MMHG | HEIGHT: 63 IN | BODY MASS INDEX: 30.2 KG/M2 | WEIGHT: 170.44 LBS | HEART RATE: 73 BPM | OXYGEN SATURATION: 97 %

## 2024-05-13 DIAGNOSIS — J41.0 SIMPLE CHRONIC BRONCHITIS: ICD-10-CM

## 2024-05-13 DIAGNOSIS — J96.11 CHRONIC RESPIRATORY FAILURE WITH HYPOXIA: Primary | ICD-10-CM

## 2024-05-13 DIAGNOSIS — J44.1 COPD EXACERBATION: ICD-10-CM

## 2024-05-13 DIAGNOSIS — R05.1 ACUTE COUGH: ICD-10-CM

## 2024-05-13 PROCEDURE — 3074F SYST BP LT 130 MM HG: CPT | Mod: CPTII,S$GLB,, | Performed by: NURSE PRACTITIONER

## 2024-05-13 PROCEDURE — 3044F HG A1C LEVEL LT 7.0%: CPT | Mod: CPTII,S$GLB,, | Performed by: NURSE PRACTITIONER

## 2024-05-13 PROCEDURE — 1159F MED LIST DOCD IN RCRD: CPT | Mod: CPTII,S$GLB,, | Performed by: NURSE PRACTITIONER

## 2024-05-13 PROCEDURE — 99213 OFFICE O/P EST LOW 20 MIN: CPT | Mod: S$PBB,25,, | Performed by: NURSE PRACTITIONER

## 2024-05-13 PROCEDURE — 96372 THER/PROPH/DIAG INJ SC/IM: CPT | Mod: S$GLB,,, | Performed by: NURSE PRACTITIONER

## 2024-05-13 PROCEDURE — 99999 PR PBB SHADOW E&M-EST. PATIENT-LVL IV: CPT | Mod: PBBFAC,,, | Performed by: NURSE PRACTITIONER

## 2024-05-13 PROCEDURE — 3078F DIAST BP <80 MM HG: CPT | Mod: CPTII,S$GLB,, | Performed by: NURSE PRACTITIONER

## 2024-05-13 PROCEDURE — 3008F BODY MASS INDEX DOCD: CPT | Mod: CPTII,S$GLB,, | Performed by: NURSE PRACTITIONER

## 2024-05-13 RX ORDER — AMOXICILLIN AND CLAVULANATE POTASSIUM 875; 125 MG/1; MG/1
1 TABLET, FILM COATED ORAL 2 TIMES DAILY
Qty: 20 TABLET | Refills: 0 | Status: SHIPPED | OUTPATIENT
Start: 2024-05-13 | End: 2024-05-23

## 2024-05-13 RX ORDER — CODEINE PHOSPHATE AND GUAIFENESIN 10; 100 MG/5ML; MG/5ML
5 SOLUTION ORAL EVERY 4 HOURS PRN
Qty: 210 ML | Refills: 0 | Status: SHIPPED | OUTPATIENT
Start: 2024-05-13 | End: 2024-05-20

## 2024-05-13 RX ORDER — BETAMETHASONE SODIUM PHOSPHATE AND BETAMETHASONE ACETATE 3; 3 MG/ML; MG/ML
6 INJECTION, SUSPENSION INTRA-ARTICULAR; INTRALESIONAL; INTRAMUSCULAR; SOFT TISSUE ONCE
Status: COMPLETED | OUTPATIENT
Start: 2024-05-13 | End: 2024-05-13

## 2024-05-13 RX ORDER — CITALOPRAM 10 MG/1
10 TABLET ORAL
COMMUNITY
Start: 2024-03-29

## 2024-05-13 RX ORDER — PREDNISONE 20 MG/1
TABLET ORAL
Qty: 12 TABLET | Refills: 0 | Status: SHIPPED | OUTPATIENT
Start: 2024-05-13

## 2024-05-13 RX ADMIN — BETAMETHASONE SODIUM PHOSPHATE AND BETAMETHASONE ACETATE 6 MG: 3; 3 INJECTION, SUSPENSION INTRA-ARTICULAR; INTRALESIONAL; INTRAMUSCULAR; SOFT TISSUE at 03:05

## 2024-05-13 NOTE — PATIENT INSTRUCTIONS
Adding blood test to your already scheduled blood labs    Continue current COPD medication regiment.

## 2024-05-13 NOTE — PROGRESS NOTES
5/13/2024    Yvette Hutchinson  Office note    Chief Complaint   Patient presents with    3m f/u    Cough    Wheezing       HPI:   5/13/2024- onset exacerbation 5 days, worsening with time, went on school field trip to animal farm. no improvement with systemic steroid therapy, needing refill on cough syrup. Productive cough day/night. Thick clear mucous. Associated with wheeze and chest tightness.   Has quit smoking for past 3 months. On Advair daily.     2/8/2024- complaint of weight gain, states she has cut back on smoking 1 cigarette daily. On therapy for depression followed by primary care provider, interested in weight loss therapy.   Wearing supplemental oxygen at 3 Liters with benefit. Needing refill on oxygen order for DME, wanting portable oxygen concentrator to assist when leaving home. Difficult to leave home with tanks.     Complaint of productive cough with brown mucous. Associated with post nasal drip.     11/6/2023- states doing well, followed by Dr. Lee last note reviewed from 11/2/2023. States hair has grown back following completion of chemo. On immune suppressant therapy.   SOB is persistent complaint. Worse with exertion, improves with rest. Using albuterol inhaler 2-3 times weekly. On Advair daily.     Cough is improved. Now gets breaks for weeks, states cough last few days with change in weather.     Wakes herself up snoring, has sore throat in mornings. Wakes up feeling tired. Gets sudden feeling of fatigue when standing in kitchen. On supplemental oxygen from [employer], wearing nightly with benefit. Had normal at home sleep study in 2021 negative. Complaint of gaining weight.     4/27/2023-  reviewed note oncologist Dr. Lee 4/18/23 on immune therapy for adenosquamous carcinoma ; recently lost brother from Sepsis. anxiety treated by Dr. Lee office but has referral to pain management. Currently living with and caring for her mother.   SOB- stable complaint, worse with exertion,  improves with rest. Wearing supplemental oxygen at 3L. Able to do activities just has to pace herself  Associated with productive cough, dx COVID 19 March 2022 productive clear yellow/green mucous. Daily complaint, wakes her at night.    1/23/23- undergoing chemo followed by Dr. Lee, complaint of cough and chest tightness onset 3 weeks, seen at urgent care, x-ray clear. Productive dark brown mucous to clear. Using nebulizer daily with benefit.   Treated with cough syrup, steroids, and antibiotics. States feeling better.   Wearing supplemental oxygen as needed. Has smaller more portable bottles, wearing at 3 liters with benefit. Able to do more in home.   Does have moments of anxiety due to states of health. Takes xanax as needed with benefit.     10/25/22- left lobectomy and lymphadenectomy Dr. Kaplan 10/3/2022- still have incision site pain, 10 on 0-10 scale, soreness. Took pain medication with benefit but has run out. Had chest tube removed 6 days prior. No swelling or drainage from incision site.   Schedule to have port placed today at 3 pm. Will start chemo therapy this week., Followed by oncologist Dr. Lee.   Wearing supplemental oxygen as needed at 3L with benefit.   SOB- stable, on advair daily, using albuterol as needed. Cough- improved, daily, mild.     Had sleep study but not hear results    7/21/2022- states doing well, Cough- recurrent complaint, worsened in past 3 days, productive  Brown mucous, associated with chest tightness and wheeze. Worse in early mornings and late evenings.   Has headaches when waking up. Has daytime fatigue.     4/28/2022- COPD exacerbation onset 3 days, persistent cough worse in early mornings and late evenings, has nocturnal arousals, productive thick brown mucous,   Associated with chest tightness and wheeze. Improved with albuterol nebulizer but returns after 2 hours. Severe complaint has urinated due to coughing fits.   Complaint of left ear pain and pressure with  headaches. Associated with body aches and fatigue.  On average has exacerbation once every 3 months.   History of chronic knee pain, not interested in orthopedic referral, treated previously with ibuprofen with benefit.     BRYNN Vasquez reviewed  1/11/2022: Hx of CLL, COPD  Endorses onset of headache, fatigue x2 days.  Cough: Productive with green thick sputum production, onset 2 days, worse at night time and in the morning. Not relieved with cough syrup. States Codeine cough syrup has helped in the past. Associated with wheezing.   States breathing is the same, not worse from baseline. Using supplemental oxygen at night and PRN.   Denies fever, denies chest tightness, GI complaints.  On Daily Advair, using rescue inhaler twice daily. Using nebulizer daily with no noted improvement.   Cut back to 2 cigarettes per day.      8/16/2021- not currently interested to perform in clinic sleep study.   SOB- daily, worse with hot weather, ran out of rescue inhaler. On Advair daily with benefit. Improves with rest.   Started coughing  After weed eating yard.     Cough- recurrent complaint, onset 5 days after doing yard work, productive clear mucous, severe complaint, inhibits ability to sleep when first laying down at night.     5/14/2021- did not receive sleep study from [facility] as ordered. Wearing supplemental oxygen at night while sleeping at 3 L. Uses when needed during day.    Having trouble falling asleep at night, started on new medication with no current benefit. Not able to fall asleep or stay asleep. Onset years, worsening with time. Feels tired when waking up in morning. Associated with occasional morning headaches.     complaint of chronic back pain followed by PCP.    Cough- recurrent complaint, mild, thick mucous, not using nebulizer regularly,   SOB- daily, worse with exertion and occasionally occurs at rest. Feels she can not take a deep breath.   On advair daily. Taking prednisone 20mg for 3 days  2x monthly.    2/8/2021- Cough- improved, daily, mild, Complaint of dry throat at night. Worse in early morning and late evenings.   occasionally productive small amount yellow mucous.   Currently smoking 1/2 pack daily, finished chantix with improvement but picked up after stopping.   Wearing supplemental oxygen at 3L most nights with benefit. SOB worse when laying flat on back, tried wedge but caused neck pain.   Sinus- unchanged, recurrent headaches in morning, sinus drainage. Currently on Flonase daily    12/3/2020- has supplemental oxygen set at 3 L at night, states benefiting,   Cough- worsened in last 7 days, worse in early morning and late evenings, nocturnal arousals nightly, productive yellow/green mucous quarter size.  Complaint of daily fatigue, dry throat in morning, day time drowsiness, mental cloudiness. Feels she never gets good sleep at night even when wearing supplemental oxygen.     10/21/2020- SOB and wheeze- recurrent problem, worsened last weeks, associated with sinus congestion, wheeze is worse in early morning and when laying down at nigtht  Cough- productive brown/green mucous for 1 week. Has not started steroid therapy. Improves with nebulizer using 1-2x daily for past week. Was not able afford inhalers. Did not receive call from [facility] pharmacy.     Complaint for cramping sensation in chest that occurs sporadically on left and right side that lasts few minutes. Onset years, recurrent complaint, started back 1 week prior. Occurs couple days in row.     7/15/2020- cough- onset 7 days, worse in mornings and night, nocturnal arousals 1x nightly, productive yellow/green dime size mucous, associated with chest tightness and wheeze, improves with nebulizer using 1-2 daily, states feels better after coughing up large amounts of mucous; went to covid clinic and tested negative for covid did have fluid on ears.   Currently on Advair daily, insurance did not cover spiriva;     4/15/2020- cough-  onset weeks, associated with occasional chest tighness, worse at night and early morning, quarter size clear to light brown in color. Currently    advair, spiriva, and rescue inhaler. States using rescue daily with little benefit.    3/5/2020- Pt is a 60 yo female with depression, GERD and COPD presenting for new evaluation.  Has chronic cough worse last 2 wks w/ phlegm, white, worse in am and evening.  Inhalers: rescue inhaler says insurance didn't cover  She reports wt gain over last 2 yrs. Has abdominal cramps intermittently radiating to the back.   Smoking since she was very young, 1 pack lasts 3 days.  She gets yearly bronchitis.  Labs from her PCP done 2 days ago are concerning for possible CLL- with WBC 21 and peripheral smear w/ smudge cells    The chief compliant  problem varies with instablilty at time  PFSH:  Past Medical History:   Diagnosis Date    Arthritis     Cancer     Depression     GERD (gastroesophageal reflux disease)     Pneumonia of left lung due to infectious organism 03/05/2020         Past Surgical History:   Procedure Laterality Date    INJECTION OF ANESTHETIC AGENT AROUND MULTIPLE INTERCOSTAL NERVES Left 10/3/2022    Procedure: BLOCK, NERVE, INTERCOSTAL, 2 OR MORE;  Surgeon: Evelio Kaplan MD;  Location: [facility] OR [address] [place] [address];  Service: Thoracic;  Laterality: Left;    INSERTION OF TUNNELED CENTRAL VENOUS CATHETER (CVC) WITH SUBCUTANEOUS PORT Right 11/3/2022    Procedure: [id number], Right or Left Neck or Chest;  Surgeon: Mini Acevedo MD;  Location: [facility] OR [address] [place] [address];  Service: General;  Laterality: Right;    ROBOT-ASSISTED LAPAROSCOPIC LYMPHADENECTOMY USING DA MONIQUE XI Left 10/3/2022    Procedure: XI ROBOTIC LYMPHADENECTOMY;  Surgeon: Evelio Kaplan MD;  Location: [facility] OR [address] [place] [address];  Service: Thoracic;  Laterality: Left;    TONSILLECTOMY      XI ROBOTIC RATS,WITH LOBECTOMY,LUNG Left 10/3/2022    Procedure: XI ROBOTIC RATS,WITH LOBECTOMY,LUNG;  Surgeon: Evelio GILLESPIE  "MD Eusebio;  Location: [facility] OR [address] [place] [address];  Service: Thoracic;  Laterality: Left;     Social History     Tobacco Use    Smoking status: Some Days     Current packs/day: 0.15     Types: Cigarettes     Passive exposure: Current    Smokeless tobacco: Never    Tobacco comments:     reports one cigarette every now and then   Substance Use Topics    Alcohol use: Yes     Comment: rarely    Drug use: Yes     Types: Marijuana     Family History   Problem Relation Name Age of Onset    Ovarian cancer Sister      Breast cancer Cousin       Review of patient's allergies indicates:  No Known Allergies    Performance Status:The patient's activity level is functions out of house.      Review of Systems:  a review of eleven systems covering constitutional, Eye, HEENT, Psych, Respiratory, Cardiac, GI, , Musculoskeletal, Endocrine, Dermatologic was negative except for pertinent findings as listed ABOVE and below:  Shortness of breath, Cough, fatigue, wheeze       Exam:Comprehensive exam done. /65 (BP Location: Right arm, Patient Position: Sitting, BP Method: Medium (Automatic))   Pulse 73   Ht 5' 3" (1.6 m)   Wt 77.3 kg (170 lb 6.7 oz)   LMP 01/13/2010 (Approximate)   SpO2 97% Comment: on room air at rest  BMI 30.19 kg/m²   Exam included Vitals as listed, and patient's appearance and affect and alertness and mood, oral exam for yeast and hygiene and pharynx lesions and Mallapatti (M) score, neck with inspection for jvd and masses and thyroid abnormalities and lymph nodes (supraclavicular and infraclavicular nodes and axillary also examined and noted if abn), chest exam included symmetry and effort and fremitus and percussion and auscultation, cardiac exam included rhythm and gallops and murmur and rubs and jvd and edema, abdominal exam for mass and hepatosplenomegaly and tenderness and hernias and bowel sounds, Musculoskeletal exam with muscle tone and posture and mobility/gait and  strength, and skin for rashes and " cyanosis and pallor and turgor, extremity for clubbing.  Findings were normal except for pertinent findings listed below:   Breath sounds bilateral wheeze   M2      Radiographs (ct chest and cxr) reviewed: view by direct vision   NM PET CT FDG Skull Base to Mid Thigh 02/19/24 Increasing SUV max of FDG avid partially calcified lymph node involving the anterior superior mediastinum    CT/PET SCAN ROUTINE 07/18/23 No findings of recurrent lung neoplasm or metastatic disease     X-Ray Chest AP Portable 03/25/23 left volume loss, leftward mediastinal shift, hyper inflation right lung    CT Chest With Contrast 09/06/22   1. Hypermetabolic nodule left upper lobe appears stable upon comparison.  2. Small localized area of hypermetabolic activity in the left infrahilar region is likewise unchanged upon comparison.  3. Indirect findings related to old granulomatous disease.    CT Chest Without Contrast 07/29/2022   2.3 cm spiculated left upper lobe lung nodule highly suspicious for bronchogenic carcinoma.  New nonspecific 7 mm nodule in the right middle lobe; this appears more linear on the coronal images and may be a focus of scarring.  Additional stable lung nodules, mildly enlarged axillary lymph nodes, substernal thyroid nodule; these findings are likely benign given the interval stability.      CT Chest Without Contrast 11/20/2020   Stable right lung pulmonary nodules.  Mild increase in size of clustered nodules within the left upper lobe, with the distribution most compatible with an atypical infectious process.  Old granulomatous disease.  Left adrenal adenoma, unchanged.  Chronic T8 compression fracture.    CT Chest Without Contrast 03/11/2020   1. There are two right lung pulmonary nodules.  Per LUNG-RADS scoring this would reflect a Category 3 (probably benign).  Recommendation is for 6 month follow-up LDCT.  2. Airways disease or apical typical infection in the left lower lobe.       CXR 12/19/19- clear lung fields  "bilaterally     Labs reviewed    Lab Results   Component Value Date    WBC 20.78 (H) 03/03/2020    RBC 4.74 03/03/2020    HGB 14.9 03/03/2020    HCT 47.0 03/03/2020    MCV 99 (H) 03/03/2020    MCH 31.4 (H) 03/03/2020    MCHC 31.7 (L) 03/03/2020    RDW 13.1 03/03/2020     03/03/2020    MPV 11.9 03/03/2020    GRAN 33.0 (L) 03/03/2020    LYMPH 64.0 (H) 03/03/2020    MONO 3.0 (L) 03/03/2020    EOS CANCELED 08/18/2017    BASO CANCELED 08/18/2017    EOSINOPHIL 0.0 03/03/2020    BASOPHIL 0.0 03/03/2020     Specimen to Pathology, Radiology Lung 08/17/22   Non small cell     PFT reviewed    3/12/2020 Severe obstruction. FEV1  49 %  predicted.   Airflow is not clearly improved after bronchodilator. Clinical improvement following bronchodilator therapy may occur in the absence of spirometric improvement.   Mild Hyperinflation.   Moderate reduction in diffusion capacity - unadjusted for hemoglobin.     EPWORTH SLEEPINESS SCALE 15 7/21/2022  [facility] HST 05/27/2021 TRISTEN 4.3       Plan:  Clinical impression is resonably certain and repeated evaluation prn +/- follow up will be needed as below.    Yvette King" was seen today for 3m f/u, cough and wheezing.    Diagnoses and all orders for this visit:    Chronic respiratory failure with hypoxia    Simple chronic bronchitis  -     guaiFENesin-codeine 100-10 mg/5 ml (TUSSI-ORGANIDIN NR)  mg/5 mL syrup; Take 5 mLs by mouth every 4 (four) hours as needed for Cough.  -     betamethasone acetate-betamethasone sodium phosphate injection 6 mg  -     CBC auto differential; Future  -     IGE; Future  -     predniSONE (DELTASONE) 20 MG tablet; Take one pill a day for three days, repeat for shortness of breath  -     amoxicillin-clavulanate 875-125mg (AUGMENTIN) 875-125 mg per tablet; Take 1 tablet by mouth 2 (two) times daily. for 10 days    Acute cough  -     guaiFENesin-codeine 100-10 mg/5 ml (TUSSI-ORGANIDIN NR)  mg/5 mL syrup; Take 5 mLs by mouth every 4 (four) hours as " needed for Cough.  -     betamethasone acetate-betamethasone sodium phosphate injection 6 mg  -     predniSONE (DELTASONE) 20 MG tablet; Take one pill a day for three days, repeat for shortness of breath  -     amoxicillin-clavulanate 875-125mg (AUGMENTIN) 875-125 mg per tablet; Take 1 tablet by mouth 2 (two) times daily. for 10 days    COPD exacerbation  -     betamethasone acetate-betamethasone sodium phosphate injection 6 mg  -     amoxicillin-clavulanate 875-125mg (AUGMENTIN) 875-125 mg per tablet; Take 1 tablet by mouth 2 (two) times daily. for 10 days         Follow up in about 3 months (around 8/13/2024), or if symptoms worsen or fail to improve.    Discussed with patient above for education the following:      Patient Instructions   Adding blood test to your already scheduled blood labs    Continue current COPD medication regiment.

## 2024-05-15 ENCOUNTER — TELEPHONE (OUTPATIENT)
Dept: FAMILY MEDICINE | Facility: CLINIC | Age: 64
End: 2024-05-15
Payer: COMMERCIAL

## 2024-05-15 NOTE — TELEPHONE ENCOUNTER
It would probably be best to hold off on the labs for a few weeks after she completes the medications. Maybe reschedule for end of this month, early next month.

## 2024-05-15 NOTE — TELEPHONE ENCOUNTER
Call placed to patient for notification. Patient verbalized understanding.  Lab appointment rescheduled for the date of 6-3-24, office visit rescheduled for the date of 6-7-24.  Patient agreed to new appointment dates, times, and location.

## 2024-05-15 NOTE — TELEPHONE ENCOUNTER
Darshan Kc [employer] Staff     ----- Message from Darshan Kc sent at 5/15/2024 10:30 AM CDT -----  Contact: Self  Type: Needs Medical Advice  Who Called:  Patient    Best Call Back Number: [phone]   Additional Information: Patient would like for someone to give her a call back regarding one of her appts

## 2024-05-15 NOTE — TELEPHONE ENCOUNTER
Patient reports she was scheduled to have TSH drawn on Monday, patient canceled lab appointment as she was not feeling well.  Patient states she is currently taking antibiotics, steroids, and cough medication.  Requesting to send a message to Mr. Price to confirm if he would like for her to proceed with having TSH drawn and follow up with him in office on Friday, or if she should postpone both appointments due to taking the above mentioned medications.  Please advise. Thank you.

## 2024-05-23 ENCOUNTER — TELEPHONE (OUTPATIENT)
Dept: HEMATOLOGY/ONCOLOGY | Facility: CLINIC | Age: 64
End: 2024-05-23
Payer: COMMERCIAL

## 2024-05-23 NOTE — TELEPHONE ENCOUNTER
Spoke to pt and notified dr out appt 05/30/24 and will need to reschedule to 05/28/24 with Dr Lee, pt agrees.

## 2024-05-27 ENCOUNTER — HOSPITAL ENCOUNTER (OUTPATIENT)
Dept: RADIOLOGY | Facility: HOSPITAL | Age: 64
Discharge: HOME | End: 2024-05-27
Attending: INTERNAL MEDICINE
Payer: COMMERCIAL

## 2024-05-27 ENCOUNTER — DOCUMENTATION ONLY (OUTPATIENT)
Dept: HEMATOLOGY/ONCOLOGY | Facility: CLINIC | Age: 64
End: 2024-05-27
Payer: COMMERCIAL

## 2024-05-27 VITALS — BODY MASS INDEX: 29.59 KG/M2 | WEIGHT: 167 LBS | HEIGHT: 63 IN

## 2024-05-27 DIAGNOSIS — E53.8 B12 DEFICIENCY: ICD-10-CM

## 2024-05-27 DIAGNOSIS — C34.10 MALIGNANT NEOPLASM OF UPPER LOBE OF LUNG, UNSPECIFIED LATERALITY: ICD-10-CM

## 2024-05-27 DIAGNOSIS — C34.01 MALIGNANT NEOPLASM OF HILUS OF RIGHT LUNG: ICD-10-CM

## 2024-05-27 DIAGNOSIS — J44.0 CHRONIC OBSTRUCTIVE PULMONARY DISEASE WITH ACUTE LOWER RESPIRATORY INFECTION: Primary | ICD-10-CM

## 2024-05-27 LAB — GLUCOSE SERPL-MCNC: 100 MG/DL (ref 70–110)

## 2024-05-27 PROCEDURE — 78815 PET IMAGE W/CT SKULL-THIGH: CPT | Mod: TC,PO

## 2024-05-27 PROCEDURE — 78815 PET IMAGE W/CT SKULL-THIGH: CPT | Mod: 26,PS,, | Performed by: RADIOLOGY

## 2024-05-27 PROCEDURE — 82962 GLUCOSE BLOOD TEST: CPT | Mod: PO

## 2024-05-27 PROCEDURE — A9552 F18 FDG: HCPCS | Mod: PO | Performed by: INTERNAL MEDICINE

## 2024-05-27 RX ORDER — FLUDEOXYGLUCOSE F18 500 MCI/ML
12.7 INJECTION INTRAVENOUS
Status: COMPLETED | OUTPATIENT
Start: 2024-05-27 | End: 2024-05-27

## 2024-05-27 RX ORDER — LEVOFLOXACIN 750 MG/1
750 TABLET ORAL DAILY
Qty: 7 TABLET | Refills: 0 | Status: SHIPPED | OUTPATIENT
Start: 2024-05-27 | End: 2024-06-03

## 2024-05-27 RX ADMIN — FLUDEOXYGLUCOSE F-18 12.7 MILLICURIE: 500 INJECTION INTRAVENOUS at 09:05

## 2024-05-28 ENCOUNTER — PATIENT MESSAGE (OUTPATIENT)
Dept: PULMONOLOGY | Facility: CLINIC | Age: 64
End: 2024-05-28
Payer: COMMERCIAL

## 2024-05-28 ENCOUNTER — HOSPITAL ENCOUNTER (OUTPATIENT)
Dept: RADIOLOGY | Facility: HOSPITAL | Age: 64
Discharge: HOME | End: 2024-05-28
Attending: NURSE PRACTITIONER
Payer: COMMERCIAL

## 2024-05-28 DIAGNOSIS — J44.0 CHRONIC OBSTRUCTIVE PULMONARY DISEASE WITH ACUTE LOWER RESPIRATORY INFECTION: ICD-10-CM

## 2024-05-28 PROCEDURE — 71046 X-RAY EXAM CHEST 2 VIEWS: CPT | Mod: TC,PO

## 2024-05-28 PROCEDURE — 71046 X-RAY EXAM CHEST 2 VIEWS: CPT | Mod: 26,,, | Performed by: RADIOLOGY

## 2024-05-29 ENCOUNTER — TELEPHONE (OUTPATIENT)
Dept: PULMONOLOGY | Facility: CLINIC | Age: 64
End: 2024-05-29
Payer: COMMERCIAL

## 2024-05-29 NOTE — TELEPHONE ENCOUNTER
----- Message from Harika Garcia NP sent at 5/28/2024  1:38 PM CDT -----  She can hold off on antibiotic until she feels symptomatic.     Harika  ----- Message -----  From: Lianna Sutton LPN  Sent: 5/28/2024  11:34 AM CDT  To: Harika Garcia NP    Spoke to patient.  Results given.  Verbalized understanding.  Patient stated her WBC are always elevated because she has lukemia.  She stated that she is getting xray done and will need yeast infection medicine if she needs to take the antibiotic.

## 2024-05-29 NOTE — TELEPHONE ENCOUNTER
Spoke to patient.  Informed her:  She can hold off on antibiotic until she feels symptomatic, per Harika Garcia, NP

## 2024-06-02 DIAGNOSIS — M19.049 HAND ARTHRITIS: ICD-10-CM

## 2024-06-02 NOTE — TELEPHONE ENCOUNTER
No care due was identified.  [facility] Embedded Care Due Messages. Reference number: [id number].   6/02/2024 1:17:16 AM CDT

## 2024-06-03 ENCOUNTER — TELEPHONE (OUTPATIENT)
Dept: FAMILY MEDICINE | Facility: CLINIC | Age: 64
End: 2024-06-03
Payer: COMMERCIAL

## 2024-06-03 ENCOUNTER — OFFICE VISIT (OUTPATIENT)
Dept: HEMATOLOGY/ONCOLOGY | Facility: CLINIC | Age: 64
End: 2024-06-03
Payer: COMMERCIAL

## 2024-06-03 ENCOUNTER — LAB VISIT (OUTPATIENT)
Dept: LAB | Facility: HOSPITAL | Age: 64
End: 2024-06-03
Payer: COMMERCIAL

## 2024-06-03 VITALS
RESPIRATION RATE: 16 BRPM | BODY MASS INDEX: 30.35 KG/M2 | OXYGEN SATURATION: 99 % | SYSTOLIC BLOOD PRESSURE: 115 MMHG | HEIGHT: 63 IN | WEIGHT: 171.31 LBS | TEMPERATURE: 97 F | HEART RATE: 75 BPM | DIASTOLIC BLOOD PRESSURE: 58 MMHG

## 2024-06-03 DIAGNOSIS — C91.10 CLL (CHRONIC LYMPHOCYTIC LEUKEMIA): Primary | ICD-10-CM

## 2024-06-03 DIAGNOSIS — F41.9 ANXIETY: ICD-10-CM

## 2024-06-03 DIAGNOSIS — C34.90 NON-SMALL CELL LUNG CANCER, UNSPECIFIED LATERALITY: ICD-10-CM

## 2024-06-03 DIAGNOSIS — E53.8 B12 DEFICIENCY: ICD-10-CM

## 2024-06-03 DIAGNOSIS — E03.9 HYPOTHYROIDISM, UNSPECIFIED TYPE: ICD-10-CM

## 2024-06-03 DIAGNOSIS — C34.01 MALIGNANT NEOPLASM OF HILUS OF RIGHT LUNG: ICD-10-CM

## 2024-06-03 LAB
T4 FREE SERPL-MCNC: 0.81 NG/DL (ref 0.71–1.51)
TSH SERPL DL<=0.005 MIU/L-ACNC: 5.05 UIU/ML (ref 0.4–4)

## 2024-06-03 PROCEDURE — 3044F HG A1C LEVEL LT 7.0%: CPT | Mod: CPTII,S$GLB,, | Performed by: INTERNAL MEDICINE

## 2024-06-03 PROCEDURE — 1159F MED LIST DOCD IN RCRD: CPT | Mod: CPTII,S$GLB,, | Performed by: INTERNAL MEDICINE

## 2024-06-03 PROCEDURE — 84443 ASSAY THYROID STIM HORMONE: CPT

## 2024-06-03 PROCEDURE — 99214 OFFICE O/P EST MOD 30 MIN: CPT | Mod: S$GLB,,, | Performed by: INTERNAL MEDICINE

## 2024-06-03 PROCEDURE — 3008F BODY MASS INDEX DOCD: CPT | Mod: CPTII,S$GLB,, | Performed by: INTERNAL MEDICINE

## 2024-06-03 PROCEDURE — 84439 ASSAY OF FREE THYROXINE: CPT

## 2024-06-03 PROCEDURE — 99999 PR PBB SHADOW E&M-EST. PATIENT-LVL V: CPT | Mod: PBBFAC,,, | Performed by: INTERNAL MEDICINE

## 2024-06-03 PROCEDURE — 3078F DIAST BP <80 MM HG: CPT | Mod: CPTII,S$GLB,, | Performed by: INTERNAL MEDICINE

## 2024-06-03 PROCEDURE — 3074F SYST BP LT 130 MM HG: CPT | Mod: CPTII,S$GLB,, | Performed by: INTERNAL MEDICINE

## 2024-06-03 PROCEDURE — 36415 COLL VENOUS BLD VENIPUNCTURE: CPT

## 2024-06-03 RX ORDER — CELECOXIB 200 MG/1
400 CAPSULE ORAL DAILY
Qty: 180 CAPSULE | Refills: 4 | Status: SHIPPED | OUTPATIENT
Start: 2024-06-03

## 2024-06-03 NOTE — TELEPHONE ENCOUNTER
----- Message from Herbert Price PA-C sent at 6/3/2024  2:59 PM CDT -----  Lab stable with no acute health concerns. We will review results in detail during upcoming follow up appointment.

## 2024-06-03 NOTE — PROGRESS NOTES
Subjective:       Patient ID: Yvette Hutchinson is a 63 y.o. female.    Chief Complaint:  Patient known to me with CLL stage 0 recently diagnosed with lung cancer August 2022  Follow-up    Lung Cancer    Too worried but I do want to keep a close watch on it how you feeling no steroids nothing why 20 mg no steroids steroids nothing at all we will discontinue to do this we will not change   Patient has chronic complains of chronic pain syndrome and COPD for which periodically she take steroids she is also on BuSpar has issues anxiety has required dilatation of esophageal strictures allergic rhinitis insomnia and history of B12 deficiency documented   Had CT chest done with pulmonary 7/22 showed mass bx done. 8/22    Oncology hx  Impression:ct chest 7/29/22     2.3 cm spiculated left upper lobe lung nodule highly suspicious for bronchogenic carcinoma.     New nonspecific 7 mm nodule in the right middle lobe; this appears more linear on the coronal images and may be a focus of scarring.     Additional stable lung nodules, mildly enlarged axillary lymph nodes, substernal thyroid nodule; these findings are likely benign given the interval stability.   LEFT LUNG MASS, CT-GUIDED BIOPSY:   Non-small cell lung carcinoma.   Comment:  The biopsy reveals invasive carcinoma with apparent glandular but   also vague squamoid features.  Tumor cells are diffusely positive with TTF-1   and focally positive with P40.  The histology differential includes   adenocarcinoma and adenosquamous carcinoma.  PD-L1 and templates NGS studies   are in progress.  The results will be issued separately.    October 3, 2022: Robotic left lobectomy T1c N1    Social history; patient is a smoker denies recreational alcohol use or drug use   Patient is currently on disability  She is allergic to the mask used during COVID pandemic she is also bothered by her mask with COPD    Family history suggestive of ovarian and breast cancer patient  advised to see a  or seek   Review of patient's allergies indicates:  No Known Allergies  Medications have been reviewed  Current Outpatient Medications on File Prior to Visit   Medication Sig Dispense Refill    (Magic mouthwash) 1:1:1 diphenhydrAMINE(Benadryl) 12.5mg/5ml liq, aluminum & magnesium hydroxide-simethicone (Maalox), LIDOcaine viscous 2% Swish and spit 10 mLs 3 (three) times daily. for mouth sores 250 mL 0    acetaminophen (TYLENOL) 500 MG tablet Take 2 tablets (1,000 mg total) by mouth every 6 (six) hours as needed for Pain. 8 tablet 0    albuterol (PROVENTIL/VENTOLIN HFA) 90 mcg/actuation inhaler Inhale 2 puffs into the lungs every 6 (six) hours as needed for Wheezing or Shortness of Breath. Rescue 8.5 g 11    albuterol-ipratropium (DUO-NEB) 2.5 mg-0.5 mg/3 mL nebulizer solution Take 3 mLs by nebulization every 6 (six) hours as needed for Wheezing. Rescue 120 each 5    ALPRAZolam (XANAX) 0.5 MG tablet Take 1 tablet (0.5 mg total) by mouth 3 (three) times daily. 90 tablet 0    buPROPion (WELLBUTRIN XL) 300 MG 24 hr tablet Take 1 tablet (300 mg total) by mouth once daily. 90 tablet 3    citalopram (CELEXA) 10 MG tablet Take 10 mg by mouth.      citalopram (CELEXA) 20 MG tablet Take 1 tablet (20 mg total) by mouth once daily. 30 tablet 11    diphenhydrAMINE-aluminum-magnesium hydroxide-simethicone-LIDOcaine HCl 2% Swish and spit 15 mLs every 4 (four) hours as needed (mouth sores). 100 each 2    fluticasone propionate (FLONASE) 50 mcg/actuation nasal spray 1 spray (50 mcg total) by Each Nostril route once daily. 16 g 3    fluticasone-salmeterol 230-21 mcg/dose (ADVAIR HFA) 230-21 mcg/actuation HFAA inhaler Inhale 2 puffs into the lungs 2 (two) times daily. Controller 12 g 5    gabapentin (NEURONTIN) 300 MG capsule Take 1 capsule (300 mg total) by mouth 3 (three) times daily. 270 capsule 1    levocetirizine (XYZAL) 5 MG tablet Take 1 tablet (5 mg total) by mouth every evening. 30 tablet 5     levoFLOXacin (LEVAQUIN) 750 MG tablet Take 1 tablet (750 mg total) by mouth once daily. for 7 days 7 tablet 0    levothyroxine (SYNTHROID) 25 MCG tablet Take 1 tablet (25 mcg total) by mouth before breakfast. 90 tablet 1    LIDOcaine (LIDODERM) 5 % Place 1 patch onto the skin once daily. Remove & Discard patch within 12 hours or as directed by MD Bloom patch 1    LIDOcaine-prilocaine (EMLA) cream Apply topically as needed (apply to port site 30 min before access). 30 g 0    naloxone (NARCAN) 4 mg/actuation Spry ADMINISTER A SINGLE SPRAY IN ONE NOSTRIL UPON SIGNS OF OPIOID OVERDOSE. CALL 911. REPEAT AFTER 3 MINUTES IF NO RESPONSE.      OLANZapine (ZYPREXA) 5 MG tablet Take 1 tablet (5 mg total) by mouth nightly. Take 1 tablet at bedtime on days 1-4 of each cycle of chemotherapy 30 tablet 2    ondansetron (ZOFRAN-ODT) 8 MG TbDL Take 1 tablet (8 mg total) by mouth every 12 (twelve) hours as needed (nausea). 30 tablet 1    pravastatin (PRAVACHOL) 10 MG tablet Take 1 tablet (10 mg total) by mouth once daily. 90 tablet 3    predniSONE (DELTASONE) 20 MG tablet Take one pill a day for three days, repeat for shortness of breath 12 tablet 0    topiramate (TOPAMAX) 100 MG tablet Take 1 tablet (100 mg total) by mouth once daily. 30 tablet 11    varenicline (CHANTIX) 1 mg Tab Take 1 tablet (1 mg total) by mouth 2 (two) times daily. 180 tablet 0    celecoxib (CELEBREX) 200 MG capsule Take 2 capsules (400 mg total) by mouth once daily. 180 capsule 4    topiramate (TOPAMAX) 50 MG tablet Take 1 tablet (50 mg total) by mouth once daily. 7 tablet 0     No current facility-administered medications on file prior to visit.       REVIEW OF SYSTEMS:     Answers submitted by the patient for this visit:  Review of Systems Questionnaire (Submitted on 5/28/2024)  appetite change : No  unexpected weight change: No  mouth sores: No  visual disturbance: No  cough: Yes  shortness of breath: Yes  chest pain: No  diarrhea: Yes  frequency: Yes  back  pain: Yes  rash: No  headaches: Yes  adenopathy: Yes  nervous/ anxious: Yes    Wt Readings from Last 3 Encounters:   06/03/24 77.7 kg (171 lb 4.8 oz)   05/27/24 75.8 kg (167 lb)   05/13/24 77.3 kg (170 lb 6.7 oz)     Temp Readings from Last 3 Encounters:   06/03/24 97.2 °F (36.2 °C) (Temporal)   03/12/24 98.2 °F (36.8 °C) (Oral)   03/08/24 97.7 °F (36.5 °C)     BP Readings from Last 3 Encounters:   06/03/24 (!) 115/58   05/13/24 104/65   03/12/24 118/60     Pulse Readings from Last 3 Encounters:   06/03/24 75   05/13/24 73   03/12/24 65     VITAL SIGNS:  as above   GENERAL: appears well-built, well-nourished.  No anxiety, no agitation, and in no distress.  Patient is awake, alert, oriented and cooperative.  HEENT:  Showed no congestion. Trachea is central no obvious icterus or pallor noted no hoarseness. no obvious JVD   NECK:  Supple.  No JVD. No obvious cervical submental or supraclavicular adenopathy.  RS: tube draining on left side. S/p lobectomy  ABDOMEN:  abdomen appears undistended.  EXTREMITIES:  Without edema.  NEUROLOGICAL:  The patient is appropriate, higher functions are normal.  No  obvious neurological deficits.  normal judgement normal thought content  No confusion, no speech impediment. Cranial nerves are intact and show no deficit. No gross motor deficits noted   SKIN MUSCULOSKELETAL: no joint or skeletal deformity, no clubbing of nails.  No visible rash ecchymosis or petechiae  Lab Results   Component Value Date    WBC 20.78 (H) 03/03/2020    HGB 14.9 03/03/2020    HCT 47.0 03/03/2020    MCV 99 (H) 03/03/2020     03/03/2020     Smudge cells presnt  CMP  Sodium   Date Value Ref Range Status   05/27/2024 141 136 - 145 mmol/L Final     Potassium   Date Value Ref Range Status   05/27/2024 4.3 3.5 - 5.1 mmol/L Final     Chloride   Date Value Ref Range Status   05/27/2024 109 95 - 110 mmol/L Final     CO2   Date Value Ref Range Status   05/27/2024 23 23 - 29 mmol/L Final     Glucose   Date Value  Ref Range Status   05/27/2024 84 70 - 110 mg/dL Final     BUN   Date Value Ref Range Status   05/27/2024 10 8 - 23 mg/dL Final     Creatinine   Date Value Ref Range Status   05/27/2024 1.1 0.5 - 1.4 mg/dL Final     Calcium   Date Value Ref Range Status   05/27/2024 8.9 8.7 - 10.5 mg/dL Final     Total Protein   Date Value Ref Range Status   05/27/2024 6.4 6.0 - 8.4 g/dL Final     Albumin   Date Value Ref Range Status   05/27/2024 4.2 3.5 - 5.2 g/dL Final     Total Bilirubin   Date Value Ref Range Status   05/27/2024 0.4 0.1 - 1.0 mg/dL Final     Comment:     For infants and newborns, interpretation of results should be based  on gestational age, weight and in agreement with clinical  observations.    Premature Infant recommended reference ranges:  Up to 24 hours.............<8.0 mg/dL  Up to 48 hours............<12.0 mg/dL  3-5 days..................<15.0 mg/dL  6-29 days.................<15.0 mg/dL       Alkaline Phosphatase   Date Value Ref Range Status   05/27/2024 120 55 - 135 U/L Final     AST   Date Value Ref Range Status   05/27/2024 16 10 - 40 U/L Final     ALT   Date Value Ref Range Status   05/27/2024 13 10 - 44 U/L Final     Anion Gap   Date Value Ref Range Status   05/27/2024 9 8 - 16 mmol/L Final     eGFR if    Date Value Ref Range Status   06/13/2022 >60 >60 mL/min/1.73 m^2 Final     eGFR if non    Date Value Ref Range Status   06/13/2022 >60 >60 mL/min/1.73 m^2 Final     Comment:     Calculation used to obtain the estimated glomerular filtration  rate (eGFR) is the CKD-EPI equation.            2wk ago    Blood Interpretation A B cell lymphoproliferative disorder is present. see comment.    Comment: Interpreted by: Jeremias Sosa M.D., Signed on 08/06/2020 at 13:07   Blood Comment Flow cytometric analysis of  peripheral blood  detects a lambda  light chain   restricted B lymphocyte population showing expression of CD19 and dim CD20   with aberrant expression of CD5 (dim).  T  lymphocytes are   immunophenotypically unremarkable.  The blasts gate is not increased.  The   findings are consistent with patient history of chronic lymphocytic   leukemia/small lymphocytic lymphoma.   Flow differential:  Lymphocytes 69.6%, Monocytes 2.8%, Granulocytes  27.5%,   Blast  0.1%, Debris/nRBC 0.1%,  Viability 97.5%.   10/3/22 robotic lef lung lobectomy  1. LYMPH NODE, LEVEL 5 FROZEN SECTION, EXCISION:   One lymph node with dominant necrotizing granuloma, negative for malignancy   (0/1).   2. LYMPH NODES, LEVEL 5 PERMANENT, EXCISION:   Two lymph nodes with multifocal necrotizing granulomas, negative for   malignancy (0/2).   3. LYMPH NODES, LEFT POSTERIOR LEVEL 10, EXCISION:   Five lymph nodes with multifocal necrotizing granulomas, negative for   malignancy (0/5).   4. LYMPH NODE, LEVEL 11, EXCISION:   One lymph node, negative for malignancy (0/1).   5. LYMPH NODES, LEVEL 12, EXCISION:   Three lymph nodes, one with single necrotizing granuloma, negative for   malignancy (0/3).   6. LYMPH NODES, LEVEL 12 NEAR UPPER DIVISION BRONCHUS, EXCISION:   Three lymph nodes with sinus histiocytosis, negative for malignancy (0/3).   7. LYMPH NODES, LEVEL 10 #2, EXCISION:   One of two lymph nodes positive for metastatic carcinoma (1/2).   Size of largest metastatic deposit:  8 mm.   Negative for extranodal extension.   8. LYMPH NODES, LEFT LEVEL 8, EXCISION:   Two lymph nodes with sinus histiocytosis, negative for malignancy (0/2).   9. LYMPH NODES, LEFT LEVEL 9, EXCISION:   Two lymph nodes, negative for malignancy (0/2).   10. 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   Surgical margins are negative for malignancy.   Background lung tissue with subpleural fibrosis, no evidence of granulomas.   Two hilar lymph nodes, negative for malignancy (0/2).   Coment (1-3, 5):  Prominent necrotizing granulomatous inflammation identified   is identified in multiple lymph nodes.  An  AE1/AE3 cytokeratin immunostain   performed on the largest granuloma (part 3) reveals no evidence of occult   carcinoma.  GMS and AFB special stains are negative for fungal and acid-fast   organisms, respectively.  The necrotizing features are not typical of   sarcoidosis.  As such, other granulomatous processes may be considered   including secondary response to malignancy or infection.  Clinical   correlation is advised.   Comment (10):  Sections reveal invasive carcinoma involving lung tissue with   predominantly squamoid morphologic features including bright eosinophilic   cytoplasm and intracellular bridging.  There are not significant keratinizing   features.  Some areas show basaloid features.  There is also regional luminal   features including cribriforming and vague glandular structures containing   mucin.  Tumor cells are diffusely positive with P40 and regionally positive   with TTF-1.  Mucicarmine special stain highlights mucin producing luminal   spaces. Overall tumor appearance and staining profile supports adenosquamous   carcinoma, a variant of non-small cell lung carcinoma.   [facility] SURGICAL PATHOLOGY CANCER CASE SUMMARY:  LUNG   Procedure:  Lobectomy   Specimen laterality:  Left   Tumor focality:  Single focus   Tumor site: Upper lobe of lung   Tumor size:  2.2 cm   Histologic type:  Adenosquamous carcinoma   Spread through airspaces:  Not identified   Visceral pleural invasion:  Not identified   Direct invasion of adjacent structures: Not applicable   Lymphovascular invasion:  Not identified   Margins:  All margins negative for invasive carcinoma     Closest margin to invasive carcinoma:  Bronchial (3.0 cm)   Regional lymph nodes:  Tumor present in regional lymph node     Number of lymph nodes with tumor:  1     Scott sites with tumor:  Left level 10 (10L)     Extranodal extension:  Not identified     Number of lymph nodes examined:  23   Pathologic stage classification (pTNM): pT1c pN1   Special  studies:  Performed on previous biopsy if additional studies needed   there may be performed on tissue blockd 10G or 10H.        Impression:pet 2/24     Increasing SUV max of FDG avid partially calcified lymph node involving the anterior superior mediastinum. This could be metastatic, lymphoproliferative, or reactive in nature.     No other findings of FDG avid malignancy.     Prominent cervical, axillary, mediastinal lymph nodes.    Impression:pet 5/24     1.  Prior left upper lobectomy with bandlike scarring on the left.     2.  Unchanged partially calcified lymph node in the anterior superior mediastinum/thoracic inlet.     3.  Numerous small cervical, axillary, mediastinal and upper abdominal lymph nodes the previous exam without increased FDG activity from background.     4.  Interval decrease in size and FDG activity to the left adrenal nodule/metastasis.     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 Due to adenosq histology chose carbo/ taxol x 4 cycles tecentriq maintanence x 1 year  pt is has had 5 cycles of carbo/ taxol  discused maintainenec at tumor board  Completed 1 year of tecentriq   Pet 2/24 showing slight enlarged calcified node pet avid      Will get short term pet to follow in 3 months which will be 8/ 24      CLL   stage 0 will confirmed with flow cytometry  NTD   she has no other cytopenias or lymphadenopathy or B symptoms patient can be observed      Continue with chronic pain syndrome and pain management advised to stop smoking if at all possible 10 pills of hydrocodoen given to pt, she needs to follow with pain mx      History of  B12 deficiency will continue to monitor    Elavted TSH : pt to [id number] this with primary care    Advised COVID precaution due to her lung situation she will be a high risk patient    Advance Care Planning     Date: 04/18/2023    Power of   I initiated the process of advance care planning today and explained the importance of this process to the patient.  I introduced the concept of advance directives to the patient, as well. Then the patient received detailed information about the importance of designating a Health Care Power of  (HCPOA). She was also instructed to communicate with this person about their wishes for future healthcare, should she become sick and lose decision-making capacity. The patient has not previously appointed a HCPOA. After our discussion, the patient has decided to complete a HCPOA .

## 2024-06-04 DIAGNOSIS — Z72.0 TOBACCO USE: ICD-10-CM

## 2024-06-04 RX ORDER — VARENICLINE TARTRATE 1 MG/1
1 TABLET, FILM COATED ORAL 2 TIMES DAILY
Qty: 180 TABLET | Refills: 0 | Status: SHIPPED | OUTPATIENT
Start: 2024-06-04

## 2024-06-07 ENCOUNTER — OFFICE VISIT (OUTPATIENT)
Dept: FAMILY MEDICINE | Facility: CLINIC | Age: 64
End: 2024-06-07
Payer: COMMERCIAL

## 2024-06-07 VITALS
RESPIRATION RATE: 18 BRPM | HEART RATE: 82 BPM | WEIGHT: 170.19 LBS | BODY MASS INDEX: 30.16 KG/M2 | OXYGEN SATURATION: 94 % | SYSTOLIC BLOOD PRESSURE: 114 MMHG | DIASTOLIC BLOOD PRESSURE: 60 MMHG | TEMPERATURE: 98 F | HEIGHT: 63 IN

## 2024-06-07 DIAGNOSIS — G89.29 CHRONIC CERVICAL PAIN: ICD-10-CM

## 2024-06-07 DIAGNOSIS — E03.9 HYPOTHYROIDISM, UNSPECIFIED TYPE: Primary | ICD-10-CM

## 2024-06-07 DIAGNOSIS — S22.000A COMPRESSION FRACTURE OF BODY OF THORACIC VERTEBRA: ICD-10-CM

## 2024-06-07 DIAGNOSIS — M54.12 CERVICAL RADICULOPATHY: ICD-10-CM

## 2024-06-07 DIAGNOSIS — M54.2 CHRONIC CERVICAL PAIN: ICD-10-CM

## 2024-06-07 DIAGNOSIS — I70.0 AORTIC ATHEROSCLEROSIS: ICD-10-CM

## 2024-06-07 PROCEDURE — 99214 OFFICE O/P EST MOD 30 MIN: CPT | Mod: S$GLB,,,

## 2024-06-07 PROCEDURE — 1159F MED LIST DOCD IN RCRD: CPT | Mod: CPTII,S$GLB,,

## 2024-06-07 PROCEDURE — 1160F RVW MEDS BY RX/DR IN RCRD: CPT | Mod: CPTII,S$GLB,,

## 2024-06-07 PROCEDURE — 99999 PR PBB SHADOW E&M-EST. PATIENT-LVL V: CPT | Mod: PBBFAC,,,

## 2024-06-07 PROCEDURE — 3044F HG A1C LEVEL LT 7.0%: CPT | Mod: CPTII,S$GLB,,

## 2024-06-07 PROCEDURE — 3074F SYST BP LT 130 MM HG: CPT | Mod: CPTII,S$GLB,,

## 2024-06-07 PROCEDURE — 3078F DIAST BP <80 MM HG: CPT | Mod: CPTII,S$GLB,,

## 2024-06-07 PROCEDURE — 3008F BODY MASS INDEX DOCD: CPT | Mod: CPTII,S$GLB,,

## 2024-06-07 RX ORDER — METHOCARBAMOL 750 MG/1
750 TABLET, FILM COATED ORAL NIGHTLY PRN
Qty: 90 TABLET | Refills: 1 | Status: SHIPPED | OUTPATIENT
Start: 2024-06-07 | End: 2024-06-17

## 2024-06-07 RX ORDER — LEVOTHYROXINE SODIUM 50 UG/1
50 TABLET ORAL
Qty: 90 TABLET | Refills: 3 | Status: SHIPPED | OUTPATIENT
Start: 2024-06-07 | End: 2025-06-07

## 2024-06-07 NOTE — PROGRESS NOTES
Subjective:       Patient ID: Yvette Hutchinson is a 63 y.o. female.    Chief Complaint: Results    Has questions about recent xray results. Shown to have compression fracture T8. Was cutting grass and drove into a big satellite dish. This caused her head and back to suddenly and aggressively bend backwards. Had back and neck pain afterwards. Continues to have neck and back pain since then. Has numbness and tingling bilateral upper extremities. Mostly L hand. Also has weakness mostly L hand as well.     Follow up thyroid as well. Recent TSH improved but still abnormal; T4 WNL. Will adjust therapy today. Recheck 3 months.           Past Medical History:   Diagnosis Date    Arthritis     Cancer     Depression     GERD (gastroesophageal reflux disease)     Pneumonia of left lung due to infectious organism 03/05/2020       Review of patient's allergies indicates:  No Known Allergies      Current Outpatient Medications:     (Magic mouthwash) 1:1:1 diphenhydrAMINE(Benadryl) 12.5mg/5ml liq, aluminum & magnesium hydroxide-simethicone (Maalox), LIDOcaine viscous 2%, Swish and spit 10 mLs 3 (three) times daily. for mouth sores, Disp: 250 mL, Rfl: 0    acetaminophen (TYLENOL) 500 MG tablet, Take 2 tablets (1,000 mg total) by mouth every 6 (six) hours as needed for Pain., Disp: 8 tablet, Rfl: 0    albuterol (PROVENTIL/VENTOLIN HFA) 90 mcg/actuation inhaler, Inhale 2 puffs into the lungs every 6 (six) hours as needed for Wheezing or Shortness of Breath. Rescue, Disp: 8.5 g, Rfl: 11    buPROPion (WELLBUTRIN XL) 300 MG 24 hr tablet, Take 1 tablet (300 mg total) by mouth once daily., Disp: 90 tablet, Rfl: 3    celecoxib (CELEBREX) 200 MG capsule, Take 2 capsules (400 mg total) by mouth once daily., Disp: 180 capsule, Rfl: 4    citalopram (CELEXA) 10 MG tablet, Take 10 mg by mouth., Disp: , Rfl:     citalopram (CELEXA) 20 MG tablet, Take 1 tablet (20 mg total) by mouth once daily., Disp: 30 tablet, Rfl: 11     diphenhydrAMINE-aluminum-magnesium hydroxide-simethicone-LIDOcaine HCl 2%, Swish and spit 15 mLs every 4 (four) hours as needed (mouth sores)., Disp: 100 each, Rfl: 2    fluticasone propionate (FLONASE) 50 mcg/actuation nasal spray, 1 spray (50 mcg total) by Each Nostril route once daily., Disp: 16 g, Rfl: 3    fluticasone-salmeterol 230-21 mcg/dose (ADVAIR HFA) 230-21 mcg/actuation HFAA inhaler, Inhale 2 puffs into the lungs 2 (two) times daily. Controller, Disp: 12 g, Rfl: 5    gabapentin (NEURONTIN) 300 MG capsule, Take 1 capsule (300 mg total) by mouth 3 (three) times daily., Disp: 270 capsule, Rfl: 1    levocetirizine (XYZAL) 5 MG tablet, Take 1 tablet (5 mg total) by mouth every evening., Disp: 30 tablet, Rfl: 5    LIDOcaine (LIDODERM) 5 %, Place 1 patch onto the skin once daily. Remove & Discard patch within 12 hours or as directed by MD, Disp: 7 patch, Rfl: 1    LIDOcaine-prilocaine (EMLA) cream, Apply topically as needed (apply to port site 30 min before access)., Disp: 30 g, Rfl: 0    naloxone (NARCAN) 4 mg/actuation Spry, ADMINISTER A SINGLE SPRAY IN ONE NOSTRIL UPON SIGNS OF OPIOID OVERDOSE. CALL 911. REPEAT AFTER 3 MINUTES IF NO RESPONSE., Disp: , Rfl:     ondansetron (ZOFRAN-ODT) 8 MG TbDL, Take 1 tablet (8 mg total) by mouth every 12 (twelve) hours as needed (nausea)., Disp: 30 tablet, Rfl: 1    pravastatin (PRAVACHOL) 10 MG tablet, Take 1 tablet (10 mg total) by mouth once daily., Disp: 90 tablet, Rfl: 3    predniSONE (DELTASONE) 20 MG tablet, Take one pill a day for three days, repeat for shortness of breath, Disp: 12 tablet, Rfl: 0    topiramate (TOPAMAX) 100 MG tablet, Take 1 tablet (100 mg total) by mouth once daily., Disp: 30 tablet, Rfl: 11    varenicline (CHANTIX) 1 mg Tab, Take 1 tablet by mouth twice daily, Disp: 180 tablet, Rfl: 0    albuterol-ipratropium (DUO-NEB) 2.5 mg-0.5 mg/3 mL nebulizer solution, Take 3 mLs by nebulization every 6 (six) hours as needed for Wheezing. Rescue, Disp: 120  "each, Rfl: 5    ALPRAZolam (XANAX) 0.5 MG tablet, Take 1 tablet (0.5 mg total) by mouth 3 (three) times daily., Disp: 90 tablet, Rfl: 0    levothyroxine (SYNTHROID) 50 MCG tablet, Take 1 tablet (50 mcg total) by mouth before breakfast., Disp: 90 tablet, Rfl: 3    methocarbamoL (ROBAXIN) 750 MG Tab, Take 1 tablet (750 mg total) by mouth nightly as needed (back pain, muscle pain, muscle spasm)., Disp: 90 tablet, Rfl: 1    OLANZapine (ZYPREXA) 5 MG tablet, Take 1 tablet (5 mg total) by mouth nightly. Take 1 tablet at bedtime on days 1-4 of each cycle of chemotherapy, Disp: 30 tablet, Rfl: 2    Review of Systems   Musculoskeletal:  Positive for back pain, neck pain and neck stiffness.   Neurological:  Positive for numbness.       Objective:      /60 (BP Location: Right arm, Patient Position: Sitting, BP Method: Large (Manual))   Pulse 82   Temp 97.8 °F (36.6 °C) (Oral)   Resp 18   Ht 5' 3" (1.6 m)   Wt 77.2 kg (170 lb 3.1 oz)   LMP 01/13/2010 (Approximate)   SpO2 (!) 94%   BMI 30.15 kg/m²   Physical Exam  Vitals reviewed.   Constitutional:       General: She is not in acute distress.     Appearance: Normal appearance. She is obese. She is not ill-appearing, toxic-appearing or diaphoretic.   HENT:      Head: Normocephalic.      Right Ear: External ear normal.      Left Ear: External ear normal.      Nose: Nose normal. No congestion or rhinorrhea.      Mouth/Throat:      Mouth: Mucous membranes are moist.      Pharynx: Oropharynx is clear.   Eyes:      General: No scleral icterus.        Right eye: No discharge.         Left eye: No discharge.      Extraocular Movements: Extraocular movements intact.      Conjunctiva/sclera: Conjunctivae normal.   Cardiovascular:      Rate and Rhythm: Normal rate and regular rhythm.      Pulses: Normal pulses.      Heart sounds: Normal heart sounds. No murmur heard.     No friction rub. No gallop.   Pulmonary:      Effort: Pulmonary effort is normal. No respiratory distress. "      Breath sounds: Normal breath sounds. No wheezing, rhonchi or rales.   Chest:      Chest wall: No tenderness.   Musculoskeletal:         General: Tenderness present. No swelling or deformity. Normal range of motion.      Cervical back: Normal range of motion.      Right lower leg: No edema.      Left lower leg: No edema.   Skin:     General: Skin is warm and dry.      Capillary Refill: Capillary refill takes less than 2 seconds.      Coloration: Skin is not jaundiced.      Findings: No bruising, erythema, lesion or rash.   Neurological:      Mental Status: She is alert and oriented to person, place, and time.      Motor: No weakness.      Gait: Gait normal.   Psychiatric:         Mood and Affect: Mood normal.         Behavior: Behavior normal.         Thought Content: Thought content normal.         Judgment: Judgment normal.         Assessment:       1. Hypothyroidism, unspecified type    2. Aortic atherosclerosis    3. Compression fracture of body of thoracic vertebra    4. Chronic cervical pain    5. Cervical radiculopathy        Plan:       Hypothyroidism, unspecified type  -     levothyroxine (SYNTHROID) 50 MCG tablet; Take 1 tablet (50 mcg total) by mouth before breakfast.  Dispense: 90 tablet; Refill: 3  -     TSH; Future; Expected date: 09/07/2024    Aortic atherosclerosis        -    Stable. No alarming symptoms currently. Continue meds. Will continue to monitor.     Compression fracture of body of thoracic vertebra  -     MRI Thoracic Spine Without Contrast; Future; Expected date: 06/07/2024  -     methocarbamoL (ROBAXIN) 750 MG Tab; Take 1 tablet (750 mg total) by mouth nightly as needed (back pain, muscle pain, muscle spasm).  Dispense: 90 tablet; Refill: 1    Chronic cervical pain  -     MRI Cervical Spine Without Contrast; Future; Expected date: 06/07/2024  -     methocarbamoL (ROBAXIN) 750 MG Tab; Take 1 tablet (750 mg total) by mouth nightly as needed (back pain, muscle pain, muscle spasm).   Dispense: 90 tablet; Refill: 1    Cervical radiculopathy  -     MRI Cervical Spine Without Contrast; Future; Expected date: 06/07/2024                 Herbert Price PA-C  Family Medicine Physician Assistant       Future Appointments       Date Provider Specialty Appt Notes    8/13/2024 Harika Garcia NP Pulmonology 3m f/u    9/3/2024 Rhiannon Lee MD Hematology and Oncology VV/Lung Ca/labs/pet scan results    9/9/2024  Lab Hypothyroidism, unspecified type    10/9/2024 Vern Priest MD Family Medicine 4 month f/u               I spent a total of 20 minutes on the day of the visit.This includes face to face time and non-face to face time preparing to see the patient (eg, review of tests), obtaining and/or reviewing separately obtained history, documenting clinical information in the electronic or other health record, independently interpreting results and communicating results to the patient/family/caregiver, or care coordinator.      We have addressed [4] Moderate: 1 or more chronic illnesses with exacerbation, progression, or side effects of treatment / 2 or more stable chronic illnesses / 1 undiagnosed new problem with uncertain prognosis / 1 acute illness with systemic symptoms / 1 acute complicated injury  The complexity of the data reviewed and analyzed for this visit was [3] Limited (Reviewed prior external note, ordered unique testing or reviewed the results of each unique test)   The risk of complications and/or morbidity or mortality are [4] Moderate risk (I.e. prescription drug management / decision regarding minor surgery with identified pt or procedure risk factors / decision regarding elective major surgery without identified pt or procedure risk factors / diagnosis or treatment significantly limited by social determinants of health)   The level of Medical Decision Making for this visit is [4] Moderate

## 2024-06-19 ENCOUNTER — HOSPITAL ENCOUNTER (OUTPATIENT)
Dept: RADIOLOGY | Facility: HOSPITAL | Age: 64
Discharge: HOME | End: 2024-06-19
Payer: COMMERCIAL

## 2024-06-19 DIAGNOSIS — G89.29 CHRONIC CERVICAL PAIN: ICD-10-CM

## 2024-06-19 DIAGNOSIS — M54.12 CERVICAL RADICULOPATHY: ICD-10-CM

## 2024-06-19 DIAGNOSIS — M54.2 CHRONIC CERVICAL PAIN: ICD-10-CM

## 2024-06-19 DIAGNOSIS — S22.000A COMPRESSION FRACTURE OF BODY OF THORACIC VERTEBRA: ICD-10-CM

## 2024-06-19 PROCEDURE — 72146 MRI CHEST SPINE W/O DYE: CPT | Mod: 26,,, | Performed by: RADIOLOGY

## 2024-06-19 PROCEDURE — 72141 MRI NECK SPINE W/O DYE: CPT | Mod: TC

## 2024-06-19 PROCEDURE — 72141 MRI NECK SPINE W/O DYE: CPT | Mod: 26,,, | Performed by: RADIOLOGY

## 2024-06-19 PROCEDURE — 72146 MRI CHEST SPINE W/O DYE: CPT | Mod: TC

## 2024-06-21 ENCOUNTER — PATIENT MESSAGE (OUTPATIENT)
Dept: FAMILY MEDICINE | Facility: CLINIC | Age: 64
End: 2024-06-21
Payer: COMMERCIAL

## 2024-06-21 DIAGNOSIS — M54.12 CERVICAL RADICULOPATHY: Primary | ICD-10-CM

## 2024-06-21 DIAGNOSIS — M54.2 CHRONIC CERVICAL PAIN: ICD-10-CM

## 2024-06-21 DIAGNOSIS — M47.812 CERVICAL SPONDYLOSIS: ICD-10-CM

## 2024-06-21 DIAGNOSIS — G89.29 CHRONIC CERVICAL PAIN: ICD-10-CM

## 2024-06-25 ENCOUNTER — OFFICE VISIT (OUTPATIENT)
Dept: NEUROSURGERY | Facility: CLINIC | Age: 64
End: 2024-06-25
Payer: COMMERCIAL

## 2024-06-25 VITALS
HEART RATE: 87 BPM | WEIGHT: 169 LBS | HEIGHT: 63 IN | BODY MASS INDEX: 29.95 KG/M2 | SYSTOLIC BLOOD PRESSURE: 112 MMHG | DIASTOLIC BLOOD PRESSURE: 72 MMHG

## 2024-06-25 DIAGNOSIS — M54.2 CERVICALGIA: Primary | ICD-10-CM

## 2024-06-25 DIAGNOSIS — M47.812 CERVICAL SPONDYLOSIS: ICD-10-CM

## 2024-06-25 DIAGNOSIS — M54.12 CERVICAL RADICULOPATHY: ICD-10-CM

## 2024-06-25 DIAGNOSIS — S22.060A COMPRESSION FRACTURE OF T8 VERTEBRA, INITIAL ENCOUNTER: ICD-10-CM

## 2024-06-25 DIAGNOSIS — M54.16 LUMBAR RADICULOPATHY, CHRONIC: ICD-10-CM

## 2024-06-25 PROCEDURE — 3044F HG A1C LEVEL LT 7.0%: CPT | Mod: CPTII,S$GLB,, | Performed by: HEALTH CARE PROVIDER

## 2024-06-25 PROCEDURE — 99204 OFFICE O/P NEW MOD 45 MIN: CPT | Mod: S$GLB,,, | Performed by: HEALTH CARE PROVIDER

## 2024-06-25 PROCEDURE — 3008F BODY MASS INDEX DOCD: CPT | Mod: CPTII,S$GLB,, | Performed by: HEALTH CARE PROVIDER

## 2024-06-25 PROCEDURE — 3078F DIAST BP <80 MM HG: CPT | Mod: CPTII,S$GLB,, | Performed by: HEALTH CARE PROVIDER

## 2024-06-25 PROCEDURE — 3074F SYST BP LT 130 MM HG: CPT | Mod: CPTII,S$GLB,, | Performed by: HEALTH CARE PROVIDER

## 2024-06-25 PROCEDURE — 1159F MED LIST DOCD IN RCRD: CPT | Mod: CPTII,S$GLB,, | Performed by: HEALTH CARE PROVIDER

## 2024-06-25 RX ORDER — IBUPROFEN 800 MG/1
800 TABLET ORAL 3 TIMES DAILY
Qty: 180 TABLET | Refills: 0 | Status: SHIPPED | OUTPATIENT
Start: 2024-06-25 | End: 2024-08-24

## 2024-06-28 NOTE — PROGRESS NOTES
Neurosurgery  History & Physical    SUBJECTIVE:     Chief Complaint:  Neck in back pain    History of Present Illness:  Yvette Hutchinson is a 63 y.o. female with past medical history of chronic pain syndrome, chronic obstructive lung disease, lung cancer status post left lobectomy, and esophageal stricture referred to us by JOSE ANTONIO Reed (PCP) for chronic cervical and lumbar pain.  Today, patient reports chronic cervical pain and back pain which has progressed in last 2 months after a mechanical fall.  Posterior cervical pain radiates to bilateral shoulders only, left shoulder greater than right. Cervical pain exacerbated with cervical rotation, flexion and extension.  She also reports bilateral hand numbness/tingling and difficulty opening water bottles.  Furthermore, she reports chronic lumbar pain that radiates to left posterolateral thigh and calf.  She has numbness and tingling in both feet, left greater than right.  She has trialed muscle relaxers which aid with sleep.  Also reports stress incontinence.  She denies falls, imbalance, hand clumsiness, recent physical therapy, epidural steroid injections, previous cervical or lumbar surgery.    Of note patient intermittently use home oxygen for chronic obstructive lung disease.    AP/AC: none  Smoking status-: Smoker (1 cigarette ppd x 20 yrs)      Review of patient's allergies indicates:  No Known Allergies    Current Outpatient Medications   Medication Sig Dispense Refill    acetaminophen (TYLENOL) 500 MG tablet Take 2 tablets (1,000 mg total) by mouth every 6 (six) hours as needed for Pain. 8 tablet 0    albuterol (PROVENTIL/VENTOLIN HFA) 90 mcg/actuation inhaler Inhale 2 puffs into the lungs every 6 (six) hours as needed for Wheezing or Shortness of Breath. Rescue 8.5 g 11    buPROPion (WELLBUTRIN XL) 300 MG 24 hr tablet Take 1 tablet (300 mg total) by mouth once daily. 90 tablet 3    celecoxib (CELEBREX) 200 MG capsule Take 2 capsules (400 mg  total) by mouth once daily. 180 capsule 4    citalopram (CELEXA) 10 MG tablet Take 10 mg by mouth.      citalopram (CELEXA) 20 MG tablet Take 1 tablet (20 mg total) by mouth once daily. 30 tablet 11    diphenhydrAMINE-aluminum-magnesium hydroxide-simethicone-LIDOcaine HCl 2% Swish and spit 15 mLs every 4 (four) hours as needed (mouth sores). 100 each 2    fluticasone propionate (FLONASE) 50 mcg/actuation nasal spray 1 spray (50 mcg total) by Each Nostril route once daily. 16 g 3    fluticasone-salmeterol 230-21 mcg/dose (ADVAIR HFA) 230-21 mcg/actuation HFAA inhaler Inhale 2 puffs into the lungs 2 (two) times daily. Controller 12 g 5    gabapentin (NEURONTIN) 300 MG capsule Take 1 capsule (300 mg total) by mouth 3 (three) times daily. 270 capsule 1    levocetirizine (XYZAL) 5 MG tablet Take 1 tablet (5 mg total) by mouth every evening. 30 tablet 5    levothyroxine (SYNTHROID) 50 MCG tablet Take 1 tablet (50 mcg total) by mouth before breakfast. 90 tablet 3    LIDOcaine (LIDODERM) 5 % Place 1 patch onto the skin once daily. Remove & Discard patch within 12 hours or as directed by MD Bloom patch 1    LIDOcaine-prilocaine (EMLA) cream Apply topically as needed (apply to port site 30 min before access). 30 g 0    naloxone (NARCAN) 4 mg/actuation Spry ADMINISTER A SINGLE SPRAY IN ONE NOSTRIL UPON SIGNS OF OPIOID OVERDOSE. CALL 911. REPEAT AFTER 3 MINUTES IF NO RESPONSE.      ondansetron (ZOFRAN-ODT) 8 MG TbDL Take 1 tablet (8 mg total) by mouth every 12 (twelve) hours as needed (nausea). 30 tablet 1    pravastatin (PRAVACHOL) 10 MG tablet Take 1 tablet (10 mg total) by mouth once daily. 90 tablet 3    predniSONE (DELTASONE) 20 MG tablet Take one pill a day for three days, repeat for shortness of breath 12 tablet 0    topiramate (TOPAMAX) 100 MG tablet Take 1 tablet (100 mg total) by mouth once daily. 30 tablet 11    varenicline (CHANTIX) 1 mg Tab Take 1 tablet by mouth twice daily 180 tablet 0    (Magic mouthwash) 1:1:1  diphenhydrAMINE(Benadryl) 12.5mg/5ml liq, aluminum & magnesium hydroxide-simethicone (Maalox), LIDOcaine viscous 2% Swish and spit 10 mLs 3 (three) times daily. for mouth sores (Patient not taking: Reported on 6/25/2024) 250 mL 0    albuterol-ipratropium (DUO-NEB) 2.5 mg-0.5 mg/3 mL nebulizer solution Take 3 mLs by nebulization every 6 (six) hours as needed for Wheezing. Rescue 120 each 5    ALPRAZolam (XANAX) 0.5 MG tablet Take 1 tablet (0.5 mg total) by mouth 3 (three) times daily. 90 tablet 0    ibuprofen (ADVIL,MOTRIN) 800 MG tablet Take 1 tablet (800 mg total) by mouth 3 (three) times daily. 180 tablet 0    OLANZapine (ZYPREXA) 5 MG tablet Take 1 tablet (5 mg total) by mouth nightly. Take 1 tablet at bedtime on days 1-4 of each cycle of chemotherapy 30 tablet 2     No current facility-administered medications for this visit.       Family History       Problem Relation (Age of Onset)    Breast cancer Cousin    Ovarian cancer Sister          Social History     Socioeconomic History    Marital status: Legally    Tobacco Use    Smoking status: Some Days     Current packs/day: 0.15     Types: Cigarettes     Passive exposure: Current    Smokeless tobacco: Never    Tobacco comments:     reports one cigarette every now and then   Substance and Sexual Activity    Alcohol use: Yes     Comment: rarely    Drug use: Yes     Types: Marijuana     Social Determinants of Health     Financial Resource Strain: Medium Risk (12/14/2023)    Overall Financial Resource Strain (CARDIA)     Difficulty of Paying Living Expenses: Somewhat hard   Food Insecurity: Patient Declined (12/14/2023)    Hunger Vital Sign     Worried About Running Out of Food in the Last Year: Patient declined     Ran Out of Food in the Last Year: Patient declined   Transportation Needs: No Transportation Needs (12/14/2023)    PRAPARE - Transportation     Lack of Transportation (Medical): No     Lack of Transportation (Non-Medical): No   Physical Activity:  "Unknown (12/14/2023)    Exercise Vital Sign     Days of Exercise per Week: 3 days   Stress: Stress Concern Present (12/14/2023)    [employer] [place] [employer] - Occupational Stress Questionnaire     Feeling of Stress : To some extent   Housing Stability: High Risk (12/14/2023)    Housing Stability Vital Sign     Unable to Pay for Housing in the Last Year: Yes     Number of Places Lived in the Last Year: 1     Unstable Housing in the Last Year: No       OBJECTIVE:     Vital Signs  Pulse: 87  BP: 112/72  Pain Score: 10-Worst pain ever  Height: 5' 3" (160 cm)  Weight: 76.6 kg (168 lb 15.7 oz)  Body mass index is 29.93 kg/m².      Neurosurgery Physical Exam  General:  No acute distress.  Neurologic: Alert and oriented. Thought content appropriate.  GCS: E4 V5 M6; Total: 15  Mental Status: Awake, Alert, Oriented   Pulmonary: normal respirations, no signs of respiratory distress    Sensory: intact to light touch throughout  Motor Strength: Moves all extremities spontaneously with good tone.  No abnormal movements seen.   Bilateral upper extremity strength deltoid/biceps/triceps/hand  5/5   Bilateral lower extremity strength iliopsoas/TA/EHL/gastrocnemius 5/5     Reflexes Right Left   Biceps 2+ 2+   Brachioradialis 2+ 2+   Triceps 2+ 2+   Patellar 2+ 2+   Islas's Neg Neg       Gait: stable  Tandem Gait: No difficulty  Able to walk on heels & toes     Cervical:   Midline TTP:  Positive.     Thoracic:  Midline TTP: Negative.     Lumbar:  Midline TTP:  Positive.  Straight Leg Test: Negative.     Other:  SI joint TTP: Negative.  Greater trochanter TTP: Negative.      Diagnostic Imaging:  Cervical MRI (06/19/2024).  Reviewed.    Thoracic MRI (06/19/2024).  Reviewed.      ASSESSMENT/PLAN:     1. Cervicalgia    2. Lumbar radiculopathy, chronic    3. Compression fracture of T8 vertebra, initial encounter    4. Cervical radiculopathy    5. Cervical spondylosis      Neurologically intact.    Patient reported " symptoms of posterior cervical pain which radiates to her shoulders (left greater than right) which is in concordant with cervical pathology seen on imaging.  Cervical CT ordered for further evaluation.    Patient's reported lumbar pain which radiates to left posterolateral leg may be related to a L5 pathology.  Ordered lumbar MRI for further evaluation.    Thoracic imaging also demonstrates a T8 wedge compression fracture without significant spinal canal or foraminal stenosis.  Patient does not report pain in thoracic region and on exam patient is nontender to palpation along thoracic midline.  If patient becomes symptomatic, I will consider pain management referral for kyphoplasty consideration.      Physical therapy referral placed for cervical and lumbar symptoms.      Patient will follow-up with Dr. Daley after imaging.        Cervicalgia  -     Ambulatory referral/consult to Neurosurgery  -     MRI Lumbar Spine Without Contrast; Future; Expected date: 06/25/2024  -     ibuprofen (ADVIL,MOTRIN) 800 MG tablet; Take 1 tablet (800 mg total) by mouth 3 (three) times daily.  Dispense: 180 tablet; Refill: 0  -     CT Cervical Spine Without Contrast; Future; Expected date: 06/25/2024  -     Ambulatory referral/consult to Physical/Occupational Therapy; Future; Expected date: 07/02/2024    Lumbar radiculopathy, chronic  -     MRI Lumbar Spine Without Contrast; Future; Expected date: 06/25/2024  -     Ambulatory referral/consult to Physical/Occupational Therapy; Future; Expected date: 07/02/2024    Compression fracture of T8 vertebra, initial encounter    Cervical radiculopathy  -     Ambulatory referral/consult to Neurosurgery    Cervical spondylosis  -     Ambulatory referral/consult to Neurosurgery        I spent a total of 39 minutes of non-face and face to face time preparing to see the patient (eg, review of tests), obtain and/or review separately obtained history, document clinical information in the electronic or  other health record, interpret results and communicate results to the patient/family/caregiver, or care coordinator.      Milana Spann PA-C  Neurosurgery  [facility]

## 2024-07-03 ENCOUNTER — HOSPITAL ENCOUNTER (OUTPATIENT)
Dept: RADIOLOGY | Facility: HOSPITAL | Age: 64
Discharge: HOME | End: 2024-07-03
Attending: HEALTH CARE PROVIDER
Payer: COMMERCIAL

## 2024-07-03 DIAGNOSIS — M54.2 CERVICALGIA: ICD-10-CM

## 2024-07-03 DIAGNOSIS — M54.16 LUMBAR RADICULOPATHY, CHRONIC: ICD-10-CM

## 2024-07-03 PROCEDURE — 72148 MRI LUMBAR SPINE W/O DYE: CPT | Mod: TC

## 2024-07-03 PROCEDURE — 72125 CT NECK SPINE W/O DYE: CPT | Mod: TC

## 2024-07-03 PROCEDURE — 72125 CT NECK SPINE W/O DYE: CPT | Mod: 26,,, | Performed by: RADIOLOGY

## 2024-07-03 PROCEDURE — 72148 MRI LUMBAR SPINE W/O DYE: CPT | Mod: 26,,, | Performed by: RADIOLOGY

## 2024-07-15 ENCOUNTER — OFFICE VISIT (OUTPATIENT)
Dept: NEUROSURGERY | Facility: CLINIC | Age: 64
End: 2024-07-15
Payer: COMMERCIAL

## 2024-07-15 ENCOUNTER — PATIENT MESSAGE (OUTPATIENT)
Dept: NEUROSURGERY | Facility: CLINIC | Age: 64
End: 2024-07-15

## 2024-07-15 VITALS
DIASTOLIC BLOOD PRESSURE: 71 MMHG | SYSTOLIC BLOOD PRESSURE: 113 MMHG | HEART RATE: 62 BPM | BODY MASS INDEX: 29.93 KG/M2 | HEIGHT: 63 IN

## 2024-07-15 DIAGNOSIS — M50.30 DDD (DEGENERATIVE DISC DISEASE), CERVICAL: ICD-10-CM

## 2024-07-15 DIAGNOSIS — M54.16 LUMBAR RADICULOPATHY: ICD-10-CM

## 2024-07-15 DIAGNOSIS — G99.2 STENOSIS OF CERVICAL SPINE WITH MYELOPATHY: ICD-10-CM

## 2024-07-15 DIAGNOSIS — M48.02 CERVICAL SPINAL STENOSIS: Primary | ICD-10-CM

## 2024-07-15 DIAGNOSIS — M54.12 CERVICAL RADICULOPATHY: ICD-10-CM

## 2024-07-15 DIAGNOSIS — Z01.818 PRE-OP EXAM: ICD-10-CM

## 2024-07-15 DIAGNOSIS — M51.36 DDD (DEGENERATIVE DISC DISEASE), LUMBAR: ICD-10-CM

## 2024-07-15 DIAGNOSIS — M48.02 STENOSIS OF CERVICAL SPINE WITH MYELOPATHY: ICD-10-CM

## 2024-07-15 DIAGNOSIS — M79.603 PAIN OF UPPER EXTREMITY, UNSPECIFIED LATERALITY: ICD-10-CM

## 2024-07-15 DIAGNOSIS — M25.552 LEFT HIP PAIN: Primary | ICD-10-CM

## 2024-07-15 DIAGNOSIS — M48.061 LUMBAR STENOSIS WITHOUT NEUROGENIC CLAUDICATION: ICD-10-CM

## 2024-07-15 DIAGNOSIS — M25.559 HIP PAIN, UNSPECIFIED LATERALITY: ICD-10-CM

## 2024-07-15 DIAGNOSIS — M81.0 OSTEOPOROSIS WITHOUT CURRENT PATHOLOGICAL FRACTURE, UNSPECIFIED OSTEOPOROSIS TYPE: ICD-10-CM

## 2024-07-15 PROCEDURE — 3044F HG A1C LEVEL LT 7.0%: CPT | Mod: CPTII,S$GLB,, | Performed by: NEUROLOGICAL SURGERY

## 2024-07-15 PROCEDURE — 99215 OFFICE O/P EST HI 40 MIN: CPT | Mod: S$GLB,,, | Performed by: NEUROLOGICAL SURGERY

## 2024-07-15 PROCEDURE — 3008F BODY MASS INDEX DOCD: CPT | Mod: CPTII,S$GLB,, | Performed by: NEUROLOGICAL SURGERY

## 2024-07-15 PROCEDURE — 3078F DIAST BP <80 MM HG: CPT | Mod: CPTII,S$GLB,, | Performed by: NEUROLOGICAL SURGERY

## 2024-07-15 PROCEDURE — 1159F MED LIST DOCD IN RCRD: CPT | Mod: CPTII,S$GLB,, | Performed by: NEUROLOGICAL SURGERY

## 2024-07-15 PROCEDURE — 3074F SYST BP LT 130 MM HG: CPT | Mod: CPTII,S$GLB,, | Performed by: NEUROLOGICAL SURGERY

## 2024-07-15 RX ORDER — MUPIROCIN 20 MG/G
OINTMENT TOPICAL
OUTPATIENT
Start: 2024-07-15

## 2024-07-15 RX ORDER — MUPIROCIN 20 MG/G
1 OINTMENT TOPICAL 2 TIMES DAILY
OUTPATIENT
Start: 2024-07-15 | End: 2024-07-16

## 2024-07-15 RX ORDER — CEFAZOLIN SODIUM 2 G/50ML
2 SOLUTION INTRAVENOUS
OUTPATIENT
Start: 2024-07-15

## 2024-07-15 NOTE — PROGRESS NOTES
History of Present Illness:  Yvette Hutchinson is a 63 y.o. female with past medical history of chronic pain syndrome, chronic obstructive lung disease, lung cancer status post left lobectomy, and esophageal stricture referred to us by JOSE ANTONIO Reed (PCP) for chronic cervical and lumbar pain.  Today, patient reports chronic cervical pain and back pain which has progressed in last 2 months after a mechanical fall.  Posterior cervical pain radiates to bilateral shoulders only, left shoulder greater than right. Cervical pain exacerbated with cervical rotation, flexion and extension.  She also reports bilateral hand numbness/tingling and difficulty opening water bottles.  Furthermore, she reports chronic lumbar pain that radiates to left posterolateral thigh and calf.  She has numbness and tingling in both feet, left greater than right.  She has trialed muscle relaxers which aid with sleep.  Also reports stress incontinence.  She denies falls, imbalance, hand clumsiness, recent physical therapy, epidural steroid injections, previous cervical or lumbar surgery.     Of note patient intermittently use home oxygen for chronic obstructive lung disease.     AP/AC: none  Smoking status-: Smoker (1 cigarette ppd x 20 yrs)      Interval history July 15, 2024: She follows up with additional imaging for review.  She was initially evaluated by Neurosurgery physician assistant.  She endorses ongoing neck pain with radiation into the left-greater-than-right shoulder.  She reports constant left forearm pain with dropping objects using the left hand.  She reports her left forearm symptoms seemed to worsen after left carpal tunnel release.  She reports persistent bilateral hand numbness with hand incoordination.  She endorses daily bladder urgency for me.  She notes that she wears on a daily basis due to concerns about urgency more so than stress incontinence.      In addition to above, she reports left groin pain.  She  states that upon standing her left hip and leg catches.  After walking a few feet the groin pain and catching sensation resolved.  She has not had previous imaging of the left hip.    She relates that she has substantially decreased her smoking to 1 cigarette every 2 days.    She has a history of lung cancer, in remission    Cervical adenopathy is being followed by her oncologist    Review of patient's allergies indicates:  No Known Allergies     Past Surgical History:   Procedure Laterality Date    INJECTION OF ANESTHETIC AGENT AROUND MULTIPLE INTERCOSTAL NERVES Left 10/3/2022    Procedure: BLOCK, NERVE, INTERCOSTAL, 2 OR MORE;  Surgeon: Evelio Kaplan MD;  [facility];  Service: Thoracic;  Laterality: Left;    INSERTION OF TUNNELED CENTRAL VENOUS CATHETER (CVC) WITH SUBCUTANEOUS PORT Right 11/3/2022    Procedure: TDECEENTK-PJUI-G-CATH, Right or Left Neck or Chest;  Surgeon: Mini Acevedo MD;  [facility];  Service: General;  Laterality: Right;    ROBOT-ASSISTED LAPAROSCOPIC LYMPHADENECTOMY USING DA MONIQUE XI Left 10/3/2022    Procedure: XI ROBOTIC LYMPHADENECTOMY;  Surgeon: Evelio Kaplan MD;  [facility];  Service: Thoracic;  Laterality: Left;    TONSILLECTOMY      XI ROBOTIC RATS,WITH LOBECTOMY,LUNG Left 10/3/2022    Procedure: XI ROBOTIC RATS,WITH LOBECTOMY,LUNG;  Surgeon: Evelio Kaplan MD;  [facility];  Service: Thoracic;  Laterality: Left;       Current Medications          Current Outpatient Medications   Medication Sig Dispense Refill    acetaminophen (TYLENOL) 500 MG tablet Take 2 tablets (1,000 mg total) by mouth every 6 (six) hours as needed for Pain. 8 tablet 0    albuterol (PROVENTIL/VENTOLIN HFA) 90 mcg/actuation inhaler Inhale 2 puffs into the lungs every 6 (six) hours as needed for Wheezing or Shortness of Breath. Rescue 8.5 g 11    buPROPion (WELLBUTRIN XL) 300 MG 24 hr tablet Take 1 tablet (300 mg total) by mouth  once daily. 90 tablet 3    celecoxib (CELEBREX) 200 MG capsule Take 2 capsules (400 mg total) by mouth once daily. 180 capsule 4    citalopram (CELEXA) 10 MG tablet Take 10 mg by mouth.        citalopram (CELEXA) 20 MG tablet Take 1 tablet (20 mg total) by mouth once daily. 30 tablet 11    diphenhydrAMINE-aluminum-magnesium hydroxide-simethicone-LIDOcaine HCl 2% Swish and spit 15 mLs every 4 (four) hours as needed (mouth sores). 100 each 2    fluticasone propionate (FLONASE) 50 mcg/actuation nasal spray 1 spray (50 mcg total) by Each Nostril route once daily. 16 g 3    fluticasone-salmeterol 230-21 mcg/dose (ADVAIR HFA) 230-21 mcg/actuation HFAA inhaler Inhale 2 puffs into the lungs 2 (two) times daily. Controller 12 g 5    gabapentin (NEURONTIN) 300 MG capsule Take 1 capsule (300 mg total) by mouth 3 (three) times daily. 270 capsule 1    levocetirizine (XYZAL) 5 MG tablet Take 1 tablet (5 mg total) by mouth every evening. 30 tablet 5    levothyroxine (SYNTHROID) 50 MCG tablet Take 1 tablet (50 mcg total) by mouth before breakfast. 90 tablet 3    LIDOcaine (LIDODERM) 5 % Place 1 patch onto the skin once daily. Remove & Discard patch within 12 hours or as directed by MD Bloom patch 1    LIDOcaine-prilocaine (EMLA) cream Apply topically as needed (apply to port site 30 min before access). 30 g 0    naloxone (NARCAN) 4 mg/actuation Spry ADMINISTER A SINGLE SPRAY IN ONE NOSTRIL UPON SIGNS OF OPIOID OVERDOSE. CALL 911. REPEAT AFTER 3 MINUTES IF NO RESPONSE.        ondansetron (ZOFRAN-ODT) 8 MG TbDL Take 1 tablet (8 mg total) by mouth every 12 (twelve) hours as needed (nausea). 30 tablet 1    pravastatin (PRAVACHOL) 10 MG tablet Take 1 tablet (10 mg total) by mouth once daily. 90 tablet 3    predniSONE (DELTASONE) 20 MG tablet Take one pill a day for three days, repeat for shortness of breath 12 tablet 0    topiramate (TOPAMAX) 100 MG tablet Take 1 tablet (100 mg total) by mouth once daily. 30 tablet 11    varenicline  (CHANTIX) 1 mg Tab Take 1 tablet by mouth twice daily 180 tablet 0    (Magic mouthwash) 1:1:1 diphenhydrAMINE(Benadryl) 12.5mg/5ml liq, aluminum & magnesium hydroxide-simethicone (Maalox), LIDOcaine viscous 2% Swish and spit 10 mLs 3 (three) times daily. for mouth sores (Patient not taking: Reported on 6/25/2024) 250 mL 0    albuterol-ipratropium (DUO-NEB) 2.5 mg-0.5 mg/3 mL nebulizer solution Take 3 mLs by nebulization every 6 (six) hours as needed for Wheezing. Rescue 120 each 5    ALPRAZolam (XANAX) 0.5 MG tablet Take 1 tablet (0.5 mg total) by mouth 3 (three) times daily. 90 tablet 0    ibuprofen (ADVIL,MOTRIN) 800 MG tablet Take 1 tablet (800 mg total) by mouth 3 (three) times daily. 180 tablet 0    OLANZapine (ZYPREXA) 5 MG tablet Take 1 tablet (5 mg total) by mouth nightly. Take 1 tablet at bedtime on days 1-4 of each cycle of chemotherapy 30 tablet 2      No current facility-administered medications for this visit.            Family History         Problem Relation (Age of Onset)     Breast cancer Cousin     Ovarian cancer Sister             Social History            Socioeconomic History    Marital status: Legally    Tobacco Use    Smoking status: Some Days       Current packs/day: 0.15       Types: Cigarettes       Passive exposure: Current    Smokeless tobacco: Never    Tobacco comments:       reports one cigarette every now and then   Substance and Sexual Activity    Alcohol use: Yes       Comment: rarely    Drug use: Yes       Types: Marijuana      Social Determinants of Health           Financial Resource Strain: Medium Risk (12/14/2023)     Overall Financial Resource Strain (CARDIA)      Difficulty of Paying Living Expenses: Somewhat hard   Food Insecurity: Patient Declined (12/14/2023)     Hunger Vital Sign      Worried About Running Out of Food in the Last Year: Patient declined      Ran Out of Food in the Last Year: Patient declined   Transportation Needs: No Transportation Needs  "(12/14/2023)     PRAPARE - Transportation      Lack of Transportation (Medical): No      Lack of Transportation (Non-Medical): No   Physical Activity: Unknown (12/14/2023)     Exercise Vital Sign      Days of Exercise per Week: 3 days   Stress: Stress Concern Present (12/14/2023)     [employer] - Occupational Stress Questionnaire      Feeling of Stress : To some extent   Housing Stability: High Risk (12/14/2023)     Housing Stability Vital Sign      Unable to Pay for Housing in the Last Year: Yes      Number of Places Lived in the Last Year: 1      Unstable Housing in the Last Year: No         OBJECTIVE:      Vital Signs  Pulse: 87  BP: 112/72  Pain Score: 10-Worst pain ever  Height: 5' 3" (160 cm)  Weight: 76.6 kg (168 lb 15.7 oz)  Body mass index is 29.93 kg/m².        Neurosurgery Physical Exam  General:  No acute distress.  Neurologic: Alert and oriented. Thought content appropriate.  GCS: E4 V5 M6; Total: 15  Mental Status: Awake, Alert, Oriented   Pulmonary: normal respirations, no signs of respiratory distress     Sensory: intact to light touch throughout  Motor Strength: Moves all extremities spontaneously with good tone.  No abnormal movements seen.   Bilateral upper extremity strength deltoid/biceps/triceps/hand  5/5   Bilateral lower extremity strength iliopsoas/TA/EHL/gastrocnemius 5/5      Hyperreflexic all extremities 3+/4  Absent Rosie's sign  Tandem walking abnormal      Gait:  Antalgic left     Cervical:   Midline TTP:  Positive.     Thoracic:  Midline TTP: Negative.     Lumbar:  Midline TTP:  Positive.  Straight Leg Test: Negative.     Other:  SI joint TTP: Negative.  Greater trochanter TTP: Negative.  JO positive left    Significant radiographic findings: I reviewed MRI cervical, thoracic, lumbar in detail with the patient today.  Date of study 06/1924, 07/03/2024.  Pertinent findings in the cervical region include multilevel disc degeneration with " "multifocal disc protrusions contributing to moderate canal and moderate to severe severe bilateral foraminal stenosis C4-5, severe canal stenosis C5-6 with loss of CSF signal circumferentially.  Disc material at C5-6 extends above and below the disc space.  Consequently, the severe stenosis extends from midbody of C5 to the upper 1/3 of C6.  Severe bilateral foraminal stenosis C5-6.  Moderate canal stenosis C6-7, moderate to severe bilateral foraminal stenosis C6-7.  Chronic T8 compression deformity with kyphosis.  No significant thoracic spinal stenosis.  In lumbar region, disc degeneration with severe canal stenosis L3-4 L4-5.    Analysis:  She has multilevel cervical degenerative disc disease with canal compromise contributing to left cervical radiculopathy and mild cervical myelopathy.  Given her findings she would benefit from surgical intervention.  Using anatomic model and MRI imaging I outlined ACDF C4-5 C5-6 C6-7 with partial C5 corpectomy.  I outlined the material risks and anticipated postoperative course with the procedure.  I advised her that smoking increases the risk for failed fusion or other complications.  I explained that after adequate recovery she would also benefit from lumbar decompression and fusion L3-5.  I outlined nonoperative treatment options relative to both cervical and lumbar pathology.  She verbalized understanding of all the above.  She states that her symptoms have become worse and are increasingly interfering with quality of life and ADLs.  She is eager for definitive resolution of her symptoms.  She elects to proceed with the ACDF C4-7.  We will request preoperative medical clearance.  I ordered DEXA bone density for surgical planning.  Regarding her mechanical left hip pain I ordered a left hip AP and lateral x-ray.  MRI left hip may also be indicated.    Yvetteel King" was seen today for follow-up.    Diagnoses and all orders for this visit:    Left hip pain    DDD " (degenerative disc disease), cervical    Stenosis of cervical spine with myelopathy    Cervical radiculopathy    Lumbar stenosis without neurogenic claudication    Lumbar radiculopathy    DDD (degenerative disc disease), lumbar      I spent a total of 40 minutes on the day of the visit.  This includes face to face time and non-face to face time preparing to see the patient (eg, review of tests), obtaining and/or reviewing separately obtained history, documenting clinical information in the electronic or other health record, independently interpreting results and communicating results to the patient/family/caregiver, or care coordinator.

## 2024-07-15 NOTE — H&P (VIEW-ONLY)
History of Present Illness:  Yvette Hutchinson is a 63 y.o. female with past medical history of chronic pain syndrome, chronic obstructive lung disease, lung cancer status post left lobectomy, and esophageal stricture referred to us by JOSE ANTONIO Reed (PCP) for chronic cervical and lumbar pain.  Today, patient reports chronic cervical pain and back pain which has progressed in last 2 months after a mechanical fall.  Posterior cervical pain radiates to bilateral shoulders only, left shoulder greater than right. Cervical pain exacerbated with cervical rotation, flexion and extension.  She also reports bilateral hand numbness/tingling and difficulty opening water bottles.  Furthermore, she reports chronic lumbar pain that radiates to left posterolateral thigh and calf.  She has numbness and tingling in both feet, left greater than right.  She has trialed muscle relaxers which aid with sleep.  Also reports stress incontinence.  She denies falls, imbalance, hand clumsiness, recent physical therapy, epidural steroid injections, previous cervical or lumbar surgery.     Of note patient intermittently use home oxygen for chronic obstructive lung disease.     AP/AC: none  Smoking status-: Smoker (1 cigarette ppd x 20 yrs)      Interval history July 15, 2024: She follows up with additional imaging for review.  She was initially evaluated by Neurosurgery physician assistant.  She endorses ongoing neck pain with radiation into the left-greater-than-right shoulder.  She reports constant left forearm pain with dropping objects using the left hand.  She reports her left forearm symptoms seemed to worsen after left carpal tunnel release.  She reports persistent bilateral hand numbness with hand incoordination.  She endorses daily bladder urgency for me.  She notes that she wears on a daily basis due to concerns about urgency more so than stress incontinence.      In addition to above, she reports left groin pain.  She  states that upon standing her left hip and leg catches.  After walking a few feet the groin pain and catching sensation resolved.  She has not had previous imaging of the left hip.    She relates that she has substantially decreased her smoking to 1 cigarette every 2 days.    She has a history of lung cancer, in remission    Cervical adenopathy is being followed by her oncologist    Review of patient's allergies indicates:  No Known Allergies     Past Surgical History:   Procedure Laterality Date    INJECTION OF ANESTHETIC AGENT AROUND MULTIPLE INTERCOSTAL NERVES Left 10/3/2022    Procedure: BLOCK, NERVE, INTERCOSTAL, 2 OR MORE;  Surgeon: Evelio Kaplan MD;  [facility];  Service: Thoracic;  Laterality: Left;    INSERTION OF TUNNELED CENTRAL VENOUS CATHETER (CVC) WITH SUBCUTANEOUS PORT Right 11/3/2022    Procedure: [id number], Right or Left Neck or Chest;  Surgeon: Mini Acevedo MD;  [facility];  Service: General;  Laterality: Right;    ROBOT-ASSISTED LAPAROSCOPIC LYMPHADENECTOMY USING DA MONIQUE XI Left 10/3/2022    Procedure: XI ROBOTIC LYMPHADENECTOMY;  Surgeon: Evelio Kaplan MD;  [facility];  Service: Thoracic;  Laterality: Left;    TONSILLECTOMY      XI ROBOTIC RATS,WITH LOBECTOMY,LUNG Left 10/3/2022    Procedure: XI ROBOTIC RATS,WITH LOBECTOMY,LUNG;  Surgeon: Evelio Kaplan MD;  [facility];  Service: Thoracic;  Laterality: Left;       Current Medications          Current Outpatient Medications   Medication Sig Dispense Refill    acetaminophen (TYLENOL) 500 MG tablet Take 2 tablets (1,000 mg total) by mouth every 6 (six) hours as needed for Pain. 8 tablet 0    albuterol (PROVENTIL/VENTOLIN HFA) 90 mcg/actuation inhaler Inhale 2 puffs into the lungs every 6 (six) hours as needed for Wheezing or Shortness of Breath. Rescue 8.5 g 11    buPROPion (WELLBUTRIN XL) 300 MG 24 hr tablet Take 1 tablet (300 mg total) by mouth  once daily. 90 tablet 3    celecoxib (CELEBREX) 200 MG capsule Take 2 capsules (400 mg total) by mouth once daily. 180 capsule 4    citalopram (CELEXA) 10 MG tablet Take 10 mg by mouth.        citalopram (CELEXA) 20 MG tablet Take 1 tablet (20 mg total) by mouth once daily. 30 tablet 11    diphenhydrAMINE-aluminum-magnesium hydroxide-simethicone-LIDOcaine HCl 2% Swish and spit 15 mLs every 4 (four) hours as needed (mouth sores). 100 each 2    fluticasone propionate (FLONASE) 50 mcg/actuation nasal spray 1 spray (50 mcg total) by Each Nostril route once daily. 16 g 3    fluticasone-salmeterol 230-21 mcg/dose (ADVAIR HFA) 230-21 mcg/actuation HFAA inhaler Inhale 2 puffs into the lungs 2 (two) times daily. Controller 12 g 5    gabapentin (NEURONTIN) 300 MG capsule Take 1 capsule (300 mg total) by mouth 3 (three) times daily. 270 capsule 1    levocetirizine (XYZAL) 5 MG tablet Take 1 tablet (5 mg total) by mouth every evening. 30 tablet 5    levothyroxine (SYNTHROID) 50 MCG tablet Take 1 tablet (50 mcg total) by mouth before breakfast. 90 tablet 3    LIDOcaine (LIDODERM) 5 % Place 1 patch onto the skin once daily. Remove & Discard patch within 12 hours or as directed by MD Bloom patch 1    LIDOcaine-prilocaine (EMLA) cream Apply topically as needed (apply to port site 30 min before access). 30 g 0    naloxone (NARCAN) 4 mg/actuation Spry ADMINISTER A SINGLE SPRAY IN ONE NOSTRIL UPON SIGNS OF OPIOID OVERDOSE. CALL 911. REPEAT AFTER 3 MINUTES IF NO RESPONSE.        ondansetron (ZOFRAN-ODT) 8 MG TbDL Take 1 tablet (8 mg total) by mouth every 12 (twelve) hours as needed (nausea). 30 tablet 1    pravastatin (PRAVACHOL) 10 MG tablet Take 1 tablet (10 mg total) by mouth once daily. 90 tablet 3    predniSONE (DELTASONE) 20 MG tablet Take one pill a day for three days, repeat for shortness of breath 12 tablet 0    topiramate (TOPAMAX) 100 MG tablet Take 1 tablet (100 mg total) by mouth once daily. 30 tablet 11    varenicline  (CHANTIX) 1 mg Tab Take 1 tablet by mouth twice daily 180 tablet 0    (Magic mouthwash) 1:1:1 diphenhydrAMINE(Benadryl) 12.5mg/5ml liq, aluminum & magnesium hydroxide-simethicone (Maalox), LIDOcaine viscous 2% Swish and spit 10 mLs 3 (three) times daily. for mouth sores (Patient not taking: Reported on 6/25/2024) 250 mL 0    albuterol-ipratropium (DUO-NEB) 2.5 mg-0.5 mg/3 mL nebulizer solution Take 3 mLs by nebulization every 6 (six) hours as needed for Wheezing. Rescue 120 each 5    ALPRAZolam (XANAX) 0.5 MG tablet Take 1 tablet (0.5 mg total) by mouth 3 (three) times daily. 90 tablet 0    ibuprofen (ADVIL,MOTRIN) 800 MG tablet Take 1 tablet (800 mg total) by mouth 3 (three) times daily. 180 tablet 0    OLANZapine (ZYPREXA) 5 MG tablet Take 1 tablet (5 mg total) by mouth nightly. Take 1 tablet at bedtime on days 1-4 of each cycle of chemotherapy 30 tablet 2      No current facility-administered medications for this visit.            Family History         Problem Relation (Age of Onset)     Breast cancer Cousin     Ovarian cancer Sister             Social History            Socioeconomic History    Marital status: Legally    Tobacco Use    Smoking status: Some Days       Current packs/day: 0.15       Types: Cigarettes       Passive exposure: Current    Smokeless tobacco: Never    Tobacco comments:       reports one cigarette every now and then   Substance and Sexual Activity    Alcohol use: Yes       Comment: rarely    Drug use: Yes       Types: Marijuana      Social Determinants of Health           Financial Resource Strain: Medium Risk (12/14/2023)     Overall Financial Resource Strain (CARDIA)      Difficulty of Paying Living Expenses: Somewhat hard   Food Insecurity: Patient Declined (12/14/2023)     Hunger Vital Sign      Worried About Running Out of Food in the Last Year: Patient declined      Ran Out of Food in the Last Year: Patient declined   Transportation Needs: No Transportation Needs  "(12/14/2023)     PRAPARE - Transportation      Lack of Transportation (Medical): No      Lack of Transportation (Non-Medical): No   Physical Activity: Unknown (12/14/2023)     Exercise Vital Sign      Days of Exercise per Week: 3 days   Stress: Stress Concern Present (12/14/2023)     [employer] - Occupational Stress Questionnaire      Feeling of Stress : To some extent   Housing Stability: High Risk (12/14/2023)     Housing Stability Vital Sign      Unable to Pay for Housing in the Last Year: Yes      Number of Places Lived in the Last Year: 1      Unstable Housing in the Last Year: No         OBJECTIVE:      Vital Signs  Pulse: 87  BP: 112/72  Pain Score: 10-Worst pain ever  Height: 5' 3" (160 cm)  Weight: 76.6 kg (168 lb 15.7 oz)  Body mass index is 29.93 kg/m².        Neurosurgery Physical Exam  General:  No acute distress.  Neurologic: Alert and oriented. Thought content appropriate.  GCS: E4 V5 M6; Total: 15  Mental Status: Awake, Alert, Oriented   Pulmonary: normal respirations, no signs of respiratory distress     Sensory: intact to light touch throughout  Motor Strength: Moves all extremities spontaneously with good tone.  No abnormal movements seen.   Bilateral upper extremity strength deltoid/biceps/triceps/hand  5/5   Bilateral lower extremity strength iliopsoas/TA/EHL/gastrocnemius 5/5      Hyperreflexic all extremities 3+/4  Absent Rosie's sign  Tandem walking abnormal      Gait:  Antalgic left     Cervical:   Midline TTP:  Positive.     Thoracic:  Midline TTP: Negative.     Lumbar:  Midline TTP:  Positive.  Straight Leg Test: Negative.     Other:  SI joint TTP: Negative.  Greater trochanter TTP: Negative.  JO positive left    Significant radiographic findings: I reviewed MRI cervical, thoracic, lumbar in detail with the patient today.  Date of study 06/1924, 07/03/2024.  Pertinent findings in the cervical region include multilevel disc degeneration with " "multifocal disc protrusions contributing to moderate canal and moderate to severe severe bilateral foraminal stenosis C4-5, severe canal stenosis C5-6 with loss of CSF signal circumferentially.  Disc material at C5-6 extends above and below the disc space.  Consequently, the severe stenosis extends from midbody of C5 to the upper 1/3 of C6.  Severe bilateral foraminal stenosis C5-6.  Moderate canal stenosis C6-7, moderate to severe bilateral foraminal stenosis C6-7.  Chronic T8 compression deformity with kyphosis.  No significant thoracic spinal stenosis.  In lumbar region, disc degeneration with severe canal stenosis L3-4 L4-5.    Analysis:  She has multilevel cervical degenerative disc disease with canal compromise contributing to left cervical radiculopathy and mild cervical myelopathy.  Given her findings she would benefit from surgical intervention.  Using anatomic model and MRI imaging I outlined ACDF C4-5 C5-6 C6-7 with partial C5 corpectomy.  I outlined the material risks and anticipated postoperative course with the procedure.  I advised her that smoking increases the risk for failed fusion or other complications.  I explained that after adequate recovery she would also benefit from lumbar decompression and fusion L3-5.  I outlined nonoperative treatment options relative to both cervical and lumbar pathology.  She verbalized understanding of all the above.  She states that her symptoms have become worse and are increasingly interfering with quality of life and ADLs.  She is eager for definitive resolution of her symptoms.  She elects to proceed with the ACDF C4-7.  We will request preoperative medical clearance.  I ordered DEXA bone density for surgical planning.  Regarding her mechanical left hip pain I ordered a left hip AP and lateral x-ray.  MRI left hip may also be indicated.    Yvetteel King" was seen today for follow-up.    Diagnoses and all orders for this visit:    Left hip pain    DDD " (degenerative disc disease), cervical    Stenosis of cervical spine with myelopathy    Cervical radiculopathy    Lumbar stenosis without neurogenic claudication    Lumbar radiculopathy    DDD (degenerative disc disease), lumbar      I spent a total of 40 minutes on the day of the visit.  This includes face to face time and non-face to face time preparing to see the patient (eg, review of tests), obtaining and/or reviewing separately obtained history, documenting clinical information in the electronic or other health record, independently interpreting results and communicating results to the patient/family/caregiver, or care coordinator.

## 2024-07-16 ENCOUNTER — OFFICE VISIT (OUTPATIENT)
Dept: FAMILY MEDICINE | Facility: CLINIC | Age: 64
End: 2024-07-16
Payer: COMMERCIAL

## 2024-07-16 VITALS
OXYGEN SATURATION: 95 % | BODY MASS INDEX: 29.8 KG/M2 | DIASTOLIC BLOOD PRESSURE: 60 MMHG | WEIGHT: 168.19 LBS | HEART RATE: 65 BPM | RESPIRATION RATE: 16 BRPM | SYSTOLIC BLOOD PRESSURE: 116 MMHG | HEIGHT: 63 IN

## 2024-07-16 DIAGNOSIS — E66.3 OVERWEIGHT (BMI 25.0-29.9): ICD-10-CM

## 2024-07-16 DIAGNOSIS — E03.9 HYPOTHYROIDISM, UNSPECIFIED TYPE: ICD-10-CM

## 2024-07-16 DIAGNOSIS — M48.061 LUMBAR STENOSIS WITHOUT NEUROGENIC CLAUDICATION: ICD-10-CM

## 2024-07-16 DIAGNOSIS — G99.2 STENOSIS OF CERVICAL SPINE WITH MYELOPATHY: ICD-10-CM

## 2024-07-16 DIAGNOSIS — Z01.818 PREOP EXAMINATION: Primary | ICD-10-CM

## 2024-07-16 DIAGNOSIS — I70.0 AORTIC ATHEROSCLEROSIS: ICD-10-CM

## 2024-07-16 DIAGNOSIS — M48.02 STENOSIS OF CERVICAL SPINE WITH MYELOPATHY: ICD-10-CM

## 2024-07-16 DIAGNOSIS — C91.10 CLL (CHRONIC LYMPHOCYTIC LEUKEMIA): ICD-10-CM

## 2024-07-16 DIAGNOSIS — C34.01 MALIGNANT NEOPLASM OF HILUS OF RIGHT LUNG: ICD-10-CM

## 2024-07-16 PROCEDURE — 3074F SYST BP LT 130 MM HG: CPT | Mod: CPTII,S$GLB,,

## 2024-07-16 PROCEDURE — 1160F RVW MEDS BY RX/DR IN RCRD: CPT | Mod: CPTII,S$GLB,,

## 2024-07-16 PROCEDURE — 1159F MED LIST DOCD IN RCRD: CPT | Mod: CPTII,S$GLB,,

## 2024-07-16 PROCEDURE — 3008F BODY MASS INDEX DOCD: CPT | Mod: CPTII,S$GLB,,

## 2024-07-16 PROCEDURE — 3044F HG A1C LEVEL LT 7.0%: CPT | Mod: CPTII,S$GLB,,

## 2024-07-16 PROCEDURE — 99999 PR PBB SHADOW E&M-EST. PATIENT-LVL V: CPT | Mod: PBBFAC,,,

## 2024-07-16 PROCEDURE — 99214 OFFICE O/P EST MOD 30 MIN: CPT | Mod: S$GLB,,,

## 2024-07-16 PROCEDURE — 3078F DIAST BP <80 MM HG: CPT | Mod: CPTII,S$GLB,,

## 2024-07-16 RX ORDER — TOPIRAMATE 100 MG/1
100 TABLET, FILM COATED ORAL 2 TIMES DAILY
Qty: 60 TABLET | Refills: 11 | Status: SHIPPED | OUTPATIENT
Start: 2024-07-16 | End: 2025-07-16

## 2024-07-16 NOTE — PROGRESS NOTES
Patient ID: Yvette Hutchinson is a 63 y.o. female.    Chief Complaint: Pre-op Exam        Preop. Surgery via Dr. Daley. He has testing ordered. Denies adverse reaction to general anesthesia previously. No excessive bleeding or bruising. Reviewed meds. Advised to hold NSAIDs ASA one week prior. No experienced CP or SOB. He/She could walk city blocks or flights of stairs without experiencing CP or SOB. Denies issues with IMELDA in the past.           Past Medical History:   Diagnosis Date    Arthritis     Cancer     Depression     GERD (gastroesophageal reflux disease)     Pneumonia of left lung due to infectious organism 03/05/2020                Current Outpatient Medications:     (Magic mouthwash) 1:1:1 diphenhydrAMINE(Benadryl) 12.5mg/5ml liq, aluminum & magnesium hydroxide-simethicone (Maalox), LIDOcaine viscous 2%, Swish and spit 10 mLs 3 (three) times daily. for mouth sores, Disp: 250 mL, Rfl: 0    acetaminophen (TYLENOL) 500 MG tablet, Take 2 tablets (1,000 mg total) by mouth every 6 (six) hours as needed for Pain., Disp: 8 tablet, Rfl: 0    albuterol (PROVENTIL/VENTOLIN HFA) 90 mcg/actuation inhaler, Inhale 2 puffs into the lungs every 6 (six) hours as needed for Wheezing or Shortness of Breath. Rescue, Disp: 8.5 g, Rfl: 11    buPROPion (WELLBUTRIN XL) 300 MG 24 hr tablet, Take 1 tablet (300 mg total) by mouth once daily., Disp: 90 tablet, Rfl: 3    celecoxib (CELEBREX) 200 MG capsule, Take 2 capsules (400 mg total) by mouth once daily., Disp: 180 capsule, Rfl: 4    citalopram (CELEXA) 10 MG tablet, Take 10 mg by mouth., Disp: , Rfl:     citalopram (CELEXA) 20 MG tablet, Take 1 tablet (20 mg total) by mouth once daily., Disp: 30 tablet, Rfl: 11    diphenhydrAMINE-aluminum-magnesium hydroxide-simethicone-LIDOcaine HCl 2%, Swish and spit 15 mLs every 4 (four) hours as needed (mouth sores)., Disp: 100 each, Rfl: 2    fluticasone propionate (FLONASE) 50 mcg/actuation nasal spray, 1 spray (50 mcg total) by Each  Nostril route once daily., Disp: 16 g, Rfl: 3    fluticasone-salmeterol 230-21 mcg/dose (ADVAIR HFA) 230-21 mcg/actuation HFAA inhaler, Inhale 2 puffs into the lungs 2 (two) times daily. Controller, Disp: 12 g, Rfl: 5    gabapentin (NEURONTIN) 300 MG capsule, Take 1 capsule (300 mg total) by mouth 3 (three) times daily., Disp: 270 capsule, Rfl: 1    ibuprofen (ADVIL,MOTRIN) 800 MG tablet, Take 1 tablet (800 mg total) by mouth 3 (three) times daily., Disp: 180 tablet, Rfl: 0    levocetirizine (XYZAL) 5 MG tablet, Take 1 tablet (5 mg total) by mouth every evening., Disp: 30 tablet, Rfl: 5    levothyroxine (SYNTHROID) 50 MCG tablet, Take 1 tablet (50 mcg total) by mouth before breakfast., Disp: 90 tablet, Rfl: 3    LIDOcaine (LIDODERM) 5 %, Place 1 patch onto the skin once daily. Remove & Discard patch within 12 hours or as directed by MD, Disp: 7 patch, Rfl: 1    LIDOcaine-prilocaine (EMLA) cream, Apply topically as needed (apply to port site 30 min before access)., Disp: 30 g, Rfl: 0    naloxone (NARCAN) 4 mg/actuation Spry, ADMINISTER A SINGLE SPRAY IN ONE NOSTRIL UPON SIGNS OF OPIOID OVERDOSE. CALL 911. REPEAT AFTER 3 MINUTES IF NO RESPONSE., Disp: , Rfl:     ondansetron (ZOFRAN-ODT) 8 MG TbDL, Take 1 tablet (8 mg total) by mouth every 12 (twelve) hours as needed (nausea)., Disp: 30 tablet, Rfl: 1    pravastatin (PRAVACHOL) 10 MG tablet, Take 1 tablet (10 mg total) by mouth once daily., Disp: 90 tablet, Rfl: 3    predniSONE (DELTASONE) 20 MG tablet, Take one pill a day for three days, repeat for shortness of breath, Disp: 12 tablet, Rfl: 0    topiramate (TOPAMAX) 100 MG tablet, Take 1 tablet (100 mg total) by mouth once daily., Disp: 30 tablet, Rfl: 11    varenicline (CHANTIX) 1 mg Tab, Take 1 tablet by mouth twice daily, Disp: 180 tablet, Rfl: 0    albuterol-ipratropium (DUO-NEB) 2.5 mg-0.5 mg/3 mL nebulizer solution, Take 3 mLs by nebulization every 6 (six) hours as needed for Wheezing. Rescue, Disp: 120 each,  Rfl: 5    ALPRAZolam (XANAX) 0.5 MG tablet, Take 1 tablet (0.5 mg total) by mouth 3 (three) times daily., Disp: 90 tablet, Rfl: 0    OLANZapine (ZYPREXA) 5 MG tablet, Take 1 tablet (5 mg total) by mouth nightly. Take 1 tablet at bedtime on days 1-4 of each cycle of chemotherapy, Disp: 30 tablet, Rfl: 2    The 10-year ASCVD risk score (Justin PORTILLO, et al., 2019) is: 11%    Values used to calculate the score:      Age: 63 years      Sex: Female      Is Non- : No      Diabetic: No      Tobacco smoker: Yes      Systolic Blood Pressure: 116 mmHg      Is BP treated: No      HDL Cholesterol: 44 mg/dL      Total Cholesterol: 308 mg/dL     Wt Readings from Last 3 Encounters:   07/16/24 76.3 kg (168 lb 3.4 oz)   06/25/24 76.6 kg (168 lb 15.7 oz)   06/07/24 77.2 kg (170 lb 3.1 oz)     Temp Readings from Last 3 Encounters:   06/07/24 97.8 °F (36.6 °C) (Oral)   06/03/24 97.2 °F (36.2 °C) (Temporal)   03/12/24 98.2 °F (36.8 °C) (Oral)     BP Readings from Last 3 Encounters:   07/16/24 116/60   07/15/24 113/71   06/25/24 112/72     Pulse Readings from Last 3 Encounters:   07/16/24 65   07/15/24 62   06/25/24 87     Resp Readings from Last 3 Encounters:   07/16/24 16   06/07/24 18   06/03/24 16     PF Readings from Last 3 Encounters:   No data found for PF     SpO2 Readings from Last 3 Encounters:   07/16/24 95%   06/07/24 (!) 94%   06/03/24 99%        Lab Results   Component Value Date    HGBA1C 5.0 02/07/2024    HGBA1C 4.9 03/04/2021     Lab Results   Component Value Date    LDLCALC 220.0 (H) 02/07/2024    CREATININE 1.1 05/27/2024       Review of Systems   Constitutional:  Positive for activity change.   Musculoskeletal:  Positive for arthralgias, back pain and gait problem.           Objective:      Physical Exam  Vitals reviewed.   Constitutional:       General: She is not in acute distress.     Appearance: Normal appearance. She is not ill-appearing, toxic-appearing or diaphoretic.   HENT:      Head:  Normocephalic.      Right Ear: External ear normal.      Left Ear: External ear normal.      Nose: Nose normal. No congestion or rhinorrhea.      Mouth/Throat:      Mouth: Mucous membranes are moist.      Pharynx: Oropharynx is clear.   Eyes:      General: No scleral icterus.        Right eye: No discharge.         Left eye: No discharge.      Extraocular Movements: Extraocular movements intact.      Conjunctiva/sclera: Conjunctivae normal.   Cardiovascular:      Rate and Rhythm: Normal rate and regular rhythm.      Pulses: Normal pulses.      Heart sounds: Normal heart sounds. No murmur heard.     No friction rub. No gallop.   Pulmonary:      Effort: Pulmonary effort is normal. No respiratory distress.      Breath sounds: Normal breath sounds. No wheezing, rhonchi or rales.   Chest:      Chest wall: No tenderness.   Musculoskeletal:         General: No swelling, tenderness or deformity. Normal range of motion.      Cervical back: Normal range of motion.      Right lower leg: No edema.      Left lower leg: No edema.   Skin:     General: Skin is warm and dry.      Capillary Refill: Capillary refill takes less than 2 seconds.      Coloration: Skin is not jaundiced.      Findings: No bruising, erythema, lesion or rash.   Neurological:      Mental Status: She is alert and oriented to person, place, and time.             Screening recommendations appropriate to age and health status were reviewed.    STOP BAN points  STOP-BANG  Low  Risk for moderate to severe IMELDA    Preop examination        -    Pt is medically optimized for upcoming procedure. Proceed as scheduled.     Overweight (BMI 25.0-29.9)  -     topiramate (TOPAMAX) 100 MG tablet; Take 1 tablet (100 mg total) by mouth 2 (two) times daily.  Dispense: 60 tablet; Refill: 11        -     Wants to go up on topamax. Injectable therapy not likely covered    Stenosis of cervical spine with myelopathy       -    Surgery via Dr. Daley     Lumbar stenosis without  neurogenic claudication       -    Surgery via Dr. Daley     Aortic atherosclerosis        -    Stable. Continue meds. Will continue to monitor.     Hypothyroidism, unspecified type        -    Stable. Continue meds. Will continue to monitor.     CLL (chronic lymphocytic leukemia)      -    Stable. Follows with oncology. Will continue to follow.     Malignant neoplasm of hilus of right lung        -    Stable. Follow with onc routinely.  Will continue to monitor.         RCRI risk factors include: no known RCRI risk factors. As such, per RCRI the risk of cardiac death, nonfatal myocardial infarction, or nonfatal cardiac arrest is 0.4% and the risk of myocardial infarction, pulmonary edema, ventricular fibrillation, primary cardiac arrest, or complete heart block is 0.5%.  Overall this patient can be considered low risk for this intermediate risk procedure. No further cardiac testing is recommended at this time.     Patient denies any symptoms (as per HPI) concerning for undiagnosed lung disease including IMELDA. Would not recommend obtaining chest X-ray, sleep study, or PFTs at this time. Patient was counseled on the importance of quitting smoking ideally 8 weeks prior to surgery. We discussed the benefits of early mobilization and deep breathing after surgery.      Screened patient for alcohol misuse, use of illicit drugs, and personal or family history of anesthetic complications or bleeding diathesis and no substantial concerns were identified.     All current medications were reviewed and at this time no changes to medications are recommended prior to surgery. Avoid NSAIDs ASA one week prior.     I recommend use of standard pre-op and post-op precautions for this patient. In my opinion, she is medically optimized for this procedure, and can proceed without further evaluation.

## 2024-07-17 ENCOUNTER — PATIENT MESSAGE (OUTPATIENT)
Dept: FAMILY MEDICINE | Facility: CLINIC | Age: 64
End: 2024-07-17
Payer: COMMERCIAL

## 2024-07-17 ENCOUNTER — TELEPHONE (OUTPATIENT)
Dept: FAMILY MEDICINE | Facility: CLINIC | Age: 64
End: 2024-07-17
Payer: COMMERCIAL

## 2024-07-17 DIAGNOSIS — C34.01 MALIGNANT NEOPLASM OF HILUS OF RIGHT LUNG: ICD-10-CM

## 2024-07-17 RX ORDER — LIDOCAINE 50 MG/G
1 PATCH TOPICAL DAILY
Qty: 7 PATCH | Refills: 4 | Status: SHIPPED | OUTPATIENT
Start: 2024-07-17

## 2024-07-17 NOTE — TELEPHONE ENCOUNTER
[place] pharmacist. Sts lidocaine patch 5% only comes 30 to a box. Gave ok to dispense one box (30) with no refills. Verbal readback by pharmacist

## 2024-07-17 NOTE — TELEPHONE ENCOUNTER
----- Message from Gia Self sent at 7/17/2024  3:00 PM CDT -----  Contact: pharm  Type: Needs Medical Advice         Who Called: Pharm  Best Call Back Number: [phone]  Additional Information: Requesting a call back regarding  rx for LIDOcaine (LIDODERM) 5 % only comes in box of 30. They need office to call and give a verbal auth to change the rx.     [facility] [address] [place] [id number] [employer]  [address] [facility] [address]  Phone: [phone] Fax: [phone]

## 2024-07-18 ENCOUNTER — HOSPITAL ENCOUNTER (OUTPATIENT)
Dept: RADIOLOGY | Facility: HOSPITAL | Age: 64
Discharge: HOME | End: 2024-07-18
Attending: NEUROLOGICAL SURGERY
Payer: COMMERCIAL

## 2024-07-18 DIAGNOSIS — M43.22 CERVICAL VERTEBRAL FUSION: Primary | ICD-10-CM

## 2024-07-18 DIAGNOSIS — Z01.818 PRE-OP EXAM: ICD-10-CM

## 2024-07-18 DIAGNOSIS — M48.02 CERVICAL SPINAL STENOSIS: ICD-10-CM

## 2024-07-18 DIAGNOSIS — M81.0 OSTEOPOROSIS WITHOUT CURRENT PATHOLOGICAL FRACTURE, UNSPECIFIED OSTEOPOROSIS TYPE: ICD-10-CM

## 2024-07-18 DIAGNOSIS — M25.559 HIP PAIN, UNSPECIFIED LATERALITY: ICD-10-CM

## 2024-07-18 PROCEDURE — 73502 X-RAY EXAM HIP UNI 2-3 VIEWS: CPT | Mod: 26,LT,, | Performed by: RADIOLOGY

## 2024-07-18 PROCEDURE — 77080 DXA BONE DENSITY AXIAL: CPT | Mod: TC,PO

## 2024-07-18 PROCEDURE — 72040 X-RAY EXAM NECK SPINE 2-3 VW: CPT | Mod: TC,PO

## 2024-07-18 PROCEDURE — 71046 X-RAY EXAM CHEST 2 VIEWS: CPT | Mod: TC,PO

## 2024-07-18 PROCEDURE — 71046 X-RAY EXAM CHEST 2 VIEWS: CPT | Mod: 26,,, | Performed by: RADIOLOGY

## 2024-07-18 PROCEDURE — 73502 X-RAY EXAM HIP UNI 2-3 VIEWS: CPT | Mod: TC,PO,LT

## 2024-07-18 PROCEDURE — 77080 DXA BONE DENSITY AXIAL: CPT | Mod: 26,,, | Performed by: RADIOLOGY

## 2024-07-19 ENCOUNTER — PATIENT OUTREACH (OUTPATIENT)
Dept: ADMINISTRATIVE | Facility: HOSPITAL | Age: 64
End: 2024-07-19
Payer: COMMERCIAL

## 2024-07-19 DIAGNOSIS — Z12.31 ENCOUNTER FOR SCREENING MAMMOGRAM FOR MALIGNANT NEOPLASM OF BREAST: Primary | ICD-10-CM

## 2024-07-19 NOTE — PROGRESS NOTES
Population Health Chart Review & Patient Outreach Details      Additional [facility] Health Notes:               Updates Requested / Reviewed:      Updated Care Coordination Note, Care Everywhere, , and External Sources: DIS         Health Maintenance Topics Overdue:      VBHM Score: 1     Mammogram    RSV Vaccine                  Health Maintenance Topic(s) Outreach Outcomes & Actions Taken:    Colorectal Cancer Screening - Outreach Outcomes & Actions Taken  : External Records Uploaded, Care Team Updated, & History Updated if Applicable    Breast Cancer Screening - Outreach Outcomes & Actions Taken  : Mammogram Order Placed

## 2024-07-22 ENCOUNTER — CLINICAL SUPPORT (OUTPATIENT)
Dept: REHABILITATION | Facility: HOSPITAL | Age: 64
End: 2024-07-22
Payer: COMMERCIAL

## 2024-07-22 ENCOUNTER — PATIENT MESSAGE (OUTPATIENT)
Dept: NEUROSURGERY | Facility: CLINIC | Age: 64
End: 2024-07-22
Payer: COMMERCIAL

## 2024-07-22 DIAGNOSIS — R29.898 DECREASED RANGE OF MOTION OF NECK: Primary | ICD-10-CM

## 2024-07-22 PROCEDURE — 97162 PT EVAL MOD COMPLEX 30 MIN: CPT | Mod: PN

## 2024-07-22 PROCEDURE — 97530 THERAPEUTIC ACTIVITIES: CPT | Mod: PN

## 2024-07-23 PROBLEM — R29.898 DECREASED RANGE OF MOTION OF NECK: Status: ACTIVE | Noted: 2024-07-23
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
## 2024-07-24 ENCOUNTER — HOSPITAL ENCOUNTER (OUTPATIENT)
Dept: PREADMISSION TESTING | Facility: HOSPITAL | Age: 64
Discharge: HOME | End: 2024-07-24
Attending: NEUROLOGICAL SURGERY
Payer: COMMERCIAL

## 2024-07-24 VITALS
BODY MASS INDEX: 29.8 KG/M2 | RESPIRATION RATE: 18 BRPM | OXYGEN SATURATION: 97 % | WEIGHT: 168.19 LBS | TEMPERATURE: 98 F | HEART RATE: 60 BPM | DIASTOLIC BLOOD PRESSURE: 68 MMHG | HEIGHT: 63 IN | SYSTOLIC BLOOD PRESSURE: 104 MMHG

## 2024-07-24 DIAGNOSIS — M48.02 CERVICAL SPINAL STENOSIS: ICD-10-CM

## 2024-07-24 DIAGNOSIS — M79.603 PAIN OF UPPER EXTREMITY, UNSPECIFIED LATERALITY: ICD-10-CM

## 2024-07-24 DIAGNOSIS — Z01.818 PRE-OP TESTING: Primary | ICD-10-CM

## 2024-07-24 DIAGNOSIS — Z01.818 PRE-OP EXAM: ICD-10-CM

## 2024-07-24 LAB
ANION GAP SERPL CALC-SCNC: 6 MMOL/L (ref 8–16)
APTT PPP: 29.1 SEC (ref 21–32)
BASOPHILS # BLD AUTO: ABNORMAL K/UL (ref 0–0.2)
BASOPHILS NFR BLD: 1 % (ref 0–1.9)
BUN SERPL-MCNC: 19 MG/DL (ref 8–23)
CALCIUM SERPL-MCNC: 8.9 MG/DL (ref 8.7–10.5)
CHLORIDE SERPL-SCNC: 107 MMOL/L (ref 95–110)
CO2 SERPL-SCNC: 25 MMOL/L (ref 23–29)
CREAT SERPL-MCNC: 1.1 MG/DL (ref 0.5–1.4)
DIFFERENTIAL METHOD BLD: ABNORMAL
EOSINOPHIL # BLD AUTO: ABNORMAL K/UL (ref 0–0.5)
EOSINOPHIL NFR BLD: 1 % (ref 0–8)
ERYTHROCYTE [DISTWIDTH] IN BLOOD BY AUTOMATED COUNT: 13.5 % (ref 11.5–14.5)
EST. GFR  (NO RACE VARIABLE): 55.8 ML/MIN/1.73 M^2
GLUCOSE SERPL-MCNC: 84 MG/DL (ref 70–110)
HCT VFR BLD AUTO: 37.8 % (ref 37–48.5)
HGB BLD-MCNC: 12 G/DL (ref 12–16)
IMM GRANULOCYTES # BLD AUTO: ABNORMAL K/UL (ref 0–0.04)
IMM GRANULOCYTES NFR BLD AUTO: ABNORMAL % (ref 0–0.5)
INR PPP: 0.9 (ref 0.8–1.2)
LYMPHOCYTES # BLD AUTO: ABNORMAL K/UL (ref 1–4.8)
LYMPHOCYTES NFR BLD: 85 % (ref 18–48)
MCH RBC QN AUTO: 30.8 PG (ref 27–31)
MCHC RBC AUTO-ENTMCNC: 31.7 G/DL (ref 32–36)
MCV RBC AUTO: 97 FL (ref 82–98)
MONOCYTES # BLD AUTO: ABNORMAL K/UL (ref 0.3–1)
MONOCYTES NFR BLD: 3 % (ref 4–15)
NEUTROPHILS NFR BLD: 10 % (ref 38–73)
NRBC BLD-RTO: 0 /100 WBC
OHS QRS DURATION: 82 MS
OHS QTC CALCULATION: 433 MS
PLATELET # BLD AUTO: 204 K/UL (ref 150–450)
PLATELET BLD QL SMEAR: ABNORMAL
PMV BLD AUTO: 10.6 FL (ref 9.2–12.9)
POTASSIUM SERPL-SCNC: 4 MMOL/L (ref 3.5–5.1)
PROTHROMBIN TIME: 10.5 SEC (ref 9–12.5)
RBC # BLD AUTO: 3.89 M/UL (ref 4–5.4)
SODIUM SERPL-SCNC: 138 MMOL/L (ref 136–145)
WBC # BLD AUTO: 38.58 K/UL (ref 3.9–12.7)

## 2024-07-24 PROCEDURE — 85007 BL SMEAR W/DIFF WBC COUNT: CPT | Performed by: NEUROLOGICAL SURGERY

## 2024-07-24 PROCEDURE — 85027 COMPLETE CBC AUTOMATED: CPT | Performed by: NEUROLOGICAL SURGERY

## 2024-07-24 PROCEDURE — 85610 PROTHROMBIN TIME: CPT | Performed by: NEUROLOGICAL SURGERY

## 2024-07-24 PROCEDURE — 80048 BASIC METABOLIC PNL TOTAL CA: CPT | Performed by: NEUROLOGICAL SURGERY

## 2024-07-24 PROCEDURE — 85730 THROMBOPLASTIN TIME PARTIAL: CPT | Performed by: NEUROLOGICAL SURGERY

## 2024-07-24 NOTE — DISCHARGE INSTRUCTIONS
To confirm, Your doctor has instructed you that surgery is scheduled for: August 6     Pre-Op will call the afternoon prior to surgery between 4:00 and 6:00 PM with the final arrival time.       1 Person can come with you the day of surgery.    Please park in the Garage Parking and come through front entrance.      GO TO REGISTRATION     After registration, proceed past gift shop and through glass door ( [facility]patient [place]) Check in at the nurses station to the left.   Do not eat or drink anything after midnight the night before your surgery - THIS INCLUDES  WATER, GUM, MINTS AND CANDY.  YOU MAY BRUSH YOUR TEETH BUT DO NOT SWALLOW     TAKE ONLY THESE MEDICATIONS WITH A SMALL SIP OF WATER THE MORNING OF YOUR PROCEDURE: Gabapentin bupropion Citalopram  Can use inhalers and flonase,         PLEASE NOTE:  The surgery schedule has many variables which may affect the time of your surgery case.  Family members should be available if your surgery time changes.  Plan to be here the day of your procedure between 4-6 hours.      DO NOT TAKE THESE MEDICATIONS 5-7 DAYS PRIOR to your procedure or per your surgeon's request: ASPIRIN, ALEVE, ADVIL, IBUPROFEN,  PIEDAD SELTZER, BC , FISH OIL , VITAMIN E, HERBALS  (May take Tylenol)                                                        IMPORTANT INSTRUCTIONS      Do not smoke, vape or drink alcoholic beverages 24 hours prior to your procedure.  Shower the night before AND the morning of your procedure with a Chlorhexidine wash such as Hibiclens or Dial antibacterial soap from the neck down.   No lotions, powder or oils on your skin after you shower. DO NOT WEAR DEODORANT   Do not get it on your face or in your eyes.  You may use your own shampoo and face wash. This helps your skin to be as bacteria free as possible.    DO NOT remove hair from the surgery site.  Do not shave the incision site unless you are given specific instructions to do so.    Sleep in a bed with clean sheets.     Do not sleep with a pet in the bed.   If you wear contact lenses, dentures, hearing aids or glasses, bring a container to put them in during surgery and give to a family member for safe keeping.    Please leave all jewelry, piercing's and valuables at home.   Wear rubber soled shoes (no flip flops).  If your doctor has scheduled you for an overnight stay, bring a small overnight bag with any personal items you need.    Make arrangements in advance for transportation home by a responsible adult.      You must make arrangements for transportation, TAXI'S, UBER'S OR LYFTS ARE NOT ALLOWED.        If you have any questions about these instructions, call (Monday - Friday) Pre-Op Admit  Nursing  at [phone] or the Pre-Op Day Surgery Unit at [phone].

## 2024-07-24 NOTE — PRE-PROCEDURE INSTRUCTIONS
Preadmit assessment complete. Review of pt health history and home medications.  Preop education per AVS. Pt voiced understanding      Advised not to smoke DOS

## 2024-07-26 DIAGNOSIS — R05.1 ACUTE COUGH: ICD-10-CM

## 2024-07-26 DIAGNOSIS — J41.0 SIMPLE CHRONIC BRONCHITIS: ICD-10-CM

## 2024-07-26 RX ORDER — LEVOCETIRIZINE DIHYDROCHLORIDE 5 MG/1
5 TABLET, FILM COATED ORAL NIGHTLY
Qty: 30 TABLET | Refills: 5 | Status: SHIPPED | OUTPATIENT
Start: 2024-07-26

## 2024-07-29 DIAGNOSIS — J41.0 SIMPLE CHRONIC BRONCHITIS: ICD-10-CM

## 2024-07-30 RX ORDER — FLUTICASONE PROPIONATE AND SALMETEROL XINAFOATE 230; 21 UG/1; UG/1
2 AEROSOL, METERED RESPIRATORY (INHALATION) 2 TIMES DAILY
Qty: 12 G | Refills: 5 | Status: SHIPPED | OUTPATIENT
Start: 2024-07-30 | End: 2025-07-30

## 2024-08-02 ENCOUNTER — LAB VISIT (OUTPATIENT)
Dept: LAB | Facility: HOSPITAL | Age: 64
End: 2024-08-02
Attending: NEUROLOGICAL SURGERY
Payer: COMMERCIAL

## 2024-08-02 DIAGNOSIS — Z01.818 PRE-OP TESTING: ICD-10-CM

## 2024-08-02 LAB
ABO + RH BLD: NORMAL
BLD GP AB SCN CELLS X3 SERPL QL: NORMAL
SPECIMEN OUTDATE: NORMAL

## 2024-08-02 PROCEDURE — 36415 COLL VENOUS BLD VENIPUNCTURE: CPT | Performed by: NEUROLOGICAL SURGERY

## 2024-08-02 PROCEDURE — 86850 RBC ANTIBODY SCREEN: CPT | Performed by: NEUROLOGICAL SURGERY

## 2024-08-02 PROCEDURE — 86900 BLOOD TYPING SEROLOGIC ABO: CPT | Performed by: NEUROLOGICAL SURGERY

## 2024-08-02 PROCEDURE — 86901 BLOOD TYPING SEROLOGIC RH(D): CPT | Performed by: NEUROLOGICAL SURGERY

## 2024-08-05 ENCOUNTER — TELEPHONE (OUTPATIENT)
Dept: NEUROSURGERY | Facility: CLINIC | Age: 64
End: 2024-08-05
Payer: COMMERCIAL

## 2024-08-06 ENCOUNTER — ANESTHESIA (OUTPATIENT)
Dept: SURGERY | Facility: HOSPITAL | Age: 64
End: 2024-08-06
Payer: COMMERCIAL

## 2024-08-06 ENCOUNTER — ANESTHESIA EVENT (OUTPATIENT)
Dept: SURGERY | Facility: HOSPITAL | Age: 64
End: 2024-08-06
Payer: COMMERCIAL

## 2024-08-06 ENCOUNTER — HOSPITAL ENCOUNTER (INPATIENT)
Facility: HOSPITAL | Age: 64
LOS: 2 days | Discharge: HOME HEALTH | DRG: 472 | End: 2024-08-08
Attending: NEUROLOGICAL SURGERY | Admitting: NEUROLOGICAL SURGERY
Payer: COMMERCIAL

## 2024-08-06 DIAGNOSIS — Z01.818 PRE-OP TESTING: Primary | ICD-10-CM

## 2024-08-06 DIAGNOSIS — Z98.1 STATUS POST CERVICAL SPINAL FUSION: ICD-10-CM

## 2024-08-06 DIAGNOSIS — M48.02 CERVICAL SPINAL STENOSIS: ICD-10-CM

## 2024-08-06 PROCEDURE — 36000710: Performed by: NEUROLOGICAL SURGERY

## 2024-08-06 PROCEDURE — 25000003 PHARM REV CODE 250: Performed by: HEALTH CARE PROVIDER

## 2024-08-06 PROCEDURE — 94799 UNLISTED PULMONARY SVC/PX: CPT

## 2024-08-06 PROCEDURE — 20930 SP BONE ALGRFT MORSEL ADD-ON: CPT | Mod: ,,, | Performed by: NEUROLOGICAL SURGERY

## 2024-08-06 PROCEDURE — 22551 ARTHRD ANT NTRBDY CERVICAL: CPT | Mod: 59,,, | Performed by: NEUROLOGICAL SURGERY

## 2024-08-06 PROCEDURE — 25000003 PHARM REV CODE 250: Performed by: NEUROLOGICAL SURGERY

## 2024-08-06 PROCEDURE — 94761 N-INVAS EAR/PLS OXIMETRY MLT: CPT

## 2024-08-06 PROCEDURE — 63600175 PHARM REV CODE 636 W HCPCS: Performed by: ANESTHESIOLOGY

## 2024-08-06 PROCEDURE — 27800903 OPTIME MED/SURG SUP & DEVICES OTHER IMPLANTS: Performed by: NEUROLOGICAL SURGERY

## 2024-08-06 PROCEDURE — 27201423 OPTIME MED/SURG SUP & DEVICES STERILE SUPPLY: Performed by: NEUROLOGICAL SURGERY

## 2024-08-06 PROCEDURE — 0RT30ZZ RESECTION OF CERVICAL VERTEBRAL DISC, OPEN APPROACH: ICD-10-PCS | Performed by: NEUROLOGICAL SURGERY

## 2024-08-06 PROCEDURE — 00NW0ZZ RELEASE CERVICAL SPINAL CORD, OPEN APPROACH: ICD-10-PCS | Performed by: NEUROLOGICAL SURGERY

## 2024-08-06 PROCEDURE — 36000711: Performed by: NEUROLOGICAL SURGERY

## 2024-08-06 PROCEDURE — 36620 INSERTION CATHETER ARTERY: CPT | Mod: 59,,, | Performed by: ANESTHESIOLOGY

## 2024-08-06 PROCEDURE — 63600175 PHARM REV CODE 636 W HCPCS: Mod: JZ,JG | Performed by: NURSE ANESTHETIST, CERTIFIED REGISTERED

## 2024-08-06 PROCEDURE — 25000003 PHARM REV CODE 250: Performed by: ANESTHESIOLOGY

## 2024-08-06 PROCEDURE — 0PB30ZZ EXCISION OF CERVICAL VERTEBRA, OPEN APPROACH: ICD-10-PCS | Performed by: NEUROLOGICAL SURGERY

## 2024-08-06 PROCEDURE — 71000033 HC RECOVERY, INTIAL HOUR: Performed by: NEUROLOGICAL SURGERY

## 2024-08-06 PROCEDURE — 0RG20A0 FUSION OF 2 OR MORE CERVICAL VERTEBRAL JOINTS WITH INTERBODY FUSION DEVICE, ANTERIOR APPROACH, ANTERIOR COLUMN, OPEN APPROACH: ICD-10-PCS | Performed by: NEUROLOGICAL SURGERY

## 2024-08-06 PROCEDURE — 25000003 PHARM REV CODE 250: Performed by: NURSE ANESTHETIST, CERTIFIED REGISTERED

## 2024-08-06 PROCEDURE — 71000039 HC RECOVERY, EACH ADD'L HOUR: Performed by: NEUROLOGICAL SURGERY

## 2024-08-06 PROCEDURE — 22585 ARTHRD ANT NTRBD MIN DSC EA: CPT | Mod: ,,, | Performed by: NEUROLOGICAL SURGERY

## 2024-08-06 PROCEDURE — 22554 ARTHRD ANT NTRBD MIN DSC CRV: CPT | Mod: 51,,, | Performed by: NEUROLOGICAL SURGERY

## 2024-08-06 PROCEDURE — 22853 INSJ BIOMECHANICAL DEVICE: CPT | Mod: ,,, | Performed by: NEUROLOGICAL SURGERY

## 2024-08-06 PROCEDURE — 63600175 PHARM REV CODE 636 W HCPCS: Performed by: NURSE ANESTHETIST, CERTIFIED REGISTERED

## 2024-08-06 PROCEDURE — 63600175 PHARM REV CODE 636 W HCPCS: Performed by: NEUROLOGICAL SURGERY

## 2024-08-06 PROCEDURE — 01N10ZZ RELEASE CERVICAL NERVE, OPEN APPROACH: ICD-10-PCS | Performed by: NEUROLOGICAL SURGERY

## 2024-08-06 PROCEDURE — 22846 INSERT SPINE FIXATION DEVICE: CPT | Mod: 59,,, | Performed by: NEUROLOGICAL SURGERY

## 2024-08-06 PROCEDURE — C1713 ANCHOR/SCREW BN/BN,TIS/BN: HCPCS | Performed by: NEUROLOGICAL SURGERY

## 2024-08-06 PROCEDURE — 37000008 HC ANESTHESIA 1ST 15 MINUTES: Performed by: NEUROLOGICAL SURGERY

## 2024-08-06 PROCEDURE — 99900031 HC PATIENT EDUCATION (STAT)

## 2024-08-06 PROCEDURE — 37000009 HC ANESTHESIA EA ADD 15 MINS: Performed by: NEUROLOGICAL SURGERY

## 2024-08-06 PROCEDURE — 63600175 PHARM REV CODE 636 W HCPCS: Performed by: HEALTH CARE PROVIDER

## 2024-08-06 PROCEDURE — 20936 SP BONE AGRFT LOCAL ADD-ON: CPT | Mod: ,,, | Performed by: NEUROLOGICAL SURGERY

## 2024-08-06 PROCEDURE — 63081 REMOVE VERT BODY DCMPRN CRVL: CPT | Mod: ,,, | Performed by: NEUROLOGICAL SURGERY

## 2024-08-06 DEVICE — PUTTY I-FACTOR PEPTIDE 2.5CC: Type: IMPLANTABLE DEVICE | Site: SPINE CERVICAL | Status: FUNCTIONAL

## 2024-08-06 DEVICE — IMPLANTABLE DEVICE: Type: IMPLANTABLE DEVICE | Site: SPINE CERVICAL | Status: FUNCTIONAL

## 2024-08-06 RX ORDER — FENTANYL CITRATE 50 UG/ML
INJECTION, SOLUTION INTRAMUSCULAR; INTRAVENOUS
Status: DISCONTINUED | OUTPATIENT
Start: 2024-08-06 | End: 2024-08-06

## 2024-08-06 RX ORDER — ACETAMINOPHEN 10 MG/ML
INJECTION, SOLUTION INTRAVENOUS
Status: DISCONTINUED | OUTPATIENT
Start: 2024-08-06 | End: 2024-08-06

## 2024-08-06 RX ORDER — LIDOCAINE HYDROCHLORIDE 10 MG/ML
INJECTION, SOLUTION INTRAVENOUS
Status: DISCONTINUED | OUTPATIENT
Start: 2024-08-06 | End: 2024-08-06

## 2024-08-06 RX ORDER — HYDROMORPHONE HYDROCHLORIDE 1 MG/ML
2 INJECTION, SOLUTION INTRAMUSCULAR; INTRAVENOUS; SUBCUTANEOUS
Status: DISCONTINUED | OUTPATIENT
Start: 2024-08-06 | End: 2024-08-08 | Stop reason: HOSPADM

## 2024-08-06 RX ORDER — OXYCODONE HYDROCHLORIDE 5 MG/1
5 TABLET ORAL
Status: COMPLETED | OUTPATIENT
Start: 2024-08-06 | End: 2024-08-06

## 2024-08-06 RX ORDER — ROCURONIUM BROMIDE 10 MG/ML
INJECTION, SOLUTION INTRAVENOUS
Status: DISCONTINUED | OUTPATIENT
Start: 2024-08-06 | End: 2024-08-06

## 2024-08-06 RX ORDER — PROCHLORPERAZINE EDISYLATE 5 MG/ML
5 INJECTION INTRAMUSCULAR; INTRAVENOUS EVERY 6 HOURS PRN
Status: DISCONTINUED | OUTPATIENT
Start: 2024-08-06 | End: 2024-08-08 | Stop reason: HOSPADM

## 2024-08-06 RX ORDER — AMOXICILLIN 250 MG
2 CAPSULE ORAL NIGHTLY PRN
Status: DISCONTINUED | OUTPATIENT
Start: 2024-08-06 | End: 2024-08-08 | Stop reason: HOSPADM

## 2024-08-06 RX ORDER — MUPIROCIN 20 MG/G
1 OINTMENT TOPICAL 2 TIMES DAILY
Status: DISCONTINUED | OUTPATIENT
Start: 2024-08-06 | End: 2024-08-06

## 2024-08-06 RX ORDER — KETAMINE HCL IN 0.9 % NACL 50 MG/5 ML
SYRINGE (ML) INTRAVENOUS
Status: DISCONTINUED | OUTPATIENT
Start: 2024-08-06 | End: 2024-08-06

## 2024-08-06 RX ORDER — EPHEDRINE SULFATE 50 MG/ML
INJECTION, SOLUTION INTRAVENOUS
Status: DISCONTINUED | OUTPATIENT
Start: 2024-08-06 | End: 2024-08-06

## 2024-08-06 RX ORDER — MUPIROCIN 20 MG/G
OINTMENT TOPICAL
Status: DISCONTINUED | OUTPATIENT
Start: 2024-08-06 | End: 2024-08-08 | Stop reason: HOSPADM

## 2024-08-06 RX ORDER — HYDROMORPHONE HYDROCHLORIDE 1 MG/ML
0.2 INJECTION, SOLUTION INTRAMUSCULAR; INTRAVENOUS; SUBCUTANEOUS EVERY 5 MIN PRN
Status: COMPLETED | OUTPATIENT
Start: 2024-08-06 | End: 2024-08-06

## 2024-08-06 RX ORDER — CEFAZOLIN SODIUM 2 G/50ML
2 SOLUTION INTRAVENOUS
Status: COMPLETED | OUTPATIENT
Start: 2024-08-06 | End: 2024-08-06

## 2024-08-06 RX ORDER — DEXAMETHASONE SODIUM PHOSPHATE 4 MG/ML
2 INJECTION, SOLUTION INTRA-ARTICULAR; INTRALESIONAL; INTRAMUSCULAR; INTRAVENOUS; SOFT TISSUE EVERY 6 HOURS
Status: COMPLETED | OUTPATIENT
Start: 2024-08-06 | End: 2024-08-07

## 2024-08-06 RX ORDER — GABAPENTIN 300 MG/1
300 CAPSULE ORAL ONCE
Status: COMPLETED | OUTPATIENT
Start: 2024-08-06 | End: 2024-08-06

## 2024-08-06 RX ORDER — MUPIROCIN 20 MG/G
OINTMENT TOPICAL 2 TIMES DAILY
Status: DISCONTINUED | OUTPATIENT
Start: 2024-08-06 | End: 2024-08-08 | Stop reason: HOSPADM

## 2024-08-06 RX ORDER — ALUMINUM HYDROXIDE, MAGNESIUM HYDROXIDE, AND SIMETHICONE 1200; 120; 1200 MG/30ML; MG/30ML; MG/30ML
30 SUSPENSION ORAL EVERY 4 HOURS PRN
Status: DISCONTINUED | OUTPATIENT
Start: 2024-08-06 | End: 2024-08-08 | Stop reason: HOSPADM

## 2024-08-06 RX ORDER — MIDAZOLAM HYDROCHLORIDE 1 MG/ML
INJECTION INTRAMUSCULAR; INTRAVENOUS
Status: DISCONTINUED | OUTPATIENT
Start: 2024-08-06 | End: 2024-08-06

## 2024-08-06 RX ORDER — BUPIVACAINE HCL/EPINEPHRINE 0.25-.0005
VIAL (ML) INJECTION
Status: DISCONTINUED | OUTPATIENT
Start: 2024-08-06 | End: 2024-08-06 | Stop reason: HOSPADM

## 2024-08-06 RX ORDER — OXYCODONE AND ACETAMINOPHEN 7.5; 325 MG/1; MG/1
1 TABLET ORAL EVERY 6 HOURS
Status: DISCONTINUED | OUTPATIENT
Start: 2024-08-06 | End: 2024-08-08 | Stop reason: HOSPADM

## 2024-08-06 RX ORDER — METHYLPREDNISOLONE ACETATE 80 MG/ML
INJECTION, SUSPENSION INTRA-ARTICULAR; INTRALESIONAL; INTRAMUSCULAR; SOFT TISSUE
Status: DISCONTINUED | OUTPATIENT
Start: 2024-08-06 | End: 2024-08-06 | Stop reason: HOSPADM

## 2024-08-06 RX ORDER — FAMOTIDINE 10 MG/ML
INJECTION INTRAVENOUS
Status: DISCONTINUED | OUTPATIENT
Start: 2024-08-06 | End: 2024-08-06

## 2024-08-06 RX ORDER — PHENYLEPHRINE HYDROCHLORIDE 10 MG/ML
INJECTION INTRAVENOUS
Status: DISCONTINUED | OUTPATIENT
Start: 2024-08-06 | End: 2024-08-06

## 2024-08-06 RX ORDER — ONDANSETRON HYDROCHLORIDE 2 MG/ML
INJECTION, SOLUTION INTRAVENOUS
Status: DISCONTINUED | OUTPATIENT
Start: 2024-08-06 | End: 2024-08-06

## 2024-08-06 RX ORDER — GLUCAGON 1 MG
1 KIT INJECTION
Status: DISCONTINUED | OUTPATIENT
Start: 2024-08-06 | End: 2024-08-06 | Stop reason: HOSPADM

## 2024-08-06 RX ORDER — OXYCODONE HYDROCHLORIDE 5 MG/1
5 TABLET ORAL EVERY 4 HOURS PRN
Status: DISCONTINUED | OUTPATIENT
Start: 2024-08-06 | End: 2024-08-08 | Stop reason: HOSPADM

## 2024-08-06 RX ORDER — TIZANIDINE 4 MG/1
4 TABLET ORAL EVERY 8 HOURS
Status: DISCONTINUED | OUTPATIENT
Start: 2024-08-06 | End: 2024-08-08 | Stop reason: HOSPADM

## 2024-08-06 RX ORDER — VASOPRESSIN 20 [USP'U]/ML
INJECTION, SOLUTION INTRAMUSCULAR; SUBCUTANEOUS
Status: DISCONTINUED | OUTPATIENT
Start: 2024-08-06 | End: 2024-08-06

## 2024-08-06 RX ORDER — BISACODYL 10 MG/1
10 SUPPOSITORY RECTAL DAILY
Status: DISCONTINUED | OUTPATIENT
Start: 2024-08-07 | End: 2024-08-08 | Stop reason: HOSPADM

## 2024-08-06 RX ORDER — ONDANSETRON HYDROCHLORIDE 2 MG/ML
4 INJECTION, SOLUTION INTRAVENOUS DAILY PRN
Status: DISCONTINUED | OUTPATIENT
Start: 2024-08-06 | End: 2024-08-06 | Stop reason: HOSPADM

## 2024-08-06 RX ORDER — ONDANSETRON 4 MG/1
8 TABLET, ORALLY DISINTEGRATING ORAL EVERY 6 HOURS PRN
Status: DISCONTINUED | OUTPATIENT
Start: 2024-08-06 | End: 2024-08-08 | Stop reason: HOSPADM

## 2024-08-06 RX ORDER — MEPERIDINE HYDROCHLORIDE 50 MG/ML
12.5 INJECTION INTRAMUSCULAR; INTRAVENOUS; SUBCUTANEOUS EVERY 10 MIN PRN
Status: DISCONTINUED | OUTPATIENT
Start: 2024-08-06 | End: 2024-08-06 | Stop reason: HOSPADM

## 2024-08-06 RX ORDER — PROPOFOL 10 MG/ML
VIAL (ML) INTRAVENOUS
Status: DISCONTINUED | OUTPATIENT
Start: 2024-08-06 | End: 2024-08-06

## 2024-08-06 RX ORDER — DEXAMETHASONE SODIUM PHOSPHATE 4 MG/ML
INJECTION, SOLUTION INTRA-ARTICULAR; INTRALESIONAL; INTRAMUSCULAR; INTRAVENOUS; SOFT TISSUE
Status: DISCONTINUED | OUTPATIENT
Start: 2024-08-06 | End: 2024-08-06

## 2024-08-06 RX ORDER — DIPHENHYDRAMINE HYDROCHLORIDE 50 MG/ML
12.5 INJECTION INTRAMUSCULAR; INTRAVENOUS EVERY 6 HOURS PRN
Status: DISCONTINUED | OUTPATIENT
Start: 2024-08-06 | End: 2024-08-06 | Stop reason: HOSPADM

## 2024-08-06 RX ADMIN — PROPOFOL 100 MG: 10 INJECTION, EMULSION INTRAVENOUS at 09:08

## 2024-08-06 RX ADMIN — OXYCODONE HYDROCHLORIDE AND ACETAMINOPHEN 1 TABLET: 7.5; 325 TABLET ORAL at 05:08

## 2024-08-06 RX ADMIN — MUPIROCIN 1 G: 20 OINTMENT TOPICAL at 09:08

## 2024-08-06 RX ADMIN — DEXAMETHASONE SODIUM PHOSPHATE 8 MG: 4 INJECTION, SOLUTION INTRAMUSCULAR; INTRAVENOUS at 09:08

## 2024-08-06 RX ADMIN — Medication 20 MG: at 09:08

## 2024-08-06 RX ADMIN — HYDROMORPHONE HYDROCHLORIDE 0.2 MG: 1 INJECTION, SOLUTION INTRAMUSCULAR; INTRAVENOUS; SUBCUTANEOUS at 02:08

## 2024-08-06 RX ADMIN — ACETAMINOPHEN 1000 MG: 10 INJECTION, SOLUTION INTRAVENOUS at 09:08

## 2024-08-06 RX ADMIN — Medication 20 MG: at 11:08

## 2024-08-06 RX ADMIN — GLYCOPYRROLATE 0.4 MG: 0.2 INJECTION, SOLUTION INTRAMUSCULAR; INTRAVITREAL at 09:08

## 2024-08-06 RX ADMIN — FENTANYL CITRATE 100 MCG: 50 INJECTION, SOLUTION INTRAMUSCULAR; INTRAVENOUS at 09:08

## 2024-08-06 RX ADMIN — EPHEDRINE SULFATE 10 MG: 50 INJECTION INTRAVENOUS at 10:08

## 2024-08-06 RX ADMIN — CEFAZOLIN SODIUM 2 G: 2 SOLUTION INTRAVENOUS at 09:08

## 2024-08-06 RX ADMIN — VASOPRESSIN 2 UNITS: 20 INJECTION INTRAVENOUS at 10:08

## 2024-08-06 RX ADMIN — SODIUM CHLORIDE, SODIUM LACTATE, POTASSIUM CHLORIDE, AND CALCIUM CHLORIDE: .6; .31; .03; .02 INJECTION, SOLUTION INTRAVENOUS at 09:08

## 2024-08-06 RX ADMIN — Medication 30 MG: at 09:08

## 2024-08-06 RX ADMIN — LIDOCAINE HYDROCHLORIDE 100 MG: 10 INJECTION, SOLUTION INTRAVENOUS at 09:08

## 2024-08-06 RX ADMIN — Medication 10 MG: at 12:08

## 2024-08-06 RX ADMIN — GABAPENTIN 300 MG: 300 CAPSULE ORAL at 09:08

## 2024-08-06 RX ADMIN — FAMOTIDINE 20 MG: 10 INJECTION, SOLUTION INTRAVENOUS at 09:08

## 2024-08-06 RX ADMIN — OXYCODONE HYDROCHLORIDE 5 MG: 5 TABLET ORAL at 09:08

## 2024-08-06 RX ADMIN — ONDANSETRON 4 MG: 2 INJECTION INTRAMUSCULAR; INTRAVENOUS at 09:08

## 2024-08-06 RX ADMIN — SODIUM CHLORIDE, SODIUM LACTATE, POTASSIUM CHLORIDE, AND CALCIUM CHLORIDE: .6; .31; .03; .02 INJECTION, SOLUTION INTRAVENOUS at 11:08

## 2024-08-06 RX ADMIN — ROCURONIUM BROMIDE 50 MG: 10 INJECTION, SOLUTION INTRAVENOUS at 09:08

## 2024-08-06 RX ADMIN — HYDROMORPHONE HYDROCHLORIDE 0.2 MG: 1 INJECTION, SOLUTION INTRAMUSCULAR; INTRAVENOUS; SUBCUTANEOUS at 01:08

## 2024-08-06 RX ADMIN — TIZANIDINE 4 MG: 4 TABLET ORAL at 09:08

## 2024-08-06 RX ADMIN — OXYCODONE HYDROCHLORIDE 5 MG: 5 TABLET ORAL at 01:08

## 2024-08-06 RX ADMIN — MIDAZOLAM HYDROCHLORIDE 2 MG: 1 INJECTION, SOLUTION INTRAMUSCULAR; INTRAVENOUS at 09:08

## 2024-08-06 RX ADMIN — EPHEDRINE SULFATE 20 MG: 50 INJECTION INTRAVENOUS at 10:08

## 2024-08-06 RX ADMIN — PHENYLEPHRINE HYDROCHLORIDE 200 MCG: 10 INJECTION INTRAVENOUS at 09:08

## 2024-08-06 RX ADMIN — DEXAMETHASONE SODIUM PHOSPHATE 2 MG: 4 INJECTION, SOLUTION INTRA-ARTICULAR; INTRALESIONAL; INTRAMUSCULAR; INTRAVENOUS; SOFT TISSUE at 06:08

## 2024-08-06 RX ADMIN — SODIUM CHLORIDE: 900 INJECTION INTRAVENOUS at 09:08

## 2024-08-06 RX ADMIN — Medication 20 MG: at 10:08

## 2024-08-06 NOTE — OP NOTE
Date of procedure:  August 6, 2024    Preoperative diagnosis:    1. Cervical spinal stenosis, severe    2. Cervical degenerative disc disease     3. Cervical radiculopathy    4. Cervical myelopathy     5. Osteopenia       Postoperative diagnosis:    1. Same     Procedures:    1. Anterior cervical diskectomy and decompression of spinal cord C4-5, C5-6, C6-7     2. Partial C5 corpectomy    3. Anterior cervical interbody arthrodesis C4-5, C5-6, C6-7    4. Insertion of biomechanical device C4-5, C5-6, C6-7    5. Anterior cervical plating C4-7    6. Harvesting local autograft    7. Implantation of allograft    8. Intraoperative neuro monitoring     9. Use of operative microscope for microdissection    Surgeon:  Anatoly Daley D.O., MBA    Assistant:  See medical record     Anesthesia:  General endotracheal      EBL: 100cc    Specimens:  None      Findings: Poor bone quality    Complications:  None       Operative procedure:     The patient was brought to the operating room where adequate IV access was obtained.  Upon anesthesia induction, the patient was gently endotracheally intubated using a glide scope while maintaining the cervical spine in neutral position. A radial arterial line was placed per anesthesia.  A Khoury catheter was placed without difficulty.  Neuro monitoring leads were placed in the usual fashion, including erbs point bilaterally.  Pre positioning motor and somatosensory evoked potentials were established in all extremities.  The patient was then positioned supine on the operative table with the cervical spine gently extended.  Following surgical positioning, neuro monitoring potentials were unchanged.  All bony prominences were padded.  Eye goggles were applied. The shoulders were gently retracted inferiorly with surgical tape. The anterior cervical region was clipped prepped and draped in sterile fashion.  The C-arm was draped in sterile fashion and brought to the operative field.  Using  intraoperative fluoroscopy the C4-5 C5-6, C6-7 disc spaces were identified  A surgical time-out was performed.  The patient received IV antibiotics within 1 hour of incision.  Lidocaine with epinephrine was injected at the incision site.  A 10 blade scalpel was used to make a horizontal incision based on the medial border of the right sternocleidomastoid muscle.  Self retaining retractors were placed.  Sharp dissection was carried along the medial border of the sternocleidomastoid muscle.  The carotid sheath was identified and maintained lateral to the plane of dissection.  Sharp dissection was continued  through the pretracheal and prevertebral fascia.  The C4-5 C5-6 C6-7 disc spaces were confirmed with intraoperative fluoroscopy.   The longus coli muscles were elevated bilaterally with electrocautery.  Self retaining retractors were placed overlying C4-5 disc space.  [place] distracting pins were placed in the midportion of C4 and C5 vertebral bodies.  The microscope was draped and brought to the operative field.    Anterior and posterior osteophytes were resected.  The C4-5 disc was completely removed. The posterior longitudinal ligament was elevated and resected in piecemeal fashion.  Bilateral foraminotomies were completed at C4-5. The spinal canal was inspected and adequately decompressed circumferentially.  Meticulous hemostasis was obtained.  A Medtronic Titan titanium interbody device was sized and packed with autograft and cell based allograft. The interbody device was then placed in the C4-5 interbody space with adequate positioning confirmed via fluoroscopy.  Motor-evoked potentials repeated in stable The retractor and distracting pins were then placed overlying the C5-6 disc and a complete diskectomy was performed.  The disc space was collapsed.  Anterior and posterior osteophytes were resected.  Preoperative imaging demonstrated compression extending cephalad from the C5-6 disc space behind the inferior  aspect of the C5 vertebral body.  Consequently, partial C5 corpectomy was indicated for adequate decompression of the spinal cord.  Using high-speed bur the lower 50% of C5 vertebral body was resected.  The posterior longitudinal ligament was elevated and resected.  The spinal canal was decompressed circumferentially.  Bilateral foraminotomies at C5-6 were completed.  The neural elements were inspected with a ball ended probe for areas of compression at C5-6 and none was identified.  A Medtronic titanium interbody device was packed with autograft and allograft and placed in the C5-6 disc space.  Fluoroscopy confirmed adequate positioning of the interbody device at C5-6.  Motor-evoked potentials repeated and stable.  Self retaining retractors were placed overlying C6-7 disc space.  [place] distracting pins were placed in the midportion of C6 and C7 vertebral bodies.  The microscope was draped and brought to the operative field.    Anterior and posterior osteophytes were resected.  The C6-7 disc was completely removed. The posterior longitudinal ligament was elevated and resected in piecemeal fashion.  Bilateral foraminotomies were completed at C6-7 The spinal canal was inspected and adequately decompressed circumferentially.  Meticulous hemostasis was obtained.  A Medtronic Titan titanium interbody device was sized and packed with autograft and cell based allograft. The interbody device was then placed in the C6-7 interbody space with adequate positioning confirmed via fluoroscopy.  Motor-evoked potentials repeated and remained stable  A Medtronic Zevo anterior cervical plate was then sized, contoured, and afixed to the anterior spinal column extending from C4-7 using 4 mm diameter self drilling screws.  Intermittent lateral and AP fluoroscopic images were utilized to ensure adequate implant positioning.  The wound was copiously irrigated.  Meticulous hemostasis was then again established.  The esophagus and vital  neck structures were carefully inspected under the microscope with no evidence of iatrogenic injury.  A soft round silicone perforated drain was placed along with anterior spinal column and tunneled remote to the incision site and secured. The platysma was reapproximated with running 3-0 Vicryl suture.  Subcutaneous tissue was reapproximated with 2-0 Vicryl suture in interrupted fashion.  The dermis was reapproximated with running 4-0 Monocryl.  A sterile surgical dressing was applied.  A rigid cervical orthosis was then placed on the patient. The patient was extubated in the operating room and transferred to the PACU in hemodynamically stable condition.  Sponge and needle counts were correct. Neuromonitoring potentials remained unchanged throughout the above procedures.

## 2024-08-06 NOTE — CONSULTS
[facility] Medicine  Consult Note    Patient Name: Yvette Hutchinson  MRN: [id number]  Admission Date: 8/6/2024  Hospital Length of Stay: 1 days  Attending Physician: Anatoly Daley DO   Primary Care Provider: Vern Priest MD           Patient information was obtained from patient and ER records.     Consults  Subjective:     Principal Problem:<principal problem not specified>    Chief Complaint: No chief complaint on file.       HPI:     Consult was called for comanagement post spinal surgery patient  63 y.o. female with past medical history of chronic pain syndrome, chronic obstructive lung disease, lung cancer status post left lobectomy 2022 s/p chemo , and esophageal stricture was admitted to cardiology unit status post Anterior cervical diskectomy and decompression of spinal cord C4-5, C5-6, C6-7, Partial C5 corpectomy and  Anterior cervical interbody arthrodesis C4-5, C5-6, C6-7  and plating .  Seen at postop patient is awake alert oriented and not in pain, drain in the pocket currently with C-collar  Patient has chronic cervical radiculopathy symptoms and which was exacerbated by she was trying to help her mother with the elevation receive a dish and looking upwards and having more symptoms and eventually did the surgery   She has a history of lung cancer, in remission and port in place   Cervical adenopathy is being followed by her oncologist and just recently seen  and also Dx with CLL stage 0.       Review of patient's allergies indicates:  No Known Allergies    Past Medical History:   Diagnosis Date    Arthritis     Cancer 10/2022    (CLL) leukemia ; adenocarcinoma  adenosgemis    Depression     GERD (gastroesophageal reflux disease)     Neck pain     and back pain    Personal history of colonic polyps 07/07/2017    Pneumonia of left lung due to infectious organism 03/05/2020    Thyroid disease        Past Surgical History:   Procedure Laterality Date    INJECTION OF  ANESTHETIC AGENT AROUND MULTIPLE INTERCOSTAL NERVES Left 10/3/2022    Procedure: BLOCK, NERVE, INTERCOSTAL, 2 OR MORE;  Surgeon: Evelio Kaplan MD;  Location: [facility] OR 2ND FLR;  Service: Thoracic;  Laterality: Left;    INSERTION OF TUNNELED CENTRAL VENOUS CATHETER (CVC) WITH SUBCUTANEOUS PORT Right 11/3/2022    Procedure: [id number], Right or Left Neck or Chest;  Surgeon: Mini Acevedo MD;  Location: [facility] OR 2ND FLR;  Service: General;  Laterality: Right;    ROBOT-ASSISTED LAPAROSCOPIC LYMPHADENECTOMY USING DA MONIQUE XI Left 10/3/2022    Procedure: XI ROBOTIC LYMPHADENECTOMY;  Surgeon: Evelio Kaplan MD;  Location: [facility] OR 2ND FLR;  Service: Thoracic;  Laterality: Left;    TONSILLECTOMY      XI ROBOTIC RATS,WITH LOBECTOMY,LUNG Left 10/3/2022    Procedure: XI ROBOTIC RATS,WITH LOBECTOMY,LUNG;  Surgeon: Evelio Kaplan MD;  Location: [facility] OR 2ND FLR;  Service: Thoracic;  Laterality: Left;       Review of patient's allergies indicates:  No Known Allergies    No current facility-administered medications on file prior to encounter.     Current Outpatient Medications on File Prior to Encounter   Medication Sig    acetaminophen (TYLENOL) 500 MG tablet Take 2 tablets (1,000 mg total) by mouth every 6 (six) hours as needed for Pain.    albuterol (PROVENTIL/VENTOLIN HFA) 90 mcg/actuation inhaler Inhale 2 puffs into the lungs every 6 (six) hours as needed for Wheezing or Shortness of Breath. Rescue    diphenhydrAMINE-aluminum-magnesium hydroxide-simethicone-LIDOcaine HCl 2% Swish and spit 15 mLs every 4 (four) hours as needed (mouth sores).    fluticasone propionate (FLONASE) 50 mcg/actuation nasal spray 1 spray (50 mcg total) by Each Nostril route once daily.    varenicline (CHANTIX) 1 mg Tab Take 1 tablet by mouth twice daily    albuterol-ipratropium (DUO-NEB) 2.5 mg-0.5 mg/3 mL nebulizer solution Take 3 mLs by nebulization every 6 (six) hours as needed for Wheezing. Rescue    buPROPion  (WELLBUTRIN XL) 300 MG 24 hr tablet Take 1 tablet (300 mg total) by mouth once daily.    citalopram (CELEXA) 20 MG tablet Take 1 tablet (20 mg total) by mouth once daily.    gabapentin (NEURONTIN) 300 MG capsule Take 1 capsule (300 mg total) by mouth 3 (three) times daily.    levothyroxine (SYNTHROID) 50 MCG tablet Take 1 tablet (50 mcg total) by mouth before breakfast.    LIDOcaine-prilocaine (EMLA) cream Apply topically as needed (apply to port site 30 min before access).    naloxone (NARCAN) 4 mg/actuation Spry ADMINISTER A SINGLE SPRAY IN ONE NOSTRIL UPON SIGNS OF OPIOID OVERDOSE. CALL 911. REPEAT AFTER 3 MINUTES IF NO RESPONSE.    pravastatin (PRAVACHOL) 10 MG tablet Take 1 tablet (10 mg total) by mouth once daily.    [DISCONTINUED] celecoxib (CELEBREX) 200 MG capsule Take 2 capsules (400 mg total) by mouth once daily.    [DISCONTINUED] ibuprofen (ADVIL,MOTRIN) 800 MG tablet Take 1 tablet (800 mg total) by mouth 3 (three) times daily.     Family History       Problem Relation (Age of Onset)    Breast cancer Cousin    Ovarian cancer Sister          Tobacco Use    Smoking status: Some Days     Current packs/day: 0.15     Types: Cigarettes     Passive exposure: Current    Smokeless tobacco: Never    Tobacco comments:     reports one cigarette every now and then   Substance and Sexual Activity    Alcohol use: Yes     Comment: rarely    Drug use: Yes     Types: Marijuana    Sexual activity: Not on file     Review of Systems  Objective:     Vital Signs (Most Recent):  Temp: 97.7 °F (36.5 °C) (08/06/24 1704)  Pulse: 60 (08/06/24 1704)  Resp: 18 (08/06/24 1750)  BP: 105/61 (08/06/24 1704)  SpO2: 98 % (08/06/24 1704) Vital Signs (24h Range):  Temp:  [96.3 °F (35.7 °C)-97.9 °F (36.6 °C)] 97.7 °F (36.5 °C)  Pulse:  [50-62] 60  Resp:  [10-20] 18  SpO2:  [96 %-100 %] 98 %  BP: ()/(50-73) 105/61        There is no height or weight on file to calculate BMI.    Physical Exam  General: Patient resting comfortably in no  "acute distress. Appears as stated age. Calm  Eyes: EOM intact. No conjunctivae injection. No scleral icterus.  ENT: Hearing grossly intact. No discharge from ears. No nasal discharge.   CVS: RRR. No LE edema BL.  Lungs: CTA BL, no wheezing or crackles. Good breath sounds. No accessory muscle use. No acute respiratory distress  Neuro: non focal , Follows commands. Responds appropriately   Significant Labs: All pertinent labs within the past 24 hours have been reviewed.  CBC: No results for input(s): "WBC", "HGB", "HCT", "PLT" in the last 48 hours.  CMP: No results for input(s): "NA", "K", "CL", "CO2", "GLU", "BUN", "CREATININE", "CALCIUM", "PROT", "ALBUMIN", "BILITOT", "ALKPHOS", "AST", "ALT", "ANIONGAP", "EGFRNONAA" in the last 48 hours.    Invalid input(s): "ESTGF[place]"    Significant Imaging: I have reviewed all pertinent imaging results/findings within the past 24 hours.    Assessment/Plan:      *S/p Anterior cervical diskectomy and decompression of spinal cord C4-5, C5-6, C6-7, Partial C5 corpectomy and  Anterior cervical interbody arthrodesis C4-5, C5-6, C6-7  and plating .           Post op care           Monitor drain           Continue C collor          Pain Mx           Follow neuro surgery       Lung ca; adenosquamous, s/p robotic lobectomy October 3, 2022 T1c N1 stage IIB level 10 +vemargins negative  5% tumor cells are positive for PDL-1                 Pet7/18/23 neg for mets       CLL   stage 0             Follow hemato oncology      chronic pain syndrome              Pain Mx           VTE Risk Mitigation (From admission, onward)           Ordered     Place sequential compression device  Until discontinued         08/06/24 0700                    HOS POC IP DISCHARGE SUMMARY    Thank you for your consult. I will follow-up with patient. Please contact us if you have any additional questions.    Emiliano Max MD  Department of Hospital Medicine   Count includes the [facility]    "

## 2024-08-07 PROBLEM — E89.0 POSTABLATIVE HYPOTHYROIDISM: Status: ACTIVE | Noted: 2024-08-07

## 2024-08-07 PROBLEM — E03.9 ACQUIRED HYPOTHYROIDISM: Status: ACTIVE | Noted: 2024-08-07

## 2024-08-07 PROBLEM — Z98.1 STATUS POST CERVICAL SPINAL FUSION: Status: ACTIVE | Noted: 2024-08-07

## 2024-08-07 LAB
ANION GAP SERPL CALC-SCNC: 7 MMOL/L (ref 8–16)
BASOPHILS NFR BLD: 0 % (ref 0–1.9)
BUN SERPL-MCNC: 13 MG/DL (ref 8–23)
CALCIUM SERPL-MCNC: 8.2 MG/DL (ref 8.7–10.5)
CHLORIDE SERPL-SCNC: 107 MMOL/L (ref 95–110)
CO2 SERPL-SCNC: 22 MMOL/L (ref 23–29)
CREAT SERPL-MCNC: 0.9 MG/DL (ref 0.5–1.4)
DIFFERENTIAL METHOD BLD: ABNORMAL
EOSINOPHIL NFR BLD: 0 % (ref 0–8)
ERYTHROCYTE [DISTWIDTH] IN BLOOD BY AUTOMATED COUNT: 13.7 % (ref 11.5–14.5)
EST. GFR  (NO RACE VARIABLE): >60 ML/MIN/1.73 M^2
GLUCOSE SERPL-MCNC: 139 MG/DL (ref 70–110)
HCT VFR BLD AUTO: 31.8 % (ref 37–48.5)
HGB BLD-MCNC: 10.3 G/DL (ref 12–16)
IMM GRANULOCYTES # BLD AUTO: ABNORMAL K/UL (ref 0–0.04)
IMM GRANULOCYTES NFR BLD AUTO: ABNORMAL % (ref 0–0.5)
LYMPHOCYTES NFR BLD: 68 % (ref 18–48)
MCH RBC QN AUTO: 31.4 PG (ref 27–31)
MCHC RBC AUTO-ENTMCNC: 32.4 G/DL (ref 32–36)
MCV RBC AUTO: 97 FL (ref 82–98)
MONOCYTES NFR BLD: 2 % (ref 4–15)
NEUTROPHILS NFR BLD: 30 % (ref 38–73)
NRBC BLD-RTO: 0 /100 WBC
PLATELET # BLD AUTO: 142 K/UL (ref 150–450)
PLATELET BLD QL SMEAR: ABNORMAL
PMV BLD AUTO: 10.3 FL (ref 9.2–12.9)
POTASSIUM SERPL-SCNC: 4.1 MMOL/L (ref 3.5–5.1)
RBC # BLD AUTO: 3.28 M/UL (ref 4–5.4)
SMUDGE CELLS BLD QL SMEAR: PRESENT
SODIUM SERPL-SCNC: 136 MMOL/L (ref 136–145)
WBC # BLD AUTO: 31.39 K/UL (ref 3.9–12.7)

## 2024-08-07 PROCEDURE — 99900035 HC TECH TIME PER 15 MIN (STAT)

## 2024-08-07 PROCEDURE — 85027 COMPLETE CBC AUTOMATED: CPT | Performed by: HEALTH CARE PROVIDER

## 2024-08-07 PROCEDURE — 36415 COLL VENOUS BLD VENIPUNCTURE: CPT | Performed by: HEALTH CARE PROVIDER

## 2024-08-07 PROCEDURE — 85007 BL SMEAR W/DIFF WBC COUNT: CPT | Performed by: HEALTH CARE PROVIDER

## 2024-08-07 PROCEDURE — 99024 POSTOP FOLLOW-UP VISIT: CPT | Mod: ,,, | Performed by: HEALTH CARE PROVIDER

## 2024-08-07 PROCEDURE — 25000003 PHARM REV CODE 250: Performed by: HEALTH CARE PROVIDER

## 2024-08-07 PROCEDURE — 97116 GAIT TRAINING THERAPY: CPT

## 2024-08-07 PROCEDURE — 80048 BASIC METABOLIC PNL TOTAL CA: CPT | Performed by: HEALTH CARE PROVIDER

## 2024-08-07 PROCEDURE — 97530 THERAPEUTIC ACTIVITIES: CPT

## 2024-08-07 PROCEDURE — 94761 N-INVAS EAR/PLS OXIMETRY MLT: CPT

## 2024-08-07 PROCEDURE — 99900031 HC PATIENT EDUCATION (STAT)

## 2024-08-07 PROCEDURE — 94799 UNLISTED PULMONARY SVC/PX: CPT

## 2024-08-07 PROCEDURE — 97161 PT EVAL LOW COMPLEX 20 MIN: CPT

## 2024-08-07 PROCEDURE — 63600175 PHARM REV CODE 636 W HCPCS: Performed by: HEALTH CARE PROVIDER

## 2024-08-07 PROCEDURE — 21400001 HC TELEMETRY ROOM

## 2024-08-07 PROCEDURE — 25000003 PHARM REV CODE 250: Performed by: NURSE PRACTITIONER

## 2024-08-07 RX ORDER — TIZANIDINE 4 MG/1
4 TABLET ORAL EVERY 8 HOURS
Qty: 90 TABLET | Refills: 0 | Status: SHIPPED | OUTPATIENT
Start: 2024-08-07 | End: 2024-09-06

## 2024-08-07 RX ORDER — TOPIRAMATE 100 MG/1
100 TABLET, FILM COATED ORAL 2 TIMES DAILY
Status: DISCONTINUED | OUTPATIENT
Start: 2024-08-07 | End: 2024-08-08 | Stop reason: HOSPADM

## 2024-08-07 RX ORDER — GABAPENTIN 300 MG/1
300 CAPSULE ORAL 3 TIMES DAILY
Status: DISCONTINUED | OUTPATIENT
Start: 2024-08-07 | End: 2024-08-08 | Stop reason: HOSPADM

## 2024-08-07 RX ORDER — CEFADROXIL 500 MG/1
500 CAPSULE ORAL EVERY 12 HOURS
Qty: 10 CAPSULE | Refills: 0 | Status: SHIPPED | OUTPATIENT
Start: 2024-08-07 | End: 2024-08-12

## 2024-08-07 RX ORDER — BUPROPION HYDROCHLORIDE 150 MG/1
300 TABLET ORAL DAILY
Status: DISCONTINUED | OUTPATIENT
Start: 2024-08-07 | End: 2024-08-08 | Stop reason: HOSPADM

## 2024-08-07 RX ORDER — OXYCODONE AND ACETAMINOPHEN 7.5; 325 MG/1; MG/1
1 TABLET ORAL EVERY 6 HOURS PRN
Qty: 28 TABLET | Refills: 0 | Status: SHIPPED | OUTPATIENT
Start: 2024-08-07 | End: 2024-08-14

## 2024-08-07 RX ORDER — AMOXICILLIN 250 MG
1 CAPSULE ORAL DAILY
Qty: 14 TABLET | Refills: 0 | Status: SHIPPED | OUTPATIENT
Start: 2024-08-07 | End: 2024-08-21

## 2024-08-07 RX ORDER — LEVOTHYROXINE SODIUM 25 UG/1
50 TABLET ORAL
Status: DISCONTINUED | OUTPATIENT
Start: 2024-08-08 | End: 2024-08-08 | Stop reason: HOSPADM

## 2024-08-07 RX ORDER — CITALOPRAM 20 MG/1
20 TABLET, FILM COATED ORAL DAILY
Status: DISCONTINUED | OUTPATIENT
Start: 2024-08-07 | End: 2024-08-08 | Stop reason: HOSPADM

## 2024-08-07 RX ADMIN — BUPROPION HYDROCHLORIDE 300 MG: 150 TABLET, EXTENDED RELEASE ORAL at 09:08

## 2024-08-07 RX ADMIN — MUPIROCIN 1 G: 20 OINTMENT TOPICAL at 09:08

## 2024-08-07 RX ADMIN — OXYCODONE HYDROCHLORIDE AND ACETAMINOPHEN 1 TABLET: 7.5; 325 TABLET ORAL at 11:08

## 2024-08-07 RX ADMIN — DEXAMETHASONE SODIUM PHOSPHATE 2 MG: 4 INJECTION, SOLUTION INTRA-ARTICULAR; INTRALESIONAL; INTRAMUSCULAR; INTRAVENOUS; SOFT TISSUE at 05:08

## 2024-08-07 RX ADMIN — GABAPENTIN 300 MG: 300 CAPSULE ORAL at 03:08

## 2024-08-07 RX ADMIN — DEXAMETHASONE SODIUM PHOSPHATE 2 MG: 4 INJECTION, SOLUTION INTRA-ARTICULAR; INTRALESIONAL; INTRAMUSCULAR; INTRAVENOUS; SOFT TISSUE at 12:08

## 2024-08-07 RX ADMIN — GABAPENTIN 300 MG: 300 CAPSULE ORAL at 09:08

## 2024-08-07 RX ADMIN — OXYCODONE HYDROCHLORIDE AND ACETAMINOPHEN 1 TABLET: 7.5; 325 TABLET ORAL at 12:08

## 2024-08-07 RX ADMIN — SODIUM CHLORIDE 500 ML: 9 INJECTION, SOLUTION INTRAVENOUS at 01:08

## 2024-08-07 RX ADMIN — TOPIRAMATE 100 MG: 100 TABLET, FILM COATED ORAL at 09:08

## 2024-08-07 RX ADMIN — OXYCODONE HYDROCHLORIDE 5 MG: 5 TABLET ORAL at 09:08

## 2024-08-07 RX ADMIN — DEXAMETHASONE SODIUM PHOSPHATE 2 MG: 4 INJECTION, SOLUTION INTRA-ARTICULAR; INTRALESIONAL; INTRAMUSCULAR; INTRAVENOUS; SOFT TISSUE at 06:08

## 2024-08-07 RX ADMIN — OXYCODONE HYDROCHLORIDE 5 MG: 5 TABLET ORAL at 04:08

## 2024-08-07 RX ADMIN — TOPIRAMATE 100 MG: 100 TABLET, FILM COATED ORAL at 11:08

## 2024-08-07 RX ADMIN — DEXAMETHASONE SODIUM PHOSPHATE 2 MG: 4 INJECTION, SOLUTION INTRA-ARTICULAR; INTRALESIONAL; INTRAMUSCULAR; INTRAVENOUS; SOFT TISSUE at 11:08

## 2024-08-07 RX ADMIN — TIZANIDINE 4 MG: 4 TABLET ORAL at 02:08

## 2024-08-07 RX ADMIN — CITALOPRAM HYDROBROMIDE 20 MG: 20 TABLET ORAL at 09:08

## 2024-08-07 RX ADMIN — OXYCODONE HYDROCHLORIDE AND ACETAMINOPHEN 1 TABLET: 7.5; 325 TABLET ORAL at 06:08

## 2024-08-07 RX ADMIN — TIZANIDINE 4 MG: 4 TABLET ORAL at 06:08

## 2024-08-07 RX ADMIN — OXYCODONE HYDROCHLORIDE AND ACETAMINOPHEN 1 TABLET: 7.5; 325 TABLET ORAL at 05:08

## 2024-08-07 RX ADMIN — TIZANIDINE 4 MG: 4 TABLET ORAL at 09:08

## 2024-08-07 NOTE — PT/OT/SLP EVAL
Physical Therapy Evaluation    Patient Name:  Yvette Hutchinson   MRN:  [id number]    Recommendations:     Discharge Recommendations: Low Intensity Therapy   Discharge Equipment Recommendations: walker, rolling   Barriers to discharge:  hypotensive    Assessment:     Yvette Hutchinson is a 64 y.o. female admitted with a medical diagnosis of <principal problem not specified>.  She presents with the following impairments/functional limitations: weakness, impaired endurance, impaired self care skills, impaired functional mobility, gait instability, impaired balance, decreased upper extremity function, decreased lower extremity function, decreased safety awareness, pain, impaired cardiopulmonary response to activity, orthopedic precautions. Patient is agreeable to participation with PT evaluation. She reports 7/10 neck pain after being premedicated for activity. She was educated on spinal precautions, log rolling, and [place] J collar. She lives with her mother and is independent at baseline. BP of 94/54 at rest. She requires SBA for supine <> sit <> stand with no AD. She ambulated 10' to bathroom with no AD and CGA. She then ambulated 100' with RW and CGA before reporting onset of lightheadedness and ambulated 100' back to room and returned to bed. BP of 117/62, HR 48 bpm, and SpO2 of 96% on room air. Bed alarm on, NP present, and RN notified.     The mobility limitation cannot be sufficiently resolved by the use of a cane. The patient's functional mobility deficit can be sufficiently resolved with the use of a rolling walker. The patient's mobility limitation significantly impairs their ability to participate in one or more activities of daily living. The use of a rolling walker will significantly improve the patient's ability to participate in mobility-related activities of daily living and the patient will use it on a regular basis in the home.     Rehab Prognosis: Good; patient would benefit from acute  skilled PT services to address these deficits and reach maximum level of function.    Recent Surgery: Procedure(s) (LRB):  FUSION, SPINE, CERVICAL, ANTERIOR APPROACH (N/A) 1 Day Post-Op    Plan:     During this hospitalization, patient to be seen BID to address the identified rehab impairments via gait training, therapeutic activities, therapeutic exercises and progress toward the following goals:    Plan of Care Expires:  09/07/24    Subjective     Chief Complaint: pain and headache   Patient/Family Comments/goals: home this afternoon  Pain/Comfort:  Pain Rating 1: 7/10  Location 1: neck  Pain Addressed 1: Pre-medicate for activity    Patients cultural, spiritual, Amish conflicts given the current situation:      Living Environment:  Patient lives with mother in a [facility] with no [employer]  Prior to admission, patients level of function was independent. She denies any recent falls. She was going to OPPT for her neck pre-operatively. Pre-op she had LUE numbness and L hip weakness. She is retired. (+) drive.  Equipment used at home: none.  DME owned (not currently used): none.  Upon discharge, patient will have assistance from family.    Objective:     Communicated with DONNA Osuna prior to session.  Patient found HOB elevated with cervical collar, DI drain, telemetry  upon PT entry to room.    General Precautions: Standard, fall  Orthopedic Precautions:spinal precautions   Braces: [place] J collar  Respiratory Status: Room air    Exams:  RLE Strength: WFL  LLE Strength: WFL    Functional Mobility:  Bed Mobility:     Supine to Sit: stand by assistance  Transfers:     Sit to Stand:  stand by assistance with no AD  Toilet Transfer: stand by assistance with  no AD  using  Step Transfer  Gait: 100' with RW and CGA before reporting onset of lightheadedness and ambulated 100' back to room and returned to bed      AM-PAC 6 CLICK MOBILITY  Total Score:22       Treatment & Education:  Patient was educated on the importance of OOB  activity and functional mobility to negate negative effects of prolonged bed rest during hospitalization, safe transfers and ambulation, spinal precautions, [place] J use, log rolling, and D/C planning     She requires SBA for toilet transfer and performed hygiene independently     Patient left HOB elevated with all lines intact, call button in reach, bed alarm on, RN notified, and NP present.    GOALS:   Multidisciplinary Problems       Physical Therapy Goals          Problem: Physical Therapy    Goal Priority Disciplines Outcome Goal Variances Interventions   Physical Therapy Goal     PT, PT/OT      Description: Goals to be met by: 24    Patient will increase functional independence with mobility by performin. Supine to sit with Supervision  2. Sit to stand transfer with Supervision  3. Bed to chair transfer with Supervision using Rolling Walker  4. Gait  x 250 feet with Supervision using Rolling Walker.   5. Lower extremity exercise program x20 reps per handout, with supervision                       History:     Past Medical History:   Diagnosis Date    Arthritis     Cancer 10/2022    (CLL) leukemia ; adenocarcinoma  adenosgemis    Depression     GERD (gastroesophageal reflux disease)     Neck pain     and back pain    Personal history of colonic polyps 2017    Pneumonia of left lung due to infectious organism 2020    Thyroid disease        Past Surgical History:   Procedure Laterality Date    FUSION, SPINE, CERVICAL, ANTERIOR APPROACH N/A 2024    Procedure: FUSION, SPINE, CERVICAL, ANTERIOR APPROACH;  Surgeon: Anatoly Daley DO;  Location: [facility] OR;  Service: Neurosurgery;  Laterality: N/A;  IOM, C-ARM, MEDTRONICS, MICRO, HORSESHOE    INJECTION OF ANESTHETIC AGENT AROUND MULTIPLE INTERCOSTAL NERVES Left 10/3/2022    Procedure: BLOCK, NERVE, INTERCOSTAL, 2 OR MORE;  Surgeon: Evelio Kaplan MD;  Location: [facility] OR [address] [place] [address];  Service: Thoracic;  Laterality: Left;    INSERTION OF  TUNNELED CENTRAL VENOUS CATHETER (CVC) WITH SUBCUTANEOUS PORT Right 11/3/2022    Procedure: [id number], Right or Left Neck or Chest;  Surgeon: Mini Acevedo MD;  Location: [address];  Service: General;  Laterality: Right;    ROBOT-ASSISTED LAPAROSCOPIC LYMPHADENECTOMY USING DA MONIQUE XI Left 10/3/2022    Procedure: XI ROBOTIC LYMPHADENECTOMY;  Surgeon: Evelio Kaplan MD;  Location: [facility] OR [address] [place] [address];  Service: Thoracic;  Laterality: Left;    TONSILLECTOMY      XI ROBOTIC RATS,WITH LOBECTOMY,LUNG Left 10/3/2022    Procedure: XI ROBOTIC RATS,WITH LOBECTOMY,LUNG;  Surgeon: Evelio Kaplan MD;  Location: [facility] OR [address] [place] [address];  Service: Thoracic;  Laterality: Left;       Time Tracking:     PT Received On: 08/07/24  PT Start Time: 1025     PT Stop Time: 1058  PT Total Time (min): 33 min     Billable Minutes: Evaluation 10, Gait Training 13, and Therapeutic Activity 10      08/07/2024

## 2024-08-07 NOTE — CARE UPDATE
08/06/24 2300   Patient Assessment/Suction   Level of Consciousness (AVPU) alert   Respiratory Effort Normal;Unlabored   Expansion/Accessory Muscles/Retractions no use of accessory muscles   All Lung Fields Breath Sounds diminished   PRE-TX-O2   Device (Oxygen Therapy) nasal cannula   Flow (L/min) (Oxygen Therapy) 2   SpO2 95 %   Pulse Oximetry Type Intermittent   $ Pulse Oximetry - Multiple Charge Pulse Oximetry - Multiple   Pulse (!) 52   Resp 16   Positioning HOB elevated 30 degrees   Positioning   Body Position position changed independently   Positioning/Transfer Devices pillows;in use   Incentive Spirometer   $ Incentive Spirometer Charges done with encouragement;postop instruction   Incentive Spirometer Predicted Level (mL) 1500   Administration (IS) instruction provided, initial   Number of Repetitions (IS) 5   Level Incentive Spirometer (mL) 2000   Patient Tolerance (IS) good;no adverse signs/symptoms present   Education   $ Education Incentive Spirometry;15 min

## 2024-08-07 NOTE — ASSESSMENT & PLAN NOTE
Yvette Hutchinson is a 64-year-old female status post C4-7 anterior cervical fusion performed on 08/06/2024.     POD 1.     Neurologically intact.        --q4h neurochecks on floor  --All labs and diagnostics reviewed.  Chronic leukocytosis  --Follow-up post-op cervical XRs in appropriate alignment without hardware malfunction.  --Maintain collar at all times   --DI drains to remain at this time on bulb suction  --PT/OT/OOB  --ADAT  --Pain control with multimodal pain regimen   --TEDs/SCDs  --Continue to monitor clinically, notify NSGY immediately with any changes in neuro status    Dispo:  Pending PT/OT evaluation, anticipate discharged home this afternoon.Patient will be discharged with drain.  Please instruct patient on how to empty and document drain output.  Please also provide urine cup for measuring.  Patient instructed to return to clinic on tomorrow morning for drain removal.

## 2024-08-07 NOTE — HOSPITAL COURSE
8/7:  Patient is status post C4-7 anterior cervical fusion performed on 08/06/2024.  POD 1.  No acute events overnight.  Afebrile.  Blood pressure 102/53.  Pulse 51.  Respirations 20.  WBC 31.39 (history of CLL).  Hemoglobin 10.3.  Platelets 142.  DI drain x1 in place with 35 mL of output in the last 12 hours.  Patient found in bed, awake and alert with cervical collar in place.  Family member at bedside.  She reports anticipated incisional pain that does improve with current pain regimen.  At this time, she is unsure if she has improvement in preoperative cervical pain that radiated to bilateral shoulders.  Patient reports she has eaten without difficulty in swallowing.  She has been out of the bed to void.  She denies dysphagia, dysphonia, new extremity weakness or paresthesias.  Patient is agreeable to discharged this afternoon.  8/8:  No acute events overnight.  Afebrile.  Blood pressure 100/58.  Pulse 44.  Respirations 18.  WBC 29.46 (history of CLL), hemoglobin 9.9.  Platelets 157.  DI drain x1 in place with 10 mL of output in last 12 hours.  Patient found in bed, awake and alert with collar in place.  No family was present at time of encounter.  Patient reports anticipated incisional pain and soreness which does improve with pain medication.  She also reports she has not had any additional episodes of dizziness since yesterday.  She has been out of the bed to void and tolerates her diet.  She denies new extremity weakness or paresthesias.

## 2024-08-07 NOTE — ASSESSMENT & PLAN NOTE
Patient has chronic hypothyroidism. TFTs reviewed-   Lab Results   Component Value Date    TSH 5.048 (H) 06/03/2024   . Will continue chronic levothyroxine and adjust for and clinical changes.

## 2024-08-07 NOTE — PROGRESS NOTES
[facility] Medicine  Progress Note    Patient Name: Yvette Hutchinson  MRN: [id number]  Patient Class: IP- Inpatient   Admission Date: 8/6/2024  Length of Stay: 1 days  Attending Physician: Anatoly Daley DO  Primary Care Provider: Vern Priest MD        Subjective:     Principal Problem:Status post cervical spinal fusion        HPI:  Consult was called for comanagement post spinal surgery patient  63 y.o. female with past medical history of chronic pain syndrome, chronic obstructive lung disease, lung cancer status post left lobectomy 2022 s/p chemo , and esophageal stricture was admitted to cardiology unit status post Anterior cervical diskectomy and decompression of spinal cord C4-5, C5-6, C6-7, Partial C5 corpectomy and  Anterior cervical interbody arthrodesis C4-5, C5-6, C6-7  and plating .  Seen at postop patient is awake alert oriented and not in pain, drain in the pocket currently with C-collar  Patient has chronic cervical radiculopathy symptoms and which was exacerbated by she was trying to help her mother with the elevation receive a dish and looking upwards and having more symptoms and eventually did the surgery   She has a history of lung cancer, in remission and port in place   Cervical adenopathy is being followed by her oncologist and just recently seen  and also Dx with CLL stage 0.    Overview/Hospital Course:  No notes on file    Interval History: experienced some lightheadedness/dizziness this morning while ambulating with SBP noted in the 90's.  IVF bolus ordered.  Will reevaluate later this afternoon.  She can likely dc home later today if dizziness resolves.    Review of Systems   Constitutional:  Negative for chills.   Musculoskeletal:  Positive for arthralgias and neck pain.   Skin:  Negative for color change and rash.   Neurological:  Positive for dizziness and light-headedness.     Objective:     Vital Signs (Most Recent):  Temp: 97.8 °F (36.6 °C)  (08/07/24 1218)  Pulse: (!) 50 (08/07/24 1324)  Resp: 18 (08/07/24 1324)  BP: (!) 161/74 (08/07/24 1218)  SpO2: 96 % (08/07/24 1324) Vital Signs (24h Range):  Temp:  [97.5 °F (36.4 °C)-98.1 °F (36.7 °C)] 97.8 °F (36.6 °C)  Pulse:  [50-68] 50  Resp:  [10-20] 18  SpO2:  [91 %-99 %] 96 %  BP: ()/(47-74) 161/74        There is no height or weight on file to calculate BMI.    Intake/Output Summary (Last 24 hours) at 8/7/2024 1345  Last data filed at 8/7/2024 1007  Gross per 24 hour   Intake 360 ml   Output 55 ml   Net 305 ml         Physical Exam  Constitutional:       Appearance: She is well-developed.   HENT:      Head: Normocephalic and atraumatic.   Eyes:      Conjunctiva/sclera: Conjunctivae normal.      Pupils: Pupils are equal, round, and reactive to light.   Neck:      Comments: Hard cervical collar in place.  Cardiovascular:      Rate and Rhythm: Normal rate and regular rhythm.      Heart sounds: Normal heart sounds.   Pulmonary:      Effort: Pulmonary effort is normal.      Breath sounds: Normal breath sounds.   Abdominal:      General: Bowel sounds are normal. There is no distension.      Palpations: Abdomen is soft.   Musculoskeletal:         General: Normal range of motion.   Skin:     General: Skin is warm and dry.   Neurological:      General: No focal deficit present.      Mental Status: She is alert and oriented to person, place, and time.   Psychiatric:         Mood and Affect: Mood normal.         Behavior: Behavior normal.         Thought Content: Thought content normal.             Significant Labs: All pertinent labs within the past 24 hours have been reviewed.  CBC:   Recent Labs   Lab 08/07/24  0510   WBC 31.39*   HGB 10.3*   HCT 31.8*   *     CMP:   Recent Labs   Lab 08/07/24  0510      K 4.1      CO2 22*   *   BUN 13   CREATININE 0.9   CALCIUM 8.2*   ANIONGAP 7*     Significant Imaging: I have reviewed all pertinent imaging results/findings within the past 24  hours.    Assessment/Plan:      * Status post cervical spinal fusion  POD 1 s/p C4-7 anterior cervical fusion with Dr. Daley  Continue to follow recommendations of surgeon.  Needs aggressive incentive spirometry.  Follow hemoglobin and hematocrit closely.  Pain control   Physical therapy as per protocol with fall precautions.      Acquired hypothyroidism  Patient has chronic hypothyroidism. TFTs reviewed-   Lab Results   Component Value Date    TSH 5.048 (H) 06/03/2024   . Will continue chronic levothyroxine and adjust for and clinical changes.        Anxiety  Continue chronic SSRI      CLL       Follows with oncology.  WBC stable.      VTE Risk Mitigation (From admission, onward)           Ordered     Place sequential compression device  Until discontinued         08/06/24 0700                    Discharge Planning   SALINA: 8/8/2024     Code Status: Prior   Is the patient medically ready for discharge?:     Reason for patient still in hospital (select all that apply): Patient trending condition and Consult recommendations  Discharge Plan A: Home Health                  ZACHARY Ramos  Department of Hospital Medicine   [facility]

## 2024-08-07 NOTE — PLAN OF CARE
[facility]  Initial Discharge Assessment       Primary Care Provider: Vern Priest MD    Admission Diagnosis: Cervical spinal stenosis [M48.02]  S/P cervical spinal fusion [Z98.1]  Pre-op testing [Z01.818]    Admission Date: 8/6/2024    DC assessment completed with patient at bedside.  Information verified as correct on facesheet.  Patient denies HPOA.  Denies HH/HD/Coumadin.  Uses oxygen at home as needed (3L) supplied by [employer]. Patient independent in ADL's.  DC plan is home.  Therapy recommending HH.  DaughterTiny, to provide transport on discharge.        Transition of Care Barriers: None    Payor: [employer] MEDICARE ADVANTAGE / Plan: [employer] DUAL ADVANTAGE / Product Type: Medicare Advantage /     Extended Emergency Contact Information  Primary Emergency Contact: Tiny Fairchild  Address: [address] [place], [place] [address] [place] [address]  Home Phone: [phone]  Mobile Phone: [phone]  Relation: Daughter  Secondary Emergency Contact: Sadie Olvera   [facility]  Home Phone: [phone]  Mobile Phone: [phone]  Relation: Mother    Discharge Plan A: Home Health  Discharge Plan B: Home with family      [id number] - [place] - [address] AT [facility] [address]   [address]  [facility] [address]  Phone: [phone] Fax: [phone]    [facility] [address]  [place] - [address]  [address] [facility] [address]  Phone: [phone] Fax: [phone]    [facility]  [address]  [facility] [address]  Phone: [phone] Fax: [phone]    [id number] - [place] - [address]  [address]  [facility] [address]  Phone: [phone] Fax: [phone]    [id number] - [place] - [address] [facility] [address]  [address]  [place] [address]  Phone: [phone] Fax:  [phone]      Initial Assessment (most recent)       Adult Discharge Assessment - 08/07/24 1401          Discharge Assessment    Assessment Type Discharge Planning Assessment     Confirmed/corrected address, phone number and insurance Yes     Confirmed Demographics Correct on Facesheet     Source of Information patient     Communicated SALINA with patient/caregiver Yes     Reason For Admission cervical spinal stenosis     People in Home parent(s)     Facility Arrived From: home     Do you expect to return to your current living situation? Yes     Do you have help at home or someone to help you manage your care at home? Yes     Equipment Currently Used at Home oxygen     Readmission within 30 days? No     Patient currently being followed by outpatient case management? No     Do you currently have service(s) that help you manage your care at home? No     Do you take prescription medications? Yes     Do you have prescription coverage? Yes     Do you have any problems affording any of your prescribed medications? No     Is the patient taking medications as prescribed? yes     Who is going to help you get home at discharge? Daughter- Sunshine     How do you get to doctors appointments? car, drives self     Are you on dialysis? No     Do you take coumadin? No     Discharge Plan A Home Health     Discharge Plan B Home with family     DME Needed Upon Discharge  none     Discharge Plan discussed with: Patient     Transition of Care Barriers None

## 2024-08-07 NOTE — PLAN OF CARE
MAU sent referrals for HH via [employer]. MAU will follow up with pt tomorrow regarding who is willing to accept her and pt will have a chance to pick preference.       08/07/24 1412   Post-Acute Status   Post-Acute Authorization Home Health   Home Health Status Referrals Sent

## 2024-08-07 NOTE — PLAN OF CARE
CM met with patient at bedside. Patient requesting rollator at discharge.  Order sent to [facility] for review.        08/07/24 1356   Post-Acute Status   Post-Acute Authorization DYLAN

## 2024-08-07 NOTE — PROGRESS NOTES
[facility]  Neurosurgery  Progress Note    Subjective:     History of Present Illness: No notes on file    Post-Op Info:  Procedure(s) (LRB):  FUSION, SPINE, CERVICAL, ANTERIOR APPROACH (N/A)   1 Day Post-Op   Interval History: 8/7:  Patient is status post C4-7 anterior cervical fusion performed on 08/06/2024.  POD 1.  No acute events overnight.  Afebrile.  Blood pressure 102/53.  Pulse 51.  Respirations 20.  WBC 31.39 (history of CLL).  Hemoglobin 10.3.  Platelets 142.  DI drain x1 in place with 35 mL of output in the last 12 hours.  Patient found in bed, awake and alert with cervical collar in place.  Family member at bedside.  She reports anticipated incisional pain that does improve with current pain regimen.  At this time, she is unsure if she has improvement in preoperative cervical pain that radiated to bilateral shoulders.  Patient reports she has eaten without difficulty in swallowing.  She has been out of the bed to void.  She denies dysphagia, dysphonia, new extremity weakness or paresthesias.  Patient is agreeable to discharged this afternoon.    Medications:  Continuous Infusions:  Scheduled Meds:   bisacodyL  10 mg Rectal Daily    buPROPion  300 mg Oral Daily    citalopram  20 mg Oral Daily    dexAMETHasone  2 mg Intravenous Q6H    [START ON 8/8/2024] levothyroxine  50 mcg Oral Before breakfast    mupirocin   Nasal BID    oxyCODONE-acetaminophen  1 tablet Oral Q6H    tiZANidine  4 mg Oral Q8H    topiramate  100 mg Oral BID     PRN Meds:  Current Facility-Administered Medications:     aluminum-magnesium hydroxide-simethicone, 30 mL, Oral, Q4H PRN    HYDROmorphone, 2 mg, Intravenous, Q3H PRN    mupirocin, , Nasal, On Call Procedure    ondansetron, 8 mg, Oral, Q6H PRN    oxyCODONE, 5 mg, Oral, Q4H PRN    prochlorperazine, 5 mg, Intravenous, Q6H PRN    senna-docusate 8.6-50 mg, 2 tablet, Oral, Nightly PRN       Objective:        There is no height or weight on file to calculate BMI.  Vital  "Signs (Most Recent):  Temp: 98.1 °F (36.7 °C) (08/07/24 0737)  Pulse: (!) 50 (08/07/24 0814)  Resp: 17 (08/07/24 0814)  BP: (!) 102/53 (08/07/24 0737)  SpO2: 96 % (08/07/24 0814) Vital Signs (24h Range):  Temp:  [96.3 °F (35.7 °C)-98.1 °F (36.7 °C)] 98.1 °F (36.7 °C)  Pulse:  [50-62] 50  Resp:  [10-20] 17  SpO2:  [91 %-100 %] 96 %  BP: ()/(47-61) 102/53                              Closed/Suction Drain 08/06/24 1035 Tube - 1 Anterior Neck Bulb 10 Fr. (Active)   Dressing Status Dry;Clean;Intact 08/06/24 1235   Dressing Intervention First dressing 08/06/24 1235   Status To bulb suction 08/06/24 1235   Output (mL) 15 mL 08/07/24 0457         Neurosurgery Physical Exam  General:  No acute distress.    Neck:  Collar in place.  Neurologic: Alert and oriented. Thought content appropriate.  GCS: E4 V5 M6; Total: 15  Pulmonary: normal respirations, no signs of respiratory distress  Skin: Skin is warm, dry and intact.    Sensory: intact to light touch throughout  Motor Strength: Moves all extremities spontaneously with good tone.  No abnormal movements seen.  Bilateral upper extremity strength deltoid/biceps/triceps/hand  5/5   Bilateral lower extremity strength iliopsoas/TA/EHL/gastrocnemius 5/5     Anterior cervical Incision: Covered with clean, dry steri strip. No surrounding erythema or edema. No drainage from incision. No palpable hematoma or underlying fluid collection.  DI drain x 1 in place     Significant Labs:  Recent Labs   Lab 08/07/24  0510   *      K 4.1      CO2 22*   BUN 13   CREATININE 0.9   CALCIUM 8.2*     Recent Labs   Lab 08/07/24  0510   WBC 31.39*   HGB 10.3*   HCT 31.8*   *     No results for input(s): "LABPT", "INR", "APTT" in the last 48 hours.  Microbiology Results (last 7 days)       ** No results found for the last 168 hours. **            Assessment/Plan:     Status post cervical spinal fusion  Yvette Hutchinson is a 64-year-old female status post C4-7 anterior " cervical fusion performed on 08/06/2024.     POD 1.     Neurologically intact.        --q4h neurochecks on floor  --All labs and diagnostics reviewed.  Chronic leukocytosis  --Follow-up post-op cervical XRs in appropriate alignment without hardware malfunction.  --Maintain collar at all times   --DI drains to remain at this time on bulb suction  --PT/OT/OOB  --ADAT  --Pain control with multimodal pain regimen   --TEDs/SCDs  --Continue to monitor clinically, notify NSGY immediately with any changes in neuro status    Dispo:  Pending PT/OT evaluation, anticipate discharged home this afternoon.Patient will be discharged with drain.  Please instruct patient on how to empty and document drain output.  Please also provide urine cup for measuring.  Patient instructed to return to clinic on tomorrow morning for drain removal.            RADHA PUENTE PA-C  Neurosurgery  [facility]

## 2024-08-07 NOTE — ASSESSMENT & PLAN NOTE
POD 1 s/p C4-7 anterior cervical fusion with Dr. Daley  Continue to follow recommendations of surgeon.  Needs aggressive incentive spirometry.  Follow hemoglobin and hematocrit closely.  Pain control   Physical therapy as per protocol with fall precautions.

## 2024-08-07 NOTE — SUBJECTIVE & OBJECTIVE
Interval History: experienced some lightheadedness/dizziness this morning while ambulating with SBP noted in the 90's.  IVF bolus ordered.  Will reevaluate later this afternoon.  She can likely dc home later today if dizziness resolves.    Review of Systems   Constitutional:  Negative for chills.   Musculoskeletal:  Positive for arthralgias and neck pain.   Skin:  Negative for color change and rash.   Neurological:  Positive for dizziness and light-headedness.     Objective:     Vital Signs (Most Recent):  Temp: 97.8 °F (36.6 °C) (08/07/24 1218)  Pulse: (!) 50 (08/07/24 1324)  Resp: 18 (08/07/24 1324)  BP: (!) 161/74 (08/07/24 1218)  SpO2: 96 % (08/07/24 1324) Vital Signs (24h Range):  Temp:  [97.5 °F (36.4 °C)-98.1 °F (36.7 °C)] 97.8 °F (36.6 °C)  Pulse:  [50-68] 50  Resp:  [10-20] 18  SpO2:  [91 %-99 %] 96 %  BP: ()/(47-74) 161/74        There is no height or weight on file to calculate BMI.    Intake/Output Summary (Last 24 hours) at 8/7/2024 1345  Last data filed at 8/7/2024 1007  Gross per 24 hour   Intake 360 ml   Output 55 ml   Net 305 ml         Physical Exam  Constitutional:       Appearance: She is well-developed.   HENT:      Head: Normocephalic and atraumatic.   Eyes:      Conjunctiva/sclera: Conjunctivae normal.      Pupils: Pupils are equal, round, and reactive to light.   Neck:      Comments: Hard cervical collar in place.  Cardiovascular:      Rate and Rhythm: Normal rate and regular rhythm.      Heart sounds: Normal heart sounds.   Pulmonary:      Effort: Pulmonary effort is normal.      Breath sounds: Normal breath sounds.   Abdominal:      General: Bowel sounds are normal. There is no distension.      Palpations: Abdomen is soft.   Musculoskeletal:         General: Normal range of motion.   Skin:     General: Skin is warm and dry.   Neurological:      General: No focal deficit present.      Mental Status: She is alert and oriented to person, place, and time.   Psychiatric:         Mood and  Affect: Mood normal.         Behavior: Behavior normal.         Thought Content: Thought content normal.             Significant Labs: All pertinent labs within the past 24 hours have been reviewed.  CBC:   Recent Labs   Lab 08/07/24  0510   WBC 31.39*   HGB 10.3*   HCT 31.8*   *     CMP:   Recent Labs   Lab 08/07/24  0510      K 4.1      CO2 22*   *   BUN 13   CREATININE 0.9   CALCIUM 8.2*   ANIONGAP 7*     Significant Imaging: I have reviewed all pertinent imaging results/findings within the past 24 hours.

## 2024-08-07 NOTE — PT/OT/SLP PROGRESS
Physical Therapy      Patient Name:  Yvette Hutchinson   MRN:  [id number]    Patient not seen for second PT visit of the day secondary to patient receiving bolus. Will drop frequency from BID to QD7 as patient was only put BID to allow ambulation after bolus for possible D/C home today. However, patient will stay tonight per NP. Will follow-up 08/08/24.

## 2024-08-07 NOTE — HPI
Consult was called for comanagement post spinal surgery patient  63 y.o. female with past medical history of chronic pain syndrome, chronic obstructive lung disease, lung cancer status post left lobectomy 2022 s/p chemo , and esophageal stricture was admitted to cardiology unit status post Anterior cervical diskectomy and decompression of spinal cord C4-5, C5-6, C6-7, Partial C5 corpectomy and  Anterior cervical interbody arthrodesis C4-5, C5-6, C6-7  and plating .  Seen at postop patient is awake alert oriented and not in pain, drain in the pocket currently with C-collar  Patient has chronic cervical radiculopathy symptoms and which was exacerbated by she was trying to help her mother with the elevation receive a dish and looking upwards and having more symptoms and eventually did the surgery   She has a history of lung cancer, in remission and port in place   Cervical adenopathy is being followed by her oncologist and just recently seen  and also Dx with CLL stage 0.

## 2024-08-07 NOTE — SUBJECTIVE & OBJECTIVE
Interval History: 8/7:  Patient is status post C4-7 anterior cervical fusion performed on 08/06/2024.  POD 1.  No acute events overnight.  Afebrile.  Blood pressure 102/53.  Pulse 51.  Respirations 20.  WBC 31.39 (history of CLL).  Hemoglobin 10.3.  Platelets 142.  DI drain x1 in place with 35 mL of output in the last 12 hours.  Patient found in bed, awake and alert with cervical collar in place.  Family member at bedside.  She reports anticipated incisional pain that does improve with current pain regimen.  At this time, she is unsure if she has improvement in preoperative cervical pain that radiated to bilateral shoulders.  Patient reports she has eaten without difficulty in swallowing.  She has been out of the bed to void.  She denies dysphagia, dysphonia, new extremity weakness or paresthesias.  Patient is agreeable to discharged this afternoon.    Medications:  Continuous Infusions:  Scheduled Meds:   bisacodyL  10 mg Rectal Daily    buPROPion  300 mg Oral Daily    citalopram  20 mg Oral Daily    dexAMETHasone  2 mg Intravenous Q6H    [START ON 8/8/2024] levothyroxine  50 mcg Oral Before breakfast    mupirocin   Nasal BID    oxyCODONE-acetaminophen  1 tablet Oral Q6H    tiZANidine  4 mg Oral Q8H    topiramate  100 mg Oral BID     PRN Meds:  Current Facility-Administered Medications:     aluminum-magnesium hydroxide-simethicone, 30 mL, Oral, Q4H PRN    HYDROmorphone, 2 mg, Intravenous, Q3H PRN    mupirocin, , Nasal, On Call Procedure    ondansetron, 8 mg, Oral, Q6H PRN    oxyCODONE, 5 mg, Oral, Q4H PRN    prochlorperazine, 5 mg, Intravenous, Q6H PRN    senna-docusate 8.6-50 mg, 2 tablet, Oral, Nightly PRN       Objective:        There is no height or weight on file to calculate BMI.  Vital Signs (Most Recent):  Temp: 98.1 °F (36.7 °C) (08/07/24 0737)  Pulse: (!) 50 (08/07/24 0814)  Resp: 17 (08/07/24 0814)  BP: (!) 102/53 (08/07/24 0737)  SpO2: 96 % (08/07/24 0814) Vital Signs (24h Range):  Temp:  [96.3 °F (35.7  "°C)-98.1 °F (36.7 °C)] 98.1 °F (36.7 °C)  Pulse:  [50-62] 50  Resp:  [10-20] 17  SpO2:  [91 %-100 %] 96 %  BP: ()/(47-61) 102/53                              Closed/Suction Drain 08/06/24 1035 Tube - 1 Anterior Neck Bulb 10 Fr. (Active)   Dressing Status Dry;Clean;Intact 08/06/24 1235   Dressing Intervention First dressing 08/06/24 1235   Status To bulb suction 08/06/24 1235   Output (mL) 15 mL 08/07/24 0457         Neurosurgery Physical Exam  General:  No acute distress.    Neck:  Collar in place.  Neurologic: Alert and oriented. Thought content appropriate.  GCS: E4 V5 M6; Total: 15  Pulmonary: normal respirations, no signs of respiratory distress  Skin: Skin is warm, dry and intact.    Sensory: intact to light touch throughout  Motor Strength: Moves all extremities spontaneously with good tone.  No abnormal movements seen.  Bilateral upper extremity strength deltoid/biceps/triceps/hand  5/5   Bilateral lower extremity strength iliopsoas/TA/EHL/gastrocnemius 5/5     Anterior cervical Incision: Covered with clean, dry steri strip. No surrounding erythema or edema. No drainage from incision. No palpable hematoma or underlying fluid collection.  DI drain x 1 in place     Significant Labs:  Recent Labs   Lab 08/07/24  0510   *      K 4.1      CO2 22*   BUN 13   CREATININE 0.9   CALCIUM 8.2*     Recent Labs   Lab 08/07/24  0510   WBC 31.39*   HGB 10.3*   HCT 31.8*   *     No results for input(s): "LABPT", "INR", "APTT" in the last 48 hours.  Microbiology Results (last 7 days)       ** No results found for the last 168 hours. **            "

## 2024-08-07 NOTE — CARE UPDATE
This afternoon, patient reported dizziness and experience hypotension during physical therapy.  Patient will be kept overnight and reevaluated in the morning.  If dizziness and hypotension resolve anticipate discharge tomorrow morning.

## 2024-08-08 VITALS
TEMPERATURE: 98 F | HEART RATE: 61 BPM | DIASTOLIC BLOOD PRESSURE: 69 MMHG | RESPIRATION RATE: 17 BRPM | SYSTOLIC BLOOD PRESSURE: 114 MMHG | OXYGEN SATURATION: 98 %

## 2024-08-08 LAB
ANION GAP SERPL CALC-SCNC: 6 MMOL/L (ref 8–16)
BASOPHILS # BLD AUTO: 0.05 K/UL (ref 0–0.2)
BASOPHILS NFR BLD: 0.2 % (ref 0–1.9)
BUN SERPL-MCNC: 16 MG/DL (ref 8–23)
CALCIUM SERPL-MCNC: 8.8 MG/DL (ref 8.7–10.5)
CHLORIDE SERPL-SCNC: 111 MMOL/L (ref 95–110)
CO2 SERPL-SCNC: 24 MMOL/L (ref 23–29)
CREAT SERPL-MCNC: 0.9 MG/DL (ref 0.5–1.4)
DIFFERENTIAL METHOD BLD: ABNORMAL
EOSINOPHIL # BLD AUTO: 0 K/UL (ref 0–0.5)
EOSINOPHIL NFR BLD: 0 % (ref 0–8)
ERYTHROCYTE [DISTWIDTH] IN BLOOD BY AUTOMATED COUNT: 14 % (ref 11.5–14.5)
EST. GFR  (NO RACE VARIABLE): >60 ML/MIN/1.73 M^2
GLUCOSE SERPL-MCNC: 132 MG/DL (ref 70–110)
HCT VFR BLD AUTO: 30.6 % (ref 37–48.5)
HGB BLD-MCNC: 9.9 G/DL (ref 12–16)
IMM GRANULOCYTES # BLD AUTO: 0.09 K/UL (ref 0–0.04)
IMM GRANULOCYTES NFR BLD AUTO: 0.3 % (ref 0–0.5)
LYMPHOCYTES # BLD AUTO: 20 K/UL (ref 1–4.8)
LYMPHOCYTES NFR BLD: 67.9 % (ref 18–48)
MCH RBC QN AUTO: 31.1 PG (ref 27–31)
MCHC RBC AUTO-ENTMCNC: 32.4 G/DL (ref 32–36)
MCV RBC AUTO: 96 FL (ref 82–98)
MONOCYTES # BLD AUTO: 0.5 K/UL (ref 0.3–1)
MONOCYTES NFR BLD: 1.8 % (ref 4–15)
NEUTROPHILS # BLD AUTO: 8.8 K/UL (ref 1.8–7.7)
NEUTROPHILS NFR BLD: 29.8 % (ref 38–73)
NRBC BLD-RTO: 0 /100 WBC
PLATELET # BLD AUTO: 157 K/UL (ref 150–450)
PMV BLD AUTO: 10.2 FL (ref 9.2–12.9)
POTASSIUM SERPL-SCNC: 4.1 MMOL/L (ref 3.5–5.1)
RBC # BLD AUTO: 3.18 M/UL (ref 4–5.4)
SODIUM SERPL-SCNC: 141 MMOL/L (ref 136–145)
WBC # BLD AUTO: 29.46 K/UL (ref 3.9–12.7)

## 2024-08-08 PROCEDURE — 85007 BL SMEAR W/DIFF WBC COUNT: CPT | Performed by: HEALTH CARE PROVIDER

## 2024-08-08 PROCEDURE — 80048 BASIC METABOLIC PNL TOTAL CA: CPT | Performed by: HEALTH CARE PROVIDER

## 2024-08-08 PROCEDURE — 25000003 PHARM REV CODE 250: Performed by: HEALTH CARE PROVIDER

## 2024-08-08 PROCEDURE — 36415 COLL VENOUS BLD VENIPUNCTURE: CPT | Performed by: HEALTH CARE PROVIDER

## 2024-08-08 PROCEDURE — 25000003 PHARM REV CODE 250: Performed by: NURSE PRACTITIONER

## 2024-08-08 PROCEDURE — 94799 UNLISTED PULMONARY SVC/PX: CPT

## 2024-08-08 PROCEDURE — 85027 COMPLETE CBC AUTOMATED: CPT | Performed by: HEALTH CARE PROVIDER

## 2024-08-08 PROCEDURE — 99900031 HC PATIENT EDUCATION (STAT)

## 2024-08-08 PROCEDURE — 99024 POSTOP FOLLOW-UP VISIT: CPT | Mod: ,,, | Performed by: HEALTH CARE PROVIDER

## 2024-08-08 PROCEDURE — 94761 N-INVAS EAR/PLS OXIMETRY MLT: CPT

## 2024-08-08 RX ADMIN — CITALOPRAM HYDROBROMIDE 20 MG: 20 TABLET ORAL at 09:08

## 2024-08-08 RX ADMIN — OXYCODONE HYDROCHLORIDE AND ACETAMINOPHEN 1 TABLET: 7.5; 325 TABLET ORAL at 02:08

## 2024-08-08 RX ADMIN — GABAPENTIN 300 MG: 300 CAPSULE ORAL at 02:08

## 2024-08-08 RX ADMIN — TIZANIDINE 4 MG: 4 TABLET ORAL at 05:08

## 2024-08-08 RX ADMIN — MUPIROCIN 1 G: 20 OINTMENT TOPICAL at 09:08

## 2024-08-08 RX ADMIN — BISACODYL 10 MG: 10 SUPPOSITORY RECTAL at 09:08

## 2024-08-08 RX ADMIN — TIZANIDINE 4 MG: 4 TABLET ORAL at 02:08

## 2024-08-08 RX ADMIN — LEVOTHYROXINE SODIUM 50 MCG: 0.03 TABLET ORAL at 05:08

## 2024-08-08 RX ADMIN — OXYCODONE HYDROCHLORIDE AND ACETAMINOPHEN 1 TABLET: 7.5; 325 TABLET ORAL at 05:08

## 2024-08-08 RX ADMIN — BUPROPION HYDROCHLORIDE 300 MG: 150 TABLET, EXTENDED RELEASE ORAL at 09:08

## 2024-08-08 RX ADMIN — GABAPENTIN 300 MG: 300 CAPSULE ORAL at 10:08

## 2024-08-08 RX ADMIN — TOPIRAMATE 100 MG: 100 TABLET, FILM COATED ORAL at 09:08

## 2024-08-08 NOTE — PROGRESS NOTES
[facility]  Progress Note    Patient Name: Yvette Hutchinson  MRN: [id number]  Patient Class: IP- Inpatient   Admission Date: 8/6/2024  Length of Stay: 2 days  Attending Physician: Anatoly Daley DO  Primary Care Provider: Vern Priest MD        Subjective:     Principal Problem:Status post cervical spinal fusion        HPI:  Consult was called for comanagement post spinal surgery patient  63 y.o. female with past medical history of chronic pain syndrome, chronic obstructive lung disease, lung cancer status post left lobectomy 2022 s/p chemo , and esophageal stricture was admitted to cardiology unit status post Anterior cervical diskectomy and decompression of spinal cord C4-5, C5-6, C6-7, Partial C5 corpectomy and  Anterior cervical interbody arthrodesis C4-5, C5-6, C6-7  and plating .  Seen at postop patient is awake alert oriented and not in pain, drain in the pocket currently with C-collar  Patient has chronic cervical radiculopathy symptoms and which was exacerbated by she was trying to help her mother with the elevation receive a dish and looking upwards and having more symptoms and eventually did the surgery   She has a history of lung cancer, in remission and port in place   Cervical adenopathy is being followed by her oncologist and just recently seen  and also Dx with CLL stage 0.    Overview/Hospital Course:  No notes on file    Interval History: no acute events overnight.  Pain well controlled.  Scheduled to discharge home today.    Review of Systems   Constitutional:  Negative for chills.   Musculoskeletal:  Positive for arthralgias and neck pain.   Skin:  Negative for color change and rash.   Neurological:  Positive for dizziness and light-headedness.     Objective:     Vital Signs (Most Recent):  Temp: 98.2 °F (36.8 °C) (08/08/24 0315)  Pulse: (!) 44 (08/08/24 0757)  Resp: 18 (08/08/24 0757)  BP: (!) 100/58 (08/08/24 0740)  SpO2: 96 % (08/08/24 0757) Vital  Signs (24h Range):  Temp:  [97.6 °F (36.4 °C)-98.3 °F (36.8 °C)] 98.2 °F (36.8 °C)  Pulse:  [44-68] 44  Resp:  [15-20] 18  SpO2:  [94 %-98 %] 96 %  BP: ()/(44-74) 100/58        There is no height or weight on file to calculate BMI.    Intake/Output Summary (Last 24 hours) at 8/8/2024 0947  Last data filed at 8/8/2024 0613  Gross per 24 hour   Intake 600 ml   Output 2020 ml   Net -1420 ml         Physical Exam  Constitutional:       Appearance: She is well-developed.   HENT:      Head: Normocephalic and atraumatic.   Eyes:      Conjunctiva/sclera: Conjunctivae normal.      Pupils: Pupils are equal, round, and reactive to light.   Neck:      Comments: Hard cervical collar in place.  Cardiovascular:      Rate and Rhythm: Normal rate and regular rhythm.      Heart sounds: Normal heart sounds.   Pulmonary:      Effort: Pulmonary effort is normal.      Breath sounds: Normal breath sounds.   Abdominal:      General: Bowel sounds are normal. There is no distension.      Palpations: Abdomen is soft.   Musculoskeletal:         General: Normal range of motion.   Skin:     General: Skin is warm and dry.   Neurological:      General: No focal deficit present.      Mental Status: She is alert and oriented to person, place, and time.   Psychiatric:         Mood and Affect: Mood normal.         Behavior: Behavior normal.         Thought Content: Thought content normal.             Significant Labs: All pertinent labs within the past 24 hours have been reviewed.  BMP:   Recent Labs   Lab 08/08/24  0443   *      K 4.1   *   CO2 24   BUN 16   CREATININE 0.9   CALCIUM 8.8     CBC:   Recent Labs   Lab 08/07/24  0510 08/08/24  0443   WBC 31.39* 29.46*   HGB 10.3* 9.9*   HCT 31.8* 30.6*   * 157       Significant Imaging: I have reviewed all pertinent imaging results/findings within the past 24 hours.    Assessment/Plan:      * Status post cervical spinal fusion  POD 2 s/p C4-7 anterior cervical fusion with  Dr. Daley  Continue to follow recommendations of surgeon.  Needs aggressive incentive spirometry.  Follow hemoglobin and hematocrit closely.  Pain control   Physical therapy as per protocol with fall precautions.      Acquired hypothyroidism  Patient has chronic hypothyroidism. TFTs reviewed-   Lab Results   Component Value Date    TSH 5.048 (H) 06/03/2024   . Will continue chronic levothyroxine and adjust for and clinical changes.        CLL (chronic lymphocytic leukemia)  Follows with oncology.  WBC stable.      Anxiety  Continue chronic SSRI        VTE Risk Mitigation (From admission, onward)           Ordered     Place sequential compression device  Until discontinued         08/06/24 0700                    Discharge Planning   SALINA: 8/8/2024     Code Status: Prior   Is the patient medically ready for discharge?:     Reason for patient still in hospital (select all that apply): Consult recommendations  Discharge Plan A: Home Health                  ZACHARY Ramos  Department of Hospital Medicine   [facility]

## 2024-08-08 NOTE — PLAN OF CARE
SW met with pt at bedside. Pt declined HH placement today. Per pt she does not feel that she needs it. SW explained if something changes she can get HH ordered through PCP after d/c or through Dr. Daley. Pt verbalized understanding.       08/08/24 6416   Discharge Plan   Discharge Plan A Home with family        unable to perform/Pt OOB to chair

## 2024-08-08 NOTE — SUBJECTIVE & OBJECTIVE
Interval History: no acute events overnight.  Pain well controlled.  Scheduled to discharge home today.    Review of Systems   Constitutional:  Negative for chills.   Musculoskeletal:  Positive for arthralgias and neck pain.   Skin:  Negative for color change and rash.   Neurological:  Positive for dizziness and light-headedness.     Objective:     Vital Signs (Most Recent):  Temp: 98.2 °F (36.8 °C) (08/08/24 0315)  Pulse: (!) 44 (08/08/24 0757)  Resp: 18 (08/08/24 0757)  BP: (!) 100/58 (08/08/24 0740)  SpO2: 96 % (08/08/24 0757) Vital Signs (24h Range):  Temp:  [97.6 °F (36.4 °C)-98.3 °F (36.8 °C)] 98.2 °F (36.8 °C)  Pulse:  [44-68] 44  Resp:  [15-20] 18  SpO2:  [94 %-98 %] 96 %  BP: ()/(44-74) 100/58        There is no height or weight on file to calculate BMI.    Intake/Output Summary (Last 24 hours) at 8/8/2024 0947  Last data filed at 8/8/2024 0613  Gross per 24 hour   Intake 600 ml   Output 2020 ml   Net -1420 ml         Physical Exam  Constitutional:       Appearance: She is well-developed.   HENT:      Head: Normocephalic and atraumatic.   Eyes:      Conjunctiva/sclera: Conjunctivae normal.      Pupils: Pupils are equal, round, and reactive to light.   Neck:      Comments: Hard cervical collar in place.  Cardiovascular:      Rate and Rhythm: Normal rate and regular rhythm.      Heart sounds: Normal heart sounds.   Pulmonary:      Effort: Pulmonary effort is normal.      Breath sounds: Normal breath sounds.   Abdominal:      General: Bowel sounds are normal. There is no distension.      Palpations: Abdomen is soft.   Musculoskeletal:         General: Normal range of motion.   Skin:     General: Skin is warm and dry.   Neurological:      General: No focal deficit present.      Mental Status: She is alert and oriented to person, place, and time.   Psychiatric:         Mood and Affect: Mood normal.         Behavior: Behavior normal.         Thought Content: Thought content normal.             Significant  Labs: All pertinent labs within the past 24 hours have been reviewed.  BMP:   Recent Labs   Lab 08/08/24  0443   *      K 4.1   *   CO2 24   BUN 16   CREATININE 0.9   CALCIUM 8.8     CBC:   Recent Labs   Lab 08/07/24  0510 08/08/24  0443   WBC 31.39* 29.46*   HGB 10.3* 9.9*   HCT 31.8* 30.6*   * 157       Significant Imaging: I have reviewed all pertinent imaging results/findings within the past 24 hours.

## 2024-08-08 NOTE — PT/OT/SLP PROGRESS
Physical Therapy      Patient Name:  Yvette Hutchinson   MRN:  [id number]    Patient not seen today secondary to neurosurgery PA requests no PT treatment today as patient is discharging home. Will follow-up 08/09/24 if D/C falls through.

## 2024-08-08 NOTE — PROGRESS NOTES
Pt discharged home. IV and telemetry removed. All belongings given. Discharge instructions reviewed. Pt transferred via wheelchair to private vehicle.

## 2024-08-08 NOTE — ASSESSMENT & PLAN NOTE
POD 2 s/p C4-7 anterior cervical fusion with Dr. Daley  Continue to follow recommendations of surgeon.  Needs aggressive incentive spirometry.  Follow hemoglobin and hematocrit closely.  Pain control   Physical therapy as per protocol with fall precautions.

## 2024-08-08 NOTE — SUBJECTIVE & OBJECTIVE
Interval History: 8/8:  No acute events overnight.  Afebrile.  Blood pressure 100/58.  Pulse 44.  Respirations 18.  WBC 29.46 (history of CLL), hemoglobin 9.9.  Platelets 157.  DI drain x1 in place with 10 mL of output in last 12 hours.  Patient found in bed, awake and alert with collar in place.  No family was present at time of encounter.  Patient reports anticipated incisional pain and soreness which does improve with pain medication.  She also reports she has not had any additional episodes of dizziness since yesterday.  She has been out of the bed to void and tolerates her diet.  She denies new extremity weakness or paresthesias.    Medications:  Continuous Infusions:  Scheduled Meds:   bisacodyL  10 mg Rectal Daily    buPROPion  300 mg Oral Daily    citalopram  20 mg Oral Daily    gabapentin  300 mg Oral TID    levothyroxine  50 mcg Oral Before breakfast    mupirocin   Nasal BID    oxyCODONE-acetaminophen  1 tablet Oral Q6H    tiZANidine  4 mg Oral Q8H    topiramate  100 mg Oral BID     PRN Meds:  Current Facility-Administered Medications:     aluminum-magnesium hydroxide-simethicone, 30 mL, Oral, Q4H PRN    HYDROmorphone, 2 mg, Intravenous, Q3H PRN    mupirocin, , Nasal, On Call Procedure    ondansetron, 8 mg, Oral, Q6H PRN    oxyCODONE, 5 mg, Oral, Q4H PRN    prochlorperazine, 5 mg, Intravenous, Q6H PRN    senna-docusate 8.6-50 mg, 2 tablet, Oral, Nightly PRN       Objective:        There is no height or weight on file to calculate BMI.  Vital Signs (Most Recent):  Temp: 98.2 °F (36.8 °C) (08/08/24 0315)  Pulse: (!) 44 (08/08/24 0757)  Resp: 18 (08/08/24 0757)  BP: (!) 100/58 (08/08/24 0740)  SpO2: 96 % (08/08/24 0757) Vital Signs (24h Range):  Temp:  [97.6 °F (36.4 °C)-98.3 °F (36.8 °C)] 98.2 °F (36.8 °C)  Pulse:  [44-68] 44  Resp:  [15-20] 18  SpO2:  [94 %-98 %] 96 %  BP: ()/(44-74) 100/58                Oxygen Concentration (%):  [0] 0             Closed/Suction Drain 08/06/24 1035 Tube - 1 Anterior  "Neck Bulb 10 Fr. (Active)   Dressing Status Dry 08/08/24 0315   Dressing Intervention Integrity maintained 08/08/24 0315   Status To bulb suction 08/08/24 0315   Output (mL) 10 mL 08/08/24 0613         Neurosurgery Physical Exam  General:  No acute distress.    Neck:  Collar in place.    Neurologic: Alert and oriented. Thought content appropriate.  GCS: E4 V5 M6; Total: 15  Pulmonary: normal respirations, no signs of respiratory distress    Motor Strength: Moves all extremities spontaneously with good tone.  No abnormal movements seen.  Bilateral upper extremity strength deltoid/biceps/triceps/hand  5/5     Anterior cervical Incision: Covered with clean, dry steri strip. No surrounding erythema or edema. No drainage from incision. No palpable hematoma or underlying fluid collection.  DI drain x 1 in place     Significant Labs:  Recent Labs   Lab 08/07/24  0510 08/08/24  0443   * 132*    141   K 4.1 4.1    111*   CO2 22* 24   BUN 13 16   CREATININE 0.9 0.9   CALCIUM 8.2* 8.8     Recent Labs   Lab 08/07/24  0510 08/08/24  0443   WBC 31.39* 29.46*   HGB 10.3* 9.9*   HCT 31.8* 30.6*   * 157     No results for input(s): "LABPT", "INR", "APTT" in the last 48 hours.  Microbiology Results (last 7 days)       ** No results found for the last 168 hours. **            "

## 2024-08-08 NOTE — PROGRESS NOTES
[facility]  Neurosurgery  Progress Note    Subjective:     History of Present Illness: Yvette Hutchinson is a 63 y.o. female with past medical history of chronic pain syndrome, chronic obstructive lung disease, lung cancer status post left lobectomy, and esophageal stricture referred to us by JOSE ANTONIO Reed (PCP) for chronic cervical and lumbar pain.  Today, patient reports chronic cervical pain and back pain which has progressed in last 2 months after a mechanical fall.  Posterior cervical pain radiates to bilateral shoulders only, left shoulder greater than right. Cervical pain exacerbated with cervical rotation, flexion and extension.  She also reports bilateral hand numbness/tingling and difficulty opening water bottles.  Furthermore, she reports chronic lumbar pain that radiates to left posterolateral thigh and calf.  She has numbness and tingling in both feet, left greater than right.  She has trialed muscle relaxers which aid with sleep.  Also reports stress incontinence.  She denies falls, imbalance, hand clumsiness, recent physical therapy, epidural steroid injections, previous cervical or lumbar surgery.     Of note patient intermittently use home oxygen for chronic obstructive lung disease.    Post-Op Info:  Procedure(s) (LRB):  FUSION, SPINE, CERVICAL, ANTERIOR APPROACH (N/A)   2 Days Post-Op   Interval History: 8/8:  No acute events overnight.  Afebrile.  Blood pressure 100/58.  Pulse 44.  Respirations 18.  WBC 29.46 (history of CLL), hemoglobin 9.9.  Platelets 157.  DI drain x1 in place with 10 mL of output in last 12 hours.  Patient found in bed, awake and alert with collar in place.  No family was present at time of encounter.  Patient reports anticipated incisional pain and soreness which does improve with pain medication.  She also reports she has not had any additional episodes of dizziness since yesterday.  She has been out of the bed to void and tolerates her diet.  She  denies dysphagia, dysphonia, new extremity weakness or paresthesias.    Medications:  Continuous Infusions:  Scheduled Meds:   bisacodyL  10 mg Rectal Daily    buPROPion  300 mg Oral Daily    citalopram  20 mg Oral Daily    gabapentin  300 mg Oral TID    levothyroxine  50 mcg Oral Before breakfast    mupirocin   Nasal BID    oxyCODONE-acetaminophen  1 tablet Oral Q6H    tiZANidine  4 mg Oral Q8H    topiramate  100 mg Oral BID     PRN Meds:  Current Facility-Administered Medications:     aluminum-magnesium hydroxide-simethicone, 30 mL, Oral, Q4H PRN    HYDROmorphone, 2 mg, Intravenous, Q3H PRN    mupirocin, , Nasal, On Call Procedure    ondansetron, 8 mg, Oral, Q6H PRN    oxyCODONE, 5 mg, Oral, Q4H PRN    prochlorperazine, 5 mg, Intravenous, Q6H PRN    senna-docusate 8.6-50 mg, 2 tablet, Oral, Nightly PRN       Objective:        There is no height or weight on file to calculate BMI.  Vital Signs (Most Recent):  Temp: 98.2 °F (36.8 °C) (08/08/24 0315)  Pulse: (!) 44 (08/08/24 0757)  Resp: 18 (08/08/24 0757)  BP: (!) 100/58 (08/08/24 0740)  SpO2: 96 % (08/08/24 0757) Vital Signs (24h Range):  Temp:  [97.6 °F (36.4 °C)-98.3 °F (36.8 °C)] 98.2 °F (36.8 °C)  Pulse:  [44-68] 44  Resp:  [15-20] 18  SpO2:  [94 %-98 %] 96 %  BP: ()/(44-74) 100/58                Oxygen Concentration (%):  [0] 0             Closed/Suction Drain 08/06/24 1035 Tube - 1 Anterior Neck Bulb 10 Fr. (Active)   Dressing Status Dry 08/08/24 0315   Dressing Intervention Integrity maintained 08/08/24 0315   Status To bulb suction 08/08/24 0315   Output (mL) 10 mL 08/08/24 0613         Neurosurgery Physical Exam  General:  No acute distress.    Neck:  Collar in place.    Neurologic: Alert and oriented. Thought content appropriate.  GCS: E4 V5 M6; Total: 15  Pulmonary: normal respirations, no signs of respiratory distress    Motor Strength: Moves all extremities spontaneously with good tone.  No abnormal movements seen.  Bilateral upper extremity  "strength deltoid/biceps/triceps/hand  5/5     Anterior cervical Incision: Covered with clean, dry steri strip. No surrounding erythema or edema. No drainage from incision. No palpable hematoma or underlying fluid collection.  DI drain x 1 in place     Significant Labs:  Recent Labs   Lab 08/07/24  0510 08/08/24  0443   * 132*    141   K 4.1 4.1    111*   CO2 22* 24   BUN 13 16   CREATININE 0.9 0.9   CALCIUM 8.2* 8.8     Recent Labs   Lab 08/07/24  0510 08/08/24  0443   WBC 31.39* 29.46*   HGB 10.3* 9.9*   HCT 31.8* 30.6*   * 157     No results for input(s): "LABPT", "INR", "APTT" in the last 48 hours.  Microbiology Results (last 7 days)       ** No results found for the last 168 hours. **            Assessment/Plan:     * Status post cervical spinal fusion  Yvette Hutchinson is a 64-year-old female status post C4-7 anterior cervical fusion performed on 08/06/2024.     POD 2.     Neurologically intact.        --All labs and diagnostics reviewed.  Chronic leukocytosis  --Maintain collar at all times   --Discontinue DI drain.  --Pain control with multimodal pain regimen   --TEDs/SCDs  --Continue to monitor clinically, notify NSGY immediately with any changes in neuro status    Dispo:  Patient denies any dizziness since yesterday.  Blood pressure has also improved.  Anticipate discharge this morning.            RADHA PUENTE PA-C  Neurosurgery  [facility]  "

## 2024-08-08 NOTE — PT/OT/SLP PROGRESS
Occupational Therapy      Patient Name:  Yvette Hutchinson   MRN:  [id number]    Patient not seen today secondary to nurse hold.     8/8/2024

## 2024-08-08 NOTE — ASSESSMENT & PLAN NOTE
Yvette Hutchinson is a 64-year-old female status post C4-7 anterior cervical fusion performed on 08/06/2024.     POD 2.     Neurologically intact.        --All labs and diagnostics reviewed.  Chronic leukocytosis  --Maintain collar at all times   --Discontinue DI drain.  --Pain control with multimodal pain regimen   --TEDs/SCDs  --Continue to monitor clinically, notify NSGY immediately with any changes in neuro status    Dispo:  Patient denies any dizziness since yesterday.  Blood pressure has also improved.  Anticipate discharge this morning.

## 2024-08-08 NOTE — CARE UPDATE
08/08/24 0740   Patient Assessment/Suction   Level of Consciousness (AVPU) alert   Respiratory Effort Normal;Unlabored   All Lung Fields Breath Sounds clear   PRE-TX-O2   Device (Oxygen Therapy) room air   SpO2 96 %   Pulse Oximetry Type Intermittent   $ Pulse Oximetry - Multiple Charge Pulse Oximetry - Multiple   Pulse (!) 50   Resp 15   Incentive Spirometer   $ Incentive Spirometer Charges done with encouragement   Incentive Spirometer Predicted Level (mL) 1500   Administration (IS) instruction provided, follow-up   Number of Repetitions (IS) 10   Level Incentive Spirometer (mL) 2000   Patient Tolerance (IS) good   Education   $ Education 15 min;Other (see comment)

## 2024-08-08 NOTE — CARE UPDATE
08/07/24 2048   Patient Assessment/Suction   Level of Consciousness (AVPU) alert   Respiratory Effort Normal;Unlabored   All Lung Fields Breath Sounds clear   PRE-TX-O2   Device (Oxygen Therapy) room air   SpO2 98 %   $ Pulse Oximetry - Multiple Charge Pulse Oximetry - Multiple   Pulse 64   Resp 17   Incentive Spirometer   $ Incentive Spirometer Charges done with encouragement   Administration (IS) instruction provided, follow-up   Number of Repetitions (IS) 5   Level Incentive Spirometer (mL) 2000   Patient Tolerance (IS) good   Education   $ Education Incentive Spirometry;15 min

## 2024-08-08 NOTE — PLAN OF CARE
Charts and orders reviewed. Pt to discharge to [facility] home health, SOC is tomorrow 8/09/2024. Dr. Daley follow-up appointment scheduled and details added to AVS. Pt has no other needs to be addressed by case management. Pt cleared to discharge from case management standpoint.     Per Pt her daughter Tiny is transporting her from hospital to home.  Rollator delivered to pt at bedside by Efren OLSON while SW was in room.      08/08/24 1106   Final Note   Assessment Type Final Discharge Note   Anticipated Discharge Disposition Home-Health   What phone number can be called within the next 1-3 days to see how you are doing after discharge? [phone]   Hospital Resources/Appts/Education Provided Appointments scheduled and added to AVS   Post-Acute Status   Post-Acute Authorization HME   E Status Set-up Complete/Auth obtained   Home Health Status Patient declined/refused   Discharge Delays None known at this time

## 2024-08-09 NOTE — DISCHARGE SUMMARY
[facility]  Neurosurgery  Discharge Summary      Patient Name: Yvette Hutchinson  MRN: [id number]  Admission Date: 8/6/2024  Hospital Length of Stay: 2 days  Discharge Date and Time: 8/8/2024  3:14 PM  Attending Physician: No att. providers found   Discharging Provider: RADHA PUENTE PA-C  Primary Care Provider: Vern Priest MD    HPI:   No notes on file    Procedure(s) (LRB):  FUSION, SPINE, CERVICAL, ANTERIOR APPROACH (N/A)     Hospital Course: 8/7:  Patient is status post C4-7 anterior cervical fusion performed on 08/06/2024.  POD 1.  No acute events overnight.  Afebrile.  Blood pressure 102/53.  Pulse 51.  Respirations 20.  WBC 31.39 (history of CLL).  Hemoglobin 10.3.  Platelets 142.  DI drain x1 in place with 35 mL of output in the last 12 hours.  Patient found in bed, awake and alert with cervical collar in place.  Family member at bedside.  She reports anticipated incisional pain that does improve with current pain regimen.  At this time, she is unsure if she has improvement in preoperative cervical pain that radiated to bilateral shoulders.  Patient reports she has eaten without difficulty in swallowing.  She has been out of the bed to void.  She denies dysphagia, dysphonia, new extremity weakness or paresthesias.  Patient is agreeable to discharged this afternoon.  8/8:  No acute events overnight.  Afebrile.  Blood pressure 100/58.  Pulse 44.  Respirations 18.  WBC 29.46 (history of CLL), hemoglobin 9.9.  Platelets 157.  DI drain x1 in place with 10 mL of output in last 12 hours.  Patient found in bed, awake and alert with collar in place.  No family was present at time of encounter.  Patient reports anticipated incisional pain and soreness which does improve with pain medication.  She also reports she has not had any additional episodes of dizziness since yesterday.  She has been out of the bed to void and tolerates her diet.  She denies new extremity weakness or  paresthesias.    Neurosurgery Physical Exam  General:  No acute distress.    Neck:  Collar in place.    Neurologic: Alert and oriented. Thought content appropriate.  GCS: E4 V5 M6; Total: 15  Pulmonary: normal respirations, no signs of respiratory distress     Motor Strength: Moves all extremities spontaneously with good tone.  No abnormal movements seen.  Bilateral upper extremity strength deltoid/biceps/triceps/hand  5/5      Anterior cervical Incision: Covered with clean, dry steri strip. No surrounding erythema or edema. No drainage from incision. No palpable hematoma or underlying fluid collection.  DI drain x 1 in place     Goals of Care Treatment Preferences:  Code Status: Full Code      Consults:     Significant Diagnostic Studies: N/A    Pending Diagnostic Studies:       None          Final Active Diagnoses:    Diagnosis Date Noted POA    PRINCIPAL PROBLEM:  Status post cervical spinal fusion [Z98.1] 08/07/2024 Not Applicable    Acquired hypothyroidism [E03.9] 08/07/2024 Yes    CLL (chronic lymphocytic leukemia) [C91.10] 05/04/2020 Yes    Anxiety [F41.9] 03/02/2020 Yes      Problems Resolved During this Admission:      Discharged Condition: good     Disposition: Home-Health Care [facility]    Follow Up:   Follow-up Information       [facility]. Go on 8/22/2024.    Why: hospital / 08/22/2024 @ 2:00 PM  Contact information:  Xray post op Cervical   @ 2:00PM             Anatoly Daley DO. Go on 8/22/2024.    Specialty: Neurosurgery  Why: [facility] 08/22/2024 @ 2:30 PM  Contact information:  [address] [place] [address]   [address]  [place] [address]  [phone]               [facility] Follow up.    Specialty: Home Health Services  Contact information:  [address] [place] [address].  [address]  [place] [address]  [phone]                           Patient Instructions:      WALKER FOR HOME USE     Order Specific Question Answer Comments   Type of Walker: Rollator with  "brakes and/or seat    With wheels? Yes    Height: 5' 3"    Weight: 76.3KG    Length of need (1-99 months): 99    Does patient have medical equipment at home? none    Please check all that apply: Patient is unable to safely ambulate without equipment.      Ambulatory referral/consult to Home Health   Standing Status: Future   Referral Priority: Routine Referral Type: Home Health   Referral Reason: Specialty Services Required   Requested Specialty: Home Health Services   Number of Visits Requested: 1     Notify your health care provider if you experience any of the following:  persistent dizziness, light-headedness, or visual disturbances     Notify your health care provider if you experience any of the following:  worsening rash     Notify your health care provider if you experience any of the following:  severe persistent headache     Notify your health care provider if you experience any of the following:  difficulty breathing or increased cough     Notify your health care provider if you experience any of the following:  redness, tenderness, or signs of infection (pain, swelling, redness, odor or green/yellow discharge around incision site)     Notify your health care provider if you experience any of the following:  severe uncontrolled pain     Notify your health care provider if you experience any of the following:  persistent nausea and vomiting or diarrhea     Notify your health care provider if you experience any of the following:  temperature >100.4     Type & Screen   Standing Status: Future Number of Occurrences: 1 Standing Exp. Date: 10/31/25     Reason for not Ordering Smoking Cessation Referral     Order Specific Question Answer Comments   Reason for not ordering: Not medically appropriate at this time      Reason for not Prescribing Nicotine Replacement     Order Specific Question Answer Comments   Reason for not Prescribing: Not medically appropriate at this time      Activity as tolerated "     Medications:  Reconciled Home Medications:      Medication List        START taking these medications      cefadroxil 500 MG Cap  Commonly known as: DURICEF  Take 1 capsule (500 mg total) by mouth every 12 (twelve) hours. for 5 days     oxyCODONE-acetaminophen 7.5-325 mg per tablet  Commonly known as: PERCOCET  Take 1 tablet by mouth every 6 (six) hours as needed for Pain.     senna-docusate 8.6-50 mg 8.6-50 mg per tablet  Commonly known as: PERICOLACE  Take 1 tablet by mouth once daily. for 14 days     tiZANidine 4 MG tablet  Commonly known as: ZANAFLEX  Take 1 tablet (4 mg total) by mouth every 8 (eight) hours.            CONTINUE taking these medications      acetaminophen 500 MG tablet  Commonly known as: TYLENOL  Take 2 tablets (1,000 mg total) by mouth every 6 (six) hours as needed for Pain.     albuterol 90 mcg/actuation inhaler  Commonly known as: PROVENTIL/VENTOLIN HFA  Inhale 2 puffs into the lungs every 6 (six) hours as needed for Wheezing or Shortness of Breath. Rescue     albuterol-ipratropium 2.5 mg-0.5 mg/3 mL nebulizer solution  Commonly known as: DUO-NEB  Take 3 mLs by nebulization every 6 (six) hours as needed for Wheezing. Rescue     buPROPion 300 MG 24 hr tablet  Commonly known as: WELLBUTRIN XL  Take 1 tablet (300 mg total) by mouth once daily.     citalopram 20 MG tablet  Commonly known as: CeleXA  Take 1 tablet (20 mg total) by mouth once daily.     diphenhydrAMINE-aluminum-magnesium hydroxide-simethicone-LIDOcaine HCl 2%  Swish and spit 15 mLs every 4 (four) hours as needed (mouth sores).     fluticasone propionate 50 mcg/actuation nasal spray  Commonly known as: FLONASE  1 spray (50 mcg total) by Each Nostril route once daily.     fluticasone-salmeterol 230-21 mcg/dose 230-21 mcg/actuation Hfaa inhaler  Commonly known as: ADVAIR HFA  Inhale 2 puffs into the lungs 2 (two) times daily. Controller     gabapentin 300 MG capsule  Commonly known as: NEURONTIN  Take 1 capsule (300 mg total) by  mouth 3 (three) times daily.     levocetirizine 5 MG tablet  Commonly known as: XYZAL  Take 1 tablet (5 mg total) by mouth every evening.     levothyroxine 50 MCG tablet  Commonly known as: SYNTHROID  Take 1 tablet (50 mcg total) by mouth before breakfast.     LIDOcaine-prilocaine cream  Commonly known as: EMLA  Apply topically as needed (apply to port site 30 min before access).     naloxone 4 mg/actuation Spry  Commonly known as: NARCAN  ADMINISTER A SINGLE SPRAY IN ONE NOSTRIL UPON SIGNS OF OPIOID OVERDOSE. CALL 911. REPEAT AFTER 3 MINUTES IF NO RESPONSE.     pravastatin 10 MG tablet  Commonly known as: PRAVACHOL  Take 1 tablet (10 mg total) by mouth once daily.     topiramate 100 MG tablet  Commonly known as: TOPAMAX  Take 1 tablet (100 mg total) by mouth 2 (two) times daily.     varenicline 1 mg Tab  Commonly known as: CHANTIX  Take 1 tablet by mouth twice daily            STOP taking these medications      celecoxib 200 MG capsule  Commonly known as: CeleBREX     ibuprofen 800 MG tablet  Commonly known as: ADVIL,MOTRIN     LIDOcaine 5 %  Commonly known as: LIDODERM              RADHA PUENTE PA-C  Neurosurgery  [facility]

## 2024-08-15 ENCOUNTER — PATIENT MESSAGE (OUTPATIENT)
Dept: NEUROSURGERY | Facility: CLINIC | Age: 64
End: 2024-08-15
Payer: COMMERCIAL

## 2024-08-20 ENCOUNTER — PATIENT MESSAGE (OUTPATIENT)
Dept: NEUROSURGERY | Facility: CLINIC | Age: 64
End: 2024-08-20
Payer: COMMERCIAL

## 2024-08-20 RX ORDER — OXYCODONE AND ACETAMINOPHEN 5; 325 MG/1; MG/1
1 TABLET ORAL EVERY 6 HOURS PRN
Qty: 28 TABLET | Refills: 0 | Status: SHIPPED | OUTPATIENT
Start: 2024-08-20 | End: 2024-08-27

## 2024-08-22 ENCOUNTER — OFFICE VISIT (OUTPATIENT)
Dept: NEUROSURGERY | Facility: CLINIC | Age: 64
End: 2024-08-22
Payer: COMMERCIAL

## 2024-08-22 ENCOUNTER — HOSPITAL ENCOUNTER (OUTPATIENT)
Dept: RADIOLOGY | Facility: HOSPITAL | Age: 64
Discharge: HOME | End: 2024-08-22
Attending: NEUROLOGICAL SURGERY
Payer: COMMERCIAL

## 2024-08-22 VITALS — SYSTOLIC BLOOD PRESSURE: 116 MMHG | DIASTOLIC BLOOD PRESSURE: 69 MMHG | HEART RATE: 83 BPM

## 2024-08-22 DIAGNOSIS — M43.22 CERVICAL VERTEBRAL FUSION: ICD-10-CM

## 2024-08-22 DIAGNOSIS — Z98.1 S/P CERVICAL SPINAL FUSION: Primary | ICD-10-CM

## 2024-08-22 PROCEDURE — 1159F MED LIST DOCD IN RCRD: CPT | Mod: CPTII,S$GLB,, | Performed by: NEUROLOGICAL SURGERY

## 2024-08-22 PROCEDURE — 3288F FALL RISK ASSESSMENT DOCD: CPT | Mod: CPTII,S$GLB,, | Performed by: NEUROLOGICAL SURGERY

## 2024-08-22 PROCEDURE — 72040 X-RAY EXAM NECK SPINE 2-3 VW: CPT | Mod: TC

## 2024-08-22 PROCEDURE — 3044F HG A1C LEVEL LT 7.0%: CPT | Mod: CPTII,S$GLB,, | Performed by: NEUROLOGICAL SURGERY

## 2024-08-22 PROCEDURE — 3078F DIAST BP <80 MM HG: CPT | Mod: CPTII,S$GLB,, | Performed by: NEUROLOGICAL SURGERY

## 2024-08-22 PROCEDURE — 99024 POSTOP FOLLOW-UP VISIT: CPT | Mod: S$GLB,,, | Performed by: NEUROLOGICAL SURGERY

## 2024-08-22 PROCEDURE — 1101F PT FALLS ASSESS-DOCD LE1/YR: CPT | Mod: CPTII,S$GLB,, | Performed by: NEUROLOGICAL SURGERY

## 2024-08-22 PROCEDURE — 72040 X-RAY EXAM NECK SPINE 2-3 VW: CPT | Mod: 26,,, | Performed by: RADIOLOGY

## 2024-08-22 PROCEDURE — 3074F SYST BP LT 130 MM HG: CPT | Mod: CPTII,S$GLB,, | Performed by: NEUROLOGICAL SURGERY

## 2024-08-22 RX ORDER — OXYCODONE AND ACETAMINOPHEN 5; 325 MG/1; MG/1
1 TABLET ORAL EVERY 6 HOURS PRN
Qty: 28 TABLET | Refills: 0 | Status: SHIPPED | OUTPATIENT
Start: 2024-08-22 | End: 2024-08-29

## 2024-08-22 NOTE — PROGRESS NOTES
"  The patient is status post uneventful ACDF C4-5 C5-6 C6-7 on 08/06/2024.  She reports resolution of her preoperative neck pain with left arm pain and numbness.  She denies dysphagia or dysphonia.  She denies new neurologic symptoms.  She has undergoing home PT OT.  She reports smoking only occasionally.  She denies incision related issues.  She denies fevers or chills.      Cervical x-rays today reviewed.  Implants remain in good position without complication       Exam:  No distress, pleasant  Smells of cigarette smoke  Awake, alert, oriented to person place and time.    Cranial nerves 2-12 grossly intact.    Strength is graded 5/5 in all muscle groups.    Sensation is grossly intact throughout.    Gait stable with walker  Right anterior cervical incision well healed without sign of infection     Analysis: She is doing well postoperatively.  Begin outpatient physical therapy.  Taper pain medication.  I reiterated the risk of nonunion or other delayed complications with smoking.  I strongly encouraged her to discontinue all smoking.  We will also order a cervical bone stimulator in light of smoking history and multilevel arthrodesis.  Activity, wound care, and collar use instructions provided.  Follow up in 6 weeks with repeat cervical x-rays.    Yvette King" was seen today for follow-up.    Diagnoses and all orders for this visit:    S/P cervical spinal fusion  -     Ambulatory referral/consult to Physical/Occupational Therapy; Future  -     Bone Stimulator for Home Use  -     X-Ray Cervical Spine AP And Lateral; Future    Other orders  -     oxyCODONE-acetaminophen (PERCOCET) 5-325 mg per tablet; Take 1 tablet by mouth every 6 (six) hours as needed for Pain.        "

## 2024-08-23 ENCOUNTER — TELEPHONE (OUTPATIENT)
Dept: FAMILY MEDICINE | Facility: CLINIC | Age: 64
End: 2024-08-23
Payer: COMMERCIAL

## 2024-08-23 NOTE — TELEPHONE ENCOUNTER
Kendy Armstrong Staff     ----- Message from Kendy Armstrong sent at 8/23/2024 10:04 AM CDT -----  Type:  Patient Returning Call    Who Called:[facility]   Who Left Message for Patient:  Does the patient know what this is regarding?:pt home health   Would the patient rather a call back or a response via [facility]? call  Best Call Back Number:  Additional Information: states pt said provider told her to stop home health and wanted to verify with office that this is true

## 2024-08-23 NOTE — TELEPHONE ENCOUNTER
Binta with [facility] states patient advised during appointment with Dr. Daley on yesterday she was advised to discontinue therapy with home health.  Mrs. Judd is requesting to confirm with Dr. Daley if patient is to discontinue this service.  Please follow up with Mrs. Judd directly at number listed in original message.  Thank you.

## 2024-08-26 ENCOUNTER — TELEPHONE (OUTPATIENT)
Dept: NEUROSURGERY | Facility: CLINIC | Age: 64
End: 2024-08-26
Payer: COMMERCIAL

## 2024-08-26 NOTE — TELEPHONE ENCOUNTER
Returned call to therapist and confirmed that pt can begin outpatient therapy. She voiced understanding.

## 2024-08-26 NOTE — TELEPHONE ENCOUNTER
Returned call to therapist and informed that pt can begin outpatient therapy. She voiced understanding.

## 2024-08-26 NOTE — TELEPHONE ENCOUNTER
----- Message from Sweetie Bryant RN sent at 8/23/2024 12:33 PM CDT -----    ----- Message -----  From: Kendy Armstrong  Sent: 8/23/2024  10:04 AM CDT  To: Edi Ledbetter Staff    Type:  Patient Returning Call    Who Called:[facility]/ [facility]   Who Left Message for Patient:  Does the patient know what this is regarding?:pt home health   Would the patient rather a call back or a response via [facility]? Call   Best Call Back Number:   Additional Information: states she was told that provider states to stop home health appts and wanted to verify with office

## 2024-08-27 ENCOUNTER — TELEPHONE (OUTPATIENT)
Dept: HEMATOLOGY/ONCOLOGY | Facility: CLINIC | Age: 64
End: 2024-08-27
Payer: COMMERCIAL

## 2024-08-27 ENCOUNTER — PATIENT MESSAGE (OUTPATIENT)
Dept: HEMATOLOGY/ONCOLOGY | Facility: CLINIC | Age: 64
End: 2024-08-27
Payer: COMMERCIAL

## 2024-08-27 NOTE — TELEPHONE ENCOUNTER
Spoke with pt to r/s appts per request. Pt declined sooner available PET scan at [facility]. Msg sent to Dr Lee to notify.

## 2024-08-28 ENCOUNTER — TELEPHONE (OUTPATIENT)
Dept: NEUROSURGERY | Facility: CLINIC | Age: 64
End: 2024-08-28
Payer: COMMERCIAL

## 2024-08-28 NOTE — TELEPHONE ENCOUNTER
This nurse attempted to call pt regarding referral for PT evaluation, the pt will need to contact this office once she feels ready to reschedule her initial evaluation appt. The provider may need to enter a new referral to PT when pt is ready to reschedule. Unable to reach pt, left voicemail detailing the above and a callback number for this office.

## 2024-08-28 NOTE — TELEPHONE ENCOUNTER
----- Message from Harika Golden sent at 8/28/2024  4:20 PM CDT -----  Regarding: RE: Post Op PT Eval - Patient Cancelled and Order Status Is Closed  Good afternoon,     The patient can reach us to discuss scheduling at [phone], option 1 then option 3.  Myself or any of my coworkers will be happy to assist her to reschedule when she is feeling better.      Regarding the closed status of the order, I am unsure how it came to be that way.  Is there a way that the provider would be able to open it again?  If not, I believe a new order would have to be placed for the patient.    Thank you  ----- Message -----  From: Sandoval Yarbrough LPN  Sent: 8/28/2024   4:12 PM CDT  To: Harika Golden  Subject: RE: Post Op PT Eval - Patient Cancelled and #    Good afternoon,    Thanks for bringing this to our attention, I spoke to the ordering provider regarding the situation, if the patient has Covid or is otherwise unable to attend the initial evaluation, she can follow-up after her current health concerns have resolved. I will attempt to reach out to the patient and inform her to contact your department regarding rescheduling when she feels able to attend her initial evaluation for therapy. Assuming patient is agreeable to this, would she contact you directly or is there another number I can provide to her when she is ready to reschedule? Also, does the order status being closed mean the provider will need to enter in a new referral?     Regards,  Sandoval Cano LPN   Office of Anatoly Daley,  - Neurosurgery  ----- Message -----  From: Harika Golden  Sent: 8/28/2024   2:28 PM CDT  To: Zana PADGETT Staff  Subject: Post Op PT Eval - Patient Cancelled and Orde#    Good Afternoon,    The physical therapy department received your order to get the attached patient scheduled for an evaluation. We have reached out to the patient and scheduled her for an evaluation; however, they have cancelled their appointment.      The  reason given for cancellation is COVID-19 Concern.  The person who cancelled the appt stated the patient stated she was uncertain about rescheduling.  The order status is now stating closed.  Please advise.        Sincerely,  Harika Golden  ____________________________  Access Navigator  [facility]   p: [phone] opt 1, then opt 3   f: [phone]  [email or web address]

## 2024-09-04 ENCOUNTER — PATIENT MESSAGE (OUTPATIENT)
Dept: NEUROSURGERY | Facility: CLINIC | Age: 64
End: 2024-09-04
Payer: COMMERCIAL

## 2024-09-04 DIAGNOSIS — Z98.1 S/P CERVICAL SPINAL FUSION: Primary | ICD-10-CM

## 2024-09-05 RX ORDER — HYDROCODONE BITARTRATE AND ACETAMINOPHEN 10; 325 MG/1; MG/1
1 TABLET ORAL EVERY 6 HOURS PRN
Qty: 28 TABLET | Refills: 0 | OUTPATIENT
Start: 2024-09-05 | End: 2024-09-12

## 2024-09-05 RX ORDER — HYDROCODONE BITARTRATE AND ACETAMINOPHEN 10; 325 MG/1; MG/1
1 TABLET ORAL EVERY 6 HOURS PRN
Qty: 28 TABLET | Refills: 0 | Status: SHIPPED | OUTPATIENT
Start: 2024-09-05 | End: 2024-09-12

## 2024-09-05 RX ORDER — OXYCODONE AND ACETAMINOPHEN 5; 325 MG/1; MG/1
1 TABLET ORAL EVERY 6 HOURS PRN
Qty: 28 TABLET | Refills: 0 | OUTPATIENT
Start: 2024-09-05 | End: 2024-09-12

## 2024-09-18 ENCOUNTER — CLINICAL SUPPORT (OUTPATIENT)
Dept: REHABILITATION | Facility: HOSPITAL | Age: 64
End: 2024-09-18
Attending: NEUROLOGICAL SURGERY
Payer: COMMERCIAL

## 2024-09-18 DIAGNOSIS — Z98.1 S/P CERVICAL SPINAL FUSION: ICD-10-CM

## 2024-09-18 DIAGNOSIS — M54.2 NECK PAIN: Primary | ICD-10-CM

## 2024-09-18 PROCEDURE — 97161 PT EVAL LOW COMPLEX 20 MIN: CPT | Mod: PO

## 2024-09-18 PROCEDURE — 97112 NEUROMUSCULAR REEDUCATION: CPT | Mod: PO

## 2024-09-18 NOTE — PLAN OF CARE
[facility]   Physical Therapy Initial Evaluation      Name: Yvette Hutchinson  [facility] Number: [id number]    Therapy Diagnosis:   Encounter Diagnoses   Name Primary?    S/P cervical spinal fusion     Neck pain Yes        Physician: Anatoly Daley DO    Physician Orders: PT Eval and Treat   Medical Diagnosis from Referral: S/P cervical spinal fusion.  Evaluation Date: 9/18/2024  Authorization Period Expiration: 12/31/24.  Plan of Care Expiration: 12/20/24  Progress Note Due: 10/18/24  Date of Surgery: 8/6/24 ACDF.  Visit # / Visits authorized: 1/ 20   FOTO: 1/ 3  67/100.    Precautions: Standard and cancer     Time In: 1040  Time Out: 1120  Total Billable Time: 40 minutes    Subjective     Date of onset: 8/6/24 DOS ACDF    History of current condition - Ilir reports: she suffered neck injury while riding riding mower around June 2024.She underwent ACDF  C4-C5-C6-C7 on 8/6/24.She reports difficulties with ADLs, functional activities.She is now referred to OP PT for rehab    Falls: no    Imaging:      FINDINGS:  C1 through C7 are visualized on the lateral radiograph.  There is an anterior cervical discectomy fusion device (ACDF) extending from C4-C7, with 2 interlocking screws at each level.  There is metallic intervertebral discectomy spacer device at C4-C5 and C5-C6 and C6-C7.  There is prevertebral soft tissue swelling and subcutaneous emphysema in keeping with the recent postoperative state.  There is no acute fracture seen. No listhesis is measured. The lateral masses are symmetric about the dens. The prevertebral soft tissues are normal and the lung apices are clear.  There is a right-sided IJ medical infusion port with tip at the level of the SVC.  Calcified plaques are seen at the aortic arch with calcified mediastinal lymph nodes.     Impression:     Interval ACDF and cervical fusion from C4-C7 in expected recent postoperative configuration.     Prior Therapy: Prior to  sx.  Social History: in 1 [place] house with mother.  Occupation: Not working  Prior Level of Function: Independent.  Current Level of Function: Modified independent.    Pain:  Current 0/10, worst 10/10, best 0/10   Location: bilateral neck    Description: Aching, Dull, Throbbing, Sharp, and Variable  Aggravating Factors: Standing, Bending, Walking, Morning, Extension, Flexing, and Lifting  Easing Factors: relaxation, pain medication, and rest    Patients goals: return to previous LOF.     Medical History:   Past Medical History:   Diagnosis Date    Arthritis     Cancer 10/2022    (CLL) leukemia ; adenocarcinoma  adenosgemis    Depression     GERD (gastroesophageal reflux disease)     Neck pain     and back pain    Personal history of colonic polyps 07/07/2017    Pneumonia of left lung due to infectious organism 03/05/2020    Thyroid disease        Surgical History:   Yvette Hutchinson  has a past surgical history that includes Tonsillectomy; xi robotic rats,with lobectomy,lung (Left, 10/03/2022); Robot-assisted laparoscopic lymphadenectomy using da Trish Xi (Left, 10/03/2022); Injection of anesthetic agent around multiple intercostal nerves (Left, 10/03/2022); Insertion of tunneled central venous catheter (CVC) with subcutaneous port (Right, 11/03/2022); fusion, spine, cervical, anterior approach (N/A, 08/06/2024); and Spine surgery.    Medications:   Yvette has a current medication list which includes the following prescription(s): acetaminophen, albuterol, albuterol-ipratropium, bupropion, citalopram, diphenhydrAMINE-aluminum-magnesium hydroxide-simethicone-LIDOcaine HCl 2%, fluticasone propionate, fluticasone-salmeterol 230-21 mcg/dose, gabapentin, levocetirizine, levothyroxine, lidocaine-prilocaine, naloxone, pravastatin, topiramate, and varenicline.    Allergies:   Review of patient's allergies indicates:  No Known Allergies     Objective      Pt to PT wearing cervical collar, driven by mother.      Cervical AROM: Pain/Dysfunction with Movement:   Flexion NT    Extension NT    Right side bending 5    Left side bending  5    Right rotation 10    Left rotation 10      Gait guarded, without AD.  Posture;dead forward, wearing collar.  Strength of c/spine is grossly 2/5 in all planes.   (2), RT;51, LT;53#, pt is right handed.  Palpation;cervical multifidus tender bilaterally.    Intake Outcome Measure for FOTO neck Survey    Therapist reviewed FOTO scores for Yevtte Hutchinson on 9/18/2024.   FOTO report - see Media section or FOTO account episode details.    Intake Score: 33%         Treatment     Total Treatment time (time-based codes) separate from Evaluation: 8 minutes     Ilir received the treatments listed below:      neuromuscular re-education activities to improve: Balance, Coordination, Kinesthetic, Sense, Proprioception, and Posture for 8 minutes. The following activities were included:  UBE 8'.    Patient Education and Home Exercises     Education provided:   - Role of PT.POC.    Written Home Exercises Provided:  to be provided .   Assessment     Yvette is a 64 y.o. female referred to outpatient Physical Therapy with a medical diagnosis of S/P cervical spinal fusion. Patient presents with ROM and strength deficits,poor posture, impaired function due to pain and and above listed deficits.    Patient prognosis is Good.   Patient will benefit from skilled outpatient Physical Therapy to address the deficits stated above and in the chart below, provide patient /family education, and to maximize patientt's level of independence.     Plan of care discussed with patient: Yes  Patient's spiritual, cultural and educational needs considered and patient is agreeable to the plan of care and goals as stated below:     Anticipated Barriers for therapy: no    Medical Necessity is demonstrated by the following  History  Co-morbidities and personal factors that may impact the plan of care [x] LOW: no  personal factors / co-morbidities  [] MODERATE: 1-2 personal factors / co-morbidities  [] HIGH: 3+ personal factors / co-morbidities    Moderate / High Support Documentation:   Co-morbidities affecting plan of care:     Personal Factors:   no deficits     Examination  Body Structures and Functions, activity limitations and participation restrictions that may impact the plan of care [x] LOW: addressing 1-2 elements  [] MODERATE: 3+ elements  [] HIGH: 4+ elements (please support below)    Moderate / High Support Documentation:      Clinical Presentation [x] LOW: stable  [] MODERATE: Evolving  [] HIGH: Unstable     Decision Making/ Complexity Score: low       Goals:  SHORT TERM GOALS:  5 weeks  Progress Date met   Recent signs and systems trend is improving in order to progress towards Long term goals.  [] Met  [] Not Met  [] Progressing    Patient will be independent with Home Exercise Program  in order to further progress and return to maximal function. [] Met  [] Not Met  [] Progressing    Pain rating at Worst: 5 /10 in order to progress towards increased independence with activity. [] Met  [] Not Met  [] Progressing    Patient will be able to correct postural deviations in sitting and standing, to decrease pain and promote postural awareness for injury prevention.  [] Met  [] Not Met  [] Progressing    Patient will improve functional outcome (FOTO) score: by 5% to increase self-worth & perceived functional ability towards long term goals [] Met  [] Not Met  [] Progressing      LONG TERM GOALS: 10 weeks  Progress Date met   Patient will return to normal activites of daily living, recreational, and work related activities with less pain and limitation.  [] Met  [] Not Met  [] Progressing    Patient will improve range of motion  to stated goals in order to return to maximal functional potential. ROM of c/spine WFL [] Met  [] Not Met  [] Progressing    Patient will improve Strength to stated goals of appropriate  musculature in order to improve functional independence. Strength of c/spine 4/5 in all planes. [] Met  [] Not Met  [] Progressing    Pain Rating at Best: 1/10 to improve Quality of Life.  [] Met  [] Not Met  [] Progressing    Patient will meet predicted functional outcome (FOTO) score: 35% to increase self-worth & perceived functional ability. [] Met  [] Not Met  [] Progressing    Patient will have met/partially met personal goal of: return to previous LOF. [] Met  [] Not Met  [] Progressing         Plan     Plan of care Certification: 9/18/2024 to 12/20/24.Requested by pt 1x wk.    Outpatient Physical Therapy 1 times weekly for 10 weeks to include the following interventions: Electrical Stimulation PRN, Manual Therapy, Moist Heat/ Ice, Neuromuscular Re-ed, Patient Education, Therapeutic Activities, and Therapeutic Exercise.     Koffi Sosa, PT        Physician's Signature: _________________________________________ Date: ________________

## 2024-10-02 ENCOUNTER — HOSPITAL ENCOUNTER (INPATIENT)
Facility: HOSPITAL | Age: 64
LOS: 14 days | Discharge: HOME HEALTH | DRG: 853 | End: 2024-10-16
Attending: EMERGENCY MEDICINE | Admitting: HOSPITALIST
Payer: COMMERCIAL

## 2024-10-02 DIAGNOSIS — B95.8 BACTEREMIA DUE TO STAPHYLOCOCCUS: ICD-10-CM

## 2024-10-02 DIAGNOSIS — R79.89 ABNORMAL LFTS: Primary | ICD-10-CM

## 2024-10-02 DIAGNOSIS — I21.4 NSTEMI (NON-ST ELEVATED MYOCARDIAL INFARCTION): ICD-10-CM

## 2024-10-02 DIAGNOSIS — R78.81 BACTEREMIA DUE TO STAPHYLOCOCCUS: ICD-10-CM

## 2024-10-02 DIAGNOSIS — Z01.810 PREOP CARDIOVASCULAR EXAM: ICD-10-CM

## 2024-10-02 DIAGNOSIS — C91.10 CLL (CHRONIC LYMPHOCYTIC LEUKEMIA): ICD-10-CM

## 2024-10-02 DIAGNOSIS — A41.9 SEPSIS: ICD-10-CM

## 2024-10-02 DIAGNOSIS — R65.21 SEPTIC SHOCK: ICD-10-CM

## 2024-10-02 DIAGNOSIS — R07.9 CHEST PAIN: ICD-10-CM

## 2024-10-02 DIAGNOSIS — K80.00 CALCULUS OF GALLBLADDER WITH ACUTE CHOLECYSTITIS WITHOUT OBSTRUCTION: ICD-10-CM

## 2024-10-02 DIAGNOSIS — R07.89 CHEST WALL DISCOMFORT: ICD-10-CM

## 2024-10-02 DIAGNOSIS — A41.9 SEPTIC SHOCK: ICD-10-CM

## 2024-10-02 PROBLEM — M79.2 NEUROPATHIC PAIN: Status: ACTIVE | Noted: 2024-10-02

## 2024-10-02 PROBLEM — N17.9 ACUTE RENAL FAILURE: Status: ACTIVE | Noted: 2024-10-02

## 2024-10-02 PROBLEM — R55 PRE-SYNCOPE: Status: ACTIVE | Noted: 2024-10-02

## 2024-10-02 PROBLEM — R10.9 ABDOMINAL PAIN: Status: ACTIVE | Noted: 2024-10-02

## 2024-10-02 LAB
ALBUMIN SERPL BCP-MCNC: 3.9 G/DL (ref 3.5–5.2)
ALP SERPL-CCNC: 216 U/L (ref 55–135)
ALT SERPL W/O P-5'-P-CCNC: 497 U/L (ref 10–44)
ANION GAP SERPL CALC-SCNC: 11 MMOL/L (ref 8–16)
AST SERPL-CCNC: 735 U/L (ref 10–40)
BACTERIA #/AREA URNS HPF: NORMAL /HPF
BASOPHILS # BLD AUTO: ABNORMAL K/UL (ref 0–0.2)
BASOPHILS NFR BLD: 0 % (ref 0–1.9)
BILIRUB SERPL-MCNC: 0.6 MG/DL (ref 0.1–1)
BILIRUB UR QL STRIP: NEGATIVE
BUN SERPL-MCNC: 14 MG/DL (ref 8–23)
CALCIUM SERPL-MCNC: 8.4 MG/DL (ref 8.7–10.5)
CHLORIDE SERPL-SCNC: 105 MMOL/L (ref 95–110)
CLARITY UR: CLEAR
CO2 SERPL-SCNC: 19 MMOL/L (ref 23–29)
COLOR UR: YELLOW
CREAT SERPL-MCNC: 1.4 MG/DL (ref 0.5–1.4)
CREAT SERPL-MCNC: 1.4 MG/DL (ref 0.5–1.4)
DIFFERENTIAL METHOD BLD: ABNORMAL
EOSINOPHIL # BLD AUTO: ABNORMAL K/UL (ref 0–0.5)
EOSINOPHIL NFR BLD: 0 % (ref 0–8)
ERYTHROCYTE [DISTWIDTH] IN BLOOD BY AUTOMATED COUNT: 13.4 % (ref 11.5–14.5)
EST. GFR  (NO RACE VARIABLE): 41.8 ML/MIN/1.73 M^2
GLUCOSE SERPL-MCNC: 97 MG/DL (ref 70–110)
GLUCOSE UR QL STRIP: NEGATIVE
HCT VFR BLD AUTO: 37.7 % (ref 37–48.5)
HGB BLD-MCNC: 12.5 G/DL (ref 12–16)
HGB UR QL STRIP: ABNORMAL
HYALINE CASTS #/AREA URNS LPF: 0 /LPF
IMM GRANULOCYTES # BLD AUTO: ABNORMAL K/UL (ref 0–0.04)
IMM GRANULOCYTES NFR BLD AUTO: ABNORMAL % (ref 0–0.5)
INFLUENZA A, MOLECULAR: NEGATIVE
INFLUENZA B, MOLECULAR: NEGATIVE
KETONES UR QL STRIP: NEGATIVE
LACTATE SERPL-SCNC: 2.8 MMOL/L (ref 0.5–1.9)
LDH SERPL L TO P-CCNC: 1.8 MMOL/L (ref 0.5–2.2)
LEUKOCYTE ESTERASE UR QL STRIP: NEGATIVE
LIPASE SERPL-CCNC: 42 U/L (ref 4–60)
LYMPHOCYTES # BLD AUTO: ABNORMAL K/UL (ref 1–4.8)
LYMPHOCYTES NFR BLD: 50 % (ref 18–48)
MAGNESIUM SERPL-MCNC: 1.5 MG/DL (ref 1.6–2.6)
MCH RBC QN AUTO: 31.8 PG (ref 27–31)
MCHC RBC AUTO-ENTMCNC: 33.2 G/DL (ref 32–36)
MCV RBC AUTO: 96 FL (ref 82–98)
MICROSCOPIC COMMENT: NORMAL
MONOCYTES # BLD AUTO: ABNORMAL K/UL (ref 0.3–1)
MONOCYTES NFR BLD: 7 % (ref 4–15)
NEUTROPHILS NFR BLD: 43 % (ref 38–73)
NITRITE UR QL STRIP: NEGATIVE
NRBC BLD-RTO: 0 /100 WBC
OB PNL STL: POSITIVE
PH UR STRIP: 6 [PH] (ref 5–8)
PHOSPHATE SERPL-MCNC: 1.8 MG/DL (ref 2.7–4.5)
PLATELET # BLD AUTO: 224 K/UL (ref 150–450)
PLATELET BLD QL SMEAR: ABNORMAL
PMV BLD AUTO: 10.6 FL (ref 9.2–12.9)
POTASSIUM SERPL-SCNC: 3.8 MMOL/L (ref 3.5–5.1)
PROT SERPL-MCNC: 6.2 G/DL (ref 6–8.4)
PROT UR QL STRIP: ABNORMAL
RBC # BLD AUTO: 3.93 M/UL (ref 4–5.4)
RBC #/AREA URNS HPF: 1 /HPF (ref 0–4)
SAMPLE: NORMAL
SAMPLE: NORMAL
SARS-COV-2 RDRP RESP QL NAA+PROBE: NEGATIVE
SMUDGE CELLS BLD QL SMEAR: PRESENT
SODIUM SERPL-SCNC: 135 MMOL/L (ref 136–145)
SP GR UR STRIP: 1.02 (ref 1–1.03)
SPECIMEN SOURCE: NORMAL
SQUAMOUS #/AREA URNS HPF: 0 /HPF
TROPONIN I SERPL HS-MCNC: 1588.5 PG/ML (ref 0–14.9)
URN SPEC COLLECT METH UR: ABNORMAL
UROBILINOGEN UR STRIP-ACNC: NEGATIVE EU/DL
WBC # BLD AUTO: 21.3 K/UL (ref 3.9–12.7)
WBC #/AREA URNS HPF: 3 /HPF (ref 0–5)

## 2024-10-02 PROCEDURE — 93010 ELECTROCARDIOGRAM REPORT: CPT | Mod: ,,, | Performed by: GENERAL PRACTICE

## 2024-10-02 PROCEDURE — 87046 STOOL CULTR AEROBIC BACT EA: CPT | Performed by: EMERGENCY MEDICINE

## 2024-10-02 PROCEDURE — 80053 COMPREHEN METABOLIC PANEL: CPT | Performed by: EMERGENCY MEDICINE

## 2024-10-02 PROCEDURE — 83690 ASSAY OF LIPASE: CPT | Performed by: EMERGENCY MEDICINE

## 2024-10-02 PROCEDURE — 36415 COLL VENOUS BLD VENIPUNCTURE: CPT | Performed by: HOSPITALIST

## 2024-10-02 PROCEDURE — 87449 NOS EACH ORGANISM AG IA: CPT | Mod: 91 | Performed by: EMERGENCY MEDICINE

## 2024-10-02 PROCEDURE — 63600175 PHARM REV CODE 636 W HCPCS: Performed by: HOSPITALIST

## 2024-10-02 PROCEDURE — 36415 COLL VENOUS BLD VENIPUNCTURE: CPT | Performed by: EMERGENCY MEDICINE

## 2024-10-02 PROCEDURE — 85007 BL SMEAR W/DIFF WBC COUNT: CPT | Performed by: EMERGENCY MEDICINE

## 2024-10-02 PROCEDURE — 25000003 PHARM REV CODE 250: Performed by: HOSPITALIST

## 2024-10-02 PROCEDURE — 96365 THER/PROPH/DIAG IV INF INIT: CPT

## 2024-10-02 PROCEDURE — 94761 N-INVAS EAR/PLS OXIMETRY MLT: CPT

## 2024-10-02 PROCEDURE — 25000003 PHARM REV CODE 250: Performed by: INTERNAL MEDICINE

## 2024-10-02 PROCEDURE — 99223 1ST HOSP IP/OBS HIGH 75: CPT | Mod: ,,, | Performed by: SURGERY

## 2024-10-02 PROCEDURE — 87154 CUL TYP ID BLD PTHGN 6+ TRGT: CPT | Performed by: EMERGENCY MEDICINE

## 2024-10-02 PROCEDURE — 81001 URINALYSIS AUTO W/SCOPE: CPT | Performed by: EMERGENCY MEDICINE

## 2024-10-02 PROCEDURE — 87449 NOS EACH ORGANISM AG IA: CPT | Performed by: EMERGENCY MEDICINE

## 2024-10-02 PROCEDURE — 87502 INFLUENZA DNA AMP PROBE: CPT | Performed by: EMERGENCY MEDICINE

## 2024-10-02 PROCEDURE — 27000221 HC OXYGEN, UP TO 24 HOURS

## 2024-10-02 PROCEDURE — 93005 ELECTROCARDIOGRAM TRACING: CPT | Performed by: GENERAL PRACTICE

## 2024-10-02 PROCEDURE — 84100 ASSAY OF PHOSPHORUS: CPT | Performed by: EMERGENCY MEDICINE

## 2024-10-02 PROCEDURE — 63600175 PHARM REV CODE 636 W HCPCS: Performed by: EMERGENCY MEDICINE

## 2024-10-02 PROCEDURE — 96366 THER/PROPH/DIAG IV INF ADDON: CPT

## 2024-10-02 PROCEDURE — U0002 COVID-19 LAB TEST NON-CDC: HCPCS | Performed by: EMERGENCY MEDICINE

## 2024-10-02 PROCEDURE — 83735 ASSAY OF MAGNESIUM: CPT | Performed by: EMERGENCY MEDICINE

## 2024-10-02 PROCEDURE — 87040 BLOOD CULTURE FOR BACTERIA: CPT | Performed by: EMERGENCY MEDICINE

## 2024-10-02 PROCEDURE — 96367 TX/PROPH/DG ADDL SEQ IV INF: CPT

## 2024-10-02 PROCEDURE — 89055 LEUKOCYTE ASSESSMENT FECAL: CPT | Performed by: EMERGENCY MEDICINE

## 2024-10-02 PROCEDURE — 25500020 PHARM REV CODE 255: Performed by: EMERGENCY MEDICINE

## 2024-10-02 PROCEDURE — 99291 CRITICAL CARE FIRST HOUR: CPT

## 2024-10-02 PROCEDURE — 25000003 PHARM REV CODE 250: Performed by: EMERGENCY MEDICINE

## 2024-10-02 PROCEDURE — 20000000 HC ICU ROOM

## 2024-10-02 PROCEDURE — 87324 CLOSTRIDIUM AG IA: CPT | Performed by: EMERGENCY MEDICINE

## 2024-10-02 PROCEDURE — 82272 OCCULT BLD FECES 1-3 TESTS: CPT | Performed by: EMERGENCY MEDICINE

## 2024-10-02 PROCEDURE — 84484 ASSAY OF TROPONIN QUANT: CPT | Performed by: HOSPITALIST

## 2024-10-02 PROCEDURE — 25000003 PHARM REV CODE 250: Performed by: SURGERY

## 2024-10-02 PROCEDURE — 25000003 PHARM REV CODE 250

## 2024-10-02 PROCEDURE — 83605 ASSAY OF LACTIC ACID: CPT | Performed by: EMERGENCY MEDICINE

## 2024-10-02 PROCEDURE — 85027 COMPLETE CBC AUTOMATED: CPT | Performed by: EMERGENCY MEDICINE

## 2024-10-02 PROCEDURE — 27000207 HC ISOLATION

## 2024-10-02 PROCEDURE — 87045 FECES CULTURE AEROBIC BACT: CPT | Performed by: EMERGENCY MEDICINE

## 2024-10-02 PROCEDURE — 96375 TX/PRO/DX INJ NEW DRUG ADDON: CPT

## 2024-10-02 RX ORDER — MUPIROCIN 20 MG/G
OINTMENT TOPICAL 2 TIMES DAILY
Status: DISCONTINUED | OUTPATIENT
Start: 2024-10-02 | End: 2024-10-05

## 2024-10-02 RX ORDER — TALC
6 POWDER (GRAM) TOPICAL NIGHTLY PRN
Status: DISCONTINUED | OUTPATIENT
Start: 2024-10-02 | End: 2024-10-16 | Stop reason: HOSPADM

## 2024-10-02 RX ORDER — ONDANSETRON HYDROCHLORIDE 2 MG/ML
4 INJECTION, SOLUTION INTRAVENOUS EVERY 6 HOURS PRN
Status: DISCONTINUED | OUTPATIENT
Start: 2024-10-02 | End: 2024-10-16 | Stop reason: HOSPADM

## 2024-10-02 RX ORDER — NOREPINEPHRINE BITARTRATE/D5W 4MG/250ML
PLASTIC BAG, INJECTION (ML) INTRAVENOUS
Status: COMPLETED
Start: 2024-10-02 | End: 2024-10-02

## 2024-10-02 RX ORDER — VANCOMYCIN HCL IN 5 % DEXTROSE 1G/250ML
1000 PLASTIC BAG, INJECTION (ML) INTRAVENOUS
Status: DISCONTINUED | OUTPATIENT
Start: 2024-10-03 | End: 2024-10-03

## 2024-10-02 RX ORDER — SODIUM,POTASSIUM PHOSPHATES 280-250MG
2 POWDER IN PACKET (EA) ORAL
Status: DISCONTINUED | OUTPATIENT
Start: 2024-10-02 | End: 2024-10-16 | Stop reason: HOSPADM

## 2024-10-02 RX ORDER — TOPIRAMATE 25 MG/1
100 TABLET ORAL 2 TIMES DAILY
Status: DISCONTINUED | OUTPATIENT
Start: 2024-10-02 | End: 2024-10-16 | Stop reason: HOSPADM

## 2024-10-02 RX ORDER — SODIUM CHLORIDE 0.9 % (FLUSH) 0.9 %
2 SYRINGE (ML) INJECTION EVERY 12 HOURS PRN
Status: DISCONTINUED | OUTPATIENT
Start: 2024-10-02 | End: 2024-10-16 | Stop reason: HOSPADM

## 2024-10-02 RX ORDER — MAGNESIUM SULFATE HEPTAHYDRATE 40 MG/ML
2 INJECTION, SOLUTION INTRAVENOUS ONCE
Status: COMPLETED | OUTPATIENT
Start: 2024-10-02 | End: 2024-10-02

## 2024-10-02 RX ORDER — GABAPENTIN 300 MG/1
300 CAPSULE ORAL 3 TIMES DAILY
Status: DISCONTINUED | OUTPATIENT
Start: 2024-10-02 | End: 2024-10-16 | Stop reason: HOSPADM

## 2024-10-02 RX ORDER — PRAVASTATIN SODIUM 10 MG/1
10 TABLET ORAL DAILY
Status: DISCONTINUED | OUTPATIENT
Start: 2024-10-03 | End: 2024-10-07

## 2024-10-02 RX ORDER — BUPROPION HYDROCHLORIDE 150 MG/1
300 TABLET ORAL DAILY
Status: DISCONTINUED | OUTPATIENT
Start: 2024-10-03 | End: 2024-10-16 | Stop reason: HOSPADM

## 2024-10-02 RX ORDER — AMOXICILLIN 250 MG
1 CAPSULE ORAL 2 TIMES DAILY PRN
Status: DISCONTINUED | OUTPATIENT
Start: 2024-10-02 | End: 2024-10-16 | Stop reason: HOSPADM

## 2024-10-02 RX ORDER — NOREPINEPHRINE BITARTRATE/D5W 4MG/250ML
0-3 PLASTIC BAG, INJECTION (ML) INTRAVENOUS CONTINUOUS
Status: DISCONTINUED | OUTPATIENT
Start: 2024-10-02 | End: 2024-10-02

## 2024-10-02 RX ORDER — CITALOPRAM 20 MG/1
20 TABLET, FILM COATED ORAL DAILY
Status: DISCONTINUED | OUTPATIENT
Start: 2024-10-03 | End: 2024-10-16 | Stop reason: HOSPADM

## 2024-10-02 RX ORDER — ACETAMINOPHEN 500 MG
1000 TABLET ORAL
Status: COMPLETED | OUTPATIENT
Start: 2024-10-02 | End: 2024-10-02

## 2024-10-02 RX ORDER — NALOXONE HCL 0.4 MG/ML
0.02 VIAL (ML) INJECTION
Status: DISCONTINUED | OUTPATIENT
Start: 2024-10-02 | End: 2024-10-16 | Stop reason: HOSPADM

## 2024-10-02 RX ORDER — LEVOTHYROXINE SODIUM 25 UG/1
50 TABLET ORAL
Status: DISCONTINUED | OUTPATIENT
Start: 2024-10-03 | End: 2024-10-13

## 2024-10-02 RX ORDER — ONDANSETRON HYDROCHLORIDE 2 MG/ML
4 INJECTION, SOLUTION INTRAVENOUS
Status: COMPLETED | OUTPATIENT
Start: 2024-10-02 | End: 2024-10-02

## 2024-10-02 RX ORDER — HEPARIN SODIUM 5000 [USP'U]/ML
5000 INJECTION, SOLUTION INTRAVENOUS; SUBCUTANEOUS EVERY 8 HOURS
Status: DISCONTINUED | OUTPATIENT
Start: 2024-10-02 | End: 2024-10-15

## 2024-10-02 RX ORDER — ACETAMINOPHEN 325 MG/1
650 TABLET ORAL EVERY 4 HOURS PRN
Status: DISCONTINUED | OUTPATIENT
Start: 2024-10-02 | End: 2024-10-16 | Stop reason: HOSPADM

## 2024-10-02 RX ORDER — HYDROCODONE BITARTRATE AND ACETAMINOPHEN 5; 325 MG/1; MG/1
1 TABLET ORAL EVERY 6 HOURS PRN
Status: DISCONTINUED | OUTPATIENT
Start: 2024-10-02 | End: 2024-10-16 | Stop reason: HOSPADM

## 2024-10-02 RX ORDER — LANOLIN ALCOHOL/MO/W.PET/CERES
800 CREAM (GRAM) TOPICAL
Status: DISCONTINUED | OUTPATIENT
Start: 2024-10-02 | End: 2024-10-16 | Stop reason: HOSPADM

## 2024-10-02 RX ORDER — IBUPROFEN 200 MG
16 TABLET ORAL
Status: DISCONTINUED | OUTPATIENT
Start: 2024-10-02 | End: 2024-10-16 | Stop reason: HOSPADM

## 2024-10-02 RX ORDER — IBUPROFEN 200 MG
24 TABLET ORAL
Status: DISCONTINUED | OUTPATIENT
Start: 2024-10-02 | End: 2024-10-16 | Stop reason: HOSPADM

## 2024-10-02 RX ORDER — GLUCAGON 1 MG
1 KIT INJECTION
Status: DISCONTINUED | OUTPATIENT
Start: 2024-10-02 | End: 2024-10-16 | Stop reason: HOSPADM

## 2024-10-02 RX ORDER — ALUMINUM HYDROXIDE, MAGNESIUM HYDROXIDE, AND SIMETHICONE 1200; 120; 1200 MG/30ML; MG/30ML; MG/30ML
30 SUSPENSION ORAL 4 TIMES DAILY PRN
Status: DISCONTINUED | OUTPATIENT
Start: 2024-10-02 | End: 2024-10-10

## 2024-10-02 RX ORDER — SODIUM CHLORIDE 9 MG/ML
INJECTION, SOLUTION INTRAVENOUS CONTINUOUS
Status: DISCONTINUED | OUTPATIENT
Start: 2024-10-02 | End: 2024-10-04

## 2024-10-02 RX ORDER — ACETAMINOPHEN 325 MG/1
650 TABLET ORAL EVERY 8 HOURS PRN
Status: DISCONTINUED | OUTPATIENT
Start: 2024-10-02 | End: 2024-10-02

## 2024-10-02 RX ADMIN — NOREPINEPHRINE BITARTRATE 0.1 MCG/KG/MIN: 4 INJECTION, SOLUTION INTRAVENOUS at 06:10

## 2024-10-02 RX ADMIN — ACETAMINOPHEN 1000 MG: 500 TABLET, FILM COATED ORAL at 10:10

## 2024-10-02 RX ADMIN — SODIUM CHLORIDE, POTASSIUM CHLORIDE, SODIUM LACTATE AND CALCIUM CHLORIDE 2286 ML: 600; 310; 30; 20 INJECTION, SOLUTION INTRAVENOUS at 11:10

## 2024-10-02 RX ADMIN — SODIUM CHLORIDE: 9 INJECTION, SOLUTION INTRAVENOUS at 09:10

## 2024-10-02 RX ADMIN — VANCOMYCIN HYDROCHLORIDE 1500 MG: 1.5 INJECTION, POWDER, LYOPHILIZED, FOR SOLUTION INTRAVENOUS at 03:10

## 2024-10-02 RX ADMIN — MAGNESIUM SULFATE HEPTAHYDRATE 2 G: 40 INJECTION, SOLUTION INTRAVENOUS at 12:10

## 2024-10-02 RX ADMIN — NOREPINEPHRINE BITARTRATE 0.25 MCG/KG/MIN: 1 INJECTION, SOLUTION, CONCENTRATE INTRAVENOUS at 10:10

## 2024-10-02 RX ADMIN — NOREPINEPHRINE BITARTRATE 0.55 MCG/KG/MIN: 4 INJECTION, SOLUTION INTRAVENOUS at 03:10

## 2024-10-02 RX ADMIN — PIPERACILLIN SODIUM AND TAZOBACTAM SODIUM 4.5 G: 4; .5 INJECTION, POWDER, LYOPHILIZED, FOR SOLUTION INTRAVENOUS at 11:10

## 2024-10-02 RX ADMIN — NOREPINEPHRINE BITARTRATE 0.05 MCG/KG/MIN: 4 INJECTION, SOLUTION INTRAVENOUS at 11:10

## 2024-10-02 RX ADMIN — MUPIROCIN 1 G: 20 OINTMENT TOPICAL at 10:10

## 2024-10-02 RX ADMIN — SODIUM CHLORIDE 1000 ML: 0.9 INJECTION, SOLUTION INTRAVENOUS at 08:10

## 2024-10-02 RX ADMIN — HEPARIN SODIUM 5000 UNITS: 5000 INJECTION INTRAVENOUS; SUBCUTANEOUS at 10:10

## 2024-10-02 RX ADMIN — NOREPINEPHRINE BITARTRATE 0.6 MCG/KG/MIN: 4 INJECTION, SOLUTION INTRAVENOUS at 06:10

## 2024-10-02 RX ADMIN — IOHEXOL 100 ML: 350 INJECTION, SOLUTION INTRAVENOUS at 02:10

## 2024-10-02 RX ADMIN — NOREPINEPHRINE BITARTRATE 0.8 MCG/KG/MIN: 4 INJECTION, SOLUTION INTRAVENOUS at 12:10

## 2024-10-02 RX ADMIN — ONDANSETRON 4 MG: 2 INJECTION INTRAMUSCULAR; INTRAVENOUS at 12:10

## 2024-10-02 RX ADMIN — ACETAMINOPHEN 650 MG: 325 TABLET ORAL at 07:10

## 2024-10-02 RX ADMIN — PIPERACILLIN SODIUM AND TAZOBACTAM SODIUM 3.38 G: 3; .375 INJECTION, POWDER, LYOPHILIZED, FOR SOLUTION INTRAVENOUS at 10:10

## 2024-10-02 NOTE — ASSESSMENT & PLAN NOTE
Suspect 2/2 hypotension with bp in the 70's. Hydrate and continue pressors. Treat infection.  Will order 2 d echo, and troponin. EKG reviewed showing sinus tachy.  Pt is not focal, so will hold off ct head.

## 2024-10-02 NOTE — HPI
64-year-old female with PMH of CLL, and lung cancer status post partial resection who presented to the ER because of presyncope, nausea, vomiting, diarrhea and abdominal pain.  According to the patient, for over 1 week she has been having intractable nausea, vomiting and diarrhea.  Also epigastric and right upper quadrant abdominal pain described as sharp, 10/10 on pain scale radiate to the rest of her abdomen.  Today when she stood up from a sitting position, she felt as if she will pass out.  As a result, she decided to come to the ER for evaluation.  Denied any fever or chills.  She denied chest pain or shortness on breath.    In the ER, vitals showed a blood pressure of 99/55, but soon dropped to the 70s.  Heart rate was 122, respiratory rate of 20, temperature of 101.6°, and patient was satting 95% on room air.  CBC was white count of 21, with history of CLL.  CMP showed sodium of 135, acute renal failure with creatinine of 1.4 compared to baseline of 1, and patient has elevated LFTs with alk-phos of 216, , and AST of 497.  COVID/flu are negative.  Blood cultures were collected.  CTA chest negative for pulmonary embolus, but showed mild diffuse bronchial wall thickening that could reflect infectious or inflammatory bronchitis in the appropriate clinical setting.  CT abdomen/pelvis showed pericholecystic edema without evidence of gallstones.  Cholecystitis cannot be excluded. Also mild Linda pancreatic edema which may represent early pancreatitis. US abdomen showed Cholelithiasis, gallbladder wall thickening, and mild pericholecystic fluid. Patient was started on Zosyn/vanco.  Was given sepsis bolus of lactated ringer of 2286 cc.  Started on Levophed for blood pressure support.  General surgery was consulted.  Patient will be admitted to ICU for septic shock.

## 2024-10-02 NOTE — SUBJECTIVE & OBJECTIVE
Past Medical History:   Diagnosis Date    Arthritis     Cancer 10/2022    (CLL) leukemia ; adenocarcinoma  adenosgemis    Depression     GERD (gastroesophageal reflux disease)     Neck pain     and back pain    Personal history of colonic polyps 07/07/2017    Pneumonia of left lung due to infectious organism 03/05/2020    Thyroid disease        Past Surgical History:   Procedure Laterality Date    FUSION, SPINE, CERVICAL, ANTERIOR APPROACH N/A 08/06/2024    Procedure: FUSION, SPINE, CERVICAL, ANTERIOR APPROACH;  Surgeon: Anatoly Daley DO;  Location: [facility] OR;  Service: Neurosurgery;  Laterality: N/A;  IOM, C-ARM, MEDTRONICS, MICRO, HORSESHOE    INJECTION OF ANESTHETIC AGENT AROUND MULTIPLE INTERCOSTAL NERVES Left 10/03/2022    Procedure: BLOCK, NERVE, INTERCOSTAL, 2 OR MORE;  Surgeon: Evelio Kaplan MD;  Location: [facility] OR [address] [place] [address];  Service: Thoracic;  Laterality: Left;    INSERTION OF TUNNELED CENTRAL VENOUS CATHETER (CVC) WITH SUBCUTANEOUS PORT Right 11/03/2022    Procedure: [id number], Right or Left Neck or Chest;  Surgeon: Mini Acevedo MD;  [facility];  Service: General;  Laterality: Right;    ROBOT-ASSISTED LAPAROSCOPIC LYMPHADENECTOMY USING DA MONIQUE XI Left 10/03/2022    Procedure: XI ROBOTIC LYMPHADENECTOMY;  Surgeon: Evelio Kaplan MD;  [facility];  Service: Thoracic;  Laterality: Left;    SPINE SURGERY      TONSILLECTOMY      XI ROBOTIC RATS,WITH LOBECTOMY,LUNG Left 10/03/2022    Procedure: XI ROBOTIC RATS,WITH LOBECTOMY,LUNG;  Surgeon: Evelio Kaplan MD;  Location: [facility] OR [address] [place] [address];  Service: Thoracic;  Laterality: Left;       Review of patient's allergies indicates:  No Known Allergies    No current facility-administered medications on file prior to encounter.     Current Outpatient Medications on File Prior to Encounter   Medication Sig    acetaminophen (TYLENOL) 500 MG tablet Take 2 tablets (1,000 mg total) by mouth every 6 (six)  hours as needed for Pain.    albuterol (PROVENTIL/VENTOLIN HFA) 90 mcg/actuation inhaler Inhale 2 puffs into the lungs every 6 (six) hours as needed for Wheezing or Shortness of Breath. Rescue    buPROPion (WELLBUTRIN XL) 300 MG 24 hr tablet Take 1 tablet (300 mg total) by mouth once daily.    citalopram (CELEXA) 20 MG tablet Take 1 tablet (20 mg total) by mouth once daily.    fluticasone propionate (FLONASE) 50 mcg/actuation nasal spray 1 spray (50 mcg total) by Each Nostril route once daily.    fluticasone-salmeterol 230-21 mcg/dose (ADVAIR HFA) 230-21 mcg/actuation HFAA inhaler Inhale 2 puffs into the lungs 2 (two) times daily. Controller    gabapentin (NEURONTIN) 300 MG capsule Take 1 capsule (300 mg total) by mouth 3 (three) times daily.    levocetirizine (XYZAL) 5 MG tablet Take 1 tablet (5 mg total) by mouth every evening.    levothyroxine (SYNTHROID) 50 MCG tablet Take 1 tablet (50 mcg total) by mouth before breakfast.    pravastatin (PRAVACHOL) 10 MG tablet Take 1 tablet (10 mg total) by mouth once daily.    topiramate (TOPAMAX) 100 MG tablet Take 1 tablet (100 mg total) by mouth 2 (two) times daily.    naloxone (NARCAN) 4 mg/actuation Spry ADMINISTER A SINGLE SPRAY IN ONE NOSTRIL UPON SIGNS OF OPIOID OVERDOSE. CALL 911. REPEAT AFTER 3 MINUTES IF NO RESPONSE.    varenicline (CHANTIX) 1 mg Tab Take 1 tablet by mouth twice daily (Patient not taking: Reported on 10/2/2024)    [DISCONTINUED] albuterol-ipratropium (DUO-NEB) 2.5 mg-0.5 mg/3 mL nebulizer solution Take 3 mLs by nebulization every 6 (six) hours as needed for Wheezing. Rescue    [DISCONTINUED] diphenhydrAMINE-aluminum-magnesium hydroxide-simethicone-LIDOcaine HCl 2% Swish and spit 15 mLs every 4 (four) hours as needed (mouth sores).    [DISCONTINUED] LIDOcaine-prilocaine (EMLA) cream Apply topically as needed (apply to port site 30 min before access).     Family History       Problem Relation (Age of Onset)    Breast cancer Cousin    Ovarian cancer  Sister          Tobacco Use    Smoking status: Some Days     Current packs/day: 0.15     Types: Cigarettes     Passive exposure: Current    Smokeless tobacco: Never    Tobacco comments:     reports one cigarette every now and then   Substance and Sexual Activity    Alcohol use: Yes     Comment: rarely    Drug use: Yes     Types: Marijuana    Sexual activity: Not on file     Review of Systems   Constitutional:  Negative for chills and fever.   HENT:  Negative for sore throat.    Respiratory:  Negative for cough and shortness of breath.    Cardiovascular:  Negative for chest pain and palpitations.   Gastrointestinal:  Positive for abdominal pain, diarrhea, nausea and vomiting.   Endocrine: Negative for polydipsia and polyuria.   Genitourinary:  Negative for dysuria, frequency and urgency.   Musculoskeletal:  Positive for neck pain.   Skin:  Negative for rash.   Neurological:  Negative for seizures.   Psychiatric/Behavioral:  Negative for confusion.      Objective:     Vital Signs (Most Recent):  Temp: (!) 100.6 °F (38.1 °C) (10/02/24 1243)  Pulse: 100 (10/02/24 1526)  Resp: (!) 21 (10/02/24 1514)  BP: (!) 110/51 (10/02/24 1526)  SpO2: 100 % (10/02/24 1526) Vital Signs (24h Range):  Temp:  [100.6 °F (38.1 °C)-101.6 °F (38.7 °C)] 100.6 °F (38.1 °C)  Pulse:  [] 100  Resp:  [18-38] 21  SpO2:  [89 %-100 %] 100 %  BP: ()/(36-72) 110/51     Weight: 76.2 kg (168 lb)  Body mass index is 29.76 kg/m².     Physical Exam  Vitals reviewed.   Constitutional:       Appearance: Normal appearance.   HENT:      Head: Normocephalic and atraumatic.      Mouth/Throat:      Mouth: Mucous membranes are moist.   Eyes:      Extraocular Movements: Extraocular movements intact.      Conjunctiva/sclera: Conjunctivae normal.      Pupils: Pupils are equal, round, and reactive to light.   Neck:      Comments: Neck is in collar.  Cardiovascular:      Rate and Rhythm: Normal rate and regular rhythm.   Pulmonary:      Effort: Pulmonary  effort is normal.      Breath sounds: Normal breath sounds.   Abdominal:      General: Abdomen is flat. Bowel sounds are normal.      Palpations: Abdomen is soft.      Tenderness: There is no abdominal tenderness.      Comments: Tenderness on palpation of the epigastric and RUQ abdomen.   Musculoskeletal:         General: No swelling or tenderness.   Skin:     General: Skin is warm.   Neurological:      Mental Status: She is alert and oriented to person, place, and time.   Psychiatric:         Mood and Affect: Mood normal.         Behavior: Behavior normal.            Significant Labs: All pertinent labs within the past 24 hours have been reviewed.  Recent Lab Results  (Last 5 results in the past 24 hours)        10/02/24  1520   10/02/24  1048   10/02/24  1036   10/02/24  1034   10/02/24  1032        Influenza A, Molecular     Negative           Influenza B, Molecular     Negative           Albumin       3.9         ALP       216         ALT       497         Anion Gap       11         Appearance, UA Clear               AST       735         Bacteria, UA Rare               Baso #       CANCELED  Comment: Result canceled by the ancillary.         Basophil %       0.0         Bilirubin (UA) Negative               BILIRUBIN TOTAL       0.6  Comment: For infants and newborns, interpretation of results should be based  on gestational age, weight and in agreement with clinical  observations.    Premature Infant recommended reference ranges:  Up to 24 hours.............<8.0 mg/dL  Up to 48 hours............<12.0 mg/dL  3-5 days..................<15.0 mg/dL  6-29 days.................<15.0 mg/dL           BUN       14         Calcium       8.4         Chloride       105         CO2       19         Color, UA Yellow               Creatinine       1.4         Differential Method       Manual         eGFR       41.8         Eos #       CANCELED  Comment: Result canceled by the ancillary.         Eos %       0.0         Flu A  & B Source     Nasal swab           Glucose       97         Glucose, UA Negative               Gran %       43.0         Hematocrit       37.7         Hemoglobin       12.5         Hyaline Casts, UA 0               Immature Grans (Abs)       CANCELED  Comment: Mild elevation in immature granulocytes is non specific and   can be seen in a variety of conditions including stress response,   acute inflammation, trauma and pregnancy. Correlation with other   laboratory and clinical findings is essential.    Result canceled by the ancillary.           Immature Granulocytes       CANCELED  Comment: Result canceled by the ancillary.         Ketones, UA Negative               Leukocyte Esterase, UA Negative               Lipase       42         Lymph #       CANCELED  Comment: Result canceled by the ancillary.         Lymph %       50.0         Magnesium        1.5         MCH       31.8         MCHC       33.2         MCV       96         Microscopic Comment SEE COMMENT  Comment: Other formed elements not mentioned in the report are not   present in the microscopic examination.                  Mono #       CANCELED  Comment: Result canceled by the ancillary.         Mono %       7.0         MPV       10.6         NITRITE UA Negative               nRBC       0         Blood, UA 1+               pH, UA 6.0               Phosphorus Level       1.8         Platelet Estimate       Appears normal         Platelet Count       224         POC Creatinine   1.4             POC Lactate         1.80       Potassium       3.8         PROTEIN TOTAL       6.2         Protein, UA 1+  Comment: Recommend a 24 hour urine protein or a urine   protein/creatinine ratio if globulin induced proteinuria is  clinically suspected.                 RBC       3.93         RBC, UA 1               RDW       13.4         Sample   VENOUS       VENOUS       SARS-CoV-2 RNA, Amplification, Qual     Negative  Comment: This test utilizes isothermal nucleic  acid amplification technology   to   detect the SARS-CoV-2 RdRp nucleic acid segment. The analytical   sensitivity   (limit of detection) is 500 copies/swab.     A POSITIVE result is indicative of the presence of SARS-CoV-2 RNA;   clinical   correlation with patient history and other diagnostic information is   necessary to determine patient infection status.    A NEGATIVE result means that SARS-CoV-2 nucleic acids are not present   above   the limit of detection. A NEGATIVE result should be treated as   presumptive.   It does not rule out the possibility of COVID-19 and should not be   the sole   basis for treatment decisions. If COVID-19 is strongly suspected   based on   clinical and exposure history, re-testing using an alternate   molecular assay   should be considered.     This test is FDA approved.Performance characteristics of this test   has been   independently verified by [facility] in conjunction   with   [facility] Department of Pathology and Laboratory   Medicine.             Smudge Cells       Present  Comment: Smudge cells present;Substantial numbers may affect the   accuracy of the differential.           Sodium       135         Spec Grav UA 1.020               Specimen UA Urine, Clean Catch               Squam Epithel, UA 0               UROBILINOGEN UA Negative               WBC, UA 3               WBC       21.30                                Significant Imaging: I have reviewed all pertinent imaging results/findings within the past 24 hours.

## 2024-10-02 NOTE — ED PROVIDER NOTES
Encounter Date: 10/2/2024       History     Chief Complaint   Patient presents with    Abdominal Pain    Nausea    Vomiting    Diarrhea     Pt has been sick for the last two weeks     64-year-old female with past medical history of CLL, depression, neck surgery 2 months ago, pneumonia, presents emergency department with vomiting, diarrhea, abdominal pain.  Patient emergency department says she has had vomiting and diarrhea off and on for a couple of months.  She is also has some associated abdominal pain worse in the epigastric region.  Patient says that she feels weak.  Brought in for further evaluation.  She has been on antibiotics about a month and a half ago.      Review of patient's allergies indicates:  No Known Allergies  Past Medical History:   Diagnosis Date    Arthritis     Cancer 10/2022    (CLL) leukemia ; adenocarcinoma  adenosgemis    Depression     GERD (gastroesophageal reflux disease)     Neck pain     and back pain    Personal history of colonic polyps 07/07/2017    Pneumonia of left lung due to infectious organism 03/05/2020    Thyroid disease      Past Surgical History:   Procedure Laterality Date    FUSION, SPINE, CERVICAL, ANTERIOR APPROACH N/A 08/06/2024    Procedure: FUSION, SPINE, CERVICAL, ANTERIOR APPROACH;  Surgeon: Anatoly Daley DO;  Location: [facility] OR;  Service: Neurosurgery;  Laterality: N/A;  IOM, C-ARM, MEDTRONICS, MICRO, HORSESHOE    INJECTION OF ANESTHETIC AGENT AROUND MULTIPLE INTERCOSTAL NERVES Left 10/03/2022    Procedure: BLOCK, NERVE, INTERCOSTAL, 2 OR MORE;  Surgeon: Evelio Kaplan MD;  [facility];  Service: Thoracic;  Laterality: Left;    INSERTION OF TUNNELED CENTRAL VENOUS CATHETER (CVC) WITH SUBCUTANEOUS PORT Right 11/03/2022    Procedure: [id number], Right or Left Neck or Chest;  Surgeon: Mini Acevedo MD;  [facility];  Service: General;  Laterality: Right;    ROBOT-ASSISTED LAPAROSCOPIC LYMPHADENECTOMY USING  DA MONIQUE XI Left 10/03/2022    Procedure: XI ROBOTIC LYMPHADENECTOMY;  Surgeon: Evelio Kaplan MD;  Location: [facility] OR [address] [place] [address];  Service: Thoracic;  Laterality: Left;    SPINE SURGERY      TONSILLECTOMY      XI ROBOTIC RATS,WITH LOBECTOMY,LUNG Left 10/03/2022    Procedure: XI ROBOTIC RATS,WITH LOBECTOMY,LUNG;  Surgeon: Evelio Kaplan MD;  Location: [facility] OR [address] [place] [address];  Service: Thoracic;  Laterality: Left;     Family History   Problem Relation Name Age of Onset    Ovarian cancer Sister      Breast cancer Cousin       Social History     Tobacco Use    Smoking status: Some Days     Current packs/day: 0.15     Types: Cigarettes     Passive exposure: Current    Smokeless tobacco: Never    Tobacco comments:     reports one cigarette every now and then   Substance Use Topics    Alcohol use: Yes     Comment: rarely    Drug use: Yes     Types: Marijuana     Review of Systems   Constitutional:  Positive for chills and fever.   HENT:  Negative for sore throat.    Respiratory:  Negative for shortness of breath.    Cardiovascular:  Negative for chest pain and palpitations.   Gastrointestinal:  Positive for abdominal pain, diarrhea, nausea and vomiting.   Genitourinary:  Negative for dysuria.   Musculoskeletal:  Negative for back pain.   Skin:  Negative for rash.   Neurological:  Negative for weakness and headaches.   Hematological:  Does not bruise/bleed easily.   All other systems reviewed and are negative.      Physical Exam     Initial Vitals [10/02/24 1016]   BP Pulse Resp Temp SpO2   (!) 99/55 (!) 122 18 (!) 101.6 °F (38.7 °C) 95 %      MAP       --         Physical Exam    Nursing note and vitals reviewed.  Constitutional: She appears well-developed and well-nourished. No distress.   HENT:   Head: Normocephalic and atraumatic. Mouth/Throat: No oropharyngeal exudate.   Eyes: Conjunctivae and EOM are normal. Pupils are equal, round, and reactive to light.   Neck: No tracheal deviation present.   Patient has a cervical  collar on from neck surgery.   Cardiovascular:  Normal rate, regular rhythm, normal heart sounds and intact distal pulses.           No murmur heard.  Pulmonary/Chest: Breath sounds normal. No stridor. No respiratory distress. She has no wheezes. She has no rhonchi. She has no rales.   Abdominal: Abdomen is soft. She exhibits no distension. There is abdominal tenderness (epigastric right upper quadrant region). There is no rebound and no guarding.   Musculoskeletal:         General: No tenderness or edema. Normal range of motion.     Neurological: She is alert and oriented to person, place, and time. She has normal strength. No cranial nerve deficit or sensory deficit.   Skin: Skin is warm and dry. Capillary refill takes less than 2 seconds. No rash noted. No erythema. No pallor.   Psychiatric: She has a normal mood and affect. Her behavior is normal. Judgment and thought content normal.         ED Course   Procedures  Labs Reviewed   CBC W/ AUTO DIFFERENTIAL - Abnormal       Result Value    WBC 21.30 (*)     RBC 3.93 (*)     Hemoglobin 12.5      Hematocrit 37.7      MCV 96      MCH 31.8 (*)     MCHC 33.2      RDW 13.4      Platelets 224      MPV 10.6      Immature Granulocytes CANCELED      Immature Grans (Abs) CANCELED      Lymph # CANCELED      Mono # CANCELED      Eos # CANCELED      Baso # CANCELED      nRBC 0      Gran % 43.0      Lymph % 50.0 (*)     Mono % 7.0      Eosinophil % 0.0      Basophil % 0.0      Platelet Estimate Appears normal      Smudge Cells Present      Differential Method Manual     COMPREHENSIVE METABOLIC PANEL - Abnormal    Sodium 135 (*)     Potassium 3.8      Chloride 105      CO2 19 (*)     Glucose 97      BUN 14      Creatinine 1.4      Calcium 8.4 (*)     Total Protein 6.2      Albumin 3.9      Total Bilirubin 0.6      Alkaline Phosphatase 216 (*)      (*)      (*)     eGFR 41.8 (*)     Anion Gap 11     LACTIC ACID, PLASMA - Abnormal    Lactate (Lactic Acid) 2.8 (*)     MAGNESIUM - Abnormal    Magnesium 1.5 (*)    PHOSPHORUS - Abnormal    Phosphorus 1.8 (*)    URINALYSIS, REFLEX TO URINE CULTURE - Abnormal    Specimen UA Urine, Clean Catch      Color, UA Yellow      Appearance, UA Clear      pH, UA 6.0      Specific Gravity, UA 1.020      Protein, UA 1+ (*)     Glucose, UA Negative      Ketones, UA Negative      Bilirubin (UA) Negative      Occult Blood UA 1+ (*)     Nitrite, UA Negative      Urobilinogen, UA Negative      Leukocytes, UA Negative      Narrative:     Specimen Source->Urine   CULTURE, BLOOD    Blood Culture, Routine No Growth to date      Narrative:     Aerobic and anaerobic   CULTURE, BLOOD    Blood Culture, Routine No Growth to date      Narrative:     Aerobic and anaerobic   CULTURE, STOOL   CLOSTRIDIUM DIFFICILE   LIPASE   SARS-COV-2 RNA AMPLIFICATION, QUAL    SARS-CoV-2 RNA, Amplification, Qual Negative     INFLUENZA A AND B ANTIGEN    Influenza A, Molecular Negative      Influenza B, Molecular Negative      Flu A & B Source Nasal swab      Narrative:     Specimen Source->Nasopharyngeal Swab   LIPASE    Lipase 42     URINALYSIS MICROSCOPIC    RBC, UA 1      WBC, UA 3      Bacteria Rare      Squam Epithel, UA 0      Hyaline Casts, UA 0      Microscopic Comment SEE COMMENT      Narrative:     Specimen Source->Urine   WBC, STOOL   OCCULT BLOOD X 1, STOOL   ISTAT LACTATE    POC Lactate 1.80      Sample VENOUS     ISTAT CREATININE    POC Creatinine 1.4      Sample VENOUS     POCT LACTATE        ECG Results              EKG 12-lead (In process)        Collection Time Result Time QRS Duration OHS QTC Calculation    10/02/24 10:26:44 10/02/24 11:31:50 74 595                     In process by Interface, Lab In [facility] (10/02/24 11:31:58)                   Narrative:    Test Reason : A41.9,    Vent. Rate : 122 BPM     Atrial Rate : 122 BPM     P-R Int : 112 ms          QRS Dur : 074 ms      QT Int : 418 ms       P-R-T Axes : 000 104 083 degrees     QTc Int : 595  ms    Sinus tachycardia  Rightward axis  ST and T wave abnormality, consider lateral ischemia  Abnormal ECG  When compared with ECG of 24-JUL-2024 10:29,  Vent. rate has increased BY  64 BPM  Nonspecific T wave abnormality, worse in Inferior leads  T wave inversion now evident in Anterior leads    Referred By: [email or web address]   SELF           Confirmed By:                                   Imaging Results              US Abdomen Limited (Final result)  Result time 10/02/24 15:34:11      Final result by Vinay Daniels MD (10/02/24 15:34:11)                   Impression:      1. Cholelithiasis, gallbladder wall thickening, and mild pericholecystic fluid are nonspecific.  Clinical and laboratory correlation is needed to assess for any additional evidence of acute cholecystitis.  2. No other sonographic abnormalities throughout the right upper quadrant.      Electronically signed by: Vinay Daniels  Date:    10/02/2024  Time:    15:34               Narrative:    EXAMINATION:  US ABDOMEN LIMITED    CLINICAL HISTORY:  ruq pain;    COMPARISON:  CT 10/02/2024    FINDINGS:  Liver maintains normal echogenicity without focal mass or intrahepatic bile duct dilation. Hepatopedal flow present in main portal vein.    Cholelithiasis sludge dependently in gallbladder.  Gallbladder wall thickening and mild pericholecystic fluid evident.  Common duct diameter of 5 mm is normal.  Sonographic Hernandez sign is negative.    Visualized pancreas is unremarkable with bowel gas obscuring portions of pancreas. Right kidney is free of hydronephrosis. Visualized abdominal aorta is nonaneurysmal. Juxtahepatic IVC is unremarkable.                                       CTA Chest Non-Coronary (PE Studies) (Final result)  Result time 10/02/24 14:22:56      Final result by Vinay Daniels MD (10/02/24 14:22:56)                   Impression:      1. Negative for pulmonary embolus.  2. Mild diffuse bronchial wall thickening could reflect infectious or inflammatory  bronchitis in the appropriate clinical setting.  Additional lower lung zone left greater than right interlobular septal thickening suggest coexisting mild interstitial edema.  3. Increased size of precarinal and right hilar lymph nodes since 09/06/2022.  Differential diagnosis of enlarged lymph nodes has been previously discussed in remains unchanged.  4. Suboptimally evaluated due to beam hardening artifact, soft tissue density with calcification in pretracheal region just superior to the sternal notch as discussed above.  This is suspicious for a exophytic thyroid nodule/parenchyma and has not significantly changed since 2020.      Electronically signed by: Vinay Daniels  Date:    10/02/2024  Time:    14:22               Narrative:    EXAMINATION:  CTA CHEST NON CORONARY (PE STUDIES)    CLINICAL HISTORY:  Pulmonary embolism (PE) suspected, high prob;    TECHNIQUE:  CT angiography of thorax with 100 mL Omnipaque 350. Maximum intensity projection coronal reformations were created at a separate workstation and stored in the patient's permanent medical record.    COMPARISON:  Multiple prior exams dating back to 03/11/2020    FINDINGS:  CTA THORAX:    Negative for pulmonary embolus.  Thoracic aorta is of normal caliber and without evidence of dissection.  Right IJ port catheter tip terminates in SVC.    Trachea and main bronchi are patent.  Postsurgical change of left upper lobectomy is evident.  Calcified granuloma remains in the left lower lobe.    6 mm medial right upper lobe nodularity (series 2, image 119) is unchanged since 11/20/2020.  No new pulmonary nodules or masses.    Mild bronchial wall thickening is diffuse.  In lung bases, mild, left greater than right, interlobular septal thickening is new.    10 mm right hilar lymph node (series 2, image 148) previously measured 7 mm.  13 mm precarinal lymph node (image 131) previously measured 8 mm 09/06/2022.  Calcifications of small subcarinal lymph node unchanged  with calcifications in left hilum unchanged.  No pleural or pericardial effusion.  Although beam hardening artifact related to venous contrast near the thoracic inlet significantly limits evaluation, calcification in pretracheal region just superior to the sternal notch and associated soft tissue density has not significantly since 02/19/2020 for and dating back to 11/28/2020, suspicious for exophytic thyroid nodule/parenchyma.    Partially visualized upper abdomen is unremarkable.    Cervical spine surgical hardware incidentally noted.  No acute or suspicious osseous abnormality.                                       CT Abdomen Pelvis With IV Contrast NO Oral Contrast (Final result)  Result time 10/02/24 14:17:22      Final result by Olesya Painter MD (10/02/24 14:17:22)                   Impression:      Pericholecystic edema without evidence of gallstones.  Cholecystitis cannot be excluded and correlation with labs and if necessary ultrasound is recommended    Mild Linda pancreatic edema which may represent early pancreatitis    Minimal ascites    Stable mildly prominent lymph nodes in the upper abdomen which may be reactive    Stable scarring within the left lung base    Diffuse vascular calcification of the aorta    Colonic diverticulosis      Electronically signed by: Olesya Painter  Date:    10/02/2024  Time:    14:17               Narrative:      CMS MANDATED QUALITY DATA - CT RADIATION - 436    All CT scans at this facility utilize dose modulation, iterative reconstruction, and/or weight based dosing when appropriate to reduce radiation dose to as low as reasonably achievable.    CLINICAL HISTORY:  ([id number])63 y/o  (1960) F    Epigastric pain;    TECHNIQUE:  (A#[id number], exam time 10/2/2024 14:02)    CT ABDOMEN PELVIS WITH IV CONTRAST [id number]    Axial CT images of the abdomen and pelvis were obtained from the dome of the diaphragm to the proximal thighs.    100cc omnipague 350 IV contrast was  given    COMPARISON:  PET-CT dated 05/27/2024    FINDINGS:  Lower Thorax:    Stable scarring within the left lung base.  The right lung base is clear.    CT Abdomen:    Liver: The liver is normal in size and attenuation.  There is periportal edema.  There is no focal hepatic mass.    Gallbladder: Pericholecystic edema which is nonspecific.  Cholecystitis cannot be excluded and correlation with labs and ultrasound is recommended.    Biliary Tree: No intra or extrahepatic ductal dilation.    Spleen: Normal.    Pancreas: Mild peripancreatic edema suggestive of pancreatitis.  There is normal enhancement of the pancreas.  There is no pancreatic ductal dilatation.    Adrenal Glands: Normal    Kidneys: The kidneys are normal in imaging appearance without hydronephrosis or hydroureter.    Vasculature: The aorta is normal in caliber with diffuse vascular calcification aorta and iliac arteries.    Lymph nodes: Stable mildly prominent lymph nodes in the upper abdomen, portacaval region and peripancreatic region.  The portacaval node measures 14 x 26 mm.  There is a 14 mm aortocaval node on image 97.  There is no new mesenteric or retroperitoneal adenopathy.    Intraperitoneal structures: Minimal ascites within the pelvis and around the liver.    Bowel: The stomach is normal.  No small bowel is unremarkable.  There is colonic diverticulosis without diverticulitis.    Abdominal wall: The abdominal wall and musculature are normal.    Musculoskeletal: There is degenerative disc disease and facet arthropathy in the lumbar spine with no aggressive appearing lytic or blastic lesions    CT Pelvis:    Bladder: Normal.    Reproductive Organs: The uterus and ovaries are unremarkable.    Pelvic Lymph nodes: No pelvic lymphadenopathy or pelvic mass is identified.                                       X-Ray Chest 1 View (Final result)  Result time 10/02/24 11:24:56      Final result by Olesya Painter MD (10/02/24 11:24:56)                    Impression:      No evidence of active cardiopulmonary disease.      Electronically signed by: Olesya Painter  Date:    10/02/2024  Time:    11:24               Narrative:    EXAMINATION:  XR CHEST 1 VIEW    CLINICAL HISTORY:  Sepsis;    FINDINGS:  Portable chest x-ray at 10:52 is compared to prior study dated 07/18/2024    The cardiomediastinal silhouette is normal in size.  The right IJ Port-A-Cath is in stable position.    Lungs are clear.  There are no acute osseous abnormality.  There has been prior cervical fusion.                                       Medications   NORepinephrine 4 mg in dextrose 5% 250 mL infusion (premix) (0.6 mcg/kg/min × 76.2 kg Intravenous New Bag 10/2/24 4073)   vancomycin - pharmacy to dose (has no administration in time range)   buPROPion TB24 tablet 300 mg (has no administration in time range)   citalopram tablet 20 mg (has no administration in time range)   gabapentin capsule 300 mg (has no administration in time range)   levothyroxine tablet 50 mcg (has no administration in time range)   pravastatin tablet 10 mg (has no administration in time range)   topiramate tablet 100 mg (has no administration in time range)   sodium chloride 0.9% flush 2 mL (has no administration in time range)   melatonin tablet 6 mg (has no administration in time range)   ondansetron injection 4 mg (has no administration in time range)   senna-docusate 8.6-50 mg per tablet 1 tablet (has no administration in time range)   aluminum-magnesium hydroxide-simethicone 200-200-20 mg/5 mL suspension 30 mL (has no administration in time range)   acetaminophen tablet 650 mg (has no administration in time range)   HYDROcodone-acetaminophen 5-325 mg per tablet 1 tablet (has no administration in time range)   naloxone 0.4 mg/mL injection 0.02 mg (has no administration in time range)   potassium bicarbonate disintegrating tablet 50 mEq (has no administration in time range)   potassium bicarbonate disintegrating  tablet 35 mEq (has no administration in time range)   potassium bicarbonate disintegrating tablet 60 mEq (has no administration in time range)   magnesium oxide tablet 800 mg (has no administration in time range)   magnesium oxide tablet 800 mg (has no administration in time range)   potassium, sodium phosphates 280-160-250 mg packet 2 packet (has no administration in time range)   potassium, sodium phosphates 280-160-250 mg packet 2 packet (has no administration in time range)   potassium, sodium phosphates 280-160-250 mg packet 2 packet (has no administration in time range)   glucose chewable tablet 16 g (has no administration in time range)   glucose chewable tablet 24 g (has no administration in time range)   dextrose 50% injection 12.5 g (has no administration in time range)   dextrose 50% injection 25 g (has no administration in time range)   glucagon (human recombinant) injection 1 mg (has no administration in time range)   heparin (porcine) injection 5,000 Units (has no administration in time range)   piperacillin-tazobactam 3.375 g in dextrose 5 % 100 mL IVPB (ready to mix) (has no administration in time range)   mupirocin 2 % ointment (has no administration in time range)   acetaminophen tablet 1,000 mg (1,000 mg Oral Given 10/2/24 1058)   iohexoL (OMNIPAQUE 350) injection 100 mL (100 mLs Intravenous Given 10/2/24 1401)   lactated ringers bolus 2,286 mL (0 mLs Intravenous Stopped 10/2/24 1233)   piperacillin-tazobactam 4.5 g in dextrose 5 % 100 mL IVPB (ready to mix) (0 g Intravenous Stopped 10/2/24 1234)   magnesium sulfate 2g in water 50mL IVPB (premix) (0 g Intravenous Stopped 10/2/24 1431)   ondansetron injection 4 mg (4 mg Intravenous Given 10/2/24 1203)   vancomycin 1.5 g in dextrose 5 % 250 mL IVPB (ready to mix) (0 mg Intravenous Stopped 10/2/24 1725)     Medical Decision Making  Differential includes but not limited to colitis, vomiting, diarrhea, dehydration, viral illness, infectious etiology,  urinary tract infection, pneumonia, COVID, flu, sepsis, septic shock, pancreatitis, cholecystitis, bowel obstruction, incarcerated hernia, internal hernia, mesenteric ischemia,    Emergent evaluation of a 64-year-old female presents emergency department above-mentioned complaints.  Patient emergency department more than likely has septic shock is febrile, hypotensive.  She has received the sepsis bolus of 30 cc/kilos of IV fluids.  She has received IV antibiotics of IV Zosyn and vancomycin.  She has received reassessment after fluids.  She is on Levophed, IV blood pressure medication.  Patient source at this point still unclear but more than likely cholecystitis versus pancreatitis.  General surgery has been consulted, Hospital Medicine has been consulted.  Patient will be admitted to the ICU.    A dictation software program was used for this note.  Please expect some simple typographical  errors in this note.     Amount and/or Complexity of Data Reviewed  External Data Reviewed: labs and notes.  Labs: ordered. Decision-making details documented in ED Course.  Radiology: ordered and independent interpretation performed. Decision-making details documented in ED Course.  ECG/medicine tests: ordered and independent interpretation performed. Decision-making details documented in ED Course.    Risk  OTC drugs.  Prescription drug management.  Decision regarding hospitalization.              Attending Attestation:             Attending ED Notes:   Attending Critical Care:   Critical Care Times:   Direct Patient Care (initial evaluation, reassessments, and time considering the case)................................................................10 minutes.   Additional History from reviewing old medical records or taking additional history from the family, EMS, PCP, etc.......................10 minutes.   Ordering, Reviewing, and Interpreting Diagnostic  Studies...............................................................................................................10 minutes.   Documentation..................................................................................................................................................................................5 minutes.   ==============================================================  · Total Critical Care Time - exclusive of procedural time: 35 minutes.  ==============================================================  Critical care was necessary to treat or prevent imminent or life-threatening deterioration of the following conditions:  Septic shock.   Critical care was time spent personally by me on the following activities: obtaining history from patient or relative, examination of patient, review of x-rays / CT sent with the patient, ordering lab, x-rays, and/or EKG, development of treatment plan with patient or relative, ordering and performing treatments and interventions, interpretation of cardiac measurements and re-evaluation of patient's conition.   Critical Care Condition: potentially life-threatening         ED Course as of 10/02/24 1853   Wed Oct 02, 2024   1122 This patient does have evidence of infective focus  My overall impression is septic shock due to SBP < 90.  Source: Abdominal  Antibiotics given- Antibiotics (72h ago, onward)    Start     Stop Route Frequency Ordered    10/02/24 1130  piperacillin-tazobactam 4.5 g in dextrose 5 % 100 mL IVPB   (ready to mix)         10/02/24 2329 IV ED 1 Time 10/02/24 1121      Latest lactate reviewed-  @[id number](lactate,poclac)@  Organ dysfunction indicated by Acute kidney injury and     Fluid challenge Actual Body weight- Patient will receive 30ml/kg actual body weight to calculate fluid bolus for treatment of septic shock.     Post- resuscitation assessment Yes Perfusion exam was performed within 6 hours of septic shock presentation after bolus  shows Adequate tissue perfusion assessed by non-invasive monitoring       Will start Pressors- Levophed for MAP of 65  Source control achieved by: IV Abx  [JR]   1141 Patient hypotensive, just started on the sepsis bolus.  Will reassess, patient has 2 IV lines.  Patient pressure 70s over 30s will start Levophed temporarily while fluids are infusing. [JR]   1156 EKG 10:26 a.m. sinus tachycardia rate of 122, nonspecific ST T wave changes, no STEMI,, EKG interpreted independently by me. [JR]   1200 Nursing staff reports that patient having some chest pain will repeat EKG.  Will reassessed the patient. [JR]   1208 EKG 12:05 p.m. sinus tachycardia rate of 102, ST depression noted inferiorly and laterally.  No STEMI.  EKG interpreted independently by me. [JR]   1446 Patient reexamined and still with epigastric tenderness to palpation.  Mild right upper quadrant tenderness.  Will consult general surgery given CT scan findings will order ultrasound [JR]   1501 I discussed the case with Dr. Smith from [facility] who will admit the patient. [JR]   1512 I discussed the case with General surgeryDr. Ann who recommended obtaining the formal ultrasound and he will see the patient in consultation [JR]      ED Course User Index  [JR] Elan Urban DO                           Clinical Impression:  Final diagnoses:  [A41.9] Sepsis  [R07.9] Chest pain  [R79.89] Abnormal LFTs (Primary)  [A41.9, R65.21] Septic shock          ED Disposition Condition    Admit Stable                Elan Urban DO  10/02/24 9040

## 2024-10-02 NOTE — ASSESSMENT & PLAN NOTE
Monitor.  White count is actually better compared to prior.  Patient's last chemo for over a year.  Follow up with Hematology outpatient.

## 2024-10-02 NOTE — ASSESSMENT & PLAN NOTE
- Suspect secondary to intra-abdominal process.  Elevated transaminitis with concern of possible acute cholecystitis.  General surgery has been consulted.  Blood cultures were collected.    UA was negative.   Differential diagnosis also includes bronchitis.  Patient was started on Zosyn and vanco which I will continue.    Continue Levophed for pressure support.

## 2024-10-02 NOTE — PHARMACY MED REC
"Admission Medication History     The home medication history was taken by Sudeep Clark.    You may go to "Admission" then "Reconcile Home Medications" tabs to review and/or act upon these items.     The home medication list has been updated by the Pharmacy department.   Please read ALL comments highlighted in yellow.   Please address this information as you see fit.    Feel free to contact us if you have any questions or require assistance.      The medications listed below were removed from the home medication list. Please reorder if appropriate:  Patient reports no longer taking the following medication(s):  Duo-Neb  Magic Mouth Wash  Emla Cream    Medications listed below were obtained from: Patient/family and Analytic software- [employer]  No current facility-administered medications on file prior to encounter.     Current Outpatient Medications on File Prior to Encounter   Medication Sig Dispense Refill    acetaminophen (TYLENOL) 500 MG tablet Take 2 tablets (1,000 mg total) by mouth every 6 (six) hours as needed for Pain. 8 tablet 0    albuterol (PROVENTIL/VENTOLIN HFA) 90 mcg/actuation inhaler Inhale 2 puffs into the lungs every 6 (six) hours as needed for Wheezing or Shortness of Breath. Rescue 8.5 g 11    buPROPion (WELLBUTRIN XL) 300 MG 24 hr tablet Take 1 tablet (300 mg total) by mouth once daily. 90 tablet 3    citalopram (CELEXA) 20 MG tablet Take 1 tablet (20 mg total) by mouth once daily. 30 tablet 11    fluticasone propionate (FLONASE) 50 mcg/actuation nasal spray 1 spray (50 mcg total) by Each Nostril route once daily. 16 g 3    fluticasone-salmeterol 230-21 mcg/dose (ADVAIR HFA) 230-21 mcg/actuation HFAA inhaler Inhale 2 puffs into the lungs 2 (two) times daily. Controller 12 g 5    gabapentin (NEURONTIN) 300 MG capsule Take 1 capsule (300 mg total) by mouth 3 (three) times daily. 270 capsule 1    levocetirizine (XYZAL) 5 MG tablet Take 1 tablet (5 mg total) by mouth every evening. 30 tablet 5    " levothyroxine (SYNTHROID) 50 MCG tablet Take 1 tablet (50 mcg total) by mouth before breakfast. 90 tablet 3    pravastatin (PRAVACHOL) 10 MG tablet Take 1 tablet (10 mg total) by mouth once daily. 90 tablet 3    topiramate (TOPAMAX) 100 MG tablet Take 1 tablet (100 mg total) by mouth 2 (two) times daily. 60 tablet 11    naloxone (NARCAN) 4 mg/actuation Spry ADMINISTER A SINGLE SPRAY IN ONE NOSTRIL UPON SIGNS OF OPIOID OVERDOSE. CALL 911. REPEAT AFTER 3 MINUTES IF NO RESPONSE.      varenicline (CHANTIX) 1 mg Tab Take 1 tablet by mouth twice daily (Patient not taking: Reported on 10/2/2024) 180 tablet 0    [DISCONTINUED] albuterol-ipratropium (DUO-NEB) 2.5 mg-0.5 mg/3 mL nebulizer solution Take 3 mLs by nebulization every 6 (six) hours as needed for Wheezing. Rescue 120 each 5    [DISCONTINUED] diphenhydrAMINE-aluminum-magnesium hydroxide-simethicone-LIDOcaine HCl 2% Swish and spit 15 mLs every 4 (four) hours as needed (mouth sores). 100 each 2    [DISCONTINUED] LIDOcaine-prilocaine (EMLA) cream Apply topically as needed (apply to port site 30 min before access). 30 g 0           Sudeep Clark  EXT 1924                .

## 2024-10-02 NOTE — H&P
[facility] - Emergency Dept  Hospital Medicine  History & Physical    Patient Name: Yvette Hutchinson  MRN: [id number]  Patient Class: IP- Inpatient  Admission Date: 10/2/2024  Attending Physician: Ivanna Smith MD  Primary Care Provider: Vern Priest MD         Patient information was obtained from patient and ER records.     Subjective:     Principal Problem:Septic shock    Chief Complaint:   Chief Complaint   Patient presents with    Abdominal Pain    Nausea    Vomiting    Diarrhea     Pt has been sick for the last two weeks        HPI: 64-year-old female with PMH of CLL, and lung cancer status post partial resection who presented to the ER because of presyncope, nausea, vomiting, diarrhea and abdominal pain.  According to the patient, for over 1 week she has been having intractable nausea, vomiting and diarrhea.  Also epigastric and right upper quadrant abdominal pain described as sharp, 10/10 on pain scale radiate to the rest of her abdomen.  Today when she stood up from a sitting position, she felt as if she will pass out.  As a result, she decided to come to the ER for evaluation.  Denied any fever or chills.  She denied chest pain or shortness on breath.    In the ER, vitals showed a blood pressure of 99/55, but soon dropped to the 70s.  Heart rate was 122, respiratory rate of 20, temperature of 101.6°, and patient was satting 95% on room air.  CBC was white count of 21, with history of CLL.  CMP showed sodium of 135, acute renal failure with creatinine of 1.4 compared to baseline of 1, and patient has elevated LFTs with alk-phos of 216, , and AST of 497.  COVID/flu are negative.  Blood cultures were collected.  CTA chest negative for pulmonary embolus, but showed mild diffuse bronchial wall thickening that could reflect infectious or inflammatory bronchitis in the appropriate clinical setting.  CT abdomen/pelvis showed pericholecystic edema without evidence of gallstones.   Cholecystitis cannot be excluded. Also mild Linda pancreatic edema which may represent early pancreatitis. US abdomen showed Cholelithiasis, gallbladder wall thickening, and mild pericholecystic fluid. Patient was started on Zosyn/vanco.  Was given sepsis bolus of lactated ringer of 2286 cc.  Started on Levophed for blood pressure support.  General surgery was consulted.  Patient will be admitted to ICU for septic shock.       Past Medical History:   Diagnosis Date    Arthritis     Cancer 10/2022    (CLL) leukemia ; adenocarcinoma  adenosgemis    Depression     GERD (gastroesophageal reflux disease)     Neck pain     and back pain    Personal history of colonic polyps 07/07/2017    Pneumonia of left lung due to infectious organism 03/05/2020    Thyroid disease        Past Surgical History:   Procedure Laterality Date    FUSION, SPINE, CERVICAL, ANTERIOR APPROACH N/A 08/06/2024    Procedure: FUSION, SPINE, CERVICAL, ANTERIOR APPROACH;  Surgeon: Anatoly Daley DO;  Location: [facility] OR;  Service: Neurosurgery;  Laterality: N/A;  IOM, C-ARM, MEDTRONICS, MICRO, HORSESHOE    INJECTION OF ANESTHETIC AGENT AROUND MULTIPLE INTERCOSTAL NERVES Left 10/03/2022    Procedure: BLOCK, NERVE, INTERCOSTAL, 2 OR MORE;  Surgeon: Evelio Kaplan MD;  Location: [facility] OR 2ND FLR;  Service: Thoracic;  Laterality: Left;    INSERTION OF TUNNELED CENTRAL VENOUS CATHETER (CVC) WITH SUBCUTANEOUS PORT Right 11/03/2022    Procedure: [id number], Right or Left Neck or Chest;  Surgeon: Mini Acevedo MD;  Location: [facility] OR 2ND FLR;  Service: General;  Laterality: Right;    ROBOT-ASSISTED LAPAROSCOPIC LYMPHADENECTOMY USING DA MONIQUE XI Left 10/03/2022    Procedure: XI ROBOTIC LYMPHADENECTOMY;  Surgeon: Evelio Kaplan MD;  Location: [facility] OR 2ND FLR;  Service: Thoracic;  Laterality: Left;    SPINE SURGERY      TONSILLECTOMY      XI ROBOTIC RATS,WITH LOBECTOMY,LUNG Left 10/03/2022    Procedure: XI ROBOTIC RATS,WITH  LOBECTOMY,LUNG;  Surgeon: Evelio Kaplan MD;  Location: [facility] OR [address] [place] [address];  Service: Thoracic;  Laterality: Left;       Review of patient's allergies indicates:  No Known Allergies    No current facility-administered medications on file prior to encounter.     Current Outpatient Medications on File Prior to Encounter   Medication Sig    acetaminophen (TYLENOL) 500 MG tablet Take 2 tablets (1,000 mg total) by mouth every 6 (six) hours as needed for Pain.    albuterol (PROVENTIL/VENTOLIN HFA) 90 mcg/actuation inhaler Inhale 2 puffs into the lungs every 6 (six) hours as needed for Wheezing or Shortness of Breath. Rescue    buPROPion (WELLBUTRIN XL) 300 MG 24 hr tablet Take 1 tablet (300 mg total) by mouth once daily.    citalopram (CELEXA) 20 MG tablet Take 1 tablet (20 mg total) by mouth once daily.    fluticasone propionate (FLONASE) 50 mcg/actuation nasal spray 1 spray (50 mcg total) by Each Nostril route once daily.    fluticasone-salmeterol 230-21 mcg/dose (ADVAIR HFA) 230-21 mcg/actuation HFAA inhaler Inhale 2 puffs into the lungs 2 (two) times daily. Controller    gabapentin (NEURONTIN) 300 MG capsule Take 1 capsule (300 mg total) by mouth 3 (three) times daily.    levocetirizine (XYZAL) 5 MG tablet Take 1 tablet (5 mg total) by mouth every evening.    levothyroxine (SYNTHROID) 50 MCG tablet Take 1 tablet (50 mcg total) by mouth before breakfast.    pravastatin (PRAVACHOL) 10 MG tablet Take 1 tablet (10 mg total) by mouth once daily.    topiramate (TOPAMAX) 100 MG tablet Take 1 tablet (100 mg total) by mouth 2 (two) times daily.    naloxone (NARCAN) 4 mg/actuation Spry ADMINISTER A SINGLE SPRAY IN ONE NOSTRIL UPON SIGNS OF OPIOID OVERDOSE. CALL 911. REPEAT AFTER 3 MINUTES IF NO RESPONSE.    varenicline (CHANTIX) 1 mg Tab Take 1 tablet by mouth twice daily (Patient not taking: Reported on 10/2/2024)    [DISCONTINUED] albuterol-ipratropium (DUO-NEB) 2.5 mg-0.5 mg/3 mL nebulizer solution Take 3 mLs by  nebulization every 6 (six) hours as needed for Wheezing. Rescue    [DISCONTINUED] diphenhydrAMINE-aluminum-magnesium hydroxide-simethicone-LIDOcaine HCl 2% Swish and spit 15 mLs every 4 (four) hours as needed (mouth sores).    [DISCONTINUED] LIDOcaine-prilocaine (EMLA) cream Apply topically as needed (apply to port site 30 min before access).     Family History       Problem Relation (Age of Onset)    Breast cancer Cousin    Ovarian cancer Sister          Tobacco Use    Smoking status: Some Days     Current packs/day: 0.15     Types: Cigarettes     Passive exposure: Current    Smokeless tobacco: Never    Tobacco comments:     reports one cigarette every now and then   Substance and Sexual Activity    Alcohol use: Yes     Comment: rarely    Drug use: Yes     Types: Marijuana    Sexual activity: Not on file     Review of Systems   Constitutional:  Negative for chills and fever.   HENT:  Negative for sore throat.    Respiratory:  Negative for cough and shortness of breath.    Cardiovascular:  Negative for chest pain and palpitations.   Gastrointestinal:  Positive for abdominal pain, diarrhea, nausea and vomiting.   Endocrine: Negative for polydipsia and polyuria.   Genitourinary:  Negative for dysuria, frequency and urgency.   Musculoskeletal:  Positive for neck pain.   Skin:  Negative for rash.   Neurological:  Negative for seizures.   Psychiatric/Behavioral:  Negative for confusion.      Objective:     Vital Signs (Most Recent):  Temp: (!) 100.6 °F (38.1 °C) (10/02/24 1243)  Pulse: 100 (10/02/24 1526)  Resp: (!) 21 (10/02/24 1514)  BP: (!) 110/51 (10/02/24 1526)  SpO2: 100 % (10/02/24 1526) Vital Signs (24h Range):  Temp:  [100.6 °F (38.1 °C)-101.6 °F (38.7 °C)] 100.6 °F (38.1 °C)  Pulse:  [] 100  Resp:  [18-38] 21  SpO2:  [89 %-100 %] 100 %  BP: ()/(36-72) 110/51     Weight: 76.2 kg (168 lb)  Body mass index is 29.76 kg/m².     Physical Exam  Vitals reviewed.   Constitutional:       Appearance: Normal  appearance.   HENT:      Head: Normocephalic and atraumatic.      Mouth/Throat:      Mouth: Mucous membranes are moist.   Eyes:      Extraocular Movements: Extraocular movements intact.      Conjunctiva/sclera: Conjunctivae normal.      Pupils: Pupils are equal, round, and reactive to light.   Neck:      Comments: Neck is in collar.  Cardiovascular:      Rate and Rhythm: Normal rate and regular rhythm.   Pulmonary:      Effort: Pulmonary effort is normal.      Breath sounds: Normal breath sounds.   Abdominal:      General: Abdomen is flat. Bowel sounds are normal.      Palpations: Abdomen is soft.      Tenderness: There is no abdominal tenderness.      Comments: Tenderness on palpation of the epigastric and RUQ abdomen.   Musculoskeletal:         General: No swelling or tenderness.   Skin:     General: Skin is warm.   Neurological:      Mental Status: She is alert and oriented to person, place, and time.   Psychiatric:         Mood and Affect: Mood normal.         Behavior: Behavior normal.            Significant Labs: All pertinent labs within the past 24 hours have been reviewed.  Recent Lab Results  (Last 5 results in the past 24 hours)        10/02/24  1520   10/02/24  1048   10/02/24  1036   10/02/24  1034   10/02/24  1032        Influenza A, Molecular     Negative           Influenza B, Molecular     Negative           Albumin       3.9         ALP       216         ALT       497         Anion Gap       11         Appearance, UA Clear               AST       735         Bacteria, UA Rare               Baso #       CANCELED  Comment: Result canceled by the ancillary.         Basophil %       0.0         Bilirubin (UA) Negative               BILIRUBIN TOTAL       0.6  Comment: For infants and newborns, interpretation of results should be based  on gestational age, weight and in agreement with clinical  observations.    Premature Infant recommended reference ranges:  Up to 24 hours.............<8.0 mg/dL  Up to  48 hours............<12.0 mg/dL  3-5 days..................<15.0 mg/dL  6-29 days.................<15.0 mg/dL           BUN       14         Calcium       8.4         Chloride       105         CO2       19         Color, UA Yellow               Creatinine       1.4         Differential Method       Manual         eGFR       41.8         Eos #       CANCELED  Comment: Result canceled by the ancillary.         Eos %       0.0         Flu A & B Source     Nasal swab           Glucose       97         Glucose, UA Negative               Gran %       43.0         Hematocrit       37.7         Hemoglobin       12.5         Hyaline Casts, UA 0               Immature Grans (Abs)       CANCELED  Comment: Mild elevation in immature granulocytes is non specific and   can be seen in a variety of conditions including stress response,   acute inflammation, trauma and pregnancy. Correlation with other   laboratory and clinical findings is essential.    Result canceled by the ancillary.           Immature Granulocytes       CANCELED  Comment: Result canceled by the ancillary.         Ketones, UA Negative               Leukocyte Esterase, UA Negative               Lipase       42         Lymph #       CANCELED  Comment: Result canceled by the ancillary.         Lymph %       50.0         Magnesium        1.5         MCH       31.8         MCHC       33.2         MCV       96         Microscopic Comment SEE COMMENT  Comment: Other formed elements not mentioned in the report are not   present in the microscopic examination.                  Mono #       CANCELED  Comment: Result canceled by the ancillary.         Mono %       7.0         MPV       10.6         NITRITE UA Negative               nRBC       0         Blood, UA 1+               pH, UA 6.0               Phosphorus Level       1.8         Platelet Estimate       Appears normal         Platelet Count       224         POC Creatinine   1.4             POC Lactate          1.80       Potassium       3.8         PROTEIN TOTAL       6.2         Protein, UA 1+  Comment: Recommend a 24 hour urine protein or a urine   protein/creatinine ratio if globulin induced proteinuria is  clinically suspected.                 RBC       3.93         RBC, UA 1               RDW       13.4         Sample   VENOUS       VENOUS       SARS-CoV-2 RNA, Amplification, Qual     Negative  Comment: This test utilizes isothermal nucleic acid amplification technology   to   detect the SARS-CoV-2 RdRp nucleic acid segment. The analytical   sensitivity   (limit of detection) is 500 copies/swab.     A POSITIVE result is indicative of the presence of SARS-CoV-2 RNA;   clinical   correlation with patient history and other diagnostic information is   necessary to determine patient infection status.    A NEGATIVE result means that SARS-CoV-2 nucleic acids are not present   above   the limit of detection. A NEGATIVE result should be treated as   presumptive.   It does not rule out the possibility of COVID-19 and should not be   the sole   basis for treatment decisions. If COVID-19 is strongly suspected   based on   clinical and exposure history, re-testing using an alternate   molecular assay   should be considered.     This test is FDA approved.Performance characteristics of this test   has been   independently verified by [facility] in conjunction   with   [facility] Department of Pathology and Laboratory   Medicine.             Smudge Cells       Present  Comment: Smudge cells present;Substantial numbers may affect the   accuracy of the differential.           Sodium       135         Spec Grav UA 1.020               Specimen UA Urine, Clean Catch               Squam Epithel, UA 0               UROBILINOGEN UA Negative               WBC, UA 3               WBC       21.30                                Significant Imaging: I have reviewed all pertinent imaging results/findings within the  past 24 hours.  Assessment/Plan:     * Septic shock  - Suspect secondary to intra-abdominal process.  Elevated transaminitis with concern of possible acute cholecystitis.  General surgery has been consulted.  Blood cultures were collected.    UA was negative.   Differential diagnosis also includes bronchitis.  Patient was started on Zosyn and vanco which I will continue.    Continue Levophed for pressure support.    Calculus of gallbladder with acute cholecystitis without obstruction  Continue antibiotics as stated above.  Appreciate General surgery recommendation.      Acute renal failure  Most recent creatinine and eGFR are listed below.  Recent Labs     10/02/24  1034   CREATININE 1.4   EGFRNORACEVR 41.8*     Likely pre-renal given her septic shock.   Hydrate pt and repeat labs.       Pre-syncope  Suspect 2/2 hypotension with bp in the 70's. Hydrate and continue pressors. Treat infection.  Will order 2 d echo, and troponin. EKG reviewed showing sinus tachy.  Pt is not focal, so will hold off ct head.         Abdominal pain  Suspect secondary to acute cholecystitis.    CT abdomen also showed mild pancreatitis, but lipase is within normal limits.    Given her diarrhea however, will order stool culture.    Supportive care with IV fluids, Zofran for nausea.  P.r.n. pain medication.      Neuropathic pain  Resume gabapentin.      Acquired hypothyroidism  Resume synthroid.       CLL (chronic lymphocytic leukemia)  Monitor.  White count is actually better compared to prior.  Patient's last chemo for over a year.  Follow up with Hematology outpatient.      Anxiety  Resume Wellbutrin and Celexa        VTE Risk Mitigation (From admission, onward)           Ordered     heparin (porcine) injection 5,000 Units  Every 8 hours         10/02/24 1611     IP VTE HIGH RISK PATIENT  Once         10/02/24 1611     Place sequential compression device  Until discontinued         10/02/24 1611                                    Ivanna NEWBERRY  MD Sarah  Department of Hospital Medicine  Formerly [facility] - Emergency Dept

## 2024-10-02 NOTE — ASSESSMENT & PLAN NOTE
Suspect secondary to acute cholecystitis.    CT abdomen also showed mild pancreatitis, but lipase is within normal limits.    Given her diarrhea however, will order stool culture.    Supportive care with IV fluids, Zofran for nausea.  P.r.n. pain medication.

## 2024-10-02 NOTE — ASSESSMENT & PLAN NOTE
Most recent creatinine and eGFR are listed below.  Recent Labs     10/02/24  1034   CREATININE 1.4   EGFRNORACEVR 41.8*     Likely pre-renal given her septic shock.   Hydrate pt and repeat labs.

## 2024-10-03 ENCOUNTER — CLINICAL SUPPORT (OUTPATIENT)
Dept: CARDIOLOGY | Facility: HOSPITAL | Age: 64
End: 2024-10-03
Attending: HOSPITALIST
Payer: COMMERCIAL

## 2024-10-03 ENCOUNTER — ANESTHESIA EVENT (OUTPATIENT)
Dept: SURGERY | Facility: HOSPITAL | Age: 64
End: 2024-10-03
Payer: COMMERCIAL

## 2024-10-03 ENCOUNTER — ANESTHESIA (OUTPATIENT)
Dept: SURGERY | Facility: HOSPITAL | Age: 64
End: 2024-10-03
Payer: COMMERCIAL

## 2024-10-03 VITALS — WEIGHT: 168 LBS | HEIGHT: 63 IN | BODY MASS INDEX: 29.77 KG/M2

## 2024-10-03 PROBLEM — B95.8 BACTEREMIA DUE TO STAPHYLOCOCCUS: Status: ACTIVE | Noted: 2024-10-03

## 2024-10-03 PROBLEM — R55 PRE-SYNCOPE: Status: RESOLVED | Noted: 2024-10-02 | Resolved: 2024-10-03

## 2024-10-03 PROBLEM — R78.81 BACTEREMIA DUE TO STAPHYLOCOCCUS: Status: ACTIVE | Noted: 2024-10-03

## 2024-10-03 LAB
ACINETOBACTER CALCOACETICUS/BAUMANNII COMPLEX: NOT DETECTED
ALBUMIN SERPL BCP-MCNC: 3.5 G/DL (ref 3.5–5.2)
ALP SERPL-CCNC: 220 U/L (ref 55–135)
ALT SERPL W/O P-5'-P-CCNC: 1316 U/L (ref 10–44)
ANION GAP SERPL CALC-SCNC: 13 MMOL/L (ref 8–16)
AORTIC ROOT ANNULUS: 3.1 CM
AORTIC VALVE CUSP SEPERATION: 2.5 CM
APICAL FOUR CHAMBER EJECTION FRACTION: 31 %
APICAL TWO CHAMBER EJECTION FRACTION: 46 %
AST SERPL-CCNC: 1970 U/L (ref 10–40)
AV INDEX (PROSTH): 0.92
AV MEAN GRADIENT: 4 MMHG
AV PEAK GRADIENT: 5.8 MMHG
AV VALVE AREA BY VELOCITY RATIO: 3.1 CM²
AV VALVE AREA: 2.9 CM²
AV VELOCITY RATIO: 1
BACTEROIDES FRAGILIS: NOT DETECTED
BASOPHILS NFR BLD: 0 % (ref 0–1.9)
BILIRUB SERPL-MCNC: 0.9 MG/DL (ref 0.1–1)
BSA FOR ECHO PROCEDURE: 1.84 M2
BUN SERPL-MCNC: 17 MG/DL (ref 8–23)
C DIFF GDH STL QL: NEGATIVE
C DIFF TOX A+B STL QL IA: NEGATIVE
CALCIUM SERPL-MCNC: 7.7 MG/DL (ref 8.7–10.5)
CANDIDA ALBICANS: NOT DETECTED
CANDIDA AURIS: NOT DETECTED
CANDIDA GLABRATA: NOT DETECTED
CANDIDA KRUSEI: NOT DETECTED
CANDIDA PARAPSILOSIS: NOT DETECTED
CANDIDA TROPICALIS: NOT DETECTED
CHLORIDE SERPL-SCNC: 103 MMOL/L (ref 95–110)
CO2 SERPL-SCNC: 17 MMOL/L (ref 23–29)
CREAT SERPL-MCNC: 1.8 MG/DL (ref 0.5–1.4)
CRYPTOCOCCUS NEOFORMANS/GATTII: NOT DETECTED
CTX-M GENE (ESBL PRODUCER): ABNORMAL
CV ECHO LV RWT: 0.28 CM
DIFFERENTIAL METHOD BLD: ABNORMAL
DOP CALC AO PEAK VEL: 1.2 M/S
DOP CALC AO VTI: 19 CM
DOP CALC LVOT AREA: 3.1 CM2
DOP CALC LVOT DIAMETER: 2 CM
DOP CALC LVOT PEAK VEL: 1.2 M/S
DOP CALC LVOT STROKE VOLUME: 55 CM3
DOP CALC MV VTI: 24.7 CM
DOP CALCLVOT PEAK VEL VTI: 17.5 CM
E WAVE DECELERATION TIME: 173 MSEC
E/A RATIO: 0.84
E/E' RATIO: 8.35 M/S
ECHO LV POSTERIOR WALL: 0.7 CM (ref 0.6–1.1)
ENTEROBACTER CLOACAE COMPLEX: NOT DETECTED
ENTEROBACTERALES: NOT DETECTED
ENTEROCOCCUS FAECALIS: NOT DETECTED
ENTEROCOCCUS FAECIUM: NOT DETECTED
EOSINOPHIL NFR BLD: 2 % (ref 0–8)
ERYTHROCYTE [DISTWIDTH] IN BLOOD BY AUTOMATED COUNT: 13.8 % (ref 11.5–14.5)
ESCHERICHIA COLI: NOT DETECTED
EST. GFR  (NO RACE VARIABLE): 30.9 ML/MIN/1.73 M^2
FRACTIONAL SHORTENING: 14 % (ref 28–44)
GLUCOSE SERPL-MCNC: 165 MG/DL (ref 70–110)
GLUCOSE SERPL-MCNC: 71 MG/DL (ref 70–110)
GLUCOSE SERPL-MCNC: 72 MG/DL (ref 70–110)
HAEMOPHILUS INFLUENZAE: NOT DETECTED
HCO3 UR-SCNC: 15.8 MMOL/L (ref 24–28)
HCO3 UR-SCNC: 24.6 MMOL/L (ref 24–28)
HCT VFR BLD AUTO: 41.8 % (ref 37–48.5)
HCT VFR BLD CALC: 31 %PCV (ref 36–54)
HCT VFR BLD CALC: 34 %PCV (ref 36–54)
HGB BLD-MCNC: 13.6 G/DL (ref 12–16)
IMM GRANULOCYTES # BLD AUTO: ABNORMAL K/UL (ref 0–0.04)
IMM GRANULOCYTES NFR BLD AUTO: ABNORMAL % (ref 0–0.5)
IMP GENE (CARBAPENEM RESISTANT): ABNORMAL
INTERVENTRICULAR SEPTUM: 1 CM (ref 0.6–1.1)
IVC DIAMETER: 1.34 CM
KLEBSIELLA AEROGENES: NOT DETECTED
KLEBSIELLA OXYTOCA: NOT DETECTED
KLEBSIELLA PNEUMONIAE GROUP: NOT DETECTED
KPC RESISTANCE GENE (CARBAPENEM): ABNORMAL
LACTATE SERPL-SCNC: 3.7 MMOL/L (ref 0.5–1.9)
LACTATE SERPL-SCNC: 3.8 MMOL/L (ref 0.5–1.9)
LEFT ATRIUM AREA SYSTOLIC (APICAL 2 CHAMBER): 14.9 CM2
LEFT ATRIUM AREA SYSTOLIC (APICAL 4 CHAMBER): 15 CM2
LEFT ATRIUM SIZE: 3.5 CM
LEFT ATRIUM VOLUME INDEX MOD: 20.9 ML/M2
LEFT ATRIUM VOLUME MOD: 37.6 ML
LEFT INTERNAL DIMENSION IN SYSTOLE: 4.3 CM (ref 2.1–4)
LEFT VENTRICLE DIASTOLIC VOLUME INDEX: 65.56 ML/M2
LEFT VENTRICLE DIASTOLIC VOLUME: 118 ML
LEFT VENTRICLE END DIASTOLIC VOLUME APICAL 2 CHAMBER: 86.9 ML
LEFT VENTRICLE END DIASTOLIC VOLUME APICAL 4 CHAMBER: 90.2 ML
LEFT VENTRICLE END SYSTOLIC VOLUME APICAL 2 CHAMBER: 36.1 ML
LEFT VENTRICLE END SYSTOLIC VOLUME APICAL 4 CHAMBER: 38.6 ML
LEFT VENTRICLE MASS INDEX: 81.6 G/M2
LEFT VENTRICLE SYSTOLIC VOLUME INDEX: 45.9 ML/M2
LEFT VENTRICLE SYSTOLIC VOLUME: 82.6 ML
LEFT VENTRICULAR INTERNAL DIMENSION IN DIASTOLE: 5 CM (ref 3.5–6)
LEFT VENTRICULAR MASS: 146.8 G
LIPASE SERPL-CCNC: 24 U/L (ref 4–60)
LISTERIA MONOCYTOGENES: NOT DETECTED
LV LATERAL E/E' RATIO: 7.89 M/S
LV SEPTAL E/E' RATIO: 8.88 M/S
LVED V (TEICH): 118 ML
LVES V (TEICH): 82.6 ML
LVOT MG: 3 MMHG
LVOT MV: 0.76 CM/S
LYMPHOCYTES NFR BLD: 62 % (ref 18–48)
MAGNESIUM SERPL-MCNC: 1.7 MG/DL (ref 1.6–2.6)
MCH RBC QN AUTO: 31.2 PG (ref 27–31)
MCHC RBC AUTO-ENTMCNC: 32.5 G/DL (ref 32–36)
MCR-1: ABNORMAL
MCV RBC AUTO: 96 FL (ref 82–98)
MEC A/C AND MREJ (MRSA): ABNORMAL
MEC A/C: ABNORMAL
MONOCYTES NFR BLD: 3 % (ref 4–15)
MV MEAN GRADIENT: 3 MMHG
MV PEAK A VEL: 0.85 M/S
MV PEAK E VEL: 0.71 M/S
MV PEAK GRADIENT: 4 MMHG
MV STENOSIS PRESSURE HALF TIME: 82 MS
MV VALVE AREA BY CONTINUITY EQUATION: 2.22 CM2
MV VALVE AREA P 1/2 METHOD: 2.68 CM2
NDM GENE (CARBAPENEM RESISTANT): ABNORMAL
NEISSERIA MENINGITIDIS: NOT DETECTED
NEUTROPHILS NFR BLD: 22 % (ref 38–73)
NEUTS BAND NFR BLD MANUAL: 11 %
NRBC BLD-RTO: 0 /100 WBC
OHS LV EJECTION FRACTION SIMPSONS BIPLANE MOD: 38 %
OHS QRS DURATION: 82 MS
OHS QRS DURATION: 86 MS
OHS QTC CALCULATION: 504 MS
OHS QTC CALCULATION: 520 MS
OXA-48-LIKE (CARBAPENEM RESISTANT): ABNORMAL
PCO2 BLDA: 40.6 MMHG (ref 35–45)
PCO2 BLDA: 50 MMHG (ref 35–45)
PH SMN: 7.2 [PH] (ref 7.35–7.45)
PH SMN: 7.3 [PH] (ref 7.35–7.45)
PISA MRMAX VEL: 3.61 M/S
PISA TR MAX VEL: 2.15 M/S
PLATELET # BLD AUTO: 239 K/UL (ref 150–450)
PLATELET BLD QL SMEAR: ABNORMAL
PMV BLD AUTO: 11.9 FL (ref 9.2–12.9)
PO2 BLDA: 506 MMHG (ref 80–100)
PO2 BLDA: 515 MMHG (ref 80–100)
POC BE: -12 MMOL/L
POC BE: -2 MMOL/L
POC IONIZED CALCIUM: 0.97 MMOL/L (ref 1.06–1.42)
POC IONIZED CALCIUM: 1.08 MMOL/L (ref 1.06–1.42)
POC SATURATED O2: 100 % (ref 95–100)
POC SATURATED O2: 100 % (ref 95–100)
POC TCO2: 17 MMOL/L (ref 23–27)
POC TCO2: 26 MMOL/L (ref 23–27)
POTASSIUM BLD-SCNC: 3.7 MMOL/L (ref 3.5–5.1)
POTASSIUM BLD-SCNC: 4 MMOL/L (ref 3.5–5.1)
POTASSIUM SERPL-SCNC: 4.1 MMOL/L (ref 3.5–5.1)
PROT SERPL-MCNC: 5.6 G/DL (ref 6–8.4)
PROTEUS SPECIES: NOT DETECTED
PSEUDOMONAS AERUGINOSA: NOT DETECTED
PV MV: 0.81 M/S
PV PEAK GRADIENT: 5 MMHG
PV PEAK VELOCITY: 1.13 M/S
RBC # BLD AUTO: 4.36 M/UL (ref 4–5.4)
RIGHT ATRIUM VOLUME AREA LENGTH APICAL 4 CHAMBER: 18.7 ML
RV TISSUE DOPPLER FREE WALL SYSTOLIC VELOCITY 1 (APICAL 4 CHAMBER VIEW): 15 CM/S
SALMONELLA SP: NOT DETECTED
SAMPLE: ABNORMAL
SAMPLE: ABNORMAL
SERRATIA MARCESCENS: NOT DETECTED
SINUS: 2.66 CM
SMUDGE CELLS BLD QL SMEAR: PRESENT
SODIUM BLD-SCNC: 139 MMOL/L (ref 136–145)
SODIUM BLD-SCNC: 143 MMOL/L (ref 136–145)
SODIUM SERPL-SCNC: 133 MMOL/L (ref 136–145)
STAPHYLOCOCCUS AUREUS: NOT DETECTED
STAPHYLOCOCCUS EPIDERMIDIS: NOT DETECTED
STAPHYLOCOCCUS LUGDUNESIS: NOT DETECTED
STAPHYLOCOCCUS SPECIES: DETECTED
STENOTROPHOMONAS MALTOPHILIA: NOT DETECTED
STJ: 2.8 CM
STREPTOCOCCUS AGALACTIAE: NOT DETECTED
STREPTOCOCCUS PNEUMONIAE: NOT DETECTED
STREPTOCOCCUS PYOGENES: NOT DETECTED
STREPTOCOCCUS SPECIES: NOT DETECTED
TDI LATERAL: 0.09 M/S
TDI SEPTAL: 0.08 M/S
TDI: 0.09 M/S
TR MAX PG: 18 MMHG
TRICUSPID ANNULAR PLANE SYSTOLIC EXCURSION: 1.83 CM
TROPONIN I SERPL HS-MCNC: 1015.8 PG/ML (ref 0–14.9)
TROPONIN I SERPL HS-MCNC: 1161.1 PG/ML (ref 0–14.9)
VAN A/B (VRE GENE): ABNORMAL
VANCOMYCIN SERPL-MCNC: 8.2 UG/ML
VIM GENE (CARBAPENEM RESISTANT): ABNORMAL
WBC # BLD AUTO: 45.3 K/UL (ref 3.9–12.7)
WBC #/AREA STL HPF: NORMAL /[HPF]
Z-SCORE OF LEFT VENTRICULAR DIMENSION IN END DIASTOLE: 0.05
Z-SCORE OF LEFT VENTRICULAR DIMENSION IN END SYSTOLE: 2.65

## 2024-10-03 PROCEDURE — 85027 COMPLETE CBC AUTOMATED: CPT | Performed by: HOSPITALIST

## 2024-10-03 PROCEDURE — 36000708 HC OR TIME LEV III 1ST 15 MIN: Performed by: SURGERY

## 2024-10-03 PROCEDURE — 80053 COMPREHEN METABOLIC PANEL: CPT | Performed by: HOSPITALIST

## 2024-10-03 PROCEDURE — 84484 ASSAY OF TROPONIN QUANT: CPT | Performed by: INTERNAL MEDICINE

## 2024-10-03 PROCEDURE — 25000003 PHARM REV CODE 250: Performed by: INTERNAL MEDICINE

## 2024-10-03 PROCEDURE — 27000207 HC ISOLATION

## 2024-10-03 PROCEDURE — 37000009 HC ANESTHESIA EA ADD 15 MINS: Performed by: SURGERY

## 2024-10-03 PROCEDURE — 27201423 OPTIME MED/SURG SUP & DEVICES STERILE SUPPLY: Performed by: SURGERY

## 2024-10-03 PROCEDURE — 20000000 HC ICU ROOM

## 2024-10-03 PROCEDURE — 63600175 PHARM REV CODE 636 W HCPCS: Performed by: INTERNAL MEDICINE

## 2024-10-03 PROCEDURE — 99223 1ST HOSP IP/OBS HIGH 75: CPT | Mod: ,,, | Performed by: INTERNAL MEDICINE

## 2024-10-03 PROCEDURE — 99232 SBSQ HOSP IP/OBS MODERATE 35: CPT | Mod: 57,,, | Performed by: SURGERY

## 2024-10-03 PROCEDURE — 25000003 PHARM REV CODE 250: Performed by: EMERGENCY MEDICINE

## 2024-10-03 PROCEDURE — 93010 ELECTROCARDIOGRAM REPORT: CPT | Mod: ,,, | Performed by: GENERAL PRACTICE

## 2024-10-03 PROCEDURE — 80202 ASSAY OF VANCOMYCIN: CPT

## 2024-10-03 PROCEDURE — 0FT44ZZ RESECTION OF GALLBLADDER, PERCUTANEOUS ENDOSCOPIC APPROACH: ICD-10-PCS | Performed by: SURGERY

## 2024-10-03 PROCEDURE — 63600175 PHARM REV CODE 636 W HCPCS: Performed by: SURGERY

## 2024-10-03 PROCEDURE — 47562 LAPAROSCOPIC CHOLECYSTECTOMY: CPT | Mod: ,,, | Performed by: SURGERY

## 2024-10-03 PROCEDURE — 93306 TTE W/DOPPLER COMPLETE: CPT | Mod: 26,,, | Performed by: GENERAL PRACTICE

## 2024-10-03 PROCEDURE — 88305 TISSUE EXAM BY PATHOLOGIST: CPT | Mod: TC | Performed by: PATHOLOGY

## 2024-10-03 PROCEDURE — 36415 COLL VENOUS BLD VENIPUNCTURE: CPT

## 2024-10-03 PROCEDURE — 25000003 PHARM REV CODE 250

## 2024-10-03 PROCEDURE — 36415 COLL VENOUS BLD VENIPUNCTURE: CPT | Performed by: HOSPITALIST

## 2024-10-03 PROCEDURE — 83605 ASSAY OF LACTIC ACID: CPT | Performed by: HOSPITALIST

## 2024-10-03 PROCEDURE — 25000003 PHARM REV CODE 250: Performed by: SURGERY

## 2024-10-03 PROCEDURE — 25000003 PHARM REV CODE 250: Performed by: HOSPITALIST

## 2024-10-03 PROCEDURE — 83735 ASSAY OF MAGNESIUM: CPT | Performed by: HOSPITALIST

## 2024-10-03 PROCEDURE — 93005 ELECTROCARDIOGRAM TRACING: CPT | Performed by: GENERAL PRACTICE

## 2024-10-03 PROCEDURE — 37000008 HC ANESTHESIA 1ST 15 MINUTES: Performed by: SURGERY

## 2024-10-03 PROCEDURE — 83690 ASSAY OF LIPASE: CPT | Performed by: SURGERY

## 2024-10-03 PROCEDURE — 36000709 HC OR TIME LEV III EA ADD 15 MIN: Performed by: SURGERY

## 2024-10-03 PROCEDURE — 83605 ASSAY OF LACTIC ACID: CPT | Mod: 91 | Performed by: INTERNAL MEDICINE

## 2024-10-03 PROCEDURE — 63600175 PHARM REV CODE 636 W HCPCS

## 2024-10-03 PROCEDURE — 85007 BL SMEAR W/DIFF WBC COUNT: CPT | Performed by: HOSPITALIST

## 2024-10-03 PROCEDURE — 84484 ASSAY OF TROPONIN QUANT: CPT | Mod: 91 | Performed by: HOSPITALIST

## 2024-10-03 PROCEDURE — 63600175 PHARM REV CODE 636 W HCPCS: Performed by: HOSPITALIST

## 2024-10-03 PROCEDURE — 27000221 HC OXYGEN, UP TO 24 HOURS

## 2024-10-03 PROCEDURE — 94761 N-INVAS EAR/PLS OXIMETRY MLT: CPT

## 2024-10-03 PROCEDURE — C9290 INJ, BUPIVACAINE LIPOSOME: HCPCS | Performed by: SURGERY

## 2024-10-03 PROCEDURE — 63600175 PHARM REV CODE 636 W HCPCS: Performed by: NURSE ANESTHETIST, CERTIFIED REGISTERED

## 2024-10-03 PROCEDURE — 36415 COLL VENOUS BLD VENIPUNCTURE: CPT | Performed by: INTERNAL MEDICINE

## 2024-10-03 PROCEDURE — 25000003 PHARM REV CODE 250: Performed by: NURSE ANESTHETIST, CERTIFIED REGISTERED

## 2024-10-03 PROCEDURE — 93306 TTE W/DOPPLER COMPLETE: CPT

## 2024-10-03 PROCEDURE — 93010 ELECTROCARDIOGRAM REPORT: CPT | Mod: 59,,, | Performed by: GENERAL PRACTICE

## 2024-10-03 RX ORDER — LIDOCAINE HYDROCHLORIDE 20 MG/ML
INJECTION, SOLUTION EPIDURAL; INFILTRATION; INTRACAUDAL; PERINEURAL
Status: DISCONTINUED | OUTPATIENT
Start: 2024-10-03 | End: 2024-10-03

## 2024-10-03 RX ORDER — ONDANSETRON HYDROCHLORIDE 2 MG/ML
INJECTION, SOLUTION INTRAVENOUS
Status: DISCONTINUED | OUTPATIENT
Start: 2024-10-03 | End: 2024-10-03

## 2024-10-03 RX ORDER — HYDROMORPHONE HYDROCHLORIDE 1 MG/ML
0.5 INJECTION, SOLUTION INTRAMUSCULAR; INTRAVENOUS; SUBCUTANEOUS
Status: DISCONTINUED | OUTPATIENT
Start: 2024-10-03 | End: 2024-10-12

## 2024-10-03 RX ORDER — FENTANYL CITRATE 50 UG/ML
INJECTION, SOLUTION INTRAMUSCULAR; INTRAVENOUS
Status: DISCONTINUED | OUTPATIENT
Start: 2024-10-03 | End: 2024-10-03

## 2024-10-03 RX ORDER — ROCURONIUM BROMIDE 10 MG/ML
INJECTION, SOLUTION INTRAVENOUS
Status: DISCONTINUED | OUTPATIENT
Start: 2024-10-03 | End: 2024-10-03

## 2024-10-03 RX ORDER — BUPIVACAINE HYDROCHLORIDE AND EPINEPHRINE 2.5; 5 MG/ML; UG/ML
INJECTION, SOLUTION EPIDURAL; INFILTRATION; INTRACAUDAL; PERINEURAL
Status: DISCONTINUED | OUTPATIENT
Start: 2024-10-03 | End: 2024-10-03 | Stop reason: HOSPADM

## 2024-10-03 RX ORDER — SUCCINYLCHOLINE CHLORIDE 20 MG/ML
INJECTION INTRAMUSCULAR; INTRAVENOUS
Status: DISCONTINUED | OUTPATIENT
Start: 2024-10-03 | End: 2024-10-03

## 2024-10-03 RX ORDER — ASPIRIN 81 MG/1
81 TABLET ORAL DAILY
Status: DISCONTINUED | OUTPATIENT
Start: 2024-10-03 | End: 2024-10-10

## 2024-10-03 RX ORDER — ETOMIDATE 2 MG/ML
INJECTION INTRAVENOUS
Status: DISCONTINUED | OUTPATIENT
Start: 2024-10-03 | End: 2024-10-03

## 2024-10-03 RX ORDER — INDOMETHACIN 25 MG/1
CAPSULE ORAL
Status: DISCONTINUED | OUTPATIENT
Start: 2024-10-03 | End: 2024-10-03

## 2024-10-03 RX ORDER — PROCHLORPERAZINE EDISYLATE 5 MG/ML
5 INJECTION INTRAMUSCULAR; INTRAVENOUS EVERY 4 HOURS PRN
Status: DISCONTINUED | OUTPATIENT
Start: 2024-10-03 | End: 2024-10-16 | Stop reason: HOSPADM

## 2024-10-03 RX ORDER — KETAMINE HCL IN 0.9 % NACL 50 MG/5 ML
SYRINGE (ML) INTRAVENOUS
Status: DISCONTINUED | OUTPATIENT
Start: 2024-10-03 | End: 2024-10-03

## 2024-10-03 RX ORDER — VANCOMYCIN HCL IN 5 % DEXTROSE 1G/250ML
1000 PLASTIC BAG, INJECTION (ML) INTRAVENOUS ONCE
Status: COMPLETED | OUTPATIENT
Start: 2024-10-03 | End: 2024-10-03

## 2024-10-03 RX ORDER — FAMOTIDINE 10 MG/ML
INJECTION INTRAVENOUS
Status: DISCONTINUED | OUTPATIENT
Start: 2024-10-03 | End: 2024-10-03

## 2024-10-03 RX ORDER — MAGNESIUM SULFATE HEPTAHYDRATE 40 MG/ML
2 INJECTION, SOLUTION INTRAVENOUS ONCE
Status: COMPLETED | OUTPATIENT
Start: 2024-10-03 | End: 2024-10-03

## 2024-10-03 RX ORDER — ASPIRIN 81 MG/1
81 TABLET ORAL DAILY
Status: DISCONTINUED | OUTPATIENT
Start: 2024-10-03 | End: 2024-10-03

## 2024-10-03 RX ADMIN — MAGNESIUM SULFATE HEPTAHYDRATE 2 G: 40 INJECTION, SOLUTION INTRAVENOUS at 09:10

## 2024-10-03 RX ADMIN — HEPARIN SODIUM 5000 UNITS: 5000 INJECTION INTRAVENOUS; SUBCUTANEOUS at 03:10

## 2024-10-03 RX ADMIN — ROCURONIUM BROMIDE 10 MG: 10 INJECTION, SOLUTION INTRAVENOUS at 11:10

## 2024-10-03 RX ADMIN — SODIUM CHLORIDE: 0.9 INJECTION, SOLUTION INTRAVENOUS at 10:10

## 2024-10-03 RX ADMIN — SODIUM CHLORIDE: 0.9 INJECTION, SOLUTION INTRAVENOUS at 12:10

## 2024-10-03 RX ADMIN — FENTANYL CITRATE 25 MCG: 50 INJECTION, SOLUTION INTRAMUSCULAR; INTRAVENOUS at 11:10

## 2024-10-03 RX ADMIN — PIPERACILLIN SODIUM AND TAZOBACTAM SODIUM 4.5 G: 4; .5 INJECTION, POWDER, LYOPHILIZED, FOR SOLUTION INTRAVENOUS at 09:10

## 2024-10-03 RX ADMIN — GABAPENTIN 300 MG: 300 CAPSULE ORAL at 03:10

## 2024-10-03 RX ADMIN — ONDANSETRON 4 MG: 2 INJECTION INTRAMUSCULAR; INTRAVENOUS at 11:10

## 2024-10-03 RX ADMIN — GABAPENTIN 300 MG: 300 CAPSULE ORAL at 09:10

## 2024-10-03 RX ADMIN — SODIUM CHLORIDE: 9 INJECTION, SOLUTION INTRAVENOUS at 04:10

## 2024-10-03 RX ADMIN — HEPARIN SODIUM 5000 UNITS: 5000 INJECTION INTRAVENOUS; SUBCUTANEOUS at 05:10

## 2024-10-03 RX ADMIN — VANCOMYCIN HYDROCHLORIDE 1000 MG: 1 INJECTION, POWDER, LYOPHILIZED, FOR SOLUTION INTRAVENOUS at 03:10

## 2024-10-03 RX ADMIN — SODIUM CHLORIDE: 9 INJECTION, SOLUTION INTRAVENOUS at 03:10

## 2024-10-03 RX ADMIN — SODIUM BICARBONATE 50 MEQ: 84 INJECTION, SOLUTION INTRAVENOUS at 11:10

## 2024-10-03 RX ADMIN — PROCHLORPERAZINE EDISYLATE 5 MG: 5 INJECTION INTRAMUSCULAR; INTRAVENOUS at 09:10

## 2024-10-03 RX ADMIN — PIPERACILLIN SODIUM AND TAZOBACTAM SODIUM 3.38 G: 3; .375 INJECTION, POWDER, LYOPHILIZED, FOR SOLUTION INTRAVENOUS at 05:10

## 2024-10-03 RX ADMIN — ONDANSETRON 4 MG: 2 INJECTION INTRAMUSCULAR; INTRAVENOUS at 07:10

## 2024-10-03 RX ADMIN — Medication 120 MG: at 10:10

## 2024-10-03 RX ADMIN — SODIUM CHLORIDE, SODIUM LACTATE, POTASSIUM CHLORIDE, AND CALCIUM CHLORIDE 500 ML: .6; .31; .03; .02 INJECTION, SOLUTION INTRAVENOUS at 03:10

## 2024-10-03 RX ADMIN — DEXTROSE MONOHYDRATE 10 G: 25 INJECTION, SOLUTION INTRAVENOUS at 12:10

## 2024-10-03 RX ADMIN — MUPIROCIN 1 G: 20 OINTMENT TOPICAL at 09:10

## 2024-10-03 RX ADMIN — SUGAMMADEX 200 MG: 100 INJECTION, SOLUTION INTRAVENOUS at 12:10

## 2024-10-03 RX ADMIN — TOPIRAMATE 100 MG: 25 TABLET, FILM COATED ORAL at 09:10

## 2024-10-03 RX ADMIN — HEPARIN SODIUM 5000 UNITS: 5000 INJECTION INTRAVENOUS; SUBCUTANEOUS at 09:10

## 2024-10-03 RX ADMIN — ETOMIDATE 2 MG: 2 INJECTION, SOLUTION INTRAVENOUS at 10:10

## 2024-10-03 RX ADMIN — ACETAMINOPHEN 650 MG: 325 TABLET ORAL at 07:10

## 2024-10-03 RX ADMIN — ASPIRIN 81 MG: 81 TABLET, COATED ORAL at 04:10

## 2024-10-03 RX ADMIN — NOREPINEPHRINE BITARTRATE 0.55 MCG/KG/MIN: 1 INJECTION, SOLUTION, CONCENTRATE INTRAVENOUS at 09:10

## 2024-10-03 RX ADMIN — ROCURONIUM BROMIDE 30 MG: 10 INJECTION, SOLUTION INTRAVENOUS at 11:10

## 2024-10-03 RX ADMIN — LIDOCAINE HYDROCHLORIDE 50 MG: 20 INJECTION, SOLUTION INTRAVENOUS at 10:10

## 2024-10-03 RX ADMIN — ROCURONIUM BROMIDE 10 MG: 10 INJECTION, SOLUTION INTRAVENOUS at 10:10

## 2024-10-03 RX ADMIN — HYDROMORPHONE HYDROCHLORIDE 0.5 MG: 0.5 INJECTION, SOLUTION INTRAMUSCULAR; INTRAVENOUS; SUBCUTANEOUS at 04:10

## 2024-10-03 RX ADMIN — ETOMIDATE 10 MG: 2 INJECTION, SOLUTION INTRAVENOUS at 10:10

## 2024-10-03 RX ADMIN — ACETAMINOPHEN 650 MG: 325 TABLET ORAL at 05:10

## 2024-10-03 RX ADMIN — Medication 20 MG: at 11:10

## 2024-10-03 RX ADMIN — FAMOTIDINE 20 MG: 10 INJECTION, SOLUTION INTRAVENOUS at 11:10

## 2024-10-03 NOTE — PROGRESS NOTES
"Pharmacokinetic Assessment Follow Up: IV Vancomycin    Vancomycin Regimen Plan:    Discontinue the scheduled vancomycin regimen and re-dose when the random level is less than 20 mcg/mL, next level to be drawn at 1400 on 10/3.    Drug levels (last 3 results):  No results for input(s): "VANCOMYCINRA", "VANCORANDOM", "VANCOMYCINPE", "VANCOPEAK", "VANCOMYCINTR", "VANCOTROUGH" in the last 72 hours.    Pharmacy will continue to follow and monitor vancomycin.    Please contact pharmacy at extension [phone] for questions regarding this assessment.    Thank you for the consult,   Malgorzata Tucker       Patient brief summary:  Yvette Hutchinson is a 64 y.o. female initiated on antimicrobial therapy with IV Vancomycin for treatment of  sepsis of unknown source    The patient's current regimen is 1000 mg every 24 hours    Drug Allergies:   Review of patient's allergies indicates:  No Known Allergies    Actual Body Weight:   76.2    Renal Function:   Estimated Creatinine Clearance: 30.4 mL/min (A) (based on SCr of 1.8 mg/dL (H)).,     Dialysis Method (if applicable):  N/A    CBC (last 72 hours):  Recent Labs   Lab Result Units 10/02/24  1034 10/03/24  0110   WBC K/uL 21.30* 45.30*   Hemoglobin g/dL 12.5 13.6   Hematocrit % 37.7 41.8   Platelets K/uL 224 239   Gran % % 43.0 22.0*   Lymph % % 50.0* 62.0*   Mono % % 7.0 3.0*   Eosinophil % % 0.0 2.0   Basophil % % 0.0 0.0   Differential Method  Manual Manual       Metabolic Panel (last 72 hours):  Recent Labs   Lab Result Units 10/02/24  1034 10/02/24  1520 10/03/24  0110   Sodium mmol/L 135*  --  133*   Potassium mmol/L 3.8  --  4.1   Chloride mmol/L 105  --  103   CO2 mmol/L 19*  --  17*   Glucose mg/dL 97  --  71   Glucose, UA   --  Negative  --    BUN mg/dL 14  --  17   Creatinine mg/dL 1.4  --  1.8*   Albumin g/dL 3.9  --  3.5   Total Bilirubin mg/dL 0.6  --  0.9   Alkaline Phosphatase U/L 216*  --  220*   AST U/L 735*  --  1,970*   ALT U/L 497*  --  1,316*   Magnesium mg/dL " 1.5*  --  1.7   Phosphorus mg/dL 1.8*  --   --        Vancomycin Administrations:  vancomycin given in the last 96 hours                     vancomycin 1.5 g in dextrose 5 % 250 mL IVPB (ready to mix) (mg) 1,500 mg New Bag 10/02/24 1520                    Microbiologic Results:  Microbiology Results (last 7 days)       Procedure Component Value Units Date/Time    Clostridium difficile EIA [[id number]] Collected: 10/02/24 1919    Order Status: Completed Specimen: Stool Updated: 10/03/24 0031     C. diff Antigen Negative     C difficile Toxins A+B, EIA Negative     Comment: Testing not recommended for children <24 months old.       Stool culture **cannot be ordered stat** [[id number]] Collected: 10/02/24 1919    Order Status: Sent Specimen: Stool Updated: 10/02/24 1927    Blood culture x two cultures. Draw prior to antibiotics [[id number]] Collected: 10/02/24 1049    Order Status: Completed Specimen: Blood from Peripheral, Wrist, Right Updated: 10/02/24 1717     Blood Culture, Routine No Growth to date    Narrative:      Aerobic and anaerobic    Blood culture x two cultures. Draw prior to antibiotics [[id number]] Collected: 10/02/24 1057    Order Status: Completed Specimen: Blood from Peripheral, Hand, Left Updated: 10/02/24 1717     Blood Culture, Routine No Growth to date    Narrative:      Aerobic and anaerobic

## 2024-10-03 NOTE — SUBJECTIVE & OBJECTIVE
Interval History:  Patient seen and examined this morning.  She is resting and appears to be quite tired.  She does endorse fatigue and generally feeling ill.  She is able to open her eyes and speak briefly the conversation was limited due to transfer coming take her for surgery.    Review of Systems was performed and is negative unless mentioned in interval history  Objective:     Vital Signs (Most Recent):  Temp: 98 °F (36.7 °C) (10/03/24 1340)  Pulse: 85 (10/03/24 1500)  Resp: 14 (10/03/24 1500)  BP: (!) 88/54 (10/03/24 1500)  SpO2: 100 % (10/03/24 1500) Vital Signs (24h Range):  Temp:  [97.8 °F (36.6 °C)-101.3 °F (38.5 °C)] 98 °F (36.7 °C)  Pulse:  [] 85  Resp:  [14-32] 14  SpO2:  [92 %-100 %] 100 %  BP: ()/(50-85) 88/54  Arterial Line BP: (109-132)/(52-61) 112/53     Weight: 76.2 kg (168 lb)  Body mass index is 29.76 kg/m².    Intake/Output Summary (Last 24 hours) at 10/3/2024 1542  Last data filed at 10/3/2024 1253  Gross per 24 hour   Intake 5853.03 ml   Output 2575 ml   Net 3278.03 ml         Physical Exam  Vitals reviewed.   Constitutional:       Appearance: Normal appearance.   HENT:      Head: Normocephalic and atraumatic.      Mouth/Throat:      Mouth: Mucous membranes are moist.   Eyes:      Extraocular Movements: Extraocular movements intact.      Conjunctiva/sclera: Conjunctivae normal.      Pupils: Pupils are equal, round, and reactive to light.   Cardiovascular:      Rate and Rhythm: Normal rate and regular rhythm.   Pulmonary:      Effort: Pulmonary effort is normal.      Breath sounds: Normal breath sounds.   Abdominal:      General: Abdomen is flat. Bowel sounds are normal.      Palpations: Abdomen is soft.      Tenderness: There is no abdominal tenderness.      Comments: Tenderness on palpation of the epigastric and RUQ abdomen.   Musculoskeletal:         General: No swelling or tenderness.   Skin:     General: Skin is warm.   Neurological:      Mental Status: She is alert and  oriented to person, place, and time.   Psychiatric:         Mood and Affect: Mood normal.         Behavior: Behavior normal.             Significant Labs: All pertinent labs within the past 24 hours have been reviewed.  BMP:   Recent Labs   Lab 10/03/24  0110   GLU 71   *   K 4.1      CO2 17*   BUN 17   CREATININE 1.8*   CALCIUM 7.7*   MG 1.7     CBC:   Recent Labs   Lab 10/02/24  1034 10/03/24  0110 10/03/24  1139 10/03/24  1209   WBC 21.30* 45.30*  --   --    HGB 12.5 13.6  --   --    HCT 37.7 41.8 34* 31*    239  --   --        Significant Imaging: I have reviewed all pertinent imaging results/findings within the past 24 hours.  I have reviewed and interpreted all pertinent imaging results/findings within the past 24 hours.

## 2024-10-03 NOTE — PROGRESS NOTES
Pharmacokinetic Assessment Follow Up: IV Vancomycin    Vancomycin serum concentration assessment(s):    The random level was drawn correctly and can be used to guide therapy at this time. The measurement is below the desired definitive target range of 15 to 20 mcg/mL.    Vancomycin Regimen Plan:    Re-dose vancomycin 1000 mg x 1. Vancomycin random level ordered for 10/4 at 1530.    Drug levels (last 3 results):  Recent Labs   Lab Result Units 10/03/24  1418   Vancomycin, Random ug/mL 8.2       Pharmacy will continue to follow and monitor vancomycin.    Please contact pharmacy at extension [phone] for questions regarding this assessment.    Thank you for the consult,   Malgorzata Tucker       Patient brief summary:  Yvette Hutchinson is a 64 y.o. female initiated on antimicrobial therapy with IV Vancomycin for treatment of intra-abdominal infection    The patient's current regimen is pulse    Drug Allergies:   Review of patient's allergies indicates:  No Known Allergies    Actual Body Weight:   76.2    Renal Function:   Estimated Creatinine Clearance: 30.4 mL/min (A) (based on SCr of 1.8 mg/dL (H)).,     Dialysis Method (if applicable):  N/A    CBC (last 72 hours):  Recent Labs   Lab Result Units 10/02/24  1034 10/03/24  0110   WBC K/uL 21.30* 45.30*   Hemoglobin g/dL 12.5 13.6   Hematocrit % 37.7 41.8   Platelets K/uL 224 239   Gran % % 43.0 22.0*   Lymph % % 50.0* 62.0*   Mono % % 7.0 3.0*   Eosinophil % % 0.0 2.0   Basophil % % 0.0 0.0   Differential Method  Manual Manual       Metabolic Panel (last 72 hours):  Recent Labs   Lab Result Units 10/02/24  1034 10/02/24  1520 10/03/24  0110   Sodium mmol/L 135*  --  133*   Potassium mmol/L 3.8  --  4.1   Chloride mmol/L 105  --  103   CO2 mmol/L 19*  --  17*   Glucose mg/dL 97  --  71   Glucose, UA   --  Negative  --    BUN mg/dL 14  --  17   Creatinine mg/dL 1.4  --  1.8*   Albumin g/dL 3.9  --  3.5   Total Bilirubin mg/dL 0.6  --  0.9   Alkaline Phosphatase U/L 216*   --  220*   AST U/L 735*  --  1,970*   ALT U/L 497*  --  1,316*   Magnesium mg/dL 1.5*  --  1.7   Phosphorus mg/dL 1.8*  --   --        Vancomycin Administrations:  vancomycin given in the last 96 hours                     vancomycin 1.5 g in dextrose 5 % 250 mL IVPB (ready to mix) (mg) 1,500 mg New Bag 10/02/24 1520                    Microbiologic Results:  Microbiology Results (last 7 days)       Procedure Component Value Units Date/Time    Rapid Organism ID by PCR (from Blood culture) [[id number]]  (Abnormal) Collected: 10/02/24 1049    Order Status: Completed Updated: 10/03/24 1322     Enterococcus faecalis Not Detected     Enterococcus faecium Not Detected     Listeria monocytogenes Not Detected     Staphylococcus spp. Detected     Staphylococcus aureus Not Detected     Staphylococcus epidermidis Not Detected     Staphylococcus lugdunensis Not Detected     Streptococcus species Not Detected     Streptococcus agalactiae Not Detected     Streptococcus pneumoniae Not Detected     Streptococcus pyogenes Not Detected     Acinetobacter calcoaceticus/baumannii complex Not Detected     Bacteroides fragilis Not Detected     Enterobacterales Not Detected     Enterobacter cloacae complex Not Detected     Escherichia coli Not Detected     Klebsiella aerogenes Not Detected     Klebsiella oxytoca Not Detected     Klebsiella pneumoniae group Not Detected     Proteus Not Detected     Salmonella sp Not Detected     Serratia marcescens Not Detected     Haemophilus influenzae Not Detected     Neisseria meningtidis Not Detected     Pseudomonas aeruginosa Not Detected     Stenotrophomonas maltophilia Not Detected     Candida albicans Not Detected     Candida auris Not Detected     Candida glabrata Not Detected     Candida krusei Not Detected     Candida parapsilosis Not Detected     Candida tropicalis Not Detected     Cryptococcus neoformans/gattii Not Detected     CTX-M (ESBL ) Test not applicable     IMP (Carbapenem  resistant) Test not applicable     KPC resistance gene (Carbapenem resistant) Test not applicable     mcr-1  Test not applicable     mec A/C  Test not applicable     mec A/C and MREJ (MRSA) gene Test not applicable     NDM (Carbapenem resistant) Test not applicable     OXA-48-like (Carbapenem resistant) Test not applicable     van A/B (VRE gene) Test not applicable     VIM (Carbapenem resistant) Test not applicable    Narrative:      Aerobic and anaerobic    Blood culture x two cultures. Draw prior to antibiotics [[id number]] Collected: 10/02/24 1057    Order Status: Completed Specimen: Blood from Peripheral, Hand, Left Updated: 10/03/24 1232     Blood Culture, Routine No Growth to date      No Growth to date    Narrative:      Aerobic and anaerobic    Blood culture x two cultures. Draw prior to antibiotics [[id number]] Collected: 10/02/24 1049    Order Status: Completed Specimen: Blood from Peripheral, Wrist, Right Updated: 10/03/24 1159     Blood Culture, Routine Gram stain aer bottle: Gram positive cocci      Results called to and read back by:Val Nino RN-ED;  10/03/2024      11:58 CJD    Narrative:      Aerobic and anaerobic    Stool culture **cannot be ordered stat** [[id number]] Collected: 10/02/24 1919    Order Status: Completed Specimen: Stool Updated: 10/03/24 1128     Stool Culture Nothing significant to date    Clostridium difficile EIA [[id number]] Collected: 10/02/24 1919    Order Status: Completed Specimen: Stool Updated: 10/03/24 0031     C. diff Antigen Negative     C difficile Toxins A+B, EIA Negative     Comment: Testing not recommended for children <24 months old.

## 2024-10-03 NOTE — CARE UPDATE
RN NOTIFIED ME THAT TROPONIN WAS ELEVATED       Troponin was not checked until later in day time.      Patient is septic and came in the morning but no chest pain     ,lactic up   Wbc up  On levophed  2+ liters fluids in   On abx    Surgery consulted for possible cholecystitis         Troponin came back at 1500 n    Repeat was down 1100 range     EKG normal        PLAN    - likely demand type ischemia from sepsis hypotension     No urgent need right for cardiology consultation     I would hold off on heparin drip until morning since troponins going down     They can decide about doing surgery this morning      We can give 81 mg aspirin now and daily     She is already on SQ  heparin q8       I have repeat troponin for am labs     Keep mag up over 2   Keep potassium up over 4      Get repeat EKG in am also       Most importantly , Keep MAP over 65 and treat the sepsis source           Total time :  20 min

## 2024-10-03 NOTE — CONSULTS
[facility]  General Surgery  Consult Note    Inpatient consult to General surgery  Consult performed by: Ang Mosley III, MD  Consult ordered by: Elan Urban DO  Reason for consult: Possible cholecystitis, possible pancreatitis        Subjective:     Chief Complaint/Reason for Admission: Possible cholecystitis, possible pancreatitis     History of Present Illness:  Patient is 64-year-old female with past medical history of left lung cancer status post left upper lobe resection and chemotherapy in 2022, COPD on occasional home oxygen. , chronic lymphocytic leukemia, 2 months postop cervical spine fusion.  She was brought to the emergency department with between 1 and 2 week history of epigastric abdominal pain, persistent nausea, vomiting and persistent daily diarrhea over that time period.  She has had fever over the last 48 hours.  Highest temperature she saw at home was 101.  On arrival she was tachycardic in the 120s and 130s.  Hypotensive in the 80s systolic.  She was resuscitated with initial sepsis protocol bolus.  Heart rate came down to about 100.  Systolic blood pressure has remained in the 80s and is now on Levophed.  She is awake and alert.  Able to answer all questions.  She also has family at bedside.  She has a showed periportal edema, pericholecystic edema with mild pancreatic edema suspicious for cholecystitis, possible pancreatitis.  Ultrasound shows gallstones with gallbladder wall thickening and mild pericholecystic fluid.  Common bile duct caliber within normal limits.  Her AST and ALT are very elevated around 700.  Bilirubin within normal limits.  Lipase also within normal limits at 42.  White count is chronically high that is 21 today.  She has white counts that range between 20 and 40,000 going back several years.  Patient also had CTA of the chest.  It was negative for pulmonary embolism.  Did show some mild diffuse bronchial wall thickening that could reflect  infectious or inflammatory bronchitis.  There was also some left-greater-than-right interlobar septal thickening.  Patient has been admitted to the ICU.  She has been started on antibiotics.    No current facility-administered medications on file prior to encounter.     Current Outpatient Medications on File Prior to Encounter   Medication Sig    acetaminophen (TYLENOL) 500 MG tablet Take 2 tablets (1,000 mg total) by mouth every 6 (six) hours as needed for Pain.    albuterol (PROVENTIL/VENTOLIN HFA) 90 mcg/actuation inhaler Inhale 2 puffs into the lungs every 6 (six) hours as needed for Wheezing or Shortness of Breath. Rescue    buPROPion (WELLBUTRIN XL) 300 MG 24 hr tablet Take 1 tablet (300 mg total) by mouth once daily.    citalopram (CELEXA) 20 MG tablet Take 1 tablet (20 mg total) by mouth once daily.    fluticasone propionate (FLONASE) 50 mcg/actuation nasal spray 1 spray (50 mcg total) by Each Nostril route once daily.    fluticasone-salmeterol 230-21 mcg/dose (ADVAIR HFA) 230-21 mcg/actuation HFAA inhaler Inhale 2 puffs into the lungs 2 (two) times daily. Controller    gabapentin (NEURONTIN) 300 MG capsule Take 1 capsule (300 mg total) by mouth 3 (three) times daily.    levocetirizine (XYZAL) 5 MG tablet Take 1 tablet (5 mg total) by mouth every evening.    levothyroxine (SYNTHROID) 50 MCG tablet Take 1 tablet (50 mcg total) by mouth before breakfast.    pravastatin (PRAVACHOL) 10 MG tablet Take 1 tablet (10 mg total) by mouth once daily.    topiramate (TOPAMAX) 100 MG tablet Take 1 tablet (100 mg total) by mouth 2 (two) times daily.    naloxone (NARCAN) 4 mg/actuation Spry ADMINISTER A SINGLE SPRAY IN ONE NOSTRIL UPON SIGNS OF OPIOID OVERDOSE. CALL 911. REPEAT AFTER 3 MINUTES IF NO RESPONSE.    varenicline (CHANTIX) 1 mg Tab Take 1 tablet by mouth twice daily (Patient not taking: Reported on 10/2/2024)    [DISCONTINUED] albuterol-ipratropium (DUO-NEB) 2.5 mg-0.5 mg/3 mL nebulizer solution Take 3 mLs by  nebulization every 6 (six) hours as needed for Wheezing. Rescue    [DISCONTINUED] diphenhydrAMINE-aluminum-magnesium hydroxide-simethicone-LIDOcaine HCl 2% Swish and spit 15 mLs every 4 (four) hours as needed (mouth sores).    [DISCONTINUED] LIDOcaine-prilocaine (EMLA) cream Apply topically as needed (apply to port site 30 min before access).       Review of patient's allergies indicates:  No Known Allergies    Past Medical History:   Diagnosis Date    Arthritis     Cancer 10/2022    (CLL) leukemia ; adenocarcinoma  adenosgemis    Depression     GERD (gastroesophageal reflux disease)     Neck pain     and back pain    Personal history of colonic polyps 07/07/2017    Pneumonia of left lung due to infectious organism 03/05/2020    Thyroid disease      Past Surgical History:   Procedure Laterality Date    FUSION, SPINE, CERVICAL, ANTERIOR APPROACH N/A 08/06/2024    Procedure: FUSION, SPINE, CERVICAL, ANTERIOR APPROACH;  Surgeon: Anatoly Daley DO;  Location: [facility] OR;  Service: Neurosurgery;  Laterality: N/A;  IOM, C-ARM, MEDTRONICS, MICRO, HORSESHOE    INJECTION OF ANESTHETIC AGENT AROUND MULTIPLE INTERCOSTAL NERVES Left 10/03/2022    Procedure: BLOCK, NERVE, INTERCOSTAL, 2 OR MORE;  Surgeon: Evelio Kaplan MD;  Location: [facility] OR 2ND FLR;  Service: Thoracic;  Laterality: Left;    INSERTION OF TUNNELED CENTRAL VENOUS CATHETER (CVC) WITH SUBCUTANEOUS PORT Right 11/03/2022    Procedure: [id number], Right or Left Neck or Chest;  Surgeon: Mini Acevedo MD;  Location: [facility] OR 2ND FLR;  Service: General;  Laterality: Right;    ROBOT-ASSISTED LAPAROSCOPIC LYMPHADENECTOMY USING DA MONIQUE XI Left 10/03/2022    Procedure: XI ROBOTIC LYMPHADENECTOMY;  Surgeon: Evelio Kaplan MD;  Location: [facility] OR 2ND FLR;  Service: Thoracic;  Laterality: Left;    SPINE SURGERY      TONSILLECTOMY      XI ROBOTIC RATS,WITH LOBECTOMY,LUNG Left 10/03/2022    Procedure: XI ROBOTIC RATS,WITH LOBECTOMY,LUNG;  Surgeon:  Evelio Kaplan MD;  Location: [facility] OR [address] [place] [address];  Service: Thoracic;  Laterality: Left;     Family History       Problem Relation (Age of Onset)    Breast cancer Cousin    Ovarian cancer Sister          Tobacco Use    Smoking status: Some Days     Current packs/day: 0.15     Types: Cigarettes     Passive exposure: Current    Smokeless tobacco: Never    Tobacco comments:     reports one cigarette every now and then   Substance and Sexual Activity    Alcohol use: Yes     Comment: rarely    Drug use: Yes     Types: Marijuana    Sexual activity: Not on file     Review of Systems   Constitutional:  Positive for chills and fever. Negative for appetite change and unexpected weight change.   HENT:  Negative for hearing loss, rhinorrhea, sore throat and voice change.    Eyes:  Negative for photophobia and visual disturbance.   Respiratory:  Negative for cough, choking and shortness of breath.    Cardiovascular:  Negative for chest pain, palpitations and leg swelling.   Gastrointestinal:  Positive for abdominal pain, diarrhea, nausea and vomiting. Negative for blood in stool and constipation.   Endocrine: Negative for cold intolerance, heat intolerance, polydipsia and polyuria.   Musculoskeletal:  Negative for arthralgias, back pain, joint swelling and neck stiffness.   Skin:  Negative for color change, pallor and rash.   Neurological:  Negative for dizziness, seizures, syncope and headaches.   Hematological:  Negative for adenopathy. Does not bruise/bleed easily.   Psychiatric/Behavioral:  Negative for agitation, behavioral problems and confusion.      Objective:     Vital Signs (Most Recent):  Temp: 100 °F (37.8 °C) (10/02/24 1815)  Pulse: (!) 121 (10/02/24 1830)  Resp: (!) 28 (10/02/24 1830)  BP: (!) 84/58 (10/02/24 1830)  SpO2: 97 % (10/02/24 1830) Vital Signs (24h Range):  Temp:  [99.6 °F (37.6 °C)-101.6 °F (38.7 °C)] 100 °F (37.8 °C)  Pulse:  [] 121  Resp:  [18-38] 28  SpO2:  [89 %-100 %] 97 %  BP:  ()/(36-85) 84/58     Weight: 76.2 kg (168 lb)  Body mass index is 29.76 kg/m².      Intake/Output Summary (Last 24 hours) at 10/2/2024 1918  Last data filed at 10/2/2024 1234  Gross per 24 hour   Intake 2386 ml   Output --   Net 2386 ml       Physical Exam  Constitutional:       General: She is awake.      Appearance: She is ill-appearing.   Pulmonary:      Effort: No tachypnea, bradypnea or respiratory distress.      Comments: On 4 L oxygen via nasal cannula  Abdominal:      General: There is no distension.      Palpations: Abdomen is soft.      Tenderness: There is abdominal tenderness in the right upper quadrant and epigastric area. There is guarding.   Skin:     Coloration: Skin is not jaundiced.   Neurological:      Mental Status: She is alert and oriented to person, place, and time.   Psychiatric:         Behavior: Behavior is cooperative.         Significant Labs:  CBC:   Recent Labs   Lab 10/02/24  1034   WBC 21.30*   RBC 3.93*   HGB 12.5   HCT 37.7      MCV 96   MCH 31.8*   MCHC 33.2     CMP:   Recent Labs   Lab 10/02/24  1034   GLU 97   CALCIUM 8.4*   ALBUMIN 3.9   PROT 6.2   *   K 3.8   CO2 19*      BUN 14   CREATININE 1.4   ALKPHOS 216*   *   *   BILITOT 0.6       Significant Diagnostics:  I have reviewed all pertinent imaging results/findings within the past 24 hours.    Assessment/Plan:     Active Diagnoses:    Diagnosis Date Noted POA    PRINCIPAL PROBLEM:  Septic shock [A41.9, R65.21] 10/02/2024 Yes    Calculus of gallbladder with acute cholecystitis without obstruction [K80.00] 10/02/2024 Yes    Neuropathic pain [M79.2] 10/02/2024 Yes    Acute renal failure [N17.9] 10/02/2024 Yes    Pre-syncope [R55] 10/02/2024 Yes    Abdominal pain [R10.9] 10/02/2024 Yes    Acquired hypothyroidism [E03.9] 08/07/2024 Yes    CLL (chronic lymphocytic leukemia) [C91.10] 05/04/2020 Yes    Anxiety [F41.9] 03/02/2020 Yes      Problems Resolved During this Admission:   - plan on  resuscitation overnight with plan for cholecystectomy tomorrow as of now.  I reviewed the CT imaging with radiologist this afternoon.  They Did not think the pancreatic inflammation was very significant.  Seem quite mild.  Her lipase is within normal limits.  There is pericholecystic edema.  She is tender on exam in the epigastrium and right upper quadrant.  Had long discussion with the family and the patient.  She is comfortable with proceeding with cholecystectomy during this hospital stay.  Will reorder LFTs and lipase again in the morning.  If she does have more convincing evidence of pancreatitis, would more than likely treat with supportive care and perform cholecystectomy once pancreatitis shows signs of getting better versus cholecystostomy tube versus proceeding with cholecystectomy.  We also discussed that if this process really has been going on for about 2 weeks the surgery could be quite difficult.  She will probably be at higher risk for conversion to an open procedure.    Thank you for your consult.    Ang Mosley III, MD  General Surgery  [facility]

## 2024-10-03 NOTE — PROGRESS NOTES
"Pharmacokinetic Assessment: IV Vancomycin     Vancomycin Day # 1    Indication & Goal: Sepsis - Trough 15 - 20    Patient Labs  76.2 kg (168 lb)  Recent Labs   Lab 10/02/24  1034   WBC 21.30*   CREATININE 1.4    Estimated Creatinine Clearance: 39.1 mL/min (based on SCr of 1.4 mg/dL).    Dialysis N/A    Drug levels (last 3 results):  No results for input(s): "VANCOMYCINRA", "VANCOMYCINTR", "VANCOTROUGH" in the last 2160 hours.    Assessment / Plan:  - 1500mg x1 then Initiate intravenous vancomycin with a dose of 1000 mg  administered every 24 hours.  - Vancomycin Trough due on 10/4 at 1400 (before 3rd dose)    Ricky Lindsay, PharmD 10/02/2024 7:45 PM    "

## 2024-10-03 NOTE — PROGRESS NOTES
"[facility]  Adult Nutrition   Progress Note (Initial Assessment)     SUMMARY     Recommendations  Recommendation/Intervention:   1. Advance diet as tolerated to Regular.   2. Offer po supplement as appropriate.  Goals: 1. Diet to advance by follow up note. 2. Labs trend to target range.  Nutrition Goal Status: new  Communication of RD Recs: reviewed with RN    Nutrition Diagnosis PES Statement: Inadequate (suboptimal) protein-energy intake related to catabolism energy increases as evidenced by patient with history of CLL and lung cancer NPO post-op.     Dietitian Rounds Brief  RD screen for ICU NPO in patient with cancer. Patient off unit for lap archana at visit per RN. Pt with N/V & sepsis off unit for lap archana. RD to follow as able. MST 2. 6%, 11 lbs in 6 months, not significant per AAIM criteria. RD to monitor for diet progression and tolerance PRN.    Nutrition Related Social Determinants of Health: SDOH: Unable to assess at this time.     Malnutrition Assessment   Patient not available at visit.           Diet order:   Current Diet Order: NPO                 Evaluation of Received Nutrient/Fluid Intake  Energy Calories Required: not meeting needs  Protein Required: not meeting needs  Fluid Required: meeting needs  Tolerance: other (see comments) (NPO post-op)     % Intake of Estimated Energy Needs: 0%  % Meal Intake: NPO      Intake/Output Summary (Last 24 hours) at 10/3/2024 1506  Last data filed at 10/3/2024 1253  Gross per 24 hour   Intake 5853.03 ml   Output 2575 ml   Net 3278.03 ml        Anthropometrics  Temp: 98 °F (36.7 °C)  Height Method: Stated  Height: 5' 3" (160 cm)  Height (inches): 63 in  Weight Method: Standard Scale  Weight: 76.2 kg (168 lb)  Weight (lb): 168 lb  Ideal Body Weight (IBW), Female: 115 lb  % Ideal Body Weight, Female (lb): 146.09 %  BMI (Calculated): 29.8  BMI Grade: 25 - 29.9 - overweight       Estimated/Assessed Needs  Weight Used For Calorie Calculations: 76.2 kg " (167 lb 15.9 oz)  Energy Calorie Requirements (kcal): 6530-4456 kcal/day (25-30 kcal/kg for weight maintenance).  Energy Need Method: Kcal/kg  Protein Requirements:  gm / kg (1.2-1.5 gm/kg)  Weight Used For Protein Calculations: 76.2 kg (167 lb 15.9 oz)     Estimated Fluid Requirement Method: RDA Method  RDA Method (mL): 1905       Reason for Assessment  Reason For Assessment: identified at risk by screening criteria  Diagnosis: infection/sepsis (septic shock)  General Information Comments: PMH of CLL, and lung cancer status post partial resection who presented to the ER because of presyncope, nausea, vomiting, diarrhea and abdominal pain.  Nutrition Discharge Planning: Pending    Nutrition/Diet History  Spiritual, Cultural Beliefs, Mormon Practices, Values that Affect Care: no  Food Allergies: NKFA  Factors Affecting Nutritional Intake: NPO    Nutrition Risk Screen  Nutrition Risk Screen: reduced oral intake over the last month     MST Score: 2  Have you recently lost weight without trying?: Yes: 2-13 lbs  Weight loss score: 1  Have you been eating poorly because of a decreased appetite?: Yes  Appetite score: 1       Weight History:  Wt Readings from Last 10 Encounters:   10/02/24 76.2 kg (168 lb)   10/03/24 76.2 kg (168 lb)   07/24/24 76.3 kg (168 lb 3.4 oz)   07/16/24 76.3 kg (168 lb 3.4 oz)   06/25/24 76.6 kg (168 lb 15.7 oz)   06/07/24 77.2 kg (170 lb 3.1 oz)   06/03/24 77.7 kg (171 lb 4.8 oz)   05/27/24 75.8 kg (167 lb)   05/13/24 77.3 kg (170 lb 6.7 oz)   03/12/24 80.5 kg (177 lb 7.5 oz)        Lab/Procedures/Meds: Pertinent Labs/Meds Reviewed    Medications:Pertinent Medications Reviewed  Scheduled Meds:   aspirin  81 mg Oral Daily    buPROPion  300 mg Oral Daily    citalopram  20 mg Oral Daily    gabapentin  300 mg Oral TID    heparin (porcine)  5,000 Units Subcutaneous Q8H    levothyroxine  50 mcg Oral Before breakfast    mupirocin   Nasal BID    piperacillin-tazobactam (Zosyn) IV (PEDS and  ADULTS) (extended infusion is not appropriate)  3.375 g Intravenous Q8H    pravastatin  10 mg Oral Daily    topiramate  100 mg Oral BID     Continuous Infusions:   0.9% NaCl   Intravenous Continuous 150 mL/hr at 10/03/24 0350 New Bag at 10/03/24 0350    NORepinephrine bitartrate-D5W  0-3 mcg/kg/min Intravenous Continuous 12.5 mL/hr at 10/03/24 1400 0.35 mcg/kg/min at 10/03/24 1400     PRN Meds:.  Current Facility-Administered Medications:     acetaminophen, 650 mg, Oral, Q4H PRN    aluminum-magnesium hydroxide-simethicone, 30 mL, Oral, QID PRN    dextrose 50%, 12.5 g, Intravenous, PRN    dextrose 50%, 25 g, Intravenous, PRN    glucagon (human recombinant), 1 mg, Intramuscular, PRN    glucose, 16 g, Oral, PRN    glucose, 24 g, Oral, PRN    HYDROcodone-acetaminophen, 1 tablet, Oral, Q6H PRN    magnesium oxide, 800 mg, Oral, PRN    magnesium oxide, 800 mg, Oral, PRN    melatonin, 6 mg, Oral, Nightly PRN    naloxone, 0.02 mg, Intravenous, PRN    ondansetron, 4 mg, Intravenous, Q6H PRN    potassium bicarbonate, 35 mEq, Oral, PRN    potassium bicarbonate, 50 mEq, Oral, PRN    potassium bicarbonate, 60 mEq, Oral, PRN    potassium, sodium phosphates, 2 packet, Oral, PRN    potassium, sodium phosphates, 2 packet, Oral, PRN    potassium, sodium phosphates, 2 packet, Oral, PRN    prochlorperazine, 5 mg, Intravenous, Q4H PRN    senna-docusate 8.6-50 mg, 1 tablet, Oral, BID PRN    sodium chloride 0.9%, 2 mL, Intravenous, Q12H PRN    Pharmacy to dose Vancomycin consult, , , Once **AND** vancomycin - pharmacy to dose, , Intravenous, pharmacy to manage frequency    Labs: Pertinent Labs Reviewed  Clinical Chemistry:  Recent Labs   Lab 10/02/24  1034 10/03/24  0110   * 133*   K 3.8 4.1    103   CO2 19* 17*   GLU 97 71   BUN 14 17   CREATININE 1.4 1.8*   CALCIUM 8.4* 7.7*   PROT 6.2 5.6*   ALBUMIN 3.9 3.5   BILITOT 0.6 0.9   ALKPHOS 216* 220*   * 1,970*   * 1,316*   ANIONGAP 11 13   MG 1.5* 1.7   PHOS 1.8*   "--    LIPASE 42 24     CBC:   Recent Labs   Lab 10/03/24  0110 10/03/24  1139 10/03/24  1209   WBC 45.30*  --   --    RBC 4.36  --   --    HGB 13.6  --   --    HCT 41.8   < > 31*     --   --    MCV 96  --   --    MCH 31.2*  --   --    MCHC 32.5  --   --     < > = values in this interval not displayed.     Lipid Panel:  No results for input(s): "CHOL", "HDL", "LDLCALC", "TRIG", "CHOLHDL" in the last 168 hours.  Cardiac Profile:  No results for input(s): "BNP", "CPK", "CPKMB", "TROPONINI", "CKTOTAL" in the last 168 hours.  Inflammatory Labs:  No results for input(s): "CRP" in the last 168 hours.  Diabetes:  No results for input(s): "HGBA1C", "POCTGLUCOSE" in the last 168 hours.  Thyroid & Parathyroid:  No results for input(s): "TSH", "FREET4", "I6SFLQE", "P9BIFEV", "THYROIDAB" in the last 168 hours.    Monitor and Evaluation  Food and Nutrient Intake: energy intake, food and beverage intake  Food and Nutrient Adminstration: diet order  Knowledge/Beliefs/Attitudes: food and nutrition knowledge/skill  Physical Activity and Function: nutrition-related ADLs and IADLs  Anthropometric Measurements: weight change, body mass index, weight  Biochemical Data, Medical Tests and Procedures: electrolyte and renal panel, gastrointestinal profile, glucose/endocrine profile, inflammatory profile, lipid profile  Nutrition-Focused Physical Findings: overall appearance     Nutrition Risk  Level of Risk/Frequency of Follow-up:  (2 x / week)     Nutrition Follow-Up  RD Follow-up?: Yes            "

## 2024-10-03 NOTE — PLAN OF CARE
[facility]  Initial Discharge Assessment    Assessment completed at bedside with Pt, and all information on FaceSheet confirmed, including demographics, PCP, pharmacy and insurance. Pt has not addressed advance directives. She currently lives with her elderly mother in [place]. Her PCP is Vern Priest MD. She denies any HH/HD/Blood Thinners but she does use a rollator. Tiny Fairchild (Daughter)[phone] (Mobile) will transport her back home at discharge. She denies any recent hospitalizations. Plan for discharge is to return home. Case Management to continue to follow for discharge planning needs.        Primary Care Provider: Vern Priest MD    Admission Diagnosis: Abnormal LFTs [R79.89]    Admission Date: 10/2/2024  Expected Discharge Date: 10/8/2024    Transition of Care Barriers: None    Payor: [employer] MEDICARE ADVANTAGE / Plan: [employer] DUAL ADVANTAGE / Product Type: Medicare Advantage /     Extended Emergency Contact Information  Primary Emergency Contact: Robert Fairchildine  Address: [address] [place] [address] [id number] [place] [address]  Home Phone: [phone]  Mobile Phone: [phone]  Relation: Daughter  Secondary Emergency Contact: Sadie Olvera   [facility]  Home Phone: [phone]  Mobile Phone: [phone]  Relation: Mother    Discharge Plan A: Home with family  Discharge Plan B: Home with family      [id number] - [place] - [address] [facility] [address]   [address]  [facility] [address]  Phone: [phone] Fax: [phone]    [facility] - [place] - [address]  [address] [facility] [address]  Phone: [phone] Fax: [phone]    [facility]  [address]  [facility] [address]  Phone: [phone] Fax: [phone]    [id number] [place] - [address]  [address] [place] [address]  Phone:  [phone] Fax: [phone]    [id number] - [place] - [address]  [address]  [place] [address]  Phone: [phone] Fax: [phone]      Initial Assessment (most recent)       Adult Discharge Assessment - 10/03/24 1057          Discharge Assessment    Assessment Type Discharge Planning Assessment     Confirmed/corrected address, phone number and insurance Yes     Confirmed Demographics Correct on Facesheet     Source of Information patient     Does patient/caregiver understand observation status Yes     Reason For Admission Septic shock     People in Home parent(s)     Do you expect to return to your current living situation? Yes     Do you have help at home or someone to help you manage your care at home? Yes     Who are your caregiver(s) and their phone number(s)? [place] (Daughter)  [phone] (Mobile)     Prior to hospitilization cognitive status: Coma/Sedated/Intubated     Current cognitive status: Coma/Sedated/Intubated     Walking or Climbing Stairs Difficulty no     Dressing/Bathing Difficulty no     Home Accessibility not wheelchair accessible     Home Layout Able to live on 1st floor     Equipment Currently Used at Home rollator     Readmission within 30 days? No     Patient currently being followed by outpatient case management? No     Do you currently have service(s) that help you manage your care at home? No     Do you take prescription medications? Yes     Do you have prescription coverage? Yes     Coverage HEALTHY BLUE MEDICARE ADVANTAGE - HEALTHY BLUE DUAL ADVANTAGE -     Do you have any problems affording any of your prescribed medications? No     Is the patient taking medications as prescribed? yes     Who is going to help you get home at discharge? Tiny Fairchild (Daughter)  [phone] (Mobile)     How do you get to doctors appointments? family or friend will provide     Are you on dialysis? No     Do you take  coumadin? No     Discharge Plan A Home with family     Discharge Plan B Home with family     DME Needed Upon Discharge  none     Discharge Plan discussed with: Adult children     Transition of Care Barriers None        Physical Activity    On average, how many days per week do you engage in moderate to strenuous exercise (like a brisk walk)? 0 days     On average, how many minutes do you engage in exercise at this level? 0 min        Financial Resource Strain    How hard is it for you to pay for the very basics like food, housing, medical care, and heating? Not hard at all        Housing Stability    In the last 12 months, was there a time when you were not able to pay the mortgage or rent on time? No     At any time in the past 12 months, were you homeless or living in a shelter (including now)? No        Transportation Needs    Has the lack of transportation kept you from medical appointments, meetings, work or from getting things needed for daily living? No        Food Insecurity    Within the past 12 months, you worried that your food would run out before you got the money to buy more. Never true     Within the past 12 months, the food you bought just didn't last and you didn't have money to get more. Never true        Stress    Do you feel stress - tense, restless, nervous, or anxious, or unable to sleep at night because your mind is troubled all the time - these days? Not at all        Social Isolation    How often do you feel lonely or isolated from those around you?  Never        Alcohol Use    Q1: How often do you have a drink containing alcohol? Never     Q2: How many drinks containing alcohol do you have on a typical day when you are drinking? Patient does not drink     Q3: How often do you have six or more drinks on one occasion? Never        [employer]    In the past 12 months has the electric, gas, oil, or water company threatened to shut off services in your home? No        Health Literacy    How often  do you need to have someone help you when you read instructions, pamphlets, or other written material from your doctor or pharmacy? Never

## 2024-10-03 NOTE — ANESTHESIA PROCEDURE NOTES
Intubation    Date/Time: 10/3/2024 11:00 AM    Performed by: Aditi Mcnamara CRNA  Authorized by: Chan Hurst MD    Intubation:     Induction:  Intravenous    Intubated:  Postinduction    Mask Ventilation:  Easy mask    Attempts:  1    Attempted By:  CRNA    Method of Intubation:  Video laryngoscopy and direct    Blade:  [place] 3    Laryngeal View Grade: Grade I - full view of cords      Difficult Airway Encountered?: No      Complications:  None    Airway Device:  Oral endotracheal tube    Airway Device Size:  7.5    Style/Cuff Inflation:  Cuffed (inflated to minimal occlusive pressure)    Tube secured:  22    Secured at:  The lips    Placement Verified By:  Capnometry    Complicating Factors:  None    Findings Post-Intubation:  BS equal bilateral and atraumatic/condition of teeth unchanged  Notes:      Head in Neutral position for intubation. Stabilized. No hyperextension or flexion RSI

## 2024-10-03 NOTE — ASSESSMENT & PLAN NOTE
- Suspect secondary to intra-abdominal process.  CT reveals pericholecystic fluid and peripancreatic fluid  Transaminitis  She is now status post cholecystectomy with General surgery, per op note the gallbladder did not appear significantly inflamed to be commensurate with the patient's presentation of septic shock, we will order Infectious Disease consult  Blood cultures were collected, continued followed no growth to date  UA was negative.   Patient was started on Zosyn and vanco which I will continue.    Continue Levophed for pressure support.

## 2024-10-03 NOTE — CARE UPDATE
10/02/24 1945   Patient Assessment/Suction   Level of Consciousness (AVPU) alert   Respiratory Effort Unlabored   Expansion/Accessory Muscles/Retractions other (see comments)   All Lung Fields Breath Sounds clear;equal bilaterally   Rhythm/Pattern, Respiratory no shortness of breath reported   Cough Frequency no cough   PRE-TX-O2   Device (Oxygen Therapy) nasal cannula   $ Is the patient on Low Flow Oxygen? Yes   Flow (L/min) (Oxygen Therapy) 2   SpO2 99 %   Pulse Oximetry Type Continuous   $ Pulse Oximetry - Multiple Charge Pulse Oximetry - Multiple   Pulse (!) 112   Resp (!) 29   Positioning   Head of Bed (HOB) Positioning HOB elevated;HOB at 20-30 degrees

## 2024-10-03 NOTE — ANESTHESIA PREPROCEDURE EVALUATION
10/03/2024  Yvette Hutchinson is a 64 y.o., female.       Results for orders placed or performed during the hospital encounter of 10/02/24   EKG 12-lead    Collection Time: 10/03/24  5:41 AM   Result Value Ref Range    QRS Duration 86 ms    [facility] QTC Calculation 504 ms    Narrative    Test Reason : I21.4,    Vent. Rate : 105 BPM     Atrial Rate : 105 BPM     P-R Int : 158 ms          QRS Dur : 086 ms      QT Int : 382 ms       P-R-T Axes : 086 -39 081 degrees     QTc Int : 504 ms    Sinus tachycardia  Left axis deviation  Low voltage QRS  Abnormal ECG  When compared with ECG of 02-OCT-2024 10:26,  The axis Shifted left  Nonspecific T wave abnormality no longer evident in Inferior leads  T wave inversion no longer evident in Anterior leads    Referred By: [email or web address]   SELF           Confirmed By:         Imaging Results              US Abdomen Limited (Final result)  Result time 10/02/24 15:34:11      Final result by Vinay Daniels MD (10/02/24 15:34:11)                   Impression:      1. Cholelithiasis, gallbladder wall thickening, and mild pericholecystic fluid are nonspecific.  Clinical and laboratory correlation is needed to assess for any additional evidence of acute cholecystitis.  2. No other sonographic abnormalities throughout the right upper quadrant.      Electronically signed by: Vinay Daniels  Date:    10/02/2024  Time:    15:34               Narrative:    EXAMINATION:  US ABDOMEN LIMITED    CLINICAL HISTORY:  ruq pain;    COMPARISON:  CT 10/02/2024    FINDINGS:  Liver maintains normal echogenicity without focal mass or intrahepatic bile duct dilation. Hepatopedal flow present in main portal vein.    Cholelithiasis sludge dependently in gallbladder.  Gallbladder wall thickening and mild pericholecystic fluid evident.  Common duct diameter of 5 mm is normal.  Sonographic Hernandez sign is  negative.    Visualized pancreas is unremarkable with bowel gas obscuring portions of pancreas. Right kidney is free of hydronephrosis. Visualized abdominal aorta is nonaneurysmal. Juxtahepatic IVC is unremarkable.                                       CTA Chest Non-Coronary (PE Studies) (Final result)  Result time 10/02/24 14:22:56      Final result by Vinay Daniels MD (10/02/24 14:22:56)                   Impression:      1. Negative for pulmonary embolus.  2. Mild diffuse bronchial wall thickening could reflect infectious or inflammatory bronchitis in the appropriate clinical setting.  Additional lower lung zone left greater than right interlobular septal thickening suggest coexisting mild interstitial edema.  3. Increased size of precarinal and right hilar lymph nodes since 09/06/2022.  Differential diagnosis of enlarged lymph nodes has been previously discussed in remains unchanged.  4. Suboptimally evaluated due to beam hardening artifact, soft tissue density with calcification in pretracheal region just superior to the sternal notch as discussed above.  This is suspicious for a exophytic thyroid nodule/parenchyma and has not significantly changed since 2020.      Electronically signed by: Vinay Daniels  Date:    10/02/2024  Time:    14:22               Narrative:    EXAMINATION:  CTA CHEST NON CORONARY (PE STUDIES)    CLINICAL HISTORY:  Pulmonary embolism (PE) suspected, high prob;    TECHNIQUE:  CT angiography of thorax with 100 mL Omnipaque 350. Maximum intensity projection coronal reformations were created at a separate workstation and stored in the patient's permanent medical record.    COMPARISON:  Multiple prior exams dating back to 03/11/2020    FINDINGS:  CTA THORAX:    Negative for pulmonary embolus.  Thoracic aorta is of normal caliber and without evidence of dissection.  Right IJ port catheter tip terminates in SVC.    Trachea and main bronchi are patent.  Postsurgical change of left upper  lobectomy is evident.  Calcified granuloma remains in the left lower lobe.    6 mm medial right upper lobe nodularity (series 2, image 119) is unchanged since 11/20/2020.  No new pulmonary nodules or masses.    Mild bronchial wall thickening is diffuse.  In lung bases, mild, left greater than right, interlobular septal thickening is new.    10 mm right hilar lymph node (series 2, image 148) previously measured 7 mm.  13 mm precarinal lymph node (image 131) previously measured 8 mm 09/06/2022.  Calcifications of small subcarinal lymph node unchanged with calcifications in left hilum unchanged.  No pleural or pericardial effusion.  Although beam hardening artifact related to venous contrast near the thoracic inlet significantly limits evaluation, calcification in pretracheal region just superior to the sternal notch and associated soft tissue density has not significantly since 02/19/2020 for and dating back to 11/28/2020, suspicious for exophytic thyroid nodule/parenchyma.    Partially visualized upper abdomen is unremarkable.    Cervical spine surgical hardware incidentally noted.  No acute or suspicious osseous abnormality.                                       CT Abdomen Pelvis With IV Contrast NO Oral Contrast (Final result)  Result time 10/02/24 14:17:22      Final result by Olesya Painter MD (10/02/24 14:17:22)                   Impression:      Pericholecystic edema without evidence of gallstones.  Cholecystitis cannot be excluded and correlation with labs and if necessary ultrasound is recommended    Mild Linda pancreatic edema which may represent early pancreatitis    Minimal ascites    Stable mildly prominent lymph nodes in the upper abdomen which may be reactive    Stable scarring within the left lung base    Diffuse vascular calcification of the aorta    Colonic diverticulosis      Electronically signed by: Olesya Painter  Date:    10/02/2024  Time:    14:17               Narrative:      CMS  MANDATED QUALITY DATA - CT RADIATION - 436    All CT scans at this facility utilize dose modulation, iterative reconstruction, and/or weight based dosing when appropriate to reduce radiation dose to as low as reasonably achievable.    CLINICAL HISTORY:  ([id number])65 y/o  (1960) F    Epigastric pain;    TECHNIQUE:  (A#[id number], exam time 10/2/2024 14:02)    CT ABDOMEN PELVIS WITH IV CONTRAST [id number]    Axial CT images of the abdomen and pelvis were obtained from the dome of the diaphragm to the proximal thighs.    100cc omnipague 350 IV contrast was given    COMPARISON:  PET-CT dated 05/27/2024    FINDINGS:  Lower Thorax:    Stable scarring within the left lung base.  The right lung base is clear.    CT Abdomen:    Liver: The liver is normal in size and attenuation.  There is periportal edema.  There is no focal hepatic mass.    Gallbladder: Pericholecystic edema which is nonspecific.  Cholecystitis cannot be excluded and correlation with labs and ultrasound is recommended.    Biliary Tree: No intra or extrahepatic ductal dilation.    Spleen: Normal.    Pancreas: Mild peripancreatic edema suggestive of pancreatitis.  There is normal enhancement of the pancreas.  There is no pancreatic ductal dilatation.    Adrenal Glands: Normal    Kidneys: The kidneys are normal in imaging appearance without hydronephrosis or hydroureter.    Vasculature: The aorta is normal in caliber with diffuse vascular calcification aorta and iliac arteries.    Lymph nodes: Stable mildly prominent lymph nodes in the upper abdomen, portacaval region and peripancreatic region.  The portacaval node measures 14 x 26 mm.  There is a 14 mm aortocaval node on image 97.  There is no new mesenteric or retroperitoneal adenopathy.    Intraperitoneal structures: Minimal ascites within the pelvis and around the liver.    Bowel: The stomach is normal.  No small bowel is unremarkable.  There is colonic diverticulosis without diverticulitis.    Abdominal  wall: The abdominal wall and musculature are normal.    Musculoskeletal: There is degenerative disc disease and facet arthropathy in the lumbar spine with no aggressive appearing lytic or blastic lesions    CT Pelvis:    Bladder: Normal.    Reproductive Organs: The uterus and ovaries are unremarkable.    Pelvic Lymph nodes: No pelvic lymphadenopathy or pelvic mass is identified.                                       X-Ray Chest 1 View (Final result)  Result time 10/02/24 11:24:56      Final result by Olesya Painter MD (10/02/24 11:24:56)                   Impression:      No evidence of active cardiopulmonary disease.      Electronically signed by: Olesya Painter  Date:    10/02/2024  Time:    11:24               Narrative:    EXAMINATION:  XR CHEST 1 VIEW    CLINICAL HISTORY:  Sepsis;    FINDINGS:  Portable chest x-ray at 10:52 is compared to prior study dated 07/18/2024    The cardiomediastinal silhouette is normal in size.  The right IJ Port-A-Cath is in stable position.    Lungs are clear.  There are no acute osseous abnormality.  There has been prior cervical fusion.                                       Lab Results   Component Value Date    WBC 45.30 (HH) 10/03/2024    HGB 13.6 10/03/2024    HCT 41.8 10/03/2024    MCV 96 10/03/2024     10/03/2024     BMP  Lab Results   Component Value Date     (L) 10/03/2024    K 4.1 10/03/2024     10/03/2024    CO2 17 (L) 10/03/2024    BUN 17 10/03/2024    CREATININE 1.8 (H) 10/03/2024    CALCIUM 7.7 (L) 10/03/2024    ANIONGAP 13 10/03/2024    GLU 71 10/03/2024    GLU 97 10/02/2024     (H) 08/08/2024       No results found for this or any previous visit.           Pre-op Assessment    I have reviewed the Patient Summary Reports.     I have reviewed the Nursing Notes. I have reviewed the NPO Status.   I have reviewed the Medications.     Review of Systems  Anesthesia Hx:  No problems with previous Anesthesia   History of prior surgery of  interest to airway management or planning: cervical fusion, lung surgery.         Denies Family Hx of Anesthesia complications.    Denies Personal Hx of Anesthesia complications.                    Social:  Alcohol Use, Smoker       Hematology/Oncology:             --  Leukemia: Chronic Lymphocytic Leukemia (CLL)        Hematology Comments: Chronic leukocytosis due to CLL      --  Cancer in past history:              chemotherapy and surgery   Oncology Comments: CLL  Lung cancer     EENT/Dental:   Full dentures, out          Cardiovascular:  Cardiovascular Normal                  ECG has been reviewed. Patient admitted with septic shock  Patient presently receiving Levophed                         Pulmonary:   COPD (Home oxygen on an as-needed basis only.  Daily inhalers)      Chronic respiratory failure with hypoxia  History of Malignant neoplasm of upper lobe of lung  History of partial left pneumonectomy in 2022 for lung cancer.  Albuterol inhaler use  Occasional home oxygen use  Presently mild diffuse bronchial wall thickening that could reflect infectious or inflammatory bronchitis.  Oxygen saturation 96%               Renal/:  Chronic Renal Disease        Kidney Function/Disease             Hepatic/GI:     GERD, well controlled   Linda pancreatic edema which may represent early pancreatitis.  Esophageal / Stomach Disorders Esophageal Disorder, Esophageal Stricture          Musculoskeletal:  Arthritis   Patient smokes marijuana and takes gabapentin for chronic neck and back pain.  Patient has T8 compression fracture.       Spine Disorders: cervical and lumbar Disc disease, Degenerative disease and Chronic Pain           Neurological:    Neuromuscular Disease,       Neuropathic pain  Occasional numbness and tingling of hands and fingers bilaterally.  Occasional burning of feet.      Chronic Pain Syndrome   Peripheral Neuropathy                          Endocrine:   Hypothyroidism          Dermatological:  Skin  Normal    Psych:  Psychiatric History  depression Sleep Disorder and Insomnia.       Sleep Disorder and Insomnia.        Physical Exam  General: Well nourished, Alert, Cooperative and Oriented    Airway:  Mallampati: I   Mouth Opening: Normal  TM Distance: Normal  Tongue: Normal  Neck ROM: Extension Decreased, Extension Painful    Dental:  Edentulous    Chest/Lungs:  Clear to auscultation, Normal Respiratory Rate    Heart:  Rate: Normal  Rhythm: Regular Rhythm  Sounds: Normal        Anesthesia Plan  Type of Anesthesia, risks & benefits discussed:    Anesthesia Type: Gen ETT  Intra-op Monitoring Plan: Standard ASA Monitors and Art Line  Post Op Pain Control Plan: multimodal analgesia  Induction:  IV and rapid sequence  Airway Plan: Direct and Video, Post-Induction  Informed Consent: Informed consent signed with the Patient and all parties understand the risks and agree with anesthesia plan.  All questions answered. Patient consented to blood products? Yes  ASA Score: 3  Anesthesia Plan Notes:       GETA  RSI  No Ofirmev  No Decadron   Minimal Versed & Fentanyl  Benadryl 6.25mg iv, Zofran 4mg iv, Pepcid 20mg iv   Ofirmev 1000mg iv, Ketamine iv   Sugammadex        Ready For Surgery From Anesthesia Perspective.     .

## 2024-10-03 NOTE — ASSESSMENT & PLAN NOTE
This has now resulted as coagulase-negative staph, likely contaminant  ID following and would like to continue vancomycin for now

## 2024-10-03 NOTE — TRANSFER OF CARE
"Anesthesia Transfer of Care Note    Patient: Yvette Hutchinson    Procedure(s) Performed: Procedure(s) (LRB):  CHOLECYSTECTOMY, LAPAROSCOPIC (N/A)    Patient location: ICU    Anesthesia Type: general    Transport from OR: Transported from OR on 2-3 L/min O2 by NC with adequate spontaneous ventilation. Continuous SpO2 monitoring in transport. Continuous ECG monitoring in transport. Continuos invasive BP monitoring in transport    Post pain: adequate analgesia    Post assessment: no apparent anesthetic complications    Post vital signs: stable    Level of consciousness: awake and responds to stimulation    Nausea/Vomiting: no nausea/vomiting    Complications: none    Transfer of care protocol was followed    Last vitals: Visit Vitals  BP (!) 123/56   Pulse 97   Temp 36.6 °C (97.8 °F) (Axillary)   Resp 16   Ht 5' 3" (1.6 m)   Wt 76.2 kg (168 lb)   LMP 01/13/2010 (Approximate)   SpO2 96%   Breastfeeding No   BMI 29.76 kg/m²     "

## 2024-10-03 NOTE — ASSESSMENT & PLAN NOTE
Most recent creatinine and eGFR are listed below.  Recent Labs     10/02/24  1034 10/03/24  0110   CREATININE 1.4 1.8*   EGFRNORACEVR 41.8* 30.9*       Likely pre-renal given her septic shock.   Hydrate pt and repeat labs.

## 2024-10-03 NOTE — PROGRESS NOTES
[facility] Medicine  Progress Note    Patient Name: Yvette Hutchinson  MRN: [id number]  Patient Class: IP- Inpatient   Admission Date: 10/2/2024  Length of Stay: 1 days  Attending Physician: Farida Martin MD  Primary Care Provider: Vern Priest MD        Subjective:     Principal Problem:Septic shock        HPI:  64-year-old female with PMH of CLL, and lung cancer status post partial resection who presented to the ER because of presyncope, nausea, vomiting, diarrhea and abdominal pain.  According to the patient, for over 1 week she has been having intractable nausea, vomiting and diarrhea.  Also epigastric and right upper quadrant abdominal pain described as sharp, 10/10 on pain scale radiate to the rest of her abdomen.  Today when she stood up from a sitting position, she felt as if she will pass out.  As a result, she decided to come to the ER for evaluation.  Denied any fever or chills.  She denied chest pain or shortness on breath.    In the ER, vitals showed a blood pressure of 99/55, but soon dropped to the 70s.  Heart rate was 122, respiratory rate of 20, temperature of 101.6°, and patient was satting 95% on room air.  CBC was white count of 21, with history of CLL.  CMP showed sodium of 135, acute renal failure with creatinine of 1.4 compared to baseline of 1, and patient has elevated LFTs with alk-phos of 216, , and AST of 497.  COVID/flu are negative.  Blood cultures were collected.  CTA chest negative for pulmonary embolus, but showed mild diffuse bronchial wall thickening that could reflect infectious or inflammatory bronchitis in the appropriate clinical setting.  CT abdomen/pelvis showed pericholecystic edema without evidence of gallstones.  Cholecystitis cannot be excluded. Also mild Linda pancreatic edema which may represent early pancreatitis. US abdomen showed Cholelithiasis, gallbladder wall thickening, and mild pericholecystic fluid. Patient was  started on Zosyn/vanco.  Was given sepsis bolus of lactated ringer of 2286 cc.  Started on Levophed for blood pressure support.  General surgery was consulted.  Patient will be admitted to ICU for septic shock.       Overview/Hospital Course:  No notes on file    No new subjective & objective note has been filed under this hospital service since the last note was generated.      Assessment/Plan:      * Septic shock  - Suspect secondary to intra-abdominal process.  CT reveals pericholecystic fluid and peripancreatic fluid  Transaminitis  She is now status post cholecystectomy with General surgery, per op note the gallbladder did not appear significantly inflamed to be commensurate with the patient's presentation of septic shock, we will order Infectious Disease consult  Blood cultures were collected, continued followed no growth to date  UA was negative.   Patient was started on Zosyn and vanco which I will continue.    Continue Levophed for pressure support.    Bacteremia due to Staphylococcus  Septic shock now favored to be secondary to Staph rather than intra-abdominal acute cholecystitis, unclear source  Continue vancomycin  Consult ID      Abdominal pain  Suspect secondary to acute cholecystitis.    CT abdomen also showed mild pancreatitis, but lipase is within normal limits.    Given her diarrhea however, will order stool culture.    Supportive care with IV fluids, Zofran for nausea.  P.r.n. pain medication.      Acute renal failure  Most recent creatinine and eGFR are listed below.  Recent Labs     10/02/24  1034 10/03/24  0110   CREATININE 1.4 1.8*   EGFRNORACEVR 41.8* 30.9*       Likely pre-renal given her septic shock.   Hydrate pt and repeat labs.       Neuropathic pain  Resume gabapentin.      Calculus of gallbladder with acute cholecystitis without obstruction  Status post cholecystectomy      Acquired hypothyroidism  Resume synthroid.       CLL (chronic lymphocytic leukemia)  Monitor. Patient's last  chemo for over a year.  Follow up with Hematology outpatient.      Anxiety  Resume Wellbutrin and Celexa        VTE Risk Mitigation (From admission, onward)           Ordered     heparin (porcine) injection 5,000 Units  Every 8 hours         10/02/24 1611     IP VTE HIGH RISK PATIENT  Once         10/02/24 1611     Place sequential compression device  Until discontinued         10/02/24 1611                    Discharge Planning   SALINA: 10/8/2024     Code Status: Full Code   Is the patient medically ready for discharge?:     Reason for patient still in hospital (select all that apply): Treatment  Discharge Plan A: Home with family            Critical care time spent on the evaluation and treatment of severe organ dysfunction, review of pertinent labs and imaging studies, discussions with consulting providers and discussions with patient/family: 35 minutes.      Farida Martin MD  Department of Hospital Medicine   [facility]

## 2024-10-03 NOTE — CONSULTS
"[facility]   Department of Infectious Disease  Consult Note        PATIENT NAME: Yvette Hutchinson  MRN: [id number]  TODAY'S DATE: 10/03/2024  ADMIT DATE: 10/2/2024  LOS: 1 days    CHIEF COMPLAINT: Abdominal Pain, Nausea, Vomiting, and Diarrhea (Pt has been sick for the last two weeks)      PRINCIPLE PROBLEM: Septic shock    REASON FOR CONSULT:     ASSESSMENT and PLAN   Septic shock in a patient with CLL.  Probably from abdominal source since her symptoms were primarily GI related.  She has had laparoscopic cholecystostomy.  Continue antibiotics and follow blood and any gallbladder cultures..    2. GM.  Management as per critical care team and hospitalist.    3. CLL.  Currently not on any specific medication for this.  It does make her immunosuppressed.      4. Acute liver injury.  Maybe congestive hepatopathy from shock.  Also with elevated troponins.  We will check acute liver panel.    RECOMMENDATIONS:   Continue current antibiotics   Follow blood cultures for significance of Staphylococcus species  Check acute Levophed panel    Thank you for this consult. Please send [employer] secure chat with any questions.  Discussed with her mother and daughter at bedside.  All questions answered.    Antibiotics (From admission, onward)      Start     Stop Route Frequency Ordered    10/03/24 2200  piperacillin-tazobactam 4.5 g in dextrose 5 % 100 mL IVPB (ready to mix)         -- IV Every 8 hours (non-standard times) 10/03/24 1506    10/03/24 1630  vancomycin in dextrose 5 % 1 gram/250 mL IVPB 1,000 mg         -- IV Once 10/03/24 1521    10/02/24 2100  mupirocin 2 % ointment         10/07/24 2059 Nasl 2 times daily 10/02/24 1823    10/02/24 1554  vancomycin - pharmacy to dose  (vancomycin IVPB (PEDS and ADULTS))        Placed in "And" Linked Group    -- IV pharmacy to manage frequency 10/02/24 1455          Antifungals (From admission, onward)      None           Antivirals (From admission, onward)      None "            HPI      Yvette Hutchinson is a 64 y.o. female with history of CLL, GERD, arthritis.  Also previous left lung cancer status post resection.  Had anterior cervical fusion on 08/06/2024 with an uneventful postoperative course.  Presented to the emergency room 10/02/2024 with nausea vomiting, diarrhea and abdominal pain of about 9 days duration.  In the ER BP 99/55, pulse 1-2, respiratory rate 18, temperature 101.6°.  She had abdominal tenderness was worse in the epigastric area.      WBC 21, hematocrit 38, platelet count 224, creatinine 1.4.  , , lipase 42, alkaline phosphatase to 1 6.  UA unremarkable.  Chest x-ray with no acute infiltrates.  CT chest showed increased interstitial markings with few pleural based lesions/infiltrates on the left.  CT abdomen and pelvis documented gallstones with pericholecystic fluid and edema which was confirmed on ultrasound she was admitted and placed on IV fluids and antibiotics.  Later seen by general surgeon and laparoscopic cholecystostomy 10/03/2024.  ID asked to assist with her care.    Current lab data showed WBC 45, hematocrit 41, creatinine 1.8, AST 1970, ALT 1316, alkaline phosphatase 220, total protein 5.6    History from patient and medical record.    Antibiotic history:    Vancomycin: 10/02/2024-  Zosyn: 10/02/2024-    Microbiology:    Blood culture 10/02/2024:  Staphylococcus species 1/2   Influenza and COVID-19 assay 10/02/2024: Negative    Social History  Marital Status: Legally   Alcohol History:  reports current alcohol use.  Tobacco History:  reports that she has been smoking cigarettes. She has been exposed to tobacco smoke. She has never used smokeless tobacco.  Drug History:  reports current drug use. Drug: Marijuana.      Review of patient's allergies indicates:  No Known Allergies  Past Medical History:   Diagnosis Date    Arthritis     Cancer 10/2022    (CLL) leukemia ; adenocarcinoma  adenosgemis    Depression      GERD (gastroesophageal reflux disease)     Neck pain     and back pain    Personal history of colonic polyps 07/07/2017    Pneumonia of left lung due to infectious organism 03/05/2020    Thyroid disease      Past Surgical History:   Procedure Laterality Date    FUSION, SPINE, CERVICAL, ANTERIOR APPROACH N/A 08/06/2024    Procedure: FUSION, SPINE, CERVICAL, ANTERIOR APPROACH;  Surgeon: Anatoly Daley DO;  Location: [facility] OR;  Service: Neurosurgery;  Laterality: N/A;  IOM, C-ARM, MEDTRONICS, MICRO, HORSESHOE    INJECTION OF ANESTHETIC AGENT AROUND MULTIPLE INTERCOSTAL NERVES Left 10/03/2022    Procedure: BLOCK, NERVE, INTERCOSTAL, 2 OR MORE;  Surgeon: Evelio Kaplan MD;  Location: [facility] OR 2ND FLR;  Service: Thoracic;  Laterality: Left;    INSERTION OF TUNNELED CENTRAL VENOUS CATHETER (CVC) WITH SUBCUTANEOUS PORT Right 11/03/2022    Procedure: [id number]-CATH, Right or Left Neck or Chest;  Surgeon: Mini Acevedo MD;  Location: [facility] OR 2ND FLR;  Service: General;  Laterality: Right;    ROBOT-ASSISTED LAPAROSCOPIC LYMPHADENECTOMY USING DA MONIQUE XI Left 10/03/2022    Procedure: XI ROBOTIC LYMPHADENECTOMY;  Surgeon: Evelio Kaplan MD;  Location: [facility] OR 2ND FLR;  Service: Thoracic;  Laterality: Left;    SPINE SURGERY      TONSILLECTOMY      XI ROBOTIC RATS,WITH LOBECTOMY,LUNG Left 10/03/2022    Procedure: XI ROBOTIC RATS,WITH LOBECTOMY,LUNG;  Surgeon: Evelio Kaplan MD;  Location: [facility] OR 2ND FLR;  Service: Thoracic;  Laterality: Left;     Family History   Problem Relation Name Age of Onset    Ovarian cancer Sister      Breast cancer Cousin         SUBJECTIVE     Review of systems: 10 system review unremarkable.  As in HPI     OBJECTIVE   Temp:  [97.8 °F (36.6 °C)-101.3 °F (38.5 °C)] 98 °F (36.7 °C)  Pulse:  [] 87  Resp:  [13-32] 16  SpO2:  [92 %-100 %] 100 %  BP: ()/(50-85) 89/54  Arterial Line BP: (109-132)/(52-61) 112/52  Temp:  [97.8 °F (36.6 °C)-101.3 °F (38.5 °C)]    Temp: 98 °F (36.7 °C) (10/03/24 1340)  Pulse: 87 (10/03/24 1545)  Resp: 16 (10/03/24 1545)  BP: (!) 89/54 (10/03/24 1545)  SpO2: 100 % (10/03/24 1545)    Intake/Output Summary (Last 24 hours) at 10/3/2024 1626  Last data filed at 10/3/2024 1253  Gross per 24 hour   Intake 5853.03 ml   Output 2575 ml   Net 3278.03 ml       Physical Exam  General:  Middle-aged woman who is acutely ill looking.  Lying quietly in bed  CVS: S1 and 2 heard, tachycardic, no murmurs appreciated.  On Levophed 0.45 micrograms/kilogram per minute    Respiratory: Clear to auscultation   Abdomen: Surgical dressing in place.  Full, soft, nontender  Skin: No rash appreciated  CNS: No focal deficits   Musculoskeletal: No joint effusions or abnormalities noted     VAD:  ISOLATION:  None    Wounds:  Surgical abdominal    Significant Labs: All pertinent labs within the past 24 hours have been reviewed.    CBC LAST 7  Recent Labs   Lab 10/02/24  1034 10/03/24  0110 10/03/24  1139 10/03/24  1209   WBC 21.30* 45.30*  --   --    RBC 3.93* 4.36  --   --    HGB 12.5 13.6  --   --    HCT 37.7 41.8 34* 31*   MCV 96 96  --   --    MCH 31.8* 31.2*  --   --    MCHC 33.2 32.5  --   --    RDW 13.4 13.8  --   --     239  --   --    MPV 10.6 11.9  --   --    GRAN 43.0 22.0*  --   --    LYMPH 50.0*  CANCELED 62.0*  --   --    MONO 7.0  CANCELED 3.0*  --   --    BASO CANCELED  --   --   --    NRBC 0 0  --   --        CHEMISTRY LAST 7  Recent Labs   Lab 10/02/24  1034 10/03/24  0110 10/03/24  1139 10/03/24  1209   * 133*  --   --    K 3.8 4.1  --   --     103  --   --    CO2 19* 17*  --   --    ANIONGAP 11 13  --   --    BUN 14 17  --   --    CREATININE 1.4 1.8*  --   --    GLU 97 71  --   --    CALCIUM 8.4* 7.7*  --   --    PH  --   --  7.198* 7.300*   MG 1.5* 1.7  --   --    ALBUMIN 3.9 3.5  --   --    PROT 6.2 5.6*  --   --    ALKPHOS 216* 220*  --   --    * 1,316*  --   --    * 1,970*  --   --    BILITOT 0.6 0.9  --   --   "      Estimated Creatinine Clearance: 30.4 mL/min (A) (based on SCr of 1.8 mg/dL (H)).    INFLAMMATORY/PROCAL  LAST 7  No results for input(s): "PROCAL", "ESR", "CRP" in the last 168 hours.  No results found for: "ESR"  CRP   Date Value Ref Range Status   09/07/2021 2.7 0.0 - 8.2 mg/L Final   03/03/2020 3.5 0.0 - 8.2 mg/L Final       PRIOR  MICROBIOLOGY:  Reviewed  No results found for the last 90 days.      LAST 7 DAYS MICROBIOLOGY   Microbiology Results (last 7 days)       Procedure Component Value Units Date/Time    Rapid Organism ID by PCR (from Blood culture) [[id number]]  (Abnormal) Collected: 10/02/24 1049    Order Status: Completed Updated: 10/03/24 1322     Enterococcus faecalis Not Detected     Enterococcus faecium Not Detected     Listeria monocytogenes Not Detected     Staphylococcus spp. Detected     Staphylococcus aureus Not Detected     Staphylococcus epidermidis Not Detected     Staphylococcus lugdunensis Not Detected     Streptococcus species Not Detected     Streptococcus agalactiae Not Detected     Streptococcus pneumoniae Not Detected     Streptococcus pyogenes Not Detected     Acinetobacter calcoaceticus/baumannii complex Not Detected     Bacteroides fragilis Not Detected     Enterobacterales Not Detected     Enterobacter cloacae complex Not Detected     Escherichia coli Not Detected     Klebsiella aerogenes Not Detected     Klebsiella oxytoca Not Detected     Klebsiella pneumoniae group Not Detected     Proteus Not Detected     Salmonella sp Not Detected     Serratia marcescens Not Detected     Haemophilus influenzae Not Detected     Neisseria meningtidis Not Detected     Pseudomonas aeruginosa Not Detected     Stenotrophomonas maltophilia Not Detected     Candida albicans Not Detected     Candida auris Not Detected     Candida glabrata Not Detected     Candida krusei Not Detected     Candida parapsilosis Not Detected     Candida tropicalis Not Detected     Cryptococcus neoformans/gattii Not " Detected     CTX-M (ESBL ) Test not applicable     IMP (Carbapenem resistant) Test not applicable     KPC resistance gene (Carbapenem resistant) Test not applicable     mcr-1  Test not applicable     mec A/C  Test not applicable     mec A/C and MREJ (MRSA) gene Test not applicable     NDM (Carbapenem resistant) Test not applicable     OXA-48-like (Carbapenem resistant) Test not applicable     van A/B (VRE gene) Test not applicable     VIM (Carbapenem resistant) Test not applicable    Narrative:      Aerobic and anaerobic    Blood culture x two cultures. Draw prior to antibiotics [[id number]] Collected: 10/02/24 1057    Order Status: Completed Specimen: Blood from Peripheral, Hand, Left Updated: 10/03/24 1232     Blood Culture, Routine No Growth to date      No Growth to date    Narrative:      Aerobic and anaerobic    Blood culture x two cultures. Draw prior to antibiotics [[id number]] Collected: 10/02/24 1049    Order Status: Completed Specimen: Blood from Peripheral, Wrist, Right Updated: 10/03/24 1159     Blood Culture, Routine Gram stain aer bottle: Gram positive cocci      Results called to and read back by:Val Nino RN-ED;  10/03/2024      11:58 CJD    Narrative:      Aerobic and anaerobic    Stool culture **cannot be ordered stat** [[id number]] Collected: 10/02/24 1919    Order Status: Completed Specimen: Stool Updated: 10/03/24 1128     Stool Culture Nothing significant to date    Clostridium difficile EIA [[id number]] Collected: 10/02/24 1919    Order Status: Completed Specimen: Stool Updated: 10/03/24 0031     C. diff Antigen Negative     C difficile Toxins A+B, EIA Negative     Comment: Testing not recommended for children <24 months old.               CURRENT/PREVIOUS VISIT EKG  Results for orders placed or performed during the hospital encounter of 10/02/24   Repeat EKG 12-lead    Collection Time: 10/03/24 10:07 AM   Result Value Ref Range    QRS Duration 82 ms    [facility] QTC Calculation 520 ms     Narrative    Test Reason : R07.9,    Vent. Rate : 096 BPM     Atrial Rate : 096 BPM     P-R Int : 154 ms          QRS Dur : 082 ms      QT Int : 412 ms       P-R-T Axes : 086 082 106 degrees     QTc Int : 520 ms    Normal sinus rhythm  Low voltage QRS  Prolonged QT  Abnormal ECG  When compared with ECG of 03-OCT-2024 05:41,  The axis Shifted right    Referred By: [email or web address]   SELF           Confirmed By:        Significant Imaging: I have reviewed all relevant and available imaging results/findings within the past 24 hours.    I spent a total of 70 minutes on the day of the visit.This includes face to face time and non-face to face time preparing to see the patient (eg, review of tests), obtaining and/or reviewing separately obtained history, documenting clinical information in the electronic or other health record, independently interpreting results and communicating results to the patient/family/caregiver, or care coordinator.    Lemuel Man MD  Date of Service: 10/03/2024      This note was created using M Modal voice recognition software that occasionally misinterpreted phrases or words.

## 2024-10-03 NOTE — OP NOTE
Surgery Date: 10/3/2024     Surgeons and Role:     * Ang Mosley III, MD - Primary    Assisting Surgeon: None    Pre-op Diagnosis:  Calculus of gallbladder with acute cholecystitis without obstruction [K80.00]    Post-op Diagnosis:  Post-Op Diagnosis Codes:     * Calculus of gallbladder with acute cholecystitis without obstruction [K80.00]    Procedure(s) (LRB):  CHOLECYSTECTOMY, LAPAROSCOPIC (N/A)    Anesthesia: General    Implants:  * No implants in log *    Operative Findings: Gallbladder mildly edematous but not gangrenous. Would not expect this gallbladder to be the cause or only cause of her shock.     The patient was brought to the operating room and transferred to the operating room table in the supine position.  Patient was given general anesthesia and intubated.  The abdomen was prepped and draped.  A transverse infraumbilical incision was made.  Dissection was carried down through the skin and subcutaneous tissue.  The abdomen was insufflated with the Veress needle. The abdomen was entered with the 5 mm visiport. Under direct visualization, three ports were placed.  One 12 mm was placed in the subxiphoid position, one 5 mm in the right anterior axillary line and the other 5 mm in the mid clavicular line.  The gallbladder body was retracted superiorly and the infundibulum was retracted laterally. The gallbladder wall mildly edematous but not gangrenous. Seemed mildly inflamed. The peritoneum overlying the cystic duct and artery was carefully taken down.  The cystic duct and cystic artery were individually isolated and dissected free 360 degrees.  They were individually clipped proximally and distally.  They were transected using endo faby.  Electrocautery was used to take the gallbladder off the liver bed.  The Endo-Catch bag was used to remove the gallbladder from the abdomen. Gallbladder full of small stones. We irrigated out the right upper quadrant with irrigation.  We checked again and there  was no evidence of any bile leak or bleeding from our clips or from the liver bed.  The patient tolerated the procedure well.  We removed all of our ports. We repaired the skin with 4-0 Monocryl.  After that was done, the patient was extubated and taken to the recovery room in stable condition.  All lap and instrument counts were correct.     Estimated Blood Loss:     Estimated Blood Loss has been documented.         Specimens:   Specimen (24h ago, onward)       Start     Ordered    10/03/24 1228  Specimen to Pathology - Surgery  Once        Comments: Pre-op Diagnosis: Calculus of gallbladder with acute cholecystitis without obstruction [K80.00]Procedure(s):CHOLECYSTECTOMY, LAPAROSCOPIC Number of specimens: 1Name of specimens: 1. gallbladder     Question:  Release to patient  Answer:  Immediate    10/03/24 1785                    [id number]

## 2024-10-03 NOTE — PROGRESS NOTES
[facility]  General Surgery  Progress Note    Subjective:     Interval History: Patient reports no change in abdominal pain. Continued on same dose of vasopressor. LFT's worse. Lipase still normal. Cr up to 1.8. Lactate worse   Now making good urine output.     Post-Op Info:  Procedure(s) (LRB):  CHOLECYSTECTOMY, LAPAROSCOPIC (N/A)   Day of Surgery      Medications:  Continuous Infusions:   0.9% NaCl   Intravenous Continuous 150 mL/hr at 10/03/24 0350 New Bag at 10/03/24 0350    NORepinephrine bitartrate-D5W  0-3 mcg/kg/min Intravenous Continuous 19.6 mL/hr at 10/03/24 0923 0.55 mcg/kg/min at 10/03/24 0923     Scheduled Meds:   aspirin  81 mg Oral Daily    buPROPion  300 mg Oral Daily    citalopram  20 mg Oral Daily    gabapentin  300 mg Oral TID    heparin (porcine)  5,000 Units Subcutaneous Q8H    levothyroxine  50 mcg Oral Before breakfast    mupirocin   Nasal BID    piperacillin-tazobactam (Zosyn) IV (PEDS and ADULTS) (extended infusion is not appropriate)  3.375 g Intravenous Q8H    pravastatin  10 mg Oral Daily    topiramate  100 mg Oral BID     PRN Meds:  Current Facility-Administered Medications:     acetaminophen, 650 mg, Oral, Q4H PRN    aluminum-magnesium hydroxide-simethicone, 30 mL, Oral, QID PRN    dextrose 50%, 12.5 g, Intravenous, PRN    dextrose 50%, 25 g, Intravenous, PRN    glucagon (human recombinant), 1 mg, Intramuscular, PRN    glucose, 16 g, Oral, PRN    glucose, 24 g, Oral, PRN    HYDROcodone-acetaminophen, 1 tablet, Oral, Q6H PRN    magnesium oxide, 800 mg, Oral, PRN    magnesium oxide, 800 mg, Oral, PRN    melatonin, 6 mg, Oral, Nightly PRN    naloxone, 0.02 mg, Intravenous, PRN    ondansetron, 4 mg, Intravenous, Q6H PRN    potassium bicarbonate, 35 mEq, Oral, PRN    potassium bicarbonate, 50 mEq, Oral, PRN    potassium bicarbonate, 60 mEq, Oral, PRN    potassium, sodium phosphates, 2 packet, Oral, PRN    potassium, sodium phosphates, 2 packet, Oral, PRN    potassium, sodium  phosphates, 2 packet, Oral, PRN    prochlorperazine, 5 mg, Intravenous, Q4H PRN    senna-docusate 8.6-50 mg, 1 tablet, Oral, BID PRN    sodium chloride 0.9%, 2 mL, Intravenous, Q12H PRN    Pharmacy to dose Vancomycin consult, , , Once **AND** vancomycin - pharmacy to dose, , Intravenous, pharmacy to manage frequency     Objective:     Vital Signs (Most Recent):  Temp: 97.8 °F (36.6 °C) (10/03/24 0800)  Pulse: 97 (10/03/24 0932)  Resp: 16 (10/03/24 0932)  BP: (!) 123/56 (10/03/24 0932)  SpO2: 96 % (10/03/24 0932) Vital Signs (24h Range):  Temp:  [97.8 °F (36.6 °C)-101.3 °F (38.5 °C)] 97.8 °F (36.6 °C)  Pulse:  [] 97  Resp:  [16-32] 16  SpO2:  [92 %-100 %] 96 %  BP: ()/(49-85) 123/56       Intake/Output Summary (Last 24 hours) at 10/3/2024 1320  Last data filed at 10/3/2024 1253  Gross per 24 hour   Intake 5853.03 ml   Output 2575 ml   Net 3278.03 ml       Physical Exam  Constitutional:       General: She is awake.      Appearance: She is ill-appearing.   Pulmonary:      Effort: No tachypnea, bradypnea or respiratory distress.      Comments: On 4 L oxygen via nasal cannula  Abdominal:      General: There is no distension.      Palpations: Abdomen is soft.      Tenderness: There is abdominal tenderness in the right upper quadrant and epigastric area. There is guarding.   Skin:     Coloration: Skin is not jaundiced.   Neurological:      Mental Status: She is alert and oriented to person, place, and time.   Psychiatric:         Behavior: Behavior is cooperative.         Significant Labs:  CBC:   Recent Labs   Lab 10/03/24  0110 10/03/24  1139 10/03/24  1209   WBC 45.30*  --   --    RBC 4.36  --   --    HGB 13.6  --   --    HCT 41.8   < > 31*     --   --    MCV 96  --   --    MCH 31.2*  --   --    MCHC 32.5  --   --     < > = values in this interval not displayed.     CMP:   Recent Labs   Lab 10/03/24  0110   GLU 71   CALCIUM 7.7*   ALBUMIN 3.5   PROT 5.6*   *   K 4.1   CO2 17*      BUN 17    CREATININE 1.8*   ALKPHOS 220*   ALT 1,316*   AST 1,970*   BILITOT 0.9       Significant Diagnostics:  I have reviewed all pertinent imaging results/findings within the past 24 hours.    Assessment/Plan:     Active Diagnoses:    Diagnosis Date Noted POA    PRINCIPAL PROBLEM:  Septic shock [A41.9, R65.21] 10/02/2024 Yes    Calculus of gallbladder with acute cholecystitis without obstruction [K80.00] 10/02/2024 Yes    Neuropathic pain [M79.2] 10/02/2024 Yes    Acute renal failure [N17.9] 10/02/2024 Yes    Pre-syncope [R55] 10/02/2024 Yes    Abdominal pain [R10.9] 10/02/2024 Yes    Acquired hypothyroidism [E03.9] 08/07/2024 Yes    CLL (chronic lymphocytic leukemia) [C91.10] 05/04/2020 Yes    Anxiety [F41.9] 03/02/2020 Yes      Problems Resolved During this Admission:   -patient has worsening acidosis. Symptoms unchanged. Suspicion for severe cholecystitis- possible gangrenous. Will go to the OR for cholecystectomy today.       Ang Mosley III, MD  General Surgery  [facility]

## 2024-10-03 NOTE — BRIEF OP NOTE
[facility]  Brief Operative Note    SUMMARY     Surgery Date: 10/3/2024     Surgeons and Role:     * Ang Mosley III, MD - Primary    Assisting Surgeon: None    Pre-op Diagnosis:  Calculus of gallbladder with acute cholecystitis without obstruction [K80.00]    Post-op Diagnosis:  Post-Op Diagnosis Codes:     * Calculus of gallbladder with acute cholecystitis without obstruction [K80.00]    Procedure(s) (LRB):  CHOLECYSTECTOMY, LAPAROSCOPIC (N/A)    Anesthesia: General    Implants:  * No implants in log *    Operative Findings: Gallbladder mildly edematous but not gangrenous. Would not expect this gallbladder to be the cause or only cause of her shock.     The patient was brought to the operating room and transferred to the operating room table in the supine position.  Patient was given general anesthesia and intubated.  The abdomen was prepped and draped.  A transverse infraumbilical incision was made.  Dissection was carried down through the skin and subcutaneous tissue.  The abdomen was insufflated with the Veress needle. The abdomen was entered with the 5 mm visiport. Under direct visualization, three ports were placed.  One 12 mm was placed in the subxiphoid position, one 5 mm in the right anterior axillary line and the other 5 mm in the mid clavicular line.  The gallbladder body was retracted superiorly and the infundibulum was retracted laterally. The gallbladder wall mildly edematous but not gangrenous. Seemed mildly inflamed. The peritoneum overlying the cystic duct and artery was carefully taken down.  The cystic duct and cystic artery were individually isolated and dissected free 360 degrees.  They were individually clipped proximally and distally.  They were transected using endo faby.  Electrocautery was used to take the gallbladder off the liver bed.  The Endo-Catch bag was used to remove the gallbladder from the abdomen. Gallbladder full of small stones. We irrigated out the  right upper quadrant with irrigation.  We checked again and there was no evidence of any bile leak or bleeding from our clips or from the liver bed.  The patient tolerated the procedure well.  We removed all of our ports. We repaired the skin with 4-0 Monocryl.  After that was done, the patient was extubated and taken to the recovery room in stable condition.  All lap and instrument counts were correct.     Estimated Blood Loss:     Estimated Blood Loss has been documented.         Specimens:   Specimen (24h ago, onward)       Start     Ordered    10/03/24 1228  Specimen to Pathology - Surgery  Once        Comments: Pre-op Diagnosis: Calculus of gallbladder with acute cholecystitis without obstruction [K80.00]Procedure(s):CHOLECYSTECTOMY, LAPAROSCOPIC Number of specimens: 1Name of specimens: 1. gallbladder     Question:  Release to patient  Answer:  Immediate    10/03/24 1228                    [id number]

## 2024-10-04 LAB
ALBUMIN SERPL BCP-MCNC: 2.7 G/DL (ref 3.5–5.2)
ALP SERPL-CCNC: 229 U/L (ref 55–135)
ALT SERPL W/O P-5'-P-CCNC: 935 U/L (ref 10–44)
ANION GAP SERPL CALC-SCNC: 5 MMOL/L (ref 8–16)
ANION GAP SERPL CALC-SCNC: 9 MMOL/L (ref 8–16)
AST SERPL-CCNC: 813 U/L (ref 10–40)
BACTERIA BLD CULT: ABNORMAL
BASOPHILS # BLD AUTO: ABNORMAL K/UL (ref 0–0.2)
BASOPHILS NFR BLD: 0 % (ref 0–1.9)
BILIRUB SERPL-MCNC: 1 MG/DL (ref 0.1–1)
BUN SERPL-MCNC: 11 MG/DL (ref 8–23)
BUN SERPL-MCNC: 15 MG/DL (ref 8–23)
CA-I BLDV-SCNC: 1.04 MMOL/L (ref 1.06–1.42)
CALCIUM SERPL-MCNC: 6.9 MG/DL (ref 8.7–10.5)
CALCIUM SERPL-MCNC: 7.1 MG/DL (ref 8.7–10.5)
CHLORIDE SERPL-SCNC: 105 MMOL/L (ref 95–110)
CHLORIDE SERPL-SCNC: 110 MMOL/L (ref 95–110)
CO2 SERPL-SCNC: 20 MMOL/L (ref 23–29)
CO2 SERPL-SCNC: 21 MMOL/L (ref 23–29)
CREAT SERPL-MCNC: 1.2 MG/DL (ref 0.5–1.4)
CREAT SERPL-MCNC: 1.2 MG/DL (ref 0.5–1.4)
DIFFERENTIAL METHOD BLD: ABNORMAL
EOSINOPHIL # BLD AUTO: ABNORMAL K/UL (ref 0–0.5)
EOSINOPHIL NFR BLD: 15 % (ref 0–8)
ERYTHROCYTE [DISTWIDTH] IN BLOOD BY AUTOMATED COUNT: 14.3 % (ref 11.5–14.5)
EST. GFR  (NO RACE VARIABLE): 50.2 ML/MIN/1.73 M^2
EST. GFR  (NO RACE VARIABLE): 50.2 ML/MIN/1.73 M^2
GLUCOSE SERPL-MCNC: 105 MG/DL (ref 70–110)
GLUCOSE SERPL-MCNC: 93 MG/DL (ref 70–110)
HCT VFR BLD AUTO: 33.8 % (ref 37–48.5)
HGB BLD-MCNC: 11.3 G/DL (ref 12–16)
IMM GRANULOCYTES # BLD AUTO: ABNORMAL K/UL (ref 0–0.04)
IMM GRANULOCYTES NFR BLD AUTO: ABNORMAL % (ref 0–0.5)
LACTATE SERPL-SCNC: 1.2 MMOL/L (ref 0.5–1.9)
LACTATE SERPL-SCNC: 1.8 MMOL/L (ref 0.5–1.9)
LYMPHOCYTES # BLD AUTO: ABNORMAL K/UL (ref 1–4.8)
LYMPHOCYTES NFR BLD: 37 % (ref 18–48)
MAGNESIUM SERPL-MCNC: 1.9 MG/DL (ref 1.6–2.6)
MAGNESIUM SERPL-MCNC: 2.2 MG/DL (ref 1.6–2.6)
MCH RBC QN AUTO: 31.6 PG (ref 27–31)
MCHC RBC AUTO-ENTMCNC: 33.4 G/DL (ref 32–36)
MCV RBC AUTO: 94 FL (ref 82–98)
MONOCYTES # BLD AUTO: ABNORMAL K/UL (ref 0.3–1)
MONOCYTES NFR BLD: 6 % (ref 4–15)
NEUTROPHILS NFR BLD: 39 % (ref 38–73)
NEUTS BAND NFR BLD MANUAL: 3 %
NRBC BLD-RTO: 0 /100 WBC
PLATELET # BLD AUTO: 244 K/UL (ref 150–450)
PLATELET BLD QL SMEAR: ABNORMAL
PMV BLD AUTO: 10.2 FL (ref 9.2–12.9)
POTASSIUM SERPL-SCNC: 3.8 MMOL/L (ref 3.5–5.1)
POTASSIUM SERPL-SCNC: 3.9 MMOL/L (ref 3.5–5.1)
PROT SERPL-MCNC: 4.4 G/DL (ref 6–8.4)
RBC # BLD AUTO: 3.58 M/UL (ref 4–5.4)
SMUDGE CELLS BLD QL SMEAR: PRESENT
SODIUM SERPL-SCNC: 131 MMOL/L (ref 136–145)
SODIUM SERPL-SCNC: 139 MMOL/L (ref 136–145)
VANCOMYCIN SERPL-MCNC: 8 UG/ML
WBC # BLD AUTO: 34.88 K/UL (ref 3.9–12.7)

## 2024-10-04 PROCEDURE — 99900035 HC TECH TIME PER 15 MIN (STAT)

## 2024-10-04 PROCEDURE — 99233 SBSQ HOSP IP/OBS HIGH 50: CPT | Mod: ,,, | Performed by: INTERNAL MEDICINE

## 2024-10-04 PROCEDURE — 25000003 PHARM REV CODE 250: Performed by: SURGERY

## 2024-10-04 PROCEDURE — 27000221 HC OXYGEN, UP TO 24 HOURS

## 2024-10-04 PROCEDURE — 99900031 HC PATIENT EDUCATION (STAT)

## 2024-10-04 PROCEDURE — 83735 ASSAY OF MAGNESIUM: CPT | Mod: 91

## 2024-10-04 PROCEDURE — 25000242 PHARM REV CODE 250 ALT 637 W/ HCPCS

## 2024-10-04 PROCEDURE — 63600175 PHARM REV CODE 636 W HCPCS: Performed by: HOSPITALIST

## 2024-10-04 PROCEDURE — 80048 BASIC METABOLIC PNL TOTAL CA: CPT

## 2024-10-04 PROCEDURE — 83735 ASSAY OF MAGNESIUM: CPT | Performed by: HOSPITALIST

## 2024-10-04 PROCEDURE — 94640 AIRWAY INHALATION TREATMENT: CPT

## 2024-10-04 PROCEDURE — 25000003 PHARM REV CODE 250: Performed by: INTERNAL MEDICINE

## 2024-10-04 PROCEDURE — 93010 ELECTROCARDIOGRAM REPORT: CPT | Mod: ,,, | Performed by: GENERAL PRACTICE

## 2024-10-04 PROCEDURE — 63600175 PHARM REV CODE 636 W HCPCS

## 2024-10-04 PROCEDURE — 85027 COMPLETE CBC AUTOMATED: CPT | Performed by: HOSPITALIST

## 2024-10-04 PROCEDURE — 82330 ASSAY OF CALCIUM: CPT

## 2024-10-04 PROCEDURE — 63600175 PHARM REV CODE 636 W HCPCS: Performed by: INTERNAL MEDICINE

## 2024-10-04 PROCEDURE — 83605 ASSAY OF LACTIC ACID: CPT

## 2024-10-04 PROCEDURE — 25000003 PHARM REV CODE 250: Mod: JZ,JG

## 2024-10-04 PROCEDURE — 80202 ASSAY OF VANCOMYCIN: CPT

## 2024-10-04 PROCEDURE — 94799 UNLISTED PULMONARY SVC/PX: CPT

## 2024-10-04 PROCEDURE — 94761 N-INVAS EAR/PLS OXIMETRY MLT: CPT

## 2024-10-04 PROCEDURE — 63600175 PHARM REV CODE 636 W HCPCS: Performed by: SURGERY

## 2024-10-04 PROCEDURE — 25000003 PHARM REV CODE 250: Performed by: HOSPITALIST

## 2024-10-04 PROCEDURE — 20000000 HC ICU ROOM

## 2024-10-04 PROCEDURE — 93005 ELECTROCARDIOGRAM TRACING: CPT | Performed by: GENERAL PRACTICE

## 2024-10-04 PROCEDURE — 36415 COLL VENOUS BLD VENIPUNCTURE: CPT

## 2024-10-04 PROCEDURE — 80053 COMPREHEN METABOLIC PANEL: CPT | Performed by: HOSPITALIST

## 2024-10-04 PROCEDURE — 85007 BL SMEAR W/DIFF WBC COUNT: CPT | Performed by: HOSPITALIST

## 2024-10-04 RX ORDER — CALCIUM GLUCONATE 20 MG/ML
3 INJECTION, SOLUTION INTRAVENOUS
Status: DISCONTINUED | OUTPATIENT
Start: 2024-10-04 | End: 2024-10-16 | Stop reason: HOSPADM

## 2024-10-04 RX ORDER — ARFORMOTEROL TARTRATE 15 UG/2ML
15 SOLUTION RESPIRATORY (INHALATION) 2 TIMES DAILY
Status: DISCONTINUED | OUTPATIENT
Start: 2024-10-04 | End: 2024-10-16 | Stop reason: HOSPADM

## 2024-10-04 RX ORDER — CALCIUM GLUCONATE 20 MG/ML
2 INJECTION, SOLUTION INTRAVENOUS
Status: DISCONTINUED | OUTPATIENT
Start: 2024-10-04 | End: 2024-10-16 | Stop reason: HOSPADM

## 2024-10-04 RX ORDER — MUPIROCIN 20 MG/G
1 OINTMENT TOPICAL 2 TIMES DAILY
Status: DISPENSED | OUTPATIENT
Start: 2024-10-04 | End: 2024-10-09

## 2024-10-04 RX ORDER — BUDESONIDE 0.5 MG/2ML
1 INHALANT ORAL EVERY 12 HOURS
Status: DISCONTINUED | OUTPATIENT
Start: 2024-10-04 | End: 2024-10-16 | Stop reason: HOSPADM

## 2024-10-04 RX ORDER — CALCIUM GLUCONATE 20 MG/ML
1 INJECTION, SOLUTION INTRAVENOUS
Status: DISCONTINUED | OUTPATIENT
Start: 2024-10-04 | End: 2024-10-16 | Stop reason: HOSPADM

## 2024-10-04 RX ADMIN — ASPIRIN 81 MG: 81 TABLET, COATED ORAL at 09:10

## 2024-10-04 RX ADMIN — SODIUM CHLORIDE: 9 INJECTION, SOLUTION INTRAVENOUS at 02:10

## 2024-10-04 RX ADMIN — PIPERACILLIN SODIUM AND TAZOBACTAM SODIUM 4.5 G: 4; .5 INJECTION, POWDER, LYOPHILIZED, FOR SOLUTION INTRAVENOUS at 10:10

## 2024-10-04 RX ADMIN — HYDROMORPHONE HYDROCHLORIDE 0.5 MG: 0.5 INJECTION, SOLUTION INTRAMUSCULAR; INTRAVENOUS; SUBCUTANEOUS at 07:10

## 2024-10-04 RX ADMIN — MUPIROCIN 1 G: 20 OINTMENT TOPICAL at 09:10

## 2024-10-04 RX ADMIN — POTASSIUM BICARBONATE 50 MEQ: 977.5 TABLET, EFFERVESCENT ORAL at 06:10

## 2024-10-04 RX ADMIN — VANCOMYCIN HYDROCHLORIDE 1250 MG: 1.25 INJECTION, POWDER, LYOPHILIZED, FOR SOLUTION INTRAVENOUS at 08:10

## 2024-10-04 RX ADMIN — GABAPENTIN 300 MG: 300 CAPSULE ORAL at 02:10

## 2024-10-04 RX ADMIN — ONDANSETRON 4 MG: 2 INJECTION INTRAMUSCULAR; INTRAVENOUS at 02:10

## 2024-10-04 RX ADMIN — CALCIUM GLUCONATE 1 G: 20 INJECTION, SOLUTION INTRAVENOUS at 10:10

## 2024-10-04 RX ADMIN — MUPIROCIN 1 G: 20 OINTMENT TOPICAL at 08:10

## 2024-10-04 RX ADMIN — PIPERACILLIN SODIUM AND TAZOBACTAM SODIUM 4.5 G: 4; .5 INJECTION, POWDER, LYOPHILIZED, FOR SOLUTION INTRAVENOUS at 05:10

## 2024-10-04 RX ADMIN — PRAVASTATIN SODIUM 10 MG: 10 TABLET ORAL at 09:10

## 2024-10-04 RX ADMIN — BUPROPION HYDROCHLORIDE 300 MG: 150 TABLET, EXTENDED RELEASE ORAL at 09:10

## 2024-10-04 RX ADMIN — SODIUM CHLORIDE, POTASSIUM CHLORIDE, SODIUM LACTATE AND CALCIUM CHLORIDE 500 ML: 600; 310; 30; 20 INJECTION, SOLUTION INTRAVENOUS at 11:10

## 2024-10-04 RX ADMIN — GABAPENTIN 300 MG: 300 CAPSULE ORAL at 09:10

## 2024-10-04 RX ADMIN — HYDROMORPHONE HYDROCHLORIDE 0.5 MG: 0.5 INJECTION, SOLUTION INTRAMUSCULAR; INTRAVENOUS; SUBCUTANEOUS at 05:10

## 2024-10-04 RX ADMIN — HEPARIN SODIUM 5000 UNITS: 5000 INJECTION INTRAVENOUS; SUBCUTANEOUS at 05:10

## 2024-10-04 RX ADMIN — GABAPENTIN 300 MG: 300 CAPSULE ORAL at 08:10

## 2024-10-04 RX ADMIN — HYDROCODONE BITARTRATE AND ACETAMINOPHEN 1 TABLET: 5; 325 TABLET ORAL at 09:10

## 2024-10-04 RX ADMIN — ARFORMOTEROL TARTRATE 15 MCG: 15 SOLUTION RESPIRATORY (INHALATION) at 07:10

## 2024-10-04 RX ADMIN — LEVOTHYROXINE SODIUM 50 MCG: 0.03 TABLET ORAL at 05:10

## 2024-10-04 RX ADMIN — CITALOPRAM HYDROBROMIDE 20 MG: 20 TABLET ORAL at 09:10

## 2024-10-04 RX ADMIN — PIPERACILLIN SODIUM AND TAZOBACTAM SODIUM 4.5 G: 4; .5 INJECTION, POWDER, LYOPHILIZED, FOR SOLUTION INTRAVENOUS at 02:10

## 2024-10-04 RX ADMIN — TOPIRAMATE 100 MG: 25 TABLET, FILM COATED ORAL at 08:10

## 2024-10-04 RX ADMIN — BUDESONIDE INHALATION 1 MG: 0.5 SUSPENSION RESPIRATORY (INHALATION) at 07:10

## 2024-10-04 RX ADMIN — HEPARIN SODIUM 5000 UNITS: 5000 INJECTION INTRAVENOUS; SUBCUTANEOUS at 02:10

## 2024-10-04 RX ADMIN — NOREPINEPHRINE BITARTRATE 0.2 MCG/KG/MIN: 1 INJECTION, SOLUTION, CONCENTRATE INTRAVENOUS at 04:10

## 2024-10-04 RX ADMIN — NOREPINEPHRINE BITARTRATE 0.55 MCG/KG/MIN: 1 INJECTION, SOLUTION, CONCENTRATE INTRAVENOUS at 02:10

## 2024-10-04 RX ADMIN — TOPIRAMATE 100 MG: 25 TABLET, FILM COATED ORAL at 09:10

## 2024-10-04 RX ADMIN — HEPARIN SODIUM 5000 UNITS: 5000 INJECTION INTRAVENOUS; SUBCUTANEOUS at 10:10

## 2024-10-04 NOTE — PLAN OF CARE
Problem: Adult Inpatient Plan of Care  Goal: Plan of Care Review  Outcome: Progressing  Goal: Patient-Specific Goal (Individualized)  Outcome: Progressing  Goal: Absence of Hospital-Acquired Illness or Injury  Outcome: Progressing  Goal: Optimal Comfort and Wellbeing  Outcome: Progressing  Goal: Readiness for Transition of Care  Outcome: Progressing     Problem: Sepsis/Septic Shock  Goal: Optimal Coping  Outcome: Progressing  Goal: Absence of Bleeding  Outcome: Progressing  Goal: Blood Glucose Level Within Targeted Range  Outcome: Progressing  Goal: Absence of Infection Signs and Symptoms  Outcome: Progressing  Goal: Optimal Nutrition Intake  Outcome: Progressing     Problem: Acute Kidney Injury/Impairment  Goal: Fluid and Electrolyte Balance  Outcome: Progressing  Goal: Improved Oral Intake  Outcome: Progressing  Goal: Effective Renal Function  Outcome: Progressing     Problem: Infection  Goal: Absence of Infection Signs and Symptoms  Outcome: Progressing     Problem: Wound  Goal: Optimal Coping  Outcome: Progressing  Goal: Optimal Functional Ability  Outcome: Progressing  Goal: Absence of Infection Signs and Symptoms  Outcome: Progressing  Goal: Improved Oral Intake  Outcome: Progressing  Goal: Optimal Pain Control and Function  Outcome: Progressing  Goal: Skin Health and Integrity  Outcome: Progressing  Goal: Optimal Wound Healing  Outcome: Progressing     Problem: Skin Injury Risk Increased  Goal: Skin Health and Integrity  Outcome: Progressing     Problem: Fall Injury Risk  Goal: Absence of Fall and Fall-Related Injury  Outcome: Progressing     Problem: Pain Acute  Goal: Optimal Pain Control and Function  Outcome: Progressing     Problem: Cholecystectomy  Goal: Absence of Bleeding  Outcome: Progressing  Goal: Effective Bowel Elimination  Outcome: Progressing  Goal: Fluid and Electrolyte Balance  Outcome: Progressing  Goal: Absence of Infection Signs and Symptoms  Outcome: Progressing  Goal: Pain Control and  Function  Outcome: Progressing  Goal: Nausea and Vomiting Relief  Outcome: Progressing  Goal: Effective Urinary Elimination  Outcome: Progressing  Goal: Effective Oxygenation and Ventilation  Outcome: Progressing

## 2024-10-04 NOTE — ASSESSMENT & PLAN NOTE
Most recent creatinine and eGFR are listed below.  Recent Labs     10/02/24  1034 10/03/24  0110 10/04/24  0455   CREATININE 1.4 1.8* 1.2   EGFRNORACEVR 41.8* 30.9* 50.2*       Likely pre-renal given her septic shock.   Status post IV hydration, patient able to take PO hydration  GM resolving

## 2024-10-04 NOTE — HOSPITAL COURSE
This 64-year-old lady with history of CLL, lung cancer status post partial resection presented to the emergency department due to presyncope, nausea vomiting and abdominal pain.  She was also noted to be hypotensive and tachycardic on admission.  She was also noted to have fever.  Patient was admitted to the hospital for septic shock concern for abdominal source given CT findings showing some pericholecystic fluid.  Patient was also noted to have transaminitis and imaging did reveal some peripancreatic fluid and inflammatory changes as well though her lipase was normal.  General surgery was consulted after ultrasound the gallbladder was performed which again redemonstrated pericholecystic fluid.  She is status post laparoscopic cholecystectomy on 10/03.  Gallbladder was not noted to be gangrenous and per the op note there was suspicion that this was not the source of her septic shock.  However, blood cultures grew only coagulation negative Staphylococcus in 1/2 bottles.  This was thought to be contaminant.  Infectious Disease was consulted and vancomycin and Zosyn was continued on this patient.  Levophed initiated on admission was able to be weaned, although she still had requirement the day after surgery.  Started on midodrine and Florinef by Cardiology, Levophed discontinued.  Had elevated troponins on admission, underwent left heart catheterization showed no obstructive disease.  Repeat blood cultures NTD.  MRCP suggest intrahepatic duct blocked, GI consulted.  Patient underwent ERCP which did not show any evidence of stricture or abnormality suggested by MRCP.  Hematology consulted given up trending WBC in the history of CLL.  CLL recommend follow up outpatient, WBC likely secondary to infection, may take some time to improve. ID recommended vanc till 10/25/24. CM to assist with HHC and IV abx. Pt declined SNF. Transfer out of ICU.  Patient remained hemodynamically stable while on the medicine floor.  She  continued her IV antibiotics.  PICC line was placed, which had continued bleeding, heparin was stopped, bleeding resolved.  She had some swelling in her arm, and ultrasound left upper extremity showed no DVT.  She was given a PPI for some slight heartburn.  She has chronic diarrhea, but began to have diarrhea that she described as different from her usual.  A GI PCR workup was negative.  Patient okay for discharge home.  She will continue IV vancomycin until 10/25.  She will follow up outpatient with PCP, id, Heme-Onc, and GI.

## 2024-10-04 NOTE — CONSULTS
[facility]  Department of Cardiology  Consult Note      PATIENT NAME: Yvette Hutchinson    MRN: [id number]  TODAY'S DATE: 10/04/2024  ADMIT DATE: 10/2/2024                          CONSULT REQUESTED BY: Farida Martin MD    SUBJECTIVE     PRINCIPAL PROBLEM: Septic shock    HPI:  Patient is a 64-year-old female who presented to the emergency room with nausea vomiting diarrhea and abdominal pain as well as near syncope.  She apparently had some chest tightness for a week or so prior to this event as well.  Patient with elevated troponin levels on arrival but was also being treated for sepsis.  Patient was noted to have cholecystitis and underwent laparoscopic cholecystectomy yesterday.  Patient is noted to have bacteremia and is followed by infectious disease.  Echocardiogram noted which shows reduced ejection fraction.  Other history also includes CLL and lung cancer with history of partial resection.      REASON FOR CONSULT:  From Hospitalist H&P:  Reduced ejection fraction, elevated troponin      Review of patient's allergies indicates:  No Known Allergies    Past Medical History:   Diagnosis Date    Arthritis     Cancer 10/2022    (CLL) leukemia ; adenocarcinoma  adenosgemis    Depression     GERD (gastroesophageal reflux disease)     Neck pain     and back pain    Personal history of colonic polyps 07/07/2017    Pneumonia of left lung due to infectious organism 03/05/2020    Thyroid disease      Past Surgical History:   Procedure Laterality Date    FUSION, SPINE, CERVICAL, ANTERIOR APPROACH N/A 08/06/2024    Procedure: FUSION, SPINE, CERVICAL, ANTERIOR APPROACH;  Surgeon: Anatoly Daley DO;  Location: [facility] OR;  Service: Neurosurgery;  Laterality: N/A;  IOM, C-ARM, MEDTRONICS, MICRO, HORSESHOE    INJECTION OF ANESTHETIC AGENT AROUND MULTIPLE INTERCOSTAL NERVES Left 10/03/2022    Procedure: BLOCK, NERVE, INTERCOSTAL, 2 OR MORE;  Surgeon: Evelio Kaplan MD;  [facility];   Service: Thoracic;  Laterality: Left;    INSERTION OF TUNNELED CENTRAL VENOUS CATHETER (CVC) WITH SUBCUTANEOUS PORT Right 11/03/2022    Procedure: RYHIQUAFF-ZFMU-D-CATH, Right or Left Neck or Chest;  Surgeon: Mini Acevedo MD;  Location: [facility] OR 2ND FLR;  Service: General;  Laterality: Right;    LAPAROSCOPIC CHOLECYSTECTOMY N/A 10/3/2024    Procedure: CHOLECYSTECTOMY, LAPAROSCOPIC;  Surgeon: Ang Mosley III, MD;  Location: [facility] OR;  Service: General;  Laterality: N/A;    ROBOT-ASSISTED LAPAROSCOPIC LYMPHADENECTOMY USING DA MONIQUE XI Left 10/03/2022    Procedure: XI ROBOTIC LYMPHADENECTOMY;  Surgeon: Evelio Kaplan MD;  Location: [facility] OR [facility] FLR;  Service: Thoracic;  Laterality: Left;    SPINE SURGERY      TONSILLECTOMY      XI ROBOTIC RATS,WITH LOBECTOMY,LUNG Left 10/03/2022    Procedure: XI ROBOTIC RATS,WITH LOBECTOMY,LUNG;  Surgeon: Evelio Kaplan MD;  Location: [facility] OR 2ND FLR;  Service: Thoracic;  Laterality: Left;     Social History     Tobacco Use    Smoking status: Some Days     Current packs/day: 0.15     Types: Cigarettes     Passive exposure: Current    Smokeless tobacco: Never    Tobacco comments:     reports one cigarette every now and then   Substance Use Topics    Alcohol use: Yes     Comment: rarely    Drug use: Yes     Types: Marijuana        REVIEW OF SYSTEMS  Per HPI    OBJECTIVE     VITAL SIGNS (Most Recent)  Temp: 98.2 °F (36.8 °C) (10/04/24 0701)  Pulse: 98 (10/04/24 1000)  Resp: 17 (10/04/24 1000)  BP: 105/64 (10/04/24 0701)  SpO2: 100 % (10/04/24 1000)    VENTILATION STATUS  Resp: 17 (10/04/24 1000)  SpO2: 100 % (10/04/24 1000)           I & O (Last 24H):  Intake/Output Summary (Last 24 hours) at 10/4/2024 1108  Last data filed at 10/4/2024 1031  Gross per 24 hour   Intake 6711.07 ml   Output 3015 ml   Net 3696.07 ml       WEIGHTS  Wt Readings from Last 1 Encounters:   10/02/24 1016 76.2 kg (168 lb)       PHYSICAL EXAM  CONSTITUTIONAL: No fever, no chills  HEENT:  Normocephalic, atraumatic,pupils reactive to light                 NECK:  No JVD no carotid bruit  CVS: S1S2+, RRR  LUNGS: Clear  ABDOMEN: Soft, NT, BS+  EXTREMITIES: No cyanosis, edema  : No ramirez catheter  NEURO: AAO X 3  PSY: Normal affect      HOME MEDICATIONS:  No current facility-administered medications on file prior to encounter.     Current Outpatient Medications on File Prior to Encounter   Medication Sig Dispense Refill    acetaminophen (TYLENOL) 500 MG tablet Take 2 tablets (1,000 mg total) by mouth every 6 (six) hours as needed for Pain. 8 tablet 0    albuterol (PROVENTIL/VENTOLIN HFA) 90 mcg/actuation inhaler Inhale 2 puffs into the lungs every 6 (six) hours as needed for Wheezing or Shortness of Breath. Rescue 8.5 g 11    buPROPion (WELLBUTRIN XL) 300 MG 24 hr tablet Take 1 tablet (300 mg total) by mouth once daily. 90 tablet 3    citalopram (CELEXA) 20 MG tablet Take 1 tablet (20 mg total) by mouth once daily. 30 tablet 11    fluticasone propionate (FLONASE) 50 mcg/actuation nasal spray 1 spray (50 mcg total) by Each Nostril route once daily. 16 g 3    fluticasone-salmeterol 230-21 mcg/dose (ADVAIR HFA) 230-21 mcg/actuation HFAA inhaler Inhale 2 puffs into the lungs 2 (two) times daily. Controller 12 g 5    gabapentin (NEURONTIN) 300 MG capsule Take 1 capsule (300 mg total) by mouth 3 (three) times daily. 270 capsule 1    levocetirizine (XYZAL) 5 MG tablet Take 1 tablet (5 mg total) by mouth every evening. 30 tablet 5    levothyroxine (SYNTHROID) 50 MCG tablet Take 1 tablet (50 mcg total) by mouth before breakfast. 90 tablet 3    pravastatin (PRAVACHOL) 10 MG tablet Take 1 tablet (10 mg total) by mouth once daily. 90 tablet 3    topiramate (TOPAMAX) 100 MG tablet Take 1 tablet (100 mg total) by mouth 2 (two) times daily. 60 tablet 11    naloxone (NARCAN) 4 mg/actuation Spry ADMINISTER A SINGLE SPRAY IN ONE NOSTRIL UPON SIGNS OF OPIOID OVERDOSE. CALL 911. REPEAT AFTER 3 MINUTES IF NO RESPONSE.       varenicline (CHANTIX) 1 mg Tab Take 1 tablet by mouth twice daily (Patient not taking: Reported on 10/2/2024) 180 tablet 0       SCHEDULED MEDS:   aspirin  81 mg Oral Daily    buPROPion  300 mg Oral Daily    citalopram  20 mg Oral Daily    gabapentin  300 mg Oral TID    heparin (porcine)  5,000 Units Subcutaneous Q8H    levothyroxine  50 mcg Oral Before breakfast    mupirocin  1 g Nasal BID    mupirocin   Nasal BID    piperacillin-tazobactam (Zosyn) IV (PEDS and ADULTS) (extended infusion is not appropriate)  4.5 g Intravenous Q8H    pravastatin  10 mg Oral Daily    topiramate  100 mg Oral BID       CONTINUOUS INFUSIONS:   NORepinephrine bitartrate-D5W  0-3 mcg/kg/min Intravenous Continuous 12.5 mL/hr at 10/04/24 1016 0.35 mcg/kg/min at 10/04/24 1016       PRN MEDS:  Current Facility-Administered Medications:     acetaminophen, 650 mg, Oral, Q4H PRN    aluminum-magnesium hydroxide-simethicone, 30 mL, Oral, QID PRN    dextrose 50%, 12.5 g, Intravenous, PRN    dextrose 50%, 25 g, Intravenous, PRN    glucagon (human recombinant), 1 mg, Intramuscular, PRN    glucose, 16 g, Oral, PRN    glucose, 24 g, Oral, PRN    HYDROcodone-acetaminophen, 1 tablet, Oral, Q6H PRN    HYDROmorphone, 0.5 mg, Intravenous, Q2H PRN    magnesium oxide, 800 mg, Oral, PRN    magnesium oxide, 800 mg, Oral, PRN    melatonin, 6 mg, Oral, Nightly PRN    naloxone, 0.02 mg, Intravenous, PRN    ondansetron, 4 mg, Intravenous, Q6H PRN    potassium bicarbonate, 35 mEq, Oral, PRN    potassium bicarbonate, 50 mEq, Oral, PRN    potassium bicarbonate, 60 mEq, Oral, PRN    potassium, sodium phosphates, 2 packet, Oral, PRN    potassium, sodium phosphates, 2 packet, Oral, PRN    potassium, sodium phosphates, 2 packet, Oral, PRN    prochlorperazine, 5 mg, Intravenous, Q4H PRN    senna-docusate 8.6-50 mg, 1 tablet, Oral, BID PRN    sodium chloride 0.9%, 2 mL, Intravenous, Q12H PRN    Pharmacy to dose Vancomycin consult, , , Once **AND** vancomycin - pharmacy to  "dose, , Intravenous, pharmacy to manage frequency    LABS AND DIAGNOSTICS     CBC LAST 3 DAYS  Recent Labs   Lab 10/02/24  1034 10/03/24  0110 10/03/24  1139 10/03/24  1209 10/04/24  0455   WBC 21.30* 45.30*  --   --  34.88*   RBC 3.93* 4.36  --   --  3.58*   HGB 12.5 13.6  --   --  11.3*   HCT 37.7 41.8 34* 31* 33.8*   MCV 96 96  --   --  94   MCH 31.8* 31.2*  --   --  31.6*   MCHC 33.2 32.5  --   --  33.4   RDW 13.4 13.8  --   --  14.3    239  --   --  244   MPV 10.6 11.9  --   --  10.2   GRAN 43.0 22.0*  --   --  39.0   LYMPH 50.0*  CANCELED 62.0*  --   --  37.0  CANCELED   MONO 7.0  CANCELED 3.0*  --   --  6.0  CANCELED   BASO CANCELED  --   --   --  CANCELED   NRBC 0 0  --   --  0       COAGULATION LAST 3 DAYS  No results for input(s): "LABPT", "INR", "APTT" in the last 168 hours.    CHEMISTRY LAST 3 DAYS  Recent Labs   Lab 10/02/24  1034 10/03/24  0110 10/03/24  1139 10/03/24  1209 10/04/24  0455   * 133*  --   --  139   K 3.8 4.1  --   --  3.8    103  --   --  110   CO2 19* 17*  --   --  20*   ANIONGAP 11 13  --   --  9   BUN 14 17  --   --  15   CREATININE 1.4 1.8*  --   --  1.2   GLU 97 71  --   --  93   CALCIUM 8.4* 7.7*  --   --  6.9*   PH  --   --  7.198* 7.300*  --    MG 1.5* 1.7  --   --  2.2   ALBUMIN 3.9 3.5  --   --  2.7*   PROT 6.2 5.6*  --   --  4.4*   ALKPHOS 216* 220*  --   --  229*   * 1,316*  --   --  935*   * 1,970*  --   --  813*   BILITOT 0.6 0.9  --   --  1.0       CARDIAC PROFILE LAST 3 DAYS  Recent Labs   Lab 10/02/24  1838 10/03/24  0112 10/03/24  0514   TROPONINIHS 1588.5* 1161.1* 1015.8*       ENDOCRINE LAST 3 DAYS  No results for input(s): "TSH", "PROCAL" in the last 168 hours.    LAST ARTERIAL BLOOD GAS  ABG  Recent Labs   Lab 10/03/24  1209   PH 7.300*   PO2 506*   PCO2 50.0*   HCO3 24.6   BE -2       LAST 7 DAYS MICROBIOLOGY   Microbiology Results (last 7 days)       Procedure Component Value Units Date/Time    Blood culture x two cultures. " Draw prior to antibiotics [[id number]]  (Abnormal) Collected: 10/02/24 1049    Order Status: Completed Specimen: Blood from Peripheral, Wrist, Right Updated: 10/04/24 0807     Blood Culture, Routine Gram stain aer bottle: Gram positive cocci      Results called to and read back by:Val Nino RN-ED;  10/03/2024      11:58 CJD      COAGULASE NEGATIVE STAPHYLOCOCCI  Organism is a probable contaminant      Narrative:      Aerobic and anaerobic    Rapid Organism ID by PCR (from Blood culture) [[id number]]  (Abnormal) Collected: 10/02/24 1049    Order Status: Completed Updated: 10/03/24 1322     Enterococcus faecalis Not Detected     Enterococcus faecium Not Detected     Listeria monocytogenes Not Detected     Staphylococcus spp. Detected     Staphylococcus aureus Not Detected     Staphylococcus epidermidis Not Detected     Staphylococcus lugdunensis Not Detected     Streptococcus species Not Detected     Streptococcus agalactiae Not Detected     Streptococcus pneumoniae Not Detected     Streptococcus pyogenes Not Detected     Acinetobacter calcoaceticus/baumannii complex Not Detected     Bacteroides fragilis Not Detected     Enterobacterales Not Detected     Enterobacter cloacae complex Not Detected     Escherichia coli Not Detected     Klebsiella aerogenes Not Detected     Klebsiella oxytoca Not Detected     Klebsiella pneumoniae group Not Detected     Proteus Not Detected     Salmonella sp Not Detected     Serratia marcescens Not Detected     Haemophilus influenzae Not Detected     Neisseria meningtidis Not Detected     Pseudomonas aeruginosa Not Detected     Stenotrophomonas maltophilia Not Detected     Candida albicans Not Detected     Candida auris Not Detected     Candida glabrata Not Detected     Candida krusei Not Detected     Candida parapsilosis Not Detected     Candida tropicalis Not Detected     Cryptococcus neoformans/gattii Not Detected     CTX-M (ESBL ) Test not applicable     IMP (Carbapenem  resistant) Test not applicable     KPC resistance gene (Carbapenem resistant) Test not applicable     mcr-1  Test not applicable     mec A/C  Test not applicable     mec A/C and MREJ (MRSA) gene Test not applicable     NDM (Carbapenem resistant) Test not applicable     OXA-48-like (Carbapenem resistant) Test not applicable     van A/B (VRE gene) Test not applicable     VIM (Carbapenem resistant) Test not applicable    Narrative:      Aerobic and anaerobic    Blood culture x two cultures. Draw prior to antibiotics [[id number]] Collected: 10/02/24 1057    Order Status: Completed Specimen: Blood from Peripheral, Hand, Left Updated: 10/03/24 1232     Blood Culture, Routine No Growth to date      No Growth to date    Narrative:      Aerobic and anaerobic    Stool culture **cannot be ordered stat** [[id number]] Collected: 10/02/24 1919    Order Status: Completed Specimen: Stool Updated: 10/03/24 1128     Stool Culture Nothing significant to date    Clostridium difficile EIA [[id number]] Collected: 10/02/24 1919    Order Status: Completed Specimen: Stool Updated: 10/03/24 0031     C. diff Antigen Negative     C difficile Toxins A+B, EIA Negative     Comment: Testing not recommended for children <24 months old.               MOST RECENT IMAGING  Echo    Left Ventricle: Left ventricle was not well visualized due to poor   sonic window. Regional wall motion abnormalities present. See diagram for   wall motion findings. There is moderately reduced systolic function with a   visually estimated ejection fraction of 30 - 35%. There is normal   diastolic function. E/A ratio is 0.84. Average E/e' ratio is 8.35.    Right Ventricle: Normal right ventricular cavity size. Systolic   function is normal.    Mitral Valve: There is mild to moderate regurgitation.    Tricuspid Valve: There is mild regurgitation.    Pulmonic Valve: There is mild regurgitation.    Pericardium: There is a small effusion. No indication of cardiac    tamponade.      ECHOCARDIOGRAM RESULTS (last 5)  Results for orders placed during the hospital encounter of 10/02/24    Echo    Interpretation Summary    Left Ventricle: Left ventricle was not well visualized due to poor sonic window. Regional wall motion abnormalities present. See diagram for wall motion findings. There is moderately reduced systolic function with a visually estimated ejection fraction of 30 - 35%. There is normal diastolic function. E/A ratio is 0.84. Average E/e' ratio is 8.35.    Right Ventricle: Normal right ventricular cavity size. Systolic function is normal.    Mitral Valve: There is mild to moderate regurgitation.    Tricuspid Valve: There is mild regurgitation.    Pulmonic Valve: There is mild regurgitation.    Pericardium: There is a small effusion. No indication of cardiac tamponade.      CURRENT/PREVIOUS VISIT EKG  Results for orders placed or performed during the hospital encounter of 10/02/24   Repeat EKG 12-lead    Collection Time: 10/03/24 10:07 AM   Result Value Ref Range    QRS Duration 82 ms    OHS QTC Calculation 520 ms    Narrative    Test Reason : R07.9,    Vent. Rate : 096 BPM     Atrial Rate : 096 BPM     P-R Int : 154 ms          QRS Dur : 082 ms      QT Int : 412 ms       P-R-T Axes : 086 082 106 degrees     QTc Int : 520 ms    Normal sinus rhythm  Low voltage QRS  Prolonged QT  Abnormal ECG  When compared with ECG of 03-OCT-2024 05:41,  The axis Shifted right  Confirmed by Lars BEAR, Wade DAVISON ([id number]) on 10/3/2024 9:26:50 PM    Referred By: [email or web address]   SELF           Confirmed By:Wade Sousa MD           ASSESSMENT/PLAN:     Active Hospital Problems    Diagnosis    *Septic shock    Bacteremia due to Staphylococcus    Calculus of gallbladder with acute cholecystitis without obstruction    Neuropathic pain    Acute renal failure    Acquired hypothyroidism    CLL (chronic lymphocytic leukemia)    Anxiety       ASSESSMENT & PLAN:     Newly reduced EF  Elevated HS  troponin likely type II MI  Septic shock  Bacteremia   GM- improved   S/p cholecystectomy  CLL      RECOMMENDATIONS:    Patient is recovering from cholecystectomy as well as sepsis. Recommend ischemic evaluation once optimized and improved overall.   In the meantime, will treat medically and optimize medication including GDMT as tolerated.   She currently remains on norepinephrine.  Patient with complaints significant headaches.  She has been given subcu heparin and ideally should receive full-dose Lovenox versus heparin drip when okay with surgery.  CT of head checked which is negative.  Recommend starting her on full-dose anticoagulation when okay with General surgery status post cholecystectomy.  Further recommendations based on hospital course.  Thank you for the consultation.      Harika Kelley NP  Date of Service: 10/04/2024  11:08 AM          I have personally interviewed and examined the patient, I have reviewed the Nurse Practitioner's history and physical, assessment, and plan. I have personally evaluated the patient at bedside and agree with the findings and made appropriate changes as necessary in recommendations.      PATIENT WITH ONSET OF CHEST PAIN OVER THE LAST WEEK OR 2.  SHE IS ADMITTED WITH SEPSIS AND UNDERWENT CHOLECYSTECTOMY.  SHE IS ON NOREPINEPHRINE FOR SEPTIC SHOCK.  ECHOCARDIOGRAM REVEALS PROBABLE DECREASED EJECTION FRACTION SECONDARY TO MYOCARDIAL INFARCTION VERSUS SEVERE SEPSIS  CONTINUE SUPPORTIVE CARE FOR NOW    RECOMMEND HEART CATHETERIZATION BEFORE DISCHARGE  Wade Sousa MD  Department of Cardiology  [facility]  10/04/2024 11:14 AM

## 2024-10-04 NOTE — PLAN OF CARE
Problem: Adult Inpatient Plan of Care  Goal: Plan of Care Review  Outcome: Progressing  Goal: Patient-Specific Goal (Individualized)  Outcome: Progressing  Goal: Absence of Hospital-Acquired Illness or Injury  Outcome: Progressing  Goal: Optimal Comfort and Wellbeing  Outcome: Progressing  Goal: Readiness for Transition of Care  Outcome: Progressing     Problem: Sepsis/Septic Shock  Goal: Optimal Coping  Outcome: Progressing  Goal: Absence of Bleeding  Outcome: Progressing  Goal: Blood Glucose Level Within Targeted Range  Outcome: Progressing  Goal: Absence of Infection Signs and Symptoms  Outcome: Progressing  Goal: Optimal Nutrition Intake  Outcome: Progressing     Problem: Acute Kidney Injury/Impairment  Goal: Fluid and Electrolyte Balance  Outcome: Progressing  Goal: Improved Oral Intake  Outcome: Progressing  Goal: Effective Renal Function  Outcome: Progressing     Problem: Infection  Goal: Absence of Infection Signs and Symptoms  Outcome: Progressing     Problem: Wound  Goal: Optimal Coping  Outcome: Progressing  Goal: Optimal Functional Ability  Outcome: Progressing  Goal: Absence of Infection Signs and Symptoms  Outcome: Progressing  Goal: Improved Oral Intake  Outcome: Progressing  Goal: Optimal Pain Control and Function  Outcome: Progressing  Goal: Skin Health and Integrity  Outcome: Progressing  Goal: Optimal Wound Healing  Outcome: Progressing     Problem: Skin Injury Risk Increased  Goal: Skin Health and Integrity  Outcome: Progressing     Problem: Fall Injury Risk  Goal: Absence of Fall and Fall-Related Injury  Outcome: Progressing

## 2024-10-04 NOTE — PROGRESS NOTES
[facility]  Progress Note    Patient Name: Yvette Hutchinson  MRN: [id number]  Patient Class: IP- Inpatient   Admission Date: 10/2/2024  Length of Stay: 2 days  Attending Physician: Farida Martin MD  Primary Care Provider: Vern Priest MD        Subjective:     Principal Problem:Septic shock        HPI:  64-year-old female with PMH of CLL, and lung cancer status post partial resection who presented to the ER because of presyncope, nausea, vomiting, diarrhea and abdominal pain.  According to the patient, for over 1 week she has been having intractable nausea, vomiting and diarrhea.  Also epigastric and right upper quadrant abdominal pain described as sharp, 10/10 on pain scale radiate to the rest of her abdomen.  Today when she stood up from a sitting position, she felt as if she will pass out.  As a result, she decided to come to the ER for evaluation.  Denied any fever or chills.  She denied chest pain or shortness on breath.    In the ER, vitals showed a blood pressure of 99/55, but soon dropped to the 70s.  Heart rate was 122, respiratory rate of 20, temperature of 101.6°, and patient was satting 95% on room air.  CBC was white count of 21, with history of CLL.  CMP showed sodium of 135, acute renal failure with creatinine of 1.4 compared to baseline of 1, and patient has elevated LFTs with alk-phos of 216, , and AST of 497.  COVID/flu are negative.  Blood cultures were collected.  CTA chest negative for pulmonary embolus, but showed mild diffuse bronchial wall thickening that could reflect infectious or inflammatory bronchitis in the appropriate clinical setting.  CT abdomen/pelvis showed pericholecystic edema without evidence of gallstones.  Cholecystitis cannot be excluded. Also mild Linda pancreatic edema which may represent early pancreatitis. US abdomen showed Cholelithiasis, gallbladder wall thickening, and mild pericholecystic fluid. Patient was  started on Zosyn/vanco.  Was given sepsis bolus of lactated ringer of 2286 cc.  Started on Levophed for blood pressure support.  General surgery was consulted.  Patient will be admitted to ICU for septic shock.       Overview/Hospital Course:  This 64-year-old lady with history of CLL, lung cancer status post partial resection presented to the emergency department due to presyncope, nausea vomiting and abdominal pain.  She was also noted to be hypotensive and tachycardic on admission.  She was also noted to have fever.  Patient was admitted to the hospital for septic shock concern for abdominal source given CT findings showing some pericholecystic fluid.  Patient was also noted to have transaminitis and imaging did reveal some peripancreatic fluid and inflammatory changes as well though her lipase was normal.  General surgery was consulted after ultrasound the gallbladder was performed which again redemonstrated pericholecystic fluid.  She is status post laparoscopic cholecystectomy on 10/30.  Gallbladder was not noted to be gangrenous and per the op note there was suspicion that this was not the source of her septic shock.  However, blood cultures grew only coagulation negative Staphylococcus in 1/2 bottles.  This was thought to be contaminant.  Infectious Disease was consulted and vancomycin and Zosyn was continued on this patient.  Levophed initiated on admission was able to be weaned, although she still had requirement the day after surgery.    Interval History:  Patient seen and examined this morning.  She is in no acute distress although she continues to look quite tired.  She subjectively reports feeling better than yesterday.  Nurse reports she has an appetite and had solids and liquids for breakfast.  She has not had any additional fever.  Patient is still requiring Levophed at lower dose.  She reports some abdominal pain.  She is without additional episodes of emesis.    Review of Systems is performed and  is negative unless mentioned in the interval history above.  Objective:     Vital Signs (Most Recent):  Temp: 99.8 °F (37.7 °C) (10/04/24 1101)  Pulse: 91 (10/04/24 1315)  Resp: 18 (10/04/24 1315)  BP: 97/66 (10/04/24 1301)  SpO2: 100 % (10/04/24 1315) Vital Signs (24h Range):  Temp:  [98.2 °F (36.8 °C)-99.8 °F (37.7 °C)] 99.8 °F (37.7 °C)  Pulse:  [85-99] 91  Resp:  [10-36] 18  SpO2:  [96 %-100 %] 100 %  BP: ()/(49-72) 97/66  Arterial Line BP: ()/(21-64) 103/55     Weight: 76.2 kg (168 lb)  Body mass index is 29.76 kg/m².    Intake/Output Summary (Last 24 hours) at 10/4/2024 1521  Last data filed at 10/4/2024 1426  Gross per 24 hour   Intake 5386.96 ml   Output 2170 ml   Net 3216.96 ml         Physical Exam  Vitals reviewed.   Constitutional:       Appearance: Normal appearance.   HENT:      Head: Normocephalic and atraumatic.      Mouth/Throat:      Mouth: Mucous membranes are moist.   Eyes:      Extraocular Movements: Extraocular movements intact.      Conjunctiva/sclera: Conjunctivae normal.      Pupils: Pupils are equal, round, and reactive to light.   Cardiovascular:      Rate and Rhythm: Normal rate and regular rhythm.   Pulmonary:      Effort: Pulmonary effort is normal.      Breath sounds: Normal breath sounds.   Abdominal:      General: Abdomen is flat. Bowel sounds are normal.      Palpations: Abdomen is soft.      Comments: Only light palpation use given surgery yesterday.  Patient continues to have diffuse abdominal tenderness    Musculoskeletal:         General: No swelling or tenderness.   Skin:     General: Skin is warm.   Neurological:      Mental Status: She is alert and oriented to person, place, and time.   Psychiatric:         Mood and Affect: Mood normal.         Behavior: Behavior normal.             Significant Labs: All pertinent labs within the past 24 hours have been reviewed.  BMP:   Recent Labs   Lab 10/04/24  0455   GLU 93      K 3.8      CO2 20*   BUN 15    CREATININE 1.2   CALCIUM 6.9*   MG 2.2     CBC:   Recent Labs   Lab 10/03/24  0110 10/03/24  1139 10/03/24  1209 10/04/24  0455   WBC 45.30*  --   --  34.88*   HGB 13.6  --   --  11.3*   HCT 41.8 34* 31* 33.8*     --   --  244       Significant Imaging: I have reviewed all pertinent imaging results/findings within the past 24 hours.  I have reviewed and interpreted all pertinent imaging results/findings within the past 24 hours.    Assessment/Plan:      * Septic shock  - Suspect secondary to intra-abdominal process.  CT reveals pericholecystic fluid and peripancreatic fluid  She is now status post cholecystectomy with General surgery, per op note the gallbladder did not appear significantly inflamed to be commensurate with the patient's presentation of septic shock, 1/2 bottles drawn for blood culture was growing coagulase-negative Staphylococcus, felt to be contaminant, however, given patient's persistent pressor requirement infectious Disease was consulted, recommendation to continue vancomycin and Zosyn at this time  Infectious Disease we will defer imaging of patient's C-spine for now  Continued follow up blood cultures  Levophed as needed for map less than 65      Bacteremia due to Staphylococcus  This has now resulted as coagulase-negative staph, likely contaminant  ID following and would like to continue vancomycin for now    Abdominal pain  Suspect secondary to acute cholecystitis.    CT abdomen also showed mild pancreatitis, but lipase is within normal limits.    Given her diarrhea however, will order stool culture.    Supportive care with IV fluids, Zofran for nausea.  P.r.n. pain medication.      Acute renal failure  Most recent creatinine and eGFR are listed below.  Recent Labs     10/02/24  1034 10/03/24  0110 10/04/24  0455   CREATININE 1.4 1.8* 1.2   EGFRNORACEVR 41.8* 30.9* 50.2*       Likely pre-renal given her septic shock.   Status post IV hydration, patient able to take PO hydration  GM  resolving      Neuropathic pain  Resume gabapentin.      Calculus of gallbladder with acute cholecystitis without obstruction  Status post cholecystectomy      Acquired hypothyroidism  Resume synthroid.       CLL (chronic lymphocytic leukemia)  Monitor. Patient's last chemo for over a year.  Follow up with Hematology outpatient.      Anxiety  Resume Wellbutrin and Celexa        VTE Risk Mitigation (From admission, onward)           Ordered     heparin (porcine) injection 5,000 Units  Every 8 hours         10/02/24 1611     IP VTE HIGH RISK PATIENT  Once         10/02/24 1611     Place sequential compression device  Until discontinued         10/02/24 1611                    Discharge Planning   SALINA: 10/8/2024     Code Status: Full Code   Is the patient medically ready for discharge?:     Reason for patient still in hospital (select all that apply): Treatment  Discharge Plan A: Home with family            Critical care time spent on the evaluation and treatment of severe organ dysfunction, review of pertinent labs and imaging studies, discussions with consulting providers and discussions with patient/family: 35 minutes.      Farida Martin MD  Department of Hospital Medicine   [facility]

## 2024-10-04 NOTE — PROGRESS NOTES
"[facility]   Department of Infectious Disease  Progress Note        PATIENT NAME: Yvette Hutchinson  MRN: [id number]  TODAY'S DATE: 10/04/2024  ADMIT DATE: 10/2/2024  LOS: 2 days    CHIEF COMPLAINT: Abdominal Pain, Nausea, Vomiting, and Diarrhea (Pt has been sick for the last two weeks)      PRINCIPLE PROBLEM: Septic shock    INTERVAL HISTORY      10/04/2024:  Improving.  WBC down to 35.  Afebrile.  Blood cultures have grown MSSE in 1/2 bottles.  No gallbladder fluid or tissue culture done.  Creatinine down to 1.2.  Levophed dose down to 0.35 microgram/kilogram per minute.    Antibiotics (From admission, onward)      Start     Stop Route Frequency Ordered    10/04/24 0900  mupirocin 2 % ointment 1 g         10/09/24 0859 Nasl 2 times daily 10/04/24 0633    10/03/24 2200  piperacillin-tazobactam 4.5 g in dextrose 5 % 100 mL IVPB (ready to mix)         -- IV Every 8 hours (non-standard times) 10/03/24 1506    10/02/24 2100  mupirocin 2 % ointment         10/07/24 2059 Nasl 2 times daily 10/02/24 1823    10/02/24 1554  vancomycin - pharmacy to dose  (vancomycin IVPB (PEDS and ADULTS))        Placed in "And" Linked Group    -- IV pharmacy to manage frequency 10/02/24 1455          Antifungals (From admission, onward)      None           Antivirals (From admission, onward)      None            ASSESSMENT and PLAN      Septic shock in a patient with CLL.  Probably from abdominal source since her symptoms were primarily GI related.  She has had laparoscopic cholecystostomy.  Continue antibiotics and follow blood and any gallbladder cultures..  Remains on low-dose Levophed..     2. GM.  Resolved.      3. CLL.  Currently not on any specific medication for this.  It does make her immunosuppressed.       4. Acute liver injury.  Maybe congestive hepatopathy from shock.  Also with elevated troponins.  We will check acute liver panel.    5. MSSE bacteremia.  Most likely contaminant.  Antibiotics as above for " now.    RECOMMENDATIONS:    Continue current empiric antibiotics  Continue to monitor clinically    Please send Epic secure chat with any questions.  Discussed with the patient and her family at bedside.      SUBJECTIVE    Yvette Hutchinson is a 64 y.o. female with history of CLL, GERD, arthritis.  Also previous left lung cancer status post resection.  Had anterior cervical fusion on 08/06/2024 with an uneventful postoperative course.  Presented to the emergency room 10/02/2024 with nausea vomiting, diarrhea and abdominal pain of about 9 days duration.  In the ER BP 99/55, pulse 1-2, respiratory rate 18, temperature 101.6°.  She had abdominal tenderness was worse in the epigastric area.       WBC 21, hematocrit 38, platelet count 224, creatinine 1.4.  , , lipase 42, alkaline phosphatase to 1 6.  UA unremarkable.  Chest x-ray with no acute infiltrates.  CT chest showed increased interstitial markings with few pleural based lesions/infiltrates on the left.  CT abdomen and pelvis documented gallstones with pericholecystic fluid and edema which was confirmed on ultrasound she was admitted and placed on IV fluids and antibiotics.  Later seen by general surgeon and laparoscopic cholecystostomy 10/03/2024.  ID asked to assist with her care.     Current lab data showed WBC 45, hematocrit 41, creatinine 1.8, AST 1970, ALT 1316, alkaline phosphatase 220, total protein 5.6     History from patient and medical record.     Antibiotic history:    Vancomycin: 10/02/2024-  Zosyn: 10/02/2024-     Microbiology:    Blood culture 10/02/2024:  Staphylococcus species 1/2   Influenza and COVID-19 assay 10/02/2024: Negative    Review of Systems  Negative except as stated above in Interval History     OBJECTIVE   Temp:  [98 °F (36.7 °C)-99.8 °F (37.7 °C)] 98.2 °F (36.8 °C)  Pulse:  [84-99] 94  Resp:  [10-36] 13  SpO2:  [95 %-100 %] 100 %  BP: ()/(49-81) 105/64  Arterial Line BP: ()/(21-63) 122/61  Temp:  [98  °F (36.7 °C)-99.8 °F (37.7 °C)]   Temp: 98.2 °F (36.8 °C) (10/04/24 0701)  Pulse: 94 (10/04/24 0730)  Resp: 13 (10/04/24 0730)  BP: 105/64 (10/04/24 0701)  SpO2: 100 % (10/04/24 0730)    Intake/Output Summary (Last 24 hours) at 10/4/2024 0830  Last data filed at 10/4/2024 0600  Gross per 24 hour   Intake 3115.54 ml   Output 2865 ml   Net 250.54 ml       Physical Exam  General:  Middle-aged woman who is acutely ill looking.  Lying quietly in bed  HEENT:  Healed right anterior neck surgical wound.  No erythema or induration.  CVS: S1 and 2 heard, tachycardic, no murmurs appreciated.  On Levophed 0.45 micrograms/kilogram per minute    Respiratory: Clear to auscultation   Abdomen:  Laparoscopic port sites are clean with no erythema or drainage.  Abdomen is distended, soft, nontender.  Skin: No rash appreciated  CNS: No focal deficits   Musculoskeletal: No joint effusions or abnormalities noted     VAD:  ISOLATION:  None     Wounds:  Surgical abdominal    Significant Labs: All pertinent labs within the past 24 hours have been reviewed.    CBC LAST 7 DAYS  Recent Labs   Lab 10/02/24  1034 10/03/24  0110 10/03/24  1139 10/03/24  1209 10/04/24  0455   WBC 21.30* 45.30*  --   --  34.88*   RBC 3.93* 4.36  --   --  3.58*   HGB 12.5 13.6  --   --  11.3*   HCT 37.7 41.8 34* 31* 33.8*   MCV 96 96  --   --  94   MCH 31.8* 31.2*  --   --  31.6*   MCHC 33.2 32.5  --   --  33.4   RDW 13.4 13.8  --   --  14.3    239  --   --  244   MPV 10.6 11.9  --   --  10.2   GRAN 43.0 22.0*  --   --  39.0   LYMPH 50.0*  CANCELED 62.0*  --   --  37.0  CANCELED   MONO 7.0  CANCELED 3.0*  --   --  6.0  CANCELED   BASO CANCELED  --   --   --  CANCELED   NRBC 0 0  --   --  0       CHEMISTRY LAST 7 DAYS  Recent Labs   Lab 10/02/24  1034 10/03/24  0110 10/03/24  1139 10/03/24  1209 10/04/24  0455   * 133*  --   --  139   K 3.8 4.1  --   --  3.8    103  --   --  110   CO2 19* 17*  --   --  20*   ANIONGAP 11 13  --   --  9   BUN 14  "17  --   --  15   CREATININE 1.4 1.8*  --   --  1.2   GLU 97 71  --   --  93   CALCIUM 8.4* 7.7*  --   --  6.9*   PH  --   --  7.198* 7.300*  --    MG 1.5* 1.7  --   --  2.2   ALBUMIN 3.9 3.5  --   --  2.7*   PROT 6.2 5.6*  --   --  4.4*   ALKPHOS 216* 220*  --   --  229*   * 1,316*  --   --  935*   * 1,970*  --   --  813*   BILITOT 0.6 0.9  --   --  1.0       Estimated Creatinine Clearance: 45.7 mL/min (based on SCr of 1.2 mg/dL).    INFLAMMATORY/PROCAL  LAST 7 DAYS  No results for input(s): "PROCAL", "ESR", "CRP" in the last 168 hours.  No results found for: "ESR"  CRP   Date Value Ref Range Status   09/07/2021 2.7 0.0 - 8.2 mg/L Final   03/03/2020 3.5 0.0 - 8.2 mg/L Final       PRIOR  MICROBIOLOGY:    Susceptibility data from last 90 days.  Collected Specimen Info Organism   10/02/24 Blood from Peripheral, Wrist, Right COAGULASE NEGATIVE STAPHYLOCOCCI       LAST 7 DAYS MICROBIOLOGY   Microbiology Results (last 7 days)       Procedure Component Value Units Date/Time    Blood culture x two cultures. Draw prior to antibiotics [[id number]]  (Abnormal) Collected: 10/02/24 1049    Order Status: Completed Specimen: Blood from Peripheral, Wrist, Right Updated: 10/04/24 0807     Blood Culture, Routine Gram stain aer bottle: Gram positive cocci      Results called to and read back by:Val Nino RN-ED;  10/03/2024      11:58 CJD      COAGULASE NEGATIVE STAPHYLOCOCCI  Organism is a probable contaminant      Narrative:      Aerobic and anaerobic    Rapid Organism ID by PCR (from Blood culture) [[id number]]  (Abnormal) Collected: 10/02/24 1049    Order Status: Completed Updated: 10/03/24 1322     Enterococcus faecalis Not Detected     Enterococcus faecium Not Detected     Listeria monocytogenes Not Detected     Staphylococcus spp. Detected     Staphylococcus aureus Not Detected     Staphylococcus epidermidis Not Detected     Staphylococcus lugdunensis Not Detected     Streptococcus species Not Detected     " Streptococcus agalactiae Not Detected     Streptococcus pneumoniae Not Detected     Streptococcus pyogenes Not Detected     Acinetobacter calcoaceticus/baumannii complex Not Detected     Bacteroides fragilis Not Detected     Enterobacterales Not Detected     Enterobacter cloacae complex Not Detected     Escherichia coli Not Detected     Klebsiella aerogenes Not Detected     Klebsiella oxytoca Not Detected     Klebsiella pneumoniae group Not Detected     Proteus Not Detected     Salmonella sp Not Detected     Serratia marcescens Not Detected     Haemophilus influenzae Not Detected     Neisseria meningtidis Not Detected     Pseudomonas aeruginosa Not Detected     Stenotrophomonas maltophilia Not Detected     Candida albicans Not Detected     Candida auris Not Detected     Candida glabrata Not Detected     Candida krusei Not Detected     Candida parapsilosis Not Detected     Candida tropicalis Not Detected     Cryptococcus neoformans/gattii Not Detected     CTX-M (ESBL ) Test not applicable     IMP (Carbapenem resistant) Test not applicable     KPC resistance gene (Carbapenem resistant) Test not applicable     mcr-1  Test not applicable     mec A/C  Test not applicable     mec A/C and MREJ (MRSA) gene Test not applicable     NDM (Carbapenem resistant) Test not applicable     OXA-48-like (Carbapenem resistant) Test not applicable     van A/B (VRE gene) Test not applicable     VIM (Carbapenem resistant) Test not applicable    Narrative:      Aerobic and anaerobic    Blood culture x two cultures. Draw prior to antibiotics [[id number]] Collected: 10/02/24 1057    Order Status: Completed Specimen: Blood from Peripheral, Hand, Left Updated: 10/03/24 1232     Blood Culture, Routine No Growth to date      No Growth to date    Narrative:      Aerobic and anaerobic    Stool culture **cannot be ordered stat** [[id number]] Collected: 10/02/24 1919    Order Status: Completed Specimen: Stool Updated: 10/03/24 1128     Stool  Culture Nothing significant to date    Clostridium difficile EIA [[id number]] Collected: 10/02/24 1919    Order Status: Completed Specimen: Stool Updated: 10/03/24 0031     C. diff Antigen Negative     C difficile Toxins A+B, EIA Negative     Comment: Testing not recommended for children <24 months old.               CURRENT/PREVIOUS VISIT EKG  Results for orders placed or performed during the hospital encounter of 10/02/24   Repeat EKG 12-lead    Collection Time: 10/03/24 10:07 AM   Result Value Ref Range    QRS Duration 82 ms    [facility] QTC Calculation 520 ms    Narrative    Test Reason : R07.9,    Vent. Rate : 096 BPM     Atrial Rate : 096 BPM     P-R Int : 154 ms          QRS Dur : 082 ms      QT Int : 412 ms       P-R-T Axes : 086 082 106 degrees     QTc Int : 520 ms    Normal sinus rhythm  Low voltage QRS  Prolonged QT  Abnormal ECG  When compared with ECG of 03-OCT-2024 05:41,  The axis Shifted right  Confirmed by Lars BEAR, Wade DAVISON ([id number]) on 10/3/2024 9:26:50 PM    Referred By: [email or web address]   SELF           Confirmed By:Wade Sousa MD        Significant Imaging: I have reviewed all relevant and available imaging results/findings within the past 24 hours.    I spent a total of 55 minutes on the day of the visit.This includes face to face time and non-face to face time preparing to see the patient (eg, review of tests), obtaining and/or reviewing separately obtained history, documenting clinical information in the electronic or other health record, independently interpreting results and communicating results to the patient/family/caregiver, or care coordinator.    Lemuel Man MD  Date of Service: 10/04/2024      This note was created using [employer] voice recognition software that occasionally misinterpreted phrases or words.

## 2024-10-04 NOTE — ASSESSMENT & PLAN NOTE
- Suspect secondary to intra-abdominal process.  CT reveals pericholecystic fluid and peripancreatic fluid  She is now status post cholecystectomy with General surgery, per op note the gallbladder did not appear significantly inflamed to be commensurate with the patient's presentation of septic shock, 1/2 bottles drawn for blood culture was growing coagulase-negative Staphylococcus, felt to be contaminant, however, given patient's persistent pressor requirement infectious Disease was consulted, recommendation to continue vancomycin and Zosyn at this time  Infectious Disease we will defer imaging of patient's C-spine for now  Continued follow up blood cultures  Levophed as needed for map less than 65

## 2024-10-04 NOTE — CARE UPDATE
10/03/24 4320   Patient Assessment/Suction   Level of Consciousness (AVPU) alert   Respiratory Effort Unlabored   Expansion/Accessory Muscles/Retractions no use of accessory muscles   All Lung Fields Breath Sounds clear;equal bilaterally   Rhythm/Pattern, Respiratory no shortness of breath reported   Cough Frequency no cough   PRE-TX-O2   Device (Oxygen Therapy) nasal cannula   $ Is the patient on Low Flow Oxygen? Yes   Flow (L/min) (Oxygen Therapy) 2   SpO2 100 %   Pulse Oximetry Type Continuous   $ Pulse Oximetry - Multiple Charge Pulse Oximetry - Multiple   Pulse 87   Resp 12   Positioning   Head of Bed (HOB) Positioning HOB elevated;HOB at 20-30 degrees

## 2024-10-04 NOTE — H&P (VIEW-ONLY)
[facility]  Department of Cardiology  Consult Note      PATIENT NAME: Yvette Hutchinson    MRN: [id number]  TODAY'S DATE: 10/04/2024  ADMIT DATE: 10/2/2024                          CONSULT REQUESTED BY: Farida Martin MD    SUBJECTIVE     PRINCIPAL PROBLEM: Septic shock    HPI:  Patient is a 64-year-old female who presented to the emergency room with nausea vomiting diarrhea and abdominal pain as well as near syncope.  She apparently had some chest tightness for a week or so prior to this event as well.  Patient with elevated troponin levels on arrival but was also being treated for sepsis.  Patient was noted to have cholecystitis and underwent laparoscopic cholecystectomy yesterday.  Patient is noted to have bacteremia and is followed by infectious disease.  Echocardiogram noted which shows reduced ejection fraction.  Other history also includes CLL and lung cancer with history of partial resection.      REASON FOR CONSULT:  From Hospitalist H&P:  Reduced ejection fraction, elevated troponin      Review of patient's allergies indicates:  No Known Allergies    Past Medical History:   Diagnosis Date    Arthritis     Cancer 10/2022    (CLL) leukemia ; adenocarcinoma  adenosgemis    Depression     GERD (gastroesophageal reflux disease)     Neck pain     and back pain    Personal history of colonic polyps 07/07/2017    Pneumonia of left lung due to infectious organism 03/05/2020    Thyroid disease      Past Surgical History:   Procedure Laterality Date    FUSION, SPINE, CERVICAL, ANTERIOR APPROACH N/A 08/06/2024    Procedure: FUSION, SPINE, CERVICAL, ANTERIOR APPROACH;  Surgeon: Anatoly Daley DO;  Location: [facility] OR;  Service: Neurosurgery;  Laterality: N/A;  IOM, C-ARM, MEDTRONICS, MICRO, HORSESHOE    INJECTION OF ANESTHETIC AGENT AROUND MULTIPLE INTERCOSTAL NERVES Left 10/03/2022    Procedure: BLOCK, NERVE, INTERCOSTAL, 2 OR MORE;  Surgeon: Evelio Kaplan MD;  [facility];   Service: Thoracic;  Laterality: Left;    INSERTION OF TUNNELED CENTRAL VENOUS CATHETER (CVC) WITH SUBCUTANEOUS PORT Right 11/03/2022    Procedure: [id number], Right or Left Neck or Chest;  Surgeon: Mini Acevedo MD;  Location: [facility] OR 2ND FLR;  Service: General;  Laterality: Right;    LAPAROSCOPIC CHOLECYSTECTOMY N/A 10/3/2024    Procedure: CHOLECYSTECTOMY, LAPAROSCOPIC;  Surgeon: Ang Mosley III, MD;  Location: [facility] OR;  Service: General;  Laterality: N/A;    ROBOT-ASSISTED LAPAROSCOPIC LYMPHADENECTOMY USING DA MONIQUE XI Left 10/03/2022    Procedure: XI ROBOTIC LYMPHADENECTOMY;  Surgeon: Evelio Kaplan MD;  Location: [facility] OR [facility] FLR;  Service: Thoracic;  Laterality: Left;    SPINE SURGERY      TONSILLECTOMY      XI ROBOTIC RATS,WITH LOBECTOMY,LUNG Left 10/03/2022    Procedure: XI ROBOTIC RATS,WITH LOBECTOMY,LUNG;  Surgeon: Evelio Kaplan MD;  Location: [facility] OR 2ND FLR;  Service: Thoracic;  Laterality: Left;     Social History     Tobacco Use    Smoking status: Some Days     Current packs/day: 0.15     Types: Cigarettes     Passive exposure: Current    Smokeless tobacco: Never    Tobacco comments:     reports one cigarette every now and then   Substance Use Topics    Alcohol use: Yes     Comment: rarely    Drug use: Yes     Types: Marijuana        REVIEW OF SYSTEMS  Per HPI    OBJECTIVE     VITAL SIGNS (Most Recent)  Temp: 98.2 °F (36.8 °C) (10/04/24 0701)  Pulse: 98 (10/04/24 1000)  Resp: 17 (10/04/24 1000)  BP: 105/64 (10/04/24 0701)  SpO2: 100 % (10/04/24 1000)    VENTILATION STATUS  Resp: 17 (10/04/24 1000)  SpO2: 100 % (10/04/24 1000)           I & O (Last 24H):  Intake/Output Summary (Last 24 hours) at 10/4/2024 1108  Last data filed at 10/4/2024 1031  Gross per 24 hour   Intake 6711.07 ml   Output 3015 ml   Net 3696.07 ml       WEIGHTS  Wt Readings from Last 1 Encounters:   10/02/24 1016 76.2 kg (168 lb)       PHYSICAL EXAM  CONSTITUTIONAL: No fever, no chills  HEENT:  Normocephalic, atraumatic,pupils reactive to light                 NECK:  No JVD no carotid bruit  CVS: S1S2+, RRR  LUNGS: Clear  ABDOMEN: Soft, NT, BS+  EXTREMITIES: No cyanosis, edema  : No ramirez catheter  NEURO: AAO X 3  PSY: Normal affect      HOME MEDICATIONS:  No current facility-administered medications on file prior to encounter.     Current Outpatient Medications on File Prior to Encounter   Medication Sig Dispense Refill    acetaminophen (TYLENOL) 500 MG tablet Take 2 tablets (1,000 mg total) by mouth every 6 (six) hours as needed for Pain. 8 tablet 0    albuterol (PROVENTIL/VENTOLIN HFA) 90 mcg/actuation inhaler Inhale 2 puffs into the lungs every 6 (six) hours as needed for Wheezing or Shortness of Breath. Rescue 8.5 g 11    buPROPion (WELLBUTRIN XL) 300 MG 24 hr tablet Take 1 tablet (300 mg total) by mouth once daily. 90 tablet 3    citalopram (CELEXA) 20 MG tablet Take 1 tablet (20 mg total) by mouth once daily. 30 tablet 11    fluticasone propionate (FLONASE) 50 mcg/actuation nasal spray 1 spray (50 mcg total) by Each Nostril route once daily. 16 g 3    fluticasone-salmeterol 230-21 mcg/dose (ADVAIR HFA) 230-21 mcg/actuation HFAA inhaler Inhale 2 puffs into the lungs 2 (two) times daily. Controller 12 g 5    gabapentin (NEURONTIN) 300 MG capsule Take 1 capsule (300 mg total) by mouth 3 (three) times daily. 270 capsule 1    levocetirizine (XYZAL) 5 MG tablet Take 1 tablet (5 mg total) by mouth every evening. 30 tablet 5    levothyroxine (SYNTHROID) 50 MCG tablet Take 1 tablet (50 mcg total) by mouth before breakfast. 90 tablet 3    pravastatin (PRAVACHOL) 10 MG tablet Take 1 tablet (10 mg total) by mouth once daily. 90 tablet 3    topiramate (TOPAMAX) 100 MG tablet Take 1 tablet (100 mg total) by mouth 2 (two) times daily. 60 tablet 11    naloxone (NARCAN) 4 mg/actuation Spry ADMINISTER A SINGLE SPRAY IN ONE NOSTRIL UPON SIGNS OF OPIOID OVERDOSE. CALL 911. REPEAT AFTER 3 MINUTES IF NO RESPONSE.       varenicline (CHANTIX) 1 mg Tab Take 1 tablet by mouth twice daily (Patient not taking: Reported on 10/2/2024) 180 tablet 0       SCHEDULED MEDS:   aspirin  81 mg Oral Daily    buPROPion  300 mg Oral Daily    citalopram  20 mg Oral Daily    gabapentin  300 mg Oral TID    heparin (porcine)  5,000 Units Subcutaneous Q8H    levothyroxine  50 mcg Oral Before breakfast    mupirocin  1 g Nasal BID    mupirocin   Nasal BID    piperacillin-tazobactam (Zosyn) IV (PEDS and ADULTS) (extended infusion is not appropriate)  4.5 g Intravenous Q8H    pravastatin  10 mg Oral Daily    topiramate  100 mg Oral BID       CONTINUOUS INFUSIONS:   NORepinephrine bitartrate-D5W  0-3 mcg/kg/min Intravenous Continuous 12.5 mL/hr at 10/04/24 1016 0.35 mcg/kg/min at 10/04/24 1016       PRN MEDS:  Current Facility-Administered Medications:     acetaminophen, 650 mg, Oral, Q4H PRN    aluminum-magnesium hydroxide-simethicone, 30 mL, Oral, QID PRN    dextrose 50%, 12.5 g, Intravenous, PRN    dextrose 50%, 25 g, Intravenous, PRN    glucagon (human recombinant), 1 mg, Intramuscular, PRN    glucose, 16 g, Oral, PRN    glucose, 24 g, Oral, PRN    HYDROcodone-acetaminophen, 1 tablet, Oral, Q6H PRN    HYDROmorphone, 0.5 mg, Intravenous, Q2H PRN    magnesium oxide, 800 mg, Oral, PRN    magnesium oxide, 800 mg, Oral, PRN    melatonin, 6 mg, Oral, Nightly PRN    naloxone, 0.02 mg, Intravenous, PRN    ondansetron, 4 mg, Intravenous, Q6H PRN    potassium bicarbonate, 35 mEq, Oral, PRN    potassium bicarbonate, 50 mEq, Oral, PRN    potassium bicarbonate, 60 mEq, Oral, PRN    potassium, sodium phosphates, 2 packet, Oral, PRN    potassium, sodium phosphates, 2 packet, Oral, PRN    potassium, sodium phosphates, 2 packet, Oral, PRN    prochlorperazine, 5 mg, Intravenous, Q4H PRN    senna-docusate 8.6-50 mg, 1 tablet, Oral, BID PRN    sodium chloride 0.9%, 2 mL, Intravenous, Q12H PRN    Pharmacy to dose Vancomycin consult, , , Once **AND** vancomycin - pharmacy to  "dose, , Intravenous, pharmacy to manage frequency    LABS AND DIAGNOSTICS     CBC LAST 3 DAYS  Recent Labs   Lab 10/02/24  1034 10/03/24  0110 10/03/24  1139 10/03/24  1209 10/04/24  0455   WBC 21.30* 45.30*  --   --  34.88*   RBC 3.93* 4.36  --   --  3.58*   HGB 12.5 13.6  --   --  11.3*   HCT 37.7 41.8 34* 31* 33.8*   MCV 96 96  --   --  94   MCH 31.8* 31.2*  --   --  31.6*   MCHC 33.2 32.5  --   --  33.4   RDW 13.4 13.8  --   --  14.3    239  --   --  244   MPV 10.6 11.9  --   --  10.2   GRAN 43.0 22.0*  --   --  39.0   LYMPH 50.0*  CANCELED 62.0*  --   --  37.0  CANCELED   MONO 7.0  CANCELED 3.0*  --   --  6.0  CANCELED   BASO CANCELED  --   --   --  CANCELED   NRBC 0 0  --   --  0       COAGULATION LAST 3 DAYS  No results for input(s): "LABPT", "INR", "APTT" in the last 168 hours.    CHEMISTRY LAST 3 DAYS  Recent Labs   Lab 10/02/24  1034 10/03/24  0110 10/03/24  1139 10/03/24  1209 10/04/24  0455   * 133*  --   --  139   K 3.8 4.1  --   --  3.8    103  --   --  110   CO2 19* 17*  --   --  20*   ANIONGAP 11 13  --   --  9   BUN 14 17  --   --  15   CREATININE 1.4 1.8*  --   --  1.2   GLU 97 71  --   --  93   CALCIUM 8.4* 7.7*  --   --  6.9*   PH  --   --  7.198* 7.300*  --    MG 1.5* 1.7  --   --  2.2   ALBUMIN 3.9 3.5  --   --  2.7*   PROT 6.2 5.6*  --   --  4.4*   ALKPHOS 216* 220*  --   --  229*   * 1,316*  --   --  935*   * 1,970*  --   --  813*   BILITOT 0.6 0.9  --   --  1.0       CARDIAC PROFILE LAST 3 DAYS  Recent Labs   Lab 10/02/24  1838 10/03/24  0112 10/03/24  0514   TROPONINIHS 1588.5* 1161.1* 1015.8*       ENDOCRINE LAST 3 DAYS  No results for input(s): "TSH", "PROCAL" in the last 168 hours.    LAST ARTERIAL BLOOD GAS  ABG  Recent Labs   Lab 10/03/24  1209   PH 7.300*   PO2 506*   PCO2 50.0*   HCO3 24.6   BE -2       LAST 7 DAYS MICROBIOLOGY   Microbiology Results (last 7 days)       Procedure Component Value Units Date/Time    Blood culture x two cultures. " Draw prior to antibiotics [[id number]]  (Abnormal) Collected: 10/02/24 1049    Order Status: Completed Specimen: Blood from Peripheral, Wrist, Right Updated: 10/04/24 0807     Blood Culture, Routine Gram stain aer bottle: Gram positive cocci      Results called to and read back by:Val Nino RN-ED;  10/03/2024      11:58 CJD      COAGULASE NEGATIVE STAPHYLOCOCCI  Organism is a probable contaminant      Narrative:      Aerobic and anaerobic    Rapid Organism ID by PCR (from Blood culture) [[id number]]  (Abnormal) Collected: 10/02/24 1049    Order Status: Completed Updated: 10/03/24 1322     Enterococcus faecalis Not Detected     Enterococcus faecium Not Detected     Listeria monocytogenes Not Detected     Staphylococcus spp. Detected     Staphylococcus aureus Not Detected     Staphylococcus epidermidis Not Detected     Staphylococcus lugdunensis Not Detected     Streptococcus species Not Detected     Streptococcus agalactiae Not Detected     Streptococcus pneumoniae Not Detected     Streptococcus pyogenes Not Detected     Acinetobacter calcoaceticus/baumannii complex Not Detected     Bacteroides fragilis Not Detected     Enterobacterales Not Detected     Enterobacter cloacae complex Not Detected     Escherichia coli Not Detected     Klebsiella aerogenes Not Detected     Klebsiella oxytoca Not Detected     Klebsiella pneumoniae group Not Detected     Proteus Not Detected     Salmonella sp Not Detected     Serratia marcescens Not Detected     Haemophilus influenzae Not Detected     Neisseria meningtidis Not Detected     Pseudomonas aeruginosa Not Detected     Stenotrophomonas maltophilia Not Detected     Candida albicans Not Detected     Candida auris Not Detected     Candida glabrata Not Detected     Candida krusei Not Detected     Candida parapsilosis Not Detected     Candida tropicalis Not Detected     Cryptococcus neoformans/gattii Not Detected     CTX-M (ESBL ) Test not applicable     IMP (Carbapenem  resistant) Test not applicable     KPC resistance gene (Carbapenem resistant) Test not applicable     mcr-1  Test not applicable     mec A/C  Test not applicable     mec A/C and MREJ (MRSA) gene Test not applicable     NDM (Carbapenem resistant) Test not applicable     OXA-48-like (Carbapenem resistant) Test not applicable     van A/B (VRE gene) Test not applicable     VIM (Carbapenem resistant) Test not applicable    Narrative:      Aerobic and anaerobic    Blood culture x two cultures. Draw prior to antibiotics [[id number]] Collected: 10/02/24 1057    Order Status: Completed Specimen: Blood from Peripheral, Hand, Left Updated: 10/03/24 1232     Blood Culture, Routine No Growth to date      No Growth to date    Narrative:      Aerobic and anaerobic    Stool culture **cannot be ordered stat** [[id number]] Collected: 10/02/24 1919    Order Status: Completed Specimen: Stool Updated: 10/03/24 1128     Stool Culture Nothing significant to date    Clostridium difficile EIA [[id number]] Collected: 10/02/24 1919    Order Status: Completed Specimen: Stool Updated: 10/03/24 0031     C. diff Antigen Negative     C difficile Toxins A+B, EIA Negative     Comment: Testing not recommended for children <24 months old.               MOST RECENT IMAGING  Echo    Left Ventricle: Left ventricle was not well visualized due to poor   sonic window. Regional wall motion abnormalities present. See diagram for   wall motion findings. There is moderately reduced systolic function with a   visually estimated ejection fraction of 30 - 35%. There is normal   diastolic function. E/A ratio is 0.84. Average E/e' ratio is 8.35.    Right Ventricle: Normal right ventricular cavity size. Systolic   function is normal.    Mitral Valve: There is mild to moderate regurgitation.    Tricuspid Valve: There is mild regurgitation.    Pulmonic Valve: There is mild regurgitation.    Pericardium: There is a small effusion. No indication of cardiac    tamponade.      ECHOCARDIOGRAM RESULTS (last 5)  Results for orders placed during the hospital encounter of 10/02/24    Echo    Interpretation Summary    Left Ventricle: Left ventricle was not well visualized due to poor sonic window. Regional wall motion abnormalities present. See diagram for wall motion findings. There is moderately reduced systolic function with a visually estimated ejection fraction of 30 - 35%. There is normal diastolic function. E/A ratio is 0.84. Average E/e' ratio is 8.35.    Right Ventricle: Normal right ventricular cavity size. Systolic function is normal.    Mitral Valve: There is mild to moderate regurgitation.    Tricuspid Valve: There is mild regurgitation.    Pulmonic Valve: There is mild regurgitation.    Pericardium: There is a small effusion. No indication of cardiac tamponade.      CURRENT/PREVIOUS VISIT EKG  Results for orders placed or performed during the hospital encounter of 10/02/24   Repeat EKG 12-lead    Collection Time: 10/03/24 10:07 AM   Result Value Ref Range    QRS Duration 82 ms    OHS QTC Calculation 520 ms    Narrative    Test Reason : R07.9,    Vent. Rate : 096 BPM     Atrial Rate : 096 BPM     P-R Int : 154 ms          QRS Dur : 082 ms      QT Int : 412 ms       P-R-T Axes : 086 082 106 degrees     QTc Int : 520 ms    Normal sinus rhythm  Low voltage QRS  Prolonged QT  Abnormal ECG  When compared with ECG of 03-OCT-2024 05:41,  The axis Shifted right  Confirmed by Lars BEAR, Wade DAVISON ([id number]) on 10/3/2024 9:26:50 PM    Referred By: [email or web address]   SELF           Confirmed By:Wade Sousa MD           ASSESSMENT/PLAN:     Active Hospital Problems    Diagnosis    *Septic shock    Bacteremia due to Staphylococcus    Calculus of gallbladder with acute cholecystitis without obstruction    Neuropathic pain    Acute renal failure    Acquired hypothyroidism    CLL (chronic lymphocytic leukemia)    Anxiety       ASSESSMENT & PLAN:     Newly reduced EF  Elevated HS  troponin likely type II MI  Septic shock  Bacteremia   GM- improved   S/p cholecystectomy  CLL      RECOMMENDATIONS:    Patient is recovering from cholecystectomy as well as sepsis. Recommend ischemic evaluation once optimized and improved overall.   In the meantime, will treat medically and optimize medication including GDMT as tolerated.   She currently remains on norepinephrine.  Patient with complaints significant headaches.  She has been given subcu heparin and ideally should receive full-dose Lovenox versus heparin drip when okay with surgery.  CT of head checked which is negative.  Recommend starting her on full-dose anticoagulation when okay with General surgery status post cholecystectomy.  Further recommendations based on hospital course.  Thank you for the consultation.      Harika Kelley NP  Date of Service: 10/04/2024  11:08 AM          I have personally interviewed and examined the patient, I have reviewed the Nurse Practitioner's history and physical, assessment, and plan. I have personally evaluated the patient at bedside and agree with the findings and made appropriate changes as necessary in recommendations.      PATIENT WITH ONSET OF CHEST PAIN OVER THE LAST WEEK OR 2.  SHE IS ADMITTED WITH SEPSIS AND UNDERWENT CHOLECYSTECTOMY.  SHE IS ON NOREPINEPHRINE FOR SEPTIC SHOCK.  ECHOCARDIOGRAM REVEALS PROBABLE DECREASED EJECTION FRACTION SECONDARY TO MYOCARDIAL INFARCTION VERSUS SEVERE SEPSIS  CONTINUE SUPPORTIVE CARE FOR NOW    RECOMMEND HEART CATHETERIZATION BEFORE DISCHARGE  Wade Sousa MD  Department of Cardiology  [facility]  10/04/2024 11:14 AM

## 2024-10-04 NOTE — PT/OT/SLP PROGRESS
Physical Therapy      Patient Name:  Yvette Hutchinson   MRN:  [id number]    Patient not seen today secondary to Patient unwilling to participate. Will follow-up 10/5/24.

## 2024-10-04 NOTE — SUBJECTIVE & OBJECTIVE
Interval History:  Patient seen and examined this morning.  She is in no acute distress although she continues to look quite tired.  She subjectively reports feeling better than yesterday.  Nurse reports she has an appetite and had solids and liquids for breakfast.  She has not had any additional fever.  Patient is still requiring Levophed at lower dose.  She reports some abdominal pain.  She is without additional episodes of emesis.    Review of Systems is performed and is negative unless mentioned in the interval history above.  Objective:     Vital Signs (Most Recent):  Temp: 99.8 °F (37.7 °C) (10/04/24 1101)  Pulse: 91 (10/04/24 1315)  Resp: 18 (10/04/24 1315)  BP: 97/66 (10/04/24 1301)  SpO2: 100 % (10/04/24 1315) Vital Signs (24h Range):  Temp:  [98.2 °F (36.8 °C)-99.8 °F (37.7 °C)] 99.8 °F (37.7 °C)  Pulse:  [85-99] 91  Resp:  [10-36] 18  SpO2:  [96 %-100 %] 100 %  BP: ()/(49-72) 97/66  Arterial Line BP: ()/(21-64) 103/55     Weight: 76.2 kg (168 lb)  Body mass index is 29.76 kg/m².    Intake/Output Summary (Last 24 hours) at 10/4/2024 1521  Last data filed at 10/4/2024 1426  Gross per 24 hour   Intake 5386.96 ml   Output 2170 ml   Net 3216.96 ml         Physical Exam  Vitals reviewed.   Constitutional:       Appearance: Normal appearance.   HENT:      Head: Normocephalic and atraumatic.      Mouth/Throat:      Mouth: Mucous membranes are moist.   Eyes:      Extraocular Movements: Extraocular movements intact.      Conjunctiva/sclera: Conjunctivae normal.      Pupils: Pupils are equal, round, and reactive to light.   Cardiovascular:      Rate and Rhythm: Normal rate and regular rhythm.   Pulmonary:      Effort: Pulmonary effort is normal.      Breath sounds: Normal breath sounds.   Abdominal:      General: Abdomen is flat. Bowel sounds are normal.      Palpations: Abdomen is soft.      Comments: Only light palpation use given surgery yesterday.  Patient continues to have diffuse abdominal  tenderness    Musculoskeletal:         General: No swelling or tenderness.   Skin:     General: Skin is warm.   Neurological:      Mental Status: She is alert and oriented to person, place, and time.   Psychiatric:         Mood and Affect: Mood normal.         Behavior: Behavior normal.             Significant Labs: All pertinent labs within the past 24 hours have been reviewed.  BMP:   Recent Labs   Lab 10/04/24  0455   GLU 93      K 3.8      CO2 20*   BUN 15   CREATININE 1.2   CALCIUM 6.9*   MG 2.2     CBC:   Recent Labs   Lab 10/03/24  0110 10/03/24  1139 10/03/24  1209 10/04/24  0455   WBC 45.30*  --   --  34.88*   HGB 13.6  --   --  11.3*   HCT 41.8 34* 31* 33.8*     --   --  244       Significant Imaging: I have reviewed all pertinent imaging results/findings within the past 24 hours.  I have reviewed and interpreted all pertinent imaging results/findings within the past 24 hours.

## 2024-10-05 LAB
ALBUMIN SERPL BCP-MCNC: 2.8 G/DL (ref 3.5–5.2)
ALP SERPL-CCNC: 356 U/L (ref 55–135)
ALT SERPL W/O P-5'-P-CCNC: 824 U/L (ref 10–44)
ANION GAP SERPL CALC-SCNC: 4 MMOL/L (ref 8–16)
AST SERPL-CCNC: 628 U/L (ref 10–40)
BACTERIA STL CULT: NORMAL
BACTERIA STL CULT: NORMAL
BASOPHILS # BLD AUTO: 0.11 K/UL (ref 0–0.2)
BASOPHILS NFR BLD: 0.4 % (ref 0–1.9)
BILIRUB SERPL-MCNC: 1.3 MG/DL (ref 0.1–1)
BUN SERPL-MCNC: 11 MG/DL (ref 8–23)
CA-I BLDV-SCNC: 1.13 MMOL/L (ref 1.06–1.42)
CALCIUM SERPL-MCNC: 7.4 MG/DL (ref 8.7–10.5)
CHLORIDE SERPL-SCNC: 104 MMOL/L (ref 95–110)
CO2 SERPL-SCNC: 23 MMOL/L (ref 23–29)
CREAT SERPL-MCNC: 1.4 MG/DL (ref 0.5–1.4)
DIFFERENTIAL METHOD BLD: ABNORMAL
EOSINOPHIL # BLD AUTO: 1.9 K/UL (ref 0–0.5)
EOSINOPHIL NFR BLD: 7.4 % (ref 0–8)
ERYTHROCYTE [DISTWIDTH] IN BLOOD BY AUTOMATED COUNT: 14.3 % (ref 11.5–14.5)
EST. GFR  (NO RACE VARIABLE): 41.8 ML/MIN/1.73 M^2
GLUCOSE SERPL-MCNC: 96 MG/DL (ref 70–110)
HCT VFR BLD AUTO: 33.9 % (ref 37–48.5)
HGB BLD-MCNC: 10.9 G/DL (ref 12–16)
IMM GRANULOCYTES # BLD AUTO: 0.08 K/UL (ref 0–0.04)
IMM GRANULOCYTES NFR BLD AUTO: 0.3 % (ref 0–0.5)
LYMPHOCYTES # BLD AUTO: 17.5 K/UL (ref 1–4.8)
LYMPHOCYTES NFR BLD: 67 % (ref 18–48)
MAGNESIUM SERPL-MCNC: 1.8 MG/DL (ref 1.6–2.6)
MCH RBC QN AUTO: 30.8 PG (ref 27–31)
MCHC RBC AUTO-ENTMCNC: 32.2 G/DL (ref 32–36)
MCV RBC AUTO: 96 FL (ref 82–98)
MONOCYTES # BLD AUTO: 1.5 K/UL (ref 0.3–1)
MONOCYTES NFR BLD: 5.7 % (ref 4–15)
NEUTROPHILS # BLD AUTO: 5 K/UL (ref 1.8–7.7)
NEUTROPHILS NFR BLD: 19.2 % (ref 38–73)
NRBC BLD-RTO: 0 /100 WBC
OHS QRS DURATION: 104 MS
OHS QRS DURATION: 74 MS
OHS QTC CALCULATION: 595 MS
OHS QTC CALCULATION: 613 MS
PLATELET # BLD AUTO: 194 K/UL (ref 150–450)
PMV BLD AUTO: 10.3 FL (ref 9.2–12.9)
POTASSIUM SERPL-SCNC: 4 MMOL/L (ref 3.5–5.1)
PROT SERPL-MCNC: 4.5 G/DL (ref 6–8.4)
RBC # BLD AUTO: 3.54 M/UL (ref 4–5.4)
SODIUM SERPL-SCNC: 131 MMOL/L (ref 136–145)
VANCOMYCIN TROUGH SERPL-MCNC: 10 UG/ML
WBC # BLD AUTO: 26.1 K/UL (ref 3.9–12.7)

## 2024-10-05 PROCEDURE — 94640 AIRWAY INHALATION TREATMENT: CPT

## 2024-10-05 PROCEDURE — 97162 PT EVAL MOD COMPLEX 30 MIN: CPT

## 2024-10-05 PROCEDURE — 80053 COMPREHEN METABOLIC PANEL: CPT | Performed by: SURGERY

## 2024-10-05 PROCEDURE — 36415 COLL VENOUS BLD VENIPUNCTURE: CPT | Performed by: HOSPITALIST

## 2024-10-05 PROCEDURE — 63600175 PHARM REV CODE 636 W HCPCS: Performed by: SURGERY

## 2024-10-05 PROCEDURE — 27000221 HC OXYGEN, UP TO 24 HOURS

## 2024-10-05 PROCEDURE — 97530 THERAPEUTIC ACTIVITIES: CPT

## 2024-10-05 PROCEDURE — 99233 SBSQ HOSP IP/OBS HIGH 50: CPT | Mod: ,,, | Performed by: INTERNAL MEDICINE

## 2024-10-05 PROCEDURE — 83735 ASSAY OF MAGNESIUM: CPT | Performed by: SURGERY

## 2024-10-05 PROCEDURE — 99900031 HC PATIENT EDUCATION (STAT)

## 2024-10-05 PROCEDURE — 87040 BLOOD CULTURE FOR BACTERIA: CPT | Mod: 59 | Performed by: INTERNAL MEDICINE

## 2024-10-05 PROCEDURE — 36415 COLL VENOUS BLD VENIPUNCTURE: CPT | Performed by: SURGERY

## 2024-10-05 PROCEDURE — 20000000 HC ICU ROOM

## 2024-10-05 PROCEDURE — 99900035 HC TECH TIME PER 15 MIN (STAT)

## 2024-10-05 PROCEDURE — 25000003 PHARM REV CODE 250: Performed by: SURGERY

## 2024-10-05 PROCEDURE — 80202 ASSAY OF VANCOMYCIN: CPT | Performed by: HOSPITALIST

## 2024-10-05 PROCEDURE — 36415 COLL VENOUS BLD VENIPUNCTURE: CPT | Performed by: INTERNAL MEDICINE

## 2024-10-05 PROCEDURE — 82330 ASSAY OF CALCIUM: CPT | Performed by: HOSPITALIST

## 2024-10-05 PROCEDURE — 85025 COMPLETE CBC W/AUTO DIFF WBC: CPT | Performed by: SURGERY

## 2024-10-05 PROCEDURE — 94799 UNLISTED PULMONARY SVC/PX: CPT

## 2024-10-05 PROCEDURE — 25000003 PHARM REV CODE 250

## 2024-10-05 PROCEDURE — 94761 N-INVAS EAR/PLS OXIMETRY MLT: CPT

## 2024-10-05 PROCEDURE — 97535 SELF CARE MNGMENT TRAINING: CPT

## 2024-10-05 PROCEDURE — 63600175 PHARM REV CODE 636 W HCPCS: Mod: JZ,JG | Performed by: SURGERY

## 2024-10-05 PROCEDURE — 97165 OT EVAL LOW COMPLEX 30 MIN: CPT

## 2024-10-05 PROCEDURE — 25000003 PHARM REV CODE 250: Performed by: HOSPITALIST

## 2024-10-05 PROCEDURE — 63600175 PHARM REV CODE 636 W HCPCS

## 2024-10-05 PROCEDURE — 25000242 PHARM REV CODE 250 ALT 637 W/ HCPCS

## 2024-10-05 RX ORDER — MIDODRINE HYDROCHLORIDE 2.5 MG/1
2.5 TABLET ORAL 3 TIMES DAILY
Status: DISCONTINUED | OUTPATIENT
Start: 2024-10-05 | End: 2024-10-06

## 2024-10-05 RX ORDER — CLOPIDOGREL BISULFATE 75 MG/1
75 TABLET ORAL DAILY
Status: DISCONTINUED | OUTPATIENT
Start: 2024-10-05 | End: 2024-10-10

## 2024-10-05 RX ADMIN — BUDESONIDE INHALATION 1 MG: 0.5 SUSPENSION RESPIRATORY (INHALATION) at 08:10

## 2024-10-05 RX ADMIN — CITALOPRAM HYDROBROMIDE 20 MG: 20 TABLET ORAL at 09:10

## 2024-10-05 RX ADMIN — ARFORMOTEROL TARTRATE 15 MCG: 15 SOLUTION RESPIRATORY (INHALATION) at 07:10

## 2024-10-05 RX ADMIN — ONDANSETRON 4 MG: 2 INJECTION INTRAMUSCULAR; INTRAVENOUS at 09:10

## 2024-10-05 RX ADMIN — ASPIRIN 81 MG: 81 TABLET, COATED ORAL at 09:10

## 2024-10-05 RX ADMIN — HEPARIN SODIUM 5000 UNITS: 5000 INJECTION INTRAVENOUS; SUBCUTANEOUS at 06:10

## 2024-10-05 RX ADMIN — HYDROMORPHONE HYDROCHLORIDE 0.5 MG: 0.5 INJECTION, SOLUTION INTRAMUSCULAR; INTRAVENOUS; SUBCUTANEOUS at 07:10

## 2024-10-05 RX ADMIN — GABAPENTIN 300 MG: 300 CAPSULE ORAL at 09:10

## 2024-10-05 RX ADMIN — MIDODRINE HYDROCHLORIDE 2.5 MG: 2.5 TABLET ORAL at 02:10

## 2024-10-05 RX ADMIN — CLOPIDOGREL BISULFATE 75 MG: 75 TABLET, FILM COATED ORAL at 02:10

## 2024-10-05 RX ADMIN — ARFORMOTEROL TARTRATE 15 MCG: 15 SOLUTION RESPIRATORY (INHALATION) at 08:10

## 2024-10-05 RX ADMIN — HEPARIN SODIUM 5000 UNITS: 5000 INJECTION INTRAVENOUS; SUBCUTANEOUS at 09:10

## 2024-10-05 RX ADMIN — HYDROCODONE BITARTRATE AND ACETAMINOPHEN 1 TABLET: 5; 325 TABLET ORAL at 09:10

## 2024-10-05 RX ADMIN — MIDODRINE HYDROCHLORIDE 2.5 MG: 2.5 TABLET ORAL at 12:10

## 2024-10-05 RX ADMIN — MUPIROCIN 1 G: 20 OINTMENT TOPICAL at 09:10

## 2024-10-05 RX ADMIN — BUPROPION HYDROCHLORIDE 300 MG: 150 TABLET, EXTENDED RELEASE ORAL at 09:10

## 2024-10-05 RX ADMIN — HYDROMORPHONE HYDROCHLORIDE 0.5 MG: 0.5 INJECTION, SOLUTION INTRAMUSCULAR; INTRAVENOUS; SUBCUTANEOUS at 05:10

## 2024-10-05 RX ADMIN — HEPARIN SODIUM 5000 UNITS: 5000 INJECTION INTRAVENOUS; SUBCUTANEOUS at 02:10

## 2024-10-05 RX ADMIN — MIDODRINE HYDROCHLORIDE 2.5 MG: 2.5 TABLET ORAL at 09:10

## 2024-10-05 RX ADMIN — PIPERACILLIN SODIUM AND TAZOBACTAM SODIUM 4.5 G: 4; .5 INJECTION, POWDER, LYOPHILIZED, FOR SOLUTION INTRAVENOUS at 11:10

## 2024-10-05 RX ADMIN — TOPIRAMATE 100 MG: 25 TABLET, FILM COATED ORAL at 09:10

## 2024-10-05 RX ADMIN — Medication 800 MG: at 09:10

## 2024-10-05 RX ADMIN — PIPERACILLIN SODIUM AND TAZOBACTAM SODIUM 4.5 G: 4; .5 INJECTION, POWDER, LYOPHILIZED, FOR SOLUTION INTRAVENOUS at 06:10

## 2024-10-05 RX ADMIN — Medication 800 MG: at 01:10

## 2024-10-05 RX ADMIN — PIPERACILLIN SODIUM AND TAZOBACTAM SODIUM 4.5 G: 4; .5 INJECTION, POWDER, LYOPHILIZED, FOR SOLUTION INTRAVENOUS at 02:10

## 2024-10-05 RX ADMIN — VANCOMYCIN HYDROCHLORIDE 1250 MG: 1.25 INJECTION, POWDER, LYOPHILIZED, FOR SOLUTION INTRAVENOUS at 09:10

## 2024-10-05 RX ADMIN — GABAPENTIN 300 MG: 300 CAPSULE ORAL at 02:10

## 2024-10-05 RX ADMIN — BUDESONIDE INHALATION 1 MG: 0.5 SUSPENSION RESPIRATORY (INHALATION) at 07:10

## 2024-10-05 RX ADMIN — HYDROCODONE BITARTRATE AND ACETAMINOPHEN 1 TABLET: 5; 325 TABLET ORAL at 10:10

## 2024-10-05 RX ADMIN — PRAVASTATIN SODIUM 10 MG: 10 TABLET ORAL at 09:10

## 2024-10-05 RX ADMIN — LEVOTHYROXINE SODIUM 50 MCG: 0.03 TABLET ORAL at 06:10

## 2024-10-05 NOTE — PROGRESS NOTES
Replaced by [facility]  Department of Cardiology  Consult Note      PATIENT NAME: Yvette Hutchinson    MRN: [id number]  TODAY'S DATE: 10/05/2024  ADMIT DATE: 10/2/2024                          CONSULT REQUESTED BY: Iris Odell MD    SUBJECTIVE     PRINCIPAL PROBLEM: Septic shock    Interval history:  10/05/2024  Patient seen this morning sitting up in chair in the ICU.  Still complains of some nausea and abdominal discomfort.  Sister-in-law at bedside.  Remains on 0.2 of nor epi.    HPI:  Patient is a 64-year-old female who presented to the emergency room with nausea vomiting diarrhea and abdominal pain as well as near syncope.  She apparently had some chest tightness for a week or so prior to this event as well.  Patient with elevated troponin levels on arrival but was also being treated for sepsis.  Patient was noted to have cholecystitis and underwent laparoscopic cholecystectomy yesterday.  Patient is noted to have bacteremia and is followed by infectious disease.  Echocardiogram noted which shows reduced ejection fraction.  Other history also includes CLL and lung cancer with history of partial resection.      REASON FOR CONSULT:  From Hospitalist H&P:  Reduced ejection fraction, elevated troponin      Review of patient's allergies indicates:  No Known Allergies    Past Medical History:   Diagnosis Date    Arthritis     Cancer 10/2022    (CLL) leukemia ; adenocarcinoma  adenosgemis    Depression     GERD (gastroesophageal reflux disease)     Neck pain     and back pain    Personal history of colonic polyps 07/07/2017    Pneumonia of left lung due to infectious organism 03/05/2020    Thyroid disease      Past Surgical History:   Procedure Laterality Date    FUSION, SPINE, CERVICAL, ANTERIOR APPROACH N/A 08/06/2024    Procedure: FUSION, SPINE, CERVICAL, ANTERIOR APPROACH;  Surgeon: Anatoly Daley DO;  Location: [facility] OR;  Service: Neurosurgery;  Laterality: N/A;  IOM, C-ARM, MEDTRONICS, MICRO,  HORSESHOE    INJECTION OF ANESTHETIC AGENT AROUND MULTIPLE INTERCOSTAL NERVES Left 10/03/2022    Procedure: BLOCK, NERVE, INTERCOSTAL, 2 OR MORE;  Surgeon: Evelio Kaplan MD;  Location: [facility] OR 2ND FLR;  Service: Thoracic;  Laterality: Left;    INSERTION OF TUNNELED CENTRAL VENOUS CATHETER (CVC) WITH SUBCUTANEOUS PORT Right 11/03/2022    Procedure: [id number], Right or Left Neck or Chest;  Surgeon: Mini Acevedo MD;  Location: [facility] OR 2ND FLR;  Service: General;  Laterality: Right;    LAPAROSCOPIC CHOLECYSTECTOMY N/A 10/3/2024    Procedure: CHOLECYSTECTOMY, LAPAROSCOPIC;  Surgeon: Ang Mosley III, MD;  Location: [facility] OR;  Service: General;  Laterality: N/A;    ROBOT-ASSISTED LAPAROSCOPIC LYMPHADENECTOMY USING DA MONIQUE XI Left 10/03/2022    Procedure: XI ROBOTIC LYMPHADENECTOMY;  Surgeon: Evelio Kaplan MD;  Location: [facility] OR 2ND FLR;  Service: Thoracic;  Laterality: Left;    SPINE SURGERY      TONSILLECTOMY      XI ROBOTIC RATS,WITH LOBECTOMY,LUNG Left 10/03/2022    Procedure: XI ROBOTIC RATS,WITH LOBECTOMY,LUNG;  Surgeon: Evelio Kaplan MD;  Location: [facility] OR 2ND FLR;  Service: Thoracic;  Laterality: Left;     Social History     Tobacco Use    Smoking status: Some Days     Current packs/day: 0.15     Types: Cigarettes     Passive exposure: Current    Smokeless tobacco: Never    Tobacco comments:     reports one cigarette every now and then   Substance Use Topics    Alcohol use: Yes     Comment: rarely    Drug use: Yes     Types: Marijuana        REVIEW OF SYSTEMS  Per HPI    OBJECTIVE     VITAL SIGNS (Most Recent)  Temp: 97.7 °F (36.5 °C) (10/05/24 1101)  Pulse: 71 (10/05/24 1200)  Resp: (!) 21 (10/05/24 1200)  BP: (!) 116/51 (10/05/24 1200)  SpO2: 97 % (10/05/24 1200)    VENTILATION STATUS  Resp: (!) 21 (10/05/24 1200)  SpO2: 97 % (10/05/24 1200)           I & O (Last 24H):  Intake/Output Summary (Last 24 hours) at 10/5/2024 1242  Last data filed at 10/5/2024  1209  Gross per 24 hour   Intake 2881.32 ml   Output 2757 ml   Net 124.32 ml       WEIGHTS  Wt Readings from Last 1 Encounters:   10/02/24 1016 76.2 kg (168 lb)       PHYSICAL EXAM  CONSTITUTIONAL: No fever, no chills, no acute distress elderly female sitting up in chair  HEENT: Normocephalic, atraumatic,pupils reactive to light                 NECK:  No JVD no carotid bruit  CVS: S1S2+, RRR  LUNGS: Clear  ABDOMEN: Soft, NT, BS+  EXTREMITIES: No cyanosis, edema  : No ramirez catheter  NEURO: AAO X 3  PSY: Normal affect      HOME MEDICATIONS:  No current facility-administered medications on file prior to encounter.     Current Outpatient Medications on File Prior to Encounter   Medication Sig Dispense Refill    acetaminophen (TYLENOL) 500 MG tablet Take 2 tablets (1,000 mg total) by mouth every 6 (six) hours as needed for Pain. 8 tablet 0    albuterol (PROVENTIL/VENTOLIN HFA) 90 mcg/actuation inhaler Inhale 2 puffs into the lungs every 6 (six) hours as needed for Wheezing or Shortness of Breath. Rescue 8.5 g 11    buPROPion (WELLBUTRIN XL) 300 MG 24 hr tablet Take 1 tablet (300 mg total) by mouth once daily. 90 tablet 3    citalopram (CELEXA) 20 MG tablet Take 1 tablet (20 mg total) by mouth once daily. 30 tablet 11    fluticasone propionate (FLONASE) 50 mcg/actuation nasal spray 1 spray (50 mcg total) by Each Nostril route once daily. 16 g 3    fluticasone-salmeterol 230-21 mcg/dose (ADVAIR HFA) 230-21 mcg/actuation HFAA inhaler Inhale 2 puffs into the lungs 2 (two) times daily. Controller 12 g 5    gabapentin (NEURONTIN) 300 MG capsule Take 1 capsule (300 mg total) by mouth 3 (three) times daily. 270 capsule 1    levocetirizine (XYZAL) 5 MG tablet Take 1 tablet (5 mg total) by mouth every evening. 30 tablet 5    levothyroxine (SYNTHROID) 50 MCG tablet Take 1 tablet (50 mcg total) by mouth before breakfast. 90 tablet 3    pravastatin (PRAVACHOL) 10 MG tablet Take 1 tablet (10 mg total) by mouth once daily. 90 tablet  3    topiramate (TOPAMAX) 100 MG tablet Take 1 tablet (100 mg total) by mouth 2 (two) times daily. 60 tablet 11    naloxone (NARCAN) 4 mg/actuation Spry ADMINISTER A SINGLE SPRAY IN ONE NOSTRIL UPON SIGNS OF OPIOID OVERDOSE. CALL 911. REPEAT AFTER 3 MINUTES IF NO RESPONSE.      varenicline (CHANTIX) 1 mg Tab Take 1 tablet by mouth twice daily (Patient not taking: Reported on 10/2/2024) 180 tablet 0       SCHEDULED MEDS:   arformoteroL  15 mcg Nebulization BID    aspirin  81 mg Oral Daily    budesonide  1 mg Nebulization Q12H    buPROPion  300 mg Oral Daily    citalopram  20 mg Oral Daily    gabapentin  300 mg Oral TID    heparin (porcine)  5,000 Units Subcutaneous Q8H    levothyroxine  50 mcg Oral Before breakfast    midodrine  2.5 mg Oral TID    mupirocin  1 g Nasal BID    piperacillin-tazobactam (Zosyn) IV (PEDS and ADULTS) (extended infusion is not appropriate)  4.5 g Intravenous Q8H    pravastatin  10 mg Oral Daily    topiramate  100 mg Oral BID    vancomycin (VANCOCIN) IV (PEDS and ADULTS)  1,250 mg Intravenous Q24H       CONTINUOUS INFUSIONS:   NORepinephrine bitartrate-D5W  0-3 mcg/kg/min Intravenous Continuous 7.1 mL/hr at 10/05/24 1220 0.2 mcg/kg/min at 10/05/24 1220       PRN MEDS:  Current Facility-Administered Medications:     acetaminophen, 650 mg, Oral, Q4H PRN    aluminum-magnesium hydroxide-simethicone, 30 mL, Oral, QID PRN    calcium gluconate IVPB, 1 g, Intravenous, PRN    calcium gluconate IVPB, 2 g, Intravenous, PRN    calcium gluconate IVPB, 3 g, Intravenous, PRN    dextrose 50%, 12.5 g, Intravenous, PRN    dextrose 50%, 25 g, Intravenous, PRN    glucagon (human recombinant), 1 mg, Intramuscular, PRN    glucose, 16 g, Oral, PRN    glucose, 24 g, Oral, PRN    HYDROcodone-acetaminophen, 1 tablet, Oral, Q6H PRN    HYDROmorphone, 0.5 mg, Intravenous, Q2H PRN    magnesium oxide, 800 mg, Oral, PRN    magnesium oxide, 800 mg, Oral, PRN    melatonin, 6 mg, Oral, Nightly PRN    naloxone, 0.02 mg,  "Intravenous, PRN    ondansetron, 4 mg, Intravenous, Q6H PRN    potassium bicarbonate, 35 mEq, Oral, PRN    potassium bicarbonate, 50 mEq, Oral, PRN    potassium bicarbonate, 60 mEq, Oral, PRN    potassium, sodium phosphates, 2 packet, Oral, PRN    potassium, sodium phosphates, 2 packet, Oral, PRN    potassium, sodium phosphates, 2 packet, Oral, PRN    prochlorperazine, 5 mg, Intravenous, Q4H PRN    senna-docusate 8.6-50 mg, 1 tablet, Oral, BID PRN    sodium chloride 0.9%, 2 mL, Intravenous, Q12H PRN    Pharmacy to dose Vancomycin consult, , , Once **AND** vancomycin - pharmacy to dose, , Intravenous, pharmacy to manage frequency    LABS AND DIAGNOSTICS     CBC LAST 3 DAYS  Recent Labs   Lab 10/02/24  1034 10/03/24  0110 10/03/24  1139 10/03/24  1209 10/04/24  0455 10/05/24  0336   WBC 21.30* 45.30*  --   --  34.88* 26.10*   RBC 3.93* 4.36  --   --  3.58* 3.54*   HGB 12.5 13.6  --   --  11.3* 10.9*   HCT 37.7 41.8   < > 31* 33.8* 33.9*   MCV 96 96  --   --  94 96   MCH 31.8* 31.2*  --   --  31.6* 30.8   MCHC 33.2 32.5  --   --  33.4 32.2   RDW 13.4 13.8  --   --  14.3 14.3    239  --   --  244 194   MPV 10.6 11.9  --   --  10.2 10.3   GRAN 43.0 22.0*  --   --  39.0 19.2*  5.0   LYMPH 50.0*  CANCELED 62.0*  --   --  37.0  CANCELED 67.0*  17.5*   MONO 7.0  CANCELED 3.0*  --   --  6.0  CANCELED 5.7  1.5*   BASO CANCELED  --   --   --  CANCELED 0.11   NRBC 0 0  --   --  0 0    < > = values in this interval not displayed.       COAGULATION LAST 3 DAYS  No results for input(s): "LABPT", "INR", "APTT" in the last 168 hours.    CHEMISTRY LAST 3 DAYS  Recent Labs   Lab 10/03/24  0110 10/03/24  1139 10/03/24  1209 10/04/24  0455 10/04/24  2101 10/05/24  0336   *  --   --  139 131* 131*   K 4.1  --   --  3.8 3.9 4.0     --   --  110 105 104   CO2 17*  --   --  20* 21* 23   ANIONGAP 13  --   --  9 5* 4*   BUN 17  --   --  15 11 11   CREATININE 1.8*  --   --  1.2 1.2 1.4   GLU 71  --   --  93 105 96 " "  CALCIUM 7.7*  --   --  6.9* 7.1* 7.4*   PH  --  7.198* 7.300*  --   --   --    MG 1.7  --   --  2.2 1.9 1.8   ALBUMIN 3.5  --   --  2.7*  --  2.8*   PROT 5.6*  --   --  4.4*  --  4.5*   ALKPHOS 220*  --   --  229*  --  356*   ALT 1,316*  --   --  935*  --  824*   AST 1,970*  --   --  813*  --  628*   BILITOT 0.9  --   --  1.0  --  1.3*       CARDIAC PROFILE LAST 3 DAYS  Recent Labs   Lab 10/02/24  1838 10/03/24  0112 10/03/24  0514   TROPONINIHS 1588.5* 1161.1* 1015.8*       ENDOCRINE LAST 3 DAYS  No results for input(s): "TSH", "PROCAL" in the last 168 hours.    LAST ARTERIAL BLOOD GAS  ABG  Recent Labs   Lab 10/03/24  1209   PH 7.300*   PO2 506*   PCO2 50.0*   HCO3 24.6   BE -2       LAST 7 DAYS MICROBIOLOGY   Microbiology Results (last 7 days)       Procedure Component Value Units Date/Time    Blood culture x two cultures. Draw prior to antibiotics [[id number]] Collected: 10/02/24 1057    Order Status: Completed Specimen: Blood from Peripheral, Hand, Left Updated: 10/05/24 1232     Blood Culture, Routine No Growth to date      No Growth to date      No Growth to date      No Growth to date    Narrative:      Aerobic and anaerobic    Blood culture [[id number]] Collected: 10/05/24 1037    Order Status: Sent Specimen: Blood Updated: 10/05/24 1043    Blood culture [[id number]] Collected: 10/05/24 1037    Order Status: Sent Specimen: Blood Updated: 10/05/24 1043    Stool culture **cannot be ordered stat** [[id number]] Collected: 10/02/24 1919    Order Status: Completed Specimen: Stool Updated: 10/05/24 1018     Stool Culture No Salmonella,Shigella,Vibrio,Campylobacter.      No E coli 0157:H7 isolated.    Blood culture x two cultures. Draw prior to antibiotics [[id number]]  (Abnormal) Collected: 10/02/24 1049    Order Status: Completed Specimen: Blood from Peripheral, Wrist, Right Updated: 10/04/24 0807     Blood Culture, Routine Gram stain aer bottle: Gram positive cocci      Results called to and read back " by:Val Nino, DONNA-ED;  10/03/2024      11:58 CJD      COAGULASE NEGATIVE STAPHYLOCOCCI  Organism is a probable contaminant      Narrative:      Aerobic and anaerobic    Rapid Organism ID by PCR (from Blood culture) [[id number]]  (Abnormal) Collected: 10/02/24 1049    Order Status: Completed Updated: 10/03/24 1322     Enterococcus faecalis Not Detected     Enterococcus faecium Not Detected     Listeria monocytogenes Not Detected     Staphylococcus spp. Detected     Staphylococcus aureus Not Detected     Staphylococcus epidermidis Not Detected     Staphylococcus lugdunensis Not Detected     Streptococcus species Not Detected     Streptococcus agalactiae Not Detected     Streptococcus pneumoniae Not Detected     Streptococcus pyogenes Not Detected     Acinetobacter calcoaceticus/baumannii complex Not Detected     Bacteroides fragilis Not Detected     Enterobacterales Not Detected     Enterobacter cloacae complex Not Detected     Escherichia coli Not Detected     Klebsiella aerogenes Not Detected     Klebsiella oxytoca Not Detected     Klebsiella pneumoniae group Not Detected     Proteus Not Detected     Salmonella sp Not Detected     Serratia marcescens Not Detected     Haemophilus influenzae Not Detected     Neisseria meningtidis Not Detected     Pseudomonas aeruginosa Not Detected     Stenotrophomonas maltophilia Not Detected     Candida albicans Not Detected     Candida auris Not Detected     Candida glabrata Not Detected     Candida krusei Not Detected     Candida parapsilosis Not Detected     Candida tropicalis Not Detected     Cryptococcus neoformans/gattii Not Detected     CTX-M (ESBL ) Test not applicable     IMP (Carbapenem resistant) Test not applicable     KPC resistance gene (Carbapenem resistant) Test not applicable     mcr-1  Test not applicable     mec A/C  Test not applicable     mec A/C and MREJ (MRSA) gene Test not applicable     NDM (Carbapenem resistant) Test not applicable      OXA-48-like (Carbapenem resistant) Test not applicable     van A/B (VRE gene) Test not applicable     VIM (Carbapenem resistant) Test not applicable    Narrative:      Aerobic and anaerobic    Clostridium difficile EIA [[id number]] Collected: 10/02/24 1919    Order Status: Completed Specimen: Stool Updated: 10/03/24 0031     C. diff Antigen Negative     C difficile Toxins A+B, EIA Negative     Comment: Testing not recommended for children <24 months old.               MOST RECENT IMAGING  CT Head Without Contrast  Narrative: EXAMINATION:  CT HEAD WITHOUT CONTRAST    CLINICAL HISTORY:  Headache, new or worsening (Age >= 50y);    TECHNIQUE:  Head CT without IV contrast.    COMPARISON:  MRI 09/24/2022    FINDINGS:  Minimal periventricular white matter low attenuation suggest minor chronic small vessel ischemic changes.  No acute intracranial hemorrhage, midline shift, or mass effect.    The ventricles and cisterns are maintained.    Calvarium is intact. Trace left maxillary sinus and right sphenoid sinus mucosal thickening is evident with mild mucosal thickening inferior frontal sinuses bilaterally.  Minimal fluid opacifies few inferior most left mastoid air cells.  Impression: No acute intracranial abnormality.    Electronically signed by: Vinay Daniels  Date:    10/04/2024  Time:    15:25      ECHOCARDIOGRAM RESULTS (last 5)  Results for orders placed during the hospital encounter of 10/02/24    Echo    Interpretation Summary    Left Ventricle: Left ventricle was not well visualized due to poor sonic window. Regional wall motion abnormalities present. See diagram for wall motion findings. There is moderately reduced systolic function with a visually estimated ejection fraction of 30 - 35%. There is normal diastolic function. E/A ratio is 0.84. Average E/e' ratio is 8.35.    Right Ventricle: Normal right ventricular cavity size. Systolic function is normal.    Mitral Valve: There is mild to moderate regurgitation.     Tricuspid Valve: There is mild regurgitation.    Pulmonic Valve: There is mild regurgitation.    Pericardium: There is a small effusion. No indication of cardiac tamponade.      CURRENT/PREVIOUS VISIT EKG  Results for orders placed or performed during the hospital encounter of 10/02/24   EKG 12-lead    Collection Time: 10/04/24  1:13 PM   Result Value Ref Range    QRS Duration 104 ms    OHS QTC Calculation 613 ms    Narrative    Test Reason : I21.4,    Vent. Rate : 092 BPM     Atrial Rate : 092 BPM     P-R Int : 142 ms          QRS Dur : 104 ms      QT Int : 496 ms       P-R-T Axes : 015 -45 113 degrees     QTc Int : 613 ms    Normal sinus rhythm  Low voltage QRS  Left anterior fascicular block  Cannot rule out Anterior infarct ,age undetermined  Prolonged QT  Abnormal ECG  When compared with ECG of 03-OCT-2024 10:07,  Left anterior fascicular block is now Present  Minimal criteria for Anterior infarct are now Present  QT has lengthened  Confirmed by Lars BEAR, Wade DAVISON ([id number]) on 10/5/2024 11:56:51 AM    Referred By: [email or web address]   SELF           Confirmed By:Wade Sousa MD           ASSESSMENT/PLAN:     Active Hospital Problems    Diagnosis    *Septic shock    Bacteremia due to Staphylococcus    Calculus of gallbladder with acute cholecystitis without obstruction    Neuropathic pain    Acute renal failure    Acquired hypothyroidism    CLL (chronic lymphocytic leukemia)    Anxiety       ASSESSMENT & PLAN:     Newly reduced EF 30-35%  Elevated HS troponin likely type II MI  Septic shock  Bacteremia   GM- improved   S/p cholecystectomy  CLL      RECOMMENDATIONS:    Patient is recovering from cholecystectomy as well as sepsis. Recommend ischemic evaluation once optimized and improved overall.  Still needs more time.  In the meantime, will treat medically and optimize medication including GDMT as tolerated.   She currently remains on norepinephrine.  Wean as tolerated.  Start midodrine 2.5 mg t.i.d. and hold for a  systolic greater than 140.  Could consider starting antiplatelet therapy once cleared by general surgery status post cholecystectomy.  We will continue to follow at this time.       Alexandria Whitman NP  Date of Service: 10/05/2024  11:08 AM  Recommend to add low doses of midodrine at 2.5 mg p.o. t.i.d. and hopefully we can wean off the pressors.  Continue monitor clinical.  Recommend to start on Plavix 75 mg daily.  I have personally interviewed and examined the patient, I have reviewed the Nurse Practitioner's history and physical, assessment, and plan. I have personally evaluated the patient at bedside and agree with the findings and made appropriate changes as necessary in recommendations.  Cardiac workup and patient is more stable and recovered from gallbladder surgery.  Discussed with the nursing staff, patient and family member at bedside  Yuval Suazo MD.    Department of Cardiology  [facility]  10/05/2024

## 2024-10-05 NOTE — ASSESSMENT & PLAN NOTE
Most recent creatinine and eGFR are listed below.  Recent Labs     10/04/24  0455 10/04/24  2101 10/05/24  0336   CREATININE 1.2 1.2 1.4   EGFRNORACEVR 50.2* 50.2* 41.8*       Likely pre-renal given her septic shock.   Status post IV hydration, patient able to take PO hydration  GM resolving

## 2024-10-05 NOTE — SUBJECTIVE & OBJECTIVE
Interval History:  Patient sitting up in chair attempting to eat a hamburger, family at bedside. On levophed. C/o abdominal soreness.   Review of Systems is performed and is negative unless mentioned in the interval history above.  Objective:     Vital Signs (Most Recent):  Temp: 97.7 °F (36.5 °C) (10/05/24 1101)  Pulse: 71 (10/05/24 1301)  Resp: 20 (10/05/24 1301)  BP: 117/64 (10/05/24 1301)  SpO2: 97 % (10/05/24 1301) Vital Signs (24h Range):  Temp:  [97 °F (36.1 °C)-98.6 °F (37 °C)] 97.7 °F (36.5 °C)  Pulse:  [68-92] 71  Resp:  [9-33] 20  SpO2:  [91 %-100 %] 97 %  BP: ()/(43-66) 117/64     Weight: 76.2 kg (168 lb)  Body mass index is 29.76 kg/m².    Intake/Output Summary (Last 24 hours) at 10/5/2024 1515  Last data filed at 10/5/2024 1429  Gross per 24 hour   Intake 3074.97 ml   Output 3497 ml   Net -422.03 ml         Physical Exam  Vitals reviewed.   Constitutional:       Appearance: Normal appearance.   HENT:      Head: Normocephalic and atraumatic.      Mouth/Throat:      Mouth: Mucous membranes are moist.   Eyes:      Extraocular Movements: Extraocular movements intact.      Conjunctiva/sclera: Conjunctivae normal.      Pupils: Pupils are equal, round, and reactive to light.   Cardiovascular:      Rate and Rhythm: Normal rate and regular rhythm.   Pulmonary:      Effort: Pulmonary effort is normal.      Breath sounds: Normal breath sounds.   Abdominal:      General: Abdomen is flat. Bowel sounds are normal.      Palpations: Abdomen is soft.      Comments: Only light palpation use given surgery yesterday.  Patient continues to have diffuse abdominal tenderness    Musculoskeletal:         General: No swelling or tenderness.   Skin:     General: Skin is warm.   Neurological:      Mental Status: She is alert and oriented to person, place, and time.   Psychiatric:         Mood and Affect: Mood normal.         Behavior: Behavior normal.             Significant Labs: All pertinent labs within the past 24 hours  have been reviewed.  BMP:   Recent Labs   Lab 10/05/24  0336   GLU 96   *   K 4.0      CO2 23   BUN 11   CREATININE 1.4   CALCIUM 7.4*   MG 1.8     CBC:   Recent Labs   Lab 10/04/24  0455 10/05/24  0336   WBC 34.88* 26.10*   HGB 11.3* 10.9*   HCT 33.8* 33.9*    194       Significant Imaging: I have reviewed all pertinent imaging results/findings within the past 24 hours.  I have reviewed and interpreted all pertinent imaging results/findings within the past 24 hours.

## 2024-10-05 NOTE — PT/OT/SLP EVAL
Occupational Therapy   Evaluation    Name: Yvette Hutchinson  MRN: [id number]  Admitting Diagnosis: Septic shock  Recent Surgery: Procedure(s) (LRB):  CHOLECYSTECTOMY, LAPAROSCOPIC (N/A) 2 Days Post-Op    Recommendations:     Discharge Recommendations: Moderate Intensity Therapy vs. Low Intensity depending on progress  Discharge Equipment Recommendations:   (to be determined by next level of care)  Barriers to discharge:  Decreased caregiver support (high fall risk, increased assistance with ADLs)    Assessment:     Yvette Hutchinson is a 64 y.o. female with a medical diagnosis of Septic shock.  Performance deficits affecting function: weakness, impaired endurance, impaired self care skills, impaired functional mobility, pain, impaired cardiopulmonary response to activity, orthopedic precautions, gait instability.      Rehab Prognosis: Good; patient would benefit from acute skilled OT services to address these deficits and reach maximum level of function.       Plan:     Patient to be seen 5 x/week to address the above listed problems via self-care/home management, therapeutic activities, therapeutic exercises  Plan of Care Expires: 11/05/24  Plan of Care Reviewed with: patient    Subjective     Chief Complaint: She is fatigued and is afraid to get up out of the bed because she is not sure if it is a good idea. She reports that the neck brace is suffocating her.  Patient/Family Comments/goals: To return home and hoping to get out of her neck brace soon.    Occupational Profile:  Living Environment: She live in a [facility] with her mother.  She home has a ramp. She has a tub shower combo with a shower seat.  Previous level of function: She was independent with self care, has not been driving for the past few weeks due to recently having neck surgery.   Roles and Routines: sedentary, ill  Equipment Used at Home: shower chair, walker, rolling, oxygen  Assistance upon Discharge:elderly mother    Pain/Comfort:  Pain  Rating 1: 6/10  Location - Side 1: Bilateral  Location - Orientation 1: generalized  Location 1: abdomen  Pain Addressed 1: Reposition, Distraction    Patients cultural, spiritual, Roman [employer] conflicts given the current situation: no    Objective:     Communicated with: nurse prior to session.  Patient found HOB elevated with ramirez catheter, telemetry, Other (comments), pulse ox (continuous), BIPAP, bed alarm upon OT entry to room.    General Precautions: Standard, fall  Orthopedic Precautions: spinal precautions  Braces: [place] J collar  Respiratory Status: Nasal cannula, flow 3 L/min    Occupational Performance:    Bed Mobility:    Patient completed Rolling/Turning to Right with minimum assistance  Patient completed Supine to Sit with moderate assistance    Functional Mobility/Transfers:  Patient completed Sit <> Stand Transfer with minimum assistance  with  rolling walker   Patient completed Bed <> Chair Transfer using Step Transfer technique with contact guard assistance with rolling walker      Activities of Daily Living:  Toileting: moderate assistance for hygiene, toileted on bed side commode using rolling walker.  She required verbal cues for sequencing and using rolling walker in tight space.    Cognitive/Visual Perceptual:  Cognitive/Psychosocial Skills:     -       Oriented to: Person, Place, Time, and Situation   -       Follows Commands/attention:Follows one-step commands  -       Communication: clear/fluent  -       Memory: No Deficits noted  -       Safety awareness/insight to disability: intact   -       Mood/Affect/Coping skills/emotional control: Appropriate to situation    Physical Exam:  Balance:    -       static sitting balance- fair +  Dominant hand:    -       right  Upper Extremity Range of Motion:     -       Right Upper Extremity: WNL  -       Left Upper Extremity: WNL  Upper Extremity Strength:    -       Right Upper Extremity: WFL  -       Left Upper Extremity: WFL   Strength:    -        Right Upper Extremity: WNL  -       Left Upper Extremity: WNL  Fine Motor Coordination:    -       Intact  Gross motor coordination:   WFL    AMPAC 6 Click ADL:  AMPAC Total Score: 13    Treatment & Education:  She was educated on Role of Occupational Therapy, goals and plan of care.  Discussed importance of OOB activity and functional mobility to negate the negative effects of prolonged bedrest during this hospitalization, safe transfers and d/c planning recommendations. Therapist provided facilitation and instruction of proper body mechanics and fall prevention strategies during tasks listed above.      Patient left up in chair with all lines intact, call button in reach, and chair alarm on    GOALS:   Multidisciplinary Problems       Occupational Therapy Goals          Problem: Occupational Therapy    Goal Priority Disciplines Outcome Interventions   Occupational Therapy Goal     OT, PT/OT     Description: Goals to be met by: 11/05/2024     Patient will increase functional independence with ADLs by performing:    UE Dressing with Set-up Assistance.  LE Dressing with Minimal Assistance.  Grooming while standing at sink with Supervision.  Toileting from toilet with Supervision for hygiene and clothing management.   Supine to sit with Supervision.                         History:     Past Medical History:   Diagnosis Date    Arthritis     Cancer 10/2022    (CLL) leukemia ; adenocarcinoma  adenosgemis    Depression     GERD (gastroesophageal reflux disease)     Neck pain     and back pain    Personal history of colonic polyps 07/07/2017    Pneumonia of left lung due to infectious organism 03/05/2020    Thyroid disease          Past Surgical History:   Procedure Laterality Date    FUSION, SPINE, CERVICAL, ANTERIOR APPROACH N/A 08/06/2024    Procedure: FUSION, SPINE, CERVICAL, ANTERIOR APPROACH;  Surgeon: Anatoly Daley DO;  Location: [facility];  Service: Neurosurgery;  Laterality: N/A;  IOM, C-ARM, MEDTRONICS,  MICRO, HORSESHOE    INJECTION OF ANESTHETIC AGENT AROUND MULTIPLE INTERCOSTAL NERVES Left 10/03/2022    Procedure: BLOCK, NERVE, INTERCOSTAL, 2 OR MORE;  Surgeon: Evelio Kaplan MD;  [facility];  Service: Thoracic;  Laterality: Left;    INSERTION OF TUNNELED CENTRAL VENOUS CATHETER (CVC) WITH SUBCUTANEOUS PORT Right 11/03/2022    Procedure: [id number], Right or Left Neck or Chest;  Surgeon: Mini Acevedo MD;  [facility];  Service: General;  Laterality: Right;    LAPAROSCOPIC CHOLECYSTECTOMY N/A 10/3/2024    Procedure: CHOLECYSTECTOMY, LAPAROSCOPIC;  Surgeon: Ang Mosley III, MD;  Location: [facility];  Service: General;  Laterality: N/A;    ROBOT-ASSISTED LAPAROSCOPIC LYMPHADENECTOMY USING DA MONIQUE XI Left 10/03/2022    Procedure: XI ROBOTIC LYMPHADENECTOMY;  Surgeon: Evelio Kaplan MD;  Location: [facility] OR [address] [place] [address];  Service: Thoracic;  Laterality: Left;    SPINE SURGERY      TONSILLECTOMY      XI ROBOTIC RATS,WITH LOBECTOMY,LUNG Left 10/03/2022    Procedure: XI ROBOTIC RATS,WITH LOBECTOMY,LUNG;  Surgeon: Evelio Kaplan MD;  Location: [facility] OR [address] [place] [address];  Service: Thoracic;  Laterality: Left;       Time Tracking:     OT Date of Treatment: 10/05/24  OT Start Time: 0914  OT Stop Time: 1004  OT Total Time (min): 50 min    Billable Minutes:Evaluation 15  Self Care/Home Management 15  Therapeutic Activity 20    10/5/2024

## 2024-10-05 NOTE — PT/OT/SLP EVAL
Physical Therapy Evaluation    Patient Name:  Yvette Hutchinson   MRN:  [id number]    Recommendations:     Discharge Recommendations: Moderate Intensity Therapy (vs Low with close supervision, pending progress)   Discharge Equipment Recommendations: none   Barriers to discharge:  medical issues    Assessment:     Yvette Hutchinson is a 64 y.o. female admitted with a medical diagnosis of Septic shock.  She presents with the following impairments/functional limitations: weakness, impaired endurance, impaired self care skills, impaired functional mobility, gait instability, impaired balance, decreased safety awareness, pain, edema, impaired cardiopulmonary response to activity, other (comment) (spinal precautions).     Pt found up in chair and reluctantly agreeable to working with PT. Pt A & O x  4 and has the following co-morbidities: Anxiety, Chronic Lymphocytic Leukemia, ARF, Bacteremia, s/p Cholecystectomy.  Pt tolerated session only fairly due to symptomatic orthostatic hypotension and required minimum assistance for safety during session today, but unable to progress pt to gait training. Pt would benefit from acute PT during hospitalization to increase strength, endurance and safety with mobility. Due to pt now having had 2 surgeries within as many months, PT feels pt may need inpatient follow up therapy prior to discharge home.  The pt currently demonstrates a need for Moderate Intensity Therapy on a daily basis secondary to decline in functional status due to illness/recent surgeries without full recovery prior to second.  If pt progresses well and is able to return home with Low Intensity Therapy pt will need close supervision for safety.         Rehab Prognosis: Fair; patient would benefit from acute skilled PT services to address these deficits and reach maximum level of function.    Recent Surgery: Procedure(s) (LRB):  CHOLECYSTECTOMY, LAPAROSCOPIC (N/A) 2 Days Post-Op    Plan:     During this  "hospitalization, patient to be seen daily to address the identified rehab impairments via gait training, therapeutic activities, therapeutic exercises, neuromuscular re-education and progress toward the following goals:    Plan of Care Expires:  11/02/24    Subjective     Chief Complaint: "I almost passed out when I got out of bed earlier."  Patient/Family Comments/goals: return to prior level of function  Pain/Comfort:  Pain Rating 1: 8/10  Location 1: abdomen  Pain Addressed 1: Pre-medicate for activity, Reposition, Distraction, Cessation of Activity, Nurse notified  Pain Rating Post-Intervention 1: 8/10    Patients cultural, spiritual, Hoahaoism conflicts given the current situation:      Living Environment:  Pt lives with her mother in a [facility] with no [employer]  Prior to admission, patients level of function was MI with rollator.  Equipment used at home: shower chair, rollator.  DME owned (not currently used): none.  Upon discharge, patient will have assistance from facility staff and family.    Objective:     Communicated with DONNA Morris prior to session.  Patient found up in chair with blood pressure cuff, chair check, central line, ramirez catheter, oxygen, pulse ox (continuous), telemetry  upon PT entry to room.    General Precautions: Standard, fall  Orthopedic Precautions:spinal precautions   Braces: [place] J collar  Respiratory Status: Nasal cannula, flow 2 L/min    Exams:  Cognitive Exam:  Patient is oriented to Person, Place, Time, and Situation  RLE ROM: WFL  RLE Strength: grossly 4/5  LLE ROM: WFL  LLE Strength: grossly 4/5    Functional Mobility:  Transfers:   BP sitting 107/55  Sit to Stand:  minimum assistance with rolling walker and vc for technique; pt's BP dropped to 80s/40s with pt c/o nausea/lightheadedness. Returned to sitting with RN present      AM-PAC 6 CLICK MOBILITY  Total Score:16       Treatment & Education:  Pt was educated on the following: call light use, importance of OOB activity and " functional mobility to negate the negative effects of prolonged bed rest during this hospitalization, safe transfers/ambulation and discharge planning recommendations/options.      Patient left up in chair with all lines intact, call button in reach, chair alarm on, and RN present.    GOALS:   Multidisciplinary Problems       Physical Therapy Goals          Problem: Physical Therapy    Goal Priority Disciplines Outcome Interventions   Physical Therapy Goal     PT, PT/OT     Description: Goals to be met by: 24     Patient will increase functional independence with mobility by performin. Supine to sit with Supervision  2. Sit to stand transfer with Supervision  3. Bed to chair transfer with Supervision using Rolling Walker  4. Gait  x 200 feet with Supervision using Rolling Walker.                              History:     Past Medical History:   Diagnosis Date    Arthritis     Cancer 10/2022    (CLL) leukemia ; adenocarcinoma  adenosgemis    Depression     GERD (gastroesophageal reflux disease)     Neck pain     and back pain    Personal history of colonic polyps 2017    Pneumonia of left lung due to infectious organism 2020    Thyroid disease        Past Surgical History:   Procedure Laterality Date    FUSION, SPINE, CERVICAL, ANTERIOR APPROACH N/A 2024    Procedure: FUSION, SPINE, CERVICAL, ANTERIOR APPROACH;  Surgeon: Anatoly Daley DO;  Location: [facility] OR;  Service: Neurosurgery;  Laterality: N/A;  IOM, C-ARM, MEDTRONICS, MICRO, HORSESHOE    INJECTION OF ANESTHETIC AGENT AROUND MULTIPLE INTERCOSTAL NERVES Left 10/03/2022    Procedure: BLOCK, NERVE, INTERCOSTAL, 2 OR MORE;  Surgeon: Evelio Kaplan MD;  Location: [address];  Service: Thoracic;  Laterality: Left;    INSERTION OF TUNNELED CENTRAL VENOUS CATHETER (CVC) WITH SUBCUTANEOUS PORT Right 2022    Procedure: [id number], Right or Left Neck or Chest;  Surgeon: Mini Acevedo MD;  Location:  [facility] OR 2ND FLR;  Service: General;  Laterality: Right;    LAPAROSCOPIC CHOLECYSTECTOMY N/A 10/3/2024    Procedure: CHOLECYSTECTOMY, LAPAROSCOPIC;  Surgeon: Ang Mosley III, MD;  Location: [facility];  Service: General;  Laterality: N/A;    ROBOT-ASSISTED LAPAROSCOPIC LYMPHADENECTOMY USING DA MONIQUE XI Left 10/03/2022    Procedure: XI ROBOTIC LYMPHADENECTOMY;  Surgeon: Evelio Kaplan MD;  Location: [address];  Service: Thoracic;  Laterality: Left;    SPINE SURGERY      TONSILLECTOMY      XI ROBOTIC RATS,WITH LOBECTOMY,LUNG Left 10/03/2022    Procedure: XI ROBOTIC RATS,WITH LOBECTOMY,LUNG;  Surgeon: Evelio Kaplan MD;  Location: [facility] OR [address] [place] [address];  Service: Thoracic;  Laterality: Left;       Time Tracking:     PT Received On: 10/05/24  PT Start Time: 1038     PT Stop Time: 1054  PT Total Time (min): 16 min     Billable Minutes: Evaluation 8 and Therapeutic Activity 8      10/05/2024

## 2024-10-05 NOTE — ASSESSMENT & PLAN NOTE
This has now resulted as coagulase-negative staph, likely contaminant  ID following and would like to continue vancomycin for now  Repeat blood cultures taken and pending 10/5/24

## 2024-10-05 NOTE — PROGRESS NOTES
"[facility]   Department of Infectious Disease  Progress Note        PATIENT NAME: Yvette Hutchinson  MRN: [id number]  TODAY'S DATE: 10/05/2024  ADMIT DATE: 10/2/2024  LOS: 3 days    CHIEF COMPLAINT: Abdominal Pain, Nausea, Vomiting, and Diarrhea (Pt has been sick for the last two weeks)      PRINCIPLE PROBLEM: Septic shock    INTERVAL HISTORY      10/04/2024:  Improving.  WBC down to 35.  Afebrile.  Blood cultures have grown MSSE in 1/2 bottles.  No gallbladder fluid or tissue culture done.  Creatinine down to 1.2.  Levophed dose down to 0.35 microgram/kilogram per minute.    10/05/2024:  Continues to improve.  Leukocytosis resolving.  No acute issues overnight.  Seen by cardiologist yesterday for elevated troponin and notes reviewed.    Antibiotics (From admission, onward)      Start     Stop Route Frequency Ordered    10/04/24 2000  vancomycin 1.25 g in dextrose 5% 250 mL IVPB (ready to mix)         -- IV Every 24 hours (non-standard times) 10/04/24 1903    10/04/24 0900  mupirocin 2 % ointment 1 g         10/09/24 0859 Nasl 2 times daily 10/04/24 0633    10/03/24 2200  piperacillin-tazobactam 4.5 g in dextrose 5 % 100 mL IVPB (ready to mix)         -- IV Every 8 hours (non-standard times) 10/03/24 1506    10/02/24 1554  vancomycin - pharmacy to dose  (vancomycin IVPB (PEDS and ADULTS))        Placed in "And" Linked Group    -- IV pharmacy to manage frequency 10/02/24 1455          Antifungals (From admission, onward)      None           Antivirals (From admission, onward)      None            ASSESSMENT and PLAN      Septic shock in a patient with CLL.  Probably from abdominal source since her symptoms were primarily GI related.  She has had laparoscopic cholecystostomy.  Continue antibiotics and follow blood and any gallbladder cultures..  Remains on low-dose Levophed..     2. GM.  Resolved.      3. CLL.  Currently not on any specific medication for this.  It does make her immunosuppressed.  "      4. Acute liver injury.  Maybe congestive hepatopathy from shock.  Also with elevated troponins.  We will check acute liver panel.    5. Coagulase-negative Staphylococcus  bacteremia.  Most likely contaminant.  Antibiotics as above for now.  Repeat blood cultures x2 sets    RECOMMENDATIONS:    Continue current empiric antibiotics  Continue to monitor clinically    Please send Epic secure chat with any questions.  Discussed with the patient and her family at bedside.      SUBJECTIVE    Yvette Hutchinson is a 64 y.o. female with history of CLL, GERD, arthritis.  Also previous left lung cancer status post resection.  Had anterior cervical fusion on 08/06/2024 with an uneventful postoperative course.  Presented to the emergency room 10/02/2024 with nausea vomiting, diarrhea and abdominal pain of about 9 days duration.  In the ER BP 99/55, pulse 1-2, respiratory rate 18, temperature 101.6°.  She had abdominal tenderness was worse in the epigastric area.       WBC 21, hematocrit 38, platelet count 224, creatinine 1.4.  , , lipase 42, alkaline phosphatase to 1 6.  UA unremarkable.  Chest x-ray with no acute infiltrates.  CT chest showed increased interstitial markings with few pleural based lesions/infiltrates on the left.  CT abdomen and pelvis documented gallstones with pericholecystic fluid and edema which was confirmed on ultrasound she was admitted and placed on IV fluids and antibiotics.  Later seen by general surgeon and laparoscopic cholecystostomy 10/03/2024.  ID asked to assist with her care.     Current lab data showed WBC 45, hematocrit 41, creatinine 1.8, AST 1970, ALT 1316, alkaline phosphatase 220, total protein 5.6     History from patient and medical record.     Antibiotic history:    Vancomycin: 10/02/2024-  Zosyn: 10/02/2024-     Microbiology:    Blood culture 10/02/2024:  Staphylococcus species 1/2   Influenza and COVID-19 assay 10/02/2024: Negative    Review of Systems  Negative  except as stated above in Interval History     OBJECTIVE   Temp:  [97 °F (36.1 °C)-99.8 °F (37.7 °C)] 97 °F (36.1 °C)  Pulse:  [70-99] 70  Resp:  [9-25] 18  SpO2:  [91 %-100 %] 95 %  BP: ()/(43-68) 105/59  Arterial Line BP: ()/(49-68) 114/66  Temp:  [97 °F (36.1 °C)-99.8 °F (37.7 °C)]   Temp: 97 °F (36.1 °C) (10/05/24 0701)  Pulse: 70 (10/05/24 0800)  Resp: 18 (10/05/24 0917)  BP: (!) 105/59 (10/05/24 0800)  SpO2: 95 % (10/05/24 0800)    Intake/Output Summary (Last 24 hours) at 10/5/2024 0957  Last data filed at 10/5/2024 0920  Gross per 24 hour   Intake 3066.92 ml   Output 2627 ml   Net 439.92 ml       Physical Exam  General:  Middle-aged woman who is in no acute distress at this time.  Sitting in chair eating lunch.  HEENT:  Healed right anterior neck surgical wound.  No erythema or induration.  CVS: S1 and 2 heard, tachycardic, no murmurs appreciated.  On Levophed 0.45 micrograms/kilogram per minute    Respiratory: Clear to auscultation   Abdomen:  Laparoscopic port sites are clean with no erythema or drainage.  Abdomen is distended, soft, nontender.  Skin: No rash appreciated  CNS: No focal deficits   Musculoskeletal: No joint effusions or abnormalities noted     VAD:  ISOLATION:  None     Wounds:  Surgical abdominal    Significant Labs: All pertinent labs within the past 24 hours have been reviewed.    CBC LAST 7 DAYS  Recent Labs   Lab 10/02/24  1034 10/03/24  0110 10/03/24  1139 10/03/24  1209 10/04/24  0455 10/05/24  0336   WBC 21.30* 45.30*  --   --  34.88* 26.10*   RBC 3.93* 4.36  --   --  3.58* 3.54*   HGB 12.5 13.6  --   --  11.3* 10.9*   HCT 37.7 41.8 34* 31* 33.8* 33.9*   MCV 96 96  --   --  94 96   MCH 31.8* 31.2*  --   --  31.6* 30.8   MCHC 33.2 32.5  --   --  33.4 32.2   RDW 13.4 13.8  --   --  14.3 14.3    239  --   --  244 194   MPV 10.6 11.9  --   --  10.2 10.3   GRAN 43.0 22.0*  --   --  39.0 19.2*  5.0   LYMPH 50.0*  CANCELED 62.0*  --   --  37.0  CANCELED 67.0*  17.5*  "  MONO 7.0  CANCELED 3.0*  --   --  6.0  CANCELED 5.7  1.5*   BASO CANCELED  --   --   --  CANCELED 0.11   NRBC 0 0  --   --  0 0       CHEMISTRY LAST 7 DAYS  Recent Labs   Lab 10/02/24  1034 10/03/24  0110 10/03/24  1139 10/03/24  1209 10/04/24  0455 10/04/24  2101 10/05/24  0336   * 133*  --   --  139 131* 131*   K 3.8 4.1  --   --  3.8 3.9 4.0    103  --   --  110 105 104   CO2 19* 17*  --   --  20* 21* 23   ANIONGAP 11 13  --   --  9 5* 4*   BUN 14 17  --   --  15 11 11   CREATININE 1.4 1.8*  --   --  1.2 1.2 1.4   GLU 97 71  --   --  93 105 96   CALCIUM 8.4* 7.7*  --   --  6.9* 7.1* 7.4*   PH  --   --  7.198* 7.300*  --   --   --    MG 1.5* 1.7  --   --  2.2 1.9 1.8   ALBUMIN 3.9 3.5  --   --  2.7*  --  2.8*   PROT 6.2 5.6*  --   --  4.4*  --  4.5*   ALKPHOS 216* 220*  --   --  229*  --  356*   * 1,316*  --   --  935*  --  824*   * 1,970*  --   --  813*  --  628*   BILITOT 0.6 0.9  --   --  1.0  --  1.3*       Estimated Creatinine Clearance: 39.1 mL/min (based on SCr of 1.4 mg/dL).    INFLAMMATORY/PROCAL  LAST 7 DAYS  No results for input(s): "PROCAL", "ESR", "CRP" in the last 168 hours.  No results found for: "ESR"  CRP   Date Value Ref Range Status   09/07/2021 2.7 0.0 - 8.2 mg/L Final   03/03/2020 3.5 0.0 - 8.2 mg/L Final       PRIOR  MICROBIOLOGY:    Susceptibility data from last 90 days.  Collected Specimen Info Organism   10/02/24 Blood from Peripheral, Wrist, Right COAGULASE NEGATIVE STAPHYLOCOCCI       LAST 7 DAYS MICROBIOLOGY   Microbiology Results (last 7 days)       Procedure Component Value Units Date/Time    Blood culture [[id number]]     Order Status: Sent Specimen: Blood     Stool culture **cannot be ordered stat** [[id number]] Collected: 10/02/24 1919    Order Status: Completed Specimen: Stool Updated: 10/04/24 1350     Stool Culture Nothing significant to date    Blood culture x two cultures. Draw prior to antibiotics [[id number]] Collected: 10/02/24 1057    Order " Status: Completed Specimen: Blood from Peripheral, Hand, Left Updated: 10/04/24 1232     Blood Culture, Routine No Growth to date      No Growth to date      No Growth to date    Narrative:      Aerobic and anaerobic    Blood culture x two cultures. Draw prior to antibiotics [[id number]]  (Abnormal) Collected: 10/02/24 1049    Order Status: Completed Specimen: Blood from Peripheral, Wrist, Right Updated: 10/04/24 0807     Blood Culture, Routine Gram stain aer bottle: Gram positive cocci      Results called to and read back by:Val Nino RN-ED;  10/03/2024      11:58 CJD      COAGULASE NEGATIVE STAPHYLOCOCCI  Organism is a probable contaminant      Narrative:      Aerobic and anaerobic    Rapid Organism ID by PCR (from Blood culture) [[id number]]  (Abnormal) Collected: 10/02/24 1049    Order Status: Completed Updated: 10/03/24 1322     Enterococcus faecalis Not Detected     Enterococcus faecium Not Detected     Listeria monocytogenes Not Detected     Staphylococcus spp. Detected     Staphylococcus aureus Not Detected     Staphylococcus epidermidis Not Detected     Staphylococcus lugdunensis Not Detected     Streptococcus species Not Detected     Streptococcus agalactiae Not Detected     Streptococcus pneumoniae Not Detected     Streptococcus pyogenes Not Detected     Acinetobacter calcoaceticus/baumannii complex Not Detected     Bacteroides fragilis Not Detected     Enterobacterales Not Detected     Enterobacter cloacae complex Not Detected     Escherichia coli Not Detected     Klebsiella aerogenes Not Detected     Klebsiella oxytoca Not Detected     Klebsiella pneumoniae group Not Detected     Proteus Not Detected     Salmonella sp Not Detected     Serratia marcescens Not Detected     Haemophilus influenzae Not Detected     Neisseria meningtidis Not Detected     Pseudomonas aeruginosa Not Detected     Stenotrophomonas maltophilia Not Detected     Candida albicans Not Detected     Candida auris Not Detected      Debra glabrata Not Detected     Candida krusei Not Detected     Candida parapsilosis Not Detected     Candida tropicalis Not Detected     Cryptococcus neoformans/gattii Not Detected     CTX-M (ESBL ) Test not applicable     IMP (Carbapenem resistant) Test not applicable     KPC resistance gene (Carbapenem resistant) Test not applicable     mcr-1  Test not applicable     mec A/C  Test not applicable     mec A/C and MREJ (MRSA) gene Test not applicable     NDM (Carbapenem resistant) Test not applicable     OXA-48-like (Carbapenem resistant) Test not applicable     van A/B (VRE gene) Test not applicable     VIM (Carbapenem resistant) Test not applicable    Narrative:      Aerobic and anaerobic    Clostridium difficile EIA [[id number]] Collected: 10/02/24 1919    Order Status: Completed Specimen: Stool Updated: 10/03/24 0031     C. diff Antigen Negative     C difficile Toxins A+B, EIA Negative     Comment: Testing not recommended for children <24 months old.               CURRENT/PREVIOUS VISIT EKG  Results for orders placed or performed during the hospital encounter of 10/02/24   EKG 12-lead    Collection Time: 10/04/24  1:13 PM   Result Value Ref Range    QRS Duration 104 ms    [facility] QTC Calculation 613 ms    Narrative    Test Reason : I21.4,    Vent. Rate : 092 BPM     Atrial Rate : 092 BPM     P-R Int : 142 ms          QRS Dur : 104 ms      QT Int : 496 ms       P-R-T Axes : 015 -45 113 degrees     QTc Int : 613 ms    Normal sinus rhythm  Low voltage QRS  Left anterior fascicular block  Cannot rule out Anterior infarct ,age undetermined  Prolonged QT  Abnormal ECG  When compared with ECG of 03-OCT-2024 10:07,  Left anterior fascicular block is now Present  Minimal criteria for Anterior infarct are now Present  QT has lengthened    Referred By: [email or web address]   SELF           Confirmed By:         Significant Imaging: I have reviewed all relevant and available imaging results/findings within the past 24  hours.    I spent a total of 50 minutes on the day of the visit.This includes face to face time and non-face to face time preparing to see the patient (eg, review of tests), obtaining and/or reviewing separately obtained history, documenting clinical information in the electronic or other health record, independently interpreting results and communicating results to the patient/family/caregiver, or care coordinator.    Lemuel Man MD  Date of Service: 10/05/2024      This note was created using [employer] voice recognition software that occasionally misinterpreted phrases or words.

## 2024-10-05 NOTE — PROGRESS NOTES
Pharmacokinetic Assessment Follow Up: IV Vancomycin    Vancomycin serum concentration assessment(s):    The random level was drawn correctly and can be used to guide therapy at this time. The measurement is below the desired definitive target range of 15 to 20 mcg/mL.    Vancomycin Regimen Plan:    Change regimen to Vancomycin 1250 mg IV every 24 hours with next serum trough concentration measured at 1900 prior to 3rd dose on 10/06.    Drug levels (last 3 results):  Recent Labs   Lab Result Units 10/03/24  1418 10/04/24  1434   Vancomycin, Random ug/mL 8.2 8.0       Pharmacy will continue to follow and monitor vancomycin.    Please contact pharmacy at extension [phone] for questions regarding this assessment.    Thank you for the consult,   Osmany Church       Patient brief summary:  Yvette Hutchinson is a 64 y.o. female initiated on antimicrobial therapy with IV Vancomycin for treatment of intra-abdominal infection    Drug Allergies:   Review of patient's allergies indicates:  No Known Allergies    Actual Body Weight:   76.2kg    Renal Function:   Estimated Creatinine Clearance: 45.7 mL/min (based on SCr of 1.2 mg/dL).,     Dialysis Method (if applicable):  N/A    CBC (last 72 hours):  Recent Labs   Lab Result Units 10/02/24  1034 10/03/24  0110 10/04/24  0455   WBC K/uL 21.30* 45.30* 34.88*   Hemoglobin g/dL 12.5 13.6 11.3*   Hematocrit % 37.7 41.8 33.8*   Platelets K/uL 224 239 244   Gran % % 43.0 22.0* 39.0   Lymph % % 50.0* 62.0* 37.0   Mono % % 7.0 3.0* 6.0   Eosinophil % % 0.0 2.0 15.0*   Basophil % % 0.0 0.0 0.0   Differential Method  Manual Manual Manual       Metabolic Panel (last 72 hours):  Recent Labs   Lab Result Units 10/02/24  1034 10/02/24  1520 10/03/24  0110 10/04/24  0455   Sodium mmol/L 135*  --  133* 139   Potassium mmol/L 3.8  --  4.1 3.8   Chloride mmol/L 105  --  103 110   CO2 mmol/L 19*  --  17* 20*   Glucose mg/dL 97  --  71 93   Glucose, UA   --  Negative  --   --    BUN mg/dL 14   --  17 15   Creatinine mg/dL 1.4  --  1.8* 1.2   Albumin g/dL 3.9  --  3.5 2.7*   Total Bilirubin mg/dL 0.6  --  0.9 1.0   Alkaline Phosphatase U/L 216*  --  220* 229*   AST U/L 735*  --  1,970* 813*   ALT U/L 497*  --  1,316* 935*   Magnesium mg/dL 1.5*  --  1.7 2.2   Phosphorus mg/dL 1.8*  --   --   --        Vancomycin Administrations:  vancomycin given in the last 96 hours                     vancomycin in dextrose 5 % 1 gram/250 mL IVPB 1,000 mg (mg) 1,000 mg New Bag 10/03/24 1539    vancomycin 1.5 g in dextrose 5 % 250 mL IVPB (ready to mix) (mg) 1,500 mg New Bag 10/02/24 1520                    Microbiologic Results:  Microbiology Results (last 7 days)       Procedure Component Value Units Date/Time    Stool culture **cannot be ordered stat** [[id number]] Collected: 10/02/24 1919    Order Status: Completed Specimen: Stool Updated: 10/04/24 1350     Stool Culture Nothing significant to date    Blood culture x two cultures. Draw prior to antibiotics [[id number]] Collected: 10/02/24 1057    Order Status: Completed Specimen: Blood from Peripheral, Hand, Left Updated: 10/04/24 1232     Blood Culture, Routine No Growth to date      No Growth to date      No Growth to date    Narrative:      Aerobic and anaerobic    Blood culture x two cultures. Draw prior to antibiotics [[id number]]  (Abnormal) Collected: 10/02/24 1049    Order Status: Completed Specimen: Blood from Peripheral, Wrist, Right Updated: 10/04/24 0807     Blood Culture, Routine Gram stain aer bottle: Gram positive cocci      Results called to and read back by:Val Nino RN-ED;  10/03/2024      11:58 CJD      COAGULASE NEGATIVE STAPHYLOCOCCI  Organism is a probable contaminant      Narrative:      Aerobic and anaerobic    Rapid Organism ID by PCR (from Blood culture) [[id number]]  (Abnormal) Collected: 10/02/24 1049    Order Status: Completed Updated: 10/03/24 1322     Enterococcus faecalis Not Detected     Enterococcus faecium Not Detected      Listeria monocytogenes Not Detected     Staphylococcus spp. Detected     Staphylococcus aureus Not Detected     Staphylococcus epidermidis Not Detected     Staphylococcus lugdunensis Not Detected     Streptococcus species Not Detected     Streptococcus agalactiae Not Detected     Streptococcus pneumoniae Not Detected     Streptococcus pyogenes Not Detected     Acinetobacter calcoaceticus/baumannii complex Not Detected     Bacteroides fragilis Not Detected     Enterobacterales Not Detected     Enterobacter cloacae complex Not Detected     Escherichia coli Not Detected     Klebsiella aerogenes Not Detected     Klebsiella oxytoca Not Detected     Klebsiella pneumoniae group Not Detected     Proteus Not Detected     Salmonella sp Not Detected     Serratia marcescens Not Detected     Haemophilus influenzae Not Detected     Neisseria meningtidis Not Detected     Pseudomonas aeruginosa Not Detected     Stenotrophomonas maltophilia Not Detected     Candida albicans Not Detected     Candida auris Not Detected     Candida glabrata Not Detected     Candida krusei Not Detected     Candida parapsilosis Not Detected     Candida tropicalis Not Detected     Cryptococcus neoformans/gattii Not Detected     CTX-M (ESBL ) Test not applicable     IMP (Carbapenem resistant) Test not applicable     KPC resistance gene (Carbapenem resistant) Test not applicable     mcr-1  Test not applicable     mec A/C  Test not applicable     mec A/C and MREJ (MRSA) gene Test not applicable     NDM (Carbapenem resistant) Test not applicable     OXA-48-like (Carbapenem resistant) Test not applicable     van A/B (VRE gene) Test not applicable     VIM (Carbapenem resistant) Test not applicable    Narrative:      Aerobic and anaerobic    Clostridium difficile EIA [[id number]] Collected: 10/02/24 1919    Order Status: Completed Specimen: Stool Updated: 10/03/24 0031     C. diff Antigen Negative     C difficile Toxins A+B, EIA Negative     Comment:  Testing not recommended for children <24 months old.

## 2024-10-05 NOTE — CARE UPDATE
10/04/24 1916   Patient Assessment/Suction   Level of Consciousness (AVPU) alert   Respiratory Effort Normal;Unlabored   Expansion/Accessory Muscles/Retractions no use of accessory muscles   All Lung Fields Breath Sounds equal bilaterally;clear   Rhythm/Pattern, Respiratory unlabored   Cough Frequency no cough   PRE-TX-O2   Device (Oxygen Therapy) nasal cannula   Flow (L/min) (Oxygen Therapy) 2   SpO2 100 %   Pulse Oximetry Type Continuous   $ Pulse Oximetry - Multiple Charge Pulse Oximetry - Multiple   Pulse 83   Resp 16   Aerosol Therapy   $ Aerosol Therapy Charges Aerosol Treatment  (Pulmicort 1.0mg)   Daily Review of Necessity (SVN) completed   Respiratory Treatment Status (SVN) given   Treatment Route (SVN) mask   Patient Position semi-Huerta's   Post Treatment Assessment (SVN) breath sounds unchanged   Signs of Intolerance (SVN) none   Breath Sounds Post-Respiratory Treatment   Throughout All Fields Post-Treatment All Fields   Throughout All Fields Post-Treatment no change   Post-treatment Heart Rate (beats/min) 84   Post-treatment Resp Rate (breaths/min) 16   Incentive Spirometer   $ Incentive Spirometer Charges unable to perform   Education   $ Education Bronchodilator;Oxygen;15 min

## 2024-10-05 NOTE — PROGRESS NOTES
[facility] Medicine  Progress Note    Patient Name: Yvette Hutchinson  MRN: [id number]  Patient Class: IP- Inpatient   Admission Date: 10/2/2024  Length of Stay: 3 days  Attending Physician: Iris Odell MD  Primary Care Provider: Vern Priest MD        Subjective:     Principal Problem:Septic shock        HPI:  64-year-old female with PMH of CLL, and lung cancer status post partial resection who presented to the ER because of presyncope, nausea, vomiting, diarrhea and abdominal pain.  According to the patient, for over 1 week she has been having intractable nausea, vomiting and diarrhea.  Also epigastric and right upper quadrant abdominal pain described as sharp, 10/10 on pain scale radiate to the rest of her abdomen.  Today when she stood up from a sitting position, she felt as if she will pass out.  As a result, she decided to come to the ER for evaluation.  Denied any fever or chills.  She denied chest pain or shortness on breath.    In the ER, vitals showed a blood pressure of 99/55, but soon dropped to the 70s.  Heart rate was 122, respiratory rate of 20, temperature of 101.6°, and patient was satting 95% on room air.  CBC was white count of 21, with history of CLL.  CMP showed sodium of 135, acute renal failure with creatinine of 1.4 compared to baseline of 1, and patient has elevated LFTs with alk-phos of 216, , and AST of 497.  COVID/flu are negative.  Blood cultures were collected.  CTA chest negative for pulmonary embolus, but showed mild diffuse bronchial wall thickening that could reflect infectious or inflammatory bronchitis in the appropriate clinical setting.  CT abdomen/pelvis showed pericholecystic edema without evidence of gallstones.  Cholecystitis cannot be excluded. Also mild Linda pancreatic edema which may represent early pancreatitis. US abdomen showed Cholelithiasis, gallbladder wall thickening, and mild pericholecystic fluid. Patient was started  on Zosyn/vanco.  Was given sepsis bolus of lactated ringer of 2286 cc.  Started on Levophed for blood pressure support.  General surgery was consulted.  Patient will be admitted to ICU for septic shock.       Overview/Hospital Course:  This 64-year-old lady with history of CLL, lung cancer status post partial resection presented to the emergency department due to presyncope, nausea vomiting and abdominal pain.  She was also noted to be hypotensive and tachycardic on admission.  She was also noted to have fever.  Patient was admitted to the hospital for septic shock concern for abdominal source given CT findings showing some pericholecystic fluid.  Patient was also noted to have transaminitis and imaging did reveal some peripancreatic fluid and inflammatory changes as well though her lipase was normal.  General surgery was consulted after ultrasound the gallbladder was performed which again redemonstrated pericholecystic fluid.  She is status post laparoscopic cholecystectomy on 10/03.  Gallbladder was not noted to be gangrenous and per the op note there was suspicion that this was not the source of her septic shock.  However, blood cultures grew only coagulation negative Staphylococcus in 1/2 bottles.  This was thought to be contaminant.  Infectious Disease was consulted and vancomycin and Zosyn was continued on this patient.  Levophed initiated on admission was able to be weaned, although she still had requirement the day after surgery.  Will attempt midodrine TID to transition off levophed.  Repeat blood cultures pending.     Interval History:  Patient sitting up in chair attempting to eat a hamburger, family at bedside. On levophed. C/o abdominal soreness.   Review of Systems is performed and is negative unless mentioned in the interval history above.  Objective:     Vital Signs (Most Recent):  Temp: 97.7 °F (36.5 °C) (10/05/24 1101)  Pulse: 71 (10/05/24 1301)  Resp: 20 (10/05/24 1301)  BP: 117/64 (10/05/24  1301)  SpO2: 97 % (10/05/24 1301) Vital Signs (24h Range):  Temp:  [97 °F (36.1 °C)-98.6 °F (37 °C)] 97.7 °F (36.5 °C)  Pulse:  [68-92] 71  Resp:  [9-33] 20  SpO2:  [91 %-100 %] 97 %  BP: ()/(43-66) 117/64     Weight: 76.2 kg (168 lb)  Body mass index is 29.76 kg/m².    Intake/Output Summary (Last 24 hours) at 10/5/2024 1515  Last data filed at 10/5/2024 1429  Gross per 24 hour   Intake 3074.97 ml   Output 3497 ml   Net -422.03 ml         Physical Exam  Vitals reviewed.   Constitutional:       Appearance: Normal appearance.   HENT:      Head: Normocephalic and atraumatic.      Mouth/Throat:      Mouth: Mucous membranes are moist.   Eyes:      Extraocular Movements: Extraocular movements intact.      Conjunctiva/sclera: Conjunctivae normal.      Pupils: Pupils are equal, round, and reactive to light.   Cardiovascular:      Rate and Rhythm: Normal rate and regular rhythm.   Pulmonary:      Effort: Pulmonary effort is normal.      Breath sounds: Normal breath sounds.   Abdominal:      General: Abdomen is flat. Bowel sounds are normal.      Palpations: Abdomen is soft.      Comments: Only light palpation use given surgery yesterday.  Patient continues to have diffuse abdominal tenderness    Musculoskeletal:         General: No swelling or tenderness.   Skin:     General: Skin is warm.   Neurological:      Mental Status: She is alert and oriented to person, place, and time.   Psychiatric:         Mood and Affect: Mood normal.         Behavior: Behavior normal.             Significant Labs: All pertinent labs within the past 24 hours have been reviewed.  BMP:   Recent Labs   Lab 10/05/24  0336   GLU 96   *   K 4.0      CO2 23   BUN 11   CREATININE 1.4   CALCIUM 7.4*   MG 1.8     CBC:   Recent Labs   Lab 10/04/24  0455 10/05/24  0336   WBC 34.88* 26.10*   HGB 11.3* 10.9*   HCT 33.8* 33.9*    194       Significant Imaging: I have reviewed all pertinent imaging results/findings within the past 24  hours.  I have reviewed and interpreted all pertinent imaging results/findings within the past 24 hours.    Assessment/Plan:      * Septic shock  - Suspect secondary to intra-abdominal process.  CT reveals pericholecystic fluid and peripancreatic fluid  She is now status post cholecystectomy with General surgery, per op note the gallbladder did not appear significantly inflamed to be commensurate with the patient's presentation of septic shock, 1/2 bottles drawn for blood culture was growing coagulase-negative Staphylococcus, felt to be contaminant, however, given patient's persistent pressor requirement infectious Disease was consulted, recommendation to continue vancomycin and Zosyn at this time  Infectious Disease we will defer imaging of patient's C-spine for now  Continued follow up blood cultures  Levophed as needed for map less than 65      Bacteremia due to Staphylococcus  This has now resulted as coagulase-negative staph, likely contaminant  ID following and would like to continue vancomycin for now  Repeat blood cultures taken and pending 10/5/24    Abdominal pain  Suspect secondary to acute cholecystitis.    CT abdomen also showed mild pancreatitis, but lipase is within normal limits.    Given her diarrhea however, will order stool culture.    Supportive care with IV fluids, Zofran for nausea.  P.r.n. pain medication.      Acute renal failure  Most recent creatinine and eGFR are listed below.  Recent Labs     10/04/24  0455 10/04/24  2101 10/05/24  0336   CREATININE 1.2 1.2 1.4   EGFRNORACEVR 50.2* 50.2* 41.8*       Likely pre-renal given her septic shock.   Status post IV hydration, patient able to take PO hydration  GM resolving      Neuropathic pain  Resume gabapentin.      Calculus of gallbladder with acute cholecystitis without obstruction  Status post cholecystectomy      Acquired hypothyroidism  Resume synthroid.       CLL (chronic lymphocytic leukemia)  Monitor. Patient's last chemo for over a  year.  Follow up with Hematology outpatient.      Anxiety  Resume Wellbutrin and Celexa        VTE Risk Mitigation (From admission, onward)           Ordered     Place INDIA hose  Until discontinued         10/05/24 1132     heparin (porcine) injection 5,000 Units  Every 8 hours         10/02/24 1611     IP VTE HIGH RISK PATIENT  Once         10/02/24 1611     Place sequential compression device  Until discontinued         10/02/24 1611                    Discharge Planning   SALINA: 10/8/2024     Code Status: Full Code   Is the patient medically ready for discharge?:     Reason for patient still in hospital (select all that apply): Patient trending condition  Discharge Plan A: Home with family            Critical care time spent on the evaluation and treatment of severe organ dysfunction, review of pertinent labs and imaging studies, discussions with consulting providers and discussions with patient/family: 30 minutes.      Iris Odell MD  Department of Hospital Medicine   [facility]

## 2024-10-06 LAB
ALBUMIN SERPL BCP-MCNC: 2.9 G/DL (ref 3.5–5.2)
ALP SERPL-CCNC: 380 U/L (ref 55–135)
ALT SERPL W/O P-5'-P-CCNC: 691 U/L (ref 10–44)
ANION GAP SERPL CALC-SCNC: 5 MMOL/L (ref 8–16)
AST SERPL-CCNC: 472 U/L (ref 10–40)
BASOPHILS # BLD AUTO: 0.12 K/UL (ref 0–0.2)
BASOPHILS NFR BLD: 0.5 % (ref 0–1.9)
BILIRUB SERPL-MCNC: 1.1 MG/DL (ref 0.1–1)
BUN SERPL-MCNC: 7 MG/DL (ref 8–23)
CALCIUM SERPL-MCNC: 7.8 MG/DL (ref 8.7–10.5)
CHLORIDE SERPL-SCNC: 107 MMOL/L (ref 95–110)
CO2 SERPL-SCNC: 26 MMOL/L (ref 23–29)
CREAT SERPL-MCNC: 1 MG/DL (ref 0.5–1.4)
DIFFERENTIAL METHOD BLD: ABNORMAL
EOSINOPHIL # BLD AUTO: 1.9 K/UL (ref 0–0.5)
EOSINOPHIL NFR BLD: 7.7 % (ref 0–8)
ERYTHROCYTE [DISTWIDTH] IN BLOOD BY AUTOMATED COUNT: 14.4 % (ref 11.5–14.5)
EST. GFR  (NO RACE VARIABLE): >60 ML/MIN/1.73 M^2
GLUCOSE SERPL-MCNC: 100 MG/DL (ref 70–110)
HCT VFR BLD AUTO: 31.8 % (ref 37–48.5)
HGB BLD-MCNC: 10.4 G/DL (ref 12–16)
IMM GRANULOCYTES # BLD AUTO: 0.06 K/UL (ref 0–0.04)
IMM GRANULOCYTES NFR BLD AUTO: 0.2 % (ref 0–0.5)
LYMPHOCYTES # BLD AUTO: 16.1 K/UL (ref 1–4.8)
LYMPHOCYTES NFR BLD: 66.8 % (ref 18–48)
MAGNESIUM SERPL-MCNC: 1.7 MG/DL (ref 1.6–2.6)
MCH RBC QN AUTO: 30.5 PG (ref 27–31)
MCHC RBC AUTO-ENTMCNC: 32.7 G/DL (ref 32–36)
MCV RBC AUTO: 93 FL (ref 82–98)
MONOCYTES # BLD AUTO: 1.4 K/UL (ref 0.3–1)
MONOCYTES NFR BLD: 5.9 % (ref 4–15)
NEUTROPHILS # BLD AUTO: 4.6 K/UL (ref 1.8–7.7)
NEUTROPHILS NFR BLD: 18.9 % (ref 38–73)
NRBC BLD-RTO: 0 /100 WBC
PLATELET # BLD AUTO: 167 K/UL (ref 150–450)
PMV BLD AUTO: 10.5 FL (ref 9.2–12.9)
POTASSIUM SERPL-SCNC: 3.6 MMOL/L (ref 3.5–5.1)
PROT SERPL-MCNC: 4.6 G/DL (ref 6–8.4)
RBC # BLD AUTO: 3.41 M/UL (ref 4–5.4)
SODIUM SERPL-SCNC: 138 MMOL/L (ref 136–145)
WBC # BLD AUTO: 24.07 K/UL (ref 3.9–12.7)

## 2024-10-06 PROCEDURE — 97530 THERAPEUTIC ACTIVITIES: CPT

## 2024-10-06 PROCEDURE — 25000242 PHARM REV CODE 250 ALT 637 W/ HCPCS

## 2024-10-06 PROCEDURE — 25000003 PHARM REV CODE 250: Performed by: SURGERY

## 2024-10-06 PROCEDURE — 85025 COMPLETE CBC W/AUTO DIFF WBC: CPT | Performed by: SURGERY

## 2024-10-06 PROCEDURE — 94799 UNLISTED PULMONARY SVC/PX: CPT

## 2024-10-06 PROCEDURE — 20000000 HC ICU ROOM

## 2024-10-06 PROCEDURE — 36415 COLL VENOUS BLD VENIPUNCTURE: CPT | Performed by: SURGERY

## 2024-10-06 PROCEDURE — 94761 N-INVAS EAR/PLS OXIMETRY MLT: CPT

## 2024-10-06 PROCEDURE — 94640 AIRWAY INHALATION TREATMENT: CPT

## 2024-10-06 PROCEDURE — 63600175 PHARM REV CODE 636 W HCPCS: Performed by: SURGERY

## 2024-10-06 PROCEDURE — 80053 COMPREHEN METABOLIC PANEL: CPT | Performed by: SURGERY

## 2024-10-06 PROCEDURE — 99900031 HC PATIENT EDUCATION (STAT)

## 2024-10-06 PROCEDURE — 25000003 PHARM REV CODE 250

## 2024-10-06 PROCEDURE — 27000221 HC OXYGEN, UP TO 24 HOURS

## 2024-10-06 PROCEDURE — 63600175 PHARM REV CODE 636 W HCPCS

## 2024-10-06 PROCEDURE — 25000003 PHARM REV CODE 250: Performed by: HOSPITALIST

## 2024-10-06 PROCEDURE — 99233 SBSQ HOSP IP/OBS HIGH 50: CPT | Mod: ,,, | Performed by: INTERNAL MEDICINE

## 2024-10-06 PROCEDURE — 83735 ASSAY OF MAGNESIUM: CPT | Performed by: SURGERY

## 2024-10-06 PROCEDURE — 25000003 PHARM REV CODE 250: Performed by: INTERNAL MEDICINE

## 2024-10-06 RX ORDER — MIDODRINE HYDROCHLORIDE 2.5 MG/1
5 TABLET ORAL 3 TIMES DAILY
Status: DISCONTINUED | OUTPATIENT
Start: 2024-10-06 | End: 2024-10-07

## 2024-10-06 RX ORDER — ALUMINUM HYDROXIDE, MAGNESIUM HYDROXIDE, AND SIMETHICONE 1200; 120; 1200 MG/30ML; MG/30ML; MG/30ML
30 SUSPENSION ORAL
Status: DISCONTINUED | OUTPATIENT
Start: 2024-10-06 | End: 2024-10-10

## 2024-10-06 RX ADMIN — PIPERACILLIN SODIUM AND TAZOBACTAM SODIUM 4.5 G: 4; .5 INJECTION, POWDER, LYOPHILIZED, FOR SOLUTION INTRAVENOUS at 09:10

## 2024-10-06 RX ADMIN — HYDROCODONE BITARTRATE AND ACETAMINOPHEN 1 TABLET: 5; 325 TABLET ORAL at 05:10

## 2024-10-06 RX ADMIN — BUDESONIDE INHALATION 1 MG: 0.5 SUSPENSION RESPIRATORY (INHALATION) at 09:10

## 2024-10-06 RX ADMIN — HYDROMORPHONE HYDROCHLORIDE 0.5 MG: 0.5 INJECTION, SOLUTION INTRAMUSCULAR; INTRAVENOUS; SUBCUTANEOUS at 08:10

## 2024-10-06 RX ADMIN — TOPIRAMATE 100 MG: 25 TABLET, FILM COATED ORAL at 08:10

## 2024-10-06 RX ADMIN — ASPIRIN 81 MG: 81 TABLET, COATED ORAL at 09:10

## 2024-10-06 RX ADMIN — ARFORMOTEROL TARTRATE 15 MCG: 15 SOLUTION RESPIRATORY (INHALATION) at 09:10

## 2024-10-06 RX ADMIN — BUPROPION HYDROCHLORIDE 300 MG: 150 TABLET, EXTENDED RELEASE ORAL at 09:10

## 2024-10-06 RX ADMIN — LEVOTHYROXINE SODIUM 50 MCG: 0.03 TABLET ORAL at 06:10

## 2024-10-06 RX ADMIN — MIDODRINE HYDROCHLORIDE 5 MG: 2.5 TABLET ORAL at 05:10

## 2024-10-06 RX ADMIN — CITALOPRAM HYDROBROMIDE 20 MG: 20 TABLET ORAL at 09:10

## 2024-10-06 RX ADMIN — GABAPENTIN 300 MG: 300 CAPSULE ORAL at 09:10

## 2024-10-06 RX ADMIN — MIDODRINE HYDROCHLORIDE 5 MG: 2.5 TABLET ORAL at 09:10

## 2024-10-06 RX ADMIN — BUDESONIDE INHALATION 1 MG: 0.5 SUSPENSION RESPIRATORY (INHALATION) at 07:10

## 2024-10-06 RX ADMIN — ALUMINUM HYDROXIDE, MAGNESIUM HYDROXIDE, AND SIMETHICONE 30 ML: 1200; 120; 1200 SUSPENSION ORAL at 09:10

## 2024-10-06 RX ADMIN — PIPERACILLIN SODIUM AND TAZOBACTAM SODIUM 4.5 G: 4; .5 INJECTION, POWDER, LYOPHILIZED, FOR SOLUTION INTRAVENOUS at 12:10

## 2024-10-06 RX ADMIN — HYDROCODONE BITARTRATE AND ACETAMINOPHEN 1 TABLET: 5; 325 TABLET ORAL at 09:10

## 2024-10-06 RX ADMIN — CLOPIDOGREL BISULFATE 75 MG: 75 TABLET, FILM COATED ORAL at 09:10

## 2024-10-06 RX ADMIN — ALUMINUM HYDROXIDE, MAGNESIUM HYDROXIDE, AND SIMETHICONE 30 ML: 1200; 120; 1200 SUSPENSION ORAL at 08:10

## 2024-10-06 RX ADMIN — PIPERACILLIN SODIUM AND TAZOBACTAM SODIUM 4.5 G: 4; .5 INJECTION, POWDER, LYOPHILIZED, FOR SOLUTION INTRAVENOUS at 06:10

## 2024-10-06 RX ADMIN — ONDANSETRON 4 MG: 2 INJECTION INTRAMUSCULAR; INTRAVENOUS at 07:10

## 2024-10-06 RX ADMIN — MUPIROCIN 1 G: 20 OINTMENT TOPICAL at 09:10

## 2024-10-06 RX ADMIN — MUPIROCIN 1 G: 20 OINTMENT TOPICAL at 08:10

## 2024-10-06 RX ADMIN — POTASSIUM BICARBONATE 50 MEQ: 977.5 TABLET, EFFERVESCENT ORAL at 06:10

## 2024-10-06 RX ADMIN — GABAPENTIN 300 MG: 300 CAPSULE ORAL at 08:10

## 2024-10-06 RX ADMIN — HYDROMORPHONE HYDROCHLORIDE 0.5 MG: 0.5 INJECTION, SOLUTION INTRAMUSCULAR; INTRAVENOUS; SUBCUTANEOUS at 06:10

## 2024-10-06 RX ADMIN — HEPARIN SODIUM 5000 UNITS: 5000 INJECTION INTRAVENOUS; SUBCUTANEOUS at 09:10

## 2024-10-06 RX ADMIN — MIDODRINE HYDROCHLORIDE 5 MG: 2.5 TABLET ORAL at 12:10

## 2024-10-06 RX ADMIN — HEPARIN SODIUM 5000 UNITS: 5000 INJECTION INTRAVENOUS; SUBCUTANEOUS at 12:10

## 2024-10-06 RX ADMIN — TOPIRAMATE 100 MG: 25 TABLET, FILM COATED ORAL at 09:10

## 2024-10-06 RX ADMIN — MIDODRINE HYDROCHLORIDE 2.5 MG: 2.5 TABLET ORAL at 09:10

## 2024-10-06 RX ADMIN — ARFORMOTEROL TARTRATE 15 MCG: 15 SOLUTION RESPIRATORY (INHALATION) at 07:10

## 2024-10-06 RX ADMIN — PRAVASTATIN SODIUM 10 MG: 10 TABLET ORAL at 09:10

## 2024-10-06 NOTE — PROGRESS NOTES
[facility] Medicine  Progress Note    Patient Name: Yvette Hutchinson  MRN: [id number]  Patient Class: IP- Inpatient   Admission Date: 10/2/2024  Length of Stay: 4 days  Attending Physician: Iris Odell MD  Primary Care Provider: Vern Priest MD        Subjective:     Principal Problem:Septic shock        HPI:  64-year-old female with PMH of CLL, and lung cancer status post partial resection who presented to the ER because of presyncope, nausea, vomiting, diarrhea and abdominal pain.  According to the patient, for over 1 week she has been having intractable nausea, vomiting and diarrhea.  Also epigastric and right upper quadrant abdominal pain described as sharp, 10/10 on pain scale radiate to the rest of her abdomen.  Today when she stood up from a sitting position, she felt as if she will pass out.  As a result, she decided to come to the ER for evaluation.  Denied any fever or chills.  She denied chest pain or shortness on breath.    In the ER, vitals showed a blood pressure of 99/55, but soon dropped to the 70s.  Heart rate was 122, respiratory rate of 20, temperature of 101.6°, and patient was satting 95% on room air.  CBC was white count of 21, with history of CLL.  CMP showed sodium of 135, acute renal failure with creatinine of 1.4 compared to baseline of 1, and patient has elevated LFTs with alk-phos of 216, , and AST of 497.  COVID/flu are negative.  Blood cultures were collected.  CTA chest negative for pulmonary embolus, but showed mild diffuse bronchial wall thickening that could reflect infectious or inflammatory bronchitis in the appropriate clinical setting.  CT abdomen/pelvis showed pericholecystic edema without evidence of gallstones.  Cholecystitis cannot be excluded. Also mild Linda pancreatic edema which may represent early pancreatitis. US abdomen showed Cholelithiasis, gallbladder wall thickening, and mild pericholecystic fluid. Patient was started  on Zosyn/vanco.  Was given sepsis bolus of lactated ringer of 2286 cc.  Started on Levophed for blood pressure support.  General surgery was consulted.  Patient will be admitted to ICU for septic shock.       Overview/Hospital Course:  This 64-year-old lady with history of CLL, lung cancer status post partial resection presented to the emergency department due to presyncope, nausea vomiting and abdominal pain.  She was also noted to be hypotensive and tachycardic on admission.  She was also noted to have fever.  Patient was admitted to the hospital for septic shock concern for abdominal source given CT findings showing some pericholecystic fluid.  Patient was also noted to have transaminitis and imaging did reveal some peripancreatic fluid and inflammatory changes as well though her lipase was normal.  General surgery was consulted after ultrasound the gallbladder was performed which again redemonstrated pericholecystic fluid.  She is status post laparoscopic cholecystectomy on 10/03.  Gallbladder was not noted to be gangrenous and per the op note there was suspicion that this was not the source of her septic shock.  However, blood cultures grew only coagulation negative Staphylococcus in 1/2 bottles.  This was thought to be contaminant.  Infectious Disease was consulted and vancomycin and Zosyn was continued on this patient.  Levophed initiated on admission was able to be weaned, although she still had requirement the day after surgery.  Will attempt midodrine TID to transition off levophed.  Repeat blood cultures NTD.     Interval History:  Nausea and vomiting, + flatus and belching    Review of Systems is performed and is negative unless mentioned in the interval history above.  Objective:     Vital Signs (Most Recent):  Temp: 98.3 °F (36.8 °C) (10/06/24 0301)  Pulse: 65 (10/06/24 0705)  Resp: 18 (10/06/24 0949)  BP: (!) 100/55 (10/06/24 0645)  SpO2: 96 % (10/06/24 0705) Vital Signs (24h Range):  Temp:  [97.7 °F  (36.5 °C)-98.7 °F (37.1 °C)] 98.3 °F (36.8 °C)  Pulse:  [60-79] 65  Resp:  [9-41] 18  SpO2:  [80 %-100 %] 96 %  BP: ()/(35-64) 100/55     Weight: 76.2 kg (168 lb)  Body mass index is 29.76 kg/m².    Intake/Output Summary (Last 24 hours) at 10/6/2024 1117  Last data filed at 10/6/2024 0601  Gross per 24 hour   Intake 2072.22 ml   Output 4250 ml   Net -2177.78 ml         Physical Exam  Vitals reviewed.   Constitutional:       Appearance: Normal appearance.   HENT:      Head: Normocephalic and atraumatic.      Mouth/Throat:      Mouth: Mucous membranes are moist.   Eyes:      Extraocular Movements: Extraocular movements intact.      Conjunctiva/sclera: Conjunctivae normal.      Pupils: Pupils are equal, round, and reactive to light.   Cardiovascular:      Rate and Rhythm: Normal rate and regular rhythm.   Pulmonary:      Effort: Pulmonary effort is normal.      Breath sounds: Normal breath sounds.   Abdominal:      General: Abdomen is flat. Bowel sounds are normal.      Palpations: Abdomen is soft.      Comments: Only light palpation use given surgery yesterday.  Patient continues to have diffuse abdominal tenderness    Musculoskeletal:         General: No swelling or tenderness.   Skin:     General: Skin is warm.   Neurological:      Mental Status: She is alert and oriented to person, place, and time.   Psychiatric:         Mood and Affect: Mood normal.         Behavior: Behavior normal.             Significant Labs: All pertinent labs within the past 24 hours have been reviewed.  BMP:   Recent Labs   Lab 10/06/24  0223         K 3.6      CO2 26   BUN 7*   CREATININE 1.0   CALCIUM 7.8*   MG 1.7     CBC:   Recent Labs   Lab 10/05/24  0336 10/06/24  0224   WBC 26.10* 24.07*   HGB 10.9* 10.4*   HCT 33.9* 31.8*    167       Significant Imaging: I have reviewed all pertinent imaging results/findings within the past 24 hours.  I have reviewed and interpreted all pertinent imaging  results/findings within the past 24 hours.    Assessment/Plan:      * Septic shock  - Suspect secondary to intra-abdominal process.  CT reveals pericholecystic fluid and peripancreatic fluid  She is now status post cholecystectomy with General surgery, per op note the gallbladder did not appear significantly inflamed to be commensurate with the patient's presentation of septic shock, 1/2 bottles drawn for blood culture was growing coagulase-negative Staphylococcus, felt to be contaminant, however, given patient's persistent pressor requirement infectious Disease was consulted, recommendation to continue vancomycin and Zosyn at this time  Infectious Disease we will defer imaging of patient's C-spine for now  Continued follow up blood cultures  Levophed as needed for map less than 65 -unable to wean off, midodrine initiated by cardiology   Check cortisol     Bacteremia due to Staphylococcus  This has now resulted as coagulase-negative staph, likely contaminant  ID following and would like to continue vancomycin for now  Repeat blood cultures 10/5/24 NTD de-escalate antibiotics ? Defer to ID following     Abdominal pain  Suspect secondary to acute cholecystitis.    CT abdomen also showed mild pancreatitis, but lipase is within normal limits.    Given her diarrhea however, will order stool culture.    Supportive care with IV fluids, Zofran for nausea.  P.r.n. pain medication.      Acute renal failure  Most recent creatinine and eGFR are listed below.  Recent Labs     10/04/24  0455 10/04/24  2101 10/05/24  0336   CREATININE 1.2 1.2 1.4   EGFRNORACEVR 50.2* 50.2* 41.8*       Likely pre-renal given her septic shock.   Status post IV hydration, patient able to take PO hydration  GM resolving      Neuropathic pain  Resume gabapentin.      Calculus of gallbladder with acute cholecystitis without obstruction  Status post cholecystectomy  WBC 24K, on zosyn       Acquired hypothyroidism  Resume synthroid.       CLL (chronic  lymphocytic leukemia)  Monitor. Patient's last chemo for over a year.  Follow up with Hematology outpatient.      Anxiety  Resume Wellbutrin and Celexa        VTE Risk Mitigation (From admission, onward)           Ordered     Place INDIA hose  Until discontinued         10/05/24 1132     heparin (porcine) injection 5,000 Units  Every 8 hours         10/02/24 1611     IP VTE HIGH RISK PATIENT  Once         10/02/24 1611     Place sequential compression device  Until discontinued         10/02/24 1611                    Discharge Planning   SALINA: 10/8/2024     Code Status: Full Code   Is the patient medically ready for discharge?:     Reason for patient still in hospital (select all that apply): Patient trending condition  Discharge Plan A: Home with family            Critical care time spent on the evaluation and treatment of severe organ dysfunction, review of pertinent labs and imaging studies, discussions with consulting providers and discussions with patient/family: 20 minutes.      Iris Odell MD  Department of Hospital Medicine   [facility]

## 2024-10-06 NOTE — CARE UPDATE
10/05/24 2025   Patient Assessment/Suction   Level of Consciousness (AVPU) alert   Respiratory Effort Normal;Unlabored   Expansion/Accessory Muscles/Retractions no use of accessory muscles   All Lung Fields Breath Sounds equal bilaterally;clear   Rhythm/Pattern, Respiratory unlabored   Cough Frequency no cough   PRE-TX-O2   Device (Oxygen Therapy) nasal cannula   Flow (L/min) (Oxygen Therapy) 2   SpO2 100 %   Pulse Oximetry Type Continuous   $ Pulse Oximetry - Multiple Charge Pulse Oximetry - Multiple   Pulse 63   Resp 16   Aerosol Therapy   $ Aerosol Therapy Charges Aerosol Treatment  (Pulmicort 1.0mg)   Daily Review of Necessity (SVN) completed   Respiratory Treatment Status (SVN) given   Treatment Route (SVN) mask   Patient Position HOB elevated   Post Treatment Assessment (SVN) breath sounds unchanged   Signs of Intolerance (SVN) none   Breath Sounds Post-Respiratory Treatment   Throughout All Fields Post-Treatment All Fields   Throughout All Fields Post-Treatment no change   Post-treatment Heart Rate (beats/min) 62   Post-treatment Resp Rate (breaths/min) 18   Incentive Spirometer   $ Incentive Spirometer Charges done with encouragement;proper technique demonstrated   Administration (IS) instruction provided, follow-up   Number of Repetitions (IS) 10   Level Incentive Spirometer (mL) 1500   Patient Tolerance (IS) good   Education   $ Education Bronchodilator;15 min

## 2024-10-06 NOTE — PROGRESS NOTES
Therapy with Vancomycin order discontinued by Dr. Man on 10/6/24 @ 14:00.   Pharmacy will sign off, please re-consult as needed.    Thank you for allowing us to participate in this patient's care.  Hina Meredith 10/6/2024 2:06 PM  Dept of Pharmacy  Ext [phone]

## 2024-10-06 NOTE — PT/OT/SLP PROGRESS
Physical Therapy Treatment    Patient Name:  Yvette Hutchinson   MRN:  [id number]    Recommendations:     Discharge Recommendations: Moderate Intensity Therapy  Discharge Equipment Recommendations: none  Barriers to discharge:  low BP with limited mobility, increase assist with mobility,     Assessment:     Yvette Hutchinson is a 64 y.o. female admitted with a medical diagnosis of Septic shock.  She presents with the following impairments/functional limitations: weakness, impaired endurance, impaired self care skills, impaired functional mobility, gait instability, impaired balance, decreased lower extremity function, decreased safety awareness, pain, impaired cardiopulmonary response to activity.    Pt found up in chair. Pt agreeable to visit. Pt on levophed so session limited to seated and standing at bedside chair. BP seated 90s/50. Sit to stand with CGA 70s/50s. Pt educated on seated TE consistent of ankle DF, knee ext hip flexion and glute squeezes throughout the day to improve circulation.     Rehab Prognosis: Fair; patient would benefit from acute skilled PT services to address these deficits and reach maximum level of function.    Recent Surgery: Procedure(s) (LRB):  CHOLECYSTECTOMY, LAPAROSCOPIC (N/A) 3 Days Post-Op    Plan:     During this hospitalization, patient to be seen daily to address the identified rehab impairments via gait training, therapeutic exercises, neuromuscular re-education, therapeutic activities and progress toward the following goals:    Plan of Care Expires:  11/06/24    Subjective     Chief Complaint: had a BM earlier  Patient/Family Comments/goals: get better  Pain/Comfort:  Pain Rating 1: 0/10      Objective:     Communicated with RN prior to session.  Patient found HOB elevated with peripheral IV, telemetry, blood pressure cuff, pulse ox (continuous), oxygen, ramirez catheter upon PT entry to room.     General Precautions: Standard, fall  Orthopedic Precautions: spinal  precautions  Braces: Amite J collar  Respiratory Status: Nasal cannula, flow 1 L/min     Functional Mobility:  Transfers:     Sit to Stand:  contact guard assistance with rolling walker      AM-PAC 6 CLICK MOBILITY          Treatment & Education:  Pt educated on POC, discharge recommendation, need for assist with mobility, seated TE HEP, importance of time OOB, use of call bell to seek assistance as needed and fall prevention    Pt tolerated seated TE including hip flexion, knee ext, ankle df and glute squeezes 1 set of 10 each with min vi for proper form and pacing for optimal strengthening        Patient left up in chair with all lines intact, call button in reach, and RN present..    GOALS:   Multidisciplinary Problems       Physical Therapy Goals          Problem: Physical Therapy    Goal Priority Disciplines Outcome Interventions   Physical Therapy Goal     PT, PT/OT     Description: Goals to be met by: 24     Patient will increase functional independence with mobility by performin. Supine to sit with Supervision  2. Sit to stand transfer with Supervision  3. Bed to chair transfer with Supervision using Rolling Walker  4. Gait  x 200 feet with Supervision using Rolling Walker.                              Time Tracking:     PT Received On: 10/06/24  PT Start Time: 945     PT Stop Time: 956  PT Total Time (min): 11 min     Billable Minutes: Therapeutic Activity 11    Treatment Type: Treatment  PT/PTA: PT     Number of PTA visits since last PT visit: 0     10/06/2024

## 2024-10-06 NOTE — ASSESSMENT & PLAN NOTE
- Suspect secondary to intra-abdominal process.  CT reveals pericholecystic fluid and peripancreatic fluid  She is now status post cholecystectomy with General surgery, per op note the gallbladder did not appear significantly inflamed to be commensurate with the patient's presentation of septic shock, 1/2 bottles drawn for blood culture was growing coagulase-negative Staphylococcus, felt to be contaminant, however, given patient's persistent pressor requirement infectious Disease was consulted, recommendation to continue vancomycin and Zosyn at this time  Infectious Disease we will defer imaging of patient's C-spine for now  Continued follow up blood cultures  Levophed as needed for map less than 65 -unable to wean off, midodrine initiated by cardiology   Check cortisol

## 2024-10-06 NOTE — ASSESSMENT & PLAN NOTE
This has now resulted as coagulase-negative staph, likely contaminant  ID following and would like to continue vancomycin for now  Repeat blood cultures 10/5/24 NTD de-escalate antibiotics ? Defer to ID following

## 2024-10-06 NOTE — SUBJECTIVE & OBJECTIVE
Interval History:  Nausea and vomiting, + flatus and belching    Review of Systems is performed and is negative unless mentioned in the interval history above.  Objective:     Vital Signs (Most Recent):  Temp: 98.3 °F (36.8 °C) (10/06/24 0301)  Pulse: 65 (10/06/24 0705)  Resp: 18 (10/06/24 0949)  BP: (!) 100/55 (10/06/24 0645)  SpO2: 96 % (10/06/24 0705) Vital Signs (24h Range):  Temp:  [97.7 °F (36.5 °C)-98.7 °F (37.1 °C)] 98.3 °F (36.8 °C)  Pulse:  [60-79] 65  Resp:  [9-41] 18  SpO2:  [80 %-100 %] 96 %  BP: ()/(35-64) 100/55     Weight: 76.2 kg (168 lb)  Body mass index is 29.76 kg/m².    Intake/Output Summary (Last 24 hours) at 10/6/2024 1117  Last data filed at 10/6/2024 0601  Gross per 24 hour   Intake 2072.22 ml   Output 4250 ml   Net -2177.78 ml         Physical Exam  Vitals reviewed.   Constitutional:       Appearance: Normal appearance.   HENT:      Head: Normocephalic and atraumatic.      Mouth/Throat:      Mouth: Mucous membranes are moist.   Eyes:      Extraocular Movements: Extraocular movements intact.      Conjunctiva/sclera: Conjunctivae normal.      Pupils: Pupils are equal, round, and reactive to light.   Cardiovascular:      Rate and Rhythm: Normal rate and regular rhythm.   Pulmonary:      Effort: Pulmonary effort is normal.      Breath sounds: Normal breath sounds.   Abdominal:      General: Abdomen is flat. Bowel sounds are normal.      Palpations: Abdomen is soft.      Comments: Only light palpation use given surgery yesterday.  Patient continues to have diffuse abdominal tenderness    Musculoskeletal:         General: No swelling or tenderness.   Skin:     General: Skin is warm.   Neurological:      Mental Status: She is alert and oriented to person, place, and time.   Psychiatric:         Mood and Affect: Mood normal.         Behavior: Behavior normal.             Significant Labs: All pertinent labs within the past 24 hours have been reviewed.  BMP:   Recent Labs   Lab 10/06/24  7786          K 3.6      CO2 26   BUN 7*   CREATININE 1.0   CALCIUM 7.8*   MG 1.7     CBC:   Recent Labs   Lab 10/05/24  0336 10/06/24  0224   WBC 26.10* 24.07*   HGB 10.9* 10.4*   HCT 33.9* 31.8*    167       Significant Imaging: I have reviewed all pertinent imaging results/findings within the past 24 hours.  I have reviewed and interpreted all pertinent imaging results/findings within the past 24 hours.

## 2024-10-06 NOTE — PROGRESS NOTES
[facility]  Department of Cardiology  Consult Note      PATIENT NAME: Yvette Hutchinson    MRN: [id number]  TODAY'S DATE: 10/06/2024  ADMIT DATE: 10/2/2024                          CONSULT REQUESTED BY: Iris Odell MD    SUBJECTIVE     PRINCIPAL PROBLEM: Septic shock    Interval history:  10/06/2024  Pt seen up this morning sitting in chair with sister in law at bedside. Still having some abdominal discomfort.  Remains on low-dose norepi.    10/05/2024  Patient seen this morning sitting up in chair in the ICU.  Still complains of some nausea and abdominal discomfort.  Sister-in-law at bedside.  Remains on 0.2 of nor epi.    HPI:  Patient is a 64-year-old female who presented to the emergency room with nausea vomiting diarrhea and abdominal pain as well as near syncope.  She apparently had some chest tightness for a week or so prior to this event as well.  Patient with elevated troponin levels on arrival but was also being treated for sepsis.  Patient was noted to have cholecystitis and underwent laparoscopic cholecystectomy yesterday.  Patient is noted to have bacteremia and is followed by infectious disease.  Echocardiogram noted which shows reduced ejection fraction.  Other history also includes CLL and lung cancer with history of partial resection.      REASON FOR CONSULT:  From Hospitalist H&P:  Reduced ejection fraction, elevated troponin      Review of patient's allergies indicates:  No Known Allergies    Past Medical History:   Diagnosis Date    Arthritis     Cancer 10/2022    (CLL) leukemia ; adenocarcinoma  adenosgemis    Depression     GERD (gastroesophageal reflux disease)     Neck pain     and back pain    Personal history of colonic polyps 07/07/2017    Pneumonia of left lung due to infectious organism 03/05/2020    Thyroid disease      Past Surgical History:   Procedure Laterality Date    FUSION, SPINE, CERVICAL, ANTERIOR APPROACH N/A 08/06/2024    Procedure: FUSION, SPINE,  CERVICAL, ANTERIOR APPROACH;  Surgeon: Anatoly Daley DO;  Location: [facility] OR;  Service: Neurosurgery;  Laterality: N/A;  IOM, C-ARM, MEDTRONICS, MICRO, HORSESHOE    INJECTION OF ANESTHETIC AGENT AROUND MULTIPLE INTERCOSTAL NERVES Left 10/03/2022    Procedure: BLOCK, NERVE, INTERCOSTAL, 2 OR MORE;  Surgeon: Evelio Kaplan MD;  Location: [facility] OR 2ND FLR;  Service: Thoracic;  Laterality: Left;    INSERTION OF TUNNELED CENTRAL VENOUS CATHETER (CVC) WITH SUBCUTANEOUS PORT Right 11/03/2022    Procedure: [id number], Right or Left Neck or Chest;  Surgeon: Mini Acevedo MD;  Location: [facility] OR 2ND FLR;  Service: General;  Laterality: Right;    LAPAROSCOPIC CHOLECYSTECTOMY N/A 10/3/2024    Procedure: CHOLECYSTECTOMY, LAPAROSCOPIC;  Surgeon: Ang Mosley III, MD;  Location: [facility] OR;  Service: General;  Laterality: N/A;    ROBOT-ASSISTED LAPAROSCOPIC LYMPHADENECTOMY USING DA MONIQUE XI Left 10/03/2022    Procedure: XI ROBOTIC LYMPHADENECTOMY;  Surgeon: Evelio Kaplan MD;  Location: [facility] OR 2ND FLR;  Service: Thoracic;  Laterality: Left;    SPINE SURGERY      TONSILLECTOMY      XI ROBOTIC RATS,WITH LOBECTOMY,LUNG Left 10/03/2022    Procedure: XI ROBOTIC RATS,WITH LOBECTOMY,LUNG;  Surgeon: Evelio Kaplan MD;  Location: [facility] OR 2ND FLR;  Service: Thoracic;  Laterality: Left;     Social History     Tobacco Use    Smoking status: Some Days     Current packs/day: 0.15     Types: Cigarettes     Passive exposure: Current    Smokeless tobacco: Never    Tobacco comments:     reports one cigarette every now and then   Substance Use Topics    Alcohol use: Yes     Comment: rarely    Drug use: Yes     Types: Marijuana        REVIEW OF SYSTEMS  Per HPI    OBJECTIVE     VITAL SIGNS (Most Recent)  Temp: 98.3 °F (36.8 °C) (10/06/24 0301)  Pulse: 64 (10/06/24 1200)  Resp: (!) 24 (10/06/24 1200)  BP: (!) 98/57 (10/06/24 1200)  SpO2: 96 % (10/06/24 1200)    VENTILATION STATUS  Resp: (!) 24 (10/06/24  1200)  SpO2: 96 % (10/06/24 1200)           I & O (Last 24H):  Intake/Output Summary (Last 24 hours) at 10/6/2024 1353  Last data filed at 10/6/2024 0601  Gross per 24 hour   Intake 1695.36 ml   Output 3525 ml   Net -1829.64 ml       WEIGHTS  Wt Readings from Last 1 Encounters:   10/02/24 1016 76.2 kg (168 lb)       PHYSICAL EXAM  CONSTITUTIONAL: No fever, no chills, no acute distress elderly female sitting up in chair  HEENT: Normocephalic, atraumatic,pupils reactive to light                 NECK:  No JVD no carotid bruit  CVS: S1S2+, RRR  LUNGS: Clear  ABDOMEN: Soft, NT, BS+  EXTREMITIES: No cyanosis, edema  : No ramirez catheter  NEURO: AAO X 3  PSY: Normal affect      HOME MEDICATIONS:  No current facility-administered medications on file prior to encounter.     Current Outpatient Medications on File Prior to Encounter   Medication Sig Dispense Refill    acetaminophen (TYLENOL) 500 MG tablet Take 2 tablets (1,000 mg total) by mouth every 6 (six) hours as needed for Pain. 8 tablet 0    albuterol (PROVENTIL/VENTOLIN HFA) 90 mcg/actuation inhaler Inhale 2 puffs into the lungs every 6 (six) hours as needed for Wheezing or Shortness of Breath. Rescue 8.5 g 11    buPROPion (WELLBUTRIN XL) 300 MG 24 hr tablet Take 1 tablet (300 mg total) by mouth once daily. 90 tablet 3    citalopram (CELEXA) 20 MG tablet Take 1 tablet (20 mg total) by mouth once daily. 30 tablet 11    fluticasone propionate (FLONASE) 50 mcg/actuation nasal spray 1 spray (50 mcg total) by Each Nostril route once daily. 16 g 3    fluticasone-salmeterol 230-21 mcg/dose (ADVAIR HFA) 230-21 mcg/actuation HFAA inhaler Inhale 2 puffs into the lungs 2 (two) times daily. Controller 12 g 5    gabapentin (NEURONTIN) 300 MG capsule Take 1 capsule (300 mg total) by mouth 3 (three) times daily. 270 capsule 1    levocetirizine (XYZAL) 5 MG tablet Take 1 tablet (5 mg total) by mouth every evening. 30 tablet 5    levothyroxine (SYNTHROID) 50 MCG tablet Take 1 tablet  (50 mcg total) by mouth before breakfast. 90 tablet 3    pravastatin (PRAVACHOL) 10 MG tablet Take 1 tablet (10 mg total) by mouth once daily. 90 tablet 3    topiramate (TOPAMAX) 100 MG tablet Take 1 tablet (100 mg total) by mouth 2 (two) times daily. 60 tablet 11    naloxone (NARCAN) 4 mg/actuation Spry ADMINISTER A SINGLE SPRAY IN ONE NOSTRIL UPON SIGNS OF OPIOID OVERDOSE. CALL 911. REPEAT AFTER 3 MINUTES IF NO RESPONSE.      varenicline (CHANTIX) 1 mg Tab Take 1 tablet by mouth twice daily (Patient not taking: Reported on 10/2/2024) 180 tablet 0       SCHEDULED MEDS:   aluminum-magnesium hydroxide-simethicone  30 mL Oral QID (AC & HS)    arformoteroL  15 mcg Nebulization BID    aspirin  81 mg Oral Daily    budesonide  1 mg Nebulization Q12H    buPROPion  300 mg Oral Daily    citalopram  20 mg Oral Daily    clopidogreL  75 mg Oral Daily    gabapentin  300 mg Oral TID    heparin (porcine)  5,000 Units Subcutaneous Q8H    levothyroxine  50 mcg Oral Before breakfast    midodrine  5 mg Oral TID    mupirocin  1 g Nasal BID    piperacillin-tazobactam (Zosyn) IV (PEDS and ADULTS) (extended infusion is not appropriate)  4.5 g Intravenous Q8H    pravastatin  10 mg Oral Daily    topiramate  100 mg Oral BID    vancomycin (VANCOCIN) IV (PEDS and ADULTS)  1,250 mg Intravenous Q18H       CONTINUOUS INFUSIONS:   NORepinephrine bitartrate-D5W  0-3 mcg/kg/min Intravenous Continuous 5.4 mL/hr at 10/06/24 0601 0.15 mcg/kg/min at 10/06/24 0601       PRN MEDS:  Current Facility-Administered Medications:     acetaminophen, 650 mg, Oral, Q4H PRN    aluminum-magnesium hydroxide-simethicone, 30 mL, Oral, QID PRN    calcium gluconate IVPB, 1 g, Intravenous, PRN    calcium gluconate IVPB, 2 g, Intravenous, PRN    calcium gluconate IVPB, 3 g, Intravenous, PRN    dextrose 50%, 12.5 g, Intravenous, PRN    dextrose 50%, 25 g, Intravenous, PRN    glucagon (human recombinant), 1 mg, Intramuscular, PRN    glucose, 16 g, Oral, PRN    glucose, 24 g,  "Oral, PRN    HYDROcodone-acetaminophen, 1 tablet, Oral, Q6H PRN    HYDROmorphone, 0.5 mg, Intravenous, Q2H PRN    magnesium oxide, 800 mg, Oral, PRN    magnesium oxide, 800 mg, Oral, PRN    melatonin, 6 mg, Oral, Nightly PRN    naloxone, 0.02 mg, Intravenous, PRN    ondansetron, 4 mg, Intravenous, Q6H PRN    potassium bicarbonate, 35 mEq, Oral, PRN    potassium bicarbonate, 50 mEq, Oral, PRN    potassium bicarbonate, 60 mEq, Oral, PRN    potassium, sodium phosphates, 2 packet, Oral, PRN    potassium, sodium phosphates, 2 packet, Oral, PRN    potassium, sodium phosphates, 2 packet, Oral, PRN    prochlorperazine, 5 mg, Intravenous, Q4H PRN    senna-docusate 8.6-50 mg, 1 tablet, Oral, BID PRN    sodium chloride 0.9%, 2 mL, Intravenous, Q12H PRN    Pharmacy to dose Vancomycin consult, , , Once **AND** vancomycin - pharmacy to dose, , Intravenous, pharmacy to manage frequency    LABS AND DIAGNOSTICS     CBC LAST 3 DAYS  Recent Labs   Lab 10/04/24  0455 10/05/24  0336 10/06/24  0224   WBC 34.88* 26.10* 24.07*   RBC 3.58* 3.54* 3.41*   HGB 11.3* 10.9* 10.4*   HCT 33.8* 33.9* 31.8*   MCV 94 96 93   MCH 31.6* 30.8 30.5   MCHC 33.4 32.2 32.7   RDW 14.3 14.3 14.4    194 167   MPV 10.2 10.3 10.5   GRAN 39.0 19.2*  5.0 18.9*  4.6   LYMPH 37.0  CANCELED 67.0*  17.5* 66.8*  16.1*   MONO 6.0  CANCELED 5.7  1.5* 5.9  1.4*   BASO CANCELED 0.11 0.12   NRBC 0 0 0       COAGULATION LAST 3 DAYS  No results for input(s): "LABPT", "INR", "APTT" in the last 168 hours.    CHEMISTRY LAST 3 DAYS  Recent Labs   Lab 10/03/24  1139 10/03/24  1209 10/04/24  0455 10/04/24  2101 10/05/24  0336 10/06/24  0223   NA  --   --  139 131* 131* 138   K  --   --  3.8 3.9 4.0 3.6   CL  --   --  110 105 104 107   CO2  --   --  20* 21* 23 26   ANIONGAP  --   --  9 5* 4* 5*   BUN  --   --  15 11 11 7*   CREATININE  --   --  1.2 1.2 1.4 1.0   GLU  --   --  93 105 96 100   CALCIUM  --   --  6.9* 7.1* 7.4* 7.8*   PH 7.198* 7.300*  --   --   --   --  " "  MG  --   --  2.2 1.9 1.8 1.7   ALBUMIN  --   --  2.7*  --  2.8* 2.9*   PROT  --   --  4.4*  --  4.5* 4.6*   ALKPHOS  --   --  229*  --  356* 380*   ALT  --   --  935*  --  824* 691*   AST  --   --  813*  --  628* 472*   BILITOT  --   --  1.0  --  1.3* 1.1*       CARDIAC PROFILE LAST 3 DAYS  Recent Labs   Lab 10/02/24  1838 10/03/24  0112 10/03/24  0514   TROPONINIHS 1588.5* 1161.1* 1015.8*       ENDOCRINE LAST 3 DAYS  No results for input(s): "TSH", "PROCAL" in the last 168 hours.    LAST ARTERIAL BLOOD GAS  ABG  Recent Labs   Lab 10/03/24  1209   PH 7.300*   PO2 506*   PCO2 50.0*   HCO3 24.6   BE -2       LAST 7 DAYS MICROBIOLOGY   Microbiology Results (last 7 days)       Procedure Component Value Units Date/Time    Blood culture [[id number]] Collected: 10/05/24 1037    Order Status: Completed Specimen: Blood Updated: 10/06/24 1232     Blood Culture, Routine No Growth to date      No Growth to date    Blood culture [[id number]] Collected: 10/05/24 1037    Order Status: Completed Specimen: Blood Updated: 10/06/24 1232     Blood Culture, Routine No Growth to date      No Growth to date    Blood culture x two cultures. Draw prior to antibiotics [[id number]] Collected: 10/02/24 1057    Order Status: Completed Specimen: Blood from Peripheral, Hand, Left Updated: 10/06/24 1232     Blood Culture, Routine No Growth to date      No Growth to date      No Growth to date      No Growth to date      No Growth to date    Narrative:      Aerobic and anaerobic    Stool culture **cannot be ordered stat** [[id number]] Collected: 10/02/24 1919    Order Status: Completed Specimen: Stool Updated: 10/05/24 1018     Stool Culture No Salmonella,Shigella,Vibrio,Campylobacter.      No E coli 0157:H7 isolated.    Blood culture x two cultures. Draw prior to antibiotics [[id number]]  (Abnormal) Collected: 10/02/24 1049    Order Status: Completed Specimen: Blood from Peripheral, Wrist, Right Updated: 10/04/24 0807     Blood Culture, Routine " Gram stain aer bottle: Gram positive cocci      Results called to and read back by:Val Nino RN-ED;  10/03/2024      11:58 CJD      COAGULASE NEGATIVE STAPHYLOCOCCI  Organism is a probable contaminant      Narrative:      Aerobic and anaerobic    Rapid Organism ID by PCR (from Blood culture) [[id number]]  (Abnormal) Collected: 10/02/24 1049    Order Status: Completed Updated: 10/03/24 1322     Enterococcus faecalis Not Detected     Enterococcus faecium Not Detected     Listeria monocytogenes Not Detected     Staphylococcus spp. Detected     Staphylococcus aureus Not Detected     Staphylococcus epidermidis Not Detected     Staphylococcus lugdunensis Not Detected     Streptococcus species Not Detected     Streptococcus agalactiae Not Detected     Streptococcus pneumoniae Not Detected     Streptococcus pyogenes Not Detected     Acinetobacter calcoaceticus/baumannii complex Not Detected     Bacteroides fragilis Not Detected     Enterobacterales Not Detected     Enterobacter cloacae complex Not Detected     Escherichia coli Not Detected     Klebsiella aerogenes Not Detected     Klebsiella oxytoca Not Detected     Klebsiella pneumoniae group Not Detected     Proteus Not Detected     Salmonella sp Not Detected     Serratia marcescens Not Detected     Haemophilus influenzae Not Detected     Neisseria meningtidis Not Detected     Pseudomonas aeruginosa Not Detected     Stenotrophomonas maltophilia Not Detected     Candida albicans Not Detected     Candida auris Not Detected     Candida glabrata Not Detected     Candida krusei Not Detected     Candida parapsilosis Not Detected     Candida tropicalis Not Detected     Cryptococcus neoformans/gattii Not Detected     CTX-M (ESBL ) Test not applicable     IMP (Carbapenem resistant) Test not applicable     KPC resistance gene (Carbapenem resistant) Test not applicable     mcr-1  Test not applicable     mec A/C  Test not applicable     mec A/C and MREJ (MRSA) gene Test  not applicable     NDM (Carbapenem resistant) Test not applicable     OXA-48-like (Carbapenem resistant) Test not applicable     van A/B (VRE gene) Test not applicable     VIM (Carbapenem resistant) Test not applicable    Narrative:      Aerobic and anaerobic    Clostridium difficile EIA [[id number]] Collected: 10/02/24 1919    Order Status: Completed Specimen: Stool Updated: 10/03/24 0031     C. diff Antigen Negative     C difficile Toxins A+B, EIA Negative     Comment: Testing not recommended for children <24 months old.               MOST RECENT IMAGING  CT Head Without Contrast  Narrative: EXAMINATION:  CT HEAD WITHOUT CONTRAST    CLINICAL HISTORY:  Headache, new or worsening (Age >= 50y);    TECHNIQUE:  Head CT without IV contrast.    COMPARISON:  MRI 09/24/2022    FINDINGS:  Minimal periventricular white matter low attenuation suggest minor chronic small vessel ischemic changes.  No acute intracranial hemorrhage, midline shift, or mass effect.    The ventricles and cisterns are maintained.    Calvarium is intact. Trace left maxillary sinus and right sphenoid sinus mucosal thickening is evident with mild mucosal thickening inferior frontal sinuses bilaterally.  Minimal fluid opacifies few inferior most left mastoid air cells.  Impression: No acute intracranial abnormality.    Electronically signed by: Vinay Daniels  Date:    10/04/2024  Time:    15:25      ECHOCARDIOGRAM RESULTS (last 5)  Results for orders placed during the hospital encounter of 10/02/24    Echo    Interpretation Summary    Left Ventricle: Left ventricle was not well visualized due to poor sonic window. Regional wall motion abnormalities present. See diagram for wall motion findings. There is moderately reduced systolic function with a visually estimated ejection fraction of 30 - 35%. There is normal diastolic function. E/A ratio is 0.84. Average E/e' ratio is 8.35.    Right Ventricle: Normal right ventricular cavity size. Systolic function is  normal.    Mitral Valve: There is mild to moderate regurgitation.    Tricuspid Valve: There is mild regurgitation.    Pulmonic Valve: There is mild regurgitation.    Pericardium: There is a small effusion. No indication of cardiac tamponade.      CURRENT/PREVIOUS VISIT EKG  Results for orders placed or performed during the hospital encounter of 10/02/24   EKG 12-lead    Collection Time: 10/04/24  1:13 PM   Result Value Ref Range    QRS Duration 104 ms    [facility] QTC Calculation 613 ms    Narrative    Test Reason : I21.4,    Vent. Rate : 092 BPM     Atrial Rate : 092 BPM     P-R Int : 142 ms          QRS Dur : 104 ms      QT Int : 496 ms       P-R-T Axes : 015 -45 113 degrees     QTc Int : 613 ms    Normal sinus rhythm  Low voltage QRS  Left anterior fascicular block  Cannot rule out Anterior infarct ,age undetermined  Prolonged QT  Abnormal ECG  When compared with ECG of 03-OCT-2024 10:07,  Left anterior fascicular block is now Present  Minimal criteria for Anterior infarct are now Present  QT has lengthened  Confirmed by Lars BEAR, Wade DAVISON ([id number]) on 10/5/2024 11:56:51 AM    Referred By: [email or web address]   SELF           Confirmed By:Wade Sousa MD           ASSESSMENT/PLAN:     Active Hospital Problems    Diagnosis    *Septic shock    Bacteremia due to Staphylococcus    Calculus of gallbladder with acute cholecystitis without obstruction    Neuropathic pain    Acute renal failure    Acquired hypothyroidism    CLL (chronic lymphocytic leukemia)    Anxiety       ASSESSMENT & PLAN:     Newly reduced EF 30-35%  Elevated HS troponin likely type II MI  Septic shock  Bacteremia   GM- improved   S/p cholecystectomy  CLL      RECOMMENDATIONS:    Patient is recovering from cholecystectomy as well as sepsis. Recommend ischemic evaluation once optimized and improved overall.  Still needs more time, at least 1 more day.  In the meantime, will treat medically and optimize medication including GDMT as tolerated.   She currently  remains on norepinephrine.  Wean as tolerated.  Increase midodrine to 5 mg t.i.d. and hold for a systolic greater than 140.  Continue aspirin and Plavix therapies as prescribed.  Continue statin.  We will continue to follow at this time.       Alexandria Whitman NP  Date of Service: 10/06/2024  11:08 AM  Although clinically improved still on low doses of pressor.  Recommend increase midodrine to 5 mg p.o. t.i.d. and wean wean her off the pressors completely.  May consider deferring the cardiac workup until patient was more stable.  Meanwhile continue on aspirin and Plavix and statin therapy patient has gradually improved is able to tolerate oral intake today.  I have personally interviewed and examined the patient, I have reviewed the Nurse Practitioner's history and physical, assessment, and plan. I have personally evaluated the patient at bedside and agree with the findings and made appropriate changes as necessary in recommendations.  Discussed the plans with the patient at the bedside in the nursing staff  Yuval Suazo MD.    Department of Cardiology  [facility]  10/06/2024

## 2024-10-06 NOTE — PROGRESS NOTES
"[facility]   Department of Infectious Disease  Progress Note        PATIENT NAME: Yvette Hutchinson  MRN: [id number]  TODAY'S DATE: 10/06/2024  ADMIT DATE: 10/2/2024  LOS: 4 days    CHIEF COMPLAINT: Abdominal Pain, Nausea, Vomiting, and Diarrhea (Pt has been sick for the last two weeks)      PRINCIPLE PROBLEM: Septic shock    INTERVAL HISTORY      10/04/2024:  Improving.  WBC down to 35.  Afebrile.  Blood cultures have grown MSSE in 1/2 bottles.  No gallbladder fluid or tissue culture done.  Creatinine down to 1.2.  Levophed dose down to 0.35 microgram/kilogram per minute.    10/05/2024:  Continues to improve.  Leukocytosis resolving.  No acute issues overnight.  Seen by cardiologist yesterday for elevated troponin and notes reviewed.    10/06/2024:  Seen and evaluated at bedside.  Continues to improve.  WBC 24 K.  Repeat blood culture 10/05/2024 so far negative.    Antibiotics (From admission, onward)      Start     Stop Route Frequency Ordered    10/06/24 1400  vancomycin 1.25 g in dextrose 5% 250 mL IVPB (ready to mix)         -- IV Every 18 hours 10/05/24 2037    10/04/24 0900  mupirocin 2 % ointment 1 g         10/09/24 0859 Nasl 2 times daily 10/04/24 0633    10/03/24 2200  piperacillin-tazobactam 4.5 g in dextrose 5 % 100 mL IVPB (ready to mix)         -- IV Every 8 hours (non-standard times) 10/03/24 1506    10/02/24 1554  vancomycin - pharmacy to dose  (vancomycin IVPB (PEDS and ADULTS))        Placed in "And" Linked Group    -- IV pharmacy to manage frequency 10/02/24 1455          Antifungals (From admission, onward)      None           Antivirals (From admission, onward)      None            ASSESSMENT and PLAN      Septic shock in a patient with CLL.  Probably from abdominal source since her symptoms were primarily GI related.  She has had laparoscopic cholecystostomy.  Continue antibiotics and follow blood and any gallbladder cultures..  Remains on low-dose Levophed..     2. GM.  " Resolved.      3. CLL.  Currently not on any specific medication for this.  It does make her immunosuppressed.       4. Acute liver injury.  Maybe congestive hepatopathy from shock.  Also with elevated troponins.  We will check acute liver panel.    5. Coagulase-negative Staphylococcus  bacteremia.  Most likely contaminant.  Antibiotics as above for now.  Repeat blood cultures x2 sets    6. Persistent diarrhea.  Stool C diff was 10/2/24.  Stool culture likewise negative same day.  Check GI molecular panel.    RECOMMENDATIONS:    Continue current empiric antibiotics  Continue to monitor clinically    Please send Epic secure chat with any questions.  Discussed with the patient and her family at bedside.      SUBJECTIVE    Yvette Hutchinson is a 64 y.o. female with history of CLL, GERD, arthritis.  Also previous left lung cancer status post resection.  Had anterior cervical fusion on 08/06/2024 with an uneventful postoperative course.  Presented to the emergency room 10/02/2024 with nausea vomiting, diarrhea and abdominal pain of about 9 days duration.  In the ER BP 99/55, pulse 1-2, respiratory rate 18, temperature 101.6°.  She had abdominal tenderness was worse in the epigastric area.       WBC 21, hematocrit 38, platelet count 224, creatinine 1.4.  , , lipase 42, alkaline phosphatase to 1 6.  UA unremarkable.  Chest x-ray with no acute infiltrates.  CT chest showed increased interstitial markings with few pleural based lesions/infiltrates on the left.  CT abdomen and pelvis documented gallstones with pericholecystic fluid and edema which was confirmed on ultrasound she was admitted and placed on IV fluids and antibiotics.  Later seen by general surgeon and laparoscopic cholecystostomy 10/03/2024.  ID asked to assist with her care.     Current lab data showed WBC 45, hematocrit 41, creatinine 1.8, AST 1970, ALT 1316, alkaline phosphatase 220, total protein 5.6     History from patient and medical  record.     Antibiotic history:    Vancomycin: 10/02/2024-  Zosyn: 10/02/2024-     Microbiology:    Blood culture 10/02/2024:  Staphylococcus species 1/2   Influenza and COVID-19 assay 10/02/2024: Negative    Review of Systems  Negative except as stated above in Interval History     OBJECTIVE   Temp:  [97.7 °F (36.5 °C)-98.7 °F (37.1 °C)] 98.3 °F (36.8 °C)  Pulse:  [60-79] 65  Resp:  [9-41] 18  SpO2:  [80 %-100 %] 96 %  BP: ()/(35-64) 100/55  Temp:  [97.7 °F (36.5 °C)-98.7 °F (37.1 °C)]   Temp: 98.3 °F (36.8 °C) (10/06/24 0301)  Pulse: 65 (10/06/24 0705)  Resp: 18 (10/06/24 0949)  BP: (!) 100/55 (10/06/24 0645)  SpO2: 96 % (10/06/24 0705)    Intake/Output Summary (Last 24 hours) at 10/6/2024 1207  Last data filed at 10/6/2024 0601  Gross per 24 hour   Intake 1872.22 ml   Output 4150 ml   Net -2277.78 ml       Physical Exam  General:  Middle-aged woman who is in no acute distress at this time.  Lying quietly in bed.  HEENT:  Healed right anterior neck surgical wound.  No erythema or induration.  CVS: S1 and 2 heard, tachycardic, no murmurs appreciated.  On Levophed 0.15 micrograms/kilogram per minute    Respiratory: Clear to auscultation   Abdomen:  Laparoscopic port sites are clean with no erythema or drainage.  Abdomen is distended, soft, nontender.  Skin: No rash appreciated  CNS: No focal deficits   Musculoskeletal: No joint effusions or abnormalities noted     VAD:  ISOLATION:  None     Wounds:  Surgical abdominal    Significant Labs: All pertinent labs within the past 24 hours have been reviewed.    CBC LAST 7 DAYS  Recent Labs   Lab 10/02/24  1034 10/03/24  0110 10/03/24  1139 10/03/24  1209 10/04/24  0455 10/05/24  0336 10/06/24  0224   WBC 21.30* 45.30*  --   --  34.88* 26.10* 24.07*   RBC 3.93* 4.36  --   --  3.58* 3.54* 3.41*   HGB 12.5 13.6  --   --  11.3* 10.9* 10.4*   HCT 37.7 41.8 34* 31* 33.8* 33.9* 31.8*   MCV 96 96  --   --  94 96 93   MCH 31.8* 31.2*  --   --  31.6* 30.8 30.5   MCHC 33.2 32.5  " --   --  33.4 32.2 32.7   RDW 13.4 13.8  --   --  14.3 14.3 14.4    239  --   --  244 194 167   MPV 10.6 11.9  --   --  10.2 10.3 10.5   GRAN 43.0 22.0*  --   --  39.0 19.2*  5.0 18.9*  4.6   LYMPH 50.0*  CANCELED 62.0*  --   --  37.0  CANCELED 67.0*  17.5* 66.8*  16.1*   MONO 7.0  CANCELED 3.0*  --   --  6.0  CANCELED 5.7  1.5* 5.9  1.4*   BASO CANCELED  --   --   --  CANCELED 0.11 0.12   NRBC 0 0  --   --  0 0 0       CHEMISTRY LAST 7 DAYS  Recent Labs   Lab 10/02/24  1034 10/03/24  0110 10/03/24  1139 10/03/24  1209 10/04/24  0455 10/04/24  2101 10/05/24  0336 10/06/24  0223   * 133*  --   --  139 131* 131* 138   K 3.8 4.1  --   --  3.8 3.9 4.0 3.6    103  --   --  110 105 104 107   CO2 19* 17*  --   --  20* 21* 23 26   ANIONGAP 11 13  --   --  9 5* 4* 5*   BUN 14 17  --   --  15 11 11 7*   CREATININE 1.4 1.8*  --   --  1.2 1.2 1.4 1.0   GLU 97 71  --   --  93 105 96 100   CALCIUM 8.4* 7.7*  --   --  6.9* 7.1* 7.4* 7.8*   PH  --   --  7.198* 7.300*  --   --   --   --    MG 1.5* 1.7  --   --  2.2 1.9 1.8 1.7   ALBUMIN 3.9 3.5  --   --  2.7*  --  2.8* 2.9*   PROT 6.2 5.6*  --   --  4.4*  --  4.5* 4.6*   ALKPHOS 216* 220*  --   --  229*  --  356* 380*   * 1,316*  --   --  935*  --  824* 691*   * 1,970*  --   --  813*  --  628* 472*   BILITOT 0.6 0.9  --   --  1.0  --  1.3* 1.1*       Estimated Creatinine Clearance: 54.8 mL/min (based on SCr of 1 mg/dL).    INFLAMMATORY/PROCAL  LAST 7 DAYS  No results for input(s): "PROCAL", "ESR", "CRP" in the last 168 hours.  No results found for: "ESR"  CRP   Date Value Ref Range Status   09/07/2021 2.7 0.0 - 8.2 mg/L Final   03/03/2020 3.5 0.0 - 8.2 mg/L Final       PRIOR  MICROBIOLOGY:    Susceptibility data from last 90 days.  Collected Specimen Info Organism   10/02/24 Blood from Peripheral, Wrist, Right COAGULASE NEGATIVE STAPHYLOCOCCI       LAST 7 DAYS MICROBIOLOGY   Microbiology Results (last 7 days)       Procedure Component " Value Units Date/Time    Blood culture [[id number]] Collected: 10/05/24 1037    Order Status: Completed Specimen: Blood Updated: 10/05/24 1717     Blood Culture, Routine No Growth to date    Blood culture [[id number]] Collected: 10/05/24 1037    Order Status: Completed Specimen: Blood Updated: 10/05/24 1717     Blood Culture, Routine No Growth to date    Blood culture x two cultures. Draw prior to antibiotics [[id number]] Collected: 10/02/24 1057    Order Status: Completed Specimen: Blood from Peripheral, Hand, Left Updated: 10/05/24 1232     Blood Culture, Routine No Growth to date      No Growth to date      No Growth to date      No Growth to date    Narrative:      Aerobic and anaerobic    Stool culture **cannot be ordered stat** [[id number]] Collected: 10/02/24 1919    Order Status: Completed Specimen: Stool Updated: 10/05/24 1018     Stool Culture No Salmonella,Shigella,Vibrio,Campylobacter.      No E coli 0157:H7 isolated.    Blood culture x two cultures. Draw prior to antibiotics [[id number]]  (Abnormal) Collected: 10/02/24 1049    Order Status: Completed Specimen: Blood from Peripheral, Wrist, Right Updated: 10/04/24 0807     Blood Culture, Routine Gram stain aer bottle: Gram positive cocci      Results called to and read back by:Val Nino RN-ED;  10/03/2024      11:58 CJD      COAGULASE NEGATIVE STAPHYLOCOCCI  Organism is a probable contaminant      Narrative:      Aerobic and anaerobic    Rapid Organism ID by PCR (from Blood culture) [[id number]]  (Abnormal) Collected: 10/02/24 1049    Order Status: Completed Updated: 10/03/24 1322     Enterococcus faecalis Not Detected     Enterococcus faecium Not Detected     Listeria monocytogenes Not Detected     Staphylococcus spp. Detected     Staphylococcus aureus Not Detected     Staphylococcus epidermidis Not Detected     Staphylococcus lugdunensis Not Detected     Streptococcus species Not Detected     Streptococcus agalactiae Not Detected     Streptococcus  pneumoniae Not Detected     Streptococcus pyogenes Not Detected     Acinetobacter calcoaceticus/baumannii complex Not Detected     Bacteroides fragilis Not Detected     Enterobacterales Not Detected     Enterobacter cloacae complex Not Detected     Escherichia coli Not Detected     Klebsiella aerogenes Not Detected     Klebsiella oxytoca Not Detected     Klebsiella pneumoniae group Not Detected     Proteus Not Detected     Salmonella sp Not Detected     Serratia marcescens Not Detected     Haemophilus influenzae Not Detected     Neisseria meningtidis Not Detected     Pseudomonas aeruginosa Not Detected     Stenotrophomonas maltophilia Not Detected     Candida albicans Not Detected     Candida auris Not Detected     Candida glabrata Not Detected     Candida krusei Not Detected     Candida parapsilosis Not Detected     Candida tropicalis Not Detected     Cryptococcus neoformans/gattii Not Detected     CTX-M (ESBL ) Test not applicable     IMP (Carbapenem resistant) Test not applicable     KPC resistance gene (Carbapenem resistant) Test not applicable     mcr-1  Test not applicable     mec A/C  Test not applicable     mec A/C and MREJ (MRSA) gene Test not applicable     NDM (Carbapenem resistant) Test not applicable     OXA-48-like (Carbapenem resistant) Test not applicable     van A/B (VRE gene) Test not applicable     VIM (Carbapenem resistant) Test not applicable    Narrative:      Aerobic and anaerobic    Clostridium difficile EIA [[id number]] Collected: 10/02/24 1919    Order Status: Completed Specimen: Stool Updated: 10/03/24 0031     C. diff Antigen Negative     C difficile Toxins A+B, EIA Negative     Comment: Testing not recommended for children <24 months old.               CURRENT/PREVIOUS VISIT EKG  Results for orders placed or performed during the hospital encounter of 10/02/24   EKG 12-lead    Collection Time: 10/04/24  1:13 PM   Result Value Ref Range    QRS Duration 104 ms    [facility] QTC Calculation  613 ms    Narrative    Test Reason : I21.4,    Vent. Rate : 092 BPM     Atrial Rate : 092 BPM     P-R Int : 142 ms          QRS Dur : 104 ms      QT Int : 496 ms       P-R-T Axes : 015 -45 113 degrees     QTc Int : 613 ms    Normal sinus rhythm  Low voltage QRS  Left anterior fascicular block  Cannot rule out Anterior infarct ,age undetermined  Prolonged QT  Abnormal ECG  When compared with ECG of 03-OCT-2024 10:07,  Left anterior fascicular block is now Present  Minimal criteria for Anterior infarct are now Present  QT has lengthened  Confirmed by Lars BEAR, Wade DAVISON ([id number]) on 10/5/2024 11:56:51 AM    Referred By: [email or web address]   SELF           Confirmed By:Wade Sousa MD        Significant Imaging: I have reviewed all relevant and available imaging results/findings within the past 24 hours.    I spent a total of 50 minutes on the day of the visit.This includes face to face time and non-face to face time preparing to see the patient (eg, review of tests), obtaining and/or reviewing separately obtained history, documenting clinical information in the electronic or other health record, independently interpreting results and communicating results to the patient/family/caregiver, or care coordinator.    Lemuel Man MD  Date of Service: 10/06/2024      This note was created using [employer] voice recognition software that occasionally misinterpreted phrases or words.

## 2024-10-06 NOTE — PROGRESS NOTES
Pharmacokinetic Assessment Follow Up: IV Vancomycin    Patient brief summary:  Yvette Hutchinson is a 64 y.o. female initiated on antimicrobial therapy with IV Vancomycin for treatment of Intra-abdominal infection    The patient's current regimen is Vancomycin 1250 mg IV every 24 hours.       Actual Body Weight = 76.2 kg (168 lb).    Renal Function:   Estimated Creatinine Clearance: 39.1 mL/min (based on SCr of 1.4 mg/dL).      Vancomycin serum concentration assessment(s):    The trough level was drawn correctly and can be used to guide therapy at this time. The measurement is below the desired definitive target range of 15 to 20 mcg/mL.    Vancomycin Regimen Plan:    Change regimen to Vancomycin 1250 mg IV every 18 hours with next serum trough concentration measured at 0700 on 10/07.    Drug levels (last 3 results):  Recent Labs   Lab Result Units 10/03/24  1418 10/04/24  1434 10/05/24  1848   Vancomycin, Random ug/mL 8.2 8.0  --    Vancomycin-Trough ug/mL  --   --  10.0       Pharmacy will continue to follow and monitor vancomycin.    Please contact pharmacy at extension [phone] for questions regarding this assessment.    Thank you for the consult,   Sofia Farias

## 2024-10-07 LAB
ALBUMIN SERPL BCP-MCNC: 2.8 G/DL (ref 3.5–5.2)
ALP SERPL-CCNC: 369 U/L (ref 55–135)
ALT SERPL W/O P-5'-P-CCNC: 568 U/L (ref 10–44)
ANION GAP SERPL CALC-SCNC: 5 MMOL/L (ref 8–16)
AST SERPL-CCNC: 396 U/L (ref 10–40)
BACTERIA BLD CULT: NORMAL
BASOPHILS NFR BLD: 0 % (ref 0–1.9)
BILIRUB SERPL-MCNC: 0.9 MG/DL (ref 0.1–1)
BUN SERPL-MCNC: 5 MG/DL (ref 8–23)
CALCIUM SERPL-MCNC: 7.7 MG/DL (ref 8.7–10.5)
CHLORIDE SERPL-SCNC: 105 MMOL/L (ref 95–110)
CO2 SERPL-SCNC: 26 MMOL/L (ref 23–29)
CORTIS SERPL-MCNC: 8.3 UG/DL
CREAT SERPL-MCNC: 1 MG/DL (ref 0.5–1.4)
DIFFERENTIAL METHOD BLD: ABNORMAL
EOSINOPHIL NFR BLD: 14 % (ref 0–8)
ERYTHROCYTE [DISTWIDTH] IN BLOOD BY AUTOMATED COUNT: 14.5 % (ref 11.5–14.5)
EST. GFR  (NO RACE VARIABLE): >60 ML/MIN/1.73 M^2
GLUCOSE SERPL-MCNC: 76 MG/DL (ref 70–110)
HCT VFR BLD AUTO: 30.7 % (ref 37–48.5)
HGB BLD-MCNC: 10.1 G/DL (ref 12–16)
IMM GRANULOCYTES # BLD AUTO: ABNORMAL K/UL (ref 0–0.04)
IMM GRANULOCYTES NFR BLD AUTO: ABNORMAL % (ref 0–0.5)
LYMPHOCYTES NFR BLD: 43 % (ref 18–48)
MAGNESIUM SERPL-MCNC: 1.6 MG/DL (ref 1.6–2.6)
MCH RBC QN AUTO: 31.1 PG (ref 27–31)
MCHC RBC AUTO-ENTMCNC: 32.9 G/DL (ref 32–36)
MCV RBC AUTO: 95 FL (ref 82–98)
MONOCYTES NFR BLD: 4 % (ref 4–15)
NEUTROPHILS NFR BLD: 39 % (ref 38–73)
NRBC BLD-RTO: 0 /100 WBC
PLATELET # BLD AUTO: 159 K/UL (ref 150–450)
PLATELET BLD QL SMEAR: ABNORMAL
PMV BLD AUTO: 10.4 FL (ref 9.2–12.9)
POTASSIUM SERPL-SCNC: 3.8 MMOL/L (ref 3.5–5.1)
PROT SERPL-MCNC: 4.6 G/DL (ref 6–8.4)
RBC # BLD AUTO: 3.25 M/UL (ref 4–5.4)
SODIUM SERPL-SCNC: 136 MMOL/L (ref 136–145)
WBC # BLD AUTO: 22.45 K/UL (ref 3.9–12.7)

## 2024-10-07 PROCEDURE — 63600175 PHARM REV CODE 636 W HCPCS: Performed by: SURGERY

## 2024-10-07 PROCEDURE — 94799 UNLISTED PULMONARY SVC/PX: CPT

## 2024-10-07 PROCEDURE — 85007 BL SMEAR W/DIFF WBC COUNT: CPT | Performed by: SURGERY

## 2024-10-07 PROCEDURE — 25000003 PHARM REV CODE 250: Performed by: HOSPITALIST

## 2024-10-07 PROCEDURE — 25000242 PHARM REV CODE 250 ALT 637 W/ HCPCS

## 2024-10-07 PROCEDURE — 83735 ASSAY OF MAGNESIUM: CPT | Performed by: SURGERY

## 2024-10-07 PROCEDURE — 80053 COMPREHEN METABOLIC PANEL: CPT | Performed by: SURGERY

## 2024-10-07 PROCEDURE — 25000003 PHARM REV CODE 250: Performed by: SURGERY

## 2024-10-07 PROCEDURE — 25000003 PHARM REV CODE 250: Performed by: NURSE PRACTITIONER

## 2024-10-07 PROCEDURE — 82533 TOTAL CORTISOL: CPT | Performed by: SURGERY

## 2024-10-07 PROCEDURE — 94640 AIRWAY INHALATION TREATMENT: CPT

## 2024-10-07 PROCEDURE — 94761 N-INVAS EAR/PLS OXIMETRY MLT: CPT

## 2024-10-07 PROCEDURE — 97530 THERAPEUTIC ACTIVITIES: CPT

## 2024-10-07 PROCEDURE — 99900031 HC PATIENT EDUCATION (STAT)

## 2024-10-07 PROCEDURE — 80074 ACUTE HEPATITIS PANEL: CPT | Performed by: SURGERY

## 2024-10-07 PROCEDURE — 85027 COMPLETE CBC AUTOMATED: CPT | Performed by: SURGERY

## 2024-10-07 PROCEDURE — 20000000 HC ICU ROOM

## 2024-10-07 PROCEDURE — 36415 COLL VENOUS BLD VENIPUNCTURE: CPT | Performed by: SURGERY

## 2024-10-07 PROCEDURE — 25000003 PHARM REV CODE 250

## 2024-10-07 PROCEDURE — 25000003 PHARM REV CODE 250: Performed by: INTERNAL MEDICINE

## 2024-10-07 PROCEDURE — 99233 SBSQ HOSP IP/OBS HIGH 50: CPT | Mod: ,,, | Performed by: INTERNAL MEDICINE

## 2024-10-07 PROCEDURE — 63600175 PHARM REV CODE 636 W HCPCS

## 2024-10-07 RX ORDER — MIDODRINE HYDROCHLORIDE 10 MG/1
10 TABLET ORAL
Status: DISCONTINUED | OUTPATIENT
Start: 2024-10-07 | End: 2024-10-16 | Stop reason: HOSPADM

## 2024-10-07 RX ORDER — DIPHENHYDRAMINE HCL 25 MG
25 CAPSULE ORAL ONCE
Status: COMPLETED | OUTPATIENT
Start: 2024-10-07 | End: 2024-10-07

## 2024-10-07 RX ORDER — FLUDROCORTISONE ACETATE 0.1 MG/1
100 TABLET ORAL DAILY
Status: DISCONTINUED | OUTPATIENT
Start: 2024-10-07 | End: 2024-10-16 | Stop reason: HOSPADM

## 2024-10-07 RX ADMIN — GABAPENTIN 300 MG: 300 CAPSULE ORAL at 10:10

## 2024-10-07 RX ADMIN — FLUDROCORTISONE ACETATE 100 MCG: 0.1 TABLET ORAL at 03:10

## 2024-10-07 RX ADMIN — MUPIROCIN 1 G: 20 OINTMENT TOPICAL at 09:10

## 2024-10-07 RX ADMIN — HEPARIN SODIUM 5000 UNITS: 5000 INJECTION INTRAVENOUS; SUBCUTANEOUS at 03:10

## 2024-10-07 RX ADMIN — ARFORMOTEROL TARTRATE 15 MCG: 15 SOLUTION RESPIRATORY (INHALATION) at 08:10

## 2024-10-07 RX ADMIN — ONDANSETRON 4 MG: 2 INJECTION INTRAMUSCULAR; INTRAVENOUS at 10:10

## 2024-10-07 RX ADMIN — ARFORMOTEROL TARTRATE 15 MCG: 15 SOLUTION RESPIRATORY (INHALATION) at 07:10

## 2024-10-07 RX ADMIN — HEPARIN SODIUM 5000 UNITS: 5000 INJECTION INTRAVENOUS; SUBCUTANEOUS at 09:10

## 2024-10-07 RX ADMIN — TOPIRAMATE 100 MG: 25 TABLET, FILM COATED ORAL at 09:10

## 2024-10-07 RX ADMIN — HYDROCODONE BITARTRATE AND ACETAMINOPHEN 1 TABLET: 5; 325 TABLET ORAL at 04:10

## 2024-10-07 RX ADMIN — LEVOTHYROXINE SODIUM 50 MCG: 0.03 TABLET ORAL at 05:10

## 2024-10-07 RX ADMIN — BUDESONIDE INHALATION 1 MG: 0.5 SUSPENSION RESPIRATORY (INHALATION) at 07:10

## 2024-10-07 RX ADMIN — PIPERACILLIN SODIUM AND TAZOBACTAM SODIUM 4.5 G: 4; .5 INJECTION, POWDER, LYOPHILIZED, FOR SOLUTION INTRAVENOUS at 05:10

## 2024-10-07 RX ADMIN — HYDROMORPHONE HYDROCHLORIDE 0.5 MG: 0.5 INJECTION, SOLUTION INTRAMUSCULAR; INTRAVENOUS; SUBCUTANEOUS at 09:10

## 2024-10-07 RX ADMIN — GABAPENTIN 300 MG: 300 CAPSULE ORAL at 03:10

## 2024-10-07 RX ADMIN — MUPIROCIN 1 G: 20 OINTMENT TOPICAL at 10:10

## 2024-10-07 RX ADMIN — MIDODRINE HYDROCHLORIDE 10 MG: 10 TABLET ORAL at 10:10

## 2024-10-07 RX ADMIN — PIPERACILLIN SODIUM AND TAZOBACTAM SODIUM 4.5 G: 4; .5 INJECTION, POWDER, LYOPHILIZED, FOR SOLUTION INTRAVENOUS at 03:10

## 2024-10-07 RX ADMIN — ASPIRIN 81 MG: 81 TABLET, COATED ORAL at 10:10

## 2024-10-07 RX ADMIN — DIPHENHYDRAMINE HYDROCHLORIDE 25 MG: 25 CAPSULE ORAL at 05:10

## 2024-10-07 RX ADMIN — BUPROPION HYDROCHLORIDE 300 MG: 150 TABLET, EXTENDED RELEASE ORAL at 10:10

## 2024-10-07 RX ADMIN — GABAPENTIN 300 MG: 300 CAPSULE ORAL at 09:10

## 2024-10-07 RX ADMIN — HYDROCODONE BITARTRATE AND ACETAMINOPHEN 1 TABLET: 5; 325 TABLET ORAL at 10:10

## 2024-10-07 RX ADMIN — POTASSIUM BICARBONATE 50 MEQ: 977.5 TABLET, EFFERVESCENT ORAL at 03:10

## 2024-10-07 RX ADMIN — TOPIRAMATE 100 MG: 25 TABLET, FILM COATED ORAL at 10:10

## 2024-10-07 RX ADMIN — ALUMINUM HYDROXIDE, MAGNESIUM HYDROXIDE, AND SIMETHICONE 30 ML: 1200; 120; 1200 SUSPENSION ORAL at 09:10

## 2024-10-07 RX ADMIN — BUDESONIDE INHALATION 1 MG: 0.5 SUSPENSION RESPIRATORY (INHALATION) at 08:10

## 2024-10-07 RX ADMIN — HEPARIN SODIUM 5000 UNITS: 5000 INJECTION INTRAVENOUS; SUBCUTANEOUS at 05:10

## 2024-10-07 RX ADMIN — PRAVASTATIN SODIUM 10 MG: 10 TABLET ORAL at 10:10

## 2024-10-07 RX ADMIN — PIPERACILLIN SODIUM AND TAZOBACTAM SODIUM 4.5 G: 4; .5 INJECTION, POWDER, LYOPHILIZED, FOR SOLUTION INTRAVENOUS at 09:10

## 2024-10-07 RX ADMIN — CLOPIDOGREL BISULFATE 75 MG: 75 TABLET, FILM COATED ORAL at 10:10

## 2024-10-07 RX ADMIN — ALUMINUM HYDROXIDE, MAGNESIUM HYDROXIDE, AND SIMETHICONE 30 ML: 1200; 120; 1200 SUSPENSION ORAL at 05:10

## 2024-10-07 RX ADMIN — Medication 800 MG: at 03:10

## 2024-10-07 RX ADMIN — CITALOPRAM HYDROBROMIDE 20 MG: 20 TABLET ORAL at 10:10

## 2024-10-07 RX ADMIN — MIDODRINE HYDROCHLORIDE 10 MG: 10 TABLET ORAL at 03:10

## 2024-10-07 NOTE — PLAN OF CARE
Problem: Adult Inpatient Plan of Care  Goal: Plan of Care Review  Outcome: Progressing  Goal: Patient-Specific Goal (Individualized)  Outcome: Progressing  Goal: Absence of Hospital-Acquired Illness or Injury  Outcome: Progressing  Goal: Optimal Comfort and Wellbeing  Outcome: Progressing  Goal: Readiness for Transition of Care  Outcome: Progressing     Problem: Sepsis/Septic Shock  Goal: Optimal Coping  Outcome: Progressing     Problem: Acute Kidney Injury/Impairment  Goal: Fluid and Electrolyte Balance  Outcome: Progressing  Goal: Improved Oral Intake  Outcome: Progressing     Problem: Infection  Goal: Absence of Infection Signs and Symptoms  Outcome: Progressing     Problem: Wound  Goal: Optimal Pain Control and Function  Outcome: Progressing     Problem: Fall Injury Risk  Goal: Absence of Fall and Fall-Related Injury  Outcome: Progressing     Problem: Pain Acute  Goal: Optimal Pain Control and Function  Outcome: Progressing     Problem: Cholecystectomy  Goal: Absence of Bleeding  Outcome: Progressing  Goal: Effective Bowel Elimination  Outcome: Progressing  Goal: Pain Control and Function  Outcome: Progressing  Goal: Nausea and Vomiting Relief  Outcome: Progressing  Goal: Effective Urinary Elimination  Outcome: Progressing  Goal: Effective Oxygenation and Ventilation  Outcome: Progressing

## 2024-10-07 NOTE — PT/OT/SLP PROGRESS
Physical Therapy Treatment    Patient Name:  Yvette Hutchinson   MRN:  [id number]    Recommendations:     Discharge Recommendations: Moderate Intensity Therapy  Discharge Equipment Recommendations: none  Barriers to discharge:  low BP with limited mobility, increase assist with mobility    Assessment:     Yvette Hutchinson is a 64 y.o. female admitted with a medical diagnosis of Septic shock.  She presents with the following impairments/functional limitations: weakness, impaired endurance, impaired self care skills, impaired functional mobility, gait instability, decreased upper extremity function, decreased lower extremity function, decreased safety awareness, impaired cardiopulmonary response to activity, orthopedic precautions. Patient declined PT's first attempt for treatment due to fatigue. However, RN informed PT that patient would now like to do therapy about 20 minutes later. Upon entering patient sitting EOB with nurse stating she needs to get to BSC urgently for BM. She requires Krystle for sit to stand and step transfer to BSC. Upon sitting on BSC patient's BP taken of 75/46 (MAP 56) with RN present. Patient endorses feeling symptomatic, but denies feeling like she will pass out and requests to remain on BSC to complete BM. BP taken 5 minutes later of 95/50 (MAP 66). Once BM completed, patient requires Krystle for sit to stand with RW and TotalA for hygiene after BM. She performed a 180 degree turn from BSC to bedside chair with RW, Krystle, and VC for sequencing. BP once in chair of 83/53 (MAP 64) and taken again 5 minutes later of 72/47 (MAP 55). RN present to assess patient, chair alarm on, and all needs met. Patient states she feels well enough to remain up in chair for a while.     Rehab Prognosis: Fair; patient would benefit from acute skilled PT services to address these deficits and reach maximum level of function.    Recent Surgery: Procedure(s) (LRB):  CHOLECYSTECTOMY, LAPAROSCOPIC (N/A) 4 Days  Post-Op    Plan:     During this hospitalization, patient to be seen daily to address the identified rehab impairments via gait training, therapeutic activities, therapeutic exercises, neuromuscular re-education and progress toward the following goals:    Plan of Care Expires:  11/06/24    Subjective     Chief Complaint: feels hot and nauseated   Patient/Family Comments/goals: get to BSC for BM  Pain/Comfort:  Pain Rating 1: 0/10      Objective:     Communicated with DONNA Joiner prior to session.  Patient found sitting edge of bed with blood pressure cuff, ramirez catheter, pulse ox (continuous), telemetry upon PT entry to room.     General Precautions: Standard, fall  Orthopedic Precautions: spinal precautions  Braces: [place] J collar  Respiratory Status: Room air     Functional Mobility:  Transfers:     Sit to Stand:  minimum assistance with rolling walker  BSC to Chair: minimum assistance with  rolling walker  using  Step Transfer  Toilet Transfer: minimum assistance with  no AD  using  Step Transfer      AM-PAC 6 CLICK MOBILITY  Turning over in bed (including adjusting bedclothes, sheets and blankets)?: 3  Sitting down on and standing up from a chair with arms (e.g., wheelchair, bedside commode, etc.): 3  Moving from lying on back to sitting on the side of the bed?: 3  Moving to and from a bed to a chair (including a wheelchair)?: 3  Need to walk in hospital room?: 2  Climbing 3-5 steps with a railing?: 2  Basic Mobility Total Score: 16       Treatment & Education:  Patient was educated on the importance of OOB activity and functional mobility to negate negative effects of prolonged bed rest during hospitalization, safe transfers and ambulation, and D/C planning     Patient requires Krystle for BSC transfer and TotalA for hygiene after BM     Patient left up in chair with all lines intact, call button in reach, chair alarm on, and RN notified..    GOALS:   Multidisciplinary Problems       Physical Therapy Goals           Problem: Physical Therapy    Goal Priority Disciplines Outcome Interventions   Physical Therapy Goal     PT, PT/OT Progressing    Description: Goals to be met by: 24     Patient will increase functional independence with mobility by performin. Supine to sit with Supervision  2. Sit to stand transfer with Supervision  3. Bed to chair transfer with Supervision using Rolling Walker  4. Gait  x 200 feet with Supervision using Rolling Walker.                              Time Tracking:     PT Received On: 10/07/24  PT Start Time: 0959     PT Stop Time: 1024  PT Total Time (min): 25 min     Billable Minutes: Therapeutic Activity 25    Treatment Type: Treatment  PT/PTA: PT     Number of PTA visits since last PT visit: 0     10/07/2024

## 2024-10-07 NOTE — SUBJECTIVE & OBJECTIVE
Interval History:  still c/o nausea and poor oral intake, dietician recs appreciated     Review of Systems is performed and is negative unless mentioned in the interval history above.  Objective:     Vital Signs (Most Recent):  Temp: 97.8 °F (36.6 °C) (10/07/24 1515)  Pulse: (!) 58 (10/07/24 1845)  Resp: 13 (10/07/24 1845)  BP: (!) 94/55 (10/07/24 1845)  SpO2: (!) 93 % (10/07/24 1845) Vital Signs (24h Range):  Temp:  [97.6 °F (36.4 °C)-99.3 °F (37.4 °C)] 97.8 °F (36.6 °C)  Pulse:  [53-76] 58  Resp:  [10-32] 13  SpO2:  [91 %-100 %] 93 %  BP: ()/(39-63) 94/55     Weight: 76.2 kg (167 lb 15.9 oz)  Body mass index is 29.77 kg/m².    Intake/Output Summary (Last 24 hours) at 10/7/2024 1857  Last data filed at 10/7/2024 1756  Gross per 24 hour   Intake 706.44 ml   Output 3100 ml   Net -2393.56 ml         Physical Exam  Vitals reviewed.   Constitutional:       Appearance: Normal appearance.   HENT:      Head: Normocephalic and atraumatic.      Mouth/Throat:      Mouth: Mucous membranes are moist.   Eyes:      Extraocular Movements: Extraocular movements intact.      Conjunctiva/sclera: Conjunctivae normal.      Pupils: Pupils are equal, round, and reactive to light.   Cardiovascular:      Rate and Rhythm: Normal rate and regular rhythm.   Pulmonary:      Effort: Pulmonary effort is normal.      Breath sounds: Normal breath sounds.   Abdominal:      General: Abdomen is flat. Bowel sounds are normal.      Palpations: Abdomen is soft.      Comments: Only light palpation use given surgery yesterday.  Patient continues to have diffuse abdominal tenderness    Musculoskeletal:         General: No swelling or tenderness.   Skin:     General: Skin is warm.   Neurological:      Mental Status: She is alert and oriented to person, place, and time.   Psychiatric:         Mood and Affect: Mood normal.         Behavior: Behavior normal.             Significant Labs: All pertinent labs within the past 24 hours have been  reviewed.  BMP:   Recent Labs   Lab 10/07/24  0255   GLU 76      K 3.8      CO2 26   BUN 5*   CREATININE 1.0   CALCIUM 7.7*   MG 1.6     CBC:   Recent Labs   Lab 10/06/24  0224 10/07/24  0255   WBC 24.07* 22.45*   HGB 10.4* 10.1*   HCT 31.8* 30.7*    159       Significant Imaging: I have reviewed all pertinent imaging results/findings within the past 24 hours.  I have reviewed and interpreted all pertinent imaging results/findings within the past 24 hours.

## 2024-10-07 NOTE — PROGRESS NOTES
"[facility]  Adult Nutrition   Progress Note (Follow-Up)    SUMMARY     Recommendations  1. Advance diet as tolerated to Regular.   2. Add Ensure TID.  3. Add ProTgold daily.     Goals: Diet to advance by follow up note.   Nutrition Goal Status: progressing towards goal  Communication of RD Recs: reviewed with RN    Nutrition Diagnosis PES Statement:   Inadequate (suboptimal) protein-energy intake related to catabolism energy increases as evidenced by patient with history of CLL and lung cancer post-op, Howard diet.     Dietitian Rounds Brief  Diet upgraded from full liquid to Howard on 10/4/24. Denies any nausea or vomiting today. Minimal intake tolerated. Encouraged pt to try drinking an Ensure if not able to eat 50% of meal. LBM 10/6.    Nutrition Related Social Determinants of Health: SDOH: Adequate food in home environment    Malnutrition Assessment  Malnutrition Level: mild  Orbital Region (Subcutaneous Fat Loss): mild depletion   [place] Region (Muscle Loss): mild depletion  Clavicle Bone Region (Muscle Loss): mild depletion  Scapular Bone Region (Muscle Loss): mild depletion  Dorsal Hand (Muscle Loss): mild depletion  Patellar Region (Muscle Loss): mild depletion  Anterior Thigh Region (Muscle Loss): mild depletion  Posterior Calf Region (Muscle Loss): mild depletion     Diet order:   Current Diet Order: Howard diet       Evaluation of Received Nutrient/Fluid Intake  % Intake of Estimated Energy Needs: 25 - 50 %  % Meal Intake: 25 - 50 %      Intake/Output Summary (Last 24 hours) at 10/7/2024 1523  Last data filed at 10/7/2024 1213  Gross per 24 hour   Intake 1001.25 ml   Output 3300 ml   Net -2298.75 ml        Anthropometrics  Temp: 97.3 °F (36.3 °C)  Height Method: Stated  Height: 5' 2.99" (160 cm)  Height (inches): 62.99 in  Weight Method: Standard Scale  Weight: 76.2 kg (167 lb 15.9 oz)  Weight (lb): 167.99 lb  Ideal Body Weight (IBW), Female: 114.95 lb  % Ideal Body Weight, Female (lb): 146.14 " %  BMI (Calculated): 29.8  BMI Grade: 25 - 29.9 - overweight       Estimated/Assessed Needs  Weight Used For Calorie Calculations: 76.2 kg (167 lb 15.9 oz)  Energy Calorie Requirements (kcal): 2277-2171 kcal/day (25-30 kcal/kg for weight maintenance).  Energy Need Method: Kcal/kg  Protein Requirements:  gm / kg (1.2-1.5 gm/kg)  Weight Used For Protein Calculations: 76.2 kg (167 lb 15.9 oz)  Fluid Requirements (mL): 1 mL/kcal or per MD  Estimated Fluid Requirement Method: RDA Method  RDA Method (mL): 1905  CHO Requirement: 238 gm daily    Reason for Assessment  Reason For Assessment: RD follow-up  Diagnosis: infection/sepsis (septic shock)  General Information Comments: PMH of CLL, and lung cancer status post partial resection who presented to the ER because of presyncope, nausea, vomiting, diarrhea and abdominal pain.  Nutrition Discharge Planning: Pending medical course.    Nutrition/Diet History  Spiritual, Cultural Beliefs, Adventist Practices, Values that Affect Care: no  Food Allergies: NKFA  Factors Affecting Nutritional Intake:  (bland diet)    Nutrition Risk Screen  Nutrition Risk Screen: no indicators present     MST Score: 2  Have you recently lost weight without trying?: Yes: 2-13 lbs  Weight loss score: 1  Have you been eating poorly because of a decreased appetite?: Yes  Appetite score: 1       Weight History:  Wt Readings from Last 10 Encounters:   10/07/24 76.2 kg (167 lb 15.9 oz)   10/03/24 76.2 kg (168 lb)   07/24/24 76.3 kg (168 lb 3.4 oz)   07/16/24 76.3 kg (168 lb 3.4 oz)   06/25/24 76.6 kg (168 lb 15.7 oz)   06/07/24 77.2 kg (170 lb 3.1 oz)   06/03/24 77.7 kg (171 lb 4.8 oz)   05/27/24 75.8 kg (167 lb)   05/13/24 77.3 kg (170 lb 6.7 oz)   03/12/24 80.5 kg (177 lb 7.5 oz)        Lab/Procedures/Meds: Pertinent Labs/Meds Reviewed    Medications:Pertinent Medications Reviewed  Scheduled Meds:   aluminum-magnesium hydroxide-simethicone  30 mL Oral QID (AC & HS)    arformoteroL  15 mcg  Nebulization BID    aspirin  81 mg Oral Daily    budesonide  1 mg Nebulization Q12H    buPROPion  300 mg Oral Daily    citalopram  20 mg Oral Daily    clopidogreL  75 mg Oral Daily    fludrocortisone  100 mcg Oral Daily    gabapentin  300 mg Oral TID    heparin (porcine)  5,000 Units Subcutaneous Q8H    levothyroxine  50 mcg Oral Before breakfast    midodrine  10 mg Oral TID AC    mupirocin  1 g Nasal BID    piperacillin-tazobactam (Zosyn) IV (PEDS and ADULTS) (extended infusion is not appropriate)  4.5 g Intravenous Q8H    topiramate  100 mg Oral BID     Continuous Infusions:   NORepinephrine bitartrate-D5W  0-3 mcg/kg/min Intravenous Continuous 1.8 mL/hr at 10/07/24 0511 0.05 mcg/kg/min at 10/07/24 0511       Labs: Pertinent Labs Reviewed  Clinical Chemistry:  Recent Labs   Lab 10/02/24  1034 10/03/24  0110 10/04/24  0455 10/05/24  0336 10/06/24  0223 10/07/24  0255   * 133*   < > 131* 138 136   K 3.8 4.1   < > 4.0 3.6 3.8    103   < > 104 107 105   CO2 19* 17*   < > 23 26 26   GLU 97 71   < > 96 100 76   BUN 14 17   < > 11 7* 5*   CREATININE 1.4 1.8*   < > 1.4 1.0 1.0   CALCIUM 8.4* 7.7*   < > 7.4* 7.8* 7.7*   PROT 6.2 5.6*   < > 4.5* 4.6* 4.6*   ALBUMIN 3.9 3.5   < > 2.8* 2.9* 2.8*   BILITOT 0.6 0.9   < > 1.3* 1.1* 0.9   ALKPHOS 216* 220*   < > 356* 380* 369*   * 1,970*   < > 628* 472* 396*   * 1,316*   < > 824* 691* 568*   ANIONGAP 11 13   < > 4* 5* 5*   MG 1.5* 1.7   < > 1.8 1.7 1.6   PHOS 1.8*  --   --   --   --   --    LIPASE 42 24  --   --   --   --     < > = values in this interval not displayed.     CBC:   Recent Labs   Lab 10/07/24  0255   WBC 22.45*   RBC 3.25*   HGB 10.1*   HCT 30.7*      MCV 95   MCH 31.1*   MCHC 32.9     Monitor and Evaluation  Food and Nutrient Intake: energy intake, food and beverage intake  Food and Nutrient Adminstration: diet order  Knowledge/Beliefs/Attitudes: food and nutrition knowledge/skill  Physical Activity and Function: nutrition-related  ADLs and IADLs  Anthropometric Measurements: weight change, body mass index, weight  Biochemical Data, Medical Tests and Procedures: electrolyte and renal panel, gastrointestinal profile, glucose/endocrine profile, inflammatory profile, lipid profile  Nutrition-Focused Physical Findings: overall appearance     Nutrition Risk  Level of Risk/Frequency of Follow-up:  (2 x / week)     Nutrition Follow-Up  RD Follow-up?: Yes

## 2024-10-07 NOTE — PROGRESS NOTES
[facility]  Progress Note    Patient Name: Yvette Hutchinson  MRN: [id number]  Patient Class: IP- Inpatient   Admission Date: 10/2/2024  Length of Stay: 5 days  Attending Physician: Iris Odell MD  Primary Care Provider: Vern Priest MD        Subjective:     Principal Problem:Septic shock        HPI:  64-year-old female with PMH of CLL, and lung cancer status post partial resection who presented to the ER because of presyncope, nausea, vomiting, diarrhea and abdominal pain.  According to the patient, for over 1 week she has been having intractable nausea, vomiting and diarrhea.  Also epigastric and right upper quadrant abdominal pain described as sharp, 10/10 on pain scale radiate to the rest of her abdomen.  Today when she stood up from a sitting position, she felt as if she will pass out.  As a result, she decided to come to the ER for evaluation.  Denied any fever or chills.  She denied chest pain or shortness on breath.    In the ER, vitals showed a blood pressure of 99/55, but soon dropped to the 70s.  Heart rate was 122, respiratory rate of 20, temperature of 101.6°, and patient was satting 95% on room air.  CBC was white count of 21, with history of CLL.  CMP showed sodium of 135, acute renal failure with creatinine of 1.4 compared to baseline of 1, and patient has elevated LFTs with alk-phos of 216, , and AST of 497.  COVID/flu are negative.  Blood cultures were collected.  CTA chest negative for pulmonary embolus, but showed mild diffuse bronchial wall thickening that could reflect infectious or inflammatory bronchitis in the appropriate clinical setting.  CT abdomen/pelvis showed pericholecystic edema without evidence of gallstones.  Cholecystitis cannot be excluded. Also mild Linda pancreatic edema which may represent early pancreatitis. US abdomen showed Cholelithiasis, gallbladder wall thickening, and mild pericholecystic fluid. Patient was started  on Zosyn/vanco.  Was given sepsis bolus of lactated ringer of 2286 cc.  Started on Levophed for blood pressure support.  General surgery was consulted.  Patient will be admitted to ICU for septic shock.       Overview/Hospital Course:  This 64-year-old lady with history of CLL, lung cancer status post partial resection presented to the emergency department due to presyncope, nausea vomiting and abdominal pain.  She was also noted to be hypotensive and tachycardic on admission.  She was also noted to have fever.  Patient was admitted to the hospital for septic shock concern for abdominal source given CT findings showing some pericholecystic fluid.  Patient was also noted to have transaminitis and imaging did reveal some peripancreatic fluid and inflammatory changes as well though her lipase was normal.  General surgery was consulted after ultrasound the gallbladder was performed which again redemonstrated pericholecystic fluid.  She is status post laparoscopic cholecystectomy on 10/03.  Gallbladder was not noted to be gangrenous and per the op note there was suspicion that this was not the source of her septic shock.  However, blood cultures grew only coagulation negative Staphylococcus in 1/2 bottles.  This was thought to be contaminant.  Infectious Disease was consulted and vancomycin and Zosyn was continued on this patient.  Levophed initiated on admission was able to be weaned, although she still had requirement the day after surgery.  Will attempt midodrine TID to transition off levophed.  Repeat blood cultures NTD. Vancomycin flex'd  Florinef added.     Interval History:  still c/o nausea and poor oral intake, dietician recs appreciated     Review of Systems is performed and is negative unless mentioned in the interval history above.  Objective:     Vital Signs (Most Recent):  Temp: 97.8 °F (36.6 °C) (10/07/24 1515)  Pulse: (!) 58 (10/07/24 1845)  Resp: 13 (10/07/24 1845)  BP: (!) 94/55 (10/07/24 1845)  SpO2: (!)  93 % (10/07/24 1845) Vital Signs (24h Range):  Temp:  [97.6 °F (36.4 °C)-99.3 °F (37.4 °C)] 97.8 °F (36.6 °C)  Pulse:  [53-76] 58  Resp:  [10-32] 13  SpO2:  [91 %-100 %] 93 %  BP: ()/(39-63) 94/55     Weight: 76.2 kg (167 lb 15.9 oz)  Body mass index is 29.77 kg/m².    Intake/Output Summary (Last 24 hours) at 10/7/2024 1857  Last data filed at 10/7/2024 1756  Gross per 24 hour   Intake 706.44 ml   Output 3100 ml   Net -2393.56 ml         Physical Exam  Vitals reviewed.   Constitutional:       Appearance: Normal appearance.   HENT:      Head: Normocephalic and atraumatic.      Mouth/Throat:      Mouth: Mucous membranes are moist.   Eyes:      Extraocular Movements: Extraocular movements intact.      Conjunctiva/sclera: Conjunctivae normal.      Pupils: Pupils are equal, round, and reactive to light.   Cardiovascular:      Rate and Rhythm: Normal rate and regular rhythm.   Pulmonary:      Effort: Pulmonary effort is normal.      Breath sounds: Normal breath sounds.   Abdominal:      General: Abdomen is flat. Bowel sounds are normal.      Palpations: Abdomen is soft.      Comments: Only light palpation use given surgery yesterday.  Patient continues to have diffuse abdominal tenderness    Musculoskeletal:         General: No swelling or tenderness.   Skin:     General: Skin is warm.   Neurological:      Mental Status: She is alert and oriented to person, place, and time.   Psychiatric:         Mood and Affect: Mood normal.         Behavior: Behavior normal.             Significant Labs: All pertinent labs within the past 24 hours have been reviewed.  BMP:   Recent Labs   Lab 10/07/24  0255   GLU 76      K 3.8      CO2 26   BUN 5*   CREATININE 1.0   CALCIUM 7.7*   MG 1.6     CBC:   Recent Labs   Lab 10/06/24  0224 10/07/24  0255   WBC 24.07* 22.45*   HGB 10.4* 10.1*   HCT 31.8* 30.7*    159       Significant Imaging: I have reviewed all pertinent imaging results/findings within the past 24  hours.  I have reviewed and interpreted all pertinent imaging results/findings within the past 24 hours.    Assessment/Plan:      * Septic shock  - Suspect secondary to intra-abdominal process.  CT reveals pericholecystic fluid and peripancreatic fluid  She is now status post cholecystectomy with General surgery, per op note the gallbladder did not appear significantly inflamed to be commensurate with the patient's presentation of septic shock, 1/2 bottles drawn for blood culture was growing coagulase-negative Staphylococcus, felt to be contaminant, however, given patient's persistent pressor requirement infectious Disease was consulted, recommendation to continue vancomycin and Zosyn at this time  Infectious Disease we will defer imaging of patient's C-spine for now  Continued follow up blood cultures  Levophed as needed for map less than 65 -unable to wean off, midodrine initiated by cardiology   Check cortisol - normal range for am draw  Florinef added     Bacteremia due to Staphylococcus  This has now resulted as coagulase-negative staph, likely contaminant  ID following and would like to continue vancomycin for now  Repeat blood cultures 10/5/24 NTD de-escalate antibiotics ? Defer to ID following     Abdominal pain  Suspect secondary to acute cholecystitis.    CT abdomen also showed mild pancreatitis, but lipase is within normal limits.    Given her diarrhea however, will order stool culture.    Supportive care with IV fluids, Zofran for nausea.  P.r.n. pain medication.      Acute renal failure  Most recent creatinine and eGFR are listed below.  Recent Labs     10/04/24  0455 10/04/24  2101 10/05/24  0336   CREATININE 1.2 1.2 1.4   EGFRNORACEVR 50.2* 50.2* 41.8*       Likely pre-renal given her septic shock.   Status post IV hydration, patient able to take PO hydration  GM resolving      Neuropathic pain  Resume gabapentin.      Calculus of gallbladder with acute cholecystitis without obstruction  Status post  cholecystectomy  WBC 24K, on zosyn       Acquired hypothyroidism  Resume synthroid.       CLL (chronic lymphocytic leukemia)  Monitor. Patient's last chemo for over a year.  Follow up with Hematology outpatient.      Anxiety  Resume Wellbutrin and Celexa        VTE Risk Mitigation (From admission, onward)           Ordered     Place [place] hose  Until discontinued         10/05/24 1132     heparin (porcine) injection 5,000 Units  Every 8 hours         10/02/24 1611     IP VTE HIGH RISK PATIENT  Once         10/02/24 1611     Place sequential compression device  Until discontinued         10/02/24 1611                    Discharge Planning   SALINA: 10/10/2024     Code Status: Full Code   Is the patient medically ready for discharge?:     Reason for patient still in hospital (select all that apply): Patient trending condition  Discharge Plan A: Home with family            Critical care time spent on the evaluation and treatment of severe organ dysfunction, review of pertinent labs and imaging studies, discussions with consulting providers and discussions with patient/family: 20 minutes.      Iris Odell MD  Department of Hospital Medicine   [facility]

## 2024-10-07 NOTE — PROGRESS NOTES
[facility] hospital   Department of Infectious Disease  Progress Note        PATIENT NAME: Yvette Hutchinson  MRN: [id number]  TODAY'S DATE: 10/07/2024  ADMIT DATE: 10/2/2024  LOS: 5 days    CHIEF COMPLAINT: Abdominal Pain, Nausea, Vomiting, and Diarrhea (Pt has been sick for the last two weeks)      PRINCIPLE PROBLEM: Septic shock    INTERVAL HISTORY      10/04/2024:  Improving.  WBC down to 35.  Afebrile.  Blood cultures have grown MSSE in 1/2 bottles.  No gallbladder fluid or tissue culture done.  Creatinine down to 1.2.  Levophed dose down to 0.35 microgram/kilogram per minute.    10/05/2024:  Continues to improve.  Leukocytosis resolving.  No acute issues overnight.  Seen by cardiologist yesterday for elevated troponin and notes reviewed.    10/06/2024:  Seen and evaluated at bedside.  Continues to improve.  WBC 24 K.  Repeat blood culture 10/05/2024 so far negative.    10/07/2024.  No acute issues overnight.  Continues to improve.  Tolerating oral feeds.  Remains on very low-dose Levophed at 0.06 microgram/kilogram per minute.  All cultures remain negative.    Antibiotics (From admission, onward)      Start     Stop Route Frequency Ordered    10/04/24 0900  mupirocin 2 % ointment 1 g         10/09/24 0859 Nasl 2 times daily 10/04/24 0633    10/03/24 2200  piperacillin-tazobactam 4.5 g in dextrose 5 % 100 mL IVPB (ready to mix)         -- IV Every 8 hours (non-standard times) 10/03/24 1506          Antifungals (From admission, onward)      None           Antivirals (From admission, onward)      None            ASSESSMENT and PLAN      Septic shock in a patient with CLL.  Probably from abdominal source since her symptoms were primarily GI related.  She has had laparoscopic cholecystostomy.  Continue antibiotics and follow blood and any gallbladder cultures..  Remains on low-dose Levophed..     2. Persistent source BP so requiring low-dose Levophed.  There may be a cardiogenic component to this as well.   Defer to cardiologist and hospitalist.      3. CLL.  Currently not on any specific medication for this.  It does make her immunosuppressed.       4. Acute liver injury.  Maybe congestive hepatopathy from shock.  Also with elevated troponins.  Follow acute liver panel.    5. Coagulase-negative Staphylococcus  bacteremia.  Most likely contaminant.  Antibiotics as above for now.  Repeat blood cultures x2 sets negative.    6. Persistent diarrhea.  Stool C diff 10/2/24 and stool culture were negative.  Improved..     RECOMMENDATIONS:    Continue current empiric antibiotics  Continue to monitor clinically  Plan to treat for 10 days through 10/12/2024.    Maybe able to complete with oral antibiotics when off pressors    Please send Epic secure chat with any questions.      SUBJECTIVE    Yvette Hutchinson is a 64 y.o. female with history of CLL, GERD, arthritis.  Also previous left lung cancer status post resection.  Had anterior cervical fusion on 08/06/2024 with an uneventful postoperative course.  Presented to the emergency room 10/02/2024 with nausea vomiting, diarrhea and abdominal pain of about 9 days duration.  In the ER BP 99/55, pulse 1-2, respiratory rate 18, temperature 101.6°.  She had abdominal tenderness was worse in the epigastric area.       WBC 21, hematocrit 38, platelet count 224, creatinine 1.4.  , , lipase 42, alkaline phosphatase to 1 6.  UA unremarkable.  Chest x-ray with no acute infiltrates.  CT chest showed increased interstitial markings with few pleural based lesions/infiltrates on the left.  CT abdomen and pelvis documented gallstones with pericholecystic fluid and edema which was confirmed on ultrasound she was admitted and placed on IV fluids and antibiotics.  Later seen by general surgeon and laparoscopic cholecystostomy 10/03/2024.  ID asked to assist with her care.     Current lab data showed WBC 45, hematocrit 41, creatinine 1.8, AST 1970, ALT 1316, alkaline phosphatase  220, total protein 5.6     History from patient and medical record.     Antibiotic history:    Vancomycin: 10/02/2024-  Zosyn: 10/02/2024-     Microbiology:    Blood culture 10/02/2024:  Staphylococcus species 1/2   Influenza and COVID-19 assay 10/02/2024: Negative    Review of Systems  Negative except as stated above in Interval History     OBJECTIVE   Temp:  [97.6 °F (36.4 °C)-98.3 °F (36.8 °C)] 98.3 °F (36.8 °C)  Pulse:  [58-76] 63  Resp:  [9-36] 18  SpO2:  [91 %-100 %] 100 %  BP: ()/(43-68) 81/48  Temp:  [97.6 °F (36.4 °C)-98.3 °F (36.8 °C)]   Temp: 98.3 °F (36.8 °C) (10/07/24 0301)  Pulse: 63 (10/07/24 0706)  Resp: 18 (10/07/24 0706)  BP: (!) 81/48 (10/07/24 0706)  SpO2: 100 % (10/07/24 0706)    Intake/Output Summary (Last 24 hours) at 10/7/2024 0832  Last data filed at 10/7/2024 0701  Gross per 24 hour   Intake 1535 ml   Output 3300 ml   Net -1765 ml       Physical Exam  General:  Middle-aged woman who is in no acute distress at this time.  Lying quietly in bed.  HEENT:  Healed right anterior neck surgical wound.  No erythema or induration.  CVS: S1 and 2 heard, tachycardic, no murmurs appreciated.  On Levophed 0.15 micrograms/kilogram per minute    Respiratory: Clear to auscultation   Abdomen:  Laparoscopic port sites are clean with no erythema or drainage.  Abdomen is distended, soft, nontender.  Skin: No rash appreciated  CNS: No focal deficits   Musculoskeletal: No joint effusions or abnormalities noted     VAD:  ISOLATION:  None     Wounds:  Surgical abdominal    Significant Labs: All pertinent labs within the past 24 hours have been reviewed.    CBC LAST 7 DAYS  Recent Labs   Lab 10/02/24  1034 10/03/24  0110 10/03/24  1139 10/03/24  1209 10/04/24  0455 10/05/24  0336 10/06/24  0224 10/07/24  0255   WBC 21.30* 45.30*  --   --  34.88* 26.10* 24.07* 22.45*   RBC 3.93* 4.36  --   --  3.58* 3.54* 3.41* 3.25*   HGB 12.5 13.6  --   --  11.3* 10.9* 10.4* 10.1*   HCT 37.7 41.8 34* 31* 33.8* 33.9* 31.8*  "30.7*   MCV 96 96  --   --  94 96 93 95   MCH 31.8* 31.2*  --   --  31.6* 30.8 30.5 31.1*   MCHC 33.2 32.5  --   --  33.4 32.2 32.7 32.9   RDW 13.4 13.8  --   --  14.3 14.3 14.4 14.5    239  --   --  244 194 167 159   MPV 10.6 11.9  --   --  10.2 10.3 10.5 10.4   GRAN 43.0 22.0*  --   --  39.0 19.2*  5.0 18.9*  4.6 39.0   LYMPH 50.0*  CANCELED 62.0*  --   --  37.0  CANCELED 67.0*  17.5* 66.8*  16.1* 43.0   MONO 7.0  CANCELED 3.0*  --   --  6.0  CANCELED 5.7  1.5* 5.9  1.4* 4.0   BASO CANCELED  --   --   --  CANCELED 0.11 0.12  --    NRBC 0 0  --   --  0 0 0 0       CHEMISTRY LAST 7 DAYS  Recent Labs   Lab 10/02/24  1034 10/03/24  0110 10/03/24  1139 10/03/24  1209 10/04/24  0455 10/04/24  2101 10/05/24  0336 10/06/24  0223 10/07/24  0255   * 133*  --   --  139 131* 131* 138 136   K 3.8 4.1  --   --  3.8 3.9 4.0 3.6 3.8    103  --   --  110 105 104 107 105   CO2 19* 17*  --   --  20* 21* 23 26 26   ANIONGAP 11 13  --   --  9 5* 4* 5* 5*   BUN 14 17  --   --  15 11 11 7* 5*   CREATININE 1.4 1.8*  --   --  1.2 1.2 1.4 1.0 1.0   GLU 97 71  --   --  93 105 96 100 76   CALCIUM 8.4* 7.7*  --   --  6.9* 7.1* 7.4* 7.8* 7.7*   PH  --   --  7.198* 7.300*  --   --   --   --   --    MG 1.5* 1.7  --   --  2.2 1.9 1.8 1.7 1.6   ALBUMIN 3.9 3.5  --   --  2.7*  --  2.8* 2.9* 2.8*   PROT 6.2 5.6*  --   --  4.4*  --  4.5* 4.6* 4.6*   ALKPHOS 216* 220*  --   --  229*  --  356* 380* 369*   * 1,316*  --   --  935*  --  824* 691* 568*   * 1,970*  --   --  813*  --  628* 472* 396*   BILITOT 0.6 0.9  --   --  1.0  --  1.3* 1.1* 0.9       Estimated Creatinine Clearance: 54.8 mL/min (based on SCr of 1 mg/dL).    INFLAMMATORY/PROCAL  LAST 7 DAYS  No results for input(s): "PROCAL", "ESR", "CRP" in the last 168 hours.  No results found for: "ESR"  CRP   Date Value Ref Range Status   09/07/2021 2.7 0.0 - 8.2 mg/L Final   03/03/2020 3.5 0.0 - 8.2 mg/L Final       PRIOR  MICROBIOLOGY:    Susceptibility " data from last 90 days.  Collected Specimen Info Organism   10/02/24 Blood from Peripheral, Wrist, Right COAGULASE NEGATIVE STAPHYLOCOCCI       LAST 7 DAYS MICROBIOLOGY   Microbiology Results (last 7 days)       Procedure Component Value Units Date/Time    Blood culture [[id number]] Collected: 10/05/24 1037    Order Status: Completed Specimen: Blood Updated: 10/06/24 1232     Blood Culture, Routine No Growth to date      No Growth to date    Blood culture [[id number]] Collected: 10/05/24 1037    Order Status: Completed Specimen: Blood Updated: 10/06/24 1232     Blood Culture, Routine No Growth to date      No Growth to date    Blood culture x two cultures. Draw prior to antibiotics [[id number]] Collected: 10/02/24 1057    Order Status: Completed Specimen: Blood from Peripheral, Hand, Left Updated: 10/06/24 1232     Blood Culture, Routine No Growth to date      No Growth to date      No Growth to date      No Growth to date      No Growth to date    Narrative:      Aerobic and anaerobic    Stool culture **cannot be ordered stat** [[id number]] Collected: 10/02/24 1919    Order Status: Completed Specimen: Stool Updated: 10/05/24 1018     Stool Culture No Salmonella,Shigella,Vibrio,Campylobacter.      No E coli 0157:H7 isolated.    Blood culture x two cultures. Draw prior to antibiotics [[id number]]  (Abnormal) Collected: 10/02/24 1049    Order Status: Completed Specimen: Blood from Peripheral, Wrist, Right Updated: 10/04/24 0807     Blood Culture, Routine Gram stain aer bottle: Gram positive cocci      Results called to and read back by:Val Nino RN-ED;  10/03/2024      11:58 CJD      COAGULASE NEGATIVE STAPHYLOCOCCI  Organism is a probable contaminant      Narrative:      Aerobic and anaerobic    Rapid Organism ID by PCR (from Blood culture) [[id number]]  (Abnormal) Collected: 10/02/24 1049    Order Status: Completed Updated: 10/03/24 1322     Enterococcus faecalis Not Detected     Enterococcus faecium Not  Detected     Listeria monocytogenes Not Detected     Staphylococcus spp. Detected     Staphylococcus aureus Not Detected     Staphylococcus epidermidis Not Detected     Staphylococcus lugdunensis Not Detected     Streptococcus species Not Detected     Streptococcus agalactiae Not Detected     Streptococcus pneumoniae Not Detected     Streptococcus pyogenes Not Detected     Acinetobacter calcoaceticus/baumannii complex Not Detected     Bacteroides fragilis Not Detected     Enterobacterales Not Detected     Enterobacter cloacae complex Not Detected     Escherichia coli Not Detected     Klebsiella aerogenes Not Detected     Klebsiella oxytoca Not Detected     Klebsiella pneumoniae group Not Detected     Proteus Not Detected     Salmonella sp Not Detected     Serratia marcescens Not Detected     Haemophilus influenzae Not Detected     Neisseria meningtidis Not Detected     Pseudomonas aeruginosa Not Detected     Stenotrophomonas maltophilia Not Detected     Candida albicans Not Detected     Candida auris Not Detected     Candida glabrata Not Detected     Candida krusei Not Detected     Candida parapsilosis Not Detected     Candida tropicalis Not Detected     Cryptococcus neoformans/gattii Not Detected     CTX-M (ESBL ) Test not applicable     IMP (Carbapenem resistant) Test not applicable     KPC resistance gene (Carbapenem resistant) Test not applicable     mcr-1  Test not applicable     mec A/C  Test not applicable     mec A/C and MREJ (MRSA) gene Test not applicable     NDM (Carbapenem resistant) Test not applicable     OXA-48-like (Carbapenem resistant) Test not applicable     van A/B (VRE gene) Test not applicable     VIM (Carbapenem resistant) Test not applicable    Narrative:      Aerobic and anaerobic    Clostridium difficile EIA [[id number]] Collected: 10/02/24 1919    Order Status: Completed Specimen: Stool Updated: 10/03/24 0031     C. diff Antigen Negative     C difficile Toxins A+B, EIA Negative      Comment: Testing not recommended for children <24 months old.               CURRENT/PREVIOUS VISIT EKG  Results for orders placed or performed during the hospital encounter of 10/02/24   EKG 12-lead    Collection Time: 10/04/24  1:13 PM   Result Value Ref Range    QRS Duration 104 ms    [facility] QTC Calculation 613 ms    Narrative    Test Reason : I21.4,    Vent. Rate : 092 BPM     Atrial Rate : 092 BPM     P-R Int : 142 ms          QRS Dur : 104 ms      QT Int : 496 ms       P-R-T Axes : 015 -45 113 degrees     QTc Int : 613 ms    Normal sinus rhythm  Low voltage QRS  Left anterior fascicular block  Cannot rule out Anterior infarct ,age undetermined  Prolonged QT  Abnormal ECG  When compared with ECG of 03-OCT-2024 10:07,  Left anterior fascicular block is now Present  Minimal criteria for Anterior infarct are now Present  QT has lengthened  Confirmed by Lars BERA, Wade DAVISON ([id number]) on 10/5/2024 11:56:51 AM    Referred By: [id number]   SELF           Confirmed By:Wade Sousa MD        Significant Imaging: I have reviewed all relevant and available imaging results/findings within the past 24 hours.    I spent a total of 50 minutes on the day of the visit.This includes face to face time and non-face to face time preparing to see the patient (eg, review of tests), obtaining and/or reviewing separately obtained history, documenting clinical information in the electronic or other health record, independently interpreting results and communicating results to the patient/family/caregiver, or care coordinator.    Lemuel Man MD  Date of Service: 10/07/2024      This note was created using [employer] voice recognition software that occasionally misinterpreted phrases or words.

## 2024-10-07 NOTE — PT/OT/SLP PROGRESS
Occupational Therapy      Patient Name:  Yvette Hutchinson   MRN:  [id number]    Patient not seen today secondary to Patient unwilling to participate (despite max encouragement). Will follow-up next service date.    10/7/2024

## 2024-10-07 NOTE — ASSESSMENT & PLAN NOTE
- Suspect secondary to intra-abdominal process.  CT reveals pericholecystic fluid and peripancreatic fluid  She is now status post cholecystectomy with General surgery, per op note the gallbladder did not appear significantly inflamed to be commensurate with the patient's presentation of septic shock, 1/2 bottles drawn for blood culture was growing coagulase-negative Staphylococcus, felt to be contaminant, however, given patient's persistent pressor requirement infectious Disease was consulted, recommendation to continue vancomycin and Zosyn at this time  Infectious Disease we will defer imaging of patient's C-spine for now  Continued follow up blood cultures  Levophed as needed for map less than 65 -unable to wean off, midodrine initiated by cardiology   Check cortisol - normal range for am draw  Florinef added

## 2024-10-07 NOTE — PLAN OF CARE
Problem: Physical Therapy  Goal: Physical Therapy Goal  Description: Goals to be met by: 24     Patient will increase functional independence with mobility by performin. Supine to sit with Supervision  2. Sit to stand transfer with Supervision  3. Bed to chair transfer with Supervision using Rolling Walker  4. Gait  x 200 feet with Supervision using Rolling Walker.         Outcome: Progressing

## 2024-10-07 NOTE — CARE UPDATE
10/07/24 0700   Patient Assessment/Suction   Level of Consciousness (AVPU) alert   Respiratory Effort Normal;Unlabored   Expansion/Accessory Muscles/Retractions no use of accessory muscles   All Lung Fields Breath Sounds Anterior:   FARZANEH Breath Sounds clear   LLL Breath Sounds clear   RUL Breath Sounds clear   RLL Breath Sounds diminished   Rhythm/Pattern, Respiratory unlabored   Cough Frequency no cough   PRE-TX-O2   Device (Oxygen Therapy) room air   Pulse Oximetry Type Continuous   $ Pulse Oximetry - Multiple Charge Pulse Oximetry - Multiple   Aerosol Therapy   $ Aerosol Therapy Charges Aerosol Treatment   Daily Review of Necessity (SVN) completed   Respiratory Treatment Status (SVN) given   Treatment Route (SVN) mask;oxygen   Patient Position semi-Huerta's   Post Treatment Assessment (SVN) patient reports breathing improved   Signs of Intolerance (SVN) none   Breath Sounds Post-Respiratory Treatment   Throughout All Fields Post-Treatment All Fields   Throughout All Fields Post-Treatment aeration increased   Post-treatment Heart Rate (beats/min) 63   Post-treatment Resp Rate (breaths/min) 16   Incentive Spirometer   $ Incentive Spirometer Charges done with encouragement   Incentive Spirometer Predicted Level (mL) 1500   Administration (IS) instruction provided, follow-up;self-administered   Number of Repetitions (IS) 10   Level Incentive Spirometer (mL) 1600   Patient Tolerance (IS) good   Education   $ Education Incentive Spirometry;Bronchodilator;30 min

## 2024-10-07 NOTE — PROGRESS NOTES
[facility]  Department of Cardiology  Consult Note      PATIENT NAME: Yvette Hutchinson    MRN: [id number]  TODAY'S DATE: 10/07/2024  ADMIT DATE: 10/2/2024                          CONSULT REQUESTED BY: Iris Odell MD    SUBJECTIVE     PRINCIPAL PROBLEM: Septic shock    Interval history:  10/07/2024    Ms. Hutchinson bp remains low despite midodrine.        10/6/2024  Pt seen up this morning sitting in chair with sister in law at bedside. Still having some abdominal discomfort.  Remains on low-dose norepi.    10/05/2024  Patient seen this morning sitting up in chair in the ICU.  Still complains of some nausea and abdominal discomfort.  Sister-in-law at bedside.  Remains on 0.2 of nor epi.    HPI:  Patient is a 64-year-old female who presented to the emergency room with nausea vomiting diarrhea and abdominal pain as well as near syncope.  She apparently had some chest tightness for a week or so prior to this event as well.  Patient with elevated troponin levels on arrival but was also being treated for sepsis.  Patient was noted to have cholecystitis and underwent laparoscopic cholecystectomy yesterday.  Patient is noted to have bacteremia and is followed by infectious disease.  Echocardiogram noted which shows reduced ejection fraction.  Other history also includes CLL and lung cancer with history of partial resection.      REASON FOR CONSULT:  From Hospitalist H&P:  Reduced ejection fraction, elevated troponin      Review of patient's allergies indicates:  No Known Allergies    Past Medical History:   Diagnosis Date    Arthritis     Cancer 10/2022    (CLL) leukemia ; adenocarcinoma  adenosgemis    Depression     GERD (gastroesophageal reflux disease)     Neck pain     and back pain    Personal history of colonic polyps 07/07/2017    Pneumonia of left lung due to infectious organism 03/05/2020    Thyroid disease      Past Surgical History:   Procedure Laterality Date    FUSION, SPINE, CERVICAL,  ANTERIOR APPROACH N/A 08/06/2024    Procedure: FUSION, SPINE, CERVICAL, ANTERIOR APPROACH;  Surgeon: Anatoly Daley DO;  Location: [facility] OR;  Service: Neurosurgery;  Laterality: N/A;  IOM, C-ARM, MEDTRONICS, MICRO, HORSESHOE    INJECTION OF ANESTHETIC AGENT AROUND MULTIPLE INTERCOSTAL NERVES Left 10/03/2022    Procedure: BLOCK, NERVE, INTERCOSTAL, 2 OR MORE;  Surgeon: Evelio Kaplan MD;  Location: [facility] OR 2ND FLR;  Service: Thoracic;  Laterality: Left;    INSERTION OF TUNNELED CENTRAL VENOUS CATHETER (CVC) WITH SUBCUTANEOUS PORT Right 11/03/2022    Procedure: [id number], Right or Left Neck or Chest;  Surgeon: Mini Acevedo MD;  Location: [facility] OR 2ND FLR;  Service: General;  Laterality: Right;    LAPAROSCOPIC CHOLECYSTECTOMY N/A 10/3/2024    Procedure: CHOLECYSTECTOMY, LAPAROSCOPIC;  Surgeon: Ang Mosley III, MD;  Location: [facility] OR;  Service: General;  Laterality: N/A;    ROBOT-ASSISTED LAPAROSCOPIC LYMPHADENECTOMY USING DA MONIQUE XI Left 10/03/2022    Procedure: XI ROBOTIC LYMPHADENECTOMY;  Surgeon: Evelio Kaplan MD;  Location: [facility] OR 2ND FLR;  Service: Thoracic;  Laterality: Left;    SPINE SURGERY      TONSILLECTOMY      XI ROBOTIC RATS,WITH LOBECTOMY,LUNG Left 10/03/2022    Procedure: XI ROBOTIC RATS,WITH LOBECTOMY,LUNG;  Surgeon: Evelio Kaplan MD;  Location: [facility] OR 2ND FLR;  Service: Thoracic;  Laterality: Left;     Social History     Tobacco Use    Smoking status: Some Days     Current packs/day: 0.15     Types: Cigarettes     Passive exposure: Current    Smokeless tobacco: Never    Tobacco comments:     reports one cigarette every now and then   Substance Use Topics    Alcohol use: Yes     Comment: rarely    Drug use: Yes     Types: Marijuana        REVIEW OF SYSTEMS  Per HPI    OBJECTIVE     VITAL SIGNS (Most Recent)  Temp: 98.3 °F (36.8 °C) (10/07/24 0301)  Pulse: 63 (10/07/24 0706)  Resp: 18 (10/07/24 0706)  BP: (!) 81/48 (10/07/24 0706)  SpO2: 100 %  (10/07/24 0706)    VENTILATION STATUS  Resp: 18 (10/07/24 0706)  SpO2: 100 % (10/07/24 0706)           I & O (Last 24H):  Intake/Output Summary (Last 24 hours) at 10/7/2024 0855  Last data filed at 10/7/2024 0701  Gross per 24 hour   Intake 1535 ml   Output 3300 ml   Net -1765 ml       WEIGHTS  Wt Readings from Last 1 Encounters:   10/02/24 1016 76.2 kg (168 lb)       PHYSICAL EXAM  CONSTITUTIONAL: No fever, no chills, no acute distress elderly female sitting up in chair  HEENT: Normocephalic, atraumatic,pupils reactive to light                 NECK:  No JVD no carotid bruit- neck brace present  CVS: S1S2+, RRR  LUNGS: Clear  ABDOMEN: Soft, NT, BS+  EXTREMITIES: No cyanosis, edema  : No ramirez catheter  NEURO: AAO X 3  PSY: Normal affect      HOME MEDICATIONS:  No current facility-administered medications on file prior to encounter.     Current Outpatient Medications on File Prior to Encounter   Medication Sig Dispense Refill    acetaminophen (TYLENOL) 500 MG tablet Take 2 tablets (1,000 mg total) by mouth every 6 (six) hours as needed for Pain. 8 tablet 0    albuterol (PROVENTIL/VENTOLIN HFA) 90 mcg/actuation inhaler Inhale 2 puffs into the lungs every 6 (six) hours as needed for Wheezing or Shortness of Breath. Rescue 8.5 g 11    buPROPion (WELLBUTRIN XL) 300 MG 24 hr tablet Take 1 tablet (300 mg total) by mouth once daily. 90 tablet 3    citalopram (CELEXA) 20 MG tablet Take 1 tablet (20 mg total) by mouth once daily. 30 tablet 11    fluticasone propionate (FLONASE) 50 mcg/actuation nasal spray 1 spray (50 mcg total) by Each Nostril route once daily. 16 g 3    fluticasone-salmeterol 230-21 mcg/dose (ADVAIR HFA) 230-21 mcg/actuation HFAA inhaler Inhale 2 puffs into the lungs 2 (two) times daily. Controller 12 g 5    gabapentin (NEURONTIN) 300 MG capsule Take 1 capsule (300 mg total) by mouth 3 (three) times daily. 270 capsule 1    levocetirizine (XYZAL) 5 MG tablet Take 1 tablet (5 mg total) by mouth every  evening. 30 tablet 5    levothyroxine (SYNTHROID) 50 MCG tablet Take 1 tablet (50 mcg total) by mouth before breakfast. 90 tablet 3    pravastatin (PRAVACHOL) 10 MG tablet Take 1 tablet (10 mg total) by mouth once daily. 90 tablet 3    topiramate (TOPAMAX) 100 MG tablet Take 1 tablet (100 mg total) by mouth 2 (two) times daily. 60 tablet 11    naloxone (NARCAN) 4 mg/actuation Spry ADMINISTER A SINGLE SPRAY IN ONE NOSTRIL UPON SIGNS OF OPIOID OVERDOSE. CALL 911. REPEAT AFTER 3 MINUTES IF NO RESPONSE.      varenicline (CHANTIX) 1 mg Tab Take 1 tablet by mouth twice daily (Patient not taking: Reported on 10/2/2024) 180 tablet 0       SCHEDULED MEDS:   aluminum-magnesium hydroxide-simethicone  30 mL Oral QID (AC & HS)    arformoteroL  15 mcg Nebulization BID    aspirin  81 mg Oral Daily    budesonide  1 mg Nebulization Q12H    buPROPion  300 mg Oral Daily    citalopram  20 mg Oral Daily    clopidogreL  75 mg Oral Daily    gabapentin  300 mg Oral TID    heparin (porcine)  5,000 Units Subcutaneous Q8H    levothyroxine  50 mcg Oral Before breakfast    midodrine  10 mg Oral TID AC    mupirocin  1 g Nasal BID    piperacillin-tazobactam (Zosyn) IV (PEDS and ADULTS) (extended infusion is not appropriate)  4.5 g Intravenous Q8H    pravastatin  10 mg Oral Daily    topiramate  100 mg Oral BID       CONTINUOUS INFUSIONS:   NORepinephrine bitartrate-D5W  0-3 mcg/kg/min Intravenous Continuous 1.8 mL/hr at 10/07/24 0511 0.05 mcg/kg/min at 10/07/24 0511       PRN MEDS:  Current Facility-Administered Medications:     acetaminophen, 650 mg, Oral, Q4H PRN    aluminum-magnesium hydroxide-simethicone, 30 mL, Oral, QID PRN    calcium gluconate IVPB, 1 g, Intravenous, PRN    calcium gluconate IVPB, 2 g, Intravenous, PRN    calcium gluconate IVPB, 3 g, Intravenous, PRN    dextrose 50%, 12.5 g, Intravenous, PRN    dextrose 50%, 25 g, Intravenous, PRN    glucagon (human recombinant), 1 mg, Intramuscular, PRN    glucose, 16 g, Oral, PRN     "glucose, 24 g, Oral, PRN    HYDROcodone-acetaminophen, 1 tablet, Oral, Q6H PRN    HYDROmorphone, 0.5 mg, Intravenous, Q2H PRN    magnesium oxide, 800 mg, Oral, PRN    magnesium oxide, 800 mg, Oral, PRN    melatonin, 6 mg, Oral, Nightly PRN    naloxone, 0.02 mg, Intravenous, PRN    ondansetron, 4 mg, Intravenous, Q6H PRN    potassium bicarbonate, 35 mEq, Oral, PRN    potassium bicarbonate, 50 mEq, Oral, PRN    potassium bicarbonate, 60 mEq, Oral, PRN    potassium, sodium phosphates, 2 packet, Oral, PRN    potassium, sodium phosphates, 2 packet, Oral, PRN    potassium, sodium phosphates, 2 packet, Oral, PRN    prochlorperazine, 5 mg, Intravenous, Q4H PRN    senna-docusate 8.6-50 mg, 1 tablet, Oral, BID PRN    sodium chloride 0.9%, 2 mL, Intravenous, Q12H PRN    LABS AND DIAGNOSTICS     CBC LAST 3 DAYS  Recent Labs   Lab 10/04/24  0455 10/04/24  0455 10/05/24  0336 10/06/24  0224 10/07/24  0255   WBC 34.88*  --  26.10* 24.07* 22.45*   RBC 3.58*  --  3.54* 3.41* 3.25*   HGB 11.3*  --  10.9* 10.4* 10.1*   HCT 33.8*  --  33.9* 31.8* 30.7*   MCV 94  --  96 93 95   MCH 31.6*  --  30.8 30.5 31.1*   MCHC 33.4  --  32.2 32.7 32.9   RDW 14.3  --  14.3 14.4 14.5     --  194 167 159   MPV 10.2  --  10.3 10.5 10.4   GRAN 39.0   < > 19.2*  5.0 18.9*  4.6 39.0   LYMPH 37.0  CANCELED  --  67.0*  17.5* 66.8*  16.1* 43.0   MONO 6.0  CANCELED  --  5.7  1.5* 5.9  1.4* 4.0   BASO CANCELED  --  0.11 0.12  --    NRBC 0  --  0 0 0    < > = values in this interval not displayed.       COAGULATION LAST 3 DAYS  No results for input(s): "LABPT", "INR", "APTT" in the last 168 hours.    CHEMISTRY LAST 3 DAYS  Recent Labs   Lab 10/03/24  1139 10/03/24  1209 10/04/24  0455 10/05/24  0336 10/06/24  0223 10/07/24  0255   NA  --   --    < > 131* 138 136   K  --   --    < > 4.0 3.6 3.8   CL  --   --    < > 104 107 105   CO2  --   --    < > 23 26 26   ANIONGAP  --   --    < > 4* 5* 5*   BUN  --   --    < > 11 7* 5*   CREATININE  --   --    " "< > 1.4 1.0 1.0   GLU  --   --    < > 96 100 76   CALCIUM  --   --    < > 7.4* 7.8* 7.7*   PH 7.198* 7.300*  --   --   --   --    MG  --   --    < > 1.8 1.7 1.6   ALBUMIN  --   --    < > 2.8* 2.9* 2.8*   PROT  --   --    < > 4.5* 4.6* 4.6*   ALKPHOS  --   --    < > 356* 380* 369*   ALT  --   --    < > 824* 691* 568*   AST  --   --    < > 628* 472* 396*   BILITOT  --   --    < > 1.3* 1.1* 0.9    < > = values in this interval not displayed.       CARDIAC PROFILE LAST 3 DAYS  Recent Labs   Lab 10/02/24  1838 10/03/24  0112 10/03/24  0514   TROPONINIHS 1588.5* 1161.1* 1015.8*       ENDOCRINE LAST 3 DAYS  No results for input(s): "TSH", "PROCAL" in the last 168 hours.    LAST ARTERIAL BLOOD GAS  ABG  Recent Labs   Lab 10/03/24  1209   PH 7.300*   PO2 506*   PCO2 50.0*   HCO3 24.6   BE -2       LAST 7 DAYS MICROBIOLOGY   Microbiology Results (last 7 days)       Procedure Component Value Units Date/Time    Blood culture [[id number]] Collected: 10/05/24 1037    Order Status: Completed Specimen: Blood Updated: 10/06/24 1232     Blood Culture, Routine No Growth to date      No Growth to date    Blood culture [[id number]] Collected: 10/05/24 1037    Order Status: Completed Specimen: Blood Updated: 10/06/24 1232     Blood Culture, Routine No Growth to date      No Growth to date    Blood culture x two cultures. Draw prior to antibiotics [[id number]] Collected: 10/02/24 1057    Order Status: Completed Specimen: Blood from Peripheral, Hand, Left Updated: 10/06/24 1232     Blood Culture, Routine No Growth to date      No Growth to date      No Growth to date      No Growth to date      No Growth to date    Narrative:      Aerobic and anaerobic    Stool culture **cannot be ordered stat** [[id number]] Collected: 10/02/24 1919    Order Status: Completed Specimen: Stool Updated: 10/05/24 1018     Stool Culture No Salmonella,Shigella,Vibrio,Campylobacter.      No E coli 0157:H7 isolated.    Blood culture x two cultures. Draw prior to " antibiotics [[id number]]  (Abnormal) Collected: 10/02/24 1049    Order Status: Completed Specimen: Blood from Peripheral, Wrist, Right Updated: 10/04/24 0807     Blood Culture, Routine Gram stain aer bottle: Gram positive cocci      Results called to and read back by:Val Nino RN-ED;  10/03/2024      11:58 CJD      COAGULASE NEGATIVE STAPHYLOCOCCI  Organism is a probable contaminant      Narrative:      Aerobic and anaerobic    Rapid Organism ID by PCR (from Blood culture) [[id number]]  (Abnormal) Collected: 10/02/24 1049    Order Status: Completed Updated: 10/03/24 1322     Enterococcus faecalis Not Detected     Enterococcus faecium Not Detected     Listeria monocytogenes Not Detected     Staphylococcus spp. Detected     Staphylococcus aureus Not Detected     Staphylococcus epidermidis Not Detected     Staphylococcus lugdunensis Not Detected     Streptococcus species Not Detected     Streptococcus agalactiae Not Detected     Streptococcus pneumoniae Not Detected     Streptococcus pyogenes Not Detected     Acinetobacter calcoaceticus/baumannii complex Not Detected     Bacteroides fragilis Not Detected     Enterobacterales Not Detected     Enterobacter cloacae complex Not Detected     Escherichia coli Not Detected     Klebsiella aerogenes Not Detected     Klebsiella oxytoca Not Detected     Klebsiella pneumoniae group Not Detected     Proteus Not Detected     Salmonella sp Not Detected     Serratia marcescens Not Detected     Haemophilus influenzae Not Detected     Neisseria meningtidis Not Detected     Pseudomonas aeruginosa Not Detected     Stenotrophomonas maltophilia Not Detected     Candida albicans Not Detected     Candida auris Not Detected     Candida glabrata Not Detected     Candida krusei Not Detected     Candida parapsilosis Not Detected     Candida tropicalis Not Detected     Cryptococcus neoformans/gattii Not Detected     CTX-M (ESBL ) Test not applicable     IMP (Carbapenem resistant) Test  not applicable     KPC resistance gene (Carbapenem resistant) Test not applicable     mcr-1  Test not applicable     mec A/C  Test not applicable     mec A/C and MREJ (MRSA) gene Test not applicable     NDM (Carbapenem resistant) Test not applicable     OXA-48-like (Carbapenem resistant) Test not applicable     van A/B (VRE gene) Test not applicable     VIM (Carbapenem resistant) Test not applicable    Narrative:      Aerobic and anaerobic    Clostridium difficile EIA [[id number]] Collected: 10/02/24 1919    Order Status: Completed Specimen: Stool Updated: 10/03/24 0031     C. diff Antigen Negative     C difficile Toxins A+B, EIA Negative     Comment: Testing not recommended for children <24 months old.               MOST RECENT IMAGING  CT Head Without Contrast  Narrative: EXAMINATION:  CT HEAD WITHOUT CONTRAST    CLINICAL HISTORY:  Headache, new or worsening (Age >= 50y);    TECHNIQUE:  Head CT without IV contrast.    COMPARISON:  MRI 09/24/2022    FINDINGS:  Minimal periventricular white matter low attenuation suggest minor chronic small vessel ischemic changes.  No acute intracranial hemorrhage, midline shift, or mass effect.    The ventricles and cisterns are maintained.    Calvarium is intact. Trace left maxillary sinus and right sphenoid sinus mucosal thickening is evident with mild mucosal thickening inferior frontal sinuses bilaterally.  Minimal fluid opacifies few inferior most left mastoid air cells.  Impression: No acute intracranial abnormality.    Electronically signed by: Vinay Daniels  Date:    10/04/2024  Time:    15:25      ECHOCARDIOGRAM RESULTS (last 5)  Results for orders placed during the hospital encounter of 10/02/24    Echo    Interpretation Summary    Left Ventricle: Left ventricle was not well visualized due to poor sonic window. Regional wall motion abnormalities present. See diagram for wall motion findings. There is moderately reduced systolic function with a visually estimated ejection  fraction of 30 - 35%. There is normal diastolic function. E/A ratio is 0.84. Average E/e' ratio is 8.35.    Right Ventricle: Normal right ventricular cavity size. Systolic function is normal.    Mitral Valve: There is mild to moderate regurgitation.    Tricuspid Valve: There is mild regurgitation.    Pulmonic Valve: There is mild regurgitation.    Pericardium: There is a small effusion. No indication of cardiac tamponade.      CURRENT/PREVIOUS VISIT EKG  Results for orders placed or performed during the hospital encounter of 10/02/24   EKG 12-lead    Collection Time: 10/04/24  1:13 PM   Result Value Ref Range    QRS Duration 104 ms    OHS QTC Calculation 613 ms    Narrative    Test Reason : I21.4,    Vent. Rate : 092 BPM     Atrial Rate : 092 BPM     P-R Int : 142 ms          QRS Dur : 104 ms      QT Int : 496 ms       P-R-T Axes : 015 -45 113 degrees     QTc Int : 613 ms    Normal sinus rhythm  Low voltage QRS  Left anterior fascicular block  Cannot rule out Anterior infarct ,age undetermined  Prolonged QT  Abnormal ECG  When compared with ECG of 03-OCT-2024 10:07,  Left anterior fascicular block is now Present  Minimal criteria for Anterior infarct are now Present  QT has lengthened  Confirmed by Lars BEAR, Wade DAVISON ([id number]) on 10/5/2024 11:56:51 AM    Referred By: [email or web address]   SELF           Confirmed By:Wade Sousa MD           ASSESSMENT/PLAN:     Active Hospital Problems    Diagnosis    *Septic shock    Bacteremia due to Staphylococcus    Calculus of gallbladder with acute cholecystitis without obstruction    Neuropathic pain    Acute renal failure    Acquired hypothyroidism    CLL (chronic lymphocytic leukemia)    Anxiety       ASSESSMENT & PLAN:     Newly reduced EF 30-35%  Elevated HS troponin likely type II MI  Septic shock  Bacteremia   GM- improved   S/p cholecystectomy  CLL  Elevated Liver Enzymes      RECOMMENDATIONS:    Hypotension despite midodrine 10 mg PO TID  Add florinef  Recommend OP work up  for cardiac Ischemia  Continue ASA and Plavix  Will follow        Angella Callahan NP  Date of Service: 10/07/2024  11:08 AM  Patient continues to have persistent elevation of transaminases although her abdomen appears to have improved consider GI input regarding the same.  As above in addition to midodrine 10 mg p.o. t.i.d. add Florinef bid in addition to support stockings  I have personally interviewed and examined the patient, I have reviewed the Nurse Practitioner's history and physical, assessment, and plan. I have personally evaluated the patient at bedside and agree with the findings and made appropriate changes as necessary in recommendations.      Yuval Suazo MD.  Department of Cardiology  [facility]  10/07/2024

## 2024-10-08 ENCOUNTER — TELEPHONE (OUTPATIENT)
Dept: HEMATOLOGY/ONCOLOGY | Facility: CLINIC | Age: 64
End: 2024-10-08
Payer: COMMERCIAL

## 2024-10-08 PROBLEM — K59.1 FUNCTIONAL DIARRHEA: Status: ACTIVE | Noted: 2024-10-08

## 2024-10-08 LAB
ALBUMIN SERPL BCP-MCNC: 2.8 G/DL (ref 3.5–5.2)
ALP SERPL-CCNC: 343 U/L (ref 55–135)
ALT SERPL W/O P-5'-P-CCNC: 436 U/L (ref 10–44)
ANION GAP SERPL CALC-SCNC: 5 MMOL/L (ref 8–16)
ANION GAP SERPL CALC-SCNC: 8 MMOL/L (ref 8–16)
AST SERPL-CCNC: 372 U/L (ref 10–40)
BASOPHILS NFR BLD: 0 % (ref 0–1.9)
BILIRUB SERPL-MCNC: 0.7 MG/DL (ref 0.1–1)
BUN SERPL-MCNC: 6 MG/DL (ref 8–23)
BUN SERPL-MCNC: 7 MG/DL (ref 8–23)
CALCIUM SERPL-MCNC: 8 MG/DL (ref 8.7–10.5)
CALCIUM SERPL-MCNC: 8.6 MG/DL (ref 8.7–10.5)
CHLORIDE SERPL-SCNC: 105 MMOL/L (ref 95–110)
CHLORIDE SERPL-SCNC: 108 MMOL/L (ref 95–110)
CO2 SERPL-SCNC: 23 MMOL/L (ref 23–29)
CO2 SERPL-SCNC: 25 MMOL/L (ref 23–29)
CREAT SERPL-MCNC: 1.1 MG/DL (ref 0.5–1.4)
CREAT SERPL-MCNC: 1.1 MG/DL (ref 0.5–1.4)
CRP SERPL-MCNC: 3.6 MG/DL
DIFFERENTIAL METHOD BLD: ABNORMAL
EOSINOPHIL NFR BLD: 6 % (ref 0–8)
ERYTHROCYTE [DISTWIDTH] IN BLOOD BY AUTOMATED COUNT: 14.8 % (ref 11.5–14.5)
EST. GFR  (NO RACE VARIABLE): 55.8 ML/MIN/1.73 M^2
EST. GFR  (NO RACE VARIABLE): 55.8 ML/MIN/1.73 M^2
GLUCOSE SERPL-MCNC: 69 MG/DL (ref 70–110)
GLUCOSE SERPL-MCNC: 89 MG/DL (ref 70–110)
HCT VFR BLD AUTO: 30.5 % (ref 37–48.5)
HGB BLD-MCNC: 9.8 G/DL (ref 12–16)
HYPOCHROMIA BLD QL SMEAR: ABNORMAL
IMM GRANULOCYTES # BLD AUTO: ABNORMAL K/UL (ref 0–0.04)
IMM GRANULOCYTES NFR BLD AUTO: ABNORMAL % (ref 0–0.5)
LYMPHOCYTES NFR BLD: 54 % (ref 18–48)
MAGNESIUM SERPL-MCNC: 1.8 MG/DL (ref 1.6–2.6)
MCH RBC QN AUTO: 30.6 PG (ref 27–31)
MCHC RBC AUTO-ENTMCNC: 32.1 G/DL (ref 32–36)
MCV RBC AUTO: 95 FL (ref 82–98)
MONOCYTES NFR BLD: 4 % (ref 4–15)
NEUTROPHILS NFR BLD: 32 % (ref 38–73)
NEUTS BAND NFR BLD MANUAL: 4 %
NRBC BLD-RTO: 0 /100 WBC
PLATELET # BLD AUTO: 181 K/UL (ref 150–450)
PLATELET BLD QL SMEAR: ABNORMAL
PMV BLD AUTO: 10.7 FL (ref 9.2–12.9)
POTASSIUM SERPL-SCNC: 3.7 MMOL/L (ref 3.5–5.1)
POTASSIUM SERPL-SCNC: 3.8 MMOL/L (ref 3.5–5.1)
PROCALCITONIN SERPL IA-MCNC: 1.94 NG/ML (ref 0–0.5)
PROT SERPL-MCNC: 4.6 G/DL (ref 6–8.4)
RBC # BLD AUTO: 3.2 M/UL (ref 4–5.4)
SMUDGE CELLS BLD QL SMEAR: PRESENT
SODIUM SERPL-SCNC: 136 MMOL/L (ref 136–145)
SODIUM SERPL-SCNC: 138 MMOL/L (ref 136–145)
WBC # BLD AUTO: 26.08 K/UL (ref 3.9–12.7)
WBC #/AREA STL HPF: NORMAL /[HPF]

## 2024-10-08 PROCEDURE — 25000242 PHARM REV CODE 250 ALT 637 W/ HCPCS

## 2024-10-08 PROCEDURE — 36415 COLL VENOUS BLD VENIPUNCTURE: CPT | Performed by: NURSE PRACTITIONER

## 2024-10-08 PROCEDURE — 25000003 PHARM REV CODE 250: Performed by: SURGERY

## 2024-10-08 PROCEDURE — 20000000 HC ICU ROOM

## 2024-10-08 PROCEDURE — 86140 C-REACTIVE PROTEIN: CPT | Performed by: INTERNAL MEDICINE

## 2024-10-08 PROCEDURE — 94761 N-INVAS EAR/PLS OXIMETRY MLT: CPT

## 2024-10-08 PROCEDURE — 99900031 HC PATIENT EDUCATION (STAT)

## 2024-10-08 PROCEDURE — 80053 COMPREHEN METABOLIC PANEL: CPT | Performed by: SURGERY

## 2024-10-08 PROCEDURE — 25000003 PHARM REV CODE 250

## 2024-10-08 PROCEDURE — 97530 THERAPEUTIC ACTIVITIES: CPT

## 2024-10-08 PROCEDURE — 80048 BASIC METABOLIC PNL TOTAL CA: CPT | Performed by: NURSE PRACTITIONER

## 2024-10-08 PROCEDURE — 36415 COLL VENOUS BLD VENIPUNCTURE: CPT | Performed by: INTERNAL MEDICINE

## 2024-10-08 PROCEDURE — 25000003 PHARM REV CODE 250: Performed by: INTERNAL MEDICINE

## 2024-10-08 PROCEDURE — 82653 EL-1 FECAL QUANTITATIVE: CPT | Performed by: HOSPITALIST

## 2024-10-08 PROCEDURE — 99233 SBSQ HOSP IP/OBS HIGH 50: CPT | Mod: ,,, | Performed by: INTERNAL MEDICINE

## 2024-10-08 PROCEDURE — 84145 PROCALCITONIN (PCT): CPT | Performed by: INTERNAL MEDICINE

## 2024-10-08 PROCEDURE — 85027 COMPLETE CBC AUTOMATED: CPT | Performed by: SURGERY

## 2024-10-08 PROCEDURE — 25000003 PHARM REV CODE 250: Performed by: NURSE PRACTITIONER

## 2024-10-08 PROCEDURE — 85007 BL SMEAR W/DIFF WBC COUNT: CPT | Performed by: SURGERY

## 2024-10-08 PROCEDURE — 83735 ASSAY OF MAGNESIUM: CPT | Performed by: SURGERY

## 2024-10-08 PROCEDURE — 25000003 PHARM REV CODE 250: Performed by: HOSPITALIST

## 2024-10-08 PROCEDURE — 94640 AIRWAY INHALATION TREATMENT: CPT

## 2024-10-08 PROCEDURE — 89055 LEUKOCYTE ASSESSMENT FECAL: CPT | Performed by: HOSPITALIST

## 2024-10-08 PROCEDURE — 63600175 PHARM REV CODE 636 W HCPCS

## 2024-10-08 PROCEDURE — 63600175 PHARM REV CODE 636 W HCPCS: Performed by: SURGERY

## 2024-10-08 PROCEDURE — 94799 UNLISTED PULMONARY SVC/PX: CPT

## 2024-10-08 RX ORDER — L. ACIDOPHILUS/L.BULGARICUS 100MM CELL
1 GRANULES IN PACKET (EA) ORAL 2 TIMES DAILY
Status: DISCONTINUED | OUTPATIENT
Start: 2024-10-08 | End: 2024-10-16 | Stop reason: HOSPADM

## 2024-10-08 RX ORDER — DOXYLAMINE SUCCINATE 25 MG
TABLET ORAL 2 TIMES DAILY
Status: DISCONTINUED | OUTPATIENT
Start: 2024-10-08 | End: 2024-10-16 | Stop reason: HOSPADM

## 2024-10-08 RX ORDER — DIPHENHYDRAMINE HCL 25 MG
25 CAPSULE ORAL EVERY 6 HOURS PRN
Status: DISCONTINUED | OUTPATIENT
Start: 2024-10-08 | End: 2024-10-16 | Stop reason: HOSPADM

## 2024-10-08 RX ORDER — SODIUM CHLORIDE 9 MG/ML
INJECTION, SOLUTION INTRAVENOUS CONTINUOUS
Status: DISCONTINUED | OUTPATIENT
Start: 2024-10-08 | End: 2024-10-08

## 2024-10-08 RX ADMIN — PIPERACILLIN SODIUM AND TAZOBACTAM SODIUM 4.5 G: 4; .5 INJECTION, POWDER, LYOPHILIZED, FOR SOLUTION INTRAVENOUS at 06:10

## 2024-10-08 RX ADMIN — MIDODRINE HYDROCHLORIDE 10 MG: 10 TABLET ORAL at 12:10

## 2024-10-08 RX ADMIN — PIPERACILLIN SODIUM AND TAZOBACTAM SODIUM 4.5 G: 4; .5 INJECTION, POWDER, LYOPHILIZED, FOR SOLUTION INTRAVENOUS at 10:10

## 2024-10-08 RX ADMIN — THEOPHYLLINE ANHYDROUS 200 MG: 100 CAPSULE, EXTENDED RELEASE ORAL at 10:10

## 2024-10-08 RX ADMIN — GABAPENTIN 300 MG: 300 CAPSULE ORAL at 04:10

## 2024-10-08 RX ADMIN — ALUMINUM HYDROXIDE, MAGNESIUM HYDROXIDE, AND SIMETHICONE 30 ML: 1200; 120; 1200 SUSPENSION ORAL at 12:10

## 2024-10-08 RX ADMIN — MIDODRINE HYDROCHLORIDE 10 MG: 10 TABLET ORAL at 04:10

## 2024-10-08 RX ADMIN — ARFORMOTEROL TARTRATE 15 MCG: 15 SOLUTION RESPIRATORY (INHALATION) at 07:10

## 2024-10-08 RX ADMIN — FLUDROCORTISONE ACETATE 100 MCG: 0.1 TABLET ORAL at 09:10

## 2024-10-08 RX ADMIN — SODIUM CHLORIDE: 9 INJECTION, SOLUTION INTRAVENOUS at 11:10

## 2024-10-08 RX ADMIN — ALUMINUM HYDROXIDE, MAGNESIUM HYDROXIDE, AND SIMETHICONE 30 ML: 1200; 120; 1200 SUSPENSION ORAL at 10:10

## 2024-10-08 RX ADMIN — THEOPHYLLINE ANHYDROUS 200 MG: 100 CAPSULE, EXTENDED RELEASE ORAL at 12:10

## 2024-10-08 RX ADMIN — TOPIRAMATE 100 MG: 25 TABLET, FILM COATED ORAL at 09:10

## 2024-10-08 RX ADMIN — TOPIRAMATE 100 MG: 25 TABLET, FILM COATED ORAL at 10:10

## 2024-10-08 RX ADMIN — MIDODRINE HYDROCHLORIDE 10 MG: 10 TABLET ORAL at 06:10

## 2024-10-08 RX ADMIN — HEPARIN SODIUM 5000 UNITS: 5000 INJECTION INTRAVENOUS; SUBCUTANEOUS at 06:10

## 2024-10-08 RX ADMIN — Medication 800 MG: at 06:10

## 2024-10-08 RX ADMIN — HYDROCODONE BITARTRATE AND ACETAMINOPHEN 1 TABLET: 5; 325 TABLET ORAL at 04:10

## 2024-10-08 RX ADMIN — CLOPIDOGREL BISULFATE 75 MG: 75 TABLET, FILM COATED ORAL at 09:10

## 2024-10-08 RX ADMIN — ARFORMOTEROL TARTRATE 15 MCG: 15 SOLUTION RESPIRATORY (INHALATION) at 08:10

## 2024-10-08 RX ADMIN — POTASSIUM BICARBONATE 50 MEQ: 977.5 TABLET, EFFERVESCENT ORAL at 06:10

## 2024-10-08 RX ADMIN — MUPIROCIN 1 G: 20 OINTMENT TOPICAL at 09:10

## 2024-10-08 RX ADMIN — HYDROCODONE BITARTRATE AND ACETAMINOPHEN 1 TABLET: 5; 325 TABLET ORAL at 12:10

## 2024-10-08 RX ADMIN — BUPROPION HYDROCHLORIDE 300 MG: 150 TABLET, EXTENDED RELEASE ORAL at 09:10

## 2024-10-08 RX ADMIN — LEVOTHYROXINE SODIUM 50 MCG: 0.03 TABLET ORAL at 06:10

## 2024-10-08 RX ADMIN — Medication 800 MG: at 04:10

## 2024-10-08 RX ADMIN — ALUMINUM HYDROXIDE, MAGNESIUM HYDROXIDE, AND SIMETHICONE 30 ML: 1200; 120; 1200 SUSPENSION ORAL at 04:10

## 2024-10-08 RX ADMIN — GABAPENTIN 300 MG: 300 CAPSULE ORAL at 09:10

## 2024-10-08 RX ADMIN — HEPARIN SODIUM 5000 UNITS: 5000 INJECTION INTRAVENOUS; SUBCUTANEOUS at 04:10

## 2024-10-08 RX ADMIN — ALUMINUM HYDROXIDE, MAGNESIUM HYDROXIDE, AND SIMETHICONE 30 ML: 1200; 120; 1200 SUSPENSION ORAL at 06:10

## 2024-10-08 RX ADMIN — GABAPENTIN 300 MG: 300 CAPSULE ORAL at 10:10

## 2024-10-08 RX ADMIN — MICONAZOLE NITRATE: 20 CREAM TOPICAL at 10:10

## 2024-10-08 RX ADMIN — HEPARIN SODIUM 5000 UNITS: 5000 INJECTION INTRAVENOUS; SUBCUTANEOUS at 10:10

## 2024-10-08 RX ADMIN — Medication 800 MG: at 05:10

## 2024-10-08 RX ADMIN — PIPERACILLIN SODIUM AND TAZOBACTAM SODIUM 4.5 G: 4; .5 INJECTION, POWDER, LYOPHILIZED, FOR SOLUTION INTRAVENOUS at 12:10

## 2024-10-08 RX ADMIN — DIPHENHYDRAMINE HYDROCHLORIDE 25 MG: 25 CAPSULE ORAL at 05:10

## 2024-10-08 RX ADMIN — BUDESONIDE INHALATION 1 MG: 0.5 SUSPENSION RESPIRATORY (INHALATION) at 07:10

## 2024-10-08 RX ADMIN — MUPIROCIN 1 G: 20 OINTMENT TOPICAL at 10:10

## 2024-10-08 RX ADMIN — LACTOBACILLUS ACIDOPHILUS / LACTOBACILLUS BULGARICUS 1 EACH: 100 MILLION CFU STRENGTH GRANULES at 10:10

## 2024-10-08 RX ADMIN — ASPIRIN 81 MG: 81 TABLET, COATED ORAL at 09:10

## 2024-10-08 RX ADMIN — BUDESONIDE INHALATION 1 MG: 0.5 SUSPENSION RESPIRATORY (INHALATION) at 08:10

## 2024-10-08 RX ADMIN — CITALOPRAM HYDROBROMIDE 20 MG: 20 TABLET ORAL at 09:10

## 2024-10-08 NOTE — ASSESSMENT & PLAN NOTE
S/p lap archana - continues to have loose stools  Lactose intolerant - diet and supplements adjusted - diarrhea more pronounced after ensure  Add probiotic while on zosyn  Add stool WBC and elastase studies   C.diff negative 6 days ago   Consider stool bulking agent if no improvement

## 2024-10-08 NOTE — PROGRESS NOTES
[facility]  Department of Cardiology  Consult Note      PATIENT NAME: Yvette Hutchinson    MRN: [id number]  TODAY'S DATE: 10/08/2024  ADMIT DATE: 10/2/2024                          CONSULT REQUESTED BY: Iris Odell MD    SUBJECTIVE     PRINCIPAL PROBLEM: Septic shock    Interval history:  10/08/2024    Attempted to work with PT/OT became hypotensive and dizzy.  WBC 26.08      Creatinine 1.1  LVEF 30-35%    10/7/2024  Ms. Hutchinson bp remains low despite midodrine.        10/6/2024  Pt seen up this morning sitting in chair with sister in law at bedside. Still having some abdominal discomfort.  Remains on low-dose norepi.    10/05/2024  Patient seen this morning sitting up in chair in the ICU.  Still complains of some nausea and abdominal discomfort.  Sister-in-law at bedside.  Remains on 0.2 of nor epi.    HPI:  Patient is a 64-year-old female who presented to the emergency room with nausea vomiting diarrhea and abdominal pain as well as near syncope.  She apparently had some chest tightness for a week or so prior to this event as well.  Patient with elevated troponin levels on arrival but was also being treated for sepsis.  Patient was noted to have cholecystitis and underwent laparoscopic cholecystectomy yesterday.  Patient is noted to have bacteremia and is followed by infectious disease.  Echocardiogram noted which shows reduced ejection fraction.  Other history also includes CLL and lung cancer with history of partial resection.      REASON FOR CONSULT:  From Hospitalist H&P:  Reduced ejection fraction, elevated troponin      Review of patient's allergies indicates:  No Known Allergies    Past Medical History:   Diagnosis Date    Arthritis     Cancer 10/2022    (CLL) leukemia ; adenocarcinoma  adenosgemis    Depression     GERD (gastroesophageal reflux disease)     Neck pain     and back pain    Personal history of colonic polyps 07/07/2017    Pneumonia of left lung due to  infectious organism 03/05/2020    Thyroid disease      Past Surgical History:   Procedure Laterality Date    FUSION, SPINE, CERVICAL, ANTERIOR APPROACH N/A 08/06/2024    Procedure: FUSION, SPINE, CERVICAL, ANTERIOR APPROACH;  Surgeon: Anatoly Daley DO;  Location: [facility];  Service: Neurosurgery;  Laterality: N/A;  IOM, C-ARM, MEDTRONICS, MICRO, HORSESHOE    INJECTION OF ANESTHETIC AGENT AROUND MULTIPLE INTERCOSTAL NERVES Left 10/03/2022    Procedure: BLOCK, NERVE, INTERCOSTAL, 2 OR MORE;  Surgeon: Evelio Kaplan MD;  Location: [facility] OR 2ND FLR;  Service: Thoracic;  Laterality: Left;    INSERTION OF TUNNELED CENTRAL VENOUS CATHETER (CVC) WITH SUBCUTANEOUS PORT Right 11/03/2022    Procedure: BRMIMZRDE-VSAI-R-CATH, Right or Left Neck or Chest;  Surgeon: Mini Acevedo MD;  Location: [facility] OR [facility] FLR;  Service: General;  Laterality: Right;    LAPAROSCOPIC CHOLECYSTECTOMY N/A 10/3/2024    Procedure: CHOLECYSTECTOMY, LAPAROSCOPIC;  Surgeon: Ang Mosley III, MD;  Location: [facility];  Service: General;  Laterality: N/A;    ROBOT-ASSISTED LAPAROSCOPIC LYMPHADENECTOMY USING DA MONIQUE XI Left 10/03/2022    Procedure: XI ROBOTIC LYMPHADENECTOMY;  Surgeon: Evelio Kaplan MD;  [facility];  Service: Thoracic;  Laterality: Left;    SPINE SURGERY      TONSILLECTOMY      XI ROBOTIC RATS,WITH LOBECTOMY,LUNG Left 10/03/2022    Procedure: XI ROBOTIC RATS,WITH LOBECTOMY,LUNG;  Surgeon: Evelio Kaplan MD;  Location: [facility] OR [facility] FLR;  Service: Thoracic;  Laterality: Left;     Social History     Tobacco Use    Smoking status: Some Days     Current packs/day: 0.15     Types: Cigarettes     Passive exposure: Current    Smokeless tobacco: Never    Tobacco comments:     reports one cigarette every now and then   Substance Use Topics    Alcohol use: Yes     Comment: rarely    Drug use: Yes     Types: Marijuana        REVIEW OF SYSTEMS  Per HPI    OBJECTIVE     VITAL SIGNS (Most Recent)  Temp: 97.8  °F (36.6 °C) (10/08/24 1115)  Pulse: (!) 58 (10/08/24 1100)  Resp: 18 (10/08/24 1230)  BP: (!) 90/50 (10/08/24 1100)  SpO2: 95 % (10/08/24 1100)    VENTILATION STATUS  Resp: 18 (10/08/24 1230)  SpO2: 95 % (10/08/24 1100)           I & O (Last 24H):  Intake/Output Summary (Last 24 hours) at 10/8/2024 1235  Last data filed at 10/8/2024 1226  Gross per 24 hour   Intake 1200.76 ml   Output 3065 ml   Net -1864.24 ml       WEIGHTS  Wt Readings from Last 1 Encounters:   10/07/24 1455 76.2 kg (167 lb 15.9 oz)   10/02/24 1016 76.2 kg (168 lb)       PHYSICAL EXAM  CONSTITUTIONAL: No fever, no chills, no acute distress elderly female sitting up in chair  HEENT: Normocephalic, atraumatic,pupils reactive to light                 NECK:  No JVD no carotid bruit- neck brace present  CVS: S1S2+, RRR  LUNGS: Clear  ABDOMEN: Soft, NT, BS+  EXTREMITIES: No cyanosis, edema  : No ramirez catheter  NEURO: AAO X 3  PSY: Normal affect      HOME MEDICATIONS:  No current facility-administered medications on file prior to encounter.     Current Outpatient Medications on File Prior to Encounter   Medication Sig Dispense Refill    acetaminophen (TYLENOL) 500 MG tablet Take 2 tablets (1,000 mg total) by mouth every 6 (six) hours as needed for Pain. 8 tablet 0    albuterol (PROVENTIL/VENTOLIN HFA) 90 mcg/actuation inhaler Inhale 2 puffs into the lungs every 6 (six) hours as needed for Wheezing or Shortness of Breath. Rescue 8.5 g 11    buPROPion (WELLBUTRIN XL) 300 MG 24 hr tablet Take 1 tablet (300 mg total) by mouth once daily. 90 tablet 3    citalopram (CELEXA) 20 MG tablet Take 1 tablet (20 mg total) by mouth once daily. 30 tablet 11    fluticasone propionate (FLONASE) 50 mcg/actuation nasal spray 1 spray (50 mcg total) by Each Nostril route once daily. 16 g 3    fluticasone-salmeterol 230-21 mcg/dose (ADVAIR HFA) 230-21 mcg/actuation HFAA inhaler Inhale 2 puffs into the lungs 2 (two) times daily. Controller 12 g 5    gabapentin (NEURONTIN)  300 MG capsule Take 1 capsule (300 mg total) by mouth 3 (three) times daily. 270 capsule 1    levocetirizine (XYZAL) 5 MG tablet Take 1 tablet (5 mg total) by mouth every evening. 30 tablet 5    levothyroxine (SYNTHROID) 50 MCG tablet Take 1 tablet (50 mcg total) by mouth before breakfast. 90 tablet 3    pravastatin (PRAVACHOL) 10 MG tablet Take 1 tablet (10 mg total) by mouth once daily. 90 tablet 3    topiramate (TOPAMAX) 100 MG tablet Take 1 tablet (100 mg total) by mouth 2 (two) times daily. 60 tablet 11    naloxone (NARCAN) 4 mg/actuation Spry ADMINISTER A SINGLE SPRAY IN ONE NOSTRIL UPON SIGNS OF OPIOID OVERDOSE. CALL 911. REPEAT AFTER 3 MINUTES IF NO RESPONSE.      varenicline (CHANTIX) 1 mg Tab Take 1 tablet by mouth twice daily (Patient not taking: Reported on 10/2/2024) 180 tablet 0       SCHEDULED MEDS:   aluminum-magnesium hydroxide-simethicone  30 mL Oral QID (AC & HS)    arformoteroL  15 mcg Nebulization BID    aspirin  81 mg Oral Daily    budesonide  1 mg Nebulization Q12H    buPROPion  300 mg Oral Daily    citalopram  20 mg Oral Daily    clopidogreL  75 mg Oral Daily    fludrocortisone  100 mcg Oral Daily    gabapentin  300 mg Oral TID    heparin (porcine)  5,000 Units Subcutaneous Q8H    levothyroxine  50 mcg Oral Before breakfast    midodrine  10 mg Oral TID AC    mupirocin  1 g Nasal BID    piperacillin-tazobactam (Zosyn) IV (PEDS and ADULTS) (extended infusion is not appropriate)  4.5 g Intravenous Q8H    theophylline  200 mg Oral BID    topiramate  100 mg Oral BID       CONTINUOUS INFUSIONS:   0.9% NaCl   Intravenous Continuous 75 mL/hr at 10/08/24 1100 New Bag at 10/08/24 1100    NORepinephrine bitartrate-D5W  0-3 mcg/kg/min Intravenous Continuous 1.1 mL/hr at 10/08/24 0630 0.03 mcg/kg/min at 10/08/24 0630       PRN MEDS:  Current Facility-Administered Medications:     acetaminophen, 650 mg, Oral, Q4H PRN    aluminum-magnesium hydroxide-simethicone, 30 mL, Oral, QID PRN    calcium gluconate  "IVPB, 1 g, Intravenous, PRN    calcium gluconate IVPB, 2 g, Intravenous, PRN    calcium gluconate IVPB, 3 g, Intravenous, PRN    dextrose 50%, 12.5 g, Intravenous, PRN    dextrose 50%, 25 g, Intravenous, PRN    glucagon (human recombinant), 1 mg, Intramuscular, PRN    glucose, 16 g, Oral, PRN    glucose, 24 g, Oral, PRN    HYDROcodone-acetaminophen, 1 tablet, Oral, Q6H PRN    HYDROmorphone, 0.5 mg, Intravenous, Q2H PRN    magnesium oxide, 800 mg, Oral, PRN    magnesium oxide, 800 mg, Oral, PRN    melatonin, 6 mg, Oral, Nightly PRN    naloxone, 0.02 mg, Intravenous, PRN    ondansetron, 4 mg, Intravenous, Q6H PRN    potassium bicarbonate, 35 mEq, Oral, PRN    potassium bicarbonate, 50 mEq, Oral, PRN    potassium bicarbonate, 60 mEq, Oral, PRN    potassium, sodium phosphates, 2 packet, Oral, PRN    potassium, sodium phosphates, 2 packet, Oral, PRN    potassium, sodium phosphates, 2 packet, Oral, PRN    prochlorperazine, 5 mg, Intravenous, Q4H PRN    senna-docusate 8.6-50 mg, 1 tablet, Oral, BID PRN    sodium chloride 0.9%, 2 mL, Intravenous, Q12H PRN    LABS AND DIAGNOSTICS     CBC LAST 3 DAYS  Recent Labs   Lab 10/04/24  0455 10/04/24  0455 10/05/24  0336 10/06/24  0224 10/07/24  0255 10/08/24  0318   WBC 34.88*  --  26.10* 24.07* 22.45* 26.08*   RBC 3.58*  --  3.54* 3.41* 3.25* 3.20*   HGB 11.3*  --  10.9* 10.4* 10.1* 9.8*   HCT 33.8*  --  33.9* 31.8* 30.7* 30.5*   MCV 94  --  96 93 95 95   MCH 31.6*  --  30.8 30.5 31.1* 30.6   MCHC 33.4  --  32.2 32.7 32.9 32.1   RDW 14.3  --  14.3 14.4 14.5 14.8*     --  194 167 159 181   MPV 10.2  --  10.3 10.5 10.4 10.7   GRAN 39.0   < > 19.2*  5.0 18.9*  4.6 39.0 32.0*   LYMPH 37.0  CANCELED  --  67.0*  17.5* 66.8*  16.1* 43.0 54.0*   MONO 6.0  CANCELED  --  5.7  1.5* 5.9  1.4* 4.0 4.0   BASO CANCELED  --  0.11 0.12  --   --    NRBC 0  --  0 0 0 0    < > = values in this interval not displayed.       COAGULATION LAST 3 DAYS  No results for input(s): "LABPT", " ""INR", "APTT" in the last 168 hours.    CHEMISTRY LAST 3 DAYS  Recent Labs   Lab 10/03/24  1139 10/03/24  1209 10/04/24  0455 10/06/24  0223 10/07/24  0255 10/08/24  0318   NA  --   --    < > 138 136 138   K  --   --    < > 3.6 3.8 3.8   CL  --   --    < > 107 105 108   CO2  --   --    < > 26 26 25   ANIONGAP  --   --    < > 5* 5* 5*   BUN  --   --    < > 7* 5* 6*   CREATININE  --   --    < > 1.0 1.0 1.1   GLU  --   --    < > 100 76 69*   CALCIUM  --   --    < > 7.8* 7.7* 8.0*   PH 7.198* 7.300*  --   --   --   --    MG  --   --    < > 1.7 1.6 1.8   ALBUMIN  --   --    < > 2.9* 2.8* 2.8*   PROT  --   --    < > 4.6* 4.6* 4.6*   ALKPHOS  --   --    < > 380* 369* 343*   ALT  --   --    < > 691* 568* 436*   AST  --   --    < > 472* 396* 372*   BILITOT  --   --    < > 1.1* 0.9 0.7    < > = values in this interval not displayed.       CARDIAC PROFILE LAST 3 DAYS  Recent Labs   Lab 10/02/24  1838 10/03/24  0112 10/03/24  0514   TROPONINIHS 1588.5* 1161.1* 1015.8*       ENDOCRINE LAST 3 DAYS  No results for input(s): "TSH", "PROCAL" in the last 168 hours.    LAST ARTERIAL BLOOD GAS  ABG  Recent Labs   Lab 10/03/24  1209   PH 7.300*   PO2 506*   PCO2 50.0*   HCO3 24.6   BE -2       LAST 7 DAYS MICROBIOLOGY   Microbiology Results (last 7 days)       Procedure Component Value Units Date/Time    Blood culture [[id number]] Collected: 10/05/24 1037    Order Status: Completed Specimen: Blood Updated: 10/08/24 1232     Blood Culture, Routine No Growth to date      No Growth to date      No Growth to date      No Growth to date    Blood culture [[id number]] Collected: 10/05/24 1037    Order Status: Completed Specimen: Blood Updated: 10/08/24 1232     Blood Culture, Routine No Growth to date      No Growth to date      No Growth to date      No Growth to date    Blood culture x two cultures. Draw prior to antibiotics [[id number]] Collected: 10/02/24 1057    Order Status: Completed Specimen: Blood from Peripheral, Hand, Left Updated: " 10/07/24 1232     Blood Culture, Routine No growth after 5 days.    Narrative:      Aerobic and anaerobic    Stool culture **cannot be ordered stat** [[id number]] Collected: 10/02/24 1919    Order Status: Completed Specimen: Stool Updated: 10/05/24 1018     Stool Culture No Salmonella,Shigella,Vibrio,Campylobacter.      No E coli 0157:H7 isolated.    Blood culture x two cultures. Draw prior to antibiotics [[id number]]  (Abnormal) Collected: 10/02/24 1049    Order Status: Completed Specimen: Blood from Peripheral, Wrist, Right Updated: 10/04/24 0807     Blood Culture, Routine Gram stain aer bottle: Gram positive cocci      Results called to and read back by:Val Nino RN-ED;  10/03/2024      11:58 CJD      COAGULASE NEGATIVE STAPHYLOCOCCI  Organism is a probable contaminant      Narrative:      Aerobic and anaerobic    Rapid Organism ID by PCR (from Blood culture) [[id number]]  (Abnormal) Collected: 10/02/24 1049    Order Status: Completed Updated: 10/03/24 1322     Enterococcus faecalis Not Detected     Enterococcus faecium Not Detected     Listeria monocytogenes Not Detected     Staphylococcus spp. Detected     Staphylococcus aureus Not Detected     Staphylococcus epidermidis Not Detected     Staphylococcus lugdunensis Not Detected     Streptococcus species Not Detected     Streptococcus agalactiae Not Detected     Streptococcus pneumoniae Not Detected     Streptococcus pyogenes Not Detected     Acinetobacter calcoaceticus/baumannii complex Not Detected     Bacteroides fragilis Not Detected     Enterobacterales Not Detected     Enterobacter cloacae complex Not Detected     Escherichia coli Not Detected     Klebsiella aerogenes Not Detected     Klebsiella oxytoca Not Detected     Klebsiella pneumoniae group Not Detected     Proteus Not Detected     Salmonella sp Not Detected     Serratia marcescens Not Detected     Haemophilus influenzae Not Detected     Neisseria meningtidis Not Detected     Pseudomonas  aeruginosa Not Detected     Stenotrophomonas maltophilia Not Detected     Candida albicans Not Detected     Candida auris Not Detected     Candida glabrata Not Detected     Candida krusei Not Detected     Candida parapsilosis Not Detected     Candida tropicalis Not Detected     Cryptococcus neoformans/gattii Not Detected     CTX-M (ESBL ) Test not applicable     IMP (Carbapenem resistant) Test not applicable     KPC resistance gene (Carbapenem resistant) Test not applicable     mcr-1  Test not applicable     mec A/C  Test not applicable     mec A/C and MREJ (MRSA) gene Test not applicable     NDM (Carbapenem resistant) Test not applicable     OXA-48-like (Carbapenem resistant) Test not applicable     van A/B (VRE gene) Test not applicable     VIM (Carbapenem resistant) Test not applicable    Narrative:      Aerobic and anaerobic    Clostridium difficile EIA [[id number]] Collected: 10/02/24 1919    Order Status: Completed Specimen: Stool Updated: 10/03/24 0031     C. diff Antigen Negative     C difficile Toxins A+B, EIA Negative     Comment: Testing not recommended for children <24 months old.               MOST RECENT IMAGING  CT Head Without Contrast  Narrative: EXAMINATION:  CT HEAD WITHOUT CONTRAST    CLINICAL HISTORY:  Headache, new or worsening (Age >= 50y);    TECHNIQUE:  Head CT without IV contrast.    COMPARISON:  MRI 09/24/2022    FINDINGS:  Minimal periventricular white matter low attenuation suggest minor chronic small vessel ischemic changes.  No acute intracranial hemorrhage, midline shift, or mass effect.    The ventricles and cisterns are maintained.    Calvarium is intact. Trace left maxillary sinus and right sphenoid sinus mucosal thickening is evident with mild mucosal thickening inferior frontal sinuses bilaterally.  Minimal fluid opacifies few inferior most left mastoid air cells.  Impression: No acute intracranial abnormality.    Electronically signed by: Vinay  Jeremiah  Date:    10/04/2024  Time:    15:25      ECHOCARDIOGRAM RESULTS (last 5)  Results for orders placed during the hospital encounter of 10/02/24    Echo    Interpretation Summary    Left Ventricle: Left ventricle was not well visualized due to poor sonic window. Regional wall motion abnormalities present. See diagram for wall motion findings. There is moderately reduced systolic function with a visually estimated ejection fraction of 30 - 35%. There is normal diastolic function. E/A ratio is 0.84. Average E/e' ratio is 8.35.    Right Ventricle: Normal right ventricular cavity size. Systolic function is normal.    Mitral Valve: There is mild to moderate regurgitation.    Tricuspid Valve: There is mild regurgitation.    Pulmonic Valve: There is mild regurgitation.    Pericardium: There is a small effusion. No indication of cardiac tamponade.      CURRENT/PREVIOUS VISIT EKG  Results for orders placed or performed during the hospital encounter of 10/02/24   EKG 12-lead    Collection Time: 10/04/24  1:13 PM   Result Value Ref Range    QRS Duration 104 ms    OHS QTC Calculation 613 ms    Narrative    Test Reason : I21.4,    Vent. Rate : 092 BPM     Atrial Rate : 092 BPM     P-R Int : 142 ms          QRS Dur : 104 ms      QT Int : 496 ms       P-R-T Axes : 015 -45 113 degrees     QTc Int : 613 ms    Normal sinus rhythm  Low voltage QRS  Left anterior fascicular block  Cannot rule out Anterior infarct ,age undetermined  Prolonged QT  Abnormal ECG  When compared with ECG of 03-OCT-2024 10:07,  Left anterior fascicular block is now Present  Minimal criteria for Anterior infarct are now Present  QT has lengthened  Confirmed by Lars BEAR, Wade DAVISON ([id number]) on 10/5/2024 11:56:51 AM    Referred By: [email or web address]   SELF           Confirmed By:Wade Sousa MD           ASSESSMENT/PLAN:     Active Hospital Problems    Diagnosis    *Septic shock    Bacteremia due to Staphylococcus    Calculus of gallbladder with acute  cholecystitis without obstruction    Neuropathic pain    Acute renal failure    Acquired hypothyroidism    CLL (chronic lymphocytic leukemia)    Anxiety       ASSESSMENT & PLAN:     Newly reduced EF 30-35%  Elevated HS troponin likely type II MI  Septic shock  Bacteremia   GM- improved   S/p cholecystectomy  CLL  Elevated Liver Enzymes  Hypotension continuing to need Norepi      RECOMMENDATIONS:    Plan for C tomorrow  Discussed with patient and explained all the risks, benefits and options at length in detail.  Coronary Angiogram +/- PCI  AntiPlatelet therapy-  Asprin Plavix Brilinta  Access - Bilateral CFA/ Radial  Allergies : No Iodine Allergy  Pre Hydration IVF  cc /hr  Pre Procedure Medication : Benadryl 25 - 50 mg po prior to procedure X 1  No recent head trauma.  No bleeding issues or Blood in the stools or Urine.   Risks include but not limited to bleeding, allergic reaction to the dye, vascular damage, renal failure with potential need for Dialysis, Infection, Rhythm abnormalities, stroke, myocardial infarction, emergency bypass surgery and death.    The risks is of moderate sedation include hypotension respiratory depression arrhythmias bronchospasm and death.  Should stenting be indicated, patient has agreed to dual antiplatelet therapy for 12 to 18 months with drug-eluting stent and a minimum of 1 month of dual antiplatelet therapy with the use of bare metal stent.  Additionally patient is aware of the noncompliance with medications is likely to result in the stent clotting with heart attack and heart failure and or death.  All questions have been answered to patient's satisfaction.  Patient understands these risks benefits and options.               Angella Callahan NP  Date of Service: 10/08/2024  11:08 AM  Patient was still manifest profound orthostasis despite being on midodrine 10 mg 3 times a day in addition to Florinef.  Recommend to add abdominal binder and continue on support  stockings.  I have personally interviewed and examined the patient, I have reviewed the Nurse Practitioner's history and physical, assessment, and plan. I have personally evaluated the patient at bedside and agree with the findings and made appropriate changes as necessary in recommendations.  In view of abnormal troponins and LV dysfunction recommend angiographic assessment for more definite diagnosis and intervention as needed.  From a GI standpoint patient continues to have abnormal transaminases although this has trending down but have not normalized patient also has persistent leukocytosis, unclear etiology questionable retained ductal stone?  Recheck labs in the morning      Yuval Suazo MD.  Department of Cardiology  [facility]  10/08/2024

## 2024-10-08 NOTE — PROGRESS NOTES
[facility]   Department of Infectious Disease  Progress Note        PATIENT NAME: Yvette Hutchinson  MRN: [id number]  TODAY'S DATE: 10/08/2024  ADMIT DATE: 10/2/2024  LOS: 6 days    CHIEF COMPLAINT: Abdominal Pain, Nausea, Vomiting, and Diarrhea (Pt has been sick for the last two weeks)      PRINCIPLE PROBLEM: Septic shock    INTERVAL HISTORY      10/04/2024:  Improving.  WBC down to 35.  Afebrile.  Blood cultures have grown MSSE in 1/2 bottles.  No gallbladder fluid or tissue culture done.  Creatinine down to 1.2.  Levophed dose down to 0.35 microgram/kilogram per minute.    10/05/2024:  Continues to improve.  Leukocytosis resolving.  No acute issues overnight.  Seen by cardiologist yesterday for elevated troponin and notes reviewed.    10/06/2024:  Seen and evaluated at bedside.  Continues to improve.  WBC 24 K.  Repeat blood culture 10/05/2024 so far negative.    10/07/2024.  No acute issues overnight.  Continues to improve.  Tolerating oral feeds.  Remains on very low-dose Levophed at 0.06 microgram/kilogram per minute.  All cultures remain negative.    10/08/2024:  Midodrine added yesterday by cardiologist due to inability to wean off very low-dose Levophed.  Cortisol level was 8.3.  Repeat blood culture remain negative.  Remains on Levophed but down to 0.03 microgram/kilogram per minute.  Still with loose bowel motions.    Antibiotics (From admission, onward)      Start     Stop Route Frequency Ordered    10/04/24 0900  mupirocin 2 % ointment 1 g         10/09/24 0859 Nasl 2 times daily 10/04/24 0633    10/03/24 2200  piperacillin-tazobactam 4.5 g in dextrose 5 % 100 mL IVPB (ready to mix)         -- IV Every 8 hours (non-standard times) 10/03/24 1506          Antifungals (From admission, onward)      None           Antivirals (From admission, onward)      None            ASSESSMENT and PLAN      Septic shock in a patient with CLL.  Probably from abdominal source since her symptoms were  primarily GI related.  She has had laparoscopic cholecystostomy.  Continue antibiotics and follow blood and any gallbladder cultures..  Remains on low-dose Levophed..     2. Persistent Low BP requiring low-dose Levophed.  There may be a cardiogenic component to this as well.  Defer to cardiologist and hospitalist.      3. CLL.  Currently not on any specific medication for this.  It does make her immunosuppressed.       4. Acute liver injury.  Maybe congestive hepatopathy from shock.  Also with elevated troponins.  Follow acute liver panel.    5. Coagulase-negative Staphylococcus  bacteremia.  Most likely contaminant.  Antibiotics as above for now.  Repeat blood cultures x2 sets negative.    6. Persistent diarrhea.  Stool C diff 10/2/24 and stool culture were negative.  Check GI molecular panel.     RECOMMENDATIONS:    Continue current empiric antibiotics  Continue to monitor clinically  Plan to treat for 10 days through 10/12/2024 may extend to 14 days through 10/16/2024.    Maybe able to complete with oral antibiotics when off pressors  Check GI molecular panel    Please send Epic secure chat with any questions.      SUBJECTIVE    Yvette Hutchinson is a 64 y.o. female with history of CLL, GERD, arthritis.  Also previous left lung cancer status post resection.  Had anterior cervical fusion on 08/06/2024 with an uneventful postoperative course.  Presented to the emergency room 10/02/2024 with nausea vomiting, diarrhea and abdominal pain of about 9 days duration.  In the ER BP 99/55, pulse 1-2, respiratory rate 18, temperature 101.6°.  She had abdominal tenderness was worse in the epigastric area.       WBC 21, hematocrit 38, platelet count 224, creatinine 1.4.  , , lipase 42, alkaline phosphatase to 1 6.  UA unremarkable.  Chest x-ray with no acute infiltrates.  CT chest showed increased interstitial markings with few pleural based lesions/infiltrates on the left.  CT abdomen and pelvis documented  gallstones with pericholecystic fluid and edema which was confirmed on ultrasound she was admitted and placed on IV fluids and antibiotics.  Later seen by general surgeon and laparoscopic cholecystostomy 10/03/2024.  ID asked to assist with her care.     Current lab data showed WBC 45, hematocrit 41, creatinine 1.8, AST 1970, ALT 1316, alkaline phosphatase 220, total protein 5.6     History from patient and medical record.     Antibiotic history:    Vancomycin: 10/02/2024-  Zosyn: 10/02/2024-     Microbiology:    Blood culture 10/02/2024:  Staphylococcus species 1/2   Influenza and COVID-19 assay 10/02/2024: Negative    Review of Systems  Negative except as stated above in Interval History     OBJECTIVE   Temp:  [97.7 °F (36.5 °C)-97.8 °F (36.6 °C)] 97.8 °F (36.6 °C)  Pulse:  [53-74] 55  Resp:  [9-51] 18  SpO2:  [91 %-100 %] 96 %  BP: ()/(47-63) 93/51  Temp:  [97.7 °F (36.5 °C)-97.8 °F (36.6 °C)]   Temp: 97.8 °F (36.6 °C) (10/08/24 0301)  Pulse: (!) 55 (10/08/24 0807)  Resp: 18 (10/08/24 0807)  BP: (!) 93/51 (10/08/24 0700)  SpO2: 96 % (10/08/24 0807)    Intake/Output Summary (Last 24 hours) at 10/8/2024 0827  Last data filed at 10/8/2024 0630  Gross per 24 hour   Intake 1420.76 ml   Output 2450 ml   Net -1029.24 ml       Physical Exam  General:  Middle-aged woman who is in no acute distress at this time.  Lying quietly in bed.  HEENT:  Healed right anterior neck surgical wound.  No erythema or induration.  CVS: S1 and 2 heard, tachycardic, no murmurs appreciated.  On Levophed 0.15 micrograms/kilogram per minute    Respiratory: Clear to auscultation   Abdomen:  Laparoscopic port sites are clean with no erythema or drainage.  Abdomen is distended, soft, nontender.  Skin: No rash appreciated  CNS: No focal deficits   Musculoskeletal: No joint effusions or abnormalities noted     VAD:  ISOLATION:  None     Wounds:  Surgical abdominal    Significant Labs: All pertinent labs within the past 24 hours have been  reviewed.    CBC LAST 7 DAYS  Recent Labs   Lab 10/02/24  1034 10/03/24  0110 10/03/24  1139 10/03/24  1209 10/04/24  0455 10/05/24  0336 10/06/24  0224 10/07/24  0255 10/08/24  0318   WBC 21.30* 45.30*  --   --  34.88* 26.10* 24.07* 22.45* 26.08*   RBC 3.93* 4.36  --   --  3.58* 3.54* 3.41* 3.25* 3.20*   HGB 12.5 13.6  --   --  11.3* 10.9* 10.4* 10.1* 9.8*   HCT 37.7 41.8 34* 31* 33.8* 33.9* 31.8* 30.7* 30.5*   MCV 96 96  --   --  94 96 93 95 95   MCH 31.8* 31.2*  --   --  31.6* 30.8 30.5 31.1* 30.6   MCHC 33.2 32.5  --   --  33.4 32.2 32.7 32.9 32.1   RDW 13.4 13.8  --   --  14.3 14.3 14.4 14.5 14.8*    239  --   --  244 194 167 159 181   MPV 10.6 11.9  --   --  10.2 10.3 10.5 10.4 10.7   GRAN 43.0 22.0*  --   --  39.0 19.2*  5.0 18.9*  4.6 39.0 32.0*   LYMPH 50.0*  CANCELED 62.0*  --   --  37.0  CANCELED 67.0*  17.5* 66.8*  16.1* 43.0 54.0*   MONO 7.0  CANCELED 3.0*  --   --  6.0  CANCELED 5.7  1.5* 5.9  1.4* 4.0 4.0   BASO CANCELED  --   --   --  CANCELED 0.11 0.12  --   --    NRBC 0 0  --   --  0 0 0 0 0       CHEMISTRY LAST 7 DAYS  Recent Labs   Lab 10/02/24  1034 10/03/24  0110 10/03/24  1139 10/03/24  1209 10/04/24  0455 10/04/24  2101 10/05/24  0336 10/06/24  0223 10/07/24  0255 10/08/24  0318   * 133*  --   --  139 131* 131* 138 136 138   K 3.8 4.1  --   --  3.8 3.9 4.0 3.6 3.8 3.8    103  --   --  110 105 104 107 105 108   CO2 19* 17*  --   --  20* 21* 23 26 26 25   ANIONGAP 11 13  --   --  9 5* 4* 5* 5* 5*   BUN 14 17  --   --  15 11 11 7* 5* 6*   CREATININE 1.4 1.8*  --   --  1.2 1.2 1.4 1.0 1.0 1.1   GLU 97 71  --   --  93 105 96 100 76 69*   CALCIUM 8.4* 7.7*  --   --  6.9* 7.1* 7.4* 7.8* 7.7* 8.0*   PH  --   --  7.198* 7.300*  --   --   --   --   --   --    MG 1.5* 1.7  --   --  2.2 1.9 1.8 1.7 1.6 1.8   ALBUMIN 3.9 3.5  --   --  2.7*  --  2.8* 2.9* 2.8* 2.8*   PROT 6.2 5.6*  --   --  4.4*  --  4.5* 4.6* 4.6* 4.6*   ALKPHOS 216* 220*  --   --  229*  --  356* 380* 369*  "343*   * 1,316*  --   --  935*  --  824* 691* 568* 436*   * 1,970*  --   --  813*  --  628* 472* 396* 372*   BILITOT 0.6 0.9  --   --  1.0  --  1.3* 1.1* 0.9 0.7       Estimated Creatinine Clearance: 49.8 mL/min (based on SCr of 1.1 mg/dL).    INFLAMMATORY/PROCAL  LAST 7 DAYS  No results for input(s): "PROCAL", "ESR", "CRP" in the last 168 hours.  No results found for: "ESR"  CRP   Date Value Ref Range Status   09/07/2021 2.7 0.0 - 8.2 mg/L Final   03/03/2020 3.5 0.0 - 8.2 mg/L Final       PRIOR  MICROBIOLOGY:    Susceptibility data from last 90 days.  Collected Specimen Info Organism   10/02/24 Blood from Peripheral, Hand, Left No growth after 5 days.   10/02/24 Blood from Peripheral, Wrist, Right COAGULASE NEGATIVE STAPHYLOCOCCI       LAST 7 DAYS MICROBIOLOGY   Microbiology Results (last 7 days)       Procedure Component Value Units Date/Time    Blood culture x two cultures. Draw prior to antibiotics [[id number]] Collected: 10/02/24 1057    Order Status: Completed Specimen: Blood from Peripheral, Hand, Left Updated: 10/07/24 1232     Blood Culture, Routine No growth after 5 days.    Narrative:      Aerobic and anaerobic    Blood culture [[id number]] Collected: 10/05/24 1037    Order Status: Completed Specimen: Blood Updated: 10/07/24 1232     Blood Culture, Routine No Growth to date      No Growth to date      No Growth to date    Blood culture [[id number]] Collected: 10/05/24 1037    Order Status: Completed Specimen: Blood Updated: 10/07/24 1232     Blood Culture, Routine No Growth to date      No Growth to date      No Growth to date    Stool culture **cannot be ordered stat** [[id number]] Collected: 10/02/24 1919    Order Status: Completed Specimen: Stool Updated: 10/05/24 1018     Stool Culture No Salmonella,Shigella,Vibrio,Campylobacter.      No E coli 0157:H7 isolated.    Blood culture x two cultures. Draw prior to antibiotics [[id number]]  (Abnormal) Collected: 10/02/24 1049    Order Status: " Completed Specimen: Blood from Peripheral, Wrist, Right Updated: 10/04/24 0807     Blood Culture, Routine Gram stain aer bottle: Gram positive cocci      Results called to and read back by:Val Nino RN-ED;  10/03/2024      11:58 CJD      COAGULASE NEGATIVE STAPHYLOCOCCI  Organism is a probable contaminant      Narrative:      Aerobic and anaerobic    Rapid Organism ID by PCR (from Blood culture) [[id number]]  (Abnormal) Collected: 10/02/24 1049    Order Status: Completed Updated: 10/03/24 1322     Enterococcus faecalis Not Detected     Enterococcus faecium Not Detected     Listeria monocytogenes Not Detected     Staphylococcus spp. Detected     Staphylococcus aureus Not Detected     Staphylococcus epidermidis Not Detected     Staphylococcus lugdunensis Not Detected     Streptococcus species Not Detected     Streptococcus agalactiae Not Detected     Streptococcus pneumoniae Not Detected     Streptococcus pyogenes Not Detected     Acinetobacter calcoaceticus/baumannii complex Not Detected     Bacteroides fragilis Not Detected     Enterobacterales Not Detected     Enterobacter cloacae complex Not Detected     Escherichia coli Not Detected     Klebsiella aerogenes Not Detected     Klebsiella oxytoca Not Detected     Klebsiella pneumoniae group Not Detected     Proteus Not Detected     Salmonella sp Not Detected     Serratia marcescens Not Detected     Haemophilus influenzae Not Detected     Neisseria meningtidis Not Detected     Pseudomonas aeruginosa Not Detected     Stenotrophomonas maltophilia Not Detected     Candida albicans Not Detected     Candida auris Not Detected     Candida glabrata Not Detected     Candida krusei Not Detected     Candida parapsilosis Not Detected     Candida tropicalis Not Detected     Cryptococcus neoformans/gattii Not Detected     CTX-M (ESBL ) Test not applicable     IMP (Carbapenem resistant) Test not applicable     KPC resistance gene (Carbapenem resistant) Test not  applicable     mcr-1  Test not applicable     mec A/C  Test not applicable     mec A/C and MREJ (MRSA) gene Test not applicable     NDM (Carbapenem resistant) Test not applicable     OXA-48-like (Carbapenem resistant) Test not applicable     van A/B (VRE gene) Test not applicable     VIM (Carbapenem resistant) Test not applicable    Narrative:      Aerobic and anaerobic    Clostridium difficile EIA [[id number]] Collected: 10/02/24 1919    Order Status: Completed Specimen: Stool Updated: 10/03/24 0031     C. diff Antigen Negative     C difficile Toxins A+B, EIA Negative     Comment: Testing not recommended for children <24 months old.               CURRENT/PREVIOUS VISIT EKG  Results for orders placed or performed during the hospital encounter of 10/02/24   EKG 12-lead    Collection Time: 10/04/24  1:13 PM   Result Value Ref Range    QRS Duration 104 ms    [facility]HS QTC Calculation 613 ms    Narrative    Test Reason : I21.4,    Vent. Rate : 092 BPM     Atrial Rate : 092 BPM     P-R Int : 142 ms          QRS Dur : 104 ms      QT Int : 496 ms       P-R-T Axes : 015 -45 113 degrees     QTc Int : 613 ms    Normal sinus rhythm  Low voltage QRS  Left anterior fascicular block  Cannot rule out Anterior infarct ,age undetermined  Prolonged QT  Abnormal ECG  When compared with ECG of 03-OCT-2024 10:07,  Left anterior fascicular block is now Present  Minimal criteria for Anterior infarct are now Present  QT has lengthened  Confirmed by Lars BEAR, Wade DAVISON ([id number]) on 10/5/2024 11:56:51 AM    Referred By: [email or web address]   SELF           Confirmed By:Wade Sousa MD        Significant Imaging: I have reviewed all relevant and available imaging results/findings within the past 24 hours.    I spent a total of 50 minutes on the day of the visit.This includes face to face time and non-face to face time preparing to see the patient (eg, review of tests), obtaining and/or reviewing separately obtained history, documenting clinical information  in the electronic or other health record, independently interpreting results and communicating results to the patient/family/caregiver, or care coordinator.    Lemuel Man MD  Date of Service: 10/08/2024      This note was created using VYou voice recognition software that occasionally misinterpreted phrases or words.

## 2024-10-08 NOTE — PLAN OF CARE
Current recs of moderate intensity therapy- will continue to monitor for SNF placement needs once patient is off vasopressors.     10/08/24 1417   Discharge Reassessment   Assessment Type Discharge Planning Reassessment   Discharge Plan A Skilled Nursing Facility

## 2024-10-08 NOTE — PLAN OF CARE
Problem: Adult Inpatient Plan of Care  Goal: Plan of Care Review  Outcome: Progressing  Goal: Optimal Comfort and Wellbeing  Outcome: Progressing  Goal: Readiness for Transition of Care  Outcome: Progressing     Problem: Sepsis/Septic Shock  Goal: Optimal Coping  Outcome: Progressing  Goal: Absence of Infection Signs and Symptoms  Outcome: Progressing     Problem: Acute Kidney Injury/Impairment  Goal: Fluid and Electrolyte Balance  Outcome: Progressing     Problem: Wound  Goal: Optimal Coping  Outcome: Progressing  Goal: Absence of Infection Signs and Symptoms  Outcome: Progressing  Goal: Improved Oral Intake  Outcome: Progressing  Goal: Optimal Pain Control and Function  Outcome: Progressing  Goal: Skin Health and Integrity  Outcome: Progressing     Problem: Pain Acute  Goal: Optimal Pain Control and Function  Outcome: Progressing     Problem: Cholecystectomy  Goal: Absence of Bleeding  Outcome: Progressing  Goal: Effective Bowel Elimination  Outcome: Progressing

## 2024-10-08 NOTE — TELEPHONE ENCOUNTER
Lft msg to notify PET missing for appt 10/09/24 and will need to reschedule PET before appt w/Dr Lee.

## 2024-10-08 NOTE — PT/OT/SLP PROGRESS
Physical Therapy Treatment    Patient Name:  Yvette Hutchinson   MRN:  [id number]    Recommendations:     Discharge Recommendations: Moderate Intensity Therapy  Discharge Equipment Recommendations: none  Barriers to discharge:   low BP with limited mobility, increase assist with mobility    Assessment:     Yvette Hutchinson is a 64 y.o. female admitted with a medical diagnosis of Septic shock.  She presents with the following impairments/functional limitations: weakness, impaired endurance, impaired self care skills, impaired functional mobility, gait instability, impaired balance, decreased lower extremity function, decreased upper extremity function, decreased safety awareness, impaired cardiopulmonary response to activity. Patient sitting up in chair and is agreeable to participation with PT treatment. BP at rest of 123/58 (MAP 83). [place] J collar applied. She requires CGA for sit to stand with rollator. She took a few steps forward with rollator before reporting dizziness and returned to sitting with BP 94/50 (MAP 70). She sat for 5 minutes with BP re-assessed of 85/53 (MAP 65). She performed ankle pumps, long arc quads, and seated marches and BP improved to 92/55 (MAP 68). Patient declines further participation and remained up in chair with chair alarm on, RN notified, and all needs met.     Rehab Prognosis: Good; patient would benefit from acute skilled PT services to address these deficits and reach maximum level of function.    Recent Surgery: Procedure(s) (LRB):  CHOLECYSTECTOMY, LAPAROSCOPIC (N/A) 5 Days Post-Op    Plan:     During this hospitalization, patient to be seen daily to address the identified rehab impairments via gait training, therapeutic activities, therapeutic exercises, neuromuscular re-education and progress toward the following goals:    Plan of Care Expires:  11/06/24    Subjective     Chief Complaint: dizzy with ambulation   Patient/Family Comments/goals: agreeable to remain up  in chair   Pain/Comfort:  Pain Rating 1: 0/10      Objective:     Communicated with DONNA Garrett prior to session.  Patient found up in chair with blood pressure cuff, ramirez catheter, peripheral IV, pulse ox (continuous), telemetry upon PT entry to room.     General Precautions: Standard, fall  Orthopedic Precautions: spinal precautions  Braces: [place] J collar  Respiratory Status: Room air     Functional Mobility:  Transfers:     Sit to Stand:  contact guard assistance with rollator  Gait: a few steps forward with rollator before reporting dizziness and returned to sitting       AM-PAC 6 CLICK MOBILITY          Treatment & Education:  Patient was educated on the importance of OOB activity and functional mobility to negate negative effects of prolonged bed rest during hospitalization, safe transfers and ambulation, and D/C planning     Patient left up in chair with all lines intact, call button in reach, chair alarm on, and RN notified..    GOALS:   Multidisciplinary Problems       Physical Therapy Goals          Problem: Physical Therapy    Goal Priority Disciplines Outcome Interventions   Physical Therapy Goal     PT, PT/OT Progressing    Description: Goals to be met by: 24     Patient will increase functional independence with mobility by performin. Supine to sit with Supervision  2. Sit to stand transfer with Supervision  3. Bed to chair transfer with Supervision using Rolling Walker  4. Gait  x 200 feet with Supervision using Rolling Walker.                              Time Tracking:     PT Received On: 10/08/24  PT Start Time: 1012     PT Stop Time: 1045  PT Total Time (min): 33 min     Billable Minutes: Therapeutic Activity 33    Treatment Type: Treatment  PT/PTA: PT     Number of PTA visits since last PT visit: 0     10/08/2024

## 2024-10-08 NOTE — CARE UPDATE
10/08/24 0807   Patient Assessment/Suction   Level of Consciousness (AVPU) alert   Respiratory Effort Normal;Unlabored   Expansion/Accessory Muscles/Retractions no use of accessory muscles;no retractions;expansion symmetric   All Lung Fields Breath Sounds clear   Cough Type good   PRE-TX-O2   Device (Oxygen Therapy) room air   SpO2 96 %   Pulse Oximetry Type Continuous   $ Pulse Oximetry - Multiple Charge Pulse Oximetry - Multiple   Pulse (!) 55   Resp 18   Aerosol Therapy   $ Aerosol Therapy Charges Aerosol Treatment   Daily Review of Necessity (SVN) completed   Respiratory Treatment Status (SVN) given   Treatment Route (SVN) oxygen;mask   Patient Position HOB elevated   Post Treatment Assessment (SVN) breath sounds unchanged   Signs of Intolerance (SVN) none   Incentive Spirometer   $ Incentive Spirometer Charges done with encouragement   Incentive Spirometer Predicted Level (mL) 1500   Administration (IS) proper technique demonstrated;instruction provided, follow-up   Number of Repetitions (IS) 10   Level Incentive Spirometer (mL) 1500   Patient Tolerance (IS) good

## 2024-10-08 NOTE — SUBJECTIVE & OBJECTIVE
Interval History:  still c/o nausea and poor oral intake, dietician recs appreciated     Review of Systems   Gastrointestinal:  Positive for abdominal pain, diarrhea and nausea.   Musculoskeletal:  Positive for back pain.    is performed and is negative unless mentioned in the interval history above.  Objective:     Vital Signs (Most Recent):  Temp: 97.8 °F (36.6 °C) (10/08/24 1115)  Pulse: (!) 59 (10/08/24 1340)  Resp: 14 (10/08/24 1340)  BP: (!) 105/55 (10/08/24 1200)  SpO2: (!) 94 % (10/08/24 1340) Vital Signs (24h Range):  Temp:  [97.7 °F (36.5 °C)-97.8 °F (36.6 °C)] 97.8 °F (36.6 °C)  Pulse:  [54-74] 59  Resp:  [9-51] 14  SpO2:  [91 %-100 %] 94 %  BP: ()/(47-59) 105/55     Weight: 76.2 kg (167 lb 15.9 oz)  Body mass index is 29.77 kg/m².    Intake/Output Summary (Last 24 hours) at 10/8/2024 1607  Last data filed at 10/8/2024 1226  Gross per 24 hour   Intake 1200.76 ml   Output 1715 ml   Net -514.24 ml         Physical Exam  Vitals reviewed.   Constitutional:       Appearance: Normal appearance.   HENT:      Head: Normocephalic and atraumatic.      Mouth/Throat:      Mouth: Mucous membranes are moist.   Eyes:      Extraocular Movements: Extraocular movements intact.      Conjunctiva/sclera: Conjunctivae normal.      Pupils: Pupils are equal, round, and reactive to light.   Cardiovascular:      Rate and Rhythm: Normal rate and regular rhythm.   Pulmonary:      Effort: Pulmonary effort is normal.      Breath sounds: Normal breath sounds.   Abdominal:      General: Abdomen is flat. Bowel sounds are normal.      Palpations: Abdomen is soft.      Comments: Appropriate abdominal tenderness.    Musculoskeletal:         General: No swelling or tenderness.   Skin:     General: Skin is warm.   Neurological:      Mental Status: She is alert and oriented to person, place, and time.   Psychiatric:         Mood and Affect: Mood normal.         Behavior: Behavior normal.             Significant Labs: All pertinent labs  within the past 24 hours have been reviewed.  BMP:   Recent Labs   Lab 10/08/24  0318 10/08/24  1209   GLU 69* 89    136   K 3.8 3.7    105   CO2 25 23   BUN 6* 7*   CREATININE 1.1 1.1   CALCIUM 8.0* 8.6*   MG 1.8  --      CBC:   Recent Labs   Lab 10/07/24  0255 10/08/24  0318   WBC 22.45* 26.08*   HGB 10.1* 9.8*   HCT 30.7* 30.5*    181       Significant Imaging: I have reviewed all pertinent imaging results/findings within the past 24 hours.  I have reviewed and interpreted all pertinent imaging results/findings within the past 24 hours.

## 2024-10-08 NOTE — PLAN OF CARE
12:30  SW spoke with Pt at bedside and explained that PT/OT recommend SNF placement. Pt had questions about private rooms, visitation, and length of stay. Pt stated she would like to discuss this with her mother and her daughter before making a decision. SW left Pt a list of facilities and stated she would follow up with Pt tomorrow.

## 2024-10-08 NOTE — PROGRESS NOTES
[facility] Medicine  Progress Note    Patient Name: Yvette Hutchinson  MRN: [id number]  Patient Class: IP- Inpatient   Admission Date: 10/2/2024  Length of Stay: 6 days  Attending Physician: Iris Odell MD  Primary Care Provider: Vern Priest MD        Subjective:     Principal Problem:Septic shock        HPI:  64-year-old female with PMH of CLL, and lung cancer status post partial resection who presented to the ER because of presyncope, nausea, vomiting, diarrhea and abdominal pain.  According to the patient, for over 1 week she has been having intractable nausea, vomiting and diarrhea.  Also epigastric and right upper quadrant abdominal pain described as sharp, 10/10 on pain scale radiate to the rest of her abdomen.  Today when she stood up from a sitting position, she felt as if she will pass out.  As a result, she decided to come to the ER for evaluation.  Denied any fever or chills.  She denied chest pain or shortness on breath.    In the ER, vitals showed a blood pressure of 99/55, but soon dropped to the 70s.  Heart rate was 122, respiratory rate of 20, temperature of 101.6°, and patient was satting 95% on room air.  CBC was white count of 21, with history of CLL.  CMP showed sodium of 135, acute renal failure with creatinine of 1.4 compared to baseline of 1, and patient has elevated LFTs with alk-phos of 216, , and AST of 497.  COVID/flu are negative.  Blood cultures were collected.  CTA chest negative for pulmonary embolus, but showed mild diffuse bronchial wall thickening that could reflect infectious or inflammatory bronchitis in the appropriate clinical setting.  CT abdomen/pelvis showed pericholecystic edema without evidence of gallstones.  Cholecystitis cannot be excluded. Also mild Linda pancreatic edema which may represent early pancreatitis. US abdomen showed Cholelithiasis, gallbladder wall thickening, and mild pericholecystic fluid. Patient was started  on Zosyn/vanco.  Was given sepsis bolus of lactated ringer of 2286 cc.  Started on Levophed for blood pressure support.  General surgery was consulted.  Patient will be admitted to ICU for septic shock.       Overview/Hospital Course:  This 64-year-old lady with history of CLL, lung cancer status post partial resection presented to the emergency department due to presyncope, nausea vomiting and abdominal pain.  She was also noted to be hypotensive and tachycardic on admission.  She was also noted to have fever.  Patient was admitted to the hospital for septic shock concern for abdominal source given CT findings showing some pericholecystic fluid.  Patient was also noted to have transaminitis and imaging did reveal some peripancreatic fluid and inflammatory changes as well though her lipase was normal.  General surgery was consulted after ultrasound the gallbladder was performed which again redemonstrated pericholecystic fluid.  She is status post laparoscopic cholecystectomy on 10/03.  Gallbladder was not noted to be gangrenous and per the op note there was suspicion that this was not the source of her septic shock.  However, blood cultures grew only coagulation negative Staphylococcus in 1/2 bottles.  This was thought to be contaminant.  Infectious Disease was consulted and vancomycin and Zosyn was continued on this patient.  Levophed initiated on admission was able to be weaned, although she still had requirement the day after surgery.  Will attempt midodrine TID to transition off levophed.  Repeat blood cultures NTD. Vancomycin dc'd  Florinef added. I/O's consistently negative due to diarrhea and poor oral intake.  Probiotic and stool WBC, elastase studies, hepatitis panel pending.  Planning for [facility] in am     Interval History:  still c/o nausea and poor oral intake, dietician recs appreciated     Review of Systems   Gastrointestinal:  Positive for abdominal pain, diarrhea and nausea.   Musculoskeletal:  Positive  for back pain.    is performed and is negative unless mentioned in the interval history above.  Objective:     Vital Signs (Most Recent):  Temp: 97.8 °F (36.6 °C) (10/08/24 1115)  Pulse: (!) 59 (10/08/24 1340)  Resp: 14 (10/08/24 1340)  BP: (!) 105/55 (10/08/24 1200)  SpO2: (!) 94 % (10/08/24 1340) Vital Signs (24h Range):  Temp:  [97.7 °F (36.5 °C)-97.8 °F (36.6 °C)] 97.8 °F (36.6 °C)  Pulse:  [54-74] 59  Resp:  [9-51] 14  SpO2:  [91 %-100 %] 94 %  BP: ()/(47-59) 105/55     Weight: 76.2 kg (167 lb 15.9 oz)  Body mass index is 29.77 kg/m².    Intake/Output Summary (Last 24 hours) at 10/8/2024 1607  Last data filed at 10/8/2024 1226  Gross per 24 hour   Intake 1200.76 ml   Output 1715 ml   Net -514.24 ml         Physical Exam  Vitals reviewed.   Constitutional:       Appearance: Normal appearance.   HENT:      Head: Normocephalic and atraumatic.      Mouth/Throat:      Mouth: Mucous membranes are moist.   Eyes:      Extraocular Movements: Extraocular movements intact.      Conjunctiva/sclera: Conjunctivae normal.      Pupils: Pupils are equal, round, and reactive to light.   Cardiovascular:      Rate and Rhythm: Normal rate and regular rhythm.   Pulmonary:      Effort: Pulmonary effort is normal.      Breath sounds: Normal breath sounds.   Abdominal:      General: Abdomen is flat. Bowel sounds are normal.      Palpations: Abdomen is soft.      Comments: Appropriate abdominal tenderness.    Musculoskeletal:         General: No swelling or tenderness.   Skin:     General: Skin is warm.   Neurological:      Mental Status: She is alert and oriented to person, place, and time.   Psychiatric:         Mood and Affect: Mood normal.         Behavior: Behavior normal.             Significant Labs: All pertinent labs within the past 24 hours have been reviewed.  BMP:   Recent Labs   Lab 10/08/24  0318 10/08/24  1209   GLU 69* 89    136   K 3.8 3.7    105   CO2 25 23   BUN 6* 7*   CREATININE 1.1 1.1   CALCIUM  8.0* 8.6*   MG 1.8  --      CBC:   Recent Labs   Lab 10/07/24  0255 10/08/24  0318   WBC 22.45* 26.08*   HGB 10.1* 9.8*   HCT 30.7* 30.5*    181       Significant Imaging: I have reviewed all pertinent imaging results/findings within the past 24 hours.  I have reviewed and interpreted all pertinent imaging results/findings within the past 24 hours.    Assessment/Plan:      * Septic shock  - Suspect secondary to intra-abdominal process.  CT reveals pericholecystic fluid and peripancreatic fluid  She is now status post cholecystectomy with General surgery, per op note the gallbladder did not appear significantly inflamed to be commensurate with the patient's presentation of septic shock, 1/2 bottles drawn for blood culture was growing coagulase-negative Staphylococcus, felt to be contaminant, however, given patient's persistent pressor requirement infectious Disease was consulted, recommendation to continue vancomycin and Zosyn at this time  Infectious Disease we will defer imaging of patient's C-spine for now  Continued follow up blood cultures  Levophed as needed for map less than 65 -unable to wean off, midodrine initiated by cardiology   Check cortisol - normal range for am draw  Kobe added     Functional diarrhea  S/p lap archana - continues to have loose stools  Lactose intolerant - diet and supplements adjusted - diarrhea more pronounced after ensure  Add probiotic while on zosyn  Add stool WBC and elastase studies   C.diff negative 6 days ago   Consider stool bulking agent if no improvement         Bacteremia due to Staphylococcus  This has now resulted as coagulase-negative staph, likely contaminant  ID following and would like to continue vancomycin for now  Repeat blood cultures 10/5/24 NTD de-escalate antibiotics ? Defer to ID following     Abdominal pain  Suspect secondary to acute cholecystitis.    CT abdomen also showed mild pancreatitis, but lipase is within normal limits.    Given her  diarrhea however, will order stool culture.    Supportive care with IV fluids, Zofran for nausea.  P.r.n. pain medication.      Acute renal failure  Most recent creatinine and eGFR are listed below.  Recent Labs     10/04/24  0455 10/04/24  2101 10/05/24  0336   CREATININE 1.2 1.2 1.4   EGFRNORACEVR 50.2* 50.2* 41.8*       Likely pre-renal given her septic shock.   Status post IV hydration, patient able to take PO hydration  GM resolving      Neuropathic pain  Resume gabapentin.      Calculus of gallbladder with acute cholecystitis without obstruction  Status post cholecystectomy  WBC 24K, on zosyn       Acquired hypothyroidism  Resume synthroid.       CLL (chronic lymphocytic leukemia)  Monitor. Patient's last chemo for over a year.  Follow up with Hematology outpatient.      Anxiety  Resume Wellbutrin and Celexa        VTE Risk Mitigation (From admission, onward)           Ordered     Place [place] hose  Until discontinued         10/05/24 1132     heparin (porcine) injection 5,000 Units  Every 8 hours         10/02/24 1611     IP VTE HIGH RISK PATIENT  Once         10/02/24 1611     Place sequential compression device  Until discontinued         10/02/24 1611                    Discharge Planning   SALINA: 10/14/2024     Code Status: Full Code   Is the patient medically ready for discharge?:     Reason for patient still in hospital (select all that apply): Patient trending condition  Discharge Plan A: Skilled Nursing Facility            Critical care time spent on the evaluation and treatment of severe organ dysfunction, review of pertinent labs and imaging studies, discussions with consulting providers and discussions with patient/family: 20 minutes.      Iris Odell MD  Department of Hospital Medicine   [facility]

## 2024-10-08 NOTE — PT/OT/SLP PROGRESS
Occupational Therapy      Patient Name:  Yvette Hutchinson   MRN:  [id number]    Patient not seen today secondary to Patient unwilling to participate (pt states she just got back to bed from using BSC and does not want to do anything else today). Will follow-up next service date.    10/8/2024

## 2024-10-09 LAB
ALBUMIN SERPL BCP-MCNC: 3 G/DL (ref 3.5–5.2)
ALP SERPL-CCNC: 399 U/L (ref 55–135)
ALT SERPL W/O P-5'-P-CCNC: 382 U/L (ref 10–44)
ANION GAP SERPL CALC-SCNC: 7 MMOL/L (ref 8–16)
APTT PPP: 43.8 SEC (ref 21–32)
AST SERPL-CCNC: 318 U/L (ref 10–40)
BASOPHILS NFR BLD: 0 % (ref 0–1.9)
BILIRUB SERPL-MCNC: 0.6 MG/DL (ref 0.1–1)
BNP SERPL-MCNC: 633 PG/ML (ref 0–99)
BUN SERPL-MCNC: 7 MG/DL (ref 8–23)
CALCIUM SERPL-MCNC: 8.1 MG/DL (ref 8.7–10.5)
CHLORIDE SERPL-SCNC: 108 MMOL/L (ref 95–110)
CO2 SERPL-SCNC: 22 MMOL/L (ref 23–29)
CREAT SERPL-MCNC: 1 MG/DL (ref 0.5–1.4)
DIFFERENTIAL METHOD BLD: ABNORMAL
EOSINOPHIL NFR BLD: 0 % (ref 0–8)
ERYTHROCYTE [DISTWIDTH] IN BLOOD BY AUTOMATED COUNT: 15.4 % (ref 11.5–14.5)
EST. GFR  (NO RACE VARIABLE): >60 ML/MIN/1.73 M^2
GLUCOSE SERPL-MCNC: 84 MG/DL (ref 70–110)
HAV IGM SERPL QL IA: NEGATIVE
HBV CORE IGM SERPL QL IA: NEGATIVE
HBV SURFACE AG SERPL QL IA: NEGATIVE
HCT VFR BLD AUTO: 29.6 % (ref 37–48.5)
HCV AB S/CO SERPL IA: NON REACTIVE
HCV AB SERPL QL IA: NORMAL
HGB BLD-MCNC: 9.5 G/DL (ref 12–16)
HYPOCHROMIA BLD QL SMEAR: ABNORMAL
IMM GRANULOCYTES # BLD AUTO: ABNORMAL K/UL (ref 0–0.04)
IMM GRANULOCYTES NFR BLD AUTO: ABNORMAL % (ref 0–0.5)
INR PPP: 1.1 (ref 0.8–1.2)
LYMPHOCYTES NFR BLD: 40 % (ref 18–48)
MAGNESIUM SERPL-MCNC: 1.9 MG/DL (ref 1.6–2.6)
MCH RBC QN AUTO: 31.1 PG (ref 27–31)
MCHC RBC AUTO-ENTMCNC: 32.1 G/DL (ref 32–36)
MCV RBC AUTO: 97 FL (ref 82–98)
MONOCYTES NFR BLD: 1 % (ref 4–15)
NEUTROPHILS NFR BLD: 49 % (ref 38–73)
NEUTS BAND NFR BLD MANUAL: 10 %
NRBC BLD-RTO: 0 /100 WBC
OHS QRS DURATION: 82 MS
OHS QTC CALCULATION: 455 MS
PLATELET # BLD AUTO: 216 K/UL (ref 150–450)
PLATELET BLD QL SMEAR: ABNORMAL
PMV BLD AUTO: 10.1 FL (ref 9.2–12.9)
POTASSIUM SERPL-SCNC: 3.3 MMOL/L (ref 3.5–5.1)
POTASSIUM SERPL-SCNC: 3.5 MMOL/L (ref 3.5–5.1)
PROT SERPL-MCNC: 5 G/DL (ref 6–8.4)
PROTHROMBIN TIME: 12 SEC (ref 9–12.5)
RBC # BLD AUTO: 3.05 M/UL (ref 4–5.4)
SMUDGE CELLS BLD QL SMEAR: PRESENT
SODIUM SERPL-SCNC: 137 MMOL/L (ref 136–145)
WBC # BLD AUTO: 29.37 K/UL (ref 3.9–12.7)

## 2024-10-09 PROCEDURE — 25000003 PHARM REV CODE 250: Performed by: NURSE PRACTITIONER

## 2024-10-09 PROCEDURE — 36415 COLL VENOUS BLD VENIPUNCTURE: CPT | Performed by: INTERNAL MEDICINE

## 2024-10-09 PROCEDURE — 83880 ASSAY OF NATRIURETIC PEPTIDE: CPT | Performed by: INTERNAL MEDICINE

## 2024-10-09 PROCEDURE — 36415 COLL VENOUS BLD VENIPUNCTURE: CPT | Performed by: NURSE PRACTITIONER

## 2024-10-09 PROCEDURE — B2111ZZ FLUOROSCOPY OF MULTIPLE CORONARY ARTERIES USING LOW OSMOLAR CONTRAST: ICD-10-PCS | Performed by: INTERNAL MEDICINE

## 2024-10-09 PROCEDURE — 63600175 PHARM REV CODE 636 W HCPCS: Performed by: HOSPITALIST

## 2024-10-09 PROCEDURE — 97116 GAIT TRAINING THERAPY: CPT | Mod: CQ

## 2024-10-09 PROCEDURE — 93458 L HRT ARTERY/VENTRICLE ANGIO: CPT | Performed by: INTERNAL MEDICINE

## 2024-10-09 PROCEDURE — 80053 COMPREHEN METABOLIC PANEL: CPT | Performed by: SURGERY

## 2024-10-09 PROCEDURE — 94761 N-INVAS EAR/PLS OXIMETRY MLT: CPT

## 2024-10-09 PROCEDURE — 94799 UNLISTED PULMONARY SVC/PX: CPT

## 2024-10-09 PROCEDURE — 25000003 PHARM REV CODE 250: Performed by: SURGERY

## 2024-10-09 PROCEDURE — 63600175 PHARM REV CODE 636 W HCPCS: Performed by: INTERNAL MEDICINE

## 2024-10-09 PROCEDURE — 25000003 PHARM REV CODE 250: Performed by: HOSPITALIST

## 2024-10-09 PROCEDURE — 93458 L HRT ARTERY/VENTRICLE ANGIO: CPT | Mod: 26,,, | Performed by: INTERNAL MEDICINE

## 2024-10-09 PROCEDURE — 25000003 PHARM REV CODE 250: Performed by: INTERNAL MEDICINE

## 2024-10-09 PROCEDURE — 85007 BL SMEAR W/DIFF WBC COUNT: CPT | Performed by: SURGERY

## 2024-10-09 PROCEDURE — 63600175 PHARM REV CODE 636 W HCPCS: Performed by: SURGERY

## 2024-10-09 PROCEDURE — 84132 ASSAY OF SERUM POTASSIUM: CPT | Performed by: HOSPITALIST

## 2024-10-09 PROCEDURE — C1894 INTRO/SHEATH, NON-LASER: HCPCS | Performed by: INTERNAL MEDICINE

## 2024-10-09 PROCEDURE — 85027 COMPLETE CBC AUTOMATED: CPT | Performed by: SURGERY

## 2024-10-09 PROCEDURE — 94640 AIRWAY INHALATION TREATMENT: CPT

## 2024-10-09 PROCEDURE — 20000000 HC ICU ROOM

## 2024-10-09 PROCEDURE — 63600175 PHARM REV CODE 636 W HCPCS

## 2024-10-09 PROCEDURE — 83735 ASSAY OF MAGNESIUM: CPT | Performed by: SURGERY

## 2024-10-09 PROCEDURE — 4A023N7 MEASUREMENT OF CARDIAC SAMPLING AND PRESSURE, LEFT HEART, PERCUTANEOUS APPROACH: ICD-10-PCS | Performed by: INTERNAL MEDICINE

## 2024-10-09 PROCEDURE — 99152 MOD SED SAME PHYS/QHP 5/>YRS: CPT | Performed by: INTERNAL MEDICINE

## 2024-10-09 PROCEDURE — 99232 SBSQ HOSP IP/OBS MODERATE 35: CPT | Mod: ,,, | Performed by: INTERNAL MEDICINE

## 2024-10-09 PROCEDURE — C1887 CATHETER, GUIDING: HCPCS | Performed by: INTERNAL MEDICINE

## 2024-10-09 PROCEDURE — 85730 THROMBOPLASTIN TIME PARTIAL: CPT | Performed by: NURSE PRACTITIONER

## 2024-10-09 PROCEDURE — 36415 COLL VENOUS BLD VENIPUNCTURE: CPT | Performed by: HOSPITALIST

## 2024-10-09 PROCEDURE — 25000242 PHARM REV CODE 250 ALT 637 W/ HCPCS

## 2024-10-09 PROCEDURE — 99900031 HC PATIENT EDUCATION (STAT)

## 2024-10-09 PROCEDURE — 85610 PROTHROMBIN TIME: CPT | Performed by: NURSE PRACTITIONER

## 2024-10-09 PROCEDURE — 25000003 PHARM REV CODE 250

## 2024-10-09 PROCEDURE — 99233 SBSQ HOSP IP/OBS HIGH 50: CPT | Mod: GT,,, | Performed by: INTERNAL MEDICINE

## 2024-10-09 PROCEDURE — C1769 GUIDE WIRE: HCPCS | Performed by: INTERNAL MEDICINE

## 2024-10-09 PROCEDURE — 82705 FATS/LIPIDS FECES QUAL: CPT | Performed by: HOSPITALIST

## 2024-10-09 RX ORDER — HEPARIN SODIUM 10000 [USP'U]/ML
INJECTION, SOLUTION INTRAVENOUS; SUBCUTANEOUS
Status: DISCONTINUED | OUTPATIENT
Start: 2024-10-09 | End: 2024-10-09 | Stop reason: HOSPADM

## 2024-10-09 RX ORDER — MIDAZOLAM HYDROCHLORIDE 1 MG/ML
INJECTION INTRAMUSCULAR; INTRAVENOUS
Status: DISCONTINUED | OUTPATIENT
Start: 2024-10-09 | End: 2024-10-09 | Stop reason: HOSPADM

## 2024-10-09 RX ORDER — NITROGLYCERIN 5 MG/ML
INJECTION, SOLUTION INTRAVENOUS
Status: DISCONTINUED | OUTPATIENT
Start: 2024-10-09 | End: 2024-10-09 | Stop reason: HOSPADM

## 2024-10-09 RX ORDER — DIPHENHYDRAMINE HCL 25 MG
50 CAPSULE ORAL
Status: DISCONTINUED | OUTPATIENT
Start: 2024-10-09 | End: 2024-10-09 | Stop reason: HOSPADM

## 2024-10-09 RX ORDER — HYDROCORTISONE 25 MG/G
CREAM TOPICAL 3 TIMES DAILY
Status: DISCONTINUED | OUTPATIENT
Start: 2024-10-09 | End: 2024-10-16 | Stop reason: HOSPADM

## 2024-10-09 RX ORDER — FENTANYL CITRATE 50 UG/ML
INJECTION, SOLUTION INTRAMUSCULAR; INTRAVENOUS
Status: DISCONTINUED | OUTPATIENT
Start: 2024-10-09 | End: 2024-10-09 | Stop reason: HOSPADM

## 2024-10-09 RX ADMIN — HYDROMORPHONE HYDROCHLORIDE 0.5 MG: 0.5 INJECTION, SOLUTION INTRAMUSCULAR; INTRAVENOUS; SUBCUTANEOUS at 12:10

## 2024-10-09 RX ADMIN — THEOPHYLLINE ANHYDROUS 200 MG: 100 CAPSULE, EXTENDED RELEASE ORAL at 08:10

## 2024-10-09 RX ADMIN — HEPARIN SODIUM 5000 UNITS: 5000 INJECTION INTRAVENOUS; SUBCUTANEOUS at 11:10

## 2024-10-09 RX ADMIN — MICONAZOLE NITRATE: 20 CREAM TOPICAL at 08:10

## 2024-10-09 RX ADMIN — POTASSIUM BICARBONATE 50 MEQ: 977.5 TABLET, EFFERVESCENT ORAL at 07:10

## 2024-10-09 RX ADMIN — BUDESONIDE INHALATION 1 MG: 0.5 SUSPENSION RESPIRATORY (INHALATION) at 07:10

## 2024-10-09 RX ADMIN — MIDODRINE HYDROCHLORIDE 10 MG: 10 TABLET ORAL at 06:10

## 2024-10-09 RX ADMIN — MICONAZOLE NITRATE: 20 CREAM TOPICAL at 11:10

## 2024-10-09 RX ADMIN — LACTOBACILLUS ACIDOPHILUS / LACTOBACILLUS BULGARICUS 1 EACH: 100 MILLION CFU STRENGTH GRANULES at 08:10

## 2024-10-09 RX ADMIN — ALUMINUM HYDROXIDE, MAGNESIUM HYDROXIDE, AND SIMETHICONE 30 ML: 1200; 120; 1200 SUSPENSION ORAL at 03:10

## 2024-10-09 RX ADMIN — DIPHENHYDRAMINE HYDROCHLORIDE 25 MG: 25 CAPSULE ORAL at 01:10

## 2024-10-09 RX ADMIN — FLUDROCORTISONE ACETATE 100 MCG: 0.1 TABLET ORAL at 08:10

## 2024-10-09 RX ADMIN — BUPROPION HYDROCHLORIDE 300 MG: 150 TABLET, EXTENDED RELEASE ORAL at 08:10

## 2024-10-09 RX ADMIN — ASPIRIN 81 MG: 81 TABLET, COATED ORAL at 08:10

## 2024-10-09 RX ADMIN — GABAPENTIN 300 MG: 300 CAPSULE ORAL at 03:10

## 2024-10-09 RX ADMIN — DIPHENHYDRAMINE HYDROCHLORIDE 25 MG: 25 CAPSULE ORAL at 12:10

## 2024-10-09 RX ADMIN — ARFORMOTEROL TARTRATE 15 MCG: 15 SOLUTION RESPIRATORY (INHALATION) at 07:10

## 2024-10-09 RX ADMIN — HYDROCODONE BITARTRATE AND ACETAMINOPHEN 1 TABLET: 5; 325 TABLET ORAL at 08:10

## 2024-10-09 RX ADMIN — HYDROCODONE BITARTRATE AND ACETAMINOPHEN 1 TABLET: 5; 325 TABLET ORAL at 07:10

## 2024-10-09 RX ADMIN — ALUMINUM HYDROXIDE, MAGNESIUM HYDROXIDE, AND SIMETHICONE 30 ML: 1200; 120; 1200 SUSPENSION ORAL at 08:10

## 2024-10-09 RX ADMIN — GABAPENTIN 300 MG: 300 CAPSULE ORAL at 08:10

## 2024-10-09 RX ADMIN — PIPERACILLIN SODIUM AND TAZOBACTAM SODIUM 4.5 G: 4; .5 INJECTION, POWDER, LYOPHILIZED, FOR SOLUTION INTRAVENOUS at 06:10

## 2024-10-09 RX ADMIN — MIDODRINE HYDROCHLORIDE 10 MG: 10 TABLET ORAL at 03:10

## 2024-10-09 RX ADMIN — PIPERACILLIN SODIUM AND TAZOBACTAM SODIUM 4.5 G: 4; .5 INJECTION, POWDER, LYOPHILIZED, FOR SOLUTION INTRAVENOUS at 11:10

## 2024-10-09 RX ADMIN — HYDROMORPHONE HYDROCHLORIDE 0.5 MG: 0.5 INJECTION, SOLUTION INTRAMUSCULAR; INTRAVENOUS; SUBCUTANEOUS at 11:10

## 2024-10-09 RX ADMIN — PIPERACILLIN SODIUM AND TAZOBACTAM SODIUM 4.5 G: 4; .5 INJECTION, POWDER, LYOPHILIZED, FOR SOLUTION INTRAVENOUS at 01:10

## 2024-10-09 RX ADMIN — HYDROCORTISONE SODIUM SUCCINATE 100 MG: 100 INJECTION, POWDER, FOR SOLUTION INTRAMUSCULAR; INTRAVENOUS at 05:10

## 2024-10-09 RX ADMIN — TOPIRAMATE 100 MG: 25 TABLET, FILM COATED ORAL at 08:10

## 2024-10-09 RX ADMIN — DIPHENHYDRAMINE HYDROCHLORIDE 25 MG: 25 CAPSULE ORAL at 11:10

## 2024-10-09 RX ADMIN — CITALOPRAM HYDROBROMIDE 20 MG: 20 TABLET ORAL at 08:10

## 2024-10-09 RX ADMIN — HEPARIN SODIUM 5000 UNITS: 5000 INJECTION INTRAVENOUS; SUBCUTANEOUS at 06:10

## 2024-10-09 RX ADMIN — MIDODRINE HYDROCHLORIDE 10 MG: 10 TABLET ORAL at 11:10

## 2024-10-09 RX ADMIN — ALUMINUM HYDROXIDE, MAGNESIUM HYDROXIDE, AND SIMETHICONE 30 ML: 1200; 120; 1200 SUSPENSION ORAL at 11:10

## 2024-10-09 NOTE — PROGRESS NOTES
10/9/24  1810pm Patient is on continuous drip and needs to be monitored by RN while in MRI. RN unavailable to come to MRI right now. will call when available.

## 2024-10-09 NOTE — BRIEF OP NOTE
Patient underwent angiogram via right radial access which showed no significant CAD.  LVEDP was around 20.  Rest management as per primary Cardiology and primary team

## 2024-10-09 NOTE — PLAN OF CARE
Problem: Adult Inpatient Plan of Care  Goal: Plan of Care Review  Outcome: Progressing  Goal: Patient-Specific Goal (Individualized)  Outcome: Progressing  Goal: Absence of Hospital-Acquired Illness or Injury  Outcome: Progressing  Goal: Optimal Comfort and Wellbeing  Outcome: Progressing     Problem: Sepsis/Septic Shock  Goal: Optimal Coping  Outcome: Progressing  Goal: Absence of Bleeding  Outcome: Progressing     Problem: Acute Kidney Injury/Impairment  Goal: Fluid and Electrolyte Balance  Outcome: Progressing     Problem: Wound  Goal: Optimal Coping  Outcome: Progressing  Goal: Improved Oral Intake  Outcome: Progressing  Goal: Skin Health and Integrity  Outcome: Progressing

## 2024-10-09 NOTE — PLAN OF CARE
Pt choice obtained. [facility] as preference noted. [facility]     10/09/24 1038   Post-Acute Status   Post-Acute Authorization Home Health   Home Health Status Referrals Sent   Coverage [employer] - [employer]   Patient choice form signed by patient/caregiver List from [employer]   Discharge Delays None known at this time   Discharge Plan   Discharge Plan A Home Health   Discharge Plan B Home with family

## 2024-10-09 NOTE — CARE UPDATE
10/09/24 0715   Patient Assessment/Suction   Level of Consciousness (AVPU) alert   Respiratory Effort Normal;Unlabored   Expansion/Accessory Muscles/Retractions no use of accessory muscles;no retractions;expansion symmetric   All Lung Fields Breath Sounds Anterior:   FARZANEH Breath Sounds diminished;clear   RUL Breath Sounds clear   Rhythm/Pattern, Respiratory unlabored   Cough Frequency no cough   PRE-TX-O2   Device (Oxygen Therapy) room air   SpO2 95 %   Pulse Oximetry Type Continuous   $ Pulse Oximetry - Multiple Charge Pulse Oximetry - Multiple   Pulse (!) 57   Resp (!) 23   Aerosol Therapy   $ Aerosol Therapy Charges Aerosol Treatment   Daily Review of Necessity (SVN) completed   Respiratory Treatment Status (SVN) given   Treatment Route (SVN) oxygen   Patient Position sitting in chair   Post Treatment Assessment (SVN) breath sounds unchanged   Signs of Intolerance (SVN) nausea  (Txs did not finish all the way due to nausea.)   Breath Sounds Post-Respiratory Treatment   Throughout All Fields Post-Treatment All Fields   Throughout All Fields Post-Treatment no change   Post-treatment Heart Rate (beats/min) 61   Post-treatment Resp Rate (breaths/min) 20   Incentive Spirometer   $ Incentive Spirometer Charges done with encouragement   Incentive Spirometer Predicted Level (mL) 1500   Administration (IS) instruction provided, follow-up;self-administered   Number of Repetitions (IS) 10   Level Incentive Spirometer (mL) 1500   Patient Tolerance (IS) good   Education   $ Education Bronchodilator;Incentive Spirometry;30 min

## 2024-10-09 NOTE — PROGRESS NOTES
[facility]  Department of Cardiology  Consult Note      PATIENT NAME: Yvette Hutchinson    MRN: [id number]  TODAY'S DATE: 10/09/2024  ADMIT DATE: 10/2/2024                          CONSULT REQUESTED BY: Iris Odell MD    SUBJECTIVE     PRINCIPAL PROBLEM: Septic shock    Interval history:  10/09/2024    Patient back from [facility] sitting up in bed eating lunch visiting with family. Patient denies CP or SOB. Right radial site soft no hematoma.       10/8/2024  Attempted to work with PT/OT became hypotensive and dizzy.  WBC 26.08      Creatinine 1.1  LVEF 30-35%    10/7/2024  Ms. Hutchinson bp remains low despite midodrine.        10/6/2024  Pt seen up this morning sitting in chair with sister in law at bedside. Still having some abdominal discomfort.  Remains on low-dose norepi.    10/05/2024  Patient seen this morning sitting up in chair in the ICU.  Still complains of some nausea and abdominal discomfort.  Sister-in-law at bedside.  Remains on 0.2 of nor epi.    HPI:  Patient is a 64-year-old female who presented to the emergency room with nausea vomiting diarrhea and abdominal pain as well as near syncope.  She apparently had some chest tightness for a week or so prior to this event as well.  Patient with elevated troponin levels on arrival but was also being treated for sepsis.  Patient was noted to have cholecystitis and underwent laparoscopic cholecystectomy yesterday.  Patient is noted to have bacteremia and is followed by infectious disease.  Echocardiogram noted which shows reduced ejection fraction.  Other history also includes CLL and lung cancer with history of partial resection.      REASON FOR CONSULT:  From Hospitalist H&P:  Reduced ejection fraction, elevated troponin      Review of patient's allergies indicates:   Allergen Reactions    Latex, natural rubber        Past Medical History:   Diagnosis Date    Arthritis     Cancer 10/2022    (CLL) leukemia ; adenocarcinoma   adenosgemis    Depression     GERD (gastroesophageal reflux disease)     Neck pain     and back pain    Personal history of colonic polyps 07/07/2017    Pneumonia of left lung due to infectious organism 03/05/2020    Thyroid disease      Past Surgical History:   Procedure Laterality Date    FUSION, SPINE, CERVICAL, ANTERIOR APPROACH N/A 08/06/2024    Procedure: FUSION, SPINE, CERVICAL, ANTERIOR APPROACH;  Surgeon: Anatoly Daley DO;  Location: [facility] OR;  Service: Neurosurgery;  Laterality: N/A;  IOM, C-ARM, MEDTRONICS, MICRO, HORSESHOE    INJECTION OF ANESTHETIC AGENT AROUND MULTIPLE INTERCOSTAL NERVES Left 10/03/2022    Procedure: BLOCK, NERVE, INTERCOSTAL, 2 OR MORE;  Surgeon: Evelio Kaplan MD;  Location: [facility] OR 2ND FLR;  Service: Thoracic;  Laterality: Left;    INSERTION OF TUNNELED CENTRAL VENOUS CATHETER (CVC) WITH SUBCUTANEOUS PORT Right 11/03/2022    Procedure: [id number], Right or Left Neck or Chest;  Surgeon: Mini Acevedo MD;  Location: [facility] OR 2ND FLR;  Service: General;  Laterality: Right;    LAPAROSCOPIC CHOLECYSTECTOMY N/A 10/3/2024    Procedure: CHOLECYSTECTOMY, LAPAROSCOPIC;  Surgeon: Ang Mosley III, MD;  Location: [facility] OR;  Service: General;  Laterality: N/A;    ROBOT-ASSISTED LAPAROSCOPIC LYMPHADENECTOMY USING DA MONIQUE XI Left 10/03/2022    Procedure: XI ROBOTIC LYMPHADENECTOMY;  Surgeon: Evelio Kaplan MD;  Location: [facility] OR 2ND FLR;  Service: Thoracic;  Laterality: Left;    SPINE SURGERY      TONSILLECTOMY      XI ROBOTIC RATS,WITH LOBECTOMY,LUNG Left 10/03/2022    Procedure: XI ROBOTIC RATS,WITH LOBECTOMY,LUNG;  Surgeon: Evelio Kaplan MD;  Location: [facility] OR 2ND FLR;  Service: Thoracic;  Laterality: Left;     Social History     Tobacco Use    Smoking status: Some Days     Current packs/day: 0.15     Types: Cigarettes     Passive exposure: Current    Smokeless tobacco: Never    Tobacco comments:     reports one cigarette every now and then    Substance Use Topics    Alcohol use: Yes     Comment: rarely    Drug use: Yes     Types: Marijuana        REVIEW OF SYSTEMS  Per HPI    OBJECTIVE     VITAL SIGNS (Most Recent)  Temp: 98.1 °F (36.7 °C) (10/09/24 1130)  Pulse: 69 (10/09/24 1300)  Resp: (!) 32 (10/09/24 1300)  BP: (!) 99/50 (10/09/24 1300)  SpO2: (!) 92 % (10/09/24 1300)    VENTILATION STATUS  Resp: (!) 32 (10/09/24 1300)  SpO2: (!) 92 % (10/09/24 1300)           I & O (Last 24H):  Intake/Output Summary (Last 24 hours) at 10/9/2024 1333  Last data filed at 10/9/2024 1131  Gross per 24 hour   Intake 1416.33 ml   Output 3235 ml   Net -1818.67 ml       WEIGHTS  Wt Readings from Last 1 Encounters:   10/07/24 1455 76.2 kg (167 lb 15.9 oz)   10/02/24 1016 76.2 kg (168 lb)       PHYSICAL EXAM  CONSTITUTIONAL: No fever, no chills, no acute distress elderly female sitting up in chair  HEENT: Normocephalic, atraumatic,pupils reactive to light                 NECK:  No JVD no carotid bruit- neck brace present  CVS: S1S2+, RRR  LUNGS: Clear  ABDOMEN: Soft, NT, BS+  EXTREMITIES: No cyanosis, edema  : No ramirez catheter  NEURO: AAO X 3  PSY: Normal affect      HOME MEDICATIONS:  No current facility-administered medications on file prior to encounter.     Current Outpatient Medications on File Prior to Encounter   Medication Sig Dispense Refill    acetaminophen (TYLENOL) 500 MG tablet Take 2 tablets (1,000 mg total) by mouth every 6 (six) hours as needed for Pain. 8 tablet 0    albuterol (PROVENTIL/VENTOLIN HFA) 90 mcg/actuation inhaler Inhale 2 puffs into the lungs every 6 (six) hours as needed for Wheezing or Shortness of Breath. Rescue 8.5 g 11    buPROPion (WELLBUTRIN XL) 300 MG 24 hr tablet Take 1 tablet (300 mg total) by mouth once daily. 90 tablet 3    citalopram (CELEXA) 20 MG tablet Take 1 tablet (20 mg total) by mouth once daily. 30 tablet 11    fluticasone propionate (FLONASE) 50 mcg/actuation nasal spray 1 spray (50 mcg total) by Each Nostril route  once daily. 16 g 3    fluticasone-salmeterol 230-21 mcg/dose (ADVAIR HFA) 230-21 mcg/actuation HFAA inhaler Inhale 2 puffs into the lungs 2 (two) times daily. Controller 12 g 5    gabapentin (NEURONTIN) 300 MG capsule Take 1 capsule (300 mg total) by mouth 3 (three) times daily. 270 capsule 1    levocetirizine (XYZAL) 5 MG tablet Take 1 tablet (5 mg total) by mouth every evening. 30 tablet 5    levothyroxine (SYNTHROID) 50 MCG tablet Take 1 tablet (50 mcg total) by mouth before breakfast. 90 tablet 3    pravastatin (PRAVACHOL) 10 MG tablet Take 1 tablet (10 mg total) by mouth once daily. 90 tablet 3    topiramate (TOPAMAX) 100 MG tablet Take 1 tablet (100 mg total) by mouth 2 (two) times daily. 60 tablet 11    naloxone (NARCAN) 4 mg/actuation Spry ADMINISTER A SINGLE SPRAY IN ONE NOSTRIL UPON SIGNS OF OPIOID OVERDOSE. CALL 911. REPEAT AFTER 3 MINUTES IF NO RESPONSE.      varenicline (CHANTIX) 1 mg Tab Take 1 tablet by mouth twice daily (Patient not taking: Reported on 10/2/2024) 180 tablet 0       SCHEDULED MEDS:   aluminum-magnesium hydroxide-simethicone  30 mL Oral QID (AC & HS)    arformoteroL  15 mcg Nebulization BID    aspirin  81 mg Oral Daily    budesonide  1 mg Nebulization Q12H    buPROPion  300 mg Oral Daily    citalopram  20 mg Oral Daily    clopidogreL  75 mg Oral Daily    fludrocortisone  100 mcg Oral Daily    gabapentin  300 mg Oral TID    heparin (porcine)  5,000 Units Subcutaneous Q8H    lactobacillus acidophilus & bulgar  1 packet Oral BID    levothyroxine  50 mcg Oral Before breakfast    miconazole   Topical (Top) BID    midodrine  10 mg Oral TID AC    piperacillin-tazobactam (Zosyn) IV (PEDS and ADULTS) (extended infusion is not appropriate)  4.5 g Intravenous Q8H    theophylline  200 mg Oral BID    topiramate  100 mg Oral BID       CONTINUOUS INFUSIONS:   NORepinephrine bitartrate-D5W  0-3 mcg/kg/min Intravenous Continuous 0.7 mL/hr at 10/09/24 0651 0.02 mcg/kg/min at 10/09/24 0651       PRN  MEDS:  Current Facility-Administered Medications:     acetaminophen, 650 mg, Oral, Q4H PRN    aluminum-magnesium hydroxide-simethicone, 30 mL, Oral, QID PRN    calcium gluconate IVPB, 1 g, Intravenous, PRN    calcium gluconate IVPB, 2 g, Intravenous, PRN    calcium gluconate IVPB, 3 g, Intravenous, PRN    dextrose 50%, 12.5 g, Intravenous, PRN    dextrose 50%, 25 g, Intravenous, PRN    diphenhydrAMINE, 25 mg, Oral, Q6H PRN    glucagon (human recombinant), 1 mg, Intramuscular, PRN    glucose, 16 g, Oral, PRN    glucose, 24 g, Oral, PRN    HYDROcodone-acetaminophen, 1 tablet, Oral, Q6H PRN    HYDROmorphone, 0.5 mg, Intravenous, Q2H PRN    magnesium oxide, 800 mg, Oral, PRN    magnesium oxide, 800 mg, Oral, PRN    melatonin, 6 mg, Oral, Nightly PRN    naloxone, 0.02 mg, Intravenous, PRN    ondansetron, 4 mg, Intravenous, Q6H PRN    potassium bicarbonate, 35 mEq, Oral, PRN    potassium bicarbonate, 50 mEq, Oral, PRN    potassium bicarbonate, 60 mEq, Oral, PRN    potassium, sodium phosphates, 2 packet, Oral, PRN    potassium, sodium phosphates, 2 packet, Oral, PRN    potassium, sodium phosphates, 2 packet, Oral, PRN    prochlorperazine, 5 mg, Intravenous, Q4H PRN    senna-docusate 8.6-50 mg, 1 tablet, Oral, BID PRN    sodium chloride 0.9%, 2 mL, Intravenous, Q12H PRN    LABS AND DIAGNOSTICS     CBC LAST 3 DAYS  Recent Labs   Lab 10/04/24  0455 10/04/24  0455 10/05/24  0336 10/06/24  0224 10/07/24  0255 10/08/24  0318 10/09/24  0317   WBC 34.88*  --  26.10* 24.07* 22.45* 26.08* 29.37*   RBC 3.58*  --  3.54* 3.41* 3.25* 3.20* 3.05*   HGB 11.3*  --  10.9* 10.4* 10.1* 9.8* 9.5*   HCT 33.8*  --  33.9* 31.8* 30.7* 30.5* 29.6*   MCV 94  --  96 93 95 95 97   MCH 31.6*  --  30.8 30.5 31.1* 30.6 31.1*   MCHC 33.4  --  32.2 32.7 32.9 32.1 32.1   RDW 14.3  --  14.3 14.4 14.5 14.8* 15.4*     --  194 167 159 181 216   MPV 10.2  --  10.3 10.5 10.4 10.7 10.1   GRAN 39.0   < > 19.2*  5.0 18.9*  4.6 39.0 32.0* 49.0   LYMPH 37.0   CANCELED  --  67.0*  17.5* 66.8*  16.1* 43.0 54.0* 40.0   MONO 6.0  CANCELED  --  5.7  1.5* 5.9  1.4* 4.0 4.0 1.0*   BASO CANCELED  --  0.11 0.12  --   --   --    NRBC 0  --  0 0 0 0 0    < > = values in this interval not displayed.       COAGULATION LAST 3 DAYS  Recent Labs   Lab 10/09/24  0910   INR 1.1   APTT 43.8*       CHEMISTRY LAST 3 DAYS  Recent Labs   Lab 10/03/24  1139 10/03/24  1209 10/04/24  0455 10/07/24  0255 10/08/24  0318 10/08/24  1209 10/09/24  0317   NA  --   --    < > 136 138 136 137   K  --   --    < > 3.8 3.8 3.7 3.5   CL  --   --    < > 105 108 105 108   CO2  --   --    < > 26 25 23 22*   ANIONGAP  --   --    < > 5* 5* 8 7*   BUN  --   --    < > 5* 6* 7* 7*   CREATININE  --   --    < > 1.0 1.1 1.1 1.0   GLU  --   --    < > 76 69* 89 84   CALCIUM  --   --    < > 7.7* 8.0* 8.6* 8.1*   PH 7.198* 7.300*  --   --   --   --   --    MG  --   --    < > 1.6 1.8  --  1.9   ALBUMIN  --   --    < > 2.8* 2.8*  --  3.0*   PROT  --   --    < > 4.6* 4.6*  --  5.0*   ALKPHOS  --   --    < > 369* 343*  --  399*   ALT  --   --    < > 568* 436*  --  382*   AST  --   --    < > 396* 372*  --  318*   BILITOT  --   --    < > 0.9 0.7  --  0.6    < > = values in this interval not displayed.       CARDIAC PROFILE LAST 3 DAYS  Recent Labs   Lab 10/02/24  1838 10/03/24  0112 10/03/24  0514 10/09/24  1108   BNP  --   --   --  633*   TROPONINIHS 1588.5* 1161.1* 1015.8*  --        ENDOCRINE LAST 3 DAYS  Recent Labs   Lab 10/08/24  1209   PROCAL 1.941*       LAST ARTERIAL BLOOD GAS  ABG  Recent Labs   Lab 10/03/24  1209   PH 7.300*   PO2 506*   PCO2 50.0*   HCO3 24.6   BE -2       LAST 7 DAYS MICROBIOLOGY   Microbiology Results (last 7 days)       Procedure Component Value Units Date/Time    Blood culture [[id number]] Collected: 10/05/24 1037    Order Status: Completed Specimen: Blood Updated: 10/09/24 1232     Blood Culture, Routine No Growth to date      No Growth to date      No Growth to date      No Growth to date       No Growth to date    Blood culture [[id number]] Collected: 10/05/24 1037    Order Status: Completed Specimen: Blood Updated: 10/09/24 1232     Blood Culture, Routine No Growth to date      No Growth to date      No Growth to date      No Growth to date      No Growth to date    Blood culture x two cultures. Draw prior to antibiotics [[id number]] Collected: 10/02/24 1057    Order Status: Completed Specimen: Blood from Peripheral, Hand, Left Updated: 10/07/24 1232     Blood Culture, Routine No growth after 5 days.    Narrative:      Aerobic and anaerobic    Stool culture **cannot be ordered stat** [[id number]] Collected: 10/02/24 1919    Order Status: Completed Specimen: Stool Updated: 10/05/24 1018     Stool Culture No Salmonella,Shigella,Vibrio,Campylobacter.      No E coli 0157:H7 isolated.    Blood culture x two cultures. Draw prior to antibiotics [[id number]]  (Abnormal) Collected: 10/02/24 1049    Order Status: Completed Specimen: Blood from Peripheral, Wrist, Right Updated: 10/04/24 0807     Blood Culture, Routine Gram stain aer bottle: Gram positive cocci      Results called to and read back by:Val Nino RN-ED;  10/03/2024      11:58 CJD      COAGULASE NEGATIVE STAPHYLOCOCCI  Organism is a probable contaminant      Narrative:      Aerobic and anaerobic    Rapid Organism ID by PCR (from Blood culture) [[id number]]  (Abnormal) Collected: 10/02/24 1049    Order Status: Completed Updated: 10/03/24 1322     Enterococcus faecalis Not Detected     Enterococcus faecium Not Detected     Listeria monocytogenes Not Detected     Staphylococcus spp. Detected     Staphylococcus aureus Not Detected     Staphylococcus epidermidis Not Detected     Staphylococcus lugdunensis Not Detected     Streptococcus species Not Detected     Streptococcus agalactiae Not Detected     Streptococcus pneumoniae Not Detected     Streptococcus pyogenes Not Detected     Acinetobacter calcoaceticus/baumannii complex Not Detected      Bacteroides fragilis Not Detected     Enterobacterales Not Detected     Enterobacter cloacae complex Not Detected     Escherichia coli Not Detected     Klebsiella aerogenes Not Detected     Klebsiella oxytoca Not Detected     Klebsiella pneumoniae group Not Detected     Proteus Not Detected     Salmonella sp Not Detected     Serratia marcescens Not Detected     Haemophilus influenzae Not Detected     Neisseria meningtidis Not Detected     Pseudomonas aeruginosa Not Detected     Stenotrophomonas maltophilia Not Detected     Candida albicans Not Detected     Candida auris Not Detected     Candida glabrata Not Detected     Candida krusei Not Detected     Candida parapsilosis Not Detected     Candida tropicalis Not Detected     Cryptococcus neoformans/gattii Not Detected     CTX-M (ESBL ) Test not applicable     IMP (Carbapenem resistant) Test not applicable     KPC resistance gene (Carbapenem resistant) Test not applicable     mcr-1  Test not applicable     mec A/C  Test not applicable     mec A/C and MREJ (MRSA) gene Test not applicable     NDM (Carbapenem resistant) Test not applicable     OXA-48-like (Carbapenem resistant) Test not applicable     van A/B (VRE gene) Test not applicable     VIM (Carbapenem resistant) Test not applicable    Narrative:      Aerobic and anaerobic    Clostridium difficile EIA [[id number]] Collected: 10/02/24 1919    Order Status: Completed Specimen: Stool Updated: 10/03/24 0031     C. diff Antigen Negative     C difficile Toxins A+B, EIA Negative     Comment: Testing not recommended for children <24 months old.               MOST RECENT IMAGING  CT Head Without Contrast  Narrative: EXAMINATION:  CT HEAD WITHOUT CONTRAST    CLINICAL HISTORY:  Headache, new or worsening (Age >= 50y);    TECHNIQUE:  Head CT without IV contrast.    COMPARISON:  MRI 09/24/2022    FINDINGS:  Minimal periventricular white matter low attenuation suggest minor chronic small vessel ischemic changes.  No  acute intracranial hemorrhage, midline shift, or mass effect.    The ventricles and cisterns are maintained.    Calvarium is intact. Trace left maxillary sinus and right sphenoid sinus mucosal thickening is evident with mild mucosal thickening inferior frontal sinuses bilaterally.  Minimal fluid opacifies few inferior most left mastoid air cells.  Impression: No acute intracranial abnormality.    Electronically signed by: Vinay Daniels  Date:    10/04/2024  Time:    15:25      ECHOCARDIOGRAM RESULTS (last 5)  Results for orders placed during the hospital encounter of 10/02/24    Echo    Interpretation Summary    Left Ventricle: Left ventricle was not well visualized due to poor sonic window. Regional wall motion abnormalities present. See diagram for wall motion findings. There is moderately reduced systolic function with a visually estimated ejection fraction of 30 - 35%. There is normal diastolic function. E/A ratio is 0.84. Average E/e' ratio is 8.35.    Right Ventricle: Normal right ventricular cavity size. Systolic function is normal.    Mitral Valve: There is mild to moderate regurgitation.    Tricuspid Valve: There is mild regurgitation.    Pulmonic Valve: There is mild regurgitation.    Pericardium: There is a small effusion. No indication of cardiac tamponade.      CURRENT/PREVIOUS VISIT EKG  Results for orders placed or performed during the hospital encounter of 10/02/24   EKG 12-lead    Collection Time: 10/09/24  8:56 AM   Result Value Ref Range    QRS Duration 82 ms    OHS QTC Calculation 455 ms    Narrative    Test Reason : I21.4,Z01.810,    Vent. Rate : 056 BPM     Atrial Rate : 056 BPM     P-R Int : 140 ms          QRS Dur : 082 ms      QT Int : 472 ms       P-R-T Axes : 077 092 -66 degrees     QTc Int : 455 ms    Sinus bradycardia  Rightward axis  Low voltage QRS  T wave abnormality, consider anterolateral ischemia  Abnormal ECG  When compared with ECG of 04-OCT-2024 13:13,  Significant changes have  occurred    Referred By: GONZALO   SELF           Confirmed By:            ASSESSMENT/PLAN:     Active Hospital Problems    Diagnosis    *Septic shock    Functional diarrhea    Bacteremia due to Staphylococcus    Calculus of gallbladder with acute cholecystitis without obstruction    Neuropathic pain    Acute renal failure    Acquired hypothyroidism    CLL (chronic lymphocytic leukemia)    Anxiety       ASSESSMENT & PLAN:     NICM LVEF 30- 35%-cath shows no blockages-no ectopy on telemetry   Elevated HS troponin likely type II MI  Septic shock  Bacteremia   GM- improved   S/p cholecystectomy  CLL  Elevated Liver Enzymes  Hypotension  Mild Hypokalemia      RECOMMENDATIONS:    Patient has no problematic obstructive disease  Patient can not go home on GDMT as she has hypotension  Patient is on midodrine and florinef continue the same  Wean drip  Abdominal Binder  PT/OT Possibly skilled facility  Consider follow up ECHO in the next few months  Follow up in office in 1-2 weeks time  Will be available PRN, but will sign off            Angella Callahan NP  Date of Service: 10/09/2024  11:08 AM  Patient tolerated angiographic evaluation very well appears stable postprocedure.  No significant occlusive coronary artery disease seen.  LVEDP was at upper limits of normal.  As discussed above maintain on midodrine 10 mg p.o. t.i.d., Florinef 0.1 mg p.o. b.i.d., support stockings both lower extremities and abdominal binder.  Discussed again with the patient regarding precautions for postural change  And he was significant autonomic dysfunction with postural hypotension she was not a candidate for GDMT treatment for LV dysfunction   She will need continued physical therapy and close monitoring.  Continued skilled therapy or close home health therapy upon discharge  I have personally interviewed and examined the patient, I have reviewed the Nurse Practitioner's history and physical, assessment, and plan. I have personally  evaluated the patient at bedside and agree with the findings and made appropriate changes as necessary in recommendations.      Yuval Suazo MD.  Department of Cardiology  [facility]  10/09/2024

## 2024-10-09 NOTE — INTERVAL H&P NOTE
How Severe Is Your Skin Lesion?: mild The patient has been examined and the H&P has been reviewed:    I concur with the findings and changes have been noted since the H&P was written: In view of abnormal troponins and LV dysfunction recommend angiographic assessment for more definite diagnosis and intervention as needed    Procedure risks, benefits and alternative options discussed and understood by patient/family.          Active Hospital Problems    Diagnosis  POA    *Septic shock [A41.9, R65.21]  Yes    Functional diarrhea [K59.1]  Yes    Bacteremia due to Staphylococcus [R78.81, B95.8]  Yes    Calculus of gallbladder with acute cholecystitis without obstruction [K80.00]  Yes    Neuropathic pain [M79.2]  Yes    Acute renal failure [N17.9]  Yes    Acquired hypothyroidism [E03.9]  Yes    CLL (chronic lymphocytic leukemia) [C91.10]  Yes    Anxiety [F41.9]  Yes      Resolved Hospital Problems   No resolved problems to display.      Has Your Skin Lesion Been Treated?: not been treated Is This A New Presentation, Or A Follow-Up?: Skin Lesion Additional History: 0/10 pain scale

## 2024-10-09 NOTE — PROGRESS NOTES
[facility]   Department of Infectious Disease  Progress Note        PATIENT NAME: Yvette Hutchinson  MRN: [id number]  TODAY'S DATE: 10/09/2024  ADMIT DATE: 10/2/2024  LOS: 7 days    CHIEF COMPLAINT: Abdominal Pain, Nausea, Vomiting, and Diarrhea (Pt has been sick for the last two weeks)      PRINCIPLE PROBLEM: Septic shock    INTERVAL HISTORY      10/04/2024:  Improving.  WBC down to 35.  Afebrile.  Blood cultures have grown MSSE in 1/2 bottles.  No gallbladder fluid or tissue culture done.  Creatinine down to 1.2.  Levophed dose down to 0.35 microgram/kilogram per minute.    10/05/2024:  Continues to improve.  Leukocytosis resolving.  No acute issues overnight.  Seen by cardiologist yesterday for elevated troponin and notes reviewed.    10/06/2024:  Seen and evaluated at bedside.  Continues to improve.  WBC 24 K.  Repeat blood culture 10/05/2024 so far negative.    10/07/2024.  No acute issues overnight.  Continues to improve.  Tolerating oral feeds.  Remains on very low-dose Levophed at 0.06 microgram/kilogram per minute.  All cultures remain negative.    10/08/2024:  Midodrine added yesterday by cardiologist due to inability to wean off very low-dose Levophed.  Cortisol level was 8.3.  Repeat blood culture remain negative.  Remains on Levophed but down to 0.03 microgram/kilogram per minute.  Still with loose bowel motions.    10/09/2024: No acute issues overnight.  Still requiring very low-dose Levophed.  On 0.02 microgram/kilogram per minute.  Had cardiac catheterization today with normal coronary vessels and LVEDP of around 20..    Antibiotics (From admission, onward)      Start     Stop Route Frequency Ordered    10/04/24 0900  mupirocin 2 % ointment 1 g         10/09/24 0859 Nasl 2 times daily 10/04/24 0633    10/03/24 2200  piperacillin-tazobactam 4.5 g in dextrose 5 % 100 mL IVPB (ready to mix)         -- IV Every 8 hours (non-standard times) 10/03/24 1506          Antifungals (From  admission, onward)      Start     Stop Route Frequency Ordered    10/08/24 2100  miconazole 2 % cream         -- Top 2 times daily 10/08/24 1734           Antivirals (From admission, onward)      None            ASSESSMENT and PLAN      Septic shock in a patient with CLL.  Probably from abdominal source since her symptoms were primarily GI related.  She has had laparoscopic cholecystostomy.  Continue antibiotics and follow blood and any gallbladder cultures..  Remains on low-dose Levophed..     2. Persistent Low BP requiring low-dose Levophed.  There may be a cardiogenic component to this as well.  Defer to cardiologist and hospitalist.      3. CLL.  Currently not on any specific medication for this.  It does make her immunosuppressed.       4. Acute liver injury.  Maybe congestive hepatopathy from shock.  Also with elevated troponins.  Acute liver panel negative.  Transaminases improving.    5. Coagulase-negative Staphylococcus  bacteremia.  Most likely contaminant.  Antibiotics as above for now.  Repeat blood cultures x2 sets negative.    6. Persistent diarrhea.  Stool C diff 10/2/24 and stool culture were negative.  Check GI molecular panel.     RECOMMENDATIONS:    Continue current empiric antibiotics  Continue to monitor clinically  Plan to treat for 10 days through 10/12/2024 may extend to 14 days through 10/16/2024.    Maybe able to complete with oral antibiotics when off pressors  Check GI molecular panel    Please send Epic secure chat with any questions.      SUBJECTIVE    Yvette Hutchinson is a 64 y.o. female with history of CLL, GERD, arthritis.  Also previous left lung cancer status post resection.  Had anterior cervical fusion on 08/06/2024 with an uneventful postoperative course.  Presented to the emergency room 10/02/2024 with nausea vomiting, diarrhea and abdominal pain of about 9 days duration.  In the ER BP 99/55, pulse 1-2, respiratory rate 18, temperature 101.6°.  She had abdominal  tenderness was worse in the epigastric area.       WBC 21, hematocrit 38, platelet count 224, creatinine 1.4.  , , lipase 42, alkaline phosphatase to 1 6.  UA unremarkable.  Chest x-ray with no acute infiltrates.  CT chest showed increased interstitial markings with few pleural based lesions/infiltrates on the left.  CT abdomen and pelvis documented gallstones with pericholecystic fluid and edema which was confirmed on ultrasound she was admitted and placed on IV fluids and antibiotics.  Later seen by general surgeon and laparoscopic cholecystostomy 10/03/2024.  ID asked to assist with her care.     Current lab data showed WBC 45, hematocrit 41, creatinine 1.8, AST 1970, ALT 1316, alkaline phosphatase 220, total protein 5.6     History from patient and medical record.     Antibiotic history:    Vancomycin: 10/02/2024-  Zosyn: 10/02/2024-     Microbiology:    Blood culture 10/02/2024:  Staphylococcus species 1/2   Influenza and COVID-19 assay 10/02/2024: Negative    Review of Systems  Negative except as stated above in Interval History     OBJECTIVE   Temp:  [97.8 °F (36.6 °C)-98.3 °F (36.8 °C)] 97.8 °F (36.6 °C)  Pulse:  [52-85] 61  Resp:  [11-54] 28  SpO2:  [76 %-99 %] 97 %  BP: ()/(49-61) 107/55  Temp:  [97.8 °F (36.6 °C)-98.3 °F (36.8 °C)]   Temp: 97.8 °F (36.6 °C) (10/09/24 0711)  Pulse: 61 (10/09/24 1000)  Resp: (!) 28 (10/09/24 1000)  BP: (!) 107/55 (10/09/24 1000)  SpO2: 97 % (10/09/24 1000)    Intake/Output Summary (Last 24 hours) at 10/9/2024 1124  Last data filed at 10/9/2024 1018  Gross per 24 hour   Intake 1416.33 ml   Output 3065 ml   Net -1648.67 ml       Physical Exam  General:  Middle-aged woman who is in no acute distress at this time.  Lying quietly in bed.  HEENT:  Healed right anterior neck surgical wound.  No erythema or induration.  CVS: S1 and 2 heard, tachycardic, no murmurs appreciated.  On Levophed 0.15 micrograms/kilogram per minute    Respiratory: Clear to auscultation    Abdomen:  Laparoscopic port sites are clean with no erythema or drainage.  Abdomen is distended, soft, nontender.  Skin: No rash appreciated  CNS: No focal deficits   Musculoskeletal: No joint effusions or abnormalities noted     VAD:  ISOLATION:  None     Wounds:  Surgical abdominal    Significant Labs: All pertinent labs within the past 24 hours have been reviewed.    CBC LAST 7 DAYS  Recent Labs   Lab 10/03/24  0110 10/03/24  0110 10/03/24  1139 10/03/24  1209 10/04/24  0455 10/05/24  0336 10/06/24  0224 10/07/24  0255 10/08/24  0318 10/09/24  0317   WBC 45.30*  --   --   --  34.88* 26.10* 24.07* 22.45* 26.08* 29.37*   RBC 4.36  --   --   --  3.58* 3.54* 3.41* 3.25* 3.20* 3.05*   HGB 13.6  --   --   --  11.3* 10.9* 10.4* 10.1* 9.8* 9.5*   HCT 41.8  --    < > 31* 33.8* 33.9* 31.8* 30.7* 30.5* 29.6*   MCV 96  --   --   --  94 96 93 95 95 97   MCH 31.2*  --   --   --  31.6* 30.8 30.5 31.1* 30.6 31.1*   MCHC 32.5  --   --   --  33.4 32.2 32.7 32.9 32.1 32.1   RDW 13.8  --   --   --  14.3 14.3 14.4 14.5 14.8* 15.4*     --   --   --  244 194 167 159 181 216   MPV 11.9  --   --   --  10.2 10.3 10.5 10.4 10.7 10.1   GRAN 22.0*  --   --   --  39.0 19.2*  5.0 18.9*  4.6 39.0 32.0* 49.0   LYMPH 62.0*   < >  --   --  37.0  CANCELED 67.0*  17.5* 66.8*  16.1* 43.0 54.0* 40.0   MONO 3.0*   < >  --   --  6.0  CANCELED 5.7  1.5* 5.9  1.4* 4.0 4.0 1.0*   BASO  --   --   --   --  CANCELED 0.11 0.12  --   --   --    NRBC 0  --   --   --  0 0 0 0 0 0    < > = values in this interval not displayed.       CHEMISTRY LAST 7 DAYS  Recent Labs   Lab 10/03/24  0110 10/03/24  1139 10/03/24  1209 10/04/24  0455 10/04/24  2101 10/05/24  0336 10/06/24  0223 10/07/24  0255 10/08/24  0318 10/08/24  1209 10/09/24  0317   *  --   --  139 131* 131* 138 136 138 136 137   K 4.1  --   --  3.8 3.9 4.0 3.6 3.8 3.8 3.7 3.5     --   --  110 105 104 107 105 108 105 108   CO2 17*  --   --  20* 21* 23 26 26 25 23 22*   ANIONGAP 13  " --   --  9 5* 4* 5* 5* 5* 8 7*   BUN 17  --   --  15 11 11 7* 5* 6* 7* 7*   CREATININE 1.8*  --   --  1.2 1.2 1.4 1.0 1.0 1.1 1.1 1.0   GLU 71  --   --  93 105 96 100 76 69* 89 84   CALCIUM 7.7*  --   --  6.9* 7.1* 7.4* 7.8* 7.7* 8.0* 8.6* 8.1*   PH  --  7.198* 7.300*  --   --   --   --   --   --   --   --    MG 1.7  --   --  2.2 1.9 1.8 1.7 1.6 1.8  --  1.9   ALBUMIN 3.5  --   --  2.7*  --  2.8* 2.9* 2.8* 2.8*  --  3.0*   PROT 5.6*  --   --  4.4*  --  4.5* 4.6* 4.6* 4.6*  --  5.0*   ALKPHOS 220*  --   --  229*  --  356* 380* 369* 343*  --  399*   ALT 1,316*  --   --  935*  --  824* 691* 568* 436*  --  382*   AST 1,970*  --   --  813*  --  628* 472* 396* 372*  --  318*   BILITOT 0.9  --   --  1.0  --  1.3* 1.1* 0.9 0.7  --  0.6       Estimated Creatinine Clearance: 54.8 mL/min (based on SCr of 1 mg/dL).    INFLAMMATORY/PROCAL  LAST 7 DAYS  Recent Labs   Lab 10/08/24  1209   PROCAL 1.941*   CRP 3.60*     No results found for: "ESR"  CRP   Date Value Ref Range Status   10/08/2024 3.60 (H) <1.00 mg/dL Final     Comment:     CRP-Normal Application expected values:   <1.0        mg/dL   Normal Range  1.0 - 5.0  mg/dL   Indicates mild inflammation  5.0 - 10.0 mg/dL   Indicates severe inflammation  >10.0        mg/dL   Represents serious processes and   frequently         indicates the presence of a bacterial   infection.      09/07/2021 2.7 0.0 - 8.2 mg/L Final   03/03/2020 3.5 0.0 - 8.2 mg/L Final       PRIOR  MICROBIOLOGY:    Susceptibility data from last 90 days.  Collected Specimen Info Organism   10/02/24 Blood from Peripheral, Hand, Left No growth after 5 days.   10/02/24 Blood from Peripheral, Wrist, Right COAGULASE NEGATIVE STAPHYLOCOCCI       LAST 7 DAYS MICROBIOLOGY   Microbiology Results (last 7 days)       Procedure Component Value Units Date/Time    Blood culture [[id number]] Collected: 10/05/24 1037    Order Status: Completed Specimen: Blood Updated: 10/08/24 1232     Blood Culture, Routine No Growth to " date      No Growth to date      No Growth to date      No Growth to date    Blood culture [[id number]] Collected: 10/05/24 1037    Order Status: Completed Specimen: Blood Updated: 10/08/24 1232     Blood Culture, Routine No Growth to date      No Growth to date      No Growth to date      No Growth to date    Blood culture x two cultures. Draw prior to antibiotics [[id number]] Collected: 10/02/24 1057    Order Status: Completed Specimen: Blood from Peripheral, Hand, Left Updated: 10/07/24 1232     Blood Culture, Routine No growth after 5 days.    Narrative:      Aerobic and anaerobic    Stool culture **cannot be ordered stat** [[id number]] Collected: 10/02/24 1919    Order Status: Completed Specimen: Stool Updated: 10/05/24 1018     Stool Culture No Salmonella,Shigella,Vibrio,Campylobacter.      No E coli 0157:H7 isolated.    Blood culture x two cultures. Draw prior to antibiotics [[id number]]  (Abnormal) Collected: 10/02/24 1049    Order Status: Completed Specimen: Blood from Peripheral, Wrist, Right Updated: 10/04/24 0807     Blood Culture, Routine Gram stain aer bottle: Gram positive cocci      Results called to and read back by:Val Nino RN-ED;  10/03/2024      11:58 CJD      COAGULASE NEGATIVE STAPHYLOCOCCI  Organism is a probable contaminant      Narrative:      Aerobic and anaerobic    Rapid Organism ID by PCR (from Blood culture) [[id number]]  (Abnormal) Collected: 10/02/24 1049    Order Status: Completed Updated: 10/03/24 1322     Enterococcus faecalis Not Detected     Enterococcus faecium Not Detected     Listeria monocytogenes Not Detected     Staphylococcus spp. Detected     Staphylococcus aureus Not Detected     Staphylococcus epidermidis Not Detected     Staphylococcus lugdunensis Not Detected     Streptococcus species Not Detected     Streptococcus agalactiae Not Detected     Streptococcus pneumoniae Not Detected     Streptococcus pyogenes Not Detected     Acinetobacter calcoaceticus/baumannii  complex Not Detected     Bacteroides fragilis Not Detected     Enterobacterales Not Detected     Enterobacter cloacae complex Not Detected     Escherichia coli Not Detected     Klebsiella aerogenes Not Detected     Klebsiella oxytoca Not Detected     Klebsiella pneumoniae group Not Detected     Proteus Not Detected     Salmonella sp Not Detected     Serratia marcescens Not Detected     Haemophilus influenzae Not Detected     Neisseria meningtidis Not Detected     Pseudomonas aeruginosa Not Detected     Stenotrophomonas maltophilia Not Detected     Candida albicans Not Detected     Candida auris Not Detected     Candida glabrata Not Detected     Candida krusei Not Detected     Candida parapsilosis Not Detected     Candida tropicalis Not Detected     Cryptococcus neoformans/gattii Not Detected     CTX-M (ESBL ) Test not applicable     IMP (Carbapenem resistant) Test not applicable     KPC resistance gene (Carbapenem resistant) Test not applicable     mcr-1  Test not applicable     mec A/C  Test not applicable     mec A/C and MREJ (MRSA) gene Test not applicable     NDM (Carbapenem resistant) Test not applicable     OXA-48-like (Carbapenem resistant) Test not applicable     van A/B (VRE gene) Test not applicable     VIM (Carbapenem resistant) Test not applicable    Narrative:      Aerobic and anaerobic    Clostridium difficile EIA [[id number]] Collected: 10/02/24 1919    Order Status: Completed Specimen: Stool Updated: 10/03/24 0031     C. diff Antigen Negative     C difficile Toxins A+B, EIA Negative     Comment: Testing not recommended for children <24 months old.               CURRENT/PREVIOUS VISIT EKG  Results for orders placed or performed during the hospital encounter of 10/02/24   EKG 12-lead    Collection Time: 10/09/24  8:56 AM   Result Value Ref Range    QRS Duration 82 ms    [facility]HS QTC Calculation 455 ms    Narrative    Test Reason : I21.4,Z01.810,    Vent. Rate : 056 BPM     Atrial Rate : 056 BPM      P-R Int : 140 ms          QRS Dur : 082 ms      QT Int : 472 ms       P-R-T Axes : 077 092 -66 degrees     QTc Int : 455 ms    Sinus bradycardia  Rightward axis  Low voltage QRS  T wave abnormality, consider anterolateral ischemia  Abnormal ECG  When compared with ECG of 04-OCT-2024 13:13,  Significant changes have occurred    Referred By: GONZALO   SELF           Confirmed By:         Significant Imaging: I have reviewed all relevant and available imaging results/findings within the past 24 hours.    I spent a total of 50 minutes on the day of the visit.This includes face to face time and non-face to face time preparing to see the patient (eg, review of tests), obtaining and/or reviewing separately obtained history, documenting clinical information in the electronic or other health record, independently interpreting results and communicating results to the patient/family/caregiver, or care coordinator.    Lemuel Man MD  Date of Service: 10/09/2024      This note was created using [employer] voice recognition software that occasionally misinterpreted phrases or words.

## 2024-10-09 NOTE — NURSING
Rec'd consult for scattered redness all over body.  Notified nurse that put consult in, Jorge Monroy RN that Wound Care Team not needed and to refer pt. back to hospitalist. We will sign off.

## 2024-10-09 NOTE — PLAN OF CARE
SW followed up with Pt at bedside to discuss discharge plan and options. Pt stated she doesn't feel like SNF is appropriate for her and she doesn't want to be in a facility. She said she feels home health is more appropriate for her needs.   Pt was presented with a list of home health agencies and stated she was with [facility] before and liked their services. She stated she would prefer going with them.    Pt choice obtained and scanned into media with [facility] home health noted as preference.    Referral sent in [facility].

## 2024-10-09 NOTE — PT/OT/SLP PROGRESS
Physical Therapy Treatment    Patient Name:  Yvette Hutchinson   MRN:  [id number]    Recommendations:     Discharge Recommendations: Moderate Intensity Therapy  Discharge Equipment Recommendations: none  Barriers to discharge:  increased assist with mobility, decreased activity tolerance, balance deficits    Assessment:     Yvette Hutchinson is a 64 y.o. female admitted with a medical diagnosis of Septic shock.  She presents with the following impairments/functional limitations: weakness, impaired endurance, impaired self care skills, impaired functional mobility, gait instability, impaired balance, decreased lower extremity function, decreased upper extremity function, decreased safety awareness, impaired cardiopulmonary response to activity.    Pt agreeable to to visit. Pt up in chair with nurse present changing IV dressing. Nurse reports pt's BP has been low and that yesterday she had some orthostatic hypotension. Nurse exited prior to mobility.    Blood pressure sitting up in chair, 89/53 (67). Pt performed sit to stand transfer with RW and contact guard assist. Blood pressure standing at RW, 81/52 (62).    Pt ambulated 40' with RW and contact guard assist, second person for line management. Blood pressure 95/ 52 (71) post ambulation.    Pt left up in chair with doctor present.    Rehab Prognosis: Fair; patient would benefit from acute skilled PT services to address these deficits and reach maximum level of function.    Recent Surgery: Procedure(s) (LRB):  Left heart cath (Left) Day of Surgery    Plan:     During this hospitalization, patient to be seen daily to address the identified rehab impairments via gait training, therapeutic activities, therapeutic exercises, neuromuscular re-education and progress toward the following goals:    Plan of Care Expires:  11/06/24    Subjective     Chief Complaint: fatigue  Patient/Family Comments/goals: to get better  Pain/Comfort:  Pain Rating 1:  0/10      Objective:     Communicated with nurse prior to session.  Patient found up in chair with blood pressure cuff, ramirez catheter, peripheral IV, pulse ox (continuous), telemetry, chair check upon PT entry to room.     General Precautions: Standard, fall  Orthopedic Precautions: spinal precautions  Braces: Jasper J collar  Respiratory Status: Room air     Functional Mobility:  Transfers:     Sit to Stand:  contact guard assistance with rolling walker  Gait: x 40' with RW and CGA, second person for line management      AM-PAC 6 CLICK MOBILITY          Treatment & Education:  Pt educated on importance of time OOB, importance of intermittent mobility, safe techniques for transfers/ambulation, discharge recommendations/options, and use of call light for assistance and fall prevention.      Patient left up in chair with all lines intact, call button in reach, chair alarm on, and doctor present..    GOALS:   Multidisciplinary Problems       Physical Therapy Goals          Problem: Physical Therapy    Goal Priority Disciplines Outcome Interventions   Physical Therapy Goal     PT, PT/OT Progressing    Description: Goals to be met by: 24     Patient will increase functional independence with mobility by performin. Supine to sit with Supervision  2. Sit to stand transfer with Supervision  3. Bed to chair transfer with Supervision using Rolling Walker  4. Gait  x 200 feet with Supervision using Rolling Walker.                              Time Tracking:     PT Received On: 10/09/24  PT Start Time: 924     PT Stop Time: 940  PT Total Time (min): 16 min     Billable Minutes: Gait Training 16    Treatment Type: Treatment  PT/PTA: PTA     Number of PTA visits since last PT visit: 1     10/09/2024

## 2024-10-09 NOTE — PT/OT/SLP PROGRESS
Occupational Therapy      Patient Name:  Yvette Hutchinson   MRN:  [id number]    Patient not seen today secondary to Other (Comment) (1st attempt, nursing care; 2nd attempt, with CM; 3rd attempt, off unit for angiogram with bed rest to follow). Will follow-up next service date.    10/9/2024

## 2024-10-10 ENCOUNTER — ANESTHESIA EVENT (OUTPATIENT)
Dept: SURGERY | Facility: HOSPITAL | Age: 64
End: 2024-10-10
Payer: COMMERCIAL

## 2024-10-10 ENCOUNTER — ANESTHESIA (OUTPATIENT)
Dept: SURGERY | Facility: HOSPITAL | Age: 64
End: 2024-10-10
Payer: COMMERCIAL

## 2024-10-10 LAB
ALBUMIN SERPL BCP-MCNC: 3.3 G/DL (ref 3.5–5.2)
ALP SERPL-CCNC: 361 U/L (ref 55–135)
ALT SERPL W/O P-5'-P-CCNC: 311 U/L (ref 10–44)
ANION GAP SERPL CALC-SCNC: 8 MMOL/L (ref 8–16)
AST SERPL-CCNC: 198 U/L (ref 10–40)
BACTERIA BLD CULT: NORMAL
BACTERIA BLD CULT: NORMAL
BASOPHILS NFR BLD: 1 % (ref 0–1.9)
BILIRUB SERPL-MCNC: 0.6 MG/DL (ref 0.1–1)
BUN SERPL-MCNC: 7 MG/DL (ref 8–23)
CALCIUM SERPL-MCNC: 8.2 MG/DL (ref 8.7–10.5)
CHLORIDE SERPL-SCNC: 108 MMOL/L (ref 95–110)
CO2 SERPL-SCNC: 23 MMOL/L (ref 23–29)
CREAT SERPL-MCNC: 0.9 MG/DL (ref 0.5–1.4)
DIFFERENTIAL METHOD BLD: ABNORMAL
EOSINOPHIL NFR BLD: 1 % (ref 0–8)
ERYTHROCYTE [DISTWIDTH] IN BLOOD BY AUTOMATED COUNT: 16.1 % (ref 11.5–14.5)
EST. GFR  (NO RACE VARIABLE): >60 ML/MIN/1.73 M^2
GLUCOSE SERPL-MCNC: 137 MG/DL (ref 70–110)
HCT VFR BLD AUTO: 29.6 % (ref 37–48.5)
HGB BLD-MCNC: 9.5 G/DL (ref 12–16)
IMM GRANULOCYTES # BLD AUTO: ABNORMAL K/UL (ref 0–0.04)
IMM GRANULOCYTES NFR BLD AUTO: ABNORMAL % (ref 0–0.5)
LACTATE SERPL-SCNC: 0.9 MMOL/L (ref 0.5–1.9)
LDH SERPL L TO P-CCNC: 171 U/L (ref 110–260)
LYMPHOCYTES NFR BLD: 65 % (ref 18–48)
MAGNESIUM SERPL-MCNC: 2.1 MG/DL (ref 1.6–2.6)
MCH RBC QN AUTO: 30.4 PG (ref 27–31)
MCHC RBC AUTO-ENTMCNC: 32.1 G/DL (ref 32–36)
MCV RBC AUTO: 95 FL (ref 82–98)
MONOCYTES NFR BLD: 1 % (ref 4–15)
NEUTROPHILS NFR BLD: 32 % (ref 38–73)
NRBC BLD-RTO: 0 /100 WBC
PLATELET # BLD AUTO: 289 K/UL (ref 150–450)
PLATELET BLD QL SMEAR: ABNORMAL
PMV BLD AUTO: 9.9 FL (ref 9.2–12.9)
POTASSIUM SERPL-SCNC: 3.5 MMOL/L (ref 3.5–5.1)
PROCALCITONIN SERPL IA-MCNC: 0.62 NG/ML (ref 0–0.5)
PROT SERPL-MCNC: 5.2 G/DL (ref 6–8.4)
RBC # BLD AUTO: 3.12 M/UL (ref 4–5.4)
SODIUM SERPL-SCNC: 139 MMOL/L (ref 136–145)
WBC # BLD AUTO: 34.29 K/UL (ref 3.9–12.7)

## 2024-10-10 PROCEDURE — 25000003 PHARM REV CODE 250: Performed by: INTERNAL MEDICINE

## 2024-10-10 PROCEDURE — 27202125 HC BALLOON, EXTRACTION (ANY): Performed by: INTERNAL MEDICINE

## 2024-10-10 PROCEDURE — 25000003 PHARM REV CODE 250: Performed by: NURSE ANESTHETIST, CERTIFIED REGISTERED

## 2024-10-10 PROCEDURE — 37000008 HC ANESTHESIA 1ST 15 MINUTES: Performed by: INTERNAL MEDICINE

## 2024-10-10 PROCEDURE — C1769 GUIDE WIRE: HCPCS | Performed by: INTERNAL MEDICINE

## 2024-10-10 PROCEDURE — 83735 ASSAY OF MAGNESIUM: CPT | Performed by: SURGERY

## 2024-10-10 PROCEDURE — 63600175 PHARM REV CODE 636 W HCPCS

## 2024-10-10 PROCEDURE — 0F798ZZ DILATION OF COMMON BILE DUCT, VIA NATURAL OR ARTIFICIAL OPENING ENDOSCOPIC: ICD-10-PCS | Performed by: INTERNAL MEDICINE

## 2024-10-10 PROCEDURE — 85007 BL SMEAR W/DIFF WBC COUNT: CPT | Performed by: SURGERY

## 2024-10-10 PROCEDURE — 83615 LACTATE (LD) (LDH) ENZYME: CPT | Performed by: INTERNAL MEDICINE

## 2024-10-10 PROCEDURE — 25000003 PHARM REV CODE 250: Performed by: SURGERY

## 2024-10-10 PROCEDURE — 25000242 PHARM REV CODE 250 ALT 637 W/ HCPCS

## 2024-10-10 PROCEDURE — 83605 ASSAY OF LACTIC ACID: CPT | Performed by: INTERNAL MEDICINE

## 2024-10-10 PROCEDURE — 94640 AIRWAY INHALATION TREATMENT: CPT

## 2024-10-10 PROCEDURE — 20000000 HC ICU ROOM

## 2024-10-10 PROCEDURE — 94761 N-INVAS EAR/PLS OXIMETRY MLT: CPT

## 2024-10-10 PROCEDURE — 25000003 PHARM REV CODE 250: Performed by: HOSPITALIST

## 2024-10-10 PROCEDURE — 84145 PROCALCITONIN (PCT): CPT | Performed by: INTERNAL MEDICINE

## 2024-10-10 PROCEDURE — 80053 COMPREHEN METABOLIC PANEL: CPT | Performed by: SURGERY

## 2024-10-10 PROCEDURE — 37000009 HC ANESTHESIA EA ADD 15 MINS: Performed by: INTERNAL MEDICINE

## 2024-10-10 PROCEDURE — 94799 UNLISTED PULMONARY SVC/PX: CPT

## 2024-10-10 PROCEDURE — 63600175 PHARM REV CODE 636 W HCPCS: Performed by: SURGERY

## 2024-10-10 PROCEDURE — 25000003 PHARM REV CODE 250: Performed by: NURSE PRACTITIONER

## 2024-10-10 PROCEDURE — 63600175 PHARM REV CODE 636 W HCPCS: Performed by: NURSE ANESTHETIST, CERTIFIED REGISTERED

## 2024-10-10 PROCEDURE — 85027 COMPLETE CBC AUTOMATED: CPT | Performed by: SURGERY

## 2024-10-10 PROCEDURE — 99233 SBSQ HOSP IP/OBS HIGH 50: CPT | Mod: ,,, | Performed by: INTERNAL MEDICINE

## 2024-10-10 PROCEDURE — 25000003 PHARM REV CODE 250

## 2024-10-10 PROCEDURE — 63600175 PHARM REV CODE 636 W HCPCS: Performed by: HOSPITALIST

## 2024-10-10 PROCEDURE — 25500020 PHARM REV CODE 255: Performed by: INTERNAL MEDICINE

## 2024-10-10 RX ORDER — FAMOTIDINE 10 MG/ML
INJECTION INTRAVENOUS
Status: DISCONTINUED | OUTPATIENT
Start: 2024-10-10 | End: 2024-10-10

## 2024-10-10 RX ORDER — SUCCINYLCHOLINE CHLORIDE 20 MG/ML
INJECTION INTRAMUSCULAR; INTRAVENOUS
Status: DISCONTINUED | OUTPATIENT
Start: 2024-10-10 | End: 2024-10-10

## 2024-10-10 RX ORDER — ONDANSETRON HYDROCHLORIDE 2 MG/ML
INJECTION, SOLUTION INTRAMUSCULAR; INTRAVENOUS
Status: DISCONTINUED | OUTPATIENT
Start: 2024-10-10 | End: 2024-10-10

## 2024-10-10 RX ORDER — PROPOFOL 10 MG/ML
VIAL (ML) INTRAVENOUS
Status: DISCONTINUED | OUTPATIENT
Start: 2024-10-10 | End: 2024-10-10

## 2024-10-10 RX ORDER — INDOMETHACIN 50 MG/1
SUPPOSITORY RECTAL
Status: COMPLETED | OUTPATIENT
Start: 2024-10-10 | End: 2024-10-10

## 2024-10-10 RX ORDER — ETOMIDATE 2 MG/ML
INJECTION INTRAVENOUS
Status: DISCONTINUED | OUTPATIENT
Start: 2024-10-10 | End: 2024-10-10

## 2024-10-10 RX ORDER — ASPIRIN 81 MG/1
81 TABLET ORAL DAILY
Status: DISCONTINUED | OUTPATIENT
Start: 2024-10-10 | End: 2024-10-16 | Stop reason: HOSPADM

## 2024-10-10 RX ORDER — CHOLESTYRAMINE 4 G/4.8G
4 POWDER, FOR SUSPENSION ORAL 3 TIMES DAILY
Status: DISCONTINUED | OUTPATIENT
Start: 2024-10-10 | End: 2024-10-16 | Stop reason: HOSPADM

## 2024-10-10 RX ORDER — ALUMINUM HYDROXIDE, MAGNESIUM HYDROXIDE, AND SIMETHICONE 1200; 120; 1200 MG/30ML; MG/30ML; MG/30ML
30 SUSPENSION ORAL EVERY 6 HOURS PRN
Status: DISCONTINUED | OUTPATIENT
Start: 2024-10-10 | End: 2024-10-16 | Stop reason: HOSPADM

## 2024-10-10 RX ORDER — LIDOCAINE HYDROCHLORIDE 20 MG/ML
INJECTION, SOLUTION EPIDURAL; INFILTRATION; INTRACAUDAL; PERINEURAL
Status: DISCONTINUED | OUTPATIENT
Start: 2024-10-10 | End: 2024-10-10

## 2024-10-10 RX ORDER — ROCURONIUM BROMIDE 10 MG/ML
INJECTION, SOLUTION INTRAVENOUS
Status: DISCONTINUED | OUTPATIENT
Start: 2024-10-10 | End: 2024-10-10

## 2024-10-10 RX ADMIN — SODIUM CHLORIDE: 0.9 INJECTION, SOLUTION INTRAVENOUS at 02:10

## 2024-10-10 RX ADMIN — LACTOBACILLUS ACIDOPHILUS / LACTOBACILLUS BULGARICUS 1 EACH: 100 MILLION CFU STRENGTH GRANULES at 09:10

## 2024-10-10 RX ADMIN — HYDROCODONE BITARTRATE AND ACETAMINOPHEN 1 TABLET: 5; 325 TABLET ORAL at 04:10

## 2024-10-10 RX ADMIN — ROCURONIUM BROMIDE 20 MG: 10 INJECTION, SOLUTION INTRAVENOUS at 03:10

## 2024-10-10 RX ADMIN — LIDOCAINE HYDROCHLORIDE 50 MG: 20 INJECTION, SOLUTION INTRAVENOUS at 02:10

## 2024-10-10 RX ADMIN — SUGAMMADEX 200 MG: 100 INJECTION, SOLUTION INTRAVENOUS at 03:10

## 2024-10-10 RX ADMIN — ARFORMOTEROL TARTRATE 15 MCG: 15 SOLUTION RESPIRATORY (INHALATION) at 07:10

## 2024-10-10 RX ADMIN — MICONAZOLE NITRATE: 20 CREAM TOPICAL at 09:10

## 2024-10-10 RX ADMIN — HEPARIN SODIUM 5000 UNITS: 5000 INJECTION INTRAVENOUS; SUBCUTANEOUS at 04:10

## 2024-10-10 RX ADMIN — ROCURONIUM BROMIDE 10 MG: 10 INJECTION, SOLUTION INTRAVENOUS at 02:10

## 2024-10-10 RX ADMIN — ASPIRIN 81 MG: 81 TABLET, COATED ORAL at 04:10

## 2024-10-10 RX ADMIN — CHOLESTYRAMINE 4 GRAMS OF ANHYDROUS CHOLESTYRAMINE: 4 POWDER, FOR SUSPENSION ORAL at 09:10

## 2024-10-10 RX ADMIN — CITALOPRAM HYDROBROMIDE 20 MG: 20 TABLET ORAL at 10:10

## 2024-10-10 RX ADMIN — PIPERACILLIN SODIUM AND TAZOBACTAM SODIUM 4.5 G: 4; .5 INJECTION, POWDER, LYOPHILIZED, FOR SOLUTION INTRAVENOUS at 05:10

## 2024-10-10 RX ADMIN — MICONAZOLE NITRATE: 20 CREAM TOPICAL at 10:10

## 2024-10-10 RX ADMIN — Medication 6 MG: at 09:10

## 2024-10-10 RX ADMIN — HYDROCORTISONE: 25 CREAM TOPICAL at 10:10

## 2024-10-10 RX ADMIN — MIDODRINE HYDROCHLORIDE 10 MG: 10 TABLET ORAL at 05:10

## 2024-10-10 RX ADMIN — THEOPHYLLINE ANHYDROUS 200 MG: 100 CAPSULE, EXTENDED RELEASE ORAL at 09:10

## 2024-10-10 RX ADMIN — ETOMIDATE 8 MG: 2 INJECTION, SOLUTION INTRAVENOUS at 02:10

## 2024-10-10 RX ADMIN — HEPARIN SODIUM 5000 UNITS: 5000 INJECTION INTRAVENOUS; SUBCUTANEOUS at 09:10

## 2024-10-10 RX ADMIN — GABAPENTIN 300 MG: 300 CAPSULE ORAL at 04:10

## 2024-10-10 RX ADMIN — LEVOTHYROXINE SODIUM 50 MCG: 0.03 TABLET ORAL at 05:10

## 2024-10-10 RX ADMIN — FLUDROCORTISONE ACETATE 100 MCG: 0.1 TABLET ORAL at 10:10

## 2024-10-10 RX ADMIN — HYDROCORTISONE SODIUM SUCCINATE 100 MG: 100 INJECTION, POWDER, FOR SOLUTION INTRAMUSCULAR; INTRAVENOUS at 10:10

## 2024-10-10 RX ADMIN — ONDANSETRON 4 MG: 2 INJECTION INTRAMUSCULAR; INTRAVENOUS at 02:10

## 2024-10-10 RX ADMIN — Medication 100 MG: at 02:10

## 2024-10-10 RX ADMIN — HYDROMORPHONE HYDROCHLORIDE 0.5 MG: 0.5 INJECTION, SOLUTION INTRAMUSCULAR; INTRAVENOUS; SUBCUTANEOUS at 09:10

## 2024-10-10 RX ADMIN — DIPHENHYDRAMINE HYDROCHLORIDE 25 MG: 25 CAPSULE ORAL at 09:10

## 2024-10-10 RX ADMIN — HYDROCORTISONE: 25 CREAM TOPICAL at 09:10

## 2024-10-10 RX ADMIN — TOPIRAMATE 100 MG: 25 TABLET, FILM COATED ORAL at 09:10

## 2024-10-10 RX ADMIN — PIPERACILLIN SODIUM AND TAZOBACTAM SODIUM 4.5 G: 4; .5 INJECTION, POWDER, LYOPHILIZED, FOR SOLUTION INTRAVENOUS at 04:10

## 2024-10-10 RX ADMIN — PROPOFOL 40 MG: 10 INJECTION, EMULSION INTRAVENOUS at 02:10

## 2024-10-10 RX ADMIN — FAMOTIDINE 20 MG: 10 INJECTION, SOLUTION INTRAVENOUS at 03:10

## 2024-10-10 RX ADMIN — BUPROPION HYDROCHLORIDE 300 MG: 150 TABLET, EXTENDED RELEASE ORAL at 10:10

## 2024-10-10 RX ADMIN — HEPARIN SODIUM 5000 UNITS: 5000 INJECTION INTRAVENOUS; SUBCUTANEOUS at 05:10

## 2024-10-10 RX ADMIN — POTASSIUM BICARBONATE 50 MEQ: 977.5 TABLET, EFFERVESCENT ORAL at 05:10

## 2024-10-10 RX ADMIN — ALUMINUM HYDROXIDE, MAGNESIUM HYDROXIDE, AND SIMETHICONE 30 ML: 1200; 120; 1200 SUSPENSION ORAL at 05:10

## 2024-10-10 RX ADMIN — PIPERACILLIN SODIUM AND TAZOBACTAM SODIUM 4.5 G: 4; .5 INJECTION, POWDER, LYOPHILIZED, FOR SOLUTION INTRAVENOUS at 09:10

## 2024-10-10 RX ADMIN — BUDESONIDE INHALATION 1 MG: 0.5 SUSPENSION RESPIRATORY (INHALATION) at 07:10

## 2024-10-10 RX ADMIN — HYDROCORTISONE: 25 CREAM TOPICAL at 04:10

## 2024-10-10 RX ADMIN — GABAPENTIN 300 MG: 300 CAPSULE ORAL at 09:10

## 2024-10-10 NOTE — ANESTHESIA PROCEDURE NOTES
Intubation    Date/Time: 10/10/2024 2:55 PM    Performed by: Blanca Viveros CRNA  Authorized by: Chan Hurst MD    Intubation:     Induction:  Intravenous    Intubated:  Postinduction    Mask Ventilation:  Not attempted    Attempts:  1    Attempted By:  CRNA    Method of Intubation:  Direct and video laryngoscopy    Blade:  Gilmore 3    Laryngeal View Grade: Grade I - full view of cords      Difficult Airway Encountered?: No      Complications:  None    Airway Device:  Oral endotracheal tube    Airway Device Size:  7.5    Style/Cuff Inflation:  Cuffed (inflated to minimal occlusive pressure)    Tube secured:  21    Secured at:  The teeth    Placement Verified By:  Capnometry    Complicating Factors:  None    Findings Post-Intubation:  BS equal bilateral and atraumatic/condition of teeth unchanged

## 2024-10-10 NOTE — RESPIRATORY THERAPY
10/10/24 1450   Incentive Spirometer   $ Incentive Spirometer Charges done with encouragement   Incentive Spirometer Predicted Level (mL) 1500   Administration (IS) instruction provided, follow-up   Number of Repetitions (IS) 10   Level Incentive Spirometer (mL) 2000   Patient Tolerance (IS) good

## 2024-10-10 NOTE — PT/OT/SLP PROGRESS
Physical Therapy      Patient Name:  Yvette Hutchinson   MRN:  [id number]    Patient not seen today secondary to patient declined participation with PT treatment x2 attempts reporting she has been up to [facility] several times this morning and going for EGD this afternoon. Will follow-up 10/11/24.

## 2024-10-10 NOTE — TRANSFER OF CARE
"Anesthesia Transfer of Care Note    Patient: Yvette Hutchinson    Procedure(s) Performed: Procedure(s) (LRB):  ERCP (ENDOSCOPIC RETROGRADE CHOLANGIOPANCREATOGRAPHY) (N/A)    Patient location: ICU    Anesthesia Type: general    Transport from OR: Transported from OR on 2-3 L/min O2 by NC with adequate spontaneous ventilation. Continuous ECG monitoring in transport. Continuous SpO2 monitoring in transport    Post pain: adequate analgesia    Post assessment: no apparent anesthetic complications and tolerated procedure well    Post vital signs: stable    Level of consciousness: awake    Nausea/Vomiting: no nausea/vomiting    Complications: none    Transfer of care protocol was followed      Last vitals: Visit Vitals  BP (!) 95/53   Pulse 68   Temp 36.7 °C (98 °F) (Oral)   Resp 20   Ht 5' 2.99" (1.6 m)   Wt 75.6 kg (166 lb 10.7 oz)   LMP 01/13/2010 (Approximate)   SpO2 96%   Breastfeeding No   BMI 29.53 kg/m²     "

## 2024-10-10 NOTE — PROGRESS NOTES
[facility]   Department of Infectious Disease  Progress Note        PATIENT NAME: Yvette Hutchinson  MRN: [id number]  TODAY'S DATE: 10/10/2024  ADMIT DATE: 10/2/2024  LOS: 8 days    CHIEF COMPLAINT: Abdominal Pain, Nausea, Vomiting, and Diarrhea (Pt has been sick for the last two weeks)      PRINCIPLE PROBLEM: Septic shock    INTERVAL HISTORY      10/04/2024:  Improving.  WBC down to 35.  Afebrile.  Blood cultures have grown MSSE in 1/2 bottles.  No gallbladder fluid or tissue culture done.  Creatinine down to 1.2.  Levophed dose down to 0.35 microgram/kilogram per minute.    10/05/2024:  Continues to improve.  Leukocytosis resolving.  No acute issues overnight.  Seen by cardiologist yesterday for elevated troponin and notes reviewed.    10/06/2024:  Seen and evaluated at bedside.  Continues to improve.  WBC 24 K.  Repeat blood culture 10/05/2024 so far negative.    10/07/2024.  No acute issues overnight.  Continues to improve.  Tolerating oral feeds.  Remains on very low-dose Levophed at 0.06 microgram/kilogram per minute.  All cultures remain negative.    10/08/2024:  Midodrine added yesterday by cardiologist due to inability to wean off very low-dose Levophed.  Cortisol level was 8.3.  Repeat blood culture remain negative.  Remains on Levophed but down to 0.03 microgram/kilogram per minute.  Still with loose bowel motions.    10/09/2024: No acute issues overnight.  Still requiring very low-dose Levophed.  On 0.02 microgram/kilogram per minute.  Had cardiac catheterization today with normal coronary vessels and LVEDP of around 20..    10/10/2024:  WBC crept back up and is now 34 K. afebrile.  Was weaned off Levophed yesterday but is back on on a very low-dose of 0.09mcg/kg/min.     Antibiotics (From admission, onward)      Start     Stop Route Frequency Ordered    10/04/24 0900  mupirocin 2 % ointment 1 g         10/09/24 0859 Nasl 2 times daily 10/04/24 0633    10/03/24 2200   piperacillin-tazobactam 4.5 g in dextrose 5 % 100 mL IVPB (ready to mix)         -- IV Every 8 hours (non-standard times) 10/03/24 1506          Antifungals (From admission, onward)      Start     Stop Route Frequency Ordered    10/08/24 2100  miconazole 2 % cream         -- Top 2 times daily 10/08/24 1734           Antivirals (From admission, onward)      None            ASSESSMENT and PLAN      Septic shock in a patient with CLL.  Probably from abdominal source since her symptoms were primarily GI related.  She has had laparoscopic cholecystostomy.  Low BP but does not look toxic.  Check lactic acid and repeat procalcitonin level.  We will add back vancomycin or linezolid p.o. if persists.     2. Persistent Low BP requiring low-dose Levophed.  There may be a cardiogenic component to this as well.  Defer to cardiologist and hospitalist.      3. CLL.  Currently not on any specific medication for this.  It does make her immunosuppressed.       4. Acute liver injury.  Maybe congestive hepatopathy from shock.  Also with elevated troponins.  Acute liver panel negative.  Transaminases improving.    5. Coagulase-negative Staphylococcus  bacteremia.  Most likely contaminant.  Antibiotics as above for now.  Repeat blood cultures x2 sets negative.    6. Persistent diarrhea.  Stool C diff 10/2/24 and stool culture were negative.  Check GI molecular panel.     RECOMMENDATIONS:    Continue current empiric antibiotics  Continue to monitor clinically  Plan to treat for 10 days through 10/12/2024 may extend to 14 days through 10/16/2024.    Maybe able to complete with oral antibiotics when off pressors  Check GI molecular panel    Please send Epic secure chat with any questions.      SUBJECTIVE    Yvetteel Hutchinson is a 64 y.o. female with history of CLL, GERD, arthritis.  Also previous left lung cancer status post resection.  Had anterior cervical fusion on 08/06/2024 with an uneventful postoperative course.  Presented to  the emergency room 10/02/2024 with nausea vomiting, diarrhea and abdominal pain of about 9 days duration.  In the ER BP 99/55, pulse 1-2, respiratory rate 18, temperature 101.6°.  She had abdominal tenderness was worse in the epigastric area.       WBC 21, hematocrit 38, platelet count 224, creatinine 1.4.  , , lipase 42, alkaline phosphatase to 1 6.  UA unremarkable.  Chest x-ray with no acute infiltrates.  CT chest showed increased interstitial markings with few pleural based lesions/infiltrates on the left.  CT abdomen and pelvis documented gallstones with pericholecystic fluid and edema which was confirmed on ultrasound she was admitted and placed on IV fluids and antibiotics.  Later seen by general surgeon and laparoscopic cholecystostomy 10/03/2024.  ID asked to assist with her care.     Current lab data showed WBC 45, hematocrit 41, creatinine 1.8, AST 1970, ALT 1316, alkaline phosphatase 220, total protein 5.6     History from patient and medical record.     Antibiotic history:    Vancomycin: 10/02/2024-  Zosyn: 10/02/2024-     Microbiology:    Blood culture 10/02/2024:  Staphylococcus species 1/2   Influenza and COVID-19 assay 10/02/2024: Negative    Review of Systems  Negative except as stated above in Interval History     OBJECTIVE   Temp:  [97.4 °F (36.3 °C)-98.1 °F (36.7 °C)] 98 °F (36.7 °C)  Pulse:  [61-75] 68  Resp:  [10-35] 22  SpO2:  [87 %-100 %] 94 %  BP: ()/(50-91) 90/55  Temp:  [97.4 °F (36.3 °C)-98.1 °F (36.7 °C)]   Temp: 98 °F (36.7 °C) (10/10/24 0800)  Pulse: 68 (10/10/24 0800)  Resp: (!) 22 (10/10/24 0800)  BP: (!) 90/55 (10/10/24 0800)  SpO2: (!) 94 % (10/10/24 0800)    Intake/Output Summary (Last 24 hours) at 10/10/2024 1249  Last data filed at 10/10/2024 0611  Gross per 24 hour   Intake 770.5 ml   Output 550 ml   Net 220.5 ml       Physical Exam  General:  Middle-aged woman who is in no acute distress at this time.  Lying quietly in bed.  HEENT:  Healed right anterior  neck surgical wound.  No erythema or induration.  CVS: S1 and 2 heard, tachycardic, no murmurs appreciated.  On Levophed 0.15 micrograms/kilogram per minute    Respiratory: Clear to auscultation   Abdomen:  Laparoscopic port sites are clean with no erythema or drainage.  Abdomen is distended, soft, nontender.  Skin: No rash appreciated  CNS: No focal deficits   Musculoskeletal: No joint effusions or abnormalities noted     VAD:  ISOLATION:  None     Wounds:  Surgical abdominal    Significant Labs: All pertinent labs within the past 24 hours have been reviewed.    CBC LAST 7 DAYS  Recent Labs   Lab 10/04/24  0455 10/05/24  0336 10/06/24  0224 10/07/24  0255 10/08/24  0318 10/09/24  0317 10/10/24  0305   WBC 34.88* 26.10* 24.07* 22.45* 26.08* 29.37* 34.29*   RBC 3.58* 3.54* 3.41* 3.25* 3.20* 3.05* 3.12*   HGB 11.3* 10.9* 10.4* 10.1* 9.8* 9.5* 9.5*   HCT 33.8* 33.9* 31.8* 30.7* 30.5* 29.6* 29.6*   MCV 94 96 93 95 95 97 95   MCH 31.6* 30.8 30.5 31.1* 30.6 31.1* 30.4   MCHC 33.4 32.2 32.7 32.9 32.1 32.1 32.1   RDW 14.3 14.3 14.4 14.5 14.8* 15.4* 16.1*    194 167 159 181 216 289   MPV 10.2 10.3 10.5 10.4 10.7 10.1 9.9   GRAN 39.0 19.2*  5.0 18.9*  4.6 39.0 32.0* 49.0 32.0*   LYMPH 37.0  CANCELED 67.0*  17.5* 66.8*  16.1* 43.0 54.0* 40.0 65.0*   MONO 6.0  CANCELED 5.7  1.5* 5.9  1.4* 4.0 4.0 1.0* 1.0*   BASO CANCELED 0.11 0.12  --   --   --   --    NRBC 0 0 0 0 0 0 0       CHEMISTRY LAST 7 DAYS  Recent Labs   Lab 10/04/24  0455 10/04/24  2101 10/05/24  0336 10/06/24  0223 10/07/24  0255 10/08/24  0318 10/08/24  1209 10/09/24  0317 10/09/24  1833 10/10/24  0305    131* 131* 138 136 138 136 137  --  139   K 3.8 3.9 4.0 3.6 3.8 3.8 3.7 3.5 3.3* 3.5    105 104 107 105 108 105 108  --  108   CO2 20* 21* 23 26 26 25 23 22*  --  23   ANIONGAP 9 5* 4* 5* 5* 5* 8 7*  --  8   BUN 15 11 11 7* 5* 6* 7* 7*  --  7*   CREATININE 1.2 1.2 1.4 1.0 1.0 1.1 1.1 1.0  --  0.9   GLU 93 105 96 100 76 69* 89 84  --  137*  "  CALCIUM 6.9* 7.1* 7.4* 7.8* 7.7* 8.0* 8.6* 8.1*  --  8.2*   MG 2.2 1.9 1.8 1.7 1.6 1.8  --  1.9  --  2.1   ALBUMIN 2.7*  --  2.8* 2.9* 2.8* 2.8*  --  3.0*  --  3.3*   PROT 4.4*  --  4.5* 4.6* 4.6* 4.6*  --  5.0*  --  5.2*   ALKPHOS 229*  --  356* 380* 369* 343*  --  399*  --  361*   *  --  824* 691* 568* 436*  --  382*  --  311*   *  --  628* 472* 396* 372*  --  318*  --  198*   BILITOT 1.0  --  1.3* 1.1* 0.9 0.7  --  0.6  --  0.6       Estimated Creatinine Clearance: 60.7 mL/min (based on SCr of 0.9 mg/dL).    INFLAMMATORY/PROCAL  LAST 7 DAYS  Recent Labs   Lab 10/08/24  1209   PROCAL 1.941*   CRP 3.60*     No results found for: "ESR"  CRP   Date Value Ref Range Status   10/08/2024 3.60 (H) <1.00 mg/dL Final     Comment:     CRP-Normal Application expected values:   <1.0        mg/dL   Normal Range  1.0 - 5.0  mg/dL   Indicates mild inflammation  5.0 - 10.0 mg/dL   Indicates severe inflammation  >10.0        mg/dL   Represents serious processes and   frequently         indicates the presence of a bacterial   infection.      09/07/2021 2.7 0.0 - 8.2 mg/L Final   03/03/2020 3.5 0.0 - 8.2 mg/L Final       PRIOR  MICROBIOLOGY:    Susceptibility data from last 90 days.  Collected Specimen Info Organism   10/05/24 Blood No growth after 5 days.   10/05/24 Blood No growth after 5 days.   10/02/24 Blood from Peripheral, Hand, Left No growth after 5 days.   10/02/24 Blood from Peripheral, Wrist, Right COAGULASE NEGATIVE STAPHYLOCOCCI       LAST 7 DAYS MICROBIOLOGY   Microbiology Results (last 7 days)       Procedure Component Value Units Date/Time    Blood culture [[id number]] Collected: 10/05/24 1037    Order Status: Completed Specimen: Blood Updated: 10/10/24 1232     Blood Culture, Routine No growth after 5 days.    Blood culture [[id number]] Collected: 10/05/24 1037    Order Status: Completed Specimen: Blood Updated: 10/10/24 1232     Blood Culture, Routine No growth after 5 days.    Blood culture x two " cultures. Draw prior to antibiotics [[id number]] Collected: 10/02/24 1057    Order Status: Completed Specimen: Blood from Peripheral, Hand, Left Updated: 10/07/24 1232     Blood Culture, Routine No growth after 5 days.    Narrative:      Aerobic and anaerobic    Stool culture **cannot be ordered stat** [[id number]] Collected: 10/02/24 1919    Order Status: Completed Specimen: Stool Updated: 10/05/24 1018     Stool Culture No Salmonella,Shigella,Vibrio,Campylobacter.      No E coli 0157:H7 isolated.    Blood culture x two cultures. Draw prior to antibiotics [[id number]]  (Abnormal) Collected: 10/02/24 1049    Order Status: Completed Specimen: Blood from Peripheral, Wrist, Right Updated: 10/04/24 0807     Blood Culture, Routine Gram stain aer bottle: Gram positive cocci      Results called to and read back by:Val Nino RN-ED;  10/03/2024      11:58 CJD      COAGULASE NEGATIVE STAPHYLOCOCCI  Organism is a probable contaminant      Narrative:      Aerobic and anaerobic    Rapid Organism ID by PCR (from Blood culture) [[id number]]  (Abnormal) Collected: 10/02/24 1049    Order Status: Completed Updated: 10/03/24 1322     Enterococcus faecalis Not Detected     Enterococcus faecium Not Detected     Listeria monocytogenes Not Detected     Staphylococcus spp. Detected     Staphylococcus aureus Not Detected     Staphylococcus epidermidis Not Detected     Staphylococcus lugdunensis Not Detected     Streptococcus species Not Detected     Streptococcus agalactiae Not Detected     Streptococcus pneumoniae Not Detected     Streptococcus pyogenes Not Detected     Acinetobacter calcoaceticus/baumannii complex Not Detected     Bacteroides fragilis Not Detected     Enterobacterales Not Detected     Enterobacter cloacae complex Not Detected     Escherichia coli Not Detected     Klebsiella aerogenes Not Detected     Klebsiella oxytoca Not Detected     Klebsiella pneumoniae group Not Detected     Proteus Not Detected     Salmonella  sp Not Detected     Serratia marcescens Not Detected     Haemophilus influenzae Not Detected     Neisseria meningtidis Not Detected     Pseudomonas aeruginosa Not Detected     Stenotrophomonas maltophilia Not Detected     Candida albicans Not Detected     Candida auris Not Detected     Candida glabrata Not Detected     Candida krusei Not Detected     Candida parapsilosis Not Detected     Candida tropicalis Not Detected     Cryptococcus neoformans/gattii Not Detected     CTX-M (ESBL ) Test not applicable     IMP (Carbapenem resistant) Test not applicable     KPC resistance gene (Carbapenem resistant) Test not applicable     mcr-1  Test not applicable     mec A/C  Test not applicable     mec A/C and MREJ (MRSA) gene Test not applicable     NDM (Carbapenem resistant) Test not applicable     OXA-48-like (Carbapenem resistant) Test not applicable     van A/B (VRE gene) Test not applicable     VIM (Carbapenem resistant) Test not applicable    Narrative:      Aerobic and anaerobic            CURRENT/PREVIOUS VISIT EKG  Results for orders placed or performed during the hospital encounter of 10/02/24   EKG 12-lead    Collection Time: 10/09/24  8:56 AM   Result Value Ref Range    QRS Duration 82 ms    [facility]HS QTC Calculation 455 ms    Narrative    Test Reason : I21.4,Z01.810,    Vent. Rate : 056 BPM     Atrial Rate : 056 BPM     P-R Int : 140 ms          QRS Dur : 082 ms      QT Int : 472 ms       P-R-T Axes : 077 092 -66 degrees     QTc Int : 455 ms    Sinus bradycardia  Rightward axis  Low voltage QRS  T wave abnormality, consider anterolateral ischemia  Abnormal ECG  When compared with ECG of 04-OCT-2024 13:13,  Significant changes have occurred    Referred By: [email or web address]   SELF           Confirmed By:         Significant Imaging: I have reviewed all relevant and available imaging results/findings within the past 24 hours.    I spent a total of 50 minutes on the day of the visit.This includes face to face time and  non-face to face time preparing to see the patient (eg, review of tests), obtaining and/or reviewing separately obtained history, documenting clinical information in the electronic or other health record, independently interpreting results and communicating results to the patient/family/caregiver, or care coordinator.    Lemuel Man MD  Date of Service: 10/10/2024      This note was created using [employer] voice recognition software that occasionally misinterpreted phrases or words.

## 2024-10-10 NOTE — CARE UPDATE
10/10/24 0736   PRE-TX-O2   SpO2 97 %   Pulse 66   Resp 18   Aerosol Therapy   $ Aerosol Therapy Charges Aerosol Treatment  (1mg pulmicort)   Respiratory Treatment Status (SVN) given   Treatment Route (SVN) mask   Incentive Spirometer   $ Incentive Spirometer Charges done with encouragement   Incentive Spirometer Predicted Level (mL) 1500   Administration (IS) instruction provided, follow-up   Number of Repetitions (IS) 10   Level Incentive Spirometer (mL) 1500

## 2024-10-10 NOTE — PROGRESS NOTES
formerly [facility] Medicine  Progress Note    Patient Name: Yvette Hutchinson  MRN: [id number]  Patient Class: IP- Inpatient   Admission Date: 10/2/2024  Length of Stay: 7 days  Attending Physician: Iris Odell MD  Primary Care Provider: Vern Priest MD        Subjective:     Principal Problem:Septic shock        HPI:  64-year-old female with PMH of CLL, and lung cancer status post partial resection who presented to the ER because of presyncope, nausea, vomiting, diarrhea and abdominal pain.  According to the patient, for over 1 week she has been having intractable nausea, vomiting and diarrhea.  Also epigastric and right upper quadrant abdominal pain described as sharp, 10/10 on pain scale radiate to the rest of her abdomen.  Today when she stood up from a sitting position, she felt as if she will pass out.  As a result, she decided to come to the ER for evaluation.  Denied any fever or chills.  She denied chest pain or shortness on breath.    In the ER, vitals showed a blood pressure of 99/55, but soon dropped to the 70s.  Heart rate was 122, respiratory rate of 20, temperature of 101.6°, and patient was satting 95% on room air.  CBC was white count of 21, with history of CLL.  CMP showed sodium of 135, acute renal failure with creatinine of 1.4 compared to baseline of 1, and patient has elevated LFTs with alk-phos of 216, , and AST of 497.  COVID/flu are negative.  Blood cultures were collected.  CTA chest negative for pulmonary embolus, but showed mild diffuse bronchial wall thickening that could reflect infectious or inflammatory bronchitis in the appropriate clinical setting.  CT abdomen/pelvis showed pericholecystic edema without evidence of gallstones.  Cholecystitis cannot be excluded. Also mild Linda pancreatic edema which may represent early pancreatitis. US abdomen showed Cholelithiasis, gallbladder wall thickening, and mild pericholecystic fluid. Patient was started  on Zosyn/vanco.  Was given sepsis bolus of lactated ringer of 2286 cc.  Started on Levophed for blood pressure support.  General surgery was consulted.  Patient will be admitted to ICU for septic shock.       Overview/Hospital Course:  This 64-year-old lady with history of CLL, lung cancer status post partial resection presented to the emergency department due to presyncope, nausea vomiting and abdominal pain.  She was also noted to be hypotensive and tachycardic on admission.  She was also noted to have fever.  Patient was admitted to the hospital for septic shock concern for abdominal source given CT findings showing some pericholecystic fluid.  Patient was also noted to have transaminitis and imaging did reveal some peripancreatic fluid and inflammatory changes as well though her lipase was normal.  General surgery was consulted after ultrasound the gallbladder was performed which again redemonstrated pericholecystic fluid.  She is status post laparoscopic cholecystectomy on 10/03.  Gallbladder was not noted to be gangrenous and per the op note there was suspicion that this was not the source of her septic shock.  However, blood cultures grew only coagulation negative Staphylococcus in 1/2 bottles.  This was thought to be contaminant.  Infectious Disease was consulted and vancomycin and Zosyn was continued on this patient.  Levophed initiated on admission was able to be weaned, although she still had requirement the day after surgery.  Will attempt midodrine TID to transition off levophed.  Repeat blood cultures NTD. Vancomycin dc'd  Florinef added. I/O's consistently negative due to diarrhea and poor oral intake.  Probiotic and stool WBC, elastase studies, hepatitis panel pending.    10/9/24: C completed, no obstructive disease; continuous diarrhea - lactinex added while on zosyn; GI consulted; severe pruritic rash buttock, down both legs and abdominal wall    No new subjective & objective note has been filed  under this hospital service since the last note was generated.      Assessment/Plan:      * Septic shock  - Suspect secondary to intra-abdominal process.  CT reveals pericholecystic fluid and peripancreatic fluid  She is now status post cholecystectomy with General surgery, per op note the gallbladder did not appear significantly inflamed to be commensurate with the patient's presentation of septic shock, 1/2 bottles drawn for blood culture was growing coagulase-negative Staphylococcus, felt to be contaminant, however, given patient's persistent pressor requirement infectious Disease was consulted, recommendation to continue vancomycin and Zosyn at this time  Infectious Disease we will defer imaging of patient's C-spine for now  Continued follow up blood cultures  Levophed as needed for map less than 65 -unable to wean off, midodrine initiated by cardiology   Check cortisol - normal range for am draw  Florinef added   Added hydrocortisone back     Functional diarrhea  S/p lap archana - continues to have loose stools  Lactose intolerant - diet and supplements adjusted - diarrhea more pronounced after ensure  Add probiotic while on zosyn  Add stool WBC and elastase studies   C.diff negative 6 days ago   Consider stool bulking agent if no improvement   GI consulted         Bacteremia due to Staphylococcus  This has now resulted as coagulase-negative staph, likely contaminant  ID following and would like to continue vancomycin for now  Repeat blood cultures 10/5/24 NTD - VAncomycin dc'd     Abdominal pain  Suspect secondary to acute cholecystitis.    CT abdomen also showed mild pancreatitis, but lipase is within normal limits.    Given her diarrhea however, will order stool culture.    Supportive care with IV fluids, Zofran for nausea.  P.r.n. pain medication.      Acute renal failure  Most recent creatinine and eGFR are listed below.  Recent Labs     10/04/24  0455 10/04/24  2101 10/05/24  0336   CREATININE 1.2 1.2 1.4    EGFRNORACEVR 50.2* 50.2* 41.8*       Likely pre-renal given her septic shock.   Status post IV hydration, patient able to take PO hydration  GM resolving      Neuropathic pain  Resume gabapentin.      Calculus of gallbladder with acute cholecystitis without obstruction  Status post cholecystectomy  WBC 24K, on zosyn       Acquired hypothyroidism  Resume synthroid.       CLL (chronic lymphocytic leukemia)  Monitor. Patient's last chemo for over a year.  Follow up with Hematology outpatient.      Anxiety  Resume Wellbutrin and Celexa        VTE Risk Mitigation (From admission, onward)           Ordered     Place [place] hose  Until discontinued         10/05/24 1132     heparin (porcine) injection 5,000 Units  Every 8 hours         10/02/24 1611     IP VTE HIGH RISK PATIENT  Once         10/02/24 1611     Place sequential compression device  Until discontinued         10/02/24 1611                    Discharge Planning   SALINA: 10/14/2024     Code Status: Full Code   Is the patient medically ready for discharge?:     Reason for patient still in hospital (select all that apply): Patient trending condition and Consult recommendations  Discharge Plan A: Home Health   Discharge Delays: None known at this time        Critical care time spent on the evaluation and treatment of severe organ dysfunction, review of pertinent labs and imaging studies, discussions with consulting providers and discussions with patient/family: 30 minutes.      Iris Odell MD  Department of Hospital Medicine   Formerly [facility]

## 2024-10-10 NOTE — PT/OT/SLP PROGRESS
Occupational Therapy      Patient Name:  Yvette Hutchinson   MRN:  [id number]    Patient not seen today secondary to Other (Comment) (OT unavailable in AM and procedure in PM). Will follow-up next service date.    10/10/2024

## 2024-10-10 NOTE — ANESTHESIA PREPROCEDURE EVALUATION
10/10/2024  Yvette Hutchinson is a 64 y.o., female.         Results for orders placed or performed during the hospital encounter of 10/02/24   EKG 12-lead    Collection Time: 10/09/24  8:56 AM   Result Value Ref Range    QRS Duration 82 ms    [facility] QTC Calculation 455 ms    Narrative    Test Reason : I21.4,Z01.810,    Vent. Rate : 056 BPM     Atrial Rate : 056 BPM     P-R Int : 140 ms          QRS Dur : 082 ms      QT Int : 472 ms       P-R-T Axes : 077 092 -66 degrees     QTc Int : 455 ms    Sinus bradycardia  Rightward axis  Low voltage QRS  T wave abnormality, consider anterolateral ischemia  Abnormal ECG  When compared with ECG of 04-OCT-2024 13:13,  Significant changes have occurred    Referred By: [email or web address]   SELF           Confirmed By:         Lab Results   Component Value Date    WBC 34.29 (H) 10/10/2024    HGB 9.5 (L) 10/10/2024    HCT 29.6 (L) 10/10/2024    MCV 95 10/10/2024     10/10/2024     BMP  Lab Results   Component Value Date     10/10/2024    K 3.5 10/10/2024     10/10/2024    CO2 23 10/10/2024    BUN 7 (L) 10/10/2024    CREATININE 0.9 10/10/2024    CALCIUM 8.2 (L) 10/10/2024    ANIONGAP 8 10/10/2024     (H) 10/10/2024    GLU 84 10/09/2024    GLU 89 10/08/2024       Results for orders placed during the hospital encounter of 10/02/24    Echo    Interpretation Summary    Left Ventricle: Left ventricle was not well visualized due to poor sonic window. Regional wall motion abnormalities present. See diagram for wall motion findings. There is moderately reduced systolic function with a visually estimated ejection fraction of 30 - 35%. There is normal diastolic function. E/A ratio is 0.84. Average E/e' ratio is 8.35.    Right Ventricle: Normal right ventricular cavity size. Systolic function is normal.    Mitral Valve: There is mild to moderate regurgitation.     Tricuspid Valve: There is mild regurgitation.    Pulmonic Valve: There is mild regurgitation.    Pericardium: There is a small effusion. No indication of cardiac tamponade.         Lab Results   Component Value Date    WBC 34.29 (H) 10/10/2024    HGB 9.5 (L) 10/10/2024    HCT 29.6 (L) 10/10/2024    MCV 95 10/10/2024     10/10/2024     BMP  Lab Results   Component Value Date     10/10/2024    K 3.5 10/10/2024     10/10/2024    CO2 23 10/10/2024    BUN 7 (L) 10/10/2024    CREATININE 0.9 10/10/2024    CALCIUM 8.2 (L) 10/10/2024    ANIONGAP 8 10/10/2024     (H) 10/10/2024    GLU 84 10/09/2024    GLU 89 10/08/2024       No results found for this or any previous visit.           Pre-op Assessment    I have reviewed the Patient Summary Reports.     I have reviewed the Nursing Notes. I have reviewed the NPO Status.   I have reviewed the Medications.     Review of Systems  Anesthesia Hx:  No problems with previous Anesthesia   History of prior surgery of interest to airway management or planning: cervical fusion, lung surgery.         Denies Family Hx of Anesthesia complications.    Denies Personal Hx of Anesthesia complications.                    Social:  Alcohol Use, Smoker       Hematology/Oncology:       -- Anemia:         --  Leukemia: Chronic Lymphocytic Leukemia (CLL)        Hematology Comments: Chronic leukocytosis due to CLL      --  Cancer in past history:              chemotherapy and surgery   Oncology Comments: CLL  Lung cancer     EENT/Dental:   Full dentures, out          Cardiovascular:       Past MI               ECG has been reviewed. Patient admitted with septic shock  Patient presently receiving Levophed    NICM LVEF 30- 35%-cath shows no blockages-no ectopy on telemetry     Midodrine 10 mg p.o. t.i.d., Florinef 0.1 mg p.o. b.i.d.                         Pulmonary:   COPD (Home oxygen on an as-needed basis only.  Daily inhalers)      Chronic respiratory failure with  hypoxia  History of Malignant neoplasm of upper lobe of lung  History of partial left pneumonectomy in 2022 for lung cancer.  Albuterol inhaler use  Occasional home oxygen use  Presently mild diffuse bronchial wall thickening that could reflect infectious or inflammatory bronchitis.  Oxygen saturation 96%               Renal/:  Chronic Renal Disease, ARF   GM - improving      Kidney Function/Disease             Hepatic/GI:     GERD, well controlled   Linda pancreatic edema which may represent early pancreatitis.  Esophageal / Stomach Disorders Esophageal Disorder, Esophageal Stricture          Musculoskeletal:  Arthritis   Patient smokes marijuana and takes gabapentin for chronic neck and back pain.  Patient has T8 compression fracture.       Spine Disorders: cervical and lumbar Disc disease, Degenerative disease and Chronic Pain           Neurological:    Neuromuscular Disease,       Neuropathic pain  Occasional numbness and tingling of hands and fingers bilaterally.  Occasional burning of feet.      Chronic Pain Syndrome   Peripheral Neuropathy                          Endocrine:   Hypothyroidism          Dermatological:  Skin Normal    Psych:  Psychiatric History  depression Sleep Disorder and Insomnia.       Sleep Disorder and Insomnia.        Physical Exam  General: Well nourished, Alert, Cooperative and Oriented    Airway:  Mallampati: I   Mouth Opening: Normal  TM Distance: Normal  Tongue: Normal  Neck ROM: Extension Decreased, Extension Painful    Dental:  Edentulous    Chest/Lungs:  Clear to auscultation, Normal Respiratory Rate    Heart:  Rate: Normal  Rhythm: Regular Rhythm  Sounds: Normal        Anesthesia Plan  Type of Anesthesia, risks & benefits discussed:    Anesthesia Type: Gen ETT  Intra-op Monitoring Plan: Standard ASA Monitors and Art Line  Post Op Pain Control Plan: multimodal analgesia  Induction:  IV and rapid sequence  Airway Plan: Direct and Video, Post-Induction  Informed Consent:  Informed consent signed with the Patient and all parties understand the risks and agree with anesthesia plan.  All questions answered. Patient consented to blood products? Yes  ASA Score: 4  Anesthesia Plan Notes:     GETA  No Versed  Hold Fentanyl   No Ofirmev  No Decadron        Ready For Surgery From Anesthesia Perspective.     .

## 2024-10-10 NOTE — ANESTHESIA POSTPROCEDURE EVALUATION
Anesthesia Post Evaluation    Patient: Yvette Hutchinson    Procedure(s) Performed: Procedure(s) (LRB):  ERCP (ENDOSCOPIC RETROGRADE CHOLANGIOPANCREATOGRAPHY) (N/A)    Final Anesthesia Type: general      Patient location during evaluation: ICU  Patient participation: Yes- Able to Participate  Level of consciousness: awake and alert, oriented and awake  Post-procedure vital signs: reviewed and stable  Pain management: adequate  Airway patency: patent    PONV status at discharge: No PONV  Anesthetic complications: no      Cardiovascular status: blood pressure returned to baseline, hemodynamically stable and stable  Respiratory status: unassisted, spontaneous ventilation and room air  Hydration status: euvolemic  Follow-up not needed.              Vitals Value Taken Time   BP 95/50 10/10/24 1730   Temp 98.0 10/10/24 1738   Pulse 69 10/10/24 1736   Resp 20 10/10/24 1736   SpO2 96 % 10/10/24 1736   Vitals shown include unfiled device data.      Event Time   Out of Recovery 10/10/2024 15:52:29         Pain/Meaghan Score: Pain Rating Prior to Med Admin: 5 (10/10/2024  4:40 PM)  Pain Rating Post Med Admin: 2 (10/10/2024 12:28 AM)  Meaghan Score: 10 (10/9/2024 11:31 AM)

## 2024-10-10 NOTE — [employer] PATIENT INSTRUCTIONS
Discharge Summary/Instructions after an Endoscopic Procedure  Patient Name: Yvette Hutchinson  Patient MRN: [id number]  Patient YOB: 1960  Thursday, October 10, 2024  Joshua Polanco III, MD  RESTRICTIONS:  During your procedure today, you received medications for sedation.  These   medications may affect your judgment, balance and coordination.  Therefore,   for 24 hours, you have the following restrictions:   - DO NOT drive a car, operate machinery, make legal/financial decisions,   sign important papers or drink alcohol.    ACTIVITY:  Today: no heavy lifting, straining or running due to procedural   sedation/anesthesia.  The following day: return to full activity including work.  DIET:  Eat and drink normally unless instructed otherwise.     TREATMENT FOR COMMON SIDE EFFECTS:  - Mild abdominal pain, nausea, belching, bloating or excessive gas:  rest,   eat lightly and use a heating pad.  - Sore Throat: treat with throat lozenges and/or gargle with warm salt   water.  - Because air was used during the procedure, expelling large amounts of air   from your rectum or belching is normal.  - If a bowel prep was taken, you may not have a bowel movement for 1-3 days.    This is normal.  SYMPTOMS TO WATCH FOR AND REPORT TO YOUR PHYSICIAN:  1. Abdominal pain or bloating, other than gas cramps.  2. Chest pain.  3. Back pain.  4. Signs of infection such as: chills or fever occurring within 24 hours   after the procedure.  5. Rectal bleeding, which would show as bright red, maroon, or black stools.   (A tablespoon of blood from the rectum is not serious, especially if   hemorrhoids are present.)  6. Vomiting.  7. Weakness or dizziness.  GO DIRECTLY TO THE NEAREST EMERGENCY ROOM IF YOU HAVE ANY OF THE FOLLOWING:      Difficulty breathing              Chills and/or fever over 101 F   Persistent vomiting and/or vomiting blood   Severe abdominal pain   Severe chest pain   Black, tarry stools   Bleeding- more than  one tablespoon   Any other symptom or condition that you feel may need urgent attention  Your doctor recommends these additional instructions:  If any biopsies were taken, your doctors clinic will contact you in 1 to 2   weeks with any results.  - Advance diet as tolerated.   - Continue present medications.   - Patient has a contact number available for emergencies.  The signs and   symptoms of potential delayed complications were discussed with the   patient.  Return to normal activities tomorrow.  Written discharge   instructions were provided to the patient.   - Return patient to hospital matias for ongoing care.  For questions, problems or results please call your physician - Joshua Polanco III, MD at Work:  [phone].  [facility], EMERGENCY ROOM PHONE NUMBER: [phone]  IF A COMPLICATION OR EMERGENCY SITUATION ARISES AND YOU ARE UNABLE TO REACH   YOUR PHYSICIAN - GO DIRECTLY TO THE EMERGENCY ROOM.  Joshua Polanco III, MD  10/10/2024 3:52:13 PM  This report has been verified and signed electronically.  Dear patient,  As a result of recent federal legislation (The Federal Cures Act), you may   receive lab or pathology results from your procedure in your [facility]   account before your physician is able to contact you. Your physician or   their representative will relay the results to you with their   recommendations at their soonest availability.  Thank you,  [employer]

## 2024-10-10 NOTE — ASSESSMENT & PLAN NOTE
- Suspect secondary to intra-abdominal process.  CT reveals pericholecystic fluid and peripancreatic fluid  She is now status post cholecystectomy with General surgery, per op note the gallbladder did not appear significantly inflamed to be commensurate with the patient's presentation of septic shock, 1/2 bottles drawn for blood culture was growing coagulase-negative Staphylococcus, felt to be contaminant, however, given patient's persistent pressor requirement infectious Disease was consulted, recommendation to continue vancomycin and Zosyn at this time  Infectious Disease we will defer imaging of patient's C-spine for now  Continued follow up blood cultures  Levophed as needed for map less than 65 -unable to wean off, midodrine initiated by cardiology   Check cortisol - normal range for am draw  Florinef added   Added hydrocortisone back

## 2024-10-10 NOTE — CARE UPDATE
10/09/24 1935   Patient Assessment/Suction   Level of Consciousness (AVPU) alert   Respiratory Effort Normal;Unlabored   Expansion/Accessory Muscles/Retractions no use of accessory muscles;no retractions   All Lung Fields Breath Sounds diminished;clear   Rhythm/Pattern, Respiratory unlabored;pattern regular;no shortness of breath reported   Cough Frequency no cough   PRE-TX-O2   Device (Oxygen Therapy) room air   SpO2 96 %   Pulse Oximetry Type Continuous   Pulse 66   Resp 19   Aerosol Therapy   $ Aerosol Therapy Charges Aerosol Treatment  (brov)   Daily Review of Necessity (SVN) completed   Respiratory Treatment Status (SVN) given   Treatment Route (SVN) mask;oxygen   Patient Position sitting in chair   Post Treatment Assessment (SVN) increased aeration   Signs of Intolerance (SVN) none   Breath Sounds Post-Respiratory Treatment   Throughout All Fields Post-Treatment All Fields   Throughout All Fields Post-Treatment aeration increased   Post-treatment Heart Rate (beats/min) 66   Post-treatment Resp Rate (breaths/min) 19   Incentive Spirometer   $ Incentive Spirometer Charges done with encouragement   Incentive Spirometer Predicted Level (mL) 1500   Administration (IS) instruction provided, follow-up;mouthpiece utilized;proper technique demonstrated   Number of Repetitions (IS) 10   Level Incentive Spirometer (mL) 1900   Patient Tolerance (IS) good

## 2024-10-10 NOTE — ASSESSMENT & PLAN NOTE
This has now resulted as coagulase-negative staph, likely contaminant  ID following and would like to continue vancomycin for now  Repeat blood cultures 10/5/24 NTD - VAncomycin dc'd

## 2024-10-10 NOTE — ASSESSMENT & PLAN NOTE
S/p lap archana - continues to have loose stools  Lactose intolerant - diet and supplements adjusted - diarrhea more pronounced after ensure  Add probiotic while on zosyn  Add stool WBC and elastase studies   C.diff negative 6 days ago   Consider stool bulking agent if no improvement   GI consulted

## 2024-10-10 NOTE — CONSULTS
GASTROENTEROLOGY INPATIENT CONSULT NOTE  Patient Name: Yvette Hutchinson  Patient MRN: [id number]  Patient : 1960    Admit Date: 10/2/2024  Service date: 10/10/2024    Reason for Consult: Septic shock / elevated LFTs / bowel chagnes / abnormal MRCP    PCP: Vern Priest MD    Chief Complaint   Patient presents with    Abdominal Pain    Nausea    Vomiting    Diarrhea     Pt has been sick for the last two weeks       HPI: Patient is a 64 y.o. female with PMHx cervical surgery, hypothyroid, lung Ca s/p partial lung resection, recent lap archana, HLD, CLL that presented w/ n/v/d and abdominal pain. Patient found to be in shock from suspected sepsis and underwent GB resection. Remains in ICU w/ intermittent pressors and suspected ischemic hepatitis in setting of NSTEMI. LHC done w/ clean coronaries. MRCP done which showed suspected stricture vs filling defect in CHD just below hilum. On plavix currently given NSTEMI but LHC unremarkable and Cards Ok'd to stop starting today.     CHART REVIEW:   WBC 34K (know h/o CLL)  ; ; ; TB 0.6 (AST/ALT were >1000 recently from suspected ischemic injury)  Blood PCR and culture  + Staph  Stool - Negative for C. diff  MRCP 10/'24 - stricture vs filling defect in CHD just below hilum; s/p archana  CT Abd 10/'24 - GB / panc inflammation; vasuclar dz; LAD; min ascites; tics;     Past Medical History:  Past Medical History:   Diagnosis Date    Arthritis     Cancer 10/2022    (CLL) leukemia ; adenocarcinoma  adenosgemis    Depression     GERD (gastroesophageal reflux disease)     Neck pain     and back pain    Personal history of colonic polyps 2017    Pneumonia of left lung due to infectious organism 2020    Thyroid disease         Past Surgical History:  Past Surgical History:   Procedure Laterality Date    FUSION, SPINE, CERVICAL, ANTERIOR APPROACH N/A 2024    Procedure: FUSION, SPINE, CERVICAL, ANTERIOR APPROACH;  Surgeon: Anatoly Daley  DO ASHTYN;  Location: [facility] OR;  Service: Neurosurgery;  Laterality: N/A;  IOM, C-ARM, MEDTRONICS, MICRO, HORSESHOE    INJECTION OF ANESTHETIC AGENT AROUND MULTIPLE INTERCOSTAL NERVES Left 10/03/2022    Procedure: BLOCK, NERVE, INTERCOSTAL, 2 OR MORE;  Surgeon: Evelio Kaplan MD;  Location: [facility] OR 2ND FLR;  Service: Thoracic;  Laterality: Left;    INSERTION OF TUNNELED CENTRAL VENOUS CATHETER (CVC) WITH SUBCUTANEOUS PORT Right 11/03/2022    Procedure: [id number], Right or Left Neck or Chest;  Surgeon: Mini Acevedo MD;  Location: [facility] OR 2ND FLR;  Service: General;  Laterality: Right;    LAPAROSCOPIC CHOLECYSTECTOMY N/A 10/3/2024    Procedure: CHOLECYSTECTOMY, LAPAROSCOPIC;  Surgeon: Ang Mosley III, MD;  Location: [facility] OR;  Service: General;  Laterality: N/A;    LEFT HEART CATHETERIZATION Left 10/9/2024    Procedure: Left heart cath;  Surgeon: Martin Ingram MD;  Location: [facility] CATH/EP LAB;  Service: Cardiology;  Laterality: Left;    ROBOT-ASSISTED LAPAROSCOPIC LYMPHADENECTOMY USING DA MONIQUE XI Left 10/03/2022    Procedure: XI ROBOTIC LYMPHADENECTOMY;  Surgeon: Evelio Kaplan MD;  Location: [facility] OR [facility] FLR;  Service: Thoracic;  Laterality: Left;    SPINE SURGERY      TONSILLECTOMY      XI ROBOTIC RATS,WITH LOBECTOMY,LUNG Left 10/03/2022    Procedure: XI ROBOTIC RATS,WITH LOBECTOMY,LUNG;  Surgeon: Evelio Kaplan MD;  Location: [facility] OR [facility] FLR;  Service: Thoracic;  Laterality: Left;        Home Medications:  Medications Prior to Admission   Medication Sig Dispense Refill Last Dose/Taking    acetaminophen (TYLENOL) 500 MG tablet Take 2 tablets (1,000 mg total) by mouth every 6 (six) hours as needed for Pain. 8 tablet 0 Past Month    albuterol (PROVENTIL/VENTOLIN HFA) 90 mcg/actuation inhaler Inhale 2 puffs into the lungs every 6 (six) hours as needed for Wheezing or Shortness of Breath. Rescue 8.5 g 11 10/1/2024 Noon    buPROPion (WELLBUTRIN XL) 300 MG 24 hr tablet Take 1  tablet (300 mg total) by mouth once daily. 90 tablet 3 10/1/2024 Morning    citalopram (CELEXA) 20 MG tablet Take 1 tablet (20 mg total) by mouth once daily. 30 tablet 11 10/1/2024 Morning    fluticasone propionate (FLONASE) 50 mcg/actuation nasal spray 1 spray (50 mcg total) by Each Nostril route once daily. 16 g 3 Past Week    fluticasone-salmeterol 230-21 mcg/dose (ADVAIR HFA) 230-21 mcg/actuation HFAA inhaler Inhale 2 puffs into the lungs 2 (two) times daily. Controller 12 g 5 10/1/2024 Evening    gabapentin (NEURONTIN) 300 MG capsule Take 1 capsule (300 mg total) by mouth 3 (three) times daily. 270 capsule 1 10/1/2024 Evening    levocetirizine (XYZAL) 5 MG tablet Take 1 tablet (5 mg total) by mouth every evening. 30 tablet 5 10/1/2024 Evening    levothyroxine (SYNTHROID) 50 MCG tablet Take 1 tablet (50 mcg total) by mouth before breakfast. 90 tablet 3 10/1/2024 Morning    pravastatin (PRAVACHOL) 10 MG tablet Take 1 tablet (10 mg total) by mouth once daily. 90 tablet 3 10/1/2024 Morning    topiramate (TOPAMAX) 100 MG tablet Take 1 tablet (100 mg total) by mouth 2 (two) times daily. 60 tablet 11 10/1/2024 Evening    naloxone (NARCAN) 4 mg/actuation Spry ADMINISTER A SINGLE SPRAY IN ONE NOSTRIL UPON SIGNS OF OPIOID OVERDOSE. CALL 911. REPEAT AFTER 3 MINUTES IF NO RESPONSE.       varenicline (CHANTIX) 1 mg Tab Take 1 tablet by mouth twice daily (Patient not taking: Reported on 10/2/2024) 180 tablet 0 Not Taking       Inpatient Medications:   arformoteroL  15 mcg Nebulization BID    aspirin  81 mg Oral Daily    budesonide  1 mg Nebulization Q12H    buPROPion  300 mg Oral Daily    citalopram  20 mg Oral Daily    fludrocortisone  100 mcg Oral Daily    gabapentin  300 mg Oral TID    heparin (porcine)  5,000 Units Subcutaneous Q8H    hydrocortisone   Topical (Top) TID    hydrocortisone sodium succinate  100 mg Intravenous Daily    lactobacillus acidophilus & bulgar  1 packet Oral BID    levothyroxine  50 mcg Oral Before  breakfast    miconazole   Topical (Top) BID    midodrine  10 mg Oral TID AC    piperacillin-tazobactam (Zosyn) IV (PEDS and ADULTS) (extended infusion is not appropriate)  4.5 g Intravenous Q8H    theophylline  200 mg Oral BID    topiramate  100 mg Oral BID       Current Facility-Administered Medications:     acetaminophen, 650 mg, Oral, Q4H PRN    aluminum-magnesium hydroxide-simethicone, 30 mL, Oral, Q6H PRN    calcium gluconate IVPB, 1 g, Intravenous, PRN    calcium gluconate IVPB, 2 g, Intravenous, PRN    calcium gluconate IVPB, 3 g, Intravenous, PRN    dextrose 50%, 12.5 g, Intravenous, PRN    dextrose 50%, 25 g, Intravenous, PRN    diphenhydrAMINE, 25 mg, Oral, Q6H PRN    glucagon (human recombinant), 1 mg, Intramuscular, PRN    glucose, 16 g, Oral, PRN    glucose, 24 g, Oral, PRN    HYDROcodone-acetaminophen, 1 tablet, Oral, Q6H PRN    HYDROmorphone, 0.5 mg, Intravenous, Q2H PRN    magnesium oxide, 800 mg, Oral, PRN    magnesium oxide, 800 mg, Oral, PRN    melatonin, 6 mg, Oral, Nightly PRN    naloxone, 0.02 mg, Intravenous, PRN    ondansetron, 4 mg, Intravenous, Q6H PRN    potassium bicarbonate, 35 mEq, Oral, PRN    potassium bicarbonate, 50 mEq, Oral, PRN    potassium bicarbonate, 60 mEq, Oral, PRN    potassium, sodium phosphates, 2 packet, Oral, PRN    potassium, sodium phosphates, 2 packet, Oral, PRN    potassium, sodium phosphates, 2 packet, Oral, PRN    prochlorperazine, 5 mg, Intravenous, Q4H PRN    senna-docusate 8.6-50 mg, 1 tablet, Oral, BID PRN    sodium chloride 0.9%, 2 mL, Intravenous, Q12H PRN    Review of patient's allergies indicates:   Allergen Reactions    Latex, natural rubber        Social History:   Social History     Occupational History    Not on file   Tobacco Use    Smoking status: Some Days     Current packs/day: 0.15     Types: Cigarettes     Passive exposure: Current    Smokeless tobacco: Never    Tobacco comments:     reports one cigarette every now and then   Substance and Sexual  "Activity    Alcohol use: Yes     Comment: rarely    Drug use: Yes     Types: Marijuana    Sexual activity: Not on file       Family History:   Family History   Problem Relation Name Age of Onset    Ovarian cancer Sister      Breast cancer Cousin         Review of Systems:  A 10 point review of systems was performed and was normal, except as mentioned in the HPI, including constitutional, HEENT, heme, lymph, cardiovascular, respiratory, gastrointestinal, genitourinary, neurologic, endocrine, psychiatric and musculoskeletal.      OBJECTIVE:    Physical Exam:  24 Hour Vital Sign Ranges: Temp:  [97.4 °F (36.3 °C)-98.1 °F (36.7 °C)] 98 °F (36.7 °C)  Pulse:  [61-82] 68  Resp:  [10-46] 20  SpO2:  [87 %-100 %] 96 %  BP: ()/(46-91) 95/53  Most recent vitals: BP (!) 95/53   Pulse 68   Temp 98 °F (36.7 °C) (Oral)   Resp 20   Ht 5' 2.99" (1.6 m)   Wt 75.6 kg (166 lb 10.7 oz)   LMP 01/13/2010 (Approximate)   SpO2 96%   Breastfeeding No   BMI 29.53 kg/m²    GEN: well-developed, well-nourished, awake and alert, non-toxic appearing adult  HEENT: PERRL, sclera anicteric, oral mucosa pink and moist without lesion  NECK: trachea midline; Good ROM  CV: regular rate and rhythm, no murmurs or gallops  RESP: clear to auscultation bilaterally, no wheezes, rhonci or rales  ABD: soft, non-tender, non-distended, normal bowel soundsl; lap incsions  EXT: no swelling or edema, 2+ pulses distally  SKIN: no rashes or jaundice  PSYCH: normal affect    Labs:   Recent Labs     10/08/24  0318 10/09/24  0317 10/10/24  0305   WBC 26.08* 29.37* 34.29*   MCV 95 97 95    216 289     Recent Labs     10/08/24  1209 10/09/24  0317 10/09/24  1833 10/10/24  0305    137  --  139   K 3.7 3.5 3.3* 3.5    108  --  108   CO2 23 22*  --  23   BUN 7* 7*  --  7*   GLU 89 84  --  137*     No results for input(s): "ALB" in the last 72 hours.    Invalid input(s): "ALKP", "SGOT", "SGPT", "TBIL", "DBIL", "TPRO"  Recent Labs     " 10/09/24  0910   INR 1.1         Radiology Review:  MRI MRCP Abdomen WO Contrast 3D WO Independent WS XPD   Final Result   Abnormal      Narrowing with shoulder ring suggested in the common hepatic duct just beyond the bifurcation into right and left intrahepatic biliary radicles.  Correlate for stricture, retained stone or Klatskin tumor.      Left L4-5 lateral recess stenosis.      This report was flagged in Epic as abnormal.         Electronically signed by: Perez Shaw   Date:    10/10/2024   Time:    02:12      CT Head Without Contrast   Final Result      No acute intracranial abnormality.         Electronically signed by: Vinay Daniels   Date:    10/04/2024   Time:    15:25      US Abdomen Limited   Final Result      1. Cholelithiasis, gallbladder wall thickening, and mild pericholecystic fluid are nonspecific.  Clinical and laboratory correlation is needed to assess for any additional evidence of acute cholecystitis.   2. No other sonographic abnormalities throughout the right upper quadrant.         Electronically signed by: Vinay Daniels   Date:    10/02/2024   Time:    15:34      CTA Chest Non-Coronary (PE Studies)   Final Result      1. Negative for pulmonary embolus.   2. Mild diffuse bronchial wall thickening could reflect infectious or inflammatory bronchitis in the appropriate clinical setting.  Additional lower lung zone left greater than right interlobular septal thickening suggest coexisting mild interstitial edema.   3. Increased size of precarinal and right hilar lymph nodes since 09/06/2022.  Differential diagnosis of enlarged lymph nodes has been previously discussed in remains unchanged.   4. Suboptimally evaluated due to beam hardening artifact, soft tissue density with calcification in pretracheal region just superior to the sternal notch as discussed above.  This is suspicious for a exophytic thyroid nodule/parenchyma and has not significantly changed since 2020.         Electronically  signed by: Vinay Daniels   Date:    10/02/2024   Time:    14:22      CT Abdomen Pelvis With IV Contrast NO Oral Contrast   Final Result      Pericholecystic edema without evidence of gallstones.  Cholecystitis cannot be excluded and correlation with labs and if necessary ultrasound is recommended      [place] pancreatic edema which may represent early pancreatitis      Minimal ascites      Stable mildly prominent lymph nodes in the upper abdomen which may be reactive      Stable scarring within the left lung base      Diffuse vascular calcification of the aorta      Colonic diverticulosis         Electronically signed by: Olesya Painter   Date:    10/02/2024   Time:    14:17      X-Ray Chest 1 View   Final Result      No evidence of active cardiopulmonary disease.         Electronically signed by: Olesya Painter   Date:    10/02/2024   Time:    11:24      FL ERCP Biliary And Pancreatic By Rad Tech    (Results Pending)         IMPRESSION / RECOMMENDATIONS:  64 y.o. female with PMHx cervical surgery, hypothyroid, lung Ca s/p partial lung resection, recent lap archana, HLD, CLL that presented w/ n/v/d and abdominal pain. Patient found to be in shock from suspected sepsis and underwent GB resection. Remains in ICU w/ intermittent pressors and suspected ischemic hepatitis in setting of NSTEMI. LHC done w/ clean coronaries. MRCP done which showed suspected stricture vs filling defect in CHD just below hilum. On plavix currently given NSTEMI but LHC unremarkable and Cards Ok'd to stop starting today. RIsks, benefits, alternatives discussed in detail regarding upcoming procedures and sedation and possible complications. Some of the more common endoscopic complications include but not limited to immediate or delayed perforation, bleeding, infections, pain, inadvertent injury to surrounding tissue / organs boubacar pacnreas and possible need for surgical evaluation. All questions answered and will proceed with procedure as  planned.     -ERCP w/ stenting only today (limited interventions due to Plavix for NSTEMI)  -Repeat ERCP in near future once off plavix w/ sphincterotomy, bx, etc as warranted.     Thank you for this consult.    Joshua Polanco III  10/10/2024  2:43 PM

## 2024-10-11 LAB
ALBUMIN SERPL BCP-MCNC: 3.4 G/DL (ref 3.5–5.2)
ALP SERPL-CCNC: 321 U/L (ref 55–135)
ALT SERPL W/O P-5'-P-CCNC: 221 U/L (ref 10–44)
ANION GAP SERPL CALC-SCNC: 8 MMOL/L (ref 8–16)
ANION GAP SERPL CALC-SCNC: 8 MMOL/L (ref 8–16)
AST SERPL-CCNC: 74 U/L (ref 10–40)
BASOPHILS NFR BLD: 0 % (ref 0–1.9)
BILIRUB SERPL-MCNC: 0.5 MG/DL (ref 0.1–1)
BUN SERPL-MCNC: 7 MG/DL (ref 8–23)
BUN SERPL-MCNC: 8 MG/DL (ref 8–23)
CALCIUM SERPL-MCNC: 8 MG/DL (ref 8.7–10.5)
CALCIUM SERPL-MCNC: 8.2 MG/DL (ref 8.7–10.5)
CHLORIDE SERPL-SCNC: 111 MMOL/L (ref 95–110)
CHLORIDE SERPL-SCNC: 112 MMOL/L (ref 95–110)
CO2 SERPL-SCNC: 23 MMOL/L (ref 23–29)
CO2 SERPL-SCNC: 24 MMOL/L (ref 23–29)
CREAT SERPL-MCNC: 0.9 MG/DL (ref 0.5–1.4)
CREAT SERPL-MCNC: 0.9 MG/DL (ref 0.5–1.4)
DIFFERENTIAL METHOD BLD: ABNORMAL
EOSINOPHIL NFR BLD: 0 % (ref 0–8)
ERYTHROCYTE [DISTWIDTH] IN BLOOD BY AUTOMATED COUNT: 16.7 % (ref 11.5–14.5)
EST. GFR  (NO RACE VARIABLE): >60 ML/MIN/1.73 M^2
EST. GFR  (NO RACE VARIABLE): >60 ML/MIN/1.73 M^2
FAT STL QL: NORMAL
GLUCOSE SERPL-MCNC: 102 MG/DL (ref 70–110)
GLUCOSE SERPL-MCNC: 97 MG/DL (ref 70–110)
HCT VFR BLD AUTO: 29.5 % (ref 37–48.5)
HGB BLD-MCNC: 9.4 G/DL (ref 12–16)
HYPOCHROMIA BLD QL SMEAR: ABNORMAL
IMM GRANULOCYTES # BLD AUTO: ABNORMAL K/UL (ref 0–0.04)
IMM GRANULOCYTES NFR BLD AUTO: ABNORMAL % (ref 0–0.5)
LYMPHOCYTES NFR BLD: 80 % (ref 18–48)
MAGNESIUM SERPL-MCNC: 2.2 MG/DL (ref 1.6–2.6)
MCH RBC QN AUTO: 30.7 PG (ref 27–31)
MCHC RBC AUTO-ENTMCNC: 31.9 G/DL (ref 32–36)
MCV RBC AUTO: 96 FL (ref 82–98)
MONOCYTES NFR BLD: 4 % (ref 4–15)
NEUTRAL FAT STL QL: NORMAL
NEUTROPHILS NFR BLD: 16 % (ref 38–73)
NRBC BLD-RTO: 0 /100 WBC
PHOSPHATE SERPL-MCNC: 2.3 MG/DL (ref 2.7–4.5)
PLATELET # BLD AUTO: 426 K/UL (ref 150–450)
PLATELET BLD QL SMEAR: ABNORMAL
PMV BLD AUTO: 9.7 FL (ref 9.2–12.9)
POTASSIUM SERPL-SCNC: 3.1 MMOL/L (ref 3.5–5.1)
POTASSIUM SERPL-SCNC: 3.1 MMOL/L (ref 3.5–5.1)
PROT SERPL-MCNC: 5.3 G/DL (ref 6–8.4)
RBC # BLD AUTO: 3.06 M/UL (ref 4–5.4)
SODIUM SERPL-SCNC: 142 MMOL/L (ref 136–145)
SODIUM SERPL-SCNC: 144 MMOL/L (ref 136–145)
WBC # BLD AUTO: 45.41 K/UL (ref 3.9–12.7)

## 2024-10-11 PROCEDURE — 63600175 PHARM REV CODE 636 W HCPCS: Performed by: SURGERY

## 2024-10-11 PROCEDURE — 25000003 PHARM REV CODE 250: Performed by: INTERNAL MEDICINE

## 2024-10-11 PROCEDURE — 80048 BASIC METABOLIC PNL TOTAL CA: CPT | Performed by: HOSPITALIST

## 2024-10-11 PROCEDURE — 85027 COMPLETE CBC AUTOMATED: CPT | Performed by: SURGERY

## 2024-10-11 PROCEDURE — 63600175 PHARM REV CODE 636 W HCPCS

## 2024-10-11 PROCEDURE — 99233 SBSQ HOSP IP/OBS HIGH 50: CPT | Mod: ,,, | Performed by: INTERNAL MEDICINE

## 2024-10-11 PROCEDURE — 25000003 PHARM REV CODE 250: Performed by: HOSPITALIST

## 2024-10-11 PROCEDURE — 94799 UNLISTED PULMONARY SVC/PX: CPT

## 2024-10-11 PROCEDURE — 25000003 PHARM REV CODE 250

## 2024-10-11 PROCEDURE — 97116 GAIT TRAINING THERAPY: CPT

## 2024-10-11 PROCEDURE — 63600175 PHARM REV CODE 636 W HCPCS: Performed by: HOSPITALIST

## 2024-10-11 PROCEDURE — 94761 N-INVAS EAR/PLS OXIMETRY MLT: CPT

## 2024-10-11 PROCEDURE — 25000003 PHARM REV CODE 250: Performed by: SURGERY

## 2024-10-11 PROCEDURE — 85007 BL SMEAR W/DIFF WBC COUNT: CPT | Performed by: SURGERY

## 2024-10-11 PROCEDURE — 63600175 PHARM REV CODE 636 W HCPCS: Performed by: INTERNAL MEDICINE

## 2024-10-11 PROCEDURE — 99223 1ST HOSP IP/OBS HIGH 75: CPT | Mod: ,,, | Performed by: INTERNAL MEDICINE

## 2024-10-11 PROCEDURE — 80053 COMPREHEN METABOLIC PANEL: CPT | Performed by: SURGERY

## 2024-10-11 PROCEDURE — 84100 ASSAY OF PHOSPHORUS: CPT | Performed by: HOSPITALIST

## 2024-10-11 PROCEDURE — 94640 AIRWAY INHALATION TREATMENT: CPT

## 2024-10-11 PROCEDURE — 25000242 PHARM REV CODE 250 ALT 637 W/ HCPCS

## 2024-10-11 PROCEDURE — 87040 BLOOD CULTURE FOR BACTERIA: CPT | Performed by: INTERNAL MEDICINE

## 2024-10-11 PROCEDURE — 25000003 PHARM REV CODE 250: Performed by: NURSE PRACTITIONER

## 2024-10-11 PROCEDURE — 36415 COLL VENOUS BLD VENIPUNCTURE: CPT | Performed by: INTERNAL MEDICINE

## 2024-10-11 PROCEDURE — 97535 SELF CARE MNGMENT TRAINING: CPT

## 2024-10-11 PROCEDURE — 20000000 HC ICU ROOM

## 2024-10-11 PROCEDURE — 83735 ASSAY OF MAGNESIUM: CPT | Performed by: SURGERY

## 2024-10-11 PROCEDURE — 99900031 HC PATIENT EDUCATION (STAT)

## 2024-10-11 RX ORDER — LORAZEPAM 2 MG/ML
2 INJECTION INTRAMUSCULAR ONCE
Status: COMPLETED | OUTPATIENT
Start: 2024-10-11 | End: 2024-10-11

## 2024-10-11 RX ADMIN — MIDODRINE HYDROCHLORIDE 10 MG: 10 TABLET ORAL at 04:10

## 2024-10-11 RX ADMIN — POTASSIUM BICARBONATE 35 MEQ: 391 TABLET, EFFERVESCENT ORAL at 10:10

## 2024-10-11 RX ADMIN — MICONAZOLE NITRATE: 20 CREAM TOPICAL at 09:10

## 2024-10-11 RX ADMIN — ARFORMOTEROL TARTRATE 15 MCG: 15 SOLUTION RESPIRATORY (INHALATION) at 07:10

## 2024-10-11 RX ADMIN — MICONAZOLE NITRATE: 20 CREAM TOPICAL at 08:10

## 2024-10-11 RX ADMIN — HEPARIN SODIUM 5000 UNITS: 5000 INJECTION INTRAVENOUS; SUBCUTANEOUS at 02:10

## 2024-10-11 RX ADMIN — FLUDROCORTISONE ACETATE 100 MCG: 0.1 TABLET ORAL at 08:10

## 2024-10-11 RX ADMIN — ARFORMOTEROL TARTRATE 15 MCG: 15 SOLUTION RESPIRATORY (INHALATION) at 08:10

## 2024-10-11 RX ADMIN — POTASSIUM BICARBONATE 35 MEQ: 391 TABLET, EFFERVESCENT ORAL at 07:10

## 2024-10-11 RX ADMIN — TOPIRAMATE 100 MG: 25 TABLET, FILM COATED ORAL at 08:10

## 2024-10-11 RX ADMIN — HYDROCORTISONE: 25 CREAM TOPICAL at 08:10

## 2024-10-11 RX ADMIN — POTASSIUM & SODIUM PHOSPHATES POWDER PACK 280-160-250 MG 2 PACKET: 280-160-250 PACK at 10:10

## 2024-10-11 RX ADMIN — LORAZEPAM 2 MG: 2 INJECTION INTRAMUSCULAR; INTRAVENOUS at 09:10

## 2024-10-11 RX ADMIN — HEPARIN SODIUM 5000 UNITS: 5000 INJECTION INTRAVENOUS; SUBCUTANEOUS at 09:10

## 2024-10-11 RX ADMIN — ASPIRIN 81 MG: 81 TABLET, COATED ORAL at 08:10

## 2024-10-11 RX ADMIN — THEOPHYLLINE ANHYDROUS 200 MG: 100 CAPSULE, EXTENDED RELEASE ORAL at 09:10

## 2024-10-11 RX ADMIN — PIPERACILLIN SODIUM AND TAZOBACTAM SODIUM 4.5 G: 4; .5 INJECTION, POWDER, LYOPHILIZED, FOR SOLUTION INTRAVENOUS at 02:10

## 2024-10-11 RX ADMIN — GABAPENTIN 300 MG: 300 CAPSULE ORAL at 03:10

## 2024-10-11 RX ADMIN — CITALOPRAM HYDROBROMIDE 20 MG: 20 TABLET ORAL at 08:10

## 2024-10-11 RX ADMIN — HYDROCORTISONE: 25 CREAM TOPICAL at 09:10

## 2024-10-11 RX ADMIN — VANCOMYCIN HYDROCHLORIDE 2000 MG: 500 INJECTION, POWDER, LYOPHILIZED, FOR SOLUTION INTRAVENOUS at 12:10

## 2024-10-11 RX ADMIN — LACTOBACILLUS ACIDOPHILUS / LACTOBACILLUS BULGARICUS 1 EACH: 100 MILLION CFU STRENGTH GRANULES at 09:10

## 2024-10-11 RX ADMIN — GABAPENTIN 300 MG: 300 CAPSULE ORAL at 08:10

## 2024-10-11 RX ADMIN — CHOLESTYRAMINE 4 GRAMS OF ANHYDROUS CHOLESTYRAMINE: 4 POWDER, FOR SUSPENSION ORAL at 09:10

## 2024-10-11 RX ADMIN — CHOLESTYRAMINE 4 GRAMS OF ANHYDROUS CHOLESTYRAMINE: 4 POWDER, FOR SUSPENSION ORAL at 03:10

## 2024-10-11 RX ADMIN — BUPROPION HYDROCHLORIDE 300 MG: 150 TABLET, EXTENDED RELEASE ORAL at 08:10

## 2024-10-11 RX ADMIN — PIPERACILLIN SODIUM AND TAZOBACTAM SODIUM 4.5 G: 4; .5 INJECTION, POWDER, LYOPHILIZED, FOR SOLUTION INTRAVENOUS at 09:10

## 2024-10-11 RX ADMIN — TOPIRAMATE 100 MG: 25 TABLET, FILM COATED ORAL at 09:10

## 2024-10-11 RX ADMIN — MIDODRINE HYDROCHLORIDE 10 MG: 10 TABLET ORAL at 10:10

## 2024-10-11 RX ADMIN — THEOPHYLLINE ANHYDROUS 200 MG: 100 CAPSULE, EXTENDED RELEASE ORAL at 08:10

## 2024-10-11 RX ADMIN — CHOLESTYRAMINE 4 GRAMS OF ANHYDROUS CHOLESTYRAMINE: 4 POWDER, FOR SUSPENSION ORAL at 08:10

## 2024-10-11 RX ADMIN — HEPARIN SODIUM 5000 UNITS: 5000 INJECTION INTRAVENOUS; SUBCUTANEOUS at 06:10

## 2024-10-11 RX ADMIN — BUDESONIDE INHALATION 1 MG: 0.5 SUSPENSION RESPIRATORY (INHALATION) at 07:10

## 2024-10-11 RX ADMIN — BUDESONIDE INHALATION 1 MG: 0.5 SUSPENSION RESPIRATORY (INHALATION) at 08:10

## 2024-10-11 RX ADMIN — MIDODRINE HYDROCHLORIDE 10 MG: 10 TABLET ORAL at 06:10

## 2024-10-11 RX ADMIN — LACTOBACILLUS ACIDOPHILUS / LACTOBACILLUS BULGARICUS 1 EACH: 100 MILLION CFU STRENGTH GRANULES at 08:10

## 2024-10-11 RX ADMIN — HYDROCORTISONE SODIUM SUCCINATE 100 MG: 100 INJECTION, POWDER, FOR SOLUTION INTRAMUSCULAR; INTRAVENOUS at 08:10

## 2024-10-11 RX ADMIN — PIPERACILLIN SODIUM AND TAZOBACTAM SODIUM 4.5 G: 4; .5 INJECTION, POWDER, LYOPHILIZED, FOR SOLUTION INTRAVENOUS at 06:10

## 2024-10-11 RX ADMIN — HYDROCORTISONE: 25 CREAM TOPICAL at 03:10

## 2024-10-11 RX ADMIN — GABAPENTIN 300 MG: 300 CAPSULE ORAL at 09:10

## 2024-10-11 RX ADMIN — LEVOTHYROXINE SODIUM 50 MCG: 0.03 TABLET ORAL at 06:10

## 2024-10-11 NOTE — PLAN OF CARE
Problem: Wound  Goal: Improved Oral Intake  Outcome: Progressing  Intervention: Promote and Optimize Oral Intake  Flowsheets (Taken 10/11/2024 3667)  Nutrition Interventions: supplemental drinks provided

## 2024-10-11 NOTE — SUBJECTIVE & OBJECTIVE
Interval History:  still c/o nausea and poor oral intake, dietician recs appreciated     Review of Systems   Gastrointestinal:  Positive for abdominal pain, diarrhea and nausea.   Musculoskeletal:  Positive for back pain.    is performed and is negative unless mentioned in the interval history above.  Objective:     Vital Signs (Most Recent):  Temp: 97.6 °F (36.4 °C) (10/11/24 0301)  Pulse: 70 (10/11/24 0530)  Resp: 14 (10/11/24 0530)  BP: (!) 100/53 (10/11/24 0530)  SpO2: 97 % (10/11/24 0530) Vital Signs (24h Range):  Temp:  [97.6 °F (36.4 °C)-98.2 °F (36.8 °C)] 97.6 °F (36.4 °C)  Pulse:  [64-82] 70  Resp:  [10-46] 14  SpO2:  [89 %-100 %] 97 %  BP: ()/(42-59) 100/53     Weight: 75.6 kg (166 lb 10.7 oz)  Body mass index is 29.53 kg/m².    Intake/Output Summary (Last 24 hours) at 10/11/2024 0803  Last data filed at 10/11/2024 0633  Gross per 24 hour   Intake 1379.89 ml   Output --   Net 1379.89 ml         Physical Exam  Vitals reviewed.   Constitutional:       Appearance: Normal appearance.   HENT:      Head: Normocephalic and atraumatic.      Mouth/Throat:      Mouth: Mucous membranes are moist.   Eyes:      Extraocular Movements: Extraocular movements intact.      Conjunctiva/sclera: Conjunctivae normal.      Pupils: Pupils are equal, round, and reactive to light.   Cardiovascular:      Rate and Rhythm: Normal rate and regular rhythm.   Pulmonary:      Effort: Pulmonary effort is normal.      Breath sounds: Normal breath sounds.   Abdominal:      General: Abdomen is flat. Bowel sounds are normal.      Palpations: Abdomen is soft.      Comments: Appropriate abdominal tenderness.    Musculoskeletal:         General: No swelling or tenderness.   Skin:     General: Skin is warm.      Comments: Erythematous and pruritic rash along the lower abdominal wall, bilateral thighs and buttocks    Neurological:      Mental Status: She is alert and oriented to person, place, and time.   Psychiatric:         Mood and Affect:  Mood normal.         Behavior: Behavior normal.             Significant Labs: All pertinent labs within the past 24 hours have been reviewed.  BMP:   Recent Labs   Lab 10/11/24  0527   GLU 97      K 3.1*   *   CO2 24   BUN 8   CREATININE 0.9   CALCIUM 8.0*   MG 2.2     CBC:   Recent Labs   Lab 10/10/24  0305 10/11/24  0527   WBC 34.29* 45.41*   HGB 9.5* 9.4*   HCT 29.6* 29.5*    426       Significant Imaging: I have reviewed all pertinent imaging results/findings within the past 24 hours.  I have reviewed and interpreted all pertinent imaging results/findings within the past 24 hours.

## 2024-10-11 NOTE — SUBJECTIVE & OBJECTIVE
Interval History:  c/o persistent diarrhea    Review of Systems   Gastrointestinal:  Positive for abdominal pain, diarrhea and nausea.   Musculoskeletal:  Positive for back pain.    is performed and is negative unless mentioned in the interval history above.  Objective:     Vital Signs (Most Recent):  Temp: 98.2 °F (36.8 °C) (10/11/24 1301)  Pulse: 65 (10/11/24 1301)  Resp: (!) 34 (10/11/24 1301)  BP: (!) 103/55 (10/11/24 1101)  SpO2: 97 % (10/11/24 1301) Vital Signs (24h Range):  Temp:  [97.6 °F (36.4 °C)-98.2 °F (36.8 °C)] 98.2 °F (36.8 °C)  Pulse:  [57-77] 65  Resp:  [10-34] 34  SpO2:  [94 %-100 %] 97 %  BP: ()/(43-62) 103/55     Weight: 75.6 kg (166 lb 10.7 oz)  Body mass index is 29.53 kg/m².    Intake/Output Summary (Last 24 hours) at 10/11/2024 1548  Last data filed at 10/11/2024 1040  Gross per 24 hour   Intake 1574.06 ml   Output --   Net 1574.06 ml         Physical Exam  Vitals reviewed.   Constitutional:       Appearance: Normal appearance.   HENT:      Head: Normocephalic and atraumatic.      Mouth/Throat:      Mouth: Mucous membranes are moist.   Eyes:      Extraocular Movements: Extraocular movements intact.      Conjunctiva/sclera: Conjunctivae normal.      Pupils: Pupils are equal, round, and reactive to light.   Cardiovascular:      Rate and Rhythm: Normal rate and regular rhythm.   Pulmonary:      Effort: Pulmonary effort is normal.      Breath sounds: Normal breath sounds.   Abdominal:      General: Abdomen is flat. Bowel sounds are normal.      Palpations: Abdomen is soft.      Comments: Appropriate abdominal tenderness.    Musculoskeletal:         General: No swelling or tenderness.   Skin:     General: Skin is warm.      Comments: Erythematous and pruritic rash along the lower abdominal wall, bilateral thighs and buttocks    Neurological:      Mental Status: She is alert and oriented to person, place, and time.   Psychiatric:         Mood and Affect: Mood normal.         Behavior:  Behavior normal.             Significant Labs: All pertinent labs within the past 24 hours have been reviewed.  BMP:   Recent Labs   Lab 10/11/24  0527   GLU 97      K 3.1*   *   CO2 24   BUN 8   CREATININE 0.9   CALCIUM 8.0*   MG 2.2     CBC:   Recent Labs   Lab 10/10/24  0305 10/11/24  0527   WBC 34.29* 45.41*   HGB 9.5* 9.4*   HCT 29.6* 29.5*    426       Significant Imaging: I have reviewed all pertinent imaging results/findings within the past 24 hours.  I have reviewed and interpreted all pertinent imaging results/findings within the past 24 hours.

## 2024-10-11 NOTE — PROGRESS NOTES
Pharmacokinetic Initial Assessment: IV Vancomycin    Assessment/Plan:    Initiate intravenous vancomycin with loading dose of 2000 mg once followed by a maintenance dose of vancomycin 2000 mg IV every 24 hours  Desired empiric serum trough concentration is 15 to 20 mcg/mL  Draw vancomycin trough level 60 min prior to third dose on 10/13 at approximately 1130.  Pharmacy will continue to follow and monitor vancomycin.      Please contact pharmacy at extension [phone] with any questions regarding this assessment.     Thank you for the consult,   Osmany Church       Patient brief summary:  Yvette Hutchinson is a 64 y.o. female initiated on antimicrobial therapy with IV Vancomycin for treatment of suspected sepsis    Drug Allergies:   Review of patient's allergies indicates:   Allergen Reactions    Latex, natural rubber        Actual Body Weight:   75.6 kg    Renal Function:   Estimated Creatinine Clearance: 60.7 mL/min (based on SCr of 0.9 mg/dL).,     Dialysis Method (if applicable):  N/A    CBC (last 72 hours):  Recent Labs   Lab Result Units 10/09/24  0317 10/10/24  0305 10/11/24  0527   WBC K/uL 29.37* 34.29* 45.41*   Hemoglobin g/dL 9.5* 9.5* 9.4*   Hematocrit % 29.6* 29.6* 29.5*   Platelets K/uL 216 289 426   Gran % % 49.0 32.0* 16.0*   Lymph % % 40.0 65.0* 80.0*   Mono % % 1.0* 1.0* 4.0   Eosinophil % % 0.0 1.0 0.0   Basophil % % 0.0 1.0 0.0   Differential Method  Manual Manual Manual       Metabolic Panel (last 72 hours):  Recent Labs   Lab Result Units 10/08/24  1209 10/09/24  0317 10/09/24  1833 10/10/24  0305 10/11/24  0527   Sodium mmol/L 136 137  --  139 144   Potassium mmol/L 3.7 3.5 3.3* 3.5 3.1*   Chloride mmol/L 105 108  --  108 112*   CO2 mmol/L 23 22*  --  23 24   Glucose mg/dL 89 84  --  137* 97   BUN mg/dL 7* 7*  --  7* 8   Creatinine mg/dL 1.1 1.0  --  0.9 0.9   Albumin g/dL  --  3.0*  --  3.3* 3.4*   Total Bilirubin mg/dL  --  0.6  --  0.6 0.5   Alkaline Phosphatase U/L  --  399*  --  361*  "321*   AST U/L  --  318*  --  198* 74*   ALT U/L  --  382*  --  311* 221*   Magnesium mg/dL  --  1.9  --  2.1 2.2       Drug levels (last 3 results):  No results for input(s): "VANCOMYCINRA", "VANCORANDOM", "VANCOMYCINPE", "VANCOPEAK", "VANCOMYCINTR", "VANCOTROUGH" in the last 72 hours.    Microbiologic Results:  Microbiology Results (last 7 days)       Procedure Component Value Units Date/Time    Blood culture [[id number]] Collected: 10/11/24 1122    Order Status: Sent Specimen: Blood Updated: 10/11/24 1127    Blood culture [[id number]] Collected: 10/11/24 1123    Order Status: Sent Specimen: Blood Updated: 10/11/24 1127    Blood culture [[id number]] Collected: 10/05/24 1037    Order Status: Completed Specimen: Blood Updated: 10/10/24 1232     Blood Culture, Routine No growth after 5 days.    Blood culture [[id number]] Collected: 10/05/24 1037    Order Status: Completed Specimen: Blood Updated: 10/10/24 1232     Blood Culture, Routine No growth after 5 days.    Blood culture x two cultures. Draw prior to antibiotics [[id number]] Collected: 10/02/24 1057    Order Status: Completed Specimen: Blood from Peripheral, Hand, Left Updated: 10/07/24 1232     Blood Culture, Routine No growth after 5 days.    Narrative:      Aerobic and anaerobic    Stool culture **cannot be ordered stat** [[id number]] Collected: 10/02/24 1919    Order Status: Completed Specimen: Stool Updated: 10/05/24 1018     Stool Culture No Salmonella,Shigella,Vibrio,Campylobacter.      No E coli 0157:H7 isolated.            "

## 2024-10-11 NOTE — PLAN OF CARE
Per MD, pt not medically clear for DC at this time. Hem/onc now following. On pressor. CM following for DC planning when time.      10/11/24 1327   Discharge Reassessment   Assessment Type Discharge Planning Reassessment   Did the patient's condition or plan change since previous assessment? No   Discharge Plan discussed with: Patient   Discharge Plan A Home Health   DME Needed Upon Discharge  none   Transition of Care Barriers None   Why the patient remains in the hospital Requires continued medical care

## 2024-10-11 NOTE — PROGRESS NOTES
[facility]   Department of Infectious Disease  Progress Note        PATIENT NAME: Yvette Hutchinson  MRN: [id number]  TODAY'S DATE: 10/11/2024  ADMIT DATE: 10/2/2024  LOS: 9 days    CHIEF COMPLAINT: Abdominal Pain, Nausea, Vomiting, and Diarrhea (Pt has been sick for the last two weeks)      PRINCIPLE PROBLEM: Septic shock    INTERVAL HISTORY      10/04/2024:  Improving.  WBC down to 35.  Afebrile.  Blood cultures have grown MSSE in 1/2 bottles.  No gallbladder fluid or tissue culture done.  Creatinine down to 1.2.  Levophed dose down to 0.35 microgram/kilogram per minute.    10/05/2024:  Continues to improve.  Leukocytosis resolving.  No acute issues overnight.  Seen by cardiologist yesterday for elevated troponin and notes reviewed.    10/06/2024:  Seen and evaluated at bedside.  Continues to improve.  WBC 24 K.  Repeat blood culture 10/05/2024 so far negative.    10/07/2024.  No acute issues overnight.  Continues to improve.  Tolerating oral feeds.  Remains on very low-dose Levophed at 0.06 microgram/kilogram per minute.  All cultures remain negative.    10/08/2024:  Midodrine added yesterday by cardiologist due to inability to wean off very low-dose Levophed.  Cortisol level was 8.3.  Repeat blood culture remain negative.  Remains on Levophed but down to 0.03 microgram/kilogram per minute.  Still with loose bowel motions.    10/09/2024: No acute issues overnight.  Still requiring very low-dose Levophed.  On 0.02 microgram/kilogram per minute.  Had cardiac catheterization today with normal coronary vessels and LVEDP of around 20..    10/10/2024:  WBC crept back up and is now 34 K. afebrile.  Was weaned off Levophed yesterday but is back on on a very low-dose of 0.09mcg/kg/min.     10/11/2024: WBC continues to increase and is up to 45 K. procalcitonin 0.62.  Remains on very low-dose Levophed 0.04mcg/kg/min.    Antibiotics (From admission, onward)      Start     Stop Route Frequency Ordered  "   10/11/24 1154  vancomycin - pharmacy to dose  (vancomycin IVPB (PEDS and ADULTS))        Placed in "And" Linked Group    -- IV pharmacy to manage frequency 10/11/24 1055    10/04/24 0900  mupirocin 2 % ointment 1 g         10/09/24 0859 Nasl 2 times daily 10/04/24 0633    10/03/24 2200  piperacillin-tazobactam 4.5 g in dextrose 5 % 100 mL IVPB (ready to mix)         -- IV Every 8 hours (non-standard times) 10/03/24 1506          Antifungals (From admission, onward)      Start     Stop Route Frequency Ordered    10/08/24 2100  miconazole 2 % cream         -- Top 2 times daily 10/08/24 1734           Antivirals (From admission, onward)      None            ASSESSMENT and PLAN      Septic shock in a patient with CLL.  Probably from abdominal source since her symptoms were primarily GI related.  She has had laparoscopic cholecystostomy.  Remains on very low-dose Levophed even though she does not appear septic anymore clinically.  WBC crept up again after initially improving.  Procalcitonin remains a little high.  She did have coag-negative Staphylococcus that was initially thought to be contaminant and vancomycin discontinued.  We will add back vancomycin and monitor.      2. Persistent Low BP requiring low-dose Levophed.  There may be a cardiogenic component to this as well.  Already on midodrine and also steroids.  Defer to cardiologist and hospitalist.  Monitor on vancomycin.      3. CLL.  Currently not on any specific medication for this.  It does make her immunosuppressed.       4. Acute liver injury.  Maybe congestive hepatopathy from shock.  Also with elevated troponins.  Acute liver panel negative.  Transaminases improving.  Had ERCP yesterday.  Defer to hospitalist and GI.    5. Coagulase-negative Staphylococcus  bacteremia.  Most likely contaminant.  However, had persistent hypotension and WBC crept up again.  Vancomycin as above.    6. Persistent diarrhea.  Stool C diff 10/2/24 and stool culture were " negative.  Better.    RECOMMENDATIONS:    Continue Zosyn and add vancomycin  Continue to monitor clinically  Plan to treat for 10 days through 10/12/2024 may extend to 14 days through 10/16/2024.     Please send Epic secure chat with any questions.      SUBJECTIVE    Yvette Hutchinson is a 64 y.o. female with history of CLL, GERD, arthritis.  Also previous left lung cancer status post resection.  Had anterior cervical fusion on 08/06/2024 with an uneventful postoperative course.  Presented to the emergency room 10/02/2024 with nausea vomiting, diarrhea and abdominal pain of about 9 days duration.  In the ER BP 99/55, pulse 1-2, respiratory rate 18, temperature 101.6°.  She had abdominal tenderness was worse in the epigastric area.       WBC 21, hematocrit 38, platelet count 224, creatinine 1.4.  , , lipase 42, alkaline phosphatase to 1 6.  UA unremarkable.  Chest x-ray with no acute infiltrates.  CT chest showed increased interstitial markings with few pleural based lesions/infiltrates on the left.  CT abdomen and pelvis documented gallstones with pericholecystic fluid and edema which was confirmed on ultrasound she was admitted and placed on IV fluids and antibiotics.  Later seen by general surgeon and laparoscopic cholecystostomy 10/03/2024.  ID asked to assist with her care.     Current lab data showed WBC 45, hematocrit 41, creatinine 1.8, AST 1970, ALT 1316, alkaline phosphatase 220, total protein 5.6     History from patient and medical record.     Antibiotic history:    Vancomycin: 10/02/2024-  Zosyn: 10/02/2024-     Microbiology:    Blood culture 10/02/2024:  Staphylococcus species 1/2   Influenza and COVID-19 assay 10/02/2024: Negative    Review of Systems  Negative except as stated above in Interval History     OBJECTIVE   Temp:  [97.6 °F (36.4 °C)-98.2 °F (36.8 °C)] 98.2 °F (36.8 °C)  Pulse:  [57-82] 63  Resp:  [10-46] 33  SpO2:  [89 %-100 %] 97 %  BP: ()/(42-62) 102/55  Temp:   [97.6 °F (36.4 °C)-98.2 °F (36.8 °C)]   Temp: 98.2 °F (36.8 °C) (10/11/24 0901)  Pulse: 63 (10/11/24 0901)  Resp: (!) 33 (10/11/24 0901)  BP: (!) 102/55 (10/11/24 0901)  SpO2: 97 % (10/11/24 0901)    Intake/Output Summary (Last 24 hours) at 10/11/2024 1055  Last data filed at 10/11/2024 1040  Gross per 24 hour   Intake 1859.89 ml   Output --   Net 1859.89 ml       Physical Exam  General:  Middle-aged woman who is in no acute distress at this time.  Lying quietly in bed.  HEENT:  Healed right anterior neck surgical wound.  No erythema or induration.  CVS: S1 and 2 heard, tachycardic, no murmurs appreciated.  On Levophed 0.15 micrograms/kilogram per minute    Respiratory: Clear to auscultation   Abdomen:  Laparoscopic port sites are clean with no erythema or drainage.  Abdomen is distended, soft, nontender.  Skin: No rash appreciated  CNS: No focal deficits   Musculoskeletal: No joint effusions or abnormalities noted     VAD:  ISOLATION:  None     Wounds:  Surgical abdominal    Significant Labs: All pertinent labs within the past 24 hours have been reviewed.    CBC LAST 7 DAYS  Recent Labs   Lab 10/05/24  0336 10/06/24  0224 10/07/24  0255 10/08/24  0318 10/09/24  0317 10/10/24  0305 10/11/24  0527   WBC 26.10* 24.07* 22.45* 26.08* 29.37* 34.29* 45.41*   RBC 3.54* 3.41* 3.25* 3.20* 3.05* 3.12* 3.06*   HGB 10.9* 10.4* 10.1* 9.8* 9.5* 9.5* 9.4*   HCT 33.9* 31.8* 30.7* 30.5* 29.6* 29.6* 29.5*   MCV 96 93 95 95 97 95 96   MCH 30.8 30.5 31.1* 30.6 31.1* 30.4 30.7   MCHC 32.2 32.7 32.9 32.1 32.1 32.1 31.9*   RDW 14.3 14.4 14.5 14.8* 15.4* 16.1* 16.7*    167 159 181 216 289 426   MPV 10.3 10.5 10.4 10.7 10.1 9.9 9.7   GRAN 19.2*  5.0 18.9*  4.6 39.0 32.0* 49.0 32.0* 16.0*   LYMPH 67.0*  17.5* 66.8*  16.1* 43.0 54.0* 40.0 65.0* 80.0*   MONO 5.7  1.5* 5.9  1.4* 4.0 4.0 1.0* 1.0* 4.0   BASO 0.11 0.12  --   --   --   --   --    NRBC 0 0 0 0 0 0 0       CHEMISTRY LAST 7 DAYS  Recent Labs   Lab 10/05/24  0336  "10/06/24  0223 10/07/24  0255 10/08/24  0318 10/08/24  1209 10/09/24  0317 10/09/24  1833 10/10/24  0305 10/11/24  0527   * 138 136 138 136 137  --  139 144   K 4.0 3.6 3.8 3.8 3.7 3.5 3.3* 3.5 3.1*    107 105 108 105 108  --  108 112*   CO2 23 26 26 25 23 22*  --  23 24   ANIONGAP 4* 5* 5* 5* 8 7*  --  8 8   BUN 11 7* 5* 6* 7* 7*  --  7* 8   CREATININE 1.4 1.0 1.0 1.1 1.1 1.0  --  0.9 0.9   GLU 96 100 76 69* 89 84  --  137* 97   CALCIUM 7.4* 7.8* 7.7* 8.0* 8.6* 8.1*  --  8.2* 8.0*   MG 1.8 1.7 1.6 1.8  --  1.9  --  2.1 2.2   ALBUMIN 2.8* 2.9* 2.8* 2.8*  --  3.0*  --  3.3* 3.4*   PROT 4.5* 4.6* 4.6* 4.6*  --  5.0*  --  5.2* 5.3*   ALKPHOS 356* 380* 369* 343*  --  399*  --  361* 321*   * 691* 568* 436*  --  382*  --  311* 221*   * 472* 396* 372*  --  318*  --  198* 74*   BILITOT 1.3* 1.1* 0.9 0.7  --  0.6  --  0.6 0.5       Estimated Creatinine Clearance: 60.7 mL/min (based on SCr of 0.9 mg/dL).    INFLAMMATORY/PROCAL  LAST 7 DAYS  Recent Labs   Lab 10/08/24  1209 10/10/24  1911   PROCAL 1.941* 0.620*   CRP 3.60*  --      No results found for: "ESR"  CRP   Date Value Ref Range Status   10/08/2024 3.60 (H) <1.00 mg/dL Final     Comment:     CRP-Normal Application expected values:   <1.0        mg/dL   Normal Range  1.0 - 5.0  mg/dL   Indicates mild inflammation  5.0 - 10.0 mg/dL   Indicates severe inflammation  >10.0        mg/dL   Represents serious processes and   frequently         indicates the presence of a bacterial   infection.      09/07/2021 2.7 0.0 - 8.2 mg/L Final   03/03/2020 3.5 0.0 - 8.2 mg/L Final       PRIOR  MICROBIOLOGY:    Susceptibility data from last 90 days.  Collected Specimen Info Organism   10/05/24 Blood No growth after 5 days.   10/05/24 Blood No growth after 5 days.   10/02/24 Blood from Peripheral, Hand, Left No growth after 5 days.   10/02/24 Blood from Peripheral, Wrist, Right COAGULASE NEGATIVE STAPHYLOCOCCI       LAST 7 DAYS MICROBIOLOGY   Microbiology Results " (last 7 days)       Procedure Component Value Units Date/Time    Blood culture [[id number]]     Order Status: No result Specimen: Blood     Blood culture [[id number]]     Order Status: No result Specimen: Blood     Blood culture [[id number]] Collected: 10/05/24 1037    Order Status: Completed Specimen: Blood Updated: 10/10/24 1232     Blood Culture, Routine No growth after 5 days.    Blood culture [[id number]] Collected: 10/05/24 1037    Order Status: Completed Specimen: Blood Updated: 10/10/24 1232     Blood Culture, Routine No growth after 5 days.    Blood culture x two cultures. Draw prior to antibiotics [[id number]] Collected: 10/02/24 1057    Order Status: Completed Specimen: Blood from Peripheral, Hand, Left Updated: 10/07/24 1232     Blood Culture, Routine No growth after 5 days.    Narrative:      Aerobic and anaerobic    Stool culture **cannot be ordered stat** [[id number]] Collected: 10/02/24 1919    Order Status: Completed Specimen: Stool Updated: 10/05/24 1018     Stool Culture No Salmonella,Shigella,Vibrio,Campylobacter.      No E coli 0157:H7 isolated.            CURRENT/PREVIOUS VISIT EKG  Results for orders placed or performed during the hospital encounter of 10/02/24   EKG 12-lead    Collection Time: 10/09/24  8:56 AM   Result Value Ref Range    QRS Duration 82 ms    [facility] QTC Calculation 455 ms    Narrative    Test Reason : I21.4,Z01.810,    Vent. Rate : 056 BPM     Atrial Rate : 056 BPM     P-R Int : 140 ms          QRS Dur : 082 ms      QT Int : 472 ms       P-R-T Axes : 077 092 -66 degrees     QTc Int : 455 ms    Sinus bradycardia  Rightward axis  Low voltage QRS  T wave abnormality, consider anterolateral ischemia  Abnormal ECG  When compared with ECG of 04-OCT-2024 13:13,  Significant changes have occurred    Referred By: [email or web address]   SELF           Confirmed By:         Significant Imaging: I have reviewed all relevant and available imaging results/findings within the past 24 hours.    I spent a  total of 50 minutes on the day of the visit.This includes face to face time and non-face to face time preparing to see the patient (eg, review of tests), obtaining and/or reviewing separately obtained history, documenting clinical information in the electronic or other health record, independently interpreting results and communicating results to the patient/family/caregiver, or care coordinator.    Lemuel Man MD  Date of Service: 10/11/2024      This note was created using Secure [employer] voice recognition software that occasionally misinterpreted phrases or words.

## 2024-10-11 NOTE — PROGRESS NOTES
[facility]  Progress Note    Patient Name: Yvette Hutchinson  MRN: [id number]  Patient Class: IP- Inpatient   Admission Date: 10/2/2024  Length of Stay: 9 days  Attending Physician: Irsi Odell MD  Primary Care Provider: Vern Priest MD        Subjective:     Principal Problem:Septic shock        HPI:  64-year-old female with PMH of CLL, and lung cancer status post partial resection who presented to the ER because of presyncope, nausea, vomiting, diarrhea and abdominal pain.  According to the patient, for over 1 week she has been having intractable nausea, vomiting and diarrhea.  Also epigastric and right upper quadrant abdominal pain described as sharp, 10/10 on pain scale radiate to the rest of her abdomen.  Today when she stood up from a sitting position, she felt as if she will pass out.  As a result, she decided to come to the ER for evaluation.  Denied any fever or chills.  She denied chest pain or shortness on breath.    In the ER, vitals showed a blood pressure of 99/55, but soon dropped to the 70s.  Heart rate was 122, respiratory rate of 20, temperature of 101.6°, and patient was satting 95% on room air.  CBC was white count of 21, with history of CLL.  CMP showed sodium of 135, acute renal failure with creatinine of 1.4 compared to baseline of 1, and patient has elevated LFTs with alk-phos of 216, , and AST of 497.  COVID/flu are negative.  Blood cultures were collected.  CTA chest negative for pulmonary embolus, but showed mild diffuse bronchial wall thickening that could reflect infectious or inflammatory bronchitis in the appropriate clinical setting.  CT abdomen/pelvis showed pericholecystic edema without evidence of gallstones.  Cholecystitis cannot be excluded. Also mild Linda pancreatic edema which may represent early pancreatitis. US abdomen showed Cholelithiasis, gallbladder wall thickening, and mild pericholecystic fluid. Patient was started  on Zosyn/vanco.  Was given sepsis bolus of lactated ringer of 2286 cc.  Started on Levophed for blood pressure support.  General surgery was consulted.  Patient will be admitted to ICU for septic shock.       Overview/Hospital Course:  This 64-year-old lady with history of CLL, lung cancer status post partial resection presented to the emergency department due to presyncope, nausea vomiting and abdominal pain.  She was also noted to be hypotensive and tachycardic on admission.  She was also noted to have fever.  Patient was admitted to the hospital for septic shock concern for abdominal source given CT findings showing some pericholecystic fluid.  Patient was also noted to have transaminitis and imaging did reveal some peripancreatic fluid and inflammatory changes as well though her lipase was normal.  General surgery was consulted after ultrasound the gallbladder was performed which again redemonstrated pericholecystic fluid.  She is status post laparoscopic cholecystectomy on 10/03.  Gallbladder was not noted to be gangrenous and per the op note there was suspicion that this was not the source of her septic shock.  However, blood cultures grew only coagulation negative Staphylococcus in 1/2 bottles.  This was thought to be contaminant.  Infectious Disease was consulted and vancomycin and Zosyn was continued on this patient.  Levophed initiated on admission was able to be weaned, although she still had requirement the day after surgery.  Will attempt midodrine TID to transition off levophed.  Repeat blood cultures NTD. Vancomycin dc'd  Florinef added. I/O's consistently negative due to diarrhea and poor oral intake.  Probiotic and stool WBC, elastase studies, hepatitis panel pending.    10/9/24: LHC completed, no obstructive disease; continuous diarrhea - lactinex added while on zosyn; GI consulted; severe pruritic rash buttock, down both legs and abdominal wall  10/10: MRCP suggest intrahepatic duct blocked,  ERCP:  Impression:            - The entire main bile duct was mildly dilated,                          acquired.                          - The patient has had a cholecystectomy.                          - No evidence of stricture, filling defect or any                          abnormalities as suggested by MRCP.   Rising lymphocytes/WBC. On two steroids; h/o CLL - hematology consulted     Interval History:  c/o persistent diarrhea    Review of Systems   Gastrointestinal:  Positive for abdominal pain, diarrhea and nausea.   Musculoskeletal:  Positive for back pain.    is performed and is negative unless mentioned in the interval history above.  Objective:     Vital Signs (Most Recent):  Temp: 98.2 °F (36.8 °C) (10/11/24 1301)  Pulse: 65 (10/11/24 1301)  Resp: (!) 34 (10/11/24 1301)  BP: (!) 103/55 (10/11/24 1101)  SpO2: 97 % (10/11/24 1301) Vital Signs (24h Range):  Temp:  [97.6 °F (36.4 °C)-98.2 °F (36.8 °C)] 98.2 °F (36.8 °C)  Pulse:  [57-77] 65  Resp:  [10-34] 34  SpO2:  [94 %-100 %] 97 %  BP: ()/(43-62) 103/55     Weight: 75.6 kg (166 lb 10.7 oz)  Body mass index is 29.53 kg/m².    Intake/Output Summary (Last 24 hours) at 10/11/2024 1548  Last data filed at 10/11/2024 1040  Gross per 24 hour   Intake 1574.06 ml   Output --   Net 1574.06 ml         Physical Exam  Vitals reviewed.   Constitutional:       Appearance: Normal appearance.   HENT:      Head: Normocephalic and atraumatic.      Mouth/Throat:      Mouth: Mucous membranes are moist.   Eyes:      Extraocular Movements: Extraocular movements intact.      Conjunctiva/sclera: Conjunctivae normal.      Pupils: Pupils are equal, round, and reactive to light.   Cardiovascular:      Rate and Rhythm: Normal rate and regular rhythm.   Pulmonary:      Effort: Pulmonary effort is normal.      Breath sounds: Normal breath sounds.   Abdominal:      General: Abdomen is flat. Bowel sounds are normal.      Palpations: Abdomen is soft.      Comments: Appropriate  abdominal tenderness.    Musculoskeletal:         General: No swelling or tenderness.   Skin:     General: Skin is warm.      Comments: Erythematous and pruritic rash along the lower abdominal wall, bilateral thighs and buttocks    Neurological:      Mental Status: She is alert and oriented to person, place, and time.   Psychiatric:         Mood and Affect: Mood normal.         Behavior: Behavior normal.             Significant Labs: All pertinent labs within the past 24 hours have been reviewed.  BMP:   Recent Labs   Lab 10/11/24  0527   GLU 97      K 3.1*   *   CO2 24   BUN 8   CREATININE 0.9   CALCIUM 8.0*   MG 2.2     CBC:   Recent Labs   Lab 10/10/24  0305 10/11/24  0527   WBC 34.29* 45.41*   HGB 9.5* 9.4*   HCT 29.6* 29.5*    426       Significant Imaging: I have reviewed all pertinent imaging results/findings within the past 24 hours.  I have reviewed and interpreted all pertinent imaging results/findings within the past 24 hours.    Assessment/Plan:      * Septic shock  - Suspect secondary to intra-abdominal process.  CT reveals pericholecystic fluid and peripancreatic fluid  She is now status post cholecystectomy with General surgery, per op note the gallbladder did not appear significantly inflamed to be commensurate with the patient's presentation of septic shock, 1/2 bottles drawn for blood culture was growing coagulase-negative Staphylococcus, felt to be contaminant, however, given patient's persistent pressor requirement infectious Disease was consulted, recommendation to continue vancomycin and Zosyn at this time  Infectious Disease we will defer imaging of patient's C-spine for now  Continued follow up blood cultures  Levophed as needed for map less than 65 -unable to wean off, midodrine initiated by cardiology   Check cortisol - normal range for am draw  Florinef added   Added hydrocortisone back     Functional diarrhea  S/p lap archana - continues to have loose stools  Lactose  intolerant - diet and supplements adjusted - diarrhea more pronounced after ensure  Add probiotic while on zosyn  Add stool WBC and elastase studies   C.diff negative 6 days ago   Consider stool bulking agent if no improvement   GI consulted         Bacteremia due to Staphylococcus  This has now resulted as coagulase-negative staph, likely contaminant  ID following and would like to continue vancomycin for now  Repeat blood cultures 10/5/24 NTD - VAncomycin dc'd   Now Vancomycin reordered with climbing WBC     Abdominal pain  Suspect secondary to acute cholecystitis.    CT abdomen also showed mild pancreatitis, but lipase is within normal limits.    Given her diarrhea however, will order stool culture.    Supportive care with IV fluids, Zofran for nausea.  P.r.n. pain medication.      Acute renal failure  Most recent creatinine and eGFR are listed below.  Recent Labs     10/04/24  0455 10/04/24  2101 10/05/24  0336   CREATININE 1.2 1.2 1.4   EGFRNORACEVR 50.2* 50.2* 41.8*       Likely pre-renal given her septic shock.   Status post IV hydration, patient able to take PO hydration  GM resolving      Neuropathic pain  Resume gabapentin.      Calculus of gallbladder with acute cholecystitis without obstruction  Status post cholecystectomy  WBC 24k climbing to 45K - higher lymphocytes  On zosyn         Acquired hypothyroidism  Resume synthroid.       CLL (chronic lymphocytic leukemia)  Monitor. Patient's last chemo for over a year.  Follow up with Hematology outpatient.  Consult hematology as WBC continues to climb, patient missed PET scan scheduled this week      Anxiety  Resume Wellbutrin and Celexa        VTE Risk Mitigation (From admission, onward)           Ordered     Place [place] hose  Until discontinued         10/05/24 1132     heparin (porcine) injection 5,000 Units  Every 8 hours         10/02/24 1611     IP VTE HIGH RISK PATIENT  Once         10/02/24 1611     Place sequential compression device  Until  discontinued         10/02/24 1611                    Discharge Planning   SALINA: 10/15/2024     Code Status: Full Code   Is the patient medically ready for discharge?:     Reason for patient still in hospital (select all that apply): Patient trending condition  Discharge Plan A: Home Health   Discharge Delays: None known at this time        Critical care time spent on the evaluation and treatment of severe organ dysfunction, review of pertinent labs and imaging studies, discussions with consulting providers and discussions with patient/family: 20 minutes.      Iris Odell MD  Department of Hospital Medicine   [facility]

## 2024-10-11 NOTE — ASSESSMENT & PLAN NOTE
This has now resulted as coagulase-negative staph, likely contaminant  ID following and would like to continue vancomycin for now  Repeat blood cultures 10/5/24 NTD - VAncomycin dc'd   Now Vancomycin reordered with climbing WBC

## 2024-10-11 NOTE — CARE UPDATE
10/10/24 1952   Patient Assessment/Suction   Level of Consciousness (AVPU) alert   Respiratory Effort Normal;Unlabored   Expansion/Accessory Muscles/Retractions no use of accessory muscles;no retractions;expansion symmetric   All Lung Fields Breath Sounds diminished   Rhythm/Pattern, Respiratory unlabored;pattern regular;no shortness of breath reported   Cough Frequency no cough   PRE-TX-O2   Device (Oxygen Therapy) room air   SpO2 100 %   Pulse Oximetry Type Continuous   Pulse 72   Resp (!) 24   Aerosol Therapy   $ Aerosol Therapy Charges Aerosol Treatment  (atro)   Daily Review of Necessity (SVN) completed   Respiratory Treatment Status (SVN) given   Treatment Route (SVN) mask;oxygen   Patient Position HOB elevated   Post Treatment Assessment (SVN) increased aeration   Signs of Intolerance (SVN) none   Breath Sounds Post-Respiratory Treatment   Throughout All Fields Post-Treatment All Fields   Throughout All Fields Post-Treatment aeration increased   Post-treatment Heart Rate (beats/min) 72   Post-treatment Resp Rate (breaths/min) 24   Incentive Spirometer   $ Incentive Spirometer Charges done with encouragement   Incentive Spirometer Predicted Level (mL) 1500   Administration (IS) instruction provided, follow-up   Number of Repetitions (IS) 10   Level Incentive Spirometer (mL) 2000   Patient Tolerance (IS) no adverse signs/symptoms present

## 2024-10-11 NOTE — PT/OT/SLP PROGRESS
Physical Therapy Treatment    Patient Name:  Yvette Hutchinson   MRN:  [id number]    Recommendations:     Discharge Recommendations: Moderate Intensity Therapy  Discharge Equipment Recommendations: none  Barriers to discharge:  increased assist with mobility, decreased activity tolerance, balance deficits, hypotension    Assessment:     Yvette Hutchinson is a 64 y.o. female admitted with a medical diagnosis of Septic shock.  She presents with the following impairments/functional limitations: weakness, impaired endurance, impaired functional mobility, gait instability, impaired balance, decreased lower extremity function, decreased safety awareness, impaired cardiopulmonary response to activity, orthopedic precautions. Patient sitting up in chair and is agreeable to participation with PT treatment. BP at rest of 104/51. She requires SBA for sit to stand with rollator. BP in standing of 98/49 with patient mildly symptomatic. She ambulated 5' forward/backward x2 trials with rollator and CGA. She returned to sitting with BP of 109/56 post-gait. She agrees to further participation and performed second trial of sit to stand with rollator and ambulated 5' forward/backward x4 trials with rollator and CGA. She returned to sitting up in chair with BP of 99/55, chair alarm on, all needs met, and RN notified.     Rehab Prognosis: Good; patient would benefit from acute skilled PT services to address these deficits and reach maximum level of function.    Recent Surgery: Procedure(s) (LRB):  ERCP (ENDOSCOPIC RETROGRADE CHOLANGIOPANCREATOGRAPHY) (N/A) 1 Day Post-Op    Plan:     During this hospitalization, patient to be seen 6 x/week to address the identified rehab impairments via gait training, therapeutic activities, therapeutic exercises, neuromuscular re-education and progress toward the following goals:    Plan of Care Expires:  11/06/24    Subjective     Chief Complaint: wants BP issues figured out   Patient/Family  Comments/goals: get better  Pain/Comfort:  Pain Rating 1: 0/10      Objective:     Communicated with DONNA Eli prior to session.  Patient found up in chair with blood pressure cuff, peripheral IV, pulse ox (continuous), telemetry, chair check upon PT entry to room.     General Precautions: Standard, fall  Orthopedic Precautions: spinal precautions  Braces: [place] J collar  Respiratory Status: Room air     Functional Mobility:  Transfers:     Sit to Stand:  stand by assistance with rollator  Gait: 5' forward/backward x2 trials with seated rest break followed by 5' forward/backward x4 trials with rollator and CGA      AM-PAC 6 CLICK MOBILITY          Treatment & Education:  Patient was educated on the importance of OOB activity and functional mobility to negate negative effects of prolonged bed rest during hospitalization, safe transfers and ambulation, and D/C planning     Patient left up in chair with all lines intact, call button in reach, chair alarm on, and RN notified..    GOALS:   Multidisciplinary Problems       Physical Therapy Goals          Problem: Physical Therapy    Goal Priority Disciplines Outcome Interventions   Physical Therapy Goal     PT, PT/OT Progressing    Description: Goals to be met by: 24     Patient will increase functional independence with mobility by performin. Supine to sit with Supervision  2. Sit to stand transfer with Supervision  3. Bed to chair transfer with Supervision using Rolling Walker  4. Gait  x 200 feet with Supervision using Rolling Walker.                              Time Tracking:     PT Received On: 10/11/24  PT Start Time: 1337     PT Stop Time: 1357  PT Total Time (min): 20 min     Billable Minutes: Gait Training 20    Treatment Type: Treatment  PT/PTA: PT     Number of PTA visits since last PT visit: 0     10/11/2024

## 2024-10-11 NOTE — PROGRESS NOTES
[facility]  Adult Nutrition   Progress Note (Follow-Up)    SUMMARY     Recommendations  Recommendation/Intervention: 1. Continue Regular diet as tolerated. 2. Continue ProtGold daily as tolerated.  Goals: 1. Diet to advance by follow up note. 2. Labs trend to target range.  Nutrition Goal Status: progressing towards goal  Communication of RD Recs: reviewed with RN    Nutrition Diagnosis PES Statement: Increased nutrition needs related to prolonged catabolic illness as evidenced by cancer diagnosis with increased needs. And mild malnutrition.    Dietitian Rounds Brief  Patient up in chair eating lunch. Doing much better today. Pt ate 75% of breakfast and is eating well at noon meal. Encourage protein supplement. Last BM was today.RD to follow for intake and status change PRN.    Nutrition Related Social Determinants of Health: SDOH: Adequate food in home environment    Malnutrition Assessment  Malnutrition Level: mild              Orbital Region (Subcutaneous Fat Loss): mild depletion   Religious Region (Muscle Loss): mild depletion  Clavicle Bone Region (Muscle Loss): mild depletion  Scapular Bone Region (Muscle Loss): mild depletion  Dorsal Hand (Muscle Loss): mild depletion  Patellar Region (Muscle Loss): mild depletion  Anterior Thigh Region (Muscle Loss): mild depletion  Posterior Calf Region (Muscle Loss): mild depletion                 Diet order:   Current Diet Order: Regular diet   Oral Nutrition Supplement: ProtGold 1 packet / day             Evaluation of Received Nutrient/Fluid Intake  Energy Calories Required: meeting needs  Protein Required: meeting needs  Fluid Required: meeting needs  Tolerance: tolerating     % Intake of Estimated Energy Needs: 50 - 75 %  % Meal Intake: 50 - 75 %      Intake/Output Summary (Last 24 hours) at 10/11/2024 1445  Last data filed at 10/11/2024 1040  Gross per 24 hour   Intake 1774.06 ml   Output --   Net 1774.06 ml        Anthropometrics  Temp: 98.2 °F (36.8  "°C)  Height Method: Stated  Height: 5' 2.99" (160 cm)  Height (inches): 62.99 in  Weight Method: Bed Scale  Weight: 75.6 kg (166 lb 10.7 oz)  Weight (lb): 166.67 lb  Ideal Body Weight (IBW), Female: 114.95 lb  % Ideal Body Weight, Female (lb): 146.14 %  BMI (Calculated): 29.5  BMI Grade: 25 - 29.9 - overweight       Estimated/Assessed Needs  Weight Used For Calorie Calculations: 76.2 kg (167 lb 15.9 oz)  Energy Calorie Requirements (kcal): 6275-7057 kcal/day (25-30 kcal/kg for weight maintenance).  Energy Need Method: Kcal/kg  Protein Requirements:  gm / kg (1.2-1.5 gm/kg)  Weight Used For Protein Calculations: 76.2 kg (167 lb 15.9 oz)  Fluid Requirements (mL): 1 mL/kcal or per MD  Estimated Fluid Requirement Method: RDA Method  RDA Method (mL): 1905  CHO Requirement: 238 gm daily    Reason for Assessment  Reason For Assessment: RD follow-up  Diagnosis: infection/sepsis (septic shock)  General Information Comments: PMH of CLL, and lung cancer status post partial resection who presented to the ER because of presyncope, nausea, vomiting, diarrhea and abdominal pain.  Nutrition Discharge Planning: Regular diet. ProtGold  protein supplement, 1 packet, daily    Nutrition/Diet History  Spiritual, Cultural Beliefs, Alevism Practices, Values that Affect Care: no  Food Allergies: NKFA  Factors Affecting Nutritional Intake:  (bland diet)    Nutrition Risk Screen  Nutrition Risk Screen: no indicators present       Wound 10/08/24 1501 Rash Back-Wound Image: Images linked  MST Score: 2  Have you recently lost weight without trying?: Yes: 2-13 lbs  Weight loss score: 1  Have you been eating poorly because of a decreased appetite?: Yes  Appetite score: 1       Weight History:  Wt Readings from Last 10 Encounters:   10/10/24 75.6 kg (166 lb 10.7 oz)   10/03/24 76.2 kg (168 lb)   07/24/24 76.3 kg (168 lb 3.4 oz)   07/16/24 76.3 kg (168 lb 3.4 oz)   06/25/24 76.6 kg (168 lb 15.7 oz)   06/07/24 77.2 kg (170 lb 3.1 oz) "   06/03/24 77.7 kg (171 lb 4.8 oz)   05/27/24 75.8 kg (167 lb)   05/13/24 77.3 kg (170 lb 6.7 oz)   03/12/24 80.5 kg (177 lb 7.5 oz)        Lab/Procedures/Meds: Pertinent Labs/Meds Reviewed    Medications:Pertinent Medications Reviewed  Scheduled Meds:   arformoteroL  15 mcg Nebulization BID    aspirin  81 mg Oral Daily    budesonide  1 mg Nebulization Q12H    buPROPion  300 mg Oral Daily    cholestyramine-aspartame  4 grams of anhydrous cholestyramine Oral TID    citalopram  20 mg Oral Daily    fludrocortisone  100 mcg Oral Daily    gabapentin  300 mg Oral TID    heparin (porcine)  5,000 Units Subcutaneous Q8H    hydrocortisone   Topical (Top) TID    hydrocortisone sodium succinate  100 mg Intravenous Daily    lactobacillus acidophilus & bulgar  1 packet Oral BID    levothyroxine  50 mcg Oral Before breakfast    miconazole   Topical (Top) BID    midodrine  10 mg Oral TID AC    piperacillin-tazobactam (Zosyn) IV (PEDS and ADULTS) (extended infusion is not appropriate)  4.5 g Intravenous Q8H    theophylline  200 mg Oral BID    topiramate  100 mg Oral BID    vancomycin (VANCOCIN) IV (PEDS and ADULTS)  2,000 mg Intravenous Q24H     Continuous Infusions:   NORepinephrine bitartrate-D5W  0-3 mcg/kg/min Intravenous Continuous 1.4 mL/hr at 10/11/24 0534 0.04 mcg/kg/min at 10/11/24 0534     PRN Meds:.  Current Facility-Administered Medications:     acetaminophen, 650 mg, Oral, Q4H PRN    aluminum-magnesium hydroxide-simethicone, 30 mL, Oral, Q6H PRN    calcium gluconate IVPB, 1 g, Intravenous, PRN    calcium gluconate IVPB, 2 g, Intravenous, PRN    calcium gluconate IVPB, 3 g, Intravenous, PRN    dextrose 50%, 12.5 g, Intravenous, PRN    dextrose 50%, 25 g, Intravenous, PRN    diphenhydrAMINE, 25 mg, Oral, Q6H PRN    glucagon (human recombinant), 1 mg, Intramuscular, PRN    glucose, 16 g, Oral, PRN    glucose, 24 g, Oral, PRN    HYDROcodone-acetaminophen, 1 tablet, Oral, Q6H PRN    HYDROmorphone, 0.5 mg, Intravenous, Q2H  "PRN    magnesium oxide, 800 mg, Oral, PRN    magnesium oxide, 800 mg, Oral, PRN    melatonin, 6 mg, Oral, Nightly PRN    naloxone, 0.02 mg, Intravenous, PRN    ondansetron, 4 mg, Intravenous, Q6H PRN    potassium bicarbonate, 35 mEq, Oral, PRN    potassium bicarbonate, 50 mEq, Oral, PRN    potassium bicarbonate, 60 mEq, Oral, PRN    potassium, sodium phosphates, 2 packet, Oral, PRN    potassium, sodium phosphates, 2 packet, Oral, PRN    potassium, sodium phosphates, 2 packet, Oral, PRN    prochlorperazine, 5 mg, Intravenous, Q4H PRN    senna-docusate 8.6-50 mg, 1 tablet, Oral, BID PRN    sodium chloride 0.9%, 2 mL, Intravenous, Q12H PRN    Pharmacy to dose Vancomycin consult, , , Once **AND** vancomycin - pharmacy to dose, , Intravenous, pharmacy to manage frequency    Labs: Pertinent Labs Reviewed  Clinical Chemistry:  Recent Labs   Lab 10/09/24  0317 10/10/24  0305 10/11/24  0527    139 144   K 3.5 3.5 3.1*    108 112*   CO2 22* 23 24   GLU 84 137* 97   BUN 7* 7* 8   CREATININE 1.0 0.9 0.9   CALCIUM 8.1* 8.2* 8.0*   PROT 5.0* 5.2* 5.3*   ALBUMIN 3.0* 3.3* 3.4*   BILITOT 0.6 0.6 0.5   ALKPHOS 399* 361* 321*   * 198* 74*   * 311* 221*   ANIONGAP 7* 8 8   MG 1.9 2.1 2.2    < > = values in this interval not displayed.     CBC:   Recent Labs   Lab 10/11/24  0527   WBC 45.41*   RBC 3.06*   HGB 9.4*   HCT 29.5*      MCV 96   MCH 30.7   MCHC 31.9*     Lipid Panel:  No results for input(s): "CHOL", "HDL", "LDLCALC", "TRIG", "CHOLHDL" in the last 168 hours.  Cardiac Profile:  Recent Labs   Lab 10/09/24  1108   *     Inflammatory Labs:  Recent Labs   Lab 10/08/24  1209   CRP 3.60*     Diabetes:  No results for input(s): "HGBA1C", "POCTGLUCOSE" in the last 168 hours.  Thyroid & Parathyroid:  No results for input(s): "TSH", "FREET4", "I3PXDUS", "G4AWRDJ", "THYROIDAB" in the last 168 hours.    Monitor and Evaluation  Food and Nutrient Intake: energy intake, food and beverage intake  Food " and Nutrient Adminstration: diet order  Knowledge/Beliefs/Attitudes: food and nutrition knowledge/skill  Physical Activity and Function: nutrition-related ADLs and IADLs  Anthropometric Measurements: weight change, body mass index, weight  Biochemical Data, Medical Tests and Procedures: electrolyte and renal panel, gastrointestinal profile, glucose/endocrine profile, inflammatory profile, lipid profile  Nutrition-Focused Physical Findings: overall appearance     Nutrition Risk  Level of Risk/Frequency of Follow-up:  (1 x / week)     Nutrition Follow-Up  RD Follow-up?: Yes

## 2024-10-11 NOTE — PLAN OF CARE
Problem: Adult Inpatient Plan of Care  Goal: Plan of Care Review  10/11/2024 0356 by Julia Bhagat RN  Outcome: Progressing  10/11/2024 0356 by Julia Bhagat RN  Outcome: Progressing  Goal: Patient-Specific Goal (Individualized)  10/11/2024 0356 by Julia Bhagat RN  Outcome: Progressing  10/11/2024 0356 by Julia Bhagat RN  Outcome: Progressing  Goal: Absence of Hospital-Acquired Illness or Injury  10/11/2024 0356 by Julia Bhagat RN  Outcome: Progressing  10/11/2024 0356 by Julia Bhagat RN  Outcome: Progressing  Goal: Optimal Comfort and Wellbeing  10/11/2024 0356 by Julia Bhagat RN  Outcome: Progressing  10/11/2024 0356 by Julia Bhagat RN  Outcome: Progressing  Goal: Readiness for Transition of Care  10/11/2024 0356 by Julia Bhagat RN  Outcome: Progressing  10/11/2024 0356 by Julia Bhagat RN  Outcome: Progressing     Problem: Sepsis/Septic Shock  Goal: Optimal Coping  10/11/2024 0356 by Julia Bhagat RN  Outcome: Progressing  10/11/2024 0356 by Julia Bhagat RN  Outcome: Progressing  Goal: Absence of Bleeding  10/11/2024 0356 by Julia Bhagat RN  Outcome: Progressing  10/11/2024 0356 by Julia Bhagat RN  Outcome: Progressing  Goal: Blood Glucose Level Within Targeted Range  10/11/2024 0356 by Julia Bhagat RN  Outcome: Progressing  10/11/2024 0356 by Julia Bhagat RN  Outcome: Progressing  Goal: Absence of Infection Signs and Symptoms  10/11/2024 0356 by Julia Bhagat RN  Outcome: Progressing  10/11/2024 0356 by Julia Bhagat RN  Outcome: Progressing  Goal: Optimal Nutrition Intake  10/11/2024 0356 by Julia Bhagat RN  Outcome: Progressing  10/11/2024 0356 by Julia Bhagat RN  Outcome: Progressing     Problem: Acute Kidney Injury/Impairment  Goal: Fluid and Electrolyte Balance  10/11/2024 0356 by Bhagat, Julia, RN  Outcome: Progressing  10/11/2024 0356 by Julia Bhagat, RN  Outcome: Progressing  Goal: Improved Oral  Intake  10/11/2024 0356 by Julia Bhagat RN  Outcome: Progressing  10/11/2024 0356 by Julia Bhagat RN  Outcome: Progressing  Goal: Effective Renal Function  10/11/2024 0356 by Julia Bhagat RN  Outcome: Progressing  10/11/2024 0356 by Julia Bhagat RN  Outcome: Progressing     Problem: Infection  Goal: Absence of Infection Signs and Symptoms  10/11/2024 0356 by Julia Bhagat RN  Outcome: Progressing  10/11/2024 0356 by Julia Bhagat RN  Outcome: Progressing     Problem: Wound  Goal: Optimal Coping  10/11/2024 0356 by Julia Bhagat RN  Outcome: Progressing  10/11/2024 0356 by Julia Bhagat RN  Outcome: Progressing  Goal: Optimal Functional Ability  10/11/2024 0356 by Julia Bhagat RN  Outcome: Progressing  10/11/2024 0356 by Julia Bhagat RN  Outcome: Progressing  Goal: Absence of Infection Signs and Symptoms  10/11/2024 0356 by Julia Bhagat RN  Outcome: Progressing  10/11/2024 0356 by Julia Bhagat RN  Outcome: Progressing  Goal: Improved Oral Intake  10/11/2024 0356 by Julia Bhagat RN  Outcome: Progressing  10/11/2024 0356 by Julia Bhagat RN  Outcome: Progressing  Goal: Optimal Pain Control and Function  10/11/2024 0356 by Julia Bhagat RN  Outcome: Progressing  10/11/2024 0356 by Julia Bhagat RN  Outcome: Progressing  Goal: Skin Health and Integrity  10/11/2024 0356 by Julia Bhagat RN  Outcome: Progressing  10/11/2024 0356 by Julia Bhagat RN  Outcome: Progressing  Goal: Optimal Wound Healing  10/11/2024 0356 by Julia Bhagat RN  Outcome: Progressing  10/11/2024 0356 by Julia Bhagat RN  Outcome: Progressing     Problem: Skin Injury Risk Increased  Goal: Skin Health and Integrity  Outcome: Progressing     Problem: Fall Injury Risk  Goal: Absence of Fall and Fall-Related Injury  Outcome: Progressing     Problem: Pain Acute  Goal: Optimal Pain Control and Function  Outcome: Progressing     Problem: Cholecystectomy  Goal: Absence of  Bleeding  Outcome: Progressing  Goal: Effective Bowel Elimination  Outcome: Progressing  Goal: Fluid and Electrolyte Balance  Outcome: Progressing  Goal: Absence of Infection Signs and Symptoms  Outcome: Progressing  Goal: Pain Control and Function  Outcome: Progressing  Goal: Nausea and Vomiting Relief  Outcome: Progressing  Goal: Effective Urinary Elimination  Outcome: Progressing  Goal: Effective Oxygenation and Ventilation  Outcome: Progressing

## 2024-10-11 NOTE — CONSULTS
HPI    64 years old female with history of CLL follows Dr. Lee and lung cancer status post partial resection presented to the ED for presyncopal events nausea vomiting diarrhea and abdominal pain.  Patient states that the symptoms has been therefore over week.  She rates the pain scale 10/10.  Denies any fever chills.  Denies any chest pain or shortness breath.    In terms of her oncology.  She was last seen by Dr. Lee on 06/03/2024 for CLL stage 0 and diagnosis of lung cancer August 2022.    Lung cancer adenoma squamous status post robotic lobectomy October 2022 [id number] stage IIB 5% PDL1 positive     Lung cancer treatment history  - carbo platinum paclitaxel atezolizumab bevacizumab between 11/7/22 - 2/8/23 maintenance by 1 year discontinue maintenance based on tumor board recommendation  -carboplatin and paclitaxel q. 3 weeks between 02/08/2023 - 03/06/2023    Patient is currently under observation    CLL stage 0 no change in status.  No cytopenia no adenopathy no B symptoms.      Patient is currently admitted to the hospital for syncopal events.  Consultation to Heme-Onc for continuity of care      Past Medical History:   Diagnosis Date    Arthritis     Cancer 10/2022    (CLL) leukemia ; adenocarcinoma  adenosgemis    Depression     GERD (gastroesophageal reflux disease)     Neck pain     and back pain    Personal history of colonic polyps 07/07/2017    Pneumonia of left lung due to infectious organism 03/05/2020    Thyroid disease      Past Surgical History:   Procedure Laterality Date    ERCP N/A 10/10/2024    Procedure: ERCP (ENDOSCOPIC RETROGRADE CHOLANGIOPANCREATOGRAPHY);  Surgeon: Joshua Polanco III, MD;  Location: [facility] ENDO;  Service: Endoscopy;  Laterality: N/A;    FUSION, SPINE, CERVICAL, ANTERIOR APPROACH N/A 08/06/2024    Procedure: FUSION, SPINE, CERVICAL, ANTERIOR APPROACH;  Surgeon: Anatoly Daley DO;  Location: [facility] OR;  Service: Neurosurgery;  Laterality: N/A;  IOM, C-ARM, [employer],  MICRO, HORSESHOE    INJECTION OF ANESTHETIC AGENT AROUND MULTIPLE INTERCOSTAL NERVES Left 10/03/2022    Procedure: BLOCK, NERVE, INTERCOSTAL, 2 OR MORE;  Surgeon: Evelio Kaplan MD;  [facility];  Service: Thoracic;  Laterality: Left;    INSERTION OF TUNNELED CENTRAL VENOUS CATHETER (CVC) WITH SUBCUTANEOUS PORT Right 11/03/2022    Procedure: [id number], Right or Left Neck or Chest;  Surgeon: Mini Acevedo MD;  [facility];  Service: General;  Laterality: Right;    LAPAROSCOPIC CHOLECYSTECTOMY N/A 10/3/2024    Procedure: CHOLECYSTECTOMY, LAPAROSCOPIC;  Surgeon: Ang Mosley III, MD;  Location: [facility] OR;  Service: General;  Laterality: N/A;    LEFT HEART CATHETERIZATION Left 10/9/2024    Procedure: Left heart cath;  Surgeon: Martin Ingram MD;  Location: [facility] CATH/EP LAB;  Service: Cardiology;  Laterality: Left;    ROBOT-ASSISTED LAPAROSCOPIC LYMPHADENECTOMY USING DA MONIQUE XI Left 10/03/2022    Procedure: XI ROBOTIC LYMPHADENECTOMY;  Surgeon: Evelio Kaplan MD;  Location: [facility] OR [address] [place] [address];  Service: Thoracic;  Laterality: Left;    SPINE SURGERY      TONSILLECTOMY      XI ROBOTIC RATS,WITH LOBECTOMY,LUNG Left 10/03/2022    Procedure: XI ROBOTIC RATS,WITH LOBECTOMY,LUNG;  Surgeon: Evelio Kaplan MD;  [facility];  Service: Thoracic;  Laterality: Left;     Social History     Socioeconomic History    Marital status: Legally    Tobacco Use    Smoking status: Some Days     Current packs/day: 0.15     Types: Cigarettes     Passive exposure: Current    Smokeless tobacco: Never    Tobacco comments:     reports one cigarette every now and then   Substance and Sexual Activity    Alcohol use: Yes     Comment: rarely    Drug use: Yes     Types: Marijuana     Social Drivers of Health     Financial Resource Strain: Low Risk  (10/3/2024)    Overall Financial Resource Strain (CARDIA)     Difficulty of Paying Living Expenses: Not hard at all    Food Insecurity: No Food Insecurity (10/3/2024)    Hunger Vital Sign     Worried About Running Out of Food in the Last Year: Never true     Ran Out of Food in the Last Year: Never true   Transportation Needs: No Transportation Needs (10/3/2024)    TRANSPORTATION NEEDS     Transportation : No   Physical Activity: Inactive (10/3/2024)    Exercise Vital Sign     Days of Exercise per Week: 0 days     Minutes of Exercise per Session: 0 min   Stress: No Stress Concern Present (10/3/2024)    [employer] - Occupational Stress Questionnaire     Feeling of Stress : Not at all   Housing Stability: Low Risk  (10/3/2024)    Housing Stability Vital Sign     Unable to Pay for Housing in the Last Year: No     Homeless in the Last Year: No     Review of patient's allergies indicates:   Allergen Reactions    Latex, natural rubber        Physical exam  Vitals:    10/11/24 1101   BP: (!) 103/55   Pulse: 62   Resp: (!) 30   Temp: 98 °F (36.7 °C)     Constitutional:       Appearance: Normal appearance.   HENT:      Head: Normocephalic and atraumatic.      Mouth/Throat:      Mouth: Mucous membranes are moist.   Eyes:      Extraocular Movements: Extraocular movements intact.      Conjunctiva/sclera: Conjunctivae normal.      Pupils: Pupils are equal, round, and reactive to light.   Cardiovascular:      Rate and Rhythm: Normal rate and regular rhythm.   Pulmonary:      Effort: Pulmonary effort is normal.      Breath sounds: Normal breath sounds.   Abdominal:      General: Abdomen is flat. Bowel sounds are normal.      Palpations: Abdomen is soft.      Comments: Appropriate abdominal tenderness.    Musculoskeletal:         General: No swelling or tenderness.   Skin:     General: Skin is warm.      Comments: Erythematous and pruritic rash along the lower abdominal wall, bilateral thighs and buttocks    Neurological:      Mental Status: She is alert and oriented to person, place, and time.   Psychiatric:          Mood and Affect: Mood normal.         Behavior: Behavior normal.     CMP  Sodium   Date Value Ref Range Status   10/11/2024 144 136 - 145 mmol/L Final     Potassium   Date Value Ref Range Status   10/11/2024 3.1 (L) 3.5 - 5.1 mmol/L Final     Chloride   Date Value Ref Range Status   10/11/2024 112 (H) 95 - 110 mmol/L Final     CO2   Date Value Ref Range Status   10/11/2024 24 23 - 29 mmol/L Final     Glucose   Date Value Ref Range Status   10/11/2024 97 70 - 110 mg/dL Final     BUN   Date Value Ref Range Status   10/11/2024 8 8 - 23 mg/dL Final     Creatinine   Date Value Ref Range Status   10/11/2024 0.9 0.5 - 1.4 mg/dL Final     Calcium   Date Value Ref Range Status   10/11/2024 8.0 (L) 8.7 - 10.5 mg/dL Final     Total Protein   Date Value Ref Range Status   10/11/2024 5.3 (L) 6.0 - 8.4 g/dL Final     Albumin   Date Value Ref Range Status   10/11/2024 3.4 (L) 3.5 - 5.2 g/dL Final     Total Bilirubin   Date Value Ref Range Status   10/11/2024 0.5 0.1 - 1.0 mg/dL Final     Comment:     For infants and newborns, interpretation of results should be based  on gestational age, weight and in agreement with clinical  observations.    Premature Infant recommended reference ranges:  Up to 24 hours.............<8.0 mg/dL  Up to 48 hours............<12.0 mg/dL  3-5 days..................<15.0 mg/dL  6-29 days.................<15.0 mg/dL       Alkaline Phosphatase   Date Value Ref Range Status   10/11/2024 321 (H) 55 - 135 U/L Final     AST   Date Value Ref Range Status   10/11/2024 74 (H) 10 - 40 U/L Final     ALT   Date Value Ref Range Status   10/11/2024 221 (H) 10 - 44 U/L Final     Anion Gap   Date Value Ref Range Status   10/11/2024 8 8 - 16 mmol/L Final     eGFR   Date Value Ref Range Status   10/11/2024 >60.0 >60 mL/min/1.73 m^2 Final     Lab Results   Component Value Date    WBC 45.41 (HH) 10/11/2024    HGB 9.4 (L) 10/11/2024    HCT 29.5 (L) 10/11/2024    MCV 96 10/11/2024     10/11/2024         Assessment and  recommendation    CLL stage 0 under observation by Dr. Lee    History of lung cancer currently under observation - out patient with Dr Lee     Sepsis bacteremia due to Staphylococcus - hospital admission    Septic shock intra-abdominal processes.- hospital admission    > Heme-Onc recommendation.  CLL will be followed as outpatient after discharge.  It is reasonable to expect WBC to double setting of infection with CLL.  I do expect WBC to be improved once infectious sources been treated however that may take some time.  Repeat CBC warranted.  Hemoglobin trending down in the setting of infection.  We can check hemolysis blood work.  LDH haptoglobin.  However her CMP total bilirubin is normal so less likely to be intravascular hemolysis.  Computer order differential did not  any cell fragmentation.  We will also check for iron panel ferritin at meantime.    > history of lung cancer will be followed as outpatient after discharge

## 2024-10-11 NOTE — PROGRESS NOTES
[facility] Medicine  Progress Note    Patient Name: Yvette Hutchinson  MRN: [id number]  Patient Class: IP- Inpatient   Admission Date: 10/2/2024  Length of Stay: 9 days  Attending Physician: Iris Odell MD  Primary Care Provider: Vern Priest MD        Subjective:     Principal Problem:Septic shock        HPI:  64-year-old female with PMH of CLL, and lung cancer status post partial resection who presented to the ER because of presyncope, nausea, vomiting, diarrhea and abdominal pain.  According to the patient, for over 1 week she has been having intractable nausea, vomiting and diarrhea.  Also epigastric and right upper quadrant abdominal pain described as sharp, 10/10 on pain scale radiate to the rest of her abdomen.  Today when she stood up from a sitting position, she felt as if she will pass out.  As a result, she decided to come to the ER for evaluation.  Denied any fever or chills.  She denied chest pain or shortness on breath.    In the ER, vitals showed a blood pressure of 99/55, but soon dropped to the 70s.  Heart rate was 122, respiratory rate of 20, temperature of 101.6°, and patient was satting 95% on room air.  CBC was white count of 21, with history of CLL.  CMP showed sodium of 135, acute renal failure with creatinine of 1.4 compared to baseline of 1, and patient has elevated LFTs with alk-phos of 216, , and AST of 497.  COVID/flu are negative.  Blood cultures were collected.  CTA chest negative for pulmonary embolus, but showed mild diffuse bronchial wall thickening that could reflect infectious or inflammatory bronchitis in the appropriate clinical setting.  CT abdomen/pelvis showed pericholecystic edema without evidence of gallstones.  Cholecystitis cannot be excluded. Also mild Linda pancreatic edema which may represent early pancreatitis. US abdomen showed Cholelithiasis, gallbladder wall thickening, and mild pericholecystic fluid. Patient was started  on Zosyn/vanco.  Was given sepsis bolus of lactated ringer of 2286 cc.  Started on Levophed for blood pressure support.  General surgery was consulted.  Patient will be admitted to ICU for septic shock.       Overview/Hospital Course:  This 64-year-old lady with history of CLL, lung cancer status post partial resection presented to the emergency department due to presyncope, nausea vomiting and abdominal pain.  She was also noted to be hypotensive and tachycardic on admission.  She was also noted to have fever.  Patient was admitted to the hospital for septic shock concern for abdominal source given CT findings showing some pericholecystic fluid.  Patient was also noted to have transaminitis and imaging did reveal some peripancreatic fluid and inflammatory changes as well though her lipase was normal.  General surgery was consulted after ultrasound the gallbladder was performed which again redemonstrated pericholecystic fluid.  She is status post laparoscopic cholecystectomy on 10/03.  Gallbladder was not noted to be gangrenous and per the op note there was suspicion that this was not the source of her septic shock.  However, blood cultures grew only coagulation negative Staphylococcus in 1/2 bottles.  This was thought to be contaminant.  Infectious Disease was consulted and vancomycin and Zosyn was continued on this patient.  Levophed initiated on admission was able to be weaned, although she still had requirement the day after surgery.  Will attempt midodrine TID to transition off levophed.  Repeat blood cultures NTD. Vancomycin dc'd  Florinef added. I/O's consistently negative due to diarrhea and poor oral intake.  Probiotic and stool WBC, elastase studies, hepatitis panel pending.    10/9/24: LHC completed, no obstructive disease; continuous diarrhea - lactinex added while on zosyn; GI consulted; severe pruritic rash buttock, down both legs and abdominal wall  10/10: MRCP suggest intrahepatic duct blocked,  ERCP:  Impression:            - The entire main bile duct was mildly dilated,                          acquired.                          - The patient has had a cholecystectomy.                          - No evidence of stricture, filling defect or any                          abnormalities as suggested by MRCP.   Rising lymphocytes/WBC. On two steroids; h/o CLL - hematology consulted     Interval History:  still c/o nausea and poor oral intake, dietician recs appreciated     Review of Systems   Gastrointestinal:  Positive for abdominal pain, diarrhea and nausea.   Musculoskeletal:  Positive for back pain.    is performed and is negative unless mentioned in the interval history above.  Objective:     Vital Signs (Most Recent):  Temp: 97.6 °F (36.4 °C) (10/11/24 0301)  Pulse: 70 (10/11/24 0530)  Resp: 14 (10/11/24 0530)  BP: (!) 100/53 (10/11/24 0530)  SpO2: 97 % (10/11/24 0530) Vital Signs (24h Range):  Temp:  [97.6 °F (36.4 °C)-98.2 °F (36.8 °C)] 97.6 °F (36.4 °C)  Pulse:  [64-82] 70  Resp:  [10-46] 14  SpO2:  [89 %-100 %] 97 %  BP: ()/(42-59) 100/53     Weight: 75.6 kg (166 lb 10.7 oz)  Body mass index is 29.53 kg/m².    Intake/Output Summary (Last 24 hours) at 10/11/2024 0803  Last data filed at 10/11/2024 0633  Gross per 24 hour   Intake 1379.89 ml   Output --   Net 1379.89 ml         Physical Exam  Vitals reviewed.   Constitutional:       Appearance: Normal appearance.   HENT:      Head: Normocephalic and atraumatic.      Mouth/Throat:      Mouth: Mucous membranes are moist.   Eyes:      Extraocular Movements: Extraocular movements intact.      Conjunctiva/sclera: Conjunctivae normal.      Pupils: Pupils are equal, round, and reactive to light.   Cardiovascular:      Rate and Rhythm: Normal rate and regular rhythm.   Pulmonary:      Effort: Pulmonary effort is normal.      Breath sounds: Normal breath sounds.   Abdominal:      General: Abdomen is flat. Bowel sounds are normal.      Palpations: Abdomen is  soft.      Comments: Appropriate abdominal tenderness.    Musculoskeletal:         General: No swelling or tenderness.   Skin:     General: Skin is warm.      Comments: Erythematous and pruritic rash along the lower abdominal wall, bilateral thighs and buttocks    Neurological:      Mental Status: She is alert and oriented to person, place, and time.   Psychiatric:         Mood and Affect: Mood normal.         Behavior: Behavior normal.             Significant Labs: All pertinent labs within the past 24 hours have been reviewed.  BMP:   Recent Labs   Lab 10/11/24  0527   GLU 97      K 3.1*   *   CO2 24   BUN 8   CREATININE 0.9   CALCIUM 8.0*   MG 2.2     CBC:   Recent Labs   Lab 10/10/24  0305 10/11/24  0527   WBC 34.29* 45.41*   HGB 9.5* 9.4*   HCT 29.6* 29.5*    426       Significant Imaging: I have reviewed all pertinent imaging results/findings within the past 24 hours.  I have reviewed and interpreted all pertinent imaging results/findings within the past 24 hours.    Assessment/Plan:      * Septic shock  - Suspect secondary to intra-abdominal process.  CT reveals pericholecystic fluid and peripancreatic fluid  She is now status post cholecystectomy with General surgery, per op note the gallbladder did not appear significantly inflamed to be commensurate with the patient's presentation of septic shock, 1/2 bottles drawn for blood culture was growing coagulase-negative Staphylococcus, felt to be contaminant, however, given patient's persistent pressor requirement infectious Disease was consulted, recommendation to continue vancomycin and Zosyn at this time  Infectious Disease we will defer imaging of patient's C-spine for now  Continued follow up blood cultures  Levophed as needed for map less than 65 -unable to wean off, midodrine initiated by cardiology   Check cortisol - normal range for am draw  Florinef added   Added hydrocortisone back     Functional diarrhea  S/p lap archana - continues  to have loose stools  Lactose intolerant - diet and supplements adjusted - diarrhea more pronounced after ensure  Add probiotic while on zosyn  Add stool WBC and elastase studies   C.diff negative 6 days ago   Consider stool bulking agent if no improvement   GI consulted         Bacteremia due to Staphylococcus  This has now resulted as coagulase-negative staph, likely contaminant  ID following and would like to continue vancomycin for now  Repeat blood cultures 10/5/24 NTD - VAncomycin dc'd     Abdominal pain  Suspect secondary to acute cholecystitis.    CT abdomen also showed mild pancreatitis, but lipase is within normal limits.    Given her diarrhea however, will order stool culture.    Supportive care with IV fluids, Zofran for nausea.  P.r.n. pain medication.      Acute renal failure  Most recent creatinine and eGFR are listed below.  Recent Labs     10/04/24  0455 10/04/24  2101 10/05/24  0336   CREATININE 1.2 1.2 1.4   EGFRNORACEVR 50.2* 50.2* 41.8*       Likely pre-renal given her septic shock.   Status post IV hydration, patient able to take PO hydration  GM resolving      Neuropathic pain  Resume gabapentin.      Calculus of gallbladder with acute cholecystitis without obstruction  Status post cholecystectomy  WBC 24k climbing to 45K - higher lymphocytes  On zosyn         Acquired hypothyroidism  Resume synthroid.       CLL (chronic lymphocytic leukemia)  Monitor. Patient's last chemo for over a year.  Follow up with Hematology outpatient.  Consult hematology as WBC continues to climb, patient missed PET scan scheduled this week      Anxiety  Resume Wellbutrin and Celexa        VTE Risk Mitigation (From admission, onward)           Ordered     Place [place] hose  Until discontinued         10/05/24 1132     heparin (porcine) injection 5,000 Units  Every 8 hours         10/02/24 1611     IP VTE HIGH RISK PATIENT  Once         10/02/24 1611     Place sequential compression device  Until discontinued          10/02/24 1611                    Discharge Planning   SALINA: 10/14/2024     Code Status: Full Code   Is the patient medically ready for discharge?:     Reason for patient still in hospital (select all that apply): Patient trending condition  Discharge Plan A: Home Health   Discharge Delays: None known at this time        Critical care time spent on the evaluation and treatment of severe organ dysfunction, review of pertinent labs and imaging studies, discussions with consulting providers and discussions with patient/family: 30 minutes.      Iris Odell MD  Department of Hospital Medicine   [facility]

## 2024-10-11 NOTE — ASSESSMENT & PLAN NOTE
Monitor. Patient's last chemo for over a year.  Follow up with Hematology outpatient.  Consult hematology as WBC continues to climb, patient missed PET scan scheduled this week

## 2024-10-11 NOTE — PT/OT/SLP PROGRESS
Occupational Therapy   Treatment    Name: Yvette Hutchinson  MRN: [id number]  Admitting Diagnosis:  Septic shock  1 Day Post-Op    Recommendations:     Discharge Recommendations: Moderate Intensity Therapy  Discharge Equipment Recommendations:   (to be determined by next level of care)  Barriers to discharge:       Assessment:     Yvette Hutchinson is a 64 y.o. female with a medical diagnosis of Septic shock. Performance deficits affecting function are weakness, impaired endurance, impaired self care skills, impaired functional mobility, gait instability, impaired balance, decreased safety awareness, impaired cardiopulmonary response to activity, orthopedic precautions. Pt declined ADLs or mobility this date. OT spent time educating patient on importance of participation with therapy and spent time discussing d/c plan.    Rehab Prognosis:  Fair; patient would benefit from acute skilled OT services to address these deficits and reach maximum level of function.       Plan:     Patient to be seen 3 x/week to address the above listed problems via self-care/home management, therapeutic activities, therapeutic exercises  Plan of Care Expires: 11/05/24  Plan of Care Reviewed with: patient, family    Subjective     Chief Complaint: none stated  Patient/Family Comments/goals: none stated  Pain/Comfort:  Pain Rating 1: 0/10    Objective:     Communicated with: nursing prior to session.  Patient found up in chair with blood pressure cuff, pulse ox (continuous), peripheral IV, telemetry, chair check upon OT entry to room.    General Precautions: Standard, fall    Orthopedic Precautions:spinal precautions  Braces: [place] J collar  Respiratory Status: Room air     Occupational Performance:     Functional Mobility/Transfers:  declined    Activities of Daily Living:  declined    Treatment & Education:  Pt educated on role of OT/POC, importance of OOB/EOB activity, use of call bell, and safety during ADLs, transfers, and  functional mobility.  OT spent time educating patient on importance of participation with therapy and spent time discussing d/c plan.    Patient left up in chair with all lines intact, call button in reach, chair alarm on, and family and MD present    GOALS:   Multidisciplinary Problems       Occupational Therapy Goals          Problem: Occupational Therapy    Goal Priority Disciplines Outcome Interventions   Occupational Therapy Goal     OT, PT/OT     Description: Goals to be met by: 11/05/2024     Patient will increase functional independence with ADLs by performing:    UE Dressing with Set-up Assistance.  LE Dressing with Minimal Assistance.  Grooming while standing at sink with Supervision.  Toileting from toilet with Supervision for hygiene and clothing management.   Supine to sit with Supervision.                         Time Tracking:     OT Date of Treatment: 10/11/24  OT Start Time: 1258  OT Stop Time: 1306  OT Total Time (min): 8 min    Billable Minutes:Self Care/Home Management 8    OT/RUSS: OT          10/11/2024

## 2024-10-12 LAB
ALBUMIN SERPL BCP-MCNC: 3.2 G/DL (ref 3.5–5.2)
ALP SERPL-CCNC: 278 U/L (ref 55–135)
ALT SERPL W/O P-5'-P-CCNC: 160 U/L (ref 10–44)
ANION GAP SERPL CALC-SCNC: 5 MMOL/L (ref 8–16)
ANION GAP SERPL CALC-SCNC: 8 MMOL/L (ref 8–16)
AST SERPL-CCNC: 43 U/L (ref 10–40)
BASOPHILS # BLD AUTO: 0.08 K/UL (ref 0–0.2)
BASOPHILS NFR BLD: 0.3 % (ref 0–1.9)
BILIRUB SERPL-MCNC: 0.5 MG/DL (ref 0.1–1)
BUN SERPL-MCNC: 7 MG/DL (ref 8–23)
BUN SERPL-MCNC: 9 MG/DL (ref 8–23)
CALCIUM SERPL-MCNC: 8.1 MG/DL (ref 8.7–10.5)
CALCIUM SERPL-MCNC: 8.3 MG/DL (ref 8.7–10.5)
CHLORIDE SERPL-SCNC: 110 MMOL/L (ref 95–110)
CHLORIDE SERPL-SCNC: 114 MMOL/L (ref 95–110)
CO2 SERPL-SCNC: 21 MMOL/L (ref 23–29)
CO2 SERPL-SCNC: 24 MMOL/L (ref 23–29)
CREAT SERPL-MCNC: 0.9 MG/DL (ref 0.5–1.4)
CREAT SERPL-MCNC: 1 MG/DL (ref 0.5–1.4)
DIFFERENTIAL METHOD BLD: ABNORMAL
ELASTASE PANC STL-MCNT: 254 UG ELAST./G
EOSINOPHIL # BLD AUTO: 0.1 K/UL (ref 0–0.5)
EOSINOPHIL NFR BLD: 0.3 % (ref 0–8)
ERYTHROCYTE [DISTWIDTH] IN BLOOD BY AUTOMATED COUNT: 16.7 % (ref 11.5–14.5)
EST. GFR  (NO RACE VARIABLE): >60 ML/MIN/1.73 M^2
EST. GFR  (NO RACE VARIABLE): >60 ML/MIN/1.73 M^2
FERRITIN SERPL-MCNC: 95.9 NG/ML (ref 20–300)
GLUCOSE SERPL-MCNC: 126 MG/DL (ref 70–110)
GLUCOSE SERPL-MCNC: 79 MG/DL (ref 70–110)
HCT VFR BLD AUTO: 26.3 % (ref 37–48.5)
HGB BLD-MCNC: 8.3 G/DL (ref 12–16)
IMM GRANULOCYTES # BLD AUTO: 0.16 K/UL (ref 0–0.04)
IMM GRANULOCYTES NFR BLD AUTO: 0.6 % (ref 0–0.5)
IRON SERPL-MCNC: 38 UG/DL (ref 30–160)
LYMPHOCYTES # BLD AUTO: 20.8 K/UL (ref 1–4.8)
LYMPHOCYTES NFR BLD: 78.8 % (ref 18–48)
MAGNESIUM SERPL-MCNC: 2.2 MG/DL (ref 1.6–2.6)
MCH RBC QN AUTO: 30.6 PG (ref 27–31)
MCHC RBC AUTO-ENTMCNC: 31.6 G/DL (ref 32–36)
MCV RBC AUTO: 97 FL (ref 82–98)
MONOCYTES # BLD AUTO: 1.1 K/UL (ref 0.3–1)
MONOCYTES NFR BLD: 4 % (ref 4–15)
NEUTROPHILS # BLD AUTO: 4.2 K/UL (ref 1.8–7.7)
NEUTROPHILS NFR BLD: 16 % (ref 38–73)
NRBC BLD-RTO: 0 /100 WBC
PLATELET # BLD AUTO: 353 K/UL (ref 150–450)
PMV BLD AUTO: 9.6 FL (ref 9.2–12.9)
POTASSIUM SERPL-SCNC: 3.5 MMOL/L (ref 3.5–5.1)
POTASSIUM SERPL-SCNC: 3.5 MMOL/L (ref 3.5–5.1)
PROT SERPL-MCNC: 4.9 G/DL (ref 6–8.4)
RBC # BLD AUTO: 2.71 M/UL (ref 4–5.4)
SATURATED IRON: 10 % (ref 20–50)
SODIUM SERPL-SCNC: 139 MMOL/L (ref 136–145)
SODIUM SERPL-SCNC: 143 MMOL/L (ref 136–145)
T4 FREE SERPL-MCNC: 0.76 NG/DL (ref 0.71–1.51)
TOTAL IRON BINDING CAPACITY: 365 UG/DL (ref 250–450)
TRANSFERRIN SERPL-MCNC: 261 MG/DL (ref 200–375)
TSH SERPL DL<=0.005 MIU/L-ACNC: 14.03 UIU/ML (ref 0.34–5.6)
WBC # BLD AUTO: 26.4 K/UL (ref 3.9–12.7)

## 2024-10-12 PROCEDURE — 82728 ASSAY OF FERRITIN: CPT | Performed by: STUDENT IN AN ORGANIZED HEALTH CARE EDUCATION/TRAINING PROGRAM

## 2024-10-12 PROCEDURE — 63600175 PHARM REV CODE 636 W HCPCS: Performed by: INTERNAL MEDICINE

## 2024-10-12 PROCEDURE — 84439 ASSAY OF FREE THYROXINE: CPT | Performed by: STUDENT IN AN ORGANIZED HEALTH CARE EDUCATION/TRAINING PROGRAM

## 2024-10-12 PROCEDURE — 25000003 PHARM REV CODE 250: Performed by: HOSPITALIST

## 2024-10-12 PROCEDURE — 99233 SBSQ HOSP IP/OBS HIGH 50: CPT | Mod: ,,, | Performed by: INTERNAL MEDICINE

## 2024-10-12 PROCEDURE — 99900031 HC PATIENT EDUCATION (STAT)

## 2024-10-12 PROCEDURE — 25000003 PHARM REV CODE 250: Performed by: SURGERY

## 2024-10-12 PROCEDURE — 84443 ASSAY THYROID STIM HORMONE: CPT | Performed by: STUDENT IN AN ORGANIZED HEALTH CARE EDUCATION/TRAINING PROGRAM

## 2024-10-12 PROCEDURE — 25000003 PHARM REV CODE 250

## 2024-10-12 PROCEDURE — 25000003 PHARM REV CODE 250: Performed by: INTERNAL MEDICINE

## 2024-10-12 PROCEDURE — 94761 N-INVAS EAR/PLS OXIMETRY MLT: CPT

## 2024-10-12 PROCEDURE — 94640 AIRWAY INHALATION TREATMENT: CPT

## 2024-10-12 PROCEDURE — 80048 BASIC METABOLIC PNL TOTAL CA: CPT | Performed by: STUDENT IN AN ORGANIZED HEALTH CARE EDUCATION/TRAINING PROGRAM

## 2024-10-12 PROCEDURE — 63600175 PHARM REV CODE 636 W HCPCS: Performed by: HOSPITALIST

## 2024-10-12 PROCEDURE — 80053 COMPREHEN METABOLIC PANEL: CPT | Performed by: SURGERY

## 2024-10-12 PROCEDURE — 25000242 PHARM REV CODE 250 ALT 637 W/ HCPCS

## 2024-10-12 PROCEDURE — 63600175 PHARM REV CODE 636 W HCPCS: Performed by: SURGERY

## 2024-10-12 PROCEDURE — 36415 COLL VENOUS BLD VENIPUNCTURE: CPT | Performed by: STUDENT IN AN ORGANIZED HEALTH CARE EDUCATION/TRAINING PROGRAM

## 2024-10-12 PROCEDURE — 83735 ASSAY OF MAGNESIUM: CPT | Performed by: SURGERY

## 2024-10-12 PROCEDURE — 63600175 PHARM REV CODE 636 W HCPCS

## 2024-10-12 PROCEDURE — 94799 UNLISTED PULMONARY SVC/PX: CPT

## 2024-10-12 PROCEDURE — 25000003 PHARM REV CODE 250: Performed by: NURSE PRACTITIONER

## 2024-10-12 PROCEDURE — 84466 ASSAY OF TRANSFERRIN: CPT | Performed by: STUDENT IN AN ORGANIZED HEALTH CARE EDUCATION/TRAINING PROGRAM

## 2024-10-12 PROCEDURE — 85025 COMPLETE CBC W/AUTO DIFF WBC: CPT | Performed by: SURGERY

## 2024-10-12 PROCEDURE — 97116 GAIT TRAINING THERAPY: CPT

## 2024-10-12 PROCEDURE — 25000003 PHARM REV CODE 250: Performed by: STUDENT IN AN ORGANIZED HEALTH CARE EDUCATION/TRAINING PROGRAM

## 2024-10-12 PROCEDURE — 20000000 HC ICU ROOM

## 2024-10-12 RX ORDER — HYDROMORPHONE HYDROCHLORIDE 1 MG/ML
0.5 INJECTION, SOLUTION INTRAMUSCULAR; INTRAVENOUS; SUBCUTANEOUS EVERY 6 HOURS PRN
Status: DISCONTINUED | OUTPATIENT
Start: 2024-10-12 | End: 2024-10-16 | Stop reason: HOSPADM

## 2024-10-12 RX ORDER — LANOLIN ALCOHOL/MO/W.PET/CERES
1 CREAM (GRAM) TOPICAL DAILY
Status: DISCONTINUED | OUTPATIENT
Start: 2024-10-12 | End: 2024-10-16 | Stop reason: HOSPADM

## 2024-10-12 RX ADMIN — CITALOPRAM HYDROBROMIDE 20 MG: 20 TABLET ORAL at 08:10

## 2024-10-12 RX ADMIN — FERROUS SULFATE TAB 325 MG (65 MG ELEMENTAL FE) 1 EACH: 325 (65 FE) TAB at 03:10

## 2024-10-12 RX ADMIN — MIDODRINE HYDROCHLORIDE 10 MG: 10 TABLET ORAL at 04:10

## 2024-10-12 RX ADMIN — FLUDROCORTISONE ACETATE 100 MCG: 0.1 TABLET ORAL at 08:10

## 2024-10-12 RX ADMIN — PIPERACILLIN SODIUM AND TAZOBACTAM SODIUM 4.5 G: 4; .5 INJECTION, POWDER, LYOPHILIZED, FOR SOLUTION INTRAVENOUS at 09:10

## 2024-10-12 RX ADMIN — CHOLESTYRAMINE 4 GRAMS OF ANHYDROUS CHOLESTYRAMINE: 4 POWDER, FOR SUSPENSION ORAL at 08:10

## 2024-10-12 RX ADMIN — PIPERACILLIN SODIUM AND TAZOBACTAM SODIUM 4.5 G: 4; .5 INJECTION, POWDER, LYOPHILIZED, FOR SOLUTION INTRAVENOUS at 05:10

## 2024-10-12 RX ADMIN — BUPROPION HYDROCHLORIDE 300 MG: 150 TABLET, EXTENDED RELEASE ORAL at 08:10

## 2024-10-12 RX ADMIN — GABAPENTIN 300 MG: 300 CAPSULE ORAL at 03:10

## 2024-10-12 RX ADMIN — HYDROCORTISONE: 25 CREAM TOPICAL at 09:10

## 2024-10-12 RX ADMIN — ARFORMOTEROL TARTRATE 15 MCG: 15 SOLUTION RESPIRATORY (INHALATION) at 07:10

## 2024-10-12 RX ADMIN — GABAPENTIN 300 MG: 300 CAPSULE ORAL at 08:10

## 2024-10-12 RX ADMIN — HEPARIN SODIUM 5000 UNITS: 5000 INJECTION INTRAVENOUS; SUBCUTANEOUS at 02:10

## 2024-10-12 RX ADMIN — HYDROCORTISONE: 25 CREAM TOPICAL at 08:10

## 2024-10-12 RX ADMIN — THEOPHYLLINE ANHYDROUS 200 MG: 100 CAPSULE, EXTENDED RELEASE ORAL at 08:10

## 2024-10-12 RX ADMIN — MIDODRINE HYDROCHLORIDE 10 MG: 10 TABLET ORAL at 05:10

## 2024-10-12 RX ADMIN — HYDROCORTISONE: 25 CREAM TOPICAL at 03:10

## 2024-10-12 RX ADMIN — LEVOTHYROXINE SODIUM 50 MCG: 0.03 TABLET ORAL at 05:10

## 2024-10-12 RX ADMIN — PIPERACILLIN SODIUM AND TAZOBACTAM SODIUM 4.5 G: 4; .5 INJECTION, POWDER, LYOPHILIZED, FOR SOLUTION INTRAVENOUS at 02:10

## 2024-10-12 RX ADMIN — MICONAZOLE NITRATE: 20 CREAM TOPICAL at 09:10

## 2024-10-12 RX ADMIN — HYDROCODONE BITARTRATE AND ACETAMINOPHEN 1 TABLET: 5; 325 TABLET ORAL at 06:10

## 2024-10-12 RX ADMIN — LACTOBACILLUS ACIDOPHILUS / LACTOBACILLUS BULGARICUS 1 EACH: 100 MILLION CFU STRENGTH GRANULES at 08:10

## 2024-10-12 RX ADMIN — POTASSIUM BICARBONATE 35 MEQ: 391 TABLET, EFFERVESCENT ORAL at 12:10

## 2024-10-12 RX ADMIN — VANCOMYCIN HYDROCHLORIDE 2000 MG: 500 INJECTION, POWDER, LYOPHILIZED, FOR SOLUTION INTRAVENOUS at 12:10

## 2024-10-12 RX ADMIN — MICONAZOLE NITRATE: 20 CREAM TOPICAL at 08:10

## 2024-10-12 RX ADMIN — HYDROCORTISONE SODIUM SUCCINATE 100 MG: 100 INJECTION, POWDER, FOR SOLUTION INTRAMUSCULAR; INTRAVENOUS at 08:10

## 2024-10-12 RX ADMIN — TOPIRAMATE 100 MG: 25 TABLET, FILM COATED ORAL at 08:10

## 2024-10-12 RX ADMIN — MIDODRINE HYDROCHLORIDE 10 MG: 10 TABLET ORAL at 11:10

## 2024-10-12 RX ADMIN — CHOLESTYRAMINE 4 GRAMS OF ANHYDROUS CHOLESTYRAMINE: 4 POWDER, FOR SUSPENSION ORAL at 03:10

## 2024-10-12 RX ADMIN — POTASSIUM BICARBONATE 50 MEQ: 977.5 TABLET, EFFERVESCENT ORAL at 06:10

## 2024-10-12 RX ADMIN — POTASSIUM & SODIUM PHOSPHATES POWDER PACK 280-160-250 MG 2 PACKET: 280-160-250 PACK at 01:10

## 2024-10-12 RX ADMIN — BUDESONIDE INHALATION 1 MG: 0.5 SUSPENSION RESPIRATORY (INHALATION) at 07:10

## 2024-10-12 RX ADMIN — POTASSIUM BICARBONATE 50 MEQ: 977.5 TABLET, EFFERVESCENT ORAL at 08:10

## 2024-10-12 RX ADMIN — HEPARIN SODIUM 5000 UNITS: 5000 INJECTION INTRAVENOUS; SUBCUTANEOUS at 05:10

## 2024-10-12 RX ADMIN — DIPHENHYDRAMINE HYDROCHLORIDE 25 MG: 25 CAPSULE ORAL at 08:10

## 2024-10-12 RX ADMIN — HEPARIN SODIUM 5000 UNITS: 5000 INJECTION INTRAVENOUS; SUBCUTANEOUS at 09:10

## 2024-10-12 RX ADMIN — ASPIRIN 81 MG: 81 TABLET, COATED ORAL at 08:10

## 2024-10-12 NOTE — SUBJECTIVE & OBJECTIVE
Interval History:  Patient seen and examined this morning, reports no pain.  WBC downtrending, 26.4.  Hemoglobin 8.3.  Cultures remained negative growth to date.    Review of Systems   Constitutional:  Negative for chills.   Respiratory:  Negative for chest tightness and shortness of breath.    Cardiovascular:  Negative for chest pain.   Gastrointestinal:  Positive for diarrhea. Negative for abdominal pain.     Objective:     Vital Signs (Most Recent):  Temp: 98 °F (36.7 °C) (10/12/24 1101)  Pulse: 68 (10/12/24 1101)  Resp: 18 (10/12/24 1101)  BP: 110/61 (10/12/24 1101)  SpO2: 97 % (10/12/24 1101) Vital Signs (24h Range):  Temp:  [98 °F (36.7 °C)-98.4 °F (36.9 °C)] 98 °F (36.7 °C)  Pulse:  [55-68] 68  Resp:  [12-38] 18  SpO2:  [95 %-100 %] 97 %  BP: ()/(50-62) 110/61     Weight: 75.6 kg (166 lb 10.7 oz)  Body mass index is 29.53 kg/m².    Intake/Output Summary (Last 24 hours) at 10/12/2024 1301  Last data filed at 10/12/2024 1025  Gross per 24 hour   Intake 2185.85 ml   Output --   Net 2185.85 ml         Physical Exam  Vitals reviewed.   Constitutional:       Appearance: Normal appearance.   Cardiovascular:      Rate and Rhythm: Normal rate and regular rhythm.   Pulmonary:      Effort: Pulmonary effort is normal.      Breath sounds: Normal breath sounds.   Abdominal:      General: Abdomen is flat.      Palpations: Abdomen is soft.      Comments: Appropriate abdominal tenderness.    Skin:     General: Skin is warm.      Comments: Erythematous and pruritic rash along the lower abdominal wall, bilateral thighs and buttocks    Neurological:      Mental Status: She is alert and oriented to person, place, and time.             Significant Labs: All pertinent labs within the past 24 hours have been reviewed.  CBC:   Recent Labs   Lab 10/11/24  0527 10/12/24  0400   WBC 45.41* 26.40*   HGB 9.4* 8.3*   HCT 29.5* 26.3*    353     CMP:   Recent Labs   Lab 10/11/24  0527 10/11/24  2053 10/12/24  0400    144 142  143   K 3.1* 3.1* 3.5   * 111* 114*   CO2 24 23 24   GLU 97 102 79   BUN 8 7* 7*   CREATININE 0.9 0.9 0.9   CALCIUM 8.0* 8.2* 8.1*   PROT 5.3*  --  4.9*   ALBUMIN 3.4*  --  3.2*   BILITOT 0.5  --  0.5   ALKPHOS 321*  --  278*   AST 74*  --  43*   *  --  160*   ANIONGAP 8 8 5*       Significant Imaging: I have reviewed all pertinent imaging results/findings within the past 24 hours.

## 2024-10-12 NOTE — ASSESSMENT & PLAN NOTE
S/p lap archana - continues to have loose stools  Lactose intolerant - diet and supplements adjusted - diarrhea more pronounced after ensure  Add probiotic while on zosyn  Add stool WBC and elastase studies   C.diff negative 6 days ago   Consider stool bulking agent if no improvement   GI consulted, cholestyramine added

## 2024-10-12 NOTE — PROGRESS NOTES
[facility] Medicine  Progress Note    Patient Name: Yvette Hutchinson  MRN: [id number]  Patient Class: IP- Inpatient   Admission Date: 10/2/2024  Length of Stay: 10 days  Attending Physician: Albert Boateng MD  Primary Care Provider: Vern Priest MD        Subjective:     Principal Problem:Septic shock        HPI:  64-year-old female with PMH of CLL, and lung cancer status post partial resection who presented to the ER because of presyncope, nausea, vomiting, diarrhea and abdominal pain.  According to the patient, for over 1 week she has been having intractable nausea, vomiting and diarrhea.  Also epigastric and right upper quadrant abdominal pain described as sharp, 10/10 on pain scale radiate to the rest of her abdomen.  Today when she stood up from a sitting position, she felt as if she will pass out.  As a result, she decided to come to the ER for evaluation.  Denied any fever or chills.  She denied chest pain or shortness on breath.    In the ER, vitals showed a blood pressure of 99/55, but soon dropped to the 70s.  Heart rate was 122, respiratory rate of 20, temperature of 101.6°, and patient was satting 95% on room air.  CBC was white count of 21, with history of CLL.  CMP showed sodium of 135, acute renal failure with creatinine of 1.4 compared to baseline of 1, and patient has elevated LFTs with alk-phos of 216, , and AST of 497.  COVID/flu are negative.  Blood cultures were collected.  CTA chest negative for pulmonary embolus, but showed mild diffuse bronchial wall thickening that could reflect infectious or inflammatory bronchitis in the appropriate clinical setting.  CT abdomen/pelvis showed pericholecystic edema without evidence of gallstones.  Cholecystitis cannot be excluded. Also mild Linda pancreatic edema which may represent early pancreatitis. US abdomen showed Cholelithiasis, gallbladder wall thickening, and mild pericholecystic fluid. Patient was started on  Zosyn/vanco.  Was given sepsis bolus of lactated ringer of 2286 cc.  Started on Levophed for blood pressure support.  General surgery was consulted.  Patient will be admitted to ICU for septic shock.       Overview/Hospital Course:  This 64-year-old lady with history of CLL, lung cancer status post partial resection presented to the emergency department due to presyncope, nausea vomiting and abdominal pain.  She was also noted to be hypotensive and tachycardic on admission.  She was also noted to have fever.  Patient was admitted to the hospital for septic shock concern for abdominal source given CT findings showing some pericholecystic fluid.  Patient was also noted to have transaminitis and imaging did reveal some peripancreatic fluid and inflammatory changes as well though her lipase was normal.  General surgery was consulted after ultrasound the gallbladder was performed which again redemonstrated pericholecystic fluid.  She is status post laparoscopic cholecystectomy on 10/03.  Gallbladder was not noted to be gangrenous and per the op note there was suspicion that this was not the source of her septic shock.  However, blood cultures grew only coagulation negative Staphylococcus in 1/2 bottles.  This was thought to be contaminant.  Infectious Disease was consulted and vancomycin and Zosyn was continued on this patient.  Levophed initiated on admission was able to be weaned, although she still had requirement the day after surgery.  Started on midodrine and Florinef by Cardiology, Levophed discontinued.  Had elevated troponins on admission, underwent left heart catheterization showed no obstructive disease.  Repeat blood cultures NTD.  MRCP suggest intrahepatic duct blocked, GI consulted.  Patient underwent ERCP which did not show any evidence of stricture or abnormality suggested by MRCP.  Hematology consulted given up trending WBC in the history of CLL.  CLL recommend follow up outpatient, WBC likely secondary  to infection, may take some time to improve.    Interval History:  Patient seen and examined this morning, reports no pain.  WBC downtrending, 26.4.  Hemoglobin 8.3.  Cultures remained negative growth to date.    Review of Systems   Constitutional:  Negative for chills.   Respiratory:  Negative for chest tightness and shortness of breath.    Cardiovascular:  Negative for chest pain.   Gastrointestinal:  Positive for diarrhea. Negative for abdominal pain.     Objective:     Vital Signs (Most Recent):  Temp: 98 °F (36.7 °C) (10/12/24 1101)  Pulse: 68 (10/12/24 1101)  Resp: 18 (10/12/24 1101)  BP: 110/61 (10/12/24 1101)  SpO2: 97 % (10/12/24 1101) Vital Signs (24h Range):  Temp:  [98 °F (36.7 °C)-98.4 °F (36.9 °C)] 98 °F (36.7 °C)  Pulse:  [55-68] 68  Resp:  [12-38] 18  SpO2:  [95 %-100 %] 97 %  BP: ()/(50-62) 110/61     Weight: 75.6 kg (166 lb 10.7 oz)  Body mass index is 29.53 kg/m².    Intake/Output Summary (Last 24 hours) at 10/12/2024 1301  Last data filed at 10/12/2024 1025  Gross per 24 hour   Intake 2185.85 ml   Output --   Net 2185.85 ml         Physical Exam  Vitals reviewed.   Constitutional:       Appearance: Normal appearance.   Cardiovascular:      Rate and Rhythm: Normal rate and regular rhythm.   Pulmonary:      Effort: Pulmonary effort is normal.      Breath sounds: Normal breath sounds.   Abdominal:      General: Abdomen is flat.      Palpations: Abdomen is soft.      Comments: Appropriate abdominal tenderness.    Skin:     General: Skin is warm.      Comments: Erythematous and pruritic rash along the lower abdominal wall, bilateral thighs and buttocks    Neurological:      Mental Status: She is alert and oriented to person, place, and time.             Significant Labs: All pertinent labs within the past 24 hours have been reviewed.  CBC:   Recent Labs   Lab 10/11/24  0527 10/12/24  0400   WBC 45.41* 26.40*   HGB 9.4* 8.3*   HCT 29.5* 26.3*    353     CMP:   Recent Labs   Lab  10/11/24  0527 10/11/24  2053 10/12/24  0400    142 143   K 3.1* 3.1* 3.5   * 111* 114*   CO2 24 23 24   GLU 97 102 79   BUN 8 7* 7*   CREATININE 0.9 0.9 0.9   CALCIUM 8.0* 8.2* 8.1*   PROT 5.3*  --  4.9*   ALBUMIN 3.4*  --  3.2*   BILITOT 0.5  --  0.5   ALKPHOS 321*  --  278*   AST 74*  --  43*   *  --  160*   ANIONGAP 8 8 5*       Significant Imaging: I have reviewed all pertinent imaging results/findings within the past 24 hours.    Assessment/Plan:      * Septic shock  - Suspect secondary to intra-abdominal process.  CT reveals pericholecystic fluid and peripancreatic fluid  She is now status post cholecystectomy with General surgery, per op note the gallbladder did not appear significantly inflamed to be commensurate with the patient's presentation of septic shock, 1/2 bottles drawn for blood culture was growing coagulase-negative Staphylococcus, felt to be contaminant, however, given patient's persistent pressor requirement infectious Disease was consulted, recommendation to continue vancomycin and Zosyn at this time  Infectious Disease we will defer imaging of patient's C-spine for now  Continued follow up blood cultures  Check cortisol - normal range for am draw  Florinef added   Dc hydrocortisone     Functional diarrhea  S/p lap archana - continues to have loose stools  Lactose intolerant - diet and supplements adjusted - diarrhea more pronounced after ensure  Add probiotic while on zosyn  Add stool WBC and elastase studies   C.diff negative 6 days ago   Consider stool bulking agent if no improvement   GI consulted, cholestyramine added        Bacteremia due to Staphylococcus  This has now resulted as coagulase-negative staph, likely contaminant  ID following and would like to continue vancomycin for now    Abdominal pain  Suspect secondary to acute cholecystitis.    CT abdomen also showed mild pancreatitis, but lipase is within normal limits.    Given her diarrhea however, will order stool  culture.    Supportive care with IV fluids, Zofran for nausea.  P.r.n. pain medication.      Acute renal failure  Most recent creatinine and eGFR are listed below.  Recent Labs     10/11/24  0527 10/11/24  2053 10/12/24  0400   CREATININE 0.9 0.9 0.9   EGFRNORACEVR >60.0 >60.0 >60.0       Likely pre-renal given her septic shock.   Status post IV hydration, patient able to take PO hydration  GM resolving      Neuropathic pain  Resume gabapentin.      Calculus of gallbladder with acute cholecystitis without obstruction  Status post cholecystectomy  On zosyn         Acquired hypothyroidism  Resume synthroid.   F/u TSH      CLL (chronic lymphocytic leukemia)  Monitor. Patient's last chemo for over a year.  Follow up with Hematology outpatient.  Consult hematology as WBC continues to climb, recommend follow up outpatient.  Will likely take some time for white blood cells to improve  WBC seems to be approaching baseline       Anxiety  Resume Wellbutrin and Celexa        VTE Risk Mitigation (From admission, onward)           Ordered     Place [place] hose  Until discontinued         10/05/24 1132     heparin (porcine) injection 5,000 Units  Every 8 hours         10/02/24 1611     IP VTE HIGH RISK PATIENT  Once         10/02/24 1611     Place sequential compression device  Until discontinued         10/02/24 1611                    Discharge Planning   SALINA: 10/15/2024     Code Status: Full Code   Is the patient medically ready for discharge?:     Reason for patient still in hospital (select all that apply): Patient trending condition  Discharge Plan A: Home Health   Discharge Delays: None known at this time        Albert Boateng MD  Department of Hospital Medicine   [facility]

## 2024-10-12 NOTE — PLAN OF CARE
CM spoke to patient concerning going into a skilled care facility.  Pt says she is not going to a facility.  She will go home and care for self.  CM uploaded Patient Choice form

## 2024-10-12 NOTE — ASSESSMENT & PLAN NOTE
- Suspect secondary to intra-abdominal process.  CT reveals pericholecystic fluid and peripancreatic fluid  She is now status post cholecystectomy with General surgery, per op note the gallbladder did not appear significantly inflamed to be commensurate with the patient's presentation of septic shock, 1/2 bottles drawn for blood culture was growing coagulase-negative Staphylococcus, felt to be contaminant, however, given patient's persistent pressor requirement infectious Disease was consulted, recommendation to continue vancomycin and Zosyn at this time  Infectious Disease we will defer imaging of patient's C-spine for now  Continued follow up blood cultures  Check cortisol - normal range for am draw  Florinef added   Dc hydrocortisone

## 2024-10-12 NOTE — PT/OT/SLP PROGRESS
Physical Therapy Treatment    Patient Name:  Yvette Hutchinson   MRN:  [id number]    Recommendations:     Discharge Recommendations: Moderate Intensity Therapy  Discharge Equipment Recommendations: none  Barriers to discharge:   decreased activity tolerance, hypotension    Assessment:     Yvette Hutchinson is a 64 y.o. female admitted with a medical diagnosis of Septic shock.  She presents with the following impairments/functional limitations: impaired endurance, weakness, impaired functional mobility, gait instability, impaired balance, decreased upper extremity function, decreased lower extremity function, decreased safety awareness, impaired cardiopulmonary response to activity. Patient sitting up in chair and is agreeable to participation with PT treatment. BP at rest of 108/62. Ware J brace applied. She requires SBA for sit to stand with rollator. She denies dizziness in standing. She ambulated 20' in room with rollator and SBA-CGA. She returned to chair with BP of 103/56. She is agreeable to further participation and ambulated and additional 20' in room with rollator and SBA-CGA. BP upon return to chair of 117/60. All needs met and RN notified.     Rehab Prognosis: Good; patient would benefit from acute skilled PT services to address these deficits and reach maximum level of function.    Recent Surgery: Procedure(s) (LRB):  ERCP (ENDOSCOPIC RETROGRADE CHOLANGIOPANCREATOGRAPHY) (N/A) 2 Days Post-Op    Plan:     During this hospitalization, patient to be seen daily to address the identified rehab impairments via gait training, therapeutic activities, therapeutic exercises, neuromuscular re-education and progress toward the following goals:    Plan of Care Expires:  11/06/24    Subjective     Chief Complaint: patient states she does not want placement and wants to go home after discharge   Patient/Family Comments/goals: home   Pain/Comfort:  Pain Rating 1: 0/10      Objective:     Communicated with RN  Alcira prior to session.  Patient found up in chair with blood pressure cuff, chair check, peripheral IV, pulse ox (continuous), telemetry upon PT entry to room.     General Precautions: Standard, fall  Orthopedic Precautions: spinal precautions  Braces: [place] J collar  Respiratory Status: Room air     Functional Mobility:  Transfers:     Sit to Stand:  stand by assistance with rollator  Gait:  20' x2 in room with rollator and SBA-CGA      AM-PAC 6 CLICK MOBILITY          Treatment & Education:  Patient was educated on the importance of OOB activity and functional mobility to negate negative effects of prolonged bed rest during hospitalization, safe transfers and ambulation, and D/C planning     Patient left up in chair with all lines intact, call button in reach, chair alarm on, and RN notified..    GOALS:   Multidisciplinary Problems       Physical Therapy Goals          Problem: Physical Therapy    Goal Priority Disciplines Outcome Interventions   Physical Therapy Goal     PT, PT/OT Progressing    Description: Goals to be met by: 24     Patient will increase functional independence with mobility by performin. Supine to sit with Supervision  2. Sit to stand transfer with Supervision  3. Bed to chair transfer with Supervision using Rolling Walker  4. Gait  x 200 feet with Supervision using Rolling Walker.                              Time Tracking:     PT Received On: 10/12/24  PT Start Time: 1255     PT Stop Time: 1314  PT Total Time (min): 19 min     Billable Minutes: Gait Training 19    Treatment Type: Treatment  PT/PTA: PT     Number of PTA visits since last PT visit: 0     10/12/2024

## 2024-10-12 NOTE — PLAN OF CARE
10/12/24 0734   Patient Assessment/Suction   Level of Consciousness (AVPU) alert   Respiratory Effort Normal;Unlabored   Expansion/Accessory Muscles/Retractions no retractions;no use of accessory muscles   All Lung Fields Breath Sounds diminished   PRE-TX-O2   Device (Oxygen Therapy) room air   SpO2 96 %   Pulse Oximetry Type Continuous   $ Pulse Oximetry - Multiple Charge Pulse Oximetry - Multiple   Pulse 60   Resp 16   BP (!) 111/55   Aerosol Therapy   $ Aerosol Therapy Charges Aerosol Treatment   Daily Review of Necessity (SVN) completed   Respiratory Treatment Status (SVN) given   Treatment Route (SVN) mask;oxygen   Patient Position HOB elevated   Post Treatment Assessment (SVN) breath sounds unchanged   Signs of Intolerance (SVN) none   Breath Sounds Post-Respiratory Treatment   Post-treatment Heart Rate (beats/min) 61   Post-treatment Resp Rate (breaths/min) 15   Education   $ Education Bronchodilator;15 min

## 2024-10-12 NOTE — RESPIRATORY THERAPY
10/11/24 1950   Patient Assessment/Suction   Level of Consciousness (AVPU) alert   Respiratory Effort Normal;Unlabored   Expansion/Accessory Muscles/Retractions no retractions;no use of accessory muscles   All Lung Fields Breath Sounds diminished   Rhythm/Pattern, Respiratory pattern regular;unlabored   Cough Frequency infrequent   Cough Type good;nonproductive   PRE-TX-O2   Device (Oxygen Therapy) room air   SpO2 98 %   Pulse Oximetry Type Continuous   $ Pulse Oximetry - Multiple Charge Pulse Oximetry - Multiple   Pulse 64   Resp 20   Aerosol Therapy   $ Aerosol Therapy Charges Aerosol Treatment   Daily Review of Necessity (SVN) completed   Respiratory Treatment Status (SVN) given   Treatment Route (SVN) mask;oxygen   Patient Position HOB elevated   Post Treatment Assessment (SVN) breath sounds unchanged   Signs of Intolerance (SVN) none   Incentive Spirometer   $ Incentive Spirometer Charges done with encouragement   Administration (IS) instruction provided, follow-up   Number of Repetitions (IS) 5   Level Incentive Spirometer (mL) 800   Patient Tolerance (IS) good   Education   $ Education Bronchodilator;Incentive Spirometry;15 min

## 2024-10-12 NOTE — ASSESSMENT & PLAN NOTE
Monitor. Patient's last chemo for over a year.  Follow up with Hematology outpatient.  Consult hematology as WBC continues to climb, recommend follow up outpatient.  Will likely take some time for white blood cells to improve  WBC seems to be approaching baseline

## 2024-10-12 NOTE — PROGRESS NOTES
[facility]   Department of Infectious Disease  Progress Note        PATIENT NAME: Yvette Hutchinson  MRN: [id number]  TODAY'S DATE: 10/12/2024  ADMIT DATE: 10/2/2024  LOS: 10 days    CHIEF COMPLAINT: Abdominal Pain, Nausea, Vomiting, and Diarrhea (Pt has been sick for the last two weeks)      PRINCIPLE PROBLEM: Septic shock    INTERVAL HISTORY      10/04/2024:  Improving.  WBC down to 35.  Afebrile.  Blood cultures have grown MSSE in 1/2 bottles.  No gallbladder fluid or tissue culture done.  Creatinine down to 1.2.  Levophed dose down to 0.35 microgram/kilogram per minute.    10/05/2024:  Continues to improve.  Leukocytosis resolving.  No acute issues overnight.  Seen by cardiologist yesterday for elevated troponin and notes reviewed.    10/06/2024:  Seen and evaluated at bedside.  Continues to improve.  WBC 24 K.  Repeat blood culture 10/05/2024 so far negative.    10/07/2024.  No acute issues overnight.  Continues to improve.  Tolerating oral feeds.  Remains on very low-dose Levophed at 0.06 microgram/kilogram per minute.  All cultures remain negative.    10/08/2024:  Midodrine added yesterday by cardiologist due to inability to wean off very low-dose Levophed.  Cortisol level was 8.3.  Repeat blood culture remain negative.  Remains on Levophed but down to 0.03 microgram/kilogram per minute.  Still with loose bowel motions.    10/09/2024: No acute issues overnight.  Still requiring very low-dose Levophed.  On 0.02 microgram/kilogram per minute.  Had cardiac catheterization today with normal coronary vessels and LVEDP of around 20..    10/10/2024:  WBC crept back up and is now 34 K. afebrile.  Was weaned off Levophed yesterday but is back on on a very low-dose of 0.09mcg/kg/min.     10/11/2024: WBC continues to increase and is up to 45 K. procalcitonin 0.62.  Remains on very low-dose Levophed 0.04mcg/kg/min.    10/12/2024:  Off Levophed.  Doing okay.  WBC down to 26 K    Antibiotics (From  "admission, onward)      Start     Stop Route Frequency Ordered    10/11/24 1230  vancomycin (VANCOCIN) 2,000 mg in D5W 500 mL IVPB         -- IV Every 24 hours (non-standard times) 10/11/24 1144    10/11/24 1154  vancomycin - pharmacy to dose  (vancomycin IVPB (PEDS and ADULTS))        Placed in "And" Linked Group    -- IV pharmacy to manage frequency 10/11/24 1055    10/04/24 0900  mupirocin 2 % ointment 1 g         10/09/24 0859 Nasl 2 times daily 10/04/24 0633    10/03/24 2200  piperacillin-tazobactam 4.5 g in dextrose 5 % 100 mL IVPB (ready to mix)         -- IV Every 8 hours (non-standard times) 10/03/24 1506          Antifungals (From admission, onward)      Start     Stop Route Frequency Ordered    10/08/24 2100  miconazole 2 % cream         -- Top 2 times daily 10/08/24 1734           Antivirals (From admission, onward)      None            ASSESSMENT and PLAN      Septic shock in a patient with CLL.  Probably from abdominal source since her symptoms were primarily GI related.  She has had laparoscopic cholecystostomy.  Remains on very low-dose Levophed even though she does not appear septic anymore clinically.  WBC crept up again after initially improving.  Procalcitonin remains a little high.  She did have coag-negative Staphylococcus that was initially thought to be contaminant and vancomycin discontinued.  We will add back vancomycin and monitor.      2. Persistent Low BP requiring low-dose Levophed.  Off Levophed.  Vancomycin appears to have helped specialist as leukocytosis also dropped to 26 K.  Monitor      3. CLL.  Currently not on any specific medication for this.  It does make her immunosuppressed.       4. Acute liver injury.  Maybe congestive hepatopathy from shock.  Also with elevated troponins.  Acute liver panel negative.  Transaminases improving.  Had ERCP yesterday.  Defer to hospitalist and GI.    5. Coagulase-negative Staphylococcus  bacteremia.  Repeat blood culture negative.  She has a " clinical and laboratory response to vancomycin.  Continue vancomycin for 14 days through    6. Persistent diarrhea.  Stool C diff 10/2/24 and stool culture were negative.  Better.    RECOMMENDATIONS:    DC Zosyn 10/13/24 AM  Continue vancomycin for 14 days through 10/25/2024  Check CBC, BMP every Monday while on IV vancomycin   Adjust vancomycin dose to achieve trough 15-20  Follow up with ID in 2 weeks    Thank you for involving ID in the care of this patient.  ID service will drop off today.  Reconsult with other ID questions.  Please send Epic secure chat with any questions.      SUBJECTIVE    Yvette Hutchinson is a 64 y.o. female with history of CLL, GERD, arthritis.  Also previous left lung cancer status post resection.  Had anterior cervical fusion on 08/06/2024 with an uneventful postoperative course.  Presented to the emergency room 10/02/2024 with nausea vomiting, diarrhea and abdominal pain of about 9 days duration.  In the ER BP 99/55, pulse 1-2, respiratory rate 18, temperature 101.6°.  She had abdominal tenderness was worse in the epigastric area.       WBC 21, hematocrit 38, platelet count 224, creatinine 1.4.  , , lipase 42, alkaline phosphatase to 1 6.  UA unremarkable.  Chest x-ray with no acute infiltrates.  CT chest showed increased interstitial markings with few pleural based lesions/infiltrates on the left.  CT abdomen and pelvis documented gallstones with pericholecystic fluid and edema which was confirmed on ultrasound she was admitted and placed on IV fluids and antibiotics.  Later seen by general surgeon and laparoscopic cholecystostomy 10/03/2024.  ID asked to assist with her care.     Current lab data showed WBC 45, hematocrit 41, creatinine 1.8, AST 1970, ALT 1316, alkaline phosphatase 220, total protein 5.6     History from patient and medical record.     Antibiotic history:    Vancomycin: 10/02/2024-10/05/2024, 10/11/2024-  Zosyn: 10/02/2024-     Microbiology:     Blood culture 10/02/2024:  Staphylococcus species 1/2   Influenza and COVID-19 assay 10/02/2024: Negative    Review of Systems  Negative except as stated above in Interval History     OBJECTIVE   Temp:  [98 °F (36.7 °C)-98.4 °F (36.9 °C)] 98.1 °F (36.7 °C)  Pulse:  [55-71] 64  Resp:  [12-38] 13  SpO2:  [95 %-100 %] 95 %  BP: ()/(50-62) 99/54  Temp:  [98 °F (36.7 °C)-98.4 °F (36.9 °C)]   Temp: 98.1 °F (36.7 °C) (10/12/24 0901)  Pulse: 64 (10/12/24 0901)  Resp: 13 (10/12/24 0901)  BP: (!) 99/54 (10/12/24 0901)  SpO2: 95 % (10/12/24 0901)    Intake/Output Summary (Last 24 hours) at 10/12/2024 1038  Last data filed at 10/12/2024 1025  Gross per 24 hour   Intake 2665.85 ml   Output --   Net 2665.85 ml       Physical Exam  General:  Middle-aged woman sitting in chair in no distress  HEENT:  Healed right anterior neck surgical wound.  No erythema or induration.  CVS: S1 and 2 heard, no murmurs appreciated  Respiratory: Clear to auscultation   Abdomen:  Laparoscopic port sites are clean with no erythema or drainage.  Abdomen is distended, soft, nontender.  Skin: No rash appreciated  CNS: No focal deficits   Musculoskeletal: No joint effusions or abnormalities noted     VAD:  ISOLATION:  None     Wounds:  Surgical abdominal    Significant Labs: All pertinent labs within the past 24 hours have been reviewed.    CBC LAST 7 DAYS  Recent Labs   Lab 10/06/24  0224 10/07/24  0255 10/08/24  0318 10/09/24  0317 10/10/24  0305 10/11/24  0527 10/12/24  0400   WBC 24.07* 22.45* 26.08* 29.37* 34.29* 45.41* 26.40*   RBC 3.41* 3.25* 3.20* 3.05* 3.12* 3.06* 2.71*   HGB 10.4* 10.1* 9.8* 9.5* 9.5* 9.4* 8.3*   HCT 31.8* 30.7* 30.5* 29.6* 29.6* 29.5* 26.3*   MCV 93 95 95 97 95 96 97   MCH 30.5 31.1* 30.6 31.1* 30.4 30.7 30.6   MCHC 32.7 32.9 32.1 32.1 32.1 31.9* 31.6*   RDW 14.4 14.5 14.8* 15.4* 16.1* 16.7* 16.7*    159 181 216 289 426 353   MPV 10.5 10.4 10.7 10.1 9.9 9.7 9.6   GRAN 18.9*  4.6 39.0 32.0* 49.0 32.0* 16.0* 16.0*  " 4.2   LYMPH 66.8*  16.1* 43.0 54.0* 40.0 65.0* 80.0* 78.8*  20.8*   MONO 5.9  1.4* 4.0 4.0 1.0* 1.0* 4.0 4.0  1.1*   BASO 0.12  --   --   --   --   --  0.08   NRBC 0 0 0 0 0 0 0       CHEMISTRY LAST 7 DAYS  Recent Labs   Lab 10/06/24  0223 10/07/24  0255 10/08/24  0318 10/08/24  1209 10/09/24  0317 10/09/24  1833 10/10/24  0305 10/11/24  0527 10/11/24  2053 10/12/24  0400    136 138 136 137  --  139 144 142 143   K 3.6 3.8 3.8 3.7 3.5 3.3* 3.5 3.1* 3.1* 3.5    105 108 105 108  --  108 112* 111* 114*   CO2 26 26 25 23 22*  --  23 24 23 24   ANIONGAP 5* 5* 5* 8 7*  --  8 8 8 5*   BUN 7* 5* 6* 7* 7*  --  7* 8 7* 7*   CREATININE 1.0 1.0 1.1 1.1 1.0  --  0.9 0.9 0.9 0.9    76 69* 89 84  --  137* 97 102 79   CALCIUM 7.8* 7.7* 8.0* 8.6* 8.1*  --  8.2* 8.0* 8.2* 8.1*   MG 1.7 1.6 1.8  --  1.9  --  2.1 2.2  --  2.2   ALBUMIN 2.9* 2.8* 2.8*  --  3.0*  --  3.3* 3.4*  --  3.2*   PROT 4.6* 4.6* 4.6*  --  5.0*  --  5.2* 5.3*  --  4.9*   ALKPHOS 380* 369* 343*  --  399*  --  361* 321*  --  278*   * 568* 436*  --  382*  --  311* 221*  --  160*   * 396* 372*  --  318*  --  198* 74*  --  43*   BILITOT 1.1* 0.9 0.7  --  0.6  --  0.6 0.5  --  0.5       Estimated Creatinine Clearance: 60.7 mL/min (based on SCr of 0.9 mg/dL).    INFLAMMATORY/PROCAL  LAST 7 DAYS  Recent Labs   Lab 10/08/24  1209 10/10/24  1911   PROCAL 1.941* 0.620*   CRP 3.60*  --      No results found for: "ESR"  CRP   Date Value Ref Range Status   10/08/2024 3.60 (H) <1.00 mg/dL Final     Comment:     CRP-Normal Application expected values:   <1.0        mg/dL   Normal Range  1.0 - 5.0  mg/dL   Indicates mild inflammation  5.0 - 10.0 mg/dL   Indicates severe inflammation  >10.0        mg/dL   Represents serious processes and   frequently         indicates the presence of a bacterial   infection.      09/07/2021 2.7 0.0 - 8.2 mg/L Final   03/03/2020 3.5 0.0 - 8.2 mg/L Final       PRIOR  MICROBIOLOGY:    Susceptibility data from " last 90 days.  Collected Specimen Info Organism   10/05/24 Blood No growth after 5 days.   10/05/24 Blood No growth after 5 days.   10/02/24 Blood from Peripheral, Hand, Left No growth after 5 days.   10/02/24 Blood from Peripheral, Wrist, Right COAGULASE NEGATIVE STAPHYLOCOCCI       LAST 7 DAYS MICROBIOLOGY   Microbiology Results (last 7 days)       Procedure Component Value Units Date/Time    Blood culture [[id number]] Collected: 10/11/24 1122    Order Status: Completed Specimen: Blood Updated: 10/11/24 1917     Blood Culture, Routine No Growth to date    Blood culture [[id number]] Collected: 10/11/24 1123    Order Status: Completed Specimen: Blood Updated: 10/11/24 1917     Blood Culture, Routine No Growth to date    Blood culture [[id number]] Collected: 10/05/24 1037    Order Status: Completed Specimen: Blood Updated: 10/10/24 1232     Blood Culture, Routine No growth after 5 days.    Blood culture [[id number]] Collected: 10/05/24 1037    Order Status: Completed Specimen: Blood Updated: 10/10/24 1232     Blood Culture, Routine No growth after 5 days.    Blood culture x two cultures. Draw prior to antibiotics [[id number]] Collected: 10/02/24 1057    Order Status: Completed Specimen: Blood from Peripheral, Hand, Left Updated: 10/07/24 1232     Blood Culture, Routine No growth after 5 days.    Narrative:      Aerobic and anaerobic            CURRENT/PREVIOUS VISIT EKG  Results for orders placed or performed during the hospital encounter of 10/02/24   EKG 12-lead    Collection Time: 10/09/24  8:56 AM   Result Value Ref Range    QRS Duration 82 ms    [facility] QTC Calculation 455 ms    Narrative    Test Reason : I21.4,Z01.810,    Vent. Rate : 056 BPM     Atrial Rate : 056 BPM     P-R Int : 140 ms          QRS Dur : 082 ms      QT Int : 472 ms       P-R-T Axes : 077 092 -66 degrees     QTc Int : 455 ms    Sinus bradycardia  Rightward axis  Low voltage QRS  T wave abnormality, consider anterolateral ischemia  Abnormal  ECG  When compared with ECG of 04-OCT-2024 13:13,  Significant changes have occurred    Referred By: [email or web address]   SELF           Confirmed By:         Significant Imaging: I have reviewed all relevant and available imaging results/findings within the past 24 hours.    I spent a total of 50 minutes on the day of the visit.This includes face to face time and non-face to face time preparing to see the patient (eg, review of tests), obtaining and/or reviewing separately obtained history, documenting clinical information in the electronic or other health record, independently interpreting results and communicating results to the patient/family/caregiver, or care coordinator.    Lemuel Man MD  Date of Service: 10/12/2024      This note was created using M Modal voice recognition software that occasionally misinterpreted phrases or words.

## 2024-10-12 NOTE — ASSESSMENT & PLAN NOTE
Most recent creatinine and eGFR are listed below.  Recent Labs     10/11/24  0527 10/11/24  2053 10/12/24  0400   CREATININE 0.9 0.9 0.9   EGFRNORACEVR >60.0 >60.0 >60.0       Likely pre-renal given her septic shock.   Status post IV hydration, patient able to take PO hydration  GM resolving

## 2024-10-13 LAB
ALBUMIN SERPL BCP-MCNC: 3.3 G/DL (ref 3.5–5.2)
ALP SERPL-CCNC: 252 U/L (ref 55–135)
ALT SERPL W/O P-5'-P-CCNC: 125 U/L (ref 10–44)
ANION GAP SERPL CALC-SCNC: 6 MMOL/L (ref 8–16)
AST SERPL-CCNC: 31 U/L (ref 10–40)
BASOPHILS # BLD AUTO: 0.09 K/UL (ref 0–0.2)
BASOPHILS NFR BLD: 0.3 % (ref 0–1.9)
BILIRUB SERPL-MCNC: 0.4 MG/DL (ref 0.1–1)
BUN SERPL-MCNC: 8 MG/DL (ref 8–23)
CALCIUM SERPL-MCNC: 8.3 MG/DL (ref 8.7–10.5)
CHLORIDE SERPL-SCNC: 113 MMOL/L (ref 95–110)
CO2 SERPL-SCNC: 24 MMOL/L (ref 23–29)
CREAT SERPL-MCNC: 1 MG/DL (ref 0.5–1.4)
DIFFERENTIAL METHOD BLD: ABNORMAL
EOSINOPHIL # BLD AUTO: 0.1 K/UL (ref 0–0.5)
EOSINOPHIL NFR BLD: 0.3 % (ref 0–8)
ERYTHROCYTE [DISTWIDTH] IN BLOOD BY AUTOMATED COUNT: 16.9 % (ref 11.5–14.5)
EST. GFR  (NO RACE VARIABLE): >60 ML/MIN/1.73 M^2
GLUCOSE SERPL-MCNC: 81 MG/DL (ref 70–110)
HCT VFR BLD AUTO: 27.9 % (ref 37–48.5)
HGB BLD-MCNC: 8.7 G/DL (ref 12–16)
IMM GRANULOCYTES # BLD AUTO: 0.14 K/UL (ref 0–0.04)
IMM GRANULOCYTES NFR BLD AUTO: 0.4 % (ref 0–0.5)
LYMPHOCYTES # BLD AUTO: 28.4 K/UL (ref 1–4.8)
LYMPHOCYTES NFR BLD: 81.7 % (ref 18–48)
MAGNESIUM SERPL-MCNC: 2.1 MG/DL (ref 1.6–2.6)
MCH RBC QN AUTO: 30.4 PG (ref 27–31)
MCHC RBC AUTO-ENTMCNC: 31.2 G/DL (ref 32–36)
MCV RBC AUTO: 98 FL (ref 82–98)
MONOCYTES # BLD AUTO: 1 K/UL (ref 0.3–1)
MONOCYTES NFR BLD: 3 % (ref 4–15)
NEUTROPHILS # BLD AUTO: 5 K/UL (ref 1.8–7.7)
NEUTROPHILS NFR BLD: 14.3 % (ref 38–73)
NRBC BLD-RTO: 0 /100 WBC
PLATELET # BLD AUTO: 449 K/UL (ref 150–450)
PMV BLD AUTO: 9.3 FL (ref 9.2–12.9)
POTASSIUM SERPL-SCNC: 3.3 MMOL/L (ref 3.5–5.1)
POTASSIUM SERPL-SCNC: 3.6 MMOL/L (ref 3.5–5.1)
POTASSIUM SERPL-SCNC: 3.7 MMOL/L (ref 3.5–5.1)
PROT SERPL-MCNC: 5.1 G/DL (ref 6–8.4)
RBC # BLD AUTO: 2.86 M/UL (ref 4–5.4)
SODIUM SERPL-SCNC: 143 MMOL/L (ref 136–145)
VANCOMYCIN TROUGH SERPL-MCNC: 11.9 UG/ML
WBC # BLD AUTO: 34.75 K/UL (ref 3.9–12.7)

## 2024-10-13 PROCEDURE — 97116 GAIT TRAINING THERAPY: CPT | Mod: CQ

## 2024-10-13 PROCEDURE — 80053 COMPREHEN METABOLIC PANEL: CPT | Performed by: SURGERY

## 2024-10-13 PROCEDURE — 80202 ASSAY OF VANCOMYCIN: CPT | Performed by: HOSPITALIST

## 2024-10-13 PROCEDURE — 25000003 PHARM REV CODE 250: Performed by: NURSE PRACTITIONER

## 2024-10-13 PROCEDURE — 25000003 PHARM REV CODE 250: Performed by: STUDENT IN AN ORGANIZED HEALTH CARE EDUCATION/TRAINING PROGRAM

## 2024-10-13 PROCEDURE — 63600175 PHARM REV CODE 636 W HCPCS: Performed by: INTERNAL MEDICINE

## 2024-10-13 PROCEDURE — 84132 ASSAY OF SERUM POTASSIUM: CPT | Mod: 91 | Performed by: STUDENT IN AN ORGANIZED HEALTH CARE EDUCATION/TRAINING PROGRAM

## 2024-10-13 PROCEDURE — 85007 BL SMEAR W/DIFF WBC COUNT: CPT | Performed by: SURGERY

## 2024-10-13 PROCEDURE — 94640 AIRWAY INHALATION TREATMENT: CPT

## 2024-10-13 PROCEDURE — 12000002 HC ACUTE/MED SURGE SEMI-PRIVATE ROOM

## 2024-10-13 PROCEDURE — 94799 UNLISTED PULMONARY SVC/PX: CPT

## 2024-10-13 PROCEDURE — 83735 ASSAY OF MAGNESIUM: CPT | Performed by: SURGERY

## 2024-10-13 PROCEDURE — 99900031 HC PATIENT EDUCATION (STAT)

## 2024-10-13 PROCEDURE — 25000003 PHARM REV CODE 250: Performed by: SURGERY

## 2024-10-13 PROCEDURE — 25000242 PHARM REV CODE 250 ALT 637 W/ HCPCS

## 2024-10-13 PROCEDURE — 25000003 PHARM REV CODE 250: Performed by: INTERNAL MEDICINE

## 2024-10-13 PROCEDURE — 94761 N-INVAS EAR/PLS OXIMETRY MLT: CPT

## 2024-10-13 PROCEDURE — 25000003 PHARM REV CODE 250: Performed by: HOSPITALIST

## 2024-10-13 PROCEDURE — 63600175 PHARM REV CODE 636 W HCPCS: Performed by: SURGERY

## 2024-10-13 PROCEDURE — 85027 COMPLETE CBC AUTOMATED: CPT | Performed by: SURGERY

## 2024-10-13 RX ORDER — CALCIUM CARBONATE 200(500)MG
1000 TABLET,CHEWABLE ORAL 3 TIMES DAILY PRN
Status: DISCONTINUED | OUTPATIENT
Start: 2024-10-13 | End: 2024-10-16 | Stop reason: HOSPADM

## 2024-10-13 RX ORDER — LEVOTHYROXINE SODIUM 25 UG/1
75 TABLET ORAL
Status: DISCONTINUED | OUTPATIENT
Start: 2024-10-14 | End: 2024-10-16 | Stop reason: HOSPADM

## 2024-10-13 RX ADMIN — LACTOBACILLUS ACIDOPHILUS / LACTOBACILLUS BULGARICUS 1 EACH: 100 MILLION CFU STRENGTH GRANULES at 09:10

## 2024-10-13 RX ADMIN — GABAPENTIN 300 MG: 300 CAPSULE ORAL at 03:10

## 2024-10-13 RX ADMIN — BUPROPION HYDROCHLORIDE 300 MG: 150 TABLET, EXTENDED RELEASE ORAL at 09:10

## 2024-10-13 RX ADMIN — MICONAZOLE NITRATE: 20 CREAM TOPICAL at 09:10

## 2024-10-13 RX ADMIN — TOPIRAMATE 100 MG: 25 TABLET, FILM COATED ORAL at 09:10

## 2024-10-13 RX ADMIN — HYDROCODONE BITARTRATE AND ACETAMINOPHEN 1 TABLET: 5; 325 TABLET ORAL at 09:10

## 2024-10-13 RX ADMIN — CITALOPRAM HYDROBROMIDE 20 MG: 20 TABLET ORAL at 09:10

## 2024-10-13 RX ADMIN — MIDODRINE HYDROCHLORIDE 10 MG: 10 TABLET ORAL at 04:10

## 2024-10-13 RX ADMIN — DIPHENHYDRAMINE HYDROCHLORIDE 25 MG: 25 CAPSULE ORAL at 09:10

## 2024-10-13 RX ADMIN — BUDESONIDE INHALATION 1 MG: 0.5 SUSPENSION RESPIRATORY (INHALATION) at 07:10

## 2024-10-13 RX ADMIN — POTASSIUM BICARBONATE 50 MEQ: 977.5 TABLET, EFFERVESCENT ORAL at 12:10

## 2024-10-13 RX ADMIN — CHOLESTYRAMINE 4 GRAMS OF ANHYDROUS CHOLESTYRAMINE: 4 POWDER, FOR SUSPENSION ORAL at 09:10

## 2024-10-13 RX ADMIN — HEPARIN SODIUM 5000 UNITS: 5000 INJECTION INTRAVENOUS; SUBCUTANEOUS at 05:10

## 2024-10-13 RX ADMIN — CALCIUM CARBONATE (ANTACID) CHEW TAB 500 MG 1000 MG: 500 CHEW TAB at 04:10

## 2024-10-13 RX ADMIN — HYDROCORTISONE: 25 CREAM TOPICAL at 03:10

## 2024-10-13 RX ADMIN — FERROUS SULFATE TAB 325 MG (65 MG ELEMENTAL FE) 1 EACH: 325 (65 FE) TAB at 09:10

## 2024-10-13 RX ADMIN — GABAPENTIN 300 MG: 300 CAPSULE ORAL at 09:10

## 2024-10-13 RX ADMIN — HYDROCORTISONE: 25 CREAM TOPICAL at 09:10

## 2024-10-13 RX ADMIN — CHOLESTYRAMINE 4 GRAMS OF ANHYDROUS CHOLESTYRAMINE: 4 POWDER, FOR SUSPENSION ORAL at 03:10

## 2024-10-13 RX ADMIN — POTASSIUM BICARBONATE 35 MEQ: 391 TABLET, EFFERVESCENT ORAL at 03:10

## 2024-10-13 RX ADMIN — VANCOMYCIN HYDROCHLORIDE 2000 MG: 500 INJECTION, POWDER, LYOPHILIZED, FOR SOLUTION INTRAVENOUS at 01:10

## 2024-10-13 RX ADMIN — THEOPHYLLINE ANHYDROUS 200 MG: 100 CAPSULE, EXTENDED RELEASE ORAL at 09:10

## 2024-10-13 RX ADMIN — HEPARIN SODIUM 5000 UNITS: 5000 INJECTION INTRAVENOUS; SUBCUTANEOUS at 01:10

## 2024-10-13 RX ADMIN — LEVOTHYROXINE SODIUM 50 MCG: 0.03 TABLET ORAL at 05:10

## 2024-10-13 RX ADMIN — ARFORMOTEROL TARTRATE 15 MCG: 15 SOLUTION RESPIRATORY (INHALATION) at 07:10

## 2024-10-13 RX ADMIN — MIDODRINE HYDROCHLORIDE 10 MG: 10 TABLET ORAL at 11:10

## 2024-10-13 RX ADMIN — HEPARIN SODIUM 5000 UNITS: 5000 INJECTION INTRAVENOUS; SUBCUTANEOUS at 09:10

## 2024-10-13 RX ADMIN — FLUDROCORTISONE ACETATE 100 MCG: 0.1 TABLET ORAL at 09:10

## 2024-10-13 RX ADMIN — MIDODRINE HYDROCHLORIDE 10 MG: 10 TABLET ORAL at 05:10

## 2024-10-13 RX ADMIN — POTASSIUM BICARBONATE 35 MEQ: 391 TABLET, EFFERVESCENT ORAL at 05:10

## 2024-10-13 RX ADMIN — ALUMINUM HYDROXIDE, MAGNESIUM HYDROXIDE, AND SIMETHICONE 30 ML: 1200; 120; 1200 SUSPENSION ORAL at 09:10

## 2024-10-13 RX ADMIN — HYDROCODONE BITARTRATE AND ACETAMINOPHEN 1 TABLET: 5; 325 TABLET ORAL at 01:10

## 2024-10-13 RX ADMIN — ASPIRIN 81 MG: 81 TABLET, COATED ORAL at 09:10

## 2024-10-13 NOTE — PLAN OF CARE
10/13/24 0721   Patient Assessment/Suction   Level of Consciousness (AVPU) alert   Respiratory Effort Normal;Unlabored   Expansion/Accessory Muscles/Retractions no use of accessory muscles;no retractions   All Lung Fields Breath Sounds diminished   PRE-TX-O2   Device (Oxygen Therapy) room air   SpO2 96 %   Pulse Oximetry Type Continuous   $ Pulse Oximetry - Multiple Charge Pulse Oximetry - Multiple   Pulse (!) 59   Resp 20   Aerosol Therapy   $ Aerosol Therapy Charges Aerosol Treatment   Daily Review of Necessity (SVN) completed   Respiratory Treatment Status (SVN) given   Treatment Route (SVN) mask;oxygen   Patient Position HOB elevated   Post Treatment Assessment (SVN) breath sounds unchanged   Signs of Intolerance (SVN) none   Breath Sounds Post-Respiratory Treatment   Post-treatment Heart Rate (beats/min) 54   Post-treatment Resp Rate (breaths/min) 18   Education   $ Education Bronchodilator;15 min

## 2024-10-13 NOTE — CARE UPDATE
10/12/24 1947   Patient Assessment/Suction   Level of Consciousness (AVPU) alert   Respiratory Effort Normal;Unlabored   Expansion/Accessory Muscles/Retractions no use of accessory muscles   All Lung Fields Breath Sounds diminished;clear   Rhythm/Pattern, Respiratory unlabored;pattern regular   PRE-TX-O2   Device (Oxygen Therapy) room air   SpO2 96 %   Pulse Oximetry Type Continuous   $ Pulse Oximetry - Multiple Charge Pulse Oximetry - Multiple   Pulse (!) 51   Resp (!) 24   Aerosol Therapy   $ Aerosol Therapy Charges Aerosol Treatment   Daily Review of Necessity (SVN) completed   Respiratory Treatment Status (SVN) given   Treatment Route (SVN) mask;oxygen   Patient Position sitting in chair   Post Treatment Assessment (SVN) breath sounds unchanged   Signs of Intolerance (SVN) none   Breath Sounds Post-Respiratory Treatment   Throughout All Fields Post-Treatment All Fields   Throughout All Fields Post-Treatment no change   Post-treatment Heart Rate (beats/min) 51   Post-treatment Resp Rate (breaths/min) 25   Incentive Spirometer   $ Incentive Spirometer Charges done with encouragement   Administration (IS) self-administered   Number of Repetitions (IS) 5   Level Incentive Spirometer (mL) 1000   Patient Tolerance (IS) good   Education   $ Education Bronchodilator;15 min

## 2024-10-13 NOTE — SUBJECTIVE & OBJECTIVE
Interval History: pt seen and examined this morning. Declined SNF placement, agreeable to [facility]. Explained the need for IV abx and follow up with ID outpatient. Pt verbalized understanding. CM at bedside during discussion. WBC back up today. TSH elevated, synthroid increased.     Review of Systems   Constitutional:  Negative for chills.   Respiratory:  Negative for chest tightness and shortness of breath.    Cardiovascular:  Negative for chest pain.   Gastrointestinal:  Negative for abdominal pain.     Objective:     Vital Signs (Most Recent):  Temp: 98.2 °F (36.8 °C) (10/13/24 0901)  Pulse: 63 (10/13/24 1000)  Resp: (!) 30 (10/13/24 1000)  BP: (!) 134/58 (10/13/24 1000)  SpO2: (!) 91 % (10/13/24 0900) Vital Signs (24h Range):  Temp:  [98 °F (36.7 °C)-98.3 °F (36.8 °C)] 98.2 °F (36.8 °C)  Pulse:  [51-80] 63  Resp:  [11-36] 30  SpO2:  [87 %-100 %] 91 %  BP: ()/() 134/58     Weight: 75.6 kg (166 lb 10.7 oz)  Body mass index is 29.53 kg/m².    Intake/Output Summary (Last 24 hours) at 10/13/2024 1156  Last data filed at 10/13/2024 0600  Gross per 24 hour   Intake 2008.75 ml   Output 803 ml   Net 1205.75 ml         Physical Exam  Vitals reviewed.   Constitutional:       Appearance: Normal appearance.   Cardiovascular:      Rate and Rhythm: Normal rate and regular rhythm.   Pulmonary:      Effort: Pulmonary effort is normal.      Breath sounds: Normal breath sounds.   Abdominal:      General: Abdomen is flat.      Palpations: Abdomen is soft.   Skin:     General: Skin is warm.   Neurological:      Mental Status: She is alert and oriented to person, place, and time.             Significant Labs: All pertinent labs within the past 24 hours have been reviewed.  CBC:   Recent Labs   Lab 10/12/24  0400 10/13/24  0300   WBC 26.40* 34.75*   HGB 8.3* 8.7*   HCT 26.3* 27.9*    449     CMP:   Recent Labs   Lab 10/12/24  0400 10/12/24  1747 10/13/24  0300 10/13/24  1117    139 143  --    K 3.5 3.5 3.3* 3.6    * 110 113*  --    CO2 24 21* 24  --    GLU 79 126* 81  --    BUN 7* 9 8  --    CREATININE 0.9 1.0 1.0  --    CALCIUM 8.1* 8.3* 8.3*  --    PROT 4.9*  --  5.1*  --    ALBUMIN 3.2*  --  3.3*  --    BILITOT 0.5  --  0.4  --    ALKPHOS 278*  --  252*  --    AST 43*  --  31  --    *  --  125*  --    ANIONGAP 5* 8 6*  --        Significant Imaging: I have reviewed all pertinent imaging results/findings within the past 24 hours.

## 2024-10-13 NOTE — ASSESSMENT & PLAN NOTE
- Suspect secondary to intra-abdominal process.  CT reveals pericholecystic fluid and peripancreatic fluid  She is now status post cholecystectomy with General surgery, per op note the gallbladder did not appear significantly inflamed to be commensurate with the patient's presentation of septic shock, 1/2 bottles drawn for blood culture was growing coagulase-negative Staphylococcus, felt to be contaminant, however, given patient's persistent pressor requirement infectious Disease was consulted, recommendation to continue vancomycin and Zosyn at this time  Infectious Disease we will defer imaging of patient's C-spine for now  Check cortisol - normal range for am draw  Florinef added   Dc hydrocortisone     Zosyn dc'ed 10/13  Per ID cont vanc till 10/25  Check CBC, BMP every Monday while on IV vancomycin   Adjust vancomycin dose to achieve trough 15-20  Follow up with ID in 2 weeks

## 2024-10-13 NOTE — ASSESSMENT & PLAN NOTE
Most recent creatinine and eGFR are listed below.  Recent Labs     10/12/24  0400 10/12/24  1747 10/13/24  0300   CREATININE 0.9 1.0 1.0   EGFRNORACEVR >60.0 >60.0 >60.0       Likely pre-renal given her septic shock.   Status post IV hydration, patient able to take PO hydration  GM resolving

## 2024-10-13 NOTE — ASSESSMENT & PLAN NOTE
Monitor. Patient's last chemo for over a year.  Follow up with Hematology outpatient.  Consult hematology as WBC continues to climb, recommend follow up outpatient.  Will likely take some time for white blood cells to improve

## 2024-10-13 NOTE — PLAN OF CARE
CM sent referral to [facility] via [facility] for home antibiotics.  Per [facility] pis is accepted by [facility].  CM also sent updated orders to [facility] for IV antibiotics.

## 2024-10-13 NOTE — PT/OT/SLP PROGRESS
Physical Therapy Treatment    Patient Name:  Yvette Hutchinson   MRN:  [id number]    Recommendations:     Discharge Recommendations: Moderate Intensity Therapy  Discharge Equipment Recommendations: none  Barriers to discharge:  weakness, impaired endurance, impaired functional mobility, impaired balance, decreased LE function, decreased safety awareness.     Assessment:     Yvette Hutchinson is a 64 y.o. female admitted with a medical diagnosis of Septic shock.  She presents with the following impairments/functional limitations: impaired endurance, weakness, impaired functional mobility, gait instability, impaired balance, decreased upper extremity function, decreased lower extremity function, decreased safety awareness, impaired cardiopulmonary response to activity.    Pt found resting with HOB elevated, agreeable to skilled physical therapy session today.     She transferred from supine to sitting EOB with SBA and then from sitting to standing with SBA and 4WW.     She ambulated 90ft with 4WW and SBA. She demonstrated appropriate gait technique throughout gait cycle and did not require and cues.     After gait, she was agreeable to sitting up in her bed side chair. Pt left sitting up in chair, chair alarm on, call light within reach, all needs met, and RN notified.     Rehab Prognosis: Fair; patient would benefit from acute skilled PT services to address these deficits and reach maximum level of function.    Recent Surgery: Procedure(s) (LRB):  ERCP (ENDOSCOPIC RETROGRADE CHOLANGIOPANCREATOGRAPHY) (N/A) 3 Days Post-Op    Plan:     During this hospitalization, patient to be seen daily to address the identified rehab impairments via gait training, therapeutic activities, therapeutic exercises, neuromuscular re-education and progress toward the following goals:    Plan of Care Expires:  11/06/24    Subjective     Chief Complaint: none  Patient/Family Comments/goals: to get better and go  home  Pain/Comfort:  Pain Rating 1: 0/10      Objective:     Communicated with RN prior to session.  Patient found HOB elevated with blood pressure cuff, chair check, peripheral IV, pulse ox (continuous), telemetry upon PT entry to room.     General Precautions: Standard, fall  Orthopedic Precautions: spinal precautions  Braces: [place] J collar  Respiratory Status: Room air     Functional Mobility:  Bed Mobility:     Supine to Sit: stand by assistance  Transfers:     Sit to Stand:  stand by assistance with 4 wheeled walker  Gait: 90ft with 4WW and SBA.       AM-PAC 6 CLICK MOBILITY          Treatment & Education:  Pt educated on importance of time OOB, importance of intermittent mobility, safe techniques for transfers/ambulation, discharge recommendations/options, and use of call light for assistance and fall prevention.      Patient left up in chair with all lines intact, call button in reach, and RN notified..    GOALS:   Multidisciplinary Problems       Physical Therapy Goals          Problem: Physical Therapy    Goal Priority Disciplines Outcome Interventions   Physical Therapy Goal     PT, PT/OT Progressing    Description: Goals to be met by: 24     Patient will increase functional independence with mobility by performin. Supine to sit with Supervision  2. Sit to stand transfer with Supervision  3. Bed to chair transfer with Supervision using Rolling Walker  4. Gait  x 200 feet with Supervision using Rolling Walker.                              Time Tracking:     PT Received On: 10/13/24  PT Start Time: 918     PT Stop Time: 930  PT Total Time (min): 12 min     Billable Minutes: Gait Training 12    Treatment Type: Treatment  PT/PTA: PTA     Number of PTA visits since last PT visit: 1     10/13/2024

## 2024-10-13 NOTE — PROGRESS NOTES
[facility] Medicine  Progress Note    Patient Name: Yvette Hutchinson  MRN: [id number]  Patient Class: IP- Inpatient   Admission Date: 10/2/2024  Length of Stay: 11 days  Attending Physician: Albert Boateng MD  Primary Care Provider: Vern Priest MD        Subjective:     Principal Problem:Septic shock        HPI:  64-year-old female with PMH of CLL, and lung cancer status post partial resection who presented to the ER because of presyncope, nausea, vomiting, diarrhea and abdominal pain.  According to the patient, for over 1 week she has been having intractable nausea, vomiting and diarrhea.  Also epigastric and right upper quadrant abdominal pain described as sharp, 10/10 on pain scale radiate to the rest of her abdomen.  Today when she stood up from a sitting position, she felt as if she will pass out.  As a result, she decided to come to the ER for evaluation.  Denied any fever or chills.  She denied chest pain or shortness on breath.    In the ER, vitals showed a blood pressure of 99/55, but soon dropped to the 70s.  Heart rate was 122, respiratory rate of 20, temperature of 101.6°, and patient was satting 95% on room air.  CBC was white count of 21, with history of CLL.  CMP showed sodium of 135, acute renal failure with creatinine of 1.4 compared to baseline of 1, and patient has elevated LFTs with alk-phos of 216, , and AST of 497.  COVID/flu are negative.  Blood cultures were collected.  CTA chest negative for pulmonary embolus, but showed mild diffuse bronchial wall thickening that could reflect infectious or inflammatory bronchitis in the appropriate clinical setting.  CT abdomen/pelvis showed pericholecystic edema without evidence of gallstones.  Cholecystitis cannot be excluded. Also mild Linda pancreatic edema which may represent early pancreatitis. US abdomen showed Cholelithiasis, gallbladder wall thickening, and mild pericholecystic fluid. Patient was started on  Zosyn/vanco.  Was given sepsis bolus of lactated ringer of 2286 cc.  Started on Levophed for blood pressure support.  General surgery was consulted.  Patient will be admitted to ICU for septic shock.       Overview/Hospital Course:  This 64-year-old lady with history of CLL, lung cancer status post partial resection presented to the emergency department due to presyncope, nausea vomiting and abdominal pain.  She was also noted to be hypotensive and tachycardic on admission.  She was also noted to have fever.  Patient was admitted to the hospital for septic shock concern for abdominal source given CT findings showing some pericholecystic fluid.  Patient was also noted to have transaminitis and imaging did reveal some peripancreatic fluid and inflammatory changes as well though her lipase was normal.  General surgery was consulted after ultrasound the gallbladder was performed which again redemonstrated pericholecystic fluid.  She is status post laparoscopic cholecystectomy on 10/03.  Gallbladder was not noted to be gangrenous and per the op note there was suspicion that this was not the source of her septic shock.  However, blood cultures grew only coagulation negative Staphylococcus in 1/2 bottles.  This was thought to be contaminant.  Infectious Disease was consulted and vancomycin and Zosyn was continued on this patient.  Levophed initiated on admission was able to be weaned, although she still had requirement the day after surgery.  Started on midodrine and Florinef by Cardiology, Levophed discontinued.  Had elevated troponins on admission, underwent left heart catheterization showed no obstructive disease.  Repeat blood cultures NTD.  MRCP suggest intrahepatic duct blocked, GI consulted.  Patient underwent ERCP which did not show any evidence of stricture or abnormality suggested by MRCP.  Hematology consulted given up trending WBC in the history of CLL.  CLL recommend follow up outpatient, WBC likely secondary  to infection, may take some time to improve. ID recommended vanc till 10/25/24. CM to assist with HHC and IV abx. Pt declined SNF. Transfer out of ICU.     Interval History: pt seen and examined this morning. Declined SNF placement, agreeable to HHC. Explained the need for IV abx and follow up with ID outpatient. Pt verbalized understanding. CM at bedside during discussion. WBC back up today. TSH elevated, synthroid increased.     Review of Systems   Constitutional:  Negative for chills.   Respiratory:  Negative for chest tightness and shortness of breath.    Cardiovascular:  Negative for chest pain.   Gastrointestinal:  Negative for abdominal pain.     Objective:     Vital Signs (Most Recent):  Temp: 98.2 °F (36.8 °C) (10/13/24 0901)  Pulse: 63 (10/13/24 1000)  Resp: (!) 30 (10/13/24 1000)  BP: (!) 134/58 (10/13/24 1000)  SpO2: (!) 91 % (10/13/24 0900) Vital Signs (24h Range):  Temp:  [98 °F (36.7 °C)-98.3 °F (36.8 °C)] 98.2 °F (36.8 °C)  Pulse:  [51-80] 63  Resp:  [11-36] 30  SpO2:  [87 %-100 %] 91 %  BP: ()/() 134/58     Weight: 75.6 kg (166 lb 10.7 oz)  Body mass index is 29.53 kg/m².    Intake/Output Summary (Last 24 hours) at 10/13/2024 1156  Last data filed at 10/13/2024 0600  Gross per 24 hour   Intake 2008.75 ml   Output 803 ml   Net 1205.75 ml         Physical Exam  Vitals reviewed.   Constitutional:       Appearance: Normal appearance.   Cardiovascular:      Rate and Rhythm: Normal rate and regular rhythm.   Pulmonary:      Effort: Pulmonary effort is normal.      Breath sounds: Normal breath sounds.   Abdominal:      General: Abdomen is flat.      Palpations: Abdomen is soft.   Skin:     General: Skin is warm.   Neurological:      Mental Status: She is alert and oriented to person, place, and time.             Significant Labs: All pertinent labs within the past 24 hours have been reviewed.  CBC:   Recent Labs   Lab 10/12/24  0400 10/13/24  0300   WBC 26.40* 34.75*   HGB 8.3* 8.7*   HCT 26.3*  27.9*    449     CMP:   Recent Labs   Lab 10/12/24  0400 10/12/24  1747 10/13/24  0300 10/13/24  1117    139 143  --    K 3.5 3.5 3.3* 3.6   * 110 113*  --    CO2 24 21* 24  --    GLU 79 126* 81  --    BUN 7* 9 8  --    CREATININE 0.9 1.0 1.0  --    CALCIUM 8.1* 8.3* 8.3*  --    PROT 4.9*  --  5.1*  --    ALBUMIN 3.2*  --  3.3*  --    BILITOT 0.5  --  0.4  --    ALKPHOS 278*  --  252*  --    AST 43*  --  31  --    *  --  125*  --    ANIONGAP 5* 8 6*  --        Significant Imaging: I have reviewed all pertinent imaging results/findings within the past 24 hours.    Assessment/Plan:      * Septic shock  - Suspect secondary to intra-abdominal process.  CT reveals pericholecystic fluid and peripancreatic fluid  She is now status post cholecystectomy with General surgery, per op note the gallbladder did not appear significantly inflamed to be commensurate with the patient's presentation of septic shock, 1/2 bottles drawn for blood culture was growing coagulase-negative Staphylococcus, felt to be contaminant, however, given patient's persistent pressor requirement infectious Disease was consulted, recommendation to continue vancomycin and Zosyn at this time  Infectious Disease we will defer imaging of patient's C-spine for now  Check cortisol - normal range for am draw  Florinef added   Dc hydrocortisone     Zosyn dc'ed 10/13  Per ID cont vanc till 10/25  Check CBC, BMP every Monday while on IV vancomycin   Adjust vancomycin dose to achieve trough 15-20  Follow up with ID in 2 weeks    Functional diarrhea  S/p lap archana - continues to have loose stools  Lactose intolerant - diet and supplements adjusted - diarrhea more pronounced after ensure  Add probiotic while on zosyn  Add stool WBC and elastase studies   C.diff negative 6 days ago   Consider stool bulking agent if no improvement   GI consulted, cholestyramine added        Bacteremia due to Staphylococcus  This has now resulted as  coagulase-negative staph, likely contaminant  ID following and would like to continue vancomycin for now    Abdominal pain  Suspect secondary to acute cholecystitis.    CT abdomen also showed mild pancreatitis, but lipase is within normal limits.    Given her diarrhea however, will order stool culture.    Supportive care with IV fluids, Zofran for nausea.  P.r.n. pain medication.      Acute renal failure  Most recent creatinine and eGFR are listed below.  Recent Labs     10/12/24  0400 10/12/24  1747 10/13/24  0300   CREATININE 0.9 1.0 1.0   EGFRNORACEVR >60.0 >60.0 >60.0       Likely pre-renal given her septic shock.   Status post IV hydration, patient able to take PO hydration  GM resolving      Neuropathic pain  Resume gabapentin.      Calculus of gallbladder with acute cholecystitis without obstruction  Status post cholecystectomy  F/u surgery op         Acquired hypothyroidism  TSH 14.028  Increased synthroid to 75 mcg      CLL (chronic lymphocytic leukemia)  Monitor. Patient's last chemo for over a year.  Follow up with Hematology outpatient.  Consult hematology as WBC continues to climb, recommend follow up outpatient.  Will likely take some time for white blood cells to improve      Anxiety  Resume Wellbutrin and Celexa        VTE Risk Mitigation (From admission, onward)           Ordered     Place [place] hose  Until discontinued         10/05/24 1132     heparin (porcine) injection 5,000 Units  Every 8 hours         10/02/24 1611     IP VTE HIGH RISK PATIENT  Once         10/02/24 1611     Place sequential compression device  Until discontinued         10/02/24 1611                    Discharge Planning   SALINA: 10/15/2024     Code Status: Full Code   Is the patient medically ready for discharge?:     Reason for patient still in hospital (select all that apply): Patient trending condition  Discharge Plan A: Home Health   Discharge Delays: None known at this time          Albert Boateng MD  Department of Hospital  Medicine   [facility]

## 2024-10-13 NOTE — PROGRESS NOTES
Pharmacokinetic Assessment Follow Up: IV Vancomycin    Vancomycin serum concentration assessment(s):    The trough level was drawn correctly and can be used to guide therapy at this time. The measurement is below the desired definitive target range of 15 to 20 mcg/mL.    Vancomycin Regimen Plan:    Continue regimen to Vancomycin 2000 mg IV every 24 hours with next serum trough concentration measured at 1130 prior to 3rd dose on 10/15.    Drug levels (last 3 results):  Recent Labs   Lab Result Units 10/13/24  1117   Vancomycin-Trough ug/mL 11.9       Pharmacy will continue to follow and monitor vancomycin.    Please contact pharmacy at extension [phone] for questions regarding this assessment.    Thank you for the consult,   Osmany Church       Patient brief summary:  Yvette Hutchinson is a 64 y.o. female initiated on antimicrobial therapy with IV Vancomycin for treatment of sepsis    Drug Allergies:   Review of patient's allergies indicates:   Allergen Reactions    Latex, natural rubber        Actual Body Weight:   75.6 kg    Renal Function:   Estimated Creatinine Clearance: 54.6 mL/min (based on SCr of 1 mg/dL).,     Dialysis Method (if applicable):  N/A    CBC (last 72 hours):  Recent Labs   Lab Result Units 10/11/24  0527 10/12/24  0400 10/13/24  0300   WBC K/uL 45.41* 26.40* 34.75*   Hemoglobin g/dL 9.4* 8.3* 8.7*   Hematocrit % 29.5* 26.3* 27.9*   Platelets K/uL 426 353 449   Gran % % 16.0* 16.0* 14.3*   Lymph % % 80.0* 78.8* 81.7*   Mono % % 4.0 4.0 3.0*   Eosinophil % % 0.0 0.3 0.3   Basophil % % 0.0 0.3 0.3   Differential Method  Manual Automated Automated       Metabolic Panel (last 72 hours):  Recent Labs   Lab Result Units 10/11/24  0527 10/11/24  2053 10/12/24  0400 10/12/24  1747 10/13/24  0300 10/13/24  1117   Sodium mmol/L 144 142 143 139 143  --    Potassium mmol/L 3.1* 3.1* 3.5 3.5 3.3* 3.6   Chloride mmol/L 112* 111* 114* 110 113*  --    CO2 mmol/L 24 23 24 21* 24  --    Glucose mg/dL 97 102  79 126* 81  --    BUN mg/dL 8 7* 7* 9 8  --    Creatinine mg/dL 0.9 0.9 0.9 1.0 1.0  --    Albumin g/dL 3.4*  --  3.2*  --  3.3*  --    Total Bilirubin mg/dL 0.5  --  0.5  --  0.4  --    Alkaline Phosphatase U/L 321*  --  278*  --  252*  --    AST U/L 74*  --  43*  --  31  --    ALT U/L 221*  --  160*  --  125*  --    Magnesium mg/dL 2.2  --  2.2  --  2.1  --    Phosphorus mg/dL  --  2.3*  --   --   --   --        Vancomycin Administrations:  vancomycin given in the last 96 hours                     vancomycin (VANCOCIN) 2,000 mg in D5W 500 mL IVPB (mg) 2,000 mg New Bag 10/12/24 1247     2,000 mg New Bag 10/11/24 1228                    Microbiologic Results:  Microbiology Results (last 7 days)       Procedure Component Value Units Date/Time    Blood culture [[id number]] Collected: 10/11/24 1122    Order Status: Completed Specimen: Blood Updated: 10/12/24 1232     Blood Culture, Routine No Growth to date      No Growth to date    Blood culture [[id number]] Collected: 10/11/24 1123    Order Status: Completed Specimen: Blood Updated: 10/12/24 1232     Blood Culture, Routine No Growth to date      No Growth to date    Blood culture [[id number]] Collected: 10/05/24 1037    Order Status: Completed Specimen: Blood Updated: 10/10/24 1232     Blood Culture, Routine No growth after 5 days.    Blood culture [[id number]] Collected: 10/05/24 1037    Order Status: Completed Specimen: Blood Updated: 10/10/24 1232     Blood Culture, Routine No growth after 5 days.    Blood culture x two cultures. Draw prior to antibiotics [[id number]] Collected: 10/02/24 1057    Order Status: Completed Specimen: Blood from Peripheral, Hand, Left Updated: 10/07/24 1232     Blood Culture, Routine No growth after 5 days.    Narrative:      Aerobic and anaerobic

## 2024-10-14 ENCOUNTER — TELEPHONE (OUTPATIENT)
Dept: HEMATOLOGY/ONCOLOGY | Facility: CLINIC | Age: 64
End: 2024-10-14
Payer: COMMERCIAL

## 2024-10-14 LAB
ALBUMIN SERPL BCP-MCNC: 3.2 G/DL (ref 3.5–5.2)
ALP SERPL-CCNC: 232 U/L (ref 55–135)
ALT SERPL W/O P-5'-P-CCNC: 103 U/L (ref 10–44)
ANION GAP SERPL CALC-SCNC: 6 MMOL/L (ref 8–16)
AST SERPL-CCNC: 52 U/L (ref 10–40)
BASOPHILS NFR BLD: 0 % (ref 0–1.9)
BASOPHILS NFR BLD: 1 % (ref 0–1.9)
BILIRUB SERPL-MCNC: 0.5 MG/DL (ref 0.1–1)
BUN SERPL-MCNC: 7 MG/DL (ref 8–23)
CALCIUM SERPL-MCNC: 8.3 MG/DL (ref 8.7–10.5)
CHLORIDE SERPL-SCNC: 112 MMOL/L (ref 95–110)
CO2 SERPL-SCNC: 25 MMOL/L (ref 23–29)
CREAT SERPL-MCNC: 1.1 MG/DL (ref 0.5–1.4)
CRP SERPL-MCNC: 0.5 MG/DL
DACRYOCYTES BLD QL SMEAR: ABNORMAL
DIFFERENTIAL METHOD BLD: ABNORMAL
DIFFERENTIAL METHOD BLD: ABNORMAL
EOSINOPHIL NFR BLD: 0 % (ref 0–8)
EOSINOPHIL NFR BLD: 0 % (ref 0–8)
ERYTHROCYTE [DISTWIDTH] IN BLOOD BY AUTOMATED COUNT: 16.9 % (ref 11.5–14.5)
ERYTHROCYTE [DISTWIDTH] IN BLOOD BY AUTOMATED COUNT: 17 % (ref 11.5–14.5)
ERYTHROCYTE [SEDIMENTATION RATE] IN BLOOD BY WESTERGREN METHOD: 2 MM/HR (ref 0–20)
EST. GFR  (NO RACE VARIABLE): 55.8 ML/MIN/1.73 M^2
GLUCOSE SERPL-MCNC: 73 MG/DL (ref 70–110)
HCT VFR BLD AUTO: 29.8 % (ref 37–48.5)
HCT VFR BLD AUTO: 29.9 % (ref 37–48.5)
HGB BLD-MCNC: 9.4 G/DL (ref 12–16)
HGB BLD-MCNC: 9.4 G/DL (ref 12–16)
HYPOCHROMIA BLD QL SMEAR: ABNORMAL
IMM GRANULOCYTES # BLD AUTO: ABNORMAL K/UL (ref 0–0.04)
IMM GRANULOCYTES # BLD AUTO: ABNORMAL K/UL (ref 0–0.04)
IMM GRANULOCYTES NFR BLD AUTO: ABNORMAL % (ref 0–0.5)
IMM GRANULOCYTES NFR BLD AUTO: ABNORMAL % (ref 0–0.5)
LYMPHOCYTES NFR BLD: 78 % (ref 18–48)
LYMPHOCYTES NFR BLD: 88 % (ref 18–48)
MAGNESIUM SERPL-MCNC: 2.1 MG/DL (ref 1.6–2.6)
MCH RBC QN AUTO: 30.8 PG (ref 27–31)
MCH RBC QN AUTO: 30.9 PG (ref 27–31)
MCHC RBC AUTO-ENTMCNC: 31.4 G/DL (ref 32–36)
MCHC RBC AUTO-ENTMCNC: 31.5 G/DL (ref 32–36)
MCV RBC AUTO: 98 FL (ref 82–98)
MCV RBC AUTO: 98 FL (ref 82–98)
MONOCYTES NFR BLD: 0 % (ref 4–15)
MONOCYTES NFR BLD: 1 % (ref 4–15)
MYELOCYTES NFR BLD MANUAL: 1 %
NEUTROPHILS NFR BLD: 22 % (ref 38–73)
NEUTROPHILS NFR BLD: 8 % (ref 38–73)
NEUTS BAND NFR BLD MANUAL: 1 %
NRBC BLD-RTO: 0 /100 WBC
NRBC BLD-RTO: 0 /100 WBC
PLATELET # BLD AUTO: 480 K/UL (ref 150–450)
PLATELET # BLD AUTO: 491 K/UL (ref 150–450)
PLATELET BLD QL SMEAR: ABNORMAL
PLATELET BLD QL SMEAR: ABNORMAL
PMV BLD AUTO: 9.1 FL (ref 9.2–12.9)
PMV BLD AUTO: 9.3 FL (ref 9.2–12.9)
POTASSIUM SERPL-SCNC: 3.8 MMOL/L (ref 3.5–5.1)
PROCALCITONIN SERPL IA-MCNC: 0.23 NG/ML (ref 0–0.5)
PROT SERPL-MCNC: 5 G/DL (ref 6–8.4)
RBC # BLD AUTO: 3.04 M/UL (ref 4–5.4)
RBC # BLD AUTO: 3.05 M/UL (ref 4–5.4)
SMUDGE CELLS BLD QL SMEAR: PRESENT
SMUDGE CELLS BLD QL SMEAR: PRESENT
SODIUM SERPL-SCNC: 143 MMOL/L (ref 136–145)
WBC # BLD AUTO: 47.03 K/UL (ref 3.9–12.7)
WBC # BLD AUTO: 52.7 K/UL (ref 3.9–12.7)

## 2024-10-14 PROCEDURE — 25000003 PHARM REV CODE 250: Performed by: HOSPITALIST

## 2024-10-14 PROCEDURE — 25000003 PHARM REV CODE 250: Performed by: INTERNAL MEDICINE

## 2024-10-14 PROCEDURE — C1751 CATH, INF, PER/CENT/MIDLINE: HCPCS

## 2024-10-14 PROCEDURE — 25000003 PHARM REV CODE 250: Performed by: NURSE PRACTITIONER

## 2024-10-14 PROCEDURE — 25000242 PHARM REV CODE 250 ALT 637 W/ HCPCS

## 2024-10-14 PROCEDURE — 99900031 HC PATIENT EDUCATION (STAT)

## 2024-10-14 PROCEDURE — 83735 ASSAY OF MAGNESIUM: CPT | Performed by: SURGERY

## 2024-10-14 PROCEDURE — 02HV33Z INSERTION OF INFUSION DEVICE INTO SUPERIOR VENA CAVA, PERCUTANEOUS APPROACH: ICD-10-PCS | Performed by: STUDENT IN AN ORGANIZED HEALTH CARE EDUCATION/TRAINING PROGRAM

## 2024-10-14 PROCEDURE — 36569 INSJ PICC 5 YR+ W/O IMAGING: CPT

## 2024-10-14 PROCEDURE — 85027 COMPLETE CBC AUTOMATED: CPT | Mod: 91 | Performed by: STUDENT IN AN ORGANIZED HEALTH CARE EDUCATION/TRAINING PROGRAM

## 2024-10-14 PROCEDURE — 63600175 PHARM REV CODE 636 W HCPCS: Performed by: STUDENT IN AN ORGANIZED HEALTH CARE EDUCATION/TRAINING PROGRAM

## 2024-10-14 PROCEDURE — 94640 AIRWAY INHALATION TREATMENT: CPT

## 2024-10-14 PROCEDURE — 84145 PROCALCITONIN (PCT): CPT | Performed by: STUDENT IN AN ORGANIZED HEALTH CARE EDUCATION/TRAINING PROGRAM

## 2024-10-14 PROCEDURE — 63600175 PHARM REV CODE 636 W HCPCS: Performed by: INTERNAL MEDICINE

## 2024-10-14 PROCEDURE — 25000003 PHARM REV CODE 250: Performed by: SURGERY

## 2024-10-14 PROCEDURE — 85027 COMPLETE CBC AUTOMATED: CPT | Performed by: SURGERY

## 2024-10-14 PROCEDURE — 99900035 HC TECH TIME PER 15 MIN (STAT)

## 2024-10-14 PROCEDURE — 85651 RBC SED RATE NONAUTOMATED: CPT | Performed by: STUDENT IN AN ORGANIZED HEALTH CARE EDUCATION/TRAINING PROGRAM

## 2024-10-14 PROCEDURE — 85007 BL SMEAR W/DIFF WBC COUNT: CPT | Performed by: SURGERY

## 2024-10-14 PROCEDURE — 80053 COMPREHEN METABOLIC PANEL: CPT | Performed by: SURGERY

## 2024-10-14 PROCEDURE — 25000003 PHARM REV CODE 250: Performed by: STUDENT IN AN ORGANIZED HEALTH CARE EDUCATION/TRAINING PROGRAM

## 2024-10-14 PROCEDURE — 12000002 HC ACUTE/MED SURGE SEMI-PRIVATE ROOM

## 2024-10-14 PROCEDURE — 94761 N-INVAS EAR/PLS OXIMETRY MLT: CPT

## 2024-10-14 PROCEDURE — 63600175 PHARM REV CODE 636 W HCPCS: Performed by: SURGERY

## 2024-10-14 PROCEDURE — 85007 BL SMEAR W/DIFF WBC COUNT: CPT | Mod: 91 | Performed by: STUDENT IN AN ORGANIZED HEALTH CARE EDUCATION/TRAINING PROGRAM

## 2024-10-14 PROCEDURE — 86140 C-REACTIVE PROTEIN: CPT | Performed by: STUDENT IN AN ORGANIZED HEALTH CARE EDUCATION/TRAINING PROGRAM

## 2024-10-14 RX ORDER — HEPARIN 100 UNIT/ML
5 SYRINGE INTRAVENOUS ONCE
Status: COMPLETED | OUTPATIENT
Start: 2024-10-14 | End: 2024-10-14

## 2024-10-14 RX ORDER — LOPERAMIDE HYDROCHLORIDE 2 MG/1
2 CAPSULE ORAL 4 TIMES DAILY PRN
Status: DISCONTINUED | OUTPATIENT
Start: 2024-10-14 | End: 2024-10-16 | Stop reason: HOSPADM

## 2024-10-14 RX ADMIN — LACTOBACILLUS ACIDOPHILUS / LACTOBACILLUS BULGARICUS 1 EACH: 100 MILLION CFU STRENGTH GRANULES at 08:10

## 2024-10-14 RX ADMIN — GABAPENTIN 300 MG: 300 CAPSULE ORAL at 03:10

## 2024-10-14 RX ADMIN — LEVOTHYROXINE SODIUM 75 MCG: 0.03 TABLET ORAL at 05:10

## 2024-10-14 RX ADMIN — HEPARIN SODIUM 5000 UNITS: 5000 INJECTION INTRAVENOUS; SUBCUTANEOUS at 09:10

## 2024-10-14 RX ADMIN — CHOLESTYRAMINE 4 GRAMS OF ANHYDROUS CHOLESTYRAMINE: 4 POWDER, FOR SUSPENSION ORAL at 08:10

## 2024-10-14 RX ADMIN — HYDROCODONE BITARTRATE AND ACETAMINOPHEN 1 TABLET: 5; 325 TABLET ORAL at 10:10

## 2024-10-14 RX ADMIN — CHOLESTYRAMINE 4 GRAMS OF ANHYDROUS CHOLESTYRAMINE: 4 POWDER, FOR SUSPENSION ORAL at 10:10

## 2024-10-14 RX ADMIN — MIDODRINE HYDROCHLORIDE 10 MG: 10 TABLET ORAL at 03:10

## 2024-10-14 RX ADMIN — HYDROCODONE BITARTRATE AND ACETAMINOPHEN 1 TABLET: 5; 325 TABLET ORAL at 06:10

## 2024-10-14 RX ADMIN — ONDANSETRON 4 MG: 2 INJECTION INTRAMUSCULAR; INTRAVENOUS at 07:10

## 2024-10-14 RX ADMIN — CHOLESTYRAMINE 4 GRAMS OF ANHYDROUS CHOLESTYRAMINE: 4 POWDER, FOR SUSPENSION ORAL at 03:10

## 2024-10-14 RX ADMIN — FERROUS SULFATE TAB 325 MG (65 MG ELEMENTAL FE) 1 EACH: 325 (65 FE) TAB at 10:10

## 2024-10-14 RX ADMIN — MICONAZOLE NITRATE: 20 CREAM TOPICAL at 08:10

## 2024-10-14 RX ADMIN — HEPARIN SODIUM 5000 UNITS: 5000 INJECTION INTRAVENOUS; SUBCUTANEOUS at 05:10

## 2024-10-14 RX ADMIN — CITALOPRAM HYDROBROMIDE 20 MG: 20 TABLET ORAL at 10:10

## 2024-10-14 RX ADMIN — LACTOBACILLUS ACIDOPHILUS / LACTOBACILLUS BULGARICUS 1 EACH: 100 MILLION CFU STRENGTH GRANULES at 10:10

## 2024-10-14 RX ADMIN — MIDODRINE HYDROCHLORIDE 10 MG: 10 TABLET ORAL at 10:10

## 2024-10-14 RX ADMIN — TOPIRAMATE 100 MG: 25 TABLET, FILM COATED ORAL at 08:10

## 2024-10-14 RX ADMIN — BUPROPION HYDROCHLORIDE 300 MG: 150 TABLET, EXTENDED RELEASE ORAL at 10:10

## 2024-10-14 RX ADMIN — HEPARIN 500 UNITS: 100 SYRINGE at 01:10

## 2024-10-14 RX ADMIN — GABAPENTIN 300 MG: 300 CAPSULE ORAL at 08:10

## 2024-10-14 RX ADMIN — THEOPHYLLINE ANHYDROUS 200 MG: 100 CAPSULE, EXTENDED RELEASE ORAL at 10:10

## 2024-10-14 RX ADMIN — HYDROCORTISONE: 25 CREAM TOPICAL at 10:10

## 2024-10-14 RX ADMIN — ASPIRIN 81 MG: 81 TABLET, COATED ORAL at 10:10

## 2024-10-14 RX ADMIN — FLUDROCORTISONE ACETATE 100 MCG: 0.1 TABLET ORAL at 10:10

## 2024-10-14 RX ADMIN — TOPIRAMATE 100 MG: 25 TABLET, FILM COATED ORAL at 10:10

## 2024-10-14 RX ADMIN — THEOPHYLLINE ANHYDROUS 200 MG: 100 CAPSULE, EXTENDED RELEASE ORAL at 08:10

## 2024-10-14 RX ADMIN — GABAPENTIN 300 MG: 300 CAPSULE ORAL at 10:10

## 2024-10-14 RX ADMIN — MIDODRINE HYDROCHLORIDE 10 MG: 10 TABLET ORAL at 05:10

## 2024-10-14 RX ADMIN — VANCOMYCIN HYDROCHLORIDE 2000 MG: 500 INJECTION, POWDER, LYOPHILIZED, FOR SOLUTION INTRAVENOUS at 01:10

## 2024-10-14 RX ADMIN — HEPARIN SODIUM 5000 UNITS: 5000 INJECTION INTRAVENOUS; SUBCUTANEOUS at 01:10

## 2024-10-14 RX ADMIN — BUDESONIDE INHALATION 1 MG: 0.5 SUSPENSION RESPIRATORY (INHALATION) at 07:10

## 2024-10-14 RX ADMIN — HYDROCORTISONE: 25 CREAM TOPICAL at 03:10

## 2024-10-14 RX ADMIN — HYDROCORTISONE: 25 CREAM TOPICAL at 08:10

## 2024-10-14 RX ADMIN — ARFORMOTEROL TARTRATE 15 MCG: 15 SOLUTION RESPIRATORY (INHALATION) at 07:10

## 2024-10-14 RX ADMIN — MICONAZOLE NITRATE: 20 CREAM TOPICAL at 10:10

## 2024-10-14 NOTE — PROCEDURES
[facility]  Date/Time: 10/14/2024 1:47 PM  Location procedure was performed: [facility] ICU A 2ND FLOOR  Performed by: Chandan Camacho RN  Supervising provider: Brittny Rhodes RT  Assisting provider: Albert Boateng MD  Pre-operative Diagnosis: Septic shock  Consent Done: Yes  Time out: Immediately prior to procedure a time out was called to verify the correct patient, procedure, equipment, support staff and site/side marked as required  Indications: med administration  Anesthesia: local infiltration  Local anesthetic: lidocaine 1% without epinephrine  Anesthetic Total (mL): 5  Preparation: skin prepped with Betadine and skin prepped with ChloraPrep  Skin prep agent dried: skin prep agent completely dried prior to procedure  Sterile barriers: all five maximum sterile barriers used - cap, mask, sterile gown, sterile gloves, and large sterile sheet  Hand hygiene: hand hygiene performed prior to central venous catheter insertion  Location details: left basilic  Catheter type: double lumen  Catheter size: 5 Fr  Catheter Length: 40cm    Ultrasound guidance: yes  Vessel Caliber: medium and patent, compressibility normal  Needle advanced into vessel with real time Ultrasound guidance.  Guidewire confirmed in vessel.  Sterile sheath used.  no esophageal manometryNumber of attempts: 1  Post-procedure: blood return through all ports, sterile dressing applied and chlorhexidine patch  Technical procedures used: seldinger technique  Estimated blood loss (mL): 0  Specimens: No  Implants: No  Assessment: placement verified by x-ray, no pneumothorax on x-ray and successful placement  Complications: none

## 2024-10-14 NOTE — PLAN OF CARE
Spoke with Carol,  for Dr Polanco's office regarding follow up; she has the pt's contact information and will contact the pt directly regarding her follow up appt.

## 2024-10-14 NOTE — PLAN OF CARE
Pt awaiting bed availability.  Port deaccessed after PICC placed for home atx.  Afebrile.  Still with loose stool.  Some nausea.  PRN pain/nausea meds in use.  Hem/onc aware of labs and following.  Rash improving.

## 2024-10-14 NOTE — SUBJECTIVE & OBJECTIVE
Interval History:  Patient seen and examined this morning, reports some nausea although feeling overall better.  WBC continues to up trend.  Afebrile, ESR and CRP within normal limits.  Procalcitonin unremarkable.  Patient's WBC has fluctuated in the past from CLL, we will monitor for another day hopefully downtrending.    Review of Systems   Constitutional:  Negative for chills.   Respiratory:  Negative for chest tightness and shortness of breath.    Cardiovascular:  Negative for chest pain.   Gastrointestinal:  Positive for nausea. Negative for abdominal pain.     Objective:     Vital Signs (Most Recent):  Temp: 97 °F (36.1 °C) (10/14/24 0710)  Pulse: 60 (10/14/24 1000)  Resp: 16 (10/14/24 1040)  BP: (!) 111/53 (10/14/24 1000)  SpO2: (!) 93 % (10/14/24 1000) Vital Signs (24h Range):  Temp:  [97 °F (36.1 °C)-98.2 °F (36.8 °C)] 97 °F (36.1 °C)  Pulse:  [54-76] 60  Resp:  [11-29] 16  SpO2:  [92 %-100 %] 93 %  BP: ()/(49-65) 111/53     Weight: 75.6 kg (166 lb 10.7 oz)  Body mass index is 29.53 kg/m².    Intake/Output Summary (Last 24 hours) at 10/14/2024 1103  Last data filed at 10/14/2024 0600  Gross per 24 hour   Intake 1060 ml   Output 550 ml   Net 510 ml         Physical Exam  Vitals reviewed.   Constitutional:       Appearance: Normal appearance.   Cardiovascular:      Rate and Rhythm: Normal rate and regular rhythm.   Pulmonary:      Effort: Pulmonary effort is normal.      Breath sounds: Normal breath sounds.   Abdominal:      General: Abdomen is flat.      Palpations: Abdomen is soft.   Skin:     General: Skin is warm.   Neurological:      Mental Status: She is alert and oriented to person, place, and time.             Significant Labs: All pertinent labs within the past 24 hours have been reviewed.  CBC:   Recent Labs   Lab 10/13/24  0300 10/14/24  0335 10/14/24  0906   WBC 34.75* 47.03* 52.70*   HGB 8.7* 9.4* 9.4*   HCT 27.9* 29.9* 29.8*    480* 491*     CMP:   Recent Labs   Lab 10/12/24  8660  10/13/24  0300 10/13/24  1117 10/13/24  1751 10/14/24  0335    143  --   --  143   K 3.5 3.3* 3.6 3.7 3.8    113*  --   --  112*   CO2 21* 24  --   --  25   * 81  --   --  73   BUN 9 8  --   --  7*   CREATININE 1.0 1.0  --   --  1.1   CALCIUM 8.3* 8.3*  --   --  8.3*   PROT  --  5.1*  --   --  5.0*   ALBUMIN  --  3.3*  --   --  3.2*   BILITOT  --  0.4  --   --  0.5   ALKPHOS  --  252*  --   --  232*   AST  --  31  --   --  52*   ALT  --  125*  --   --  103*   ANIONGAP 8 6*  --   --  6*       Significant Imaging: I have reviewed all pertinent imaging results/findings within the past 24 hours.

## 2024-10-14 NOTE — ASSESSMENT & PLAN NOTE
S/p lap archana - continues to have loose stools  Lactose intolerant - diet and supplements adjusted - diarrhea more pronounced after ensure  Add probiotic   C.diff negative   GI consulted, cholestyramine added

## 2024-10-14 NOTE — PROGRESS NOTES
Patient's WBC reviewed and is fluctuant  Not concerning for CLL progression  This is probably result of her acute illness and treatments  Will follow peripherally and she can follow in my clinic as outpatient

## 2024-10-14 NOTE — TELEPHONE ENCOUNTER
Spoke with pt to notify of scheduled hospital f/u.    ----- Message from  Heidi sent at 10/14/2024 10:49 AM CDT -----  Good morning! Pt is in need of hospital follow up appointment in approx 2 weeks. If you could please contact pt with appointment date and time. Thank you, Heidi Moran RN CM

## 2024-10-14 NOTE — ASSESSMENT & PLAN NOTE
Monitor. Patient's last chemo for over a year.  Follow up with Hematology outpatient.  Consult hematology as WBC continues to climb, recommend follow up outpatient.  Will likely take some time for white blood cells to improve  Has had fluctuations of WBC in the past

## 2024-10-14 NOTE — PROGRESS NOTES
[facility] Medicine  Progress Note    Patient Name: Yvette Hutchinson  MRN: [id number]  Patient Class: IP- Inpatient   Admission Date: 10/2/2024  Length of Stay: 12 days  Attending Physician: Albert Boateng MD  Primary Care Provider: Vern Priest MD        Subjective:     Principal Problem:Septic shock        HPI:  64-year-old female with PMH of CLL, and lung cancer status post partial resection who presented to the ER because of presyncope, nausea, vomiting, diarrhea and abdominal pain.  According to the patient, for over 1 week she has been having intractable nausea, vomiting and diarrhea.  Also epigastric and right upper quadrant abdominal pain described as sharp, 10/10 on pain scale radiate to the rest of her abdomen.  Today when she stood up from a sitting position, she felt as if she will pass out.  As a result, she decided to come to the ER for evaluation.  Denied any fever or chills.  She denied chest pain or shortness on breath.    In the ER, vitals showed a blood pressure of 99/55, but soon dropped to the 70s.  Heart rate was 122, respiratory rate of 20, temperature of 101.6°, and patient was satting 95% on room air.  CBC was white count of 21, with history of CLL.  CMP showed sodium of 135, acute renal failure with creatinine of 1.4 compared to baseline of 1, and patient has elevated LFTs with alk-phos of 216, , and AST of 497.  COVID/flu are negative.  Blood cultures were collected.  CTA chest negative for pulmonary embolus, but showed mild diffuse bronchial wall thickening that could reflect infectious or inflammatory bronchitis in the appropriate clinical setting.  CT abdomen/pelvis showed pericholecystic edema without evidence of gallstones.  Cholecystitis cannot be excluded. Also mild Linda pancreatic edema which may represent early pancreatitis. US abdomen showed Cholelithiasis, gallbladder wall thickening, and mild pericholecystic fluid. Patient was started on  Zosyn/vanco.  Was given sepsis bolus of lactated ringer of 2286 cc.  Started on Levophed for blood pressure support.  General surgery was consulted.  Patient will be admitted to ICU for septic shock.       Overview/Hospital Course:  This 64-year-old lady with history of CLL, lung cancer status post partial resection presented to the emergency department due to presyncope, nausea vomiting and abdominal pain.  She was also noted to be hypotensive and tachycardic on admission.  She was also noted to have fever.  Patient was admitted to the hospital for septic shock concern for abdominal source given CT findings showing some pericholecystic fluid.  Patient was also noted to have transaminitis and imaging did reveal some peripancreatic fluid and inflammatory changes as well though her lipase was normal.  General surgery was consulted after ultrasound the gallbladder was performed which again redemonstrated pericholecystic fluid.  She is status post laparoscopic cholecystectomy on 10/03.  Gallbladder was not noted to be gangrenous and per the op note there was suspicion that this was not the source of her septic shock.  However, blood cultures grew only coagulation negative Staphylococcus in 1/2 bottles.  This was thought to be contaminant.  Infectious Disease was consulted and vancomycin and Zosyn was continued on this patient.  Levophed initiated on admission was able to be weaned, although she still had requirement the day after surgery.  Started on midodrine and Florinef by Cardiology, Levophed discontinued.  Had elevated troponins on admission, underwent left heart catheterization showed no obstructive disease.  Repeat blood cultures NTD.  MRCP suggest intrahepatic duct blocked, GI consulted.  Patient underwent ERCP which did not show any evidence of stricture or abnormality suggested by MRCP.  Hematology consulted given up trending WBC in the history of CLL.  CLL recommend follow up outpatient, WBC likely secondary  to infection, may take some time to improve. ID recommended vanc till 10/25/24. CM to assist with HHC and IV abx. Pt declined SNF. Transfer out of ICU.     Interval History:  Patient seen and examined this morning, reports some nausea although feeling overall better.  WBC continues to up trend.  Afebrile, ESR and CRP within normal limits.  Procalcitonin unremarkable.  Patient's WBC has fluctuated in the past from CLL, we will monitor for another day hopefully downtrending.    Review of Systems   Constitutional:  Negative for chills.   Respiratory:  Negative for chest tightness and shortness of breath.    Cardiovascular:  Negative for chest pain.   Gastrointestinal:  Positive for nausea. Negative for abdominal pain.     Objective:     Vital Signs (Most Recent):  Temp: 97 °F (36.1 °C) (10/14/24 0710)  Pulse: 60 (10/14/24 1000)  Resp: 16 (10/14/24 1040)  BP: (!) 111/53 (10/14/24 1000)  SpO2: (!) 93 % (10/14/24 1000) Vital Signs (24h Range):  Temp:  [97 °F (36.1 °C)-98.2 °F (36.8 °C)] 97 °F (36.1 °C)  Pulse:  [54-76] 60  Resp:  [11-29] 16  SpO2:  [92 %-100 %] 93 %  BP: ()/(49-65) 111/53     Weight: 75.6 kg (166 lb 10.7 oz)  Body mass index is 29.53 kg/m².    Intake/Output Summary (Last 24 hours) at 10/14/2024 1103  Last data filed at 10/14/2024 0600  Gross per 24 hour   Intake 1060 ml   Output 550 ml   Net 510 ml         Physical Exam  Vitals reviewed.   Constitutional:       Appearance: Normal appearance.   Cardiovascular:      Rate and Rhythm: Normal rate and regular rhythm.   Pulmonary:      Effort: Pulmonary effort is normal.      Breath sounds: Normal breath sounds.   Abdominal:      General: Abdomen is flat.      Palpations: Abdomen is soft.   Skin:     General: Skin is warm.   Neurological:      Mental Status: She is alert and oriented to person, place, and time.             Significant Labs: All pertinent labs within the past 24 hours have been reviewed.  CBC:   Recent Labs   Lab 10/13/24  0300  10/14/24  0335 10/14/24  0906   WBC 34.75* 47.03* 52.70*   HGB 8.7* 9.4* 9.4*   HCT 27.9* 29.9* 29.8*    480* 491*     CMP:   Recent Labs   Lab 10/12/24  1747 10/13/24  0300 10/13/24  1117 10/13/24  1751 10/14/24  0335    143  --   --  143   K 3.5 3.3* 3.6 3.7 3.8    113*  --   --  112*   CO2 21* 24  --   --  25   * 81  --   --  73   BUN 9 8  --   --  7*   CREATININE 1.0 1.0  --   --  1.1   CALCIUM 8.3* 8.3*  --   --  8.3*   PROT  --  5.1*  --   --  5.0*   ALBUMIN  --  3.3*  --   --  3.2*   BILITOT  --  0.4  --   --  0.5   ALKPHOS  --  252*  --   --  232*   AST  --  31  --   --  52*   ALT  --  125*  --   --  103*   ANIONGAP 8 6*  --   --  6*       Significant Imaging: I have reviewed all pertinent imaging results/findings within the past 24 hours.    Assessment/Plan:      * Septic shock  - Suspect secondary to intra-abdominal process.  CT reveals pericholecystic fluid and peripancreatic fluid  She is now status post cholecystectomy with General surgery, per op note the gallbladder did not appear significantly inflamed to be commensurate with the patient's presentation of septic shock, 1/2 bottles drawn for blood culture was growing coagulase-negative Staphylococcus, felt to be contaminant, however, given patient's persistent pressor requirement infectious Disease was consulted  Infectious Disease we will defer imaging of patient's C-spine for now  Check cortisol - normal range for am draw  Florinef added   Dc hydrocortisone     Zosyn dc'ed 10/13  Per ID cont vanc till 10/25  Check CBC, BMP every Monday while on IV vancomycin   Adjust vancomycin dose to achieve trough 15-20  Follow up with ID in 2 weeks  Doubt todays increased WBC from infection, inflammatory markers unremarkable. Will monitor overnight and if WBC downtrending and pt stable will dc tomorrow     Functional diarrhea  S/p lap archana - continues to have loose stools  Lactose intolerant - diet and supplements adjusted - diarrhea  more pronounced after ensure  Add probiotic   C.diff negative   GI consulted, cholestyramine added        Bacteremia due to Staphylococcus  This has now resulted as coagulase-negative staph, likely contaminant  ID following and would like to continue vancomycin for now    Abdominal pain  Suspect secondary to acute cholecystitis.    CT abdomen also showed mild pancreatitis, but lipase is within normal limits.    Given her diarrhea however, will order stool culture.    Supportive care with IV fluids, Zofran for nausea.  P.r.n. pain medication.      Acute renal failure  Most recent creatinine and eGFR are listed below.  Recent Labs     10/12/24  1747 10/13/24  0300 10/14/24  0335   CREATININE 1.0 1.0 1.1   EGFRNORACEVR >60.0 >60.0 55.8*       Likely pre-renal given her septic shock.   Status post IV hydration, patient able to take PO hydration  GM resolving      Neuropathic pain  Resume gabapentin.      Calculus of gallbladder with acute cholecystitis without obstruction  Status post cholecystectomy  F/u surgery op         Acquired hypothyroidism  TSH 14.028  Increased synthroid to 75 mcg      CLL (chronic lymphocytic leukemia)  Monitor. Patient's last chemo for over a year.  Follow up with Hematology outpatient.  Consult hematology as WBC continues to climb, recommend follow up outpatient.  Will likely take some time for white blood cells to improve  Has had fluctuations of WBC in the past       Anxiety  Resume Wellbutrin and Celexa        VTE Risk Mitigation (From admission, onward)           Ordered     Place [place] hose  Until discontinued         10/05/24 1132     heparin (porcine) injection 5,000 Units  Every 8 hours         10/02/24 1611     IP VTE HIGH RISK PATIENT  Once         10/02/24 1611     Place sequential compression device  Until discontinued         10/02/24 1611                    Discharge Planning   SALINA: 10/15/2024     Code Status: Full Code   Is the patient medically ready for discharge?:     Reason  for patient still in hospital (select all that apply): Patient trending condition  Discharge Plan A: Home Health   Discharge Delays: None known at this time          Albert Boateng MD  Department of Hospital Medicine   [facility]

## 2024-10-14 NOTE — PLAN OF CARE
MAU sent updated HH and IV orders to [facility] and [facility].  Both agencies updated on pt's SALINA (10/15/24).     10/14/24 1033   Post-Acute Status   Post-Acute Authorization Home Health;IV Infusion   Home Health Status Set-up Complete/Auth obtained   IV Infusion Status Set-up Complete/Auth obtained   Discharge Plan   Discharge Plan A Home Health   Discharge Plan B [facility]

## 2024-10-14 NOTE — ASSESSMENT & PLAN NOTE
- Suspect secondary to intra-abdominal process.  CT reveals pericholecystic fluid and peripancreatic fluid  She is now status post cholecystectomy with General surgery, per op note the gallbladder did not appear significantly inflamed to be commensurate with the patient's presentation of septic shock, 1/2 bottles drawn for blood culture was growing coagulase-negative Staphylococcus, felt to be contaminant, however, given patient's persistent pressor requirement infectious Disease was consulted  Infectious Disease we will defer imaging of patient's C-spine for now  Check cortisol - normal range for am draw  Florinef added   Dc hydrocortisone     Zosyn dc'ed 10/13  Per ID cont vanc till 10/25  Check CBC, BMP every Monday while on IV vancomycin   Adjust vancomycin dose to achieve trough 15-20  Follow up with ID in 2 weeks  Doubt todays increased WBC from infection, inflammatory markers unremarkable. Will monitor overnight and if WBC downtrending and pt stable will dc tomorrow

## 2024-10-14 NOTE — ASSESSMENT & PLAN NOTE
Most recent creatinine and eGFR are listed below.  Recent Labs     10/12/24  1747 10/13/24  0300 10/14/24  0335   CREATININE 1.0 1.0 1.1   EGFRNORACEVR >60.0 >60.0 55.8*       Likely pre-renal given her septic shock.   Status post IV hydration, patient able to take PO hydration  GM resolving

## 2024-10-14 NOTE — CARE UPDATE
10/13/24 1923   Patient Assessment/Suction   Level of Consciousness (AVPU) alert   Respiratory Effort Unlabored   Expansion/Accessory Muscles/Retractions no retractions;no use of accessory muscles   All Lung Fields Breath Sounds Anterior:;Lateral:;diminished   PRE-TX-O2   Device (Oxygen Therapy) room air   SpO2 97 %   Pulse (!) 55   Resp 18   Aerosol Therapy   $ Aerosol Therapy Charges Aerosol Treatment   Daily Review of Necessity (SVN) completed   Respiratory Treatment Status (SVN) given   Treatment Route (SVN) oxygen;mask   Patient Position HOB elevated   Post Treatment Assessment (SVN) breath sounds unchanged   Signs of Intolerance (SVN) none   Breath Sounds Post-Respiratory Treatment   Throughout All Fields Post-Treatment All Fields   Throughout All Fields Post-Treatment no change   Post-treatment Heart Rate (beats/min) 68   Post-treatment Resp Rate (breaths/min) 18   Incentive Spirometer   $ Incentive Spirometer Charges done with encouragement   Incentive Spirometer Predicted Level (mL) 1500   Administration (IS) self-administered   Number of Repetitions (IS) 10   Level Incentive Spirometer (mL) 1100   Patient Tolerance (IS) good   Education   $ Education Bronchodilator;Incentive Spirometry;15 min

## 2024-10-14 NOTE — PLAN OF CARE
1330- spoke with Harjinder from [employer], update given of tomorrow for possible discharge. Stated she would be by to provide pt education    1105- Hematology requested pt get a PICC line for discharge antibiotics. Order placed.     1101- reached out to Hematology in regards to pts port being utilized for home IVabx, awaiting reply.     1054- Follow up appointments for PCP, ID, and Hema/Onc added to AVS. Unable to schedule Hema/ Onc, in[employer]ps[employer]et message sent, clinic to contact pt with appointment date and time. Case management following.     10/14/24 1053   Discharge Reassessment   Assessment Type Discharge Planning Reassessment   Did the patient's condition or plan change since previous assessment? No   Discharge Plan discussed with: Patient   Communicated SALINA with patient/caregiver Yes   Discharge Plan A Home Health   Discharge Plan B Home Health

## 2024-10-14 NOTE — PT/OT/SLP PROGRESS
Physical Therapy      Patient Name:  Yvette Hutchinson   MRN:  [id number]    Patient not seen today secondary to Declined x 2 attempts due to nausea, stomach upset. Will follow-up 10/15/24.

## 2024-10-15 ENCOUNTER — PATIENT MESSAGE (OUTPATIENT)
Dept: RESEARCH | Facility: HOSPITAL | Age: 64
End: 2024-10-15
Payer: COMMERCIAL

## 2024-10-15 ENCOUNTER — CLINICAL SUPPORT (OUTPATIENT)
Dept: SMOKING CESSATION | Facility: CLINIC | Age: 64
End: 2024-10-15
Payer: COMMERCIAL

## 2024-10-15 DIAGNOSIS — Z87.891 FORMER CIGARETTE SMOKER: Primary | ICD-10-CM

## 2024-10-15 LAB
ALBUMIN SERPL BCP-MCNC: 3.2 G/DL (ref 3.5–5.2)
ALP SERPL-CCNC: 216 U/L (ref 55–135)
ALT SERPL W/O P-5'-P-CCNC: 89 U/L (ref 10–44)
ANION GAP SERPL CALC-SCNC: 7 MMOL/L (ref 8–16)
ANISOCYTOSIS BLD QL SMEAR: SLIGHT
AST SERPL-CCNC: 63 U/L (ref 10–40)
BASOPHILS # BLD AUTO: 0.23 K/UL (ref 0–0.2)
BASOPHILS NFR BLD: 0.4 % (ref 0–1.9)
BILIRUB SERPL-MCNC: 0.4 MG/DL (ref 0.1–1)
BUN SERPL-MCNC: 6 MG/DL (ref 8–23)
CALCIUM SERPL-MCNC: 8.1 MG/DL (ref 8.7–10.5)
CHLORIDE SERPL-SCNC: 113 MMOL/L (ref 95–110)
CO2 SERPL-SCNC: 23 MMOL/L (ref 23–29)
CREAT SERPL-MCNC: 1.1 MG/DL (ref 0.5–1.4)
DIFFERENTIAL METHOD BLD: ABNORMAL
EOSINOPHIL # BLD AUTO: 0.7 K/UL (ref 0–0.5)
EOSINOPHIL NFR BLD: 1.3 % (ref 0–8)
ERYTHROCYTE [DISTWIDTH] IN BLOOD BY AUTOMATED COUNT: 16.8 % (ref 11.5–14.5)
EST. GFR  (NO RACE VARIABLE): 55.8 ML/MIN/1.73 M^2
GLUCOSE SERPL-MCNC: 76 MG/DL (ref 70–110)
HCT VFR BLD AUTO: 31.1 % (ref 37–48.5)
HGB BLD-MCNC: 9.4 G/DL (ref 12–16)
HYPOCHROMIA BLD QL SMEAR: ABNORMAL
IMM GRANULOCYTES # BLD AUTO: 0.16 K/UL (ref 0–0.04)
IMM GRANULOCYTES NFR BLD AUTO: 0.3 % (ref 0–0.5)
LYMPHOCYTES # BLD AUTO: 46 K/UL (ref 1–4.8)
LYMPHOCYTES NFR BLD: 87.2 % (ref 18–48)
MAGNESIUM SERPL-MCNC: 2 MG/DL (ref 1.6–2.6)
MCH RBC QN AUTO: 30.2 PG (ref 27–31)
MCHC RBC AUTO-ENTMCNC: 30.2 G/DL (ref 32–36)
MCV RBC AUTO: 100 FL (ref 82–98)
MONOCYTES # BLD AUTO: 1.1 K/UL (ref 0.3–1)
MONOCYTES NFR BLD: 2 % (ref 4–15)
NEUTROPHILS # BLD AUTO: 4.6 K/UL (ref 1.8–7.7)
NEUTROPHILS NFR BLD: 8.8 % (ref 38–73)
NRBC BLD-RTO: 0 /100 WBC
PLATELET # BLD AUTO: 518 K/UL (ref 150–450)
PLATELET BLD QL SMEAR: ABNORMAL
PMV BLD AUTO: 9.2 FL (ref 9.2–12.9)
POTASSIUM SERPL-SCNC: 3.5 MMOL/L (ref 3.5–5.1)
PROT SERPL-MCNC: 5.2 G/DL (ref 6–8.4)
RBC # BLD AUTO: 3.11 M/UL (ref 4–5.4)
SMUDGE CELLS BLD QL SMEAR: PRESENT
SODIUM SERPL-SCNC: 143 MMOL/L (ref 136–145)
VANCOMYCIN TROUGH SERPL-MCNC: 17.5 UG/ML
WBC # BLD AUTO: 52.71 K/UL (ref 3.9–12.7)

## 2024-10-15 PROCEDURE — 25000003 PHARM REV CODE 250: Performed by: INTERNAL MEDICINE

## 2024-10-15 PROCEDURE — 25000003 PHARM REV CODE 250: Performed by: HOSPITALIST

## 2024-10-15 PROCEDURE — 25000003 PHARM REV CODE 250: Performed by: STUDENT IN AN ORGANIZED HEALTH CARE EDUCATION/TRAINING PROGRAM

## 2024-10-15 PROCEDURE — 80202 ASSAY OF VANCOMYCIN: CPT | Performed by: STUDENT IN AN ORGANIZED HEALTH CARE EDUCATION/TRAINING PROGRAM

## 2024-10-15 PROCEDURE — 25000242 PHARM REV CODE 250 ALT 637 W/ HCPCS: Performed by: INTERNAL MEDICINE

## 2024-10-15 PROCEDURE — 99407 BEHAV CHNG SMOKING > 10 MIN: CPT | Mod: S$GLB,,,

## 2024-10-15 PROCEDURE — 25000003 PHARM REV CODE 250: Performed by: NURSE PRACTITIONER

## 2024-10-15 PROCEDURE — 63600175 PHARM REV CODE 636 W HCPCS: Performed by: INTERNAL MEDICINE

## 2024-10-15 PROCEDURE — 85025 COMPLETE CBC W/AUTO DIFF WBC: CPT | Performed by: INTERNAL MEDICINE

## 2024-10-15 PROCEDURE — 94640 AIRWAY INHALATION TREATMENT: CPT

## 2024-10-15 PROCEDURE — 83735 ASSAY OF MAGNESIUM: CPT | Performed by: INTERNAL MEDICINE

## 2024-10-15 PROCEDURE — 94761 N-INVAS EAR/PLS OXIMETRY MLT: CPT

## 2024-10-15 PROCEDURE — 12000002 HC ACUTE/MED SURGE SEMI-PRIVATE ROOM

## 2024-10-15 PROCEDURE — 80053 COMPREHEN METABOLIC PANEL: CPT | Performed by: INTERNAL MEDICINE

## 2024-10-15 PROCEDURE — 99900031 HC PATIENT EDUCATION (STAT)

## 2024-10-15 PROCEDURE — 94799 UNLISTED PULMONARY SVC/PX: CPT

## 2024-10-15 PROCEDURE — 99406 BEHAV CHNG SMOKING 3-10 MIN: CPT

## 2024-10-15 RX ADMIN — HYDROCODONE BITARTRATE AND ACETAMINOPHEN 1 TABLET: 5; 325 TABLET ORAL at 10:10

## 2024-10-15 RX ADMIN — FERROUS SULFATE TAB 325 MG (65 MG ELEMENTAL FE) 1 EACH: 325 (65 FE) TAB at 10:10

## 2024-10-15 RX ADMIN — HYDROCODONE BITARTRATE AND ACETAMINOPHEN 1 TABLET: 5; 325 TABLET ORAL at 02:10

## 2024-10-15 RX ADMIN — THEOPHYLLINE ANHYDROUS 200 MG: 100 CAPSULE, EXTENDED RELEASE ORAL at 10:10

## 2024-10-15 RX ADMIN — VANCOMYCIN HYDROCHLORIDE 2000 MG: 500 INJECTION, POWDER, LYOPHILIZED, FOR SOLUTION INTRAVENOUS at 01:10

## 2024-10-15 RX ADMIN — LACTOBACILLUS ACIDOPHILUS / LACTOBACILLUS BULGARICUS 1 EACH: 100 MILLION CFU STRENGTH GRANULES at 10:10

## 2024-10-15 RX ADMIN — HYDROCODONE BITARTRATE AND ACETAMINOPHEN 1 TABLET: 5; 325 TABLET ORAL at 08:10

## 2024-10-15 RX ADMIN — BUPROPION HYDROCHLORIDE 300 MG: 150 TABLET, EXTENDED RELEASE ORAL at 10:10

## 2024-10-15 RX ADMIN — GABAPENTIN 300 MG: 300 CAPSULE ORAL at 10:10

## 2024-10-15 RX ADMIN — MIDODRINE HYDROCHLORIDE 10 MG: 10 TABLET ORAL at 05:10

## 2024-10-15 RX ADMIN — POTASSIUM BICARBONATE 50 MEQ: 977.5 TABLET, EFFERVESCENT ORAL at 10:10

## 2024-10-15 RX ADMIN — ARFORMOTEROL TARTRATE 15 MCG: 15 SOLUTION RESPIRATORY (INHALATION) at 07:10

## 2024-10-15 RX ADMIN — MIDODRINE HYDROCHLORIDE 10 MG: 10 TABLET ORAL at 10:10

## 2024-10-15 RX ADMIN — ASPIRIN 81 MG: 81 TABLET, COATED ORAL at 10:10

## 2024-10-15 RX ADMIN — ONDANSETRON 4 MG: 2 INJECTION INTRAMUSCULAR; INTRAVENOUS at 01:10

## 2024-10-15 RX ADMIN — CHOLESTYRAMINE 4 GRAMS OF ANHYDROUS CHOLESTYRAMINE: 4 POWDER, FOR SUSPENSION ORAL at 10:10

## 2024-10-15 RX ADMIN — GABAPENTIN 300 MG: 300 CAPSULE ORAL at 08:10

## 2024-10-15 RX ADMIN — CALCIUM CARBONATE (ANTACID) CHEW TAB 500 MG 1000 MG: 500 CHEW TAB at 03:10

## 2024-10-15 RX ADMIN — LEVOTHYROXINE SODIUM 75 MCG: 0.03 TABLET ORAL at 05:10

## 2024-10-15 RX ADMIN — CITALOPRAM HYDROBROMIDE 20 MG: 20 TABLET ORAL at 10:10

## 2024-10-15 RX ADMIN — BUDESONIDE INHALATION 1 MG: 0.5 SUSPENSION RESPIRATORY (INHALATION) at 07:10

## 2024-10-15 RX ADMIN — FLUDROCORTISONE ACETATE 100 MCG: 0.1 TABLET ORAL at 10:10

## 2024-10-15 RX ADMIN — TOPIRAMATE 100 MG: 25 TABLET, FILM COATED ORAL at 10:10

## 2024-10-15 RX ADMIN — THEOPHYLLINE ANHYDROUS 200 MG: 100 CAPSULE, EXTENDED RELEASE ORAL at 08:10

## 2024-10-15 RX ADMIN — TOPIRAMATE 100 MG: 25 TABLET, FILM COATED ORAL at 08:10

## 2024-10-15 RX ADMIN — DIPHENHYDRAMINE HYDROCHLORIDE 25 MG: 25 CAPSULE ORAL at 08:10

## 2024-10-15 RX ADMIN — HEPARIN SODIUM 5000 UNITS: 5000 INJECTION INTRAVENOUS; SUBCUTANEOUS at 05:10

## 2024-10-15 RX ADMIN — MIDODRINE HYDROCHLORIDE 10 MG: 10 TABLET ORAL at 03:10

## 2024-10-15 RX ADMIN — Medication 6 MG: at 08:10

## 2024-10-15 RX ADMIN — ALUMINUM HYDROXIDE, MAGNESIUM HYDROXIDE, AND SIMETHICONE 30 ML: 1200; 120; 1200 SUSPENSION ORAL at 12:10

## 2024-10-15 NOTE — PROGRESS NOTES
[facility]  Progress Note    Patient Name: Yvette Hutchinson  MRN: [id number]  Patient Class: IP- Inpatient   Admission Date: 10/2/2024  Length of Stay: 13 days  Attending Physician: Maury Mckinney DO  Primary Care Provider: Vern Priest MD        Subjective:     Principal Problem:Septic shock        HPI:  64-year-old female with PMH of CLL, and lung cancer status post partial resection who presented to the ER because of presyncope, nausea, vomiting, diarrhea and abdominal pain.  According to the patient, for over 1 week she has been having intractable nausea, vomiting and diarrhea.  Also epigastric and right upper quadrant abdominal pain described as sharp, 10/10 on pain scale radiate to the rest of her abdomen.  Today when she stood up from a sitting position, she felt as if she will pass out.  As a result, she decided to come to the ER for evaluation.  Denied any fever or chills.  She denied chest pain or shortness on breath.    In the ER, vitals showed a blood pressure of 99/55, but soon dropped to the 70s.  Heart rate was 122, respiratory rate of 20, temperature of 101.6°, and patient was satting 95% on room air.  CBC was white count of 21, with history of CLL.  CMP showed sodium of 135, acute renal failure with creatinine of 1.4 compared to baseline of 1, and patient has elevated LFTs with alk-phos of 216, , and AST of 497.  COVID/flu are negative.  Blood cultures were collected.  CTA chest negative for pulmonary embolus, but showed mild diffuse bronchial wall thickening that could reflect infectious or inflammatory bronchitis in the appropriate clinical setting.  CT abdomen/pelvis showed pericholecystic edema without evidence of gallstones.  Cholecystitis cannot be excluded. Also mild Linda pancreatic edema which may represent early pancreatitis. US abdomen showed Cholelithiasis, gallbladder wall thickening, and mild pericholecystic fluid. Patient was started  on Zosyn/vanco.  Was given sepsis bolus of lactated ringer of 2286 cc.  Started on Levophed for blood pressure support.  General surgery was consulted.  Patient will be admitted to ICU for septic shock.       Overview/Hospital Course:  This 64-year-old lady with history of CLL, lung cancer status post partial resection presented to the emergency department due to presyncope, nausea vomiting and abdominal pain.  She was also noted to be hypotensive and tachycardic on admission.  She was also noted to have fever.  Patient was admitted to the hospital for septic shock concern for abdominal source given CT findings showing some pericholecystic fluid.  Patient was also noted to have transaminitis and imaging did reveal some peripancreatic fluid and inflammatory changes as well though her lipase was normal.  General surgery was consulted after ultrasound the gallbladder was performed which again redemonstrated pericholecystic fluid.  She is status post laparoscopic cholecystectomy on 10/03.  Gallbladder was not noted to be gangrenous and per the op note there was suspicion that this was not the source of her septic shock.  However, blood cultures grew only coagulation negative Staphylococcus in 1/2 bottles.  This was thought to be contaminant.  Infectious Disease was consulted and vancomycin and Zosyn was continued on this patient.  Levophed initiated on admission was able to be weaned, although she still had requirement the day after surgery.  Started on midodrine and Florinef by Cardiology, Levophed discontinued.  Had elevated troponins on admission, underwent left heart catheterization showed no obstructive disease.  Repeat blood cultures NTD.  MRCP suggest intrahepatic duct blocked, GI consulted.  Patient underwent ERCP which did not show any evidence of stricture or abnormality suggested by MRCP.  Hematology consulted given up trending WBC in the history of CLL.  CLL recommend follow up outpatient, WBC likely  secondary to infection, may take some time to improve. ID recommended vanc till 10/25/24. CM to assist with HHC and IV abx. Pt declined SNF. Transfer out of ICU.     Interval History:  Patient was seen and examined at bedside this morning.  She reports she is doing well today, and has no complaints at this time.  Patient has PICC line in left arm with continued bleeding at insertion site.  Heparin has been stopped.  Patient has continued to have minimal bleeding throughout the day, however it just has not stopped.  We will continue to monitor overnight for continued bleeding.  If bleeding can be controlled, patient can be discharged tomorrow.    Review of Systems   Constitutional:  Negative for chills.   Respiratory:  Negative for chest tightness and shortness of breath.    Cardiovascular:  Negative for chest pain.   Gastrointestinal:  Positive for nausea. Negative for abdominal pain.     Objective:     Vital Signs (Most Recent):  Temp: 97.6 °F (36.4 °C) (10/15/24 1130)  Pulse: (!) 57 (10/15/24 1100)  Resp: 18 (10/15/24 1100)  BP: 119/64 (10/15/24 1100)  SpO2: 96 % (10/15/24 1100) Vital Signs (24h Range):  Temp:  [97.6 °F (36.4 °C)-99.2 °F (37.3 °C)] 97.6 °F (36.4 °C)  Pulse:  [55-67] 57  Resp:  [11-20] 18  SpO2:  [94 %-99 %] 96 %  BP: ()/(47-64) 119/64     Weight: 75.6 kg (166 lb 10.7 oz)  Body mass index is 29.53 kg/m².    Intake/Output Summary (Last 24 hours) at 10/15/2024 1634  Last data filed at 10/15/2024 1517  Gross per 24 hour   Intake 1300 ml   Output --   Net 1300 ml         Physical Exam  Vitals reviewed.   Constitutional:       Appearance: Normal appearance.   Cardiovascular:      Rate and Rhythm: Normal rate and regular rhythm.   Pulmonary:      Effort: Pulmonary effort is normal.      Breath sounds: Normal breath sounds.   Abdominal:      General: Abdomen is flat.      Palpations: Abdomen is soft.   Skin:     General: Skin is warm.   Neurological:      Mental Status: She is alert and oriented to  person, place, and time.             Significant Labs: All pertinent labs within the past 24 hours have been reviewed.  CBC:   Recent Labs   Lab 10/14/24  0335 10/14/24  0906 10/15/24  0553   WBC 47.03* 52.70* 52.71*   HGB 9.4* 9.4* 9.4*   HCT 29.9* 29.8* 31.1*   * 491* 518*     CMP:   Recent Labs   Lab 10/13/24  1751 10/14/24  0335 10/15/24  0553   NA  --  143 143   K 3.7 3.8 3.5   CL  --  112* 113*   CO2  --  25 23   GLU  --  73 76   BUN  --  7* 6*   CREATININE  --  1.1 1.1   CALCIUM  --  8.3* 8.1*   PROT  --  5.0* 5.2*   ALBUMIN  --  3.2* 3.2*   BILITOT  --  0.5 0.4   ALKPHOS  --  232* 216*   AST  --  52* 63*   ALT  --  103* 89*   ANIONGAP  --  6* 7*       Significant Imaging: I have reviewed all pertinent imaging results/findings within the past 24 hours.    Imaging Results              US Abdomen Limited (Final result)  Result time 10/02/24 15:34:11      Final result by Vinay Daniels MD (10/02/24 15:34:11)                   Impression:      1. Cholelithiasis, gallbladder wall thickening, and mild pericholecystic fluid are nonspecific.  Clinical and laboratory correlation is needed to assess for any additional evidence of acute cholecystitis.  2. No other sonographic abnormalities throughout the right upper quadrant.      Electronically signed by: Vinay Daniels  Date:    10/02/2024  Time:    15:34               Narrative:    EXAMINATION:  US ABDOMEN LIMITED    CLINICAL HISTORY:  ruq pain;    COMPARISON:  CT 10/02/2024    FINDINGS:  Liver maintains normal echogenicity without focal mass or intrahepatic bile duct dilation. Hepatopedal flow present in main portal vein.    Cholelithiasis sludge dependently in gallbladder.  Gallbladder wall thickening and mild pericholecystic fluid evident.  Common duct diameter of 5 mm is normal.  Sonographic Hernandez sign is negative.    Visualized pancreas is unremarkable with bowel gas obscuring portions of pancreas. Right kidney is free of hydronephrosis. Visualized  abdominal aorta is nonaneurysmal. Juxtahepatic IVC is unremarkable.                                       CTA Chest Non-Coronary (PE Studies) (Final result)  Result time 10/02/24 14:22:56      Final result by Vinay Daniels MD (10/02/24 14:22:56)                   Impression:      1. Negative for pulmonary embolus.  2. Mild diffuse bronchial wall thickening could reflect infectious or inflammatory bronchitis in the appropriate clinical setting.  Additional lower lung zone left greater than right interlobular septal thickening suggest coexisting mild interstitial edema.  3. Increased size of precarinal and right hilar lymph nodes since 09/06/2022.  Differential diagnosis of enlarged lymph nodes has been previously discussed in remains unchanged.  4. Suboptimally evaluated due to beam hardening artifact, soft tissue density with calcification in pretracheal region just superior to the sternal notch as discussed above.  This is suspicious for a exophytic thyroid nodule/parenchyma and has not significantly changed since 2020.      Electronically signed by: Vinay Daniels  Date:    10/02/2024  Time:    14:22               Narrative:    EXAMINATION:  CTA CHEST NON CORONARY (PE STUDIES)    CLINICAL HISTORY:  Pulmonary embolism (PE) suspected, high prob;    TECHNIQUE:  CT angiography of thorax with 100 mL Omnipaque 350. Maximum intensity projection coronal reformations were created at a separate workstation and stored in the patient's permanent medical record.    COMPARISON:  Multiple prior exams dating back to 03/11/2020    FINDINGS:  CTA THORAX:    Negative for pulmonary embolus.  Thoracic aorta is of normal caliber and without evidence of dissection.  Right IJ port catheter tip terminates in SVC.    Trachea and main bronchi are patent.  Postsurgical change of left upper lobectomy is evident.  Calcified granuloma remains in the left lower lobe.    6 mm medial right upper lobe nodularity (series 2, image 119) is unchanged  since 11/20/2020.  No new pulmonary nodules or masses.    Mild bronchial wall thickening is diffuse.  In lung bases, mild, left greater than right, interlobular septal thickening is new.    10 mm right hilar lymph node (series 2, image 148) previously measured 7 mm.  13 mm precarinal lymph node (image 131) previously measured 8 mm 09/06/2022.  Calcifications of small subcarinal lymph node unchanged with calcifications in left hilum unchanged.  No pleural or pericardial effusion.  Although beam hardening artifact related to venous contrast near the thoracic inlet significantly limits evaluation, calcification in pretracheal region just superior to the sternal notch and associated soft tissue density has not significantly since 02/19/2020 for and dating back to 11/28/2020, suspicious for exophytic thyroid nodule/parenchyma.    Partially visualized upper abdomen is unremarkable.    Cervical spine surgical hardware incidentally noted.  No acute or suspicious osseous abnormality.                                       CT Abdomen Pelvis With IV Contrast NO Oral Contrast (Final result)  Result time 10/02/24 14:17:22      Final result by Olesya Painter MD (10/02/24 14:17:22)                   Impression:      Pericholecystic edema without evidence of gallstones.  Cholecystitis cannot be excluded and correlation with labs and if necessary ultrasound is recommended    Mild Linda pancreatic edema which may represent early pancreatitis    Minimal ascites    Stable mildly prominent lymph nodes in the upper abdomen which may be reactive    Stable scarring within the left lung base    Diffuse vascular calcification of the aorta    Colonic diverticulosis      Electronically signed by: Olesya Painter  Date:    10/02/2024  Time:    14:17               Narrative:      CMS MANDATED QUALITY DATA - CT RADIATION - 436    All CT scans at this facility utilize dose modulation, iterative reconstruction, and/or weight based dosing when  appropriate to reduce radiation dose to as low as reasonably achievable.    CLINICAL HISTORY:  ([id number])63 y/o  (1960) F    Epigastric pain;    TECHNIQUE:  (A#[id number], exam time 10/2/2024 14:02)    CT ABDOMEN PELVIS WITH IV CONTRAST FUM483    Axial CT images of the abdomen and pelvis were obtained from the dome of the diaphragm to the proximal thighs.    100cc omnipague 350 IV contrast was given    COMPARISON:  PET-CT dated 05/27/2024    FINDINGS:  Lower Thorax:    Stable scarring within the left lung base.  The right lung base is clear.    CT Abdomen:    Liver: The liver is normal in size and attenuation.  There is periportal edema.  There is no focal hepatic mass.    Gallbladder: Pericholecystic edema which is nonspecific.  Cholecystitis cannot be excluded and correlation with labs and ultrasound is recommended.    Biliary Tree: No intra or extrahepatic ductal dilation.    Spleen: Normal.    Pancreas: Mild peripancreatic edema suggestive of pancreatitis.  There is normal enhancement of the pancreas.  There is no pancreatic ductal dilatation.    Adrenal Glands: Normal    Kidneys: The kidneys are normal in imaging appearance without hydronephrosis or hydroureter.    Vasculature: The aorta is normal in caliber with diffuse vascular calcification aorta and iliac arteries.    Lymph nodes: Stable mildly prominent lymph nodes in the upper abdomen, portacaval region and peripancreatic region.  The portacaval node measures 14 x 26 mm.  There is a 14 mm aortocaval node on image 97.  There is no new mesenteric or retroperitoneal adenopathy.    Intraperitoneal structures: Minimal ascites within the pelvis and around the liver.    Bowel: The stomach is normal.  No small bowel is unremarkable.  There is colonic diverticulosis without diverticulitis.    Abdominal wall: The abdominal wall and musculature are normal.    Musculoskeletal: There is degenerative disc disease and facet arthropathy in the lumbar spine with no  aggressive appearing lytic or blastic lesions    CT Pelvis:    Bladder: Normal.    Reproductive Organs: The uterus and ovaries are unremarkable.    Pelvic Lymph nodes: No pelvic lymphadenopathy or pelvic mass is identified.                                       X-Ray Chest 1 View (Final result)  Result time 10/02/24 11:24:56      Final result by Olesya Painter MD (10/02/24 11:24:56)                   Impression:      No evidence of active cardiopulmonary disease.      Electronically signed by: Olesya Painter  Date:    10/02/2024  Time:    11:24               Narrative:    EXAMINATION:  XR CHEST 1 VIEW    CLINICAL HISTORY:  Sepsis;    FINDINGS:  Portable chest x-ray at 10:52 is compared to prior study dated 07/18/2024    The cardiomediastinal silhouette is normal in size.  The right IJ Port-A-Cath is in stable position.    Lungs are clear.  There are no acute osseous abnormality.  There has been prior cervical fusion.                                        Assessment/Plan:      * Septic shock  - Suspect secondary to intra-abdominal process.  CT reveals pericholecystic fluid and peripancreatic fluid  She is now status post cholecystectomy with General surgery, per op note the gallbladder did not appear significantly inflamed to be commensurate with the patient's presentation of septic shock, 1/2 bottles drawn for blood culture was growing coagulase-negative Staphylococcus, felt to be contaminant, however, given patient's persistent pressor requirement infectious Disease was consulted  Infectious Disease we will defer imaging of patient's C-spine for now  Check cortisol - normal range for am draw  Florinef added   Dc hydrocortisone     Zosyn dc'ed 10/13  Per ID cont vanc till 10/25  Check CBC, BMP every Monday while on IV vancomycin   Adjust vancomycin dose to achieve trough 15-20  Follow up with ID in 2 weeks  Doubt todays increased WBC from infection, inflammatory markers unremarkable. Will monitor overnight  and if WBC downtrending and pt stable will dc tomorrow     Functional diarrhea  S/p lap archana - continues to have loose stools  Lactose intolerant - diet and supplements adjusted - diarrhea more pronounced after ensure  Add probiotic   C.diff negative   GI consulted, cholestyramine added        Bacteremia due to Staphylococcus  This has now resulted as coagulase-negative staph, likely contaminant  ID following and would like to continue vancomycin for now    Abdominal pain  Suspect secondary to acute cholecystitis.    CT abdomen also showed mild pancreatitis, but lipase is within normal limits.    Given her diarrhea however, will order stool culture.    Supportive care with IV fluids, Zofran for nausea.  P.r.n. pain medication.      Acute renal failure  Most recent creatinine and eGFR are listed below.  Recent Labs     10/13/24  0300 10/14/24  0335 10/15/24  0553   CREATININE 1.0 1.1 1.1   EGFRNORACEVR >60.0 55.8* 55.8*       Likely pre-renal given her septic shock.   Status post IV hydration, patient able to take PO hydration  GM resolving      Neuropathic pain  Resume gabapentin.      Calculus of gallbladder with acute cholecystitis without obstruction  Status post cholecystectomy  F/u surgery op         Acquired hypothyroidism  TSH 14.028  Increased synthroid to 75 mcg      CLL (chronic lymphocytic leukemia)  Monitor. Patient's last chemo for over a year.  Follow up with Hematology outpatient.  Consult hematology as WBC continues to climb, recommend follow up outpatient.  Will likely take some time for white blood cells to improve  Has had fluctuations of WBC in the past       Anxiety  Resume Wellbutrin and Celexa        VTE Risk Mitigation (From admission, onward)           Ordered     Place [place] hose  Until discontinued         10/05/24 1132     IP VTE HIGH RISK PATIENT  Once         10/02/24 1611     Place sequential compression device  Until discontinued         10/02/24 1611                    Discharge  Planning   SALINA: 10/18/2024     Code Status: Full Code   Is the patient medically ready for discharge?:     Reason for patient still in hospital (select all that apply): Patient trending condition  Discharge Plan A: Home Health   Discharge Delays: None known at this time              Maury Mckinney DO  Department of Hospital Medicine   [facility]

## 2024-10-15 NOTE — PHYSICIAN QUERY
Please clarify the nutritional diagnosis associated with the clinical findings (include all that apply):  Mild protein calorie malnutrition

## 2024-10-15 NOTE — CARE UPDATE
10/14/24 1936   Patient Assessment/Suction   Level of Consciousness (AVPU) alert   Respiratory Effort Unlabored   Expansion/Accessory Muscles/Retractions expansion symmetric   All Lung Fields Breath Sounds diminished   Rhythm/Pattern, Respiratory depth regular;pattern regular   Cough Frequency infrequent   Cough Type good;nonproductive   PRE-TX-O2   Device (Oxygen Therapy) room air   SpO2 99 %   Pulse Oximetry Type Continuous   $ Pulse Oximetry - Multiple Charge Pulse Oximetry - Multiple   Pulse 60   Resp 12   Aerosol Therapy   $ Aerosol Therapy Charges Aerosol Treatment   Daily Review of Necessity (SVN) completed   Respiratory Treatment Status (SVN) given   Treatment Route (SVN) mask   Patient Position HOB elevated   Post Treatment Assessment (SVN) breath sounds unchanged   Signs of Intolerance (SVN) none   Breath Sounds Post-Respiratory Treatment   Throughout All Fields Post-Treatment All Fields   Throughout All Fields Post-Treatment no change   Post-treatment Heart Rate (beats/min) 57   Post-treatment Resp Rate (breaths/min) 18   Incentive Spirometer   $ Incentive Spirometer Charges done independently per patient   Incentive Spirometer Predicted Level (mL) 1500   Administration (IS) instruction provided, follow-up   Number of Repetitions (IS) 10   Level Incentive Spirometer (mL) 1000   Patient Tolerance (IS) good   Education   $ Education Oxygen;15 min

## 2024-10-15 NOTE — ASSESSMENT & PLAN NOTE
Most recent creatinine and eGFR are listed below.  Recent Labs     10/13/24  0300 10/14/24  0335 10/15/24  0553   CREATININE 1.0 1.1 1.1   EGFRNORACEVR >60.0 55.8* 55.8*       Likely pre-renal given her septic shock.   Status post IV hydration, patient able to take PO hydration  GM resolving

## 2024-10-15 NOTE — SUBJECTIVE & OBJECTIVE
Interval History:  Patient was seen and examined at bedside this morning.  She reports she is doing well today, and has no complaints at this time.  Patient has PICC line in left arm with continued bleeding at insertion site.  Heparin has been stopped.  Patient has continued to have minimal bleeding throughout the day, however it just has not stopped.  We will continue to monitor overnight for continued bleeding.  If bleeding can be controlled, patient can be discharged tomorrow.    Review of Systems   Constitutional:  Negative for chills.   Respiratory:  Negative for chest tightness and shortness of breath.    Cardiovascular:  Negative for chest pain.   Gastrointestinal:  Positive for nausea. Negative for abdominal pain.     Objective:     Vital Signs (Most Recent):  Temp: 97.6 °F (36.4 °C) (10/15/24 1130)  Pulse: (!) 57 (10/15/24 1100)  Resp: 18 (10/15/24 1100)  BP: 119/64 (10/15/24 1100)  SpO2: 96 % (10/15/24 1100) Vital Signs (24h Range):  Temp:  [97.6 °F (36.4 °C)-99.2 °F (37.3 °C)] 97.6 °F (36.4 °C)  Pulse:  [55-67] 57  Resp:  [11-20] 18  SpO2:  [94 %-99 %] 96 %  BP: ()/(47-64) 119/64     Weight: 75.6 kg (166 lb 10.7 oz)  Body mass index is 29.53 kg/m².    Intake/Output Summary (Last 24 hours) at 10/15/2024 1634  Last data filed at 10/15/2024 1517  Gross per 24 hour   Intake 1300 ml   Output --   Net 1300 ml         Physical Exam  Vitals reviewed.   Constitutional:       Appearance: Normal appearance.   Cardiovascular:      Rate and Rhythm: Normal rate and regular rhythm.   Pulmonary:      Effort: Pulmonary effort is normal.      Breath sounds: Normal breath sounds.   Abdominal:      General: Abdomen is flat.      Palpations: Abdomen is soft.   Skin:     General: Skin is warm.   Neurological:      Mental Status: She is alert and oriented to person, place, and time.             Significant Labs: All pertinent labs within the past 24 hours have been reviewed.  CBC:   Recent Labs   Lab 10/14/24  2942  10/14/24  0906 10/15/24  0553   WBC 47.03* 52.70* 52.71*   HGB 9.4* 9.4* 9.4*   HCT 29.9* 29.8* 31.1*   * 491* 518*     CMP:   Recent Labs   Lab 10/13/24  1751 10/14/24  0335 10/15/24  0553   NA  --  143 143   K 3.7 3.8 3.5   CL  --  112* 113*   CO2  --  25 23   GLU  --  73 76   BUN  --  7* 6*   CREATININE  --  1.1 1.1   CALCIUM  --  8.3* 8.1*   PROT  --  5.0* 5.2*   ALBUMIN  --  3.2* 3.2*   BILITOT  --  0.5 0.4   ALKPHOS  --  232* 216*   AST  --  52* 63*   ALT  --  103* 89*   ANIONGAP  --  6* 7*       Significant Imaging: I have reviewed all pertinent imaging results/findings within the past 24 hours.    Imaging Results              US Abdomen Limited (Final result)  Result time 10/02/24 15:34:11      Final result by Vinay Daniels MD (10/02/24 15:34:11)                   Impression:      1. Cholelithiasis, gallbladder wall thickening, and mild pericholecystic fluid are nonspecific.  Clinical and laboratory correlation is needed to assess for any additional evidence of acute cholecystitis.  2. No other sonographic abnormalities throughout the right upper quadrant.      Electronically signed by: Vinay Daniels  Date:    10/02/2024  Time:    15:34               Narrative:    EXAMINATION:  US ABDOMEN LIMITED    CLINICAL HISTORY:  ruq pain;    COMPARISON:  CT 10/02/2024    FINDINGS:  Liver maintains normal echogenicity without focal mass or intrahepatic bile duct dilation. Hepatopedal flow present in main portal vein.    Cholelithiasis sludge dependently in gallbladder.  Gallbladder wall thickening and mild pericholecystic fluid evident.  Common duct diameter of 5 mm is normal.  Sonographic Hernandez sign is negative.    Visualized pancreas is unremarkable with bowel gas obscuring portions of pancreas. Right kidney is free of hydronephrosis. Visualized abdominal aorta is nonaneurysmal. Juxtahepatic IVC is unremarkable.                                       CTA Chest Non-Coronary (PE Studies) (Final result)  Result  time 10/02/24 14:22:56      Final result by Vinay Daniels MD (10/02/24 14:22:56)                   Impression:      1. Negative for pulmonary embolus.  2. Mild diffuse bronchial wall thickening could reflect infectious or inflammatory bronchitis in the appropriate clinical setting.  Additional lower lung zone left greater than right interlobular septal thickening suggest coexisting mild interstitial edema.  3. Increased size of precarinal and right hilar lymph nodes since 09/06/2022.  Differential diagnosis of enlarged lymph nodes has been previously discussed in remains unchanged.  4. Suboptimally evaluated due to beam hardening artifact, soft tissue density with calcification in pretracheal region just superior to the sternal notch as discussed above.  This is suspicious for a exophytic thyroid nodule/parenchyma and has not significantly changed since 2020.      Electronically signed by: Vinay Daniels  Date:    10/02/2024  Time:    14:22               Narrative:    EXAMINATION:  CTA CHEST NON CORONARY (PE STUDIES)    CLINICAL HISTORY:  Pulmonary embolism (PE) suspected, high prob;    TECHNIQUE:  CT angiography of thorax with 100 mL Omnipaque 350. Maximum intensity projection coronal reformations were created at a separate workstation and stored in the patient's permanent medical record.    COMPARISON:  Multiple prior exams dating back to 03/11/2020    FINDINGS:  CTA THORAX:    Negative for pulmonary embolus.  Thoracic aorta is of normal caliber and without evidence of dissection.  Right IJ port catheter tip terminates in SVC.    Trachea and main bronchi are patent.  Postsurgical change of left upper lobectomy is evident.  Calcified granuloma remains in the left lower lobe.    6 mm medial right upper lobe nodularity (series 2, image 119) is unchanged since 11/20/2020.  No new pulmonary nodules or masses.    Mild bronchial wall thickening is diffuse.  In lung bases, mild, left greater than right, interlobular septal  thickening is new.    10 mm right hilar lymph node (series 2, image 148) previously measured 7 mm.  13 mm precarinal lymph node (image 131) previously measured 8 mm 09/06/2022.  Calcifications of small subcarinal lymph node unchanged with calcifications in left hilum unchanged.  No pleural or pericardial effusion.  Although beam hardening artifact related to venous contrast near the thoracic inlet significantly limits evaluation, calcification in pretracheal region just superior to the sternal notch and associated soft tissue density has not significantly since 02/19/2020 for and dating back to 11/28/2020, suspicious for exophytic thyroid nodule/parenchyma.    Partially visualized upper abdomen is unremarkable.    Cervical spine surgical hardware incidentally noted.  No acute or suspicious osseous abnormality.                                       CT Abdomen Pelvis With IV Contrast NO Oral Contrast (Final result)  Result time 10/02/24 14:17:22      Final result by Olesya Painter MD (10/02/24 14:17:22)                   Impression:      Pericholecystic edema without evidence of gallstones.  Cholecystitis cannot be excluded and correlation with labs and if necessary ultrasound is recommended    [place] pancreatic edema which may represent early pancreatitis    Minimal ascites    Stable mildly prominent lymph nodes in the upper abdomen which may be reactive    Stable scarring within the left lung base    Diffuse vascular calcification of the aorta    Colonic diverticulosis      Electronically signed by: Olesya Painter  Date:    10/02/2024  Time:    14:17               Narrative:      CMS MANDATED QUALITY DATA - CT RADIATION - 436    All CT scans at this facility utilize dose modulation, iterative reconstruction, and/or weight based dosing when appropriate to reduce radiation dose to as low as reasonably achievable.    CLINICAL HISTORY:  ([id number])63 y/o  (1960) F    Epigastric  pain;    TECHNIQUE:  (A#[id number], exam time 10/2/2024 14:02)    CT ABDOMEN PELVIS WITH IV CONTRAST [id number]    Axial CT images of the abdomen and pelvis were obtained from the dome of the diaphragm to the proximal thighs.    100cc omnipague 350 IV contrast was given    COMPARISON:  PET-CT dated 05/27/2024    FINDINGS:  Lower Thorax:    Stable scarring within the left lung base.  The right lung base is clear.    CT Abdomen:    Liver: The liver is normal in size and attenuation.  There is periportal edema.  There is no focal hepatic mass.    Gallbladder: Pericholecystic edema which is nonspecific.  Cholecystitis cannot be excluded and correlation with labs and ultrasound is recommended.    Biliary Tree: No intra or extrahepatic ductal dilation.    Spleen: Normal.    Pancreas: Mild peripancreatic edema suggestive of pancreatitis.  There is normal enhancement of the pancreas.  There is no pancreatic ductal dilatation.    Adrenal Glands: Normal    Kidneys: The kidneys are normal in imaging appearance without hydronephrosis or hydroureter.    Vasculature: The aorta is normal in caliber with diffuse vascular calcification aorta and iliac arteries.    Lymph nodes: Stable mildly prominent lymph nodes in the upper abdomen, portacaval region and peripancreatic region.  The portacaval node measures 14 x 26 mm.  There is a 14 mm aortocaval node on image 97.  There is no new mesenteric or retroperitoneal adenopathy.    Intraperitoneal structures: Minimal ascites within the pelvis and around the liver.    Bowel: The stomach is normal.  No small bowel is unremarkable.  There is colonic diverticulosis without diverticulitis.    Abdominal wall: The abdominal wall and musculature are normal.    Musculoskeletal: There is degenerative disc disease and facet arthropathy in the lumbar spine with no aggressive appearing lytic or blastic lesions    CT Pelvis:    Bladder: Normal.    Reproductive Organs: The uterus and ovaries are  unremarkable.    Pelvic Lymph nodes: No pelvic lymphadenopathy or pelvic mass is identified.                                       X-Ray Chest 1 View (Final result)  Result time 10/02/24 11:24:56      Final result by Olesya Painter MD (10/02/24 11:24:56)                   Impression:      No evidence of active cardiopulmonary disease.      Electronically signed by: Olesya Painter  Date:    10/02/2024  Time:    11:24               Narrative:    EXAMINATION:  XR CHEST 1 VIEW    CLINICAL HISTORY:  Sepsis;    FINDINGS:  Portable chest x-ray at 10:52 is compared to prior study dated 07/18/2024    The cardiomediastinal silhouette is normal in size.  The right IJ Port-A-Cath is in stable position.    Lungs are clear.  There are no acute osseous abnormality.  There has been prior cervical fusion.

## 2024-10-15 NOTE — NURSING
Transferred to 1203 via bed. Belongings placed at bedside, chart and medications given to nurse, tele monitor applied per order. Family notified by patient. Oriented to room, call light in reach, SRx2.

## 2024-10-15 NOTE — PT/OT/SLP PROGRESS
Occupational Therapy      Patient Name:  Yvette Hutchinson   MRN:  [id number]    Patient not seen today secondary to patient was unwilling to participate due to left arm pain. Will follow-up 10/16/2024.    10/15/2024

## 2024-10-15 NOTE — CARE UPDATE
10/14/24 2300   Patient Assessment/Suction   Level of Consciousness (AVPU) alert   Respiratory Effort Normal;Unlabored   Rhythm/Pattern, Respiratory unlabored;pattern regular;no shortness of breath reported   Cough Frequency no cough   PRE-TX-O2   Device (Oxygen Therapy) room air   SpO2 96 %   Pulse Oximetry Type Intermittent   $ Pulse Oximetry - Multiple Charge Pulse Oximetry - Multiple

## 2024-10-15 NOTE — PROGRESS NOTES
Pharmacokinetic Assessment Follow Up: IV Vancomycin    Vancomycin serum concentration assessment(s):    The trough level was drawn correctly and can be used to guide therapy at this time. The measurement is within the desired definitive target range of 15 to 20 mcg/mL.    Vancomycin Regimen Plan:    Continue regimen to Vancomycin 2000 mg IV every 24 hours with next serum trough concentration measured at 1130 prior to third dose on 10/17    Drug levels (last 3 results):  Recent Labs   Lab Result Units 10/13/24  1117 10/15/24  1141   Vancomycin-Trough ug/mL 11.9 17.5       Pharmacy will continue to follow and monitor vancomycin.    Please contact pharmacy at extension [phone] for questions regarding this assessment.    Thank you for the consult,   Sunitha Iniguez       Patient brief summary:  Yvette Hutchinson is a 64 y.o. female initiated on antimicrobial therapy with IV Vancomycin for treatment of sepsis    The patient's current regimen is every 24 hours    Drug Allergies:   Review of patient's allergies indicates:   Allergen Reactions    Latex, natural rubber        Actual Body Weight:   75.6 kg    Renal Function:   Estimated Creatinine Clearance: 49.7 mL/min (based on SCr of 1.1 mg/dL).,     Dialysis Method (if applicable):  N/A    CBC (last 72 hours):  Recent Labs   Lab Result Units 10/13/24  0300 10/14/24  0335 10/14/24  0906 10/15/24  0553   WBC K/uL 34.75* 47.03* 52.70* 52.71*   Hemoglobin g/dL 8.7* 9.4* 9.4* 9.4*   Hematocrit % 27.9* 29.9* 29.8* 31.1*   Platelets K/uL 449 480* 491* 518*   Gran % % 14.3* 22.0* 8.0* 8.8*   Lymph % % 81.7* 78.0* 88.0* 87.2*   Mono % % 3.0* 0.0* 1.0* 2.0*   Eosinophil % % 0.3 0.0 0.0 1.3   Basophil % % 0.3 0.0 1.0 0.4   Differential Method  Automated Manual Manual Automated       Metabolic Panel (last 72 hours):  Recent Labs   Lab Result Units 10/12/24  1747 10/13/24  0300 10/13/24  1117 10/13/24  1751 10/14/24  0335 10/15/24  0553   Sodium mmol/L 139 143  --   --  143 143    Potassium mmol/L 3.5 3.3* 3.6 3.7 3.8 3.5   Chloride mmol/L 110 113*  --   --  112* 113*   CO2 mmol/L 21* 24  --   --  25 23   Glucose mg/dL 126* 81  --   --  73 76   BUN mg/dL 9 8  --   --  7* 6*   Creatinine mg/dL 1.0 1.0  --   --  1.1 1.1   Albumin g/dL  --  3.3*  --   --  3.2* 3.2*   Total Bilirubin mg/dL  --  0.4  --   --  0.5 0.4   Alkaline Phosphatase U/L  --  252*  --   --  232* 216*   AST U/L  --  31  --   --  52* 63*   ALT U/L  --  125*  --   --  103* 89*   Magnesium mg/dL  --  2.1  --   --  2.1 2.0       Vancomycin Administrations:  vancomycin given in the last 96 hours                     vancomycin (VANCOCIN) 2,000 mg in D5W 500 mL IVPB (mg) 2,000 mg New Bag 10/14/24 1346     2,000 mg New Bag 10/13/24 1328     2,000 mg New Bag 10/12/24 1247     2,000 mg New Bag 10/11/24 1228                    Microbiologic Results:  Microbiology Results (last 7 days)       Procedure Component Value Units Date/Time    Blood culture [[id number]] Collected: 10/11/24 1123    Order Status: Completed Specimen: Blood Updated: 10/14/24 1232     Blood Culture, Routine No Growth to date      No Growth to date      No Growth to date      No Growth to date    Blood culture [[id number]] Collected: 10/11/24 1122    Order Status: Completed Specimen: Blood Updated: 10/14/24 1232     Blood Culture, Routine No Growth to date      No Growth to date      No Growth to date      No Growth to date    Blood culture [[id number]] Collected: 10/05/24 1037    Order Status: Completed Specimen: Blood Updated: 10/10/24 1232     Blood Culture, Routine No growth after 5 days.    Blood culture [[id number]] Collected: 10/05/24 1037    Order Status: Completed Specimen: Blood Updated: 10/10/24 1232     Blood Culture, Routine No growth after 5 days.

## 2024-10-15 NOTE — PT/OT/SLP PROGRESS
Physical Therapy      Patient Name:  Yvette Hutchinson   MRN:  [id number]    Patient not seen today secondary to  (L arm pain). Will follow-up 10/16/2024.

## 2024-10-15 NOTE — PROGRESS NOTES
10/15/24 0815   Tobacco Cessation Intervention   Do you use any type of tobacco product? No  (PT states she has quit for a few months)   Are you interested in quitting use of tobacco products? Ready to quit   Are you interested in Nicotine Replacement for symptom relief? No   $ Smoking Cessation Charges Smoking Cessation - Intermediate (CTTS)

## 2024-10-16 VITALS
HEART RATE: 58 BPM | OXYGEN SATURATION: 96 % | BODY MASS INDEX: 29.54 KG/M2 | DIASTOLIC BLOOD PRESSURE: 63 MMHG | RESPIRATION RATE: 16 BRPM | WEIGHT: 166.69 LBS | SYSTOLIC BLOOD PRESSURE: 116 MMHG | TEMPERATURE: 98 F | HEIGHT: 63 IN

## 2024-10-16 LAB
ALBUMIN SERPL BCP-MCNC: 3.6 G/DL (ref 3.5–5.2)
ALP SERPL-CCNC: 226 U/L (ref 55–135)
ALT SERPL W/O P-5'-P-CCNC: 85 U/L (ref 10–44)
ANION GAP SERPL CALC-SCNC: 9 MMOL/L (ref 8–16)
ANISOCYTOSIS BLD QL SMEAR: SLIGHT
AST SERPL-CCNC: 71 U/L (ref 10–40)
ASTROVIRUS: NOT DETECTED
BACTERIA BLD CULT: NORMAL
BACTERIA BLD CULT: NORMAL
BASOPHILS NFR BLD: 0 % (ref 0–1.9)
BILIRUB SERPL-MCNC: 0.5 MG/DL (ref 0.1–1)
BUN SERPL-MCNC: 6 MG/DL (ref 8–23)
C COLI+JEJ+UPSA DNA STL QL NAA+NON-PROBE: NOT DETECTED
CALCIUM SERPL-MCNC: 8.7 MG/DL (ref 8.7–10.5)
CHLORIDE SERPL-SCNC: 111 MMOL/L (ref 95–110)
CO2 SERPL-SCNC: 22 MMOL/L (ref 23–29)
CREAT SERPL-MCNC: 1.1 MG/DL (ref 0.5–1.4)
CYCLOSPORA CAYETANENSIS: NOT DETECTED
DIFFERENTIAL METHOD BLD: ABNORMAL
ENTEROAGGREGATIVE E COLI: NOT DETECTED
ENTEROPATHOGENIC E COLI: NOT DETECTED
EOSINOPHIL NFR BLD: 0 % (ref 0–8)
ERYTHROCYTE [DISTWIDTH] IN BLOOD BY AUTOMATED COUNT: 16.6 % (ref 11.5–14.5)
EST. GFR  (NO RACE VARIABLE): 55.8 ML/MIN/1.73 M^2
GLUCOSE SERPL-MCNC: 76 MG/DL (ref 70–110)
GPP - ADENOVIRUS 40/41: NOT DETECTED
GPP - CRYPTOSPORIDIUM: NOT DETECTED
GPP - ENTAMOEBA HISTOLYTICA: NOT DETECTED
GPP - ENTEROTOXIGENIC E COLI (ETEC): NOT DETECTED
GPP - GIARDIA LAMBLIA: NOT DETECTED
GPP - NOROVIRUS GI/GII: NOT DETECTED
GPP - ROTAVIRUS A: NOT DETECTED
GPP - SALMONELLA: NOT DETECTED
GPP - VIBRIO CHOLERA: NOT DETECTED
GPP - YERSINIA ENTEROCOLITICA: NOT DETECTED
HCT VFR BLD AUTO: 31.6 % (ref 37–48.5)
HGB BLD-MCNC: 9.9 G/DL (ref 12–16)
HYPOCHROMIA BLD QL SMEAR: ABNORMAL
IMM GRANULOCYTES # BLD AUTO: ABNORMAL K/UL (ref 0–0.04)
IMM GRANULOCYTES NFR BLD AUTO: ABNORMAL % (ref 0–0.5)
LACTATE PLASV-SCNC: NOT DETECTED MMOL/L
LYMPHOCYTES NFR BLD: 89 % (ref 18–48)
MAGNESIUM SERPL-MCNC: 2.1 MG/DL (ref 1.6–2.6)
MCH RBC QN AUTO: 31.1 PG (ref 27–31)
MCHC RBC AUTO-ENTMCNC: 31.3 G/DL (ref 32–36)
MCV RBC AUTO: 99 FL (ref 82–98)
MONOCYTES NFR BLD: 2 % (ref 4–15)
NEUTROPHILS NFR BLD: 8 % (ref 38–73)
NEUTS BAND NFR BLD MANUAL: 1 %
NRBC BLD-RTO: 0 /100 WBC
OHS QRS DURATION: 84 MS
OHS QTC CALCULATION: 474 MS
PLATELET # BLD AUTO: 583 K/UL (ref 150–450)
PLESIOMONAS SHIGELLOIDES: NOT DETECTED
PMV BLD AUTO: 9.4 FL (ref 9.2–12.9)
POTASSIUM SERPL-SCNC: 3.3 MMOL/L (ref 3.5–5.1)
PROT SERPL-MCNC: 5.6 G/DL (ref 6–8.4)
RBC # BLD AUTO: 3.18 M/UL (ref 4–5.4)
SAPOVIRUS: NOT DETECTED
SHIGELLA SP+EIEC IPAH STL QL NAA+PROBE: NOT DETECTED
SODIUM SERPL-SCNC: 142 MMOL/L (ref 136–145)
VIBRIO: NOT DETECTED
WBC # BLD AUTO: 56.27 K/UL (ref 3.9–12.7)

## 2024-10-16 PROCEDURE — 25000003 PHARM REV CODE 250: Performed by: STUDENT IN AN ORGANIZED HEALTH CARE EDUCATION/TRAINING PROGRAM

## 2024-10-16 PROCEDURE — 63600175 PHARM REV CODE 636 W HCPCS: Performed by: INTERNAL MEDICINE

## 2024-10-16 PROCEDURE — 87507 IADNA-DNA/RNA PROBE TQ 12-25: CPT | Performed by: STUDENT IN AN ORGANIZED HEALTH CARE EDUCATION/TRAINING PROGRAM

## 2024-10-16 PROCEDURE — 36415 COLL VENOUS BLD VENIPUNCTURE: CPT | Performed by: INTERNAL MEDICINE

## 2024-10-16 PROCEDURE — 99900031 HC PATIENT EDUCATION (STAT)

## 2024-10-16 PROCEDURE — 25000003 PHARM REV CODE 250: Performed by: INTERNAL MEDICINE

## 2024-10-16 PROCEDURE — 85027 COMPLETE CBC AUTOMATED: CPT | Performed by: INTERNAL MEDICINE

## 2024-10-16 PROCEDURE — 87040 BLOOD CULTURE FOR BACTERIA: CPT | Performed by: INTERNAL MEDICINE

## 2024-10-16 PROCEDURE — 80053 COMPREHEN METABOLIC PANEL: CPT | Performed by: INTERNAL MEDICINE

## 2024-10-16 PROCEDURE — 94640 AIRWAY INHALATION TREATMENT: CPT

## 2024-10-16 PROCEDURE — 94761 N-INVAS EAR/PLS OXIMETRY MLT: CPT

## 2024-10-16 PROCEDURE — 25000003 PHARM REV CODE 250: Performed by: NURSE PRACTITIONER

## 2024-10-16 PROCEDURE — 85007 BL SMEAR W/DIFF WBC COUNT: CPT | Performed by: INTERNAL MEDICINE

## 2024-10-16 PROCEDURE — 83735 ASSAY OF MAGNESIUM: CPT | Performed by: INTERNAL MEDICINE

## 2024-10-16 PROCEDURE — 99233 SBSQ HOSP IP/OBS HIGH 50: CPT | Mod: ,,, | Performed by: INTERNAL MEDICINE

## 2024-10-16 RX ORDER — ASPIRIN 81 MG/1
81 TABLET ORAL DAILY
Qty: 30 TABLET | Refills: 0 | Status: ON HOLD | OUTPATIENT
Start: 2024-10-16 | End: 2024-11-15

## 2024-10-16 RX ORDER — PANTOPRAZOLE SODIUM 40 MG/1
40 TABLET, DELAYED RELEASE ORAL DAILY
Status: DISCONTINUED | OUTPATIENT
Start: 2024-10-16 | End: 2024-10-16 | Stop reason: HOSPADM

## 2024-10-16 RX ORDER — HYDROCODONE BITARTRATE AND ACETAMINOPHEN 5; 325 MG/1; MG/1
1 TABLET ORAL EVERY 6 HOURS PRN
Qty: 12 TABLET | Refills: 0 | Status: ON HOLD | OUTPATIENT
Start: 2024-10-16 | End: 2024-10-19

## 2024-10-16 RX ORDER — PANTOPRAZOLE SODIUM 40 MG/1
40 TABLET, DELAYED RELEASE ORAL DAILY
Qty: 14 TABLET | Refills: 0 | Status: ON HOLD | OUTPATIENT
Start: 2024-10-17 | End: 2024-10-31

## 2024-10-16 RX ORDER — LEVOTHYROXINE SODIUM 75 UG/1
75 TABLET ORAL
Qty: 30 TABLET | Refills: 0 | Status: ON HOLD | OUTPATIENT
Start: 2024-10-16 | End: 2024-11-15

## 2024-10-16 RX ADMIN — CHOLESTYRAMINE 4 GRAMS OF ANHYDROUS CHOLESTYRAMINE: 4 POWDER, FOR SUSPENSION ORAL at 08:10

## 2024-10-16 RX ADMIN — MIDODRINE HYDROCHLORIDE 10 MG: 10 TABLET ORAL at 11:10

## 2024-10-16 RX ADMIN — ASPIRIN 81 MG: 81 TABLET, COATED ORAL at 08:10

## 2024-10-16 RX ADMIN — VANCOMYCIN HYDROCHLORIDE 2000 MG: 500 INJECTION, POWDER, LYOPHILIZED, FOR SOLUTION INTRAVENOUS at 01:10

## 2024-10-16 RX ADMIN — THEOPHYLLINE ANHYDROUS 200 MG: 100 CAPSULE, EXTENDED RELEASE ORAL at 08:10

## 2024-10-16 RX ADMIN — POTASSIUM BICARBONATE 35 MEQ: 391 TABLET, EFFERVESCENT ORAL at 08:10

## 2024-10-16 RX ADMIN — MIDODRINE HYDROCHLORIDE 10 MG: 10 TABLET ORAL at 04:10

## 2024-10-16 RX ADMIN — GABAPENTIN 300 MG: 300 CAPSULE ORAL at 08:10

## 2024-10-16 RX ADMIN — TOPIRAMATE 100 MG: 25 TABLET, FILM COATED ORAL at 08:10

## 2024-10-16 RX ADMIN — CHOLESTYRAMINE 4 GRAMS OF ANHYDROUS CHOLESTYRAMINE: 4 POWDER, FOR SUSPENSION ORAL at 02:10

## 2024-10-16 RX ADMIN — FERROUS SULFATE TAB 325 MG (65 MG ELEMENTAL FE) 1 EACH: 325 (65 FE) TAB at 08:10

## 2024-10-16 RX ADMIN — FLUDROCORTISONE ACETATE 100 MCG: 0.1 TABLET ORAL at 08:10

## 2024-10-16 RX ADMIN — LEVOTHYROXINE SODIUM 75 MCG: 0.03 TABLET ORAL at 05:10

## 2024-10-16 RX ADMIN — MIDODRINE HYDROCHLORIDE 10 MG: 10 TABLET ORAL at 05:10

## 2024-10-16 RX ADMIN — CITALOPRAM HYDROBROMIDE 20 MG: 20 TABLET ORAL at 08:10

## 2024-10-16 RX ADMIN — BUPROPION HYDROCHLORIDE 300 MG: 150 TABLET, EXTENDED RELEASE ORAL at 08:10

## 2024-10-16 RX ADMIN — CALCIUM CARBONATE (ANTACID) CHEW TAB 500 MG 1000 MG: 500 CHEW TAB at 07:10

## 2024-10-16 RX ADMIN — ONDANSETRON 4 MG: 2 INJECTION INTRAMUSCULAR; INTRAVENOUS at 07:10

## 2024-10-16 RX ADMIN — HYDROCODONE BITARTRATE AND ACETAMINOPHEN 1 TABLET: 5; 325 TABLET ORAL at 02:10

## 2024-10-16 RX ADMIN — PANTOPRAZOLE SODIUM 40 MG: 40 TABLET, DELAYED RELEASE ORAL at 11:10

## 2024-10-16 NOTE — PT/OT/SLP PROGRESS
Physical Therapy      Patient Name:  Yvette Hutchinson   MRN:  [id number]    Patient not seen today secondary to Other (Comment) (Pt declined due to stomach issues in am; pt prepping for discharge in pm.). Will follow-up next service date if discharge falls through.

## 2024-10-16 NOTE — DISCHARGE SUMMARY
[facility] Medicine  Discharge Summary      Patient Name: Yvette Hutchinson  MRN: [id number]  KURT: [id number]  Patient Class: IP- Inpatient  Admission Date: 10/2/2024  Hospital Length of Stay: 14 days  Discharge Date and Time:  10/16/2024 4:45 PM  Attending Physician: Maury Mckinney DO   Discharging Provider: Maury Mckinney DO  Primary Care Provider: Vern Priest MD    Primary Care Team: Networked reference to record PCT     HPI:   64-year-old female with PMH of CLL, and lung cancer status post partial resection who presented to the ER because of presyncope, nausea, vomiting, diarrhea and abdominal pain.  According to the patient, for over 1 week she has been having intractable nausea, vomiting and diarrhea.  Also epigastric and right upper quadrant abdominal pain described as sharp, 10/10 on pain scale radiate to the rest of her abdomen.  Today when she stood up from a sitting position, she felt as if she will pass out.  As a result, she decided to come to the ER for evaluation.  Denied any fever or chills.  She denied chest pain or shortness on breath.    In the ER, vitals showed a blood pressure of 99/55, but soon dropped to the 70s.  Heart rate was 122, respiratory rate of 20, temperature of 101.6°, and patient was satting 95% on room air.  CBC was white count of 21, with history of CLL.  CMP showed sodium of 135, acute renal failure with creatinine of 1.4 compared to baseline of 1, and patient has elevated LFTs with alk-phos of 216, , and AST of 497.  COVID/flu are negative.  Blood cultures were collected.  CTA chest negative for pulmonary embolus, but showed mild diffuse bronchial wall thickening that could reflect infectious or inflammatory bronchitis in the appropriate clinical setting.  CT abdomen/pelvis showed pericholecystic edema without evidence of gallstones.  Cholecystitis cannot be excluded. Also mild Linda pancreatic edema which may represent early  pancreatitis. US abdomen showed Cholelithiasis, gallbladder wall thickening, and mild pericholecystic fluid. Patient was started on Zosyn/vanco.  Was given sepsis bolus of lactated ringer of 2286 cc.  Started on Levophed for blood pressure support.  General surgery was consulted.  Patient will be admitted to ICU for septic shock.       Procedure(s) (LRB):  ERCP (ENDOSCOPIC RETROGRADE CHOLANGIOPANCREATOGRAPHY) (N/A)      Hospital Course:   This 64-year-old lady with history of CLL, lung cancer status post partial resection presented to the emergency department due to presyncope, nausea vomiting and abdominal pain.  She was also noted to be hypotensive and tachycardic on admission.  She was also noted to have fever.  Patient was admitted to the hospital for septic shock concern for abdominal source given CT findings showing some pericholecystic fluid.  Patient was also noted to have transaminitis and imaging did reveal some peripancreatic fluid and inflammatory changes as well though her lipase was normal.  General surgery was consulted after ultrasound the gallbladder was performed which again redemonstrated pericholecystic fluid.  She is status post laparoscopic cholecystectomy on 10/03.  Gallbladder was not noted to be gangrenous and per the op note there was suspicion that this was not the source of her septic shock.  However, blood cultures grew only coagulation negative Staphylococcus in 1/2 bottles.  This was thought to be contaminant.  Infectious Disease was consulted and vancomycin and Zosyn was continued on this patient.  Levophed initiated on admission was able to be weaned, although she still had requirement the day after surgery.  Started on midodrine and Florinef by Cardiology, Levophed discontinued.  Had elevated troponins on admission, underwent left heart catheterization showed no obstructive disease.  Repeat blood cultures NTD.  MRCP suggest intrahepatic duct blocked, GI consulted.  Patient underwent  "ERCP which did not show any evidence of stricture or abnormality suggested by MRCP.  Hematology consulted given up trending WBC in the history of CLL.  CLL recommend follow up outpatient, WBC likely secondary to infection, may take some time to improve. ID recommended vanc till 10/25/24. CM to assist with HHC and IV abx. Pt declined SNF. Transfer out of ICU.  Patient remained hemodynamically stable while on the medicine floor.  She continued her IV antibiotics.  PICC line was placed, which had continued bleeding, heparin was stopped, bleeding resolved.  She had some swelling in her arm, and ultrasound left upper extremity showed no DVT.  She was given a PPI for some slight heartburn.  She has chronic diarrhea, but began to have diarrhea that she described as different from her usual.  A GI PCR workup was negative.  Patient okay for discharge home.  She will continue IV vancomycin until 10/25.  She will follow up outpatient with PCP, id, Heme-Onc, and GI.     Goals of Care Treatment Preferences:  Code Status: Full Code      SDOH Screening:  The patient was screened for utility difficulties, food insecurity, transport difficulties, housing insecurity, and interpersonal safety and there were no concerns identified this admission.     Consults:   Consults (From admission, onward)          Status Ordering Provider     Inpatient consult to PICC Line Nurse  Once        Provider:  (Not yet assigned)    Completed CARLTON GREEN     Inpatient consult to   Once        Provider:  (Not yet assigned)    Completed CARLTON GREEN     Inpatient consult to Social Work/Case Management  Once        Provider:  (Not yet assigned)    Completed MARY GMAEZ     Pharmacy to dose Vancomycin consult  Once        Provider:  (Not yet assigned)   Placed in "And" Linked Group    Acknowledged MARY GMAEZ     Inpatient consult to Hematology Oncology  Once        Provider:  Chandan Wahl MD    Completed WILIAN JOHANSEN     " Inpatient consult to Gastroenterology  Once        Provider:  (Not yet assigned)    Completed JORI MONIQUE     Inpatient consult to Cardiology  Once        Provider:  Maite Simons MD    Completed SAE CLAROS     Inpatient consult to Infectious Diseases  Once        Provider:  Lemuel Man MD    Completed SAE CLAROS     Inpatient consult to General surgery  Once        Provider:  Ang Mosley III, MD    Completed LEVAR GARCIA new Assessment & Plan notes have been filed under this hospital service since the last note was generated.  Service: Hospital Medicine    Final Active Diagnoses:    Diagnosis Date Noted POA    PRINCIPAL PROBLEM:  Septic shock [A41.9, R65.21] 10/02/2024 Yes    Functional diarrhea [K59.1] 10/08/2024 Yes    Bacteremia due to Staphylococcus [R78.81, B95.8] 10/03/2024 Yes    Calculus of gallbladder with acute cholecystitis without obstruction [K80.00] 10/02/2024 Yes    Neuropathic pain [M79.2] 10/02/2024 Yes    Acute renal failure [N17.9] 10/02/2024 Yes    Acquired hypothyroidism [E03.9] 08/07/2024 Yes    CLL (chronic lymphocytic leukemia) [C91.10] 05/04/2020 Yes    Anxiety [F41.9] 03/02/2020 Yes      Problems Resolved During this Admission:       Discharged Condition: good    Disposition: Home-Health Care [facility]    Follow Up:   Follow-up Information       Vern Priest MD. Go on 10/29/2024.    Specialty: Family Medicine  Why: Please go to hospital follow up appointment at 0830AM. clinic to contact pt if earlier appointment becomes available.  Contact information:  [address] [place] [id number] [phone]               Lemuel Man MD. Go on 10/31/2024.    Specialty: Infectious Diseases  Why: please go to hospital follow up appointment at 2PM  Contact information:  [address] [place] [address] [phone]               Rhiannon Lee MD Follow up.    Specialties: Hematology and Oncology, Hematology, Oncology  Why:  case management unable to make appointment. in basket message sent to provider group. clinic to contact pt with follow up appointment date and time.  Contact information:  [address]  [facility] [address]  [phone]               Joshua Polanco III, MD. Go on 10/15/2024.    Specialty: Gastroenterology  Contact information:  [address] [facility] [address]  [phone]                           Patient Instructions:      Ambulatory referral/consult to Home Health   Standing Status: Future   Referral Priority: Routine Referral Type: Home Health   Referral Reason: Specialty Services Required   Requested Specialty: Home Health Services   Number of Visits Requested: 1     Notify your health care provider if you experience any of the following:  increased confusion or weakness     Notify your health care provider if you experience any of the following:  persistent dizziness, light-headedness, or visual disturbances     Notify your health care provider if you experience any of the following:  worsening rash     Notify your health care provider if you experience any of the following:  severe persistent headache     Notify your health care provider if you experience any of the following:  difficulty breathing or increased cough     Notify your health care provider if you experience any of the following:  redness, tenderness, or signs of infection (pain, swelling, redness, odor or green/yellow discharge around incision site)     Notify your health care provider if you experience any of the following:  severe uncontrolled pain     Notify your health care provider if you experience any of the following:  persistent nausea and vomiting or diarrhea     Notify your health care provider if you experience any of the following:  temperature >100.4     SUBSEQUENT HOME HEALTH ORDERS   Order Comments: Weekly Vanc trough starting 10/18/2024     Order Specific Question Answer Comments   What Home Health  Agency is the patient currently using? [facility]      Activity as tolerated       Significant Diagnostic Studies: Labs: CMP   Recent Labs   Lab 10/15/24  0553 10/16/24  0336    142   K 3.5 3.3*   * 111*   CO2 23 22*   GLU 76 76   BUN 6* 6*   CREATININE 1.1 1.1   CALCIUM 8.1* 8.7   PROT 5.2* 5.6*   ALBUMIN 3.2* 3.6   BILITOT 0.4 0.5   ALKPHOS 216* 226*   AST 63* 71*   ALT 89* 85*   ANIONGAP 7* 9    and CBC   Recent Labs   Lab 10/15/24  0553 10/16/24  0336   WBC 52.71* 56.27*   HGB 9.4* 9.9*   HCT 31.1* 31.6*   * 583*       Pending Diagnostic Studies:       Procedure Component Value Units Date/Time    EKG 12-lead [[id number]] Collected: 10/16/24 1024    Order Status: Sent Lab Status: In process Updated: 10/16/24 1033     QRS Duration 84 ms      [facility] QTC Calculation 474 ms     Narrative:      Test Reason : R07.89,    Vent. Rate : 060 BPM     Atrial Rate : 060 BPM     P-R Int : 116 ms          QRS Dur : 084 ms      QT Int : 474 ms       P-R-T Axes : 069 074 115 degrees     QTc Int : 474 ms    Normal sinus rhythm  Nonspecific T wave abnormality  Prolonged QT  Abnormal ECG  When compared with ECG of 09-OCT-2024 08:56,  Nonspecific T wave abnormality no longer evident in Inferior leads  Nonspecific T wave abnormality, improved in Lateral leads    Referred By: [email or web address]   SELF           Confirmed By:              Imaging Results              US Abdomen Limited (Final result)  Result time 10/02/24 15:34:11      Final result by Vinay Daniels MD (10/02/24 15:34:11)                   Impression:      1. Cholelithiasis, gallbladder wall thickening, and mild pericholecystic fluid are nonspecific.  Clinical and laboratory correlation is needed to assess for any additional evidence of acute cholecystitis.  2. No other sonographic abnormalities throughout the right upper quadrant.      Electronically signed by: Vinay Daniels  Date:    10/02/2024  Time:    15:34               Narrative:    EXAMINATION:  US  ABDOMEN LIMITED    CLINICAL HISTORY:  ruq pain;    COMPARISON:  CT 10/02/2024    FINDINGS:  Liver maintains normal echogenicity without focal mass or intrahepatic bile duct dilation. Hepatopedal flow present in main portal vein.    Cholelithiasis sludge dependently in gallbladder.  Gallbladder wall thickening and mild pericholecystic fluid evident.  Common duct diameter of 5 mm is normal.  Sonographic Hernandez sign is negative.    Visualized pancreas is unremarkable with bowel gas obscuring portions of pancreas. Right kidney is free of hydronephrosis. Visualized abdominal aorta is nonaneurysmal. Juxtahepatic IVC is unremarkable.                                       CTA Chest Non-Coronary (PE Studies) (Final result)  Result time 10/02/24 14:22:56      Final result by Vinay Daniels MD (10/02/24 14:22:56)                   Impression:      1. Negative for pulmonary embolus.  2. Mild diffuse bronchial wall thickening could reflect infectious or inflammatory bronchitis in the appropriate clinical setting.  Additional lower lung zone left greater than right interlobular septal thickening suggest coexisting mild interstitial edema.  3. Increased size of precarinal and right hilar lymph nodes since 09/06/2022.  Differential diagnosis of enlarged lymph nodes has been previously discussed in remains unchanged.  4. Suboptimally evaluated due to beam hardening artifact, soft tissue density with calcification in pretracheal region just superior to the sternal notch as discussed above.  This is suspicious for a exophytic thyroid nodule/parenchyma and has not significantly changed since 2020.      Electronically signed by: Vinay Daniels  Date:    10/02/2024  Time:    14:22               Narrative:    EXAMINATION:  CTA CHEST NON CORONARY (PE STUDIES)    CLINICAL HISTORY:  Pulmonary embolism (PE) suspected, high prob;    TECHNIQUE:  CT angiography of thorax with 100 mL Omnipaque 350. Maximum intensity projection coronal reformations  were created at a separate workstation and stored in the patient's permanent medical record.    COMPARISON:  Multiple prior exams dating back to 03/11/2020    FINDINGS:  CTA THORAX:    Negative for pulmonary embolus.  Thoracic aorta is of normal caliber and without evidence of dissection.  Right IJ port catheter tip terminates in SVC.    Trachea and main bronchi are patent.  Postsurgical change of left upper lobectomy is evident.  Calcified granuloma remains in the left lower lobe.    6 mm medial right upper lobe nodularity (series 2, image 119) is unchanged since 11/20/2020.  No new pulmonary nodules or masses.    Mild bronchial wall thickening is diffuse.  In lung bases, mild, left greater than right, interlobular septal thickening is new.    10 mm right hilar lymph node (series 2, image 148) previously measured 7 mm.  13 mm precarinal lymph node (image 131) previously measured 8 mm 09/06/2022.  Calcifications of small subcarinal lymph node unchanged with calcifications in left hilum unchanged.  No pleural or pericardial effusion.  Although beam hardening artifact related to venous contrast near the thoracic inlet significantly limits evaluation, calcification in pretracheal region just superior to the sternal notch and associated soft tissue density has not significantly since 02/19/2020 for and dating back to 11/28/2020, suspicious for exophytic thyroid nodule/parenchyma.    Partially visualized upper abdomen is unremarkable.    Cervical spine surgical hardware incidentally noted.  No acute or suspicious osseous abnormality.                                       CT Abdomen Pelvis With IV Contrast NO Oral Contrast (Final result)  Result time 10/02/24 14:17:22      Final result by Olesya Painter MD (10/02/24 14:17:22)                   Impression:      Pericholecystic edema without evidence of gallstones.  Cholecystitis cannot be excluded and correlation with labs and if necessary ultrasound is  recommended    Mild Linda pancreatic edema which may represent early pancreatitis    Minimal ascites    Stable mildly prominent lymph nodes in the upper abdomen which may be reactive    Stable scarring within the left lung base    Diffuse vascular calcification of the aorta    Colonic diverticulosis      Electronically signed by: Olesya Painter  Date:    10/02/2024  Time:    14:17               Narrative:      CMS MANDATED QUALITY DATA - CT RADIATION - 436    All CT scans at this facility utilize dose modulation, iterative reconstruction, and/or weight based dosing when appropriate to reduce radiation dose to as low as reasonably achievable.    CLINICAL HISTORY:  ([id number])65 y/o  (1960) F    Epigastric pain;    TECHNIQUE:  (A#[id number], exam time 10/2/2024 14:02)    CT ABDOMEN PELVIS WITH IV CONTRAST [id number]    Axial CT images of the abdomen and pelvis were obtained from the dome of the diaphragm to the proximal thighs.    100cc omnipague 350 IV contrast was given    COMPARISON:  PET-CT dated 05/27/2024    FINDINGS:  Lower Thorax:    Stable scarring within the left lung base.  The right lung base is clear.    CT Abdomen:    Liver: The liver is normal in size and attenuation.  There is periportal edema.  There is no focal hepatic mass.    Gallbladder: Pericholecystic edema which is nonspecific.  Cholecystitis cannot be excluded and correlation with labs and ultrasound is recommended.    Biliary Tree: No intra or extrahepatic ductal dilation.    Spleen: Normal.    Pancreas: Mild peripancreatic edema suggestive of pancreatitis.  There is normal enhancement of the pancreas.  There is no pancreatic ductal dilatation.    Adrenal Glands: Normal    Kidneys: The kidneys are normal in imaging appearance without hydronephrosis or hydroureter.    Vasculature: The aorta is normal in caliber with diffuse vascular calcification aorta and iliac arteries.    Lymph nodes: Stable mildly prominent lymph nodes in the upper  abdomen, portacaval region and peripancreatic region.  The portacaval node measures 14 x 26 mm.  There is a 14 mm aortocaval node on image 97.  There is no new mesenteric or retroperitoneal adenopathy.    Intraperitoneal structures: Minimal ascites within the pelvis and around the liver.    Bowel: The stomach is normal.  No small bowel is unremarkable.  There is colonic diverticulosis without diverticulitis.    Abdominal wall: The abdominal wall and musculature are normal.    Musculoskeletal: There is degenerative disc disease and facet arthropathy in the lumbar spine with no aggressive appearing lytic or blastic lesions    CT Pelvis:    Bladder: Normal.    Reproductive Organs: The uterus and ovaries are unremarkable.    Pelvic Lymph nodes: No pelvic lymphadenopathy or pelvic mass is identified.                                       X-Ray Chest 1 View (Final result)  Result time 10/02/24 11:24:56      Final result by Olesya Painter MD (10/02/24 11:24:56)                   Impression:      No evidence of active cardiopulmonary disease.      Electronically signed by: Olesya Painter  Date:    10/02/2024  Time:    11:24               Narrative:    EXAMINATION:  XR CHEST 1 VIEW    CLINICAL HISTORY:  Sepsis;    FINDINGS:  Portable chest x-ray at 10:52 is compared to prior study dated 07/18/2024    The cardiomediastinal silhouette is normal in size.  The right IJ Port-A-Cath is in stable position.    Lungs are clear.  There are no acute osseous abnormality.  There has been prior cervical fusion.                                            Medications:  Reconciled Home Medications:      Medication List        START taking these medications      aspirin 81 MG EC tablet  Commonly known as: ECOTRIN  Take 1 tablet (81 mg total) by mouth once daily.     HYDROcodone-acetaminophen 5-325 mg per tablet  Commonly known as: NORCO  Take 1 tablet by mouth every 6 (six) hours as needed for Pain.     pantoprazole 40 MG  tablet  Commonly known as: PROTONIX  Take 1 tablet (40 mg total) by mouth once daily. for 14 days  Start taking on: October 17, 2024            CHANGE how you take these medications      levothyroxine 75 MCG tablet  Commonly known as: SYNTHROID  Take 1 tablet (75 mcg total) by mouth before breakfast.  What changed:   medication strength  how much to take            CONTINUE taking these medications      acetaminophen 500 MG tablet  Commonly known as: TYLENOL  Take 2 tablets (1,000 mg total) by mouth every 6 (six) hours as needed for Pain.     albuterol 90 mcg/actuation inhaler  Commonly known as: PROVENTIL/VENTOLIN HFA  Inhale 2 puffs into the lungs every 6 (six) hours as needed for Wheezing or Shortness of Breath. Rescue     buPROPion 300 MG 24 hr tablet  Commonly known as: WELLBUTRIN XL  Take 1 tablet (300 mg total) by mouth once daily.     citalopram 20 MG tablet  Commonly known as: CeleXA  Take 1 tablet (20 mg total) by mouth once daily.     fluticasone propionate 50 mcg/actuation nasal spray  Commonly known as: FLONASE  1 spray (50 mcg total) by Each Nostril route once daily.     fluticasone-salmeterol 230-21 mcg/dose 230-21 mcg/actuation Hfaa inhaler  Commonly known as: ADVAIR HFA  Inhale 2 puffs into the lungs 2 (two) times daily. Controller     gabapentin 300 MG capsule  Commonly known as: NEURONTIN  Take 1 capsule (300 mg total) by mouth 3 (three) times daily.     levocetirizine 5 MG tablet  Commonly known as: XYZAL  Take 1 tablet (5 mg total) by mouth every evening.     naloxone 4 mg/actuation Spry  Commonly known as: NARCAN  ADMINISTER A SINGLE SPRAY IN ONE NOSTRIL UPON SIGNS OF OPIOID OVERDOSE. CALL 911. REPEAT AFTER 3 MINUTES IF NO RESPONSE.     pravastatin 10 MG tablet  Commonly known as: PRAVACHOL  Take 1 tablet (10 mg total) by mouth once daily.     topiramate 100 MG tablet  Commonly known as: TOPAMAX  Take 1 tablet (100 mg total) by mouth 2 (two) times daily.            STOP taking these medications       varenicline 1 mg Tab  Commonly known as: CHANTIX              Indwelling Lines/Drains at time of discharge:   Lines/Drains/Airways       Peripherally Inserted Central Catheter Line  Duration             PICC Double Lumen 10/14/24 1235 left basilic 2 days              Central Venous Catheter Line  Duration             Port A Cath Single Lumen 11/03/22 0757 right internal jugular 713 days                    Time spent on the discharge of patient: 35 minutes         Maury Mckinney DO  Department of Hospital Medicine  [facility]

## 2024-10-16 NOTE — CARE UPDATE
10/16/24 0831   Patient Assessment/Suction   Level of Consciousness (AVPU) alert   Respiratory Effort Normal;Unlabored   Expansion/Accessory Muscles/Retractions no retractions;no use of accessory muscles   All Lung Fields Breath Sounds Anterior:   FARZANEH Breath Sounds clear   RUL Breath Sounds clear   Rhythm/Pattern, Respiratory unlabored   Cough Frequency no cough   PRE-TX-O2   Device (Oxygen Therapy) room air   SpO2 98 %   Pulse Oximetry Type Intermittent   $ Pulse Oximetry - Multiple Charge Pulse Oximetry - Multiple   Pulse (!) 58   Resp 17   Aerosol Therapy   $ Aerosol Therapy Charges Aerosol Treatment  (Patient refused due to  feei nauseous)   Education   $ Education 15 min;Oxygen

## 2024-10-16 NOTE — CARE UPDATE
10/15/24 1932   Patient Assessment/Suction   Level of Consciousness (AVPU) alert   Respiratory Effort Normal;Unlabored   Expansion/Accessory Muscles/Retractions no retractions;no use of accessory muscles   FARZANEH Breath Sounds clear   LLL Breath Sounds diminished   RUL Breath Sounds clear   RML Breath Sounds diminished   RLL Breath Sounds diminished   Rhythm/Pattern, Respiratory unlabored;pattern regular;no shortness of breath reported   Cough Frequency no cough   PRE-TX-O2   Device (Oxygen Therapy) room air   SpO2 96 %   Pulse Oximetry Type Intermittent   $ Pulse Oximetry - Multiple Charge Pulse Oximetry - Multiple   Aerosol Therapy   $ Aerosol Therapy Charges Aerosol Treatment  (pulmicort given)   Daily Review of Necessity (SVN) completed   Respiratory Treatment Status (SVN) given   Treatment Route (SVN) mask;oxygen   Post Treatment Assessment (SVN) increased aeration   Signs of Intolerance (SVN) none   Education   $ Education Bronchodilator;15 min

## 2024-10-16 NOTE — PROGRESS NOTES
Infectious Disease  Consult Note//  progress note    Patient Name: Yvette Hutchinson   MRN: [id number]   Admission Date: 10/2/2024   Hospital Length of Stay: 14 days  Attending Physician: Maury Mckinney DO   Primary Care Provider: Vern Priest MD     Isolation Status: No active isolations       Impression     10/04/2024:  Improving.  WBC down to 35.  Afebrile.  Blood cultures have grown MSSE in 1/2 bottles.  No gallbladder fluid or tissue culture done.  Creatinine down to 1.2.  Levophed dose down to 0.35 microgram/kilogram per minute.     10/05/2024:  Continues to improve.  Leukocytosis resolving.  No acute issues overnight.  Seen by cardiologist yesterday for elevated troponin and notes reviewed.     10/06/2024:  Seen and evaluated at bedside.  Continues to improve.  WBC 24 K.  Repeat blood culture 10/05/2024 so far negative.     10/07/2024.  No acute issues overnight.  Continues to improve.  Tolerating oral feeds.  Remains on very low-dose Levophed at 0.06 microgram/kilogram per minute.  All cultures remain negative.     10/08/2024:  Midodrine added yesterday by cardiologist due to inability to wean off very low-dose Levophed.  Cortisol level was 8.3.  Repeat blood culture remain negative.  Remains on Levophed but down to 0.03 microgram/kilogram per minute.  Still with loose bowel motions.     10/09/2024: No acute issues overnight.  Still requiring very low-dose Levophed.  On 0.02 microgram/kilogram per minute.  Had cardiac catheterization today with normal coronary vessels and LVEDP of around 20..     10/10/2024:  WBC crept back up and is now 34 K. afebrile.  Was weaned off Levophed yesterday but is back on on a very low-dose of 0.09mcg/kg/min.      10/11/2024: WBC continues to increase and is up to 45 K. procalcitonin 0.62.  Remains on very low-dose Levophed 0.04mcg/kg/min.     10/12/2024:  Off Levophed.  Doing okay.  WBC down to 26 K    October 16, 2024-  the plan was to send this patient home on IV  vancomycin for the coagulase-negative Staph bacteremia but her PICC line site is bleeding and the white cell count is creeping up.      Today's WBCs 56   No fevers   She complains of feeling cold but no rigors or night sweats   The PICC line site is not swollen or erythematous suggesting of a DVT   She is having diarrhea   Abdomen is soft       Recommendations    Her Zosyn was discontinued on October 13   White cell count is slowly creeping up   Physical examination is benign   We will run a stool GI PCR   If the GI PCR is  negative, patient is  stable to go home but primary team should address the PICC line issues, she is actively bleeding.      Patient is on couple of antidepressants, if she can come off  the antidepressants, we can switch her to Zyvox orally     With a history of CLL, this white cell count could be from the leukemia itself.    She has lymphocytic predominance on the differential     I will also order set of blood cultures today.    I will not expand antibiotics.      Portions of this note dictated using EMR integrated voice recognition software, and may be subject to voice recognition errors not corrected at proofreading. Please contact writer for clarification if needed.    Subjective:     Principal Problem: Septic shock         Past Medical History:   Diagnosis Date    Arthritis     Cancer 10/2022    (CLL) leukemia ; adenocarcinoma  adenosgemis    Depression     GERD (gastroesophageal reflux disease)     Neck pain     and back pain    Personal history of colonic polyps 07/07/2017    Pneumonia of left lung due to infectious organism 03/05/2020    Thyroid disease         Past Surgical History:   Procedure Laterality Date    ERCP N/A 10/10/2024    Procedure: ERCP (ENDOSCOPIC RETROGRADE CHOLANGIOPANCREATOGRAPHY);  Surgeon: Joshua Polanco III, MD;  Location: [facility];  Service: Endoscopy;  Laterality: N/A;    FUSION, SPINE, CERVICAL, ANTERIOR APPROACH N/A 08/06/2024    Procedure: FUSION,  SPINE, CERVICAL, ANTERIOR APPROACH;  Surgeon: Anatoly Daley DO;  Location: [facility] OR;  Service: Neurosurgery;  Laterality: N/A;  IOM, C-ARM, MEDTRONICS, MICRO, HORSESHOE    INJECTION OF ANESTHETIC AGENT AROUND MULTIPLE INTERCOSTAL NERVES Left 10/03/2022    Procedure: BLOCK, NERVE, INTERCOSTAL, 2 OR MORE;  Surgeon: Evelio Kaplan MD;  Location: [facility] OR [facility] FLR;  Service: Thoracic;  Laterality: Left;    INSERTION OF TUNNELED CENTRAL VENOUS CATHETER (CVC) WITH SUBCUTANEOUS PORT Right 11/03/2022    Procedure: [id number], Right or Left Neck or Chest;  Surgeon: Mini Acevedo MD;  Location: [facility] OR [facility] FLR;  Service: General;  Laterality: Right;    LAPAROSCOPIC CHOLECYSTECTOMY N/A 10/3/2024    Procedure: CHOLECYSTECTOMY, LAPAROSCOPIC;  Surgeon: Ang Mosley III, MD;  Location: [facility] OR;  Service: General;  Laterality: N/A;    LEFT HEART CATHETERIZATION Left 10/9/2024    Procedure: Left heart cath;  Surgeon: Martin Ingram MD;  Location: [facility] CATH/EP LAB;  Service: Cardiology;  Laterality: Left;    ROBOT-ASSISTED LAPAROSCOPIC LYMPHADENECTOMY USING DA MONIQUE XI Left 10/03/2022    Procedure: XI ROBOTIC LYMPHADENECTOMY;  Surgeon: Evelio Kaplan MD;  Location: [facility] OR [facility] FLR;  Service: Thoracic;  Laterality: Left;    SPINE SURGERY      TONSILLECTOMY      XI ROBOTIC RATS,WITH LOBECTOMY,LUNG Left 10/03/2022    Procedure: XI ROBOTIC RATS,WITH LOBECTOMY,LUNG;  Surgeon: Evelio Kaplan MD;  Location: [facility] OR 2ND FLR;  Service: Thoracic;  Laterality: Left;       Review of patient's allergies indicates:   Allergen Reactions    Latex, natural rubber         Family History       Problem Relation (Age of Onset)    Breast cancer Cousin    Ovarian cancer Sister               Social History     Socioeconomic History    Marital status: Legally    Tobacco Use    Smoking status: Former     Types: Cigarettes     Passive exposure: Current    Smokeless tobacco: Never    Tobacco comments:      PT states she has quit now for a few months, plans to stay quit.   Substance and Sexual Activity    Alcohol use: Yes     Comment: rarely    Drug use: Yes     Types: Marijuana     Social Drivers of Health     Financial Resource Strain: Low Risk  (10/3/2024)    Overall Financial Resource Strain (CARDIA)     Difficulty of Paying Living Expenses: Not hard at all   Food Insecurity: No Food Insecurity (10/3/2024)    Hunger Vital Sign     Worried About Running Out of Food in the Last Year: Never true     Ran Out of Food in the Last Year: Never true   Transportation Needs: No Transportation Needs (10/3/2024)    TRANSPORTATION NEEDS     Transportation : No   Physical Activity: Inactive (10/3/2024)    Exercise Vital Sign     Days of Exercise per Week: 0 days     Minutes of Exercise per Session: 0 min   Stress: No Stress Concern Present (10/3/2024)    Citizen of [place] [employer] - Occupational Stress Questionnaire     Feeling of Stress : Not at all   Housing Stability: Low Risk  (10/3/2024)    Housing Stability Vital Sign     Unable to Pay for Housing in the Last Year: No     Homeless in the Last Year: No        Lines/Drains/Airways       Peripherally Inserted Central Catheter Line  Duration             PICC Double Lumen 10/14/24 1235 left basilic 1 day              Central Venous Catheter Line  Duration             Port A Cath Single Lumen 11/03/22 0757 right internal jugular 713 days                     Medication:  Medications Prior to Admission   Medication Sig    acetaminophen (TYLENOL) 500 MG tablet Take 2 tablets (1,000 mg total) by mouth every 6 (six) hours as needed for Pain.    albuterol (PROVENTIL/VENTOLIN HFA) 90 mcg/actuation inhaler Inhale 2 puffs into the lungs every 6 (six) hours as needed for Wheezing or Shortness of Breath. Rescue    buPROPion (WELLBUTRIN XL) 300 MG 24 hr tablet Take 1 tablet (300 mg total) by mouth once daily.    citalopram (CELEXA) 20 MG tablet Take 1 tablet (20 mg total) by  "mouth once daily.    fluticasone propionate (FLONASE) 50 mcg/actuation nasal spray 1 spray (50 mcg total) by Each Nostril route once daily.    fluticasone-salmeterol 230-21 mcg/dose (ADVAIR HFA) 230-21 mcg/actuation HFAA inhaler Inhale 2 puffs into the lungs 2 (two) times daily. Controller    gabapentin (NEURONTIN) 300 MG capsule Take 1 capsule (300 mg total) by mouth 3 (three) times daily.    levocetirizine (XYZAL) 5 MG tablet Take 1 tablet (5 mg total) by mouth every evening.    levothyroxine (SYNTHROID) 50 MCG tablet Take 1 tablet (50 mcg total) by mouth before breakfast.    pravastatin (PRAVACHOL) 10 MG tablet Take 1 tablet (10 mg total) by mouth once daily.    topiramate (TOPAMAX) 100 MG tablet Take 1 tablet (100 mg total) by mouth 2 (two) times daily.    naloxone (NARCAN) 4 mg/actuation Spry ADMINISTER A SINGLE SPRAY IN ONE NOSTRIL UPON SIGNS OF OPIOID OVERDOSE. CALL 911. REPEAT AFTER 3 MINUTES IF NO RESPONSE.    varenicline (CHANTIX) 1 mg Tab Take 1 tablet by mouth twice daily (Patient not taking: Reported on 10/2/2024)          Antimicrobials:  Antibiotics (From admission, onward)      Start     Stop Route Frequency Ordered    10/11/24 1230  vancomycin (VANCOCIN) 2,000 mg in D5W 500 mL IVPB         -- IV Every 24 hours (non-standard times) 10/11/24 1144    10/11/24 1154  vancomycin - pharmacy to dose  (vancomycin IVPB (PEDS and ADULTS))        Placed in "And" Linked Group    -- IV pharmacy to manage frequency 10/11/24 1055    10/04/24 0900  mupirocin 2 % ointment 1 g         10/09/24 0859 Nasl 2 times daily 10/04/24 0633    10/03/24 2200  piperacillin-tazobactam 4.5 g in dextrose 5 % 100 mL IVPB (ready to mix)         10/13/24 0600 IV Every 8 hours (non-standard times) 10/03/24 1506             Antifungals (From admission, onward)      Start     Stop Route Frequency Ordered    10/08/24 2100  miconazole 2 % cream         -- Top 2 times daily 10/08/24 1734            Antivirals (From admission, onward)      " None               Review of Systems   ROS      Objective:     Vital Signs (Most Recent):  Temp: 98.5 °F (36.9 °C) (10/16/24 0701)  Pulse: (!) 58 (10/16/24 0831)  Resp: 17 (10/16/24 0831)  BP: 122/66 (10/16/24 0701)  SpO2: 98 % (10/16/24 0831)  Vital Signs (24h Range):  Temp:  [97.6 °F (36.4 °C)-99.2 °F (37.3 °C)] 98.5 °F (36.9 °C)  Pulse:  [56-67] 58  Resp:  [0-18] 17  SpO2:  [95 %-99 %] 98 %  BP: ()/(59-67) 122/66      Weight:   Wt Readings from Last 1 Encounters:   10/10/24 75.6 kg (166 lb 10.7 oz)      Body mass index is Body mass index is 29.53 kg/m².     Estimated Creatinine Clearance: Estimated Creatinine Clearance: 49.7 mL/min (based on SCr of 1.1 mg/dL).     Physical Exam  Physical Exam    She is afebrile   She is on room air   She is not in distress   Lungs are clear to auscultation   S1-S2 heard   Abdomen soft   PICC line is actively bleeding    Significant Labs: Blood Culture:   Recent Labs   Lab 10/02/24  1049 10/02/24  1057 10/05/24  1037 10/11/24  1122 10/11/24  1123   LABBLOO Gram stain aer bottle: Gram positive cocci  Results called to and read back by:Val Nino RN-ED;  10/03/2024  11:58 CJD  COAGULASE NEGATIVE STAPHYLOCOCCI  Organism is a probable contaminant  * No growth after 5 days. No growth after 5 days.  No growth after 5 days. No Growth to date  No Growth to date  No Growth to date  No Growth to date  No Growth to date No Growth to date  No Growth to date  No Growth to date  No Growth to date  No Growth to date         Microbiology Results (last 7 days)       Procedure Component Value Units Date/Time    Blood culture [[id number]] Collected: 10/11/24 1122    Order Status: Completed Specimen: Blood Updated: 10/15/24 1232     Blood Culture, Routine No Growth to date      No Growth to date      No Growth to date      No Growth to date      No Growth to date    Blood culture [[id number]] Collected: 10/11/24 1123    Order Status: Completed Specimen: Blood Updated: 10/15/24  1232     Blood Culture, Routine No Growth to date      No Growth to date      No Growth to date      No Growth to date      No Growth to date    Blood culture [[id number]] Collected: 10/05/24 1037    Order Status: Completed Specimen: Blood Updated: 10/10/24 1232     Blood Culture, Routine No growth after 5 days.    Blood culture [[id number]] Collected: 10/05/24 1037    Order Status: Completed Specimen: Blood Updated: 10/10/24 1232     Blood Culture, Routine No growth after 5 days.             Significant Imaging: I have reviewed all pertinent imaging results/findings within the past 24 hours.      DYLAN MERIDA MD  Infectious Disease

## 2024-10-16 NOTE — PLAN OF CARE
MAU contacted [facility] and informed that D/C orders were sent in [facility]. Awaiting confirmation of receipt.

## 2024-10-18 ENCOUNTER — LAB VISIT (OUTPATIENT)
Dept: LAB | Facility: HOSPITAL | Age: 64
End: 2024-10-18
Attending: INTERNAL MEDICINE
Payer: COMMERCIAL

## 2024-10-18 DIAGNOSIS — A41.9 SEPTICEMIA DURING LABOR: Primary | ICD-10-CM

## 2024-10-18 LAB — VANCOMYCIN TROUGH SERPL-MCNC: 29.7 UG/ML (ref 10–22)

## 2024-10-18 PROCEDURE — 36415 COLL VENOUS BLD VENIPUNCTURE: CPT | Performed by: INTERNAL MEDICINE

## 2024-10-18 PROCEDURE — 80202 ASSAY OF VANCOMYCIN: CPT | Performed by: INTERNAL MEDICINE

## 2024-10-19 ENCOUNTER — LAB VISIT (OUTPATIENT)
Dept: LAB | Facility: HOSPITAL | Age: 64
End: 2024-10-19
Attending: INTERNAL MEDICINE
Payer: COMMERCIAL

## 2024-10-19 ENCOUNTER — HOSPITAL ENCOUNTER (OUTPATIENT)
Facility: HOSPITAL | Age: 64
Discharge: HOME HEALTH | End: 2024-10-20
Attending: EMERGENCY MEDICINE | Admitting: INTERNAL MEDICINE
Payer: COMMERCIAL

## 2024-10-19 DIAGNOSIS — R78.81 BACTEREMIA: ICD-10-CM

## 2024-10-19 DIAGNOSIS — E87.6 HYPOKALEMIA: Primary | ICD-10-CM

## 2024-10-19 DIAGNOSIS — A41.9 SEPTICEMIA DURING LABOR: Primary | ICD-10-CM

## 2024-10-19 DIAGNOSIS — R07.9 CHEST PAIN: ICD-10-CM

## 2024-10-19 PROBLEM — Z86.19 HISTORY OF SEPSIS: Status: ACTIVE | Noted: 2024-10-19

## 2024-10-19 PROBLEM — Z90.49 HISTORY OF CHOLECYSTECTOMY: Status: ACTIVE | Noted: 2024-10-19

## 2024-10-19 LAB
ALBUMIN SERPL BCP-MCNC: 3.8 G/DL (ref 3.5–5.2)
ALP SERPL-CCNC: 205 U/L (ref 55–135)
ALT SERPL W/O P-5'-P-CCNC: 43 U/L (ref 10–44)
ANION GAP SERPL CALC-SCNC: 12 MMOL/L (ref 8–16)
ANION GAP SERPL CALC-SCNC: 9 MMOL/L (ref 8–16)
ANISOCYTOSIS BLD QL SMEAR: SLIGHT
AST SERPL-CCNC: 41 U/L (ref 10–40)
BACTERIA BLD CULT: NORMAL
BASOPHILS NFR BLD: 0 % (ref 0–1.9)
BILIRUB SERPL-MCNC: 0.4 MG/DL (ref 0.1–1)
BNP SERPL-MCNC: 128 PG/ML (ref 0–99)
BUN SERPL-MCNC: 5 MG/DL (ref 8–23)
BUN SERPL-MCNC: 6 MG/DL (ref 8–23)
CALCIUM SERPL-MCNC: 8.2 MG/DL (ref 8.7–10.5)
CALCIUM SERPL-MCNC: 8.7 MG/DL (ref 8.7–10.5)
CHLORIDE SERPL-SCNC: 108 MMOL/L (ref 95–110)
CHLORIDE SERPL-SCNC: 109 MMOL/L (ref 95–110)
CO2 SERPL-SCNC: 18 MMOL/L (ref 23–29)
CO2 SERPL-SCNC: 18 MMOL/L (ref 23–29)
CREAT SERPL-MCNC: 1.4 MG/DL (ref 0.5–1.4)
CREAT SERPL-MCNC: 1.4 MG/DL (ref 0.5–1.4)
DIFFERENTIAL METHOD BLD: ABNORMAL
EOSINOPHIL NFR BLD: 1 % (ref 0–8)
ERYTHROCYTE [DISTWIDTH] IN BLOOD BY AUTOMATED COUNT: 16.7 % (ref 11.5–14.5)
EST. GFR  (NO RACE VARIABLE): 41.8 ML/MIN/1.73 M^2
EST. GFR  (NO RACE VARIABLE): 42 ML/MIN/1.73 M^2
GLUCOSE SERPL-MCNC: 108 MG/DL (ref 70–110)
GLUCOSE SERPL-MCNC: 112 MG/DL (ref 70–110)
HCT VFR BLD AUTO: 31.9 % (ref 37–48.5)
HGB BLD-MCNC: 10.1 G/DL (ref 12–16)
IMM GRANULOCYTES # BLD AUTO: ABNORMAL K/UL (ref 0–0.04)
IMM GRANULOCYTES NFR BLD AUTO: ABNORMAL % (ref 0–0.5)
LYMPHOCYTES NFR BLD: 82 % (ref 18–48)
MAGNESIUM SERPL-MCNC: 2.1 MG/DL (ref 1.6–2.6)
MCH RBC QN AUTO: 30.6 PG (ref 27–31)
MCHC RBC AUTO-ENTMCNC: 31.7 G/DL (ref 32–36)
MCV RBC AUTO: 97 FL (ref 82–98)
MONOCYTES NFR BLD: 4 % (ref 4–15)
NEUTROPHILS NFR BLD: 13 % (ref 38–73)
NRBC BLD-RTO: 0 /100 WBC
PLATELET # BLD AUTO: 460 K/UL (ref 150–450)
PLATELET BLD QL SMEAR: ABNORMAL
PMV BLD AUTO: 10.1 FL (ref 9.2–12.9)
POTASSIUM SERPL-SCNC: 2.8 MMOL/L (ref 3.5–5.1)
POTASSIUM SERPL-SCNC: 2.9 MMOL/L (ref 3.5–5.1)
PROT SERPL-MCNC: 5.9 G/DL (ref 6–8.4)
RBC # BLD AUTO: 3.3 M/UL (ref 4–5.4)
SODIUM SERPL-SCNC: 135 MMOL/L (ref 136–145)
SODIUM SERPL-SCNC: 139 MMOL/L (ref 136–145)
TROPONIN I SERPL HS-MCNC: 16.3 PG/ML (ref 0–14.9)
VANCOMYCIN TROUGH SERPL-MCNC: 14.8 UG/ML (ref 10–22)
WBC # BLD AUTO: 42.98 K/UL (ref 3.9–12.7)

## 2024-10-19 PROCEDURE — 96367 TX/PROPH/DG ADDL SEQ IV INF: CPT

## 2024-10-19 PROCEDURE — G0378 HOSPITAL OBSERVATION PER HR: HCPCS

## 2024-10-19 PROCEDURE — 96365 THER/PROPH/DIAG IV INF INIT: CPT

## 2024-10-19 PROCEDURE — 80202 ASSAY OF VANCOMYCIN: CPT | Performed by: INTERNAL MEDICINE

## 2024-10-19 PROCEDURE — 85007 BL SMEAR W/DIFF WBC COUNT: CPT

## 2024-10-19 PROCEDURE — 93005 ELECTROCARDIOGRAM TRACING: CPT | Performed by: INTERNAL MEDICINE

## 2024-10-19 PROCEDURE — 83880 ASSAY OF NATRIURETIC PEPTIDE: CPT

## 2024-10-19 PROCEDURE — 99285 EMERGENCY DEPT VISIT HI MDM: CPT | Mod: 25

## 2024-10-19 PROCEDURE — 84484 ASSAY OF TROPONIN QUANT: CPT

## 2024-10-19 PROCEDURE — 25000003 PHARM REV CODE 250: Performed by: EMERGENCY MEDICINE

## 2024-10-19 PROCEDURE — 63600175 PHARM REV CODE 636 W HCPCS: Performed by: INTERNAL MEDICINE

## 2024-10-19 PROCEDURE — 80053 COMPREHEN METABOLIC PANEL: CPT

## 2024-10-19 PROCEDURE — 36415 COLL VENOUS BLD VENIPUNCTURE: CPT | Performed by: INTERNAL MEDICINE

## 2024-10-19 PROCEDURE — 83735 ASSAY OF MAGNESIUM: CPT

## 2024-10-19 PROCEDURE — 93010 ELECTROCARDIOGRAM REPORT: CPT | Mod: ,,, | Performed by: INTERNAL MEDICINE

## 2024-10-19 PROCEDURE — 80048 BASIC METABOLIC PNL TOTAL CA: CPT | Performed by: INTERNAL MEDICINE

## 2024-10-19 PROCEDURE — 85027 COMPLETE CBC AUTOMATED: CPT

## 2024-10-19 PROCEDURE — 25000003 PHARM REV CODE 250: Performed by: INTERNAL MEDICINE

## 2024-10-19 PROCEDURE — 63600175 PHARM REV CODE 636 W HCPCS: Performed by: EMERGENCY MEDICINE

## 2024-10-19 RX ORDER — BUPROPION HYDROCHLORIDE 150 MG/1
300 TABLET ORAL DAILY
Status: DISCONTINUED | OUTPATIENT
Start: 2024-10-20 | End: 2024-10-20 | Stop reason: HOSPADM

## 2024-10-19 RX ORDER — TALC
6 POWDER (GRAM) TOPICAL NIGHTLY PRN
Status: DISCONTINUED | OUTPATIENT
Start: 2024-10-19 | End: 2024-10-20 | Stop reason: HOSPADM

## 2024-10-19 RX ORDER — ASPIRIN 81 MG/1
81 TABLET ORAL DAILY
Status: DISCONTINUED | OUTPATIENT
Start: 2024-10-20 | End: 2024-10-20 | Stop reason: HOSPADM

## 2024-10-19 RX ORDER — SODIUM CHLORIDE AND POTASSIUM CHLORIDE 150; 900 MG/100ML; MG/100ML
INJECTION, SOLUTION INTRAVENOUS CONTINUOUS
Status: DISCONTINUED | OUTPATIENT
Start: 2024-10-19 | End: 2024-10-20

## 2024-10-19 RX ORDER — ONDANSETRON HYDROCHLORIDE 2 MG/ML
4 INJECTION, SOLUTION INTRAVENOUS EVERY 6 HOURS PRN
Status: DISCONTINUED | OUTPATIENT
Start: 2024-10-19 | End: 2024-10-20 | Stop reason: HOSPADM

## 2024-10-19 RX ORDER — POTASSIUM CHLORIDE 20 MEQ/1
40 TABLET, EXTENDED RELEASE ORAL ONCE
Status: COMPLETED | OUTPATIENT
Start: 2024-10-19 | End: 2024-10-19

## 2024-10-19 RX ORDER — CITALOPRAM 20 MG/1
20 TABLET, FILM COATED ORAL DAILY
Status: DISCONTINUED | OUTPATIENT
Start: 2024-10-20 | End: 2024-10-20 | Stop reason: HOSPADM

## 2024-10-19 RX ORDER — HYDROCODONE BITARTRATE AND ACETAMINOPHEN 5; 325 MG/1; MG/1
1 TABLET ORAL EVERY 6 HOURS PRN
Status: DISCONTINUED | OUTPATIENT
Start: 2024-10-19 | End: 2024-10-20 | Stop reason: HOSPADM

## 2024-10-19 RX ORDER — POTASSIUM CHLORIDE 7.45 MG/ML
10 INJECTION INTRAVENOUS ONCE
Status: COMPLETED | OUTPATIENT
Start: 2024-10-19 | End: 2024-10-19

## 2024-10-19 RX ORDER — NALOXONE HCL 0.4 MG/ML
0.02 VIAL (ML) INJECTION
Status: DISCONTINUED | OUTPATIENT
Start: 2024-10-19 | End: 2024-10-20 | Stop reason: HOSPADM

## 2024-10-19 RX ORDER — ACETAMINOPHEN 325 MG/1
650 TABLET ORAL EVERY 8 HOURS PRN
Status: DISCONTINUED | OUTPATIENT
Start: 2024-10-19 | End: 2024-10-20 | Stop reason: HOSPADM

## 2024-10-19 RX ORDER — SODIUM,POTASSIUM PHOSPHATES 280-250MG
2 POWDER IN PACKET (EA) ORAL
Status: DISCONTINUED | OUTPATIENT
Start: 2024-10-19 | End: 2024-10-20 | Stop reason: HOSPADM

## 2024-10-19 RX ORDER — LANOLIN ALCOHOL/MO/W.PET/CERES
800 CREAM (GRAM) TOPICAL
Status: DISCONTINUED | OUTPATIENT
Start: 2024-10-19 | End: 2024-10-20 | Stop reason: HOSPADM

## 2024-10-19 RX ORDER — ACETAMINOPHEN 325 MG/1
650 TABLET ORAL EVERY 4 HOURS PRN
Status: DISCONTINUED | OUTPATIENT
Start: 2024-10-19 | End: 2024-10-20 | Stop reason: HOSPADM

## 2024-10-19 RX ORDER — PANTOPRAZOLE SODIUM 40 MG/1
40 TABLET, DELAYED RELEASE ORAL
Status: DISCONTINUED | OUTPATIENT
Start: 2024-10-20 | End: 2024-10-20 | Stop reason: HOSPADM

## 2024-10-19 RX ORDER — ALUMINUM HYDROXIDE, MAGNESIUM HYDROXIDE, AND SIMETHICONE 1200; 120; 1200 MG/30ML; MG/30ML; MG/30ML
30 SUSPENSION ORAL 4 TIMES DAILY PRN
Status: DISCONTINUED | OUTPATIENT
Start: 2024-10-19 | End: 2024-10-20 | Stop reason: HOSPADM

## 2024-10-19 RX ORDER — ENOXAPARIN SODIUM 100 MG/ML
40 INJECTION SUBCUTANEOUS EVERY 24 HOURS
Status: DISCONTINUED | OUTPATIENT
Start: 2024-10-19 | End: 2024-10-20 | Stop reason: HOSPADM

## 2024-10-19 RX ADMIN — POTASSIUM CHLORIDE 40 MEQ: 1500 TABLET, EXTENDED RELEASE ORAL at 09:10

## 2024-10-19 RX ADMIN — SODIUM CHLORIDE AND POTASSIUM CHLORIDE: 9; 1.49 INJECTION, SOLUTION INTRAVENOUS at 11:10

## 2024-10-19 RX ADMIN — DEXTROSE MONOHYDRATE 1250 MG: 50 INJECTION, SOLUTION INTRAVENOUS at 11:10

## 2024-10-19 RX ADMIN — POTASSIUM CHLORIDE 10 MEQ: 7.46 INJECTION, SOLUTION INTRAVENOUS at 09:10

## 2024-10-20 VITALS
HEIGHT: 63 IN | BODY MASS INDEX: 26.21 KG/M2 | SYSTOLIC BLOOD PRESSURE: 109 MMHG | TEMPERATURE: 98 F | RESPIRATION RATE: 18 BRPM | DIASTOLIC BLOOD PRESSURE: 69 MMHG | OXYGEN SATURATION: 96 % | HEART RATE: 76 BPM | WEIGHT: 147.94 LBS

## 2024-10-20 LAB
ALBUMIN SERPL BCP-MCNC: 3.5 G/DL (ref 3.5–5.2)
ALP SERPL-CCNC: 186 U/L (ref 55–135)
ALT SERPL W/O P-5'-P-CCNC: 37 U/L (ref 10–44)
ANION GAP SERPL CALC-SCNC: 8 MMOL/L (ref 8–16)
AST SERPL-CCNC: 37 U/L (ref 10–40)
BASOPHILS # BLD AUTO: ABNORMAL K/UL (ref 0–0.2)
BASOPHILS NFR BLD: 0 % (ref 0–1.9)
BILIRUB SERPL-MCNC: 0.4 MG/DL (ref 0.1–1)
BUN SERPL-MCNC: 5 MG/DL (ref 8–23)
CALCIUM SERPL-MCNC: 8.1 MG/DL (ref 8.7–10.5)
CHLORIDE SERPL-SCNC: 115 MMOL/L (ref 95–110)
CO2 SERPL-SCNC: 19 MMOL/L (ref 23–29)
CREAT SERPL-MCNC: 1.4 MG/DL (ref 0.5–1.4)
DIFFERENTIAL METHOD BLD: ABNORMAL
EOSINOPHIL # BLD AUTO: ABNORMAL K/UL (ref 0–0.5)
EOSINOPHIL NFR BLD: 6 % (ref 0–8)
ERYTHROCYTE [DISTWIDTH] IN BLOOD BY AUTOMATED COUNT: 16.6 % (ref 11.5–14.5)
EST. GFR  (NO RACE VARIABLE): 41.8 ML/MIN/1.73 M^2
GLUCOSE SERPL-MCNC: 106 MG/DL (ref 70–110)
HCT VFR BLD AUTO: 30.2 % (ref 37–48.5)
HGB BLD-MCNC: 9.6 G/DL (ref 12–16)
IMM GRANULOCYTES # BLD AUTO: ABNORMAL K/UL (ref 0–0.04)
IMM GRANULOCYTES NFR BLD AUTO: ABNORMAL % (ref 0–0.5)
LYMPHOCYTES # BLD AUTO: ABNORMAL K/UL (ref 1–4.8)
LYMPHOCYTES NFR BLD: 88 % (ref 18–48)
MAGNESIUM SERPL-MCNC: 2.1 MG/DL (ref 1.6–2.6)
MCH RBC QN AUTO: 30.9 PG (ref 27–31)
MCHC RBC AUTO-ENTMCNC: 31.8 G/DL (ref 32–36)
MCV RBC AUTO: 97 FL (ref 82–98)
MONOCYTES # BLD AUTO: ABNORMAL K/UL (ref 0.3–1)
MONOCYTES NFR BLD: 1 % (ref 4–15)
NEUTROPHILS NFR BLD: 5 % (ref 38–73)
NRBC BLD-RTO: 0 /100 WBC
OHS QRS DURATION: 86 MS
OHS QTC CALCULATION: 457 MS
PLATELET # BLD AUTO: 435 K/UL (ref 150–450)
PLATELET BLD QL SMEAR: ABNORMAL
PMV BLD AUTO: 10.2 FL (ref 9.2–12.9)
POTASSIUM SERPL-SCNC: 3 MMOL/L (ref 3.5–5.1)
POTASSIUM SERPL-SCNC: 3.4 MMOL/L (ref 3.5–5.1)
PROT SERPL-MCNC: 5.6 G/DL (ref 6–8.4)
RBC # BLD AUTO: 3.11 M/UL (ref 4–5.4)
SMUDGE CELLS BLD QL SMEAR: PRESENT
SODIUM SERPL-SCNC: 142 MMOL/L (ref 136–145)
WBC # BLD AUTO: 42.91 K/UL (ref 3.9–12.7)

## 2024-10-20 PROCEDURE — G0378 HOSPITAL OBSERVATION PER HR: HCPCS

## 2024-10-20 PROCEDURE — 84132 ASSAY OF SERUM POTASSIUM: CPT | Mod: 59 | Performed by: FAMILY MEDICINE

## 2024-10-20 PROCEDURE — 83735 ASSAY OF MAGNESIUM: CPT | Performed by: INTERNAL MEDICINE

## 2024-10-20 PROCEDURE — 36415 COLL VENOUS BLD VENIPUNCTURE: CPT | Performed by: FAMILY MEDICINE

## 2024-10-20 PROCEDURE — 80053 COMPREHEN METABOLIC PANEL: CPT | Performed by: INTERNAL MEDICINE

## 2024-10-20 PROCEDURE — 85027 COMPLETE CBC AUTOMATED: CPT | Performed by: INTERNAL MEDICINE

## 2024-10-20 PROCEDURE — 25000003 PHARM REV CODE 250: Performed by: INTERNAL MEDICINE

## 2024-10-20 PROCEDURE — 36415 COLL VENOUS BLD VENIPUNCTURE: CPT | Performed by: INTERNAL MEDICINE

## 2024-10-20 PROCEDURE — 96366 THER/PROPH/DIAG IV INF ADDON: CPT

## 2024-10-20 PROCEDURE — 25000003 PHARM REV CODE 250: Performed by: FAMILY MEDICINE

## 2024-10-20 PROCEDURE — 85007 BL SMEAR W/DIFF WBC COUNT: CPT | Performed by: INTERNAL MEDICINE

## 2024-10-20 RX ORDER — POTASSIUM CHLORIDE 750 MG/1
20 CAPSULE, EXTENDED RELEASE ORAL DAILY
Qty: 5 CAPSULE | Refills: 0 | Status: SHIPPED | OUTPATIENT
Start: 2024-10-20 | End: 2024-10-22 | Stop reason: SDUPTHER

## 2024-10-20 RX ORDER — POTASSIUM CHLORIDE 20 MEQ/1
20 TABLET, EXTENDED RELEASE ORAL ONCE
Status: COMPLETED | OUTPATIENT
Start: 2024-10-20 | End: 2024-10-20

## 2024-10-20 RX ORDER — POTASSIUM CHLORIDE 20 MEQ/1
40 TABLET, EXTENDED RELEASE ORAL ONCE
Status: COMPLETED | OUTPATIENT
Start: 2024-10-20 | End: 2024-10-20

## 2024-10-20 RX ADMIN — ASPIRIN 81 MG: 81 TABLET, COATED ORAL at 09:10

## 2024-10-20 RX ADMIN — CITALOPRAM HYDROBROMIDE 20 MG: 20 TABLET ORAL at 09:10

## 2024-10-20 RX ADMIN — POTASSIUM CHLORIDE 20 MEQ: 1500 TABLET, EXTENDED RELEASE ORAL at 01:10

## 2024-10-20 RX ADMIN — PANTOPRAZOLE SODIUM 40 MG: 40 TABLET, DELAYED RELEASE ORAL at 05:10

## 2024-10-20 RX ADMIN — POTASSIUM CHLORIDE 40 MEQ: 1500 TABLET, EXTENDED RELEASE ORAL at 09:10

## 2024-10-20 RX ADMIN — BUPROPION HYDROCHLORIDE 300 MG: 150 TABLET, EXTENDED RELEASE ORAL at 09:10

## 2024-10-20 NOTE — ASSESSMENT & PLAN NOTE
Monitor and replete   Started on iv fluids with KCL  Recent Labs     10/19/24  1400 10/19/24  1918   K 2.8* 2.9*

## 2024-10-20 NOTE — HPI
64 year old pt getting admitted with hypokalemia  Pt was recently in this hospital with septic shock  Blood culture grew coagulase negative staph and currently she is getting iv vancomycin via PICC line till 10/25/24  She also had lap cholecystectomy on last admission  She said he vancomycin trough levels were high and she skipped few iv vancomycin doses  Today Labs were done and it showed hypokalemia and HH nurse instructed pt ,to come to hospital  Pt denied diarrhea/N&V or other c/o

## 2024-10-20 NOTE — HOSPITAL COURSE
64 year old was admitted with hypokalemia.  Patient was repleted and discharged once potassium was normal.  Patient was discharged home with potassium tablets. Ordered labs to be repeated outpatient and recommended to follow up with PCP outpatient.

## 2024-10-20 NOTE — PLAN OF CARE
Problem: Adult Inpatient Plan of Care  Goal: Plan of Care Review  Outcome: Progressing  Goal: Patient-Specific Goal (Individualized)  Outcome: Progressing  Goal: Absence of Hospital-Acquired Illness or Injury  Outcome: Progressing  Goal: Optimal Comfort and Wellbeing  Outcome: Progressing  Goal: Readiness for Transition of Care  Outcome: Progressing     Problem: Sepsis/Septic Shock  Goal: Optimal Coping  Outcome: Progressing     Problem: Sepsis/Septic Shock  Goal: Absence of Bleeding  Outcome: Progressing

## 2024-10-20 NOTE — SUBJECTIVE & OBJECTIVE
Past Medical History:   Diagnosis Date    Arthritis     Cancer 10/2022    (CLL) leukemia ; adenocarcinoma  adenosgemis    Depression     GERD (gastroesophageal reflux disease)     Neck pain     and back pain    Personal history of colonic polyps 07/07/2017    Pneumonia of left lung due to infectious organism 03/05/2020    Thyroid disease        Past Surgical History:   Procedure Laterality Date    ERCP N/A 10/10/2024    Procedure: ERCP (ENDOSCOPIC RETROGRADE CHOLANGIOPANCREATOGRAPHY);  Surgeon: Joshua Polanco III, MD;  Location: [facility] ENDO;  Service: Endoscopy;  Laterality: N/A;    FUSION, SPINE, CERVICAL, ANTERIOR APPROACH N/A 08/06/2024    Procedure: FUSION, SPINE, CERVICAL, ANTERIOR APPROACH;  Surgeon: Anatoly aDley DO;  Location: [facility] OR;  Service: Neurosurgery;  Laterality: N/A;  IOM, C-ARM, MEDTRONICS, MICRO, HORSESHOE    INJECTION OF ANESTHETIC AGENT AROUND MULTIPLE INTERCOSTAL NERVES Left 10/03/2022    Procedure: BLOCK, NERVE, INTERCOSTAL, 2 OR MORE;  Surgeon: Evelio Kaplan MD;  Location: [facility] OR 2ND FLR;  Service: Thoracic;  Laterality: Left;    INSERTION OF TUNNELED CENTRAL VENOUS CATHETER (CVC) WITH SUBCUTANEOUS PORT Right 11/03/2022    Procedure: [id number], Right or Left Neck or Chest;  Surgeon: Mini Acevedo MD;  Location: [facility] OR 2ND FLR;  Service: General;  Laterality: Right;    LAPAROSCOPIC CHOLECYSTECTOMY N/A 10/3/2024    Procedure: CHOLECYSTECTOMY, LAPAROSCOPIC;  Surgeon: Ang Mosley III, MD;  Location: [facility] OR;  Service: General;  Laterality: N/A;    LEFT HEART CATHETERIZATION Left 10/9/2024    Procedure: Left heart cath;  Surgeon: Martin Ingram MD;  Location: [facility] CATH/EP LAB;  Service: Cardiology;  Laterality: Left;    ROBOT-ASSISTED LAPAROSCOPIC LYMPHADENECTOMY USING DA MONIQUE XI Left 10/03/2022    Procedure: XI ROBOTIC LYMPHADENECTOMY;  Surgeon: Evelio Kaplan MD;  Location: [facility] OR 2ND FLR;  Service: Thoracic;  Laterality: Left;    SPINE  SURGERY      TONSILLECTOMY      XI ROBOTIC RATS,WITH LOBECTOMY,LUNG Left 10/03/2022    Procedure: XI ROBOTIC RATS,WITH LOBECTOMY,LUNG;  Surgeon: Evelio Kaplan MD;  Location: [facility] OR [address] [place] [address];  Service: Thoracic;  Laterality: Left;       Review of patient's allergies indicates:   Allergen Reactions    Latex, natural rubber        No current facility-administered medications on file prior to encounter.     Current Outpatient Medications on File Prior to Encounter   Medication Sig    acetaminophen (TYLENOL) 500 MG tablet Take 2 tablets (1,000 mg total) by mouth every 6 (six) hours as needed for Pain.    albuterol (PROVENTIL/VENTOLIN HFA) 90 mcg/actuation inhaler Inhale 2 puffs into the lungs every 6 (six) hours as needed for Wheezing or Shortness of Breath. Rescue    aspirin (ECOTRIN) 81 MG EC tablet Take 1 tablet (81 mg total) by mouth once daily.    buPROPion (WELLBUTRIN XL) 300 MG 24 hr tablet Take 1 tablet (300 mg total) by mouth once daily.    citalopram (CELEXA) 20 MG tablet Take 1 tablet (20 mg total) by mouth once daily.    fluticasone propionate (FLONASE) 50 mcg/actuation nasal spray 1 spray (50 mcg total) by Each Nostril route once daily.    fluticasone-salmeterol 230-21 mcg/dose (ADVAIR HFA) 230-21 mcg/actuation HFAA inhaler Inhale 2 puffs into the lungs 2 (two) times daily. Controller    gabapentin (NEURONTIN) 300 MG capsule Take 1 capsule (300 mg total) by mouth 3 (three) times daily.    HYDROcodone-acetaminophen (NORCO) 5-325 mg per tablet Take 1 tablet by mouth every 6 (six) hours as needed for Pain.    levocetirizine (XYZAL) 5 MG tablet Take 1 tablet (5 mg total) by mouth every evening.    levothyroxine (SYNTHROID) 75 MCG tablet Take 1 tablet (75 mcg total) by mouth before breakfast.    naloxone (NARCAN) 4 mg/actuation Spry ADMINISTER A SINGLE SPRAY IN ONE NOSTRIL UPON SIGNS OF OPIOID OVERDOSE. CALL 911. REPEAT AFTER 3 MINUTES IF NO RESPONSE.    pantoprazole (PROTONIX) 40 MG tablet Take 1 tablet  (40 mg total) by mouth once daily. for 14 days    pravastatin (PRAVACHOL) 10 MG tablet Take 1 tablet (10 mg total) by mouth once daily.    topiramate (TOPAMAX) 100 MG tablet Take 1 tablet (100 mg total) by mouth 2 (two) times daily.     Family History       Problem Relation (Age of Onset)    Breast cancer Cousin    Ovarian cancer Sister          Tobacco Use    Smoking status: Former     Types: Cigarettes     Passive exposure: Current    Smokeless tobacco: Never    Tobacco comments:     PT states she has quit now for a few months, plans to stay quit.   Substance and Sexual Activity    Alcohol use: Yes     Comment: rarely    Drug use: Yes     Types: Marijuana    Sexual activity: Not on file     Review of Systems   Constitutional:  Negative for activity change and appetite change.   HENT:  Negative for congestion and dental problem.    Eyes:  Negative for discharge and itching.   Respiratory:  Negative for shortness of breath.    Cardiovascular:  Negative for chest pain.   Gastrointestinal:  Negative for abdominal distention and abdominal pain.   Endocrine: Negative for cold intolerance.   Genitourinary:  Negative for difficulty urinating and dysuria.   Musculoskeletal:  Negative for arthralgias and back pain.   Skin:  Negative for color change.   Neurological:  Negative for dizziness and facial asymmetry.   Hematological:  Negative for adenopathy.   Psychiatric/Behavioral:  Negative for agitation and behavioral problems.      Objective:     Vital Signs (Most Recent):  Temp: 98.5 °F (36.9 °C) (10/19/24 1820)  Pulse: 81 (10/19/24 1820)  Resp: 20 (10/19/24 1820)  BP: 106/71 (10/19/24 1820)  SpO2: 98 % (10/19/24 1820) Vital Signs (24h Range):  Temp:  [98.5 °F (36.9 °C)] 98.5 °F (36.9 °C)  Pulse:  [81] 81  Resp:  [20] 20  SpO2:  [98 %] 98 %  BP: (106)/(71) 106/71     Weight: 68 kg (150 lb)  Body mass index is 27.44 kg/m².     Physical Exam  Vitals and nursing note reviewed.   Constitutional:       General: She is not in  acute distress.  HENT:      Head: Atraumatic.      Right Ear: External ear normal.      Left Ear: External ear normal.      Nose: Nose normal.      Mouth/Throat:      Mouth: Mucous membranes are moist.   Eyes:      Extraocular Movements: Extraocular movements intact.   Cardiovascular:      Rate and Rhythm: Normal rate.   Pulmonary:      Effort: Pulmonary effort is normal.   Abdominal:      Palpations: Abdomen is soft.   Musculoskeletal:         General: Normal range of motion.      Cervical back: Normal range of motion.   Skin:     General: Skin is warm.   Neurological:      Mental Status: She is alert and oriented to person, place, and time.   Psychiatric:         Behavior: Behavior normal.                Significant Labs: All pertinent labs within the past 24 hours have been reviewed.  CBC:   Recent Labs   Lab 10/19/24  1918   WBC 42.98*   HGB 10.1*   HCT 31.9*   *     CMP:   Recent Labs   Lab 10/19/24  1400 10/19/24  1918    135*   K 2.8* 2.9*    108   CO2 18* 18*   * 108   BUN 6* 5*   CREATININE 1.4 1.4   CALCIUM 8.7 8.2*   PROT  --  5.9*   ALBUMIN  --  3.8   BILITOT  --  0.4   ALKPHOS  --  205*   AST  --  41*   ALT  --  43   ANIONGAP 12 9       Significant Imaging: I have reviewed all pertinent imaging results/findings within the past 24 hours.

## 2024-10-20 NOTE — ED PROVIDER NOTES
Chief complaint:  ABNORMAL LABS (BLOOD COLLECTED TODAY PER PICC LINE, LOW K+ (2.8))      HPI:  Yvette Hutchinson is a 64 y.o. female with prior CLL, lung CA s/p partial resection presenting with report of low potassium on home health check of blood work absent new symptoms. Recent ED visit with admission for presyncope r/t v/d/AP s/p lap cholex  on 10/3/24 for cholecystitis, IV vanco rec by ID at d/c until 10/25/24 via PICC.  Patient has had no vomiting or diarrhea and is eating and drinking well since recent discharge several days ago.  She denies any abdominal pain.  She endorses normal urination.  She is still on outpatient antibiotics via PICC line.    ROS: As per HPI and below:  No syncope, confusion, swelling, rash, fever, chest pain.    Review of patient's allergies indicates:   Allergen Reactions    Latex, natural rubber        Patient's Medications   New Prescriptions    No medications on file   Previous Medications    ACETAMINOPHEN (TYLENOL) 500 MG TABLET    Take 2 tablets (1,000 mg total) by mouth every 6 (six) hours as needed for Pain.    ALBUTEROL (PROVENTIL/VENTOLIN HFA) 90 MCG/ACTUATION INHALER    Inhale 2 puffs into the lungs every 6 (six) hours as needed for Wheezing or Shortness of Breath. Rescue    ASPIRIN (ECOTRIN) 81 MG EC TABLET    Take 1 tablet (81 mg total) by mouth once daily.    BUPROPION (WELLBUTRIN XL) 300 MG 24 HR TABLET    Take 1 tablet (300 mg total) by mouth once daily.    CITALOPRAM (CELEXA) 20 MG TABLET    Take 1 tablet (20 mg total) by mouth once daily.    FLUTICASONE PROPIONATE (FLONASE) 50 MCG/ACTUATION NASAL SPRAY    1 spray (50 mcg total) by Each Nostril route once daily.    FLUTICASONE-SALMETEROL 230-21 MCG/DOSE (ADVAIR HFA) 230-21 MCG/ACTUATION HFAA INHALER    Inhale 2 puffs into the lungs 2 (two) times daily. Controller    GABAPENTIN (NEURONTIN) 300 MG CAPSULE    Take 1 capsule (300 mg total) by mouth 3 (three) times daily.    HYDROCODONE-ACETAMINOPHEN (NORCO) 5-325 MG  PER TABLET    Take 1 tablet by mouth every 6 (six) hours as needed for Pain.    LEVOCETIRIZINE (XYZAL) 5 MG TABLET    Take 1 tablet (5 mg total) by mouth every evening.    LEVOTHYROXINE (SYNTHROID) 75 MCG TABLET    Take 1 tablet (75 mcg total) by mouth before breakfast.    NALOXONE (NARCAN) 4 MG/ACTUATION SPRY    ADMINISTER A SINGLE SPRAY IN ONE NOSTRIL UPON SIGNS OF OPIOID OVERDOSE. CALL 911. REPEAT AFTER 3 MINUTES IF NO RESPONSE.    PANTOPRAZOLE (PROTONIX) 40 MG TABLET    Take 1 tablet (40 mg total) by mouth once daily. for 14 days    PRAVASTATIN (PRAVACHOL) 10 MG TABLET    Take 1 tablet (10 mg total) by mouth once daily.    TOPIRAMATE (TOPAMAX) 100 MG TABLET    Take 1 tablet (100 mg total) by mouth 2 (two) times daily.   Modified Medications    No medications on file   Discontinued Medications    No medications on file       PMH:  As per HPI and below:  Past Medical History:   Diagnosis Date    Arthritis     Cancer 10/2022    (CLL) leukemia ; adenocarcinoma  adenosgemis    Depression     GERD (gastroesophageal reflux disease)     Neck pain     and back pain    Personal history of colonic polyps 07/07/2017    Pneumonia of left lung due to infectious organism 03/05/2020    Thyroid disease      Past Surgical History:   Procedure Laterality Date    ERCP N/A 10/10/2024    Procedure: ERCP (ENDOSCOPIC RETROGRADE CHOLANGIOPANCREATOGRAPHY);  Surgeon: Joshua Polanco III, MD;  Location: [facility] ENDO;  Service: Endoscopy;  Laterality: N/A;    FUSION, SPINE, CERVICAL, ANTERIOR APPROACH N/A 08/06/2024    Procedure: FUSION, SPINE, CERVICAL, ANTERIOR APPROACH;  Surgeon: Anatoly Daley DO;  Location: [facility] OR;  Service: Neurosurgery;  Laterality: N/A;  IOM, C-ARM, MEDTRONICS, MICRO, HORSESHOE    INJECTION OF ANESTHETIC AGENT AROUND MULTIPLE INTERCOSTAL NERVES Left 10/03/2022    Procedure: BLOCK, NERVE, INTERCOSTAL, 2 OR MORE;  Surgeon: Evelio Kaplan MD;  Location: [address];  Service: Thoracic;  Laterality: Left;     INSERTION OF TUNNELED CENTRAL VENOUS CATHETER (CVC) WITH SUBCUTANEOUS PORT Right 11/03/2022    Procedure: [id number], Right or Left Neck or Chest;  Surgeon: Mini Acevedo MD;  Location: [address];  Service: General;  Laterality: Right;    LAPAROSCOPIC CHOLECYSTECTOMY N/A 10/3/2024    Procedure: CHOLECYSTECTOMY, LAPAROSCOPIC;  Surgeon: Ang Mosley III, MD;  Location: [facility] OR;  Service: General;  Laterality: N/A;    LEFT HEART CATHETERIZATION Left 10/9/2024    Procedure: Left heart cath;  Surgeon: Martin Ingram MD;  Location: [facility] CATH/EP LAB;  Service: Cardiology;  Laterality: Left;    ROBOT-ASSISTED LAPAROSCOPIC LYMPHADENECTOMY USING DA MONIQUE XI Left 10/03/2022    Procedure: XI ROBOTIC LYMPHADENECTOMY;  Surgeon: Evelio Kaplan MD;  Location: [facility] OR [address] [place] [address];  Service: Thoracic;  Laterality: Left;    SPINE SURGERY      TONSILLECTOMY      XI ROBOTIC RATS,WITH LOBECTOMY,LUNG Left 10/03/2022    Procedure: XI ROBOTIC RATS,WITH LOBECTOMY,LUNG;  Surgeon: Evelio Kaplan MD;  Location: [address];  Service: Thoracic;  Laterality: Left;       Social History     Socioeconomic History    Marital status: Legally    Tobacco Use    Smoking status: Former     Types: Cigarettes     Passive exposure: Current    Smokeless tobacco: Never    Tobacco comments:     PT states she has quit now for a few months, plans to stay quit.   Substance and Sexual Activity    Alcohol use: Yes     Comment: rarely    Drug use: Yes     Types: Marijuana     Social Drivers of Health     Financial Resource Strain: Low Risk  (10/3/2024)    Overall Financial Resource Strain (CARDIA)     Difficulty of Paying Living Expenses: Not hard at all   Food Insecurity: No Food Insecurity (10/3/2024)    Hunger Vital Sign     Worried About Running Out of Food in the Last Year: Never true     Ran Out of Food in the Last Year: Never true   Transportation Needs: No Transportation Needs (10/3/2024)     TRANSPORTATION NEEDS     Transportation : No   Physical Activity: Inactive (10/3/2024)    Exercise Vital Sign     Days of Exercise per Week: 0 days     Minutes of Exercise per Session: 0 min   Stress: No Stress Concern Present (10/3/2024)    [employer] [place] [employer] - Occupational Stress Questionnaire     Feeling of Stress : Not at all   Housing Stability: Low Risk  (10/3/2024)    Housing Stability Vital Sign     Unable to Pay for Housing in the Last Year: No     Homeless in the Last Year: No       Family History   Problem Relation Name Age of Onset    Ovarian cancer Sister      Breast cancer Cousin         Physical Exam:    Vitals:    10/19/24 1820   BP: 106/71   Pulse: 81   Resp: 20   Temp: 98.5 °F (36.9 °C)     GENERAL:  No apparent distress.  Alert.    HEENT:  Moist mucous membranes.  Normocephalic and atraumatic.    NECK:  No swelling.  Midline trachea.   CARDIOVASCULAR:  Regular rate and rhythm.  2+ radial pulses.  No murmur.  R chest wall port is present.    PULMONARY:  Lungs clear to auscultation bilaterally.  No wheezes, rales, or rhonci.    ABDOMEN:  Non-tender and non-distended.    EXTREMITIES:  Warm and well perfused.  Brisk capillary refill.  No edema.  NEUROLOGICAL:  Normal mental status.  Appropriate and conversant.    SKIN:  No rashes or ecchymoses.    BACK:  Atraumatic.  No CVA tenderness to palpation.      Labs Reviewed   CBC W/ AUTO DIFFERENTIAL - Abnormal       Result Value    WBC 42.98 (*)     RBC 3.30 (*)     Hemoglobin 10.1 (*)     Hematocrit 31.9 (*)     MCV 97      MCH 30.6      MCHC 31.7 (*)     RDW 16.7 (*)     Platelets 460 (*)     MPV 10.1      Narrative:     WBC critical result(s) called and verbal readback obtained from Jyotsna Cobos RN (ER) by MARTIN 10/19/2024 20:09   COMPREHENSIVE METABOLIC PANEL - Abnormal    Sodium 135 (*)     Potassium 2.9 (*)     Chloride 108      CO2 18 (*)     Glucose 108      BUN 5 (*)     Creatinine 1.4      Calcium 8.2 (*)     Total Protein 5.9  (*)     Albumin 3.8      Total Bilirubin 0.4      Alkaline Phosphatase 205 (*)     AST 41 (*)     ALT 43      eGFR 41.8 (*)     Anion Gap 9     TROPONIN I HIGH SENSITIVITY - Abnormal    Troponin I High Sensitivity 16.3 (*)    B-TYPE NATRIURETIC PEPTIDE - Abnormal     (*)    MAGNESIUM    Magnesium 2.1         Current Discharge Medication List        CONTINUE these medications which have NOT CHANGED    Details   acetaminophen (TYLENOL) 500 MG tablet Take 2 tablets (1,000 mg total) by mouth every 6 (six) hours as needed for Pain.  Qty: 8 tablet, Refills: 0      albuterol (PROVENTIL/VENTOLIN HFA) 90 mcg/actuation inhaler Inhale 2 puffs into the lungs every 6 (six) hours as needed for Wheezing or Shortness of Breath. Rescue  Qty: 8.5 g, Refills: 11    Associated Diagnoses: Chronic respiratory failure with hypoxia      aspirin (ECOTRIN) 81 MG EC tablet Take 1 tablet (81 mg total) by mouth once daily.  Qty: 30 tablet, Refills: 0      buPROPion (WELLBUTRIN XL) 300 MG 24 hr tablet Take 1 tablet (300 mg total) by mouth once daily.  Qty: 90 tablet, Refills: 3    Associated Diagnoses: Anxiety      citalopram (CELEXA) 20 MG tablet Take 1 tablet (20 mg total) by mouth once daily.  Qty: 30 tablet, Refills: 11    Associated Diagnoses: Mild episode of recurrent major depressive disorder      fluticasone propionate (FLONASE) 50 mcg/actuation nasal spray 1 spray (50 mcg total) by Each Nostril route once daily.  Qty: 16 g, Refills: 3      fluticasone-salmeterol 230-21 mcg/dose (ADVAIR HFA) 230-21 mcg/actuation HFAA inhaler Inhale 2 puffs into the lungs 2 (two) times daily. Controller  Qty: 12 g, Refills: 5    Associated Diagnoses: Simple chronic bronchitis      gabapentin (NEURONTIN) 300 MG capsule Take 1 capsule (300 mg total) by mouth 3 (three) times daily.  Qty: 270 capsule, Refills: 1    Associated Diagnoses: Hand arthritis      HYDROcodone-acetaminophen (NORCO) 5-325 mg per tablet Take 1 tablet by mouth every 6 (six) hours  as needed for Pain.  Qty: 12 tablet, Refills: 0    Comments: Quantity prescribed more than 7 day supply? No      levocetirizine (XYZAL) 5 MG tablet Take 1 tablet (5 mg total) by mouth every evening.  Qty: 30 tablet, Refills: 5    Associated Diagnoses: Simple chronic bronchitis; Acute cough      levothyroxine (SYNTHROID) 75 MCG tablet Take 1 tablet (75 mcg total) by mouth before breakfast.  Qty: 30 tablet, Refills: 0      naloxone (NARCAN) 4 mg/actuation Spry ADMINISTER A SINGLE SPRAY IN ONE NOSTRIL UPON SIGNS OF OPIOID OVERDOSE. CALL 911. REPEAT AFTER 3 MINUTES IF NO RESPONSE.      pantoprazole (PROTONIX) 40 MG tablet Take 1 tablet (40 mg total) by mouth once daily. for 14 days  Qty: 14 tablet, Refills: 0      pravastatin (PRAVACHOL) 10 MG tablet Take 1 tablet (10 mg total) by mouth once daily.  Qty: 90 tablet, Refills: 3    Associated Diagnoses: Mixed hyperlipidemia      topiramate (TOPAMAX) 100 MG tablet Take 1 tablet (100 mg total) by mouth 2 (two) times daily.  Qty: 60 tablet, Refills: 11    Associated Diagnoses: Overweight (BMI 25.0-29.9)             Orders Placed This Encounter   Procedures    X-Ray Chest AP Portable    Magnesium    CBC auto differential    Comprehensive metabolic panel    Troponin I High Sensitivity #1    B-Type natriuretic peptide (BNP)    Comprehensive Metabolic Panel (CMP)    Magnesium    CBC with Automated Differential    VANCOMYCIN, TROUGH    Diet Cardiac    Vital signs    Bladder scan    Notify Physician    Place sequential compression device    Cardiac Monitoring - Adult    Full code    Pharmacy to dose Vancomycin consult    Pulse Oximetry Continuous    EKG 12-lead    EKG 12-lead    Saline lock IV    Saline lock IV    Place in Observation    Fall precautions       Imaging Results              X-Ray Chest AP Portable (Final result)  Result time 10/19/24 18:49:54      Final result by Mehdi Broussard MD (10/19/24 18:49:54)                   Impression:      As  above.      Electronically signed by: Mehdi Broussard MD  Date:    10/19/2024  Time:    18:49               Narrative:    EXAMINATION:  XR CHEST AP PORTABLE    CLINICAL HISTORY:  Chest Pain;    TECHNIQUE:  Single frontal view of the chest was performed.    COMPARISON:  10/14/2024    FINDINGS:  There is a left-sided PICC line present in similar position to prior examination.  There is a right chest wall MediPort catheter in similar position.  Cardiac monitoring leads overlie the chest.  Cardiac silhouette is stable in size and configuration.  There is aortic atherosclerosis.  Lungs are symmetrically expanded with mild coarse interstitial attenuation.  There are streaky left basilar interstitial opacities.  There is stable blunting of the left costophrenic angle.  No definite pneumothorax identified.  Osseous structures demonstrate degenerative change and postoperative change of the cervical spine.                                  (Rad read)    ED Course as of 10/19/24 2147   Sat Oct 19, 2024   1929 EKG:  NSR, rate of 77, mild motion artifact, R axis.  No U-waves.  Old ST/T changes compared to prior with no sign of acute ischemia.   [MR]      ED Course User Index  [MR] Mehdi Baker MD       MDM:    64 y.o. female with report of hypokalemia.  Laboratories sent to confirm.  She denies any other new symptoms.  There are no associated EKG changes compared to prior.  Hypokalemia confirmed at 2.9 and I will admit for repletion with IV and p.o. potassium initiated in the emergency department.  I have discussed with hospital medicine who will assume care.    Diagnoses:    1. Hypokalemia       Mehdi Baker MD  10/19/24 2148

## 2024-10-20 NOTE — PLAN OF CARE
[facility]  Initial Discharge Assessment       Primary Care Provider: Vern Priest MD    Admission Diagnosis: Hypokalemia [E87.6]    Admission Date: 10/19/2024  Expected Discharge Date: Assessment completed at bedside with pt at bedside , and all information on FaceSheet confirmed, including demographics, PCP, pharmacy and insurance.  did  not address  advance directive.  She currently lives with her mother.  Her PCP is Dr. Priest. .   She denies any HH/HD/Blood Thinners.  For transportation to appointments, she usually has her daughter provide her transportation.  For transportation at discharge,  her daughter will provide transportation.  Plan for discharge is  home with [facility] with HH with [facility].  Case Management to continue to follow for discharge planning needs.       Transition of Care Barriers: (P) None    Payor: [employer] MEDICARE ADVANTAGE / Plan: [employer] DUAL ADVANTAGE / Product Type: Medicare Advantage /     Extended Emergency Contact Information  Primary Emergency Contact: FairchildTiny  Address: [address] [place] [address]  Home Phone: [phone]  Mobile Phone: [phone]  Relation: Daughter  Secondary Emergency Contact: Sadie Olvera   [facility]  Home Phone: [phone]  Mobile Phone: [phone]  Relation: Mother    Discharge Plan A: (P) Home Health  Discharge Plan B: (P) Home Health      [id number]  [place], [place] - [address] [id number] [facility] [address]  Phone: [phone] Fax: [phone]    [id number] - [place] - [address] AT [facility] [address]  [address]  [facility] [address]  Phone: [phone] Fax: [phone]    [facility]  [address]  [facility] [address]  Phone: [phone] Fax: [phone]    [id number] - [place] - [address]  [address]  [place]  [place] [address]  Phone: [phone] Fax: [phone]    [id number] - [place] - [address]  [address]  [place] [address]  Phone: [phone] Fax: [phone]      Initial Assessment (most recent)       Adult Discharge Assessment - 10/20/24 1027          Discharge Assessment    Assessment Type Discharge Planning Assessment     Confirmed/corrected address, phone number and insurance Yes     Confirmed Demographics Correct on Facesheet     Source of Information patient     Does patient/caregiver understand observation status Yes     People in Home parent(s)     Do you expect to return to your current living situation? Yes     Do you have help at home or someone to help you manage your care at home? Yes     Who are your caregiver(s) and their phone number(s)? Rosalba Fairchild [phone]     Prior to hospitilization cognitive status: Alert/Oriented     Current cognitive status: Alert/Oriented     Walking or Climbing Stairs Difficulty no     Dressing/Bathing Difficulty no     Equipment Currently Used at Home oxygen;walker, rolling     Readmission within 30 days? Yes     Patient currently being followed by outpatient case management? No     Do you currently have service(s) that help you manage your care at home? Yes     Name and Contact number of agency [facility]     Is the pt/caregiver preference to resume services with current agency Yes     Do you take prescription medications? Yes     Do you have prescription coverage? Yes     Coverage [employer] Advantage     Do you have any problems affording any of your prescribed medications? No (P)      Is the patient taking medications as prescribed? yes (P)      Who is going to help you get home at discharge? Carlitos Fairchild [phone] (P)      How do you get to doctors appointments? agency (P)      Are you on dialysis? No (P)      Do you take coumadin? No (P)       Discharge Plan A Home Health (P)      Discharge Plan B Home Health (P)      DME Needed Upon Discharge  none (P)      Discharge Plan discussed with: Patient (P)      Transition of Care Barriers None (P)         Physical Activity    On average, how many days per week do you engage in moderate to strenuous exercise (like a brisk walk)? 0 days (P)      On average, how many minutes do you engage in exercise at this level? 0 min (P)         Financial Resource Strain    How hard is it for you to pay for the very basics like food, housing, medical care, and heating? Not hard at all (P)         Housing Stability    In the last 12 months, was there a time when you were not able to pay the mortgage or rent on time? No (P)      At any time in the past 12 months, were you homeless or living in a shelter (including now)? No (P)         Transportation Needs    Has the lack of transportation kept you from medical appointments, meetings, work or from getting things needed for daily living? No (P)         Food Insecurity    Within the past 12 months, you worried that your food would run out before you got the money to buy more. Never true (P)      Within the past 12 months, the food you bought just didn't last and you didn't have money to get more. Never true (P)         Stress    Do you feel stress - tense, restless, nervous, or anxious, or unable to sleep at night because your mind is troubled all the time - these days? Only a little (P)         Social Isolation    How often do you feel lonely or isolated from those around you?  Never (P)         Alcohol Use    Q1: How often do you have a drink containing alcohol? Never (P)      Q2: How many drinks containing alcohol do you have on a typical day when you are drinking? Patient does not drink (P)      Q3: How often do you have six or more drinks on one occasion? Never (P)         Racemi    In the past 12 months has the electric, gas, oil, or water company threatened to shut off  services in your home? No (P)         Health Literacy    How often do you need to have someone help you when you read instructions, pamphlets, or other written material from your doctor or pharmacy? Never (P)         OTHER    Name(s) of People in Home Salina Olvera [phone] (P)

## 2024-10-20 NOTE — PLAN OF CARE
Patient cleared for discharge from case management standpoint.     sent in basket message to provider office to schedule appointment.     Chart and discharge orders reviewed.  Patient discharged home with no further case management needs.        10/20/24 1411   Final Note   Assessment Type Final Discharge Note   Anticipated Discharge Disposition [facility] Resources/Appts/Education Provided Appointment suggestion unavailable  ( sent in basket message to provider for appointment)   Post-Acute Status   Discharge Delays None known at this time

## 2024-10-20 NOTE — H&P
[facility] - Emergency Dept  Hospital Medicine  History & Physical    Patient Name: Yvette Hutchinson  MRN: [id number]  Patient Class: OP- Observation  Admission Date: 10/19/2024  Attending Physician: Dominic Bravo MD   Primary Care Provider: Vern Priest MD         Patient information was obtained from patient, past medical records, and ER records.     Subjective:     Principal Problem:Hypokalemia    Chief Complaint:   Chief Complaint   Patient presents with    ABNORMAL LABS     BLOOD COLLECTED TODAY PER PICC LINE, LOW K+ (2.8)        HPI: 64 year old pt getting admitted with hypokalemia  Pt was recently in this hospital with septic shock  Blood culture grew coagulase negative staph and currently she is getting iv vancomycin via PICC line till 10/25/24  She also had lap cholecystectomy on last admission  She said he vancomycin trough levels were high and she skipped few iv vancomycin doses  Today Labs were done and it showed hypokalemia and HH nurse instructed pt ,to come to hospital  Pt denied diarrhea/N&V or other c/o     Past Medical History:   Diagnosis Date    Arthritis     Cancer 10/2022    (CLL) leukemia ; adenocarcinoma  adenosgemis    Depression     GERD (gastroesophageal reflux disease)     Neck pain     and back pain    Personal history of colonic polyps 07/07/2017    Pneumonia of left lung due to infectious organism 03/05/2020    Thyroid disease        Past Surgical History:   Procedure Laterality Date    ERCP N/A 10/10/2024    Procedure: ERCP (ENDOSCOPIC RETROGRADE CHOLANGIOPANCREATOGRAPHY);  Surgeon: Joshua Polanco III, MD;  Location: [facility] END[facility];  Service: Endoscopy;  Laterality: N/A;    FUSION, SPINE, CERVICAL, ANTERIOR APPROACH N/A 08/06/2024    Procedure: FUSION, SPINE, CERVICAL, ANTERIOR APPROACH;  Surgeon: Anatoly Daley DO;  Location: [facility] OR;  Service: Neurosurgery;  Laterality: N/A;  IOM, C-ARM, MEDTRONICS, MICRO, HORSESHOE    INJECTION OF ANESTHETIC AGENT AROUND  MULTIPLE INTERCOSTAL NERVES Left 10/03/2022    Procedure: BLOCK, NERVE, INTERCOSTAL, 2 OR MORE;  Surgeon: Evelio Kaplan MD;  Location: [facility] OR [facility] FLR;  Service: Thoracic;  Laterality: Left;    INSERTION OF TUNNELED CENTRAL VENOUS CATHETER (CVC) WITH SUBCUTANEOUS PORT Right 11/03/2022    Procedure: [id number]-[id number]-CATH, Right or Left Neck or Chest;  Surgeon: Mini Acevedo MD;  Location: [facility] OR [facility] FLR;  Service: General;  Laterality: Right;    LAPAROSCOPIC CHOLECYSTECTOMY N/A 10/3/2024    Procedure: CHOLECYSTECTOMY, LAPAROSCOPIC;  Surgeon: Ang Mosley III, MD;  Location: [facility] OR;  Service: General;  Laterality: N/A;    LEFT HEART CATHETERIZATION Left 10/9/2024    Procedure: Left heart cath;  Surgeon: Martin Ingram MD;  Location: [facility] CATH/EP LAB;  Service: Cardiology;  Laterality: Left;    ROBOT-ASSISTED LAPAROSCOPIC LYMPHADENECTOMY USING DA MONIQUE XI Left 10/03/2022    Procedure: XI ROBOTIC LYMPHADENECTOMY;  Surgeon: Evelio Kaplan MD;  [facility];  Service: Thoracic;  Laterality: Left;    SPINE SURGERY      TONSILLECTOMY      XI ROBOTIC RATS,WITH LOBECTOMY,LUNG Left 10/03/2022    Procedure: XI ROBOTIC RATS,WITH LOBECTOMY,LUNG;  Surgeon: Evelio Kaplan MD;  [facility];  Service: Thoracic;  Laterality: Left;       Review of patient's allergies indicates:   Allergen Reactions    Latex, natural rubber        No current facility-administered medications on file prior to encounter.     Current Outpatient Medications on File Prior to Encounter   Medication Sig    acetaminophen (TYLENOL) 500 MG tablet Take 2 tablets (1,000 mg total) by mouth every 6 (six) hours as needed for Pain.    albuterol (PROVENTIL/VENTOLIN HFA) 90 mcg/actuation inhaler Inhale 2 puffs into the lungs every 6 (six) hours as needed for Wheezing or Shortness of Breath. Rescue    aspirin (ECOTRIN) 81 MG EC tablet Take 1 tablet (81 mg total) by mouth once daily.    buPROPion (WELLBUTRIN  XL) 300 MG 24 hr tablet Take 1 tablet (300 mg total) by mouth once daily.    citalopram (CELEXA) 20 MG tablet Take 1 tablet (20 mg total) by mouth once daily.    fluticasone propionate (FLONASE) 50 mcg/actuation nasal spray 1 spray (50 mcg total) by Each Nostril route once daily.    fluticasone-salmeterol 230-21 mcg/dose (ADVAIR HFA) 230-21 mcg/actuation HFAA inhaler Inhale 2 puffs into the lungs 2 (two) times daily. Controller    gabapentin (NEURONTIN) 300 MG capsule Take 1 capsule (300 mg total) by mouth 3 (three) times daily.    HYDROcodone-acetaminophen (NORCO) 5-325 mg per tablet Take 1 tablet by mouth every 6 (six) hours as needed for Pain.    levocetirizine (XYZAL) 5 MG tablet Take 1 tablet (5 mg total) by mouth every evening.    levothyroxine (SYNTHROID) 75 MCG tablet Take 1 tablet (75 mcg total) by mouth before breakfast.    naloxone (NARCAN) 4 mg/actuation Spry ADMINISTER A SINGLE SPRAY IN ONE NOSTRIL UPON SIGNS OF OPIOID OVERDOSE. CALL 911. REPEAT AFTER 3 MINUTES IF NO RESPONSE.    pantoprazole (PROTONIX) 40 MG tablet Take 1 tablet (40 mg total) by mouth once daily. for 14 days    pravastatin (PRAVACHOL) 10 MG tablet Take 1 tablet (10 mg total) by mouth once daily.    topiramate (TOPAMAX) 100 MG tablet Take 1 tablet (100 mg total) by mouth 2 (two) times daily.     Family History       Problem Relation (Age of Onset)    Breast cancer Cousin    Ovarian cancer Sister          Tobacco Use    Smoking status: Former     Types: Cigarettes     Passive exposure: Current    Smokeless tobacco: Never    Tobacco comments:     PT states she has quit now for a few months, plans to stay quit.   Substance and Sexual Activity    Alcohol use: Yes     Comment: rarely    Drug use: Yes     Types: Marijuana    Sexual activity: Not on file     Review of Systems   Constitutional:  Negative for activity change and appetite change.   HENT:  Negative for congestion and dental problem.    Eyes:  Negative for discharge and itching.    Respiratory:  Negative for shortness of breath.    Cardiovascular:  Negative for chest pain.   Gastrointestinal:  Negative for abdominal distention and abdominal pain.   Endocrine: Negative for cold intolerance.   Genitourinary:  Negative for difficulty urinating and dysuria.   Musculoskeletal:  Negative for arthralgias and back pain.   Skin:  Negative for color change.   Neurological:  Negative for dizziness and facial asymmetry.   Hematological:  Negative for adenopathy.   Psychiatric/Behavioral:  Negative for agitation and behavioral problems.      Objective:     Vital Signs (Most Recent):  Temp: 98.5 °F (36.9 °C) (10/19/24 1820)  Pulse: 81 (10/19/24 1820)  Resp: 20 (10/19/24 1820)  BP: 106/71 (10/19/24 1820)  SpO2: 98 % (10/19/24 1820) Vital Signs (24h Range):  Temp:  [98.5 °F (36.9 °C)] 98.5 °F (36.9 °C)  Pulse:  [81] 81  Resp:  [20] 20  SpO2:  [98 %] 98 %  BP: (106)/(71) 106/71     Weight: 68 kg (150 lb)  Body mass index is 27.44 kg/m².     Physical Exam  Vitals and nursing note reviewed.   Constitutional:       General: She is not in acute distress.  HENT:      Head: Atraumatic.      Right Ear: External ear normal.      Left Ear: External ear normal.      Nose: Nose normal.      Mouth/Throat:      Mouth: Mucous membranes are moist.   Eyes:      Extraocular Movements: Extraocular movements intact.   Cardiovascular:      Rate and Rhythm: Normal rate.   Pulmonary:      Effort: Pulmonary effort is normal.   Abdominal:      Palpations: Abdomen is soft.   Musculoskeletal:         General: Normal range of motion.      Cervical back: Normal range of motion.   Skin:     General: Skin is warm.   Neurological:      Mental Status: She is alert and oriented to person, place, and time.   Psychiatric:         Behavior: Behavior normal.                Significant Labs: All pertinent labs within the past 24 hours have been reviewed.  CBC:   Recent Labs   Lab 10/19/24  1918   WBC 42.98*   HGB 10.1*   HCT 31.9*   *      CMP:   Recent Labs   Lab 10/19/24  1400 10/19/24  1918    135*   K 2.8* 2.9*    108   CO2 18* 18*   * 108   BUN 6* 5*   CREATININE 1.4 1.4   CALCIUM 8.7 8.2*   PROT  --  5.9*   ALBUMIN  --  3.8   BILITOT  --  0.4   ALKPHOS  --  205*   AST  --  41*   ALT  --  43   ANIONGAP 12 9       Significant Imaging: I have reviewed all pertinent imaging results/findings within the past 24 hours.  Assessment/Plan:     * Hypokalemia  Monitor and replete   Started on iv fluids with KCL  Recent Labs     10/19/24  1400 10/19/24  1918   K 2.8* 2.9*       History of sepsis  As per HPI  Maintain iv vancomycin       History of cholecystectomy  Aware       Malignant neoplasm of upper lobe of lung  Aware       CLL (chronic lymphocytic leukemia)  Chronic issue         VTE Risk Mitigation (From admission, onward)           Ordered     enoxaparin injection 40 mg  Daily         10/19/24 2140     IP VTE HIGH RISK PATIENT  Once         10/19/24 2140     Place sequential compression device  Until discontinued         10/19/24 2140                       On 10/19/2024, patient should be placed in hospital observation services under my care.             Dominic Bravo MD  Department of Hospital Medicine  [facility] - Emergency Dept

## 2024-10-20 NOTE — PHARMACY MED REC
"              .        Admission Medication History     The home medication history was taken by Renata Rhodes.    You may go to "Admission" then "Reconcile Home Medications" tabs to review and/or act upon these items.     The home medication list has been updated by the Pharmacy department.   Please read ALL comments highlighted in yellow.   Please address this information as you see fit.    Feel free to contact us if you have any questions or require assistance.        Medications listed below were obtained from: Patient/family and Analytic software- [employer]  No current facility-administered medications on file prior to encounter.     Current Outpatient Medications on File Prior to Encounter   Medication Sig Dispense Refill    albuterol (PROVENTIL/VENTOLIN HFA) 90 mcg/actuation inhaler Inhale 2 puffs into the lungs every 6 (six) hours as needed for Wheezing or Shortness of Breath. Rescue 8.5 g 11    buPROPion (WELLBUTRIN XL) 300 MG 24 hr tablet Take 1 tablet (300 mg total) by mouth once daily. 90 tablet 3    citalopram (CELEXA) 20 MG tablet Take 1 tablet (20 mg total) by mouth once daily. 30 tablet 11    fluticasone propionate (FLONASE) 50 mcg/actuation nasal spray 1 spray (50 mcg total) by Each Nostril route once daily. 16 g 3    fluticasone-salmeterol 230-21 mcg/dose (ADVAIR HFA) 230-21 mcg/actuation HFAA inhaler Inhale 2 puffs into the lungs 2 (two) times daily. Controller 12 g 5    gabapentin (NEURONTIN) 300 MG capsule Take 1 capsule (300 mg total) by mouth 3 (three) times daily. 270 capsule 1    HYDROcodone-acetaminophen (NORCO) 5-325 mg per tablet Take 1 tablet by mouth every 6 (six) hours as needed for Pain. 12 tablet 0    levothyroxine (SYNTHROID) 75 MCG tablet Take 1 tablet (75 mcg total) by mouth before breakfast. 30 tablet 0    naloxone (NARCAN) 4 mg/actuation Spry 1 spray by Nasal route once.      pantoprazole (PROTONIX) 40 MG tablet Take 1 tablet (40 mg total) by mouth once daily. for 14 days 14 " tablet 0    pravastatin (PRAVACHOL) 10 MG tablet Take 1 tablet (10 mg total) by mouth once daily. 90 tablet 3    topiramate (TOPAMAX) 100 MG tablet Take 1 tablet (100 mg total) by mouth 2 (two) times daily. 60 tablet 11    aspirin (ECOTRIN) 81 MG EC tablet Take 1 tablet (81 mg total) by mouth once daily. (Patient not taking: Reported on 10/19/2024) 30 tablet 0    levocetirizine (XYZAL) 5 MG tablet Take 1 tablet (5 mg total) by mouth every evening. (Patient not taking: Reported on 10/19/2024) 30 tablet 5    [DISCONTINUED] acetaminophen (TYLENOL) 500 MG tablet Take 2 tablets (1,000 mg total) by mouth every 6 (six) hours as needed for Pain. 8 tablet 0       Potential issues to be addressed PRIOR TO DISCHARGE  Patient reported not taking the following medications: (Xyzal & Aspirin). These medications remain on the home medication list. Please address accordingly.     Renata Rhodes  EXT [phone]

## 2024-10-20 NOTE — DISCHARGE SUMMARY
"[facility], later [facility]  Discharge Summary      Patient Name: Yvette Hutchinson  MRN: [id number]  KURT: [id number]  Patient Class: OP- Observation  Admission Date: 10/19/2024  Hospital Length of Stay: 0 days  Discharge Date and Time:  10/20/2024 4:02 PM  Attending Physician: Marques Glover DO   Discharging Provider: Marques Glover DO  Primary Care Provider: Vern Priest MD    Primary Care Team: Networked reference to record PCT     HPI:   64 year old pt getting admitted with hypokalemia  Pt was recently in this hospital with septic shock  Blood culture grew coagulase negative staph and currently she is getting iv vancomycin via PICC line till 10/25/24  She also had lap cholecystectomy on last admission  She said he vancomycin trough levels were high and she skipped few iv vancomycin doses  Today Labs were done and it showed hypokalemia and HH nurse instructed pt ,to come to hospital  Pt denied diarrhea/N&V or other c/o     * No surgery found *      Hospital Course:    64 year old was admitted with hypokalemia.  Patient was repleted and discharged once potassium was normal.  Patient was discharged home with potassium tablets. Ordered labs to be repeated outpatient and recommended to follow up with PCP outpatient.     Goals of Care Treatment Preferences:  Code Status: Full Code      SDOH Screening:  The patient was screened for utility difficulties, food insecurity, transport difficulties, housing insecurity, and interpersonal safety and there were no concerns identified this admission.     Consults:   Consults (From admission, onward)          Status Ordering Provider     Pharmacy to dose Vancomycin consult  Once        Provider:  (Not yet assigned)   Placed in "And" Linked Group    Acknowledged GLADYS CONNOR            No new Assessment & Plan notes have been filed under this hospital service since the last note was generated.  Service: Hospital Medicine    Final Active Diagnoses:    " Diagnosis Date Noted POA    PRINCIPAL PROBLEM:  Hypokalemia [E87.6] 10/19/2024 Yes    History of sepsis [Z86.19] 10/19/2024 Yes    History of cholecystectomy [Z90.49] 10/19/2024 Not Applicable    Malignant neoplasm of upper lobe of lung [C34.10] 08/29/2022 Yes    CLL (chronic lymphocytic leukemia) [C91.10] 05/04/2020 Yes      Problems Resolved During this Admission:       Discharged Condition: fair    Disposition: Home-Health Care [facility]    Follow Up:   Follow-up Information       Vern Priest MD Follow up in 1 week(s).    Specialty: Family Medicine  Contact information:  [address] [id number] [phone]                           Patient Instructions:      Comprehensive metabolic panel   Standing Status: Future Standing Exp. Date: 01/18/26     Order Specific Question Answer Comments   Send normal result to authorizing provider's In Basket if patient is active on MyChart: Yes      CBC auto differential   Standing Status: Future Standing Exp. Date: 01/18/26     Order Specific Question Answer Comments   Send normal result to authorizing provider's In Basket if patient is active on MyChart: Yes        Significant Diagnostic Studies: N/A    Pending Diagnostic Studies:       None           Medications:  None    Indwelling Lines/Drains at time of discharge:   Lines/Drains/Airways       Peripherally Inserted Central Catheter Line  Duration             PICC Double Lumen 10/14/24 1235 left basilic 6 days                    Time spent on the discharge of patient: 32 minutes         Marques Glover DO  Department of Hospital Medicine  [facility]

## 2024-10-20 NOTE — PLAN OF CARE
Medicare Outpatient Observation Notice  Medicare Outpatient and Observation Notification regarding financial responsibility: (P) Given to patient/caregiver     Date KELLEY was signed: (P) 10/20/24  Time KELLEY was signed: (P) 1030      10/20/24 1107   KELLEY Message   Medicare Outpatient and Observation Notification regarding financial responsibility Given to patient/caregiver   Date KELLEY was signed 10/20/24   Time KELLEY was signed 1030

## 2024-10-20 NOTE — PROGRESS NOTES
Pharmacokinetic Assessment: IV Vancomycin     Vancomycin Day # 1    Indication & Goal: Bacteremia - Trough 15 - 20    Patient Labs  68 kg (150 lb)  Recent Labs   Lab 10/15/24  0553 10/16/24  0336 10/19/24  1400 10/19/24  1918   WBC 52.71* 56.27*  --  42.98*   CREATININE 1.1 1.1 1.4 1.4    Estimated Creatinine Clearance: 36.2 mL/min (based on SCr of 1.4 mg/dL).    Dialysis N/A    Drug levels (last 3 results):  Recent Labs   Lab Result Units 10/03/24  1418 10/04/24  1434 10/05/24  1848 10/15/24  1141 10/18/24  1029 10/19/24  1400   Vancomycin, Random ug/mL 8.2 8.0  --   --   --   --    Vancomycin-Trough ug/mL  --   --    < > 17.5 29.7* 14.8    < > = values in this interval not displayed.       Assessment/ Plan:  - 1250mg x1 then: Initiate intravenous vancomycin with a dose of 750 mg  administered every 24 hours.    - Vancomycin Trough due on 10/20 at 22:00 (before the second dose)    Ricky Lindsay, Greta 10/19/2024 9:49 PM

## 2024-10-21 ENCOUNTER — LAB VISIT (OUTPATIENT)
Dept: LAB | Facility: HOSPITAL | Age: 64
End: 2024-10-21
Attending: INTERNAL MEDICINE
Payer: COMMERCIAL

## 2024-10-21 ENCOUNTER — TELEPHONE (OUTPATIENT)
Dept: INFECTIOUS DISEASES | Facility: CLINIC | Age: 64
End: 2024-10-21
Payer: COMMERCIAL

## 2024-10-21 DIAGNOSIS — A41.9 SEPTICEMIA DURING LABOR: Primary | ICD-10-CM

## 2024-10-21 LAB
ANION GAP SERPL CALC-SCNC: 12 MMOL/L (ref 8–16)
BASOPHILS NFR BLD: 0 % (ref 0–1.9)
BUN SERPL-MCNC: 5 MG/DL (ref 8–23)
CALCIUM SERPL-MCNC: 9 MG/DL (ref 8.7–10.5)
CHLORIDE SERPL-SCNC: 111 MMOL/L (ref 95–110)
CO2 SERPL-SCNC: 16 MMOL/L (ref 23–29)
CREAT SERPL-MCNC: 1.5 MG/DL (ref 0.5–1.4)
DIFFERENTIAL METHOD BLD: ABNORMAL
EOSINOPHIL NFR BLD: 0 % (ref 0–8)
ERYTHROCYTE [DISTWIDTH] IN BLOOD BY AUTOMATED COUNT: 16.4 % (ref 11.5–14.5)
EST. GFR  (NO RACE VARIABLE): 38 ML/MIN/1.73 M^2
GLUCOSE SERPL-MCNC: 90 MG/DL (ref 70–110)
HCT VFR BLD AUTO: 32.9 % (ref 37–48.5)
HGB BLD-MCNC: 10.5 G/DL (ref 12–16)
IMM GRANULOCYTES # BLD AUTO: ABNORMAL K/UL (ref 0–0.04)
IMM GRANULOCYTES NFR BLD AUTO: ABNORMAL % (ref 0–0.5)
LYMPHOCYTES NFR BLD: 90 % (ref 18–48)
MCH RBC QN AUTO: 31.2 PG (ref 27–31)
MCHC RBC AUTO-ENTMCNC: 31.9 G/DL (ref 32–36)
MCV RBC AUTO: 98 FL (ref 82–98)
MONOCYTES NFR BLD: 1 % (ref 4–15)
NEUTROPHILS NFR BLD: 9 % (ref 38–73)
NRBC BLD-RTO: 0 /100 WBC
PLATELET # BLD AUTO: 438 K/UL (ref 150–450)
PLATELET BLD QL SMEAR: ABNORMAL
PMV BLD AUTO: 10.2 FL (ref 9.2–12.9)
POTASSIUM SERPL-SCNC: 3.3 MMOL/L (ref 3.5–5.1)
RBC # BLD AUTO: 3.37 M/UL (ref 4–5.4)
SODIUM SERPL-SCNC: 139 MMOL/L (ref 136–145)
VANCOMYCIN TROUGH SERPL-MCNC: 11.6 UG/ML (ref 10–22)
WBC # BLD AUTO: 43.45 K/UL (ref 3.9–12.7)

## 2024-10-21 PROCEDURE — 36415 COLL VENOUS BLD VENIPUNCTURE: CPT | Performed by: INTERNAL MEDICINE

## 2024-10-21 PROCEDURE — 85027 COMPLETE CBC AUTOMATED: CPT | Performed by: INTERNAL MEDICINE

## 2024-10-21 PROCEDURE — 80048 BASIC METABOLIC PNL TOTAL CA: CPT | Performed by: INTERNAL MEDICINE

## 2024-10-21 PROCEDURE — 85007 BL SMEAR W/DIFF WBC COUNT: CPT | Performed by: INTERNAL MEDICINE

## 2024-10-21 PROCEDURE — 80202 ASSAY OF VANCOMYCIN: CPT | Performed by: INTERNAL MEDICINE

## 2024-10-21 NOTE — TELEPHONE ENCOUNTER
Val with [facility] called  [phone]    Patient has a critical WBC of 43.45    Please advise     [facility]   10/21/24

## 2024-10-22 ENCOUNTER — PATIENT MESSAGE (OUTPATIENT)
Dept: FAMILY MEDICINE | Facility: CLINIC | Age: 64
End: 2024-10-22
Payer: COMMERCIAL

## 2024-10-22 DIAGNOSIS — E87.6 HYPOKALEMIA: Primary | ICD-10-CM

## 2024-10-22 NOTE — TELEPHONE ENCOUNTER
Care Due:                  Date            Visit Type   Department     Provider  --------------------------------------------------------------------------------                                EP -                              PRIMARY      [facility] FAMILY    Vern Celis  Last Visit: 10-      CARE ([facility])   MEDICINE       Zayda  Next Visit: None Scheduled  None         None Found                                                            Last  Test          Frequency    Reason                     Performed    Due Date  --------------------------------------------------------------------------------    Office Visit  15 months..  buPROPion................  10-   01-    [facility] Embedded Care Due Messages. Reference number: [id number].   10/22/2024 12:48:45 PM CDT

## 2024-10-22 NOTE — TELEPHONE ENCOUNTER
I spoke with Val ( with [facility] ) to advise patient has CLL and her WBC s will always be elevated   Per Dr Man's orders   J [facility]   10/22/24

## 2024-10-23 RX ORDER — POTASSIUM CHLORIDE 750 MG/1
20 CAPSULE, EXTENDED RELEASE ORAL DAILY
Qty: 60 CAPSULE | Refills: 0 | Status: SHIPPED | OUTPATIENT
Start: 2024-10-23 | End: 2024-11-22

## 2024-10-24 ENCOUNTER — LAB VISIT (OUTPATIENT)
Dept: LAB | Facility: HOSPITAL | Age: 64
End: 2024-10-24
Attending: INTERNAL MEDICINE
Payer: COMMERCIAL

## 2024-10-24 DIAGNOSIS — R78.81 BACTEREMIA: ICD-10-CM

## 2024-10-24 DIAGNOSIS — A41.9 SEPTICEMIA DURING LABOR: Primary | ICD-10-CM

## 2024-10-24 LAB
ANION GAP SERPL CALC-SCNC: 12 MMOL/L (ref 8–16)
BUN SERPL-MCNC: 5 MG/DL (ref 8–23)
CALCIUM SERPL-MCNC: 9.3 MG/DL (ref 8.7–10.5)
CHLORIDE SERPL-SCNC: 109 MMOL/L (ref 95–110)
CO2 SERPL-SCNC: 18 MMOL/L (ref 23–29)
CREAT SERPL-MCNC: 1.5 MG/DL (ref 0.5–1.4)
EST. GFR  (NO RACE VARIABLE): 38 ML/MIN/1.73 M^2
GLUCOSE SERPL-MCNC: 94 MG/DL (ref 70–110)
POTASSIUM SERPL-SCNC: 3.6 MMOL/L (ref 3.5–5.1)
SODIUM SERPL-SCNC: 139 MMOL/L (ref 136–145)
VANCOMYCIN TROUGH SERPL-MCNC: 19.3 UG/ML (ref 10–22)

## 2024-10-24 PROCEDURE — 36415 COLL VENOUS BLD VENIPUNCTURE: CPT | Performed by: INTERNAL MEDICINE

## 2024-10-24 PROCEDURE — 80048 BASIC METABOLIC PNL TOTAL CA: CPT | Performed by: INTERNAL MEDICINE

## 2024-10-24 PROCEDURE — 80202 ASSAY OF VANCOMYCIN: CPT | Performed by: INTERNAL MEDICINE

## 2024-10-28 ENCOUNTER — LAB VISIT (OUTPATIENT)
Dept: LAB | Facility: HOSPITAL | Age: 64
End: 2024-10-28
Attending: INTERNAL MEDICINE
Payer: COMMERCIAL

## 2024-10-28 DIAGNOSIS — A41.9 SEPTICEMIA DURING LABOR: Primary | ICD-10-CM

## 2024-10-28 LAB
ALBUMIN SERPL BCP-MCNC: 3.7 G/DL (ref 3.5–5.2)
ALP SERPL-CCNC: 205 U/L (ref 40–150)
ALT SERPL W/O P-5'-P-CCNC: 23 U/L (ref 10–44)
ANION GAP SERPL CALC-SCNC: 11 MMOL/L (ref 8–16)
AST SERPL-CCNC: 47 U/L (ref 10–40)
BASOPHILS # BLD AUTO: ABNORMAL K/UL (ref 0–0.2)
BASOPHILS NFR BLD: 0 % (ref 0–1.9)
BILIRUB SERPL-MCNC: 0.4 MG/DL (ref 0.1–1)
BUN SERPL-MCNC: 7 MG/DL (ref 8–23)
CALCIUM SERPL-MCNC: 9.5 MG/DL (ref 8.7–10.5)
CHLORIDE SERPL-SCNC: 108 MMOL/L (ref 95–110)
CO2 SERPL-SCNC: 20 MMOL/L (ref 23–29)
CREAT SERPL-MCNC: 2.5 MG/DL (ref 0.5–1.4)
CRP SERPL-MCNC: 13.3 MG/L (ref 0–8.2)
DIFFERENTIAL METHOD BLD: ABNORMAL
EOSINOPHIL # BLD AUTO: ABNORMAL K/UL (ref 0–0.5)
EOSINOPHIL NFR BLD: 1 % (ref 0–8)
ERYTHROCYTE [DISTWIDTH] IN BLOOD BY AUTOMATED COUNT: 15 % (ref 11.5–14.5)
ERYTHROCYTE [SEDIMENTATION RATE] IN BLOOD BY WESTERGREN METHOD: 30 MM/HR (ref 0–20)
EST. GFR  (NO RACE VARIABLE): 21 ML/MIN/1.73 M^2
GLUCOSE SERPL-MCNC: 122 MG/DL (ref 70–110)
HCT VFR BLD AUTO: 36.2 % (ref 37–48.5)
HGB BLD-MCNC: 11.4 G/DL (ref 12–16)
IMM GRANULOCYTES # BLD AUTO: ABNORMAL K/UL (ref 0–0.04)
IMM GRANULOCYTES NFR BLD AUTO: ABNORMAL % (ref 0–0.5)
LYMPHOCYTES # BLD AUTO: ABNORMAL K/UL (ref 1–4.8)
LYMPHOCYTES NFR BLD: 86 % (ref 18–48)
MCH RBC QN AUTO: 30.8 PG (ref 27–31)
MCHC RBC AUTO-ENTMCNC: 31.5 G/DL (ref 32–36)
MCV RBC AUTO: 98 FL (ref 82–98)
MONOCYTES # BLD AUTO: ABNORMAL K/UL (ref 0.3–1)
MONOCYTES NFR BLD: 3 % (ref 4–15)
NEUTROPHILS NFR BLD: 10 % (ref 38–73)
NRBC BLD-RTO: 0 /100 WBC
PLATELET # BLD AUTO: 281 K/UL (ref 150–450)
PLATELET BLD QL SMEAR: ABNORMAL
PMV BLD AUTO: 10.9 FL (ref 9.2–12.9)
POTASSIUM SERPL-SCNC: 3.8 MMOL/L (ref 3.5–5.1)
PROT SERPL-MCNC: 6.4 G/DL (ref 6–8.4)
RBC # BLD AUTO: 3.7 M/UL (ref 4–5.4)
SODIUM SERPL-SCNC: 139 MMOL/L (ref 136–145)
VANCOMYCIN TROUGH SERPL-MCNC: 13.7 UG/ML (ref 10–22)
WBC # BLD AUTO: 49.56 K/UL (ref 3.9–12.7)

## 2024-10-28 PROCEDURE — 36415 COLL VENOUS BLD VENIPUNCTURE: CPT | Performed by: INTERNAL MEDICINE

## 2024-10-28 PROCEDURE — 80053 COMPREHEN METABOLIC PANEL: CPT | Performed by: INTERNAL MEDICINE

## 2024-10-28 PROCEDURE — 80202 ASSAY OF VANCOMYCIN: CPT | Performed by: INTERNAL MEDICINE

## 2024-10-28 PROCEDURE — 85651 RBC SED RATE NONAUTOMATED: CPT | Performed by: INTERNAL MEDICINE

## 2024-10-28 PROCEDURE — 85027 COMPLETE CBC AUTOMATED: CPT | Performed by: INTERNAL MEDICINE

## 2024-10-28 PROCEDURE — 86140 C-REACTIVE PROTEIN: CPT | Performed by: INTERNAL MEDICINE

## 2024-10-28 PROCEDURE — 85007 BL SMEAR W/DIFF WBC COUNT: CPT | Performed by: INTERNAL MEDICINE

## 2024-10-29 ENCOUNTER — OFFICE VISIT (OUTPATIENT)
Dept: FAMILY MEDICINE | Facility: CLINIC | Age: 64
End: 2024-10-29
Payer: COMMERCIAL

## 2024-10-29 ENCOUNTER — LAB VISIT (OUTPATIENT)
Dept: LAB | Facility: HOSPITAL | Age: 64
End: 2024-10-29
Payer: COMMERCIAL

## 2024-10-29 VITALS
WEIGHT: 141.13 LBS | BODY MASS INDEX: 25.01 KG/M2 | OXYGEN SATURATION: 98 % | HEIGHT: 63 IN | SYSTOLIC BLOOD PRESSURE: 88 MMHG | RESPIRATION RATE: 18 BRPM | DIASTOLIC BLOOD PRESSURE: 58 MMHG | HEART RATE: 98 BPM

## 2024-10-29 DIAGNOSIS — I95.9 HYPOTENSION, UNSPECIFIED HYPOTENSION TYPE: ICD-10-CM

## 2024-10-29 DIAGNOSIS — Z09 HOSPITAL DISCHARGE FOLLOW-UP: Primary | ICD-10-CM

## 2024-10-29 DIAGNOSIS — R11.2 NAUSEA AND VOMITING, UNSPECIFIED VOMITING TYPE: ICD-10-CM

## 2024-10-29 DIAGNOSIS — E66.3 OVERWEIGHT (BMI 25.0-29.9): ICD-10-CM

## 2024-10-29 DIAGNOSIS — E87.6 HYPOKALEMIA: ICD-10-CM

## 2024-10-29 LAB
ALBUMIN SERPL BCP-MCNC: 3.8 G/DL (ref 3.5–5.2)
ALP SERPL-CCNC: 215 U/L (ref 40–150)
ALT SERPL W/O P-5'-P-CCNC: 22 U/L (ref 10–44)
ANION GAP SERPL CALC-SCNC: 11 MMOL/L (ref 8–16)
ANISOCYTOSIS BLD QL SMEAR: SLIGHT
AST SERPL-CCNC: 49 U/L (ref 10–40)
BASOPHILS NFR BLD: 0 % (ref 0–1.9)
BILIRUB SERPL-MCNC: 0.5 MG/DL (ref 0.1–1)
BUN SERPL-MCNC: 9 MG/DL (ref 8–23)
CALCIUM SERPL-MCNC: 9.8 MG/DL (ref 8.7–10.5)
CHLORIDE SERPL-SCNC: 104 MMOL/L (ref 95–110)
CO2 SERPL-SCNC: 22 MMOL/L (ref 23–29)
CREAT SERPL-MCNC: 2.5 MG/DL (ref 0.5–1.4)
DACRYOCYTES BLD QL SMEAR: ABNORMAL
DIFFERENTIAL METHOD BLD: ABNORMAL
EOSINOPHIL NFR BLD: 3 % (ref 0–8)
ERYTHROCYTE [DISTWIDTH] IN BLOOD BY AUTOMATED COUNT: 15 % (ref 11.5–14.5)
EST. GFR  (NO RACE VARIABLE): 20.8 ML/MIN/1.73 M^2
GLUCOSE SERPL-MCNC: 98 MG/DL (ref 70–110)
HCT VFR BLD AUTO: 36.2 % (ref 37–48.5)
HGB BLD-MCNC: 11.6 G/DL (ref 12–16)
IMM GRANULOCYTES # BLD AUTO: ABNORMAL K/UL (ref 0–0.04)
IMM GRANULOCYTES NFR BLD AUTO: ABNORMAL % (ref 0–0.5)
LYMPHOCYTES NFR BLD: 79 % (ref 18–48)
MAGNESIUM SERPL-MCNC: 1.9 MG/DL (ref 1.6–2.6)
MCH RBC QN AUTO: 30.9 PG (ref 27–31)
MCHC RBC AUTO-ENTMCNC: 32 G/DL (ref 32–36)
MCV RBC AUTO: 96 FL (ref 82–98)
MONOCYTES NFR BLD: 2 % (ref 4–15)
NEUTROPHILS NFR BLD: 16 % (ref 38–73)
NRBC BLD-RTO: 0 /100 WBC
OVALOCYTES BLD QL SMEAR: ABNORMAL
PLATELET # BLD AUTO: 268 K/UL (ref 150–450)
PLATELET BLD QL SMEAR: ABNORMAL
PMV BLD AUTO: 11.4 FL (ref 9.2–12.9)
POIKILOCYTOSIS BLD QL SMEAR: SLIGHT
POTASSIUM SERPL-SCNC: 3.9 MMOL/L (ref 3.5–5.1)
PROT SERPL-MCNC: 6.6 G/DL (ref 6–8.4)
RBC # BLD AUTO: 3.76 M/UL (ref 4–5.4)
SMUDGE CELLS BLD QL SMEAR: PRESENT
SODIUM SERPL-SCNC: 137 MMOL/L (ref 136–145)
WBC # BLD AUTO: 39.93 K/UL (ref 3.9–12.7)

## 2024-10-29 PROCEDURE — 3008F BODY MASS INDEX DOCD: CPT | Mod: CPTII,S$GLB,,

## 2024-10-29 PROCEDURE — 1160F RVW MEDS BY RX/DR IN RCRD: CPT | Mod: CPTII,S$GLB,,

## 2024-10-29 PROCEDURE — 99214 OFFICE O/P EST MOD 30 MIN: CPT | Mod: S$GLB,,,

## 2024-10-29 PROCEDURE — 1159F MED LIST DOCD IN RCRD: CPT | Mod: CPTII,S$GLB,,

## 2024-10-29 PROCEDURE — 1111F DSCHRG MED/CURRENT MED MERGE: CPT | Mod: CPTII,S$GLB,,

## 2024-10-29 PROCEDURE — 99999 PR PBB SHADOW E&M-EST. PATIENT-LVL IV: CPT | Mod: PBBFAC,,,

## 2024-10-29 PROCEDURE — 36415 COLL VENOUS BLD VENIPUNCTURE: CPT | Mod: PO

## 2024-10-29 PROCEDURE — 85007 BL SMEAR W/DIFF WBC COUNT: CPT

## 2024-10-29 PROCEDURE — 85060 BLOOD SMEAR INTERPRETATION: CPT | Mod: ,,, | Performed by: PATHOLOGY

## 2024-10-29 PROCEDURE — 3288F FALL RISK ASSESSMENT DOCD: CPT | Mod: CPTII,S$GLB,,

## 2024-10-29 PROCEDURE — 3078F DIAST BP <80 MM HG: CPT | Mod: CPTII,S$GLB,,

## 2024-10-29 PROCEDURE — 85027 COMPLETE CBC AUTOMATED: CPT

## 2024-10-29 PROCEDURE — 1101F PT FALLS ASSESS-DOCD LE1/YR: CPT | Mod: CPTII,S$GLB,,

## 2024-10-29 PROCEDURE — 83735 ASSAY OF MAGNESIUM: CPT

## 2024-10-29 PROCEDURE — G2211 COMPLEX E/M VISIT ADD ON: HCPCS | Mod: S$GLB,,,

## 2024-10-29 PROCEDURE — 3044F HG A1C LEVEL LT 7.0%: CPT | Mod: CPTII,S$GLB,,

## 2024-10-29 PROCEDURE — 80053 COMPREHEN METABOLIC PANEL: CPT

## 2024-10-29 PROCEDURE — 3074F SYST BP LT 130 MM HG: CPT | Mod: CPTII,S$GLB,,

## 2024-10-29 RX ORDER — TOPIRAMATE 50 MG/1
50 TABLET, FILM COATED ORAL 2 TIMES DAILY
Qty: 60 TABLET | Refills: 0 | Status: ON HOLD | OUTPATIENT
Start: 2024-10-29 | End: 2024-11-28

## 2024-10-29 RX ORDER — ONDANSETRON 8 MG/1
8 TABLET, ORALLY DISINTEGRATING ORAL EVERY 6 HOURS PRN
Qty: 30 TABLET | Refills: 2 | Status: ON HOLD | OUTPATIENT
Start: 2024-10-29

## 2024-10-29 RX ORDER — TOPIRAMATE 25 MG/1
25 TABLET ORAL 2 TIMES DAILY
Qty: 60 TABLET | Refills: 0 | Status: ON HOLD | OUTPATIENT
Start: 2024-11-29 | End: 2024-12-29

## 2024-10-30 DIAGNOSIS — N17.9 AKI (ACUTE KIDNEY INJURY): Primary | ICD-10-CM

## 2024-10-30 LAB — PATH REV BLD -IMP: NORMAL

## 2024-10-31 ENCOUNTER — OFFICE VISIT (OUTPATIENT)
Dept: INFECTIOUS DISEASES | Facility: CLINIC | Age: 64
End: 2024-10-31
Payer: COMMERCIAL

## 2024-10-31 VITALS
BODY MASS INDEX: 25.27 KG/M2 | WEIGHT: 142.63 LBS | TEMPERATURE: 98 F | DIASTOLIC BLOOD PRESSURE: 62 MMHG | SYSTOLIC BLOOD PRESSURE: 98 MMHG | HEIGHT: 63 IN

## 2024-10-31 DIAGNOSIS — A41.9 SEPTIC SHOCK: Primary | ICD-10-CM

## 2024-10-31 DIAGNOSIS — R65.21 SEPTIC SHOCK: Primary | ICD-10-CM

## 2024-10-31 DIAGNOSIS — C91.10 CLL (CHRONIC LYMPHOCYTIC LEUKEMIA): ICD-10-CM

## 2024-10-31 PROCEDURE — 99999 PR PBB SHADOW E&M-EST. PATIENT-LVL III: CPT | Mod: PBBFAC,,, | Performed by: INTERNAL MEDICINE

## 2024-11-01 ENCOUNTER — TELEPHONE (OUTPATIENT)
Dept: FAMILY MEDICINE | Facility: CLINIC | Age: 64
End: 2024-11-01
Payer: COMMERCIAL

## 2024-11-01 ENCOUNTER — HOSPITAL ENCOUNTER (INPATIENT)
Facility: HOSPITAL | Age: 64
LOS: 1 days | Discharge: HOME | DRG: 683 | End: 2024-11-04
Attending: EMERGENCY MEDICINE | Admitting: INTERNAL MEDICINE
Payer: COMMERCIAL

## 2024-11-01 DIAGNOSIS — E27.40 ADRENAL INSUFFICIENCY: Primary | ICD-10-CM

## 2024-11-01 DIAGNOSIS — N17.0 ACUTE RENAL FAILURE WITH TUBULAR NECROSIS: ICD-10-CM

## 2024-11-01 DIAGNOSIS — N17.9 AKI (ACUTE KIDNEY INJURY): Primary | ICD-10-CM

## 2024-11-01 DIAGNOSIS — R07.9 CHEST PAIN: ICD-10-CM

## 2024-11-01 DIAGNOSIS — R79.89 ELEVATED SERUM CREATININE: ICD-10-CM

## 2024-11-01 DIAGNOSIS — C91.10 CLL (CHRONIC LYMPHOCYTIC LEUKEMIA): ICD-10-CM

## 2024-11-01 DIAGNOSIS — N17.9 AKI (ACUTE KIDNEY INJURY): ICD-10-CM

## 2024-11-01 PROBLEM — D72.829 LEUKOCYTOSIS: Status: ACTIVE | Noted: 2024-11-01

## 2024-11-01 LAB
ALBUMIN SERPL BCP-MCNC: 4.3 G/DL (ref 3.5–5.2)
ALP SERPL-CCNC: 168 U/L (ref 55–135)
ALT SERPL W/O P-5'-P-CCNC: 15 U/L (ref 10–44)
ANION GAP SERPL CALC-SCNC: 6 MMOL/L (ref 8–16)
ANISOCYTOSIS BLD QL SMEAR: SLIGHT
AST SERPL-CCNC: 36 U/L (ref 10–40)
BASOPHILS # BLD AUTO: ABNORMAL K/UL (ref 0–0.2)
BASOPHILS NFR BLD: 0 % (ref 0–1.9)
BILIRUB SERPL-MCNC: 0.4 MG/DL (ref 0.1–1)
BILIRUB UR QL STRIP: NEGATIVE
BUN SERPL-MCNC: 10 MG/DL (ref 8–23)
CALCIUM SERPL-MCNC: 9 MG/DL (ref 8.7–10.5)
CHLORIDE SERPL-SCNC: 103 MMOL/L (ref 95–110)
CLARITY UR: CLEAR
CO2 SERPL-SCNC: 25 MMOL/L (ref 23–29)
COLOR UR: COLORLESS
CREAT SERPL-MCNC: 2.8 MG/DL (ref 0.5–1.4)
DIFFERENTIAL METHOD BLD: ABNORMAL
EOSINOPHIL # BLD AUTO: ABNORMAL K/UL (ref 0–0.5)
EOSINOPHIL NFR BLD: 1 % (ref 0–8)
ERYTHROCYTE [DISTWIDTH] IN BLOOD BY AUTOMATED COUNT: 14.4 % (ref 11.5–14.5)
EST. GFR  (NO RACE VARIABLE): 18.2 ML/MIN/1.73 M^2
GLUCOSE SERPL-MCNC: 116 MG/DL (ref 70–110)
GLUCOSE UR QL STRIP: NEGATIVE
HCT VFR BLD AUTO: 33.3 % (ref 37–48.5)
HGB BLD-MCNC: 10.8 G/DL (ref 12–16)
HGB UR QL STRIP: NEGATIVE
IMM GRANULOCYTES # BLD AUTO: ABNORMAL K/UL (ref 0–0.04)
IMM GRANULOCYTES NFR BLD AUTO: ABNORMAL % (ref 0–0.5)
KETONES UR QL STRIP: NEGATIVE
LEUKOCYTE ESTERASE UR QL STRIP: ABNORMAL
LYMPHOCYTES # BLD AUTO: ABNORMAL K/UL (ref 1–4.8)
LYMPHOCYTES NFR BLD: 83 % (ref 18–48)
MCH RBC QN AUTO: 31.4 PG (ref 27–31)
MCHC RBC AUTO-ENTMCNC: 32.4 G/DL (ref 32–36)
MCV RBC AUTO: 97 FL (ref 82–98)
MICROSCOPIC COMMENT: NORMAL
MONOCYTES # BLD AUTO: ABNORMAL K/UL (ref 0.3–1)
MONOCYTES NFR BLD: 4 % (ref 4–15)
NEUTROPHILS NFR BLD: 12 % (ref 38–73)
NITRITE UR QL STRIP: NEGATIVE
NRBC BLD-RTO: 0 /100 WBC
OSMOLALITY UR: 66 MOSM/KG (ref 50–1200)
PH UR STRIP: 6 [PH] (ref 5–8)
PLATELET # BLD AUTO: 273 K/UL (ref 150–450)
PLATELET BLD QL SMEAR: ABNORMAL
PMV BLD AUTO: 10.6 FL (ref 9.2–12.9)
POTASSIUM SERPL-SCNC: 3.7 MMOL/L (ref 3.5–5.1)
PROT SERPL-MCNC: 6.6 G/DL (ref 6–8.4)
PROT UR QL STRIP: NEGATIVE
RBC # BLD AUTO: 3.44 M/UL (ref 4–5.4)
RBC #/AREA URNS HPF: 1 /HPF (ref 0–4)
SMUDGE CELLS BLD QL SMEAR: PRESENT
SODIUM SERPL-SCNC: 134 MMOL/L (ref 136–145)
SODIUM UR-SCNC: 23 MMOL/L (ref 20–250)
SP GR UR STRIP: <1.005 (ref 1–1.03)
SQUAMOUS #/AREA URNS HPF: 1 /HPF
URN SPEC COLLECT METH UR: ABNORMAL
UROBILINOGEN UR STRIP-ACNC: NEGATIVE EU/DL
WBC # BLD AUTO: 35.91 K/UL (ref 3.9–12.7)
WBC #/AREA URNS HPF: 2 /HPF (ref 0–5)

## 2024-11-01 PROCEDURE — G0378 HOSPITAL OBSERVATION PER HR: HCPCS

## 2024-11-01 PROCEDURE — 99285 EMERGENCY DEPT VISIT HI MDM: CPT | Mod: 25

## 2024-11-01 PROCEDURE — 96361 HYDRATE IV INFUSION ADD-ON: CPT

## 2024-11-01 PROCEDURE — 63600175 PHARM REV CODE 636 W HCPCS: Performed by: EMERGENCY MEDICINE

## 2024-11-01 PROCEDURE — 80053 COMPREHEN METABOLIC PANEL: CPT | Performed by: NURSE PRACTITIONER

## 2024-11-01 PROCEDURE — 25000003 PHARM REV CODE 250: Performed by: NURSE PRACTITIONER

## 2024-11-01 PROCEDURE — 83935 ASSAY OF URINE OSMOLALITY: CPT | Performed by: NURSE PRACTITIONER

## 2024-11-01 PROCEDURE — 85027 COMPLETE CBC AUTOMATED: CPT | Performed by: NURSE PRACTITIONER

## 2024-11-01 PROCEDURE — 85007 BL SMEAR W/DIFF WBC COUNT: CPT | Performed by: NURSE PRACTITIONER

## 2024-11-01 PROCEDURE — 81001 URINALYSIS AUTO W/SCOPE: CPT | Performed by: NURSE PRACTITIONER

## 2024-11-01 PROCEDURE — 84300 ASSAY OF URINE SODIUM: CPT | Performed by: NURSE PRACTITIONER

## 2024-11-01 RX ORDER — TALC
6 POWDER (GRAM) TOPICAL NIGHTLY PRN
Status: DISCONTINUED | OUTPATIENT
Start: 2024-11-01 | End: 2024-11-04 | Stop reason: HOSPADM

## 2024-11-01 RX ORDER — LEVOTHYROXINE SODIUM 25 UG/1
75 TABLET ORAL
Status: DISCONTINUED | OUTPATIENT
Start: 2024-11-02 | End: 2024-11-04 | Stop reason: HOSPADM

## 2024-11-01 RX ORDER — SODIUM CHLORIDE 0.9 % (FLUSH) 0.9 %
2 SYRINGE (ML) INJECTION EVERY 12 HOURS PRN
Status: DISCONTINUED | OUTPATIENT
Start: 2024-11-01 | End: 2024-11-04 | Stop reason: HOSPADM

## 2024-11-01 RX ORDER — ACETAMINOPHEN 325 MG/1
650 TABLET ORAL EVERY 4 HOURS PRN
Status: DISCONTINUED | OUTPATIENT
Start: 2024-11-01 | End: 2024-11-04 | Stop reason: HOSPADM

## 2024-11-01 RX ORDER — TOPIRAMATE 25 MG/1
50 TABLET ORAL 2 TIMES DAILY
Status: DISCONTINUED | OUTPATIENT
Start: 2024-11-01 | End: 2024-11-04 | Stop reason: HOSPADM

## 2024-11-01 RX ORDER — HYDROCODONE BITARTRATE AND ACETAMINOPHEN 5; 325 MG/1; MG/1
1 TABLET ORAL EVERY 6 HOURS PRN
Status: DISCONTINUED | OUTPATIENT
Start: 2024-11-01 | End: 2024-11-04 | Stop reason: HOSPADM

## 2024-11-01 RX ORDER — SODIUM,POTASSIUM PHOSPHATES 280-250MG
2 POWDER IN PACKET (EA) ORAL
Status: DISCONTINUED | OUTPATIENT
Start: 2024-11-01 | End: 2024-11-04 | Stop reason: HOSPADM

## 2024-11-01 RX ORDER — CITALOPRAM 20 MG/1
20 TABLET, FILM COATED ORAL DAILY
Status: DISCONTINUED | OUTPATIENT
Start: 2024-11-02 | End: 2024-11-04 | Stop reason: HOSPADM

## 2024-11-01 RX ORDER — NALOXONE HCL 0.4 MG/ML
0.02 VIAL (ML) INJECTION
Status: DISCONTINUED | OUTPATIENT
Start: 2024-11-01 | End: 2024-11-04 | Stop reason: HOSPADM

## 2024-11-01 RX ORDER — FLUTICASONE PROPIONATE 50 MCG
1 SPRAY, SUSPENSION (ML) NASAL DAILY
Status: DISCONTINUED | OUTPATIENT
Start: 2024-11-02 | End: 2024-11-04 | Stop reason: HOSPADM

## 2024-11-01 RX ORDER — PANTOPRAZOLE SODIUM 40 MG/1
40 TABLET, DELAYED RELEASE ORAL DAILY
Status: DISCONTINUED | OUTPATIENT
Start: 2024-11-02 | End: 2024-11-04 | Stop reason: HOSPADM

## 2024-11-01 RX ORDER — ALUMINUM HYDROXIDE, MAGNESIUM HYDROXIDE, AND SIMETHICONE 1200; 120; 1200 MG/30ML; MG/30ML; MG/30ML
30 SUSPENSION ORAL 4 TIMES DAILY PRN
Status: DISCONTINUED | OUTPATIENT
Start: 2024-11-01 | End: 2024-11-04 | Stop reason: HOSPADM

## 2024-11-01 RX ORDER — SODIUM CHLORIDE 9 MG/ML
INJECTION, SOLUTION INTRAVENOUS CONTINUOUS
Status: DISCONTINUED | OUTPATIENT
Start: 2024-11-01 | End: 2024-11-03

## 2024-11-01 RX ORDER — ONDANSETRON HYDROCHLORIDE 2 MG/ML
4 INJECTION, SOLUTION INTRAVENOUS EVERY 6 HOURS PRN
Status: DISCONTINUED | OUTPATIENT
Start: 2024-11-01 | End: 2024-11-04 | Stop reason: HOSPADM

## 2024-11-01 RX ORDER — BUPROPION HYDROCHLORIDE 150 MG/1
300 TABLET ORAL DAILY
Status: DISCONTINUED | OUTPATIENT
Start: 2024-11-02 | End: 2024-11-04 | Stop reason: HOSPADM

## 2024-11-01 RX ORDER — PRAVASTATIN SODIUM 10 MG/1
10 TABLET ORAL DAILY
Status: DISCONTINUED | OUTPATIENT
Start: 2024-11-02 | End: 2024-11-04 | Stop reason: HOSPADM

## 2024-11-01 RX ORDER — ACETAMINOPHEN 325 MG/1
650 TABLET ORAL EVERY 8 HOURS PRN
Status: DISCONTINUED | OUTPATIENT
Start: 2024-11-01 | End: 2024-11-04 | Stop reason: HOSPADM

## 2024-11-01 RX ORDER — LANOLIN ALCOHOL/MO/W.PET/CERES
800 CREAM (GRAM) TOPICAL
Status: DISCONTINUED | OUTPATIENT
Start: 2024-11-01 | End: 2024-11-04 | Stop reason: HOSPADM

## 2024-11-01 RX ADMIN — SODIUM CHLORIDE: 9 INJECTION, SOLUTION INTRAVENOUS at 10:11

## 2024-11-01 RX ADMIN — SODIUM CHLORIDE, POTASSIUM CHLORIDE, SODIUM LACTATE AND CALCIUM CHLORIDE 1000 ML: 600; 310; 30; 20 INJECTION, SOLUTION INTRAVENOUS at 06:11

## 2024-11-01 RX ADMIN — TOPIRAMATE 50 MG: 25 TABLET, FILM COATED ORAL at 10:11

## 2024-11-01 NOTE — HPI
64-year-old female with a past medical history of reflux disease, CLL, left lung cancer status post resection, depression, Hypokalemia, History of sepsis, s/p cholecystecomy, CLL, Malignant neoplasm of upper lobe of lung presents for evaluation of abnormal lab work. Reports that her kidney function has been getting worse over the last several days. Denies any associated chest pain, shortness of breath, nausea/vomiting, hematuria, dysuria, decreased urine output, fever/chills, or abdominal pain.   Of note: patient was discharged 10/16/2024 after a 14 day stay in the hospital for septic shock. Only positive culture was coagulase-negative Staph in blood. Completed IV vancomycin 10/28/24.    In ER, afebrile, leukocytosis improving with WBC 35.91, mild anemia with H&H 10.8/33.3, hyponatremia treat make with sodium of 133, serum creatinine 2.8, glucose 116, UA with 1+ leukocytes, 2 WBCs    Admit to hospital medicine for GM

## 2024-11-01 NOTE — ED PROVIDER NOTES
Encounter Date: 11/1/2024       History     Chief Complaint   Patient presents with    ABNORMAL LABS     KIDNEY FUNCTIONS      64-year-old female with a past medical history of reflux disease, CLL, and depression presents for evaluation of abnormal lab work.  The patient reports that her kidney function has been getting worse over the last several days.  She reports repeat lab work done by her PCP, that showed her kidney function to be going down.  She denies any associated chest pain, shortness of breath, nausea/vomiting, hematuria, dysuria, decreased urine output, fever/chills, or abdominal pain.  The patient recently had her gallbladder taken out and had a bacterial infection in her blood, and finished antibiotics (vancomycin) for this last week, on October 25th.  There are no alleviating factors.        Review of patient's allergies indicates:   Allergen Reactions    Latex, natural rubber      Past Medical History:   Diagnosis Date    Arthritis     Cancer 10/2022    (CLL) leukemia ; adenocarcinoma  adenosgemis    Depression     GERD (gastroesophageal reflux disease)     Neck pain     and back pain    Personal history of colonic polyps 07/07/2017    Pneumonia of left lung due to infectious organism 03/05/2020    Thyroid disease      Past Surgical History:   Procedure Laterality Date    ERCP N/A 10/10/2024    Procedure: ERCP (ENDOSCOPIC RETROGRADE CHOLANGIOPANCREATOGRAPHY);  Surgeon: Joshua Polanco III, MD;  Location: [facility] ENDO;  Service: Endoscopy;  Laterality: N/A;    FUSION, SPINE, CERVICAL, ANTERIOR APPROACH N/A 08/06/2024    Procedure: FUSION, SPINE, CERVICAL, ANTERIOR APPROACH;  Surgeon: Anatoly Daley DO;  Location: [facility] OR;  Service: Neurosurgery;  Laterality: N/A;  IOM, C-ARM, MEDTRONICS, MICRO, HORSESHOE    INJECTION OF ANESTHETIC AGENT AROUND MULTIPLE INTERCOSTAL NERVES Left 10/03/2022    Procedure: BLOCK, NERVE, INTERCOSTAL, 2 OR MORE;  Surgeon: Evelio Kaplan MD;  Location: [address]  FLR;  Service: Thoracic;  Laterality: Left;    INSERTION OF TUNNELED CENTRAL VENOUS CATHETER (CVC) WITH SUBCUTANEOUS PORT Right 11/03/2022    Procedure: [id number]-CATH, Right or Left Neck or Chest;  Surgeon: Mini Acevedo MD;  Location: [facility] OR 2ND FLR;  Service: General;  Laterality: Right;    LAPAROSCOPIC CHOLECYSTECTOMY N/A 10/3/2024    Procedure: CHOLECYSTECTOMY, LAPAROSCOPIC;  Surgeon: Ang Mosley III, MD;  Location: [facility] OR;  Service: General;  Laterality: N/A;    LEFT HEART CATHETERIZATION Left 10/9/2024    Procedure: Left heart cath;  Surgeon: Martin Ingram MD;  Location: [facility] CATH/EP LAB;  Service: Cardiology;  Laterality: Left;    ROBOT-ASSISTED LAPAROSCOPIC LYMPHADENECTOMY USING DA MONIQUE XI Left 10/03/2022    Procedure: XI ROBOTIC LYMPHADENECTOMY;  Surgeon: Evelio Kaplan MD;  Location: [facility] OR 2ND FLR;  Service: Thoracic;  Laterality: Left;    SPINE SURGERY      TONSILLECTOMY      XI ROBOTIC RATS,WITH LOBECTOMY,LUNG Left 10/03/2022    Procedure: XI ROBOTIC RATS,WITH LOBECTOMY,LUNG;  Surgeon: Evelio Kaplan MD;  Location: [facility] OR 2ND FLR;  Service: Thoracic;  Laterality: Left;     Family History   Problem Relation Name Age of Onset    Ovarian cancer Sister      Breast cancer Cousin       Social History     Tobacco Use    Smoking status: Former     Types: Cigarettes     Passive exposure: Current    Smokeless tobacco: Never    Tobacco comments:     PT states she has quit now for a few months, plans to stay quit.   Substance Use Topics    Alcohol use: Yes     Comment: rarely    Drug use: Yes     Types: Marijuana     Review of Systems   Constitutional:  Negative for chills and fever.   HENT:  Negative for congestion.    Respiratory:  Negative for cough and shortness of breath.    Cardiovascular:  Negative for chest pain.   Gastrointestinal:  Negative for abdominal pain, nausea and vomiting.   Genitourinary:  Negative for dysuria.   Musculoskeletal:  Negative for gait  problem.   Skin:  Negative for color change.   Neurological:  Negative for dizziness and numbness.   Psychiatric/Behavioral:  Negative for agitation.        Physical Exam     Initial Vitals [11/01/24 1650]   BP Pulse Resp Temp SpO2   110/72 90 18 98.6 °F (37 °C) 97 %      MAP       --         Physical Exam    Nursing note and vitals reviewed.  Constitutional: She appears well-developed and well-nourished.   HENT:   Head: Atraumatic.   Eyes: EOM are normal. Pupils are equal, round, and reactive to light.   Neck:   Normal range of motion.  Cardiovascular:  Normal rate and regular rhythm.           Pulmonary/Chest: Breath sounds normal.   Abdominal: Abdomen is soft. Bowel sounds are normal. She exhibits no distension. There is no abdominal tenderness. There is no rebound and no guarding.   Musculoskeletal:         General: Normal range of motion.      Right shoulder: Normal.      Left shoulder: Normal.      Cervical back: Normal range of motion.     Neurological: She is alert and oriented to person, place, and time.   Skin: Skin is warm and dry.   Psychiatric: She has a normal mood and affect.         ED Course   Critical Care    Date/Time: 11/1/2024 6:49 PM    Performed by: Brad Alvarado MD  Authorized by: Brad Alvarado MD  Direct patient critical care time: 16 minutes  Ordering / reviewing critical care time: 11 minutes  Documentation critical care time: 15 minutes  Total critical care time (exclusive of procedural time) : 42 minutes  Critical care was time spent personally by me on the following activities: development of treatment plan with patient or surrogate, examination of patient, obtaining history from patient or surrogate, ordering and performing treatments and interventions and ordering and review of laboratory studies.        Labs Reviewed   CBC W/ AUTO DIFFERENTIAL - Abnormal       Result Value    WBC 35.91 (*)     RBC 3.44 (*)     Hemoglobin 10.8 (*)     Hematocrit 33.3 (*)     MCV 97       MCH 31.4 (*)     MCHC 32.4      RDW 14.4      Platelets 273      MPV 10.6      Immature Granulocytes CANCELED      Immature Grans (Abs) CANCELED      Lymph # CANCELED      Mono # CANCELED      Eos # CANCELED      Baso # CANCELED      nRBC 0      Gran % 12.0 (*)     Lymph % 83.0 (*)     Mono % 4.0      Eosinophil % 1.0      Basophil % 0.0      Platelet Estimate Appears normal      Aniso Slight      Smudge Cells Present      Differential Method Manual      Narrative:     WBC critical result(s) repeated. Called and verbal readback obtained   from Quynh Larose RN. by [email or web address] 11/01/2024 18:22   COMPREHENSIVE METABOLIC PANEL - Abnormal    Sodium 134 (*)     Potassium 3.7      Chloride 103      CO2 25      Glucose 116 (*)     BUN 10      Creatinine 2.8 (*)     Calcium 9.0      Total Protein 6.6      Albumin 4.3      Total Bilirubin 0.4      Alkaline Phosphatase 168 (*)     AST 36      ALT 15      eGFR 18.2 (*)     Anion Gap 6 (*)    URINALYSIS, REFLEX TO URINE CULTURE - Abnormal    Specimen UA Urine, Clean Catch      Color, UA Colorless (*)     Appearance, UA Clear      pH, UA 6.0      Specific Gravity, UA <1.005 (*)     Protein, UA Negative      Glucose, UA Negative      Ketones, UA Negative      Bilirubin (UA) Negative      Occult Blood UA Negative      Nitrite, UA Negative      Urobilinogen, UA Negative      Leukocytes, UA 1+ (*)     Narrative:     Specimen Source->Urine   URINALYSIS MICROSCOPIC    RBC, UA 1      WBC, UA 2      Squam Epithel, UA 1      Microscopic Comment SEE COMMENT      Narrative:     Specimen Source->Urine          Imaging Results    None          Medications   lactated ringers bolus 1,000 mL (0 mLs Intravenous Stopped 11/1/24 9974)     Medical Decision Making  64-year-old female presents for abnormal lab work.    Initial differential diagnosis included but not limited to dehydration, acute kidney injury, and electrolyte abnormality.    Amount and/or Complexity of Data Reviewed  Labs:  ordered.    Risk  Risk Details: The patient was emergently evaluated in the emergency department, her evaluation was significant for an older female with a benign abdominal exam noted.  The patient's labs were concerning for acute kidney injury.  She was aggressively treated with IV fluid boluses here in the emergency department.  I will admit her to the hospitalist service for further care.  The case was discussed with the APC on call for the hospitalist service.  She has accepted the patient for admission.                                      Clinical Impression:  Final diagnoses:  [R79.89] Elevated serum creatinine  [N17.9] GM (acute kidney injury) (Primary)          ED Disposition Condition    Observation Stable                Brad Alvarado MD  11/01/24 1950       Brad Alvarado MD  11/01/24 1950

## 2024-11-01 NOTE — FIRST PROVIDER EVALUATION
Emergency Department TeleTriage Encounter Note      CHIEF COMPLAINT    Chief Complaint   Patient presents with    ABNORMAL LABS     KIDNEY FUNCTIONS        VITAL SIGNS   Initial Vitals [11/01/24 1650]   BP Pulse Resp Temp SpO2   110/72 90 18 98.6 °F (37 °C) 97 %      MAP       --            ALLERGIES    Review of patient's allergies indicates:   Allergen Reactions    Latex, natural rubber        PROVIDER TRIAGE NOTE  This is a teletriage evaluation of a 64 y.o. female presenting to the ED with c/o elevated Cr (2.7) on labs today, increased from 1.5 11 days ago. Recent admissions. Reports no symptoms. Limited physical exam via telehealth: The patient is awake, alert, answering questions appropriately and is not in respiratory distress. Initial orders will be placed and care will be transferred to an alternate provider when patient is roomed for a full evaluation. Any additional orders and the final disposition will be determined by that provider.         ORDERS  Labs Reviewed   CBC W/ AUTO DIFFERENTIAL   COMPREHENSIVE METABOLIC PANEL   URINALYSIS, REFLEX TO URINE CULTURE       ED Orders (720h ago, onward)      Start Ordered     Status Ordering Provider    11/01/24 1709 11/01/24 1708  Vital signs  Every 2 hours         Ordered [facility] MA[facility]    11/01/24 1709 11/01/24 1708  Diet NPO  Diet effective now         Ordered [facility]ES MAIN    11/01/24 1709 11/01/24 1708  Insert peripheral IV  Once         Ordered [facility]ES MAIN    11/01/24 1709 11/01/24 1708  CBC W/ AUTO DIFFERENTIAL  STAT         Ordered [facility]ES MAIN    11/01/24 1709 11/01/24 1708  Comp. Metabolic Panel  STAT         Ordered [facility]ES MAIN    11/01/24 1709 11/01/24 1708  Urinalysis, Reflex to Urine Culture Urine, Clean Catch  STAT         Ordered [facility]ES MAIN    11/01/24 1709 11/01/24 1708  EKG 12-lead  Once         Ordered MILES MAIN              Virtual Visit Note: The provider triage portion of this emergency department evaluation  and documentation was performed via [employer] [address] [employer], a HIPAA-compliant telemedicine application, in concert with a tele-presenter in the room. A face to face patient evaluation with one of my colleagues will occur once the patient is placed in an emergency department room.      DISCLAIMER: This note was prepared with [employer] voice recognition transcription software. Garbled syntax, mangled pronouns, and other bizarre constructions may be attributed to that software system.

## 2024-11-02 LAB
ALBUMIN SERPL BCP-MCNC: 3.5 G/DL (ref 3.5–5.2)
ALP SERPL-CCNC: 141 U/L (ref 55–135)
ALT SERPL W/O P-5'-P-CCNC: 11 U/L (ref 10–44)
ANION GAP SERPL CALC-SCNC: 7 MMOL/L (ref 8–16)
AST SERPL-CCNC: 26 U/L (ref 10–40)
BASOPHILS # BLD AUTO: ABNORMAL K/UL (ref 0–0.2)
BASOPHILS NFR BLD: 0 % (ref 0–1.9)
BILIRUB SERPL-MCNC: 0.3 MG/DL (ref 0.1–1)
BNP SERPL-MCNC: 127 PG/ML (ref 0–99)
BUN SERPL-MCNC: 10 MG/DL (ref 8–23)
CALCIUM SERPL-MCNC: 8.6 MG/DL (ref 8.7–10.5)
CHLORIDE SERPL-SCNC: 111 MMOL/L (ref 95–110)
CHLORIDE UR-SCNC: 56 MMOL/L (ref 25–200)
CK SERPL-CCNC: 25 U/L (ref 20–180)
CO2 SERPL-SCNC: 24 MMOL/L (ref 23–29)
CREAT SERPL-MCNC: 2.6 MG/DL (ref 0.5–1.4)
CREAT UR-MCNC: 31.2 MG/DL (ref 15–325)
DIFFERENTIAL METHOD BLD: ABNORMAL
EOSINOPHIL # BLD AUTO: ABNORMAL K/UL (ref 0–0.5)
EOSINOPHIL NFR BLD: 3 % (ref 0–8)
ERYTHROCYTE [DISTWIDTH] IN BLOOD BY AUTOMATED COUNT: 14.5 % (ref 11.5–14.5)
EST. GFR  (NO RACE VARIABLE): 19.9 ML/MIN/1.73 M^2
GLUCOSE SERPL-MCNC: 84 MG/DL (ref 70–110)
HCT VFR BLD AUTO: 30.7 % (ref 37–48.5)
HGB BLD-MCNC: 9.6 G/DL (ref 12–16)
IMM GRANULOCYTES # BLD AUTO: ABNORMAL K/UL (ref 0–0.04)
IMM GRANULOCYTES NFR BLD AUTO: ABNORMAL % (ref 0–0.5)
LYMPHOCYTES # BLD AUTO: ABNORMAL K/UL (ref 1–4.8)
LYMPHOCYTES NFR BLD: 74 % (ref 18–48)
MAGNESIUM SERPL-MCNC: 1.8 MG/DL (ref 1.6–2.6)
MCH RBC QN AUTO: 30.7 PG (ref 27–31)
MCHC RBC AUTO-ENTMCNC: 31.3 G/DL (ref 32–36)
MCV RBC AUTO: 98 FL (ref 82–98)
MONOCYTES # BLD AUTO: ABNORMAL K/UL (ref 0.3–1)
MONOCYTES NFR BLD: 7 % (ref 4–15)
NEUTROPHILS NFR BLD: 16 % (ref 38–73)
NRBC BLD-RTO: 0 /100 WBC
OHS QRS DURATION: 72 MS
OHS QRS DURATION: 86 MS
OHS QTC CALCULATION: 451 MS
OHS QTC CALCULATION: 457 MS
OSMOLALITY UR: 158 MOSM/KG (ref 50–1200)
PLATELET # BLD AUTO: 263 K/UL (ref 150–450)
PLATELET BLD QL SMEAR: ABNORMAL
PMV BLD AUTO: 10.8 FL (ref 9.2–12.9)
POTASSIUM SERPL-SCNC: 4.1 MMOL/L (ref 3.5–5.1)
POTASSIUM UR-SCNC: 15 MMOL/L (ref 15–95)
PROT SERPL-MCNC: 5.5 G/DL (ref 6–8.4)
PROT UR-MCNC: 10 MG/DL (ref 6–15)
RBC # BLD AUTO: 3.13 M/UL (ref 4–5.4)
SMUDGE CELLS BLD QL SMEAR: PRESENT
SODIUM SERPL-SCNC: 142 MMOL/L (ref 136–145)
SODIUM UR-SCNC: 53 MMOL/L (ref 20–250)
URATE SERPL-MCNC: 5.6 MG/DL (ref 2.4–5.7)
UUN UR-MCNC: 64 MG/DL (ref 140–1050)
WBC # BLD AUTO: 33.94 K/UL (ref 3.9–12.7)

## 2024-11-02 PROCEDURE — 84133 ASSAY OF URINE POTASSIUM: CPT | Performed by: INTERNAL MEDICINE

## 2024-11-02 PROCEDURE — G0378 HOSPITAL OBSERVATION PER HR: HCPCS

## 2024-11-02 PROCEDURE — 84156 ASSAY OF PROTEIN URINE: CPT | Performed by: INTERNAL MEDICINE

## 2024-11-02 PROCEDURE — 83880 ASSAY OF NATRIURETIC PEPTIDE: CPT | Performed by: INTERNAL MEDICINE

## 2024-11-02 PROCEDURE — 82570 ASSAY OF URINE CREATININE: CPT | Performed by: INTERNAL MEDICINE

## 2024-11-02 PROCEDURE — 85007 BL SMEAR W/DIFF WBC COUNT: CPT | Performed by: NURSE PRACTITIONER

## 2024-11-02 PROCEDURE — 25000242 PHARM REV CODE 250 ALT 637 W/ HCPCS: Performed by: NURSE PRACTITIONER

## 2024-11-02 PROCEDURE — 99215 OFFICE O/P EST HI 40 MIN: CPT | Mod: ,,, | Performed by: INTERNAL MEDICINE

## 2024-11-02 PROCEDURE — 84550 ASSAY OF BLOOD/URIC ACID: CPT | Performed by: NURSE PRACTITIONER

## 2024-11-02 PROCEDURE — 83735 ASSAY OF MAGNESIUM: CPT | Performed by: NURSE PRACTITIONER

## 2024-11-02 PROCEDURE — 82550 ASSAY OF CK (CPK): CPT | Performed by: NURSE PRACTITIONER

## 2024-11-02 PROCEDURE — 82436 ASSAY OF URINE CHLORIDE: CPT | Performed by: INTERNAL MEDICINE

## 2024-11-02 PROCEDURE — 96361 HYDRATE IV INFUSION ADD-ON: CPT

## 2024-11-02 PROCEDURE — 25000003 PHARM REV CODE 250: Performed by: NURSE PRACTITIONER

## 2024-11-02 PROCEDURE — 80053 COMPREHEN METABOLIC PANEL: CPT | Performed by: NURSE PRACTITIONER

## 2024-11-02 PROCEDURE — 84540 ASSAY OF URINE/UREA-N: CPT | Performed by: INTERNAL MEDICINE

## 2024-11-02 PROCEDURE — 85027 COMPLETE CBC AUTOMATED: CPT | Performed by: NURSE PRACTITIONER

## 2024-11-02 PROCEDURE — 84300 ASSAY OF URINE SODIUM: CPT | Performed by: INTERNAL MEDICINE

## 2024-11-02 PROCEDURE — 36415 COLL VENOUS BLD VENIPUNCTURE: CPT | Performed by: NURSE PRACTITIONER

## 2024-11-02 PROCEDURE — 83935 ASSAY OF URINE OSMOLALITY: CPT | Performed by: INTERNAL MEDICINE

## 2024-11-02 PROCEDURE — 36415 COLL VENOUS BLD VENIPUNCTURE: CPT | Performed by: INTERNAL MEDICINE

## 2024-11-02 RX ORDER — ENOXAPARIN SODIUM 100 MG/ML
30 INJECTION SUBCUTANEOUS EVERY 24 HOURS
Status: DISCONTINUED | OUTPATIENT
Start: 2024-11-02 | End: 2024-11-04 | Stop reason: HOSPADM

## 2024-11-02 RX ADMIN — PRAVASTATIN SODIUM 10 MG: 10 TABLET ORAL at 08:11

## 2024-11-02 RX ADMIN — BUPROPION HYDROCHLORIDE 300 MG: 150 TABLET, EXTENDED RELEASE ORAL at 09:11

## 2024-11-02 RX ADMIN — FLUTICASONE PROPIONATE 50 MCG: 50 SPRAY, METERED NASAL at 09:11

## 2024-11-02 RX ADMIN — CITALOPRAM HYDROBROMIDE 20 MG: 20 TABLET ORAL at 09:11

## 2024-11-02 RX ADMIN — TOPIRAMATE 50 MG: 25 TABLET, FILM COATED ORAL at 08:11

## 2024-11-02 RX ADMIN — SODIUM CHLORIDE: 9 INJECTION, SOLUTION INTRAVENOUS at 04:11

## 2024-11-02 RX ADMIN — LEVOTHYROXINE SODIUM 75 MCG: 0.03 TABLET ORAL at 04:11

## 2024-11-02 RX ADMIN — TOPIRAMATE 50 MG: 25 TABLET, FILM COATED ORAL at 09:11

## 2024-11-02 RX ADMIN — ACETAMINOPHEN 650 MG: 325 TABLET ORAL at 09:11

## 2024-11-02 RX ADMIN — PANTOPRAZOLE SODIUM 40 MG: 40 TABLET, DELAYED RELEASE ORAL at 04:11

## 2024-11-02 NOTE — CONSULTS
Critical access hospital   Department of Infectious Disease  Consult Note        PATIENT NAME: Yvette Hutchinson  MRN: [id number]  TODAY'S DATE: 11/02/2024  ADMIT DATE: 11/1/2024  LOS: 0 days    CHIEF COMPLAINT: ABNORMAL LABS (KIDNEY FUNCTIONS )      PRINCIPLE PROBLEM: Acute renal failure    REASON FOR CONSULT:     ASSESSMENT and PLAN   1. GM.  Creatinine appears to have plateaued.  Management as per nephrologist and hospitalist.      2. CLL with chronic leukocytosis.  Expectant management as per her oncologist.  Not on any chemotherapy at this time.    3. Diarrhea.  Resolved.       RECOMMENDATIONS:  Nothing to add for now.  I will follow peripherally.    Thank you for this consult. Please send [employer] secure chat with any questions.    Antibiotics (From admission, onward)      None          Antifungals (From admission, onward)      None           Antivirals (From admission, onward)      None            HPI      Yvette Hutchinson is a 64 y.o. female with CLL chronic leukocytosis.  Recently hospitalized with septic shock with only positive culture been coagulase-negative Staphylococcus bacteremia.  Received empiric antibiotics with improvement.  Discharge home on vancomycin and complete therapy 10/28/2024.  I saw her for follow up on 10/31/2024 she was doing okay.    Her creatinine had continued to increase and was 2.5 from baseline of 1.1..  Seen by her PCP yesterday and sent into the hospital.  She has been evaluated by nephrologist.  Notes reviewed.  ID asked to revisit.    Antibiotic history:  None at this time    Microbiology:  No new cultures    Social History  Marital Status: Legally   Alcohol History:  reports current alcohol use.  Tobacco History:  reports that she has quit smoking. Her smoking use included cigarettes. She has been exposed to tobacco smoke. She has never used smokeless tobacco.  Drug History:  reports current drug use. Drug: Marijuana.      Review of patient's allergies  indicates:   Allergen Reactions    Latex, natural rubber      Past Medical History:   Diagnosis Date    Arthritis     Cancer 10/2022    (CLL) leukemia ; adenocarcinoma  adenosgemis    Depression     GERD (gastroesophageal reflux disease)     Neck pain     and back pain    Personal history of colonic polyps 07/07/2017    Pneumonia of left lung due to infectious organism 03/05/2020    Thyroid disease      Past Surgical History:   Procedure Laterality Date    ERCP N/A 10/10/2024    Procedure: ERCP (ENDOSCOPIC RETROGRADE CHOLANGIOPANCREATOGRAPHY);  Surgeon: Joshua Polanco III, MD;  Location: [facility] ENDO;  Service: Endoscopy;  Laterality: N/A;    FUSION, SPINE, CERVICAL, ANTERIOR APPROACH N/A 08/06/2024    Procedure: FUSION, SPINE, CERVICAL, ANTERIOR APPROACH;  Surgeon: Anatoly Daley DO;  Location: [facility] OR;  Service: Neurosurgery;  Laterality: N/A;  IOM, C-ARM, MEDTRONICS, MICRO, HORSESHOE    INJECTION OF ANESTHETIC AGENT AROUND MULTIPLE INTERCOSTAL NERVES Left 10/03/2022    Procedure: BLOCK, NERVE, INTERCOSTAL, 2 OR MORE;  Surgeon: Evelio Kaplan MD;  [facility];  Service: Thoracic;  Laterality: Left;    INSERTION OF TUNNELED CENTRAL VENOUS CATHETER (CVC) WITH SUBCUTANEOUS PORT Right 11/03/2022    Procedure: [id number], Right or Left Neck or Chest;  Surgeon: Mini Acevedo MD;  Location: [facility] OR 2ND FLR;  Service: General;  Laterality: Right;    LAPAROSCOPIC CHOLECYSTECTOMY N/A 10/3/2024    Procedure: CHOLECYSTECTOMY, LAPAROSCOPIC;  Surgeon: Ang Mosley III, MD;  Location: [facility] OR;  Service: General;  Laterality: N/A;    LEFT HEART CATHETERIZATION Left 10/9/2024    Procedure: Left heart cath;  Surgeon: Martin Ingram MD;  Location: [facility] CATH/EP LAB;  Service: Cardiology;  Laterality: Left;    ROBOT-ASSISTED LAPAROSCOPIC LYMPHADENECTOMY USING DA MONIQUE XI Left 10/03/2022    Procedure: XI ROBOTIC LYMPHADENECTOMY;  Surgeon: Evelio Kaplan MD;  Location: [facility] OR  2ND FLR;  Service: Thoracic;  Laterality: Left;    SPINE SURGERY      TONSILLECTOMY      XI ROBOTIC RATS,WITH LOBECTOMY,LUNG Left 10/03/2022    Procedure: XI ROBOTIC RATS,WITH LOBECTOMY,LUNG;  Surgeon: Evelio Kaplan MD;  Location: [facility] OR 2ND FLR;  Service: Thoracic;  Laterality: Left;     Family History   Problem Relation Name Age of Onset    Ovarian cancer Sister      Breast cancer Cousin         SUBJECTIVE     Review of systems:  10 system review unremarkable.  As in HPI      OBJECTIVE   Temp:  [98 °F (36.7 °C)-99 °F (37.2 °C)] 98.3 °F (36.8 °C)  Pulse:  [64-90] 64  Resp:  [16-20] 16  SpO2:  [94 %-100 %] 97 %  BP: ()/(56-72) 96/60  Temp:  [98 °F (36.7 °C)-99 °F (37.2 °C)]   Temp: 98.3 °F (36.8 °C) (11/02/24 1108)  Pulse: 64 (11/02/24 1108)  Resp: 16 (11/02/24 0735)  BP: 96/60 (11/02/24 1108)  SpO2: 97 % (11/02/24 1108)    Intake/Output Summary (Last 24 hours) at 11/2/2024 1350  Last data filed at 11/2/2024 1225  Gross per 24 hour   Intake 2792.5 ml   Output 2895 ml   Net -102.5 ml       Physical Exam  General:  Well-groomed middle-aged man lying quietly in bed in no acute distress   CVS: S1 and 2 heard, no murmurs appreciated   Respiratory: Clear to auscultation   Abdomen: Full, soft, nontender, no palpable organomegaly   Skin: No rash appreciated.  Dry   CNS: No focal deficits   Musculoskeletal: No joint or bony abnormalities appreciated  Psych: Good mood, normal affect.     VAD:  PIV  ISOLATION:  None    Wounds:     Significant Labs: All pertinent labs within the past 24 hours have been reviewed.    CBC LAST 7  Recent Labs   Lab 10/28/24  1045 10/29/24  0931 11/01/24  1727 11/02/24  0535   WBC 49.56* 39.93* 35.91* 33.94*   RBC 3.70* 3.76* 3.44* 3.13*   HGB 11.4* 11.6* 10.8* 9.6*   HCT 36.2* 36.2* 33.3* 30.7*   MCV 98 96 97 98   MCH 30.8 30.9 31.4* 30.7   MCHC 31.5* 32.0 32.4 31.3*   RDW 15.0* 15.0* 14.4 14.5    268 273 263   MPV 10.9 11.4 10.6 10.8   GRAN 10.0* 16.0* 12.0* 16.0*   LYMPH 86.0*  " CANCELED 79.0* 83.0*  CANCELED 74.0*  CANCELED   MONO 3.0*  CANCELED 2.0* 4.0  CANCELED 7.0  CANCELED   BASO CANCELED  --  CANCELED CANCELED   NRBC 0 0 0 0       CHEMISTRY LAST 7  Recent Labs   Lab 10/28/24  1045 10/29/24  0931 11/01/24  1158 11/01/24  1727 11/02/24  0535    137 135* 134* 142   K 3.8 3.9 3.8 3.7 4.1    104 102 103 111*   CO2 20* 22* 22* 25 24   ANIONGAP 11 11 11 6* 7*   BUN 7* 9 9 10 10   CREATININE 2.5* 2.5* 2.7* 2.8* 2.6*   * 98 88 116* 84   CALCIUM 9.5 9.8 9.5 9.0 8.6*   MG  --  1.9  --   --  1.8   ALBUMIN 3.7 3.8  --  4.3 3.5   PROT 6.4 6.6  --  6.6 5.5*   ALKPHOS 205* 215*  --  168* 141*   ALT 23 22  --  15 11   AST 47* 49*  --  36 26   BILITOT 0.4 0.5  --  0.4 0.3       Estimated Creatinine Clearance: 19.5 mL/min (A) (based on SCr of 2.6 mg/dL (H)).    INFLAMMATORY/PROCAL  LAST 7  Recent Labs   Lab 10/28/24  1045   CRP 13.3*     No results found for: "ESR"  CRP   Date Value Ref Range Status   10/28/2024 13.3 (H) 0.0 - 8.2 mg/L Final   10/14/2024 0.50 <1.00 mg/dL Final     Comment:     CRP-Normal Application expected values:   <1.0        mg/dL   Normal Range  1.0 - 5.0  mg/dL   Indicates mild inflammation  5.0 - 10.0 mg/dL   Indicates severe inflammation  >10.0        mg/dL   Represents serious processes and   frequently         indicates the presence of a bacterial   infection.      10/08/2024 3.60 (H) <1.00 mg/dL Final     Comment:     CRP-Normal Application expected values:   <1.0        mg/dL   Normal Range  1.0 - 5.0  mg/dL   Indicates mild inflammation  5.0 - 10.0 mg/dL   Indicates severe inflammation  >10.0        mg/dL   Represents serious processes and   frequently         indicates the presence of a bacterial   infection.      09/07/2021 2.7 0.0 - 8.2 mg/L Final   03/03/2020 3.5 0.0 - 8.2 mg/L Final       PRIOR  MICROBIOLOGY:  Reviewed    Susceptibility data from last 90 days.  Collected Specimen Info Organism   10/16/24 Blood from Peripheral, Antecubital, " Right No growth after 5 days.   10/11/24 Blood No growth after 5 days.   10/11/24 Blood No growth after 5 days.   10/05/24 Blood No growth after 5 days.   10/05/24 Blood No growth after 5 days.   10/02/24 Blood from Peripheral, Hand, Left No growth after 5 days.   10/02/24 Blood from Peripheral, Wrist, Right COAGULASE NEGATIVE STAPHYLOCOCCI       LAST 7 DAYS MICROBIOLOGY   Microbiology Results (last 7 days)       ** No results found for the last 168 hours. **              CURRENT/PREVIOUS VISIT EKG  Results for orders placed or performed during the hospital encounter of 11/01/24   EKG 12-lead    Collection Time: 11/01/24  5:24 PM   Result Value Ref Range    QRS Duration 72 ms    [facility] QTC Calculation 451 ms    Narrative    Test Reason : R79.89,    Vent. Rate : 079 BPM     Atrial Rate : 079 BPM     P-R Int : 144 ms          QRS Dur : 072 ms      QT Int : 394 ms       P-R-T Axes : 075 074 085 degrees     QTc Int : 451 ms    Normal sinus rhythm  Nonspecific ST abnormality  Abnormal ECG  When compared with ECG of 19-OCT-2024 18:28,  No significant change was found    Referred By: [email or web address]   SELF           Confirmed By:        Significant Imaging: I have reviewed all relevant and available imaging results/findings within the past 24 hours.    I spent a total of 50 minutes on the day of the visit.This includes face to face time and non-face to face time preparing to see the patient (eg, review of tests), obtaining and/or reviewing separately obtained history, documenting clinical information in the electronic or other health record, independently interpreting results and communicating results to the patient/family/caregiver, or care coordinator.    Lemuel Man MD  Date of Service: 11/02/2024      This note was created using [employer] voice recognition software that occasionally misinterpreted phrases or words.

## 2024-11-02 NOTE — PROGRESS NOTES
[facility]  Progress Note    Patient Name: Yvette Hutchinson  MRN: [id number]  Patient Class: OP- Observation   Admission Date: 11/1/2024  Length of Stay: 0 days  Attending Physician: Delfina Grey MD  Primary Care Provider: Vern Priest MD        Subjective:     Principal Problem:Acute renal failure        HPI:  64-year-old female with a past medical history of reflux disease, CLL, left lung cancer status post resection, depression, Hypokalemia, History of sepsis, s/p cholecystecomy, CLL, Malignant neoplasm of upper lobe of lung presents for evaluation of abnormal lab work. Reports that her kidney function has been getting worse over the last several days. Denies any associated chest pain, shortness of breath, nausea/vomiting, hematuria, dysuria, decreased urine output, fever/chills, or abdominal pain.   Of note: patient was discharged 10/16/2024 after a 14 day stay in the hospital for septic shock. Only positive culture was coagulase-negative Staph in blood. Completed IV vancomycin 10/28/24.    In ER, afebrile, leukocytosis improving with WBC 35.91, mild anemia with H&H 10.8/33.3, hyponatremia treat make with sodium of 133, serum creatinine 2.8, glucose 116, UA with 1+ leukocytes, 2 WBCs    Admit to hospital medicine for GM    Overview/Hospital Course:  Admited for abnormal lab work - found to have worsening chronic leukocytosis, acute on chronic CKD, and hyponatremia. Started on IV fluids - sodium normalized. Noted to have recently completed IV ABX therapy and poor appetite. ID consulted as well, Nephrology consulted related to CKD. Cautious IV fluids r/t known heart failure (EF 30-35% [10/03/24]).     Past Medical History:   Diagnosis Date    Arthritis     Cancer 10/2022    (CLL) leukemia ; adenocarcinoma  adenosgemis    Depression     GERD (gastroesophageal reflux disease)     Neck pain     and back pain    Personal history of colonic polyps 07/07/2017     Pneumonia of left lung due to infectious organism 03/05/2020    Thyroid disease        Past Surgical History:   Procedure Laterality Date    ERCP N/A 10/10/2024    Procedure: ERCP (ENDOSCOPIC RETROGRADE CHOLANGIOPANCREATOGRAPHY);  Surgeon: Joshua Polanco III, MD;  Location: [facility] ENDO;  Service: Endoscopy;  Laterality: N/A;    FUSION, SPINE, CERVICAL, ANTERIOR APPROACH N/A 08/06/2024    Procedure: FUSION, SPINE, CERVICAL, ANTERIOR APPROACH;  Surgeon: Anatoly Daley DO;  Location: [facility] OR;  Service: Neurosurgery;  Laterality: N/A;  IOM, C-ARM, MEDTRONICS, MICRO, HORSESHOE    INJECTION OF ANESTHETIC AGENT AROUND MULTIPLE INTERCOSTAL NERVES Left 10/03/2022    Procedure: BLOCK, NERVE, INTERCOSTAL, 2 OR MORE;  Surgeon: Evelio Kaplan MD;  Location: [facility] OR 2ND FLR;  Service: Thoracic;  Laterality: Left;    INSERTION OF TUNNELED CENTRAL VENOUS CATHETER (CVC) WITH SUBCUTANEOUS PORT Right 11/03/2022    Procedure: [id number], Right or Left Neck or Chest;  Surgeon: Mini Acevedo MD;  Location: [facility] OR 2ND FLR;  Service: General;  Laterality: Right;    LAPAROSCOPIC CHOLECYSTECTOMY N/A 10/3/2024    Procedure: CHOLECYSTECTOMY, LAPAROSCOPIC;  Surgeon: Ang Mosley III, MD;  Location: [facility] OR;  Service: General;  Laterality: N/A;    LEFT HEART CATHETERIZATION Left 10/9/2024    Procedure: Left heart cath;  Surgeon: Martin Ingram MD;  Location: [facility] CATH/EP LAB;  Service: Cardiology;  Laterality: Left;    ROBOT-ASSISTED LAPAROSCOPIC LYMPHADENECTOMY USING DA MONIQUE XI Left 10/03/2022    Procedure: XI ROBOTIC LYMPHADENECTOMY;  Surgeon: Evelio Kaplan MD;  Location: [facility] OR 2ND FLR;  Service: Thoracic;  Laterality: Left;    SPINE SURGERY      TONSILLECTOMY      XI ROBOTIC RATS,WITH LOBECTOMY,LUNG Left 10/03/2022    Procedure: XI ROBOTIC RATS,WITH LOBECTOMY,LUNG;  Surgeon: Evelio Kaplan MD;  Location: [facility] OR 2ND FLR;  Service: Thoracic;  Laterality: Left;       Review of patient's  allergies indicates:   Allergen Reactions    Latex, natural rubber        No current facility-administered medications on file prior to encounter.     Current Outpatient Medications on File Prior to Encounter   Medication Sig    albuterol (PROVENTIL/VENTOLIN HFA) 90 mcg/actuation inhaler Inhale 2 puffs into the lungs every 6 (six) hours as needed for Wheezing or Shortness of Breath. Rescue    buPROPion (WELLBUTRIN XL) 300 MG 24 hr tablet Take 1 tablet (300 mg total) by mouth once daily.    citalopram (CELEXA) 20 MG tablet Take 1 tablet (20 mg total) by mouth once daily.    fluticasone-salmeterol 230-21 mcg/dose (ADVAIR HFA) 230-21 mcg/actuation HFAA inhaler Inhale 2 puffs into the lungs 2 (two) times daily. Controller    gabapentin (NEURONTIN) 300 MG capsule Take 1 capsule (300 mg total) by mouth 3 (three) times daily.    levothyroxine (SYNTHROID) 75 MCG tablet Take 1 tablet (75 mcg total) by mouth before breakfast.    ondansetron (ZOFRAN-ODT) 8 MG TbDL Take 1 tablet (8 mg total) by mouth every 6 (six) hours as needed (nausea).    pantoprazole (PROTONIX) 40 MG tablet Take 1 tablet (40 mg total) by mouth once daily. for 14 days    potassium chloride (MICRO-K) 10 MEQ CpSR Take 2 capsules (20 mEq total) by mouth once daily. (Patient taking differently: Take 10 mEq by mouth 2 (two) times daily.)    pravastatin (PRAVACHOL) 10 MG tablet Take 1 tablet (10 mg total) by mouth once daily.    topiramate (TOPAMAX) 50 MG tablet Take 1 tablet (50 mg total) by mouth 2 (two) times daily.    fluticasone propionate (FLONASE) 50 mcg/actuation nasal spray 1 spray (50 mcg total) by Each Nostril route once daily.    naloxone (NARCAN) 4 mg/actuation Spry 1 spray by Nasal route once.    [START ON 11/29/2024] topiramate (TOPAMAX) 25 MG tablet Take 1 tablet (25 mg total) by mouth 2 (two) times daily.     Family History       Problem Relation (Age of Onset)    Breast cancer Cousin    Ovarian cancer Sister          Tobacco Use    Smoking  status: Former     Types: Cigarettes     Passive exposure: Current    Smokeless tobacco: Never    Tobacco comments:     PT states she has quit now for a few months, plans to stay quit.   Substance and Sexual Activity    Alcohol use: Yes     Comment: rarely    Drug use: Yes     Types: Marijuana    Sexual activity: Not on file     Review of Systems   Constitutional: Negative.    HENT: Negative.     Respiratory: Negative.     Cardiovascular: Negative.    Gastrointestinal: Negative.    Genitourinary: Negative.    Musculoskeletal: Negative.    Skin: Negative.    Neurological: Negative.    Psychiatric/Behavioral: Negative.       Objective:     Vital Signs (Most Recent):  Temp: 98.3 °F (36.8 °C) (11/02/24 1108)  Pulse: 64 (11/02/24 1108)  Resp: 16 (11/02/24 0735)  BP: 96/60 (11/02/24 1108)  SpO2: 97 % (11/02/24 1108) Vital Signs (24h Range):  Temp:  [98 °F (36.7 °C)-99 °F (37.2 °C)] 98.3 °F (36.8 °C)  Pulse:  [64-90] 64  Resp:  [16-20] 16  SpO2:  [94 %-100 %] 97 %  BP: ()/(56-72) 96/60     Weight: 64.9 kg (143 lb 1.3 oz)  Body mass index is 25.35 kg/m².     Physical Exam  Vitals reviewed.   Constitutional:       Appearance: Normal appearance.      Comments: Body mass index is 25.35 kg/m².; small framed; frail appearing   HENT:      Head: Normocephalic and atraumatic.      Mouth/Throat:      Mouth: Mucous membranes are moist.   Eyes:      Pupils: Pupils are equal, round, and reactive to light.   Cardiovascular:      Rate and Rhythm: Normal rate and regular rhythm.      Pulses: Normal pulses.      Heart sounds: No murmur heard.  Pulmonary:      Effort: Pulmonary effort is normal.      Breath sounds: Normal breath sounds.   Abdominal:      General: Bowel sounds are normal. There is no distension.      Palpations: Abdomen is soft.      Tenderness: There is no abdominal tenderness.   Musculoskeletal:         General: Normal range of motion.      Cervical back: Normal range of motion and neck supple.   Skin:     General:  Skin is warm and dry.      Capillary Refill: Capillary refill takes less than 2 seconds.   Neurological:      General: No focal deficit present.      Mental Status: She is alert. Mental status is at baseline.   Psychiatric:         Mood and Affect: Mood normal.              CRANIAL NERVES     CN III, IV, VI   Pupils are equal, round, and reactive to light.       Significant Labs: All pertinent labs within the past 24 hours have been reviewed.  Recent Lab Results         11/02/24  0535   11/01/24  2051   11/01/24  1743   11/01/24  1727        Albumin 3.5       4.3              168       ALT 11       15       Anion Gap 7       6       Aniso       Slight       Appearance, UA     Clear         AST 26       36       Baso # CANCELED  Comment: Result canceled by the ancillary.       CANCELED  Comment: Result canceled by the ancillary.       Basophil % 0.0       0.0       Bilirubin (UA)     Negative         BILIRUBIN TOTAL 0.3  Comment: For infants and newborns, interpretation of results should be based  on gestational age, weight and in agreement with clinical  observations.    Premature Infant recommended reference ranges:  Up to 24 hours.............<8.0 mg/dL  Up to 48 hours............<12.0 mg/dL  3-5 days..................<15.0 mg/dL  6-29 days.................<15.0 mg/dL         0.4  Comment: For infants and newborns, interpretation of results should be based  on gestational age, weight and in agreement with clinical  observations.    Premature Infant recommended reference ranges:  Up to 24 hours.............<8.0 mg/dL  Up to 48 hours............<12.0 mg/dL  3-5 days..................<15.0 mg/dL  6-29 days.................<15.0 mg/dL           Comment: Values of less than 100 pg/ml are consistent with non-CHF populations.             BUN 10       10       Calcium 8.6       9.0       Chloride 111       103       CO2 24       25       Color, UA     Colorless         Creatinine 2.6       2.8        Differential Method Manual  Comment: CORRECTED RESULT; previously reported as Automated on 11/02/2024 at   07:43.    [C]       Manual       eGFR 19.9       18.2       Eos # CANCELED  Comment: Result canceled by the ancillary.       CANCELED  Comment: Result canceled by the ancillary.       Eos % 3.0       1.0       Glucose 84       116       Glucose, UA     Negative         Gran % 16.0       12.0       Hematocrit 30.7       33.3       Hemoglobin 9.6       10.8       Immature Grans (Abs) CANCELED  Comment: Mild elevation in immature granulocytes is non specific and   can be seen in a variety of conditions including stress response,   acute inflammation, trauma and pregnancy. Correlation with other   laboratory and clinical findings is essential.    Result canceled by the ancillary.         CANCELED  Comment: Mild elevation in immature granulocytes is non specific and   can be seen in a variety of conditions including stress response,   acute inflammation, trauma and pregnancy. Correlation with other   laboratory and clinical findings is essential.    Result canceled by the ancillary.         Immature Granulocytes CANCELED  Comment: Result canceled by the ancillary.       CANCELED  Comment: Result canceled by the ancillary.       Ketones, UA     Negative         Leukocyte Esterase, UA     1+         Lymph # CANCELED  Comment: Result canceled by the ancillary.       CANCELED  Comment: Result canceled by the ancillary.       Lymph % 74.0       83.0  Comment: Reactive lymphocytes present.       Magnesium  1.8             MCH 30.7       31.4       MCHC 31.3       32.4       MCV 98       97       Microscopic Comment     SEE COMMENT  Comment: Other formed elements not mentioned in the report are not   present in the microscopic examination.            Mono # CANCELED  Comment: Result canceled by the ancillary.       CANCELED  Comment: Result canceled by the ancillary.       Mono % 7.0       4.0       MPV 10.8       10.6        NITRITE UA     Negative         nRBC 0       0       Blood, UA     Negative         Urine Osmolality   66  Comment: The random urine reference ranges provided were established   for 24 hour urine collections.  No reference ranges exist for  random urine specimens.  Correlate clinically.             pH, UA     6.0         Platelet Estimate Appears normal       Appears normal       Platelet Count 263       273       Potassium 4.1       3.7       PROTEIN TOTAL 5.5       6.6       Protein, UA     Negative  Comment: Recommend a 24 hour urine protein or a urine   protein/creatinine ratio if globulin induced proteinuria is  clinically suspected.           RBC 3.13       3.44       RBC, UA     1         RDW 14.5       14.4       Smudge Cells Present  Comment: Smudge cells present;Substantial numbers may affect the   accuracy of the differential.         Present  Comment: Smudge cells present;Substantial numbers may affect the   accuracy of the differential.         Sodium 142       134       Sodium, Urine   23  Comment: The random urine reference ranges provided were established   for 24 hour urine collections.  No reference ranges exist for  random urine specimens.  Correlate clinically.             Spec Grav UA     <1.005         Specimen UA     Urine, Clean Catch         Squam Epithel, UA     1         Uric Acid 5.6             UROBILINOGEN UA     Negative         WBC, UA     2         WBC 33.94       35.91  Comment: WBC critical result(s) repeated. Called and verbal readback obtained   from Quynh Larose RN. by JRYasir 11/01/2024 18:22                  [C] - Corrected Result               Significant Imaging: I have reviewed all pertinent imaging results/findings within the past 24 hours.  None      Assessment/Plan:      * Acute renal failure  GM is likely due to pre-renal azotemia due to dehydration. Baseline creatinine is  1 . Most recent creatinine and eGFR are listed below.  Recent Labs     11/01/24  4521  11/01/24  1727   CREATININE 2.7* 2.8*   EGFRNORACEVR 19* 18.2*      Plan  - GM is worsening. Will adjust treatment as follows: continue IV fluids  - Avoid nephrotoxins and renally dose meds for GFR listed above  - Monitor urine output, serial BMP, and adjust therapy as needed    Creatinine levels trending upward  Consult Nephrology  Renal US ordered  Urine Na, Urine Osmolality      Leukocytosis  WBC trending down  56.27 2 weeks ago, today 35.91  Completed IV vancomycin on 10/28/24  Trend WBC  Consulted ID          CLL (chronic lymphocytic leukemia)  Noted  Chronic leukocytosis - improved somewhat with IV fluids  Continue Hematology        VTE Risk Mitigation (From admission, onward)           Ordered     enoxaparin injection 30 mg  Every 24 hours         11/02/24 1244     IP VTE HIGH RISK PATIENT  Once         11/01/24 1953     Place sequential compression device  Until discontinued         11/01/24 1953                    Discharge Planning   SALINA:      Code Status: Full Code   Is the patient medically ready for discharge?:     Reason for patient still in hospital (select all that apply): Patient unstable, Patient trending condition, and Treatment                 Janie Rao, DNP, APRN, FNP-C  [facility]  [facility]  [email or web address]

## 2024-11-02 NOTE — HOSPITAL COURSE
Admited for abnormal lab work - found to have worsening chronic leukocytosis, acute on chronic CKD, and hyponatremia. Started on IV fluids - sodium normalized. Noted to have recently completed IV ABX therapy and poor appetite. ID consulted as well, Nephrology consulted related to CKD. Cautious IV fluids administered r/t known heart failure (EF 30-35% [10/03/24]). Creatine level trending down. Due to patient feeling better, voiding well, and national IV fluid shortage, IV fluids discontinued. Cortisol level low = 3.7. Glucose sustaining around 80's. Cosyntropin stimulation test in am. Discussed case with Endocrinology [facility], Dr. Jennifer MD who gave recommendations for treatment, including start empiric hydrocortisone 20 mg in the morning and 10 mg in the evening and d/c her with adrenal insufficiency sick day precautions and refer to endocrinology outpatient.  Case management consulted and ambulatory referral placed for endocrinology outpatient.  She should also follow up with Endocrinology as usual.  Patient given extensive instructions on sick day protocol and typed instructions explained.  Patient is alert oriented and feels fine.  Shows discharge.  Vitals stable discharge to home in stable condition.  Repeat chemistry Friday.

## 2024-11-02 NOTE — CONSULTS
Consult Note  Nephrology    Consult Requested By: Delfina Grey MD    Reason for Consult:   GM    SUBJECTIVE:     History of Present Illness:    64-year-old female with a past medical history of reflux disease, CLL, left lung cancer status post resection, depression, Hypokalemia, History of sepsis, s/p cholecystecomy, CLL, Malignant neoplasm of upper lobe of lung presents for evaluation of abnormal lab work. Reports that her kidney function has been getting worse over the last several days PTA.  Of note: patient was discharged 10/16/2024 after a 14 day stay in the hospital for septic shock. Only positive culture was coagulase-negative Staph in blood. Completed IV vancomycin 10/28/24.  In ER, afebrile, leukocytosis improving with WBC 35.91, mild anemia with H&H 10.8/33.3, hyponatremia treat make with sodium of 133, serum creatinine 2.8, glucose 116, UA with 1+ leukocytes, 2 WBCs.  Nephrology is consulted for GM.      11/2  UOP for one shift 1L, hypotensive at times.  Scr down a bit from admit, Na+ now up to 142.  UA noted.  No hydronephrosis on renal us.  Old lab results reviewed, has baseline CKD 3.  Scr is normal at baseline, 1.1-1.4.  has never seen a nephrologist in out patient setting.  Reports recent increase in levothyroxine dose.  States appetite has been overall poor d/t recent MI and hospitalization, but has been slowly improving.    Assessment/plan:    GM  CKD 3  Hyponatremia  Anemia of chronic dz  hypothyroidism    --GM d/t poor intake, hemodynamics.  agree w/IVF.  Renal US and UA done.  F/u renal panel and TSH in am.  Added on uric acid level.  --Avoid nephrotoxic agents.  No NSAIDs, COXibs, or aminoglycoside antibiotics.  Dose all medications for level of renal function.  --Na+ improved  --recent iron studies noted  --TSH in am d/t recent med change    Past Medical History:   Diagnosis Date    Arthritis     Cancer 10/2022    (CLL) leukemia ; adenocarcinoma  adenosgemis    Depression     GERD  (gastroesophageal reflux disease)     Neck pain     and back pain    Personal history of colonic polyps 07/07/2017    Pneumonia of left lung due to infectious organism 03/05/2020    Thyroid disease      Past Surgical History:   Procedure Laterality Date    ERCP N/A 10/10/2024    Procedure: ERCP (ENDOSCOPIC RETROGRADE CHOLANGIOPANCREATOGRAPHY);  Surgeon: Joshua Polanco III, MD;  Location: [facility] ENDO;  Service: Endoscopy;  Laterality: N/A;    FUSION, SPINE, CERVICAL, ANTERIOR APPROACH N/A 08/06/2024    Procedure: FUSION, SPINE, CERVICAL, ANTERIOR APPROACH;  Surgeon: Anatoly Daley DO;  Location: [facility] OR;  Service: Neurosurgery;  Laterality: N/A;  IOM, C-ARM, MEDTRONICS, MICRO, HORSESHOE    INJECTION OF ANESTHETIC AGENT AROUND MULTIPLE INTERCOSTAL NERVES Left 10/03/2022    Procedure: BLOCK, NERVE, INTERCOSTAL, 2 OR MORE;  Surgeon: Evelio Kaplan MD;  Location: [facility] OR 2ND FLR;  Service: Thoracic;  Laterality: Left;    INSERTION OF TUNNELED CENTRAL VENOUS CATHETER (CVC) WITH SUBCUTANEOUS PORT Right 11/03/2022    Procedure: [id number], Right or Left Neck or Chest;  Surgeon: Mini Acevedo MD;  Location: [facility] OR 2ND FLR;  Service: General;  Laterality: Right;    LAPAROSCOPIC CHOLECYSTECTOMY N/A 10/3/2024    Procedure: CHOLECYSTECTOMY, LAPAROSCOPIC;  Surgeon: Ang Mosley III, MD;  Location: [facility] OR;  Service: General;  Laterality: N/A;    LEFT HEART CATHETERIZATION Left 10/9/2024    Procedure: Left heart cath;  Surgeon: Martin Ingram MD;  Location: [facility] CATH/EP LAB;  Service: Cardiology;  Laterality: Left;    ROBOT-ASSISTED LAPAROSCOPIC LYMPHADENECTOMY USING DA MONIQUE XI Left 10/03/2022    Procedure: XI ROBOTIC LYMPHADENECTOMY;  Surgeon: Evelio Kaplan MD;  Location: [facility] OR 2ND FLR;  Service: Thoracic;  Laterality: Left;    SPINE SURGERY      TONSILLECTOMY      XI ROBOTIC RATS,WITH LOBECTOMY,LUNG Left 10/03/2022    Procedure: XI ROBOTIC RATS,WITH LOBECTOMY,LUNG;  Surgeon:  Evelio Kalpan MD;  Location: [address];  Service: Thoracic;  Laterality: Left;     Family History   Problem Relation Name Age of Onset    Ovarian cancer Sister      Breast cancer Cousin       Social History     Tobacco Use    Smoking status: Former     Types: Cigarettes     Passive exposure: Current    Smokeless tobacco: Never    Tobacco comments:     PT states she has quit now for a few months, plans to stay quit.   Substance Use Topics    Alcohol use: Yes     Comment: rarely    Drug use: Yes     Types: Marijuana       Review of patient's allergies indicates:   Allergen Reactions    Latex, natural rubber         Review of Systems:  As above    OBJECTIVE:     Vital Signs Range (Last 24H):  Temp:  [98 °F (36.7 °C)-99 °F (37.2 °C)]   Pulse:  [66-90]   Resp:  [16-20]   BP: ()/(56-72)   SpO2:  [94 %-100 %]     Physical Exam:  General- NAD noted  HEENT- WNL  Neck- supple  CV- Regular rate and rhythm  Resp- Lungs CTA Bilaterally, No increased WOB  GI- Non tender/non-distended, BS normoactive x4 quads  Extrem- No cyanosis, clubbing, edema.  Derm- skin w/d  Neuro-  No flap.     Body mass index is 25.35 kg/m².    Laboratory:  CBC:   Recent Labs   Lab 11/02/24  0535   WBC 33.94*   RBC 3.13*   HGB 9.6*   HCT 30.7*      MCV 98   MCH 30.7   MCHC 31.3*     CMP:   Recent Labs   Lab 11/02/24  0535   GLU 84   CALCIUM 8.6*   ALBUMIN 3.5   PROT 5.5*      K 4.1   CO2 24   *   BUN 10   CREATININE 2.6*   ALKPHOS 141*   ALT 11   AST 26   BILITOT 0.3     Recent Labs   Lab 11/01/24  1743   COLORU Colorless*   SPECGRAV <1.005*   PHUR 6.0   PROTEINUA Negative   NITRITE Negative   LEUKOCYTESUR 1+*   UROBILINOGEN Negative       Diagnostic Results:  Labs: Reviewed      ASSESSMENT/PLAN:     Active Hospital Problems    Diagnosis  POA    Leukocytosis [D72.829]  Yes    Acute renal failure [N17.9]  Yes    CLL (chronic lymphocytic leukemia) [C91.10]  Yes      Resolved Hospital Problems   No resolved problems to  display.         Thank you for allowing us to participate in the care of your patient. We will follow the patient and provide recommendations as needed.      Time spent seeing patient( greater than 1/2 spent in direct contact) :     Denia Jackson NP

## 2024-11-02 NOTE — H&P
[facility] - Emergency Dept  Hospital Medicine  History & Physical    Patient Name: Yvette Hutchinson  MRN: [id number]  Patient Class: OP- Observation  Admission Date: 11/1/2024  Attending Physician: Ivanna Smith MD   Primary Care Provider: Vern Priest MD         Patient information was obtained from patient, past medical records, and ER records.     Subjective:     Principal Problem:<principal problem not specified>    Chief Complaint:   Chief Complaint   Patient presents with    ABNORMAL LABS     KIDNEY FUNCTIONS         HPI: 64-year-old female with a past medical history of reflux disease, CLL, left lung cancer status post resection, depression, Hypokalemia, History of sepsis, s/p cholecystecomy, CLL, Malignant neoplasm of upper lobe of lung presents for evaluation of abnormal lab work. Reports that her kidney function has been getting worse over the last several days. Denies any associated chest pain, shortness of breath, nausea/vomiting, hematuria, dysuria, decreased urine output, fever/chills, or abdominal pain.   Of note: patient was discharged 10/16/2024 after a 14 day stay in the hospital for septic shock. Only positive culture was coagulase-negative Staph in blood. Completed IV vancomycin 10/28/24.    In ER, afebrile, leukocytosis improving with WBC 35.91, mild anemia with H&H 10.8/33.3, hyponatremia treat make with sodium of 133, serum creatinine 2.8, glucose 116, UA with 1+ leukocytes, 2 WBCs    Admit to hospital medicine for GM    Past Medical History:   Diagnosis Date    Arthritis     Cancer 10/2022    (CLL) leukemia ; adenocarcinoma  adenosgemis    Depression     GERD (gastroesophageal reflux disease)     Neck pain     and back pain    Personal history of colonic polyps 07/07/2017    Pneumonia of left lung due to infectious organism 03/05/2020    Thyroid disease        Past Surgical History:   Procedure Laterality Date    ERCP N/A 10/10/2024    Procedure: ERCP (ENDOSCOPIC  RETROGRADE CHOLANGIOPANCREATOGRAPHY);  Surgeon: Joshua Polanco III, MD;  Location: [facility] ENDO;  Service: Endoscopy;  Laterality: N/A;    FUSION, SPINE, CERVICAL, ANTERIOR APPROACH N/A 08/06/2024    Procedure: FUSION, SPINE, CERVICAL, ANTERIOR APPROACH;  Surgeon: Anatoly Daley DO;  Location: [facility] OR;  Service: Neurosurgery;  Laterality: N/A;  IOM, C-ARM, MEDTRONICS, MICRO, HORSESHOE    INJECTION OF ANESTHETIC AGENT AROUND MULTIPLE INTERCOSTAL NERVES Left 10/03/2022    Procedure: BLOCK, NERVE, INTERCOSTAL, 2 OR MORE;  Surgeon: Evelio Kaplan MD;  Location: [facility] OR 2ND FLR;  Service: Thoracic;  Laterality: Left;    INSERTION OF TUNNELED CENTRAL VENOUS CATHETER (CVC) WITH SUBCUTANEOUS PORT Right 11/03/2022    Procedure: [id number], Right or Left Neck or Chest;  Surgeon: Mini Acevedo MD;  Location: [facility] OR 2ND FLR;  Service: General;  Laterality: Right;    LAPAROSCOPIC CHOLECYSTECTOMY N/A 10/3/2024    Procedure: CHOLECYSTECTOMY, LAPAROSCOPIC;  Surgeon: Ang Mosley III, MD;  Location: [facility] OR;  Service: General;  Laterality: N/A;    LEFT HEART CATHETERIZATION Left 10/9/2024    Procedure: Left heart cath;  Surgeon: Martin Ingram MD;  Location: [facility] CATH/EP LAB;  Service: Cardiology;  Laterality: Left;    ROBOT-ASSISTED LAPAROSCOPIC LYMPHADENECTOMY USING DA MONIQUE XI Left 10/03/2022    Procedure: XI ROBOTIC LYMPHADENECTOMY;  Surgeon: Evelio Kaplan MD;  Location: [facility] OR 2ND FLR;  Service: Thoracic;  Laterality: Left;    SPINE SURGERY      TONSILLECTOMY      XI ROBOTIC RATS,WITH LOBECTOMY,LUNG Left 10/03/2022    Procedure: XI ROBOTIC RATS,WITH LOBECTOMY,LUNG;  Surgeon: Evelio Kaplan MD;  Location: [facility] OR 2ND FLR;  Service: Thoracic;  Laterality: Left;       Review of patient's allergies indicates:   Allergen Reactions    Latex, natural rubber        No current facility-administered medications on file prior to encounter.     Current Outpatient Medications on File  Prior to Encounter   Medication Sig    albuterol (PROVENTIL/VENTOLIN HFA) 90 mcg/actuation inhaler Inhale 2 puffs into the lungs every 6 (six) hours as needed for Wheezing or Shortness of Breath. Rescue    buPROPion (WELLBUTRIN XL) 300 MG 24 hr tablet Take 1 tablet (300 mg total) by mouth once daily.    citalopram (CELEXA) 20 MG tablet Take 1 tablet (20 mg total) by mouth once daily.    fluticasone-salmeterol 230-21 mcg/dose (ADVAIR HFA) 230-21 mcg/actuation HFAA inhaler Inhale 2 puffs into the lungs 2 (two) times daily. Controller    gabapentin (NEURONTIN) 300 MG capsule Take 1 capsule (300 mg total) by mouth 3 (three) times daily.    levothyroxine (SYNTHROID) 75 MCG tablet Take 1 tablet (75 mcg total) by mouth before breakfast.    ondansetron (ZOFRAN-ODT) 8 MG TbDL Take 1 tablet (8 mg total) by mouth every 6 (six) hours as needed (nausea).    pantoprazole (PROTONIX) 40 MG tablet Take 1 tablet (40 mg total) by mouth once daily. for 14 days    potassium chloride (MICRO-K) 10 MEQ CpSR Take 2 capsules (20 mEq total) by mouth once daily. (Patient taking differently: Take 10 mEq by mouth 2 (two) times daily.)    pravastatin (PRAVACHOL) 10 MG tablet Take 1 tablet (10 mg total) by mouth once daily.    topiramate (TOPAMAX) 50 MG tablet Take 1 tablet (50 mg total) by mouth 2 (two) times daily.    fluticasone propionate (FLONASE) 50 mcg/actuation nasal spray 1 spray (50 mcg total) by Each Nostril route once daily.    naloxone (NARCAN) 4 mg/actuation Spry 1 spray by Nasal route once.    [START ON 11/29/2024] topiramate (TOPAMAX) 25 MG tablet Take 1 tablet (25 mg total) by mouth 2 (two) times daily.     Family History       Problem Relation (Age of Onset)    Breast cancer Cousin    Ovarian cancer Sister          Tobacco Use    Smoking status: Former     Types: Cigarettes     Passive exposure: Current    Smokeless tobacco: Never    Tobacco comments:     PT states she has quit now for a few months, plans to stay quit.    Substance and Sexual Activity    Alcohol use: Yes     Comment: rarely    Drug use: Yes     Types: Marijuana    Sexual activity: Not on file     Review of Systems   Constitutional: Negative.    HENT: Negative.     Respiratory: Negative.     Cardiovascular: Negative.    Gastrointestinal: Negative.    Genitourinary: Negative.    Musculoskeletal: Negative.    Skin: Negative.    Neurological: Negative.    Psychiatric/Behavioral: Negative.       Objective:     Vital Signs (Most Recent):  Temp: 98.6 °F (37 °C) (11/01/24 1650)  Pulse: 74 (11/01/24 1927)  Resp: 20 (11/01/24 1927)  BP: 116/62 (11/01/24 1927)  SpO2: 100 % (11/01/24 1927) Vital Signs (24h Range):  Temp:  [98.6 °F (37 °C)] 98.6 °F (37 °C)  Pulse:  [74-90] 74  Resp:  [18-20] 20  SpO2:  [97 %-100 %] 100 %  BP: (110-116)/(62-72) 116/62     Weight: 64 kg (141 lb)  Body mass index is 24.98 kg/m².     Physical Exam  Vitals reviewed.   Constitutional:       Appearance: Normal appearance.   HENT:      Head: Normocephalic and atraumatic.      Mouth/Throat:      Mouth: Mucous membranes are moist.   Eyes:      Pupils: Pupils are equal, round, and reactive to light.   Cardiovascular:      Rate and Rhythm: Normal rate and regular rhythm.      Pulses: Normal pulses.      Heart sounds: No murmur heard.  Pulmonary:      Effort: Pulmonary effort is normal.      Breath sounds: Normal breath sounds.   Abdominal:      General: Bowel sounds are normal. There is no distension.      Palpations: Abdomen is soft.      Tenderness: There is no abdominal tenderness.   Musculoskeletal:         General: Normal range of motion.      Cervical back: Normal range of motion and neck supple.   Skin:     General: Skin is warm and dry.      Capillary Refill: Capillary refill takes less than 2 seconds.   Neurological:      General: No focal deficit present.      Mental Status: She is alert. Mental status is at baseline.   Psychiatric:         Mood and Affect: Mood normal.              CRANIAL  NERVES     CN III, IV, VI   Pupils are equal, round, and reactive to light.       Significant Labs: All pertinent labs within the past 24 hours have been reviewed.  Recent Lab Results         11/01/24  1743   11/01/24  1727   11/01/24  1158        Albumin   4.3         ALP   168         ALT   15         Anion Gap   6   11       Aniso   Slight         Appearance, UA Clear           AST   36         Baso #   CANCELED  Comment: Result canceled by the ancillary.         Basophil %   0.0         Bilirubin (UA) Negative           BILIRUBIN TOTAL   0.4  Comment: For infants and newborns, interpretation of results should be based  on gestational age, weight and in agreement with clinical  observations.    Premature Infant recommended reference ranges:  Up to 24 hours.............<8.0 mg/dL  Up to 48 hours............<12.0 mg/dL  3-5 days..................<15.0 mg/dL  6-29 days.................<15.0 mg/dL           BUN   10   9       Calcium   9.0   9.5       Chloride   103   102       CO2   25   22       Color, UA Colorless           Creatinine   2.8   2.7       Differential Method   Manual         eGFR   18.2   19       Eos #   CANCELED  Comment: Result canceled by the ancillary.         Eos %   1.0         Glucose   116   88       Glucose, UA Negative           Gran %   12.0         Hematocrit   33.3         Hemoglobin   10.8         Immature Grans (Abs)   CANCELED  Comment: Mild elevation in immature granulocytes is non specific and   can be seen in a variety of conditions including stress response,   acute inflammation, trauma and pregnancy. Correlation with other   laboratory and clinical findings is essential.    Result canceled by the ancillary.           Immature Granulocytes   CANCELED  Comment: Result canceled by the ancillary.         Ketones, UA Negative           Leukocyte Esterase, UA 1+           Lymph #   CANCELED  Comment: Result canceled by the ancillary.         Lymph %   83.0  Comment: Reactive  lymphocytes present.         MCH   31.4         MCHC   32.4         MCV   97         Microscopic Comment SEE COMMENT  Comment: Other formed elements not mentioned in the report are not   present in the microscopic examination.              Mono #   CANCELED  Comment: Result canceled by the ancillary.         Mono %   4.0         MPV   10.6         NITRITE UA Negative           nRBC   0         Blood, UA Negative           pH, UA 6.0           Platelet Estimate   Appears normal         Platelet Count   273         Potassium   3.7   3.8       PROTEIN TOTAL   6.6         Protein, UA Negative  Comment: Recommend a 24 hour urine protein or a urine   protein/creatinine ratio if globulin induced proteinuria is  clinically suspected.             RBC   3.44         RBC, UA 1           RDW   14.4         Smudge Cells   Present  Comment: Smudge cells present;Substantial numbers may affect the   accuracy of the differential.           Sodium   134   135       Spec Grav UA <1.005           Specimen UA Urine, Clean Catch           Squam Epithel, UA 1           UROBILINOGEN UA Negative           WBC, UA 2           WBC   35.91  Comment: WBC critical result(s) repeated. Called and verbal readback obtained   from Quynh Larose RN. by JRYasir 11/01/2024 18:22                   Significant Imaging: I have reviewed all pertinent imaging results/findings within the past 24 hours.  None    Assessment/Plan:     Leukocytosis  WBC trending down  56.27 2 weeks ago, today 35.91  Completed IV vancomycin on 10/28/24  Trend WBC  Consult ID          Acute renal failure  GM is likely due to pre-renal azotemia due to dehydration. Baseline creatinine is  1 . Most recent creatinine and eGFR are listed below.  Recent Labs     11/01/24  1158 11/01/24  1727   CREATININE 2.7* 2.8*   EGFRNORACEVR 19* 18.2*      Plan  - GM is worsening. Will adjust treatment as follows: continue IV fluids  - Avoid nephrotoxins and renally dose meds for GFR listed  above  - Monitor urine output, serial BMP, and adjust therapy as needed    Creatinine levels trending upward  Consult Nephrology  Renal US ordered  Urine Na, Urine Osmolality        VTE Risk Mitigation (From admission, onward)           Ordered     IP VTE HIGH RISK PATIENT  Once         11/01/24 1953     Place sequential compression device  Until discontinued         11/01/24 1953                         On 11/01/2024, patient should be placed in hospital observation services under my care in collaboration with Dr. mSith.           Jane Alex NP  Department of Hospital Medicine  [facility] - Emergency Dept

## 2024-11-02 NOTE — ASSESSMENT & PLAN NOTE
WBC trending down  56.27 2 weeks ago, today 35.91  Completed IV vancomycin on 10/28/24  Trend WBC  Consulted ID

## 2024-11-02 NOTE — ASSESSMENT & PLAN NOTE
WBC trending down  56.27 2 weeks ago, today 35.91  Completed IV vancomycin on 10/28/24  Trend WBC  Consult ID

## 2024-11-02 NOTE — PLAN OF CARE
Problem: Adult Inpatient Plan of Care  Goal: Plan of Care Review  Outcome: Progressing  Goal: Patient-Specific Goal (Individualized)  Outcome: Progressing  Goal: Absence of Hospital-Acquired Illness or Injury  Outcome: Progressing  Goal: Optimal Comfort and Wellbeing  Outcome: Progressing  Goal: Readiness for Transition of Care  Outcome: Progressing     Problem: Sepsis/Septic Shock  Goal: Optimal Coping  Outcome: Progressing  Goal: Absence of Bleeding  Outcome: Progressing  Goal: Blood Glucose Level Within Targeted Range  Outcome: Progressing  Goal: Absence of Infection Signs and Symptoms  Outcome: Progressing  Goal: Optimal Nutrition Intake  Outcome: Progressing     Problem: Acute Kidney Injury/Impairment  Goal: Fluid and Electrolyte Balance  Outcome: Progressing  Goal: Improved Oral Intake  Outcome: Progressing  Goal: Effective Renal Function  Outcome: Progressing     Problem: Wound  Goal: Optimal Coping  Outcome: Progressing  Goal: Optimal Functional Ability  Outcome: Progressing  Goal: Absence of Infection Signs and Symptoms  Outcome: Progressing  Goal: Improved Oral Intake  Outcome: Progressing  Goal: Optimal Pain Control and Function  Outcome: Progressing  Goal: Skin Health and Integrity  Outcome: Progressing  Goal: Optimal Wound Healing  Outcome: Progressing

## 2024-11-02 NOTE — ASSESSMENT & PLAN NOTE
GM is likely due to pre-renal azotemia due to dehydration. Baseline creatinine is  1 . Most recent creatinine and eGFR are listed below.  Recent Labs     11/01/24  1158 11/01/24  1727   CREATININE 2.7* 2.8*   EGFRNORACEVR 19* 18.2*      Plan  - GM is worsening. Will adjust treatment as follows: continue IV fluids  - Avoid nephrotoxins and renally dose meds for GFR listed above  - Monitor urine output, serial BMP, and adjust therapy as needed    Creatinine levels trending upward  Consult Nephrology  Renal US ordered  Urine Na, Urine Osmolality

## 2024-11-02 NOTE — PLAN OF CARE
[facility]  Initial Discharge Assessment       Primary Care Provider: Vern Priest MD    Admission Diagnosis: GM (acute kidney injury) [N17.9]    Admission Date: 11/1/2024  Expected Discharge Date: 11/4/2024  Pt is a 64-year-old female who arrived from home with Acute renal failure problem. Information verified as correct on facesheet. The assessment was completed at the patient's bedside.  Pt lives with Gawaldemar Sadie [phone] (Mother). Pt does not have a Living Will or Advance Directive. The Pt is oriented to person, places, and times. PCP last seen 10/31/24.  Pt denies Coumadin, dialysis, but she receives O2 from [facility], But she denies HH, and she has been readmitted into the hospital in the last thirty days.  Pt is capable of performing ADLs without assistance. Pt drives to and from medical appointments. Pt reports she takes her medication as prescribed; pharmacy uses [facility] on [address]  Pt verbalized plan to discharge home via family transport. Pt has no other needs to be addressed at this time. CM reviewed the chart and will continue to monitor.         Payor: [employer] MEDICARE ADVANTAGE / Plan: [employer] DUAL ADVANTAGE / Product Type: Medicare Advantage /     Extended Emergency Contact Information  Primary Emergency Contact: FairchildTiny  Address: [address] [address] of [facility]  Home Phone: [phone]  Mobile Phone: [phone]  Relation: Daughter  Secondary Emergency Contact: Sadie Olvera   [facility]  Home Phone: [phone]  Mobile Phone: [phone]  Relation: Mother    Discharge Plan A: Home with family  Discharge Plan B: Home with family      [facility] [place] - [address]  [address]  [place] [address]  Phone: [phone] Fax: [phone]    [id number] - [place] - [address] [facility] [address]  [address] [place]  [address]  Phone: [phone] Fax: [phone]    [facility]  [address]  [place] [address]  Phone: [phone] Fax: [phone]    [id number] - [place] - [address]  [address]  [place] [address]  Phone: [phone] Fax: [phone]    [id number] - [place] - [address]  [address]  [place] [address]  Phone: [phone] Fax: [phone]      Initial Assessment (most recent)       Adult Discharge Assessment - 11/02/24 1253          Discharge Assessment    Assessment Type Discharge Planning Assessment     Confirmed/corrected address, phone number and insurance Yes     Confirmed Demographics Correct on Facesheet     Source of Information patient     When was your last doctors appointment? 10/01/24     Does patient/caregiver understand observation status Yes     Communicated SALINA with patient/caregiver No     Reason For Admission Acute renal failure     People in Home parent(s)     Do you expect to return to your current living situation? Yes     Do you have help at home or someone to help you manage your care at home? Yes     Who are your caregiver(s) and their phone number(s)? Tiny Fairchild (Daughter)  [phone] (Mobile)     Prior to hospitilization cognitive status: Alert/Oriented     Current cognitive status: Alert/Oriented     Walking or Climbing Stairs Difficulty no     Dressing/Bathing Difficulty no     Home Accessibility wheelchair accessible     Home Layout Able to live on 1st floor     Equipment Currently Used at Home none     Readmission within 30 days? Yes     Patient currently being followed by outpatient case management? No     Do you currently have service(s) that help you manage your care at home? No     Do you take prescription medications? Yes     Do you have prescription coverage? Yes     Coverage [employer]ICARE ADVANTAGE - [employer] DUAL ADVANTAGE -      Do you have any problems affording any of your prescribed medications? No     Is the patient taking medications as prescribed? yes     Who is going to help you get home at discharge? Tiny Fairchild (Daughter)  [phone] (Mobile)     How do you get to doctors appointments? car, drives self;family or friend will provide     Are you on dialysis? No     Do you take coumadin? No     Discharge Plan A Home with family     Discharge Plan B Home with family

## 2024-11-02 NOTE — SUBJECTIVE & OBJECTIVE
Past Medical History:   Diagnosis Date    Arthritis     Cancer 10/2022    (CLL) leukemia ; adenocarcinoma  adenosgemis    Depression     GERD (gastroesophageal reflux disease)     Neck pain     and back pain    Personal history of colonic polyps 07/07/2017    Pneumonia of left lung due to infectious organism 03/05/2020    Thyroid disease        Past Surgical History:   Procedure Laterality Date    ERCP N/A 10/10/2024    Procedure: ERCP (ENDOSCOPIC RETROGRADE CHOLANGIOPANCREATOGRAPHY);  Surgeon: Joshua Polanco III, MD;  Location: [facility] ENDO;  Service: Endoscopy;  Laterality: N/A;    FUSION, SPINE, CERVICAL, ANTERIOR APPROACH N/A 08/06/2024    Procedure: FUSION, SPINE, CERVICAL, ANTERIOR APPROACH;  Surgeon: Anatoly Daley DO;  Location: [facility] OR;  Service: Neurosurgery;  Laterality: N/A;  IOM, C-ARM, MEDTRONICS, MICRO, HORSESHOE    INJECTION OF ANESTHETIC AGENT AROUND MULTIPLE INTERCOSTAL NERVES Left 10/03/2022    Procedure: BLOCK, NERVE, INTERCOSTAL, 2 OR MORE;  Surgeon: Evelio Kaplan MD;  Location: [facility] OR 2ND FLR;  Service: Thoracic;  Laterality: Left;    INSERTION OF TUNNELED CENTRAL VENOUS CATHETER (CVC) WITH SUBCUTANEOUS PORT Right 11/03/2022    Procedure: [id number], Right or Left Neck or Chest;  Surgeon: Mini Acevedo MD;  Location: [facility] OR 2ND FLR;  Service: General;  Laterality: Right;    LAPAROSCOPIC CHOLECYSTECTOMY N/A 10/3/2024    Procedure: CHOLECYSTECTOMY, LAPAROSCOPIC;  Surgeon: Ang Mosley III, MD;  Location: [facility] OR;  Service: General;  Laterality: N/A;    LEFT HEART CATHETERIZATION Left 10/9/2024    Procedure: Left heart cath;  Surgeon: Martin Ingram MD;  Location: [facility] CATH/EP LAB;  Service: Cardiology;  Laterality: Left;    ROBOT-ASSISTED LAPAROSCOPIC LYMPHADENECTOMY USING DA MONIQUE XI Left 10/03/2022    Procedure: XI ROBOTIC LYMPHADENECTOMY;  Surgeon: Evelio Kaplan MD;  Location: [facility] OR 2ND FLR;  Service: Thoracic;  Laterality: Left;    SPINE  SURGERY      TONSILLECTOMY      XI ROBOTIC RATS,WITH LOBECTOMY,LUNG Left 10/03/2022    Procedure: XI ROBOTIC RATS,WITH LOBECTOMY,LUNG;  Surgeon: Evelio Kaplan MD;  Location: [facility] OR [address] [place] [address];  Service: Thoracic;  Laterality: Left;       Review of patient's allergies indicates:   Allergen Reactions    Latex, natural rubber        No current facility-administered medications on file prior to encounter.     Current Outpatient Medications on File Prior to Encounter   Medication Sig    albuterol (PROVENTIL/VENTOLIN HFA) 90 mcg/actuation inhaler Inhale 2 puffs into the lungs every 6 (six) hours as needed for Wheezing or Shortness of Breath. Rescue    buPROPion (WELLBUTRIN XL) 300 MG 24 hr tablet Take 1 tablet (300 mg total) by mouth once daily.    citalopram (CELEXA) 20 MG tablet Take 1 tablet (20 mg total) by mouth once daily.    fluticasone-salmeterol 230-21 mcg/dose (ADVAIR HFA) 230-21 mcg/actuation HFAA inhaler Inhale 2 puffs into the lungs 2 (two) times daily. Controller    gabapentin (NEURONTIN) 300 MG capsule Take 1 capsule (300 mg total) by mouth 3 (three) times daily.    levothyroxine (SYNTHROID) 75 MCG tablet Take 1 tablet (75 mcg total) by mouth before breakfast.    ondansetron (ZOFRAN-ODT) 8 MG TbDL Take 1 tablet (8 mg total) by mouth every 6 (six) hours as needed (nausea).    pantoprazole (PROTONIX) 40 MG tablet Take 1 tablet (40 mg total) by mouth once daily. for 14 days    potassium chloride (MICRO-K) 10 MEQ CpSR Take 2 capsules (20 mEq total) by mouth once daily. (Patient taking differently: Take 10 mEq by mouth 2 (two) times daily.)    pravastatin (PRAVACHOL) 10 MG tablet Take 1 tablet (10 mg total) by mouth once daily.    topiramate (TOPAMAX) 50 MG tablet Take 1 tablet (50 mg total) by mouth 2 (two) times daily.    fluticasone propionate (FLONASE) 50 mcg/actuation nasal spray 1 spray (50 mcg total) by Each Nostril route once daily.    naloxone (NARCAN) 4 mg/actuation Spry 1 spray by Nasal route  once.    [START ON 11/29/2024] topiramate (TOPAMAX) 25 MG tablet Take 1 tablet (25 mg total) by mouth 2 (two) times daily.     Family History       Problem Relation (Age of Onset)    Breast cancer Cousin    Ovarian cancer Sister          Tobacco Use    Smoking status: Former     Types: Cigarettes     Passive exposure: Current    Smokeless tobacco: Never    Tobacco comments:     PT states she has quit now for a few months, plans to stay quit.   Substance and Sexual Activity    Alcohol use: Yes     Comment: rarely    Drug use: Yes     Types: Marijuana    Sexual activity: Not on file     Review of Systems   Constitutional: Negative.    HENT: Negative.     Respiratory: Negative.     Cardiovascular: Negative.    Gastrointestinal: Negative.    Genitourinary: Negative.    Musculoskeletal: Negative.    Skin: Negative.    Neurological: Negative.    Psychiatric/Behavioral: Negative.       Objective:     Vital Signs (Most Recent):  Temp: 98.3 °F (36.8 °C) (11/02/24 1108)  Pulse: 64 (11/02/24 1108)  Resp: 16 (11/02/24 0735)  BP: 96/60 (11/02/24 1108)  SpO2: 97 % (11/02/24 1108) Vital Signs (24h Range):  Temp:  [98 °F (36.7 °C)-99 °F (37.2 °C)] 98.3 °F (36.8 °C)  Pulse:  [64-90] 64  Resp:  [16-20] 16  SpO2:  [94 %-100 %] 97 %  BP: ()/(56-72) 96/60     Weight: 64.9 kg (143 lb 1.3 oz)  Body mass index is 25.35 kg/m².     Physical Exam  Vitals reviewed.   Constitutional:       Appearance: Normal appearance.      Comments: Body mass index is 25.35 kg/m².; small framed; frail appearing   HENT:      Head: Normocephalic and atraumatic.      Mouth/Throat:      Mouth: Mucous membranes are moist.   Eyes:      Pupils: Pupils are equal, round, and reactive to light.   Cardiovascular:      Rate and Rhythm: Normal rate and regular rhythm.      Pulses: Normal pulses.      Heart sounds: No murmur heard.  Pulmonary:      Effort: Pulmonary effort is normal.      Breath sounds: Normal breath sounds.   Abdominal:      General: Bowel sounds  are normal. There is no distension.      Palpations: Abdomen is soft.      Tenderness: There is no abdominal tenderness.   Musculoskeletal:         General: Normal range of motion.      Cervical back: Normal range of motion and neck supple.   Skin:     General: Skin is warm and dry.      Capillary Refill: Capillary refill takes less than 2 seconds.   Neurological:      General: No focal deficit present.      Mental Status: She is alert. Mental status is at baseline.   Psychiatric:         Mood and Affect: Mood normal.              CRANIAL NERVES     CN III, IV, VI   Pupils are equal, round, and reactive to light.       Significant Labs: All pertinent labs within the past 24 hours have been reviewed.  Recent Lab Results         11/02/24  0535   11/01/24  2051   11/01/24  1743   11/01/24  1727        Albumin 3.5       4.3              168       ALT 11       15       Anion Gap 7       6       Aniso       Slight       Appearance, UA     Clear         AST 26       36       Baso # CANCELED  Comment: Result canceled by the ancillary.       CANCELED  Comment: Result canceled by the ancillary.       Basophil % 0.0       0.0       Bilirubin (UA)     Negative         BILIRUBIN TOTAL 0.3  Comment: For infants and newborns, interpretation of results should be based  on gestational age, weight and in agreement with clinical  observations.    Premature Infant recommended reference ranges:  Up to 24 hours.............<8.0 mg/dL  Up to 48 hours............<12.0 mg/dL  3-5 days..................<15.0 mg/dL  6-29 days.................<15.0 mg/dL         0.4  Comment: For infants and newborns, interpretation of results should be based  on gestational age, weight and in agreement with clinical  observations.    Premature Infant recommended reference ranges:  Up to 24 hours.............<8.0 mg/dL  Up to 48 hours............<12.0 mg/dL  3-5 days..................<15.0 mg/dL  6-29 days.................<15.0 mg/dL         BNP  127  Comment: Values of less than 100 pg/ml are consistent with non-CHF populations.             BUN 10       10       Calcium 8.6       9.0       Chloride 111       103       CO2 24       25       Color, UA     Colorless         Creatinine 2.6       2.8       Differential Method Manual  Comment: CORRECTED RESULT; previously reported as Automated on 11/02/2024 at   07:43.    [C]       Manual       eGFR 19.9       18.2       Eos # CANCELED  Comment: Result canceled by the ancillary.       CANCELED  Comment: Result canceled by the ancillary.       Eos % 3.0       1.0       Glucose 84       116       Glucose, UA     Negative         Gran % 16.0       12.0       Hematocrit 30.7       33.3       Hemoglobin 9.6       10.8       Immature Grans (Abs) CANCELED  Comment: Mild elevation in immature granulocytes is non specific and   can be seen in a variety of conditions including stress response,   acute inflammation, trauma and pregnancy. Correlation with other   laboratory and clinical findings is essential.    Result canceled by the ancillary.         CANCELED  Comment: Mild elevation in immature granulocytes is non specific and   can be seen in a variety of conditions including stress response,   acute inflammation, trauma and pregnancy. Correlation with other   laboratory and clinical findings is essential.    Result canceled by the ancillary.         Immature Granulocytes CANCELED  Comment: Result canceled by the ancillary.       CANCELED  Comment: Result canceled by the ancillary.       Ketones, UA     Negative         Leukocyte Esterase, UA     1+         Lymph # CANCELED  Comment: Result canceled by the ancillary.       CANCELED  Comment: Result canceled by the ancillary.       Lymph % 74.0       83.0  Comment: Reactive lymphocytes present.       Magnesium  1.8             MCH 30.7       31.4       MCHC 31.3       32.4       MCV 98       97       Microscopic Comment     SEE COMMENT  Comment: Other formed elements not  mentioned in the report are not   present in the microscopic examination.            Mono # CANCELED  Comment: Result canceled by the ancillary.       CANCELED  Comment: Result canceled by the ancillary.       Mono % 7.0       4.0       MPV 10.8       10.6       NITRITE UA     Negative         nRBC 0       0       Blood, UA     Negative         Urine Osmolality   66  Comment: The random urine reference ranges provided were established   for 24 hour urine collections.  No reference ranges exist for  random urine specimens.  Correlate clinically.             pH, UA     6.0         Platelet Estimate Appears normal       Appears normal       Platelet Count 263       273       Potassium 4.1       3.7       PROTEIN TOTAL 5.5       6.6       Protein, UA     Negative  Comment: Recommend a 24 hour urine protein or a urine   protein/creatinine ratio if globulin induced proteinuria is  clinically suspected.           RBC 3.13       3.44       RBC, UA     1         RDW 14.5       14.4       Smudge Cells Present  Comment: Smudge cells present;Substantial numbers may affect the   accuracy of the differential.         Present  Comment: Smudge cells present;Substantial numbers may affect the   accuracy of the differential.         Sodium 142       134       Sodium, Urine   23  Comment: The random urine reference ranges provided were established   for 24 hour urine collections.  No reference ranges exist for  random urine specimens.  Correlate clinically.             Spec Grav UA     <1.005         Specimen UA     Urine, Clean Catch         Squam Epithel, UA     1         Uric Acid 5.6             UROBILINOGEN UA     Negative         WBC, UA     2         WBC 33.94       35.91  Comment: WBC critical result(s) repeated. Called and verbal readback obtained   from Quynh Larose RN. by [email or web address] 11/01/2024 18:22                  [C] - Corrected Result               Significant Imaging: I have reviewed all pertinent imaging  results/findings within the past 24 hours.  None

## 2024-11-02 NOTE — SUBJECTIVE & OBJECTIVE
Past Medical History:   Diagnosis Date    Arthritis     Cancer 10/2022    (CLL) leukemia ; adenocarcinoma  adenosgemis    Depression     GERD (gastroesophageal reflux disease)     Neck pain     and back pain    Personal history of colonic polyps 07/07/2017    Pneumonia of left lung due to infectious organism 03/05/2020    Thyroid disease        Past Surgical History:   Procedure Laterality Date    ERCP N/A 10/10/2024    Procedure: ERCP (ENDOSCOPIC RETROGRADE CHOLANGIOPANCREATOGRAPHY);  Surgeon: Joshua Polanco III, MD;  Location: [facility] ENDO;  Service: Endoscopy;  Laterality: N/A;    FUSION, SPINE, CERVICAL, ANTERIOR APPROACH N/A 08/06/2024    Procedure: FUSION, SPINE, CERVICAL, ANTERIOR APPROACH;  Surgeon: Anatoly Daley DO;  Location: [facility] OR;  Service: Neurosurgery;  Laterality: N/A;  IOM, C-ARM, MEDTRONICS, MICRO, HORSESHOE    INJECTION OF ANESTHETIC AGENT AROUND MULTIPLE INTERCOSTAL NERVES Left 10/03/2022    Procedure: BLOCK, NERVE, INTERCOSTAL, 2 OR MORE;  Surgeon: Evelio Kaplan MD;  Location: [facility] OR 2ND FLR;  Service: Thoracic;  Laterality: Left;    INSERTION OF TUNNELED CENTRAL VENOUS CATHETER (CVC) WITH SUBCUTANEOUS PORT Right 11/03/2022    Procedure: [id number], Right or Left Neck or Chest;  Surgeon: Mini Acevedo MD;  Location: [facility] OR 2ND FLR;  Service: General;  Laterality: Right;    LAPAROSCOPIC CHOLECYSTECTOMY N/A 10/3/2024    Procedure: CHOLECYSTECTOMY, LAPAROSCOPIC;  Surgeon: Ang Mosley III, MD;  Location: [facility] OR;  Service: General;  Laterality: N/A;    LEFT HEART CATHETERIZATION Left 10/9/2024    Procedure: Left heart cath;  Surgeon: Martin Ingram MD;  Location: [facility] CATH/EP LAB;  Service: Cardiology;  Laterality: Left;    ROBOT-ASSISTED LAPAROSCOPIC LYMPHADENECTOMY USING DA MONIQUE XI Left 10/03/2022    Procedure: XI ROBOTIC LYMPHADENECTOMY;  Surgeon: Evelio Kaplan MD;  Location: [facility] OR 2ND FLR;  Service: Thoracic;  Laterality: Left;    SPINE  SURGERY      TONSILLECTOMY      XI ROBOTIC RATS,WITH LOBECTOMY,LUNG Left 10/03/2022    Procedure: XI ROBOTIC RATS,WITH LOBECTOMY,LUNG;  Surgeon: Evelio Kaplan MD;  Location: [facility] OR [address] [place] [address];  Service: Thoracic;  Laterality: Left;       Review of patient's allergies indicates:   Allergen Reactions    Latex, natural rubber        No current facility-administered medications on file prior to encounter.     Current Outpatient Medications on File Prior to Encounter   Medication Sig    albuterol (PROVENTIL/VENTOLIN HFA) 90 mcg/actuation inhaler Inhale 2 puffs into the lungs every 6 (six) hours as needed for Wheezing or Shortness of Breath. Rescue    buPROPion (WELLBUTRIN XL) 300 MG 24 hr tablet Take 1 tablet (300 mg total) by mouth once daily.    citalopram (CELEXA) 20 MG tablet Take 1 tablet (20 mg total) by mouth once daily.    fluticasone-salmeterol 230-21 mcg/dose (ADVAIR HFA) 230-21 mcg/actuation HFAA inhaler Inhale 2 puffs into the lungs 2 (two) times daily. Controller    gabapentin (NEURONTIN) 300 MG capsule Take 1 capsule (300 mg total) by mouth 3 (three) times daily.    levothyroxine (SYNTHROID) 75 MCG tablet Take 1 tablet (75 mcg total) by mouth before breakfast.    ondansetron (ZOFRAN-ODT) 8 MG TbDL Take 1 tablet (8 mg total) by mouth every 6 (six) hours as needed (nausea).    pantoprazole (PROTONIX) 40 MG tablet Take 1 tablet (40 mg total) by mouth once daily. for 14 days    potassium chloride (MICRO-K) 10 MEQ CpSR Take 2 capsules (20 mEq total) by mouth once daily. (Patient taking differently: Take 10 mEq by mouth 2 (two) times daily.)    pravastatin (PRAVACHOL) 10 MG tablet Take 1 tablet (10 mg total) by mouth once daily.    topiramate (TOPAMAX) 50 MG tablet Take 1 tablet (50 mg total) by mouth 2 (two) times daily.    fluticasone propionate (FLONASE) 50 mcg/actuation nasal spray 1 spray (50 mcg total) by Each Nostril route once daily.    naloxone (NARCAN) 4 mg/actuation Spry 1 spray by Nasal route  once.    [START ON 11/29/2024] topiramate (TOPAMAX) 25 MG tablet Take 1 tablet (25 mg total) by mouth 2 (two) times daily.     Family History       Problem Relation (Age of Onset)    Breast cancer Cousin    Ovarian cancer Sister          Tobacco Use    Smoking status: Former     Types: Cigarettes     Passive exposure: Current    Smokeless tobacco: Never    Tobacco comments:     PT states she has quit now for a few months, plans to stay quit.   Substance and Sexual Activity    Alcohol use: Yes     Comment: rarely    Drug use: Yes     Types: Marijuana    Sexual activity: Not on file     Review of Systems   Constitutional: Negative.    HENT: Negative.     Respiratory: Negative.     Cardiovascular: Negative.    Gastrointestinal: Negative.    Genitourinary: Negative.    Musculoskeletal: Negative.    Skin: Negative.    Neurological: Negative.    Psychiatric/Behavioral: Negative.       Objective:     Vital Signs (Most Recent):  Temp: 98.6 °F (37 °C) (11/01/24 1650)  Pulse: 74 (11/01/24 1927)  Resp: 20 (11/01/24 1927)  BP: 116/62 (11/01/24 1927)  SpO2: 100 % (11/01/24 1927) Vital Signs (24h Range):  Temp:  [98.6 °F (37 °C)] 98.6 °F (37 °C)  Pulse:  [74-90] 74  Resp:  [18-20] 20  SpO2:  [97 %-100 %] 100 %  BP: (110-116)/(62-72) 116/62     Weight: 64 kg (141 lb)  Body mass index is 24.98 kg/m².     Physical Exam  Vitals reviewed.   Constitutional:       Appearance: Normal appearance.   HENT:      Head: Normocephalic and atraumatic.      Mouth/Throat:      Mouth: Mucous membranes are moist.   Eyes:      Pupils: Pupils are equal, round, and reactive to light.   Cardiovascular:      Rate and Rhythm: Normal rate and regular rhythm.      Pulses: Normal pulses.      Heart sounds: No murmur heard.  Pulmonary:      Effort: Pulmonary effort is normal.      Breath sounds: Normal breath sounds.   Abdominal:      General: Bowel sounds are normal. There is no distension.      Palpations: Abdomen is soft.      Tenderness: There is no  abdominal tenderness.   Musculoskeletal:         General: Normal range of motion.      Cervical back: Normal range of motion and neck supple.   Skin:     General: Skin is warm and dry.      Capillary Refill: Capillary refill takes less than 2 seconds.   Neurological:      General: No focal deficit present.      Mental Status: She is alert. Mental status is at baseline.   Psychiatric:         Mood and Affect: Mood normal.              CRANIAL NERVES     CN III, IV, VI   Pupils are equal, round, and reactive to light.       Significant Labs: All pertinent labs within the past 24 hours have been reviewed.  Recent Lab Results         11/01/24  1743   11/01/24  1727   11/01/24  1158        Albumin   4.3         ALP   168         ALT   15         Anion Gap   6   11       Aniso   Slight         Appearance, UA Clear           AST   36         Baso #   CANCELED  Comment: Result canceled by the ancillary.         Basophil %   0.0         Bilirubin (UA) Negative           BILIRUBIN TOTAL   0.4  Comment: For infants and newborns, interpretation of results should be based  on gestational age, weight and in agreement with clinical  observations.    Premature Infant recommended reference ranges:  Up to 24 hours.............<8.0 mg/dL  Up to 48 hours............<12.0 mg/dL  3-5 days..................<15.0 mg/dL  6-29 days.................<15.0 mg/dL           BUN   10   9       Calcium   9.0   9.5       Chloride   103   102       CO2   25   22       Color, UA Colorless           Creatinine   2.8   2.7       Differential Method   Manual         eGFR   18.2   19       Eos #   CANCELED  Comment: Result canceled by the ancillary.         Eos %   1.0         Glucose   116   88       Glucose, UA Negative           Gran %   12.0         Hematocrit   33.3         Hemoglobin   10.8         Immature Grans (Abs)   CANCELED  Comment: Mild elevation in immature granulocytes is non specific and   can be seen in a variety of conditions including  stress response,   acute inflammation, trauma and pregnancy. Correlation with other   laboratory and clinical findings is essential.    Result canceled by the ancillary.           Immature Granulocytes   CANCELED  Comment: Result canceled by the ancillary.         Ketones, UA Negative           Leukocyte Esterase, UA 1+           Lymph #   CANCELED  Comment: Result canceled by the ancillary.         Lymph %   83.0  Comment: Reactive lymphocytes present.         MCH   31.4         MCHC   32.4         MCV   97         Microscopic Comment SEE COMMENT  Comment: Other formed elements not mentioned in the report are not   present in the microscopic examination.              Mono #   CANCELED  Comment: Result canceled by the ancillary.         Mono %   4.0         MPV   10.6         NITRITE UA Negative           nRBC   0         Blood, UA Negative           pH, UA 6.0           Platelet Estimate   Appears normal         Platelet Count   273         Potassium   3.7   3.8       PROTEIN TOTAL   6.6         Protein, UA Negative  Comment: Recommend a 24 hour urine protein or a urine   protein/creatinine ratio if globulin induced proteinuria is  clinically suspected.             RBC   3.44         RBC, UA 1           RDW   14.4         Smudge Cells   Present  Comment: Smudge cells present;Substantial numbers may affect the   accuracy of the differential.           Sodium   134   135       Spec Grav UA <1.005           Specimen UA Urine, Clean Catch           Squam Epithel, UA 1           UROBILINOGEN UA Negative           WBC, UA 2           WBC   35.91  Comment: WBC critical result(s) repeated. Called and verbal readback obtained   from Quynh Larose RN. by [email or web address] 11/01/2024 18:22                   Significant Imaging: I have reviewed all pertinent imaging results/findings within the past 24 hours.  None

## 2024-11-03 PROBLEM — R79.89 LOW SERUM CORTISOL LEVEL: Status: ACTIVE | Noted: 2024-11-03

## 2024-11-03 LAB
ALBUMIN SERPL BCP-MCNC: 3.6 G/DL (ref 3.5–5.2)
ALBUMIN SERPL BCP-MCNC: 3.6 G/DL (ref 3.5–5.2)
ALP SERPL-CCNC: 162 U/L (ref 55–135)
ALT SERPL W/O P-5'-P-CCNC: 13 U/L (ref 10–44)
ANION GAP SERPL CALC-SCNC: 8 MMOL/L (ref 8–16)
ANION GAP SERPL CALC-SCNC: 8 MMOL/L (ref 8–16)
AST SERPL-CCNC: 41 U/L (ref 10–40)
BASOPHILS # BLD AUTO: ABNORMAL K/UL (ref 0–0.2)
BASOPHILS NFR BLD: 0 % (ref 0–1.9)
BILIRUB SERPL-MCNC: 0.4 MG/DL (ref 0.1–1)
BUN SERPL-MCNC: 7 MG/DL (ref 8–23)
BUN SERPL-MCNC: 7 MG/DL (ref 8–23)
CALCIUM SERPL-MCNC: 8.3 MG/DL (ref 8.7–10.5)
CALCIUM SERPL-MCNC: 8.3 MG/DL (ref 8.7–10.5)
CHLORIDE SERPL-SCNC: 112 MMOL/L (ref 95–110)
CHLORIDE SERPL-SCNC: 112 MMOL/L (ref 95–110)
CO2 SERPL-SCNC: 20 MMOL/L (ref 23–29)
CO2 SERPL-SCNC: 20 MMOL/L (ref 23–29)
CORTIS SERPL-MCNC: 3.7 UG/DL
CREAT SERPL-MCNC: 2.1 MG/DL (ref 0.5–1.4)
CREAT SERPL-MCNC: 2.1 MG/DL (ref 0.5–1.4)
DIFFERENTIAL METHOD BLD: ABNORMAL
EOSINOPHIL # BLD AUTO: ABNORMAL K/UL (ref 0–0.5)
EOSINOPHIL NFR BLD: 1 % (ref 0–8)
ERYTHROCYTE [DISTWIDTH] IN BLOOD BY AUTOMATED COUNT: 14.5 % (ref 11.5–14.5)
EST. GFR  (NO RACE VARIABLE): 25.7 ML/MIN/1.73 M^2
EST. GFR  (NO RACE VARIABLE): 25.7 ML/MIN/1.73 M^2
GLUCOSE SERPL-MCNC: 84 MG/DL (ref 70–110)
GLUCOSE SERPL-MCNC: 84 MG/DL (ref 70–110)
HCT VFR BLD AUTO: 33.3 % (ref 37–48.5)
HGB BLD-MCNC: 10.2 G/DL (ref 12–16)
IMM GRANULOCYTES # BLD AUTO: ABNORMAL K/UL (ref 0–0.04)
IMM GRANULOCYTES NFR BLD AUTO: ABNORMAL % (ref 0–0.5)
LYMPHOCYTES # BLD AUTO: ABNORMAL K/UL (ref 1–4.8)
LYMPHOCYTES NFR BLD: 87 % (ref 18–48)
MAGNESIUM SERPL-MCNC: 1.7 MG/DL (ref 1.6–2.6)
MCH RBC QN AUTO: 30.4 PG (ref 27–31)
MCHC RBC AUTO-ENTMCNC: 30.6 G/DL (ref 32–36)
MCV RBC AUTO: 99 FL (ref 82–98)
MONOCYTES # BLD AUTO: ABNORMAL K/UL (ref 0.3–1)
MONOCYTES NFR BLD: 2 % (ref 4–15)
NEUTROPHILS NFR BLD: 10 % (ref 38–73)
NRBC BLD-RTO: 0 /100 WBC
PHOSPHATE SERPL-MCNC: 4.4 MG/DL (ref 2.7–4.5)
PLATELET # BLD AUTO: 286 K/UL (ref 150–450)
PLATELET BLD QL SMEAR: ABNORMAL
PMV BLD AUTO: 10.1 FL (ref 9.2–12.9)
POTASSIUM SERPL-SCNC: 3.8 MMOL/L (ref 3.5–5.1)
POTASSIUM SERPL-SCNC: 3.8 MMOL/L (ref 3.5–5.1)
PROT SERPL-MCNC: 5.7 G/DL (ref 6–8.4)
RBC # BLD AUTO: 3.36 M/UL (ref 4–5.4)
SMUDGE CELLS BLD QL SMEAR: PRESENT
SODIUM SERPL-SCNC: 140 MMOL/L (ref 136–145)
SODIUM SERPL-SCNC: 140 MMOL/L (ref 136–145)
TSH SERPL DL<=0.005 MIU/L-ACNC: 2.5 UIU/ML (ref 0.34–5.6)
WBC # BLD AUTO: 37.22 K/UL (ref 3.9–12.7)

## 2024-11-03 PROCEDURE — 80069 RENAL FUNCTION PANEL: CPT | Performed by: NURSE PRACTITIONER

## 2024-11-03 PROCEDURE — 84443 ASSAY THYROID STIM HORMONE: CPT | Performed by: NURSE PRACTITIONER

## 2024-11-03 PROCEDURE — 80053 COMPREHEN METABOLIC PANEL: CPT | Performed by: NURSE PRACTITIONER

## 2024-11-03 PROCEDURE — 83735 ASSAY OF MAGNESIUM: CPT | Performed by: NURSE PRACTITIONER

## 2024-11-03 PROCEDURE — 85027 COMPLETE CBC AUTOMATED: CPT | Performed by: NURSE PRACTITIONER

## 2024-11-03 PROCEDURE — 25000003 PHARM REV CODE 250: Performed by: NURSE PRACTITIONER

## 2024-11-03 PROCEDURE — G0378 HOSPITAL OBSERVATION PER HR: HCPCS

## 2024-11-03 PROCEDURE — 82533 TOTAL CORTISOL: CPT | Performed by: INTERNAL MEDICINE

## 2024-11-03 PROCEDURE — 85007 BL SMEAR W/DIFF WBC COUNT: CPT | Performed by: NURSE PRACTITIONER

## 2024-11-03 PROCEDURE — 96361 HYDRATE IV INFUSION ADD-ON: CPT

## 2024-11-03 PROCEDURE — 99214 OFFICE O/P EST MOD 30 MIN: CPT | Mod: ,,, | Performed by: INTERNAL MEDICINE

## 2024-11-03 RX ORDER — COSYNTROPIN 0.25 MG/ML
0.25 INJECTION, POWDER, FOR SOLUTION INTRAMUSCULAR; INTRAVENOUS ONCE
Status: COMPLETED | OUTPATIENT
Start: 2024-11-04 | End: 2024-11-04

## 2024-11-03 RX ORDER — DOCUSATE SODIUM 100 MG
200 CAPSULE ORAL
Status: DISCONTINUED | OUTPATIENT
Start: 2024-11-03 | End: 2024-11-04 | Stop reason: HOSPADM

## 2024-11-03 RX ADMIN — ACETAMINOPHEN 650 MG: 325 TABLET ORAL at 06:11

## 2024-11-03 RX ADMIN — TOPIRAMATE 50 MG: 25 TABLET, FILM COATED ORAL at 08:11

## 2024-11-03 RX ADMIN — TOPIRAMATE 50 MG: 25 TABLET, FILM COATED ORAL at 10:11

## 2024-11-03 RX ADMIN — LEVOTHYROXINE SODIUM 75 MCG: 0.03 TABLET ORAL at 05:11

## 2024-11-03 RX ADMIN — BUPROPION HYDROCHLORIDE 300 MG: 150 TABLET, EXTENDED RELEASE ORAL at 10:11

## 2024-11-03 RX ADMIN — POTASSIUM BICARBONATE 35 MEQ: 391 TABLET, EFFERVESCENT ORAL at 10:11

## 2024-11-03 RX ADMIN — PRAVASTATIN SODIUM 10 MG: 10 TABLET ORAL at 08:11

## 2024-11-03 RX ADMIN — Medication 200 ML: at 06:11

## 2024-11-03 RX ADMIN — Medication 800 MG: at 06:11

## 2024-11-03 RX ADMIN — Medication 800 MG: at 10:11

## 2024-11-03 RX ADMIN — CITALOPRAM HYDROBROMIDE 20 MG: 20 TABLET ORAL at 10:11

## 2024-11-03 RX ADMIN — FLUTICASONE PROPIONATE 50 MCG: 50 SPRAY, METERED NASAL at 10:11

## 2024-11-03 RX ADMIN — PANTOPRAZOLE SODIUM 40 MG: 40 TABLET, DELAYED RELEASE ORAL at 05:11

## 2024-11-03 NOTE — PLAN OF CARE
Yvette Hutchinson has warranted treatment spanning two or more midnights of hospital level care for the management of GM on CKD3,Abnormal cortisol level,Leukocytosis. She continues to require daily labs, further testing/imaging, monitoring of vital signs, medication adjustments, and further evaluation by consultants. Her condition is also complicated by the following comorbidities: Chronic kidney disease, Malignancy, and GERD,Anemia and Thyroid disease.        Janie Rao, THOMAS, APRN, FNP-C  [facility]  [facility]  [email or web address]

## 2024-11-03 NOTE — PLAN OF CARE
Problem: Adult Inpatient Plan of Care  Goal: Plan of Care Review  Outcome: Progressing  Goal: Patient-Specific Goal (Individualized)  Outcome: Progressing  Goal: Absence of Hospital-Acquired Illness or Injury  Outcome: Progressing  Goal: Optimal Comfort and Wellbeing  Outcome: Progressing  Goal: Readiness for Transition of Care  Outcome: Progressing     Problem: Sepsis/Septic Shock  Goal: Optimal Coping  Outcome: Progressing  Goal: Absence of Bleeding  Outcome: Progressing  Goal: Blood Glucose Level Within Targeted Range  Outcome: Progressing  Goal: Absence of Infection Signs and Symptoms  Outcome: Progressing  Goal: Optimal Nutrition Intake  Outcome: Progressing     Problem: Acute Kidney Injury/Impairment  Goal: Fluid and Electrolyte Balance  Outcome: Progressing  Goal: Improved Oral Intake  Outcome: Progressing  Goal: Effective Renal Function  Outcome: Progressing     Problem: Wound  Goal: Optimal Coping  Outcome: Progressing  Goal: Optimal Functional Ability  Outcome: Progressing  Goal: Absence of Infection Signs and Symptoms  Outcome: Progressing  Goal: Improved Oral Intake  Outcome: Progressing  Goal: Optimal Pain Control and Function  Outcome: Progressing  Goal: Skin Health and Integrity  Outcome: Progressing  Goal: Optimal Wound Healing  Outcome: Progressing     Problem: Fall Injury Risk  Goal: Absence of Fall and Fall-Related Injury  Outcome: Progressing

## 2024-11-03 NOTE — ASSESSMENT & PLAN NOTE
GM is likely due to pre-renal azotemia due to dehydration. Baseline creatinine is  1 . Most recent creatinine and eGFR are listed below.  Recent Labs     11/01/24  1727 11/02/24  0535 11/03/24  0703   CREATININE 2.8* 2.6* 2.1*  2.1*   EGFRNORACEVR 18.2* 19.9* 25.7*  25.7*        Plan  - GM is worsening. Will adjust treatment as follows: continue IV fluids  - Avoid nephrotoxins and renally dose meds for GFR listed above  - Monitor urine output, serial BMP, and adjust therapy as needed    Creatinine levels trending upward  Consult Nephrology  Renal US no hydronephrosis  Creatine improving slowly

## 2024-11-03 NOTE — ASSESSMENT & PLAN NOTE
Cortisol 3.7 - down from >8  Per last PET scan has known left adrenal nodule/metastasis  Nephrology following - Cosyntropin stimulation test

## 2024-11-03 NOTE — PROGRESS NOTES
[facility] Medicine  Progress Note    Patient Name: Yvette Hutchinson  MRN: [id number]  Patient Class: OP- Observation   Admission Date: 11/1/2024  Length of Stay: 0 days  Attending Physician: Delfina Grey MD  Primary Care Provider: Vern Priest MD        Subjective:     Principal Problem:Acute renal failure        HPI:  64-year-old female with a past medical history of reflux disease, CLL, left lung cancer status post resection, depression, Hypokalemia, History of sepsis, s/p cholecystecomy, CLL, Malignant neoplasm of upper lobe of lung presents for evaluation of abnormal lab work. Reports that her kidney function has been getting worse over the last several days. Denies any associated chest pain, shortness of breath, nausea/vomiting, hematuria, dysuria, decreased urine output, fever/chills, or abdominal pain.   Of note: patient was discharged 10/16/2024 after a 14 day stay in the hospital for septic shock. Only positive culture was coagulase-negative Staph in blood. Completed IV vancomycin 10/28/24.    In ER, afebrile, leukocytosis improving with WBC 35.91, mild anemia with H&H 10.8/33.3, hyponatremia treat make with sodium of 133, serum creatinine 2.8, glucose 116, UA with 1+ leukocytes, 2 WBCs    Admit to hospital medicine for GM    Overview/Hospital Course:  Admited for abnormal lab work - found to have worsening chronic leukocytosis, acute on chronic CKD, and hyponatremia. Started on IV fluids - sodium normalized. Noted to have recently completed IV ABX therapy and poor appetite. ID consulted as well, Nephrology consulted related to CKD. Cautious IV fluids administered r/t known heart failure (EF 30-35% [10/03/24]). Creatine level trending down. Cortisol level low = 3.7. Glucose sustaining around 80's. Cosyntropin stimulation test in am.    Past Medical History:   Diagnosis Date    Arthritis     Cancer 10/2022    (CLL) leukemia ; adenocarcinoma  adenosgemis    Depression      GERD (gastroesophageal reflux disease)     Neck pain     and back pain    Personal history of colonic polyps 07/07/2017    Pneumonia of left lung due to infectious organism 03/05/2020    Thyroid disease        Past Surgical History:   Procedure Laterality Date    ERCP N/A 10/10/2024    Procedure: ERCP (ENDOSCOPIC RETROGRADE CHOLANGIOPANCREATOGRAPHY);  Surgeon: Joshua Polanco III, MD;  Location: [facility] ENDO;  Service: Endoscopy;  Laterality: N/A;    FUSION, SPINE, CERVICAL, ANTERIOR APPROACH N/A 08/06/2024    Procedure: FUSION, SPINE, CERVICAL, ANTERIOR APPROACH;  Surgeon: Anatoly Daley DO;  Location: [facility] OR;  Service: Neurosurgery;  Laterality: N/A;  IOM, C-ARM, MEDTRONICS, MICRO, HORSESHOE    INJECTION OF ANESTHETIC AGENT AROUND MULTIPLE INTERCOSTAL NERVES Left 10/03/2022    Procedure: BLOCK, NERVE, INTERCOSTAL, 2 OR MORE;  Surgeon: Evelio Kaplan MD;  Location: [facility] OR 2ND FLR;  Service: Thoracic;  Laterality: Left;    INSERTION OF TUNNELED CENTRAL VENOUS CATHETER (CVC) WITH SUBCUTANEOUS PORT Right 11/03/2022    Procedure: [id number]-CATH, Right or Left Neck or Chest;  Surgeon: Mini Acevedo MD;  Location: [facility] OR 2ND FLR;  Service: General;  Laterality: Right;    LAPAROSCOPIC CHOLECYSTECTOMY N/A 10/3/2024    Procedure: CHOLECYSTECTOMY, LAPAROSCOPIC;  Surgeon: Ang Mosley III, MD;  Location: [facility] OR;  Service: General;  Laterality: N/A;    LEFT HEART CATHETERIZATION Left 10/9/2024    Procedure: Left heart cath;  Surgeon: Martin Ingram MD;  Location: [facility] CATH/EP LAB;  Service: Cardiology;  Laterality: Left;    ROBOT-ASSISTED LAPAROSCOPIC LYMPHADENECTOMY USING DA MONIQUE XI Left 10/03/2022    Procedure: XI ROBOTIC LYMPHADENECTOMY;  Surgeon: Evelio Kaplan MD;  Location: [facility] OR 2ND FLR;  Service: Thoracic;  Laterality: Left;    SPINE SURGERY      TONSILLECTOMY      XI ROBOTIC RATS,WITH LOBECTOMY,LUNG Left 10/03/2022    Procedure: XI ROBOTIC RATS,WITH LOBECTOMY,LUNG;   Surgeon: Evelio Kaplan MD;  Location: [facility] OR [address] [place] [address];  Service: Thoracic;  Laterality: Left;       Review of patient's allergies indicates:   Allergen Reactions    Latex, natural rubber        No current facility-administered medications on file prior to encounter.     Current Outpatient Medications on File Prior to Encounter   Medication Sig    albuterol (PROVENTIL/VENTOLIN HFA) 90 mcg/actuation inhaler Inhale 2 puffs into the lungs every 6 (six) hours as needed for Wheezing or Shortness of Breath. Rescue    buPROPion (WELLBUTRIN XL) 300 MG 24 hr tablet Take 1 tablet (300 mg total) by mouth once daily.    citalopram (CELEXA) 20 MG tablet Take 1 tablet (20 mg total) by mouth once daily.    fluticasone-salmeterol 230-21 mcg/dose (ADVAIR HFA) 230-21 mcg/actuation HFAA inhaler Inhale 2 puffs into the lungs 2 (two) times daily. Controller    gabapentin (NEURONTIN) 300 MG capsule Take 1 capsule (300 mg total) by mouth 3 (three) times daily.    levothyroxine (SYNTHROID) 75 MCG tablet Take 1 tablet (75 mcg total) by mouth before breakfast.    ondansetron (ZOFRAN-ODT) 8 MG TbDL Take 1 tablet (8 mg total) by mouth every 6 (six) hours as needed (nausea).    pantoprazole (PROTONIX) 40 MG tablet Take 1 tablet (40 mg total) by mouth once daily. for 14 days    potassium chloride (MICRO-K) 10 MEQ CpSR Take 2 capsules (20 mEq total) by mouth once daily. (Patient taking differently: Take 10 mEq by mouth 2 (two) times daily.)    pravastatin (PRAVACHOL) 10 MG tablet Take 1 tablet (10 mg total) by mouth once daily.    topiramate (TOPAMAX) 50 MG tablet Take 1 tablet (50 mg total) by mouth 2 (two) times daily.    fluticasone propionate (FLONASE) 50 mcg/actuation nasal spray 1 spray (50 mcg total) by Each Nostril route once daily.    naloxone (NARCAN) 4 mg/actuation Spry 1 spray by Nasal route once.    [START ON 11/29/2024] topiramate (TOPAMAX) 25 MG tablet Take 1 tablet (25 mg total) by mouth 2 (two) times daily.     Family  History       Problem Relation (Age of Onset)    Breast cancer Cousin    Ovarian cancer Sister          Tobacco Use    Smoking status: Former     Types: Cigarettes     Passive exposure: Current    Smokeless tobacco: Never    Tobacco comments:     PT states she has quit now for a few months, plans to stay quit.   Substance and Sexual Activity    Alcohol use: Yes     Comment: rarely    Drug use: Yes     Types: Marijuana    Sexual activity: Not on file     Review of Systems   Constitutional: Negative.    HENT: Negative.     Respiratory: Negative.     Cardiovascular: Negative.    Gastrointestinal: Negative.    Genitourinary: Negative.    Musculoskeletal: Negative.    Skin: Negative.    Neurological: Negative.    Psychiatric/Behavioral: Negative.       Objective:     Vital Signs (Most Recent):  Temp: 98.3 °F (36.8 °C) (11/03/24 0756)  Pulse: 71 (11/03/24 0756)  Resp: 19 (11/03/24 0756)  BP: 98/64 (11/03/24 0756)  SpO2: 95 % (11/03/24 0756) Vital Signs (24h Range):  Temp:  [97.8 °F (36.6 °C)-98.5 °F (36.9 °C)] 98.3 °F (36.8 °C)  Pulse:  [64-76] 71  Resp:  [16-19] 19  SpO2:  [94 %-98 %] 95 %  BP: ()/(62-70) 98/64     Weight: 64.9 kg (143 lb 1.3 oz)  Body mass index is 25.35 kg/m².     Physical Exam  Vitals reviewed.   Constitutional:       Appearance: Normal appearance.      Comments: Body mass index is 25.35 kg/m².; small framed; frail appearing   HENT:      Head: Normocephalic and atraumatic.      Mouth/Throat:      Mouth: Mucous membranes are moist.   Eyes:      Pupils: Pupils are equal, round, and reactive to light.   Cardiovascular:      Rate and Rhythm: Normal rate and regular rhythm.      Pulses: Normal pulses.      Heart sounds: No murmur heard.  Pulmonary:      Effort: Pulmonary effort is normal.      Breath sounds: Normal breath sounds.   Abdominal:      General: Bowel sounds are normal. There is no distension.      Palpations: Abdomen is soft.      Tenderness: There is no abdominal tenderness.    Musculoskeletal:         General: Normal range of motion.      Cervical back: Normal range of motion and neck supple.   Skin:     General: Skin is warm and dry.      Capillary Refill: Capillary refill takes less than 2 seconds.   Neurological:      General: No focal deficit present.      Mental Status: She is alert. Mental status is at baseline.   Psychiatric:         Mood and Affect: Mood normal.              CRANIAL NERVES     CN III, IV, VI   Pupils are equal, round, and reactive to light.       Significant Labs: All pertinent labs within the past 24 hours have been reviewed.  Recent Lab Results         11/03/24  0703   11/02/24  1347        Albumin 3.6          3.6                  ALT 13         Anion Gap 8          8         AST 41         Baso # CANCELED  Comment: Result canceled by the ancillary.         Basophil % 0.0         BILIRUBIN TOTAL 0.4  Comment: For infants and newborns, interpretation of results should be based  on gestational age, weight and in agreement with clinical  observations.    Premature Infant recommended reference ranges:  Up to 24 hours.............<8.0 mg/dL  Up to 48 hours............<12.0 mg/dL  3-5 days..................<15.0 mg/dL  6-29 days.................<15.0 mg/dL           BUN 7          7         Calcium 8.3          8.3         Chloride 112          112         Urine Chloride   56  Comment: The random urine reference ranges provided were established   for 24 hour urine collections.  No reference ranges exist for  random urine specimens.  Correlate clinically.         CO2 20          20         Cortisol 3.7  Comment: Cortisol Reference Range:  Before 10:00 am: 4.46-22.7 ug/dL  After 5:00 pm:    1.7-14.1 ug/dL           Creatinine 2.1          2.1         Urine Creatinine   31.2  Comment: The random urine reference ranges provided were established   for 24 hour urine collections.  No reference ranges exist for  random urine specimens.  Correlate clinically.          Differential Method Manual         eGFR 25.7          25.7         Eos # CANCELED  Comment: Result canceled by the ancillary.         Eos % 1.0         Glucose 84          84         Gran % 10.0         Hematocrit 33.3         Hemoglobin 10.2         Immature Grans (Abs) CANCELED  Comment: Mild elevation in immature granulocytes is non specific and   can be seen in a variety of conditions including stress response,   acute inflammation, trauma and pregnancy. Correlation with other   laboratory and clinical findings is essential.    Result canceled by the ancillary.           Immature Granulocytes CANCELED  Comment: Result canceled by the ancillary.         Lymph # CANCELED  Comment: Result canceled by the ancillary.         Lymph % 87.0         Magnesium  1.7         MCH 30.4         MCHC 30.6         MCV 99         Mono # CANCELED  Comment: Result canceled by the ancillary.         Mono % 2.0         MPV 10.1         nRBC 0         Urine Osmolality   158  Comment: The random urine reference ranges provided were established   for 24 hour urine collections.  No reference ranges exist for  random urine specimens.  Correlate clinically.         Phosphorus Level 4.4         Platelet Estimate Appears normal         Platelet Count 286         Potassium 3.8          3.8         Potassium, Urine   15  Comment: The random urine reference ranges provided were established   for 24 hour urine collections.  No reference ranges exist for  random urine specimens.  Correlate clinically.         PROTEIN TOTAL 5.7         Urine Protein   10  Comment: The random urine reference ranges provided were established   for 24 hour urine collections.  No reference ranges exist for  random urine specimens.  Correlate clinically.         RBC 3.36         RDW 14.5         Smudge Cells Present  Comment: Smudge cells present;Substantial numbers may affect the   accuracy of the differential.           Sodium 140          140         Sodium, Urine    53  Comment: The random urine reference ranges provided were established   for 24 hour urine collections.  No reference ranges exist for  random urine specimens.  Correlate clinically.         TSH 2.502         Urine Urea Nitrogen   64  Comment: The random urine reference ranges provided were established   for 24 hour urine collections.  No reference ranges exist for  random urine specimens.  Correlate clinically.         WBC 37.22  Comment: WBC critical result(s) repeated. Called and verbal readback obtained   from Abelardo Mcnamara, 1 Rajat RN.  by SLT1 11/03/2024 07:33                   Significant Imaging: I have reviewed all pertinent imaging results/findings within the past 24 hours.  None      Assessment/Plan:      * Acute renal failure  GM is likely due to pre-renal azotemia due to dehydration. Baseline creatinine is  1 . Most recent creatinine and eGFR are listed below.  Recent Labs     11/01/24  1727 11/02/24  0535 11/03/24  0703   CREATININE 2.8* 2.6* 2.1*  2.1*   EGFRNORACEVR 18.2* 19.9* 25.7*  25.7*        Plan  - GM is worsening. Will adjust treatment as follows: continue IV fluids  - Avoid nephrotoxins and renally dose meds for GFR listed above  - Monitor urine output, serial BMP, and adjust therapy as needed    Creatinine levels trending upward  Consult Nephrology  Renal US no hydronephrosis  Creatine improving slowly      Low serum cortisol level  Cortisol 3.7 - down from >8  Per last PET scan has known left adrenal nodule/metastasis  Nephrology following - Cosyntropin stimulation test     Leukocytosis  There is a certain degree of chronicity given her known CLL and intermittent steroid use  In the 30K's has been higher  Completed IV vancomycin on 10/28/24  ID Consulted and signed off  No fever          CLL (chronic lymphocytic leukemia)  Noted  Chronic leukocytosis - improved somewhat with IV fluids  Continue Hematology        VTE Risk Mitigation (From admission, onward)           Ordered      enoxaparin injection 30 mg  Every 24 hours         11/02/24 1244     IP VTE HIGH RISK PATIENT  Once         11/01/24 1953     Place sequential compression device  Until discontinued         11/01/24 1953                    Discharge Planning   SALINA: 11/4/2024     Code Status: Full Code   Is the patient medically ready for discharge?:     Reason for patient still in hospital (select all that apply): Patient trending condition, Treatment, and Consult recommendations  Discharge Plan A: Home with family              Janie Rao, THOMAS, APRN, FNP-C  [facility]  [facility]  [email or web address]

## 2024-11-03 NOTE — PROGRESS NOTES
[facility]   Department of Infectious Disease  Progress Note        PATIENT NAME: Yvette Hutchinson  MRN: [id number]  TODAY'S DATE: 11/03/2024  ADMIT DATE: 11/1/2024  LOS: 0 days    CHIEF COMPLAINT: ABNORMAL LABS (KIDNEY FUNCTIONS )      PRINCIPLE PROBLEM: Acute renal failure    INTERVAL HISTORY      11/3/2024: No acute issues overnight remains stable.  Creatinine down to 2.1.      Antibiotics (From admission, onward)      None          Antifungals (From admission, onward)      None           Antivirals (From admission, onward)      None            ASSESSMENT and PLAN      1. GM.  Resolving.  Management as per nephrologist and hospitalist.       2. CLL with chronic leukocytosis.  Expectant management as per her oncologist.  Not on any chemotherapy at this time.    RECOMMENDATIONS:    Management as of GM as per nephrologist and hospitalist   Follow up with oncologist post discharge    Please send Epic secure chat with any questions.      SUBJECTIVE    Yvette Hutchinson is a 64 y.o. female with CLL chronic leukocytosis.  Recently hospitalized with septic shock with only positive culture been coagulase-negative Staphylococcus bacteremia.  Received empiric antibiotics with improvement.  Discharge home on vancomycin and complete therapy 10/28/2024.  I saw her for follow up on 10/31/2024 she was doing okay.     Her creatinine had continued to increase and was 2.5 from baseline of 1.1..  Seen by her PCP yesterday and sent into the hospital.  She has been evaluated by nephrologist.  Notes reviewed.  ID asked to revisit.     Antibiotic history:  None at this time     Microbiology:  No new cultures    Review of Systems  Negative except as stated above in Interval History     OBJECTIVE   Temp:  [97.8 °F (36.6 °C)-98.5 °F (36.9 °C)] 98.4 °F (36.9 °C)  Pulse:  [64-83] 83  Resp:  [16-19] 18  SpO2:  [94 %-98 %] 97 %  BP: ()/(62-70) 99/64  Temp:  [97.8 °F (36.6 °C)-98.5 °F (36.9 °C)]   Temp: 98.4 °F  (36.9 °C) (11/03/24 1149)  Pulse: 83 (11/03/24 1149)  Resp: 18 (11/03/24 1149)  BP: 99/64 (11/03/24 1149)  SpO2: 97 % (11/03/24 1149)    Intake/Output Summary (Last 24 hours) at 11/3/2024 1324  Last data filed at 11/3/2024 1310  Gross per 24 hour   Intake 2060 ml   Output 3050 ml   Net -990 ml       Physical ExamGeneral:  Well-groomed middle-aged man lying quietly in bed in no acute distress   CVS: S1 and 2 heard, no murmurs appreciated   Respiratory: Clear to auscultation   Abdomen: Full, soft, nontender, no palpable organomegaly   Skin: No rash appreciated.  Dry   CNS: No focal deficits   Musculoskeletal: No joint or bony abnormalities appreciated  Psych: Good mood, normal affect.      VAD:  PIV  ISOLATION:  None     Wounds: None    Significant Labs: All pertinent labs within the past 24 hours have been reviewed.    CBC LAST 7 DAYS  Recent Labs   Lab 10/28/24  1045 10/29/24  0931 11/01/24  1727 11/02/24  0535 11/03/24  0703   WBC 49.56* 39.93* 35.91* 33.94* 37.22*   RBC 3.70* 3.76* 3.44* 3.13* 3.36*   HGB 11.4* 11.6* 10.8* 9.6* 10.2*   HCT 36.2* 36.2* 33.3* 30.7* 33.3*   MCV 98 96 97 98 99*   MCH 30.8 30.9 31.4* 30.7 30.4   MCHC 31.5* 32.0 32.4 31.3* 30.6*   RDW 15.0* 15.0* 14.4 14.5 14.5    268 273 263 286   MPV 10.9 11.4 10.6 10.8 10.1   GRAN 10.0* 16.0* 12.0* 16.0* 10.0*   LYMPH 86.0*  CANCELED 79.0* 83.0*  CANCELED 74.0*  CANCELED 87.0*  CANCELED   MONO 3.0*  CANCELED 2.0* 4.0  CANCELED 7.0  CANCELED 2.0*  CANCELED   BASO CANCELED  --  CANCELED CANCELED CANCELED   NRBC 0 0 0 0 0       CHEMISTRY LAST 7 DAYS  Recent Labs   Lab 10/28/24  1045 10/29/24  0931 11/01/24  1158 11/01/24  1727 11/02/24  0535 11/03/24  0703    137 135* 134* 142 140  140   K 3.8 3.9 3.8 3.7 4.1 3.8  3.8    104 102 103 111* 112*  112*   CO2 20* 22* 22* 25 24 20*  20*   ANIONGAP 11 11 11 6* 7* 8  8   BUN 7* 9 9 10 10 7*  7*   CREATININE 2.5* 2.5* 2.7* 2.8* 2.6* 2.1*  2.1*   * 98 88 116* 84 84  84  "  CALCIUM 9.5 9.8 9.5 9.0 8.6* 8.3*  8.3*   MG  --  1.9  --   --  1.8 1.7   ALBUMIN 3.7 3.8  --  4.3 3.5 3.6  3.6   PROT 6.4 6.6  --  6.6 5.5* 5.7*   ALKPHOS 205* 215*  --  168* 141* 162*   ALT 23 22  --  15 11 13   AST 47* 49*  --  36 26 41*   BILITOT 0.4 0.5  --  0.4 0.3 0.4       Estimated Creatinine Clearance: 24.2 mL/min (A) (based on SCr of 2.1 mg/dL (H)).    INFLAMMATORY/PROCAL  LAST 7 DAYS  Recent Labs   Lab 10/28/24  1045   CRP 13.3*     No results found for: "ESR"  CRP   Date Value Ref Range Status   10/28/2024 13.3 (H) 0.0 - 8.2 mg/L Final   10/14/2024 0.50 <1.00 mg/dL Final     Comment:     CRP-Normal Application expected values:   <1.0        mg/dL   Normal Range  1.0 - 5.0  mg/dL   Indicates mild inflammation  5.0 - 10.0 mg/dL   Indicates severe inflammation  >10.0        mg/dL   Represents serious processes and   frequently         indicates the presence of a bacterial   infection.      10/08/2024 3.60 (H) <1.00 mg/dL Final     Comment:     CRP-Normal Application expected values:   <1.0        mg/dL   Normal Range  1.0 - 5.0  mg/dL   Indicates mild inflammation  5.0 - 10.0 mg/dL   Indicates severe inflammation  >10.0        mg/dL   Represents serious processes and   frequently         indicates the presence of a bacterial   infection.      09/07/2021 2.7 0.0 - 8.2 mg/L Final   03/03/2020 3.5 0.0 - 8.2 mg/L Final       PRIOR  MICROBIOLOGY:    Susceptibility data from last 90 days.  Collected Specimen Info Organism   10/16/24 Blood from Peripheral, Antecubital, Right No growth after 5 days.   10/11/24 Blood No growth after 5 days.   10/11/24 Blood No growth after 5 days.   10/05/24 Blood No growth after 5 days.   10/05/24 Blood No growth after 5 days.   10/02/24 Blood from Peripheral, Hand, Left No growth after 5 days.   10/02/24 Blood from Peripheral, Wrist, Right COAGULASE NEGATIVE STAPHYLOCOCCI       LAST 7 DAYS MICROBIOLOGY   Microbiology Results (last 7 days)       ** No results found for the " last 168 hours. **              CURRENT/PREVIOUS VISIT EKG  Results for orders placed or performed during the hospital encounter of 11/01/24   EKG 12-lead    Collection Time: 11/01/24  5:24 PM   Result Value Ref Range    QRS Duration 72 ms    [facility] QTC Calculation 451 ms    Narrative    Test Reason : R79.89,    Vent. Rate : 079 BPM     Atrial Rate : 079 BPM     P-R Int : 144 ms          QRS Dur : 072 ms      QT Int : 394 ms       P-R-T Axes : 075 074 085 degrees     QTc Int : 451 ms    Normal sinus rhythm  Nonspecific ST abnormality  Abnormal ECG  When compared with ECG of 19-OCT-2024 18:28,  No significant change was found    Referred By: [email or web address]   SELF           Confirmed By:           Significant Imaging: I have reviewed all relevant and available imaging results/findings within the past 24 hours.    I spent a total of 35 minutes on the day of the visit.This includes face to face time and non-face to face time preparing to see the patient (eg, review of tests), obtaining and/or reviewing separately obtained history, documenting clinical information in the electronic or other health record, independently interpreting results and communicating results to the patient/family/caregiver, or care coordinator.    Lemuel Man MD  Date of Service: 11/03/2024      This note was created using M Modal voice recognition software that occasionally misinterpreted phrases or words.

## 2024-11-03 NOTE — PROGRESS NOTES
progress Note  Nephrology    Consult Requested By: Delfina Grey MD    Reason for Consult:   GM    SUBJECTIVE:     History of Present Illness:    64-year-old female with a past medical history of reflux disease, CLL, left lung cancer status post resection, depression, Hypokalemia, History of sepsis, s/p cholecystecomy, CLL, Malignant neoplasm of upper lobe of lung presents for evaluation of abnormal lab work. Reports that her kidney function has been getting worse over the last several days PTA.  Of note: patient was discharged 10/16/2024 after a 14 day stay in the hospital for septic shock. Only positive culture was coagulase-negative Staph in blood. Completed IV vancomycin 10/28/24.  In ER, afebrile, leukocytosis improving with WBC 35.91, mild anemia with H&H 10.8/33.3, hyponatremia treat make with sodium of 133, serum creatinine 2.8, glucose 116, UA with 1+ leukocytes, 2 WBCs.  Nephrology is consulted for GM.      11/2  UOP for one shift 1L, hypotensive at times.  Scr down a bit from admit, Na+ now up to 142.  UA noted.  No hydronephrosis on renal us.  Old lab results reviewed, has baseline CKD 3.  Scr is normal at baseline, 1.1-1.4.  has never seen a nephrologist in out patient setting.  Reports recent increase in levothyroxine dose.  States appetite has been overall poor d/t recent MI and hospitalization, but has been slowly improving.  11/3  Scr trended down.  Urine osm 158, urea 64.  AM cortisol 3.7.  UOP 3.6L.  d/w bedside nurse, PVR done yesterday 0 ml.  No new complaints.    Assessment/plan:    GM  CKD 3  Hyponatremia  Anemia of chronic dz  hypothyroidism    --GM d/t poor intake, hemodynamics.  agree w/IVF.  Renal US and UA done.  F/u renal panel and TSH in am.  Added on uric acid level.  --Avoid nephrotoxic agents.  No NSAIDs, COXibs, or aminoglycoside antibiotics.  Dose all medications for level of renal function.  --Na+ improved  --recent iron studies noted  --TSH in am d/t recent med  change    Past Medical History:   Diagnosis Date    Arthritis     Cancer 10/2022    (CLL) leukemia ; adenocarcinoma  adenosgemis    Depression     GERD (gastroesophageal reflux disease)     Neck pain     and back pain    Personal history of colonic polyps 07/07/2017    Pneumonia of left lung due to infectious organism 03/05/2020    Thyroid disease      Past Surgical History:   Procedure Laterality Date    ERCP N/A 10/10/2024    Procedure: ERCP (ENDOSCOPIC RETROGRADE CHOLANGIOPANCREATOGRAPHY);  Surgeon: Joshua Polanco III, MD;  Location: [facility] ENDO;  Service: Endoscopy;  Laterality: N/A;    FUSION, SPINE, CERVICAL, ANTERIOR APPROACH N/A 08/06/2024    Procedure: FUSION, SPINE, CERVICAL, ANTERIOR APPROACH;  Surgeon: Anatoly Daley DO;  Location: [facility] OR;  Service: Neurosurgery;  Laterality: N/A;  IOM, C-ARM, MEDTRONICS, MICRO, HORSESHOE    INJECTION OF ANESTHETIC AGENT AROUND MULTIPLE INTERCOSTAL NERVES Left 10/03/2022    Procedure: BLOCK, NERVE, INTERCOSTAL, 2 OR MORE;  Surgeon: Evelio Kaplan MD;  Location: [facility] OR 2ND FLR;  Service: Thoracic;  Laterality: Left;    INSERTION OF TUNNELED CENTRAL VENOUS CATHETER (CVC) WITH SUBCUTANEOUS PORT Right 11/03/2022    Procedure: [id number], Right or Left Neck or Chest;  Surgeon: Mini Acevedo MD;  Location: [facility] OR 2ND FLR;  Service: General;  Laterality: Right;    LAPAROSCOPIC CHOLECYSTECTOMY N/A 10/3/2024    Procedure: CHOLECYSTECTOMY, LAPAROSCOPIC;  Surgeon: Ang Mosley III, MD;  Location: [facility] OR;  Service: General;  Laterality: N/A;    LEFT HEART CATHETERIZATION Left 10/9/2024    Procedure: Left heart cath;  Surgeon: Martin Ingram MD;  Location: [facility] CATH/EP LAB;  Service: Cardiology;  Laterality: Left;    ROBOT-ASSISTED LAPAROSCOPIC LYMPHADENECTOMY USING DA MONIQUE XI Left 10/03/2022    Procedure: XI ROBOTIC LYMPHADENECTOMY;  Surgeon: Evelio Kaplan MD;  Location: [facility] OR 2ND FLR;  Service: Thoracic;  Laterality: Left;     SPINE SURGERY      TONSILLECTOMY      XI ROBOTIC RATS,WITH LOBECTOMY,LUNG Left 10/03/2022    Procedure: XI ROBOTIC RATS,WITH LOBECTOMY,LUNG;  Surgeon: Evelio Kalpan MD;  Location: [facility] OR [address] [place] [address];  Service: Thoracic;  Laterality: Left;     Family History   Problem Relation Name Age of Onset    Ovarian cancer Sister      Breast cancer Cousin       Social History     Tobacco Use    Smoking status: Former     Types: Cigarettes     Passive exposure: Current    Smokeless tobacco: Never    Tobacco comments:     PT states she has quit now for a few months, plans to stay quit.   Substance Use Topics    Alcohol use: Yes     Comment: rarely    Drug use: Yes     Types: Marijuana       Review of patient's allergies indicates:   Allergen Reactions    Latex, natural rubber         Review of Systems:  As above    OBJECTIVE:     Vital Signs Range (Last 24H):  Temp:  [97.8 °F (36.6 °C)-98.5 °F (36.9 °C)]   Pulse:  [64-76]   Resp:  [16-19]   BP: ()/(60-70)   SpO2:  [94 %-98 %]     Physical Exam:  General- NAD noted  HEENT- WNL  Neck- supple  CV- Regular rate and rhythm  Resp- Lungs CTA Bilaterally, No increased WOB  GI- Non tender/non-distended, BS normoactive x4 quads  Extrem- No cyanosis, clubbing, edema.  Derm- skin w/d  Neuro-  No flap.     Body mass index is 25.35 kg/m².    Laboratory:  CBC:   Recent Labs   Lab 11/03/24  0703   WBC 37.22*   RBC 3.36*   HGB 10.2*   HCT 33.3*      MCV 99*   MCH 30.4   MCHC 30.6*     CMP:   Recent Labs   Lab 11/03/24  0703   GLU 84  84   CALCIUM 8.3*  8.3*   ALBUMIN 3.6  3.6   PROT 5.7*     140   K 3.8  3.8   CO2 20*  20*   *  112*   BUN 7*  7*   CREATININE 2.1*  2.1*   ALKPHOS 162*   ALT 13   AST 41*   BILITOT 0.4     Recent Labs   Lab 11/01/24  1743   COLORU Colorless*   SPECGRAV <1.005*   PHUR 6.0   PROTEINUA Negative   NITRITE Negative   LEUKOCYTESUR 1+*   UROBILINOGEN Negative       Diagnostic Results:  Labs: Reviewed      ASSESSMENT/PLAN:     Active  Hospital Problems    Diagnosis  POA    *Acute renal failure [N17.9]  Yes    Leukocytosis [D72.829]  Yes    CLL (chronic lymphocytic leukemia) [C91.10]  Yes      Resolved Hospital Problems   No resolved problems to display.         Thank you for allowing us to participate in the care of your patient. We will follow the patient and provide recommendations as needed.      Time spent seeing patient( greater than 1/2 spent in direct contact) :     Denia Jackson NP

## 2024-11-03 NOTE — ASSESSMENT & PLAN NOTE
There is a certain degree of chronicity given her known CLL and intermittent steroid use  In the 30K's has been higher  Completed IV vancomycin on 10/28/24  ID Consulted and signed off  No fever

## 2024-11-04 ENCOUNTER — PATIENT MESSAGE (OUTPATIENT)
Dept: CASE MANAGEMENT | Facility: HOSPITAL | Age: 64
End: 2024-11-04

## 2024-11-04 ENCOUNTER — TELEPHONE (OUTPATIENT)
Facility: CLINIC | Age: 64
End: 2024-11-04
Payer: COMMERCIAL

## 2024-11-04 VITALS
RESPIRATION RATE: 18 BRPM | DIASTOLIC BLOOD PRESSURE: 71 MMHG | WEIGHT: 143.06 LBS | HEIGHT: 63 IN | OXYGEN SATURATION: 90 % | SYSTOLIC BLOOD PRESSURE: 122 MMHG | BODY MASS INDEX: 25.35 KG/M2 | HEART RATE: 78 BPM | TEMPERATURE: 98 F

## 2024-11-04 PROBLEM — E27.40 ADRENAL INSUFFICIENCY: Status: ACTIVE | Noted: 2024-11-04

## 2024-11-04 LAB
ALBUMIN SERPL BCP-MCNC: 3.6 G/DL (ref 3.5–5.2)
ALP SERPL-CCNC: 162 U/L (ref 55–135)
ALT SERPL W/O P-5'-P-CCNC: 15 U/L (ref 10–44)
ANION GAP SERPL CALC-SCNC: 7 MMOL/L (ref 8–16)
AST SERPL-CCNC: 52 U/L (ref 10–40)
BASOPHILS # BLD AUTO: ABNORMAL K/UL (ref 0–0.2)
BASOPHILS NFR BLD: 0 % (ref 0–1.9)
BILIRUB SERPL-MCNC: 0.4 MG/DL (ref 0.1–1)
BUN SERPL-MCNC: 6 MG/DL (ref 8–23)
CALCIUM SERPL-MCNC: 8.8 MG/DL (ref 8.7–10.5)
CHLORIDE SERPL-SCNC: 108 MMOL/L (ref 95–110)
CO2 SERPL-SCNC: 23 MMOL/L (ref 23–29)
CORTIS SERPL-MCNC: 3.1 UG/DL
CORTIS SERPL-MCNC: 3.7 UG/DL
CORTIS SERPL-MCNC: 4.2 UG/DL
CREAT SERPL-MCNC: 2.1 MG/DL (ref 0.5–1.4)
DIFFERENTIAL METHOD BLD: ABNORMAL
EOSINOPHIL # BLD AUTO: ABNORMAL K/UL (ref 0–0.5)
EOSINOPHIL NFR BLD: 5 % (ref 0–8)
ERYTHROCYTE [DISTWIDTH] IN BLOOD BY AUTOMATED COUNT: 14.2 % (ref 11.5–14.5)
EST. GFR  (NO RACE VARIABLE): 25.7 ML/MIN/1.73 M^2
GLUCOSE SERPL-MCNC: 78 MG/DL (ref 70–110)
HCT VFR BLD AUTO: 31.6 % (ref 37–48.5)
HGB BLD-MCNC: 10.1 G/DL (ref 12–16)
IMM GRANULOCYTES # BLD AUTO: ABNORMAL K/UL (ref 0–0.04)
IMM GRANULOCYTES NFR BLD AUTO: ABNORMAL % (ref 0–0.5)
LYMPHOCYTES # BLD AUTO: ABNORMAL K/UL (ref 1–4.8)
LYMPHOCYTES NFR BLD: 71 % (ref 18–48)
MAGNESIUM SERPL-MCNC: 1.8 MG/DL (ref 1.6–2.6)
MCH RBC QN AUTO: 30.9 PG (ref 27–31)
MCHC RBC AUTO-ENTMCNC: 32 G/DL (ref 32–36)
MCV RBC AUTO: 97 FL (ref 82–98)
MONOCYTES # BLD AUTO: ABNORMAL K/UL (ref 0.3–1)
MONOCYTES NFR BLD: 5 % (ref 4–15)
NEUTROPHILS NFR BLD: 19 % (ref 38–73)
NRBC BLD-RTO: 0 /100 WBC
PLATELET # BLD AUTO: 279 K/UL (ref 150–450)
PLATELET BLD QL SMEAR: ABNORMAL
PMV BLD AUTO: 10.2 FL (ref 9.2–12.9)
POTASSIUM SERPL-SCNC: 3.7 MMOL/L (ref 3.5–5.1)
PROT SERPL-MCNC: 5.7 G/DL (ref 6–8.4)
RBC # BLD AUTO: 3.27 M/UL (ref 4–5.4)
SODIUM SERPL-SCNC: 138 MMOL/L (ref 136–145)
WBC # BLD AUTO: 31.88 K/UL (ref 3.9–12.7)

## 2024-11-04 PROCEDURE — 80053 COMPREHEN METABOLIC PANEL: CPT | Performed by: NURSE PRACTITIONER

## 2024-11-04 PROCEDURE — 82024 ASSAY OF ACTH: CPT | Performed by: INTERNAL MEDICINE

## 2024-11-04 PROCEDURE — 85027 COMPLETE CBC AUTOMATED: CPT | Performed by: NURSE PRACTITIONER

## 2024-11-04 PROCEDURE — 25000003 PHARM REV CODE 250: Performed by: NURSE PRACTITIONER

## 2024-11-04 PROCEDURE — 85007 BL SMEAR W/DIFF WBC COUNT: CPT | Performed by: NURSE PRACTITIONER

## 2024-11-04 PROCEDURE — 99232 SBSQ HOSP IP/OBS MODERATE 35: CPT | Mod: ,,, | Performed by: INTERNAL MEDICINE

## 2024-11-04 PROCEDURE — 83735 ASSAY OF MAGNESIUM: CPT | Performed by: NURSE PRACTITIONER

## 2024-11-04 PROCEDURE — 12000002 HC ACUTE/MED SURGE SEMI-PRIVATE ROOM

## 2024-11-04 PROCEDURE — 96374 THER/PROPH/DIAG INJ IV PUSH: CPT

## 2024-11-04 PROCEDURE — 63600175 PHARM REV CODE 636 W HCPCS: Performed by: INTERNAL MEDICINE

## 2024-11-04 PROCEDURE — 82533 TOTAL CORTISOL: CPT | Mod: 91 | Performed by: INTERNAL MEDICINE

## 2024-11-04 PROCEDURE — 36415 COLL VENOUS BLD VENIPUNCTURE: CPT | Performed by: INTERNAL MEDICINE

## 2024-11-04 RX ORDER — SODIUM CHLORIDE 9 MG/ML
INJECTION, SOLUTION INTRAVENOUS CONTINUOUS
Status: DISCONTINUED | OUTPATIENT
Start: 2024-11-04 | End: 2024-11-04

## 2024-11-04 RX ORDER — HYDROCORTISONE 20 MG/1
20 TABLET ORAL DAILY
Qty: 90 TABLET | Refills: 3 | Status: SHIPPED | OUTPATIENT
Start: 2024-11-04

## 2024-11-04 RX ORDER — HYDROCORTISONE 10 MG/1
10 TABLET ORAL NIGHTLY
Qty: 90 TABLET | Refills: 3 | Status: SHIPPED | OUTPATIENT
Start: 2024-11-04

## 2024-11-04 RX ADMIN — CITALOPRAM HYDROBROMIDE 20 MG: 20 TABLET ORAL at 08:11

## 2024-11-04 RX ADMIN — TOPIRAMATE 50 MG: 25 TABLET, FILM COATED ORAL at 08:11

## 2024-11-04 RX ADMIN — COSYNTROPIN 0.25 MG: 0.25 INJECTION, POWDER, LYOPHILIZED, FOR SOLUTION INTRAVENOUS at 07:11

## 2024-11-04 RX ADMIN — BUPROPION HYDROCHLORIDE 300 MG: 150 TABLET, EXTENDED RELEASE ORAL at 08:11

## 2024-11-04 RX ADMIN — PANTOPRAZOLE SODIUM 40 MG: 40 TABLET, DELAYED RELEASE ORAL at 06:11

## 2024-11-04 RX ADMIN — Medication 200 ML: at 07:11

## 2024-11-04 RX ADMIN — FLUTICASONE PROPIONATE 50 MCG: 50 SPRAY, METERED NASAL at 08:11

## 2024-11-04 RX ADMIN — LEVOTHYROXINE SODIUM 75 MCG: 0.03 TABLET ORAL at 06:11

## 2024-11-04 NOTE — PLAN OF CARE
Patient cleared for discharge from case management standpoint.    Follow up appointments scheduled and added to AVS.    Chart and discharge orders reviewed.  Patient discharged home with no further case management needs once meds delivered to bedside.       11/04/24 1550   Final Note   Assessment Type Final Discharge Note   Anticipated Discharge Disposition Home   Hospital Resources/Appts/Education Provided Provided patient/caregiver with written discharge plan information;Appointments scheduled and added to AVS   Post-Acute Status   Coverage Payor: HEALTHY BLUE MEDICARE ADVANTAGE - HEALTHY BLUE DUAL ADVANTAGE -   Discharge Delays None known at this time

## 2024-11-04 NOTE — DISCHARGE SUMMARY
[facility]  Discharge Summary      Patient Name: Yvette Hutchinson  MRN: [id number]  KURT: [id number]  Patient Class: IP- Inpatient  Admission Date: 11/1/2024  Hospital Length of Stay: 0 days  Discharge Date and Time:  11/04/2024 3:01 PM  Attending Physician: Delfina Grey MD   Discharging Provider: ZACHARY Yi  Primary Care Provider: Vern Priest MD    Primary Care Team: Networked reference to record PCT     HPI:   64-year-old female with a past medical history of reflux disease, CLL, left lung cancer status post resection, depression, Hypokalemia, History of sepsis, s/p cholecystecomy, CLL, Malignant neoplasm of upper lobe of lung presents for evaluation of abnormal lab work. Reports that her kidney function has been getting worse over the last several days. Denies any associated chest pain, shortness of breath, nausea/vomiting, hematuria, dysuria, decreased urine output, fever/chills, or abdominal pain.   Of note: patient was discharged 10/16/2024 after a 14 day stay in the hospital for septic shock. Only positive culture was coagulase-negative Staph in blood. Completed IV vancomycin 10/28/24.    In ER, afebrile, leukocytosis improving with WBC 35.91, mild anemia with H&H 10.8/33.3, hyponatremia treat make with sodium of 133, serum creatinine 2.8, glucose 116, UA with 1+ leukocytes, 2 WBCs    Admit to hospital medicine for GM    * No surgery found *      Hospital Course:   Admited for abnormal lab work - found to have worsening chronic leukocytosis, acute on chronic CKD, and hyponatremia. Started on IV fluids - sodium normalized. Noted to have recently completed IV ABX therapy and poor appetite. ID consulted as well, Nephrology consulted related to CKD. Cautious IV fluids administered r/t known heart failure (EF 30-35% [10/03/24]). Creatine level trending down. Due to patient feeling better, voiding well, and national IV fluid shortage, IV fluids  discontinued. Cortisol level low = 3.7. Glucose sustaining around 80's. Cosyntropin stimulation test in am. Discussed case with Endocrinology [facility], Dr. Jennifer MD who gave recommendations for treatment, including start empiric hydrocortisone 20 mg in the morning and 10 mg in the evening and d/c her with adrenal insufficiency sick day precautions and refer to endocrinology outpatient.  Case management consulted and ambulatory referral placed for endocrinology outpatient.  She should also follow up with Endocrinology as usual.  Patient given extensive instructions on sick day protocol and typed instructions explained.  Patient is alert oriented and feels fine.  Shows discharge.  Vitals stable discharge to home in stable condition.  Repeat chemistry Friday.     Goals of Care Treatment Preferences:  Code Status: Full Code         Consults:   Consults (From admission, onward)          Status Ordering Provider     Inpatient consult to Nephrology  Once        Provider:  Dejan Quezada MD    Completed DONA SWANSON     Inpatient consult to Infectious Diseases  Once        Provider:  Lemuel Man MD    Completed DONA SWANSON                Final Active Diagnoses:    Diagnosis Date Noted POA    PRINCIPAL PROBLEM:  Acute renal failure [N17.9] 10/02/2024 Yes    Low serum cortisol level [R79.89] 11/03/2024 Yes    Leukocytosis [D72.829] 11/01/2024 Yes    Adrenal insufficiency [E27.40] 11/04/2024 Yes    CLL (chronic lymphocytic leukemia) [C91.10] 05/04/2020 Yes      Problems Resolved During this Admission:       Discharged Condition: stable    Disposition: Home or Self Care    Follow Up:    Patient Instructions:      Comprehensive metabolic panel   Standing Status: Future Standing Exp. Date: 02/02/26   Order Comments: Report results as below     Order Specific Question Answer Comments   Send normal result to authorizing provider's In Basket if patient is active on MyChart: Yes      Ambulatory referral/consult to  Endocrinology   Standing Status: Future   Referral Priority: Urgent Referral Type: Consultation   Requested Specialty: Endocrinology   Number of Visits Requested: 1     Ambulatory referral/consult to Hematology / Oncology   Standing Status: Future   Referral Priority: Routine Referral Type: Consultation   Referral Reason: Specialty Services Required   Requested Specialty: Hematology and Oncology   Number of Visits Requested: 1       Significant Diagnostic Studies: Labs: CMP   Recent Labs   Lab 11/03/24 0703 11/04/24 0733     140 138   K 3.8  3.8 3.7   *  112* 108   CO2 20*  20* 23   GLU 84  84 78   BUN 7*  7* 6*   CREATININE 2.1*  2.1* 2.1*   CALCIUM 8.3*  8.3* 8.8   PROT 5.7* 5.7*   ALBUMIN 3.6  3.6 3.6   BILITOT 0.4 0.4   ALKPHOS 162* 162*   AST 41* 52*   ALT 13 15   ANIONGAP 8  8 7*   , CBC   Recent Labs   Lab 11/03/24 0703 11/04/24 0733   WBC 37.22* 31.88*   HGB 10.2* 10.1*   HCT 33.3* 31.6*    279   , INR   Lab Results   Component Value Date    INR 1.1 10/09/2024    INR 0.9 07/24/2024    INR 1.0 08/17/2022   , Lipid Panel   Lab Results   Component Value Date    CHOL 308 (H) 02/07/2024    HDL 44 02/07/2024    LDLCALC 220.0 (H) 02/07/2024    TRIG 220 (H) 02/07/2024    CHOLHDL 14.3 (L) 02/07/2024       Pending Diagnostic Studies:       Procedure Component Value Units Date/Time    ACTH [[id number]] Collected: 11/04/24 0733    Order Status: Sent Lab Status: In process Updated: 11/04/24 0803    Specimen: Blood            Medications:  Reconciled Home Medications:      Medication List        START taking these medications      * hydrocortisone 10 MG Tab  Commonly known as: CORTEF  Take 1 tablet (10 mg total) by mouth every evening.     * hydrocortisone 20 MG Tab  Commonly known as: CORTEF  Take 1 tablet (20 mg total) by mouth once daily.           * This list has 2 medication(s) that are the same as other medications prescribed for you. Read the directions carefully, and ask your  doctor or other care provider to review them with you.                CONTINUE taking these medications      albuterol 90 mcg/actuation inhaler  Commonly known as: PROVENTIL/VENTOLIN HFA  Inhale 2 puffs into the lungs every 6 (six) hours as needed for Wheezing or Shortness of Breath. Rescue     buPROPion 300 MG 24 hr tablet  Commonly known as: WELLBUTRIN XL  Take 1 tablet (300 mg total) by mouth once daily.     citalopram 20 MG tablet  Commonly known as: CeleXA  Take 1 tablet (20 mg total) by mouth once daily.     fluticasone propionate 50 mcg/actuation nasal spray  Commonly known as: FLONASE  1 spray (50 mcg total) by Each Nostril route once daily.     fluticasone-salmeterol 230-21 mcg/dose 230-21 mcg/actuation Hfaa inhaler  Commonly known as: ADVAIR HFA  Inhale 2 puffs into the lungs 2 (two) times daily. Controller     gabapentin 300 MG capsule  Commonly known as: NEURONTIN  Take 1 capsule (300 mg total) by mouth 3 (three) times daily.     levothyroxine 75 MCG tablet  Commonly known as: SYNTHROID  Take 1 tablet (75 mcg total) by mouth before breakfast.     naloxone 4 mg/actuation Spry  Commonly known as: NARCAN  1 spray by Nasal route once.     ondansetron 8 MG Tbdl  Commonly known as: ZOFRAN-ODT  Take 1 tablet (8 mg total) by mouth every 6 (six) hours as needed (nausea).     pantoprazole 40 MG tablet  Commonly known as: PROTONIX  Take 1 tablet (40 mg total) by mouth once daily. for 14 days     potassium chloride 10 MEQ Cpsr  Commonly known as: MICRO-K  Take 2 capsules (20 mEq total) by mouth once daily.     pravastatin 10 MG tablet  Commonly known as: PRAVACHOL  Take 1 tablet (10 mg total) by mouth once daily.     * topiramate 50 MG tablet  Commonly known as: TOPAMAX  Take 1 tablet (50 mg total) by mouth 2 (two) times daily.     * topiramate 25 MG tablet  Commonly known as: TOPAMAX  Take 1 tablet (25 mg total) by mouth 2 (two) times daily.  Start taking on: November 29, 2024           * This list has 2  medication(s) that are the same as other medications prescribed for you. Read the directions carefully, and ask your doctor or other care provider to review them with you.                  Indwelling Lines/Drains at time of discharge:   Lines/Drains/Airways       None                   Time spent on the discharge of patient: 45 minutes         ZACHARY Yi  Department of Hospital Medicine  [facility]

## 2024-11-04 NOTE — PROGRESS NOTES
[facility]   Department of Infectious Disease  Progress Note        PATIENT NAME: Yvette Hutchinson  MRN: [id number]  TODAY'S DATE: 11/04/2024  ADMIT DATE: 11/1/2024  LOS: 0 days    CHIEF COMPLAINT: ABNORMAL LABS (KIDNEY FUNCTIONS )      PRINCIPLE PROBLEM: Acute renal failure    INTERVAL HISTORY      11/3/2024: No acute issues overnight remains stable.  Creatinine down to 2.1.      11/04/2024:  Remain stable.  Creatinine still 2.1.  No other acute issues.    Antibiotics (From admission, onward)      None          Antifungals (From admission, onward)      None           Antivirals (From admission, onward)      None            ASSESSMENT and PLAN      1. GM.  Resolving.  Management as per nephrologist and hospitalist.       2. CLL with chronic leukocytosis.  Expectant management as per her oncologist.  Not on any chemotherapy at this time.    RECOMMENDATIONS:    Management as of GM as per nephrologist and hospitalist   Follow up with oncologist post discharge    ID service will drop off today.  Please call with any questions.     SUBJECTIVE    Yvette Hutchinson is a 64 y.o. female with CLL chronic leukocytosis.  Recently hospitalized with septic shock with only positive culture been coagulase-negative Staphylococcus bacteremia.  Received empiric antibiotics with improvement.  Discharge home on vancomycin and complete therapy 10/28/2024.  I saw her for follow up on 10/31/2024 she was doing okay.     Her creatinine had continued to increase and was 2.5 from baseline of 1.1..  Seen by her PCP yesterday and sent into the hospital.  She has been evaluated by nephrologist.  Notes reviewed.  ID asked to revisit.     Antibiotic history:  None at this time     Microbiology:  No new cultures    Review of Systems  Negative except as stated above in Interval History     OBJECTIVE   Temp:  [97.9 °F (36.6 °C)-98.6 °F (37 °C)] 97.9 °F (36.6 °C)  Pulse:  [63-83] 72  Resp:  [18] 18  SpO2:  [95 %-97 %] 96  %  BP: ()/(55-65) 101/60  Temp:  [97.9 °F (36.6 °C)-98.6 °F (37 °C)]   Temp: 97.9 °F (36.6 °C) (11/04/24 0730)  Pulse: 72 (11/04/24 0730)  Resp: 18 (11/04/24 0730)  BP: 101/60 (11/04/24 0730)  SpO2: 96 % (11/04/24 0730)    Intake/Output Summary (Last 24 hours) at 11/4/2024 0912  Last data filed at 11/4/2024 0757  Gross per 24 hour   Intake 2840 ml   Output 2100 ml   Net 740 ml       Physical ExamGeneral:  Well-groomed middle-aged man lying quietly in bed in no acute distress   CVS: S1 and 2 heard, no murmurs appreciated   Respiratory: Clear to auscultation   Abdomen: Full, soft, nontender, no palpable organomegaly   Skin: No rash appreciated.  Dry   CNS: No focal deficits   Musculoskeletal: No joint or bony abnormalities appreciated  Psych: Good mood, normal affect.      VAD:  PIV  ISOLATION:  None     Wounds: None    Significant Labs: All pertinent labs within the past 24 hours have been reviewed.    CBC LAST 7 DAYS  Recent Labs   Lab 10/28/24  1045 10/29/24  0931 11/01/24  1727 11/02/24  0535 11/03/24  0703 11/04/24  0733   WBC 49.56* 39.93* 35.91* 33.94* 37.22* 31.88*   RBC 3.70* 3.76* 3.44* 3.13* 3.36* 3.27*   HGB 11.4* 11.6* 10.8* 9.6* 10.2* 10.1*   HCT 36.2* 36.2* 33.3* 30.7* 33.3* 31.6*   MCV 98 96 97 98 99* 97   MCH 30.8 30.9 31.4* 30.7 30.4 30.9   MCHC 31.5* 32.0 32.4 31.3* 30.6* 32.0   RDW 15.0* 15.0* 14.4 14.5 14.5 14.2    268 273 263 286 279   MPV 10.9 11.4 10.6 10.8 10.1 10.2   GRAN 10.0* 16.0* 12.0* 16.0* 10.0*  --    LYMPH 86.0*  CANCELED 79.0* 83.0*  CANCELED 74.0*  CANCELED 87.0*  CANCELED  --    MONO 3.0*  CANCELED 2.0* 4.0  CANCELED 7.0  CANCELED 2.0*  CANCELED  --    BASO CANCELED  --  CANCELED CANCELED CANCELED  --    NRBC 0 0 0 0 0  --        CHEMISTRY LAST 7 DAYS  Recent Labs   Lab 10/28/24  1045 10/29/24  0931 11/01/24  1158 11/01/24  1727 11/02/24  0535 11/03/24  0703 11/04/24  0733    137 135* 134* 142 140  140 138   K 3.8 3.9 3.8 3.7 4.1 3.8  3.8 3.7     "104 102 103 111* 112*  112* 108   CO2 20* 22* 22* 25 24 20*  20* 23   ANIONGAP 11 11 11 6* 7* 8  8 7*   BUN 7* 9 9 10 10 7*  7* 6*   CREATININE 2.5* 2.5* 2.7* 2.8* 2.6* 2.1*  2.1* 2.1*   * 98 88 116* 84 84  84 78   CALCIUM 9.5 9.8 9.5 9.0 8.6* 8.3*  8.3* 8.8   MG  --  1.9  --   --  1.8 1.7 1.8   ALBUMIN 3.7 3.8  --  4.3 3.5 3.6  3.6 3.6   PROT 6.4 6.6  --  6.6 5.5* 5.7* 5.7*   ALKPHOS 205* 215*  --  168* 141* 162* 162*   ALT 23 22  --  15 11 13 15   AST 47* 49*  --  36 26 41* 52*   BILITOT 0.4 0.5  --  0.4 0.3 0.4 0.4       Estimated Creatinine Clearance: 24.2 mL/min (A) (based on SCr of 2.1 mg/dL (H)).    INFLAMMATORY/PROCAL  LAST 7 DAYS  Recent Labs   Lab 10/28/24  1045   CRP 13.3*     No results found for: "ESR"  CRP   Date Value Ref Range Status   10/28/2024 13.3 (H) 0.0 - 8.2 mg/L Final   10/14/2024 0.50 <1.00 mg/dL Final     Comment:     CRP-Normal Application expected values:   <1.0        mg/dL   Normal Range  1.0 - 5.0  mg/dL   Indicates mild inflammation  5.0 - 10.0 mg/dL   Indicates severe inflammation  >10.0        mg/dL   Represents serious processes and   frequently         indicates the presence of a bacterial   infection.      10/08/2024 3.60 (H) <1.00 mg/dL Final     Comment:     CRP-Normal Application expected values:   <1.0        mg/dL   Normal Range  1.0 - 5.0  mg/dL   Indicates mild inflammation  5.0 - 10.0 mg/dL   Indicates severe inflammation  >10.0        mg/dL   Represents serious processes and   frequently         indicates the presence of a bacterial   infection.      09/07/2021 2.7 0.0 - 8.2 mg/L Final   03/03/2020 3.5 0.0 - 8.2 mg/L Final       PRIOR  MICROBIOLOGY:    Susceptibility data from last 90 days.  Collected Specimen Info Organism   10/16/24 Blood from Peripheral, Antecubital, Right No growth after 5 days.   10/11/24 Blood No growth after 5 days.   10/11/24 Blood No growth after 5 days.   10/05/24 Blood No growth after 5 days.   10/05/24 Blood No growth after 5 " days.   10/02/24 Blood from Peripheral, Hand, Left No growth after 5 days.   10/02/24 Blood from Peripheral, Wrist, Right COAGULASE NEGATIVE STAPHYLOCOCCI       LAST 7 DAYS MICROBIOLOGY   Microbiology Results (last 7 days)       ** No results found for the last 168 hours. **              CURRENT/PREVIOUS VISIT EKG  Results for orders placed or performed during the hospital encounter of 11/01/24   EKG 12-lead    Collection Time: 11/01/24  5:24 PM   Result Value Ref Range    QRS Duration 72 ms    [facility] QTC Calculation 451 ms    Narrative    Test Reason : R79.89,    Vent. Rate : 079 BPM     Atrial Rate : 079 BPM     P-R Int : 144 ms          QRS Dur : 072 ms      QT Int : 394 ms       P-R-T Axes : 075 074 085 degrees     QTc Int : 451 ms    Normal sinus rhythm  Nonspecific ST abnormality  Abnormal ECG  When compared with ECG of 19-OCT-2024 18:28,  No significant change was found    Referred By: [email or web address]   SELF           Confirmed By:           Significant Imaging: I have reviewed all relevant and available imaging results/findings within the past 24 hours.    I spent a total of 35 minutes on the day of the visit.This includes face to face time and non-face to face time preparing to see the patient (eg, review of tests), obtaining and/or reviewing separately obtained history, documenting clinical information in the electronic or other health record, independently interpreting results and communicating results to the patient/family/caregiver, or care coordinator.    Lemuel Man MD  Date of Service: 11/04/2024      This note was created using [employer] voice recognition software that occasionally misinterpreted phrases or words.

## 2024-11-04 NOTE — TELEPHONE ENCOUNTER
Received NP referral from JAS Rao NP. Pt a current pt of Dr. Lee. Message sent to Dr. Lee staff to schedule a f/u appt.

## 2024-11-04 NOTE — PROGRESS NOTES
progress Note  Nephrology    Consult Requested By: Delfina Grey MD    Reason for Consult:   GM    SUBJECTIVE:     History of Present Illness:    64-year-old female with a past medical history of reflux disease, CLL, left lung cancer status post resection, depression, Hypokalemia, History of sepsis, s/p cholecystecomy, CLL, Malignant neoplasm of upper lobe of lung presents for evaluation of abnormal lab work. Reports that her kidney function has been getting worse over the last several days PTA.  Of note: patient was discharged 10/16/2024 after a 14 day stay in the hospital for septic shock. Only positive culture was coagulase-negative Staph in blood. Completed IV vancomycin 10/28/24.  In ER, afebrile, leukocytosis improving with WBC 35.91, mild anemia with H&H 10.8/33.3, hyponatremia treat make with sodium of 133, serum creatinine 2.8, glucose 116, UA with 1+ leukocytes, 2 WBCs.  Nephrology is consulted for GM.      11/2  UOP for one shift 1L, hypotensive at times.  Scr down a bit from admit, Na+ now up to 142.  UA noted.  No hydronephrosis on renal us.  Old lab results reviewed, has baseline CKD 3.  Scr is normal at baseline, 1.1-1.4.  has never seen a nephrologist in out patient setting.  Reports recent increase in levothyroxine dose.  States appetite has been overall poor d/t recent MI and hospitalization, but has been slowly improving.  11/3  Scr trended down.  Urine osm 158, urea 64.  AM cortisol 3.7.  UOP 3.6L.  d/w bedside nurse, PVR done yesterday 0 ml.  No new complaints.  11/4  UOP 2L, renal function same.  Cosyntropin stimulation test today.  Awaiting results, pt anxious to go home.  No complaints.    Assessment/plan:    GM  CKD 3  Hyponatremia  Anemia of chronic dz  hypothyroidism    --GM d/t poor intake, hemodynamics.  agree w/IVF.  Renal US and UA done.  F/u renal panel and TSH in am.  Added on uric acid level.  --Avoid nephrotoxic agents.  No NSAIDs, COXibs, or aminoglycoside antibiotics.   Dose all medications for level of renal function.  --Na+ improved  --recent iron studies noted  --TSH 2.5    Past Medical History:   Diagnosis Date    Arthritis     Cancer 10/2022    (CLL) leukemia ; adenocarcinoma  adenosgemis    Depression     GERD (gastroesophageal reflux disease)     Neck pain     and back pain    Personal history of colonic polyps 07/07/2017    Pneumonia of left lung due to infectious organism 03/05/2020    Thyroid disease      Past Surgical History:   Procedure Laterality Date    ERCP N/A 10/10/2024    Procedure: ERCP (ENDOSCOPIC RETROGRADE CHOLANGIOPANCREATOGRAPHY);  Surgeon: Joshua Polanco III, MD;  Location: [facility] ENDO;  Service: Endoscopy;  Laterality: N/A;    FUSION, SPINE, CERVICAL, ANTERIOR APPROACH N/A 08/06/2024    Procedure: FUSION, SPINE, CERVICAL, ANTERIOR APPROACH;  Surgeon: Anatoly Daley DO;  Location: [facility] OR;  Service: Neurosurgery;  Laterality: N/A;  IOM, C-ARM, MEDTRONICS, MICRO, HORSESHOE    INJECTION OF ANESTHETIC AGENT AROUND MULTIPLE INTERCOSTAL NERVES Left 10/03/2022    Procedure: BLOCK, NERVE, INTERCOSTAL, 2 OR MORE;  Surgeon: Evelio Kaplan MD;  Location: [facility] OR 2ND FLR;  Service: Thoracic;  Laterality: Left;    INSERTION OF TUNNELED CENTRAL VENOUS CATHETER (CVC) WITH SUBCUTANEOUS PORT Right 11/03/2022    Procedure: FHVOFMOCL-REAU-A-CATH, Right or Left Neck or Chest;  Surgeon: Mini Acevedo MD;  Location: [facility] OR 2ND FLR;  Service: General;  Laterality: Right;    LAPAROSCOPIC CHOLECYSTECTOMY N/A 10/3/2024    Procedure: CHOLECYSTECTOMY, LAPAROSCOPIC;  Surgeon: Ang Mosley III, MD;  Location: [facility] OR;  Service: General;  Laterality: N/A;    LEFT HEART CATHETERIZATION Left 10/9/2024    Procedure: Left heart cath;  Surgeon: Martin Ingram MD;  Location: [facility] CATH/EP LAB;  Service: Cardiology;  Laterality: Left;    ROBOT-ASSISTED LAPAROSCOPIC LYMPHADENECTOMY USING DA MONIQUE XI Left 10/03/2022    Procedure: XI ROBOTIC LYMPHADENECTOMY;   Surgeon: Evelio Kaplan MD;  Location: [address];  Service: Thoracic;  Laterality: Left;    SPINE SURGERY      TONSILLECTOMY      XI ROBOTIC RATS,WITH LOBECTOMY,LUNG Left 10/03/2022    Procedure: XI ROBOTIC RATS,WITH LOBECTOMY,LUNG;  Surgeon: Evelio Kaplan MD;  Location: [facility] OR [address] [place] [address];  Service: Thoracic;  Laterality: Left;     Family History   Problem Relation Name Age of Onset    Ovarian cancer Sister      Breast cancer Cousin       Social History     Tobacco Use    Smoking status: Former     Types: Cigarettes     Passive exposure: Current    Smokeless tobacco: Never    Tobacco comments:     PT states she has quit now for a few months, plans to stay quit.   Substance Use Topics    Alcohol use: Yes     Comment: rarely    Drug use: Yes     Types: Marijuana       Review of patient's allergies indicates:   Allergen Reactions    Latex, natural rubber         Review of Systems:  As above    OBJECTIVE:     Vital Signs Range (Last 24H):  Temp:  [97.9 °F (36.6 °C)-98.6 °F (37 °C)]   Pulse:  [63-83]   Resp:  [18]   BP: ()/(55-65)   SpO2:  [95 %-97 %]     Physical Exam:  General- NAD noted  HEENT- WNL  Neck- supple  CV- Regular rate and rhythm  Resp- Lungs CTA Bilaterally, No increased WOB  GI- Non tender/non-distended, BS normoactive x4 quads  Extrem- No cyanosis, clubbing, edema.  Derm- skin w/d  Neuro-  No flap.     Body mass index is 25.35 kg/m².    Laboratory:  CBC:   Recent Labs   Lab 11/04/24  0733   WBC 31.88*   RBC 3.27*   HGB 10.1*   HCT 31.6*      MCV 97   MCH 30.9   MCHC 32.0     CMP:   Recent Labs   Lab 11/04/24  0733   GLU 78   CALCIUM 8.8   ALBUMIN 3.6   PROT 5.7*      K 3.7   CO2 23      BUN 6*   CREATININE 2.1*   ALKPHOS 162*   ALT 15   AST 52*   BILITOT 0.4     Recent Labs   Lab 11/01/24  1743   COLORU Colorless*   SPECGRAV <1.005*   PHUR 6.0   PROTEINUA Negative   NITRITE Negative   LEUKOCYTESUR 1+*   UROBILINOGEN Negative       Diagnostic Results:  Labs:  Reviewed      ASSESSMENT/PLAN:     Active Hospital Problems    Diagnosis  POA    *Acute renal failure [N17.9]  Yes    Low serum cortisol level [R79.89]  Yes    Leukocytosis [D72.829]  Yes    CLL (chronic lymphocytic leukemia) [C91.10]  Yes      Resolved Hospital Problems   No resolved problems to display.         Thank you for allowing us to participate in the care of your patient. We will follow the patient and provide recommendations as needed.      Time spent seeing patient( greater than 1/2 spent in direct contact) :     Denia Jackson NP

## 2024-11-04 NOTE — DISCHARGE INSTRUCTIONS
"Adrenal insufficiency self-care instructions and sick day rules      Adrenal insufficiency, which involves a deficiency in steroid hormones that are normally produced by the body, can result in symptoms that include generalized and severe fatigue, malaise, dizziness, and low blood pressure. Should you develop any of these symptoms, please call us immediately or go to the ER if severe symptoms develop. You may also take your emergency intramuscular dexamethasone injection if you have this available     Please refer to the following instructions for patients with adrenal insufficiency:  1. Please get an alert bracelet that says "Adrenal insufficiency. Give stress doses of steroids in case of illness."      2. If you become nauseous and are unable to keep hydrocortisone down, you need to go to an emergency room to get this medication via IV.      3. If you are ill with a low-grade fever of  F, please double your dose of hydrocortisone. If you have a fever of >100 F, please triple your hydrocortisone dose for 3 days or until fever resolves.      4. If you are undergoing a planned medical procedure (such as minor surgery, colonoscopy) or a major dental procedure (tooth extraction, root canal etc) you should double your dose the day before and the day of and the day after the procedure.     5. If a major surgery requiring general anesthesia is being planned, please notify your endocrinologist so that we can give instructions to the anesthesia team that will be taking care of you with regards to the amount of hydrocortisone to be given during surgery.     Please call us with any questions and to notify us that you are ill at home or are heading to ER/hospital so that we can help you through the situation and communicate with the physicians taking care of you.         Complete medications as ordered  Follow all discharge instructions.  Please schedule follow up appointments as necessary      When to Call Your " Doctor  Call your doctor immediately if you have any of the following:  Severe headache  Severe dizziness, or fainting  Nausea or vomiting  Fast heartbeat (higher than 100 beats per minute)  Fever or chills  Swollen ankles  Weakness  Chest Pain attacks that last longer, occur more often, or are more severe than in the past

## 2024-11-04 NOTE — PLAN OF CARE
In basket request sent to ID for hospital follow up appt , office staff will contact patient to schedule . Added information to AVS .     Office information with instructions added for patient to call and schedule hospital follow up appt with Denia Jackson NP - Nephrology added to AVS .     Hospital follow up appt scheduled and added to AVS @ discharge clinic.     Pharmacy notified for medication delivery to bedside prior to DC .        11/04/24 1601   Post-Acute Status   Post-Acute Authorization Medications   Medication Status Pending bedside delivery   Hospital Resources/Appts/Education Provided Appointment suggestion unavailable   Discharge Plan   Discharge Plan A Home

## 2024-11-04 NOTE — PLAN OF CARE
Pt clear for DC from case management standpoint. Discharging to Home with Family .       PHARMACY TO DELIVER MEDS TO BEDSIDE PRIOR TO LEAVING FACILITY .          11/04/24 1605   Final Note   Assessment Type Final Discharge Note   Anticipated Discharge Disposition Home

## 2024-11-05 LAB — ACTH PLAS-MCNC: 735 PG/ML (ref 7.2–63.3)

## 2024-11-08 ENCOUNTER — OFFICE VISIT (OUTPATIENT)
Dept: HEMATOLOGY/ONCOLOGY | Facility: CLINIC | Age: 64
End: 2024-11-08
Payer: COMMERCIAL

## 2024-11-08 ENCOUNTER — LAB VISIT (OUTPATIENT)
Dept: LAB | Facility: HOSPITAL | Age: 64
End: 2024-11-08
Attending: INTERNAL MEDICINE
Payer: COMMERCIAL

## 2024-11-08 VITALS
SYSTOLIC BLOOD PRESSURE: 117 MMHG | OXYGEN SATURATION: 100 % | HEART RATE: 73 BPM | WEIGHT: 144.38 LBS | DIASTOLIC BLOOD PRESSURE: 58 MMHG | TEMPERATURE: 97 F | HEIGHT: 63 IN | BODY MASS INDEX: 25.58 KG/M2 | RESPIRATION RATE: 18 BRPM

## 2024-11-08 DIAGNOSIS — Q95.1: ICD-10-CM

## 2024-11-08 DIAGNOSIS — C91.10 CLL (CHRONIC LYMPHOCYTIC LEUKEMIA): Primary | ICD-10-CM

## 2024-11-08 DIAGNOSIS — M79.2 NEUROPATHIC PAIN: ICD-10-CM

## 2024-11-08 DIAGNOSIS — Z85.118 HISTORY OF LUNG CANCER: ICD-10-CM

## 2024-11-08 DIAGNOSIS — F41.9 ANXIETY: ICD-10-CM

## 2024-11-08 DIAGNOSIS — E53.8 B12 DEFICIENCY: ICD-10-CM

## 2024-11-08 DIAGNOSIS — C34.90 NON-SMALL CELL LUNG CANCER, UNSPECIFIED LATERALITY: ICD-10-CM

## 2024-11-08 DIAGNOSIS — C34.01 MALIGNANT NEOPLASM OF HILUS OF RIGHT LUNG: ICD-10-CM

## 2024-11-08 LAB
BASOPHILS # BLD AUTO: ABNORMAL K/UL (ref 0–0.2)
BASOPHILS NFR BLD: 0 % (ref 0–1.9)
DIFFERENTIAL METHOD BLD: ABNORMAL
EOSINOPHIL # BLD AUTO: ABNORMAL K/UL (ref 0–0.5)
EOSINOPHIL NFR BLD: 0 % (ref 0–8)
ERYTHROCYTE [DISTWIDTH] IN BLOOD BY AUTOMATED COUNT: 14.6 % (ref 11.5–14.5)
HCT VFR BLD AUTO: 32.3 % (ref 37–48.5)
HGB BLD-MCNC: 9.9 G/DL (ref 12–16)
IMM GRANULOCYTES # BLD AUTO: ABNORMAL K/UL (ref 0–0.04)
IMM GRANULOCYTES NFR BLD AUTO: ABNORMAL % (ref 0–0.5)
LYMPHOCYTES # BLD AUTO: ABNORMAL K/UL (ref 1–4.8)
LYMPHOCYTES NFR BLD: 90 % (ref 18–48)
MCH RBC QN AUTO: 30 PG (ref 27–31)
MCHC RBC AUTO-ENTMCNC: 30.7 G/DL (ref 32–36)
MCV RBC AUTO: 98 FL (ref 82–98)
MONOCYTES # BLD AUTO: ABNORMAL K/UL (ref 0.3–1)
MONOCYTES NFR BLD: 2 % (ref 4–15)
NEUTROPHILS NFR BLD: 8 % (ref 38–73)
NRBC BLD-RTO: 0 /100 WBC
PLATELET # BLD AUTO: 378 K/UL (ref 150–450)
PLATELET BLD QL SMEAR: ABNORMAL
PMV BLD AUTO: 10.3 FL (ref 9.2–12.9)
RBC # BLD AUTO: 3.3 M/UL (ref 4–5.4)
WBC # BLD AUTO: 36.03 K/UL (ref 3.9–12.7)

## 2024-11-08 PROCEDURE — 36415 COLL VENOUS BLD VENIPUNCTURE: CPT | Performed by: INTERNAL MEDICINE

## 2024-11-08 PROCEDURE — 99999 PR PBB SHADOW E&M-EST. PATIENT-LVL V: CPT | Mod: PBBFAC,,, | Performed by: INTERNAL MEDICINE

## 2024-11-08 PROCEDURE — 85007 BL SMEAR W/DIFF WBC COUNT: CPT | Performed by: INTERNAL MEDICINE

## 2024-11-08 PROCEDURE — 85027 COMPLETE CBC AUTOMATED: CPT | Performed by: INTERNAL MEDICINE

## 2024-11-08 NOTE — PROGRESS NOTES
Subjective:       Patient ID: Yvette Hutchinson is a 64 y.o. female.    Chief Complaint:  Patient known to me with CLL stage 0 recently diagnosed with lung cancer August 2022  Follow-up    Lung Cancer    64 year old  Patient has chronic complains of chronic pain syndrome and COPD for which periodically she take steroids she is also on BuSpar has issues anxiety has required dilatation of esophageal strictures allergic rhinitis insomnia and history of B12 deficiency documented and CLL   Had CT chest done with pulmonary 7/22 showed mass bx done. 8/22  10/24 :  3 admissions, septic shock, GM and hypokalemia  Oncology hx  Impression:ct chest 7/29/22     2.3 cm spiculated left upper lobe lung nodule highly suspicious for bronchogenic carcinoma.     New nonspecific 7 mm nodule in the right middle lobe; this appears more linear on the coronal images and may be a focus of scarring.     Additional stable lung nodules, mildly enlarged axillary lymph nodes, substernal thyroid nodule; these findings are likely benign given the interval stability.   LEFT LUNG MASS, CT-GUIDED BIOPSY:   Non-small cell lung carcinoma.   Comment:  The biopsy reveals invasive carcinoma with apparent glandular but   also vague squamoid features.  Tumor cells are diffusely positive with TTF-1   and focally positive with P40.  The histology differential includes   adenocarcinoma and adenosquamous carcinoma.  PD-L1 and templates NGS studies   are in progress.  The results will be issued separately.    October 3, 2022: Robotic left lobectomy T1c N1    Social history; patient is a smoker denies recreational alcohol use or drug use   Patient is currently on disability  She is allergic to the mask used during COVID pandemic she is also bothered by her mask with COPD    Family history suggestive of ovarian and breast cancer patient advised to see a  or seek   Review of patient's allergies indicates:   Allergen Reactions    Latex,  natural rubber      Medications have been reviewed  Current Outpatient Medications on File Prior to Visit   Medication Sig Dispense Refill    albuterol (PROVENTIL/VENTOLIN HFA) 90 mcg/actuation inhaler Inhale 2 puffs into the lungs every 6 (six) hours as needed for Wheezing or Shortness of Breath. Rescue 8.5 g 11    buPROPion (WELLBUTRIN XL) 300 MG 24 hr tablet Take 1 tablet (300 mg total) by mouth once daily. 90 tablet 3    citalopram (CELEXA) 20 MG tablet Take 1 tablet (20 mg total) by mouth once daily. 30 tablet 11    fluticasone propionate (FLONASE) 50 mcg/actuation nasal spray 1 spray (50 mcg total) by Each Nostril route once daily. 16 g 3    fluticasone-salmeterol 230-21 mcg/dose (ADVAIR HFA) 230-21 mcg/actuation HFAA inhaler Inhale 2 puffs into the lungs 2 (two) times daily. Controller 12 g 5    gabapentin (NEURONTIN) 300 MG capsule Take 1 capsule (300 mg total) by mouth 3 (three) times daily. 270 capsule 1    hydrocortisone (CORTEF) 10 MG Tab Take 1 tablet (10 mg total) by mouth every evening. 90 tablet 3    hydrocortisone (CORTEF) 20 MG Tab Take 1 tablet (20 mg total) by mouth once daily. 90 tablet 3    levothyroxine (SYNTHROID) 75 MCG tablet Take 1 tablet (75 mcg total) by mouth before breakfast. 30 tablet 0    naloxone (NARCAN) 4 mg/actuation Spry 1 spray by Nasal route once.      ondansetron (ZOFRAN-ODT) 8 MG TbDL Take 1 tablet (8 mg total) by mouth every 6 (six) hours as needed (nausea). 30 tablet 2    potassium chloride (MICRO-K) 10 MEQ CpSR Take 2 capsules (20 mEq total) by mouth once daily. 60 capsule 0    pravastatin (PRAVACHOL) 10 MG tablet Take 1 tablet (10 mg total) by mouth once daily. 90 tablet 3    [START ON 11/29/2024] topiramate (TOPAMAX) 25 MG tablet Take 1 tablet (25 mg total) by mouth 2 (two) times daily. 60 tablet 0    topiramate (TOPAMAX) 50 MG tablet Take 1 tablet (50 mg total) by mouth 2 (two) times daily. 60 tablet 0    pantoprazole (PROTONIX) 40 MG tablet Take 1 tablet (40 mg total)  by mouth once daily. for 14 days (Patient not taking: Reported on 11/8/2024) 14 tablet 0     No current facility-administered medications on file prior to visit.       REVIEW OF SYSTEMS:         Wt Readings from Last 3 Encounters:   11/08/24 65.5 kg (144 lb 6.4 oz)   11/01/24 64.9 kg (143 lb 1.3 oz)   10/31/24 64.7 kg (142 lb 9.6 oz)     Temp Readings from Last 3 Encounters:   11/08/24 96.9 °F (36.1 °C) (Temporal)   11/04/24 98 °F (36.7 °C) (Oral)   10/31/24 97.7 °F (36.5 °C) (Temporal)     BP Readings from Last 3 Encounters:   11/08/24 (!) 117/58   11/04/24 122/71   10/31/24 98/62     Pulse Readings from Last 3 Encounters:   11/08/24 73   11/04/24 78   10/29/24 98     VITAL SIGNS:  as above   GENERAL: appears well-built, well-nourished.  No anxiety, no agitation, and in no distress.  Patient is awake, alert, oriented and cooperative.  HEENT:  Showed no congestion. Trachea is central no obvious icterus or pallor noted no hoarseness. no obvious JVD   NECK:  Supple.  No JVD. No obvious cervical submental or supraclavicular adenopathy.  RS: tube draining on left side. S/p lobectomy  ABDOMEN:  abdomen appears undistended.  EXTREMITIES:  Without edema.  NEUROLOGICAL:  The patient is appropriate, higher functions are normal.  No  obvious neurological deficits.  normal judgement normal thought content  No confusion, no speech impediment. Cranial nerves are intact and show no deficit. No gross motor deficits noted   SKIN MUSCULOSKELETAL: no joint or skeletal deformity, no clubbing of nails.  No visible rash ecchymosis or petechiae  Lab Results   Component Value Date    WBC 20.78 (H) 03/03/2020    HGB 14.9 03/03/2020    HCT 47.0 03/03/2020    MCV 99 (H) 03/03/2020     03/03/2020     Smudge cells presnt  CMP  Sodium   Date Value Ref Range Status   11/04/2024 138 136 - 145 mmol/L Final     Potassium   Date Value Ref Range Status   11/04/2024 3.7 3.5 - 5.1 mmol/L Final     Chloride   Date Value Ref Range Status    11/04/2024 108 95 - 110 mmol/L Final     CO2   Date Value Ref Range Status   11/04/2024 23 23 - 29 mmol/L Final     Glucose   Date Value Ref Range Status   11/04/2024 78 70 - 110 mg/dL Final     BUN   Date Value Ref Range Status   11/04/2024 6 (L) 8 - 23 mg/dL Final     Creatinine   Date Value Ref Range Status   11/04/2024 2.1 (H) 0.5 - 1.4 mg/dL Final     Calcium   Date Value Ref Range Status   11/04/2024 8.8 8.7 - 10.5 mg/dL Final     Total Protein   Date Value Ref Range Status   11/04/2024 5.7 (L) 6.0 - 8.4 g/dL Final     Albumin   Date Value Ref Range Status   11/04/2024 3.6 3.5 - 5.2 g/dL Final     Total Bilirubin   Date Value Ref Range Status   11/04/2024 0.4 0.1 - 1.0 mg/dL Final     Comment:     For infants and newborns, interpretation of results should be based  on gestational age, weight and in agreement with clinical  observations.    Premature Infant recommended reference ranges:  Up to 24 hours.............<8.0 mg/dL  Up to 48 hours............<12.0 mg/dL  3-5 days..................<15.0 mg/dL  6-29 days.................<15.0 mg/dL       Alkaline Phosphatase   Date Value Ref Range Status   11/04/2024 162 (H) 55 - 135 U/L Final     AST   Date Value Ref Range Status   11/04/2024 52 (H) 10 - 40 U/L Final     ALT   Date Value Ref Range Status   11/04/2024 15 10 - 44 U/L Final     Anion Gap   Date Value Ref Range Status   11/04/2024 7 (L) 8 - 16 mmol/L Final     eGFR if    Date Value Ref Range Status   06/13/2022 >60 >60 mL/min/1.73 m^2 Final     eGFR if non    Date Value Ref Range Status   06/13/2022 >60 >60 mL/min/1.73 m^2 Final     Comment:     Calculation used to obtain the estimated glomerular filtration  rate (eGFR) is the CKD-EPI equation.            2wk ago    Blood Interpretation A B cell lymphoproliferative disorder is present. see comment.    Comment: Interpreted by: Jeremias Sosa M.D., Signed on 08/06/2020 at 13:07   Blood Comment Flow cytometric analysis of   peripheral blood  detects a lambda  light chain   restricted B lymphocyte population showing expression of CD19 and dim CD20   with aberrant expression of CD5 (dim).  T lymphocytes are   immunophenotypically unremarkable.  The blasts gate is not increased.  The   findings are consistent with patient history of chronic lymphocytic   leukemia/small lymphocytic lymphoma.   Flow differential:  Lymphocytes 69.6%, Monocytes 2.8%, Granulocytes  27.5%,   Blast  0.1%, Debris/nRBC 0.1%,  Viability 97.5%.   10/3/22 robotic lef lung lobectomy  1. LYMPH NODE, LEVEL 5 FROZEN SECTION, EXCISION:   One lymph node with dominant necrotizing granuloma, negative for malignancy   (0/1).   2. LYMPH NODES, LEVEL 5 PERMANENT, EXCISION:   Two lymph nodes with multifocal necrotizing granulomas, negative for   malignancy (0/2).   3. LYMPH NODES, LEFT POSTERIOR LEVEL 10, EXCISION:   Five lymph nodes with multifocal necrotizing granulomas, negative for   malignancy (0/5).   4. LYMPH NODE, LEVEL 11, EXCISION:   One lymph node, negative for malignancy (0/1).   5. LYMPH NODES, LEVEL 12, EXCISION:   Three lymph nodes, one with single necrotizing granuloma, negative for   malignancy (0/3).   6. LYMPH NODES, LEVEL 12 NEAR UPPER DIVISION BRONCHUS, EXCISION:   Three lymph nodes with sinus histiocytosis, negative for malignancy (0/3).   7. LYMPH NODES, LEVEL 10 #2, EXCISION:   One of two lymph nodes positive for metastatic carcinoma (1/2).   Size of largest metastatic deposit:  8 mm.   Negative for extranodal extension.   8. LYMPH NODES, LEFT LEVEL 8, EXCISION:   Two lymph nodes with sinus histiocytosis, negative for malignancy (0/2).   9. LYMPH NODES, LEFT LEVEL 9, EXCISION:   Two lymph nodes, negative for malignancy (0/2).   10. 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   Surgical margins are negative for malignancy.   Background lung tissue with subpleural fibrosis, no evidence of granulomas.   Two hilar  lymph nodes, negative for malignancy (0/2).   Coment (1-3, 5):  Prominent necrotizing granulomatous inflammation identified   is identified in multiple lymph nodes.  An AE1/AE3 cytokeratin immunostain   performed on the largest granuloma (part 3) reveals no evidence of occult   carcinoma.  GMS and AFB special stains are negative for fungal and acid-fast   organisms, respectively.  The necrotizing features are not typical of   sarcoidosis.  As such, other granulomatous processes may be considered   including secondary response to malignancy or infection.  Clinical   correlation is advised.   Comment (10):  Sections reveal invasive carcinoma involving lung tissue with   predominantly squamoid morphologic features including bright eosinophilic   cytoplasm and intracellular bridging.  There are not significant keratinizing   features.  Some areas show basaloid features.  There is also regional luminal   features including cribriforming and vague glandular structures containing   mucin.  Tumor cells are diffusely positive with P40 and regionally positive   with TTF-1.  Mucicarmine special stain highlights mucin producing luminal   spaces. Overall tumor appearance and staining profile supports adenosquamous   carcinoma, a variant of non-small cell lung carcinoma.   [facility] SURGICAL PATHOLOGY CANCER CASE SUMMARY:  LUNG   Procedure:  Lobectomy   Specimen laterality:  Left   Tumor focality:  Single focus   Tumor site: Upper lobe of lung   Tumor size:  2.2 cm   Histologic type:  Adenosquamous carcinoma   Spread through airspaces:  Not identified   Visceral pleural invasion:  Not identified   Direct invasion of adjacent structures: Not applicable   Lymphovascular invasion:  Not identified   Margins:  All margins negative for invasive carcinoma     Closest margin to invasive carcinoma:  Bronchial (3.0 cm)   Regional lymph nodes:  Tumor present in regional lymph node     Number of lymph nodes with tumor:  1     Scott sites with  tumor:  Left level 10 (10L)     Extranodal extension:  Not identified     Number of lymph nodes examined:  23   Pathologic stage classification (pTNM): pT1c pN1   Special studies:  Performed on previous biopsy if additional studies needed   there may be performed on tissue blockd 10G or 10H.        Impression:pet 2/24     Increasing SUV max of FDG avid partially calcified lymph node involving the anterior superior mediastinum. This could be metastatic, lymphoproliferative, or reactive in nature.     No other findings of FDG avid malignancy.     Prominent cervical, axillary, mediastinal lymph nodes.    Impression:pet 5/24     1.  Prior left upper lobectomy with bandlike scarring on the left.     2.  Unchanged partially calcified lymph node in the anterior superior mediastinum/thoracic inlet.     3.  Numerous small cervical, axillary, mediastinal and upper abdominal lymph nodes the previous exam without increased FDG activity from background.     4.  Interval decrease in size and FDG activity to the left adrenal nodule/metastasis.     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 Due to adenosq histology chose carbo/ taxol x 4 cycles tecentriq maintanence x 1 year  pt is has had 5 cycles of carbo/ taxol  discused maintainenec at tumor board  Completed 1 year of tecentriq   Pet 2/24 showing slight enlarged calcified node pet avid      Patient had CTA October 20, 2024 we will go ahead and repeat PET scan in January 2025    Continue with chronic pain syndrome and pain management advised to stop smoking if at all possible 10 pills of hydrocodoen  given to pt, she needs to follow with pain mx      History of B12 deficiency will continue to monitor    Hypothyroidism patient on levothyroxine 75 mcg and follows with PCP    Advised COVID precaution due to her lung situation she will be a high risk patient     CKD with EGFR of 25 with resulted anemia discuss with patient maybe a candidate for erythropoietin per due to CLL would recommend bone marrow biopsy   Needs renal ref                     show

## 2024-11-08 NOTE — H&P (VIEW-ONLY)
Subjective:       Patient ID: Yvette Hutchinson is a 64 y.o. female.    Chief Complaint:  Patient known to me with CLL stage 0 recently diagnosed with lung cancer August 2022  Follow-up    Lung Cancer    64 year old  Patient has chronic complains of chronic pain syndrome and COPD for which periodically she take steroids she is also on BuSpar has issues anxiety has required dilatation of esophageal strictures allergic rhinitis insomnia and history of B12 deficiency documented and CLL   Had CT chest done with pulmonary 7/22 showed mass bx done. 8/22  10/24 :  3 admissions, septic shock, GM and hypokalemia  Oncology hx  Impression:ct chest 7/29/22     2.3 cm spiculated left upper lobe lung nodule highly suspicious for bronchogenic carcinoma.     New nonspecific 7 mm nodule in the right middle lobe; this appears more linear on the coronal images and may be a focus of scarring.     Additional stable lung nodules, mildly enlarged axillary lymph nodes, substernal thyroid nodule; these findings are likely benign given the interval stability.   LEFT LUNG MASS, CT-GUIDED BIOPSY:   Non-small cell lung carcinoma.   Comment:  The biopsy reveals invasive carcinoma with apparent glandular but   also vague squamoid features.  Tumor cells are diffusely positive with TTF-1   and focally positive with P40.  The histology differential includes   adenocarcinoma and adenosquamous carcinoma.  PD-L1 and templates NGS studies   are in progress.  The results will be issued separately.    October 3, 2022: Robotic left lobectomy T1c N1    Social history; patient is a smoker denies recreational alcohol use or drug use   Patient is currently on disability  She is allergic to the mask used during COVID pandemic she is also bothered by her mask with COPD    Family history suggestive of ovarian and breast cancer patient advised to see a  or seek   Review of patient's allergies indicates:   Allergen Reactions    Latex,  natural rubber      Medications have been reviewed  Current Outpatient Medications on File Prior to Visit   Medication Sig Dispense Refill    albuterol (PROVENTIL/VENTOLIN HFA) 90 mcg/actuation inhaler Inhale 2 puffs into the lungs every 6 (six) hours as needed for Wheezing or Shortness of Breath. Rescue 8.5 g 11    buPROPion (WELLBUTRIN XL) 300 MG 24 hr tablet Take 1 tablet (300 mg total) by mouth once daily. 90 tablet 3    citalopram (CELEXA) 20 MG tablet Take 1 tablet (20 mg total) by mouth once daily. 30 tablet 11    fluticasone propionate (FLONASE) 50 mcg/actuation nasal spray 1 spray (50 mcg total) by Each Nostril route once daily. 16 g 3    fluticasone-salmeterol 230-21 mcg/dose (ADVAIR HFA) 230-21 mcg/actuation HFAA inhaler Inhale 2 puffs into the lungs 2 (two) times daily. Controller 12 g 5    gabapentin (NEURONTIN) 300 MG capsule Take 1 capsule (300 mg total) by mouth 3 (three) times daily. 270 capsule 1    hydrocortisone (CORTEF) 10 MG Tab Take 1 tablet (10 mg total) by mouth every evening. 90 tablet 3    hydrocortisone (CORTEF) 20 MG Tab Take 1 tablet (20 mg total) by mouth once daily. 90 tablet 3    levothyroxine (SYNTHROID) 75 MCG tablet Take 1 tablet (75 mcg total) by mouth before breakfast. 30 tablet 0    naloxone (NARCAN) 4 mg/actuation Spry 1 spray by Nasal route once.      ondansetron (ZOFRAN-ODT) 8 MG TbDL Take 1 tablet (8 mg total) by mouth every 6 (six) hours as needed (nausea). 30 tablet 2    potassium chloride (MICRO-K) 10 MEQ CpSR Take 2 capsules (20 mEq total) by mouth once daily. 60 capsule 0    pravastatin (PRAVACHOL) 10 MG tablet Take 1 tablet (10 mg total) by mouth once daily. 90 tablet 3    [START ON 11/29/2024] topiramate (TOPAMAX) 25 MG tablet Take 1 tablet (25 mg total) by mouth 2 (two) times daily. 60 tablet 0    topiramate (TOPAMAX) 50 MG tablet Take 1 tablet (50 mg total) by mouth 2 (two) times daily. 60 tablet 0    pantoprazole (PROTONIX) 40 MG tablet Take 1 tablet (40 mg total)  by mouth once daily. for 14 days (Patient not taking: Reported on 11/8/2024) 14 tablet 0     No current facility-administered medications on file prior to visit.       REVIEW OF SYSTEMS:         Wt Readings from Last 3 Encounters:   11/08/24 65.5 kg (144 lb 6.4 oz)   11/01/24 64.9 kg (143 lb 1.3 oz)   10/31/24 64.7 kg (142 lb 9.6 oz)     Temp Readings from Last 3 Encounters:   11/08/24 96.9 °F (36.1 °C) (Temporal)   11/04/24 98 °F (36.7 °C) (Oral)   10/31/24 97.7 °F (36.5 °C) (Temporal)     BP Readings from Last 3 Encounters:   11/08/24 (!) 117/58   11/04/24 122/71   10/31/24 98/62     Pulse Readings from Last 3 Encounters:   11/08/24 73   11/04/24 78   10/29/24 98     VITAL SIGNS:  as above   GENERAL: appears well-built, well-nourished.  No anxiety, no agitation, and in no distress.  Patient is awake, alert, oriented and cooperative.  HEENT:  Showed no congestion. Trachea is central no obvious icterus or pallor noted no hoarseness. no obvious JVD   NECK:  Supple.  No JVD. No obvious cervical submental or supraclavicular adenopathy.  RS: tube draining on left side. S/p lobectomy  ABDOMEN:  abdomen appears undistended.  EXTREMITIES:  Without edema.  NEUROLOGICAL:  The patient is appropriate, higher functions are normal.  No  obvious neurological deficits.  normal judgement normal thought content  No confusion, no speech impediment. Cranial nerves are intact and show no deficit. No gross motor deficits noted   SKIN MUSCULOSKELETAL: no joint or skeletal deformity, no clubbing of nails.  No visible rash ecchymosis or petechiae  Lab Results   Component Value Date    WBC 20.78 (H) 03/03/2020    HGB 14.9 03/03/2020    HCT 47.0 03/03/2020    MCV 99 (H) 03/03/2020     03/03/2020     Smudge cells presnt  CMP  Sodium   Date Value Ref Range Status   11/04/2024 138 136 - 145 mmol/L Final     Potassium   Date Value Ref Range Status   11/04/2024 3.7 3.5 - 5.1 mmol/L Final     Chloride   Date Value Ref Range Status    11/04/2024 108 95 - 110 mmol/L Final     CO2   Date Value Ref Range Status   11/04/2024 23 23 - 29 mmol/L Final     Glucose   Date Value Ref Range Status   11/04/2024 78 70 - 110 mg/dL Final     BUN   Date Value Ref Range Status   11/04/2024 6 (L) 8 - 23 mg/dL Final     Creatinine   Date Value Ref Range Status   11/04/2024 2.1 (H) 0.5 - 1.4 mg/dL Final     Calcium   Date Value Ref Range Status   11/04/2024 8.8 8.7 - 10.5 mg/dL Final     Total Protein   Date Value Ref Range Status   11/04/2024 5.7 (L) 6.0 - 8.4 g/dL Final     Albumin   Date Value Ref Range Status   11/04/2024 3.6 3.5 - 5.2 g/dL Final     Total Bilirubin   Date Value Ref Range Status   11/04/2024 0.4 0.1 - 1.0 mg/dL Final     Comment:     For infants and newborns, interpretation of results should be based  on gestational age, weight and in agreement with clinical  observations.    Premature Infant recommended reference ranges:  Up to 24 hours.............<8.0 mg/dL  Up to 48 hours............<12.0 mg/dL  3-5 days..................<15.0 mg/dL  6-29 days.................<15.0 mg/dL       Alkaline Phosphatase   Date Value Ref Range Status   11/04/2024 162 (H) 55 - 135 U/L Final     AST   Date Value Ref Range Status   11/04/2024 52 (H) 10 - 40 U/L Final     ALT   Date Value Ref Range Status   11/04/2024 15 10 - 44 U/L Final     Anion Gap   Date Value Ref Range Status   11/04/2024 7 (L) 8 - 16 mmol/L Final     eGFR if    Date Value Ref Range Status   06/13/2022 >60 >60 mL/min/1.73 m^2 Final     eGFR if non    Date Value Ref Range Status   06/13/2022 >60 >60 mL/min/1.73 m^2 Final     Comment:     Calculation used to obtain the estimated glomerular filtration  rate (eGFR) is the CKD-EPI equation.            2wk ago    Blood Interpretation A B cell lymphoproliferative disorder is present. see comment.    Comment: Interpreted by: Jeremias Sosa M.D., Signed on 08/06/2020 at 13:07   Blood Comment Flow cytometric analysis of   peripheral blood  detects a lambda  light chain   restricted B lymphocyte population showing expression of CD19 and dim CD20   with aberrant expression of CD5 (dim).  T lymphocytes are   immunophenotypically unremarkable.  The blasts gate is not increased.  The   findings are consistent with patient history of chronic lymphocytic   leukemia/small lymphocytic lymphoma.   Flow differential:  Lymphocytes 69.6%, Monocytes 2.8%, Granulocytes  27.5%,   Blast  0.1%, Debris/nRBC 0.1%,  Viability 97.5%.   10/3/22 robotic lef lung lobectomy  1. LYMPH NODE, LEVEL 5 FROZEN SECTION, EXCISION:   One lymph node with dominant necrotizing granuloma, negative for malignancy   (0/1).   2. LYMPH NODES, LEVEL 5 PERMANENT, EXCISION:   Two lymph nodes with multifocal necrotizing granulomas, negative for   malignancy (0/2).   3. LYMPH NODES, LEFT POSTERIOR LEVEL 10, EXCISION:   Five lymph nodes with multifocal necrotizing granulomas, negative for   malignancy (0/5).   4. LYMPH NODE, LEVEL 11, EXCISION:   One lymph node, negative for malignancy (0/1).   5. LYMPH NODES, LEVEL 12, EXCISION:   Three lymph nodes, one with single necrotizing granuloma, negative for   malignancy (0/3).   6. LYMPH NODES, LEVEL 12 NEAR UPPER DIVISION BRONCHUS, EXCISION:   Three lymph nodes with sinus histiocytosis, negative for malignancy (0/3).   7. LYMPH NODES, LEVEL 10 #2, EXCISION:   One of two lymph nodes positive for metastatic carcinoma (1/2).   Size of largest metastatic deposit:  8 mm.   Negative for extranodal extension.   8. LYMPH NODES, LEFT LEVEL 8, EXCISION:   Two lymph nodes with sinus histiocytosis, negative for malignancy (0/2).   9. LYMPH NODES, LEFT LEVEL 9, EXCISION:   Two lymph nodes, negative for malignancy (0/2).   10. 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   Surgical margins are negative for malignancy.   Background lung tissue with subpleural fibrosis, no evidence of granulomas.   Two hilar  lymph nodes, negative for malignancy (0/2).   Coment (1-3, 5):  Prominent necrotizing granulomatous inflammation identified   is identified in multiple lymph nodes.  An AE1/AE3 cytokeratin immunostain   performed on the largest granuloma (part 3) reveals no evidence of occult   carcinoma.  GMS and AFB special stains are negative for fungal and acid-fast   organisms, respectively.  The necrotizing features are not typical of   sarcoidosis.  As such, other granulomatous processes may be considered   including secondary response to malignancy or infection.  Clinical   correlation is advised.   Comment (10):  Sections reveal invasive carcinoma involving lung tissue with   predominantly squamoid morphologic features including bright eosinophilic   cytoplasm and intracellular bridging.  There are not significant keratinizing   features.  Some areas show basaloid features.  There is also regional luminal   features including cribriforming and vague glandular structures containing   mucin.  Tumor cells are diffusely positive with P40 and regionally positive   with TTF-1.  Mucicarmine special stain highlights mucin producing luminal   spaces. Overall tumor appearance and staining profile supports adenosquamous   carcinoma, a variant of non-small cell lung carcinoma.   [facility] SURGICAL PATHOLOGY CANCER CASE SUMMARY:  LUNG   Procedure:  Lobectomy   Specimen laterality:  Left   Tumor focality:  Single focus   Tumor site: Upper lobe of lung   Tumor size:  2.2 cm   Histologic type:  Adenosquamous carcinoma   Spread through airspaces:  Not identified   Visceral pleural invasion:  Not identified   Direct invasion of adjacent structures: Not applicable   Lymphovascular invasion:  Not identified   Margins:  All margins negative for invasive carcinoma     Closest margin to invasive carcinoma:  Bronchial (3.0 cm)   Regional lymph nodes:  Tumor present in regional lymph node     Number of lymph nodes with tumor:  1     Scott sites with  tumor:  Left level 10 (10L)     Extranodal extension:  Not identified     Number of lymph nodes examined:  23   Pathologic stage classification (pTNM): pT1c pN1   Special studies:  Performed on previous biopsy if additional studies needed   there may be performed on tissue blockd 10G or 10H.        Impression:pet 2/24     Increasing SUV max of FDG avid partially calcified lymph node involving the anterior superior mediastinum. This could be metastatic, lymphoproliferative, or reactive in nature.     No other findings of FDG avid malignancy.     Prominent cervical, axillary, mediastinal lymph nodes.    Impression:pet 5/24     1.  Prior left upper lobectomy with bandlike scarring on the left.     2.  Unchanged partially calcified lymph node in the anterior superior mediastinum/thoracic inlet.     3.  Numerous small cervical, axillary, mediastinal and upper abdominal lymph nodes the previous exam without increased FDG activity from background.     4.  Interval decrease in size and FDG activity to the left adrenal nodule/metastasis.     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 Due to adenosq histology chose carbo/ taxol x 4 cycles tecentriq maintanence x 1 year  pt is has had 5 cycles of carbo/ taxol  discused maintainenec at tumor board  Completed 1 year of tecentriq   Pet 2/24 showing slight enlarged calcified node pet avid      Patient had CTA October 20, 2024 we will go ahead and repeat PET scan in January 2025    Continue with chronic pain syndrome and pain management advised to stop smoking if at all possible 10 pills of hydrocodoen  given to pt, she needs to follow with pain mx      History of B12 deficiency will continue to monitor    Hypothyroidism patient on levothyroxine 75 mcg and follows with PCP    Advised COVID precaution due to her lung situation she will be a high risk patient     CKD with EGFR of 25 with resulted anemia discuss with patient maybe a candidate for erythropoietin per due to CLL would recommend bone marrow biopsy   Needs renal ref

## 2024-11-12 ENCOUNTER — TELEPHONE (OUTPATIENT)
Dept: INTERVENTIONAL RADIOLOGY/VASCULAR | Facility: HOSPITAL | Age: 64
End: 2024-11-12

## 2024-11-12 ENCOUNTER — TELEPHONE (OUTPATIENT)
Dept: HEMATOLOGY/ONCOLOGY | Facility: CLINIC | Age: 64
End: 2024-11-12
Payer: COMMERCIAL

## 2024-11-12 NOTE — NURSING
Radiology Pre-Procedural Instructions- [facility]    Radiology Nurse contacted patient to provide instructions for scheduled Bone Marrow Biopsy at 1100 on 11/19/24.   Patient instructed to arrive no later than 0930 am to outpatient registration.   Registration will direct patient to outpatient lab for pre-op lab work if required.  Following labs, patient needs to check in at the surgery waiting room desk located on the second floor.     Patient instructed to remain NPO after midnight with the exception of approved essential meds taken with a small sip of water.  Instructed patient to bathe the night before and the morning of their procedure with an anti bacterial soap or Hibiclens.   Patient is aware of their responsibility to make post transportation arrangements home by a responsible adult.   Patient educated on all additional pre-op instructions per policy and verbalized understanding.

## 2024-11-13 ENCOUNTER — DOCUMENTATION ONLY (OUTPATIENT)
Dept: HEMATOLOGY/ONCOLOGY | Facility: CLINIC | Age: 64
End: 2024-11-13
Payer: COMMERCIAL

## 2024-11-14 ENCOUNTER — TELEPHONE (OUTPATIENT)
Dept: HEMATOLOGY/ONCOLOGY | Facility: CLINIC | Age: 64
End: 2024-11-14
Payer: COMMERCIAL

## 2024-11-14 ENCOUNTER — PATIENT MESSAGE (OUTPATIENT)
Dept: FAMILY MEDICINE | Facility: CLINIC | Age: 64
End: 2024-11-14
Payer: COMMERCIAL

## 2024-11-14 NOTE — TELEPHONE ENCOUNTER
"Pt states that she saw Dr Quezada (nephro) yesterday, and he said that her kidneys were not "that bad."She is questioning if she needs to have the Bmbx. Explained that per Dr Lee's last clinic note, she "may be a candidate for erythropoietin per due to CLL; would recommend bone marrow biopsy." This nurse explained to pt that a Bmbx is the best way to tell if a pt is truly anemic. Pt verbalized understanding.     ----- Message from Angella sent at 11/14/2024 10:06 AM CST -----  Contact: self  Type: Needs Medical Advice  Who Called:  pt  Best Call Back Number: [phone]   Additional Information: please call (did not say what about)  "

## 2024-11-15 DIAGNOSIS — F41.9 ANXIETY: ICD-10-CM

## 2024-11-15 RX ORDER — BUPROPION HYDROCHLORIDE 300 MG/1
300 TABLET ORAL
Qty: 90 TABLET | Refills: 3 | Status: SHIPPED | OUTPATIENT
Start: 2024-11-15

## 2024-11-15 NOTE — TELEPHONE ENCOUNTER
No care due was identified.  Health [facility] Embedded Care Due Messages. Reference number: [id number].   11/15/2024 6:33:29 AM CST

## 2024-11-18 NOTE — TELEPHONE ENCOUNTER
Follow up call placed to patient as patient has not yet read portal message dated 11/18/24.  Patient states she is unable to perform visit on 11/20/24.   Patient agreed to appointment on 11/29/24.  Appointment scheduled.

## 2024-11-19 ENCOUNTER — DOCUMENTATION ONLY (OUTPATIENT)
Dept: HEMATOLOGY/ONCOLOGY | Facility: CLINIC | Age: 64
End: 2024-11-19
Payer: COMMERCIAL

## 2024-11-19 ENCOUNTER — HOSPITAL ENCOUNTER (OUTPATIENT)
Dept: RADIOLOGY | Facility: HOSPITAL | Age: 64
Discharge: HOME | End: 2024-11-19
Attending: INTERNAL MEDICINE
Payer: COMMERCIAL

## 2024-11-19 VITALS
SYSTOLIC BLOOD PRESSURE: 94 MMHG | OXYGEN SATURATION: 98 % | TEMPERATURE: 98 F | HEART RATE: 61 BPM | DIASTOLIC BLOOD PRESSURE: 55 MMHG | BODY MASS INDEX: 24.8 KG/M2 | RESPIRATION RATE: 17 BRPM | HEIGHT: 63 IN | WEIGHT: 140 LBS

## 2024-11-19 DIAGNOSIS — C91.10 CLL (CHRONIC LYMPHOCYTIC LEUKEMIA): Primary | ICD-10-CM

## 2024-11-19 DIAGNOSIS — Q95.1: ICD-10-CM

## 2024-11-19 PROCEDURE — C1830 POWER BONE MARROW BX NEEDLE: HCPCS

## 2024-11-19 PROCEDURE — 63600175 PHARM REV CODE 636 W HCPCS

## 2024-11-19 PROCEDURE — 77012 CT SCAN FOR NEEDLE BIOPSY: CPT | Mod: 26,RT,, | Performed by: RADIOLOGY

## 2024-11-19 PROCEDURE — 38222 DX BONE MARROW BX & ASPIR: CPT | Mod: 26,RT,, | Performed by: RADIOLOGY

## 2024-11-19 PROCEDURE — 63600175 PHARM REV CODE 636 W HCPCS: Performed by: RADIOLOGY

## 2024-11-19 PROCEDURE — 63600175 PHARM REV CODE 636 W HCPCS: Performed by: PHYSICIAN ASSISTANT

## 2024-11-19 RX ORDER — FENTANYL CITRATE 50 UG/ML
INJECTION, SOLUTION INTRAMUSCULAR; INTRAVENOUS
Status: COMPLETED | OUTPATIENT
Start: 2024-11-19 | End: 2024-11-19

## 2024-11-19 RX ORDER — FENTANYL CITRATE 50 UG/ML
INJECTION, SOLUTION INTRAMUSCULAR; INTRAVENOUS
Status: DISPENSED
Start: 2024-11-19 | End: 2024-11-19

## 2024-11-19 RX ORDER — MIDAZOLAM HYDROCHLORIDE 2 MG/2ML
INJECTION, SOLUTION INTRAMUSCULAR; INTRAVENOUS
Status: DISPENSED
Start: 2024-11-19 | End: 2024-11-19

## 2024-11-19 RX ORDER — MIDAZOLAM HYDROCHLORIDE 1 MG/ML
INJECTION, SOLUTION INTRAMUSCULAR; INTRAVENOUS
Status: COMPLETED | OUTPATIENT
Start: 2024-11-19 | End: 2024-11-19

## 2024-11-19 RX ORDER — LIDOCAINE HYDROCHLORIDE 10 MG/ML
1 INJECTION, SOLUTION EPIDURAL; INFILTRATION; INTRACAUDAL; PERINEURAL ONCE AS NEEDED
Status: COMPLETED | OUTPATIENT
Start: 2024-11-19 | End: 2024-11-19

## 2024-11-19 RX ORDER — LIDOCAINE HYDROCHLORIDE 10 MG/ML
INJECTION, SOLUTION INFILTRATION; PERINEURAL
Status: COMPLETED
Start: 2024-11-19 | End: 2024-11-19

## 2024-11-19 RX ADMIN — MIDAZOLAM HYDROCHLORIDE 1 MG: 1 INJECTION, SOLUTION INTRAMUSCULAR; INTRAVENOUS at 11:11

## 2024-11-19 RX ADMIN — LIDOCAINE HYDROCHLORIDE 10 MG: 10 INJECTION, SOLUTION EPIDURAL; INFILTRATION; INTRACAUDAL; PERINEURAL at 10:11

## 2024-11-19 RX ADMIN — LIDOCAINE HYDROCHLORIDE 100 MG: 10 INJECTION, SOLUTION INFILTRATION; PERINEURAL at 11:11

## 2024-11-19 RX ADMIN — FENTANYL CITRATE 25 MCG: 50 INJECTION INTRAMUSCULAR; INTRAVENOUS at 11:11

## 2024-11-19 RX ADMIN — MIDAZOLAM HYDROCHLORIDE 2 MG: 1 INJECTION, SOLUTION INTRAMUSCULAR; INTRAVENOUS at 11:11

## 2024-11-19 RX ADMIN — FENTANYL CITRATE 50 MCG: 50 INJECTION INTRAMUSCULAR; INTRAVENOUS at 11:11

## 2024-11-19 NOTE — NURSING
POST-OP BONE MARROW    Patient transferred via stretcher from DSU to CT 1.  Consents obtained by Radiologist in pre-op.   Bone Marrow biopsy performed by . Versed 5mg and Fentanyl 100mcg were administered IV.  Pathology Alexandra andrews present, received specimens and verified acceptable sample.    Vital signs were monitored and remained stable for duration of procedure.  Patient tolerated procedure well.   Bandaid applied to lower back - clean, dry and intact.  Report given to post op nurse DONNA Dowd.   Patient transported via stretcher to post op recovery.

## 2024-11-19 NOTE — PLAN OF CARE
Pt POC explained, v/u. Discharge instructions reviewed, v/u. All questions answered.  Pt discharged via wheelchair with RN to private vehicle with family member in stable condition.

## 2024-11-19 NOTE — PLAN OF CARE
Pt prepped for surgery. Daughter, Tiny, signed up for alerts and will remain in hospital for pt. Pt belongings, including clothes are in personal belongings bag kept in preop room. All questions answered, POC explained, v/u, call bell in reach.

## 2024-11-26 ENCOUNTER — LAB VISIT (OUTPATIENT)
Dept: LAB | Facility: HOSPITAL | Age: 64
End: 2024-11-26
Attending: INTERNAL MEDICINE
Payer: COMMERCIAL

## 2024-11-26 DIAGNOSIS — N17.9 ACUTE KIDNEY FAILURE, UNSPECIFIED: Primary | ICD-10-CM

## 2024-11-26 LAB
ALBUMIN SERPL BCP-MCNC: 4 G/DL (ref 3.5–5.2)
ANION GAP SERPL CALC-SCNC: 11 MMOL/L (ref 8–16)
ANISOCYTOSIS BLD QL SMEAR: SLIGHT
BASOPHILS # BLD AUTO: ABNORMAL K/UL (ref 0–0.2)
BASOPHILS NFR BLD: 0 % (ref 0–1.9)
BUN SERPL-MCNC: 15 MG/DL (ref 8–23)
CALCIUM SERPL-MCNC: 9.5 MG/DL (ref 8.7–10.5)
CHLORIDE SERPL-SCNC: 109 MMOL/L (ref 95–110)
CO2 SERPL-SCNC: 20 MMOL/L (ref 23–29)
CREAT SERPL-MCNC: 1.6 MG/DL (ref 0.5–1.4)
DIFFERENTIAL METHOD BLD: ABNORMAL
EOSINOPHIL # BLD AUTO: ABNORMAL K/UL (ref 0–0.5)
EOSINOPHIL NFR BLD: 1 % (ref 0–8)
ERYTHROCYTE [DISTWIDTH] IN BLOOD BY AUTOMATED COUNT: 14.3 % (ref 11.5–14.5)
EST. GFR  (NO RACE VARIABLE): 36 ML/MIN/1.73 M^2
GLUCOSE SERPL-MCNC: 111 MG/DL (ref 70–110)
HCT VFR BLD AUTO: 33.6 % (ref 37–48.5)
HGB BLD-MCNC: 10.6 G/DL (ref 12–16)
IMM GRANULOCYTES # BLD AUTO: ABNORMAL K/UL (ref 0–0.04)
IMM GRANULOCYTES NFR BLD AUTO: ABNORMAL % (ref 0–0.5)
LYMPHOCYTES # BLD AUTO: ABNORMAL K/UL (ref 1–4.8)
LYMPHOCYTES NFR BLD: 80 % (ref 18–48)
MCH RBC QN AUTO: 30.5 PG (ref 27–31)
MCHC RBC AUTO-ENTMCNC: 31.5 G/DL (ref 32–36)
MCV RBC AUTO: 97 FL (ref 82–98)
MONOCYTES # BLD AUTO: ABNORMAL K/UL (ref 0.3–1)
MONOCYTES NFR BLD: 4 % (ref 4–15)
NEUTROPHILS NFR BLD: 15 % (ref 38–73)
NRBC BLD-RTO: 0 /100 WBC
PHOSPHATE SERPL-MCNC: 4.1 MG/DL (ref 2.7–4.5)
PLATELET # BLD AUTO: 218 K/UL (ref 150–450)
PLATELET BLD QL SMEAR: ABNORMAL
PMV BLD AUTO: 11.5 FL (ref 9.2–12.9)
POTASSIUM SERPL-SCNC: 3.8 MMOL/L (ref 3.5–5.1)
RBC # BLD AUTO: 3.48 M/UL (ref 4–5.4)
SMUDGE CELLS BLD QL SMEAR: PRESENT
SODIUM SERPL-SCNC: 140 MMOL/L (ref 136–145)
WBC # BLD AUTO: 31.71 K/UL (ref 3.9–12.7)

## 2024-11-26 PROCEDURE — 36415 COLL VENOUS BLD VENIPUNCTURE: CPT | Performed by: INTERNAL MEDICINE

## 2024-11-26 PROCEDURE — 80069 RENAL FUNCTION PANEL: CPT | Performed by: INTERNAL MEDICINE

## 2024-11-26 PROCEDURE — 85007 BL SMEAR W/DIFF WBC COUNT: CPT | Performed by: INTERNAL MEDICINE

## 2024-11-26 PROCEDURE — 85027 COMPLETE CBC AUTOMATED: CPT | Performed by: INTERNAL MEDICINE

## 2024-12-02 ENCOUNTER — LAB VISIT (OUTPATIENT)
Dept: LAB | Facility: HOSPITAL | Age: 64
End: 2024-12-02
Payer: COMMERCIAL

## 2024-12-02 ENCOUNTER — PATIENT MESSAGE (OUTPATIENT)
Dept: FAMILY MEDICINE | Facility: CLINIC | Age: 64
End: 2024-12-02
Payer: COMMERCIAL

## 2024-12-02 DIAGNOSIS — E78.2 MIXED HYPERLIPIDEMIA: Primary | ICD-10-CM

## 2024-12-02 DIAGNOSIS — N17.9 AKI (ACUTE KIDNEY INJURY): ICD-10-CM

## 2024-12-02 DIAGNOSIS — E03.9 HYPOTHYROIDISM, UNSPECIFIED TYPE: ICD-10-CM

## 2024-12-02 LAB
ALBUMIN SERPL BCP-MCNC: 4.2 G/DL (ref 3.5–5.2)
ANION GAP SERPL CALC-SCNC: 10 MMOL/L (ref 8–16)
BUN SERPL-MCNC: 29 MG/DL (ref 8–23)
CALCIUM SERPL-MCNC: 10 MG/DL (ref 8.7–10.5)
CHLORIDE SERPL-SCNC: 107 MMOL/L (ref 95–110)
CO2 SERPL-SCNC: 21 MMOL/L (ref 23–29)
CREAT SERPL-MCNC: 1.9 MG/DL (ref 0.5–1.4)
EST. GFR  (NO RACE VARIABLE): 29 ML/MIN/1.73 M^2
GLUCOSE SERPL-MCNC: 80 MG/DL (ref 70–110)
PHOSPHATE SERPL-MCNC: 4.4 MG/DL (ref 2.7–4.5)
POTASSIUM SERPL-SCNC: 4.6 MMOL/L (ref 3.5–5.1)
SODIUM SERPL-SCNC: 138 MMOL/L (ref 136–145)

## 2024-12-02 PROCEDURE — 80069 RENAL FUNCTION PANEL: CPT

## 2024-12-02 PROCEDURE — 36415 COLL VENOUS BLD VENIPUNCTURE: CPT

## 2024-12-02 NOTE — TELEPHONE ENCOUNTER
TSH looked good on recent check. Lipid we will check next time she has labs. I'd like for her to see Dr. Priest in February for routine follow up on all of this and we will recheck labs immediately prior so they can review them in detail.

## 2024-12-03 ENCOUNTER — OFFICE VISIT (OUTPATIENT)
Dept: HEMATOLOGY/ONCOLOGY | Facility: CLINIC | Age: 64
End: 2024-12-03
Payer: COMMERCIAL

## 2024-12-03 VITALS
DIASTOLIC BLOOD PRESSURE: 62 MMHG | OXYGEN SATURATION: 100 % | RESPIRATION RATE: 16 BRPM | HEIGHT: 63 IN | SYSTOLIC BLOOD PRESSURE: 109 MMHG | TEMPERATURE: 96 F | WEIGHT: 140.88 LBS | BODY MASS INDEX: 24.96 KG/M2 | HEART RATE: 85 BPM

## 2024-12-03 DIAGNOSIS — Z85.118 HISTORY OF LUNG CANCER: ICD-10-CM

## 2024-12-03 DIAGNOSIS — C34.01 MALIGNANT NEOPLASM OF HILUS OF RIGHT LUNG: ICD-10-CM

## 2024-12-03 DIAGNOSIS — E03.9 ACQUIRED HYPOTHYROIDISM: ICD-10-CM

## 2024-12-03 DIAGNOSIS — E53.8 B12 DEFICIENCY: ICD-10-CM

## 2024-12-03 DIAGNOSIS — C91.10 CLL (CHRONIC LYMPHOCYTIC LEUKEMIA): Primary | ICD-10-CM

## 2024-12-03 PROCEDURE — 1111F DSCHRG MED/CURRENT MED MERGE: CPT | Mod: CPTII,S$GLB,, | Performed by: INTERNAL MEDICINE

## 2024-12-03 PROCEDURE — 99999 PR PBB SHADOW E&M-EST. PATIENT-LVL IV: CPT | Mod: PBBFAC,,, | Performed by: INTERNAL MEDICINE

## 2024-12-03 PROCEDURE — 3044F HG A1C LEVEL LT 7.0%: CPT | Mod: CPTII,S$GLB,, | Performed by: INTERNAL MEDICINE

## 2024-12-03 PROCEDURE — 3008F BODY MASS INDEX DOCD: CPT | Mod: CPTII,S$GLB,, | Performed by: INTERNAL MEDICINE

## 2024-12-03 PROCEDURE — 1159F MED LIST DOCD IN RCRD: CPT | Mod: CPTII,S$GLB,, | Performed by: INTERNAL MEDICINE

## 2024-12-03 PROCEDURE — 3074F SYST BP LT 130 MM HG: CPT | Mod: CPTII,S$GLB,, | Performed by: INTERNAL MEDICINE

## 2024-12-03 PROCEDURE — 99215 OFFICE O/P EST HI 40 MIN: CPT | Mod: S$GLB,,, | Performed by: INTERNAL MEDICINE

## 2024-12-03 PROCEDURE — 3078F DIAST BP <80 MM HG: CPT | Mod: CPTII,S$GLB,, | Performed by: INTERNAL MEDICINE

## 2024-12-03 NOTE — PROGRESS NOTES
Subjective:       Patient ID: Yvette Hutchinson is a 64 y.o. female.    Chief Complaint:  Patient known to me with CLL stage 0 recently diagnosed with lung cancer August 2022  Follow-up    Lung Cancer    64 year old  Patient has chronic complains of chronic pain syndrome and COPD for which periodically she take steroids she is also on BuSpar has issues anxiety has required dilatation of esophageal strictures allergic rhinitis insomnia and history of B12 deficiency documented and CLL   Had CT chest done with pulmonary 7/22 showed mass bx done. 8/22  10/24 :  3 admissions, septic shock, GM and hypokalemia  Oncology hx  Impression:ct chest 7/29/22     2.3 cm spiculated left upper lobe lung nodule highly suspicious for bronchogenic carcinoma.     New nonspecific 7 mm nodule in the right middle lobe; this appears more linear on the coronal images and may be a focus of scarring.     Additional stable lung nodules, mildly enlarged axillary lymph nodes, substernal thyroid nodule; these findings are likely benign given the interval stability.   LEFT LUNG MASS, CT-GUIDED BIOPSY:   Non-small cell lung carcinoma.   Comment:  The biopsy reveals invasive carcinoma with apparent glandular but   also vague squamoid features.  Tumor cells are diffusely positive with TTF-1   and focally positive with P40.  The histology differential includes   adenocarcinoma and adenosquamous carcinoma.  PD-L1 and templates NGS studies   are in progress.  The results will be issued separately.    October 3, 2022: Robotic left lobectomy T1c N1    Social history; patient is a smoker denies recreational alcohol use or drug use   Patient is currently on disability  She is allergic to the mask used during COVID pandemic she is also bothered by her mask with COPD    Family history suggestive of ovarian and breast cancer patient advised to see a  or seek   Review of patient's allergies indicates:   Allergen Reactions    Latex,  natural rubber Rash     Medications have been reviewed  Current Outpatient Medications on File Prior to Visit   Medication Sig Dispense Refill    albuterol (PROVENTIL/VENTOLIN HFA) 90 mcg/actuation inhaler Inhale 2 puffs into the lungs every 6 (six) hours as needed for Wheezing or Shortness of Breath. Rescue 8.5 g 11    buPROPion (WELLBUTRIN XL) 300 MG 24 hr tablet Take 1 tablet by mouth once daily 90 tablet 3    citalopram (CELEXA) 20 MG tablet Take 1 tablet (20 mg total) by mouth once daily. 30 tablet 11    fluticasone propionate (FLONASE) 50 mcg/actuation nasal spray 1 spray (50 mcg total) by Each Nostril route once daily. 16 g 3    fluticasone-salmeterol 230-21 mcg/dose (ADVAIR HFA) 230-21 mcg/actuation HFAA inhaler Inhale 2 puffs into the lungs 2 (two) times daily. Controller 12 g 5    gabapentin (NEURONTIN) 300 MG capsule Take 1 capsule (300 mg total) by mouth 3 (three) times daily. 270 capsule 1    hydrocortisone (CORTEF) 10 MG Tab Take 1 tablet (10 mg total) by mouth every evening. 90 tablet 3    hydrocortisone (CORTEF) 20 MG Tab Take 1 tablet (20 mg total) by mouth once daily. 90 tablet 3    levothyroxine (SYNTHROID) 75 MCG tablet Take 1 tablet (75 mcg total) by mouth before breakfast. 30 tablet 0    naloxone (NARCAN) 4 mg/actuation Spry 1 spray by Nasal route once.      ondansetron (ZOFRAN-ODT) 8 MG TbDL Take 1 tablet (8 mg total) by mouth every 6 (six) hours as needed (nausea). 30 tablet 2    pravastatin (PRAVACHOL) 10 MG tablet Take 1 tablet (10 mg total) by mouth once daily. 90 tablet 3    topiramate (TOPAMAX) 25 MG tablet Take 1 tablet (25 mg total) by mouth 2 (two) times daily. 60 tablet 0     No current facility-administered medications on file prior to visit.       REVIEW OF SYSTEMS:         Wt Readings from Last 3 Encounters:   12/03/24 63.9 kg (140 lb 14 oz)   11/19/24 63.5 kg (140 lb)   11/08/24 65.5 kg (144 lb 6.4 oz)     Temp Readings from Last 3 Encounters:   12/03/24 96.4 °F (35.8 °C)  (Temporal)   11/19/24 97.7 °F (36.5 °C) (Temporal)   11/08/24 96.9 °F (36.1 °C) (Temporal)     BP Readings from Last 3 Encounters:   12/03/24 109/62   11/19/24 (!) 94/55   11/08/24 (!) 117/58     Pulse Readings from Last 3 Encounters:   12/03/24 85   11/19/24 61   11/08/24 73     VITAL SIGNS:  as above   GENERAL: appears well-built, well-nourished.  No anxiety, no agitation, and in no distress.  Patient is awake, alert, oriented and cooperative.  HEENT:  Showed no congestion. Trachea is central no obvious icterus or pallor noted no hoarseness. no obvious JVD   NECK:  Supple.  No JVD. No obvious cervical submental or supraclavicular adenopathy.  RS: tube draining on left side. S/p lobectomy  ABDOMEN:  abdomen appears undistended.  EXTREMITIES:  Without edema.  NEUROLOGICAL:  The patient is appropriate, higher functions are normal.  No  obvious neurological deficits.  normal judgement normal thought content  No confusion, no speech impediment. Cranial nerves are intact and show no deficit. No gross motor deficits noted   SKIN MUSCULOSKELETAL: no joint or skeletal deformity, no clubbing of nails.  No visible rash ecchymosis or petechiae  Lab Results   Component Value Date    WBC 20.78 (H) 03/03/2020    HGB 14.9 03/03/2020    HCT 47.0 03/03/2020    MCV 99 (H) 03/03/2020     03/03/2020     Smudge cells presnt  CMP  Sodium   Date Value Ref Range Status   12/02/2024 138 136 - 145 mmol/L Final     Potassium   Date Value Ref Range Status   12/02/2024 4.6 3.5 - 5.1 mmol/L Final     Chloride   Date Value Ref Range Status   12/02/2024 107 95 - 110 mmol/L Final     CO2   Date Value Ref Range Status   12/02/2024 21 (L) 23 - 29 mmol/L Final     Glucose   Date Value Ref Range Status   12/02/2024 80 70 - 110 mg/dL Final     BUN   Date Value Ref Range Status   12/02/2024 29 (H) 8 - 23 mg/dL Final     Creatinine   Date Value Ref Range Status   12/02/2024 1.9 (H) 0.5 - 1.4 mg/dL Final     Calcium   Date Value Ref Range Status    12/02/2024 10.0 8.7 - 10.5 mg/dL Final     Total Protein   Date Value Ref Range Status   11/04/2024 5.7 (L) 6.0 - 8.4 g/dL Final     Albumin   Date Value Ref Range Status   12/02/2024 4.2 3.5 - 5.2 g/dL Final     Total Bilirubin   Date Value Ref Range Status   11/04/2024 0.4 0.1 - 1.0 mg/dL Final     Comment:     For infants and newborns, interpretation of results should be based  on gestational age, weight and in agreement with clinical  observations.    Premature Infant recommended reference ranges:  Up to 24 hours.............<8.0 mg/dL  Up to 48 hours............<12.0 mg/dL  3-5 days..................<15.0 mg/dL  6-29 days.................<15.0 mg/dL       Alkaline Phosphatase   Date Value Ref Range Status   11/04/2024 162 (H) 55 - 135 U/L Final     AST   Date Value Ref Range Status   11/04/2024 52 (H) 10 - 40 U/L Final     ALT   Date Value Ref Range Status   11/04/2024 15 10 - 44 U/L Final     Anion Gap   Date Value Ref Range Status   12/02/2024 10 8 - 16 mmol/L Final     eGFR if    Date Value Ref Range Status   06/13/2022 >60 >60 mL/min/1.73 m^2 Final     eGFR if non    Date Value Ref Range Status   06/13/2022 >60 >60 mL/min/1.73 m^2 Final     Comment:     Calculation used to obtain the estimated glomerular filtration  rate (eGFR) is the CKD-EPI equation.            2wk ago    Blood Interpretation A B cell lymphoproliferative disorder is present. see comment.    Comment: Interpreted by: Jeremias Sosa M.D., Signed on 08/06/2020 at 13:07   Blood Comment Flow cytometric analysis of  peripheral blood  detects a lambda  light chain   restricted B lymphocyte population showing expression of CD19 and dim CD20   with aberrant expression of CD5 (dim).  T lymphocytes are   immunophenotypically unremarkable.  The blasts gate is not increased.  The   findings are consistent with patient history of chronic lymphocytic   leukemia/small lymphocytic lymphoma.   Flow differential:  Lymphocytes  69.6%, Monocytes 2.8%, Granulocytes  27.5%,   Blast  0.1%, Debris/nRBC 0.1%,  Viability 97.5%.   10/3/22 robotic lef lung lobectomy  1. LYMPH NODE, LEVEL 5 FROZEN SECTION, EXCISION:   One lymph node with dominant necrotizing granuloma, negative for malignancy   (0/1).   2. LYMPH NODES, LEVEL 5 PERMANENT, EXCISION:   Two lymph nodes with multifocal necrotizing granulomas, negative for   malignancy (0/2).   3. LYMPH NODES, LEFT POSTERIOR LEVEL 10, EXCISION:   Five lymph nodes with multifocal necrotizing granulomas, negative for   malignancy (0/5).   4. LYMPH NODE, LEVEL 11, EXCISION:   One lymph node, negative for malignancy (0/1).   5. LYMPH NODES, LEVEL 12, EXCISION:   Three lymph nodes, one with single necrotizing granuloma, negative for   malignancy (0/3).   6. LYMPH NODES, LEVEL 12 NEAR UPPER DIVISION BRONCHUS, EXCISION:   Three lymph nodes with sinus histiocytosis, negative for malignancy (0/3).   7. LYMPH NODES, LEVEL 10 #2, EXCISION:   One of two lymph nodes positive for metastatic carcinoma (1/2).   Size of largest metastatic deposit:  8 mm.   Negative for extranodal extension.   8. LYMPH NODES, LEFT LEVEL 8, EXCISION:   Two lymph nodes with sinus histiocytosis, negative for malignancy (0/2).   9. LYMPH NODES, LEFT LEVEL 9, EXCISION:   Two lymph nodes, negative for malignancy (0/2).   10. 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   Surgical margins are negative for malignancy.   Background lung tissue with subpleural fibrosis, no evidence of granulomas.   Two hilar lymph nodes, negative for malignancy (0/2).   Coment (1-3, 5):  Prominent necrotizing granulomatous inflammation identified   is identified in multiple lymph nodes.  An AE1/AE3 cytokeratin immunostain   performed on the largest granuloma (part 3) reveals no evidence of occult   carcinoma.  GMS and AFB special stains are negative for fungal and acid-fast   organisms, respectively.  The necrotizing  features are not typical of   sarcoidosis.  As such, other granulomatous processes may be considered   including secondary response to malignancy or infection.  Clinical   correlation is advised.   Comment (10):  Sections reveal invasive carcinoma involving lung tissue with   predominantly squamoid morphologic features including bright eosinophilic   cytoplasm and intracellular bridging.  There are not significant keratinizing   features.  Some areas show basaloid features.  There is also regional luminal   features including cribriforming and vague glandular structures containing   mucin.  Tumor cells are diffusely positive with P40 and regionally positive   with TTF-1.  Mucicarmine special stain highlights mucin producing luminal   spaces. Overall tumor appearance and staining profile supports adenosquamous   carcinoma, a variant of non-small cell lung carcinoma.   [facility] SURGICAL PATHOLOGY CANCER CASE SUMMARY:  LUNG   Procedure:  Lobectomy   Specimen laterality:  Left   Tumor focality:  Single focus   Tumor site: Upper lobe of lung   Tumor size:  2.2 cm   Histologic type:  Adenosquamous carcinoma   Spread through airspaces:  Not identified   Visceral pleural invasion:  Not identified   Direct invasion of adjacent structures: Not applicable   Lymphovascular invasion:  Not identified   Margins:  All margins negative for invasive carcinoma     Closest margin to invasive carcinoma:  Bronchial (3.0 cm)   Regional lymph nodes:  Tumor present in regional lymph node     Number of lymph nodes with tumor:  1     Scott sites with tumor:  Left level 10 (10L)     Extranodal extension:  Not identified     Number of lymph nodes examined:  23   Pathologic stage classification (pTNM): pT1c pN1   Special studies:  Performed on previous biopsy if additional studies needed   there may be performed on tissue blockd 10G or 10H.        Impression:pet 2/24     Increasing SUV max of FDG avid partially calcified lymph node involving the  anterior superior mediastinum. This could be metastatic, lymphoproliferative, or reactive in nature.     No other findings of FDG avid malignancy.     Prominent cervical, axillary, mediastinal lymph nodes.    Impression:pet 5/24     1.  Prior left upper lobectomy with bandlike scarring on the left.     2.  Unchanged partially calcified lymph node in the anterior superior mediastinum/thoracic inlet.     3.  Numerous small cervical, axillary, mediastinal and upper abdominal lymph nodes the previous exam without increased FDG activity from background.     4.  Interval decrease in size and FDG activity to the left adrenal nodule/metastasis.      11/24  BONE MARROW, RIGHT ILIAC CREST, ASPIRATE, CLOT SECTION, AND CORE BIOPSY:     --LOW-GRADE NON-HODGKIN B-CELL LYMPHOMA; FURTHER CHARACTERIZATION      PENDING IMMUNOHISTOCHEMISTRY; FINAL DIAGNOSIS TO FOLLOW.     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 Due to adenosq histology chose carbo/ taxol x 4 cycles tecentriq maintanence x 1 year  pt is has had 5 cycles of carbo/ taxol  discused maintainenec at tumor board  Completed 1 year of tecentriq   Pet 2/24 showing slight enlarged calcified node pet avid      Patient had CTA October 20, 2024 we will go ahead and repeat PET scan in January 2025    Continue with chronic pain  syndrome and pain management advised to stop smoking if at all possible 10 pills of hydrocodoen given to pt, she needs to follow with pain mx      History of B12 deficiency will continue to monitor    Hypothyroidism patient on levothyroxine 75 mcg and follows with PCP    Advised COVID precaution due to her lung situation she will be a high risk patient     CKD with EGFR of 25 with resulted anemia  but hgb is above 10gm  start procrit if hgb below 10 gm  renal ref

## 2024-12-07 ENCOUNTER — PATIENT MESSAGE (OUTPATIENT)
Dept: NEUROSURGERY | Facility: CLINIC | Age: 64
End: 2024-12-07
Payer: COMMERCIAL

## 2024-12-07 DIAGNOSIS — Z98.1 S/P CERVICAL SPINAL FUSION: Primary | ICD-10-CM

## 2024-12-12 ENCOUNTER — TELEPHONE (OUTPATIENT)
Dept: CARDIOLOGY | Facility: CLINIC | Age: 64
End: 2024-12-12
Payer: COMMERCIAL

## 2024-12-12 NOTE — TELEPHONE ENCOUNTER
----- Message from Nessa sent at 12/12/2024 11:15 AM CST -----  Contact: self  Type:  Sooner Appointment Request    Caller is requesting a sooner appointment.  Caller declined first available appointment listed below.  Caller will not accept being placed on the waitlist and is requesting a message be sent to doctor.    Name of Caller:  pt   When is the first available appointment?  Na nothing pulling up  Symptoms:  pt saw the doctor back when she was in [facility] in October of 2024 but she winded going back into the hospital for a quite a few different issues.  Now is asking to get isabela in w/either Dr Sousa or Ms. Kelley  Would the patient rather a call back or a response via [facility]? Call back   Best Call Back Number:  [phone]  Additional Information:  thanks nothing pulled up anytime this year or next.  thanks

## 2024-12-17 ENCOUNTER — OFFICE VISIT (OUTPATIENT)
Dept: NEUROSURGERY | Facility: CLINIC | Age: 64
End: 2024-12-17
Payer: COMMERCIAL

## 2024-12-17 ENCOUNTER — HOSPITAL ENCOUNTER (OUTPATIENT)
Dept: RADIOLOGY | Facility: HOSPITAL | Age: 64
Discharge: HOME | End: 2024-12-17
Attending: NEUROLOGICAL SURGERY
Payer: COMMERCIAL

## 2024-12-17 VITALS — HEART RATE: 81 BPM | SYSTOLIC BLOOD PRESSURE: 93 MMHG | DIASTOLIC BLOOD PRESSURE: 66 MMHG

## 2024-12-17 DIAGNOSIS — Z98.1 S/P CERVICAL SPINAL FUSION: ICD-10-CM

## 2024-12-17 DIAGNOSIS — Z98.1 S/P CERVICAL SPINAL FUSION: Primary | ICD-10-CM

## 2024-12-17 PROCEDURE — 3074F SYST BP LT 130 MM HG: CPT | Mod: CPTII,S$GLB,, | Performed by: HEALTH CARE PROVIDER

## 2024-12-17 PROCEDURE — 72040 X-RAY EXAM NECK SPINE 2-3 VW: CPT | Mod: 26,,, | Performed by: RADIOLOGY

## 2024-12-17 PROCEDURE — 99213 OFFICE O/P EST LOW 20 MIN: CPT | Mod: S$GLB,,, | Performed by: HEALTH CARE PROVIDER

## 2024-12-17 PROCEDURE — 1101F PT FALLS ASSESS-DOCD LE1/YR: CPT | Mod: CPTII,S$GLB,, | Performed by: HEALTH CARE PROVIDER

## 2024-12-17 PROCEDURE — 3044F HG A1C LEVEL LT 7.0%: CPT | Mod: CPTII,S$GLB,, | Performed by: HEALTH CARE PROVIDER

## 2024-12-17 PROCEDURE — 1159F MED LIST DOCD IN RCRD: CPT | Mod: CPTII,S$GLB,, | Performed by: HEALTH CARE PROVIDER

## 2024-12-17 PROCEDURE — 3288F FALL RISK ASSESSMENT DOCD: CPT | Mod: CPTII,S$GLB,, | Performed by: HEALTH CARE PROVIDER

## 2024-12-17 PROCEDURE — 3078F DIAST BP <80 MM HG: CPT | Mod: CPTII,S$GLB,, | Performed by: HEALTH CARE PROVIDER

## 2024-12-17 PROCEDURE — 72040 X-RAY EXAM NECK SPINE 2-3 VW: CPT | Mod: TC

## 2024-12-17 PROCEDURE — 99999 PR PBB SHADOW E&M-EST. PATIENT-LVL II: CPT | Mod: PBBFAC,,, | Performed by: HEALTH CARE PROVIDER

## 2024-12-17 RX ORDER — METHOCARBAMOL 750 MG/1
750 TABLET, FILM COATED ORAL 3 TIMES DAILY
Qty: 270 TABLET | Refills: 0 | Status: SHIPPED | OUTPATIENT
Start: 2024-12-17 | End: 2025-03-17

## 2024-12-17 RX ORDER — TOPIRAMATE 100 MG/1
100 TABLET, FILM COATED ORAL 2 TIMES DAILY
COMMUNITY
Start: 2024-11-25

## 2024-12-17 NOTE — PROGRESS NOTES
Neurosurgery  Post-Operative Follow up    SUBJECTIVE:     History of Present Illness:  Yvette Hutchinson is a 64 y.o. female  presents status post C4-7 anterior cervical diskectomy and fusion on 08/06/2024 for cervical spinal stenosis, degenerative disc disease, myelopathy and radiculopathy present for 6 week postop follow-up.  Since last visit, patient reported she had a myocardial infarction and was admitted to the hospital.  During admission she also had a cholecystectomy.  She reports she is doing much better now.  She continues to report resolution of preoperative neck and left arm pain.  She also reports resolution of left hand fingertip numbness.  She does admit to intermittent neck stiffness/soreness which mildly improves with range-of-motion exercises.  She requested muscle relaxant prescription.  She can perform ADLs without difficulty.  She denies incisional issues, dysphagia, dysphonia, upper extremity weakness or paresthesias.      OBJECTIVE:     Vital Signs  Pulse: 81  BP: 93/66  Pain Score:   5  There is no height or weight on file to calculate BMI.    Neurosurgery Physical Exam  General:  No acute distress.    Neurologic: Alert and oriented. Thought content appropriate.  GCS: E4 V5 M6; Total: 15  Pulmonary: normal respirations, no signs of respiratory distress  Skin: Skin is warm, dry and intact.    Motor Strength: Moves all extremities spontaneously with good tone. No abnormal movements seen.   Bilateral upper extremity strength deltoid/biceps/triceps/hand  5/5     Anterior cervical Incision:  Well healed.      Diagnostic Imaging:  I have personally reviewed cervical x-ray images obtained 12/17/2024 with patient which revealed stable alignment without hardware malfunction or complication.    ASSESSMENT/PLAN:     1. S/P cervical spinal fusion        Postoperatively, patient is doing very well and is neurologically intact.  Cervical x-rays demonstrate stable instrument fusion without acute  findings.  I believe cervical stiffness/soreness will improve with cervical range-of-motion exercises and muscle relaxants.    Patient will follow-up in 3 months with cervical x-rays.          S/P cervical spinal fusion  -     methocarbamoL (ROBAXIN) 750 MG Tab; Take 1 tablet (750 mg total) by mouth 3 (three) times daily.  Dispense: 270 tablet; Refill: 0  -     X-Ray Cervical Spine 2 or 3 Views; Future; Expected date: 03/17/2025          I spent a total of 17 minutes non-face and face to face time preparing to see the patient (eg, review of tests), obtaining and/or reviewing separately obtained history, documenting clinical information in the electronic or other health record, interpreting results and communicating results to the patient/family/caregiver, or care coordinator.    Milana Spann PA-C  Neurosurgery  [facility]

## 2024-12-19 ENCOUNTER — LAB VISIT (OUTPATIENT)
Dept: LAB | Facility: HOSPITAL | Age: 64
End: 2024-12-19
Attending: INTERNAL MEDICINE
Payer: COMMERCIAL

## 2024-12-19 DIAGNOSIS — N17.9 ACUTE KIDNEY FAILURE, UNSPECIFIED: Primary | ICD-10-CM

## 2024-12-19 LAB
ALBUMIN SERPL BCP-MCNC: 4.2 G/DL (ref 3.5–5.2)
ANION GAP SERPL CALC-SCNC: 9 MMOL/L (ref 8–16)
BASOPHILS NFR BLD: 0 % (ref 0–1.9)
BUN SERPL-MCNC: 16 MG/DL (ref 8–23)
CALCIUM SERPL-MCNC: 9.9 MG/DL (ref 8.7–10.5)
CHLORIDE SERPL-SCNC: 106 MMOL/L (ref 95–110)
CO2 SERPL-SCNC: 23 MMOL/L (ref 23–29)
CREAT SERPL-MCNC: 1.3 MG/DL (ref 0.5–1.4)
DIFFERENTIAL METHOD BLD: ABNORMAL
EOSINOPHIL NFR BLD: 0 % (ref 0–8)
ERYTHROCYTE [DISTWIDTH] IN BLOOD BY AUTOMATED COUNT: 14.3 % (ref 11.5–14.5)
EST. GFR  (NO RACE VARIABLE): 46 ML/MIN/1.73 M^2
GLUCOSE SERPL-MCNC: 80 MG/DL (ref 70–110)
HCT VFR BLD AUTO: 35.7 % (ref 37–48.5)
HGB BLD-MCNC: 11.3 G/DL (ref 12–16)
IMM GRANULOCYTES # BLD AUTO: ABNORMAL K/UL (ref 0–0.04)
IMM GRANULOCYTES NFR BLD AUTO: ABNORMAL % (ref 0–0.5)
LYMPHOCYTES NFR BLD: 91 % (ref 18–48)
MCH RBC QN AUTO: 29.5 PG (ref 27–31)
MCHC RBC AUTO-ENTMCNC: 31.7 G/DL (ref 32–36)
MCV RBC AUTO: 93 FL (ref 82–98)
MONOCYTES NFR BLD: 1 % (ref 4–15)
NEUTROPHILS NFR BLD: 8 % (ref 38–73)
NRBC BLD-RTO: 0 /100 WBC
PHOSPHATE SERPL-MCNC: 3.9 MG/DL (ref 2.7–4.5)
PLATELET # BLD AUTO: 194 K/UL (ref 150–450)
PMV BLD AUTO: 9.9 FL (ref 9.2–12.9)
POTASSIUM SERPL-SCNC: 4.3 MMOL/L (ref 3.5–5.1)
RBC # BLD AUTO: 3.83 M/UL (ref 4–5.4)
SODIUM SERPL-SCNC: 138 MMOL/L (ref 136–145)
WBC # BLD AUTO: 35.25 K/UL (ref 3.9–12.7)

## 2024-12-19 PROCEDURE — 36415 COLL VENOUS BLD VENIPUNCTURE: CPT | Performed by: INTERNAL MEDICINE

## 2024-12-19 PROCEDURE — 85007 BL SMEAR W/DIFF WBC COUNT: CPT | Performed by: INTERNAL MEDICINE

## 2024-12-19 PROCEDURE — 85027 COMPLETE CBC AUTOMATED: CPT | Performed by: INTERNAL MEDICINE

## 2024-12-19 PROCEDURE — 80069 RENAL FUNCTION PANEL: CPT | Performed by: INTERNAL MEDICINE

## 2024-12-20 ENCOUNTER — OFFICE VISIT (OUTPATIENT)
Dept: CARDIOLOGY | Facility: CLINIC | Age: 64
End: 2024-12-20
Payer: COMMERCIAL

## 2024-12-20 VITALS
HEART RATE: 92 BPM | OXYGEN SATURATION: 99 % | DIASTOLIC BLOOD PRESSURE: 68 MMHG | SYSTOLIC BLOOD PRESSURE: 105 MMHG | WEIGHT: 141.19 LBS | HEIGHT: 63 IN | BODY MASS INDEX: 25.02 KG/M2

## 2024-12-20 DIAGNOSIS — E78.2 MIXED HYPERLIPIDEMIA: ICD-10-CM

## 2024-12-20 DIAGNOSIS — E03.9 ACQUIRED HYPOTHYROIDISM: ICD-10-CM

## 2024-12-20 DIAGNOSIS — I50.20 HFREF (HEART FAILURE WITH REDUCED EJECTION FRACTION): ICD-10-CM

## 2024-12-20 DIAGNOSIS — I95.9 HYPOTENSION, UNSPECIFIED HYPOTENSION TYPE: ICD-10-CM

## 2024-12-20 DIAGNOSIS — I49.8 FLUTTERING HEART: Primary | ICD-10-CM

## 2024-12-20 DIAGNOSIS — E78.5 DYSLIPIDEMIA: ICD-10-CM

## 2024-12-20 DIAGNOSIS — R06.02 SOB (SHORTNESS OF BREATH): ICD-10-CM

## 2024-12-20 DIAGNOSIS — R53.83 FATIGUE, UNSPECIFIED TYPE: ICD-10-CM

## 2024-12-20 PROCEDURE — 99999 PR PBB SHADOW E&M-EST. PATIENT-LVL III: CPT | Mod: PBBFAC,,,

## 2024-12-20 RX ORDER — PRAVASTATIN SODIUM 20 MG/1
20 TABLET ORAL DAILY
Qty: 90 TABLET | Refills: 3 | Status: SHIPPED | OUTPATIENT
Start: 2024-12-20 | End: 2025-12-20

## 2024-12-20 NOTE — ASSESSMENT & PLAN NOTE
Last echo showed reduced EF of 30-35%.  Euvolemic upon exam today.  Not currently on goal-directed medical therapy due to hypotension.    We will repeat echo at this time.  Denies any worsening shortness of breath or worsening swelling in the feet or legs.  Last .

## 2024-12-20 NOTE — ASSESSMENT & PLAN NOTE
Stable.  Continue Synthroid 75 mcg before breakfast daily.     Latest Reference Range & Units 11/03/24 07:03   TSH 0.340 - 5.600 uIU/mL 2.502

## 2024-12-20 NOTE — ASSESSMENT & PLAN NOTE
History of hypotension.  Blood pressure stable today in clinic at 105/68.  No longer on midodrine or Florinef.  Encouraged:    Get up slowly from bed or after sitting for a long time. If you are in bed, roll to your side and swing your legs over the edge of the bed and onto the floor. Push your body up to a sitting position. Wait for a while before you slowly stand up.  Ensure you are staying adequately hydrated.  Choose water and other clear liquids.   Wear compression stockings to help improve blood flow.     no

## 2024-12-20 NOTE — PROGRESS NOTES
Subjective:    Patient ID:  Yvette Hutchinson is a 64 y.o. female who presents for follow-up.   Chief Complaint   Patient presents with    Hospital Follow Up    Back Pain     Low back pain       HPI:  Ms. Hutchinson is a 64-year-old female with past medical history of CLL, arthritis, depression, GERD, chronic neck and back pain, hypotension and thyroid disease who comes into the clinic today for hospital follow-up.  Blood pressure has been holding a we will at home and she has not been on the midodrine or Florinef.  She denies any chest pain or shortness of breath however she does admit to some infrequent fluttering in her chest/palpitations.  States compliance with all medication.  Denies any blood in the urine or stool.      Review of patient's allergies indicates:   Allergen Reactions    Latex, natural rubber Rash       Past Medical History:   Diagnosis Date    Arthritis     Cancer 10/2022    (CLL) leukemia ; adenocarcinoma  adenosgemis    Depression     GERD (gastroesophageal reflux disease)     Neck pain     and back pain    Personal history of colonic polyps 07/07/2017    Pneumonia of left lung due to infectious organism 03/05/2020    Thyroid disease      Past Surgical History:   Procedure Laterality Date    ERCP N/A 10/10/2024    Procedure: ERCP (ENDOSCOPIC RETROGRADE CHOLANGIOPANCREATOGRAPHY);  Surgeon: Joshua Polanco III, MD;  Location: [facility] ENDO;  Service: Endoscopy;  Laterality: N/A;    FUSION, SPINE, CERVICAL, ANTERIOR APPROACH N/A 08/06/2024    Procedure: FUSION, SPINE, CERVICAL, ANTERIOR APPROACH;  Surgeon: Anatoly Daley DO;  Location: [facility] OR;  Service: Neurosurgery;  Laterality: N/A;  IOM, C-ARM, MEDTRONICS, MICRO, HORSESHOE    INJECTION OF ANESTHETIC AGENT AROUND MULTIPLE INTERCOSTAL NERVES Left 10/03/2022    Procedure: BLOCK, NERVE, INTERCOSTAL, 2 OR MORE;  Surgeon: Evelio Kaplan MD;  [address];  Service: Thoracic;  Laterality: Left;    INSERTION OF TUNNELED  CENTRAL VENOUS CATHETER (CVC) WITH SUBCUTANEOUS PORT Right 11/03/2022    Procedure: [id number], Right or Left Neck or Chest;  Surgeon: Mini Acevedo MD;  Location: [address];  Service: General;  Laterality: Right;    LAPAROSCOPIC CHOLECYSTECTOMY N/A 10/3/2024    Procedure: CHOLECYSTECTOMY, LAPAROSCOPIC;  Surgeon: Ang Mosley III, MD;  Location: [facility] OR;  Service: General;  Laterality: N/A;    LEFT HEART CATHETERIZATION Left 10/9/2024    Procedure: Left heart cath;  Surgeon: Martin Ingram MD;  Location: [facility] CATH/EP LAB;  Service: Cardiology;  Laterality: Left;    ROBOT-ASSISTED LAPAROSCOPIC LYMPHADENECTOMY USING DA MONIQUE XI Left 10/03/2022    Procedure: XI ROBOTIC LYMPHADENECTOMY;  Surgeon: Evelio Kaplan MD;  Location: [facility] OR [address] [place] [address];  Service: Thoracic;  Laterality: Left;    SPINE SURGERY      TONSILLECTOMY      XI ROBOTIC RATS,WITH LOBECTOMY,LUNG Left 10/03/2022    Procedure: XI ROBOTIC RATS,WITH LOBECTOMY,LUNG;  Surgeon: Evelio Kaplan MD;  Location: [address];  Service: Thoracic;  Laterality: Left;     Social History     Tobacco Use    Smoking status: Former     Types: Cigarettes     Passive exposure: Current    Smokeless tobacco: Never    Tobacco comments:     PT states she has quit now for a few months, plans to stay quit.   Substance Use Topics    Alcohol use: Yes     Comment: rarely    Drug use: Not Currently     Types: Marijuana     Family History   Problem Relation Name Age of Onset    Ovarian cancer Sister      Breast cancer Cousin          Review of Systems:   Constitution: Negative for diaphoresis and fever.   HEENT: Negative for nosebleeds.    Cardiovascular: Negative for chest pain       No dyspnea on exertion       No leg swelling        + palpitations  Respiratory: Negative for shortness of breath and wheezing.    Hematologic/Lymphatic: Negative for bleeding problem. Does not bruise/bleed easily.   Skin: Negative for color change and rash.    Musculoskeletal: Negative for falls and myalgias.   Gastrointestinal: Negative for hematemesis and hematochezia.   Genitourinary: Negative for hematuria.   Neurological: Negative for dizziness and light-headedness.   Psychiatric/Behavioral: Negative for altered mental status and memory loss.          Objective:        Vitals:    12/20/24 1336   BP: 105/68   Pulse: 92       Lab Results   Component Value Date    WBC 35.25 (HH) 12/19/2024    HGB 11.3 (L) 12/19/2024    HCT 35.7 (L) 12/19/2024     12/19/2024    CHOL 308 (H) 02/07/2024    TRIG 220 (H) 02/07/2024    HDL 44 02/07/2024    ALT 15 11/04/2024    AST 52 (H) 11/04/2024     12/19/2024    K 4.3 12/19/2024     12/19/2024    CREATININE 1.3 12/19/2024    BUN 16 12/19/2024    CO2 23 12/19/2024    TSH 2.502 11/03/2024    INR 1.1 10/09/2024    HGBA1C 5.0 02/07/2024        ECHOCARDIOGRAM RESULTS  Results for orders placed during the hospital encounter of 10/02/24    Echo    Interpretation Summary    Left Ventricle: Left ventricle was not well visualized due to poor sonic window. Regional wall motion abnormalities present. See diagram for wall motion findings. There is moderately reduced systolic function with a visually estimated ejection fraction of 30 - 35%. There is normal diastolic function. E/A ratio is 0.84. Average E/e' ratio is 8.35.    Right Ventricle: Normal right ventricular cavity size. Systolic function is normal.    Mitral Valve: There is mild to moderate regurgitation.    Tricuspid Valve: There is mild regurgitation.    Pulmonic Valve: There is mild regurgitation.    Pericardium: There is a small effusion. No indication of cardiac tamponade.        CURRENT/PREVIOUS VISIT EKG  Results for orders placed or performed during the hospital encounter of 11/01/24   EKG 12-lead    Collection Time: 11/01/24  5:24 PM   Result Value Ref Range    QRS Duration 72 ms    [facility] QTC Calculation 451 ms    Narrative    Test Reason : R79.89,    Vent. Rate :   79 BPM     Atrial Rate :  79 BPM     P-R Int : 144 ms          QRS Dur :  72 ms      QT Int : 394 ms       P-R-T Axes :  75  74  85 degrees    QTcB Int : 451 ms    Normal sinus rhythm  Nonspecific ST abnormality  Abnormal ECG  When compared with ECG of 19-OCT-2024 18:28,  No significant change was found  Confirmed by Yuval Suazo ([id number]) on 11/17/2024 1:34:22 PM    Referred By: AAAREFERRAL SELF           Confirmed By: Yuval Suazo     No valid procedures specified.   Results for orders placed during the hospital encounter of 09/19/22    Nuclear Stress - Cardiology Interpreted    Interpretation Summary    Normal myocardial perfusion scan. There is no evidence of myocardial ischemia or infarction.    The gated perfusion images showed an ejection fraction of 67% at rest. The gated perfusion images showed an ejection fraction of 71% post stress. Normal ejection fraction is greater than 53%.    There is normal wall motion at rest and post stress.    LV cavity size is normal at rest and normal at stress.    The EKG portion of this study is negative for ischemia.    The patient reported chest pain during the stress test.    There were no arrhythmias during stress.    There are no prior studies for comparison.      Physical Exam:  CONSTITUTIONAL: No fever, no chills  HEENT: Normocephalic, atraumatic,pupils reactive to light                 NECK:  No JVD no carotid bruit  CVS: S1S2+, RRR, no murmurs,   LUNGS: Clear  ABDOMEN: Soft, NT, BS+  EXTREMITIES: No cyanosis, edema  : No ramirez catheter  NEURO: AAO X 3  PSY: Normal affect      Medication List with Changes/Refills   Current Medications    ALBUTEROL (PROVENTIL/VENTOLIN HFA) 90 MCG/ACTUATION INHALER    Inhale 2 puffs into the lungs every 6 (six) hours as needed for Wheezing or Shortness of Breath. Rescue    BUPROPION (WELLBUTRIN XL) 300 MG 24 HR TABLET    Take 1 tablet by mouth once daily    CITALOPRAM (CELEXA) 20 MG TABLET    Take 1 tablet (20 mg total) by mouth  once daily.    FLUTICASONE PROPIONATE (FLONASE) 50 MCG/ACTUATION NASAL SPRAY    1 spray (50 mcg total) by Each Nostril route once daily.    FLUTICASONE-SALMETEROL 230-21 MCG/DOSE (ADVAIR HFA) 230-21 MCG/ACTUATION HFAA INHALER    Inhale 2 puffs into the lungs 2 (two) times daily. Controller    GABAPENTIN (NEURONTIN) 300 MG CAPSULE    Take 1 capsule (300 mg total) by mouth 3 (three) times daily.    HYDROCORTISONE (CORTEF) 20 MG TAB    Take 1 tablet (20 mg total) by mouth once daily.    LEVOTHYROXINE (SYNTHROID) 75 MCG TABLET    Take 1 tablet (75 mcg total) by mouth before breakfast.    METHOCARBAMOL (ROBAXIN) 750 MG TAB    Take 1 tablet (750 mg total) by mouth 3 (three) times daily.    NALOXONE (NARCAN) 4 MG/ACTUATION SPRY    1 spray by Nasal route once.    ONDANSETRON (ZOFRAN-ODT) 8 MG TBDL    Take 1 tablet (8 mg total) by mouth every 6 (six) hours as needed (nausea).    TOPIRAMATE (TOPAMAX) 100 MG TABLET    Take 100 mg by mouth 2 (two) times daily.   Changed and/or Refilled Medications    Modified Medication Previous Medication    PRAVASTATIN (PRAVACHOL) 20 MG TABLET pravastatin (PRAVACHOL) 10 MG tablet       Take 1 tablet (20 mg total) by mouth once daily.    Take 1 tablet (10 mg total) by mouth once daily.   Discontinued Medications    HYDROCORTISONE (CORTEF) 10 MG TAB    Take 1 tablet (10 mg total) by mouth every evening.             Assessment:       1. Fluttering heart    2. SOB (shortness of breath)    3. Fatigue, unspecified type    4. Mixed hyperlipidemia    5. Dyslipidemia    6. Hypotension, unspecified hypotension type    7. HFrEF (heart failure with reduced ejection fraction)    8. Acquired hypothyroidism         Plan:     Problem List Items Addressed This Visit          Cardiac/Vascular    Dyslipidemia    Current Assessment & Plan     Last lipid panel NOT at goal:     Latest Reference Range & Units 02/07/24 14:08   Cholesterol Total 120 - 199 mg/dL 308 (H)   HDL 40 - 75 mg/dL 44   HDL/Cholesterol Ratio  20.0 - 50.0 % 14.3 (L)   Non-HDL Cholesterol mg/dL 264   Total Cholesterol/HDL Ratio 2.0 - 5.0  7.0 (H)   Triglycerides 30 - 150 mg/dL 220 (H)   LDL Cholesterol 63.0 - 159.0 mg/dL 220.0 (H)   (H): Data is abnormally high  (L): Data is abnormally low    It appears she was started on pravastatin 10 mg daily after these labs had resulted back in February.  Increase to 20 mg at this time - plan to repeat lipid panel but we will need to consider changing to a high-intensity statin.  Encouraged heart healthy low-fat low-cholesterol diet and exercise as tolerated.         Hypotension    Current Assessment & Plan     History of hypotension.  Blood pressure stable today in clinic at 105/68.  No longer on midodrine or Florinef.  Encouraged:    Get up slowly from bed or after sitting for a long time. If you are in bed, roll to your side and swing your legs over the edge of the bed and onto the floor. Push your body up to a sitting position. Wait for a while before you slowly stand up.  Ensure you are staying adequately hydrated.  Choose water and other clear liquids.   Wear compression stockings to help improve blood flow.           HFrEF (heart failure with reduced ejection fraction)    Current Assessment & Plan     Last echo showed reduced EF of 30-35%.  Euvolemic upon exam today.  Not currently on goal-directed medical therapy due to hypotension.    We will repeat echo at this time.  Denies any worsening shortness of breath or worsening swelling in the feet or legs.  Last .            Endocrine    Acquired hypothyroidism    Current Assessment & Plan     Stable.  Continue Synthroid 75 mcg before breakfast daily.     Latest Reference Range & Units 11/03/24 07:03   TSH 0.340 - 5.600 uIU/mL 2.502             Other Visit Diagnoses       Fluttering heart    -  Primary    Relevant Orders    IN OFFICE EKG 12-LEAD (to [facility])    Cardiac Monitor - 3-15 Day Adult ([facility] Only)    SOB (shortness of breath)        Relevant Orders     IN OFFICE EKG 12-LEAD (to [facility])    Fatigue, unspecified type        Relevant Orders    IN OFFICE EKG 12-LEAD (to [place])    Echo    Mixed hyperlipidemia        Relevant Medications    pravastatin (PRAVACHOL) 20 MG tablet    Other Relevant Orders    Lipid Panel            Follow up in about 3 months (around 3/20/2025).

## 2024-12-20 NOTE — ASSESSMENT & PLAN NOTE
Last lipid panel NOT at goal:     Latest Reference Range & Units 02/07/24 14:08   Cholesterol Total 120 - 199 mg/dL 308 (H)   HDL 40 - 75 mg/dL 44   HDL/Cholesterol Ratio 20.0 - 50.0 % 14.3 (L)   Non-HDL Cholesterol mg/dL 264   Total Cholesterol/HDL Ratio 2.0 - 5.0  7.0 (H)   Triglycerides 30 - 150 mg/dL 220 (H)   LDL Cholesterol 63.0 - 159.0 mg/dL 220.0 (H)   (H): Data is abnormally high  (L): Data is abnormally low    It appears she was started on pravastatin 10 mg daily after these labs had resulted back in February.  Increase to 20 mg at this time - plan to repeat lipid panel but we will need to consider changing to a high-intensity statin.  Encouraged heart healthy low-fat low-cholesterol diet and exercise as tolerated.

## 2025-01-06 ENCOUNTER — HOSPITAL ENCOUNTER (OUTPATIENT)
Dept: CARDIOLOGY | Facility: CLINIC | Age: 65
Discharge: HOME | End: 2025-01-06
Payer: COMMERCIAL

## 2025-01-06 ENCOUNTER — HOSPITAL ENCOUNTER (OUTPATIENT)
Dept: CARDIOLOGY | Facility: HOSPITAL | Age: 65
Discharge: HOME | End: 2025-01-06
Payer: COMMERCIAL

## 2025-01-06 VITALS — HEIGHT: 63 IN | BODY MASS INDEX: 25.01 KG/M2 | WEIGHT: 141.13 LBS

## 2025-01-06 DIAGNOSIS — R53.83 FATIGUE, UNSPECIFIED TYPE: ICD-10-CM

## 2025-01-06 DIAGNOSIS — I49.8 FLUTTERING HEART: ICD-10-CM

## 2025-01-06 PROCEDURE — 93306 TTE W/DOPPLER COMPLETE: CPT

## 2025-01-06 PROCEDURE — 93306 TTE W/DOPPLER COMPLETE: CPT | Mod: 26,,, | Performed by: INTERNAL MEDICINE

## 2025-01-09 ENCOUNTER — TELEPHONE (OUTPATIENT)
Dept: FAMILY MEDICINE | Facility: CLINIC | Age: 65
End: 2025-01-09

## 2025-01-09 ENCOUNTER — TELEPHONE (OUTPATIENT)
Dept: FAMILY MEDICINE | Facility: CLINIC | Age: 65
End: 2025-01-09
Payer: COMMERCIAL

## 2025-01-09 ENCOUNTER — OFFICE VISIT (OUTPATIENT)
Dept: FAMILY MEDICINE | Facility: CLINIC | Age: 65
End: 2025-01-09
Payer: COMMERCIAL

## 2025-01-09 VITALS
SYSTOLIC BLOOD PRESSURE: 110 MMHG | OXYGEN SATURATION: 98 % | HEIGHT: 63 IN | DIASTOLIC BLOOD PRESSURE: 62 MMHG | WEIGHT: 141.56 LBS | HEART RATE: 78 BPM | TEMPERATURE: 98 F | BODY MASS INDEX: 25.08 KG/M2

## 2025-01-09 DIAGNOSIS — J40 BRONCHITIS: Primary | ICD-10-CM

## 2025-01-09 DIAGNOSIS — J32.9 RHINOSINUSITIS: ICD-10-CM

## 2025-01-09 LAB
AORTIC ROOT ANNULUS: 3.3 CM
AORTIC VALVE CUSP SEPERATION: 2.1 CM
APICAL FOUR CHAMBER EJECTION FRACTION: 50 %
APICAL TWO CHAMBER EJECTION FRACTION: 55 %
AV INDEX (PROSTH): 0.98
AV MEAN GRADIENT: 3 MMHG
AV PEAK GRADIENT: 5.8 MMHG
AV VALVE AREA BY VELOCITY RATIO: 3.8 CM²
AV VALVE AREA: 3.4 CM²
AV VELOCITY RATIO: 1.08
BSA FOR ECHO PROCEDURE: 1.69 M2
CV ECHO LV RWT: 0.3 CM
DOP CALC AO PEAK VEL: 1.2 M/S
DOP CALC AO VTI: 25.3 CM
DOP CALC LVOT AREA: 3.5 CM2
DOP CALC LVOT DIAMETER: 2.1 CM
DOP CALC LVOT PEAK VEL: 1.3 M/S
DOP CALC LVOT STROKE VOLUME: 85.5 CM3
DOP CALC MV VTI: 28 CM
DOP CALCLVOT PEAK VEL VTI: 24.7 CM
E WAVE DECELERATION TIME: 189 MSEC
E/A RATIO: 1.28
E/E' RATIO: 11.88 M/S
ECHO LV POSTERIOR WALL: 0.7 CM (ref 0.6–1.1)
FRACTIONAL SHORTENING: 21.3 % (ref 28–44)
INTERVENTRICULAR SEPTUM: 0.7 CM (ref 0.6–1.1)
IVC DIAMETER: 1.5 CM
IVRT: 63 MSEC
LEFT ATRIUM AREA SYSTOLIC (APICAL 2 CHAMBER): 21.8 CM2
LEFT ATRIUM AREA SYSTOLIC (APICAL 4 CHAMBER): 20.5 CM2
LEFT ATRIUM SIZE: 3.7 CM
LEFT ATRIUM VOLUME INDEX MOD: 40 ML/M2
LEFT ATRIUM VOLUME MOD: 66.8 ML
LEFT INTERNAL DIMENSION IN SYSTOLE: 3.7 CM (ref 2.1–4)
LEFT VENTRICLE DIASTOLIC VOLUME INDEX: 61.29 ML/M2
LEFT VENTRICLE DIASTOLIC VOLUME: 102.36 ML
LEFT VENTRICLE END DIASTOLIC VOLUME APICAL 2 CHAMBER: 119 ML
LEFT VENTRICLE END DIASTOLIC VOLUME APICAL 4 CHAMBER: 80.8 ML
LEFT VENTRICLE END SYSTOLIC VOLUME APICAL 2 CHAMBER: 70 ML
LEFT VENTRICLE END SYSTOLIC VOLUME APICAL 4 CHAMBER: 63.4 ML
LEFT VENTRICLE MASS INDEX: 61.7 G/M2
LEFT VENTRICLE SYSTOLIC VOLUME INDEX: 34.8 ML/M2
LEFT VENTRICLE SYSTOLIC VOLUME: 58.13 ML
LEFT VENTRICULAR INTERNAL DIMENSION IN DIASTOLE: 4.7 CM (ref 3.5–6)
LEFT VENTRICULAR MASS: 103.1 G
LV LATERAL E/E' RATIO: 10.1 M/S
LV SEPTAL E/E' RATIO: 14.43 M/S
LVED V (TEICH): 102.36 ML
LVES V (TEICH): 58.13 ML
LVOT MG: 3 MMHG
LVOT MV: 0.81 CM/S
MV MEAN GRADIENT: 2 MMHG
MV PEAK A VEL: 0.79 M/S
MV PEAK E VEL: 1.01 M/S
MV PEAK GRADIENT: 4 MMHG
MV VALVE AREA BY CONTINUITY EQUATION: 3.05 CM2
OHS CV RV/LV RATIO: 0.43 CM
OHS LV EJECTION FRACTION SIMPSONS BIPLANE MOD: 53 %
PISA TR MAX VEL: 2.47 M/S
PV MV: 0.81 M/S
PV PEAK GRADIENT: 5 MMHG
PV PEAK VELOCITY: 1.13 M/S
RA PRESSURE ESTIMATED: 3 MMHG
RIGHT VENTRICLE DIASTOLIC BASEL DIMENSION: 2 CM
RIGHT VENTRICULAR END-DIASTOLIC DIMENSION: 2 CM
RV TB RVSP: 5 MMHG
RV TISSUE DOPPLER FREE WALL SYSTOLIC VELOCITY 1 (APICAL 4 CHAMBER VIEW): 14.4 CM/S
TDI LATERAL: 0.1 M/S
TDI SEPTAL: 0.07 M/S
TDI: 0.09 M/S
TR MAX PG: 24 MMHG
TRICUSPID ANNULAR PLANE SYSTOLIC EXCURSION: 2.97 CM
TV REST PULMONARY ARTERY PRESSURE: 27 MMHG
Z-SCORE OF LEFT VENTRICULAR DIMENSION IN END DIASTOLE: 0.07
Z-SCORE OF LEFT VENTRICULAR DIMENSION IN END SYSTOLE: 1.95

## 2025-01-09 PROCEDURE — 99999 PR PBB SHADOW E&M-EST. PATIENT-LVL IV: CPT | Mod: PBBFAC,,,

## 2025-01-09 RX ORDER — LORATADINE 10 MG/1
10 TABLET ORAL DAILY
Qty: 30 TABLET | Refills: 0 | Status: SHIPPED | OUTPATIENT
Start: 2025-01-09 | End: 2025-02-08

## 2025-01-09 RX ORDER — AZELASTINE 1 MG/ML
1 SPRAY, METERED NASAL 2 TIMES DAILY
Qty: 30 ML | Refills: 2 | Status: SHIPPED | OUTPATIENT
Start: 2025-01-09 | End: 2026-01-09

## 2025-01-09 RX ORDER — PREDNISONE 20 MG/1
20 TABLET ORAL DAILY
Qty: 3 TABLET | Refills: 1 | Status: SHIPPED | OUTPATIENT
Start: 2025-01-09 | End: 2025-01-15

## 2025-01-09 RX ORDER — CODEINE PHOSPHATE AND GUAIFENESIN 10; 100 MG/5ML; MG/5ML
SOLUTION ORAL
Qty: 118 ML | Refills: 0 | Status: SHIPPED | OUTPATIENT
Start: 2025-01-09

## 2025-01-09 RX ORDER — PROMETHAZINE HYDROCHLORIDE AND DEXTROMETHORPHAN HYDROBROMIDE 6.25; 15 MG/5ML; MG/5ML
5 SYRUP ORAL EVERY 4 HOURS PRN
Qty: 118 ML | Refills: 0 | Status: SHIPPED | OUTPATIENT
Start: 2025-01-09 | End: 2025-01-19

## 2025-01-09 RX ORDER — DEXAMETHASONE SODIUM PHOSPHATE 4 MG/ML
4 INJECTION, SOLUTION INTRA-ARTICULAR; INTRALESIONAL; INTRAMUSCULAR; INTRAVENOUS; SOFT TISSUE
Status: DISCONTINUED | OUTPATIENT
Start: 2025-01-09 | End: 2025-01-09

## 2025-01-09 RX ORDER — DEXAMETHASONE SODIUM PHOSPHATE 4 MG/ML
4 INJECTION, SOLUTION INTRA-ARTICULAR; INTRALESIONAL; INTRAMUSCULAR; INTRAVENOUS; SOFT TISSUE ONCE
Status: COMPLETED | OUTPATIENT
Start: 2025-01-09 | End: 2025-01-09

## 2025-01-09 RX ORDER — AMOXICILLIN AND CLAVULANATE POTASSIUM 875; 125 MG/1; MG/1
1 TABLET, FILM COATED ORAL 2 TIMES DAILY
Qty: 10 TABLET | Refills: 0 | Status: SHIPPED | OUTPATIENT
Start: 2025-01-09 | End: 2025-01-14

## 2025-01-09 RX ADMIN — DEXAMETHASONE SODIUM PHOSPHATE 4 MG: 4 INJECTION, SOLUTION INTRA-ARTICULAR; INTRALESIONAL; INTRAMUSCULAR; INTRAVENOUS; SOFT TISSUE at 03:01

## 2025-01-09 NOTE — TELEPHONE ENCOUNTER
----- Message from Betty sent at 1/9/2025 12:31 PM CST -----  Regarding: Sooner Appointment Request  Contact: patient at [phone]  Type:  Sooner Appointment Request  Name of Caller:  patient at [phone]    Additional Information:  Patient is coming to appointment with her mom today for 1 and wanted to know if she can be seen as well for a sinus infection. Please call and advise thank you.

## 2025-01-09 NOTE — TELEPHONE ENCOUNTER
----- Message from Lizzette sent at 1/9/2025  3:12 PM CST -----  Type:  Needs Medical Advice    Who Called: richardson beck     Pharmacy name and phone #:      [facility] - [place] - [address]  [address]  [facility] [address]  Phone: [phone] Fax: [phone]    Would the patient rather a call back or a response via [facility]? Call back     Best Call Back Number:     Additional Information:    guaiFENesin-codeine 100-10 mg/5 ml  guaiFENesin-codeine 100-10 mg/5 ml (TUSSI-ORGANIDIN NR)  mg/5 mL syrup  They dont carry this any more  Please call back to advise. Thanks!

## 2025-01-09 NOTE — TELEPHONE ENCOUNTER
Hi,     I sent in another medication. Please tell patient to not drive with medication as it can cause drowsiness.

## 2025-01-10 ENCOUNTER — TELEPHONE (OUTPATIENT)
Dept: FAMILY MEDICINE | Facility: CLINIC | Age: 65
End: 2025-01-10
Payer: COMMERCIAL

## 2025-01-23 ENCOUNTER — PATIENT MESSAGE (OUTPATIENT)
Dept: HEMATOLOGY/ONCOLOGY | Facility: CLINIC | Age: 65
End: 2025-01-23
Payer: COMMERCIAL

## 2025-01-28 ENCOUNTER — LAB VISIT (OUTPATIENT)
Dept: LAB | Facility: HOSPITAL | Age: 65
End: 2025-01-28
Attending: INTERNAL MEDICINE
Payer: COMMERCIAL

## 2025-01-28 DIAGNOSIS — N17.9 ACUTE KIDNEY FAILURE, UNSPECIFIED: Primary | ICD-10-CM

## 2025-01-28 LAB
ALBUMIN SERPL BCP-MCNC: 4.1 G/DL (ref 3.5–5.2)
ANION GAP SERPL CALC-SCNC: 7 MMOL/L (ref 8–16)
ANISOCYTOSIS BLD QL SMEAR: SLIGHT
BASOPHILS # BLD AUTO: ABNORMAL K/UL (ref 0–0.2)
BASOPHILS NFR BLD: 0 % (ref 0–1.9)
BUN SERPL-MCNC: 10 MG/DL (ref 8–23)
CALCIUM SERPL-MCNC: 9.5 MG/DL (ref 8.7–10.5)
CHLORIDE SERPL-SCNC: 106 MMOL/L (ref 95–110)
CO2 SERPL-SCNC: 25 MMOL/L (ref 23–29)
CREAT SERPL-MCNC: 1.1 MG/DL (ref 0.5–1.4)
DIFFERENTIAL METHOD BLD: ABNORMAL
EOSINOPHIL # BLD AUTO: ABNORMAL K/UL (ref 0–0.5)
EOSINOPHIL NFR BLD: 2 % (ref 0–8)
ERYTHROCYTE [DISTWIDTH] IN BLOOD BY AUTOMATED COUNT: 14.9 % (ref 11.5–14.5)
EST. GFR  (NO RACE VARIABLE): 56 ML/MIN/1.73 M^2
GLUCOSE SERPL-MCNC: 67 MG/DL (ref 70–110)
HCT VFR BLD AUTO: 36.6 % (ref 37–48.5)
HGB BLD-MCNC: 11.5 G/DL (ref 12–16)
IMM GRANULOCYTES # BLD AUTO: ABNORMAL K/UL (ref 0–0.04)
IMM GRANULOCYTES NFR BLD AUTO: ABNORMAL % (ref 0–0.5)
LYMPHOCYTES # BLD AUTO: ABNORMAL K/UL (ref 1–4.8)
LYMPHOCYTES NFR BLD: 81 % (ref 18–48)
MCH RBC QN AUTO: 29.5 PG (ref 27–31)
MCHC RBC AUTO-ENTMCNC: 31.4 G/DL (ref 32–36)
MCV RBC AUTO: 94 FL (ref 82–98)
MONOCYTES # BLD AUTO: ABNORMAL K/UL (ref 0.3–1)
MONOCYTES NFR BLD: 3 % (ref 4–15)
NEUTROPHILS NFR BLD: 13 % (ref 38–73)
NEUTS BAND NFR BLD MANUAL: 1 %
NRBC BLD-RTO: 0 /100 WBC
PHOSPHATE SERPL-MCNC: 3.4 MG/DL (ref 2.7–4.5)
PLATELET # BLD AUTO: 224 K/UL (ref 150–450)
PLATELET BLD QL SMEAR: ABNORMAL
PMV BLD AUTO: 10.3 FL (ref 9.2–12.9)
POTASSIUM SERPL-SCNC: 3.7 MMOL/L (ref 3.5–5.1)
RBC # BLD AUTO: 3.9 M/UL (ref 4–5.4)
SODIUM SERPL-SCNC: 138 MMOL/L (ref 136–145)
WBC # BLD AUTO: 40.93 K/UL (ref 3.9–12.7)

## 2025-01-28 PROCEDURE — 85027 COMPLETE CBC AUTOMATED: CPT | Performed by: INTERNAL MEDICINE

## 2025-01-28 PROCEDURE — 85007 BL SMEAR W/DIFF WBC COUNT: CPT | Performed by: INTERNAL MEDICINE

## 2025-01-28 PROCEDURE — 80069 RENAL FUNCTION PANEL: CPT | Performed by: INTERNAL MEDICINE

## 2025-01-31 ENCOUNTER — OFFICE VISIT (OUTPATIENT)
Dept: FAMILY MEDICINE | Facility: CLINIC | Age: 65
End: 2025-01-31
Payer: COMMERCIAL

## 2025-01-31 ENCOUNTER — LAB VISIT (OUTPATIENT)
Dept: LAB | Facility: HOSPITAL | Age: 65
End: 2025-01-31
Payer: COMMERCIAL

## 2025-01-31 VITALS
OXYGEN SATURATION: 97 % | HEIGHT: 63 IN | BODY MASS INDEX: 25.35 KG/M2 | HEART RATE: 66 BPM | SYSTOLIC BLOOD PRESSURE: 120 MMHG | RESPIRATION RATE: 18 BRPM | DIASTOLIC BLOOD PRESSURE: 80 MMHG | WEIGHT: 143.06 LBS

## 2025-01-31 DIAGNOSIS — C34.01 MALIGNANT NEOPLASM OF HILUS OF RIGHT LUNG: ICD-10-CM

## 2025-01-31 DIAGNOSIS — R82.90 FOUL SMELLING URINE: ICD-10-CM

## 2025-01-31 DIAGNOSIS — N89.8 VAGINAL DISCHARGE: ICD-10-CM

## 2025-01-31 DIAGNOSIS — Z80.41 FAMILY HISTORY OF OVARIAN CANCER: ICD-10-CM

## 2025-01-31 DIAGNOSIS — N83.209 CYST OF OVARY, UNSPECIFIED LATERALITY: ICD-10-CM

## 2025-01-31 DIAGNOSIS — R59.0 INGUINAL LYMPHADENOPATHY: ICD-10-CM

## 2025-01-31 DIAGNOSIS — N89.8 VAGINAL DISCHARGE: Primary | ICD-10-CM

## 2025-01-31 DIAGNOSIS — I70.0 AORTIC ATHEROSCLEROSIS: ICD-10-CM

## 2025-01-31 LAB
BACTERIA #/AREA URNS AUTO: NORMAL /HPF
BILIRUB UR QL STRIP: NEGATIVE
CLARITY UR REFRACT.AUTO: CLEAR
COLOR UR AUTO: YELLOW
GLUCOSE UR QL STRIP: NEGATIVE
HGB UR QL STRIP: NEGATIVE
KETONES UR QL STRIP: NEGATIVE
LEUKOCYTE ESTERASE UR QL STRIP: ABNORMAL
MICROSCOPIC COMMENT: NORMAL
NITRITE UR QL STRIP: NEGATIVE
PH UR STRIP: 6 [PH] (ref 5–8)
PROT UR QL STRIP: NEGATIVE
RBC #/AREA URNS AUTO: 0 /HPF (ref 0–4)
SP GR UR STRIP: 1.01 (ref 1–1.03)
SQUAMOUS #/AREA URNS AUTO: 1 /HPF
URN SPEC COLLECT METH UR: ABNORMAL
WBC #/AREA URNS AUTO: 1 /HPF (ref 0–5)

## 2025-01-31 PROCEDURE — 3008F BODY MASS INDEX DOCD: CPT | Mod: CPTII,S$GLB,,

## 2025-01-31 PROCEDURE — G2211 COMPLEX E/M VISIT ADD ON: HCPCS | Mod: S$GLB,,,

## 2025-01-31 PROCEDURE — 3079F DIAST BP 80-89 MM HG: CPT | Mod: CPTII,S$GLB,,

## 2025-01-31 PROCEDURE — 1101F PT FALLS ASSESS-DOCD LE1/YR: CPT | Mod: CPTII,S$GLB,,

## 2025-01-31 PROCEDURE — 1125F AMNT PAIN NOTED PAIN PRSNT: CPT | Mod: CPTII,S$GLB,,

## 2025-01-31 PROCEDURE — 3288F FALL RISK ASSESSMENT DOCD: CPT | Mod: CPTII,S$GLB,,

## 2025-01-31 PROCEDURE — 1160F RVW MEDS BY RX/DR IN RCRD: CPT | Mod: CPTII,S$GLB,,

## 2025-01-31 PROCEDURE — 99999 PR PBB SHADOW E&M-EST. PATIENT-LVL V: CPT | Mod: PBBFAC,,,

## 2025-01-31 PROCEDURE — 1159F MED LIST DOCD IN RCRD: CPT | Mod: CPTII,S$GLB,,

## 2025-01-31 PROCEDURE — 81001 URINALYSIS AUTO W/SCOPE: CPT

## 2025-01-31 PROCEDURE — 99214 OFFICE O/P EST MOD 30 MIN: CPT | Mod: S$GLB,,,

## 2025-01-31 PROCEDURE — 3074F SYST BP LT 130 MM HG: CPT | Mod: CPTII,S$GLB,,

## 2025-01-31 RX ORDER — FLUCONAZOLE 150 MG/1
150 TABLET ORAL DAILY PRN
Qty: 2 TABLET | Refills: 0 | Status: SHIPPED | OUTPATIENT
Start: 2025-01-31

## 2025-01-31 NOTE — PROGRESS NOTES
Subjective:       Patient ID: Yvette Hutchinson is a 64 y.o. female.    Chief Complaint: Follow-up    Follow-up        History of Present Illness    CHIEF COMPLAINT:  Patient presents today for evaluation of groin lumps.    HISTORY OF PRESENT ILLNESS:  She reports bilateral groin lumps present for a couple weeks, initially appearing unilaterally before spreading bilaterally with increased sensitivity. Pain increases with movement, particularly when standing. The condition has worsened since progressing to the contralateral side. She reports colored vaginal discharge requiring pad use due to soiling of undergarments and foul-smelling urine. She denies current sexual activity.    RECENT ILLNESS:  She had sinus problems a couple weeks ago treated with cough medicine and allergy medication. She continues to have a persistent cough and very mild bilateral sinus discomfort.    CONSTITUTIONAL:  She experiences temperature dysregulation with episodes of feeling excessively hot with occasional sweating and intermittent chills, even when others are comfortable with the ambient temperature.    MEDICAL HISTORY:  History of ovarian cyst.    FAMILY HISTORY:  Sister with ovarian cancer, currently in remission.      ROS:  General: positive chills  Respiratory: positive cough  Endocrine: positive excessive sweating  Musculoskeletal: positive joint pain  Female Genitourinary: positive vaginal discharge          Past Medical History:   Diagnosis Date    Arthritis     Cancer 10/2022    (CLL) leukemia ; adenocarcinoma  adenosgemis    Depression     GERD (gastroesophageal reflux disease)     Neck pain     and back pain    Personal history of colonic polyps 07/07/2017    Pneumonia of left lung due to infectious organism 03/05/2020    Thyroid disease        Review of patient's allergies indicates:   Allergen Reactions    Latex, natural rubber Rash         Current Outpatient Medications:     albuterol (PROVENTIL/VENTOLIN HFA) 90  mcg/actuation inhaler, Inhale 2 puffs into the lungs every 6 (six) hours as needed for Wheezing or Shortness of Breath. Rescue, Disp: 8.5 g, Rfl: 11    azelastine (ASTELIN) 137 mcg (0.1 %) nasal spray, 1 spray (137 mcg total) by Nasal route 2 (two) times daily. Point up and slightly outward toward ear when spraying to avoid irritating nasal septum., Disp: 30 mL, Rfl: 2    buPROPion (WELLBUTRIN XL) 300 MG 24 hr tablet, Take 1 tablet by mouth once daily, Disp: 90 tablet, Rfl: 3    citalopram (CELEXA) 20 MG tablet, Take 1 tablet (20 mg total) by mouth once daily., Disp: 30 tablet, Rfl: 11    fluticasone propionate (FLONASE) 50 mcg/actuation nasal spray, 1 spray (50 mcg total) by Each Nostril route once daily., Disp: 16 g, Rfl: 3    fluticasone-salmeterol 230-21 mcg/dose (ADVAIR HFA) 230-21 mcg/actuation HFAA inhaler, Inhale 2 puffs into the lungs 2 (two) times daily. Controller, Disp: 12 g, Rfl: 5    gabapentin (NEURONTIN) 300 MG capsule, Take 1 capsule (300 mg total) by mouth 3 (three) times daily., Disp: 270 capsule, Rfl: 1    guaiFENesin-codeine 100-10 mg/5 ml (TUSSI-ORGANIDIN NR)  mg/5 mL syrup, Take 5-10 ml po every 6 hours as needed for cough, Disp: 118 mL, Rfl: 0    loratadine (CLARITIN) 10 mg tablet, Take 1 tablet (10 mg total) by mouth once daily., Disp: 30 tablet, Rfl: 0    methocarbamoL (ROBAXIN) 750 MG Tab, Take 1 tablet (750 mg total) by mouth 3 (three) times daily., Disp: 270 tablet, Rfl: 0    naloxone (NARCAN) 4 mg/actuation Spry, 1 spray by Nasal route once., Disp: , Rfl:     ondansetron (ZOFRAN-ODT) 8 MG TbDL, Take 1 tablet (8 mg total) by mouth every 6 (six) hours as needed (nausea)., Disp: 30 tablet, Rfl: 2    pravastatin (PRAVACHOL) 20 MG tablet, Take 1 tablet (20 mg total) by mouth once daily., Disp: 90 tablet, Rfl: 3    topiramate (TOPAMAX) 100 MG tablet, Take 100 mg by mouth 2 (two) times daily., Disp: , Rfl:     fluconazole (DIFLUCAN) 150 MG Tab, Take 1 tablet (150 mg total) by mouth daily  "as needed (yeast infection). Take the second pill 72 hours after the first pill., Disp: 2 tablet, Rfl: 0    levothyroxine (SYNTHROID) 75 MCG tablet, Take 1 tablet (75 mcg total) by mouth before breakfast., Disp: 30 tablet, Rfl: 0    Review of Systems    Objective:      /80 (BP Location: Right arm, Patient Position: Sitting)   Pulse 66   Resp 18   Ht 5' 3" (1.6 m)   Wt 64.9 kg (143 lb 1.3 oz)   LMP 01/13/2010 (Approximate)   SpO2 97%   BMI 25.35 kg/m²   Physical Exam  Vitals reviewed.   Constitutional:       General: She is not in acute distress.     Appearance: Normal appearance. She is normal weight. She is not ill-appearing, toxic-appearing or diaphoretic.   HENT:      Head: Normocephalic.      Right Ear: External ear normal.      Left Ear: External ear normal.      Nose: Nose normal. No congestion or rhinorrhea.      Mouth/Throat:      Mouth: Mucous membranes are moist.      Pharynx: Oropharynx is clear.   Eyes:      General: No scleral icterus.        Right eye: No discharge.         Left eye: No discharge.      Extraocular Movements: Extraocular movements intact.      Conjunctiva/sclera: Conjunctivae normal.   Cardiovascular:      Rate and Rhythm: Normal rate and regular rhythm.      Pulses: Normal pulses.      Heart sounds: Normal heart sounds. No murmur heard.     No friction rub. No gallop.   Pulmonary:      Effort: Pulmonary effort is normal. No respiratory distress.      Breath sounds: Normal breath sounds. No wheezing, rhonchi or rales.   Chest:      Chest wall: No tenderness.   Abdominal:      Tenderness: There is abdominal tenderness.   Musculoskeletal:         General: No swelling, tenderness or deformity. Normal range of motion.      Cervical back: Normal range of motion.      Right lower leg: No edema.      Left lower leg: No edema.   Skin:     General: Skin is warm and dry.      Capillary Refill: Capillary refill takes less than 2 seconds.      Coloration: Skin is not jaundiced.      " Findings: No bruising, erythema, lesion or rash.   Neurological:      Mental Status: She is alert and oriented to person, place, and time.      Gait: Gait normal.   Psychiatric:         Mood and Affect: Mood normal.         Behavior: Behavior normal.         Thought Content: Thought content normal.         Judgment: Judgment normal.             Assessment:       1. Vaginal discharge    2. Foul smelling urine    3. Inguinal lymphadenopathy    4. Cyst of ovary, unspecified laterality    5. Family history of ovarian cancer          Assessment & Plan    IMPRESSION:  - Suspect lymph node enlargement in groin region, potentially related to recent sinus infection  - Consider possible urinary tract infection or yeast infection as cause of symptoms  - Evaluate for potential ovarian issues due to family history of ovarian cancer  - Noted mild wheezing in lungs, likely related to recent upper respiratory infection    PLAN SUMMARY:  - Performed physical exam of abdominal area  - Ordered ultrasound of bilateral groin area and ovaries  - Ordered urinalysis with reflex to urine culture if suspicious  - Prescribed Diflucan: 1 pill today, 2nd pill 72 hours later  - Planned exam of vaginal area  - Follow up with Dr. Arreola next week for test results review and regular follow-up    URINARY TRACT INFECTION (SUSPECTED):  - Ordered urinalysis with reflex to urine culture if suspicious.  - Performed physical exam of the abdominal area.  - Patient reports pain in the groin region, ongoing for 2 weeks and spreading to both sides, with pain experienced when getting up.  - Considered potential causes including lymph node enlargement, urinary tract infection, or ovarian issues.    OVARIAN ISSUES (SUSPECTED):  - Ordered ultrasound of groin area and ovaries.  - Patient mentions possible ovarian cyst.  - Acknowledged potential ovarian cyst and planned to investigate.  - Patient reports family history of ovarian cancer in her sister.  -  Acknowledged family history and planned to investigate further.  - Ordered ultrasound of ovaries.  - Ordered ultrasound of bilateral groin area and ovaries.    VAGINAL YEAST INFECTION (SUSPECTED):  - Patient reports vaginal discharge.  - Planned exam of the vaginal area.  - Considered potential causes including yeast infection.  - Prescribed Diflucan (fluconazole) as a precautionary treatment for potential yeast infection.  - Prescribed Diflucan: Instructed the patient to take 1 pill today, then take 2nd pill 72 hours after the 1st pill.    UPPER RESPIRATORY INFECTION:  - Patient reports recent sinus trouble and cough.  - Noted mild wheezing in patient's lungs.  - Attributed wheezing to upper respiratory infection.  - Patient has a history of wheezing.  - Detected mild wheezing in both lungs during exam.  - Attributed wheezing to upper respiratory infection.    FOLLOW UP:  - Follow up with Dr. Arreola next week as scheduled for review of test results and regular follow-up.          Plan:       Vaginal discharge  -     Urinalysis, Reflex to Urine Culture Urine, Clean Catch; Future; Expected date: 01/31/2025  -     fluconazole (DIFLUCAN) 150 MG Tab; Take 1 tablet (150 mg total) by mouth daily as needed (yeast infection). Take the second pill 72 hours after the first pill.  Dispense: 2 tablet; Refill: 0    Foul smelling urine  -     Urinalysis, Reflex to Urine Culture Urine, Clean Catch; Future; Expected date: 01/31/2025    Inguinal lymphadenopathy  -     Urinalysis, Reflex to Urine Culture Urine, Clean Catch; Future; Expected date: 01/31/2025  -     US Soft Tissue, Groin Right; Future; Expected date: 01/31/2025  -     US Soft Tissue, Groin Left; Future; Expected date: 01/31/2025  -     US Pelvis Complete Non OB; Future; Expected date: 01/31/2025    Cyst of ovary, unspecified laterality  -     US Pelvis Complete Non OB; Future; Expected date: 01/31/2025    Family history of ovarian cancer  -     US Pelvis Complete Non  OB; Future; Expected date: 01/31/2025    Aortic atherosclerosis        -    Stable. Continue statin. Will continue to monitor.     Malignant neoplasm of hilus of right lung        -    Definitely a consideration given potential lymph node involvement. Following with onc routinely                  Herbert Price PA-C  Family Medicine Physician Assistant       Future Appointments       Date Provider Specialty Appt Notes    2/5/2025 Vern Priest MD Family Medicine F/U    2/17/2025  Lab [facility]    2/24/2025 Rhiannon Lee MD Hematology and Oncology Lung Ca/Labs/PET results/2mofu    3/18/2025 Milana Spann PA-C Neurosurgery 3 month f/u w/ x rays    3/21/2025 Alexandria Whitman NP Cardiology 3 mo follow up               I spent a total of 20 minutes on the day of the visit.This includes face to face time and non-face to face time preparing to see the patient (eg, review of tests), obtaining and/or reviewing separately obtained history, documenting clinical information in the electronic or other health record, independently interpreting results and communicating results to the patient/family/caregiver, or care coordinator.      We have addressed [4] Moderate: 1 or more chronic illnesses with exacerbation, progression, or side effects of treatment / 2 or more stable chronic illnesses / 1 undiagnosed new problem with uncertain prognosis / 1 acute illness with systemic symptoms / 1 acute complicated injury  The complexity of the data reviewed and analyzed for this visit was [3] Limited (Reviewed prior external note, ordered unique testing or reviewed the results of each unique test)   The risk of complications and/or morbidity or mortality are [4] Moderate risk (I.e. prescription drug management / decision regarding minor surgery with identified pt or procedure risk factors / decision regarding elective major surgery without identified pt or procedure risk factors / diagnosis or treatment significantly limited by  social determinants of health)   The level of Medical Decision Making for this visit is [4] Moderate     This note may have been generated with the assistance of ambient listening technology. If used, verbal consent was obtained by the patient and accompanying visitor(s) for the recording of patient appointment to facilitate this note. I attest to having reviewed and edited the generated note for accuracy, though some syntax or spelling errors may persist. Please contact the author of this note for any clarification.

## 2025-02-05 ENCOUNTER — LAB VISIT (OUTPATIENT)
Dept: LAB | Facility: HOSPITAL | Age: 65
End: 2025-02-05
Attending: STUDENT IN AN ORGANIZED HEALTH CARE EDUCATION/TRAINING PROGRAM
Payer: COMMERCIAL

## 2025-02-05 ENCOUNTER — OFFICE VISIT (OUTPATIENT)
Dept: FAMILY MEDICINE | Facility: CLINIC | Age: 65
End: 2025-02-05
Payer: COMMERCIAL

## 2025-02-05 VITALS
WEIGHT: 142.44 LBS | OXYGEN SATURATION: 98 % | HEIGHT: 63 IN | BODY MASS INDEX: 25.24 KG/M2 | HEART RATE: 90 BPM | SYSTOLIC BLOOD PRESSURE: 106 MMHG | DIASTOLIC BLOOD PRESSURE: 62 MMHG

## 2025-02-05 DIAGNOSIS — I70.0 AORTIC ATHEROSCLEROSIS: ICD-10-CM

## 2025-02-05 DIAGNOSIS — Z23 NEED FOR INFLUENZA VACCINATION: Primary | ICD-10-CM

## 2025-02-05 DIAGNOSIS — R30.0 DYSURIA: ICD-10-CM

## 2025-02-05 DIAGNOSIS — Z00.00 ROUTINE GENERAL MEDICAL EXAMINATION AT A HEALTH CARE FACILITY: ICD-10-CM

## 2025-02-05 DIAGNOSIS — R59.0 INGUINAL LYMPHADENOPATHY: ICD-10-CM

## 2025-02-05 DIAGNOSIS — E03.9 HYPOTHYROIDISM, UNSPECIFIED TYPE: ICD-10-CM

## 2025-02-05 LAB
BILIRUB UR QL STRIP: NEGATIVE
CLARITY UR REFRACT.AUTO: CLEAR
COLOR UR AUTO: YELLOW
GLUCOSE UR QL STRIP: NEGATIVE
HGB UR QL STRIP: NEGATIVE
KETONES UR QL STRIP: NEGATIVE
LEUKOCYTE ESTERASE UR QL STRIP: NEGATIVE
NITRITE UR QL STRIP: NEGATIVE
PH UR STRIP: 6 [PH] (ref 5–8)
PROT UR QL STRIP: NEGATIVE
SP GR UR STRIP: 1.01 (ref 1–1.03)
URN SPEC COLLECT METH UR: NORMAL

## 2025-02-05 PROCEDURE — 3288F FALL RISK ASSESSMENT DOCD: CPT | Mod: CPTII,S$GLB,, | Performed by: STUDENT IN AN ORGANIZED HEALTH CARE EDUCATION/TRAINING PROGRAM

## 2025-02-05 PROCEDURE — 1101F PT FALLS ASSESS-DOCD LE1/YR: CPT | Mod: CPTII,S$GLB,, | Performed by: STUDENT IN AN ORGANIZED HEALTH CARE EDUCATION/TRAINING PROGRAM

## 2025-02-05 PROCEDURE — 1160F RVW MEDS BY RX/DR IN RCRD: CPT | Mod: CPTII,S$GLB,, | Performed by: STUDENT IN AN ORGANIZED HEALTH CARE EDUCATION/TRAINING PROGRAM

## 2025-02-05 PROCEDURE — 3078F DIAST BP <80 MM HG: CPT | Mod: CPTII,S$GLB,, | Performed by: STUDENT IN AN ORGANIZED HEALTH CARE EDUCATION/TRAINING PROGRAM

## 2025-02-05 PROCEDURE — G2211 COMPLEX E/M VISIT ADD ON: HCPCS | Mod: S$GLB,,, | Performed by: STUDENT IN AN ORGANIZED HEALTH CARE EDUCATION/TRAINING PROGRAM

## 2025-02-05 PROCEDURE — 3074F SYST BP LT 130 MM HG: CPT | Mod: CPTII,S$GLB,, | Performed by: STUDENT IN AN ORGANIZED HEALTH CARE EDUCATION/TRAINING PROGRAM

## 2025-02-05 PROCEDURE — 3008F BODY MASS INDEX DOCD: CPT | Mod: CPTII,S$GLB,, | Performed by: STUDENT IN AN ORGANIZED HEALTH CARE EDUCATION/TRAINING PROGRAM

## 2025-02-05 PROCEDURE — 99214 OFFICE O/P EST MOD 30 MIN: CPT | Mod: S$GLB,,, | Performed by: STUDENT IN AN ORGANIZED HEALTH CARE EDUCATION/TRAINING PROGRAM

## 2025-02-05 PROCEDURE — 99999 PR PBB SHADOW E&M-EST. PATIENT-LVL V: CPT | Mod: PBBFAC,,, | Performed by: STUDENT IN AN ORGANIZED HEALTH CARE EDUCATION/TRAINING PROGRAM

## 2025-02-05 PROCEDURE — 87086 URINE CULTURE/COLONY COUNT: CPT | Performed by: STUDENT IN AN ORGANIZED HEALTH CARE EDUCATION/TRAINING PROGRAM

## 2025-02-05 PROCEDURE — 90653 IIV ADJUVANT VACCINE IM: CPT | Mod: S$GLB,,, | Performed by: STUDENT IN AN ORGANIZED HEALTH CARE EDUCATION/TRAINING PROGRAM

## 2025-02-05 PROCEDURE — 1126F AMNT PAIN NOTED NONE PRSNT: CPT | Mod: CPTII,S$GLB,, | Performed by: STUDENT IN AN ORGANIZED HEALTH CARE EDUCATION/TRAINING PROGRAM

## 2025-02-05 PROCEDURE — G0008 ADMIN INFLUENZA VIRUS VAC: HCPCS | Mod: S$GLB,,, | Performed by: STUDENT IN AN ORGANIZED HEALTH CARE EDUCATION/TRAINING PROGRAM

## 2025-02-05 PROCEDURE — 1159F MED LIST DOCD IN RCRD: CPT | Mod: CPTII,S$GLB,, | Performed by: STUDENT IN AN ORGANIZED HEALTH CARE EDUCATION/TRAINING PROGRAM

## 2025-02-05 PROCEDURE — 81003 URINALYSIS AUTO W/O SCOPE: CPT | Performed by: STUDENT IN AN ORGANIZED HEALTH CARE EDUCATION/TRAINING PROGRAM

## 2025-02-05 RX ORDER — LEVOTHYROXINE SODIUM 50 UG/1
50 TABLET ORAL
Qty: 30 TABLET | Refills: 11 | Status: SHIPPED | OUTPATIENT
Start: 2025-02-05 | End: 2026-02-05

## 2025-02-05 RX ORDER — ROSUVASTATIN CALCIUM 20 MG/1
20 TABLET, COATED ORAL DAILY
Qty: 90 TABLET | Refills: 3 | Status: SHIPPED | OUTPATIENT
Start: 2025-02-05 | End: 2026-02-05

## 2025-02-05 NOTE — PROGRESS NOTES
Patient ID: Yvette Hutchinson is a 64 y.o. female.    Chief Complaint: Follow-up    History of Present Illness    CHIEF COMPLAINT:  Patient presents today with swollen glands in groin area    HISTORY OF PRESENT ILLNESS:  She reports pain in the groin area when walking or taking big steps, accompanied by a palpable lump. She also experiences burning with urination.    LEUKEMIA:  She is seeing oncology for CLL, ?causing persistent fatigue. A PET scan is scheduled for next week. She has had previous bone marrow testing and biopsy.    RECENT HOSPITALIZATIONS:  She was recently hospitalized for blood infection resulting in cholecystectomy with gallstone removal.    MENTAL HEALTH:  She continues to experience episodes of depression and anxiety attacks despite current therapy with Wellbutrin and Citalopram.    RESPIRATORY:  She has a history of wheezing and currently takes Advair and Albuterol for management.    ALLERGIES:  She recently experienced an allergic reaction to shrimp resulting in eye swelling, drooping, and dryness without respiratory symptoms.    MEDICAL HISTORY:  She has a history of arthritis in her hands previously treated with Celebrex.    MEDICATIONS:  Current medications include thyroid medication (previously 50 mg), Wellbutrin, Citalopram, Advair, and Albuterol.        Physical Exam    General: No acute distress. Well-developed. Well-nourished.  Eyes: EOMI. Sclerae anicteric.  HENT: Normocephalic. Atraumatic. Nares patent. Moist oral mucosa.  Cardiovascular: Regular rate. Regular rhythm. No murmurs. No rubs. No gallops. Normal S1, S2.  Respiratory: Normal respiratory effort. Clear to auscultation bilaterally. No rales. No rhonchi. No wheezing.  : examined with chaperone, my DOTTIE Serrato. Non tender adenopathy. No appreciable hernia.   Musculoskeletal: No  obvious deformity.  Extremities: No lower extremity edema.  Neurological: Alert & oriented x3. No slurred speech. Normal gait.  Psychiatric:  Normal mood. Normal affect. Good insight. Good judgment.  Skin: Warm. Dry. No rash.         Assessment & Plan    E03.9 Hypothyroidism, unspecified type  R59.0 Inguinal lymphadenopathy  I70.0 Aortic atherosclerosis  R30.0 Dysuria  Z00.00 Routine general medical exam at a health care facility  C91.10 Chronic lymphocytic leukemia of B-cell type not having achieved remission  E78.5 Hyperlipidemia, unspecified  F41.9 Anxiety disorder, unspecified  J44.9 Chronic obstructive pulmonary disease, unspecified  Z90.49 Acquired absence of other specified parts of digestive tract  F32.A Depression, unspecified  Z23 Encounter for immunization    IMPRESSION:  - Assessed cholesterol levels; LDL decreased from 220 to 120, but still above target of 70  - Noted presence of plaque in aorta on imaging, indicating need for more aggressive cholesterol management  - Evaluated thyroid function; current dose appears appropriate  - Considered resuming Celebrex for arthritis, but decided to delay due to recent kidney issues  - Suspected lymph node swelling in groin related to patient's CLL/CLM (chronic lymphocytic leukemia/chronic lymphocytic lymphoma)    HYPOTHYROIDISM, UNSPECIFIED TYPE:  - Continue levothyroxine 50 mcg for thyroid management.    INGUINAL LYMPHADENOPATHY:  - Schedule follow-up ultrasound appointment for groin area.    AORTIC ATHEROSCLEROSIS:  - Educate the patient about arterial plaque formation over time due to cholesterol buildup and calcification.    DYSURIA:  - Order urine test to check for infection.    ROUTINE GENERAL MEDICAL EXAMINATION AT A HEALTH CARE FACILITY:  - Instruct the patient to contact the office to cancel gynecology appointment if no further issues arise.    CHRONIC LYMPHOCYTIC LEUKEMIA (CLL):  - Schedule follow-up for planned PET scan to assess cancer status.  - Perform bone marrow test and biopsy, which showed leukemia has taken over the bone marrow.  - Patient reports feeling tired and experiencing  swollen glands.  - The swelling in the groin area is likely due to lymph nodes swelling up because of CLL.    HYPERLIPIDEMIA:  - Increase Crestor to 20 mg for more aggressive cholesterol management.  - Cholesterol levels have improved, with LDL decreasing from 220 to 120.  - Recommend being more aggressive in treating cholesterol to reduce it further to 70.    ANXIETY AND DEPRESSION:  - Continue current medication regimen of Wellbutrin for depression and citalopram for anxiety management.    COPD:  - Continue Advair and albuterol for respiratory management.  - Note that the patient had some wheezing in the chest during the last visit.  - Perform a physical exam of the chest.    FLU VACCINATION:  - Administer flu vaccine in office.  - Patient has not received a flu vaccine this year.  - Recommend flu vaccine due to patient's lung history and susceptibility to severe illness.    POTENTIAL SHRIMP ALLERGY:  - Discussed potential shrimp allergy based on recent reaction.  - Patient to avoid consuming shrimp due to potential allergy.    KIDNEY FOLLOW-UP:  - Follow up with nephrologist as scheduled.            No follow-ups on file.    This note was generated with the assistance of ambient listening technology. Verbal consent was obtained by the patient and accompanying visitor(s) for the recording of patient appointment to facilitate this note. I attest to having reviewed and edited the generated note for accuracy, though some syntax or spelling errors may persist. Please contact the author of this note for any clarification.

## 2025-02-07 ENCOUNTER — HOSPITAL ENCOUNTER (OUTPATIENT)
Dept: RADIOLOGY | Facility: HOSPITAL | Age: 65
Discharge: HOME | End: 2025-02-07
Payer: COMMERCIAL

## 2025-02-07 ENCOUNTER — PATIENT MESSAGE (OUTPATIENT)
Dept: FAMILY MEDICINE | Facility: CLINIC | Age: 65
End: 2025-02-07
Payer: COMMERCIAL

## 2025-02-07 DIAGNOSIS — R59.0 INGUINAL LYMPHADENOPATHY: ICD-10-CM

## 2025-02-07 DIAGNOSIS — Z80.41 FAMILY HISTORY OF OVARIAN CANCER: ICD-10-CM

## 2025-02-07 DIAGNOSIS — N83.209 CYST OF OVARY, UNSPECIFIED LATERALITY: ICD-10-CM

## 2025-02-07 DIAGNOSIS — R93.89 ABNORMAL PELVIC ULTRASOUND: Primary | ICD-10-CM

## 2025-02-07 LAB
BACTERIA UR CULT: NORMAL
BACTERIA UR CULT: NORMAL

## 2025-02-07 PROCEDURE — 76856 US EXAM PELVIC COMPLETE: CPT | Mod: TC,PO

## 2025-02-07 PROCEDURE — 76882 US LMTD JT/FCL EVL NVASC XTR: CPT | Mod: 26,RT,, | Performed by: RADIOLOGY

## 2025-02-07 PROCEDURE — 76882 US LMTD JT/FCL EVL NVASC XTR: CPT | Mod: TC,PO,LT

## 2025-02-07 PROCEDURE — 76856 US EXAM PELVIC COMPLETE: CPT | Mod: 26,,, | Performed by: RADIOLOGY

## 2025-02-07 PROCEDURE — 76882 US LMTD JT/FCL EVL NVASC XTR: CPT | Mod: 26,LT,, | Performed by: RADIOLOGY

## 2025-02-07 PROCEDURE — 76882 US LMTD JT/FCL EVL NVASC XTR: CPT | Mod: TC,PO,RT

## 2025-02-10 NOTE — ANESTHESIA POSTPROCEDURE EVALUATION
Anesthesia Post Evaluation    Patient: Yvette Hutchinson    Procedure(s) Performed: Procedure(s) (LRB):  CHOLECYSTECTOMY, LAPAROSCOPIC (N/A)    Final Anesthesia Type: general      Patient location during evaluation: ICU  Patient participation: Yes- Able to Participate  Post-procedure mental status: Mental status same as prior to surgery after receiving Compazine.  Post-procedure vital signs: reviewed and stable  Pain management: adequate  Airway patency: patent    PONV status at discharge: No PONV  Anesthetic complications: no      Cardiovascular status: blood pressure returned to baseline, hemodynamically stable and stable  Respiratory status: unassisted, spontaneous ventilation and face mask  Hydration status: euvolemic  Follow-up not needed.              Vitals Value Taken Time   /60 10/03/24 1430   Temp 36.7 °C (98 °F) 10/03/24 1340   Pulse 87 10/03/24 1435   Resp 15 10/03/24 1435   SpO2 100 % 10/03/24 1435   Vitals shown include unfiled device data.      No case tracking events are documented in the log.      Pain/Meaghan Score: Pain Rating Prior to Med Admin: 6 (10/3/2024  5:33 AM)           81.6

## 2025-02-14 ENCOUNTER — OFFICE VISIT (OUTPATIENT)
Dept: FAMILY MEDICINE | Facility: CLINIC | Age: 65
End: 2025-02-14
Payer: COMMERCIAL

## 2025-02-14 VITALS
HEART RATE: 72 BPM | WEIGHT: 145.31 LBS | DIASTOLIC BLOOD PRESSURE: 60 MMHG | SYSTOLIC BLOOD PRESSURE: 128 MMHG | OXYGEN SATURATION: 98 % | RESPIRATION RATE: 18 BRPM | BODY MASS INDEX: 25.75 KG/M2 | HEIGHT: 63 IN

## 2025-02-14 DIAGNOSIS — C91.10 CLL (CHRONIC LYMPHOCYTIC LEUKEMIA): ICD-10-CM

## 2025-02-14 DIAGNOSIS — R05.9 COUGH, UNSPECIFIED TYPE: ICD-10-CM

## 2025-02-14 DIAGNOSIS — R59.0 INGUINAL LYMPHADENOPATHY: ICD-10-CM

## 2025-02-14 DIAGNOSIS — I70.0 AORTIC ATHEROSCLEROSIS: ICD-10-CM

## 2025-02-14 DIAGNOSIS — E03.9 HYPOTHYROIDISM, UNSPECIFIED TYPE: ICD-10-CM

## 2025-02-14 DIAGNOSIS — R93.89 ABNORMAL PELVIC ULTRASOUND: Primary | ICD-10-CM

## 2025-02-14 PROCEDURE — 99999 PR PBB SHADOW E&M-EST. PATIENT-LVL V: CPT | Mod: PBBFAC,,,

## 2025-02-14 RX ORDER — TOPIRAMATE 100 MG/1
100 TABLET, FILM COATED ORAL 2 TIMES DAILY
Qty: 60 TABLET | Refills: 2 | Status: SHIPPED | OUTPATIENT
Start: 2025-02-14

## 2025-02-14 RX ORDER — PROMETHAZINE HYDROCHLORIDE AND DEXTROMETHORPHAN HYDROBROMIDE 6.25; 15 MG/5ML; MG/5ML
5 SYRUP ORAL EVERY 6 HOURS PRN
Qty: 118 ML | Refills: 0 | Status: SHIPPED | OUTPATIENT
Start: 2025-02-14 | End: 2025-02-24

## 2025-02-14 NOTE — PROGRESS NOTES
Subjective:       Patient ID: Yvette Hutchinson is a 64 y.o. female.    Chief Complaint: Results    HPI    History of Present Illness    CHIEF COMPLAINT:  Patient presents today for follow up of ultrasound results    IMAGING:  Left groin ultrasound shows several borderline or mildly enlarged left inguinal lymph nodes measuring up to 14 mm in short axis dimension. Pelvic ultrasound reveals an oval intraluminal filling defect in the anteromedial canal. A 5 x 2 cm macroscopic calcification in the right uterine fundus is noted, consistent with history of calcified fibroids.    MEDICAL HISTORY:  She has a history of Chronic Lymphocytic Leukemia (CLL) and Stage 3 Chronic Kidney Disease.    LABS:  Cholesterol has improved with reduction of approximately 100 points from previous year. Kidney function tests demonstrate improvement compared to prior values. Thyroid studies and urinalysis are normal.    CURRENT SYMPTOMS:  She reports increased appetite and persistent cough. She requests to increase Topamax from current dose of 50mg and desires prescription for cough medicine.      ROS:  Respiratory: positive cough          Past Medical History:   Diagnosis Date    Arthritis     Cancer 10/2022    (CLL) leukemia ; adenocarcinoma  adenosgemis    Depression     GERD (gastroesophageal reflux disease)     Neck pain     and back pain    Personal history of colonic polyps 07/07/2017    Pneumonia of left lung due to infectious organism 03/05/2020    Thyroid disease        Review of patient's allergies indicates:   Allergen Reactions    Latex, natural rubber Rash         Current Outpatient Medications:     albuterol (PROVENTIL/VENTOLIN HFA) 90 mcg/actuation inhaler, Inhale 2 puffs into the lungs every 6 (six) hours as needed for Wheezing or Shortness of Breath. Rescue, Disp: 8.5 g, Rfl: 11    azelastine (ASTELIN) 137 mcg (0.1 %) nasal spray, 1 spray (137 mcg total) by Nasal route 2 (two) times daily. Point up and slightly outward  toward ear when spraying to avoid irritating nasal septum., Disp: 30 mL, Rfl: 2    buPROPion (WELLBUTRIN XL) 300 MG 24 hr tablet, Take 1 tablet by mouth once daily, Disp: 90 tablet, Rfl: 3    citalopram (CELEXA) 20 MG tablet, Take 1 tablet (20 mg total) by mouth once daily., Disp: 30 tablet, Rfl: 11    fluconazole (DIFLUCAN) 150 MG Tab, Take 1 tablet (150 mg total) by mouth daily as needed (yeast infection). Take the second pill 72 hours after the first pill., Disp: 2 tablet, Rfl: 0    fluticasone propionate (FLONASE) 50 mcg/actuation nasal spray, 1 spray (50 mcg total) by Each Nostril route once daily., Disp: 16 g, Rfl: 3    fluticasone-salmeterol 230-21 mcg/dose (ADVAIR HFA) 230-21 mcg/actuation HFAA inhaler, Inhale 2 puffs into the lungs 2 (two) times daily. Controller, Disp: 12 g, Rfl: 5    gabapentin (NEURONTIN) 300 MG capsule, Take 1 capsule (300 mg total) by mouth 3 (three) times daily., Disp: 270 capsule, Rfl: 1    guaiFENesin-codeine 100-10 mg/5 ml (TUSSI-ORGANIDIN NR)  mg/5 mL syrup, Take 5-10 ml po every 6 hours as needed for cough, Disp: 118 mL, Rfl: 0    levothyroxine (SYNTHROID) 50 MCG tablet, Take 1 tablet (50 mcg total) by mouth before breakfast., Disp: 30 tablet, Rfl: 11    loratadine (CLARITIN) 10 mg tablet, Take 1 tablet (10 mg total) by mouth once daily., Disp: 30 tablet, Rfl: 0    methocarbamoL (ROBAXIN) 750 MG Tab, Take 1 tablet (750 mg total) by mouth 3 (three) times daily., Disp: 270 tablet, Rfl: 0    naloxone (NARCAN) 4 mg/actuation Spry, 1 spray by Nasal route once., Disp: , Rfl:     ondansetron (ZOFRAN-ODT) 8 MG TbDL, Take 1 tablet (8 mg total) by mouth every 6 (six) hours as needed (nausea)., Disp: 30 tablet, Rfl: 2    rosuvastatin (CRESTOR) 20 MG tablet, Take 1 tablet (20 mg total) by mouth once daily., Disp: 90 tablet, Rfl: 3    promethazine-dextromethorphan (PROMETHAZINE-DM) 6.25-15 mg/5 mL Syrp, Take 5 mLs by mouth every 6 (six) hours as needed (cough)., Disp: 118 mL, Rfl: 0     "topiramate (TOPAMAX) 100 MG tablet, Take 1 tablet (100 mg total) by mouth 2 (two) times daily., Disp: 60 tablet, Rfl: 2    Review of Systems    Objective:      /60 (BP Location: Right arm, Patient Position: Sitting)   Pulse 72   Resp 18   Ht 5' 3" (1.6 m)   Wt 65.9 kg (145 lb 4.5 oz)   LMP 01/13/2010 (Approximate)   SpO2 98%   BMI 25.74 kg/m²   Physical Exam  Vitals reviewed.   Constitutional:       General: She is not in acute distress.     Appearance: Normal appearance. She is normal weight. She is not ill-appearing, toxic-appearing or diaphoretic.   HENT:      Head: Normocephalic.      Right Ear: External ear normal.      Left Ear: External ear normal.      Nose: Nose normal. No congestion or rhinorrhea.      Mouth/Throat:      Mouth: Mucous membranes are moist.      Pharynx: Oropharynx is clear.   Eyes:      General: No scleral icterus.        Right eye: No discharge.         Left eye: No discharge.      Extraocular Movements: Extraocular movements intact.      Conjunctiva/sclera: Conjunctivae normal.   Cardiovascular:      Rate and Rhythm: Normal rate and regular rhythm.      Pulses: Normal pulses.      Heart sounds: Normal heart sounds. No murmur heard.     No friction rub. No gallop.   Pulmonary:      Effort: Pulmonary effort is normal. No respiratory distress.      Breath sounds: Normal breath sounds. No wheezing, rhonchi or rales.   Chest:      Chest wall: No tenderness.   Musculoskeletal:         General: No swelling, tenderness or deformity. Normal range of motion.      Cervical back: Normal range of motion.      Right lower leg: No edema.      Left lower leg: No edema.   Skin:     General: Skin is warm and dry.      Capillary Refill: Capillary refill takes less than 2 seconds.      Coloration: Skin is not jaundiced.      Findings: No bruising, erythema, lesion or rash.   Neurological:      Mental Status: She is alert and oriented to person, place, and time.      Gait: Gait normal. "   Psychiatric:         Mood and Affect: Mood normal.         Behavior: Behavior normal.         Thought Content: Thought content normal.         Judgment: Judgment normal.             Assessment:       1. Abnormal pelvic ultrasound    2. Inguinal lymphadenopathy    3. CLL (chronic lymphocytic leukemia)    4. Cough, unspecified type    5. Hypothyroidism, unspecified type    6. Aortic atherosclerosis          Assessment & Plan    IMPRESSION:  - Assessed mildly enlarged left inguinal lymph nodes (up to 14mm) on ultrasound, likely related to patient's CLL and leukocytosis  - Evaluated pelvic ultrasound showing intraluminal filling defect in anteromedial canal, possibly representing polyp or fibroid  - Noted improvement in cholesterol levels, reducing cardiovascular risk  - Observed kidney function improvement, with GFR approaching 60, indicating recovery from chronic kidney disease    PLAN SUMMARY:  - Performed groin and pelvic ultrasounds  - Increased Topamax from 50mg to 100mg twice daily  - Referred patient to Dr. Nava (OBGYN) for evaluation and possible biopsy of uterine filling defect  - Continue statin therapy for aortic atherosclerosis management  - Continue Synthroid as prescribed for hypothyroidism  - Prescribed cough syrup for persistent cough  - Continue ongoing management with Dr. Lee for CLL and leukocytosis  - Advised patient to discuss recent findings with Dr. Lee  - Follow up in 6 months  - Continue care with Dr. Nava for further evaluation of uterine filling defect  - Instructed patient to stay well-hydrated for kidney function support    CHRONIC LYMPHOCYTIC LEUKEMIA (CLL):  - Identified mildly enlarged lymph nodes in the left inguinal region, measuring up to 14 mm in short axis dimension.  - Noted borderline enlarged inguinal lymph nodes on the right side.  - Observed that the lymph nodes maintain a normal fatty hilus, with a few nodes demonstrating a thickened cortex.  - Attributed the enlarged  lymph nodes to the patient's chronic lymphocytic leukemia (CLL).  - Explained the relationship between enlarged lymph nodes and the patient's existing CLL diagnosis.  - Advised the patient to discuss the recent findings with Dr. Lee, who manages their CLL.  - Continue ongoing management with Dr. Lee for CLL and leukocytosis.  - Performed groin ultrasound.  - Associated the lymph node enlargement with the patient's history of chronic lymphocytic leukemia.  - Advised the patient to discuss the findings with their specialist, Dr. Lee.    UTERINE ABNORMALITIES:  - Performed pelvic ultrasound.  - Identified a 5 by 2 macroscopic calcification in the right uterine fundus, compatible with an old calcified fibroid.  - Observed an oval intraluminal filling defect on the anteromedial canal, potentially representing a polyp or fibroid.  - Discussed the need for further investigation of the uterine filling defect, emphasizing it could be benign (e.g., fibroid) but requires closer exam due to age-related cancer risk.  - Referred the patient to Dr. Nava (OBGYN) for further evaluation and possible biopsy of the new finding.  - Continue care with Dr. Nava for further evaluation of uterine filling defect.    CHRONIC KIDNEY DISEASE:  - Noted improvement in kidney function tests, with GFR approaching 60, indicating recovery from previous values in the 20s and 30s.  - Clarified the meaning of chronic kidney disease staging and its implications for medication management.  - Advised continued hydration and caution with medications that could potentially harm the kidneys.  - Instructed the patient to continue staying well-hydrated to support kidney function.    THYROID DISORDER:  - Monitored thyroid function.  - Noted current thyroid function tests are normal.  - Continue Synthroid as prescribed for hypothyroidism.    PERSISTENT COUGH:  - Acknowledged the patient's report of a persistent cough that they can't get rid of.  -  Rec  ognized that the cough has been a widespread issue and particularly aggravating.  - Prescribed cough medicine to address the persistent cough.  - Instructed the patient to start cough syrup as prescribed.    AORTIC ATHEROSCLEROSIS:  - Continue statin therapy for management of aortic atherosclerosis.    MEDICATIONS/SUPPLEMENTS:  - Increased Topamax from 50mg to 100mg twice daily.    FOLLOW UP:  - Follow up in 6 months.  - Contact the office if any concerns arise before the next scheduled appointment.          Plan:       Abnormal pelvic ultrasound    Inguinal lymphadenopathy    CLL (chronic lymphocytic leukemia)    Cough, unspecified type  -     promethazine-dextromethorphan (PROMETHAZINE-DM) 6.25-15 mg/5 mL Syrp; Take 5 mLs by mouth every 6 (six) hours as needed (cough).  Dispense: 118 mL; Refill: 0    Hypothyroidism, unspecified type    Aortic atherosclerosis    Other orders  -     topiramate (TOPAMAX) 100 MG tablet; Take 1 tablet (100 mg total) by mouth 2 (two) times daily.  Dispense: 60 tablet; Refill: 2                   Herbert Price PA-C  Family Medicine Physician Assistant       Future Appointments       Date Provider Specialty Appt Notes    2/17/2025  Lab [facility]    2/24/2025 Rhiannon Lee MD Hematology and Oncology Lung Ca/Labs/PET results/2mofu    3/7/2025 Mary Nava MD Obstetrics and Gynecology Abnormal pelvic ultrasound    3/18/2025 Milana Spann PA-C Neurosurgery 3 month f/u w/ x rays    3/21/2025 Alexandria Whitman NP Cardiology 3 mo follow up    8/21/2025 Vern Priest MD Family Medicine 6m f/u               I spent a total of 20 minutes on the day of the visit.This includes face to face time and non-face to face time preparing to see the patient (eg, review of tests), obtaining and/or reviewing separately obtained history, documenting clinical information in the electronic or other health record, independently interpreting results and communicating results to the  patient/family/caregiver, or care coordinator.      We have addressed [4] Moderate: 1 or more chronic illnesses with exacerbation, progression, or side effects of treatment / 2 or more stable chronic illnesses / 1 undiagnosed new problem with uncertain prognosis / 1 acute illness with systemic symptoms / 1 acute complicated injury  The complexity of the data reviewed and analyzed for this visit was [2] Minimal or None  The risk of complications and/or morbidity or mortality are [4] Moderate risk (I.e. prescription drug management / decision regarding minor surgery with identified pt or procedure risk factors / decision regarding elective major surgery without identified pt or procedure risk factors / diagnosis or treatment significantly limited by social determinants of health)   The level of Medical Decision Making for this visit is [4] Moderate     This note may have been generated with the assistance of ambient listening technology. If used, verbal consent was obtained by the patient and accompanying visitor(s) for the recording of patient appointment to facilitate this note. I attest to having reviewed and edited the generated note for accuracy, though some syntax or spelling errors may persist. Please contact the author of this note for any clarification.

## 2025-02-17 ENCOUNTER — DOCUMENTATION ONLY (OUTPATIENT)
Dept: HEMATOLOGY/ONCOLOGY | Facility: CLINIC | Age: 65
End: 2025-02-17
Payer: COMMERCIAL

## 2025-02-17 ENCOUNTER — HOSPITAL ENCOUNTER (OUTPATIENT)
Dept: RADIOLOGY | Facility: HOSPITAL | Age: 65
Discharge: HOME | End: 2025-02-17
Attending: INTERNAL MEDICINE
Payer: COMMERCIAL

## 2025-02-17 VITALS — BODY MASS INDEX: 25.69 KG/M2 | WEIGHT: 145 LBS | HEIGHT: 63 IN

## 2025-02-17 DIAGNOSIS — C91.10 CLL (CHRONIC LYMPHOCYTIC LEUKEMIA): ICD-10-CM

## 2025-02-17 DIAGNOSIS — C34.01 MALIGNANT NEOPLASM OF HILUS OF RIGHT LUNG: ICD-10-CM

## 2025-02-17 LAB — POCT GLUCOSE: 81 MG/DL (ref 70–110)

## 2025-02-17 PROCEDURE — A9552 F18 FDG: HCPCS | Mod: PO | Performed by: INTERNAL MEDICINE

## 2025-02-17 PROCEDURE — 82962 GLUCOSE BLOOD TEST: CPT | Mod: PO

## 2025-02-17 RX ORDER — FLUDEOXYGLUCOSE F18 500 MCI/ML
12.8 INJECTION INTRAVENOUS
Status: COMPLETED | OUTPATIENT
Start: 2025-02-17 | End: 2025-02-17

## 2025-02-17 RX ADMIN — FLUDEOXYGLUCOSE F-18 12.8 MILLICURIE: 500 INJECTION INTRAVENOUS at 01:02

## 2025-02-22 ENCOUNTER — PATIENT MESSAGE (OUTPATIENT)
Dept: HEMATOLOGY/ONCOLOGY | Facility: CLINIC | Age: 65
End: 2025-02-22
Payer: COMMERCIAL

## 2025-02-24 ENCOUNTER — OFFICE VISIT (OUTPATIENT)
Dept: HEMATOLOGY/ONCOLOGY | Facility: CLINIC | Age: 65
End: 2025-02-24
Payer: COMMERCIAL

## 2025-02-24 VITALS
BODY MASS INDEX: 24.8 KG/M2 | OXYGEN SATURATION: 99 % | HEART RATE: 66 BPM | WEIGHT: 140 LBS | HEIGHT: 63 IN | TEMPERATURE: 98 F | DIASTOLIC BLOOD PRESSURE: 56 MMHG | SYSTOLIC BLOOD PRESSURE: 114 MMHG

## 2025-02-24 DIAGNOSIS — E78.5 DYSLIPIDEMIA: Primary | ICD-10-CM

## 2025-02-24 DIAGNOSIS — E53.8 B12 DEFICIENCY: ICD-10-CM

## 2025-02-24 DIAGNOSIS — Z85.118 HISTORY OF LUNG CANCER: ICD-10-CM

## 2025-02-24 DIAGNOSIS — E03.9 ACQUIRED HYPOTHYROIDISM: ICD-10-CM

## 2025-02-24 DIAGNOSIS — N85.8 MASS OF UTERUS: ICD-10-CM

## 2025-02-24 DIAGNOSIS — C91.10 CLL (CHRONIC LYMPHOCYTIC LEUKEMIA): ICD-10-CM

## 2025-02-24 PROCEDURE — 3288F FALL RISK ASSESSMENT DOCD: CPT | Mod: CPTII,S$GLB,, | Performed by: INTERNAL MEDICINE

## 2025-02-24 PROCEDURE — 99214 OFFICE O/P EST MOD 30 MIN: CPT | Mod: S$GLB,,, | Performed by: INTERNAL MEDICINE

## 2025-02-24 PROCEDURE — 1101F PT FALLS ASSESS-DOCD LE1/YR: CPT | Mod: CPTII,S$GLB,, | Performed by: INTERNAL MEDICINE

## 2025-02-24 PROCEDURE — 1159F MED LIST DOCD IN RCRD: CPT | Mod: CPTII,S$GLB,, | Performed by: INTERNAL MEDICINE

## 2025-02-24 PROCEDURE — 3008F BODY MASS INDEX DOCD: CPT | Mod: CPTII,S$GLB,, | Performed by: INTERNAL MEDICINE

## 2025-02-24 PROCEDURE — 1125F AMNT PAIN NOTED PAIN PRSNT: CPT | Mod: CPTII,S$GLB,, | Performed by: INTERNAL MEDICINE

## 2025-02-24 PROCEDURE — 3078F DIAST BP <80 MM HG: CPT | Mod: CPTII,S$GLB,, | Performed by: INTERNAL MEDICINE

## 2025-02-24 PROCEDURE — 3074F SYST BP LT 130 MM HG: CPT | Mod: CPTII,S$GLB,, | Performed by: INTERNAL MEDICINE

## 2025-02-24 PROCEDURE — 99999 PR PBB SHADOW E&M-EST. PATIENT-LVL IV: CPT | Mod: PBBFAC,,, | Performed by: INTERNAL MEDICINE

## 2025-02-24 RX ORDER — SODIUM CHLORIDE 0.9 % (FLUSH) 0.9 %
10 SYRINGE (ML) INJECTION
OUTPATIENT
Start: 2025-02-24

## 2025-02-24 RX ORDER — HEPARIN 100 UNIT/ML
500 SYRINGE INTRAVENOUS
OUTPATIENT
Start: 2025-02-24

## 2025-02-24 NOTE — PROGRESS NOTES
Subjective:       Patient ID: Yvette Hutchinson is a 64 y.o. female.    Chief Complaint:  Patient known to me with CLL stage 0 recently diagnosed with lung cancer August 2022  Follow-up    Lung Cancer    64 year old  Patient has chronic complains of chronic pain syndrome and COPD for which periodically she take steroids she is also on BuSpar has issues anxiety has required dilatation of esophageal strictures allergic rhinitis insomnia and history of B12 deficiency documented and CLL   Had CT chest done with pulmonary 7/22 showed mass bx done. 8/22  10/24 :  3 admissions, septic shock, GM and hypokalemia  Oncology hx  Impression:ct chest 7/29/22     2.3 cm spiculated left upper lobe lung nodule highly suspicious for bronchogenic carcinoma.     New nonspecific 7 mm nodule in the right middle lobe; this appears more linear on the coronal images and may be a focus of scarring.     Additional stable lung nodules, mildly enlarged axillary lymph nodes, substernal thyroid nodule; these findings are likely benign given the interval stability.   LEFT LUNG MASS, CT-GUIDED BIOPSY:   Non-small cell lung carcinoma.   Comment:  The biopsy reveals invasive carcinoma with apparent glandular but   also vague squamoid features.  Tumor cells are diffusely positive with TTF-1   and focally positive with P40.  The histology differential includes   adenocarcinoma and adenosquamous carcinoma.  PD-L1 and templates NGS studies   are in progress.  The results will be issued separately.    October 3, 2022: Robotic left lobectomy T1c N1    Social history; patient is a smoker denies recreational alcohol use or drug use   Patient is currently on disability  She is allergic to the mask used during COVID pandemic she is also bothered by her mask with COPD    Family history suggestive of ovarian and breast cancer patient advised to see a  or seek   Review of patient's allergies indicates:   Allergen Reactions    Latex,  natural rubber Rash     Medications have been reviewed  Current Outpatient Medications on File Prior to Visit   Medication Sig Dispense Refill    albuterol (PROVENTIL/VENTOLIN HFA) 90 mcg/actuation inhaler Inhale 2 puffs into the lungs every 6 (six) hours as needed for Wheezing or Shortness of Breath. Rescue 8.5 g 11    azelastine (ASTELIN) 137 mcg (0.1 %) nasal spray 1 spray (137 mcg total) by Nasal route 2 (two) times daily. Point up and slightly outward toward ear when spraying to avoid irritating nasal septum. 30 mL 2    buPROPion (WELLBUTRIN XL) 300 MG 24 hr tablet Take 1 tablet by mouth once daily 90 tablet 3    citalopram (CELEXA) 20 MG tablet Take 1 tablet (20 mg total) by mouth once daily. 30 tablet 11    fluconazole (DIFLUCAN) 150 MG Tab Take 1 tablet (150 mg total) by mouth daily as needed (yeast infection). Take the second pill 72 hours after the first pill. 2 tablet 0    fluticasone propionate (FLONASE) 50 mcg/actuation nasal spray 1 spray (50 mcg total) by Each Nostril route once daily. 16 g 3    fluticasone-salmeterol 230-21 mcg/dose (ADVAIR HFA) 230-21 mcg/actuation HFAA inhaler Inhale 2 puffs into the lungs 2 (two) times daily. Controller 12 g 5    gabapentin (NEURONTIN) 300 MG capsule Take 1 capsule (300 mg total) by mouth 3 (three) times daily. 270 capsule 1    guaiFENesin-codeine 100-10 mg/5 ml (TUSSI-ORGANIDIN NR)  mg/5 mL syrup Take 5-10 ml po every 6 hours as needed for cough 118 mL 0    levothyroxine (SYNTHROID) 50 MCG tablet Take 1 tablet (50 mcg total) by mouth before breakfast. 30 tablet 11    loratadine (CLARITIN) 10 mg tablet Take 1 tablet (10 mg total) by mouth once daily. 30 tablet 0    methocarbamoL (ROBAXIN) 750 MG Tab Take 1 tablet (750 mg total) by mouth 3 (three) times daily. 270 tablet 0    naloxone (NARCAN) 4 mg/actuation Spry 1 spray by Nasal route once.      ondansetron (ZOFRAN-ODT) 8 MG TbDL Take 1 tablet (8 mg total) by mouth every 6 (six) hours as needed (nausea). 30  tablet 2    promethazine-dextromethorphan (PROMETHAZINE-DM) 6.25-15 mg/5 mL Syrp Take 5 mLs by mouth every 6 (six) hours as needed (cough). 118 mL 0    rosuvastatin (CRESTOR) 20 MG tablet Take 1 tablet (20 mg total) by mouth once daily. 90 tablet 3    topiramate (TOPAMAX) 100 MG tablet Take 1 tablet (100 mg total) by mouth 2 (two) times daily. 60 tablet 2     No current facility-administered medications on file prior to visit.       REVIEW OF SYSTEMS:     2/25; patient complaining of bilateral groin pain which was excruciating had ultrasounds done which show mass and uterus    Wt Readings from Last 3 Encounters:   02/17/25 65.8 kg (145 lb)   02/14/25 65.9 kg (145 lb 4.5 oz)   02/05/25 64.6 kg (142 lb 6.7 oz)     Temp Readings from Last 3 Encounters:   01/09/25 97.8 °F (36.6 °C) (Oral)   12/03/24 96.4 °F (35.8 °C) (Temporal)   11/19/24 97.7 °F (36.5 °C) (Temporal)     BP Readings from Last 3 Encounters:   02/14/25 128/60   02/05/25 106/62   01/31/25 120/80     Pulse Readings from Last 3 Encounters:   02/14/25 72   02/05/25 90   01/31/25 66     VITAL SIGNS:  as above   GENERAL: appears well-built, well-nourished.  No anxiety, no agitation, and in no distress.  Patient is awake, alert, oriented and cooperative.  HEENT:  Showed no congestion. Trachea is central no obvious icterus or pallor noted no hoarseness. no obvious JVD   NECK:  Supple.  No JVD. No obvious cervical submental or supraclavicular adenopathy.  RS: tube draining on left side. S/p lobectomy  ABDOMEN:  abdomen appears undistended.  EXTREMITIES:  Without edema.  NEUROLOGICAL:  The patient is appropriate, higher functions are normal.  No  obvious neurological deficits.  normal judgement normal thought content  No confusion, no speech impediment. Cranial nerves are intact and show no deficit. No gross motor deficits noted   SKIN MUSCULOSKELETAL: no joint or skeletal deformity, no clubbing of nails.  No visible rash ecchymosis or petechiae  Lab Results    Component Value Date    WBC 20.78 (H) 03/03/2020    HGB 14.9 03/03/2020    HCT 47.0 03/03/2020    MCV 99 (H) 03/03/2020     03/03/2020     Smudge cells presnt  CMP  Sodium   Date Value Ref Range Status   02/17/2025 136 136 - 145 mmol/L Final     Potassium   Date Value Ref Range Status   02/17/2025 4.1 3.5 - 5.1 mmol/L Final     Chloride   Date Value Ref Range Status   02/17/2025 106 95 - 110 mmol/L Final     CO2   Date Value Ref Range Status   02/17/2025 22 (L) 23 - 29 mmol/L Final     Glucose   Date Value Ref Range Status   02/17/2025 66 (L) 70 - 110 mg/dL Final     BUN   Date Value Ref Range Status   02/17/2025 17 8 - 23 mg/dL Final     Creatinine   Date Value Ref Range Status   02/17/2025 1.3 0.5 - 1.4 mg/dL Final     Calcium   Date Value Ref Range Status   02/17/2025 9.3 8.7 - 10.5 mg/dL Final     Total Protein   Date Value Ref Range Status   02/17/2025 7.1 6.0 - 8.4 g/dL Final     Albumin   Date Value Ref Range Status   02/17/2025 4.5 3.5 - 5.2 g/dL Final     Total Bilirubin   Date Value Ref Range Status   02/17/2025 0.4 0.1 - 1.0 mg/dL Final     Comment:     For infants and newborns, interpretation of results should be based  on gestational age, weight and in agreement with clinical  observations.    Premature Infant recommended reference ranges:  Up to 24 hours.............<8.0 mg/dL  Up to 48 hours............<12.0 mg/dL  3-5 days..................<15.0 mg/dL  6-29 days.................<15.0 mg/dL       Alkaline Phosphatase   Date Value Ref Range Status   02/17/2025 119 40 - 150 U/L Final     AST   Date Value Ref Range Status   02/17/2025 29 10 - 40 U/L Final     ALT   Date Value Ref Range Status   02/17/2025 24 10 - 44 U/L Final     Anion Gap   Date Value Ref Range Status   02/17/2025 8 8 - 16 mmol/L Final     eGFR if    Date Value Ref Range Status   06/13/2022 >60 >60 mL/min/1.73 m^2 Final     eGFR if non    Date Value Ref Range Status   06/13/2022 >60 >60  mL/min/1.73 m^2 Final     Comment:     Calculation used to obtain the estimated glomerular filtration  rate (eGFR) is the CKD-EPI equation.            2wk ago    Blood Interpretation A B cell lymphoproliferative disorder is present. see comment.    Comment: Interpreted by: Jeremias Sosa M.D., Signed on 08/06/2020 at 13:07   Blood Comment Flow cytometric analysis of  peripheral blood  detects a lambda  light chain   restricted B lymphocyte population showing expression of CD19 and dim CD20   with aberrant expression of CD5 (dim).  T lymphocytes are   immunophenotypically unremarkable.  The blasts gate is not increased.  The   findings are consistent with patient history of chronic lymphocytic   leukemia/small lymphocytic lymphoma.   Flow differential:  Lymphocytes 69.6%, Monocytes 2.8%, Granulocytes  27.5%,   Blast  0.1%, Debris/nRBC 0.1%,  Viability 97.5%.   10/3/22 robotic lef lung lobectomy  1. LYMPH NODE, LEVEL 5 FROZEN SECTION, EXCISION:   One lymph node with dominant necrotizing granuloma, negative for malignancy   (0/1).   2. LYMPH NODES, LEVEL 5 PERMANENT, EXCISION:   Two lymph nodes with multifocal necrotizing granulomas, negative for   malignancy (0/2).   3. LYMPH NODES, LEFT POSTERIOR LEVEL 10, EXCISION:   Five lymph nodes with multifocal necrotizing granulomas, negative for   malignancy (0/5).   4. LYMPH NODE, LEVEL 11, EXCISION:   One lymph node, negative for malignancy (0/1).   5. LYMPH NODES, LEVEL 12, EXCISION:   Three lymph nodes, one with single necrotizing granuloma, negative for   malignancy (0/3).   6. LYMPH NODES, LEVEL 12 NEAR UPPER DIVISION BRONCHUS, EXCISION:   Three lymph nodes with sinus histiocytosis, negative for malignancy (0/3).   7. LYMPH NODES, LEVEL 10 #2, EXCISION:   One of two lymph nodes positive for metastatic carcinoma (1/2).   Size of largest metastatic deposit:  8 mm.   Negative for extranodal extension.   8. LYMPH NODES, LEFT LEVEL 8, EXCISION:   Two lymph nodes with sinus  histiocytosis, negative for malignancy (0/2).   9. LYMPH NODES, LEFT LEVEL 9, EXCISION:   Two lymph nodes, negative for malignancy (0/2).   10. 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   Surgical margins are negative for malignancy.   Background lung tissue with subpleural fibrosis, no evidence of granulomas.   Two hilar lymph nodes, negative for malignancy (0/2).   Coment (1-3, 5):  Prominent necrotizing granulomatous inflammation identified   is identified in multiple lymph nodes.  An AE1/AE3 cytokeratin immunostain   performed on the largest granuloma (part 3) reveals no evidence of occult   carcinoma.  GMS and AFB special stains are negative for fungal and acid-fast   organisms, respectively.  The necrotizing features are not typical of   sarcoidosis.  As such, other granulomatous processes may be considered   including secondary response to malignancy or infection.  Clinical   correlation is advised.   Comment (10):  Sections reveal invasive carcinoma involving lung tissue with   predominantly squamoid morphologic features including bright eosinophilic   cytoplasm and intracellular bridging.  There are not significant keratinizing   features.  Some areas show basaloid features.  There is also regional luminal   features including cribriforming and vague glandular structures containing   mucin.  Tumor cells are diffusely positive with P40 and regionally positive   with TTF-1.  Mucicarmine special stain highlights mucin producing luminal   spaces. Overall tumor appearance and staining profile supports adenosquamous   carcinoma, a variant of non-small cell lung carcinoma.   CAP SURGICAL PATHOLOGY CANCER CASE SUMMARY:  LUNG   Procedure:  Lobectomy   Specimen laterality:  Left   Tumor focality:  Single focus   Tumor site: Upper lobe of lung   Tumor size:  2.2 cm   Histologic type:  Adenosquamous carcinoma   Spread through airspaces:  Not identified   Visceral pleural  invasion:  Not identified   Direct invasion of adjacent structures: Not applicable   Lymphovascular invasion:  Not identified   Margins:  All margins negative for invasive carcinoma     Closest margin to invasive carcinoma:  Bronchial (3.0 cm)   Regional lymph nodes:  Tumor present in regional lymph node     Number of lymph nodes with tumor:  1     Scott sites with tumor:  Left level 10 (10L)     Extranodal extension:  Not identified     Number of lymph nodes examined:  23   Pathologic stage classification (pTNM): pT1c pN1   Special studies:  Performed on previous biopsy if additional studies needed   there may be performed on tissue blockd 10G or 10H.        Impression:pet 2/24     Increasing SUV max of FDG avid partially calcified lymph node involving the anterior superior mediastinum. This could be metastatic, lymphoproliferative, or reactive in nature.     No other findings of FDG avid malignancy.     Prominent cervical, axillary, mediastinal lymph nodes.    Impression:pet 5/24     1.  Prior left upper lobectomy with bandlike scarring on the left.     2.  Unchanged partially calcified lymph node in the anterior superior mediastinum/thoracic inlet.     3.  Numerous small cervical, axillary, mediastinal and upper abdominal lymph nodes the previous exam without increased FDG activity from background.     4.  Interval decrease in size and FDG activity to the left adrenal nodule/metastasis.      11/24  BONE MARROW, RIGHT ILIAC CREST, ASPIRATE, CLOT SECTION, AND CORE BIOPSY:     --LOW-GRADE NON-HODGKIN B-CELL LYMPHOMA; FURTHER CHARACTERIZATION      PENDING IMMUNOHISTOCHEMISTRY; FINAL DIAGNOSIS TO FOLLOW.     --PERIPHERAL BLOOD WITH NORMAL THROMBOCYTE COUNT (269,000/MICROLITER);      ANEMIA (HEMOGLOBIN 11.0 GRAM/DECILITER); AND ATYPICAL      Impression:     1.  Oval intraluminal filling defect in the anteromedial canal, possibly representing an endometrial polyp, or submucosal fibroid, but suspicious in this age group,  consider hysteroscopy/biopsy as endometrial cancer is not excluded.     2.  Mildly heterogeneous appearing uterus with small calcified myometrial mass favored to represent a fibroid.     3.  Physiologic appearing ovaries.     This report was flagged in Epic as abnormal.  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 Due to adenosq histology chose carbo/ taxol x 4 cycles tecentriq maintanence x 1 year  pt is has had 5 cycles of carbo/ taxol  discused maintainenec at tumor board  Completed 1 year of tecentriq   Pet 2/24 showing slight enlarged calcified node pet avid    Patient had CTA October 20, 2024 repeat PET scan in January 2025 negative for Mets    Continue with chronic pain syndrome and pain management advised to stop smoking if at all possible 10 pills of hydrocodoen given to pt, she needs to follow with pain mx      History of B12 deficiency will continue to monitor    Hypothyroidism patient on levothyroxine 75 mcg and follows with PCP    Advised COVID precaution due to her lung situation she will be a high risk patient     CKD with EGFR of 25 with resulted anemia  but hgb is above 10gm  start procrit if hgb below 10 gm  renal ref    Uterine/cervical mass seen on ultrasound due to groin pain; patient has gyn appointment patient has been reassured today I will see her in 1 month to make sure this uterine mass has been evaluated and we have a plan

## 2025-03-07 ENCOUNTER — OFFICE VISIT (OUTPATIENT)
Dept: OBSTETRICS AND GYNECOLOGY | Facility: CLINIC | Age: 65
End: 2025-03-07
Payer: COMMERCIAL

## 2025-03-07 ENCOUNTER — TELEPHONE (OUTPATIENT)
Dept: CARDIOLOGY | Facility: CLINIC | Age: 65
End: 2025-03-07
Payer: COMMERCIAL

## 2025-03-07 VITALS
RESPIRATION RATE: 18 BRPM | HEIGHT: 63 IN | WEIGHT: 143.19 LBS | DIASTOLIC BLOOD PRESSURE: 62 MMHG | BODY MASS INDEX: 25.37 KG/M2 | SYSTOLIC BLOOD PRESSURE: 104 MMHG

## 2025-03-07 DIAGNOSIS — R93.89 ABNORMAL PELVIC ULTRASOUND: ICD-10-CM

## 2025-03-07 DIAGNOSIS — Z12.31 SCREENING MAMMOGRAM FOR BREAST CANCER: ICD-10-CM

## 2025-03-07 DIAGNOSIS — Z01.419 WELL WOMAN EXAM: Primary | ICD-10-CM

## 2025-03-07 DIAGNOSIS — Z12.4 CERVICAL CANCER SCREENING: ICD-10-CM

## 2025-03-07 DIAGNOSIS — R93.89 ABNORMAL PELVIC ULTRASOUND: Primary | ICD-10-CM

## 2025-03-07 PROCEDURE — 99999 PR PBB SHADOW E&M-EST. PATIENT-LVL IV: CPT | Mod: PBBFAC,,, | Performed by: GENERAL PRACTICE

## 2025-03-07 NOTE — PROGRESS NOTES
ASSESSMENT AND PLAN     2025  64 y.o. for WWE and further evaluation of abnormal US findings.  EMBx done today though scant tissue retrieved.  Offered patient option of proceeding to OR for Hx with D&C vs watchful waiting.  She prefers watchful waiting.    Rads: pelvic US in 6 months with RTC afterwards to discuss results.  RTC sooner for bleeding or other GYN concerns.  Advised against douching.  Do not suspect pelvic pain is related to GYN issues.  Seems more musculoskeletal.  f/u results of  EMBx  Cervical Cancer screening: f/u results of PAP / HPV --> if both negative then can discontinue PAP tests based on age and history (still recommend periodic pelvic exam)  Mammogram: ordered and patient will schedule  Encouraged self breast awareness; RTC for breast concerns      SUBJECTIVE     2025  Yvette Hutchinson is a 64 y.o. here for WWE and evaluation of abnormal US findings.  US was ordered due to pelvic pain.  Was treated for a UTI.  Pelvic pain worse with standing up.  Catches in her legs. Has been for months.  Denies any vaginal bleeding since menopause.  Uses a vinegar-based douche.  Has been burning for the past few months.    G'sP's:   LMP: age 49  Relationship:  but  for 20yrs, not sexually active  PAP Hx: no h/o abnormals  LAST PAP: 3/7/2025: PAP neg / HPV neg   MMG (22) = negative  HRT: never used   BLEEDING: denies     GYNECOLOGIC HISTORY  PAP Hx: no h/o abnormals  Genital HSV: No  Other STD Hx: No  Menarche: 8yoa  Pain -- Non-menstrual pelvic pain: Yes / Dyspareunia:  n/a  Other -- Vasomotor Sxs: denies / Vaginal dryness: denies    OBSTETRIC HISTORY  Living children: 1  Vaginal deliveries: 1    SOCIAL HISTORY  Lives with: mom  Smoker: smoker / Alcohol: denies / Drugs: denies  Domestic Violence: No  Occupation:  disabled    PAST MEDICAL HISTORY  -------------------------------------    Arthritis    Cancer    (CLL) leukemia ; adenocarcinoma  adenosgemis     Depression with anxiety    GERD (gastroesophageal reflux disease)    History of myocardial infarction    HLD (hyperlipidemia)    Lung cancer    Neck pain    and back pain    Personal history of colonic polyps    Pneumonia of left lung due to infectious organism    Thyroid disease     PAST SURGICAL HISTORY  ----------------------------    Dilation and curettage of uterus    for abnormal bleeding, was before menopause    Ercp    Procedure: ERCP (ENDOSCOPIC RETROGRADE CHOLANGIOPANCREATOGRAPHY);  Surgeon: Joshua Polanco III, MD;  Location: [facility] ENDO;  Service: Endoscopy;  Laterality: N/A;    Fusion, spine, cervical, anterior approach    Procedure: FUSION, SPINE, CERVICAL, ANTERIOR APPROACH;  Surgeon: Anatoly Daley DO;  Location: [facility] OR;  Service: Neurosurgery;  Laterality: N/A;  IOM, C-ARM, MEDTRONICS, MICRO, HORSESHOE    Injection of anesthetic agent around multiple intercostal nerves    Procedure: BLOCK, NERVE, INTERCOSTAL, 2 OR MORE;  Surgeon: Evelio Kaplan MD;  Location: [facility] OR [address] [place] [address];  Service: Thoracic;  Laterality: Left;    Insertion of tunneled central venous catheter (cvc) with subcutaneous port    Procedure: [id number], Right or Left Neck or Chest;  Surgeon: Mini Acevedo MD;  Location: [facility] OR [address] [place] [address];  Service: General;  Laterality: Right;    Laparoscopic cholecystectomy    Procedure: CHOLECYSTECTOMY, LAPAROSCOPIC;  Surgeon: Ang Mosley III, MD;  Location: [facility] OR;  Service: General;  Laterality: N/A;    Left heart catheterization    Procedure: Left heart cath;  Surgeon: Martin Ingram MD;  Location: [facility] CATH/EP LAB;  Service: Cardiology;  Laterality: Left;    Robot-assisted laparoscopic lymphadenectomy using da krupa xi    Procedure: XI ROBOTIC LYMPHADENECTOMY;  Surgeon: Evelio Kaplan MD;  Location: [facility] OR [facility] FLR;  Service: Thoracic;  Laterality: Left;    Spine surgery    Tonsillectomy    Xi robotic rats,with lobectomy,lung    Procedure: XI  ROBOTIC RATS,WITH LOBECTOMY,LUNG;  Surgeon: Evelio Kaplan MD;  Location: [facility] OR [address] [place] [address];  Service: Thoracic;  Laterality: Left;     ALLERGIES  Review of patient's allergies indicates:   Allergen Reactions    Latex, natural rubber Rash     MEDICATIONS  Current Outpatient Medications   Medication Instructions    albuterol (PROVENTIL/VENTOLIN HFA) 90 mcg/actuation inhaler 2 puffs, Inhalation, Every 6 hours PRN, Rescue    azelastine (ASTELIN) 137 mcg, Nasal, 2 times daily, Point up and slightly outward toward ear when spraying to avoid irritating nasal septum.    buPROPion (WELLBUTRIN XL) 300 mg, Oral    citalopram (CELEXA) 20 mg, Oral, Daily    fluconazole (DIFLUCAN) 150 mg, Oral, Daily PRN, Take the second pill 72 hours after the first pill.    fluticasone propionate (FLONASE) 50 mcg, Each Nostril, Daily    fluticasone-salmeterol 230-21 mcg/dose (ADVAIR HFA) 230-21 mcg/actuation HFAA inhaler 2 puffs, Inhalation, 2 times daily, Controller    gabapentin (NEURONTIN) 300 mg, Oral, 3 times daily    guaiFENesin-codeine 100-10 mg/5 ml (TUSSI-ORGANIDIN NR)  mg/5 mL syrup Take 5-10 ml po every 6 hours as needed for cough    levothyroxine (SYNTHROID) 50 mcg, Oral, Before breakfast    loratadine (CLARITIN) 10 mg, Oral, Daily    methocarbamoL (ROBAXIN) 750 mg, Oral, 3 times daily    naloxone (NARCAN) 4 mg/actuation Spry 1 spray, Once    ondansetron (ZOFRAN-ODT) 8 mg, Oral, Every 6 hours PRN    rosuvastatin (CRESTOR) 20 mg, Oral, Daily    topiramate (TOPAMAX) 100 mg, Oral, 2 times daily     FAMILY HISTORY  BLEEDING or CLOTTING DISORDERS: none  BREAST CA:  mom's side  / UTERINE CA: none / OVARIAN CA: sister (still living)  COLON CA: none     OBJECTIVE     PHYSICAL EXAM  Vitals:    03/07/25 1425   BP: 104/62   Resp: 18     GEN = alert/oriented, nad, pleasant  HEENT = sclera anicteric, EOM grossly normal  BREASTS = nontender, no suspicious masses palpated, no suspicious skin changes, no nipple discharge  CV = BP and HR  as per vitals  PULM = normal respiratory effort   =      External: nefg, moderately atrophic     Vagina: severely atrophied and without lesions and urethral meatus normal     Discharge: scant     Cervix: normal-appearing and PAP smear obtained     Bimanual: uterus normal size and nontender, no adnexal masses or tenderness    ENDOMETRIAL BIOPSY  Informed Consent: The indication for endometrial biopsy (abnormal US findings) has been reviewed with the patient.  She understands the risks of the procedure (bleeding, infection, pain, uterine perforation, inadequate tissue for diagnosis) and agrees to proceed.  All of her questions have been answered.    Site Verification:  Proper patient, procedure, and site were confirmed prior to starting.    Procedure: A speculum was placed in the vagina.  Normal vagina/ CVX with no lesions, normal discharge.  The cervix was cleaned with iodine.  A tenaculum was applied to the anterior lip of the cervix.  The pipelle was introduced, and the uterus sounded to 7cm.   Two passes were made and with scant return of tissue.  Mucous was obtained with the first pass.  The tenaculum was removed, and hemostasis was observed..  The speculum was removed.  The patient tolerated the procedure well.      LABS AND RADS    Lab Results   Component Value Date    WBC 47.34 (HH) 02/17/2025    HGB 11.7 (L) 02/17/2025    HCT 37.2 02/17/2025     02/17/2025    MCV 96 02/17/2025      Lab Results   Component Value Date    LABURIN  02/05/2025     Multiple organisms isolated. None in predominance.  Repeat if    LABURIN clinically necessary. 02/05/2025     Lab Results   Component Value Date    TSH 2.462 01/28/2025        PELVIC US (2/7/25) =  Uterus 6 x 5 x 4 cm  EMS 5 mm  ROV 2 x 1 x 1 cm  LOV 2 x 2 x 2 cm  Other: 12 x 12 x 6 mm oval isoechoic structure in the endometrial canal, without a clear vascular stalk       Mary Nava MD

## 2025-03-07 NOTE — TELEPHONE ENCOUNTER
----- Message from Sonja sent at 3/7/2025  8:51 AM CST -----  Regarding: return call  Contact: patient  Type:  Patient Returning CallWho Called:  patientWho Left Message for Patient:  unknownDoes the patient know what this is regarding?:  Jorge Call Back Number:  [phone] Information:  Patient states she can be seen on 03/27/25.  Thanks!

## 2025-03-07 NOTE — TELEPHONE ENCOUNTER
Sw the patient about  someone calling  from our office informing of rescheduling a slot given needed to be change she was assigned (4/8/25 @1:30 pm)  another date that she agreed to later  due to conflict schedule

## 2025-03-07 NOTE — PATIENT INSTRUCTIONS
PAP SMEARS:    Screening for cervical cancer involves 1 or 2 parts based on your age and situation:  PAP smear (looking at the cells from your cervix)  HPV testing (checking whether you have the Human Papilloma Virus in your cervical cells)    These test results often come back at different times, but you won't hear from me until they BOTH are back since both pieces of information are important in deciding what to do next.    Soif you see a PAP result in MyChart (or an HPV result) that is abnormal, please allow another 7-10 business days before you send a message asking what to do next.  I am waiting for the other test result before I make a recommendation.    If both tests are resulted in MyChart, you should hear from me within 2-3 business days.    Abnormal tests will be followed up in one of two ways:  Repeat testing of PAP and/or HPV  Colposcopy (examination and biopsy of your cervix in the office using staining solutions and magnification)       ENDOMETRIAL BIOPSY    PURPOSE:  Remove tissue from the endometrium (inner lining of the uterus) for microscopic examination.  Microscopic examination can be useful in obtaining a precise diagnosis in cases of:  Abnormal uterine bleeding  Abnormal ultrasound findings  Hormonal problems  Bleeding after menopause  Other suspected problems with the uterus    PROCEDURE:  Generally no anesthesia is used, and the procedure is done in the clinic.  Consider taking ibuprofen or tylenol 30 minutes to an hour before your appointment.  You may be asked to give urine for a pregnancy test.  A speculum is placed in the vagina in order to visualize the cervix.  The cervix will be cleaned with iodine or another solution.  An instrument is used to stabilize the cervix while a thin plastic tube is inserted into the uterine cavity.  If the cervical opening is too narrow, another instrument may be used to gently stretch the opening.  A small amount of suction is applied to the plastic tube  while the doctor sweeps the tube around in order to collect the tissue sample.  It is common to experience cramping or sharp pain, nausea, and some light-headedness during the procedure.  The procedure doesn't last long.  You will be able to drive yourself home and participate in most normal activities for the rest of the day.    AFTER THE PROCEDURE:  Bring a panty-liner or pad to wear home.  It is normal to have bloody, watery, and/or black discharge in the days following the biopsy.  Don't put anything in the vagina (no tampons, sex, etc.) for 3 days unless otherwise instructed.  Take ibuprofen or tylenol as needed for cramping.  Your doctor will let you know how he/she will communicate the biopsy results to you (clinic appointment, MyChart, phone call, etc.).  If two weeks go by and you haven't heard about the results, please contact the clinic to ask.    RETURN PRECAUTIONS:  Contact the clinic (MyChart or phone call), call the nurse line ([phone]), or go to the ER if your symptoms are severe.  Reasons for concern:  Unusually heavy bleeding (1 pad per hour) or swelling  Fever (temp 100.4 or higher), chills, body aches  Foul-smelling vaginal discharge or thick yellow/green discharge  Pain not controlled by tylenol or ibuprofen      Picture from: [email or web address]--[email or web address]

## 2025-03-10 ENCOUNTER — TELEPHONE (OUTPATIENT)
Dept: OBSTETRICS AND GYNECOLOGY | Facility: CLINIC | Age: 65
End: 2025-03-10
Payer: COMMERCIAL

## 2025-03-10 DIAGNOSIS — R93.89 ABNORMAL PELVIC ULTRASOUND: Primary | ICD-10-CM

## 2025-03-13 ENCOUNTER — RESULTS FOLLOW-UP (OUTPATIENT)
Dept: OBSTETRICS AND GYNECOLOGY | Facility: CLINIC | Age: 65
End: 2025-03-13

## 2025-03-13 NOTE — PROGRESS NOTES
Ms. Hazel,    Great news that the pathology from your endometrial is benign (no cancer or pre-cancer identified).    I'll see you as scheduled for follow-up on 9/10/2025, and we can talk more then.    Sincerely,  Dr. Nava

## 2025-03-18 ENCOUNTER — PATIENT MESSAGE (OUTPATIENT)
Dept: FAMILY MEDICINE | Facility: CLINIC | Age: 65
End: 2025-03-18
Payer: COMMERCIAL

## 2025-03-18 DIAGNOSIS — M19.049 HAND ARTHRITIS: ICD-10-CM

## 2025-03-18 NOTE — TELEPHONE ENCOUNTER
No care due was identified.  [facility] Embedded Care Due Messages. Reference number: [id number].   3/18/2025 11:24:37 AM CDT

## 2025-03-19 RX ORDER — GABAPENTIN 300 MG/1
300 CAPSULE ORAL 3 TIMES DAILY
Qty: 270 CAPSULE | Refills: 1 | Status: SHIPPED | OUTPATIENT
Start: 2025-03-19

## 2025-03-24 ENCOUNTER — PATIENT MESSAGE (OUTPATIENT)
Dept: HEMATOLOGY/ONCOLOGY | Facility: CLINIC | Age: 65
End: 2025-03-24
Payer: COMMERCIAL

## 2025-03-26 ENCOUNTER — INFUSION (OUTPATIENT)
Dept: INFUSION THERAPY | Facility: HOSPITAL | Age: 65
End: 2025-03-26
Attending: INTERNAL MEDICINE
Payer: COMMERCIAL

## 2025-03-26 VITALS
RESPIRATION RATE: 18 BRPM | HEIGHT: 63 IN | DIASTOLIC BLOOD PRESSURE: 68 MMHG | BODY MASS INDEX: 25.3 KG/M2 | SYSTOLIC BLOOD PRESSURE: 104 MMHG | HEART RATE: 66 BPM | TEMPERATURE: 97 F | WEIGHT: 142.81 LBS | OXYGEN SATURATION: 100 %

## 2025-03-26 DIAGNOSIS — C34.01 MALIGNANT NEOPLASM OF HILUS OF RIGHT LUNG: Primary | ICD-10-CM

## 2025-03-26 PROCEDURE — 25000003 PHARM REV CODE 250: Performed by: INTERNAL MEDICINE

## 2025-03-26 PROCEDURE — A4216 STERILE WATER/SALINE, 10 ML: HCPCS | Performed by: INTERNAL MEDICINE

## 2025-03-26 PROCEDURE — 63600175 PHARM REV CODE 636 W HCPCS: Performed by: INTERNAL MEDICINE

## 2025-03-26 PROCEDURE — 96523 IRRIG DRUG DELIVERY DEVICE: CPT

## 2025-03-26 RX ORDER — HEPARIN 100 UNIT/ML
500 SYRINGE INTRAVENOUS
Status: DISCONTINUED | OUTPATIENT
Start: 2025-03-26 | End: 2025-03-26 | Stop reason: HOSPADM

## 2025-03-26 RX ORDER — SODIUM CHLORIDE 0.9 % (FLUSH) 0.9 %
10 SYRINGE (ML) INJECTION
OUTPATIENT
Start: 2025-03-26

## 2025-03-26 RX ORDER — HEPARIN 100 UNIT/ML
500 SYRINGE INTRAVENOUS
OUTPATIENT
Start: 2025-03-26

## 2025-03-26 RX ORDER — SODIUM CHLORIDE 0.9 % (FLUSH) 0.9 %
10 SYRINGE (ML) INJECTION
Status: DISCONTINUED | OUTPATIENT
Start: 2025-03-26 | End: 2025-03-26 | Stop reason: HOSPADM

## 2025-03-26 RX ADMIN — SODIUM CHLORIDE, PRESERVATIVE FREE 10 ML: 5 INJECTION INTRAVENOUS at 08:03

## 2025-03-26 RX ADMIN — HEPARIN 500 UNITS: 100 SYRINGE at 08:03

## 2025-03-26 NOTE — PLAN OF CARE
Problem: Infection  Goal: Absence of Infection Signs and Symptoms  Outcome: Progressing  Intervention: Prevent or Manage Infection  Flowsheets (Taken 3/26/2025 0826)  Infection Management: aseptic technique maintained  Isolation Precautions: precautions maintained

## 2025-04-07 ENCOUNTER — TELEPHONE (OUTPATIENT)
Dept: HEMATOLOGY/ONCOLOGY | Facility: CLINIC | Age: 65
End: 2025-04-07
Payer: COMMERCIAL

## 2025-04-07 DIAGNOSIS — C91.10 CLL (CHRONIC LYMPHOCYTIC LEUKEMIA): Primary | ICD-10-CM

## 2025-04-08 ENCOUNTER — OFFICE VISIT (OUTPATIENT)
Dept: CARDIOLOGY | Facility: CLINIC | Age: 65
End: 2025-04-08
Payer: COMMERCIAL

## 2025-04-08 VITALS
DIASTOLIC BLOOD PRESSURE: 73 MMHG | SYSTOLIC BLOOD PRESSURE: 121 MMHG | BODY MASS INDEX: 25.01 KG/M2 | HEIGHT: 63 IN | HEART RATE: 67 BPM | WEIGHT: 141.13 LBS

## 2025-04-08 DIAGNOSIS — R00.2 PALPITATIONS: Primary | ICD-10-CM

## 2025-04-08 PROCEDURE — 1159F MED LIST DOCD IN RCRD: CPT | Mod: CPTII,S$GLB,, | Performed by: NURSE PRACTITIONER

## 2025-04-08 PROCEDURE — 99999 PR PBB SHADOW E&M-EST. PATIENT-LVL III: CPT | Mod: PBBFAC,,, | Performed by: NURSE PRACTITIONER

## 2025-04-08 PROCEDURE — 1126F AMNT PAIN NOTED NONE PRSNT: CPT | Mod: CPTII,S$GLB,, | Performed by: NURSE PRACTITIONER

## 2025-04-08 PROCEDURE — 1101F PT FALLS ASSESS-DOCD LE1/YR: CPT | Mod: CPTII,S$GLB,, | Performed by: NURSE PRACTITIONER

## 2025-04-08 PROCEDURE — 3008F BODY MASS INDEX DOCD: CPT | Mod: CPTII,S$GLB,, | Performed by: NURSE PRACTITIONER

## 2025-04-08 PROCEDURE — 3078F DIAST BP <80 MM HG: CPT | Mod: CPTII,S$GLB,, | Performed by: NURSE PRACTITIONER

## 2025-04-08 PROCEDURE — 3074F SYST BP LT 130 MM HG: CPT | Mod: CPTII,S$GLB,, | Performed by: NURSE PRACTITIONER

## 2025-04-08 PROCEDURE — 3288F FALL RISK ASSESSMENT DOCD: CPT | Mod: CPTII,S$GLB,, | Performed by: NURSE PRACTITIONER

## 2025-04-08 PROCEDURE — 99214 OFFICE O/P EST MOD 30 MIN: CPT | Mod: S$GLB,,, | Performed by: NURSE PRACTITIONER

## 2025-04-08 NOTE — PROGRESS NOTES
Subjective:    Patient ID:  Yvette Hutchinson is a 64 y.o. female.  Chief Complaint   Patient presents with    Results       HPI:    Patient presents today for follow-up appointment.  Patient has no complaints of chest pain, dizziness, shortness of breath, or syncope. She does still have palpitations intermittently and reports it's brief lasting only a few seconds. Patient has been taking medications as ordered.  Denies any falls or head injuries.  Denies any blood in the stool or in the urine.        Review of patient's allergies indicates:   Allergen Reactions    Latex, natural rubber Rash       Past Medical History:   Diagnosis Date    Arthritis     Cancer 10/2022    (CLL) leukemia ; adenocarcinoma  adenosgemis    Depression with anxiety     GERD (gastroesophageal reflux disease)     History of myocardial infarction     HLD (hyperlipidemia)     Lung cancer     Neck pain     and back pain    Personal history of colonic polyps 07/07/2017    Pneumonia of left lung due to infectious organism 03/05/2020    Thyroid disease      Past Surgical History:   Procedure Laterality Date    DILATION AND CURETTAGE OF UTERUS      for abnormal bleeding, was before menopause    ERCP N/A 10/10/2024    Procedure: ERCP (ENDOSCOPIC RETROGRADE CHOLANGIOPANCREATOGRAPHY);  Surgeon: Joshua Polanco III, MD;  Location: [facility] ENDO;  Service: Endoscopy;  Laterality: N/A;    FUSION, SPINE, CERVICAL, ANTERIOR APPROACH N/A 08/06/2024    Procedure: FUSION, SPINE, CERVICAL, ANTERIOR APPROACH;  Surgeon: Anatoly Daley DO;  Location: [facility] OR;  Service: Neurosurgery;  Laterality: N/A;  IOM, C-ARM, MEDTRONICS, MICRO, HORSESHOE    INJECTION OF ANESTHETIC AGENT AROUND MULTIPLE INTERCOSTAL NERVES Left 10/03/2022    Procedure: BLOCK, NERVE, INTERCOSTAL, 2 OR MORE;  Surgeon: Evelio Kaplan MD;  Location: [address];  Service: Thoracic;  Laterality: Left;    INSERTION OF TUNNELED CENTRAL VENOUS CATHETER (CVC) WITH SUBCUTANEOUS PORT  Right 11/03/2022    Procedure: [id number], Right or Left Neck or Chest;  Surgeon: Mini Acevedo MD;  [facility];  Service: General;  Laterality: Right;    LAPAROSCOPIC CHOLECYSTECTOMY N/A 10/03/2024    Procedure: CHOLECYSTECTOMY, LAPAROSCOPIC;  Surgeon: Ang Mosley III, MD;  Location: [facility] OR;  Service: General;  Laterality: N/A;    LEFT HEART CATHETERIZATION Left 10/09/2024    Procedure: Left heart cath;  Surgeon: Martin Ingram MD;  Location: [facility] CATH/EP LAB;  Service: Cardiology;  Laterality: Left;    ROBOT-ASSISTED LAPAROSCOPIC LYMPHADENECTOMY USING DA MONIQUE XI Left 10/03/2022    Procedure: XI ROBOTIC LYMPHADENECTOMY;  Surgeon: Evelio Kaplan MD;  [facility];  Service: Thoracic;  Laterality: Left;    SPINE SURGERY      TONSILLECTOMY      XI ROBOTIC RATS,WITH LOBECTOMY,LUNG Left 10/03/2022    Procedure: XI ROBOTIC RATS,WITH LOBECTOMY,LUNG;  Surgeon: Evelio Kaplan MD;  [facility];  Service: Thoracic;  Laterality: Left;     Social History[1]  Family History   Problem Relation Name Age of Onset    Ovarian cancer Sister      Breast cancer Cousin          Review of Systems:   Per HPI         Objective:        Vitals:    04/08/25 1335   BP: 121/73   Pulse: 67       Lab Results   Component Value Date    WBC 47.34 (HH) 02/17/2025    HGB 11.7 (L) 02/17/2025    HCT 37.2 02/17/2025     02/17/2025    CHOL 208 (H) 01/28/2025    TRIG 196 (H) 01/28/2025    HDL 39 (L) 01/28/2025    ALT 24 02/17/2025    AST 29 02/17/2025     02/17/2025    K 4.1 02/17/2025     02/17/2025    CREATININE 1.3 02/17/2025    BUN 17 02/17/2025    CO2 22 (L) 02/17/2025    TSH 2.462 01/28/2025    INR 1.1 10/09/2024    HGBA1C 5.0 02/07/2024        ECHOCARDIOGRAM RESULTS  Results for orders placed during the hospital encounter of 01/06/25    Echo    Interpretation Summary    Left Ventricle: The left ventricle is normal in size. Normal wall thickness.  There is normal systolic function with a visually estimated ejection fraction of 55 - 60%.    Right Ventricle: Normal right ventricular cavity size. Systolic function is normal.    Left Atrium: Left atrium is mildly dilated.    Tricuspid Valve: There is mild regurgitation.    IVC/SVC: Normal venous pressure at 3 mmHg.        CURRENT/PREVIOUS VISIT EKG  Results for orders placed or performed in visit on 12/20/24   IN OFFICE EKG 12-LEAD (to [place])    Collection Time: 12/20/24  1:48 PM   Result Value Ref Range    QRS Duration 86 ms    [facility] QTC Calculation 455 ms    Narrative    Test Reason : R06.02,I49.8,R53.83,    Vent. Rate :  92 BPM     Atrial Rate :  92 BPM     P-R Int : 148 ms          QRS Dur :  86 ms      QT Int : 368 ms       P-R-T Axes :  89  85  59 degrees    QTcB Int : 455 ms    Normal sinus rhythm  Nonspecific ST and T wave abnormality  Abnormal ECG  When compared with ECG of 01-Nov-2024 17:24,  Nonspecific T wave abnormality no longer evident in Lateral leads  Confirmed by Yuval Suazo ([id number]) on 1/5/2025 4:29:07 PM    Referred By:            Confirmed By: Yuval Suazo     No valid procedures specified.   Results for orders placed during the hospital encounter of 09/19/22    Nuclear Stress - Cardiology Interpreted    Interpretation Summary    Normal myocardial perfusion scan. There is no evidence of myocardial ischemia or infarction.    The gated perfusion images showed an ejection fraction of 67% at rest. The gated perfusion images showed an ejection fraction of 71% post stress. Normal ejection fraction is greater than 53%.    There is normal wall motion at rest and post stress.    LV cavity size is normal at rest and normal at stress.    The EKG portion of this study is negative for ischemia.    The patient reported chest pain during the stress test.    There were no arrhythmias during stress.    There are no prior studies for comparison.      Physical Exam:  CONSTITUTIONAL: No fever, no  chills  HEENT: Normocephalic, atraumatic,pupils reactive to light                 NECK:  No JVD no carotid bruit  CVS: S1S2+, RRR, no murmurs,   LUNGS: Clear  ABDOMEN: Soft, NT, BS+  EXTREMITIES: No cyanosis, edema  : No ramirez catheter  NEURO: AAO X 3  PSY: Normal affect      Medication List with Changes/Refills   Current Medications    ALBUTEROL (PROVENTIL/VENTOLIN HFA) 90 MCG/ACTUATION INHALER    Inhale 2 puffs into the lungs every 6 (six) hours as needed for Wheezing or Shortness of Breath. Rescue    AZELASTINE (ASTELIN) 137 MCG (0.1 %) NASAL SPRAY    1 spray (137 mcg total) by Nasal route 2 (two) times daily. Point up and slightly outward toward ear when spraying to avoid irritating nasal septum.    BUPROPION (WELLBUTRIN XL) 300 MG 24 HR TABLET    Take 1 tablet by mouth once daily    CITALOPRAM (CELEXA) 20 MG TABLET    Take 1 tablet (20 mg total) by mouth once daily.    FLUCONAZOLE (DIFLUCAN) 150 MG TAB    Take 1 tablet (150 mg total) by mouth daily as needed (yeast infection). Take the second pill 72 hours after the first pill.    FLUTICASONE PROPIONATE (FLONASE) 50 MCG/ACTUATION NASAL SPRAY    1 spray (50 mcg total) by Each Nostril route once daily.    FLUTICASONE-SALMETEROL 230-21 MCG/DOSE (ADVAIR HFA) 230-21 MCG/ACTUATION HFAA INHALER    Inhale 2 puffs into the lungs 2 (two) times daily. Controller    GABAPENTIN (NEURONTIN) 300 MG CAPSULE    Take 1 capsule (300 mg total) by mouth 3 (three) times daily.    GUAIFENESIN-CODEINE 100-10 MG/5 ML (TUSSI-ORGANIDIN NR)  MG/5 ML SYRUP    Take 5-10 ml po every 6 hours as needed for cough    LEVOTHYROXINE (SYNTHROID) 50 MCG TABLET    Take 1 tablet (50 mcg total) by mouth before breakfast.    LORATADINE (CLARITIN) 10 MG TABLET    Take 1 tablet (10 mg total) by mouth once daily.    NALOXONE (NARCAN) 4 MG/ACTUATION SPRY    1 spray by Nasal route once.    ONDANSETRON (ZOFRAN-ODT) 8 MG TBDL    Take 1 tablet (8 mg total) by mouth every 6 (six) hours as needed  (nausea).    ROSUVASTATIN (CRESTOR) 20 MG TABLET    Take 1 tablet (20 mg total) by mouth once daily.    TOPIRAMATE (TOPAMAX) 100 MG TABLET    Take 1 tablet (100 mg total) by mouth 2 (two) times daily.             Assessment:       1. Palpitations         Plan:     Problem List Items Addressed This Visit          Unprioritized    Palpitations - Primary    Current Assessment & Plan   Brief palpitations reported intermittently. Zio monitor showed very brief SVT episodes. She is on magnesium and will continue. HR low normal at baseline. Will defer betablocker for now.             Follow up in about 6 months (around 10/8/2025).          Harika Kelley NP-C  Department of Cardiology   [facility]- [place], [place]         [1]   Social History  Tobacco Use    Smoking status: Former     Types: Cigarettes     Passive exposure: Current    Smokeless tobacco: Never    Tobacco comments:     PT states she has quit now for a few months, plans to stay quit.   Substance Use Topics    Alcohol use: Yes     Comment: rarely    Drug use: Not Currently     Types: Marijuana

## 2025-04-08 NOTE — ASSESSMENT & PLAN NOTE
S/p lap archana - continues to have loose stools  Lactose intolerant - diet and supplements adjusted - diarrhea more pronounced after ensure  Add probiotic while on zosyn  Add stool WBC and elastase studies   C.diff negative 6 days ago   Consider stool bulking agent if no improvement   GI consulted, cholestyramine added       No

## 2025-04-08 NOTE — ASSESSMENT & PLAN NOTE
Brief palpitations reported intermittently. Zio monitor showed very brief SVT episodes. She is on magnesium and will continue. HR low normal at baseline. Will defer betablocker for now.

## 2025-04-10 ENCOUNTER — LAB VISIT (OUTPATIENT)
Dept: LAB | Facility: HOSPITAL | Age: 65
End: 2025-04-10
Attending: INTERNAL MEDICINE
Payer: COMMERCIAL

## 2025-04-10 ENCOUNTER — DOCUMENTATION ONLY (OUTPATIENT)
Dept: HEMATOLOGY/ONCOLOGY | Facility: CLINIC | Age: 65
End: 2025-04-10
Payer: COMMERCIAL

## 2025-04-10 DIAGNOSIS — C91.10 CLL (CHRONIC LYMPHOCYTIC LEUKEMIA): ICD-10-CM

## 2025-04-10 LAB
ALBUMIN SERPL-MCNC: 4.5 G/DL (ref 3.5–5.2)
ALP SERPL-CCNC: 107 UNIT/L (ref 40–150)
ALT SERPL-CCNC: 20 UNIT/L (ref 10–44)
ANION GAP (SMH): 7 MMOL/L (ref 8–16)
AST SERPL-CCNC: 34 UNIT/L (ref 11–45)
BILIRUB SERPL-MCNC: 0.2 MG/DL (ref 0.1–1)
BUN SERPL-MCNC: 17 MG/DL (ref 8–23)
CALCIUM SERPL-MCNC: 9.3 MG/DL (ref 8.7–10.5)
CHLORIDE SERPL-SCNC: 111 MMOL/L (ref 95–110)
CO2 SERPL-SCNC: 23 MMOL/L (ref 23–29)
CREAT SERPL-MCNC: 1.3 MG/DL (ref 0.5–1.4)
EOSINOPHIL NFR BLD MANUAL: 1 % (ref 0–8)
ERYTHROCYTE [DISTWIDTH] IN BLOOD BY AUTOMATED COUNT: 14.2 % (ref 11.5–14.5)
GFR SERPLBLD CREATININE-BSD FMLA CKD-EPI: 46 ML/MIN/1.73/M2
GLUCOSE SERPL-MCNC: 67 MG/DL (ref 70–110)
HCT VFR BLD AUTO: 35.7 % (ref 37–48.5)
HGB BLD-MCNC: 11.6 GM/DL (ref 12–16)
LYMPHOCYTES NFR BLD MANUAL: 87 % (ref 18–48)
MCH RBC QN AUTO: 31.3 PG (ref 27–31)
MCHC RBC AUTO-ENTMCNC: 32.5 G/DL (ref 32–36)
MCV RBC AUTO: 96 FL (ref 82–98)
MONOCYTES NFR BLD MANUAL: 2 % (ref 4–15)
NEUTROPHILS NFR BLD MANUAL: 10 % (ref 38–73)
NUCLEATED RBC (/100WBC) (SMH): 0 /100 WBC
PLATELET # BLD AUTO: 172 K/UL (ref 150–450)
PLATELET BLD QL SMEAR: ABNORMAL
PMV BLD AUTO: 10.2 FL (ref 9.2–12.9)
POTASSIUM SERPL-SCNC: 4 MMOL/L (ref 3.5–5.1)
PROT SERPL-MCNC: 6.4 GM/DL (ref 6–8.4)
RBC # BLD AUTO: 3.71 M/UL (ref 4–5.4)
SODIUM SERPL-SCNC: 141 MMOL/L (ref 136–145)
WBC # BLD AUTO: 42.68 K/UL (ref 3.9–12.7)

## 2025-04-10 PROCEDURE — 36415 COLL VENOUS BLD VENIPUNCTURE: CPT

## 2025-04-10 PROCEDURE — 85027 COMPLETE CBC AUTOMATED: CPT

## 2025-04-10 PROCEDURE — 84460 ALANINE AMINO (ALT) (SGPT): CPT

## 2025-04-14 ENCOUNTER — OFFICE VISIT (OUTPATIENT)
Dept: HEMATOLOGY/ONCOLOGY | Facility: CLINIC | Age: 65
End: 2025-04-14
Payer: COMMERCIAL

## 2025-04-14 VITALS
BODY MASS INDEX: 25.32 KG/M2 | WEIGHT: 142.88 LBS | HEART RATE: 64 BPM | RESPIRATION RATE: 18 BRPM | DIASTOLIC BLOOD PRESSURE: 55 MMHG | OXYGEN SATURATION: 99 % | SYSTOLIC BLOOD PRESSURE: 117 MMHG | HEIGHT: 63 IN | TEMPERATURE: 97 F

## 2025-04-14 DIAGNOSIS — C91.10 CLL (CHRONIC LYMPHOCYTIC LEUKEMIA): Primary | ICD-10-CM

## 2025-04-14 DIAGNOSIS — Z85.118 HISTORY OF LUNG CANCER: ICD-10-CM

## 2025-04-14 PROCEDURE — 3074F SYST BP LT 130 MM HG: CPT | Mod: CPTII,S$GLB,, | Performed by: INTERNAL MEDICINE

## 2025-04-14 PROCEDURE — 3288F FALL RISK ASSESSMENT DOCD: CPT | Mod: CPTII,S$GLB,, | Performed by: INTERNAL MEDICINE

## 2025-04-14 PROCEDURE — 3078F DIAST BP <80 MM HG: CPT | Mod: CPTII,S$GLB,, | Performed by: INTERNAL MEDICINE

## 2025-04-14 PROCEDURE — 1126F AMNT PAIN NOTED NONE PRSNT: CPT | Mod: CPTII,S$GLB,, | Performed by: INTERNAL MEDICINE

## 2025-04-14 PROCEDURE — 99214 OFFICE O/P EST MOD 30 MIN: CPT | Mod: S$GLB,,, | Performed by: INTERNAL MEDICINE

## 2025-04-14 PROCEDURE — 1101F PT FALLS ASSESS-DOCD LE1/YR: CPT | Mod: CPTII,S$GLB,, | Performed by: INTERNAL MEDICINE

## 2025-04-14 PROCEDURE — 3008F BODY MASS INDEX DOCD: CPT | Mod: CPTII,S$GLB,, | Performed by: INTERNAL MEDICINE

## 2025-04-14 PROCEDURE — 99999 PR PBB SHADOW E&M-EST. PATIENT-LVL IV: CPT | Mod: PBBFAC,,, | Performed by: INTERNAL MEDICINE

## 2025-04-14 PROCEDURE — 1159F MED LIST DOCD IN RCRD: CPT | Mod: CPTII,S$GLB,, | Performed by: INTERNAL MEDICINE

## 2025-04-14 NOTE — PROGRESS NOTES
Subjective:       Patient ID: Yvette Hutchinson is a 64 y.o. female.    Chief Complaint:  Patient known to me with CLL stage 0 recently diagnosed with lung cancer August 2022  Follow-up    Lung Cancer    64 year old  Patient has chronic complains of chronic pain syndrome and COPD for which periodically she take steroids she is also on BuSpar has issues anxiety has required dilatation of esophageal strictures allergic rhinitis insomnia and history of B12 deficiency documented and CLL   Had CT chest done with pulmonary 7/22 showed mass bx done. 8/22  10/24 :  3 admissions, septic shock, GM and hypokalemia  Oncology hx  Impression:ct chest 7/29/22     2.3 cm spiculated left upper lobe lung nodule highly suspicious for bronchogenic carcinoma.     New nonspecific 7 mm nodule in the right middle lobe; this appears more linear on the coronal images and may be a focus of scarring.     Additional stable lung nodules, mildly enlarged axillary lymph nodes, substernal thyroid nodule; these findings are likely benign given the interval stability.   LEFT LUNG MASS, CT-GUIDED BIOPSY:   Non-small cell lung carcinoma.   Comment:  The biopsy reveals invasive carcinoma with apparent glandular but   also vague squamoid features.  Tumor cells are diffusely positive with TTF-1   and focally positive with P40.  The histology differential includes   adenocarcinoma and adenosquamous carcinoma.  PD-L1 and templates NGS studies   are in progress.  The results will be issued separately.    October 3, 2022: Robotic left lobectomy T1c N1    Social history; patient is a smoker denies recreational alcohol use or drug use   Patient is currently on disability  She is allergic to the mask used during COVID pandemic she is also bothered by her mask with COPD    Family history suggestive of ovarian and breast cancer patient advised to see a  or seek   Review of patient's allergies indicates:   Allergen Reactions    Latex,  natural rubber Rash     Medications have been reviewed  Current Outpatient Medications on File Prior to Visit   Medication Sig Dispense Refill    albuterol (PROVENTIL/VENTOLIN HFA) 90 mcg/actuation inhaler Inhale 2 puffs into the lungs every 6 (six) hours as needed for Wheezing or Shortness of Breath. Rescue 8.5 g 11    azelastine (ASTELIN) 137 mcg (0.1 %) nasal spray 1 spray (137 mcg total) by Nasal route 2 (two) times daily. Point up and slightly outward toward ear when spraying to avoid irritating nasal septum. 30 mL 2    buPROPion (WELLBUTRIN XL) 300 MG 24 hr tablet Take 1 tablet by mouth once daily 90 tablet 3    citalopram (CELEXA) 20 MG tablet Take 1 tablet (20 mg total) by mouth once daily. 30 tablet 11    fluconazole (DIFLUCAN) 150 MG Tab Take 1 tablet (150 mg total) by mouth daily as needed (yeast infection). Take the second pill 72 hours after the first pill. (Patient not taking: Reported on 3/7/2025) 2 tablet 0    fluticasone propionate (FLONASE) 50 mcg/actuation nasal spray 1 spray (50 mcg total) by Each Nostril route once daily. 16 g 3    fluticasone-salmeterol 230-21 mcg/dose (ADVAIR HFA) 230-21 mcg/actuation HFAA inhaler Inhale 2 puffs into the lungs 2 (two) times daily. Controller 12 g 5    gabapentin (NEURONTIN) 300 MG capsule Take 1 capsule (300 mg total) by mouth 3 (three) times daily. 270 capsule 1    guaiFENesin-codeine 100-10 mg/5 ml (TUSSI-ORGANIDIN NR)  mg/5 mL syrup Take 5-10 ml po every 6 hours as needed for cough 118 mL 0    levothyroxine (SYNTHROID) 50 MCG tablet Take 1 tablet (50 mcg total) by mouth before breakfast. 30 tablet 11    loratadine (CLARITIN) 10 mg tablet Take 1 tablet (10 mg total) by mouth once daily. 30 tablet 0    naloxone (NARCAN) 4 mg/actuation Spry 1 spray by Nasal route once.      ondansetron (ZOFRAN-ODT) 8 MG TbDL Take 1 tablet (8 mg total) by mouth every 6 (six) hours as needed (nausea). 30 tablet 2    rosuvastatin (CRESTOR) 20 MG tablet Take 1 tablet (20 mg  total) by mouth once daily. 90 tablet 3    topiramate (TOPAMAX) 100 MG tablet Take 1 tablet (100 mg total) by mouth 2 (two) times daily. 60 tablet 2     No current facility-administered medications on file prior to visit.       REVIEW OF SYSTEMS:     2/25; patient complaining of bilateral groin pain which was excruciating had ultrasounds done which show mass and uterus    Wt Readings from Last 3 Encounters:   04/08/25 64 kg (141 lb 1.5 oz)   03/26/25 64.8 kg (142 lb 12.8 oz)   03/07/25 64.9 kg (143 lb 3 oz)     Temp Readings from Last 3 Encounters:   03/26/25 97.3 °F (36.3 °C)   02/24/25 97.8 °F (36.6 °C) (Temporal)   01/09/25 97.8 °F (36.6 °C) (Oral)     BP Readings from Last 3 Encounters:   04/08/25 121/73   03/26/25 104/68   03/07/25 104/62     Pulse Readings from Last 3 Encounters:   04/08/25 67   03/26/25 66   02/24/25 66     VITAL SIGNS:  as above   GENERAL: appears well-built, well-nourished.  No anxiety, no agitation, and in no distress.  Patient is awake, alert, oriented and cooperative.  HEENT:  Showed no congestion. Trachea is central no obvious icterus or pallor noted no hoarseness. no obvious JVD   NECK:  Supple.  No JVD. No obvious cervical submental or supraclavicular adenopathy.  RS: tube draining on left side. S/p lobectomy  ABDOMEN:  abdomen appears undistended.  EXTREMITIES:  Without edema.  NEUROLOGICAL:  The patient is appropriate, higher functions are normal.  No  obvious neurological deficits.  normal judgement normal thought content  No confusion, no speech impediment. Cranial nerves are intact and show no deficit. No gross motor deficits noted   SKIN MUSCULOSKELETAL: no joint or skeletal deformity, no clubbing of nails.  No visible rash ecchymosis or petechiae  Lab Results   Component Value Date    WBC 20.78 (H) 03/03/2020    HGB 14.9 03/03/2020    HCT 47.0 03/03/2020    MCV 99 (H) 03/03/2020     03/03/2020     Smudge cells presnt  CMP  Sodium   Date Value Ref Range Status    04/10/2025 141 136 - 145 mmol/L Final     Potassium   Date Value Ref Range Status   04/10/2025 4.0 3.5 - 5.1 mmol/L Final     Chloride   Date Value Ref Range Status   02/17/2025 106 95 - 110 mmol/L Final     CO2   Date Value Ref Range Status   04/10/2025 23 23 - 29 mmol/L Final     Glucose   Date Value Ref Range Status   02/17/2025 66 (L) 70 - 110 mg/dL Final     BUN   Date Value Ref Range Status   04/10/2025 17 8 - 23 mg/dL Final     Creatinine   Date Value Ref Range Status   04/10/2025 1.3 0.5 - 1.4 mg/dL Final     Calcium   Date Value Ref Range Status   04/10/2025 9.3 8.7 - 10.5 mg/dL Final     Total Protein   Date Value Ref Range Status   02/17/2025 7.1 6.0 - 8.4 g/dL Final     Albumin   Date Value Ref Range Status   04/10/2025 4.5 3.5 - 5.2 g/dL Final     Bilirubin Total   Date Value Ref Range Status   04/10/2025 0.2 0.1 - 1.0 mg/dL Final     Comment:     For infants and newborns, interpretation of results should be based   on gestational age, weight and in agreement with clinical   observations.    Premature Infant recommended reference ranges:   0-24 hours:  <8.0 mg/dL   24-48 hours: <12.0 mg/dL   3-5 days:    <15.0 mg/dL   6-29 days:   <15.0 mg/dL     ALP   Date Value Ref Range Status   04/10/2025 107 40 - 150 unit/L Final     AST   Date Value Ref Range Status   04/10/2025 34 11 - 45 unit/L Final     ALT   Date Value Ref Range Status   04/10/2025 20 10 - 44 unit/L Final     Anion Gap   Date Value Ref Range Status   02/17/2025 8 8 - 16 mmol/L Final     eGFR if    Date Value Ref Range Status   06/13/2022 >60 >60 mL/min/1.73 m^2 Final     eGFR if non    Date Value Ref Range Status   06/13/2022 >60 >60 mL/min/1.73 m^2 Final     Comment:     Calculation used to obtain the estimated glomerular filtration  rate (eGFR) is the CKD-EPI equation.            2wk ago    Blood Interpretation A B cell lymphoproliferative disorder is present. see comment.    Comment: Interpreted by: Jeremias  RUTH Sosa, Signed on 08/06/2020 at 13:07   Blood Comment Flow cytometric analysis of  peripheral blood  detects a lambda  light chain   restricted B lymphocyte population showing expression of CD19 and dim CD20   with aberrant expression of CD5 (dim).  T lymphocytes are   immunophenotypically unremarkable.  The blasts gate is not increased.  The   findings are consistent with patient history of chronic lymphocytic   leukemia/small lymphocytic lymphoma.   Flow differential:  Lymphocytes 69.6%, Monocytes 2.8%, Granulocytes  27.5%,   Blast  0.1%, Debris/nRBC 0.1%,  Viability 97.5%.   10/3/22 robotic lef lung lobectomy  1. LYMPH NODE, LEVEL 5 FROZEN SECTION, EXCISION:   One lymph node with dominant necrotizing granuloma, negative for malignancy   (0/1).   2. LYMPH NODES, LEVEL 5 PERMANENT, EXCISION:   Two lymph nodes with multifocal necrotizing granulomas, negative for   malignancy (0/2).   3. LYMPH NODES, LEFT POSTERIOR LEVEL 10, EXCISION:   Five lymph nodes with multifocal necrotizing granulomas, negative for   malignancy (0/5).   4. LYMPH NODE, LEVEL 11, EXCISION:   One lymph node, negative for malignancy (0/1).   5. LYMPH NODES, LEVEL 12, EXCISION:   Three lymph nodes, one with single necrotizing granuloma, negative for   malignancy (0/3).   6. LYMPH NODES, LEVEL 12 NEAR UPPER DIVISION BRONCHUS, EXCISION:   Three lymph nodes with sinus histiocytosis, negative for malignancy (0/3).   7. LYMPH NODES, LEVEL 10 #2, EXCISION:   One of two lymph nodes positive for metastatic carcinoma (1/2).   Size of largest metastatic deposit:  8 mm.   Negative for extranodal extension.   8. LYMPH NODES, LEFT LEVEL 8, EXCISION:   Two lymph nodes with sinus histiocytosis, negative for malignancy (0/2).   9. LYMPH NODES, LEFT LEVEL 9, EXCISION:   Two lymph nodes, negative for malignancy (0/2).   10. 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   Surgical margins are negative for malignancy.    Background lung tissue with subpleural fibrosis, no evidence of granulomas.   Two hilar lymph nodes, negative for malignancy (0/2).   Coment (1-3, 5):  Prominent necrotizing granulomatous inflammation identified   is identified in multiple lymph nodes.  An AE1/AE3 cytokeratin immunostain   performed on the largest granuloma (part 3) reveals no evidence of occult   carcinoma.  GMS and AFB special stains are negative for fungal and acid-fast   organisms, respectively.  The necrotizing features are not typical of   sarcoidosis.  As such, other granulomatous processes may be considered   including secondary response to malignancy or infection.  Clinical   correlation is advised.   Comment (10):  Sections reveal invasive carcinoma involving lung tissue with   predominantly squamoid morphologic features including bright eosinophilic   cytoplasm and intracellular bridging.  There are not significant keratinizing   features.  Some areas show basaloid features.  There is also regional luminal   features including cribriforming and vague glandular structures containing   mucin.  Tumor cells are diffusely positive with P40 and regionally positive   with TTF-1.  Mucicarmine special stain highlights mucin producing luminal   spaces. Overall tumor appearance and staining profile supports adenosquamous   carcinoma, a variant of non-small cell lung carcinoma.   [facility] SURGICAL PATHOLOGY CANCER CASE SUMMARY:  LUNG   Procedure:  Lobectomy   Specimen laterality:  Left   Tumor focality:  Single focus   Tumor site: Upper lobe of lung   Tumor size:  2.2 cm   Histologic type:  Adenosquamous carcinoma   Spread through airspaces:  Not identified   Visceral pleural invasion:  Not identified   Direct invasion of adjacent structures: Not applicable   Lymphovascular invasion:  Not identified   Margins:  All margins negative for invasive carcinoma     Closest margin to invasive carcinoma:  Bronchial (3.0 cm)   Regional lymph nodes:  Tumor present  in regional lymph node     Number of lymph nodes with tumor:  1     Scott sites with tumor:  Left level 10 (10L)     Extranodal extension:  Not identified     Number of lymph nodes examined:  23   Pathologic stage classification (pTNM): pT1c pN1   Special studies:  Performed on previous biopsy if additional studies needed   there may be performed on tissue blockd 10G or 10H.        Impression:pet 2/24     Increasing SUV max of FDG avid partially calcified lymph node involving the anterior superior mediastinum. This could be metastatic, lymphoproliferative, or reactive in nature.     No other findings of FDG avid malignancy.     Prominent cervical, axillary, mediastinal lymph nodes.    Impression:pet 5/24     1.  Prior left upper lobectomy with bandlike scarring on the left.     2.  Unchanged partially calcified lymph node in the anterior superior mediastinum/thoracic inlet.     3.  Numerous small cervical, axillary, mediastinal and upper abdominal lymph nodes the previous exam without increased FDG activity from background.     4.  Interval decrease in size and FDG activity to the left adrenal nodule/metastasis.      11/24  BONE MARROW, RIGHT ILIAC CREST, ASPIRATE, CLOT SECTION, AND CORE BIOPSY:     --LOW-GRADE NON-HODGKIN B-CELL LYMPHOMA; FURTHER CHARACTERIZATION      PENDING IMMUNOHISTOCHEMISTRY; FINAL DIAGNOSIS TO FOLLOW.     --PERIPHERAL BLOOD WITH NORMAL THROMBOCYTE COUNT (269,000/MICROLITER);      ANEMIA (HEMOGLOBIN 11.0 GRAM/DECILITER); AND ATYPICAL      Impression:     1.  Oval intraluminal filling defect in the anteromedial canal, possibly representing an endometrial polyp, or submucosal fibroid, but suspicious in this age group, consider hysteroscopy/biopsy as endometrial cancer is not excluded.     2.  Mildly heterogeneous appearing uterus with small calcified myometrial mass favored to represent a fibroid.     3.  Physiologic appearing ovaries.     This report was flagged in Epic as  abnormal.  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 Due to adenosq histology chose carbo/ taxol x 4 cycles tecentriq maintanence x 1 year  pt is has had 5 cycles of carbo/ taxol  discused maintainenec at tumor board  Completed 1 year of tecentriq   Pet 2/24 showing slight enlarged calcified node pet avid    Patient had CTA October 20, 2024 repeat PET scan in January 2025 negative for Mets  See me in 9/25 with pet cbc, cmp  Continue with chronic pain syndrome and pain management advised to stop smoking if at all possible 10 pills of hydrocodoen given to pt, she needs to follow with pain mx      History of B12 deficiency will continue to monitor    Hypothyroidism patient on levothyroxine 75 mcg and follows with PCP    Advised COVID precaution due to her lung situation she will be a high risk patient     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

## 2025-04-14 NOTE — Clinical Note
Dc port pt had it done in [place] but would like to get it removed in [place]  See me in 9/25 with pet , cbc, cmp

## 2025-04-21 DIAGNOSIS — F33.0 MILD EPISODE OF RECURRENT MAJOR DEPRESSIVE DISORDER: ICD-10-CM

## 2025-04-21 RX ORDER — CITALOPRAM 20 MG/1
20 TABLET, FILM COATED ORAL DAILY
Qty: 90 TABLET | Refills: 3 | Status: SHIPPED | OUTPATIENT
Start: 2025-04-21 | End: 2026-04-21

## 2025-04-22 RX ORDER — TOPIRAMATE 100 MG/1
100 TABLET, FILM COATED ORAL 2 TIMES DAILY
Qty: 60 TABLET | Refills: 2 | Status: SHIPPED | OUTPATIENT
Start: 2025-04-22

## 2025-04-25 ENCOUNTER — RESULTS FOLLOW-UP (OUTPATIENT)
Dept: FAMILY MEDICINE | Facility: CLINIC | Age: 65
End: 2025-04-25

## 2025-04-25 ENCOUNTER — HOSPITAL ENCOUNTER (OUTPATIENT)
Dept: RADIOLOGY | Facility: HOSPITAL | Age: 65
Discharge: HOME | End: 2025-04-25
Attending: STUDENT IN AN ORGANIZED HEALTH CARE EDUCATION/TRAINING PROGRAM
Payer: COMMERCIAL

## 2025-04-25 DIAGNOSIS — Z12.31 ENCOUNTER FOR SCREENING MAMMOGRAM FOR BREAST CANCER: ICD-10-CM

## 2025-04-25 PROCEDURE — 77063 BREAST TOMOSYNTHESIS BI: CPT | Mod: TC

## 2025-04-25 PROCEDURE — 77063 BREAST TOMOSYNTHESIS BI: CPT | Mod: 26,,, | Performed by: RADIOLOGY

## 2025-04-25 PROCEDURE — 77067 SCR MAMMO BI INCL CAD: CPT | Mod: 26,,, | Performed by: RADIOLOGY

## 2025-04-30 ENCOUNTER — LAB VISIT (OUTPATIENT)
Dept: LAB | Facility: HOSPITAL | Age: 65
End: 2025-04-30
Attending: INTERNAL MEDICINE
Payer: COMMERCIAL

## 2025-04-30 DIAGNOSIS — N18.30 CHRONIC KIDNEY DISEASE, STAGE III (MODERATE): Primary | ICD-10-CM

## 2025-04-30 DIAGNOSIS — D64.9 ANEMIA, UNSPECIFIED TYPE: ICD-10-CM

## 2025-04-30 LAB
ALBUMIN SERPL-MCNC: 4.7 G/DL (ref 3.5–5.2)
ANION GAP (SMH): 9 MMOL/L (ref 8–16)
BUN SERPL-MCNC: 16 MG/DL (ref 8–23)
CALCIUM SERPL-MCNC: 9.3 MG/DL (ref 8.7–10.5)
CHLORIDE SERPL-SCNC: 111 MMOL/L (ref 95–110)
CO2 SERPL-SCNC: 22 MMOL/L (ref 23–29)
CREAT SERPL-MCNC: 1.2 MG/DL (ref 0.5–1.4)
CREAT UR-MCNC: 132.1 MG/DL (ref 15–325)
ERYTHROCYTE [DISTWIDTH] IN BLOOD BY AUTOMATED COUNT: 13.8 % (ref 11.5–14.5)
FERRITIN SERPL-MCNC: 17 NG/ML (ref 20–300)
GFR SERPLBLD CREATININE-BSD FMLA CKD-EPI: 50 ML/MIN/1.73/M2
GLUCOSE SERPL-MCNC: 89 MG/DL (ref 70–110)
HCT VFR BLD AUTO: 38.5 % (ref 37–48.5)
HGB BLD-MCNC: 12.4 GM/DL (ref 12–16)
HYALINE CASTS UR QL AUTO: 13 /LPF (ref 0–1)
IRON SATN MFR SERPL: 15 % (ref 20–50)
IRON SERPL-MCNC: 66 UG/DL (ref 30–160)
LYMPHOCYTES NFR BLD MANUAL: 94 % (ref 18–48)
MCH RBC QN AUTO: 31.5 PG (ref 27–31)
MCHC RBC AUTO-ENTMCNC: 32.2 G/DL (ref 32–36)
MCV RBC AUTO: 98 FL (ref 82–98)
MICROSCOPIC COMMENT: ABNORMAL
NEUTROPHILS NFR BLD MANUAL: 6 % (ref 38–73)
NUCLEATED RBC (/100WBC) (SMH): 0 /100 WBC
PHOSPHATE SERPL-MCNC: 4.3 MG/DL (ref 2.7–4.5)
PLATELET # BLD AUTO: 186 K/UL (ref 150–450)
PMV BLD AUTO: 10.5 FL (ref 9.2–12.9)
POTASSIUM SERPL-SCNC: 3.6 MMOL/L (ref 3.5–5.1)
PROT UR-MCNC: 9 MG/DL
PROT/CREAT UR: 0.07 MG/G{CREAT}
PTH-INTACT SERPL-MCNC: 44.3 PG/ML (ref 9–77)
RBC # BLD AUTO: 3.94 M/UL (ref 4–5.4)
RBC #/AREA URNS AUTO: 1 /HPF
SODIUM SERPL-SCNC: 142 MMOL/L (ref 136–145)
SQUAMOUS #/AREA URNS AUTO: 2 /HPF
TIBC SERPL-MCNC: 451 UG/DL (ref 250–450)
TRANSFERRIN SERPL-MCNC: 305 MG/DL (ref 200–375)
WBC # BLD AUTO: 44.52 K/UL (ref 3.9–12.7)
WBC #/AREA URNS AUTO: 6 /HPF

## 2025-04-30 PROCEDURE — 83540 ASSAY OF IRON: CPT

## 2025-04-30 PROCEDURE — 82040 ASSAY OF SERUM ALBUMIN: CPT

## 2025-04-30 PROCEDURE — 85025 COMPLETE CBC W/AUTO DIFF WBC: CPT

## 2025-04-30 PROCEDURE — 81001 URINALYSIS AUTO W/SCOPE: CPT

## 2025-04-30 PROCEDURE — 82570 ASSAY OF URINE CREATININE: CPT

## 2025-04-30 PROCEDURE — 82728 ASSAY OF FERRITIN: CPT

## 2025-04-30 PROCEDURE — 83970 ASSAY OF PARATHORMONE: CPT

## 2025-04-30 PROCEDURE — 36415 COLL VENOUS BLD VENIPUNCTURE: CPT

## 2025-05-05 ENCOUNTER — PATIENT MESSAGE (OUTPATIENT)
Dept: SURGERY | Facility: CLINIC | Age: 65
End: 2025-05-05
Payer: COMMERCIAL

## 2025-05-05 ENCOUNTER — OFFICE VISIT (OUTPATIENT)
Dept: SURGERY | Facility: CLINIC | Age: 65
End: 2025-05-05
Payer: COMMERCIAL

## 2025-05-05 VITALS
TEMPERATURE: 98 F | BODY MASS INDEX: 24.27 KG/M2 | HEIGHT: 63 IN | HEART RATE: 75 BPM | WEIGHT: 137 LBS | SYSTOLIC BLOOD PRESSURE: 118 MMHG | DIASTOLIC BLOOD PRESSURE: 71 MMHG

## 2025-05-05 DIAGNOSIS — C34.90 NON-SMALL CELL LUNG CANCER, UNSPECIFIED LATERALITY: Primary | ICD-10-CM

## 2025-05-05 PROCEDURE — 99999 PR PBB SHADOW E&M-EST. PATIENT-LVL III: CPT | Mod: PBBFAC,,, | Performed by: SURGERY

## 2025-05-07 NOTE — H&P
GENERAL SURGERY  OUTPATIENT H&P    REASON FOR VISIT/CC: Port placement    HPI: Yvette Hutchinson is a 64 y.o. female with a history leukemia, lung adenocarcinoma status treatment who was referred for port removal. Patient had port placed in November 2022 at [facility].  Port has a right IJ port. The patient has completed treatment in port it is no longer needed.  Patient has requested removal. Denies blood thinner use.    I have reviewed the patient's chart including prior progress notes, procedures and testing.     ROS:   Review of Systems    PROBLEM LIST:  Problem List[1]      HISTORY  Past Medical History:   Diagnosis Date    Arthritis     Cancer 10/2022    (CLL) leukemia ; adenocarcinoma  adenosgemis    Depression with anxiety     GERD (gastroesophageal reflux disease)     History of myocardial infarction     HLD (hyperlipidemia)     Lung cancer     Neck pain     and back pain    Personal history of colonic polyps 07/07/2017    Pneumonia of left lung due to infectious organism 03/05/2020    Thyroid disease        Past Surgical History:   Procedure Laterality Date    DILATION AND CURETTAGE OF UTERUS      for abnormal bleeding, was before menopause    ERCP N/A 10/10/2024    Procedure: ERCP (ENDOSCOPIC RETROGRADE CHOLANGIOPANCREATOGRAPHY);  Surgeon: Joshua Polanco III, MD;  Location: [facility] ENDO;  Service: Endoscopy;  Laterality: N/A;    FUSION, SPINE, CERVICAL, ANTERIOR APPROACH N/A 08/06/2024    Procedure: FUSION, SPINE, CERVICAL, ANTERIOR APPROACH;  Surgeon: Anatoly Daley DO;  Location: [facility] OR;  Service: Neurosurgery;  Laterality: N/A;  IOM, C-ARM, MEDTRONICS, MICRO, HORSESHOE    INJECTION OF ANESTHETIC AGENT AROUND MULTIPLE INTERCOSTAL NERVES Left 10/03/2022    Procedure: BLOCK, NERVE, INTERCOSTAL, 2 OR MORE;  Surgeon: Evelio Kaplan MD;  Location: [address];  Service: Thoracic;  Laterality: Left;    INSERTION OF TUNNELED CENTRAL VENOUS CATHETER (CVC) WITH SUBCUTANEOUS PORT  Right 11/03/2022    Procedure: [id number]-B-CATH, Right or Left Neck or Chest;  Surgeon: Mini Acevedo MD;  Location: [address];  Service: General;  Laterality: Right;    LAPAROSCOPIC CHOLECYSTECTOMY N/A 10/03/2024    Procedure: CHOLECYSTECTOMY, LAPAROSCOPIC;  Surgeon: Ang Mosley III, MD;  Location: [facility] OR;  Service: General;  Laterality: N/A;    LEFT HEART CATHETERIZATION Left 10/09/2024    Procedure: Left heart cath;  Surgeon: Martin Ingram MD;  Location: [facility] CATH/EP LAB;  Service: Cardiology;  Laterality: Left;    ROBOT-ASSISTED LAPAROSCOPIC LYMPHADENECTOMY USING DA MONIQUE XI Left 10/03/2022    Procedure: XI ROBOTIC LYMPHADENECTOMY;  Surgeon: Evelio Kaplan MD;  Location: [address];  Service: Thoracic;  Laterality: Left;    SPINE SURGERY      TONSILLECTOMY      XI ROBOTIC RATS,WITH LOBECTOMY,LUNG Left 10/03/2022    Procedure: XI ROBOTIC RATS,WITH LOBECTOMY,LUNG;  Surgeon: Evelio Kaplan MD;  Location: [address];  Service: Thoracic;  Laterality: Left;       Social History[2]    Family History   Problem Relation Name Age of Onset    Ovarian cancer Sister      Breast cancer Cousin           MEDS:  Medications Ordered Prior to Encounter[3]    ALLERGIES:  Review of patient's allergies indicates:   Allergen Reactions    Latex, natural rubber Rash         VITALS:  Vitals:    05/05/25 1545   BP: 118/71   Pulse: 75   Temp: 98.1 °F (36.7 °C)         PHYSICAL EXAM:  Physical Exam  Vitals reviewed.   Constitutional:       General: She is not in acute distress.     Appearance: Normal appearance. She is well-developed. She is not diaphoretic.   HENT:      Head: Normocephalic and atraumatic.      Nose: Nose normal.   Eyes:      General: No scleral icterus.     Conjunctiva/sclera: Conjunctivae normal.   Neck:      Trachea: No tracheal tenderness or tracheal deviation.      Comments: Right IJ port in place without signs of infection or fluid collection  Cardiovascular:       Rate and Rhythm: Normal rate and regular rhythm.   Pulmonary:      Effort: Pulmonary effort is normal. No accessory muscle usage or respiratory distress.      Breath sounds: Normal breath sounds. No stridor.   Chest:   Breasts:     Breasts are symmetrical.      Right: No inverted nipple, mass, nipple discharge, skin change or tenderness.      Left: No inverted nipple, mass, nipple discharge, skin change or tenderness.   Abdominal:      General: There is no distension.      Palpations: Abdomen is soft. There is no mass.      Tenderness: There is no abdominal tenderness. There is no rebound.      Hernia: No hernia is present. There is no hernia in the ventral area or left inguinal area.   Musculoskeletal:         General: No deformity. Normal range of motion.      Cervical back: Normal range of motion and neck supple.   Lymphadenopathy:      Upper Body:      Right upper body: No supraclavicular adenopathy.      Left upper body: No supraclavicular adenopathy.      Lower Body: No right inguinal adenopathy. No left inguinal adenopathy.   Skin:     General: Skin is warm and dry.      Findings: No erythema or rash.   Neurological:      Mental Status: She is alert and oriented to person, place, and time.      Motor: No abnormal muscle tone.   Psychiatric:         Behavior: Behavior normal.         Thought Content: Thought content normal.         Judgment: Judgment normal.           LABS:  Lab Results   Component Value Date    WBC 44.52 (HH) 04/30/2025    RBC 3.94 (L) 04/30/2025    HGB 12.4 04/30/2025    HCT 38.5 04/30/2025    HCT 31 (L) 10/03/2024     04/30/2025     Lab Results   Component Value Date    GLU 89 04/30/2025     04/30/2025    K 3.6 04/30/2025     (H) 04/30/2025    CO2 22 (L) 04/30/2025    BUN 16 04/30/2025    CREATININE 1.2 04/30/2025    CALCIUM 9.3 04/30/2025     Lab Results   Component Value Date    ALT 20 04/10/2025    AST 34 04/10/2025    ALKPHOS 107 04/10/2025    BILITOT 0.2 04/10/2025      Lab Results   Component Value Date    MG 1.8 11/04/2024    PHOS 4.3 04/30/2025       STUDIES:  Images and reports were personally reviewed.  Review of operative no states a right IJ port was placed with 3 stay sutures which were dissolvable    ASSESSMENT & PLAN:  64 y.o. female with right IJ port in place  -port no longer indicated and removal has been requested  -port removed in totality at bedside, see procedure note below for further details  -surgical glue has been placed over your incision site, she may shower over the area using soap and water, leave glue in place for at least 7-10 days before attempting removal  -contact the office with any severe pain or swelling otherwise return to clinic.    05/07/2025    Yvette Hutchinson  [id number]    PROCEDURE PERFORMED: Removal right IJ port a catheter    PERFORMING SURGEON: Rasta Tobar Jr    CONSENT:  The risks benefits and alternatives of the procedure were discussed with the patient. The patient was queried for questions and all concerns were addressed.  The patient provided written consent for the procedure.    ANESTHESIA: Lidocaine 2% with epinephrine    INDICATION: Port no longer needed    FINDINGS: Port and catheter removed in totality    PROCEDURE IN DETAIL: Verbal consent was obtained.  Area was prepped with chlorhexidine and draped. Lidocaine was injected over the previous insertion incision site. The skin was then incised sharply. We encountered the port and dissected through the fibrous capsule surrounding it. It was freed and delivered through the incision site. The port and catheter were then removed in totality.  Pressure was held at the neck for about 3 minutes.  Hemostasis was adequate. The incision was closed with dissolvable Monocryl subcuticular stitch and Dermabond.    EBL: Minimal    COMPLICATIONS: none                   [1]   Patient Active Problem List  Diagnosis    Tobacco dependence    B12 deficiency    COLD (chronic  obstructive lung disease)    Chronic pain syndrome    Insomnia    Overweight (BMI 25.0-29.9)    S/P dilatation of esophageal stricture    Seasonal allergic rhinitis due to pollen    History of colon polyps    GERD without esophagitis    Anxiety    Low HDL (under 40)    CLL (chronic lymphocytic leukemia)    Chronic respiratory failure with hypoxia    Malignant neoplasm of upper lobe of lung    Non-small cell lung cancer    Malignant neoplasm of right lung    Upper extremity weakness    S/P cervical spinal fusion    Acquired hypothyroidism    Neck pain    Septic shock    Calculus of gallbladder with acute cholecystitis without obstruction    Neuropathic pain    Acute renal failure    Abdominal pain    Bacteremia due to Staphylococcus    Functional diarrhea    Hypokalemia    History of sepsis    History of cholecystectomy    Leukocytosis    Low serum cortisol level    Adrenal insufficiency    History of lung cancer    Dyslipidemia    Hypotension    HFrEF (heart failure with reduced ejection fraction)    Mass of uterus    Palpitations   [2]   Social History  Tobacco Use    Smoking status: Some Days     Types: Cigarettes     Passive exposure: Current    Smokeless tobacco: Never    Tobacco comments:     PT states she has quit now for a few months, plans to stay quit.   Substance Use Topics    Alcohol use: Yes     Comment: rarely    Drug use: Not Currently     Types: Marijuana   [3]   Current Outpatient Medications on File Prior to Visit   Medication Sig Dispense Refill    albuterol (PROVENTIL/VENTOLIN HFA) 90 mcg/actuation inhaler Inhale 2 puffs into the lungs every 6 (six) hours as needed for Wheezing or Shortness of Breath. Rescue 8.5 g 11    azelastine (ASTELIN) 137 mcg (0.1 %) nasal spray 1 spray (137 mcg total) by Nasal route 2 (two) times daily. Point up and slightly outward toward ear when spraying to avoid irritating nasal septum. 30 mL 2    buPROPion (WELLBUTRIN XL) 300 MG 24 hr tablet Take 1 tablet by mouth once  daily 90 tablet 3    citalopram (CELEXA) 20 MG tablet Take 1 tablet (20 mg total) by mouth once daily. 90 tablet 3    fluconazole (DIFLUCAN) 150 MG Tab Take 1 tablet (150 mg total) by mouth daily as needed (yeast infection). Take the second pill 72 hours after the first pill. 2 tablet 0    fluticasone propionate (FLONASE) 50 mcg/actuation nasal spray 1 spray (50 mcg total) by Each Nostril route once daily. 16 g 3    fluticasone-salmeterol 230-21 mcg/dose (ADVAIR HFA) 230-21 mcg/actuation HFAA inhaler Inhale 2 puffs into the lungs 2 (two) times daily. Controller 12 g 5    gabapentin (NEURONTIN) 300 MG capsule Take 1 capsule (300 mg total) by mouth 3 (three) times daily. 270 capsule 1    guaiFENesin-codeine 100-10 mg/5 ml (TUSSI-ORGANIDIN NR)  mg/5 mL syrup Take 5-10 ml po every 6 hours as needed for cough 118 mL 0    levothyroxine (SYNTHROID) 50 MCG tablet Take 1 tablet (50 mcg total) by mouth before breakfast. 30 tablet 11    loratadine (CLARITIN) 10 mg tablet Take 1 tablet (10 mg total) by mouth once daily. 30 tablet 0    naloxone (NARCAN) 4 mg/actuation Spry 1 spray by Nasal route once.      ondansetron (ZOFRAN-ODT) 8 MG TbDL Take 1 tablet (8 mg total) by mouth every 6 (six) hours as needed (nausea). 30 tablet 2    rosuvastatin (CRESTOR) 20 MG tablet Take 1 tablet (20 mg total) by mouth once daily. 90 tablet 3    topiramate (TOPAMAX) 100 MG tablet Take 1 tablet (100 mg total) by mouth 2 (two) times daily. 60 tablet 2     No current facility-administered medications on file prior to visit.

## 2025-07-03 ENCOUNTER — OFFICE VISIT (OUTPATIENT)
Dept: FAMILY MEDICINE | Facility: CLINIC | Age: 65
End: 2025-07-03
Payer: COMMERCIAL

## 2025-07-03 ENCOUNTER — HOSPITAL ENCOUNTER (OUTPATIENT)
Dept: RADIOLOGY | Facility: CLINIC | Age: 65
Discharge: HOME | End: 2025-07-03
Attending: PHYSICIAN ASSISTANT
Payer: COMMERCIAL

## 2025-07-03 VITALS
SYSTOLIC BLOOD PRESSURE: 116 MMHG | HEIGHT: 63 IN | WEIGHT: 133.38 LBS | OXYGEN SATURATION: 96 % | DIASTOLIC BLOOD PRESSURE: 60 MMHG | BODY MASS INDEX: 23.63 KG/M2 | HEART RATE: 81 BPM

## 2025-07-03 DIAGNOSIS — E03.9 HYPOTHYROIDISM, UNSPECIFIED TYPE: ICD-10-CM
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
## 2025-07-03 DIAGNOSIS — R20.0 NUMBNESS AND TINGLING: ICD-10-CM

## 2025-07-03 DIAGNOSIS — R20.2 NUMBNESS AND TINGLING: ICD-10-CM

## 2025-07-03 DIAGNOSIS — E78.5 DYSLIPIDEMIA: Primary | ICD-10-CM

## 2025-07-03 DIAGNOSIS — M16.12 ARTHRITIS OF LEFT HIP: ICD-10-CM

## 2025-07-03 PROCEDURE — 3078F DIAST BP <80 MM HG: CPT | Mod: CPTII,S$GLB,, | Performed by: PHYSICIAN ASSISTANT

## 2025-07-03 PROCEDURE — 1159F MED LIST DOCD IN RCRD: CPT | Mod: CPTII,S$GLB,, | Performed by: PHYSICIAN ASSISTANT

## 2025-07-03 PROCEDURE — 1125F AMNT PAIN NOTED PAIN PRSNT: CPT | Mod: CPTII,S$GLB,, | Performed by: PHYSICIAN ASSISTANT

## 2025-07-03 PROCEDURE — 73560 X-RAY EXAM OF KNEE 1 OR 2: CPT | Mod: 26,LT,, | Performed by: RADIOLOGY

## 2025-07-03 PROCEDURE — 1101F PT FALLS ASSESS-DOCD LE1/YR: CPT | Mod: CPTII,S$GLB,, | Performed by: PHYSICIAN ASSISTANT

## 2025-07-03 PROCEDURE — 99214 OFFICE O/P EST MOD 30 MIN: CPT | Mod: S$GLB,,, | Performed by: PHYSICIAN ASSISTANT

## 2025-07-03 PROCEDURE — 3074F SYST BP LT 130 MM HG: CPT | Mod: CPTII,S$GLB,, | Performed by: PHYSICIAN ASSISTANT

## 2025-07-03 PROCEDURE — 73560 X-RAY EXAM OF KNEE 1 OR 2: CPT | Mod: TC,FY,PO,LT

## 2025-07-03 PROCEDURE — 99999 PR PBB SHADOW E&M-EST. PATIENT-LVL V: CPT | Mod: PBBFAC,,, | Performed by: PHYSICIAN ASSISTANT

## 2025-07-03 PROCEDURE — 1160F RVW MEDS BY RX/DR IN RCRD: CPT | Mod: CPTII,S$GLB,, | Performed by: PHYSICIAN ASSISTANT

## 2025-07-03 PROCEDURE — 73502 X-RAY EXAM HIP UNI 2-3 VIEWS: CPT | Mod: TC,FY,PO,LT

## 2025-07-03 PROCEDURE — G2211 COMPLEX E/M VISIT ADD ON: HCPCS | Mod: S$GLB,,, | Performed by: PHYSICIAN ASSISTANT

## 2025-07-03 PROCEDURE — 3288F FALL RISK ASSESSMENT DOCD: CPT | Mod: CPTII,S$GLB,, | Performed by: PHYSICIAN ASSISTANT

## 2025-07-03 PROCEDURE — 73502 X-RAY EXAM HIP UNI 2-3 VIEWS: CPT | Mod: 26,LT,, | Performed by: RADIOLOGY

## 2025-07-03 PROCEDURE — 3008F BODY MASS INDEX DOCD: CPT | Mod: CPTII,S$GLB,, | Performed by: PHYSICIAN ASSISTANT

## 2025-07-03 NOTE — ASSESSMENT & PLAN NOTE
Patient fell forward 2 days ago, resulting in soreness in neck, left knee, left arm, and left hip.  Patient is moving around adequately with low suspicion for suspected fractures. Recommend rest, ice application to affected areas, Voltaren gel, Tylenol Arthritis for soreness management and monitor symptoms. Will order x-ray of hip and knee, she has history of left hip arthritis and has had chronic left knee pain acutely worsened since fall.

## 2025-07-03 NOTE — PROGRESS NOTES
"OFFICE VISIT   7/3/2025    Patient ID: Yvette Hutchinson is a 64 y.o. female    SUBJECTIVE:  Fall      HPI/ROS:  History of Present Illness    Patient presents today following a fall yesterday with multiple areas of pain and soreness.     She sustained a fall two days ago with multiple areas of injury. States she tripped over a small ledge and fell forward. She experiences arm soreness throughout the left arm, though notes no visible bruising. Her neck pain has been exacerbated since the fall and is currently slightly worse than baseline related to her previous neck surgery.   She reports intermittent facial numbness involving the lips, characterized by tingling. Symptoms occur in a specific area of the mouth with fluctuating intensity. The numbness comes and goes, creating uncertainty about its origin. She expresses significant anxiety about the underlying cause, describing it as unpredictable and concerning. She denies associated pain or other neurological symptoms.   She has pre-existing joint issues involving left hip and knee. Her left hip has known moderate to advanced arthritis seen on x-ray approximately one year ago, which causes intermittent discomfort. She describes knee problems characterized by instability with sensation that her knee wants to "give" on her.  She does not currently have an orthopedic specialist managing these joint conditions. She has a significant medical history including chronic lymphocytic leukemia, history of lung cancer with left upper lobe removal, and previous neck surgery. She experiences ongoing neck pain related to prior neck surgery.   She continues cholesterol medication as prescribed and takes ibuprofen over the counter for pain management. She denies using Tylenol and is aware of the need to be cautious with ibuprofen use due to potential kidney impact. Bone density scan from last year revealed decreased bone density, not meeting criteria for osteoporosis. " Cholesterol levels were previously high but significantly improved in January. Thyroid function was normal in January.          Review of Systems   Constitutional:  Negative for chills and fever.   Respiratory:  Negative for cough and shortness of breath.    Musculoskeletal:  Positive for arthralgias and neck pain. Negative for back pain, joint swelling and myalgias.   Skin:  Negative for wound.   Neurological:  Negative for dizziness and weakness.   Psychiatric/Behavioral:  Negative for sleep disturbance. The patient is not nervous/anxious.        Past Medical History:   Diagnosis Date    Arthritis     Cancer 10/2022    (CLL) leukemia ; adenocarcinoma  adenosgemis    Depression with anxiety     GERD (gastroesophageal reflux disease)     History of myocardial infarction     HLD (hyperlipidemia)     Lung cancer     Neck pain     and back pain    Personal history of colonic polyps 07/07/2017    Pneumonia of left lung due to infectious organism 03/05/2020    Thyroid disease        Social History[1]    Past Surgical History:   Procedure Laterality Date    DILATION AND CURETTAGE OF UTERUS      for abnormal bleeding, was before menopause    ERCP N/A 10/10/2024    Procedure: ERCP (ENDOSCOPIC RETROGRADE CHOLANGIOPANCREATOGRAPHY);  Surgeon: Joshua Polanco III, MD;  Location: [facility] ENDO;  Service: Endoscopy;  Laterality: N/A;    FUSION, SPINE, CERVICAL, ANTERIOR APPROACH N/A 08/06/2024    Procedure: FUSION, SPINE, CERVICAL, ANTERIOR APPROACH;  Surgeon: Anatoly Daley DO;  Location: [facility] OR;  Service: Neurosurgery;  Laterality: N/A;  IOM, C-ARM, MEDTRONICS, MICRO, HORSESHOE    INJECTION OF ANESTHETIC AGENT AROUND MULTIPLE INTERCOSTAL NERVES Left 10/03/2022    Procedure: BLOCK, NERVE, INTERCOSTAL, 2 OR MORE;  Surgeon: Evelio Kaplan MD;  Location: [address];  Service: Thoracic;  Laterality: Left;    INSERTION OF TUNNELED CENTRAL VENOUS CATHETER (CVC) WITH SUBCUTANEOUS PORT Right 11/03/2022    Procedure:  "[id number], Right or Left Neck or Chest;  Surgeon: Mini Acevedo MD;  Location: [address] FLR;  Service: General;  Laterality: Right;    LAPAROSCOPIC CHOLECYSTECTOMY N/A 10/03/2024    Procedure: CHOLECYSTECTOMY, LAPAROSCOPIC;  Surgeon: Ang Mosley III, MD;  Location: [facility] OR;  Service: General;  Laterality: N/A;    LEFT HEART CATHETERIZATION Left 10/09/2024    Procedure: Left heart cath;  Surgeon: Martin Ingram MD;  Location: [facility] CATH/EP LAB;  Service: Cardiology;  Laterality: Left;    ROBOT-ASSISTED LAPAROSCOPIC LYMPHADENECTOMY USING DA MONIQUE XI Left 10/03/2022    Procedure: XI ROBOTIC LYMPHADENECTOMY;  Surgeon: Evelio Kaplan MD;  Location: [facility] OR [address] [place] [address];  Service: Thoracic;  Laterality: Left;    SPINE SURGERY      TONSILLECTOMY      XI ROBOTIC RATS,WITH LOBECTOMY,LUNG Left 10/03/2022    Procedure: XI ROBOTIC RATS,WITH LOBECTOMY,LUNG;  Surgeon: Evelio Kaplan MD;  [address];  Service: Thoracic;  Laterality: Left;       OBJECTIVE:    /60   Pulse 81   Ht 5' 3" (1.6 m)   Wt 60.5 kg (133 lb 6.1 oz)   LMP 01/13/2010 (Approximate)   SpO2 96%   BMI 23.63 kg/m²     Current Medications[2]    Physical Exam  Constitutional:       General: She is not in acute distress.     Appearance: Normal appearance. She is normal weight. She is not ill-appearing.   HENT:      Head: Normocephalic and atraumatic.      Right Ear: External ear normal.      Left Ear: External ear normal.      Nose: Nose normal.   Eyes:      Extraocular Movements: Extraocular movements intact.      Conjunctiva/sclera: Conjunctivae normal.   Neck:      Comments: Mild movement pain  Cardiovascular:      Rate and Rhythm: Normal rate and regular rhythm.      Pulses: Normal pulses.      Heart sounds: Normal heart sounds.   Pulmonary:      Effort: Pulmonary effort is normal. No respiratory distress.      Breath sounds: Normal breath sounds. No wheezing or rhonchi.   Abdominal:      " Palpations: Abdomen is soft.   Musculoskeletal:         General: No swelling. Normal range of motion.      Left elbow: No swelling or deformity. Normal range of motion. No tenderness.      Cervical back: Normal range of motion and neck supple.      Left hip: No deformity or tenderness. Normal range of motion.      Left knee: No swelling, deformity or effusion. Normal range of motion.   Skin:     General: Skin is warm and dry.   Neurological:      General: No focal deficit present.      Mental Status: She is alert and oriented to person, place, and time. Mental status is at baseline.      Cranial Nerves: Cranial nerves 2-12 are intact. No cranial nerve deficit, dysarthria or facial asymmetry.   Psychiatric:         Mood and Affect: Mood normal.         Behavior: Behavior normal.         Thought Content: Thought content normal.         Judgment: Judgment normal.         Assessment/Plan:  Assessment & Plan    - Assessed fall-related injuries, noting pre-existing hip and knee arthritis.  - Evaluated numbness in lips and mouth, considering potential causes including vitamin B12 deficiency.  - Ruled out signs of stroke or Bell's palsy based on exam.  - Recommend conservative management for fall-related pain and arthritis symptoms.    ADDITIONAL RECOMMENDATIONS AND FOLLOW-UP:  - Patient can consider using OTC Voltaren gel for elbow pain.  - Ordered thyroid function labs.  - Will follow up with results of labs and x-rays         Problem List Items Addressed This Visit       Hypothyroidism    Last TSH in January stable. Patient requesting recheck today. Currently on levothyroxine 50 mcg daily.          Relevant Orders    TSH    Dyslipidemia - Primary    Monitoring lipid panel. Currently on rosuvastatin 20 mg daily.          Relevant Orders    Lipid Panel    Fall    Patient fell forward 2 days ago, resulting in soreness in neck, left knee, left arm, and left hip.  Patient is moving around adequately with low suspicion for  suspected fractures. Recommend rest, ice application to affected areas, Voltaren gel, Tylenol Arthritis for soreness management and monitor symptoms. Will order x-ray of hip and knee, she has history of left hip arthritis and has had chronic left knee pain acutely worsened since fall.          Relevant Orders    X-Ray Hip 2 or 3 views Left with Pelvis when performed    X-Ray Knee 3 View Left    Numbness and tingling    Patient reports intermittent numbness and tingling around the lips and mouth. Discussed potential causes including thyroid issues and vitamin B12 deficiency. Described signs of Bell's palsy, including facial asymmetry and inability to move one side of the face which patient is concerned for. Ordered labs to check thyroid, cholesterol, vitamin B12, and folate levels. Cranial nerves grossly intact on exam.          Relevant Orders    Vitamin B12    Folate     Other Visit Diagnoses         Arthritis of left hip        Relevant Orders    Ambulatory referral/consult to Orthopedics            Patient verbalizes understanding of instructions and healthcare topics reviewed. All of patient's questions were answered.  Patient encouraged to contact office if other questions arise.    Health Maintenance Due   Topic Date Due    COVID-19 Vaccine (1) Never done    RSV Vaccine (Age 60+ and Pregnant patients) (1 - Risk 60-74 years 1-dose series) Never done    Colorectal Cancer Screening  07/07/2022    Pneumococcal Vaccines (Age 50+) (3 of 3 - PPSV23, PCV20 or PCV21) 03/02/2025       Follow up if symptoms worsen or fail to improve.    This note was generated with the assistance of ambient listening technology. Verbal consent was obtained by the patient and accompanying visitor(s) for the recording of patient appointment to facilitate this note. I attest to having reviewed and edited the generated note for accuracy, though some syntax or spelling errors may persist. Please contact the author of this note for any  clarification.     Reviewed complexities inherent to evaluation and management of this patient's acute and chronic problems to deliver optimal long-term care to the patient at time of visit.      Val Anderson PA-C  Family Medicine Physician Assistant          [1]   Social History  Tobacco Use    Smoking status: Some Days     Types: Cigarettes     Passive exposure: Current    Smokeless tobacco: Never    Tobacco comments:     PT states she has quit now for a few months, plans to stay quit.   Substance Use Topics    Alcohol use: Yes     Comment: rarely    Drug use: Not Currently     Types: Marijuana   [2]   Current Outpatient Medications:     azelastine (ASTELIN) 137 mcg (0.1 %) nasal spray, 1 spray (137 mcg total) by Nasal route 2 (two) times daily. Point up and slightly outward toward ear when spraying to avoid irritating nasal septum., Disp: 30 mL, Rfl: 2    buPROPion (WELLBUTRIN XL) 300 MG 24 hr tablet, Take 1 tablet by mouth once daily, Disp: 90 tablet, Rfl: 3    citalopram (CELEXA) 20 MG tablet, Take 1 tablet (20 mg total) by mouth once daily., Disp: 90 tablet, Rfl: 3    fluconazole (DIFLUCAN) 150 MG Tab, Take 1 tablet (150 mg total) by mouth daily as needed (yeast infection). Take the second pill 72 hours after the first pill., Disp: 2 tablet, Rfl: 0    fluticasone propionate (FLONASE) 50 mcg/actuation nasal spray, 1 spray (50 mcg total) by Each Nostril route once daily., Disp: 16 g, Rfl: 3    fluticasone-salmeterol 230-21 mcg/dose (ADVAIR HFA) 230-21 mcg/actuation HFAA inhaler, Inhale 2 puffs into the lungs 2 (two) times daily. Controller, Disp: 12 g, Rfl: 5    gabapentin (NEURONTIN) 300 MG capsule, Take 1 capsule (300 mg total) by mouth 3 (three) times daily., Disp: 270 capsule, Rfl: 1    guaiFENesin-codeine 100-10 mg/5 ml (TUSSI-ORGANIDIN NR)  mg/5 mL syrup, Take 5-10 ml po every 6 hours as needed for cough, Disp: 118 mL, Rfl: 0    levothyroxine (SYNTHROID) 50 MCG tablet, Take 1 tablet (50  mcg total) by mouth before breakfast., Disp: 30 tablet, Rfl: 11    naloxone (NARCAN) 4 mg/actuation Spry, 1 spray by Nasal route once., Disp: , Rfl:     ondansetron (ZOFRAN-ODT) 8 MG TbDL, Take 1 tablet (8 mg total) by mouth every 6 (six) hours as needed (nausea)., Disp: 30 tablet, Rfl: 2    rosuvastatin (CRESTOR) 20 MG tablet, Take 1 tablet (20 mg total) by mouth once daily., Disp: 90 tablet, Rfl: 3    topiramate (TOPAMAX) 100 MG tablet, Take 1 tablet (100 mg total) by mouth 2 (two) times daily., Disp: 60 tablet, Rfl: 2    albuterol (PROVENTIL/VENTOLIN HFA) 90 mcg/actuation inhaler, Inhale 2 puffs into the lungs every 6 (six) hours as needed for Wheezing or Shortness of Breath. Rescue, Disp: 8.5 g, Rfl: 11    loratadine (CLARITIN) 10 mg tablet, Take 1 tablet (10 mg total) by mouth once daily., Disp: 30 tablet, Rfl: 0

## 2025-07-03 NOTE — ASSESSMENT & PLAN NOTE
Last TSH in January stable. Patient requesting recheck today. Currently on levothyroxine 50 mcg daily.

## 2025-07-03 NOTE — ASSESSMENT & PLAN NOTE
Patient reports intermittent numbness and tingling around the lips and mouth. Discussed potential causes including thyroid issues and vitamin B12 deficiency. Described signs of Bell's palsy, including facial asymmetry and inability to move one side of the face which patient is concerned for. Ordered labs to check thyroid, cholesterol, vitamin B12, and folate levels. Cranial nerves grossly intact on exam.

## 2025-07-07 ENCOUNTER — RESULTS FOLLOW-UP (OUTPATIENT)
Dept: FAMILY MEDICINE | Facility: CLINIC | Age: 65
End: 2025-07-07

## 2025-07-22 ENCOUNTER — OFFICE VISIT (OUTPATIENT)
Dept: ORTHOPEDICS | Facility: CLINIC | Age: 65
End: 2025-07-22
Payer: COMMERCIAL

## 2025-07-22 VITALS — WEIGHT: 133.38 LBS | HEIGHT: 63 IN | BODY MASS INDEX: 23.63 KG/M2

## 2025-07-22 DIAGNOSIS — M16.12 PRIMARY OSTEOARTHRITIS OF LEFT HIP: ICD-10-CM

## 2025-07-22 DIAGNOSIS — M17.12 PRIMARY OSTEOARTHRITIS OF LEFT KNEE: Primary | ICD-10-CM

## 2025-07-22 PROCEDURE — 20610 DRAIN/INJ JOINT/BURSA W/O US: CPT | Mod: LT,S$GLB,, | Performed by: ORTHOPAEDIC SURGERY

## 2025-07-22 PROCEDURE — 1160F RVW MEDS BY RX/DR IN RCRD: CPT | Mod: CPTII,S$GLB,, | Performed by: ORTHOPAEDIC SURGERY

## 2025-07-22 PROCEDURE — 1159F MED LIST DOCD IN RCRD: CPT | Mod: CPTII,S$GLB,, | Performed by: ORTHOPAEDIC SURGERY

## 2025-07-22 PROCEDURE — 3288F FALL RISK ASSESSMENT DOCD: CPT | Mod: CPTII,S$GLB,, | Performed by: ORTHOPAEDIC SURGERY

## 2025-07-22 PROCEDURE — 99999 PR PBB SHADOW E&M-EST. PATIENT-LVL III: CPT | Mod: PBBFAC,,, | Performed by: ORTHOPAEDIC SURGERY

## 2025-07-22 PROCEDURE — 3008F BODY MASS INDEX DOCD: CPT | Mod: CPTII,S$GLB,, | Performed by: ORTHOPAEDIC SURGERY

## 2025-07-22 PROCEDURE — 99204 OFFICE O/P NEW MOD 45 MIN: CPT | Mod: 25,S$GLB,, | Performed by: ORTHOPAEDIC SURGERY

## 2025-07-22 PROCEDURE — 1125F AMNT PAIN NOTED PAIN PRSNT: CPT | Mod: CPTII,S$GLB,, | Performed by: ORTHOPAEDIC SURGERY

## 2025-07-22 PROCEDURE — 1101F PT FALLS ASSESS-DOCD LE1/YR: CPT | Mod: CPTII,S$GLB,, | Performed by: ORTHOPAEDIC SURGERY

## 2025-07-22 RX ORDER — TRIAMCINOLONE ACETONIDE 40 MG/ML
40 INJECTION, SUSPENSION INTRA-ARTICULAR; INTRAMUSCULAR
Status: DISCONTINUED | OUTPATIENT
Start: 2025-07-22 | End: 2025-07-22 | Stop reason: HOSPADM

## 2025-07-22 RX ADMIN — TRIAMCINOLONE ACETONIDE 40 MG: 40 INJECTION, SUSPENSION INTRA-ARTICULAR; INTRAMUSCULAR at 01:07

## 2025-07-22 NOTE — PROCEDURES
Large Joint Aspiration/Injection: L knee    Date/Time: 7/22/2025 1:00 PM    Performed by: Gumaro Landon MD  Authorized by: Gumaro Landon MD    Consent Done?:  Yes (Verbal)  Indications:  Arthritis and pain  Site marked: the procedure site was marked    Timeout: prior to procedure the correct patient, procedure, and site was verified    Prep: patient was prepped and draped in usual sterile fashion      Local anesthesia used?: Yes    Local anesthetic:  Lidocaine 1% without epinephrine    Details:  Needle Size:  25 G  Ultrasonic Guidance for needle placement?: No    Location:  Knee  Site:  L knee  Medications:  40 mg triamcinolone acetonide 40 mg/mL  Patient tolerance:  Patient tolerated the procedure well with no immediate complications

## 2025-07-22 NOTE — PROGRESS NOTES
[facility]    Subjective:     Chief Complaint:   Chief Complaint   Patient presents with    Left Knee - Pain     L knee pain x 6 months, has been increasing in pain over time. Denies any injury. Wakes from sleep. Having generalized pain around the knee cap. Endorses crepitus. Denies locking up, giving way. Describes pain as burning. Pain is worse with standing, walking. Painful ROM.         Past Medical History:   Diagnosis Date    Arthritis     Cancer 10/2022    (CLL) leukemia ; adenocarcinoma  adenosgemis    Depression with anxiety     GERD (gastroesophageal reflux disease)     History of myocardial infarction     HLD (hyperlipidemia)     Lung cancer     Neck pain     and back pain    Personal history of colonic polyps 07/07/2017    Pneumonia of left lung due to infectious organism 03/05/2020    Thyroid disease        Past Surgical History:   Procedure Laterality Date    DILATION AND CURETTAGE OF UTERUS      for abnormal bleeding, was before menopause    ERCP N/A 10/10/2024    Procedure: ERCP (ENDOSCOPIC RETROGRADE CHOLANGIOPANCREATOGRAPHY);  Surgeon: Joshua Polanco III, MD;  Location: [facility] ENDO;  Service: Endoscopy;  Laterality: N/A;    FUSION, SPINE, CERVICAL, ANTERIOR APPROACH N/A 08/06/2024    Procedure: FUSION, SPINE, CERVICAL, ANTERIOR APPROACH;  Surgeon: Anatoly Daley DO;  Location: [facility] OR;  Service: Neurosurgery;  Laterality: N/A;  IOM, C-ARM, MEDTRONICS, MICRO, HORSESHOE    INJECTION OF ANESTHETIC AGENT AROUND MULTIPLE INTERCOSTAL NERVES Left 10/03/2022    Procedure: BLOCK, NERVE, INTERCOSTAL, 2 OR MORE;  Surgeon: Evelio Kaplan MD;  Location: [facility] OR 2ND FLR;  Service: Thoracic;  Laterality: Left;    INSERTION OF TUNNELED CENTRAL VENOUS CATHETER (CVC) WITH SUBCUTANEOUS PORT Right 11/03/2022    Procedure: [id number], Right or Left Neck or Chest;  Surgeon: Mini Acevedo MD;  Location: [facility] OR 2ND FLR;  Service: General;  Laterality: Right;    LAPAROSCOPIC  CHOLECYSTECTOMY N/A 10/03/2024    Procedure: CHOLECYSTECTOMY, LAPAROSCOPIC;  Surgeon: Ang Mosley III, MD;  Location: [facility] OR;  Service: General;  Laterality: N/A;    LEFT HEART CATHETERIZATION Left 10/09/2024    Procedure: Left heart cath;  Surgeon: Martin Ingram MD;  Location: [facility] CATH/EP LAB;  Service: Cardiology;  Laterality: Left;    ROBOT-ASSISTED LAPAROSCOPIC LYMPHADENECTOMY USING DA MONIQUE XI Left 10/03/2022    Procedure: XI ROBOTIC LYMPHADENECTOMY;  Surgeon: Evelio Kaplan MD;  Location: [facility] OR [facility] FLR;  Service: Thoracic;  Laterality: Left;    SPINE SURGERY      TONSILLECTOMY      XI ROBOTIC RATS,WITH LOBECTOMY,LUNG Left 10/03/2022    Procedure: XI ROBOTIC RATS,WITH LOBECTOMY,LUNG;  Surgeon: Evelio Kaplan MD;  Location: [facility] OR 2ND FLR;  Service: Thoracic;  Laterality: Left;       Current Outpatient Medications   Medication Sig    azelastine (ASTELIN) 137 mcg (0.1 %) nasal spray 1 spray (137 mcg total) by Nasal route 2 (two) times daily. Point up and slightly outward toward ear when spraying to avoid irritating nasal septum.    buPROPion (WELLBUTRIN XL) 300 MG 24 hr tablet Take 1 tablet by mouth once daily    citalopram (CELEXA) 20 MG tablet Take 1 tablet (20 mg total) by mouth once daily.    fluconazole (DIFLUCAN) 150 MG Tab Take 1 tablet (150 mg total) by mouth daily as needed (yeast infection). Take the second pill 72 hours after the first pill.    fluticasone propionate (FLONASE) 50 mcg/actuation nasal spray 1 spray (50 mcg total) by Each Nostril route once daily.    fluticasone-salmeterol 230-21 mcg/dose (ADVAIR HFA) 230-21 mcg/actuation HFAA inhaler Inhale 2 puffs into the lungs 2 (two) times daily. Controller    gabapentin (NEURONTIN) 300 MG capsule Take 1 capsule (300 mg total) by mouth 3 (three) times daily.    guaiFENesin-codeine 100-10 mg/5 ml (TUSSI-ORGANIDIN NR)  mg/5 mL syrup Take 5-10 ml po every 6 hours as needed for cough    levothyroxine (SYNTHROID) 50 MCG  tablet Take 1 tablet (50 mcg total) by mouth before breakfast.    naloxone (NARCAN) 4 mg/actuation Spry 1 spray by Nasal route once.    ondansetron (ZOFRAN-ODT) 8 MG TbDL Take 1 tablet (8 mg total) by mouth every 6 (six) hours as needed (nausea).    rosuvastatin (CRESTOR) 20 MG tablet Take 1 tablet (20 mg total) by mouth once daily.    topiramate (TOPAMAX) 100 MG tablet Take 1 tablet (100 mg total) by mouth 2 (two) times daily.    albuterol (PROVENTIL/VENTOLIN HFA) 90 mcg/actuation inhaler Inhale 2 puffs into the lungs every 6 (six) hours as needed for Wheezing or Shortness of Breath. Rescue    loratadine (CLARITIN) 10 mg tablet Take 1 tablet (10 mg total) by mouth once daily.     No current facility-administered medications for this visit.       Review of patient's allergies indicates:   Allergen Reactions    Latex, natural rubber Rash       Family History   Problem Relation Name Age of Onset    Ovarian cancer Sister      Breast cancer Cousin         Social History[1]    History of present illness: 65-year-old female, presents to clinic today for evaluation of follow up after a fall.  She has a known left hip and left knee arthritis previously.  She has a misstep on an uneven surface about 3 weeks ago.  X-rays done as an outpatient.  Hip pain has gotten better, she continues to have persistent left knee pain.      Review of Systems:    Constitution: Negative for chills, fever, and sweats.  Negative for unexplained weight loss.    HENT:  Negative for headaches and blurry vision.    Cardiovascular:Negative for chest pain or irregular heart beat. Negative for hypertension.    Respiratory:  Negative for cough and shortness of breath.    Gastrointestinal: Negative for abdominal pain, heartburn, melena, nausea, and vomitting.    Genitourinary:  Negative bladder incontinence and dysuria.    Musculoskeletal:  See HPI for details.     Neurological: Negative for numbness.    Psychiatric/Behavioral: Negative for depression.   The patient is not nervous/anxious.      Endocrine: Negative for polyuria    Hematologic/Lymphatic: Negative for bleeding problem.  Does not bruise/bleed easily.    Skin: Negative for poor would healing and rash    Objective:      Physical Examination:    Vital Signs:  There were no vitals filed for this visit.    Body mass index is 23.63 kg/m².    This a well-developed, well nourished patient in no acute distress.  They are alert and oriented and cooperative to examination.          Examination of the left knee, skin is dry and intact, no erythema or ecchymosis, no signs symptoms of infection.  No effusion.  Range of motion 0-120 degrees.  Stable in flexion and extension.  Homans signs negative, straight leg raise is negative, distally neurovascularly intact.  Vi's was negative.    Pertinent New Results:  Narrative & Impression  EXAMINATION:  XR HIP WITH PELVIS WHEN PERFORMED 2 OR 3 VIEWS LEFT     CLINICAL HISTORY:  Unspecified fall, initial encounter     TECHNIQUE:  AP view of the pelvis and frog leg lateral view of the left hip were performed.     COMPARISON:  07/18/2024     FINDINGS:  No fracture or dislocation.  Moderate left and mild right hip joint degenerative changes with joint space narrowing and periarticular osteophyte formation.  No suspicious appearing lytic or blastic lesions.  Dextroconvex curvature and degenerative changes of the lower lumbar spine.     Impression:     As above.        Electronically signed by:Georges Guadalupe  Date:                                            07/03/2025  Time:                                           16:56        Exam Ended: 07/03/25 15:11 CDT Last Resulted: 07/03/25 16:56 CDT     Narrative & Impression  EXAMINATION:  XR KNEE 1 OR 2 VIEW LEFT     CLINICAL HISTORY:  Unspecified fall, initial encounter     TECHNIQUE:  AP and lateral views of left knee     COMPARISON:  None.     FINDINGS:  No fracture or dislocation.  No joint effusion.  Bilateral knee  "tricompartmental osteophyte formation with mild medial tibiofemoral joint space narrowing.  Quadriceps tendon insertion left patellar enthesophyte formation.     Impression:     As above.        Electronically signed by:Georges Guadalupe  Date:                                            07/03/2025  Time:                                           16:54        Exam Ended: 07/03/25 15:10 CDT Last Resulted: 07/03/25 16:54 CDT     XRAY Report / Interpretation:     Reports a test and reviewed, medial compartment narrowing bilaterally with spurring.    Assessment/Plan:        Fall, left hip and knee pain.  Persistent left knee pain since the fall, the hip pain has gotten better.  She has known arthritis in both the left hip and the left knee.  We injected the left knee today for the underlying inflammation and arthritis.  Anterior lateral approach sterile technique lidocaine and triamcinolone.  She tolerated well.  She will follow up with us in 6 weeks.  If the right knee continues to bother her we will inject her right knee as well.  Ultimately she may need a knee replacement at some point.     Michael Marte, Physician Assistant, served in the capacity as a "scribe" for this patient encounter.  A "face-to-face" encounter occurred with Dr. Gumaro Landon on this date.  The treatment plan and medical decision-making is outlined above. Patient was seen and examined with a chaperone.     This note was created using Dragon voice recognition software that occasionally misinterpreted phrases or words.             [1]   Social History  Socioeconomic History    Marital status: Legally    Tobacco Use    Smoking status: Some Days     Types: Cigarettes     Passive exposure: Current    Smokeless tobacco: Never    Tobacco comments:     PT states she has quit now for a few months, plans to stay quit.   Substance and Sexual Activity    Alcohol use: Yes     Comment: rarely    Drug use: Not Currently     Types: Marijuana     Social " Drivers of Health     Financial Resource Strain: Low Risk  (10/20/2024)    Overall Financial Resource Strain (CARDIA)     Difficulty of Paying Living Expenses: Not hard at all   Food Insecurity: No Food Insecurity (10/20/2024)    Hunger Vital Sign     Worried About Running Out of Food in the Last Year: Never true     Ran Out of Food in the Last Year: Never true   Transportation Needs: No Transportation Needs (10/20/2024)    TRANSPORTATION NEEDS     Transportation : No   Physical Activity: Inactive (10/20/2024)    Exercise Vital Sign     Days of Exercise per Week: 0 days     Minutes of Exercise per Session: 0 min   Stress: No Stress Concern Present (10/20/2024)    [employer] - Occupational Stress Questionnaire     Feeling of Stress : Only a little   Housing Stability: Unknown (10/20/2024)    Housing Stability Vital Sign     Unable to Pay for Housing in the Last Year: No     Homeless in the Last Year: No

## 2025-08-07 ENCOUNTER — OFFICE VISIT (OUTPATIENT)
Dept: FAMILY MEDICINE | Facility: CLINIC | Age: 65
End: 2025-08-07
Payer: COMMERCIAL

## 2025-08-07 ENCOUNTER — LAB VISIT (OUTPATIENT)
Dept: LAB | Facility: HOSPITAL | Age: 65
End: 2025-08-07
Attending: STUDENT IN AN ORGANIZED HEALTH CARE EDUCATION/TRAINING PROGRAM
Payer: COMMERCIAL

## 2025-08-07 VITALS
RESPIRATION RATE: 18 BRPM | BODY MASS INDEX: 25.5 KG/M2 | HEIGHT: 63 IN | SYSTOLIC BLOOD PRESSURE: 114 MMHG | OXYGEN SATURATION: 95 % | WEIGHT: 143.94 LBS | HEART RATE: 74 BPM | DIASTOLIC BLOOD PRESSURE: 68 MMHG

## 2025-08-07 DIAGNOSIS — N18.31 STAGE 3A CHRONIC KIDNEY DISEASE: ICD-10-CM

## 2025-08-07 DIAGNOSIS — C91.10 CLL (CHRONIC LYMPHOCYTIC LEUKEMIA): ICD-10-CM

## 2025-08-07 DIAGNOSIS — E78.5 DYSLIPIDEMIA: ICD-10-CM

## 2025-08-07 DIAGNOSIS — Z51.89 ENCOUNTER FOR MEDICATION ADJUSTMENT: Primary | ICD-10-CM

## 2025-08-07 DIAGNOSIS — E03.9 HYPOTHYROIDISM, UNSPECIFIED TYPE: ICD-10-CM

## 2025-08-07 LAB
ABSOLUTE NEUTROPHIL MANUAL (OHS): 4.6 K/UL (ref 1.8–7.7)
ALBUMIN SERPL BCP-MCNC: 4.4 G/DL (ref 3.5–5.2)
ANION GAP (OHS): 9 MMOL/L (ref 8–16)
ANISOCYTOSIS BLD QL SMEAR: SLIGHT
BUN SERPL-MCNC: 21 MG/DL (ref 8–23)
CALCIUM SERPL-MCNC: 9 MG/DL (ref 8.7–10.5)
CHLORIDE SERPL-SCNC: 107 MMOL/L (ref 95–110)
CO2 SERPL-SCNC: 23 MMOL/L (ref 23–29)
CREAT SERPL-MCNC: 1.3 MG/DL (ref 0.5–1.4)
CRP SERPL-MCNC: 0.5 MG/L
EOSINOPHIL NFR BLD MANUAL: 1 % (ref 0–8)
ERYTHROCYTE [DISTWIDTH] IN BLOOD BY AUTOMATED COUNT: 14.4 % (ref 11.5–14.5)
GFR SERPLBLD CREATININE-BSD FMLA CKD-EPI: 45 ML/MIN/1.73/M2
GLUCOSE SERPL-MCNC: 70 MG/DL (ref 70–110)
HCT VFR BLD AUTO: 36.4 % (ref 37–48.5)
HGB BLD-MCNC: 11.4 GM/DL (ref 12–16)
LYMPHOCYTES NFR BLD MANUAL: 85 % (ref 18–48)
MCH RBC QN AUTO: 31.6 PG (ref 27–31)
MCHC RBC AUTO-ENTMCNC: 31.3 G/DL (ref 32–36)
MCV RBC AUTO: 101 FL (ref 82–98)
MONOCYTES NFR BLD MANUAL: 4 % (ref 4–15)
NEUTROPHILS NFR BLD MANUAL: 10 % (ref 38–73)
NUCLEATED RBC (/100WBC) (OHS): 0 /100 WBC
PHOSPHATE SERPL-MCNC: 3.3 MG/DL (ref 2.7–4.5)
PLATELET # BLD AUTO: 180 K/UL (ref 150–450)
PLATELET BLD QL SMEAR: ABNORMAL
PMV BLD AUTO: 10.9 FL (ref 9.2–12.9)
POTASSIUM SERPL-SCNC: 4.4 MMOL/L (ref 3.5–5.1)
RBC # BLD AUTO: 3.61 M/UL (ref 4–5.4)
SODIUM SERPL-SCNC: 139 MMOL/L (ref 136–145)
WBC # BLD AUTO: 46.32 K/UL (ref 3.9–12.7)

## 2025-08-07 PROCEDURE — 1159F MED LIST DOCD IN RCRD: CPT | Mod: CPTII,S$GLB,, | Performed by: STUDENT IN AN ORGANIZED HEALTH CARE EDUCATION/TRAINING PROGRAM

## 2025-08-07 PROCEDURE — 99214 OFFICE O/P EST MOD 30 MIN: CPT | Mod: S$GLB,,, | Performed by: STUDENT IN AN ORGANIZED HEALTH CARE EDUCATION/TRAINING PROGRAM

## 2025-08-07 PROCEDURE — 3008F BODY MASS INDEX DOCD: CPT | Mod: CPTII,S$GLB,, | Performed by: STUDENT IN AN ORGANIZED HEALTH CARE EDUCATION/TRAINING PROGRAM

## 2025-08-07 PROCEDURE — 3078F DIAST BP <80 MM HG: CPT | Mod: CPTII,S$GLB,, | Performed by: STUDENT IN AN ORGANIZED HEALTH CARE EDUCATION/TRAINING PROGRAM

## 2025-08-07 PROCEDURE — 80069 RENAL FUNCTION PANEL: CPT

## 2025-08-07 PROCEDURE — 99999 PR PBB SHADOW E&M-EST. PATIENT-LVL V: CPT | Mod: PBBFAC,,, | Performed by: STUDENT IN AN ORGANIZED HEALTH CARE EDUCATION/TRAINING PROGRAM

## 2025-08-07 PROCEDURE — 3288F FALL RISK ASSESSMENT DOCD: CPT | Mod: CPTII,S$GLB,, | Performed by: STUDENT IN AN ORGANIZED HEALTH CARE EDUCATION/TRAINING PROGRAM

## 2025-08-07 PROCEDURE — 1101F PT FALLS ASSESS-DOCD LE1/YR: CPT | Mod: CPTII,S$GLB,, | Performed by: STUDENT IN AN ORGANIZED HEALTH CARE EDUCATION/TRAINING PROGRAM

## 2025-08-07 PROCEDURE — 36415 COLL VENOUS BLD VENIPUNCTURE: CPT | Mod: PO

## 2025-08-07 PROCEDURE — 1160F RVW MEDS BY RX/DR IN RCRD: CPT | Mod: CPTII,S$GLB,, | Performed by: STUDENT IN AN ORGANIZED HEALTH CARE EDUCATION/TRAINING PROGRAM

## 2025-08-07 PROCEDURE — 86140 C-REACTIVE PROTEIN: CPT

## 2025-08-07 PROCEDURE — 1125F AMNT PAIN NOTED PAIN PRSNT: CPT | Mod: CPTII,S$GLB,, | Performed by: STUDENT IN AN ORGANIZED HEALTH CARE EDUCATION/TRAINING PROGRAM

## 2025-08-07 PROCEDURE — 3074F SYST BP LT 130 MM HG: CPT | Mod: CPTII,S$GLB,, | Performed by: STUDENT IN AN ORGANIZED HEALTH CARE EDUCATION/TRAINING PROGRAM

## 2025-08-07 PROCEDURE — 85007 BL SMEAR W/DIFF WBC COUNT: CPT

## 2025-08-07 RX ORDER — TOPIRAMATE 50 MG/1
TABLET, FILM COATED ORAL
Qty: 49 TABLET | Refills: 0 | Status: SHIPPED | OUTPATIENT
Start: 2025-08-07 | End: 2025-09-18

## 2025-08-07 NOTE — PATIENT INSTRUCTIONS
Limit use of screens, especially before bed. No television, computer or phone 2 hours before bed.     Reduce or stop drinking caffeine, especially at night. This can worsen sleep.     No alcohol.     Bed is for sleeping. Don't get in bed and do other activities like watch tv, listen to music or anything else.     Don't keep a clock right by your bed. If you wake up and look at the clock it can be stimulating.     Keep room dark, quiet and cool. You will sleep better in a cool dark room.     If you wake up, get out of bed. Don't lay in bed awake. Do a relaxing activity without screens like reading. Try to minimize the number of lights you turn on.     Avoid exercise within 2 hours of bed time.     Try using meditation or relaxation techniques. Can try CBT-I misty (free misty available in usual misty stores) for ideas.

## 2025-08-07 NOTE — PROGRESS NOTES
Patient ID: Yvette Hutchinson is a 65 y.o. female.    Chief Complaint: Follow-up (6 month f/u)    History of Present Illness    CHIEF COMPLAINT:  Patient presents today for follow up and reports headaches and poor sleep    SLEEP CONCERNS:  She reports ongoing sleep disturbances for several months, with difficulties in both sleep onset and maintenance. She wakes up to use the bathroom and struggles to return to sleep. She takes Zzzquil nightly with some relief. Previous sleep apnea evaluation was negative.    HEADACHES:  She experiences intermittent internal headaches without associated vision changes. Recent vision exam showed no alterations. She is currently being tapered off Topamax.    CHRONIC LYMPHOCYTIC LEUKEMIA (CLL):  She reports persistent fatigue associated with CLL, describing heavy legs and significant energy depletion throughout the day.    WEIGHT MANAGEMENT:  She has experienced unintentional weight loss of approximately 20 lbs with no clear explanation. Topamax, initially prescribed for weight loss, is being tapered off as it has been ineffective.    MUSCULOSKELETAL:  She reports intermittent hip pain occurring with certain movements such as turning, getting up, or moving incorrectly. Pain is significant when triggered. She has possible hip arthritis and expresses reluctance towards surgical intervention.    ALLERGIES:  She experiences cough and sneezing triggered by exposure to laundry detergents, soap powder, and strong cologne. Denies fever.    CURRENT MEDICATIONS:  She takes Wellbutrin for anxiety and depression but reports uncertainty about its effectiveness, Rosuvastatin 10mg for heart attack prevention, and Synthroid for well-controlled thyroid.    SOCIAL HISTORY:  She reports occasional smoking when experiencing heightened anxiety.      ROS:  General: -fever, -chills, +fatigue, -weight gain, -weight loss  Eyes: -vision changes, -redness, -discharge  ENT: -ear pain, -nasal congestion,  -sore throat  Cardiovascular: -chest pain, -palpitations, -lower extremity edema  Respiratory: +cough, -shortness of breath, +allergic reactions  Gastrointestinal: -abdominal pain, -nausea, -vomiting, -diarrhea, -constipation, -blood in stool  Genitourinary: -dysuria, -hematuria, -frequency, +nocturia, +difficulty falling asleep, +difficulty staying asleep  Musculoskeletal: -joint pain, -muscle pain, +pain with movement  Skin: -rash, -lesion, +easy bruising  Neurological: +headache, -dizziness, -numbness, -tingling  Psychiatric: -anxiety, +depression, +sleep difficulty         Physical Exam    General: No acute distress. Well-developed. Well-nourished.  Eyes: EOMI. Sclerae anicteric.  HENT: Normocephalic. Atraumatic. Nares patent. Moist oral mucosa.  Cardiovascular: Regular rate. Regular rhythm. No murmurs. No rubs. No gallops. Normal S1, S2.  Respiratory: Normal respiratory effort. Clear to auscultation bilaterally. No rales. No rhonchi. No wheezing.  Musculoskeletal: No  obvious deformity.  Extremities: No lower extremity edema.  Neurological: Alert & oriented x3. No slurred speech. Normal gait.  Psychiatric: Normal mood. Normal affect. Good insight. Good judgment.  Skin: Warm. Dry. No rash.         Assessment & Plan    C91.10 CLL (chronic lymphocytic leukemia)  N18.31 Stage 3a chronic kidney disease  Z51.89 Encounter for medication adjustment  G47.00 Insomnia, unspecified  F41.9 Anxiety disorder, unspecified  E03.9 Hypothyroidism, unspecified  E78.5 Hyperlipidemia, unspecified  F17.200 Nicotine dependence, unspecified, uncomplicated    IMPRESSION:  - Assessed sleep issues and considered potential impact of Wellbutrin on sleep, but maintained current antidepressant regimen due to ongoing depression.  - Evaluated weight loss progress and determined to discontinue Topamax as patient has lost approximately 20 lbs.  - Reviewed recent lab results, including folate and B12 levels, finding them within acceptable  "ranges.  - Considered potential causes of fatigue, including CLL leukemia, and decided to check CLL levels and inflammatory markers.  - Considered hip pain as potentially due to arthritis, discussed treatment options including injections and joint replacement, but patient expressed reluctance.    CLL (CHRONIC LYMPHOCYTIC LEUKEMIA):  - Ordered CBC to check blood counts, electrolyte panel, inflammatory markers, and CLL levels.    ENCOUNTER FOR MEDICATION ADJUSTMENT:  - Decreased Topamax: 100 mg daily for 2 weeks, then 50 mg daily for 2 weeks, then 50 mg every other day for 2 weeks, then discontinue.    INSOMNIA, UNSPECIFIED:  - Patient reports difficulty falling asleep and staying asleep, waking up to use the bathroom and unable to return to sleep.  - Has been experiencing insomnia for months, requiring nightly use of Zzzquil.  - Discussed good sleep hygiene practices, including avoiding screen time before bed and using bed only for sleep.    ANXIETY DISORDER, UNSPECIFIED:  - Patient is currently on Wellbutrin for anxiety and depression but reports feeling depressed and uncertain about its effectiveness.  - Discussed the potential impact of Wellbutrin on sleep and considered adjusting medication.  - Will continue Wellbutrin at current dose due to ongoing depression symptoms.    HYPOTHYROIDISM, UNSPECIFIED:  - Thyroid levels are good on current Synthroid dose.  - Will continue at present dosage.    HYPERLIPIDEMIA, UNSPECIFIED:  - Cholesterol levels have improved on Rosuvastatin (Crestor), which patient takes for heart attack prevention and management of hyperlipidemia.  - Will continue at current dose.    NICOTINE DEPENDENCE, UNSPECIFIED, UNCOMPLICATED:  - Patient smokes occasionally when nervous.    LIFESTYLE CHANGES:  - Recommend daily exercise.  - Follow up in 1 year for annual appointment.          Yvette King" was seen today for follow-up.    Diagnoses and all orders for this visit:    Encounter for " medication adjustment  -     topiramate (TOPAMAX) 50 MG tablet; Take 2 tablets (100 mg total) by mouth once daily for 14 days, THEN 1 tablet (50 mg total) once daily for 14 days, THEN 1 tablet (50 mg total) every other day for 14 days.    CLL (chronic lymphocytic leukemia)  -     CBC Auto Differential; Future  -     C-Reactive Protein; Future    Stage 3a chronic kidney disease  -     Renal Function Panel; Future          Follow up in about 1 year (around 8/7/2026).    This note was generated with the assistance of ambient listening technology. Verbal consent was obtained by the patient and accompanying visitor(s) for the recording of patient appointment to facilitate this note. I attest to having reviewed and edited the generated note for accuracy, though some syntax or spelling errors may persist. Please contact the author of this note for any clarification.

## (undated) DEVICE — SUT SILK 0 STRANDS 30IN BLK

## (undated) DEVICE — TROCAR KII FIOS ZTHREAD 12X100

## (undated) DEVICE — HEMOSTAT SURGICEL NUKNIT 3X4IN

## (undated) DEVICE — TRAY CATH 1-LYR URIMTR 16FR

## (undated) DEVICE — SEAL UNIVERSAL 5MM-8MM XI

## (undated) DEVICE — DRAPE C ARM 42 X 120 10/BX

## (undated) DEVICE — KIT PROCEDURE STER INLET CLOSU

## (undated) DEVICE — TAPE SILK 3IN

## (undated) DEVICE — DRAIN CHEST DRY SUCTION

## (undated) DEVICE — TAPE MEDIPORE H CLOTH 2INX2YD

## (undated) DEVICE — CATH DXTERITY JR40 100CM 5FR

## (undated) DEVICE — TIP YANKAUERS BULB NO VENT

## (undated) DEVICE — DRESSING TELFA STRL 4X3 LF

## (undated) DEVICE — ADHESIVE MASTISOL VIAL 48/BX

## (undated) DEVICE — REMOVER LOTION

## (undated) DEVICE — ADHESIVE SURG LIQ 2 OZ

## (undated) DEVICE — GLIDESHEATH SLENDER SS 5FR10CM

## (undated) DEVICE — SYR SLIP TIP 20CC

## (undated) DEVICE — CONTAINER SPECIMEN STRL 4OZ

## (undated) DEVICE — Device

## (undated) DEVICE — BAG INZII TISS RETRV 12/15MM

## (undated) DEVICE — DRESSING TRANS 2X2 TEGADERM

## (undated) DEVICE — BLADE 4IN EDGE INSULATED

## (undated) DEVICE — ELECTRODE REM PLYHSV RETURN 9

## (undated) DEVICE — YANKAUER FLEX NO VENT REG CAP

## (undated) DEVICE — DRAPE ABDOMINAL TIBURON 14X11

## (undated) DEVICE — RELOAD SUREFORM 45 2.5 WHT 6R

## (undated) DEVICE — MATRIX HEMOSTATIC FLOSEAL 5ML

## (undated) DEVICE — DRESSING TRANS 4X4 TEGADERM

## (undated) DEVICE — DRAPE COLUMN DAVINCI XI

## (undated) DEVICE — STAPLER SKIN PROXIMATE WIDE

## (undated) DEVICE — STAPLER SUREFORM CRVD TIP 45

## (undated) DEVICE — SUT VICRYL+ 27 UR-6 VIOL

## (undated) DEVICE — SOL NACL 0.9% INJ PF/50151

## (undated) DEVICE — DRAPE INCISE IOBAN 2 23X17IN

## (undated) DEVICE — ELECTRODE BLADE INSULATED 1 IN

## (undated) DEVICE — APPLICATOR CHLORAPREP ORN 26ML

## (undated) DEVICE — CANNULA ENDOPATH XCEL 5X100MM

## (undated) DEVICE — NDL ECLIPSE SAFETY 23G 1.5IN

## (undated) DEVICE — DURAPREP SURG SCRUB 26ML

## (undated) DEVICE — RELOAD SUREFORM 45 3.5 BLU 6R

## (undated) DEVICE — KIT ANTIFOG W/SPONG & FLUID

## (undated) DEVICE — GLOVE BIOGEL PI MICRO SZ 7.5

## (undated) DEVICE — CANNULA SEAL 12MM

## (undated) DEVICE — POWDER ARISTA AH 3G

## (undated) DEVICE — DRESSING TELFA N ADH 3X8

## (undated) DEVICE — KIT GLIDESHEATH SLEND 6FR 10CM

## (undated) DEVICE — ADHESIVE DERMABOND ADVANCED

## (undated) DEVICE — DRAPE C-ARM ELAS CLIP 42X120IN

## (undated) DEVICE — GUIDEWIRE INQWIRE SE 3MM JTIP

## (undated) DEVICE — CLIP ENDO LIGATION LARGE CLIPS

## (undated) DEVICE — CORD MPLR STR PLUG DISP 10FT

## (undated) DEVICE — GOWN SURGICAL X-LARGE

## (undated) DEVICE — LOOP VESSEL BLUE MAXI

## (undated) DEVICE — SUT MCRYL PLUS 4-0 PS2 27IN

## (undated) DEVICE — DRAPE SCOPE PILLOW WARMER

## (undated) DEVICE — SUT 2-0 VICRYL / SH (J417)

## (undated) DEVICE — STRIP MEDI WND CLSR 1/2X4IN

## (undated) DEVICE — TRAY GENERAL LAPAROSCOPY SMH

## (undated) DEVICE — DRESSING SURGICAL 1X3

## (undated) DEVICE — SPONGE X-RAY DETCT .375IN

## (undated) DEVICE — MARKER DUAL TIP SKIN W/ RULER

## (undated) DEVICE — SOL NACL IRR 1000ML BTL

## (undated) DEVICE — BAG TISS RETRV MONARCH 10MM

## (undated) DEVICE — SOL WATER STRL IRR 1000ML

## (undated) DEVICE — GAUZE SPONGE 4X4 12PLY

## (undated) DEVICE — DRAPE ARM DAVINCI XI

## (undated) DEVICE — ELECTRODE L HOOK 13IN 33M

## (undated) DEVICE — TRAY ACF SMH

## (undated) DEVICE — PROTECTOR ULNAR NERVE FOAM

## (undated) DEVICE — SUT VICRYL 3-0 27 SH

## (undated) DEVICE — SYR ONLY LUER LOCK 20CC

## (undated) DEVICE — OMNIPAQUE 350MG 150ML VIAL

## (undated) DEVICE — DISSECTOR KITTNER 5MM T 45CM S

## (undated) DEVICE — GLOVE BIOGEL PI MICRO INDIC 8

## (undated) DEVICE — APPLICATOR ARISTA FLEX XL

## (undated) DEVICE — SCISSOR 5MMX35CM DIRECT DRIVE

## (undated) DEVICE — GOWN POLY REINF BRTH SLV XL

## (undated) DEVICE — RELOAD SUREFORM 45 4.3 GRN 6R

## (undated) DEVICE — DRAPE T THYROID STERILE

## (undated) DEVICE — NDL SPINAL 18GX3.5 SPINOCAN

## (undated) DEVICE — GLOVE BIOGEL SKINSENSE PI 7.5

## (undated) DEVICE — TRAY MINOR GEN SURG OMC

## (undated) DEVICE — COVER LIGHT HANDLE

## (undated) DEVICE — CLOSURE SKIN STERI STRIP 1/2X4

## (undated) DEVICE — SET DECANTER MEDICHOICE

## (undated) DEVICE — CANNULA REDUCER 12-8MM

## (undated) DEVICE — SET TRI-LUMEN FILTERED TUBE

## (undated) DEVICE — CATH IV CATHLON W/HUB14GAX

## (undated) DEVICE — TUBING SUC UNIV W/CONN 12FT

## (undated) DEVICE — DRAPE STERI INSTRUMENT 1018

## (undated) DEVICE — SYS SEE SHARP SCOPE ANTIFOG

## (undated) DEVICE — DRAPE OPMI STERILE

## (undated) DEVICE — SUT SILK 0 BLK BR FSL 18 IN

## (undated) DEVICE — COLLAR MIAMI J STOUT

## (undated) DEVICE — DRAPE THREE-QUARTER 53X77IN

## (undated) DEVICE — GOWN SMART IMP BREATHABLE XXLG

## (undated) DEVICE — DRAPE PED LAP II 100X72X124IN

## (undated) DEVICE — SPONGE IV DRAIN 4X4 STERILE

## (undated) DEVICE — BLADE SURG CARBON STEEL SZ11

## (undated) DEVICE — CATH DXTERITY JL35 100CM 5FR

## (undated) DEVICE — HEMOSTAT VASC BAND REG 24CM

## (undated) DEVICE — NDL PNEUMO INSUFFLATI 120MM

## (undated) DEVICE — DRAIN CHAN RND HUBLS 8MM 24FR

## (undated) DEVICE — SUT PROLENE 2-0 SH 36IN BLU

## (undated) DEVICE — TROCAR ENDOPATH XCEL 5X100MM

## (undated) DEVICE — PORT AIRSEAL 12/120MM LPI

## (undated) DEVICE — GLOVE BIOGEL SKINSENSE PI 8.0

## (undated) DEVICE — SYS EXOFIN SKIN CLOSURE 22CM

## (undated) DEVICE — GLOVE SENSICARE PI GRN 8.5